# Patient Record
Sex: FEMALE | Race: WHITE | NOT HISPANIC OR LATINO | Employment: OTHER | URBAN - METROPOLITAN AREA
[De-identification: names, ages, dates, MRNs, and addresses within clinical notes are randomized per-mention and may not be internally consistent; named-entity substitution may affect disease eponyms.]

---

## 2017-01-03 ENCOUNTER — OFFICE VISIT (OUTPATIENT)
Dept: FAMILY MEDICINE | Facility: CLINIC | Age: 67
End: 2017-01-03
Payer: COMMERCIAL

## 2017-01-03 ENCOUNTER — HOSPITAL ENCOUNTER (OUTPATIENT)
Dept: RADIOLOGY | Facility: HOSPITAL | Age: 67
Discharge: HOME | End: 2017-01-03
Attending: FAMILY MEDICINE
Payer: MEDICARE

## 2017-01-03 ENCOUNTER — PATIENT OUTREACH (OUTPATIENT)
Dept: OTHER | Facility: OTHER | Age: 67
End: 2017-01-03
Payer: COMMERCIAL

## 2017-01-03 VITALS
HEIGHT: 62 IN | WEIGHT: 187.63 LBS | HEART RATE: 80 BPM | SYSTOLIC BLOOD PRESSURE: 126 MMHG | TEMPERATURE: 98 F | DIASTOLIC BLOOD PRESSURE: 70 MMHG | BODY MASS INDEX: 34.53 KG/M2

## 2017-01-03 DIAGNOSIS — E78.5 HYPERLIPIDEMIA, UNSPECIFIED HYPERLIPIDEMIA TYPE: ICD-10-CM

## 2017-01-03 DIAGNOSIS — F41.9 ANXIETY: ICD-10-CM

## 2017-01-03 DIAGNOSIS — K21.9 GASTROESOPHAGEAL REFLUX DISEASE WITHOUT ESOPHAGITIS: ICD-10-CM

## 2017-01-03 DIAGNOSIS — Z00.00 PREVENTATIVE HEALTH CARE: ICD-10-CM

## 2017-01-03 DIAGNOSIS — Z78.0 MENOPAUSE: ICD-10-CM

## 2017-01-03 DIAGNOSIS — I10 ESSENTIAL HYPERTENSION: Primary | ICD-10-CM

## 2017-01-03 PROCEDURE — 3074F SYST BP LT 130 MM HG: CPT | Mod: S$GLB,,, | Performed by: FAMILY MEDICINE

## 2017-01-03 PROCEDURE — 99999 PR PBB SHADOW E&M-EST. PATIENT-LVL III: CPT | Mod: PBBFAC,,, | Performed by: FAMILY MEDICINE

## 2017-01-03 PROCEDURE — 90471 IMMUNIZATION ADMIN: CPT | Mod: S$GLB,,, | Performed by: FAMILY MEDICINE

## 2017-01-03 PROCEDURE — 3078F DIAST BP <80 MM HG: CPT | Mod: S$GLB,,, | Performed by: FAMILY MEDICINE

## 2017-01-03 PROCEDURE — 77080 DXA BONE DENSITY AXIAL: CPT | Mod: 26,,, | Performed by: RADIOLOGY

## 2017-01-03 PROCEDURE — 77080 DXA BONE DENSITY AXIAL: CPT | Mod: TC,PO

## 2017-01-03 PROCEDURE — 90670 PCV13 VACCINE IM: CPT | Mod: S$GLB,,, | Performed by: FAMILY MEDICINE

## 2017-01-03 PROCEDURE — 1159F MED LIST DOCD IN RCRD: CPT | Mod: S$GLB,,, | Performed by: FAMILY MEDICINE

## 2017-01-03 PROCEDURE — 99214 OFFICE O/P EST MOD 30 MIN: CPT | Mod: 25,S$GLB,, | Performed by: FAMILY MEDICINE

## 2017-01-03 RX ORDER — CALC/MAG/B COMPLEX/D3/HERB 61
15 TABLET ORAL DAILY
Status: ON HOLD | COMMUNITY
End: 2017-07-13 | Stop reason: HOSPADM

## 2017-01-03 NOTE — MR AVS SNAPSHOT
[facility]  [address]  [facility] [address]  Phone: [phone]  Fax: [phone]                  Lucinda Aguilera   1/3/2017 8:00 AM   Office Visit    Description:  Female : 1950   Provider:  Saurabh Hassan MD   Department:  [facility]           Reason for Visit     Hyperlipidemia     Hypertension           Diagnoses this Visit        Comments    Essential hypertension    -  Primary     Hyperlipidemia, unspecified hyperlipidemia type         Anxiety         Gastroesophageal reflux disease without esophagitis         Preventative health care         Menopause                To Do List           Future Appointments        Provider Department Dept Phone    1/3/2017 9:20 AM [facility] DEXA1 [facility] [phone]    2017 10:00 AM Ritu Griffin MD [facility] - [phone]    2017 8:20 AM [facility] [place] [facility] [phone]    7/10/2017 9:00 AM Saurabh Hassan MD [facility] [phone]      Goals (5 Years of Data)              Today    17    Blood Pressure <= 140/90   126/70  145/73  156/81    Notes - Note created  2016 10:41 AM by Jaqueline Cage    It is important to consistently maintain a controlled blood pressure.      Exercise at least 150 minutes per week.           Maintain a low sodium diet           Take at least one BP reading per week at various times of the day           Weight (lb) < 79.4 kg (175 lb)   85.1 kg (187 lb 9.8 oz)        Notes - Note created  2016  9:36 AM by Jaqueline Cage    Decrease weight by 5% to reduce cardiovascular risk factors        Follow-Up and Disposition     Return in about 6 months (around 7/3/2017).      [facility]     [facility] -  Assistance  Registered nurses in the [facility] provide clinical advisement, health education, appointment booking, and other advisory  services.  Call for this free service at [phone].             Medications           Message regarding Medications     Verify the changes and/or additions to your medication regime listed below are the same as discussed with your clinician today.  If any of these changes or additions are incorrect, please notify your healthcare provider.        STOP taking these medications     omeprazole (PRILOSEC OTC) 20 MG tablet Take 20 mg by mouth. 1 Tablet, Delayed Release (E.C.) Oral Every day    docusate sodium (COLACE) 100 MG capsule Take 1 capsule (100 mg total) by mouth 2 (two) times daily as needed for Constipation.    promethazine (PHENERGAN) 12.5 MG Tab Take 1 tablet (12.5 mg total) by mouth every 6 (six) hours as needed (Nausea).           Verify that the below list of medications is an accurate representation of the medications you are currently taking.  If none reported, the list may be blank. If incorrect, please contact your healthcare provider. Carry this list with you in case of emergency.           Current Medications     alprazolam (XANAX) 0.5 MG tablet TAKE 1 TABLET BY MOUTH TWICE DAILY AS NEEDED    amlodipine (NORVASC) 5 MG tablet Take 1 tablet (5 mg total) by mouth every evening.    aspirin (ECOTRIN) 81 MG EC tablet Take 81 mg by mouth once daily.    carboxymethylcellulose (REFRESH PLUS) 0.5 % Dpet 1 drop 3 (three) times daily as needed.    coenzyme Q10 100 mg capsule Take 100 mg by mouth once daily.    fish oil-omega-3 fatty acids 300-1,000 mg capsule Take 2 g by mouth once daily.    hydrochlorothiazide (HYDRODIURIL) 25 MG tablet Take 1 tablet (25 mg total) by mouth every morning.    irbesartan (AVAPRO) 300 MG tablet Take 1 tablet (300 mg total) by mouth every evening.    lansoprazole (PREVACID) 15 MG capsule Take 15 mg by mouth once daily.    lansoprazole (PREVACID) 15 MG capsule Take 15 mg by mouth once daily.    multivitamin (MULTIVITAMIN) per tablet Take 1 tablet by mouth once daily.       "naproxen sodium (ALEVE) 220 mg Cap Take 220 mg by mouth 2 (two) times daily.    niacin 500 MG CpSR Take 500 mg by mouth every evening.      paroxetine (PAXIL) 10 MG tablet Take 1 tablet (10 mg total) by mouth every morning.    polycarbophil (FIBERCON) 625 mg tablet Take 625 mg by mouth once daily.    pravastatin (PRAVACHOL) 40 MG tablet Take 1 tablet (40 mg total) by mouth once daily.           Clinical Reference Information           Vital Signs - Last Recorded  Most recent update: 1/3/2017  8:17 AM by Angi Thompson MA    BP Pulse Temp Ht Wt LMP    126/70 80 97.6 °F (36.4 °C) (Oral) 5' 2" (1.575 m) 85.1 kg (187 lb 9.8 oz) 04/18/2004    BMI                34.31 kg/m2          Blood Pressure          Most Recent Value    BP  126/70      Allergies as of 1/3/2017     No Known Allergies      Immunizations Administered on Date of Encounter - 1/3/2017     Name Date Dose VIS Date Route    Pneumococcal Conjugate - 13 Valent  Incomplete 0.5 mL 11/5/2015 Intramuscular      Orders Placed During Today's Visit      Normal Orders This Visit    Pneumococcal Conjugate Vaccine (13 Valent) (IM)     Future Labs/Procedures Expected by Expires    DXA Bone Density Spine And Hip_Axial Skeleton  1/3/2017 1/3/2018    CBC auto differential  7/2/2017 3/4/2018    Comprehensive metabolic panel  7/2/2017 3/4/2018    Lipid panel  7/2/2017 1/3/2018      "

## 2017-01-03 NOTE — PROGRESS NOTES
Last 5 Patient Entered Readings                                                               Current 30 Day Average: 146/74     Recent Readings 1/2/2017 1/1/2017 12/31/2016 12/30/2016 12/29/2016    Systolic BP (mmHg) 145 156 140 141 142    Diastolic BP (mmHg) 73 81 66 70 77        Outpatient Hypertension Medications as of 1/3/2017             amlodipine (NORVASC) 5 MG tablet Take 1 tablet (5 mg total) by mouth every evening.    hydrochlorothiazide (HYDRODIURIL) 25 MG tablet Take 1 tablet (25 mg total) by mouth every morning.    irbesartan (AVAPRO) 300 MG tablet Take 1 tablet (300 mg total) by mouth every evening.        Plan:   Called patient to follow up. Per current 30 day average, BP is not well controlled. Patient was seen by PCP today, BP was 126/70mmHg.  Patient was instructed to lose 10lbs.   Patient denies having questions or concerns. Will continue to monitor. WCB in 1 month, sooner if BP begins to trend up or down.   If BP remains elevated, would like to increase amlodipine to 10mg.

## 2017-01-03 NOTE — PROGRESS NOTES
Subjective:       Patient ID: Lucinda Aguilera is a 66 y.o. female.    Chief Complaint: Hyperlipidemia (6 month follow up with labs prior: Pneum 13 today) and Hypertension    HPI Comments: Here today for 6 month f/u on chronic health issues.    HLD - tolerating Zocor 40mg daily  Anxiety - She is taking Paxil 10mg daily and feels fine. Taking Xanax 0.5mg 1 tablet BID.   HTN - tolerating Avapro 300mg daily, Norvasc 5mg and HCTZ 25mg daily with good control.  GERD - tolerating Prilosec 20mg daily    She did have angiogram in 2010.    Past Medical History:    Hypertension                                                  Anxiety                                           Hyperlipemia                                                  GERD (gastroesophageal reflux disease)                        Cataract                                                        Comment:OU    PVD (posterior vitreous detachment)                             Comment:OD    Arthritis                                                       Comment:right thumb    Past Surgical History:    chest abcess                                                     Comment:child    KNEE ARTHROSCOPY W/ LASER                                        Comment:right    COLONOSCOPY                                      1/2012          Comment:repeat in 5 years    No Known Allergies    Social History    Marital Status:              Spouse Name:                       Years of Education:                 Number of children: 2             Occupational History  Occupation          Employer            Comment               retired                                   retired teacher an*                         Social History Main Topics    Smoking Status: Never Smoker                      Smokeless Status: Never Used                        Alcohol Use: Yes                Comment: social    Drug Use: No              Sexual Activity: Yes               Partners with: Male        Birth Control/Protection: None, Post-menopausal    Current Outpatient Prescriptions on File Prior to Visit   Medication Sig Dispense Refill    alprazolam (XANAX) 0.5 MG tablet TAKE 1 TABLET BY MOUTH TWICE DAILY AS NEEDED 60 tablet 5    amlodipine (NORVASC) 5 MG tablet Take 1 tablet (5 mg total) by mouth every evening. 30 tablet 11    aspirin (ECOTRIN) 81 MG EC tablet Take 81 mg by mouth once daily.      carboxymethylcellulose (REFRESH PLUS) 0.5 % Dpet 1 drop 3 (three) times daily as needed.      coenzyme Q10 100 mg capsule Take 100 mg by mouth once daily.      fish oil-omega-3 fatty acids 300-1,000 mg capsule Take 2 g by mouth once daily.      hydrochlorothiazide (HYDRODIURIL) 25 MG tablet Take 1 tablet (25 mg total) by mouth every morning. 90 tablet 3    irbesartan (AVAPRO) 300 MG tablet Take 1 tablet (300 mg total) by mouth every evening. 90 tablet 3    lansoprazole (PREVACID) 15 MG capsule Take 15 mg by mouth once daily.      multivitamin (MULTIVITAMIN) per tablet Take 1 tablet by mouth once daily.        naproxen sodium (ALEVE) 220 mg Cap Take 220 mg by mouth 2 (two) times daily.      niacin 500 MG CpSR Take 500 mg by mouth every evening.        paroxetine (PAXIL) 10 MG tablet Take 1 tablet (10 mg total) by mouth every morning. 90 tablet 3    polycarbophil (FIBERCON) 625 mg tablet Take 625 mg by mouth once daily.      pravastatin (PRAVACHOL) 40 MG tablet Take 1 tablet (40 mg total) by mouth once daily. 90 tablet 3    [DISCONTINUED] docusate sodium (COLACE) 100 MG capsule Take 1 capsule (100 mg total) by mouth 2 (two) times daily as needed for Constipation. 60 capsule 1    [DISCONTINUED] omeprazole (PRILOSEC OTC) 20 MG tablet Take 20 mg by mouth. 1 Tablet, Delayed Release (E.C.) Oral Every day      [DISCONTINUED] promethazine (PHENERGAN) 12.5 MG Tab Take 1 tablet (12.5 mg total) by mouth every 6 (six) hours as needed (Nausea). 60 tablet 0     No current facility-administered medications on file  "prior to visit.      Review of patient's family history indicates:    Hypertension                   Mother                    Heart disease                  Mother                    Stroke                         Father                    Review of Systems   Constitutional: Negative for fever, chills, appetite change and unexpected weight change.   HENT: Negative for sore throat and trouble swallowing.    Eyes: Negative for pain and visual disturbance.   Respiratory: Negative for cough, shortness of breath and wheezing.    Cardiovascular: Negative for chest pain and palpitations.   Gastrointestinal: Negative for nausea, vomiting, abdominal pain, diarrhea and blood in stool.   Genitourinary: Negative for dysuria, hematuria and difficulty urinating.   Musculoskeletal: Negative for arthralgias, gait problem and neck pain.   Skin: Negative for rash and wound.   Neurological: Negative for dizziness, weakness, numbness and headaches.   Hematological: Negative for adenopathy.   Psychiatric/Behavioral: Negative for dysphoric mood.         Objective:       Visit Vitals    /70    Pulse 80    Temp 97.6 °F (36.4 °C) (Oral)    Ht 5' 2" (1.575 m)    Wt 85.1 kg (187 lb 9.8 oz)    LMP 04/18/2004    BMI 34.31 kg/m2       Physical Exam   Constitutional: She is oriented to person, place, and time. She appears well-developed and well-nourished.   HENT:   Head: Normocephalic.   Mouth/Throat: Oropharynx is clear and moist. No oropharyngeal exudate or posterior oropharyngeal erythema.   Eyes: Conjunctivae and EOM are normal. Pupils are equal, round, and reactive to light.   Neck: Normal range of motion. Neck supple. No thyromegaly present.   Cardiovascular: Normal rate, regular rhythm, S1 normal, S2 normal, normal heart sounds and intact distal pulses.  Exam reveals no gallop and no friction rub.    No murmur heard.  Pulmonary/Chest: Effort normal and breath sounds normal. She has no wheezes. She has no rales.   Abdominal: " Normal appearance.   Musculoskeletal:        Right lower leg: She exhibits no edema.        Left lower leg: She exhibits no edema.   Lymphadenopathy:     She has no cervical adenopathy.   Neurological: She is alert and oriented to person, place, and time. No cranial nerve deficit. Gait normal.   Skin: Skin is warm and intact. No rash noted.   Psychiatric: She has a normal mood and affect.       Results for orders placed or performed in visit on 07/26/16   Comprehensive metabolic panel   Result Value Ref Range    Sodium 132 (L) 136 - 145 mmol/L    Potassium 4.6 3.5 - 5.1 mmol/L    Chloride 100 95 - 110 mmol/L    CO2 25 23 - 29 mmol/L    Glucose 98 70 - 110 mg/dL    BUN, Bld 18 8 - 23 mg/dL    Creatinine 0.7 0.5 - 1.4 mg/dL    Calcium 9.2 8.7 - 10.5 mg/dL    Total Protein 7.2 6.0 - 8.4 g/dL    Albumin 4.0 3.5 - 5.2 g/dL    Total Bilirubin 0.3 0.1 - 1.0 mg/dL    Alkaline Phosphatase 123 55 - 135 U/L    AST 24 10 - 40 U/L    ALT 16 10 - 44 U/L    Anion Gap 7 (L) 8 - 16 mmol/L    eGFR if African American >60.0 >60 mL/min/1.73 m^2    eGFR if non African American >60.0 >60 mL/min/1.73 m^2   Lipid panel   Result Value Ref Range    Cholesterol 184 120 - 199 mg/dL    Triglycerides 101 30 - 150 mg/dL    HDL 56 40 - 75 mg/dL    LDL Cholesterol 107.8 63.0 - 159.0 mg/dL    HDL/Chol Ratio 30.4 20.0 - 50.0 %    Total Cholesterol/HDL Ratio 3.3 2.0 - 5.0    Non-HDL Cholesterol 128 mg/dL   Hepatitis C antibody   Result Value Ref Range    Hepatitis C Ab Negative      Assessment:       1. Essential hypertension    2. Hyperlipidemia, unspecified hyperlipidemia type    3. Anxiety    4. Gastroesophageal reflux disease without esophagitis    5. Preventative health care    6. Menopause        Plan:       Essential hypertension  -     CBC auto differential; Future; Expected date: 7/2/17    Hyperlipidemia, unspecified hyperlipidemia type  -     Comprehensive metabolic panel; Future; Expected date: 7/2/17  -     Lipid panel; Future; Expected  date: 7/2/17    Anxiety    Gastroesophageal reflux disease without esophagitis    Preventative health care  -     Pneumococcal Conjugate Vaccine (13 Valent) (IM)    Menopause  -     DXA Bone Density Spine And Hip_Axial Skeleton; Future; Expected date: 1/3/17            cotninue aspirin 81mg daily  Continue present meds  Counseled on regular exercise, maintenance of a healthy weight, balanced diet rich in fruits/vegetables and lean protein, and avoidance of unhealthy habits like smoking and excessive alcohol intake.  F/u 6 months with labs

## 2017-01-12 ENCOUNTER — OFFICE VISIT (OUTPATIENT)
Dept: OBSTETRICS AND GYNECOLOGY | Facility: CLINIC | Age: 67
End: 2017-01-12
Payer: MEDICARE

## 2017-01-12 VITALS
SYSTOLIC BLOOD PRESSURE: 134 MMHG | WEIGHT: 187.38 LBS | DIASTOLIC BLOOD PRESSURE: 86 MMHG | BODY MASS INDEX: 34.27 KG/M2

## 2017-01-12 DIAGNOSIS — D25.1 INTRAMURAL LEIOMYOMA OF UTERUS: ICD-10-CM

## 2017-01-12 DIAGNOSIS — Z01.419 ENCOUNTER FOR GYNECOLOGICAL EXAMINATION WITHOUT ABNORMAL FINDING: Primary | ICD-10-CM

## 2017-01-12 DIAGNOSIS — Z12.4 CERVICAL CANCER SCREENING: ICD-10-CM

## 2017-01-12 DIAGNOSIS — Z11.51 SCREENING FOR HPV (HUMAN PAPILLOMAVIRUS): ICD-10-CM

## 2017-01-12 PROCEDURE — 99213 OFFICE O/P EST LOW 20 MIN: CPT | Mod: PBBFAC,PO | Performed by: OBSTETRICS & GYNECOLOGY

## 2017-01-12 PROCEDURE — G0101 CA SCREEN;PELVIC/BREAST EXAM: HCPCS | Mod: S$PBB,,, | Performed by: OBSTETRICS & GYNECOLOGY

## 2017-01-12 PROCEDURE — G0101 CA SCREEN;PELVIC/BREAST EXAM: HCPCS | Mod: PBBFAC,PO | Performed by: OBSTETRICS & GYNECOLOGY

## 2017-01-12 PROCEDURE — 88175 CYTOPATH C/V AUTO FLUID REDO: CPT

## 2017-01-12 PROCEDURE — 87624 HPV HI-RISK TYP POOLED RSLT: CPT

## 2017-01-12 PROCEDURE — 99999 PR PBB SHADOW E&M-EST. PATIENT-LVL III: CPT | Mod: PBBFAC,,, | Performed by: OBSTETRICS & GYNECOLOGY

## 2017-01-12 NOTE — MR AVS SNAPSHOT
[facility] - OB/GYN  [address] [facility] [address]  Phone: [phone]                  Lucinda DRAKE Ale   2017 10:00 AM   Office Visit    Description:  Female : 1950   Provider:  Ritu Griffin MD   Department:  [facility] - OB/GYN           Reason for Visit     Well Woman     Fibroids           Diagnoses this Visit        Comments    Cervical cancer screening    -  Primary     Screening for HPV (human papillomavirus)                To Do List           Future Appointments        Provider Department Dept Phone    2017 10:00 AM Ritu Griffin MD [facility] [phone]    2017 8:30 AM ZACHARY Rodrigues [facility] [place] - Optometry [phone]    2017 8:20 AM [facility], [place] [facility] [phone]    7/10/2017 9:00 AM Saurabh Hassan MD [facility] [phone]      Goals (5 Years of Data)              Today    1/11/17    1/10/17    Blood Pressure <= 140/90   134/86  147/74  166/84    Notes - Note created  2016 10:41 AM by Jaqueline Cage    It is important to consistently maintain a controlled blood pressure.      Exercise at least 150 minutes per week.           Maintain a low sodium diet           Take at least one BP reading per week at various times of the day           Weight (lb) < 79.4 kg (175 lb)   85 kg (187 lb 6.3 oz)        Notes - Note created  2016  9:36 AM by Jaqueline Cage    Decrease weight by 5% to reduce cardiovascular risk factors        [facility]     [facility] -  Assistance  Registered nurses in the [facility] provide clinical advisement, health education, appointment booking, and other advisory services.  Call for this free service at [phone].             Medications           Message regarding Medications     Verify the changes and/or additions to your medication regime listed below are the same as discussed with your  clinician today.  If any of these changes or additions are incorrect, please notify your healthcare provider.             Verify that the below list of medications is an accurate representation of the medications you are currently taking.  If none reported, the list may be blank. If incorrect, please contact your healthcare provider. Carry this list with you in case of emergency.           Current Medications     alprazolam (XANAX) 0.5 MG tablet TAKE 1 TABLET BY MOUTH TWICE DAILY AS NEEDED    amlodipine (NORVASC) 5 MG tablet Take 1 tablet (5 mg total) by mouth every evening.    aspirin (ECOTRIN) 81 MG EC tablet Take 81 mg by mouth once daily.    carboxymethylcellulose (REFRESH PLUS) 0.5 % Dpet 1 drop 3 (three) times daily as needed.    coenzyme Q10 100 mg capsule Take 100 mg by mouth once daily.    fish oil-omega-3 fatty acids 300-1,000 mg capsule Take 2 g by mouth once daily.    hydrochlorothiazide (HYDRODIURIL) 25 MG tablet Take 1 tablet (25 mg total) by mouth every morning.    irbesartan (AVAPRO) 300 MG tablet Take 1 tablet (300 mg total) by mouth every evening.    lansoprazole (PREVACID) 15 MG capsule Take 15 mg by mouth once daily.    lansoprazole (PREVACID) 15 MG capsule Take 15 mg by mouth once daily.    niacin 500 MG CpSR Take 500 mg by mouth every evening.      paroxetine (PAXIL) 10 MG tablet Take 1 tablet (10 mg total) by mouth every morning.    polycarbophil (FIBERCON) 625 mg tablet Take 625 mg by mouth once daily.    pravastatin (PRAVACHOL) 40 MG tablet Take 1 tablet (40 mg total) by mouth once daily.    multivitamin (MULTIVITAMIN) per tablet Take 1 tablet by mouth once daily.      naproxen sodium (ALEVE) 220 mg Cap Take 220 mg by mouth 2 (two) times daily.           Clinical Reference Information           Vital Signs - Last Recorded  Most recent update: 1/12/2017  9:36 AM by Mayra Brown LPN    BP Wt LMP BMI       134/86 85 kg (187 lb 6.3 oz) 04/18/2004 34.27 kg/m2       Blood Pressure           Most Recent Value    BP  134/86      Allergies as of 1/12/2017     No Known Allergies      Immunizations Administered on Date of Encounter - 1/12/2017     None      Orders Placed During Today's Visit      Normal Orders This Visit    HPV DNA probe, amplified     Liquid-based pap smear, screening

## 2017-01-12 NOTE — PROGRESS NOTES
Chief Complaint   Patient presents with    Well Woman     Mammogram complete    Fibroids       History of Present Illness: Lucinda Aguilera is a 66 y.o. female that presents today 1/12/2017 for well gyn visit.    Past Medical History   Diagnosis Date    Anxiety      takes daily Xanax    Arthritis      right thumb    Cataract      OU    GERD (gastroesophageal reflux disease)     Hyperlipemia     Hypertension     PVD (posterior vitreous detachment)      OD       Past Surgical History   Procedure Laterality Date    Chest abcess       child    Knee arthroscopy w/ laser       right    Colonoscopy  1/2012     repeat in 5 years    Thumb surgery  11/2014    Knee surgery Right 06/03/2016     Total knee replacement       Current Outpatient Prescriptions   Medication Sig Dispense Refill    alprazolam (XANAX) 0.5 MG tablet TAKE 1 TABLET BY MOUTH TWICE DAILY AS NEEDED 60 tablet 5    amlodipine (NORVASC) 5 MG tablet Take 1 tablet (5 mg total) by mouth every evening. 30 tablet 11    aspirin (ECOTRIN) 81 MG EC tablet Take 81 mg by mouth once daily.      carboxymethylcellulose (REFRESH PLUS) 0.5 % Dpet 1 drop 3 (three) times daily as needed.      coenzyme Q10 100 mg capsule Take 100 mg by mouth once daily.      fish oil-omega-3 fatty acids 300-1,000 mg capsule Take 2 g by mouth once daily.      hydrochlorothiazide (HYDRODIURIL) 25 MG tablet Take 1 tablet (25 mg total) by mouth every morning. 90 tablet 3    irbesartan (AVAPRO) 300 MG tablet Take 1 tablet (300 mg total) by mouth every evening. 90 tablet 3    lansoprazole (PREVACID) 15 MG capsule Take 15 mg by mouth once daily.      lansoprazole (PREVACID) 15 MG capsule Take 15 mg by mouth once daily.      niacin 500 MG CpSR Take 500 mg by mouth every evening.        paroxetine (PAXIL) 10 MG tablet Take 1 tablet (10 mg total) by mouth every morning. 90 tablet 3    polycarbophil (FIBERCON) 625 mg tablet Take 625 mg by mouth once daily.      pravastatin  (PRAVACHOL) 40 MG tablet Take 1 tablet (40 mg total) by mouth once daily. 90 tablet 3    multivitamin (MULTIVITAMIN) per tablet Take 1 tablet by mouth once daily.        naproxen sodium (ALEVE) 220 mg Cap Take 220 mg by mouth 2 (two) times daily.       No current facility-administered medications for this visit.        Review of patient's allergies indicates:  No Known Allergies    Family History   Problem Relation Age of Onset    Hypertension Mother     Heart disease Mother     Stroke Father     Glaucoma Sister     Cataracts Sister     Breast cancer Neg Hx        Social History     Social History    Marital status:      Spouse name: N/A    Number of children: 2    Years of education: N/A     Occupational History    retired     retired teacher and principal      Social History Main Topics    Smoking status: Never Smoker    Smokeless tobacco: Never Used    Alcohol use Yes      Comment: social    Drug use: No    Sexual activity: Yes     Partners: Male     Birth control/ protection: None, Post-menopausal     Other Topics Concern    None     Social History Narrative       OB History    Para Term  AB SAB TAB Ectopic Multiple Living   3 2 2  1 1    2      # Outcome Date GA Lbr Gagan/2nd Weight Sex Delivery Anes PTL Lv   3 Term    3.175 kg (7 lb) F Vag-Spont EPI N Y   2 SAB            1 Term    2.268 kg (5 lb) F Vag-Spont EPI N Y          Review of Symptoms:  GENERAL: Denies weight gain or weight loss. Feeling well overall.   SKIN: Denies rash or lesions.   HEAD: Denies head injury or headache.   NODES: Denies enlarged lymph nodes.   CHEST: Denies chest pain or shortness of breath.   CARDIOVASCULAR: Denies palpitations or left sided chest pain.   ABDOMEN: No abdominal pain, constipation, diarrhea, nausea, vomiting or rectal bleeding.   URINARY: No frequency, dysuria, hematuria, or burning on urination.  HEMATOLOGIC: No easy bruisability or excessive bleeding.    MUSCULOSKELETAL: Denies joint pain or swelling.     Visit Vitals    /86    Wt 85 kg (187 lb 6.3 oz)    LMP 04/18/2004     Physical Exam:  APPEARANCE: Well nourished, well developed, in no acute distress.  SKIN: Normal skin turgor, no lesions.  NECK: Neck symmetric without masses   RESPIRATORY: Normal respiratory effort with no retractions or use of accessory muscles  CARDIOVASCULAR: Peripheral vascular system with no swelling no varicosities and palpation of pulses normal  LYMPHATIC: No enlargements of the lymph nodes noted in the neck, axillae, or groin  ABDOMEN: Soft. No tenderness or masses. No hepatosplenomegaly. No hernias.  BREASTS: Symmetrical, no skin changes or visible lesions. No palpable masses, nipple discharge or adenopathy bilaterally.  PELVIC: Normal external female genitalia without lesions. Normal hair distribution. Adequate perineal body, normal urethral meatus. Urethra with no masses.  Bladder nontender. Vagina moist and well rugated without lesions or discharge. Cervix pink and without lesions. No significant cystocele or rectocele. Bimanual exam showed uterus normal size, shape, position, mobile and nontender. Adnexa without masses or tenderness. Urethra and bladder normal. PAP DONE  EXTREMITIES: No clubbing cyanosis or edema.    ASSESSMENT/PLAN:  Encounter for gynecological examination without abnormal finding    Cervical cancer screening  -     Liquid-based pap smear, screening    Screening for HPV (human papillomavirus)  -     HPV DNA probe, amplified    Intramural leiomyoma of uterus          Patient was counseled today on Pap guidelines, recommendation for pelvic exams, mammograms every other year after the age of 40 and annually after the age of 50, Colonoscopy after the age of 50, Dexa Bone Scan and calcium and vitamin D supplementation in menopause and to see her PCP for other health maintenance.   FOLLOW-UP:prn

## 2017-01-13 ENCOUNTER — PATIENT MESSAGE (OUTPATIENT)
Dept: OTHER | Facility: OTHER | Age: 67
End: 2017-01-13

## 2017-01-19 ENCOUNTER — PATIENT OUTREACH (OUTPATIENT)
Dept: OTHER | Facility: OTHER | Age: 67
End: 2017-01-19
Payer: COMMERCIAL

## 2017-01-19 NOTE — PROGRESS NOTES
Last 5 Patient Entered Readings                                                               Current 30 Day Average: 149/74     Recent Readings 1/18/2017 1/17/2017 1/16/2017 1/15/2017 1/14/2017    Systolic BP (mmHg) 138 149 142 145 148    Diastolic BP (mmHg) 67 73 78 72 77    Pulse 82 93 82 91 99        Follow up with Mrs. Lucinda Aguilera completed. Ms. Aguilera is doing well. Patient is maintaining a low sodium diet and doing some walking. Patient did not have any further questions or concerns. I will follow up in a few weeks to see how she is doing and progressing.

## 2017-01-20 LAB — HUMAN PAPILLOMAVIRUS (HPV): NOT DETECTED

## 2017-01-29 ENCOUNTER — PATIENT MESSAGE (OUTPATIENT)
Dept: OTHER | Facility: OTHER | Age: 67
End: 2017-01-29

## 2017-01-31 ENCOUNTER — PATIENT OUTREACH (OUTPATIENT)
Dept: OTHER | Facility: OTHER | Age: 67
End: 2017-01-31
Payer: COMMERCIAL

## 2017-01-31 DIAGNOSIS — I10 ESSENTIAL HYPERTENSION: Primary | ICD-10-CM

## 2017-01-31 RX ORDER — CHLORTHALIDONE 25 MG/1
25 TABLET ORAL EVERY MORNING
Qty: 30 TABLET | Refills: 11 | Status: SHIPPED | OUTPATIENT
Start: 2017-01-31 | End: 2018-01-31 | Stop reason: SDUPTHER

## 2017-01-31 NOTE — PROGRESS NOTES
Last 5 Patient Entered Readings                                                               Current 30 Day Average: 148/74     Recent Readings 1/30/2017 1/29/2017 1/29/2017 1/28/2017 1/27/2017    Systolic BP (mmHg) 141 176 178 139 145    Diastolic BP (mmHg) 73 86 82 76 76    Pulse 69 76 89 81 82        Outpatient Hypertension Medications as of 1/31/2017             amlodipine (NORVASC) 5 MG tablet Take 1 tablet (5 mg total) by mouth every evening.    hydrochlorothiazide (HYDRODIURIL) 25 MG tablet Take 1 tablet (25 mg total) by mouth every morning.    irbesartan (AVAPRO) 300 MG tablet Take 1 tablet (300 mg total) by mouth every evening.        Plan:   Called patient to follow up. Per current 30 day average, BP is not well controlled. Will change HCTZ to chlorthalidone, patient confirms understanding.  Will schedule CMP.    Patient denies having questions or concerns. Will continue to monitor. WCB in 2 weeks.  If BP remains uncontrolled, will increase amlodipine to 10mg.

## 2017-02-01 ENCOUNTER — PATIENT MESSAGE (OUTPATIENT)
Dept: OTHER | Facility: OTHER | Age: 67
End: 2017-02-01

## 2017-02-02 ENCOUNTER — PATIENT MESSAGE (OUTPATIENT)
Dept: OTHER | Facility: OTHER | Age: 67
End: 2017-02-02

## 2017-02-08 ENCOUNTER — PATIENT OUTREACH (OUTPATIENT)
Dept: OTHER | Facility: OTHER | Age: 67
End: 2017-02-08
Payer: COMMERCIAL

## 2017-02-08 NOTE — PROGRESS NOTES
Last 5 Patient Entered Readings                                                               Current 30 Day Average: 148/75     Recent Readings 2/7/2017 2/6/2017 2/5/2017 2/4/2017 2/3/2017    Systolic BP (mmHg) 141 144 157 156 161    Diastolic BP (mmHg) 77 75 73 72 81    Pulse 85 88 82 79 76        Follow up with Mrs. Lucinda Aguilera completed. Mrs. Aguilera is doing well. She is finally starting to see a difference in her blood pressure after her last medication change. She is trying to lose 10 pounds to help lower her blood pressure. Patient is maintaining a low sodium diet, eliminated sweets, and drinking plenty of water. 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 She also has a bike petal that she uses at home when she is on the couch. Patient did not have any further questions or concerns. I will follow up in a few weeks to see how she is doing and progressing.

## 2017-02-09 ENCOUNTER — PATIENT MESSAGE (OUTPATIENT)
Dept: OTHER | Facility: OTHER | Age: 67
End: 2017-02-09

## 2017-02-10 ENCOUNTER — LAB VISIT (OUTPATIENT)
Dept: LAB | Facility: HOSPITAL | Age: 67
End: 2017-02-10
Attending: FAMILY MEDICINE
Payer: COMMERCIAL

## 2017-02-10 ENCOUNTER — PATIENT OUTREACH (OUTPATIENT)
Dept: OTHER | Facility: OTHER | Age: 67
End: 2017-02-10
Payer: COMMERCIAL

## 2017-02-10 DIAGNOSIS — I10 ESSENTIAL HYPERTENSION: ICD-10-CM

## 2017-02-10 LAB
ALBUMIN SERPL BCP-MCNC: 4 G/DL
ALP SERPL-CCNC: 111 U/L
ALT SERPL W/O P-5'-P-CCNC: 24 U/L
ANION GAP SERPL CALC-SCNC: 7 MMOL/L
AST SERPL-CCNC: 35 U/L
BILIRUB SERPL-MCNC: 0.3 MG/DL
BUN SERPL-MCNC: 24 MG/DL
CALCIUM SERPL-MCNC: 9.3 MG/DL
CHLORIDE SERPL-SCNC: 99 MMOL/L
CO2 SERPL-SCNC: 28 MMOL/L
CREAT SERPL-MCNC: 0.8 MG/DL
EST. GFR  (AFRICAN AMERICAN): >60 ML/MIN/1.73 M^2
EST. GFR  (NON AFRICAN AMERICAN): >60 ML/MIN/1.73 M^2
GLUCOSE SERPL-MCNC: 111 MG/DL
POTASSIUM SERPL-SCNC: 3.6 MMOL/L
PROT SERPL-MCNC: 7.7 G/DL
SODIUM SERPL-SCNC: 134 MMOL/L

## 2017-02-10 PROCEDURE — 36415 COLL VENOUS BLD VENIPUNCTURE: CPT | Mod: PO

## 2017-02-10 PROCEDURE — 80053 COMPREHEN METABOLIC PANEL: CPT

## 2017-02-10 NOTE — PROGRESS NOTES
Last 5 Patient Entered Readings                                                               Current 30 Day Average: 147/75     Recent Readings 2/9/2017 2/8/2017 2/7/2017 2/6/2017 2/5/2017    Systolic BP (mmHg) 154 148 141 144 157    Diastolic BP (mmHg) 66 79 77 75 73    Pulse 73 81 85 88 82        Hypertension Medications             amlodipine (NORVASC) 5 MG tablet Take 1 tablet (5 mg total) by mouth every evening.    chlorthalidone (HYGROTEN) 25 MG Tab Take 1 tablet (25 mg total) by mouth every morning. Stop HCTZ.    irbesartan (AVAPRO) 300 MG tablet Take 1 tablet (300 mg total) by mouth every evening.        Plan:   Called patient to follow up per HC's request. Patient states the past 2 nights her ankles have been swollen. Encouraged patient to elevate her legs when possible.  Instructed patient to call or write me if swelling remains or worsens over the next several days, patient confirms understanding.    Patient confirms she is currently taking #2 naproxen 220mg tablets qam for knee and hip pain/ arthritis.  Patient believes she could decrease to 1 tablet. Explained that daily use of NSAIDs can prevent the efficacy of chlorthalidone.  Daily naproxen use is likely the reason we are not seeing the desired effect from chlorthalidone.     Per current 30 day average, BP is not well controlled. Will continue to monitor. WCB in 1 week.    If swelling remains, will decrease or discontinue amlodipine and start coreg.

## 2017-02-13 ENCOUNTER — PATIENT MESSAGE (OUTPATIENT)
Dept: OTHER | Facility: OTHER | Age: 67
End: 2017-02-13

## 2017-02-17 ENCOUNTER — PATIENT MESSAGE (OUTPATIENT)
Dept: FAMILY MEDICINE | Facility: CLINIC | Age: 67
End: 2017-02-17

## 2017-02-17 ENCOUNTER — PATIENT OUTREACH (OUTPATIENT)
Dept: OTHER | Facility: OTHER | Age: 67
End: 2017-02-17
Payer: COMMERCIAL

## 2017-02-17 NOTE — PROGRESS NOTES
Last 5 Patient Entered Readings                                                               Current 30 Day Average: 147/74     Recent Readings 2/16/2017 2/15/2017 2/14/2017 2/13/2017 2/12/2017    Systolic BP (mmHg) 160 155 158 140 128    Diastolic BP (mmHg) 74 73 79 78 67    Pulse 84 88 85 80 78        Hypertension Medications             amlodipine (NORVASC) 5 MG tablet Take 1 tablet (5 mg total) by mouth every evening.    chlorthalidone (HYGROTEN) 25 MG Tab Take 1 tablet (25 mg total) by mouth every morning. Stop HCTZ.    irbesartan (AVAPRO) 300 MG tablet Take 1 tablet (300 mg total) by mouth every evening.        Plan:   Called patient to follow up. Per current 30 day average, BP is uncontrolled. Patient believes cuff maybe giving falsely elevated readings. Recommended patient make an appt to have her BP checked manually so we can compare against digital readings. Patient also has another cuff at home, states always gives lower readings than digital cuff.  Will email instructions on how to manually enter.   Patient denies having questions or concerns. Will continue to monitor. WCB in 2 weeks.

## 2017-02-20 ENCOUNTER — PATIENT MESSAGE (OUTPATIENT)
Dept: OTHER | Facility: OTHER | Age: 67
End: 2017-02-20

## 2017-02-21 ENCOUNTER — PATIENT MESSAGE (OUTPATIENT)
Dept: FAMILY MEDICINE | Facility: CLINIC | Age: 67
End: 2017-02-21

## 2017-02-21 NOTE — TELEPHONE ENCOUNTER
I spoke with Ms. Aguilera and explained what happened yesterday about us not coming to get her BP after she checked in. She was very understanding. I offered her another appointment and she asked if her daughter, who is a nurse, could take her BP. I told her that would be great and she can send the readings to us in a e-mail. We would send the results to Dr. Hassan for review.

## 2017-02-24 DIAGNOSIS — I10 ESSENTIAL HYPERTENSION: ICD-10-CM

## 2017-02-24 RX ORDER — PROMETHAZINE HYDROCHLORIDE 12.5 MG/1
TABLET ORAL
Qty: 60 TABLET | Refills: 0 | OUTPATIENT
Start: 2017-02-24

## 2017-02-24 RX ORDER — CIPROFLOXACIN 500 MG/1
TABLET ORAL
Qty: 10 TABLET | Refills: 0 | Status: SHIPPED | OUTPATIENT
Start: 2017-02-24 | End: 2017-04-25 | Stop reason: ALTCHOICE

## 2017-02-24 RX ORDER — IRBESARTAN 150 MG/1
TABLET ORAL
Qty: 90 TABLET | Refills: 3 | Status: SHIPPED | OUTPATIENT
Start: 2017-02-24 | End: 2017-03-03 | Stop reason: DRUGHIGH

## 2017-03-02 ENCOUNTER — PATIENT OUTREACH (OUTPATIENT)
Dept: OTHER | Facility: OTHER | Age: 67
End: 2017-03-02
Payer: COMMERCIAL

## 2017-03-02 NOTE — PROGRESS NOTES
Last 5 Patient Entered Readings                                                               Current 30 Day Average: 146/74     Recent Readings 3/1/2017 2/28/2017 2/27/2017 2/25/2017 2/24/2017    Systolic BP (mmHg) 140 141 140 126 135    Diastolic BP (mmHg) 71 80 83 68 77    Pulse 81 86 86 94 76        Follow up with Mrs. Lucinda Aguilera completed. Patient is maintaining a low sodium diet and continuing her exercise regimen. She is in [place] this week. Patient did not have any further questions or concerns. I will follow up in a few weeks to see how she is doing and progressing.

## 2017-03-03 ENCOUNTER — PATIENT OUTREACH (OUTPATIENT)
Dept: OTHER | Facility: OTHER | Age: 67
End: 2017-03-03
Payer: COMMERCIAL

## 2017-03-03 DIAGNOSIS — I10 ESSENTIAL HYPERTENSION: ICD-10-CM

## 2017-03-03 RX ORDER — AMLODIPINE BESYLATE 10 MG/1
10 TABLET ORAL NIGHTLY
Qty: 90 TABLET | Refills: 3 | Status: SHIPPED | OUTPATIENT
Start: 2017-03-03 | End: 2017-04-20

## 2017-03-03 RX ORDER — IRBESARTAN 300 MG/1
300 TABLET ORAL NIGHTLY
Qty: 90 TABLET | Refills: 3 | Status: SHIPPED | OUTPATIENT
Start: 2017-03-03 | End: 2018-06-05 | Stop reason: SDUPTHER

## 2017-03-03 NOTE — PROGRESS NOTES
Last 5 Patient Entered Readings                                                               Current 30 Day Average: 146/74     Recent Readings 3/2/2017 3/1/2017 2/28/2017 2/27/2017 2/25/2017    Systolic BP (mmHg) 155 140 141 140 126    Diastolic BP (mmHg) 73 71 80 83 68    Pulse 75 81 86 86 94        Hypertension Medications             amlodipine (NORVASC) 5 MG tablet Take 1 tablet (5 mg total) by mouth every evening.    chlorthalidone (HYGROTEN) 25 MG Tab Take 1 tablet (25 mg total) by mouth every morning. Stop HCTZ.    irbesartan (AVAPRO) 300 MG tablet Take 1 tablet (300 mg total) by mouth every evening.        Plan:   Called patient to follow up. Per current 30 day average, BP is not well controlled. Instructed patient to increase amlodipine to 10mg, patient confirms understanding. Patient denies having questions or concerns. Will continue to monitor. WCB in 2 weeks.

## 2017-03-04 DIAGNOSIS — F41.9 ANXIETY: ICD-10-CM

## 2017-03-04 RX ORDER — ALPRAZOLAM 0.5 MG/1
TABLET ORAL
Qty: 60 TABLET | Refills: 5 | Status: SHIPPED | OUTPATIENT
Start: 2017-03-04 | End: 2017-09-14 | Stop reason: SDUPTHER

## 2017-03-06 ENCOUNTER — PATIENT MESSAGE (OUTPATIENT)
Dept: ORTHOPEDICS | Facility: CLINIC | Age: 67
End: 2017-03-06

## 2017-03-08 ENCOUNTER — PATIENT MESSAGE (OUTPATIENT)
Dept: ORTHOPEDICS | Facility: CLINIC | Age: 67
End: 2017-03-08

## 2017-03-10 DIAGNOSIS — F41.9 ANXIETY: ICD-10-CM

## 2017-03-10 RX ORDER — PAROXETINE 10 MG/1
TABLET, FILM COATED ORAL
Qty: 90 TABLET | Refills: 3 | Status: SHIPPED | OUTPATIENT
Start: 2017-03-10 | End: 2017-05-23 | Stop reason: SDUPTHER

## 2017-03-15 ENCOUNTER — OFFICE VISIT (OUTPATIENT)
Dept: ORTHOPEDICS | Facility: CLINIC | Age: 67
End: 2017-03-15
Payer: MEDICARE

## 2017-03-15 ENCOUNTER — HOSPITAL ENCOUNTER (OUTPATIENT)
Dept: RADIOLOGY | Facility: HOSPITAL | Age: 67
Discharge: HOME | End: 2017-03-15
Attending: NURSE PRACTITIONER
Payer: MEDICARE

## 2017-03-15 VITALS — BODY MASS INDEX: 34.1 KG/M2 | WEIGHT: 185.31 LBS | HEIGHT: 62 IN

## 2017-03-15 DIAGNOSIS — M25.551 BILATERAL HIP PAIN: ICD-10-CM

## 2017-03-15 DIAGNOSIS — M25.552 BILATERAL HIP PAIN: ICD-10-CM

## 2017-03-15 DIAGNOSIS — M25.562 LEFT KNEE PAIN, UNSPECIFIED CHRONICITY: Primary | ICD-10-CM

## 2017-03-15 DIAGNOSIS — M25.562 LEFT KNEE PAIN, UNSPECIFIED CHRONICITY: ICD-10-CM

## 2017-03-15 PROCEDURE — 73521 X-RAY EXAM HIPS BI 2 VIEWS: CPT | Mod: 26,,, | Performed by: RADIOLOGY

## 2017-03-15 PROCEDURE — 73521 X-RAY EXAM HIPS BI 2 VIEWS: CPT | Mod: TC

## 2017-03-15 PROCEDURE — 99213 OFFICE O/P EST LOW 20 MIN: CPT | Mod: S$PBB,,, | Performed by: NURSE PRACTITIONER

## 2017-03-15 PROCEDURE — 73562 X-RAY EXAM OF KNEE 3: CPT | Mod: 26,LT,, | Performed by: RADIOLOGY

## 2017-03-15 PROCEDURE — 73560 X-RAY EXAM OF KNEE 1 OR 2: CPT | Mod: TC,59,RT

## 2017-03-15 PROCEDURE — 99999 PR PBB SHADOW E&M-EST. PATIENT-LVL IV: CPT | Mod: PBBFAC,,, | Performed by: NURSE PRACTITIONER

## 2017-03-15 PROCEDURE — 73560 X-RAY EXAM OF KNEE 1 OR 2: CPT | Mod: 26,59,RT, | Performed by: RADIOLOGY

## 2017-03-15 RX ORDER — METHYLPREDNISOLONE 4 MG/1
TABLET ORAL
Qty: 1 PACKAGE | Refills: 0 | Status: SHIPPED | OUTPATIENT
Start: 2017-03-15 | End: 2017-04-05

## 2017-03-16 ENCOUNTER — HOSPITAL ENCOUNTER (OUTPATIENT)
Dept: RADIOLOGY | Facility: HOSPITAL | Age: 67
Discharge: HOME | End: 2017-03-16
Attending: NURSE PRACTITIONER
Payer: MEDICARE

## 2017-03-16 DIAGNOSIS — M25.562 LEFT KNEE PAIN, UNSPECIFIED CHRONICITY: ICD-10-CM

## 2017-03-16 PROCEDURE — 73721 MRI JNT OF LWR EXTRE W/O DYE: CPT | Mod: TC,PO,LT

## 2017-03-16 PROCEDURE — 73721 MRI JNT OF LWR EXTRE W/O DYE: CPT | Mod: 26,LT,, | Performed by: RADIOLOGY

## 2017-03-17 ENCOUNTER — PATIENT OUTREACH (OUTPATIENT)
Dept: OTHER | Facility: OTHER | Age: 67
End: 2017-03-17
Payer: COMMERCIAL

## 2017-03-17 ENCOUNTER — OFFICE VISIT (OUTPATIENT)
Dept: OPTOMETRY | Facility: CLINIC | Age: 67
End: 2017-03-17
Payer: MEDICARE

## 2017-03-17 DIAGNOSIS — H52.203 MYOPIA WITH ASTIGMATISM AND PRESBYOPIA, BILATERAL: ICD-10-CM

## 2017-03-17 DIAGNOSIS — H43.813 POSTERIOR VITREOUS DETACHMENT, BILATERAL: ICD-10-CM

## 2017-03-17 DIAGNOSIS — Z13.5 GLAUCOMA SCREENING: ICD-10-CM

## 2017-03-17 DIAGNOSIS — H52.13 MYOPIA WITH ASTIGMATISM AND PRESBYOPIA, BILATERAL: ICD-10-CM

## 2017-03-17 DIAGNOSIS — H25.13 NUCLEAR SCLEROSIS, BILATERAL: Primary | ICD-10-CM

## 2017-03-17 DIAGNOSIS — H52.4 MYOPIA WITH ASTIGMATISM AND PRESBYOPIA, BILATERAL: ICD-10-CM

## 2017-03-17 PROCEDURE — 99213 OFFICE O/P EST LOW 20 MIN: CPT | Mod: PBBFAC,PO | Performed by: OPTOMETRIST

## 2017-03-17 PROCEDURE — 92015 DETERMINE REFRACTIVE STATE: CPT | Mod: ,,, | Performed by: OPTOMETRIST

## 2017-03-17 PROCEDURE — 99999 PR PBB SHADOW E&M-EST. PATIENT-LVL III: CPT | Mod: PBBFAC,,, | Performed by: OPTOMETRIST

## 2017-03-17 PROCEDURE — 92014 COMPRE OPH EXAM EST PT 1/>: CPT | Mod: S$PBB,,, | Performed by: OPTOMETRIST

## 2017-03-17 NOTE — MR AVS SNAPSHOT
[facility]  [address] [facility] [address]  [facility] [address]  Phone: [phone]  Fax: [phone]                  Lucinda Aguilera   3/17/2017 8:15 AM   Office Visit    Description:  Female : 1950   Provider:  ZACHARY Rodrigues OD   Department:  [facility]           Reason for Visit     Annual Exam     Dry Eye           Diagnoses this Visit        Comments    Nuclear sclerosis, bilateral    -  Primary     Posterior vitreous detachment, bilateral         Glaucoma screening         Myopia with astigmatism and presbyopia, bilateral                To Do List           Future Appointments        Provider Department Dept Phone    2017 8:20 AM LAB, [place] [facility] [phone]    7/10/2017 9:00 AM Saurabh Hassan MD [facility] [phone]      Goals (5 Years of Data)              3/15/17    3/14/17    3/13/17    Blood Pressure <= 140/90   147/75  147/75  147/75    Notes - Note created  2016 10:41 AM by Jaqueline Cage    It is important to consistently maintain a controlled blood pressure.      Exercise at least 150 minutes per week.           Maintain a low sodium diet           Take at least one BP reading per week at various times of the day           Weight (lb) < 79.4 kg (175 lb)   84.1 kg (185 lb 4.8 oz)        Notes - Note created  2016  9:36 AM by Jaqueline Cage    Decrease weight by 5% to reduce cardiovascular risk factors        Follow-Up and Disposition     Return in about 1 year (around 3/17/2018) for Annual Eye Exam.      [facility]     [facility] Care [facility] -  Assistance  Registered nurses in the [facility] provide clinical advisement, health education, appointment booking, and other advisory services.  Call for this free service at [phone].             Medications           Message regarding Medications     Verify the changes and/or additions to your medication regime listed  below are the same as discussed with your clinician today.  If any of these changes or additions are incorrect, please notify your healthcare provider.             Verify that the below list of medications is an accurate representation of the medications you are currently taking.  If none reported, the list may be blank. If incorrect, please contact your healthcare provider. Carry this list with you in case of emergency.           Current Medications     alprazolam (XANAX) 0.5 MG tablet TAKE 1 TABLET BY MOUTH TWICE DAILY AS NEEDED    amlodipine (NORVASC) 10 MG tablet Take 1 tablet (10 mg total) by mouth every evening.    aspirin (ECOTRIN) 81 MG EC tablet Take 81 mg by mouth once daily.    carboxymethylcellulose (REFRESH PLUS) 0.5 % Dpet 1 drop 3 (three) times daily as needed.    chlorthalidone (HYGROTEN) 25 MG Tab Take 1 tablet (25 mg total) by mouth every morning. Stop HCTZ.    ciprofloxacin HCl (CIPRO) 500 MG tablet TAKE 1 TABLET(500 MG) BY MOUTH TWICE DAILY    coenzyme Q10 100 mg capsule Take 100 mg by mouth once daily.    fish oil-omega-3 fatty acids 300-1,000 mg capsule Take 2 g by mouth once daily.    irbesartan (AVAPRO) 300 MG tablet Take 1 tablet (300 mg total) by mouth every evening. Discontinue irbesartan 150mg RX.    lansoprazole (PREVACID) 15 MG capsule Take 15 mg by mouth once daily.    lansoprazole (PREVACID) 15 MG capsule Take 15 mg by mouth once daily.    methylPREDNISolone (MEDROL DOSEPACK) 4 mg tablet use as directed    multivitamin (MULTIVITAMIN) per tablet Take 1 tablet by mouth once daily.      naproxen sodium (ALEVE) 220 mg Cap Take 220 mg by mouth 2 (two) times daily.    niacin 500 MG CpSR Take 500 mg by mouth every evening.      paroxetine (PAXIL) 10 MG tablet TAKE 1 TABLET BY MOUTH EVERY MORNING    polycarbophil (FIBERCON) 625 mg tablet Take 625 mg by mouth once daily.    pravastatin (PRAVACHOL) 40 MG tablet Take 1 tablet (40 mg total) by mouth once daily.           Clinical Reference  "Information           Your Vitals Were     Last Period                   04/18/2004           Allergies as of 3/17/2017     No Known Allergies      Immunizations Administered on Date of Encounter - 3/17/2017     None      Instructions    FLASHES / FLOATERS / POSTERIOR VITREOUS DETACHMENT    Call the clinic if you have any further changes in symptoms.  Including:  Increased numbers of floaters or flashing lights, dimness or darkness that moves through or stays constant in your vision, or any pain in the eye (s).            Early Cataracts--not visually significant for surgery consultation.    What Are Cataracts?  A clear lens in the eye focuses light. This lets the eye see images sharply. With age, the lens slowly becomes cloudy. The cloudy lens is a cataract. A cataract scatters light and makes it hard for the eye to focus. Cataracts often form in both eyes. But one lens may cloud faster than the other.      The Aging of Your Lens    Your lens may cloud so slowly that you don`t notice any vision changes at first. But as the cataract gets worse, the eye has a harder time focusing. In early stages, glasses may help you see better. As the lens gets cloudier, your doctor may recommend surgery to restore your vision.  DRY EYES:  Use Over The Counter artificial tears as needed for dry eye symptoms.  Some common brands include:  Systane, Optive, and Refresh.  These drops can be used as frequently as desired, but may be most helpful use during long periods of concentrated work.  For example, reading / working at the computer.  Avoid drops that "get redness out", as these contain medication that may further irritate the eyes.    ALLERGY EYES / SYMPTOMS:    Over the counter medications include--Zaditor and Alaway  Use as directed 1-2 drops daily for symptoms of itching / watering eyes.  These drops will not help for dry eye or exposure symptoms.           Language Assistance Services     ATTENTION: Language assistance " services are available, free of charge. Please call [phone].      ATENCIÓN: Si habla judy, tiene a guerrero disposición servicios gratuitos de asistencia lingüística. Llame al [phone].     CHÚ Ý: N?u b?n nói Ti?ng Vi?t, có các d?ch v? h? tr? ngôn ng? mi?n phí dành cho b?n. G?i s? [phone].         [facility] complies with applicable Federal civil rights laws and does not discriminate on the basis of race, color, national origin, age, disability, or sex.

## 2017-03-17 NOTE — PROGRESS NOTES
HPI     Annual Exam    Additional comments: DLE 1-16 (samra)    ocular health exam  // pt   states no visual complaints           Dry Eye    Additional comments: OU red qam -- +Refresh gtts prn           Comments   Agree above  Feels VA slightly reduced  Some allergy? redness in am       Last edited by ZACHARY Rodrigues, OD on 3/17/2017  8:49 AM. (History)            Assessment /Plan     For exam results, see Encounter Report.    Nuclear sclerosis, bilateral    Posterior vitreous detachment, bilateral    Glaucoma screening    Myopia with astigmatism and presbyopia, bilateral      1. Early vis sig, not ready for consult  Cautions night driving  2. RD precautions given  3. Not suspect  4. Updated specs rx gave copy fill prn    Discussed and educated patient on current findings /plan.  RTC 1 year, prn if any changes / issues

## 2017-03-17 NOTE — PROGRESS NOTES
Last 5 Patient Entered Readings                                                               Current 30 Day Average: 139/71     Recent Readings 3/15/2017 3/14/2017 3/13/2017 3/12/2017 3/11/2017    Systolic BP (mmHg) 147 132 142 123 143    Diastolic BP (mmHg) 75 69 75 71 67    Pulse 95 81 96 90 87        Hypertension Medications             amlodipine (NORVASC) 10 MG tablet Take 1 tablet (10 mg total) by mouth every evening.    chlorthalidone (HYGROTEN) 25 MG Tab Take 1 tablet (25 mg total) by mouth every morning. Stop HCTZ.    irbesartan (AVAPRO) 300 MG tablet Take 1 tablet (300 mg total) by mouth every evening. Discontinue irbesartan 150mg RX.        Plan:   Called patient to follow up. Per current 30 day average, BP is controlled. Patient denies having questions or concerns. Will continue to monitor. WCB in 1 month.

## 2017-03-17 NOTE — PROGRESS NOTES
CC: left knee pain      HPI: Pt with left knee pain which catching and locking for the past month. She had a right knee replacement in the past. The pain in the left knee is aching and medial. It is worse with activity. She is ambulating without assistive device. There is not a limp.    ROS  General: denies fever and chills  Resp: no c/o sob  CVS: no c/o cp  MSK: c/o left knee pain with catching and locking    PE  General: AAOx3, pleasant and cooperative  Resp: respirations even and unlabored  MSK: left knee exam  0 degrees extension  120 degrees flexion  No warmth or erythema   - effusion    Xray:  Ordered and reviewed by me: Total knee prosthesis is in place on the right in good position.  Left knee joint is slightly narrowed laterally no focal degenerative change and no joint effusion    Assessment:  Left knee pain    Plan:  MRI for further evaluation  RICE  nsaids prn  F/u results by phone

## 2017-03-20 ENCOUNTER — TELEPHONE (OUTPATIENT)
Dept: ORTHOPEDICS | Facility: CLINIC | Age: 67
End: 2017-03-20

## 2017-03-20 NOTE — TELEPHONE ENCOUNTER
Called pt with MRI results. Her knee is feeling much better. She will f/u for a cortisone injection in June before her trip to [facility].

## 2017-03-22 ENCOUNTER — PATIENT MESSAGE (OUTPATIENT)
Dept: ADMINISTRATIVE | Facility: OTHER | Age: 67
End: 2017-03-22

## 2017-03-30 ENCOUNTER — PATIENT OUTREACH (OUTPATIENT)
Dept: OTHER | Facility: OTHER | Age: 67
End: 2017-03-30
Payer: COMMERCIAL

## 2017-03-30 NOTE — PROGRESS NOTES
"Last 5 Patient Entered Readings                                                               Current 30 Day Average: 141/70     Recent Readings 3/29/2017 3/28/2017 3/27/2017 3/26/2017 3/24/2017    Systolic BP (mmHg) 139 147 149 130 130    Diastolic BP (mmHg) 68 67 69 66 68    Pulse 87 80 87 81 94        Follow up with Mrs. Lucinda Aguilera completed. Mrs. Aguilera has a cold. She is taking dayquil and nyquil. Mrs. Aguilera will check her BP daily. Patient reports that she is maintaining a low sodium diet. She is eating more fresh fruits and vegetables, and reading food labels for "low sodium" options. She is walking for exercise. Patient did not have any further questions or concerns. I will follow up in a few weeks to see how she is doing and progressing.            "

## 2017-04-13 ENCOUNTER — PATIENT MESSAGE (OUTPATIENT)
Dept: OTHER | Facility: OTHER | Age: 67
End: 2017-04-13

## 2017-04-20 ENCOUNTER — PATIENT MESSAGE (OUTPATIENT)
Dept: OTHER | Facility: OTHER | Age: 67
End: 2017-04-20

## 2017-04-20 ENCOUNTER — PATIENT OUTREACH (OUTPATIENT)
Dept: OTHER | Facility: OTHER | Age: 67
End: 2017-04-20
Payer: COMMERCIAL

## 2017-04-20 NOTE — PROGRESS NOTES
Ms. Aguilera messaged PharmD to report red/swollen ankles. She also complains of a non-itchy red rash that began when swelling started, but she forgot to report. She decreased amlodipine to 5mg as directed last week, but she feels that swelling has not improved. She notes that by the end of the day, even her toes are swollen. She has been elevating feet.     Last 5 Patient Entered Readings                                                               Current 30 Day Average: 142/70     Recent Readings 4/19/2017 4/17/2017 4/16/2017 4/15/2017 4/14/2017    Systolic BP (mmHg) 149 141 164 143 146    Diastolic BP (mmHg) 71 68 70 77 69    Pulse 88 85 93 89 94      BP slightly above goal  Discontinue amlodipine 5mg   Continue BP monitoring to assess additional treatment    Hypertension Medications             chlorthalidone (HYGROTEN) 25 MG Tab Take 1 tablet (25 mg total) by mouth every morning. Stop HCTZ.    irbesartan (AVAPRO) 300 MG tablet Take 1 tablet (300 mg total) by mouth every evening. Discontinue irbesartan 150mg RX.

## 2017-04-23 ENCOUNTER — PATIENT MESSAGE (OUTPATIENT)
Dept: ADMINISTRATIVE | Facility: OTHER | Age: 67
End: 2017-04-23

## 2017-04-24 ENCOUNTER — PATIENT OUTREACH (OUTPATIENT)
Dept: OTHER | Facility: OTHER | Age: 67
End: 2017-04-24
Payer: COMMERCIAL

## 2017-04-24 ENCOUNTER — PATIENT MESSAGE (OUTPATIENT)
Dept: FAMILY MEDICINE | Facility: CLINIC | Age: 67
End: 2017-04-24

## 2017-04-24 NOTE — PROGRESS NOTES
Last 5 Patient Entered Readings                                                               Current 30 Day Average: 142/71     Recent Readings 4/23/2017 4/22/2017 4/21/2017 4/20/2017 4/19/2017    Systolic BP (mmHg) 159 130 148 147 149    Diastolic BP (mmHg) 70 75 74 76 71    Pulse 80 81 96 92 88        Hypertension Medications             chlorthalidone (HYGROTEN) 25 MG Tab Take 1 tablet (25 mg total) by mouth every morning. Stop HCTZ.    irbesartan (AVAPRO) 300 MG tablet Take 1 tablet (300 mg total) by mouth every evening. Discontinue irbesartan 150mg RX.        Plan:   Called patient to follow up, amlodipine was recently discontinued due to LAMONT.  Patient states ankles/ feet are still swollen, sesitive to touch.  Patient confirms she has been keeping her feet elevated, drinking fluids, and staying on a low salt diet.  Recommended patient make appt with PCP or NP to have LAMONT evaluated.    Per current 30 day average, BP is uncontrolled. Would like to add Coreg 6.25mg BID; however, patient would prefer to first see PCP/ NP.   Patient denies having questions or concerns. Will continue to monitor. WCB in 2 weeks.

## 2017-04-25 ENCOUNTER — TELEPHONE (OUTPATIENT)
Dept: FAMILY MEDICINE | Facility: CLINIC | Age: 67
End: 2017-04-25

## 2017-04-25 ENCOUNTER — OFFICE VISIT (OUTPATIENT)
Dept: FAMILY MEDICINE | Facility: CLINIC | Age: 67
End: 2017-04-25
Payer: MEDICARE

## 2017-04-25 VITALS
HEART RATE: 90 BPM | WEIGHT: 183.63 LBS | DIASTOLIC BLOOD PRESSURE: 72 MMHG | TEMPERATURE: 98 F | HEIGHT: 62 IN | BODY MASS INDEX: 33.79 KG/M2 | SYSTOLIC BLOOD PRESSURE: 118 MMHG

## 2017-04-25 DIAGNOSIS — R60.0 LOCALIZED EDEMA: ICD-10-CM

## 2017-04-25 DIAGNOSIS — I10 ESSENTIAL HYPERTENSION: Primary | ICD-10-CM

## 2017-04-25 PROCEDURE — 99213 OFFICE O/P EST LOW 20 MIN: CPT | Mod: PBBFAC,PO | Performed by: NURSE PRACTITIONER

## 2017-04-25 PROCEDURE — 99213 OFFICE O/P EST LOW 20 MIN: CPT | Mod: S$PBB,,, | Performed by: NURSE PRACTITIONER

## 2017-04-25 PROCEDURE — 99999 PR PBB SHADOW E&M-EST. PATIENT-LVL III: CPT | Mod: PBBFAC,,, | Performed by: NURSE PRACTITIONER

## 2017-04-25 NOTE — MR AVS SNAPSHOT
[facility]  [address] [facility] [address]  [place] [address]  Phone: [phone]  Fax: [phone]                  Lucinda B Ale   2017 9:40 AM   Office Visit    Description:  Female : 1950   Provider:  Sharifa Plunkett NP   Department:  [facility]           Reason for Visit     Hypertension                To Do List           Future Appointments        Provider Department Dept Phone    2017 8:20 AM LAB, [place] [facility] [phone]    7/10/2017 9:00 AM Saurabh Hassan MD [facility] [phone]      Goals (5 Years of Data)              Today    17    Blood Pressure <= 140/90   118/72  118/72  118/72    Notes - Note created  2016 10:41 AM by Jaqueline Cage    It is important to consistently maintain a controlled blood pressure.      Exercise at least 150 minutes per week.           Maintain a low sodium diet           Take at least one BP reading per week at various times of the day           Weight (lb) < 79.4 kg (175 lb)   83.3 kg (183 lb 10.3 oz)        Notes - Note created  2016  9:36 AM by Jaqueline Cage    Decrease weight by 5% to reduce cardiovascular risk factors        [facility]     [facility] Nurse Care Line -  Assistance  Unless otherwise directed by your provider, please contact [facility], our nurse care line that is available for  assistance.     Registered nurses in the [facility] provide: appointment scheduling, clinical advisement, health education, and other advisory services.  Call: [phone] (toll free)               Medications           Message regarding Medications     Verify the changes and/or additions to your medication regime listed below are the same as discussed with your clinician today.  If any of these changes or additions are incorrect, please notify your healthcare provider.        STOP taking these medications  "    ciprofloxacin HCl (CIPRO) 500 MG tablet TAKE 1 TABLET(500 MG) BY MOUTH TWICE DAILY           Verify that the below list of medications is an accurate representation of the medications you are currently taking.  If none reported, the list may be blank. If incorrect, please contact your healthcare provider. Carry this list with you in case of emergency.           Current Medications     alprazolam (XANAX) 0.5 MG tablet TAKE 1 TABLET BY MOUTH TWICE DAILY AS NEEDED    aspirin (ECOTRIN) 81 MG EC tablet Take 81 mg by mouth once daily.    carboxymethylcellulose (REFRESH PLUS) 0.5 % Dpet 1 drop 3 (three) times daily as needed.    chlorthalidone (HYGROTEN) 25 MG Tab Take 1 tablet (25 mg total) by mouth every morning. Stop HCTZ.    coenzyme Q10 100 mg capsule Take 100 mg by mouth once daily.    fish oil-omega-3 fatty acids 300-1,000 mg capsule Take 2 g by mouth once daily.    irbesartan (AVAPRO) 300 MG tablet Take 1 tablet (300 mg total) by mouth every evening. Discontinue irbesartan 150mg RX.    lansoprazole (PREVACID) 15 MG capsule Take 15 mg by mouth once daily.    multivitamin (MULTIVITAMIN) per tablet Take 1 tablet by mouth once daily.      naproxen sodium (ALEVE) 220 mg Cap Take 220 mg by mouth 2 (two) times daily.    niacin 500 MG CpSR Take 500 mg by mouth every evening.      paroxetine (PAXIL) 10 MG tablet TAKE 1 TABLET BY MOUTH EVERY MORNING    polycarbophil (FIBERCON) 625 mg tablet Take 625 mg by mouth once daily.    pravastatin (PRAVACHOL) 40 MG tablet Take 1 tablet (40 mg total) by mouth once daily.           Clinical Reference Information           Your Vitals Were     BP Pulse Temp Height Weight Last Period    118/72 90 98 °F (36.7 °C) (Oral) 5' 2" (1.575 m) 83.3 kg (183 lb 10.3 oz) 04/18/2004    BMI                33.59 kg/m2          Blood Pressure          Most Recent Value    BP  118/72      Allergies as of 4/25/2017     No Known Allergies      Immunizations Administered on Date of Encounter - 4/25/2017  "    None      Language Assistance Services     ATTENTION: Language assistance services are available, free of charge. Please call [phone].      ATENCIÓN: Si habla judy, tiene a guerrero disposición servicios gratuitos de asistencia lingüística. Llame al [phone].     CHÚ Ý: N?u b?n nói Ti?ng Vi?t, có các d?ch v? h? tr? ngôn ng? mi?n phí dành cho b?n. G?i s? [phone].         [facility] complies with applicable Federal civil rights laws and does not discriminate on the basis of race, color, national origin, age, disability, or sex.

## 2017-04-25 NOTE — PROGRESS NOTES
Subjective:       Patient ID: Lucinda Aguilera is a 66 y.o. female.    Chief Complaint: Hypertension (BP high and feet swelling for 2 weeks,top of feet very tender to touch.patietn states she stopped taking amlodopine since the weekend. Needs refills)    HPI Comments: Stopped amlodopine 4 days ago - now Bp 118/72   And taking avapro 300 at night and chlorithalidone 25 in am   129/70s-- last night with no amlodopine     Would wait on adding any other med   Would like to check her BP monitor against our BPcuff       Hypertension   This is a new problem. The current episode started in the past 7 days. The problem has been waxing and waning since onset. The problem is uncontrolled. Pertinent negatives include no anxiety, blurred vision, chest pain, headaches, malaise/fatigue, neck pain, orthopnea, palpitations, peripheral edema, PND, shortness of breath or sweats. Agents associated with hypertension include NSAIDs (1 alleve a day ).     Vitals:    04/25/17 0935   BP: 118/72   Pulse: 90   Temp: 98 °F (36.7 °C)     Review of Systems   Constitutional: Negative.  Negative for diaphoresis, fatigue, fever and malaise/fatigue.   HENT: Negative.    Eyes: Negative.  Negative for blurred vision.   Respiratory: Negative.  Negative for cough, shortness of breath and wheezing.    Cardiovascular: Negative.  Negative for chest pain, palpitations, orthopnea and PND.   Gastrointestinal: Negative.  Negative for abdominal pain, diarrhea and nausea.   Endocrine: Negative.    Genitourinary: Negative.  Negative for dysuria and hematuria.   Musculoskeletal: Negative.  Negative for neck pain.   Skin: Negative.  Negative for color change and rash.   Allergic/Immunologic: Negative.    Neurological: Negative.  Negative for speech difficulty, numbness and headaches.   Hematological: Negative.    Psychiatric/Behavioral: Negative.        Past Medical History:   Diagnosis Date    Anxiety     takes daily Xanax    Arthritis     right thumb     Cataract     OU    GERD (gastroesophageal reflux disease)     Hyperlipemia     Hypertension     PVD (posterior vitreous detachment)     OD     Objective:      Physical Exam   Constitutional: She is oriented to person, place, and time. She appears well-developed and well-nourished.   HENT:   Head: Normocephalic and atraumatic.   Mouth/Throat: Oropharynx is clear and moist.   Eyes: Conjunctivae and EOM are normal. Pupils are equal, round, and reactive to light.   Neck: Neck supple.   Cardiovascular: Normal rate, regular rhythm, normal heart sounds and intact distal pulses.  Exam reveals no friction rub.    No murmur heard.  No swelling    Pulmonary/Chest: Effort normal and breath sounds normal. No respiratory distress. She has no wheezes.   Abdominal: Soft. Bowel sounds are normal.   Musculoskeletal: Normal range of motion.   Neurological: She is alert and oriented to person, place, and time.   Skin: Skin is warm and dry.   Psychiatric: She has a normal mood and affect. Her behavior is normal. Judgment and thought content normal.   Nursing note and vitals reviewed.      Assessment:       1. Essential hypertension    2. Localized edema        Plan:       Essential hypertension    Localized edema  Due to the amlodopine- much better since she stopped it     Now on avapro and chlorithalidone in am - doing well with this - BP stable today   She will come back this afternoon for Bp check

## 2017-04-26 ENCOUNTER — CLINICAL SUPPORT (OUTPATIENT)
Dept: FAMILY MEDICINE | Facility: CLINIC | Age: 67
End: 2017-04-26
Payer: MEDICARE

## 2017-04-26 VITALS — DIASTOLIC BLOOD PRESSURE: 72 MMHG | SYSTOLIC BLOOD PRESSURE: 118 MMHG

## 2017-04-26 NOTE — PROGRESS NOTES
Patient here to compare her electronic BP via iphone and manual BP cuff.   Electronic 150/77  Manual  118/72

## 2017-04-27 ENCOUNTER — CLINICAL SUPPORT (OUTPATIENT)
Dept: FAMILY MEDICINE | Facility: CLINIC | Age: 67
End: 2017-04-27
Payer: MEDICARE

## 2017-04-27 VITALS — DIASTOLIC BLOOD PRESSURE: 78 MMHG | SYSTOLIC BLOOD PRESSURE: 150 MMHG

## 2017-04-27 DIAGNOSIS — Z01.30 BP CHECK: Primary | ICD-10-CM

## 2017-05-03 ENCOUNTER — PATIENT MESSAGE (OUTPATIENT)
Dept: FAMILY MEDICINE | Facility: CLINIC | Age: 67
End: 2017-05-03

## 2017-05-04 ENCOUNTER — PATIENT OUTREACH (OUTPATIENT)
Dept: OTHER | Facility: OTHER | Age: 67
End: 2017-05-04
Payer: COMMERCIAL

## 2017-05-04 NOTE — PROGRESS NOTES
Last 5 Patient Entered Readings                                                               Current 30 Day Average: 144/72     Recent Readings 4/30/2017 4/29/2017 4/28/2017 4/27/2017 4/27/2017    Systolic BP (mmHg) 153 155 143 126 139    Diastolic BP (mmHg) 67 85 69 73 79    Pulse 83 69 95 67 68        Follow up with Mrs. Lucinda Aguilera completed. Mrs. Aguilera is doing well. She is uncertain of the accuracy of her [employer] cuff. She had the cuff compared to a manual reading and seems to be fine. However, her readings in the clinic were 150/78 and 118/72. She plans on using her home cuff and entering readings manually. Patient requested tech support as she forgot her [employer] password. She is maintaining a low sodium diet and continuing her exercise regimen. Patient did not have any further questions or concerns. I will follow up in a few weeks to see how she is doing and progressing.

## 2017-05-08 ENCOUNTER — PATIENT OUTREACH (OUTPATIENT)
Dept: OTHER | Facility: OTHER | Age: 67
End: 2017-05-08
Payer: COMMERCIAL

## 2017-05-08 NOTE — PROGRESS NOTES
Last 5 Patient Entered Readings                                                               Current 30 Day Average: 146/72     Recent Readings 4/30/2017 4/29/2017 4/28/2017 4/27/2017 4/27/2017    Systolic BP (mmHg) 153 155 143 126 139    Diastolic BP (mmHg) 67 85 69 73 79    Pulse 83 69 95 67 68        Hypertension Medications             chlorthalidone (HYGROTEN) 25 MG Tab Take 1 tablet (25 mg total) by mouth every morning. Stop HCTZ.    irbesartan (AVAPRO) 300 MG tablet Take 1 tablet (300 mg total) by mouth every evening. Discontinue irbesartan 150mg RX.        Plan:   Called patient to follow up. Per current 30 day average, BP is uncontrolled. Patient requests to call me back.

## 2017-05-09 ENCOUNTER — PATIENT MESSAGE (OUTPATIENT)
Dept: FAMILY MEDICINE | Facility: CLINIC | Age: 67
End: 2017-05-09

## 2017-05-09 NOTE — TELEPHONE ENCOUNTER
Your labs are scheduled for 5/31/17 at 8: 45 am. This date should be good, the lab results would be in.

## 2017-05-15 NOTE — PROGRESS NOTES
Last 5 Patient Entered Readings                                                               Current 30 Day Average: 144/72     Recent Readings 5/14/2017 5/12/2017 5/8/2017 4/30/2017 4/29/2017    Systolic BP (mmHg) 132 122 111 153 155    Diastolic BP (mmHg) 79 70 71 67 85    Pulse 81 81 73 83 69        Hypertension Medications             chlorthalidone (HYGROTEN) 25 MG Tab Take 1 tablet (25 mg total) by mouth every morning. Stop HCTZ.    irbesartan (AVAPRO) 300 MG tablet Take 1 tablet (300 mg total) by mouth every evening. Discontinue irbesartan 150mg RX.        Plan:   Called patient to follow up. Per current 30 day average, BP is slightly uncontrolled. Patient denies having questions or concerns. Will continue to monitor. WCB in 1 month.

## 2017-05-18 ENCOUNTER — PATIENT MESSAGE (OUTPATIENT)
Dept: FAMILY MEDICINE | Facility: CLINIC | Age: 67
End: 2017-05-18

## 2017-05-23 ENCOUNTER — PATIENT MESSAGE (OUTPATIENT)
Dept: FAMILY MEDICINE | Facility: CLINIC | Age: 67
End: 2017-05-23

## 2017-05-23 DIAGNOSIS — F41.9 ANXIETY: ICD-10-CM

## 2017-05-23 RX ORDER — PAROXETINE 10 MG/1
10 TABLET, FILM COATED ORAL EVERY MORNING
Qty: 90 TABLET | Refills: 3 | Status: SHIPPED | OUTPATIENT
Start: 2017-05-23 | End: 2017-06-07 | Stop reason: SDUPTHER

## 2017-05-25 ENCOUNTER — PATIENT OUTREACH (OUTPATIENT)
Dept: OTHER | Facility: OTHER | Age: 67
End: 2017-05-25
Payer: COMMERCIAL

## 2017-05-25 NOTE — PROGRESS NOTES
Last 5 Patient Entered Readings                                                               Current 30 Day Average: 134/72     Recent Readings 5/24/2017 5/20/2017 5/16/2017 5/14/2017 5/12/2017    Systolic BP (mmHg) 130 123 125 132 122    Diastolic BP (mmHg) 70 76 69 79 70    Pulse 77 67 89 81 81        Follow up with Mrs. Lucinda Aguilera completed. Mrs. Aguilera is doing well. Patient has been using her own digital cuff and entering readings manually. She is pleased with her recent BP readings. She is staying active and monitoring her sodium intake. Patient did not have any further questions or concerns. I will follow up in a few weeks to see how she is doing and progressing.

## 2017-05-31 ENCOUNTER — PATIENT OUTREACH (OUTPATIENT)
Dept: OTHER | Facility: OTHER | Age: 67
End: 2017-05-31
Payer: COMMERCIAL

## 2017-05-31 ENCOUNTER — LAB VISIT (OUTPATIENT)
Dept: LAB | Facility: HOSPITAL | Age: 67
End: 2017-05-31
Attending: FAMILY MEDICINE
Payer: MEDICARE

## 2017-05-31 DIAGNOSIS — I10 ESSENTIAL HYPERTENSION: ICD-10-CM

## 2017-05-31 DIAGNOSIS — E78.5 HYPERLIPIDEMIA, UNSPECIFIED HYPERLIPIDEMIA TYPE: ICD-10-CM

## 2017-05-31 DIAGNOSIS — I10 ESSENTIAL HYPERTENSION: Primary | ICD-10-CM

## 2017-05-31 LAB
ALBUMIN SERPL BCP-MCNC: 3.9 G/DL
ALP SERPL-CCNC: 105 U/L
ALT SERPL W/O P-5'-P-CCNC: 21 U/L
ANION GAP SERPL CALC-SCNC: 9 MMOL/L
AST SERPL-CCNC: 24 U/L
BASOPHILS # BLD AUTO: 0.06 K/UL
BASOPHILS NFR BLD: 0.7 %
BILIRUB SERPL-MCNC: 0.6 MG/DL
BUN SERPL-MCNC: 14 MG/DL
CALCIUM SERPL-MCNC: 9.3 MG/DL
CHLORIDE SERPL-SCNC: 102 MMOL/L
CHOLEST/HDLC SERPL: 3.4 {RATIO}
CO2 SERPL-SCNC: 25 MMOL/L
CREAT SERPL-MCNC: 0.8 MG/DL
DIFFERENTIAL METHOD: ABNORMAL
EOSINOPHIL # BLD AUTO: 0.3 K/UL
EOSINOPHIL NFR BLD: 2.7 %
ERYTHROCYTE [DISTWIDTH] IN BLOOD BY AUTOMATED COUNT: 13.6 %
EST. GFR  (AFRICAN AMERICAN): >60 ML/MIN/1.73 M^2
EST. GFR  (NON AFRICAN AMERICAN): >60 ML/MIN/1.73 M^2
GLUCOSE SERPL-MCNC: 89 MG/DL
HCT VFR BLD AUTO: 39.4 %
HDL/CHOLESTEROL RATIO: 29 %
HDLC SERPL-MCNC: 186 MG/DL
HDLC SERPL-MCNC: 54 MG/DL
HGB BLD-MCNC: 13.3 G/DL
LDLC SERPL CALC-MCNC: 106.8 MG/DL
LYMPHOCYTES # BLD AUTO: 3.2 K/UL
LYMPHOCYTES NFR BLD: 35.4 %
MCH RBC QN AUTO: 28.8 PG
MCHC RBC AUTO-ENTMCNC: 33.8 %
MCV RBC AUTO: 85 FL
MONOCYTES # BLD AUTO: 0.6 K/UL
MONOCYTES NFR BLD: 6.7 %
NEUTROPHILS # BLD AUTO: 5 K/UL
NEUTROPHILS NFR BLD: 54.4 %
NONHDLC SERPL-MCNC: 132 MG/DL
PLATELET # BLD AUTO: 363 K/UL
PMV BLD AUTO: 9.7 FL
POTASSIUM SERPL-SCNC: 3.3 MMOL/L
PROT SERPL-MCNC: 7.5 G/DL
RBC # BLD AUTO: 4.62 M/UL
SODIUM SERPL-SCNC: 136 MMOL/L
TRIGL SERPL-MCNC: 126 MG/DL
WBC # BLD AUTO: 9.15 K/UL

## 2017-05-31 PROCEDURE — 80053 COMPREHEN METABOLIC PANEL: CPT

## 2017-05-31 PROCEDURE — 36415 COLL VENOUS BLD VENIPUNCTURE: CPT | Mod: PO

## 2017-05-31 PROCEDURE — 85025 COMPLETE CBC W/AUTO DIFF WBC: CPT

## 2017-05-31 PROCEDURE — 80061 LIPID PANEL: CPT

## 2017-05-31 NOTE — PROGRESS NOTES
Last 5 Patient Entered Readings                                                               Current 30 Day Average: 125/73     Recent Readings 5/28/2017 5/24/2017 5/20/2017 5/16/2017 5/14/2017    Systolic BP (mmHg) 134 130 123 125 132    Diastolic BP (mmHg) 81 70 76 69 79    Pulse 70 77 67 89 81        Hypertension Medications             chlorthalidone (HYGROTEN) 25 MG Tab Take 1 tablet (25 mg total) by mouth every morning. Stop HCTZ.    irbesartan (AVAPRO) 300 MG tablet Take 1 tablet (300 mg total) by mouth every evening. Discontinue irbesartan 150mg RX.        Plan:   Called patient to follow up regarding CMP.  K+ is slightly low, 3.3mg/dl.  Instructed patient to begin eating 1 banana per day; patient would prefer to see if she can maintain K+ level by dietary means instead of RX KCl.  Will also send patient a list of K+ rich foods.  Per current 30 day average, BP is well controlled. Patient denies having questions or concerns. Will continue to monitor. WCB in 1 week with K+ results.

## 2017-06-07 ENCOUNTER — OFFICE VISIT (OUTPATIENT)
Dept: FAMILY MEDICINE | Facility: CLINIC | Age: 67
End: 2017-06-07
Payer: MEDICARE

## 2017-06-07 ENCOUNTER — LAB VISIT (OUTPATIENT)
Dept: LAB | Facility: HOSPITAL | Age: 67
End: 2017-06-07
Attending: FAMILY MEDICINE
Payer: MEDICARE

## 2017-06-07 ENCOUNTER — PATIENT OUTREACH (OUTPATIENT)
Dept: OTHER | Facility: OTHER | Age: 67
End: 2017-06-07
Payer: COMMERCIAL

## 2017-06-07 VITALS
BODY MASS INDEX: 33.51 KG/M2 | RESPIRATION RATE: 18 BRPM | HEART RATE: 88 BPM | TEMPERATURE: 98 F | DIASTOLIC BLOOD PRESSURE: 60 MMHG | SYSTOLIC BLOOD PRESSURE: 130 MMHG | WEIGHT: 182.13 LBS | HEIGHT: 62 IN

## 2017-06-07 DIAGNOSIS — I10 ESSENTIAL HYPERTENSION: Primary | ICD-10-CM

## 2017-06-07 DIAGNOSIS — E78.5 HYPERLIPIDEMIA, UNSPECIFIED HYPERLIPIDEMIA TYPE: ICD-10-CM

## 2017-06-07 DIAGNOSIS — E87.6 HYPOKALEMIA: ICD-10-CM

## 2017-06-07 DIAGNOSIS — F41.9 ANXIETY: ICD-10-CM

## 2017-06-07 DIAGNOSIS — I10 ESSENTIAL HYPERTENSION: ICD-10-CM

## 2017-06-07 LAB — POTASSIUM SERPL-SCNC: 3.2 MMOL/L

## 2017-06-07 PROCEDURE — 99999 PR PBB SHADOW E&M-EST. PATIENT-LVL III: CPT | Mod: PBBFAC,,, | Performed by: FAMILY MEDICINE

## 2017-06-07 PROCEDURE — 84132 ASSAY OF SERUM POTASSIUM: CPT

## 2017-06-07 PROCEDURE — 99213 OFFICE O/P EST LOW 20 MIN: CPT | Mod: PBBFAC,PO | Performed by: FAMILY MEDICINE

## 2017-06-07 PROCEDURE — 99214 OFFICE O/P EST MOD 30 MIN: CPT | Mod: S$PBB,,, | Performed by: FAMILY MEDICINE

## 2017-06-07 PROCEDURE — 36415 COLL VENOUS BLD VENIPUNCTURE: CPT | Mod: PO

## 2017-06-07 RX ORDER — PAROXETINE HYDROCHLORIDE 20 MG/1
20 TABLET, FILM COATED ORAL EVERY MORNING
Qty: 90 TABLET | Refills: 3 | Status: SHIPPED | OUTPATIENT
Start: 2017-06-07 | End: 2017-12-19

## 2017-06-07 RX ORDER — AMLODIPINE BESYLATE 10 MG/1
10 TABLET ORAL DAILY
Qty: 90 TABLET | Refills: 3
Start: 2017-06-07 | End: 2017-06-08

## 2017-06-07 NOTE — PROGRESS NOTES
Last 5 Patient Entered Readings                                                               Current 30 Day Average: 128/74     Recent Readings 6/5/2017 6/2/2017 5/31/2017 5/28/2017 5/24/2017    Systolic BP (mmHg) 133 129 125 134 130    Diastolic BP (mmHg) 76 77 73 81 70    Pulse 86 77 77 70 77        Hypertension Medications             chlorthalidone (HYGROTEN) 25 MG Tab Take 1 tablet (25 mg total) by mouth every morning. Stop HCTZ.    irbesartan (AVAPRO) 300 MG tablet Take 1 tablet (300 mg total) by mouth every evening. Discontinue irbesartan 150mg RX.        Plan:   Called patient to follow up regarding K+. K+ is low, 3.2mmol/L, will need to supplement.  Will call in KCl 20meq daily. Will redraw CMP in 2 weeks.   Per current 30 day average, BP is well controlled. Patient denies having questions or concerns. Will continue to monitor. WCB in 2 weeks.

## 2017-06-07 NOTE — PROGRESS NOTES
"Subjective:       Patient ID: Lucinda Aguilera is a 66 y.o. female.    Chief Complaint: Hypertension (Follow up )    HPI Comments: Here today for 6 month f/u on chronic health issues.    HLD - tolerating pravachol 40mg daily  Anxiety - She is taking Paxil 10mg daily. Taking Xanax 0.5mg 1 tablet BID. She reports more anxiety on a regular basis and is worrying more.  HTN - tolerating Avapro 300mg daily and Hygroten 25mg daily and amlodipine; BP controlled; digital flowsheet reviewed  GERD - tolerating Prilosec 20mg daily; getting some "sour stomach" and using Maalox PRN    She did have angiogram in 2010.    Past Medical History:    Hypertension                                                  Anxiety                                           Hyperlipemia                                                  GERD (gastroesophageal reflux disease)                        Cataract                                                        Comment:OU    PVD (posterior vitreous detachment)                             Comment:OD    Arthritis                                                       Comment:right thumb    Past Surgical History:    chest abcess                                                     Comment:child    KNEE ARTHROSCOPY W/ LASER                                        Comment:right    COLONOSCOPY                                      1/2012          Comment:repeat in 5 years    No Known Allergies    Social History    Marital Status:              Spouse Name:                       Years of Education:                 Number of children: 2             Occupational History  Occupation          Employer            Comment               retired                                   retired teacher an*                         Social History Main Topics    Smoking Status: Never Smoker                      Smokeless Status: Never Used                        Alcohol Use: Yes                Comment: social    Drug Use: No   "            Sexual Activity: Yes               Partners with: Male       Birth Control/Protection: None, Post-menopausal    Current Outpatient Prescriptions on File Prior to Visit   Medication Sig Dispense Refill    alprazolam (XANAX) 0.5 MG tablet TAKE 1 TABLET BY MOUTH TWICE DAILY AS NEEDED 60 tablet 5    aspirin (ECOTRIN) 81 MG EC tablet Take 81 mg by mouth once daily.      carboxymethylcellulose (REFRESH PLUS) 0.5 % Dpet 1 drop 3 (three) times daily as needed.      chlorthalidone (HYGROTEN) 25 MG Tab Take 1 tablet (25 mg total) by mouth every morning. Stop HCTZ. 30 tablet 11    coenzyme Q10 100 mg capsule Take 100 mg by mouth once daily.      fish oil-omega-3 fatty acids 300-1,000 mg capsule Take 2 g by mouth once daily.      irbesartan (AVAPRO) 300 MG tablet Take 1 tablet (300 mg total) by mouth every evening. Discontinue irbesartan 150mg RX. 90 tablet 3    lansoprazole (PREVACID) 15 MG capsule Take 15 mg by mouth once daily.      multivitamin (MULTIVITAMIN) per tablet Take 1 tablet by mouth once daily.        naproxen sodium (ALEVE) 220 mg Cap Take 220 mg by mouth 2 (two) times daily.      niacin 500 MG CpSR Take 500 mg by mouth every evening.        polycarbophil (FIBERCON) 625 mg tablet Take 625 mg by mouth once daily.      pravastatin (PRAVACHOL) 40 MG tablet Take 1 tablet (40 mg total) by mouth once daily. 90 tablet 3     No current facility-administered medications on file prior to visit.      Review of patient's family history indicates:    Hypertension                   Mother                    Heart disease                  Mother                    Stroke                         Father                    Review of Systems   Constitutional: Negative for fever, chills, appetite change and unexpected weight change.   HENT: Negative for sore throat and trouble swallowing.    Eyes: Negative for pain and visual disturbance.   Respiratory: Negative for cough, shortness of breath and wheezing.   "  Cardiovascular: Negative for chest pain and palpitations.   Gastrointestinal: Negative for nausea, vomiting, abdominal pain, diarrhea and blood in stool.   Genitourinary: Negative for dysuria, hematuria and difficulty urinating.   Musculoskeletal: Negative for arthralgias, gait problem and neck pain.   Skin: Negative for rash and wound.   Neurological: Negative for dizziness, weakness, numbness and headaches.   Hematological: Negative for adenopathy.   Psychiatric/Behavioral: Negative for dysphoric mood.         Objective:       /60 (BP Location: Left arm, Patient Position: Sitting, BP Method: Manual)   Pulse 88   Temp 98 °F (36.7 °C) (Oral)   Resp 18   Ht 5' 2" (1.575 m)   Wt 82.6 kg (182 lb 1.6 oz)   LMP 04/18/2004   BMI 33.31 kg/m²     Physical Exam   Constitutional: She is oriented to person, place, and time. She appears well-developed and well-nourished.   HENT:   Head: Normocephalic.   Mouth/Throat: Oropharynx is clear and moist. No oropharyngeal exudate or posterior oropharyngeal erythema.   Eyes: Conjunctivae and EOM are normal. Pupils are equal, round, and reactive to light.   Neck: Normal range of motion. Neck supple. No thyromegaly present.   Cardiovascular: Normal rate, regular rhythm, S1 normal, S2 normal, normal heart sounds and intact distal pulses.  Exam reveals no gallop and no friction rub.    No murmur heard.  Pulmonary/Chest: Effort normal and breath sounds normal. She has no wheezes. She has no rales.   Abdominal: Normal appearance.   Musculoskeletal:        Right lower leg: She exhibits no edema.        Left lower leg: She exhibits no edema.   Lymphadenopathy:     She has no cervical adenopathy.   Neurological: She is alert and oriented to person, place, and time. No cranial nerve deficit. Gait normal.   Skin: Skin is warm and intact. No rash noted.   Psychiatric: She has a normal mood and affect.       Results for orders placed or performed in visit on 05/31/17   Comprehensive " metabolic panel   Result Value Ref Range    Sodium 136 136 - 145 mmol/L    Potassium 3.3 (L) 3.5 - 5.1 mmol/L    Chloride 102 95 - 110 mmol/L    CO2 25 23 - 29 mmol/L    Glucose 89 70 - 110 mg/dL    BUN, Bld 14 8 - 23 mg/dL    Creatinine 0.8 0.5 - 1.4 mg/dL    Calcium 9.3 8.7 - 10.5 mg/dL    Total Protein 7.5 6.0 - 8.4 g/dL    Albumin 3.9 3.5 - 5.2 g/dL    Total Bilirubin 0.6 0.1 - 1.0 mg/dL    Alkaline Phosphatase 105 55 - 135 U/L    AST 24 10 - 40 U/L    ALT 21 10 - 44 U/L    Anion Gap 9 8 - 16 mmol/L    eGFR if African American >60.0 >60 mL/min/1.73 m^2    eGFR if non African American >60.0 >60 mL/min/1.73 m^2   Lipid panel   Result Value Ref Range    Cholesterol 186 120 - 199 mg/dL    Triglycerides 126 30 - 150 mg/dL    HDL 54 40 - 75 mg/dL    LDL Cholesterol 106.8 63.0 - 159.0 mg/dL    HDL/Chol Ratio 29.0 20.0 - 50.0 %    Total Cholesterol/HDL Ratio 3.4 2.0 - 5.0    Non-HDL Cholesterol 132 mg/dL   CBC auto differential   Result Value Ref Range    WBC 9.15 3.90 - 12.70 K/uL    RBC 4.62 4.00 - 5.40 M/uL    Hemoglobin 13.3 12.0 - 16.0 g/dL    Hematocrit 39.4 37.0 - 48.5 %    MCV 85 82 - 98 fL    MCH 28.8 27.0 - 31.0 pg    MCHC 33.8 32.0 - 36.0 %    RDW 13.6 11.5 - 14.5 %    Platelets 363 (H) 150 - 350 K/uL    MPV 9.7 9.2 - 12.9 fL    Gran # 5.0 1.8 - 7.7 K/uL    Lymph # 3.2 1.0 - 4.8 K/uL    Mono # 0.6 0.3 - 1.0 K/uL    Eos # 0.3 0.0 - 0.5 K/uL    Baso # 0.06 0.00 - 0.20 K/uL    Gran% 54.4 38.0 - 73.0 %    Lymph% 35.4 18.0 - 48.0 %    Mono% 6.7 4.0 - 15.0 %    Eosinophil% 2.7 0.0 - 8.0 %    Basophil% 0.7 0.0 - 1.9 %    Differential Method Automated      Assessment:       1. Essential hypertension    2. Hyperlipidemia, unspecified hyperlipidemia type    3. Hypokalemia    4. Anxiety        Plan:       Essential hypertension    Hyperlipidemia, unspecified hyperlipidemia type    Hypokalemia    Anxiety  -     paroxetine (PAXIL) 20 MG tablet; Take 1 tablet (20 mg total) by mouth every morning.  Dispense: 90 tablet;  Refill: 3    Other orders  -     Discontinue: amlodipine (NORVASC) 10 MG tablet; Take 1 tablet (10 mg total) by mouth once daily.  Dispense: 90 tablet; Refill: 3            Increase Paxil to 20mg daily. Advised against increasing Xanax at this time. My chart f/u in 1 month.  Potassium as ordered and will see if potassium in diet is helping the loss with the chlorthalidone  cotninue aspirin 81mg daily  Continue other present meds  Counseled on regular exercise, maintenance of a healthy weight, balanced diet rich in fruits/vegetables and lean protein, and avoidance of unhealthy habits like smoking and excessive alcohol intake.  F/u 6 months with labs

## 2017-06-08 ENCOUNTER — PATIENT MESSAGE (OUTPATIENT)
Dept: OTHER | Facility: OTHER | Age: 67
End: 2017-06-08

## 2017-06-08 RX ORDER — POTASSIUM CHLORIDE 750 MG/1
20 CAPSULE, EXTENDED RELEASE ORAL DAILY
Qty: 60 CAPSULE | Refills: 11 | Status: SHIPPED | OUTPATIENT
Start: 2017-06-08 | End: 2018-06-18 | Stop reason: SDUPTHER

## 2017-06-15 ENCOUNTER — PATIENT OUTREACH (OUTPATIENT)
Dept: OTHER | Facility: OTHER | Age: 67
End: 2017-06-15
Payer: COMMERCIAL

## 2017-06-15 NOTE — PROGRESS NOTES
Last 5 Patient Entered Readings                                                               Current 30 Day Average: 126/75     Recent Readings 6/13/2017 6/9/2017 6/5/2017 6/2/2017 5/31/2017    Systolic BP (mmHg) 113 125 133 129 125    Diastolic BP (mmHg) 77 77 76 77 73    Pulse 79 81 86 77 77        Follow up with Mrs. Lucinda Aguilera completed. Mrs. Aguilera is on vacation in [place]. She is doing well. Patient reports that she is maintaining a low sodium diet and staying active. Patient did not have any further questions or concerns. I will follow up in a few weeks to see how she is doing and progressing.

## 2017-06-17 ENCOUNTER — PATIENT MESSAGE (OUTPATIENT)
Dept: ORTHOPEDICS | Facility: CLINIC | Age: 67
End: 2017-06-17

## 2017-06-21 ENCOUNTER — LAB VISIT (OUTPATIENT)
Dept: LAB | Facility: HOSPITAL | Age: 67
End: 2017-06-21
Attending: FAMILY MEDICINE
Payer: MEDICARE

## 2017-06-21 DIAGNOSIS — I10 ESSENTIAL HYPERTENSION: ICD-10-CM

## 2017-06-21 LAB — POTASSIUM SERPL-SCNC: 4 MMOL/L

## 2017-06-21 PROCEDURE — 36415 COLL VENOUS BLD VENIPUNCTURE: CPT | Mod: PO

## 2017-06-21 PROCEDURE — 84132 ASSAY OF SERUM POTASSIUM: CPT

## 2017-06-22 ENCOUNTER — PATIENT OUTREACH (OUTPATIENT)
Dept: OTHER | Facility: OTHER | Age: 67
End: 2017-06-22

## 2017-06-22 ENCOUNTER — PATIENT MESSAGE (OUTPATIENT)
Dept: OTHER | Facility: OTHER | Age: 67
End: 2017-06-22

## 2017-06-22 NOTE — PROGRESS NOTES
Last 5 Patient Entered Readings                                                               Current 30 Day Average: 129/77     Recent Readings 6/18/2017 6/18/2017 6/16/2017 6/13/2017 6/9/2017    Systolic BP (mmHg) 126 140 132 113 125    Diastolic BP (mmHg) 80 82 85 77 77    Pulse 70 93 90 79 81        Hypertension Medications             chlorthalidone (HYGROTEN) 25 MG Tab Take 1 tablet (25 mg total) by mouth every morning. Stop HCTZ.    irbesartan (AVAPRO) 300 MG tablet Take 1 tablet (300 mg total) by mouth every evening. Discontinue irbesartan 150mg RX.        Plan:   Messaged patient to follow up, K+ is WNL (up from 3.2mmol/L on 6/7 to 4.0mmol/L on 6/21, KCl 20meq daily). Per current 30 day average, BP is well controlled. Will continue to monitor. WCB in 4 months, sooner if BP begins to trend up or down.

## 2017-06-24 ENCOUNTER — PATIENT MESSAGE (OUTPATIENT)
Dept: ORTHOPEDICS | Facility: CLINIC | Age: 67
End: 2017-06-24

## 2017-06-29 ENCOUNTER — PATIENT MESSAGE (OUTPATIENT)
Dept: OTHER | Facility: OTHER | Age: 67
End: 2017-06-29

## 2017-06-29 ENCOUNTER — OFFICE VISIT (OUTPATIENT)
Dept: ORTHOPEDICS | Facility: CLINIC | Age: 67
End: 2017-06-29
Payer: MEDICARE

## 2017-06-29 DIAGNOSIS — M17.12 PRIMARY OSTEOARTHRITIS OF LEFT KNEE: Primary | ICD-10-CM

## 2017-06-29 PROCEDURE — 20610 DRAIN/INJ JOINT/BURSA W/O US: CPT | Mod: PBBFAC | Performed by: NURSE PRACTITIONER

## 2017-06-29 PROCEDURE — 99213 OFFICE O/P EST LOW 20 MIN: CPT | Mod: PBBFAC | Performed by: NURSE PRACTITIONER

## 2017-06-29 PROCEDURE — 99999 PR PBB SHADOW E&M-EST. PATIENT-LVL III: CPT | Mod: PBBFAC,,, | Performed by: NURSE PRACTITIONER

## 2017-06-29 PROCEDURE — 20610 DRAIN/INJ JOINT/BURSA W/O US: CPT | Mod: S$PBB,LT,, | Performed by: NURSE PRACTITIONER

## 2017-06-29 PROCEDURE — 99499 UNLISTED E&M SERVICE: CPT | Mod: S$PBB,,, | Performed by: NURSE PRACTITIONER

## 2017-06-29 RX ORDER — TRIAMCINOLONE ACETONIDE 40 MG/ML
40 INJECTION, SUSPENSION INTRA-ARTICULAR; INTRAMUSCULAR
Status: COMPLETED | OUTPATIENT
Start: 2017-06-29 | End: 2017-06-29

## 2017-06-29 RX ADMIN — TRIAMCINOLONE ACETONIDE 40 MG: 40 INJECTION, SUSPENSION INTRA-ARTICULAR; INTRAMUSCULAR at 12:06

## 2017-06-29 NOTE — PROGRESS NOTES
Pt with known left knee djd and meniscus tear. MRI was done in March. She had an injection at that time which helped. She returns to clinic today for another cortisone injection. She had a right knee replacement last year and she is not ready to pursue surgery on the right.    Knee Injection Procedure Note    Pre-operative Diagnosis: left knee degenerative arthritis    Post-operative Diagnosis: same    Indications: left knee pain    Anesthesia: none    Procedure Details     Verbal consent was obtained for the procedure. The injection site was identified and the skin was prepared with alcohol. The left knee was injected from an anterolateral approach with 1 ml of Kenalog and 5 ml Lidocaine under sterile technique using a 22 gauge needle. The needle was removed and the area cleansed and dressed.    Complications:  None; patient tolerated the procedure well.    she was advised to rest the knee today, using ice and elevation as needed for comfort and swelling. she did receive immediate relief of the knee pain. she was told this would be short lived and is secondary to the lidocaine. she may have an increase in discomfort tonight followed by steady improvement over the next several days. It may take 1-3 weeks following the injection to get the full benefit of the medication.

## 2017-07-03 ENCOUNTER — OFFICE VISIT (OUTPATIENT)
Dept: FAMILY MEDICINE | Facility: CLINIC | Age: 67
End: 2017-07-03
Payer: MEDICARE

## 2017-07-03 VITALS
HEIGHT: 62 IN | BODY MASS INDEX: 32.91 KG/M2 | DIASTOLIC BLOOD PRESSURE: 70 MMHG | HEART RATE: 96 BPM | SYSTOLIC BLOOD PRESSURE: 110 MMHG | TEMPERATURE: 99 F | WEIGHT: 178.81 LBS | RESPIRATION RATE: 16 BRPM

## 2017-07-03 DIAGNOSIS — K21.9 GASTROESOPHAGEAL REFLUX DISEASE, ESOPHAGITIS PRESENCE NOT SPECIFIED: ICD-10-CM

## 2017-07-03 DIAGNOSIS — K44.9 HIATAL HERNIA: ICD-10-CM

## 2017-07-03 DIAGNOSIS — Z79.1 NSAID LONG-TERM USE: ICD-10-CM

## 2017-07-03 DIAGNOSIS — K21.9 GASTROESOPHAGEAL REFLUX DISEASE, ESOPHAGITIS PRESENCE NOT SPECIFIED: Primary | ICD-10-CM

## 2017-07-03 DIAGNOSIS — R14.2 BELCHING: ICD-10-CM

## 2017-07-03 PROCEDURE — 99215 OFFICE O/P EST HI 40 MIN: CPT | Mod: PBBFAC,PO | Performed by: NURSE PRACTITIONER

## 2017-07-03 PROCEDURE — 99213 OFFICE O/P EST LOW 20 MIN: CPT | Mod: S$PBB,,, | Performed by: NURSE PRACTITIONER

## 2017-07-03 PROCEDURE — 99999 PR PBB SHADOW E&M-EST. PATIENT-LVL V: CPT | Mod: PBBFAC,,, | Performed by: NURSE PRACTITIONER

## 2017-07-03 RX ORDER — SUCRALFATE 1 G/1
1 TABLET ORAL 4 TIMES DAILY
Qty: 120 TABLET | Refills: 0 | Status: SHIPPED | OUTPATIENT
Start: 2017-07-03 | End: 2018-06-12

## 2017-07-03 RX ORDER — SUCRALFATE 1 G/1
TABLET ORAL
Qty: 360 TABLET | Refills: 0 | OUTPATIENT
Start: 2017-07-03

## 2017-07-03 NOTE — PROGRESS NOTES
Subjective:       Patient ID: Lucinda Aguilera is a 66 y.o. female.    Chief Complaint: Hernia (hernia in upper belly.Was diagnose many years ago. It's been acting out over the weekend)    Abdominal Pain   This is a new problem. The current episode started in the past 7 days (2 days ). The onset quality is sudden. The problem occurs constantly. The problem has been gradually worsening. The pain is located in the epigastric region. Pain radiation: upper chest area  Pertinent negatives include no anorexia, arthralgias, belching, constipation, diarrhea, dysuria, fever, flatus, frequency, headaches, hematochezia, hematuria, melena, myalgias, nausea, vomiting or weight loss. The pain is aggravated by eating, palpation and NSAIDs (take NSAIDS daily ). The pain is relieved by belching. She has tried proton pump inhibitors and antacids for the symptoms. Prior diagnostic workup includes lower endoscopy. Her past medical history is significant for GERD.     Vitals:    07/03/17 1348   BP: 110/70   Pulse: 96   Resp: 16   Temp: 98.8 °F (37.1 °C)     Review of Systems   Constitutional: Negative.  Negative for diaphoresis, fatigue, fever and weight loss.   HENT: Negative.    Eyes: Negative.    Respiratory: Negative.  Negative for cough, shortness of breath and wheezing.    Cardiovascular: Negative.  Negative for chest pain.   Gastrointestinal: Positive for abdominal distention and abdominal pain. Negative for anorexia, constipation, diarrhea, flatus, hematochezia, melena, nausea and vomiting.   Endocrine: Negative.    Genitourinary: Negative.  Negative for dysuria, frequency and hematuria.   Musculoskeletal: Negative.  Negative for arthralgias and myalgias.   Skin: Negative for color change and rash.   Allergic/Immunologic: Negative.    Neurological: Negative.  Negative for speech difficulty, numbness and headaches.   Hematological: Negative.    Psychiatric/Behavioral: Negative.        Past Medical History:   Diagnosis Date     Anxiety     takes daily Xanax    Arthritis     right thumb    Cataract     OU    GERD (gastroesophageal reflux disease)     Hyperlipemia     Hypertension     PVD (posterior vitreous detachment)     OD     Objective:      Physical Exam   Constitutional: She is oriented to person, place, and time. She appears well-developed and well-nourished.   HENT:   Head: Normocephalic and atraumatic.   Mouth/Throat: Oropharynx is clear and moist.   Eyes: Conjunctivae and EOM are normal. Pupils are equal, round, and reactive to light.   Neck: Neck supple.   Cardiovascular: Normal rate, regular rhythm, normal heart sounds and intact distal pulses.  Exam reveals no friction rub.    No murmur heard.  Pulmonary/Chest: Effort normal and breath sounds normal. No respiratory distress. She has no wheezes. She has no rales.   Abdominal: Soft. Bowel sounds are normal. There is generalized tenderness and tenderness in the epigastric area. There is no rigidity, no rebound, no guarding and no CVA tenderness.   Musculoskeletal: Normal range of motion.   Neurological: She is alert and oriented to person, place, and time.   Skin: Skin is warm and dry.   Psychiatric: She has a normal mood and affect. Her behavior is normal.   Nursing note and vitals reviewed.      Assessment:       1. Gastroesophageal reflux disease, esophagitis presence not specified    2. Hiatal hernia    3. Belching    4. NSAID long-term use        Plan:       Gastroesophageal reflux disease, esophagitis presence not specified  -     Case request GI: ESOPHAGOGASTRODUODENOSCOPY (EGD)  -     sucralfate (CARAFATE) 1 gram tablet; Take 1 tablet (1 g total) by mouth 4 (four) times daily.  Dispense: 120 tablet; Refill: 0    Hiatal hernia  -     Case request GI: ESOPHAGOGASTRODUODENOSCOPY (EGD)  -     sucralfate (CARAFATE) 1 gram tablet; Take 1 tablet (1 g total) by mouth 4 (four) times daily.  Dispense: 120 tablet; Refill: 0    Belching  -     Case request GI:  ESOPHAGOGASTRODUODENOSCOPY (EGD)  -     sucralfate (CARAFATE) 1 gram tablet; Take 1 tablet (1 g total) by mouth 4 (four) times daily.  Dispense: 120 tablet; Refill: 0    NSAID long-term use  -     Case request GI: ESOPHAGOGASTRODUODENOSCOPY (EGD)  -     sucralfate (CARAFATE) 1 gram tablet; Take 1 tablet (1 g total) by mouth 4 (four) times daily.  Dispense: 120 tablet; Refill: 0            Will follow up after EGD

## 2017-07-03 NOTE — PATIENT INSTRUCTIONS
Tips to Control Acid Reflux    To control acid reflux, youll need to make some basic diet and lifestyle changes. The simple steps outlined below may be all youll need to ease discomfort.  Watch what you eat  · Avoid fatty foods and spicy foods.  · Eat fewer acidic foods, such as citrus and tomato-based foods. These can increase symptoms.  · Limit drinking alcohol, caffeine, and fizzy beverages. All increase acid reflux.  · Try limiting chocolate, peppermint, and spearmint. These can worsen acid reflux in some people.  Watch when you eat  · Avoid lying down for 3 hours after eating.  · Do not snack before going to bed.  Raise your head  Raising your head and upper body by 4 to 6 inches helps limit reflux when youre lying down. Put blocks under the head of your bed frame to raise it.  Other changes  · Lose weight, if you need to  · Dont exercise near bedtime  · Avoid tight-fitting clothes  · Limit aspirin and ibuprofen  · Stop smoking   Date Last Reviewed: 7/1/2016 © 2000-2016 [employer]. [address] [place] [address]. All rights reserved. This information is not intended as a substitute for professional medical care. Always follow your healthcare professional's instructions.        GERD (Adult)    The esophagus is a tube that carries food from the mouth to the stomach. A valve at the lower end of the esophagus prevents stomach acid from flowing upward. When this valve doesn't work properly, stomach contents may repeatedly flow back up (reflux) into the esophagus. This is called gastroesophageal reflux disease (GERD). GERD can irritate the esophagus. It can cause problems with swallowing or breathing. In severe cases, GERD can cause recurrent pneumonia or other serious problems.  Symptoms of reflux include burning, pressure or sharp pain in the upper abdomen or mid to lower chest. The pain can spread to the neck, back, or shoulder. There may be belching, an acid taste in the back of  "the throat, chronic cough, or sore throat or hoarseness. GERD symptoms often occur during the day after a big meal. They can also occur at night when lying down.   Home care  Lifestyle changes can help reduce symptoms. If needed, medicines may be prescribed. Symptoms often improve with treatment, but if treatment is stopped, the symptoms often return after a few months. So most persons with GERD will need to continue treatment.  Lifestyle changes  · Limit or avoid fatty, fried, and spicy foods, as well as coffee, chocolate, mint, and foods with high acid content such as tomatoes and citrus fruit and juices (orange, grapefruit, lemon).  · Dont eat large meals, especially at night. Frequent, smaller meals are best. Do not lie down right after eating. And dont eat anything 3 hours before going to bed.  · Avoid drinking alcohol and smoking. As much as possible, stay away from second hand smoke.  · If you are overweight, losing weight will reduce symptoms.   · Avoid wearing tight clothing around your stomach area.  · If your symptoms occur during sleep, use a foam wedge to elevate your upper body (not just your head.) Or, place 4" blocks under the head of your bed.  Medicines  If needed, medicines can help relieve the symptoms of GERD and prevent damage to the esophagus. Discuss a medicine plan with your healthcare provider. This may include one or more of the following medicines:  · Antacids to help neutralize the normal acids in your stomach.  · Acid blockers (H2 blockers) to decrease acid production.  · Acid inhibitors (PPIs) to decrease acid production in a different way than the blockers. They may work better, but can take a little longer to take effect.  Take an antacid 30-60 minutes after eating and at bedtime, but not at the same time as an acid blocker.  Try not to take medicines such as ibuprofen and aspirin. If you are taking aspirin for your heart or other medical reasons, talk to your healthcare provider " about stopping it.  Follow-up care  Follow up with your healthcare provider or as advised by our staff.  When to seek medical advice  Call your healthcare provider if any of the following occur:  · Stomach pain gets worse or moves to the lower right abdomen (appendix area)  · Chest pain appears or gets worse, or spreads to the back, neck, shoulder, or arm  · Frequent vomiting (cant keep down liquids)  · Blood in the stool or vomit (red or black in color)  · Feeling weak or dizzy  · Fever of 100.4ºF (38ºC) or higher, or as directed by your healthcare provider  Date Last Reviewed: 6/23/2015 © 2000-2016 [employer]. [address] [place] [address], [place] [address]. All rights reserved. This information is not intended as a substitute for professional medical care. Always follow your healthcare professional's instructions.        Medicines for GERD  Gastroesophageal reflux disease (GERD) can be treated with medicine. This may be done with a medicine you can buy over the counter. Or it may be done with a medicine that your healthcare provider has to prescribe. In some cases, both types may be used. Your provider will tell you what is best for your symptoms.  Antacids  Antacids work to weaken the acid in your stomach and can give you quick relief. You can buy many of them with no prescription. Antacids can be high in sodium. This may be a problem if you have high blood pressure. Some antacids also have aluminum. This should be avoided if you have long-term (chronic) kidney disease. So check with your provider first. Take antacids only when you need to, as advised by your provider.  Side effects: Constipation, diarrhea. If you take too much medicine, it can cause calcium to build up.   H-2 blockers  H-2 blockers cause the stomach to make less acid. They are often used both on demand as symptoms occur, and daily to keep symptoms away. Your provider may prescribe them if antacids dont work for you. You can buy some  of them over the counter. These come in a lower dosage.  Side effects: Confusion in older adults  Proton-pump inhibitors  These also cause the stomach to make less acid. They reduce stomach acid more than H-2 blockers. They may be used for a short time, or longer to treat certain conditions. You can buy some of them over the counter. Or your provider may prescribe them. They help control GERD symptoms.  Side effects: Belly (abdominal) pain, diarrhea, upset stomach (nausea). Possible other side effects linked to long-term use and high doses.  Prokinetics  These medicines affect the movement of the digestive tract. They may be recommended if your stomach is emptying too slowly. But in most cases they are not recommended for treating GERD.   Side effects: Tiredness, depression, anxiety, problems with physical movement, belly cramps, constipation, diarrhea, a jittery feeling  Medicines to avoid  Dont take aspirin without your providers approval. And dont take a nonsteroidal anti-inflammatory drug (NSAID), such as ibuprofen. These reduce the protective lining of your stomach. This can lead to more GERD symptoms. Check with your provider or pharmacist before taking a new medicine.   Date Last Reviewed: 7/1/2016  © 6613-8225 [employer]. [address] [place] [address], [place] [address]. All rights reserved. This information is not intended as a substitute for professional medical care. Always follow your healthcare professional's instructions.        What Is GERD?     With GERD, the weak LES allows food and fluids to travel back, or reflux, into the esophagus.      If you often have a painful burning feeling in your chest after you eat, you may have gastroesophageal reflux disease (GERD). Heartburn that keeps coming back is a classic symptom of GERD. But you may have other symptoms as well. A GERD diagnosis is made only after a complete evaluation by your healthcare provider.  Note: Chest pain may also be caused  by heart problems. Be sure to have all chest pain evaluated by a healthcare provider.   When you have a reflux problem  After you eat, food travels from your mouth down the esophagus to your stomach. Along the way, food passes through a one-way valve called the lower esophageal sphincter (LES). The LES sits at the opening to your stomach. Normally the LES opens when you swallow. It lets food enter the stomach, then closes quickly. With GERD, the LES doesnt work normally. It lets food and stomach acid flow back (reflux) into the esophagus.  Some common symptoms  · Frequent heartburn or burping  · Sour-tasting fluid backing up into your mouth  · Symptoms that get worse after you eat, bend over, or lie down  · Trouble swallowing or pain when swallowing  · A dry, long-term (chronic) cough  · Upset stomach (nausea) or vomiting  Relieving your discomfort  You and your healthcare provider can work together to find the treatment options that best ease your symptoms. These may include lifestyle changes, medicine, and possibly surgery.  Many people find their GERD symptoms decrease when they eat small frequent meals instead of 3 large ones. Reducing the amount of fatty foods in your diet will also help.   The following foods tend to cause problems for people diagnosed with GERD:  · Tomatoes and tomato products  · Alcohol  · Coffee  · Peppermint  · Greasy or spicy foods  Talk with your provider if you dont understand how to make the dietary changes needed to control your GERD symptoms. Your provider can refer you to a nutritionist.  Date Last Reviewed: 7/1/2016  © 8009-0845 [employer]. [address] [place] [address], [place] [address]. All rights reserved. This information is not intended as a substitute for professional medical care. Always follow your healthcare professional's instructions.        How Acid Reflux Affects Your Throat    Do you have to clear your throat or cough often? Are you hoarse? Do you have  trouble swallowing? If you have these or other throat symptoms, you may have acid reflux. This occurs when stomach acid flows back up and irritates your throat.  Why you have throat symptoms  There are muscles (esophageal sphincters) at both ends of the tube that carries food to your stomach (the esophagus). These muscles relax to let food pass. Then they tighten to keep stomach acid down. When the lower esophageal sphincter (LES) doesnt tighten enough, acid can flow back (reflux) from your stomach into your esophagus. This may cause heartburn. In some cases the upper esophageal sphincter (UES) also doesnt work well. Then acid can travel higher and enter your throat (pharynx). In many cases, this causes throat symptoms.  Common throat symptoms  · Need to clear your throat often  · Feeling like youre choking  · Long-term (chronic) cough  · Hoarseness  · Trouble swallowing  · Feel like you have a lump in your throat  · Sour or acid taste  · Sore throat that keeps coming back   Date Last Reviewed: 7/1/2016  © 3296-8018 [employer]. [address] [place] [address], [place] [address]. All rights reserved. This information is not intended as a substitute for professional medical care. Always follow your healthcare professional's instructions.

## 2017-07-10 ENCOUNTER — TELEPHONE (OUTPATIENT)
Dept: GASTROENTEROLOGY | Facility: CLINIC | Age: 67
End: 2017-07-10

## 2017-07-11 ENCOUNTER — TELEPHONE (OUTPATIENT)
Dept: GASTROENTEROLOGY | Facility: CLINIC | Age: 67
End: 2017-07-11

## 2017-07-11 NOTE — TELEPHONE ENCOUNTER
----- Message from Leonora Mccrary sent at 7/11/2017  8:35 AM CDT -----  Contact: Lucinda Jaimes is ready to schedule EGD. Please call [phone]. Thanks!

## 2017-07-12 NOTE — H&P
History & Physical - Short Stay  Gastroenterology      SUBJECTIVE:     Procedure: Gastroscopy    Chief Complaint/Indication for Procedure: GERD    History of Present Illness:  Office Visit     7/3/2017  [facility]   Sharifa Plunkett NP   Family Medicine   Gastroesophageal reflux disease, esophagitis presence not specified +3 more   Dx   Hernia    Reason for Visit        Progress Notes        Subjective:       Patient ID: Lucinda Aguilera is a 66 y.o. female.     Chief Complaint: Hernia (hernia in upper belly.Was diagnose many years ago. It's been acting out over the weekend)     Abdominal Pain   This is a new problem. The current episode started in the past 7 days (2 days ). The onset quality is sudden. The problem occurs constantly. The problem has been gradually worsening. The pain is located in the epigastric region. Pain radiation: upper chest area  Pertinent negatives include no anorexia, arthralgias, belching, constipation, diarrhea, dysuria, fever, flatus, frequency, headaches, hematochezia, hematuria, melena, myalgias, nausea, vomiting or weight loss. The pain is aggravated by eating, palpation and NSAIDs (take NSAIDS daily ). The pain is relieved by belching. She has tried proton pump inhibitors and antacids for the symptoms. Prior diagnostic workup includes lower endoscopy. Her past medical history is significant for GERD.     Assessment:       1. Gastroesophageal reflux disease, esophagitis presence not specified    2. Hiatal hernia    3. Belching    4. NSAID long-term use        Plan:       Gastroesophageal reflux disease, esophagitis presence not specified  -     Case request GI: ESOPHAGOGASTRODUODENOSCOPY (EGD)  -     sucralfate (CARAFATE) 1 gram tablet; Take 1 tablet (1 g total) by mouth 4 (four) times daily.  Dispense: 120 tablet; Refill: 0     Hiatal hernia  -     Case request GI: ESOPHAGOGASTRODUODENOSCOPY (EGD)  -     sucralfate (CARAFATE) 1 gram tablet; Take 1 tablet (1 g  total) by mouth 4 (four) times daily.  Dispense: 120 tablet; Refill: 0     Belching  -     Case request GI: ESOPHAGOGASTRODUODENOSCOPY (EGD)  -     sucralfate (CARAFATE) 1 gram tablet; Take 1 tablet (1 g total) by mouth 4 (four) times daily.  Dispense: 120 tablet; Refill: 0     NSAID long-term use  -     Case request GI: ESOPHAGOGASTRODUODENOSCOPY (EGD)  -     sucralfate (CARAFATE) 1 gram tablet; Take 1 tablet (1 g total) by mouth 4 (four) times daily.  Dispense: 120 tablet; Refill: 0                 PTA Medications   Medication Sig    alprazolam (XANAX) 0.5 MG tablet TAKE 1 TABLET BY MOUTH TWICE DAILY AS NEEDED    aspirin (ECOTRIN) 81 MG EC tablet Take 81 mg by mouth once daily.    chlorthalidone (HYGROTEN) 25 MG Tab Take 1 tablet (25 mg total) by mouth every morning. Stop HCTZ.    coenzyme Q10 100 mg capsule Take 100 mg by mouth once daily.    fish oil-omega-3 fatty acids 300-1,000 mg capsule Take 2 g by mouth once daily.    irbesartan (AVAPRO) 300 MG tablet Take 1 tablet (300 mg total) by mouth every evening. Discontinue irbesartan 150mg RX.    lansoprazole (PREVACID) 15 MG capsule Take 15 mg by mouth once daily.    multivitamin (MULTIVITAMIN) per tablet Take 1 tablet by mouth once daily.      naproxen sodium (ALEVE) 220 mg Cap Take 220 mg by mouth 2 (two) times daily.    niacin 500 MG CpSR Take 500 mg by mouth every evening.      paroxetine (PAXIL) 20 MG tablet Take 1 tablet (20 mg total) by mouth every morning.    polycarbophil (FIBERCON) 625 mg tablet Take 625 mg by mouth once daily.    potassium chloride (MICRO-K) 10 MEQ CpSR Take 2 capsules (20 mEq total) by mouth once daily.    pravastatin (PRAVACHOL) 40 MG tablet Take 1 tablet (40 mg total) by mouth once daily.    sucralfate (CARAFATE) 1 gram tablet Take 1 tablet (1 g total) by mouth 4 (four) times daily.    carboxymethylcellulose (REFRESH PLUS) 0.5 % Dpet 1 drop 3 (three) times daily as needed.       Review of patient's allergies  "indicates:  No Known Allergies     Past Medical History:   Diagnosis Date    Anxiety     takes daily Xanax    Arthritis     right thumb    Cataract     OU    GERD (gastroesophageal reflux disease)     Hyperlipemia     Hypertension     PVD (posterior vitreous detachment)     OD     Past Surgical History:   Procedure Laterality Date    chest abcess      child    COLONOSCOPY  1/2012    repeat in 5 years    JOINT REPLACEMENT      right knee     KNEE ARTHROSCOPY W/ LASER      right    KNEE SURGERY Right 06/03/2016    Total knee replacement    thumb surgery  11/2014     Family History   Problem Relation Age of Onset    Hypertension Mother     Heart disease Mother     Stroke Father     Glaucoma Sister     Cataracts Sister     Macular degeneration Sister     Breast cancer Neg Hx      Social History   Substance Use Topics    Smoking status: Never Smoker    Smokeless tobacco: Never Used    Alcohol use Yes      Comment: social         OBJECTIVE:     Vital Signs (Most Recent)  Temp: 97.7 °F (36.5 °C) (07/13/17 1111)  Pulse: 66 (07/13/17 1111)  Resp: 18 (07/13/17 1111)  BP: 124/60 (07/13/17 1111)  SpO2: 97 % (07/13/17 1111)    Physical Exam:          :Ht 5' 2" (1.575 m)    Wt 81.1 kg (178 lb 12.7 oz)    BMI 32.70 kg/m²                                                      GENERAL:  Comfortable, in no acute distress.                                 HEENT EXAM:  Nonicteric.  No adenopathy.  Oropharynx is clear.               NECK:  Supple.                                                               LUNGS:  Clear.                                                               CARDIAC:  Regular rate and rhythm.  S1, S2.  No murmur.                      ABDOMEN:  Soft, positive bowel sounds, nontender.  No hepatosplenomegaly or masses.  No rebound or guarding.                                             EXTREMITIES:  No edema.     MENTAL STATUS:  Alert and oriented.    ASSESSMENT/PLAN:     Assessment: " GERD    Plan: Gastroscopy    Anesthesia Plan:   MAC / General Anaesthesia    ASA Grade: ASA 2 - Patient with mild systemic disease with no functional limitations    MALLAMPATI SCORE: II (hard and soft palate, upper portion of tonsils anduvula visible)

## 2017-07-13 ENCOUNTER — HOSPITAL ENCOUNTER (OUTPATIENT)
Facility: HOSPITAL | Age: 67
Discharge: HOME | End: 2017-07-13
Attending: INTERNAL MEDICINE | Admitting: INTERNAL MEDICINE
Payer: MEDICARE

## 2017-07-13 ENCOUNTER — SURGERY (OUTPATIENT)
Age: 67
End: 2017-07-13

## 2017-07-13 ENCOUNTER — ANESTHESIA EVENT (OUTPATIENT)
Dept: ENDOSCOPY | Facility: HOSPITAL | Age: 67
End: 2017-07-13
Payer: MEDICARE

## 2017-07-13 ENCOUNTER — ANESTHESIA (OUTPATIENT)
Dept: ENDOSCOPY | Facility: HOSPITAL | Age: 67
End: 2017-07-13
Payer: MEDICARE

## 2017-07-13 VITALS
TEMPERATURE: 98 F | SYSTOLIC BLOOD PRESSURE: 160 MMHG | HEART RATE: 60 BPM | DIASTOLIC BLOOD PRESSURE: 70 MMHG | HEIGHT: 62 IN | OXYGEN SATURATION: 96 % | RESPIRATION RATE: 20 BRPM | WEIGHT: 175 LBS | BODY MASS INDEX: 32.2 KG/M2

## 2017-07-13 VITALS — RESPIRATION RATE: 6 BRPM

## 2017-07-13 DIAGNOSIS — K21.9 GERD (GASTROESOPHAGEAL REFLUX DISEASE): Primary | ICD-10-CM

## 2017-07-13 LAB — H PYLORI INDEX VALUE: NEGATIVE

## 2017-07-13 PROCEDURE — 27201012 HC FORCEPS, HOT/COLD, DISP: Mod: PO | Performed by: INTERNAL MEDICINE

## 2017-07-13 PROCEDURE — 88305 TISSUE EXAM BY PATHOLOGIST: CPT | Performed by: PATHOLOGY

## 2017-07-13 PROCEDURE — 87449 NOS EACH ORGANISM AG IA: CPT | Mod: PO | Performed by: INTERNAL MEDICINE

## 2017-07-13 PROCEDURE — 43239 EGD BIOPSY SINGLE/MULTIPLE: CPT | Mod: PO | Performed by: INTERNAL MEDICINE

## 2017-07-13 PROCEDURE — 88342 IMHCHEM/IMCYTCHM 1ST ANTB: CPT | Mod: 26,,, | Performed by: PATHOLOGY

## 2017-07-13 PROCEDURE — 37000008 HC ANESTHESIA 1ST 15 MINUTES: Mod: PO | Performed by: INTERNAL MEDICINE

## 2017-07-13 PROCEDURE — 63600175 PHARM REV CODE 636 W HCPCS: Mod: PO | Performed by: NURSE ANESTHETIST, CERTIFIED REGISTERED

## 2017-07-13 PROCEDURE — 25000003 PHARM REV CODE 250: Mod: PO | Performed by: NURSE ANESTHETIST, CERTIFIED REGISTERED

## 2017-07-13 PROCEDURE — 88305 TISSUE EXAM BY PATHOLOGIST: CPT | Mod: 26,,, | Performed by: PATHOLOGY

## 2017-07-13 PROCEDURE — 43239 EGD BIOPSY SINGLE/MULTIPLE: CPT | Mod: ,,, | Performed by: INTERNAL MEDICINE

## 2017-07-13 PROCEDURE — D9220A PRA ANESTHESIA: Mod: CRNA,,, | Performed by: NURSE ANESTHETIST, CERTIFIED REGISTERED

## 2017-07-13 PROCEDURE — 37000009 HC ANESTHESIA EA ADD 15 MINS: Mod: PO | Performed by: INTERNAL MEDICINE

## 2017-07-13 PROCEDURE — 25000003 PHARM REV CODE 250: Mod: PO | Performed by: INTERNAL MEDICINE

## 2017-07-13 PROCEDURE — D9220A PRA ANESTHESIA: Mod: ANES,,, | Performed by: ANESTHESIOLOGY

## 2017-07-13 RX ORDER — LIDOCAINE HYDROCHLORIDE 10 MG/ML
1 INJECTION INFILTRATION; PERINEURAL ONCE
Status: COMPLETED | OUTPATIENT
Start: 2017-07-13 | End: 2017-07-13

## 2017-07-13 RX ORDER — PROPOFOL 10 MG/ML
VIAL (ML) INTRAVENOUS
Status: DISCONTINUED | OUTPATIENT
Start: 2017-07-13 | End: 2017-07-13

## 2017-07-13 RX ORDER — PANTOPRAZOLE SODIUM 40 MG/1
40 TABLET, DELAYED RELEASE ORAL
Qty: 30 TABLET | Refills: 11 | Status: SHIPPED | OUTPATIENT
Start: 2017-07-13 | End: 2018-06-27 | Stop reason: SDUPTHER

## 2017-07-13 RX ORDER — SODIUM CHLORIDE, SODIUM LACTATE, POTASSIUM CHLORIDE, CALCIUM CHLORIDE 600; 310; 30; 20 MG/100ML; MG/100ML; MG/100ML; MG/100ML
INJECTION, SOLUTION INTRAVENOUS CONTINUOUS
Status: DISCONTINUED | OUTPATIENT
Start: 2017-07-13 | End: 2017-07-13 | Stop reason: HOSPADM

## 2017-07-13 RX ORDER — LIDOCAINE HCL/PF 100 MG/5ML
SYRINGE (ML) INTRAVENOUS
Status: DISCONTINUED | OUTPATIENT
Start: 2017-07-13 | End: 2017-07-13

## 2017-07-13 RX ADMIN — LIDOCAINE HYDROCHLORIDE 100 MG: 20 INJECTION, SOLUTION INTRAVENOUS at 11:07

## 2017-07-13 RX ADMIN — LIDOCAINE HYDROCHLORIDE 1 ML: 10 INJECTION, SOLUTION EPIDURAL; INFILTRATION; INTRACAUDAL; PERINEURAL at 11:07

## 2017-07-13 RX ADMIN — SODIUM CHLORIDE, SODIUM LACTATE, POTASSIUM CHLORIDE, AND CALCIUM CHLORIDE: .6; .31; .03; .02 INJECTION, SOLUTION INTRAVENOUS at 11:07

## 2017-07-13 RX ADMIN — PROPOFOL 50 MG: 10 INJECTION, EMULSION INTRAVENOUS at 12:07

## 2017-07-13 RX ADMIN — PROPOFOL 100 MG: 10 INJECTION, EMULSION INTRAVENOUS at 11:07

## 2017-07-13 RX ADMIN — PROPOFOL 50 MG: 10 INJECTION, EMULSION INTRAVENOUS at 11:07

## 2017-07-13 NOTE — BRIEF OP NOTE
Discharge Note  Short Stay      SUMMARY     Admit Date: 7/13/2017    Attending Physician: Nathan Lopez Jr., MD     Discharge Physician: Nathan Lopez Jr., MD    Discharge Date: 7/13/2017 12:45 PM    Final Diagnosis: Hiatal hernia [K44.9]  Belching [R14.2]  NSAID long-term use [Z79.1]  Gastroesophageal reflux disease, esophagitis presence not specified [K21.9]    Impression:          - Normal oropharynx.                       - Normal esophagus.                       - Non-erosive esophageal reflux (NERD) disease                        present.                       - Z-line regular, 35 cm from the incisors.                       - Gastric mucosal atrophy.                       - Antritis. Biopsied.                       - Scar in the incisura. Biopsied.                       - Normal stomach otherwise.                       - Normal examined duodenum.  Recommendation:      - Discharge patient to home.                       - Await pathology results.                       - Follow an antireflux regimen.                       - Continue present medications.                       - Use Protonix (pantoprazole) 40 mg PO daily.                       - Use sucralfate tablets 1 gram PO QID for 4 weeks.                       - Return to GI clinic in 6-8 weeks.  Nathan Lopez MD  7/13/2017     Disposition: HOME OR SELF CARE    Patient Instructions:   Current Discharge Medication List      START taking these medications    Details   pantoprazole (PROTONIX) 40 MG tablet Take 1 tablet (40 mg total) by mouth before breakfast.  Qty: 30 tablet, Refills: 11         CONTINUE these medications which have NOT CHANGED    Details   alprazolam (XANAX) 0.5 MG tablet TAKE 1 TABLET BY MOUTH TWICE DAILY AS NEEDED  Qty: 60 tablet, Refills: 5    Associated Diagnoses: Anxiety      aspirin (ECOTRIN) 81 MG EC tablet Take 81 mg by mouth once daily.      chlorthalidone (HYGROTEN) 25 MG Tab Take 1 tablet (25 mg total) by mouth every  morning. Stop HCTZ.  Qty: 30 tablet, Refills: 11    Associated Diagnoses: Essential hypertension      coenzyme Q10 100 mg capsule Take 100 mg by mouth once daily.      fish oil-omega-3 fatty acids 300-1,000 mg capsule Take 2 g by mouth once daily.      irbesartan (AVAPRO) 300 MG tablet Take 1 tablet (300 mg total) by mouth every evening. Discontinue irbesartan 150mg RX.  Qty: 90 tablet, Refills: 3    Associated Diagnoses: Essential hypertension      multivitamin (MULTIVITAMIN) per tablet Take 1 tablet by mouth once daily.        naproxen sodium (ALEVE) 220 mg Cap Take 220 mg by mouth 2 (two) times daily.      niacin 500 MG CpSR Take 500 mg by mouth every evening.        paroxetine (PAXIL) 20 MG tablet Take 1 tablet (20 mg total) by mouth every morning.  Qty: 90 tablet, Refills: 3    Associated Diagnoses: Anxiety      polycarbophil (FIBERCON) 625 mg tablet Take 625 mg by mouth once daily.      potassium chloride (MICRO-K) 10 MEQ CpSR Take 2 capsules (20 mEq total) by mouth once daily.  Qty: 60 capsule, Refills: 11    Associated Diagnoses: Essential hypertension      pravastatin (PRAVACHOL) 40 MG tablet Take 1 tablet (40 mg total) by mouth once daily.  Qty: 90 tablet, Refills: 3      sucralfate (CARAFATE) 1 gram tablet Take 1 tablet (1 g total) by mouth 4 (four) times daily.  Qty: 120 tablet, Refills: 0    Associated Diagnoses: Gastroesophageal reflux disease, esophagitis presence not specified; Hiatal hernia; Belching; NSAID long-term use      carboxymethylcellulose (REFRESH PLUS) 0.5 % Dpet 1 drop 3 (three) times daily as needed.         STOP taking these medications       lansoprazole (PREVACID) 15 MG capsule Comments:   Reason for Stopping:               Discharge Procedure Orders (must include Diet, Follow-up, Activity)    Follow Up:  Follow up with PCP as per your routine.  Please follow an anti reflux  diet.  Activity as tolerated.    No driving day of procedure.

## 2017-07-13 NOTE — DISCHARGE INSTRUCTIONS
Procedural Sedation (Adult)  You have been given medicine by vein to make you sleep during your surgery. This may have included both a pain medicine and sleeping medicine. Most of the effects have worn off. But you may still have some drowsiness for the next 6 to 8 hours.  Home care  Follow these guidelines when you get home:  · For the next 8 hours, you should be watched by a responsible adult. This person should make sure your condition is not getting worse.  · Don't take any medicine by mouth for pain or for sleep during the next 4 hours. These might react with the medicines you were given in the hospital. This could cause a much stronger response than usual.  · Don't drink any alcohol for the next 24 hours.  · Don't drive, operate dangerous machinery, or make important business or personal decisions during the next 24 hours.  Follow-up care  Follow up with your healthcare provider if you are not alert and back to your usual level of activity within 12 hours.  When to seek medical advice  Call your healthcare provider right away if any of these occur:  · Drowsiness gets worse  · Weakness or dizziness gets worse  · Repeated vomiting  · You cannot be awakened   Date Last Reviewed: 10/18/2016  © 6249-8089 [employer]. [address] [place] [address]. All rights reserved. This information is not intended as a substitute for professional medical care. Always follow your healthcare professional's instructions.        Tips to Control Acid Reflux    To control acid reflux, youll need to make some basic diet and lifestyle changes. The simple steps outlined below may be all youll need to ease discomfort.  Watch what you eat  · Avoid fatty foods and spicy foods.  · Eat fewer acidic foods, such as citrus and tomato-based foods. These can increase symptoms.  · Limit drinking alcohol, caffeine, and fizzy beverages. All increase acid reflux.  · Try limiting chocolate, peppermint, and spearmint. These  can worsen acid reflux in some people.  Watch when you eat  · Avoid lying down for 3 hours after eating.  · Do not snack before going to bed.  Raise your head  Raising your head and upper body by 4 to 6 inches helps limit reflux when youre lying down. Put blocks under the head of your bed frame to raise it.  Other changes  · Lose weight, if you need to  · Dont exercise near bedtime  · Avoid tight-fitting clothes  · Limit aspirin and ibuprofen  · Stop smoking   Date Last Reviewed: 7/1/2016  © 5167-7384 [employer]. [address] [place] [address], [place] [address]. All rights reserved. This information is not intended as a substitute for professional medical care. Always follow your healthcare professional's instructions.

## 2017-07-13 NOTE — ANESTHESIA POSTPROCEDURE EVALUATION
"Anesthesia Post Evaluation    Patient: Lucinda Aguilera    Procedure(s) Performed: Procedure(s) (LRB):  ESOPHAGOGASTRODUODENOSCOPY (EGD) (N/A)    Final Anesthesia Type: general  Patient location during evaluation: PACU  Patient participation: Yes- Able to Participate  Level of consciousness: awake and alert and oriented  Post-procedure vital signs: reviewed and stable  Pain management: adequate  Airway patency: patent  PONV status at discharge: No PONV  Anesthetic complications: no      Cardiovascular status: blood pressure returned to baseline  Respiratory status: unassisted, spontaneous ventilation and room air  Hydration status: euvolemic  Follow-up not needed.        Visit Vitals  BP (!) 112/55   Pulse 65   Temp 36.5 °C (97.7 °F) (Temporal)   Resp 18   Ht 5' 2" (1.575 m)   Wt 79.4 kg (175 lb)   LMP 04/18/2004   SpO2 99%   Breastfeeding? No   BMI 32.01 kg/m²       Pain/Ashley Score: Pain Assessment Performed: Yes (7/13/2017 12:17 PM)  Presence of Pain: denies (7/13/2017 12:17 PM)  Ashley Score: 10 (7/13/2017 12:17 PM)      "

## 2017-07-13 NOTE — ANESTHESIA PREPROCEDURE EVALUATION
07/13/2017  Lucinda Aguilera is a 66 y.o., female.    Anesthesia Evaluation    I have reviewed the Patient Summary Reports.    I have reviewed the Nursing Notes.   I have reviewed the Medications.     Review of Systems  Social:  Non-Smoker    Cardiovascular:   Hypertension    Hepatic/GI:   GERD    Musculoskeletal:   Arthritis     Neurological:   Neuromuscular Disease,        Physical Exam  General:  Obesity    Airway/Jaw/Neck:  Airway Findings: Mouth Opening: Normal Tongue: Normal  General Airway Assessment: Adult, Good  Mallampati: I       Chest/Lungs:  Chest/Lungs Findings: Clear to auscultation, Normal Respiratory Rate     Heart/Vascular:  Heart Findings: Rate: Normal  Rhythm: Regular Rhythm  Sounds: Normal        Mental Status:  Mental Status Findings:  Cooperative, Alert and Oriented         Anesthesia Plan  Type of Anesthesia, risks & benefits discussed:  Anesthesia Type:  general  Patient's Preference:   Intra-op Monitoring Plan:   Intra-op Monitoring Plan Comments:   Post Op Pain Control Plan:   Post Op Pain Control Plan Comments:   Induction:   IV  Beta Blocker:  Patient is not currently on a Beta-Blocker (No further documentation required).       Informed Consent: Patient understands risks and agrees with Anesthesia plan.  Questions answered. Anesthesia consent signed with patient.  ASA Score: 3     Day of Surgery Review of History & Physical: I have interviewed and examined the patient. I have reviewed the patient's H&P dated:  There are no significant changes.          Ready For Surgery From Anesthesia Perspective.

## 2017-07-13 NOTE — TRANSFER OF CARE
"Anesthesia Transfer of Care Note    Patient: Lucinda Aguilera    Procedure(s) Performed: Procedure(s) (LRB):  ESOPHAGOGASTRODUODENOSCOPY (EGD) (N/A)    Patient location: PACU    Anesthesia Type: general    Transport from OR: Transported from OR on room air with adequate spontaneous ventilation    Post pain: adequate analgesia    Post assessment: no apparent anesthetic complications    Post vital signs: stable    Level of consciousness: awake, alert and oriented    Nausea/Vomiting: no nausea/vomiting    Complications: none    Transfer of care protocol was followed      Last vitals:   Visit Vitals  /60 (BP Location: Right arm, Patient Position: Lying, BP Method: Automatic)   Pulse 66   Temp 36.5 °C (97.7 °F) (Temporal)   Resp 18   Ht 5' 2" (1.575 m)   Wt 79.4 kg (175 lb)   LMP 04/18/2004   SpO2 97%   Breastfeeding? No   BMI 32.01 kg/m²     "

## 2017-07-13 NOTE — PLAN OF CARE
Awake, alert and tolerating po fluids well. No apparent distress noted. Meets the department guidelines for discharge. Instructions reviewed with patient and ,verbal understanding expressed. Family/friend to drive patient home.

## 2017-07-17 ENCOUNTER — PATIENT OUTREACH (OUTPATIENT)
Dept: OTHER | Facility: OTHER | Age: 67
End: 2017-07-17

## 2017-07-17 NOTE — PROGRESS NOTES
Last 5 Patient Entered [facility] Current 30 Day Average: 122/75     Recent Readings 7/16/2017 7/10/2017 7/5/2017 7/1/2017 6/27/2017    Systolic BP (mmHg) 128 120 131 109 125    Diastolic BP (mmHg) 75 71 74 71 74    Pulse - 70 73 70 76        Follow up with Mrs. Lucinda Aguilera completed. Mrs. Aguilera is doing well. Her left meniscus is torn. She has appointment to determine what her next steps are. Mrs. Aguilera is very pleased with her BP. Patient is maintaining a low sodium diet and continuing her exercise regimen. Patient did not have any further questions or concerns. I will follow up in a few weeks to see how she is doing and progressing.

## 2017-07-20 ENCOUNTER — OFFICE VISIT (OUTPATIENT)
Dept: ORTHOPEDICS | Facility: CLINIC | Age: 67
End: 2017-07-20
Payer: MEDICARE

## 2017-07-20 ENCOUNTER — HOSPITAL ENCOUNTER (OUTPATIENT)
Dept: RADIOLOGY | Facility: HOSPITAL | Age: 67
Discharge: HOME | End: 2017-07-20
Attending: ORTHOPAEDIC SURGERY
Payer: MEDICARE

## 2017-07-20 VITALS — BODY MASS INDEX: 33.15 KG/M2 | HEIGHT: 62 IN | WEIGHT: 180.13 LBS

## 2017-07-20 DIAGNOSIS — Z98.890 POST-OPERATIVE STATE: ICD-10-CM

## 2017-07-20 DIAGNOSIS — Z96.651 S/P TKR (TOTAL KNEE REPLACEMENT), RIGHT: ICD-10-CM

## 2017-07-20 DIAGNOSIS — M17.12 PRIMARY OSTEOARTHRITIS OF LEFT KNEE: ICD-10-CM

## 2017-07-20 DIAGNOSIS — Z98.890 POST-OPERATIVE STATE: Primary | ICD-10-CM

## 2017-07-20 PROCEDURE — 99999 PR PBB SHADOW E&M-EST. PATIENT-LVL II: CPT | Mod: PBBFAC,,, | Performed by: ORTHOPAEDIC SURGERY

## 2017-07-20 PROCEDURE — 99213 OFFICE O/P EST LOW 20 MIN: CPT | Mod: S$PBB,,, | Performed by: ORTHOPAEDIC SURGERY

## 2017-07-20 NOTE — PROGRESS NOTES
CC:  Right knee replacement and left knee pain    Hx:  Lucinda Aguilera presents for routine follow up of right knee replacement.  Her procedure was performed 1 year ago.  She has done well postoperatively and she denies effusions, warmth, or fever.  No complaints of pain.  No complaints of swelling.  She does get mild intermittant left knee pain.  This does not affect her ADL's.  She had an MRI earlier this spring and wishes to review this as well.    ROS:  Denies fevers/chills.  Denies distal edema or distal paresthesias.  Denies warmth or erythema in the knee.      PE:  Skin is unremarkable over both knees other than well healed anterior incision on the right.  No warmth, erythema, or effusion bilaterally.  Stable to varus/valgus stress bilaterally.  No distal edema bilaterally.  No pain ROM either hip.  ROM 0-130 right and left    AP standing knees, Merchant's and lateral bilateral knee radiographs were reviewed with the patient and show no evidence of loosening or wear.  These show only mild DJD.  Her MRI shows mild DJD as well and I do not appreciate a significant meniscal tear.    IMP: S/P right knee replacement  2) Mild DJD left knee    Plan: Dental prophylaxis was discussed.    She may follow up in 1 year.

## 2017-08-15 ENCOUNTER — PATIENT OUTREACH (OUTPATIENT)
Dept: OTHER | Facility: OTHER | Age: 67
End: 2017-08-15

## 2017-08-15 NOTE — PROGRESS NOTES
"Last 5 Patient Entered [facility] Current 30 Day Average: 129/75     Recent Readings 8/8/2017 8/5/2017 8/2/2017 7/31/2017 7/24/2017    Systolic BP (mmHg) 122 134 132 131 134    Diastolic BP (mmHg) 75 73 74 80 79    Pulse 82 72 83 67 75        Follow up with Mrs. Lucinda Aguilera completed. Ms. Aguilera is doing well. Ms. Aguilera has had injections in her left knee and reports it is feeling much better. Patient reports having upper GI and hernia issues which requires her to be on a more restricted diet that she is maintaining along with her low sodium diet. 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 Ms. Aguilera is "so happy" with her BP readings. Patient did not have any further questions or concerns. I will follow up in a few weeks to see how she is doing and progressing.            "

## 2017-08-23 ENCOUNTER — PATIENT MESSAGE (OUTPATIENT)
Dept: OBSTETRICS AND GYNECOLOGY | Facility: CLINIC | Age: 67
End: 2017-08-23

## 2017-08-23 DIAGNOSIS — Z12.31 VISIT FOR SCREENING MAMMOGRAM: Primary | ICD-10-CM

## 2017-09-13 ENCOUNTER — PATIENT MESSAGE (OUTPATIENT)
Dept: FAMILY MEDICINE | Facility: CLINIC | Age: 67
End: 2017-09-13

## 2017-09-13 DIAGNOSIS — F41.9 ANXIETY: ICD-10-CM

## 2017-09-14 RX ORDER — ALPRAZOLAM 0.5 MG/1
0.5 TABLET ORAL 2 TIMES DAILY PRN
Qty: 60 TABLET | Refills: 5 | Status: SHIPPED | OUTPATIENT
Start: 2017-09-14 | End: 2018-03-25 | Stop reason: SDUPTHER

## 2017-09-25 ENCOUNTER — PATIENT OUTREACH (OUTPATIENT)
Dept: OTHER | Facility: OTHER | Age: 67
End: 2017-09-25

## 2017-09-25 NOTE — PROGRESS NOTES
Last 5 Patient Entered [facility] Current 30 Day Average: 125/78     Recent Readings 9/20/2017 9/18/2017 9/15/2017 9/12/2017 9/9/2017    Systolic BP (mmHg) 118 116 126 133 124    Diastolic BP (mmHg) 79 77 74 80 78    Pulse 70 83 67 75 74        Hypertension Digital Medicine Program (HDMP): Health  Follow Up    Lifestyle Modifications:    1.Low sodium diet: yes Patient reports that she is maintaining her diet.     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    3.Hypotension/Hypertension symptoms: no   Frequency/Alleviating factors/Precipitating factors, etc.     4.Patient has been compliant with the medication regimen.     Follow up with Ms. Lucinda B Ale completed. Mrs. Aguilera is doing well. No further questions or concerns. I will follow up in a few weeks to assess progress.

## 2017-10-06 ENCOUNTER — OFFICE VISIT (OUTPATIENT)
Dept: ORTHOPEDICS | Facility: CLINIC | Age: 67
End: 2017-10-06
Payer: MEDICARE

## 2017-10-06 VITALS
DIASTOLIC BLOOD PRESSURE: 64 MMHG | SYSTOLIC BLOOD PRESSURE: 104 MMHG | HEART RATE: 72 BPM | HEIGHT: 62 IN | WEIGHT: 180 LBS | BODY MASS INDEX: 33.13 KG/M2

## 2017-10-06 DIAGNOSIS — M23.201 OLD COMPLEX TEAR OF LATERAL MENISCUS OF LEFT KNEE: Primary | ICD-10-CM

## 2017-10-06 DIAGNOSIS — G89.29 CHRONIC PAIN OF LEFT KNEE: ICD-10-CM

## 2017-10-06 DIAGNOSIS — M17.12 PRIMARY OSTEOARTHRITIS OF LEFT KNEE: ICD-10-CM

## 2017-10-06 DIAGNOSIS — M25.562 CHRONIC PAIN OF LEFT KNEE: ICD-10-CM

## 2017-10-06 PROCEDURE — 99213 OFFICE O/P EST LOW 20 MIN: CPT | Mod: 57,S$PBB,, | Performed by: ORTHOPAEDIC SURGERY

## 2017-10-06 PROCEDURE — 99999 PR PBB SHADOW E&M-EST. PATIENT-LVL IV: CPT | Mod: PBBFAC,,, | Performed by: ORTHOPAEDIC SURGERY

## 2017-10-06 PROCEDURE — 99214 OFFICE O/P EST MOD 30 MIN: CPT | Mod: PBBFAC,PO | Performed by: ORTHOPAEDIC SURGERY

## 2017-10-06 NOTE — PROGRESS NOTES
Subjective:          Chief Complaint: Lucinda Aguilera is a 66 y.o. female who had concerns including Pain of the Left Knee.    Mrs. Aguilera is here for evaluation of her left knee which has been hurting since early this year. She has had imaging done in March however was not having a lot of issues at that time. Her pain, swelling and mechanical symptoms have increased since then.     She had an injection in March 2017 that helped with the pain.     Pain: 3/10          Past Medical History:   Diagnosis Date    Anxiety     takes daily Xanax    Arthritis     right thumb    Cataract     OU    GERD (gastroesophageal reflux disease)     Hyperlipemia     Hypertension     PVD (posterior vitreous detachment)     OD       Past Surgical History:   Procedure Laterality Date    chest abcess      child    COLONOSCOPY  1/2012    repeat in 5 years    JOINT REPLACEMENT      right knee     KNEE ARTHROSCOPY W/ LASER      right    KNEE SURGERY Right 06/03/2016    Total knee replacement    thumb surgery  11/2014       Family History   Problem Relation Age of Onset    Hypertension Mother     Heart disease Mother     Stroke Father     Glaucoma Sister     Cataracts Sister     Macular degeneration Sister     Breast cancer Neg Hx          Current Outpatient Prescriptions:     alprazolam (XANAX) 0.5 MG tablet, Take 1 tablet (0.5 mg total) by mouth 2 (two) times daily as needed., Disp: 60 tablet, Rfl: 5    aspirin (ECOTRIN) 81 MG EC tablet, Take 81 mg by mouth once daily., Disp: , Rfl:     carboxymethylcellulose (REFRESH PLUS) 0.5 % Dpet, 1 drop 3 (three) times daily as needed., Disp: , Rfl:     chlorthalidone (HYGROTEN) 25 MG Tab, Take 1 tablet (25 mg total) by mouth every morning. Stop HCTZ., Disp: 30 tablet, Rfl: 11    coenzyme Q10 100 mg capsule, Take 100 mg by mouth once daily., Disp: , Rfl:     fish oil-omega-3 fatty acids 300-1,000 mg capsule, Take 2 g by mouth once daily., Disp: , Rfl:     irbesartan (AVAPRO)  300 MG tablet, Take 1 tablet (300 mg total) by mouth every evening. Discontinue irbesartan 150mg RX., Disp: 90 tablet, Rfl: 3    multivitamin (MULTIVITAMIN) per tablet, Take 1 tablet by mouth once daily.  , Disp: , Rfl:     naproxen sodium (ALEVE) 220 mg Cap, Take 220 mg by mouth 2 (two) times daily., Disp: , Rfl:     niacin 500 MG CpSR, Take 500 mg by mouth every evening.  , Disp: , Rfl:     pantoprazole (PROTONIX) 40 MG tablet, Take 1 tablet (40 mg total) by mouth before breakfast., Disp: 30 tablet, Rfl: 11    paroxetine (PAXIL) 20 MG tablet, Take 1 tablet (20 mg total) by mouth every morning., Disp: 90 tablet, Rfl: 3    polycarbophil (FIBERCON) 625 mg tablet, Take 625 mg by mouth once daily., Disp: , Rfl:     potassium chloride (MICRO-K) 10 MEQ CpSR, Take 2 capsules (20 mEq total) by mouth once daily., Disp: 60 capsule, Rfl: 11    pravastatin (PRAVACHOL) 40 MG tablet, Take 1 tablet (40 mg total) by mouth once daily., Disp: 90 tablet, Rfl: 3    sucralfate (CARAFATE) 1 gram tablet, Take 1 tablet (1 g total) by mouth 4 (four) times daily., Disp: 120 tablet, Rfl: 0    Review of patient's allergies indicates:  No Known Allergies    Vitals:    10/06/17 0805   BP: 104/64   Pulse: 72       Review of Systems   Constitution: Negative for chills and fever.   Musculoskeletal: Positive for joint pain, joint swelling and stiffness.   Gastrointestinal: Positive for heartburn.   All other systems reviewed and are negative.                  Objective:        General: Lucinda is well-developed, well-nourished, appears stated age, in no acute distress, alert and oriented to time, place and person.     General    Vitals reviewed.  Constitutional: She is oriented to person, place, and time. She appears well-developed and well-nourished. No distress.   HENT:   Head: Normocephalic and atraumatic.   Nose: Nose normal.   Eyes: Pupils are equal, round, and reactive to light.   Cardiovascular: Normal rate.    Pulmonary/Chest:  Effort normal.   Neurological: She is alert and oriented to person, place, and time.   Psychiatric: She has a normal mood and affect. Her behavior is normal. Judgment and thought content normal.           Right Knee Exam     Inspection   Scars: present    Left Knee Exam     Inspection   Effusion: present  Bruising: absent    Tenderness   The patient tender to palpation of the lateral joint line and lateral retinaculum.    Range of Motion   Extension: 0   Flexion: 140     Tests   Meniscus   Liyah:  Medial - negative Lateral - positive  Stability Lachman: normal (-1 to 2mm) PCL-Posterior Drawer: normal (0 to 2mm)  MCL - Valgus: normal (0 to 2mm)  LCL - Varus: normal (0 to 2mm)  Patella   Patellar Apprehension: negative  Passive Patellar Tilt: neutral  Patellar Tracking: normal  Patellar Glide (Quadrants): Lateral - 1 Medial - 2  Q-Angle at 90 degrees: normal  Patellar Grind: negative    Other   Sensation: normal    Muscle Strength   Left Lower Extremity   Hip Abduction: 5/5   Quadriceps:  5/5   Hamstrin/5     Vascular Exam       Left Pulses  Dorsalis Pedis:      2+  Posterior Tibial:      2+              Current and previous radiographic studies and results were reviewed with the patient:   MRI:    1. Meniscal tearing involving the anterior horn posterior horn and body of the lateral meniscus    2. Loose body adjacent to the popliteal tendon along with chondromalacia patella,. Cartilaginous thinning is also noted involving the lateral tibial plateau.    3. Lateral collateral ligament sprain with a wavy appearance suggestive of possible tear. Medial collateral ligament sprain at the femoral insertion    4. Small suprapatellar joint effusion        Assessment:       Encounter Diagnoses   Name Primary?    Chronic pain of left knee     Primary osteoarthritis of left knee     Old complex tear of lateral meniscus of left knee Yes          Plan:         We reviewed with Lucinda phan, the pathology and natural  history of her diagnosis. We have discussed a variety of treatment options including medications, therapy and other alternative treatments. I also briefly explained the indications for surgery. After discussion, Lucinad decided to proceed with surgery. The decision was made to go forward with:    -  Left knee arthroscopic lateral meniscectomy  - DOS: 20 November 2017  - Pre-Op Date: 10 November 2017  - Pre-Op Clearance Needed: NO  - Case Request Completed at this visit    The details of the surgical procedure as well as risks and benefits will be explained when the patient returns for their pre-operative visit. At that time informed consent will be obtained and we will ensure the patient has taken care of all of their pre-operative responsibilities to include medical clearance if needed, PT/OT appointments, arranging home health if necessary and obtaining necessary DME. The patient will contact us if they have any questions or concerns in the interim.

## 2017-10-09 ENCOUNTER — PATIENT MESSAGE (OUTPATIENT)
Dept: ORTHOPEDICS | Facility: CLINIC | Age: 67
End: 2017-10-09

## 2017-10-11 ENCOUNTER — PATIENT MESSAGE (OUTPATIENT)
Dept: FAMILY MEDICINE | Facility: CLINIC | Age: 67
End: 2017-10-11

## 2017-10-11 RX ORDER — MUPIROCIN 20 MG/G
1 OINTMENT TOPICAL
Status: CANCELLED | OUTPATIENT
Start: 2017-10-11

## 2017-10-20 ENCOUNTER — PATIENT OUTREACH (OUTPATIENT)
Dept: OTHER | Facility: OTHER | Age: 67
End: 2017-10-20

## 2017-10-20 NOTE — PROGRESS NOTES
Last 5 Patient Entered Redings Current 30 Day Average: 129/79     Recent Readings 10/19/2017 10/13/2017 10/10/2017 10/4/2017 9/29/2017    Systolic BP (mmHg) 136 134 120 136 131    Diastolic BP (mmHg) 80 83 73 80 79    Pulse 71 60 69 74 66        Hypertension Medications             chlorthalidone (HYGROTEN) 25 MG Tab Take 1 tablet (25 mg total) by mouth every morning. Stop HCTZ.    irbesartan (AVAPRO) 300 MG tablet Take 1 tablet (300 mg total) by mouth every evening. Discontinue irbesartan 150mg RX.        Plan:   Called patient to follow up. Per current 30 day average, BP is well controlled.   LVM, requested patient call back if she has any questions or concerns.   Will continue to monitor. WCB in 4 months, sooner if BP begins to trend up or down.

## 2017-10-24 ENCOUNTER — PATIENT MESSAGE (OUTPATIENT)
Dept: OBSTETRICS AND GYNECOLOGY | Facility: CLINIC | Age: 67
End: 2017-10-24

## 2017-10-31 ENCOUNTER — PATIENT MESSAGE (OUTPATIENT)
Dept: ADMINISTRATIVE | Facility: OTHER | Age: 67
End: 2017-10-31

## 2017-10-31 ENCOUNTER — PATIENT MESSAGE (OUTPATIENT)
Dept: OBSTETRICS AND GYNECOLOGY | Facility: CLINIC | Age: 67
End: 2017-10-31

## 2017-11-05 ENCOUNTER — PATIENT MESSAGE (OUTPATIENT)
Dept: ORTHOPEDICS | Facility: CLINIC | Age: 67
End: 2017-11-05

## 2017-11-06 RX ORDER — PRAVASTATIN SODIUM 40 MG/1
TABLET ORAL
Qty: 90 TABLET | Refills: 2 | Status: SHIPPED | OUTPATIENT
Start: 2017-11-06 | End: 2018-12-17 | Stop reason: SDUPTHER

## 2017-11-10 ENCOUNTER — PATIENT MESSAGE (OUTPATIENT)
Dept: ORTHOPEDICS | Facility: CLINIC | Age: 67
End: 2017-11-10

## 2017-11-12 ENCOUNTER — PATIENT MESSAGE (OUTPATIENT)
Dept: OTHER | Facility: OTHER | Age: 67
End: 2017-11-12

## 2017-11-13 ENCOUNTER — PATIENT MESSAGE (OUTPATIENT)
Dept: ORTHOPEDICS | Facility: CLINIC | Age: 67
End: 2017-11-13

## 2017-11-14 ENCOUNTER — PATIENT OUTREACH (OUTPATIENT)
Dept: OTHER | Facility: OTHER | Age: 67
End: 2017-11-14

## 2017-11-14 NOTE — PROGRESS NOTES
Last 5 Patient Entered Readings Current 30 Day Average: 132/77     Recent Readings 11/12/2017 11/8/2017 11/5/2017 11/2/2017 10/30/2017    Systolic BP (mmHg) 136 139 125 120 138    Diastolic BP (mmHg) 76 78 82 79 71    Pulse 71 80 67 85 78        Hypertension Digital Medicine Program (HDMP): Health  Follow Up    Lifestyle Modifications:    1.Low sodium diet: yes Patient reports that she is following the Atkins diet and watching her salt intake.    2.Physical activity: yes Mrs. Aguilera is continuing to stay active.     3.Hypotension/Hypertension symptoms: no   Frequency/Alleviating factors/Precipitating factors, etc.     4.Patient has been compliant with the medication regimen.     Follow up with Ms. Lucinda Aguilera completed. Mrs. Aguilera is doing well. Patient is excited about her sister joining the HDMP. No further questions or concerns. I will follow up in a few weeks to assess progress.

## 2017-11-22 ENCOUNTER — HOSPITAL ENCOUNTER (OUTPATIENT)
Dept: RADIOLOGY | Facility: HOSPITAL | Age: 67
Discharge: HOME | End: 2017-11-22
Attending: OBSTETRICS & GYNECOLOGY
Payer: MEDICARE

## 2017-11-22 VITALS — HEIGHT: 62 IN | WEIGHT: 180 LBS | BODY MASS INDEX: 33.13 KG/M2

## 2017-11-22 DIAGNOSIS — Z12.31 VISIT FOR SCREENING MAMMOGRAM: ICD-10-CM

## 2017-11-22 PROCEDURE — 77067 SCR MAMMO BI INCL CAD: CPT | Mod: 26,,, | Performed by: RADIOLOGY

## 2017-11-22 PROCEDURE — 77067 SCR MAMMO BI INCL CAD: CPT | Mod: TC,PO

## 2017-11-22 PROCEDURE — 77063 BREAST TOMOSYNTHESIS BI: CPT | Mod: 26,,, | Performed by: RADIOLOGY

## 2017-11-29 ENCOUNTER — PATIENT MESSAGE (OUTPATIENT)
Dept: ORTHOPEDICS | Facility: CLINIC | Age: 67
End: 2017-11-29

## 2017-12-06 RX ORDER — PRAVASTATIN SODIUM 40 MG/1
TABLET ORAL
Qty: 90 TABLET | Refills: 3 | Status: SHIPPED | OUTPATIENT
Start: 2017-12-06 | End: 2017-12-19 | Stop reason: SDUPTHER

## 2017-12-07 ENCOUNTER — OFFICE VISIT (OUTPATIENT)
Dept: FAMILY MEDICINE | Facility: CLINIC | Age: 67
End: 2017-12-07
Payer: MEDICARE

## 2017-12-07 VITALS
WEIGHT: 179.69 LBS | HEIGHT: 62 IN | DIASTOLIC BLOOD PRESSURE: 80 MMHG | OXYGEN SATURATION: 96 % | HEART RATE: 101 BPM | TEMPERATURE: 98 F | BODY MASS INDEX: 33.07 KG/M2 | SYSTOLIC BLOOD PRESSURE: 120 MMHG | RESPIRATION RATE: 16 BRPM

## 2017-12-07 DIAGNOSIS — I10 ESSENTIAL HYPERTENSION: Primary | ICD-10-CM

## 2017-12-07 DIAGNOSIS — E78.5 HYPERLIPIDEMIA, UNSPECIFIED HYPERLIPIDEMIA TYPE: ICD-10-CM

## 2017-12-07 DIAGNOSIS — F41.9 ANXIETY: ICD-10-CM

## 2017-12-07 PROCEDURE — 99214 OFFICE O/P EST MOD 30 MIN: CPT | Mod: S$PBB,,, | Performed by: FAMILY MEDICINE

## 2017-12-07 PROCEDURE — 99999 PR PBB SHADOW E&M-EST. PATIENT-LVL IV: CPT | Mod: PBBFAC,,, | Performed by: FAMILY MEDICINE

## 2017-12-07 PROCEDURE — 99214 OFFICE O/P EST MOD 30 MIN: CPT | Mod: PBBFAC,PO | Performed by: FAMILY MEDICINE

## 2017-12-07 NOTE — PROGRESS NOTES
Subjective:       Patient ID: Lucinda Aguilera is a 66 y.o. female.    Chief Complaint: Hypertension (follow up )    HPI Comments: Here today for 6 month f/u on chronic health issues.    HLD - tolerating pravachol 40mg daily  Anxiety - She is taking Paxil 20mg daily. Taking Xanax 0.5mg 1 tablet BID. She feels like this is well-controlled  HTN - tolerating Avapro 300mg daily and Hygroten 25mg daily and amlodipine; BP controlled; digital flowsheet reviewed  GERD - tolerating Prilosec 20mg daily  HLD - tolerating pravastatin 40mg  Knee arthroscopy pending with Dr. Etienne  She did have angiogram in 2010.    Past Medical History:    Hypertension                                                  Anxiety                                           Hyperlipemia                                                  GERD (gastroesophageal reflux disease)                        Cataract                                                        Comment:OU    PVD (posterior vitreous detachment)                             Comment:OD    Arthritis                                                       Comment:right thumb    Past Surgical History:    chest abcess                                                     Comment:child    KNEE ARTHROSCOPY W/ LASER                                        Comment:right    COLONOSCOPY                                      1/2012          Comment:repeat in 5 years    No Known Allergies    Social History    Marital Status:              Spouse Name:                       Years of Education:                 Number of children: 2             Occupational History  Occupation          Employer            Comment               retired                                   retired teacher an*                         Social History Main Topics    Smoking Status: Never Smoker                      Smokeless Status: Never Used                        Alcohol Use: Yes                Comment: social    Drug Use: No               Sexual Activity: Yes               Partners with: Male       Birth Control/Protection: None, Post-menopausal    Current Outpatient Prescriptions on File Prior to Visit   Medication Sig Dispense Refill    alprazolam (XANAX) 0.5 MG tablet Take 1 tablet (0.5 mg total) by mouth 2 (two) times daily as needed. 60 tablet 5    aspirin (ECOTRIN) 81 MG EC tablet Take 81 mg by mouth once daily.      carboxymethylcellulose (REFRESH PLUS) 0.5 % Dpet 1 drop 3 (three) times daily as needed.      chlorthalidone (HYGROTEN) 25 MG Tab Take 1 tablet (25 mg total) by mouth every morning. Stop HCTZ. 30 tablet 11    coenzyme Q10 100 mg capsule Take 100 mg by mouth once daily.      fish oil-omega-3 fatty acids 300-1,000 mg capsule Take 2 g by mouth once daily.      irbesartan (AVAPRO) 300 MG tablet Take 1 tablet (300 mg total) by mouth every evening. Discontinue irbesartan 150mg RX. 90 tablet 3    multivitamin (MULTIVITAMIN) per tablet Take 1 tablet by mouth once daily.        naproxen sodium (ALEVE) 220 mg Cap Take 220 mg by mouth 2 (two) times daily.      niacin 500 MG CpSR Take 500 mg by mouth every evening.        pantoprazole (PROTONIX) 40 MG tablet Take 1 tablet (40 mg total) by mouth before breakfast. 30 tablet 11    paroxetine (PAXIL) 20 MG tablet Take 1 tablet (20 mg total) by mouth every morning. 90 tablet 3    potassium chloride (MICRO-K) 10 MEQ CpSR Take 2 capsules (20 mEq total) by mouth once daily. 60 capsule 11    pravastatin (PRAVACHOL) 40 MG tablet TAKE 1 TABLET(40 MG) BY MOUTH EVERY DAY 90 tablet 2    pravastatin (PRAVACHOL) 40 MG tablet TAKE 1 TABLET(40 MG) BY MOUTH EVERY DAY 90 tablet 3    sucralfate (CARAFATE) 1 gram tablet Take 1 tablet (1 g total) by mouth 4 (four) times daily. 120 tablet 0    polycarbophil (FIBERCON) 625 mg tablet Take 625 mg by mouth once daily.       No current facility-administered medications on file prior to visit.      Review of patient's family history indicates:     "Hypertension                   Mother                    Heart disease                  Mother                    Stroke                         Father                    Review of Systems   Constitutional: Negative for fever, chills, appetite change and unexpected weight change.   HENT: Negative for sore throat and trouble swallowing.    Eyes: Negative for pain and visual disturbance.   Respiratory: Negative for cough, shortness of breath and wheezing.    Cardiovascular: Negative for chest pain and palpitations.   Gastrointestinal: Negative for nausea, vomiting, abdominal pain, diarrhea and blood in stool.   Genitourinary: Negative for dysuria, hematuria and difficulty urinating.   Musculoskeletal: Negative for arthralgias, gait problem and neck pain.   Skin: Negative for rash and wound.   Neurological: Negative for dizziness, weakness, numbness and headaches.   Hematological: Negative for adenopathy.   Psychiatric/Behavioral: Negative for dysphoric mood.     Answers for HPI/ROS submitted by the patient on 11/30/2017   activity change: No  unexpected weight change: No  neck pain: No  hearing loss: No  rhinorrhea: No  trouble swallowing: No  eye discharge: No  visual disturbance: No  chest tightness: No  wheezing: No  chest pain: No  palpitations: No  blood in stool: No  constipation: No  vomiting: No  diarrhea: No  polydipsia: No  polyuria: No  difficulty urinating: No  hematuria: No  menstrual problem: No  dysuria: No  joint swelling: No  arthralgias: No  headaches: No  weakness: No  confusion: No  dysphoric mood: No        Objective:       /80 (BP Location: Left arm, Patient Position: Sitting, BP Method: Large (Manual))   Pulse 101   Temp 98.1 °F (36.7 °C) (Oral)   Resp 16   Ht 5' 2" (1.575 m)   Wt 81.5 kg (179 lb 10.8 oz)   LMP 04/18/2004   SpO2 96%   BMI 32.86 kg/m²     Physical Exam   Constitutional: She is oriented to person, place, and time. She appears well-developed and well-nourished. "   HENT:   Head: Normocephalic.   Mouth/Throat: Oropharynx is clear and moist. No oropharyngeal exudate or posterior oropharyngeal erythema.   Eyes: Conjunctivae and EOM are normal. Pupils are equal, round, and reactive to light.   Neck: Normal range of motion. Neck supple. No thyromegaly present.   Cardiovascular: Normal rate, regular rhythm, S1 normal, S2 normal, normal heart sounds and intact distal pulses.  Exam reveals no gallop and no friction rub.    No murmur heard.  Pulmonary/Chest: Effort normal and breath sounds normal. She has no wheezes. She has no rales.   Abdominal: Normal appearance.   Musculoskeletal:        Right lower leg: She exhibits no edema.        Left lower leg: She exhibits no edema.   Lymphadenopathy:     She has no cervical adenopathy.   Neurological: She is alert and oriented to person, place, and time. No cranial nerve deficit. Gait normal.   Skin: Skin is warm and intact. No rash noted.   Psychiatric: She has a normal mood and affect.       Results for orders placed or performed during the hospital encounter of 07/13/17   POCT H. pylori   Result Value Ref Range    H. pylori Index Value Negative Negative     Assessment:       1. Essential hypertension    2. Anxiety    3. Hyperlipidemia, unspecified hyperlipidemia type        Plan:       Essential hypertension  -     CBC auto differential; Future; Expected date: 03/07/2018    Anxiety    Hyperlipidemia, unspecified hyperlipidemia type  -     Lipid panel; Future; Expected date: 03/07/2018  -     Comprehensive metabolic panel; Future; Expected date: 03/07/2018            Continue Paxil 20mg daily, Xanax PRN  cotninue aspirin 81mg daily  Continue other present meds  Counseled on regular exercise, maintenance of a healthy weight, balanced diet rich in fruits/vegetables and lean protein, and avoidance of unhealthy habits like smoking and excessive alcohol intake.  F/u 6 months with labs

## 2017-12-12 ENCOUNTER — PATIENT OUTREACH (OUTPATIENT)
Dept: OTHER | Facility: OTHER | Age: 67
End: 2017-12-12

## 2017-12-12 NOTE — PROGRESS NOTES
Last 5 Patient Entered Readings Current 30 Day Average: 124/75       Units 12/11/2017 12/6/2017 12/6/2017 12/3/2017 12/1/2017    Time -  7:23 PM  6:34 PM  6:21 PM  8:48 PM  9:24 PM    Systolic Blood Pressure - 129 124 135 131 130    Diastolic Blood Pressure - 74 75 90 75 71    Pulse bpm 72 69 69 74 86    Weight - 172 172 172 172 172    Steps Walked - 4000 6000 6000 500 5500        Hypertension Digital Medicine Program (HDMP): Health  Follow Up    Lifestyle Modifications:    1.Low sodium diet: yes Patient denies any changes to her diet.    2.Physical activity: yes Ms. Aguilera continues to stay active. Patient states that she is schedule for an arthroscopy-meniscectomy on 12/21/17.     3.Hypotension/Hypertension symptoms: no   Frequency/Alleviating factors/Precipitating factors, etc.     4.Patient has been compliant with the medication regimen.     Follow up with Ms. Lucinda DRAKE Ale completed. Ms. Aguilera is doing well. Patient has been entering BP readings manually. However, she thinks that she is going to try using the [employer] digital cuff again. She has not charged it in months. Asked that she charge the cuff overnight before use, and to call is she has trouble getting it to connect. Patient verbalized understanding. No further questions or concerns. I will follow up in a few weeks to assess progress.

## 2017-12-13 ENCOUNTER — PATIENT MESSAGE (OUTPATIENT)
Dept: OBSTETRICS AND GYNECOLOGY | Facility: CLINIC | Age: 67
End: 2017-12-13

## 2017-12-19 ENCOUNTER — PATIENT MESSAGE (OUTPATIENT)
Dept: SURGERY | Facility: HOSPITAL | Age: 67
End: 2017-12-19

## 2017-12-19 ENCOUNTER — OFFICE VISIT (OUTPATIENT)
Dept: ORTHOPEDICS | Facility: CLINIC | Age: 67
End: 2017-12-19
Payer: MEDICARE

## 2017-12-19 VITALS
BODY MASS INDEX: 32.94 KG/M2 | WEIGHT: 179 LBS | SYSTOLIC BLOOD PRESSURE: 131 MMHG | DIASTOLIC BLOOD PRESSURE: 70 MMHG | HEART RATE: 72 BPM | HEIGHT: 62 IN

## 2017-12-19 DIAGNOSIS — M17.12 PRIMARY OSTEOARTHRITIS OF LEFT KNEE: ICD-10-CM

## 2017-12-19 DIAGNOSIS — M25.562 CHRONIC PAIN OF LEFT KNEE: ICD-10-CM

## 2017-12-19 DIAGNOSIS — M23.201 OLD COMPLEX TEAR OF LATERAL MENISCUS OF LEFT KNEE: Primary | ICD-10-CM

## 2017-12-19 DIAGNOSIS — G89.29 CHRONIC PAIN OF LEFT KNEE: ICD-10-CM

## 2017-12-19 PROCEDURE — 99999 PR PBB SHADOW E&M-EST. PATIENT-LVL IV: CPT | Mod: PBBFAC,,, | Performed by: ORTHOPAEDIC SURGERY

## 2017-12-19 PROCEDURE — 99214 OFFICE O/P EST MOD 30 MIN: CPT | Mod: PBBFAC,PN | Performed by: ORTHOPAEDIC SURGERY

## 2017-12-19 PROCEDURE — 99213 OFFICE O/P EST LOW 20 MIN: CPT | Mod: S$PBB,,, | Performed by: ORTHOPAEDIC SURGERY

## 2017-12-19 RX ORDER — DOCUSATE SODIUM 100 MG/1
100 CAPSULE, LIQUID FILLED ORAL 2 TIMES DAILY
Qty: 60 CAPSULE | Refills: 0 | Status: SHIPPED | OUTPATIENT
Start: 2017-12-19 | End: 2018-07-23 | Stop reason: SDUPTHER

## 2017-12-19 RX ORDER — OXYCODONE AND ACETAMINOPHEN 5; 325 MG/1; MG/1
1 TABLET ORAL EVERY 8 HOURS PRN
Qty: 60 TABLET | Refills: 0 | Status: SHIPPED | OUTPATIENT
Start: 2017-12-19 | End: 2018-03-01

## 2017-12-19 RX ORDER — PROMETHAZINE HYDROCHLORIDE 25 MG/1
25 TABLET ORAL EVERY 6 HOURS PRN
Qty: 20 TABLET | Refills: 0 | Status: SHIPPED | OUTPATIENT
Start: 2017-12-19 | End: 2018-06-12

## 2017-12-19 NOTE — H&P
Subjective:          Chief Complaint: Lucinda Aguilera is a 66 y.o. female who had concerns including Pain of the Left Knee.    Mrs. Aguilera is here for evaluation of her left knee which has been hurting since early this year. She has had imaging done in March however was not having a lot of issues at that time. Her pain, swelling and mechanical symptoms have increased since then.     She had an injection in March 2017 that helped with the pain.     Pain: 3/10      Pain   Associated symptoms include joint swelling and myalgias. Pertinent negatives include no chest pain, chills, coughing, diaphoresis, fever, headaches, nausea, neck pain, numbness, rash, vomiting or weakness.   Leg Pain    The pain is present in the left knee. This is a new problem. The current episode started more than 1 month ago. There has been no history of extremity trauma. The injury was the result of a falling action while at home. The problem occurs daily. The problem has been rapidly worsening. The quality of the pain is described as aching, generalized and throbbing. The pain is at a severity of 8/10. Associated symptoms include an inability to bear weight, joint locking, a limited range of motion and stiffness. Pertinent negatives include no fever, itching, numbness or tingling. The symptoms are aggravated by bearing weight, standing and walking. She has tried cold, heat, OTC ointments and rest for the symptoms. The treatment provided mild relief. Physical therapy was not tried.        Past Medical History:   Diagnosis Date    Anxiety     takes daily Xanax    Arthritis     right thumb    Cataract     OU    GERD (gastroesophageal reflux disease)     Hyperlipemia     Hypertension     PVD (posterior vitreous detachment)     OD       Past Surgical History:   Procedure Laterality Date    chest abcess      child    COLONOSCOPY  1/2012    repeat in 5 years    JOINT REPLACEMENT      right knee     KNEE ARTHROSCOPY W/ LASER      right     KNEE SURGERY Right 06/03/2016    Total knee replacement    thumb surgery  11/2014       Family History   Problem Relation Age of Onset    Hypertension Mother     Heart disease Mother     Stroke Father     Glaucoma Sister     Cataracts Sister     Macular degeneration Sister     Breast cancer Neg Hx          Current Outpatient Prescriptions:     alprazolam (XANAX) 0.5 MG tablet, Take 1 tablet (0.5 mg total) by mouth 2 (two) times daily as needed., Disp: 60 tablet, Rfl: 5    aspirin (ECOTRIN) 81 MG EC tablet, Take 81 mg by mouth once daily., Disp: , Rfl:     carboxymethylcellulose (REFRESH PLUS) 0.5 % Dpet, 1 drop 3 (three) times daily as needed., Disp: , Rfl:     chlorthalidone (HYGROTEN) 25 MG Tab, Take 1 tablet (25 mg total) by mouth every morning. Stop HCTZ., Disp: 30 tablet, Rfl: 11    coenzyme Q10 100 mg capsule, Take 100 mg by mouth once daily., Disp: , Rfl:     fish oil-omega-3 fatty acids 300-1,000 mg capsule, Take 2 g by mouth once daily., Disp: , Rfl:     irbesartan (AVAPRO) 300 MG tablet, Take 1 tablet (300 mg total) by mouth every evening. Discontinue irbesartan 150mg RX., Disp: 90 tablet, Rfl: 3    multivitamin (MULTIVITAMIN) per tablet, Take 1 tablet by mouth once daily.  , Disp: , Rfl:     naproxen sodium (ALEVE) 220 mg Cap, Take 220 mg by mouth 2 (two) times daily., Disp: , Rfl:     niacin 500 MG CpSR, Take 500 mg by mouth every evening.  , Disp: , Rfl:     pantoprazole (PROTONIX) 40 MG tablet, Take 1 tablet (40 mg total) by mouth before breakfast., Disp: 30 tablet, Rfl: 11    paroxetine (PAXIL) 20 MG tablet, Take 1 tablet (20 mg total) by mouth every morning., Disp: 90 tablet, Rfl: 3    polycarbophil (FIBERCON) 625 mg tablet, Take 625 mg by mouth once daily., Disp: , Rfl:     potassium chloride (MICRO-K) 10 MEQ CpSR, Take 2 capsules (20 mEq total) by mouth once daily., Disp: 60 capsule, Rfl: 11    pravastatin (PRAVACHOL) 40 MG tablet, TAKE 1 TABLET(40 MG) BY MOUTH EVERY DAY,  Disp: 90 tablet, Rfl: 2    pravastatin (PRAVACHOL) 40 MG tablet, TAKE 1 TABLET(40 MG) BY MOUTH EVERY DAY, Disp: 90 tablet, Rfl: 3    sucralfate (CARAFATE) 1 gram tablet, Take 1 tablet (1 g total) by mouth 4 (four) times daily., Disp: 120 tablet, Rfl: 0    Review of patient's allergies indicates:  No Known Allergies    Vitals:    12/19/17 0823   BP: 131/70   Pulse: 72       Review of Systems   Constitution: Negative for chills, diaphoresis, fever and weakness.   HENT: Negative for ear pain, hearing loss, nosebleeds and tinnitus.    Eyes: Negative for discharge, pain, redness and visual disturbance.   Cardiovascular: Negative for chest pain and palpitations.   Respiratory: Negative for cough, shortness of breath and wheezing.    Endocrine: Negative for polydipsia and polyuria.   Skin: Negative for itching and rash.   Musculoskeletal: Positive for joint pain, joint swelling, myalgias and stiffness. Negative for back pain and neck pain.   Gastrointestinal: Positive for heartburn. Negative for constipation, diarrhea, nausea and vomiting.   Genitourinary: Negative for dysuria, frequency and hematuria.   Neurological: Negative for dizziness, headaches, numbness, seizures and tingling.   Psychiatric/Behavioral: The patient is not nervous/anxious.    Allergic/Immunologic: Negative for environmental allergies.   All other systems reviewed and are negative.                  Objective:        General: Lucinda is well-developed, well-nourished, appears stated age, in no acute distress, alert and oriented to time, place and person.     General    Vitals reviewed.  Constitutional: She is oriented to person, place, and time. She appears well-developed and well-nourished. No distress.   HENT:   Head: Normocephalic and atraumatic.   Nose: Nose normal.   Eyes: Pupils are equal, round, and reactive to light.   Cardiovascular: Normal rate.    Pulmonary/Chest: Effort normal.   Neurological: She is alert and oriented to person, place,  and time.   Psychiatric: She has a normal mood and affect. Her behavior is normal. Judgment and thought content normal.           Right Knee Exam     Inspection   Scars: present    Left Knee Exam     Inspection   Effusion: present  Bruising: absent    Tenderness   The patient tender to palpation of the lateral joint line and lateral retinaculum.    Range of Motion   Extension: 0   Flexion: 140     Tests   Meniscus   Liyah:  Medial - negative Lateral - positive  Stability Lachman: normal (-1 to 2mm) PCL-Posterior Drawer: normal (0 to 2mm)  MCL - Valgus: normal (0 to 2mm)  LCL - Varus: normal (0 to 2mm)  Patella   Patellar Apprehension: negative  Passive Patellar Tilt: neutral  Patellar Tracking: normal  Patellar Glide (Quadrants): Lateral - 1 Medial - 2  Q-Angle at 90 degrees: normal  Patellar Grind: negative    Other   Sensation: normal    Muscle Strength   Left Lower Extremity   Hip Abduction: 5/5   Quadriceps:  5/5   Hamstrin/5     Vascular Exam       Left Pulses  Dorsalis Pedis:      2+  Posterior Tibial:      2+              Current and previous radiographic studies and results were reviewed with the patient:   MRI:    1. Meniscal tearing involving the anterior horn posterior horn and body of the lateral meniscus    2. Loose body adjacent to the popliteal tendon along with chondromalacia patella,. Cartilaginous thinning is also noted involving the lateral tibial plateau.    3. Lateral collateral ligament sprain with a wavy appearance suggestive of possible tear. Medial collateral ligament sprain at the femoral insertion    4. Small suprapatellar joint effusion        Assessment:       Encounter Diagnoses   Name Primary?    Chronic pain of left knee Yes    Primary osteoarthritis of left knee     Old complex tear of lateral meniscus of left knee           Plan:         We reviewed with Lucinda today, the pathology and natural history of her diagnosis. We have discussed a variety of treatment options  including medications, physical therapy and other alternative treatments. I also explained the indications, risks and benefits of surgery. After discussion, Lucinda decided to proceed with surgery. The decision was made to go forward with   -  Left knee arthroscopic lateral meniscectomy  - DOS: 21 DECEMBER 2017  - Pre-Op Date: 19 DECEMBER 2017  - Pre-Op Clearance Needed: NO  - Case Request Completed at this visit    The details of the surgical procedure were explained, including the location of probable incisions and a description of likely hardware and/or grafts to be used.  The patient understands the likely convalescence after surgery.  Also, we have thoroughly discussed the risks, benefits and alternatives to surgery, including, but not limited to, the risk of infection, joint stiffness, blood clot (including DVT and/or pulmonary embolus), neurologic and vascular injury.  It was explained that, if tissue has been repaired or reconstructed, there is a chance of failure, which may require further management.      All of the patient's questions were answered and informed consent was obtained. The patient will contact us if they have any questions or concerns in the interim.          Answers for HPI/ROS submitted by the patient on 12/12/2017   Leg pain  activity change: No  unexpected weight change: No  rhinorrhea: No  trouble swallowing: No  chest tightness: No  blood in stool: No  difficulty urinating: No  menstrual problem: No  arthralgias: Yes  confusion: No  dysphoric mood: No  appetite change : No  sleep disturbance: No  IMMUNOCOMPROMISED: No  sinus pressure : No  food allergies: No  painful intercourse: No  Radiating Pain: No

## 2017-12-20 ENCOUNTER — ANESTHESIA EVENT (OUTPATIENT)
Dept: SURGERY | Facility: HOSPITAL | Age: 67
End: 2017-12-20
Payer: MEDICARE

## 2017-12-21 ENCOUNTER — PATIENT MESSAGE (OUTPATIENT)
Dept: SURGERY | Facility: HOSPITAL | Age: 67
End: 2017-12-21

## 2017-12-21 ENCOUNTER — NURSE TRIAGE (OUTPATIENT)
Dept: ADMINISTRATIVE | Facility: CLINIC | Age: 67
End: 2017-12-21

## 2017-12-21 ENCOUNTER — ANESTHESIA (OUTPATIENT)
Dept: SURGERY | Facility: HOSPITAL | Age: 67
End: 2017-12-21
Payer: MEDICARE

## 2017-12-21 ENCOUNTER — SURGERY (OUTPATIENT)
Age: 67
End: 2017-12-21

## 2017-12-21 ENCOUNTER — HOSPITAL ENCOUNTER (OUTPATIENT)
Facility: HOSPITAL | Age: 67
Discharge: HOME | End: 2017-12-21
Attending: ORTHOPAEDIC SURGERY | Admitting: ORTHOPAEDIC SURGERY
Payer: MEDICARE

## 2017-12-21 DIAGNOSIS — G89.29 CHRONIC PAIN OF LEFT KNEE: ICD-10-CM

## 2017-12-21 DIAGNOSIS — M17.12 PRIMARY OSTEOARTHRITIS OF LEFT KNEE: ICD-10-CM

## 2017-12-21 DIAGNOSIS — M23.201 OLD COMPLEX TEAR OF LATERAL MENISCUS OF LEFT KNEE: ICD-10-CM

## 2017-12-21 DIAGNOSIS — M25.562 CHRONIC PAIN OF LEFT KNEE: ICD-10-CM

## 2017-12-21 PROCEDURE — 63600175 PHARM REV CODE 636 W HCPCS: Mod: PO | Performed by: ANESTHESIOLOGY

## 2017-12-21 PROCEDURE — 27201423 OPTIME MED/SURG SUP & DEVICES STERILE SUPPLY: Mod: PO | Performed by: ORTHOPAEDIC SURGERY

## 2017-12-21 PROCEDURE — D9220A PRA ANESTHESIA: Mod: CRNA,,, | Performed by: NURSE ANESTHETIST, CERTIFIED REGISTERED

## 2017-12-21 PROCEDURE — 71000039 HC RECOVERY, EACH ADD'L HOUR: Mod: PO | Performed by: ORTHOPAEDIC SURGERY

## 2017-12-21 PROCEDURE — 63600175 PHARM REV CODE 636 W HCPCS: Mod: PO | Performed by: ORTHOPAEDIC SURGERY

## 2017-12-21 PROCEDURE — 37000009 HC ANESTHESIA EA ADD 15 MINS: Mod: PO | Performed by: ORTHOPAEDIC SURGERY

## 2017-12-21 PROCEDURE — 71000033 HC RECOVERY, INTIAL HOUR: Mod: PO | Performed by: ORTHOPAEDIC SURGERY

## 2017-12-21 PROCEDURE — D9220A PRA ANESTHESIA: Mod: ANES,,, | Performed by: ANESTHESIOLOGY

## 2017-12-21 PROCEDURE — 25000003 PHARM REV CODE 250: Mod: PO | Performed by: ORTHOPAEDIC SURGERY

## 2017-12-21 PROCEDURE — 25000003 PHARM REV CODE 250: Mod: PO | Performed by: ANESTHESIOLOGY

## 2017-12-21 PROCEDURE — 29881 ARTHRS KNE SRG MNISECTMY M/L: CPT | Mod: LT,,, | Performed by: ORTHOPAEDIC SURGERY

## 2017-12-21 PROCEDURE — 63600175 PHARM REV CODE 636 W HCPCS: Mod: PO | Performed by: NURSE ANESTHETIST, CERTIFIED REGISTERED

## 2017-12-21 PROCEDURE — 37000008 HC ANESTHESIA 1ST 15 MINUTES: Mod: PO | Performed by: ORTHOPAEDIC SURGERY

## 2017-12-21 PROCEDURE — 27200651 HC AIRWAY, LMA: Mod: PO | Performed by: NURSE ANESTHETIST, CERTIFIED REGISTERED

## 2017-12-21 PROCEDURE — 36000711: Mod: PO | Performed by: ORTHOPAEDIC SURGERY

## 2017-12-21 PROCEDURE — 36000710: Mod: PO | Performed by: ORTHOPAEDIC SURGERY

## 2017-12-21 RX ORDER — FENTANYL CITRATE 50 UG/ML
INJECTION, SOLUTION INTRAMUSCULAR; INTRAVENOUS
Status: DISCONTINUED | OUTPATIENT
Start: 2017-12-21 | End: 2017-12-21

## 2017-12-21 RX ORDER — ONDANSETRON 2 MG/ML
INJECTION INTRAMUSCULAR; INTRAVENOUS
Status: DISCONTINUED | OUTPATIENT
Start: 2017-12-21 | End: 2017-12-21

## 2017-12-21 RX ORDER — MUPIROCIN 20 MG/G
1 OINTMENT TOPICAL
Status: COMPLETED | OUTPATIENT
Start: 2017-12-21 | End: 2017-12-21

## 2017-12-21 RX ORDER — FENTANYL CITRATE 50 UG/ML
25 INJECTION, SOLUTION INTRAMUSCULAR; INTRAVENOUS EVERY 5 MIN PRN
Status: COMPLETED | OUTPATIENT
Start: 2017-12-21 | End: 2017-12-21

## 2017-12-21 RX ORDER — ONDANSETRON 2 MG/ML
4 INJECTION INTRAMUSCULAR; INTRAVENOUS EVERY 12 HOURS PRN
Status: DISCONTINUED | OUTPATIENT
Start: 2017-12-21 | End: 2017-12-21 | Stop reason: HOSPADM

## 2017-12-21 RX ORDER — HYDROCODONE BITARTRATE AND ACETAMINOPHEN 5; 325 MG/1; MG/1
1 TABLET ORAL EVERY 4 HOURS PRN
Status: DISCONTINUED | OUTPATIENT
Start: 2017-12-21 | End: 2017-12-21 | Stop reason: HOSPADM

## 2017-12-21 RX ORDER — MIDAZOLAM HYDROCHLORIDE 1 MG/ML
INJECTION, SOLUTION INTRAMUSCULAR; INTRAVENOUS
Status: DISCONTINUED | OUTPATIENT
Start: 2017-12-21 | End: 2017-12-21

## 2017-12-21 RX ORDER — SODIUM CHLORIDE, SODIUM LACTATE, POTASSIUM CHLORIDE, CALCIUM CHLORIDE 600; 310; 30; 20 MG/100ML; MG/100ML; MG/100ML; MG/100ML
INJECTION, SOLUTION INTRAVENOUS CONTINUOUS
Status: DISCONTINUED | OUTPATIENT
Start: 2017-12-21 | End: 2017-12-21 | Stop reason: HOSPADM

## 2017-12-21 RX ORDER — ACETAMINOPHEN 10 MG/ML
INJECTION, SOLUTION INTRAVENOUS
Status: DISCONTINUED | OUTPATIENT
Start: 2017-12-21 | End: 2017-12-21

## 2017-12-21 RX ORDER — DIPHENHYDRAMINE HYDROCHLORIDE 50 MG/ML
25 INJECTION INTRAMUSCULAR; INTRAVENOUS EVERY 6 HOURS PRN
Status: DISCONTINUED | OUTPATIENT
Start: 2017-12-21 | End: 2017-12-21 | Stop reason: HOSPADM

## 2017-12-21 RX ORDER — PROPOFOL 10 MG/ML
VIAL (ML) INTRAVENOUS
Status: DISCONTINUED | OUTPATIENT
Start: 2017-12-21 | End: 2017-12-21

## 2017-12-21 RX ORDER — LIDOCAINE HYDROCHLORIDE 10 MG/ML
1 INJECTION, SOLUTION EPIDURAL; INFILTRATION; INTRACAUDAL; PERINEURAL ONCE AS NEEDED
Status: COMPLETED | OUTPATIENT
Start: 2017-12-21 | End: 2017-12-21

## 2017-12-21 RX ORDER — LIDOCAINE HCL/PF 100 MG/5ML
SYRINGE (ML) INTRAVENOUS
Status: DISCONTINUED | OUTPATIENT
Start: 2017-12-21 | End: 2017-12-21

## 2017-12-21 RX ORDER — LIDOCAINE HYDROCHLORIDE 10 MG/ML
1 INJECTION INFILTRATION; PERINEURAL ONCE
Status: DISCONTINUED | OUTPATIENT
Start: 2017-12-21 | End: 2017-12-21 | Stop reason: HOSPADM

## 2017-12-21 RX ORDER — HYDROMORPHONE HYDROCHLORIDE 2 MG/ML
1 INJECTION, SOLUTION INTRAMUSCULAR; INTRAVENOUS; SUBCUTANEOUS EVERY 4 HOURS PRN
Status: DISCONTINUED | OUTPATIENT
Start: 2017-12-21 | End: 2017-12-21 | Stop reason: HOSPADM

## 2017-12-21 RX ORDER — KETOROLAC TROMETHAMINE 30 MG/ML
INJECTION, SOLUTION INTRAMUSCULAR; INTRAVENOUS
Status: DISCONTINUED | OUTPATIENT
Start: 2017-12-21 | End: 2017-12-21

## 2017-12-21 RX ORDER — DEXAMETHASONE SODIUM PHOSPHATE 4 MG/ML
8 INJECTION, SOLUTION INTRA-ARTICULAR; INTRALESIONAL; INTRAMUSCULAR; INTRAVENOUS; SOFT TISSUE
Status: COMPLETED | OUTPATIENT
Start: 2017-12-21 | End: 2017-12-21

## 2017-12-21 RX ORDER — MEPERIDINE HYDROCHLORIDE 50 MG/ML
12.5 INJECTION INTRAMUSCULAR; INTRAVENOUS; SUBCUTANEOUS ONCE AS NEEDED
Status: DISCONTINUED | OUTPATIENT
Start: 2017-12-21 | End: 2017-12-21 | Stop reason: HOSPADM

## 2017-12-21 RX ORDER — HYDROMORPHONE HYDROCHLORIDE 2 MG/ML
0.2 INJECTION, SOLUTION INTRAMUSCULAR; INTRAVENOUS; SUBCUTANEOUS EVERY 5 MIN PRN
Status: DISCONTINUED | OUTPATIENT
Start: 2017-12-21 | End: 2017-12-21 | Stop reason: HOSPADM

## 2017-12-21 RX ORDER — OXYCODONE HYDROCHLORIDE 5 MG/1
5 TABLET ORAL ONCE AS NEEDED
Status: COMPLETED | OUTPATIENT
Start: 2017-12-22 | End: 2017-12-21

## 2017-12-21 RX ADMIN — FENTANYL CITRATE 25 MCG: 50 INJECTION, SOLUTION INTRAMUSCULAR; INTRAVENOUS at 07:12

## 2017-12-21 RX ADMIN — FENTANYL CITRATE 25 MCG: 50 INJECTION, SOLUTION INTRAMUSCULAR; INTRAVENOUS at 08:12

## 2017-12-21 RX ADMIN — MIDAZOLAM HYDROCHLORIDE 2 MG: 1 INJECTION, SOLUTION INTRAMUSCULAR; INTRAVENOUS at 07:12

## 2017-12-21 RX ADMIN — ACETAMINOPHEN 1000 MG: 10 INJECTION, SOLUTION INTRAVENOUS at 07:12

## 2017-12-21 RX ADMIN — DEXAMETHASONE SODIUM PHOSPHATE 8 MG: 4 INJECTION, SOLUTION INTRAMUSCULAR; INTRAVENOUS at 06:12

## 2017-12-21 RX ADMIN — MUPIROCIN 1 G: 20 OINTMENT TOPICAL at 06:12

## 2017-12-21 RX ADMIN — OXYCODONE HYDROCHLORIDE 5 MG: 5 TABLET ORAL at 08:12

## 2017-12-21 RX ADMIN — LIDOCAINE HYDROCHLORIDE 75 MG: 20 INJECTION PARENTERAL at 07:12

## 2017-12-21 RX ADMIN — SODIUM CHLORIDE, SODIUM LACTATE, POTASSIUM CHLORIDE, CALCIUM CHLORIDE: 600; 310; 30; 20 INJECTION, SOLUTION INTRAVENOUS at 06:12

## 2017-12-21 RX ADMIN — DEXTROSE 2 G: 50 INJECTION, SOLUTION INTRAVENOUS at 07:12

## 2017-12-21 RX ADMIN — LIDOCAINE HYDROCHLORIDE 1 MG: 10 INJECTION, SOLUTION EPIDURAL; INFILTRATION; INTRACAUDAL; PERINEURAL at 06:12

## 2017-12-21 RX ADMIN — KETOROLAC TROMETHAMINE 30 MG: 30 INJECTION, SOLUTION INTRAMUSCULAR; INTRAVENOUS at 07:12

## 2017-12-21 RX ADMIN — ROPIVACAINE HYDROCHLORIDE: 5 INJECTION, SOLUTION EPIDURAL; INFILTRATION; PERINEURAL at 07:12

## 2017-12-21 RX ADMIN — ONDANSETRON 4 MG: 2 INJECTION, SOLUTION INTRAMUSCULAR; INTRAVENOUS at 07:12

## 2017-12-21 RX ADMIN — FENTANYL CITRATE 50 MCG: 50 INJECTION, SOLUTION INTRAMUSCULAR; INTRAVENOUS at 07:12

## 2017-12-21 RX ADMIN — PROPOFOL 160 MG: 10 INJECTION, EMULSION INTRAVENOUS at 07:12

## 2017-12-21 NOTE — DISCHARGE INSTRUCTIONS
[address], [place]                                                                                          [phone]                   Postoperative Instructions for Knee Surgery                 Your Surgery Included:   Open               Arthroscopic   [] Ligament Repair       [x] Diagnostic           [] ACL     [] PCL     [] MCL     [] PLLC      [x] Synovectomy / Plica Removal [] Meniscal Cartilage Repair / Transplantation      [] Lysis of Adhesions / Manipulation [] Articular Cartilage Repair      [] Interval Release           [] Microfracture       [] OATS   [] ACI      [x] Meniscectomy           [] Osteochondral Allograft      [] Meniscal Cartilage Repair  [] Patellar Realignment       [x] Debridement / Chondroplasty         [] Lateral Release   [] Ligament Repair      [] Articular Cartilage Repair          [] Extensor Mechanism             []   Microfracture  []  OATS         []  Cartilage Biopsy [] Tendon Repair          [] Ligament Reconstruction          [] Patella                  [] Quadriceps             []   ACL    []   PCL  [] High Tibial Osteotomy       [] PRP Arthrocentesis  [] Joint Replacement         [] Amniox Arthrocentesis           [] Unicompartmental   [] Patellofemoral                    [] Total Knee                  Call our office ([phone]) immediately if you experience any of the following:       Excessive bleeding or pus like drainage at the incision site       Uncontrollable pain not relieved by pain medication       Excessive swelling or redness at the incision site       Fever above 101.5 degrees not controlled with Tylenol or Motrin       Shortness of Breath       Any foul odor or blistering from the surgery site    FOR EMERGENCIES: If any unusual problems or difficulties occur, call our office at [phone], or page the  at [phone] who will direct your call appropriately    1.   Pain Management: A cold therapy  cuff, pain medications, local injections, and in some cases, regional anesthesia injections are used to manage your post-operative pain. The decision to use each of these options is based on their risks and benefits.     Medications: You were given one or more of the following medication prescriptions before leaving the hospital. Have the prescriptions filled at a pharmacy on your way home and follow the instructions on the bottles. If you need a refill, please call your pharmacy.      Narcotic Medication (usually Vicodin ES, Lortab, Percocet or Nucynta): Begin taking the medication before your knee starts to hurt. Some patients do not like to take any medication, but if you wait until your pain is severe before taking, you will be very uncomfortable for several hours waiting for the narcotic to work. Always take with food.     Nausea / Vomiting: For this issue, we prescribe Phenergan, use this medication as directed.     Cold Therapy: You may have been sent home with a [facility] cold therapy unit and wrap for your knee. Fill with ice and water to the indicated fill line and use throughout the day for the first two days and then as needed to help relieve pain and control swelling.      Regional Anesthesia Injections (Blocks): You may have been given a regional nerve block either before or after surgery. This may make your entire leg numb for 24-36 hours.                            * Proceed with caution when bearing weight on your leg.     2.   Diet: Eat a bland diet for the first day after surgery. Progress your diet as tolerated. Constipation may occur with Narcotic usage, contact our office if you are experiencing constipation.    3.   Activity: Limit your activity during the first 48 hours, keep your leg elevated with pillows under your heel. After the first 48 hours at home, increase your activity level based on your symptoms.    4.   Dressing Change: Remove the dressing on the 3rd day. It is normal for some  "blood to be seen on the dressings. It is also normal for you to see apparent bruising on the skin around your knee when you remove the dressing. If present, leave the steri-strip tape across the incisions. If you are concerned by the drainage or the appearance of your knee, please call one of the numbers listed below.    5.   Showering: You may shower on the 3rd day after surgery with waterproof bandages over small incisions. If you have an incision with Prineo (clear mesh), it does not need to be covered. Do not submerge in any water until after your postoperative appointment in clinic.    6.   Your procedure did not require a post-operative brace.    7.   Your procedure did not require a Continuous Passive Motion (CPM) device.    8.   Weight Bearing: You may have been sent home with crutches. If instructed (see below), use these crutches at all times unless at complete rest.      [] Non-weight bearing for     0 weeks (you may touch your toes to the floor)      [] Partial weight bearing for  0 weeks    [] 25% Body Weight   [] 50% Body Weight      [x] Full weight bearing   AS TOLERATED         [x]  NOW    []  after 0 weeks     9.  Knee Exercises: Begin these exercises the first day after surgery in order to help you regain your motion and strength. You may do the following marked exercises:     [x] Quad Sets - Begin activating your quadriceps muscle by driving your          knee downward into full knee extension while seated on a table or bed   with a towel rolled and propped under your heel     [x] Straight Leg Raise (SLR) - While tulio your quadriceps muscle, lift     your fully extended leg to the level of your non-operative knee (as shown)     [x] Heel Slides - With the knee straight, slide your heel slowly toward your   buttocks, hold at the endpoint for 10-15 seconds, then slowly straighten     [x] Ankle pumps - With your knee straight, move your ankle in a "pumping"    fashion to activate your calf and " leg muscles      10.  Physical Therapy: Physical therapy is an essential component to your recovery from surgery. Your physical therapy will start in 1 days.    FIRST POSTOPERATIVE VISIT: As scheduled.

## 2017-12-21 NOTE — INTERVAL H&P NOTE
The patient has been examined and the H&P has been reviewed:    I concur with the findings and no changes have occurred since H&P was written. The patient was seen this morning. The operative extremity (left knee) was identified by the patient and initialed by an operating surgeon. There have been no changes in the patient's health since their last office visit. The patient's questions were all answered and we will proceed to the O.R.      Anesthesia/Surgery risks, benefits and alternative options discussed and understood by patient/family.          Active Hospital Problems    Diagnosis  POA    Old complex tear of lateral meniscus of left knee [M23.201]  Yes      Resolved Hospital Problems    Diagnosis Date Resolved POA   No resolved problems to display.

## 2017-12-21 NOTE — ANESTHESIA POSTPROCEDURE EVALUATION
"Anesthesia Post Evaluation    Patient: Lucinda Aguilera    Procedure(s) Performed: Procedure(s) (LRB):  ARTHROSCOPY-MENISCECTOMY (Left)  DEBRIDEMENT-KNEE (Left)  SYNOVECTOMY-KNEE (Left)  CHONDROPLASTY-KNEE (Left)    Final Anesthesia Type: general  Patient location during evaluation: PACU  Patient participation: Yes- Able to Participate  Level of consciousness: awake and alert and oriented  Post-procedure vital signs: reviewed and stable  Pain management: adequate  Airway patency: patent  PONV status at discharge: No PONV  Anesthetic complications: no      Cardiovascular status: blood pressure returned to baseline  Respiratory status: unassisted, spontaneous ventilation and room air  Hydration status: euvolemic  Follow-up not needed.        Visit Vitals  BP (!) 144/75 (BP Location: Left arm)   Pulse 85   Temp 36.2 °C (97.2 °F) (Skin)   Resp 18   Ht 5' 3" (1.6 m)   Wt 79.4 kg (175 lb)   LMP 04/18/2004   SpO2 96%   BMI 31.00 kg/m²       Pain/Ashley Score: Pain Assessment Performed: Yes (12/21/2017  8:31 AM)  Presence of Pain: complains of pain/discomfort (12/21/2017  8:31 AM)  Ashley Score: 8 (12/21/2017  8:31 AM)      "

## 2017-12-21 NOTE — BRIEF OP NOTE
[facility]  Brief Operative Note     SUMMARY     Surgery Date: 12/21/2017     Surgeon(s) and Role:     * Tomas Etienne MD - Primary    Assistant: MARGARETH Verma    Pre-op Diagnosis:  Primary osteoarthritis of left knee [M17.12]  Chronic pain of left knee [M25.562, G89.29]  Old complex tear of lateral meniscus of left knee [M23.201]    Post-op Diagnosis:  Primary osteoarthritis of left knee [M17.12]  Chronic pain of left knee [M25.562, G89.29]  Old complex tear of lateral meniscus of left knee [M23.201]    Procedure(s) (LRB):  ARTHROSCOPY-MENISCECTOMY (Left)  DEBRIDEMENT-KNEE (Left)  SYNOVECTOMY-KNEE (Left)    Anesthesia: General and local    Description of the findings of the procedure: patellofemoral chondromalacia; lateral meniscus tear; end-stage lateral compartment arthritis    Findings/Key Components: see operative note      Estimated Blood Loss: minimal         Specimens:   Specimen (12h ago through future)    None          Discharge Note    SUMMARY     Admit Date: 12/21/2017    Discharge Date and Time: No discharge date for patient encounter.    Attending Physician: Tomas Etienne MD     Discharge Provider: Tomas Etienne    Final Diagnosis :Primary osteoarthritis of left knee [M17.12]  Chronic pain of left knee [M25.562, G89.29]  Old complex tear of lateral meniscus of left knee [M23.201]    Outcome of Hospitalization, Treatment, Procedure, or Surgery:  Patient was admitted for an outpatient procedure and tolerated it well without complications.    Disposition: Home or Self care    Follow Up/Patient Instructions: The patient will be discharged home after meeting all discharge criteria. The patient will resume a diet as tolerated. Please follow post-operative instructions for activity restrictions. Keep previously planned post-operative follow -up appointment.      Medications:  Reconciled Home Medications:   Current Discharge Medication List      CONTINUE these medications  which have NOT CHANGED    Details   alprazolam (XANAX) 0.5 MG tablet Take 1 tablet (0.5 mg total) by mouth 2 (two) times daily as needed.  Qty: 60 tablet, Refills: 5    Associated Diagnoses: Anxiety      carboxymethylcellulose (REFRESH PLUS) 0.5 % Dpet 1 drop 3 (three) times daily as needed.      chlorthalidone (HYGROTEN) 25 MG Tab Take 1 tablet (25 mg total) by mouth every morning. Stop HCTZ.  Qty: 30 tablet, Refills: 11    Associated Diagnoses: Essential hypertension      docusate sodium (COLACE) 100 MG capsule Take 1 capsule (100 mg total) by mouth 2 (two) times daily.  Qty: 60 capsule, Refills: 0    Associated Diagnoses: Chronic pain of left knee; Primary osteoarthritis of left knee; Old complex tear of lateral meniscus of left knee      fish oil-omega-3 fatty acids 300-1,000 mg capsule Take 2 g by mouth once daily.      irbesartan (AVAPRO) 300 MG tablet Take 1 tablet (300 mg total) by mouth every evening. Discontinue irbesartan 150mg RX.  Qty: 90 tablet, Refills: 3    Associated Diagnoses: Essential hypertension      multivitamin (MULTIVITAMIN) per tablet Take 1 tablet by mouth once daily.        naproxen sodium (ALEVE) 220 mg Cap Take 220 mg by mouth 2 (two) times daily.      niacin 500 MG CpSR Take 500 mg by mouth every evening.        pantoprazole (PROTONIX) 40 MG tablet Take 1 tablet (40 mg total) by mouth before breakfast.  Qty: 30 tablet, Refills: 11      potassium chloride (MICRO-K) 10 MEQ CpSR Take 2 capsules (20 mEq total) by mouth once daily.  Qty: 60 capsule, Refills: 11    Associated Diagnoses: Essential hypertension      pravastatin (PRAVACHOL) 40 MG tablet TAKE 1 TABLET(40 MG) BY MOUTH EVERY DAY  Qty: 90 tablet, Refills: 2    Comments: **Patient requests 90 days supply**      promethazine (PHENERGAN) 25 MG tablet Take 1 tablet (25 mg total) by mouth every 6 (six) hours as needed for Nausea.  Qty: 20 tablet, Refills: 0    Associated Diagnoses: Chronic pain of left knee; Primary osteoarthritis of  left knee; Old complex tear of lateral meniscus of left knee      sucralfate (CARAFATE) 1 gram tablet Take 1 tablet (1 g total) by mouth 4 (four) times daily.  Qty: 120 tablet, Refills: 0    Associated Diagnoses: Gastroesophageal reflux disease, esophagitis presence not specified; Hiatal hernia; Belching; NSAID long-term use      aspirin (ECOTRIN) 81 MG EC tablet Take 81 mg by mouth once daily.      oxyCODONE-acetaminophen (PERCOCET) 5-325 mg per tablet Take 1 tablet by mouth every 8 (eight) hours as needed for Pain.  Qty: 60 tablet, Refills: 0    Associated Diagnoses: Chronic pain of left knee; Primary osteoarthritis of left knee; Old complex tear of lateral meniscus of left knee           No discharge procedures on file.  Follow-up Information     Tomas Etienne MD.    Specialties:  Sports Medicine, Orthopedic Surgery  Why:  patient has f/u appointments scheduled  Contact information:  [address]  [place] [address]  [phone]

## 2017-12-21 NOTE — ANESTHESIA PREPROCEDURE EVALUATION
12/21/2017  Lucinda Aguilera is a 66 y.o., female.    Anesthesia Evaluation    I have reviewed the Patient Summary Reports.    I have reviewed the Nursing Notes.   I have reviewed the Medications.     Review of Systems  Anesthesia Hx:  No problems with previous Anesthesia    Social:  Non-Smoker    Cardiovascular:   Hypertension hyperlipidemia    Hepatic/GI:   GERD    Musculoskeletal:   Arthritis   Joint Disease:  Arthritis   Chronic pain of left knee           Physical Exam  General:  Obesity    Airway/Jaw/Neck:  Airway Findings: Mouth Opening: Normal Tongue: Normal  General Airway Assessment: Adult, Good  Mallampati: II  Improves to II with phonation.  TM Distance: 4-6 cm      Dental:  Dental Findings: In tact   Chest/Lungs:  Chest/Lungs Findings: Clear to auscultation, Normal Respiratory Rate     Heart/Vascular:  Heart Findings: Rate: Normal  Rhythm: Regular Rhythm  Sounds: Normal  Heart murmur: negative       Mental Status:  Mental Status Findings:  Cooperative, Alert and Oriented         Anesthesia Plan  Type of Anesthesia, risks & benefits discussed:  Anesthesia Type:  general  Patient's Preference:   Intra-op Monitoring Plan: standard ASA monitors  Intra-op Monitoring Plan Comments:   Post Op Pain Control Plan:   Post Op Pain Control Plan Comments:   Induction:   IV  Beta Blocker:  Patient is not currently on a Beta-Blocker (No further documentation required).       Informed Consent: Patient understands risks and agrees with Anesthesia plan.  Questions answered. Anesthesia consent signed with patient.  ASA Score: 3     Day of Surgery Review of History & Physical: I have interviewed and examined the patient. I have reviewed the patient's H&P dated:  There are no significant changes.          Ready For Surgery From Anesthesia Perspective.

## 2017-12-21 NOTE — PLAN OF CARE
Vss, fausto po fluids, denies pain, ambulates easily. IV removed, catheter intact. Discharge instructions provided and states understanding. States ready to go home.  Discharged from facility with family.

## 2017-12-21 NOTE — TRANSFER OF CARE
"Anesthesia Transfer of Care Note    Patient: Lucinda Aguilera    Procedure(s) Performed: Procedure(s) (LRB):  ARTHROSCOPY-MENISCECTOMY (Left)  DEBRIDEMENT-KNEE (Left)  SYNOVECTOMY-KNEE (Left)  CHONDROPLASTY-KNEE (Left)    Patient location: PACU    Anesthesia Type: general    Transport from OR: Transported from OR on room air with adequate spontaneous ventilation    Post pain: adequate analgesia    Post assessment: no apparent anesthetic complications and tolerated procedure well    Post vital signs: stable    Level of consciousness: awake and alert    Nausea/Vomiting: no nausea/vomiting    Complications: none    Transfer of care protocol was followed      Last vitals:   Visit Vitals  /69 (BP Location: Right arm, Patient Position: Sitting)   Pulse 69   Temp 36.2 °C (97.2 °F) (Skin)   Resp 20   Ht 5' 3" (1.6 m)   Wt 79.4 kg (175 lb)   LMP 04/18/2004   SpO2 95%   BMI 31.00 kg/m²     "

## 2017-12-21 NOTE — OP NOTE
DATE OF PROCEDURE: 21 December 2017    PREOPERATIVE DIAGNOSES:   left knee lateral meniscus tear  left knee patellofemoral chondromalacia  left knee lateral compartmnt chondromalacia  left knee synovitis  left knee loose body    POSTOPERATIVE DIAGNOSES:   left knee lateral meniscus tear  left knee patellofemoral chondromalacia  left knee lateral compartmnt chondromalacia  left knee synovitis  left knee loose bodies    PROCEDURES:   left knee arthroscopic partial lateral meniscectomy (50% resection)  left knee chondroplasty of patellofemoral and lateral compartment(s)  left knee partial synovectomy  left knee debridement of loose bodies    SURGEON: Tomas Etienne M.D.     ASSISTANT: MARGARETH Verma    COMPLICATIONS: None.     POSITION: Supine     ANESTHESIA: LMA plus local.     COMPLICATIONS: None.     TOURNIQUET TIME:  None    EBL:  Minimal      EXAMINATION UNDER ANESTHESIA:   Knee: Range of motion 0-140; no instability; mild effusion.     ARTHROSCOPIC FINDINGS:   1. Normal medial meniscus   2. Degenerative tear of lateral meniscus  3. Grade II Patellar chondromalacia   4. Grade II-III Trochlear chondromalacia  5. Normal Medial compartment   6. Endstage Lateral compartment chondral loss  7. ++ Synovitis  8. +++ Chondral loose bodies    IMPLANTS: None    INDICATIONS: The patient is a 66 y.o. y.o.-year-old female with a history of LEFT knee pain that began having increased swelling and mechanical symptoms. MRI confirms a tear in her lateral meniscus as well as lateral compartment chondral loss and loose bodies  After the risks and benefits of surgery were discussed with the patient, including bleeding, infection, scarring, persistent pain, risk of blood clots (DVT), pulmonary embolism, compartment syndrome, damage to cartilage, damage to neurovascular structures, plus the risks of anesthesia, including, death, stroke, and heart attack, the patient wished to proceed with operative intervention.      DESCRIPTION  OF PROCEDURE:     The patient and correct limb were identified and marked in the pre-op holding area. The patient was brought in the room. After undergoing General,  she was placed on a well-padded operating table. The contralateral leg was secured in place on the OR table and well padded.  Her left lower extremity was prepped and draped in usual sterile fashion.     A time-out was taken properly identifying the patient, procedure to be performed; operative extremity and the receipt of pre-operative antibiotics    After infiltrating the joint and portal sites with a local anesthetic mixture, the standard inferolateral and inferomedial portals were created. Diagnostic arthroscopy performed.     The patellofemoral joint was entered. The abovementioned findings on the trochlea and the undersurface of the patella were noted.    There was a large amount synovitis noted within the anterior interval as well as a plica that was visible. An Edge device was used to remove areas of irritating synovium from the overlying trochlea in the anterior interval to allow for visualization and to minimize abrasion.    Attention turned to the medial compartment.  The medial meniscus was normal  and the chondral surfaces were essentially normal.    Attention was turned to the intercondylar notch. The ACL and PCL were intact. There were some chondral fragments that were noted in the notch that were debrided.    Attention was turned to the lateral compartment. The abovementioned findings on the lateral femoral condyle and the tibial plateau were noted. These areas were debrided minimally. There was a large degenerative  tear of the lateral meniscus, Using combination of straight biters and arthroscopic tien, the unstable portion of the lateral meniscus was trimmed down to a stable rim. Approximately 50% of the body of the lateral meniscus was resected down to get this to a stable position.       We then returned to the patellofemoral  joint and any areas of chondromalacia were debrided down to stable shoulders. The synovitic areas were further debrided.  Any additional loose bodies were removed as well.    Hemostasis was maintained throughout the case.      Arthroscopic photos were taken throughout the case as well. The case was concluded and the portal sites were closed with 3-0 Nylon, Xeroform, 4 x 4 fluffs, ABD pads, cast padding and thigh-high PAWEL hose.    The patient was awakened in the room, transferred to Recovery Room in stable condition.  There were no complications in the case.     I was present, scrubbed and did perform all critical portions of the case. The patient will be WBAT with a walker and begin PT on tomorrow.      MARIANELA SALVADOR MD

## 2017-12-22 ENCOUNTER — TELEPHONE (OUTPATIENT)
Dept: ORTHOPEDICS | Facility: CLINIC | Age: 67
End: 2017-12-22

## 2017-12-22 NOTE — TELEPHONE ENCOUNTER
----- Message from Rocío Gomez sent at 12/21/2017  4:49 PM CST -----  Contact: Self  Patient needs a call back she needs advise with her stockings and her dressing.  Please call [phone]. Thank you!

## 2017-12-26 ENCOUNTER — PATIENT MESSAGE (OUTPATIENT)
Dept: OBSTETRICS AND GYNECOLOGY | Facility: CLINIC | Age: 67
End: 2017-12-26

## 2017-12-26 VITALS
HEART RATE: 77 BPM | WEIGHT: 175 LBS | OXYGEN SATURATION: 95 % | RESPIRATION RATE: 18 BRPM | TEMPERATURE: 97 F | BODY MASS INDEX: 31.01 KG/M2 | SYSTOLIC BLOOD PRESSURE: 148 MMHG | DIASTOLIC BLOOD PRESSURE: 72 MMHG | HEIGHT: 63 IN

## 2018-01-02 ENCOUNTER — OFFICE VISIT (OUTPATIENT)
Dept: ORTHOPEDICS | Facility: CLINIC | Age: 68
End: 2018-01-02
Payer: MEDICARE

## 2018-01-02 VITALS
SYSTOLIC BLOOD PRESSURE: 151 MMHG | DIASTOLIC BLOOD PRESSURE: 87 MMHG | WEIGHT: 175 LBS | HEART RATE: 107 BPM | HEIGHT: 63 IN | BODY MASS INDEX: 31.01 KG/M2

## 2018-01-02 DIAGNOSIS — M25.562 CHRONIC PAIN OF LEFT KNEE: Primary | ICD-10-CM

## 2018-01-02 DIAGNOSIS — G89.18 POST-OP PAIN: ICD-10-CM

## 2018-01-02 DIAGNOSIS — G89.29 CHRONIC PAIN OF LEFT KNEE: Primary | ICD-10-CM

## 2018-01-02 DIAGNOSIS — M21.062 ACQUIRED VALGUS DEFORMITY KNEE, LEFT: Primary | ICD-10-CM

## 2018-01-02 DIAGNOSIS — M21.062 ACQUIRED VALGUS DEFORMITY KNEE, LEFT: ICD-10-CM

## 2018-01-02 DIAGNOSIS — M17.12 PRIMARY OSTEOARTHRITIS OF LEFT KNEE: ICD-10-CM

## 2018-01-02 DIAGNOSIS — Z98.890 S/P LEFT KNEE ARTHROSCOPY: ICD-10-CM

## 2018-01-02 PROBLEM — M23.201 OLD COMPLEX TEAR OF LATERAL MENISCUS OF LEFT KNEE: Status: RESOLVED | Noted: 2017-10-06 | Resolved: 2018-01-02

## 2018-01-02 PROCEDURE — 99024 POSTOP FOLLOW-UP VISIT: CPT | Mod: ,,, | Performed by: ORTHOPAEDIC SURGERY

## 2018-01-02 PROCEDURE — 99999 PR PBB SHADOW E&M-EST. PATIENT-LVL III: CPT | Mod: PBBFAC,,, | Performed by: ORTHOPAEDIC SURGERY

## 2018-01-02 PROCEDURE — 99213 OFFICE O/P EST LOW 20 MIN: CPT | Mod: PBBFAC,PN | Performed by: ORTHOPAEDIC SURGERY

## 2018-01-02 NOTE — PROGRESS NOTES
Subjective:          Chief Complaint: Lucinda Aguilera is a 67 y.o. female who had concerns including Post-op Evaluation of the Left Knee.    Mrs. Aguilera is here for evaluation of her left knee after surgery. She is doing well and has not been to therapy. No complaints. 0/10 pain    DATE OF PROCEDURE: 21 December 2017     PREOPERATIVE DIAGNOSES:   left knee lateral meniscus tear  left knee patellofemoral chondromalacia  left knee lateral compartmnt chondromalacia  left knee synovitis  left knee loose body     POSTOPERATIVE DIAGNOSES:   left knee lateral meniscus tear  left knee patellofemoral chondromalacia  left knee lateral compartmnt chondromalacia  left knee synovitis  left knee loose bodies     PROCEDURES:   left knee arthroscopic partial lateral meniscectomy (50% resection)  left knee chondroplasty of patellofemoral and lateral compartment(s)  left knee partial synovectomy  left knee debridement of loose bodies             Past Medical History:   Diagnosis Date    Anticoagulant long-term use     Anxiety     takes daily Xanax    Arthritis     right thumb    Cataract     OU    GERD (gastroesophageal reflux disease)     Hyperlipemia     Hypertension     PVD (posterior vitreous detachment)     OD       Past Surgical History:   Procedure Laterality Date    chest abcess      child    COLONOSCOPY  1/2012    repeat in 5 years    JOINT REPLACEMENT      right knee     KNEE ARTHROSCOPY W/ LASER      right    KNEE SURGERY Right 06/03/2016    Total knee replacement    thumb surgery  11/2014       Family History   Problem Relation Age of Onset    Hypertension Mother     Heart disease Mother     Stroke Father     Glaucoma Sister     Cataracts Sister     Macular degeneration Sister     Breast cancer Neg Hx          Current Outpatient Prescriptions:     alprazolam (XANAX) 0.5 MG tablet, Take 1 tablet (0.5 mg total) by mouth 2 (two) times daily as needed., Disp: 60 tablet, Rfl: 5    aspirin (ECOTRIN) 81  MG EC tablet, Take 81 mg by mouth once daily., Disp: , Rfl:     carboxymethylcellulose (REFRESH PLUS) 0.5 % Dpet, 1 drop 3 (three) times daily as needed., Disp: , Rfl:     chlorthalidone (HYGROTEN) 25 MG Tab, Take 1 tablet (25 mg total) by mouth every morning. Stop HCTZ., Disp: 30 tablet, Rfl: 11    docusate sodium (COLACE) 100 MG capsule, Take 1 capsule (100 mg total) by mouth 2 (two) times daily., Disp: 60 capsule, Rfl: 0    fish oil-omega-3 fatty acids 300-1,000 mg capsule, Take 2 g by mouth once daily., Disp: , Rfl:     irbesartan (AVAPRO) 300 MG tablet, Take 1 tablet (300 mg total) by mouth every evening. Discontinue irbesartan 150mg RX., Disp: 90 tablet, Rfl: 3    multivitamin (MULTIVITAMIN) per tablet, Take 1 tablet by mouth once daily.  , Disp: , Rfl:     naproxen sodium (ALEVE) 220 mg Cap, Take 220 mg by mouth 2 (two) times daily., Disp: , Rfl:     niacin 500 MG CpSR, Take 500 mg by mouth every evening.  , Disp: , Rfl:     oxyCODONE-acetaminophen (PERCOCET) 5-325 mg per tablet, Take 1 tablet by mouth every 8 (eight) hours as needed for Pain., Disp: 60 tablet, Rfl: 0    pantoprazole (PROTONIX) 40 MG tablet, Take 1 tablet (40 mg total) by mouth before breakfast., Disp: 30 tablet, Rfl: 11    potassium chloride (MICRO-K) 10 MEQ CpSR, Take 2 capsules (20 mEq total) by mouth once daily., Disp: 60 capsule, Rfl: 11    pravastatin (PRAVACHOL) 40 MG tablet, TAKE 1 TABLET(40 MG) BY MOUTH EVERY DAY, Disp: 90 tablet, Rfl: 2    promethazine (PHENERGAN) 25 MG tablet, Take 1 tablet (25 mg total) by mouth every 6 (six) hours as needed for Nausea., Disp: 20 tablet, Rfl: 0    sucralfate (CARAFATE) 1 gram tablet, Take 1 tablet (1 g total) by mouth 4 (four) times daily., Disp: 120 tablet, Rfl: 0    Review of patient's allergies indicates:  No Known Allergies    Vitals:    01/02/18 0951   BP: (!) 151/87   Pulse: 107       Review of Systems   Constitution: Negative for chills and fever.   Musculoskeletal: Positive  for arthritis, joint pain and joint swelling.   All other systems reviewed and are negative.                  Objective:        General: Lucinda is well-developed, well-nourished, appears stated age, in no acute distress, alert and oriented to time, place and person.     General    Vitals reviewed.  Constitutional: She is oriented to person, place, and time. She appears well-developed and well-nourished. No distress.   HENT:   Head: Normocephalic and atraumatic.   Nose: Nose normal.   Eyes: Pupils are equal, round, and reactive to light.   Cardiovascular: Normal rate.    Pulmonary/Chest: Effort normal.   Neurological: She is alert and oriented to person, place, and time.   Psychiatric: She has a normal mood and affect. Her behavior is normal. Judgment and thought content normal.           Right Knee Exam     Inspection   Scars: present    Left Knee Exam     Inspection   Scars: present  Effusion: absent  Bruising: absent    Tenderness   The patient tender to palpation of the lateral joint line.    Range of Motion   Extension: 0   Flexion: 140     Tests   Meniscus   Liyah:  Medial - negative Lateral - positive  Stability Lachman: normal (-1 to 2mm) PCL-Posterior Drawer: normal (0 to 2mm)  MCL - Valgus: abnormal - grade I  LCL - Varus: abnormal - grade I  Patella   Patellar Apprehension: negative  Passive Patellar Tilt: neutral  Patellar Tracking: normal  Patellar Glide (Quadrants): Lateral - 1 Medial - 2  Q-Angle at 90 degrees: normal  Patellar Grind: negative    Other   Sensation: normal    Muscle Strength   Left Lower Extremity   Hip Abduction: 5/5   Quadriceps:  5/5   Hamstrin/5     Vascular Exam       Left Pulses  Dorsalis Pedis:      2+  Posterior Tibial:      2+        Edema  Left Lower Leg: absent            Assessment:       Encounter Diagnoses   Name Primary?    Chronic pain of left knee Yes    Primary osteoarthritis of left knee     Post-op pain     S/P left knee arthroscopy     Acquired valgus  deformity knee, left           Plan:         The patient was counseled today regarding her diagnostic study results and the different treatment options. I spent >50% of the time discussing conservative vs. Operative options with the patient as well as risks and benefits of the different options.  Mrs. Aguilera primarily has pain when she is ambulating. Her pain is on the lateral aspect of the knee with correlates with the increased wear in the lateral compartment (as seen on the x-rays) and the varus/valgus  deformity (as seen on her x-rays). The patient is not unstable when they ambulate and has not had a recent injury, but has had surgery on the knee. In order for the patient to become more effective when they ambulate and treat their symptoms in the best way possible, I believe that they are a great candidate for  bracing. The deformity is correctable on exam with passive stress. This lateral  brace will unload the painful side of the knee and allow for less painful ambulation and hopefully allow the patient to ambulate for longer distances and for recreational activities. All of which should improve their current quality of life.  Home exercises  F/U in 2 weeks                \

## 2018-01-09 ENCOUNTER — PATIENT MESSAGE (OUTPATIENT)
Dept: ORTHOPEDICS | Facility: CLINIC | Age: 68
End: 2018-01-09

## 2018-01-18 ENCOUNTER — PATIENT MESSAGE (OUTPATIENT)
Dept: ORTHOPEDICS | Facility: CLINIC | Age: 68
End: 2018-01-18

## 2018-01-23 ENCOUNTER — OFFICE VISIT (OUTPATIENT)
Dept: ORTHOPEDICS | Facility: CLINIC | Age: 68
End: 2018-01-23
Payer: MEDICARE

## 2018-01-23 VITALS
BODY MASS INDEX: 31.01 KG/M2 | SYSTOLIC BLOOD PRESSURE: 147 MMHG | HEART RATE: 92 BPM | DIASTOLIC BLOOD PRESSURE: 81 MMHG | HEIGHT: 63 IN | WEIGHT: 175 LBS

## 2018-01-23 DIAGNOSIS — Z98.890 S/P LEFT KNEE ARTHROSCOPY: ICD-10-CM

## 2018-01-23 DIAGNOSIS — G89.29 CHRONIC PAIN OF LEFT KNEE: ICD-10-CM

## 2018-01-23 DIAGNOSIS — M17.12 PRIMARY OSTEOARTHRITIS OF LEFT KNEE: Primary | ICD-10-CM

## 2018-01-23 DIAGNOSIS — M21.062 ACQUIRED VALGUS DEFORMITY KNEE, LEFT: ICD-10-CM

## 2018-01-23 DIAGNOSIS — M25.562 CHRONIC PAIN OF LEFT KNEE: ICD-10-CM

## 2018-01-23 PROCEDURE — 99024 POSTOP FOLLOW-UP VISIT: CPT | Mod: POP,,, | Performed by: ORTHOPAEDIC SURGERY

## 2018-01-23 PROCEDURE — 99213 OFFICE O/P EST LOW 20 MIN: CPT | Mod: PBBFAC,PN | Performed by: ORTHOPAEDIC SURGERY

## 2018-01-23 PROCEDURE — 99999 PR PBB SHADOW E&M-EST. PATIENT-LVL III: CPT | Mod: PBBFAC,,, | Performed by: ORTHOPAEDIC SURGERY

## 2018-01-23 NOTE — PROGRESS NOTES
Subjective:          Chief Complaint: Lucinda Aguilera is a 67 y.o. female who had concerns including Pain of the Left Knee.    Mrs. Aguilera is here for evaluation of her left knee after surgery. She is doing well and has not been to therapy. No complaints. 4/10 pain today it was up to 10/10 yesterday however she has been doing a lot of walking.    DATE OF PROCEDURE: 21 December 2017     PREOPERATIVE DIAGNOSES:   left knee lateral meniscus tear  left knee patellofemoral chondromalacia  left knee lateral compartmnt chondromalacia  left knee synovitis  left knee loose body     POSTOPERATIVE DIAGNOSES:   left knee lateral meniscus tear  left knee patellofemoral chondromalacia  left knee lateral compartmnt chondromalacia  left knee synovitis  left knee loose bodies     PROCEDURES:   left knee arthroscopic partial lateral meniscectomy (50% resection)  left knee chondroplasty of patellofemoral and lateral compartment(s)  left knee partial synovectomy  left knee debridement of loose bodies         Pain   Pertinent negatives include no chills, fever or joint swelling.         Past Medical History:   Diagnosis Date    Anticoagulant long-term use     Anxiety     takes daily Xanax    Arthritis     right thumb    Cataract     OU    GERD (gastroesophageal reflux disease)     Hyperlipemia     Hypertension     PVD (posterior vitreous detachment)     OD       Past Surgical History:   Procedure Laterality Date    chest abcess      child    COLONOSCOPY  1/2012    repeat in 5 years    JOINT REPLACEMENT      right knee     KNEE ARTHROSCOPY W/ LASER      right    KNEE SURGERY Right 06/03/2016    Total knee replacement    thumb surgery  11/2014       Family History   Problem Relation Age of Onset    Hypertension Mother     Heart disease Mother     Stroke Father     Glaucoma Sister     Cataracts Sister     Macular degeneration Sister     Breast cancer Neg Hx          Current Outpatient Prescriptions:     alprazolam  (XANAX) 0.5 MG tablet, Take 1 tablet (0.5 mg total) by mouth 2 (two) times daily as needed., Disp: 60 tablet, Rfl: 5    aspirin (ECOTRIN) 81 MG EC tablet, Take 81 mg by mouth once daily., Disp: , Rfl:     carboxymethylcellulose (REFRESH PLUS) 0.5 % Dpet, 1 drop 3 (three) times daily as needed., Disp: , Rfl:     chlorthalidone (HYGROTEN) 25 MG Tab, Take 1 tablet (25 mg total) by mouth every morning. Stop HCTZ., Disp: 30 tablet, Rfl: 11    docusate sodium (COLACE) 100 MG capsule, Take 1 capsule (100 mg total) by mouth 2 (two) times daily., Disp: 60 capsule, Rfl: 0    fish oil-omega-3 fatty acids 300-1,000 mg capsule, Take 2 g by mouth once daily., Disp: , Rfl:     irbesartan (AVAPRO) 300 MG tablet, Take 1 tablet (300 mg total) by mouth every evening. Discontinue irbesartan 150mg RX., Disp: 90 tablet, Rfl: 3    multivitamin (MULTIVITAMIN) per tablet, Take 1 tablet by mouth once daily.  , Disp: , Rfl:     naproxen sodium (ALEVE) 220 mg Cap, Take 220 mg by mouth 2 (two) times daily., Disp: , Rfl:     niacin 500 MG CpSR, Take 500 mg by mouth every evening.  , Disp: , Rfl:     oxyCODONE-acetaminophen (PERCOCET) 5-325 mg per tablet, Take 1 tablet by mouth every 8 (eight) hours as needed for Pain., Disp: 60 tablet, Rfl: 0    pantoprazole (PROTONIX) 40 MG tablet, Take 1 tablet (40 mg total) by mouth before breakfast., Disp: 30 tablet, Rfl: 11    potassium chloride (MICRO-K) 10 MEQ CpSR, Take 2 capsules (20 mEq total) by mouth once daily., Disp: 60 capsule, Rfl: 11    pravastatin (PRAVACHOL) 40 MG tablet, TAKE 1 TABLET(40 MG) BY MOUTH EVERY DAY, Disp: 90 tablet, Rfl: 2    promethazine (PHENERGAN) 25 MG tablet, Take 1 tablet (25 mg total) by mouth every 6 (six) hours as needed for Nausea., Disp: 20 tablet, Rfl: 0    sucralfate (CARAFATE) 1 gram tablet, Take 1 tablet (1 g total) by mouth 4 (four) times daily., Disp: 120 tablet, Rfl: 0    Review of patient's allergies indicates:  No Known Allergies    Vitals:     18 0820   BP: (!) 147/81   Pulse: 92       Review of Systems   Constitution: Negative for chills and fever.   Musculoskeletal: Positive for arthritis and joint pain. Negative for joint swelling.   All other systems reviewed and are negative.                  Objective:        General: Lucinda is well-developed, well-nourished, appears stated age, in no acute distress, alert and oriented to time, place and person.     General    Vitals reviewed.  Constitutional: She is oriented to person, place, and time. She appears well-developed and well-nourished. No distress.   HENT:   Head: Normocephalic and atraumatic.   Nose: Nose normal.   Eyes: Pupils are equal, round, and reactive to light.   Cardiovascular: Normal rate.    Pulmonary/Chest: Effort normal.   Neurological: She is alert and oriented to person, place, and time.   Psychiatric: She has a normal mood and affect. Her behavior is normal. Judgment and thought content normal.           Right Knee Exam     Inspection   Scars: present    Left Knee Exam     Inspection   Scars: present  Effusion: absent  Bruising: absent    Tenderness   The patient tender to palpation of the lateral joint line.    Range of Motion   Extension: 0   Flexion: 140     Tests   Meniscus   Liyah:  Medial - negative Lateral - negative  Stability Lachman: normal (-1 to 2mm) PCL-Posterior Drawer: normal (0 to 2mm)  MCL - Valgus: abnormal - grade I  LCL - Varus: abnormal - grade I  Patella   Patellar Apprehension: negative  Passive Patellar Tilt: neutral  Patellar Tracking: normal  Patellar Glide (Quadrants): Lateral - 1 Medial - 2  Q-Angle at 90 degrees: normal  Patellar Grind: negative    Other   Sensation: normal    Muscle Strength   Left Lower Extremity   Hip Abduction: 5/5   Quadriceps:  5/5   Hamstrin/5     Vascular Exam       Left Pulses  Dorsalis Pedis:      2+  Posterior Tibial:      2+        Edema  Left Lower Leg: absent            Assessment:       Encounter Diagnoses    Name Primary?    Acquired valgus deformity knee, left Yes    Primary osteoarthritis of left knee     Chronic pain of left knee     S/P left knee arthroscopy           Plan:         The patient was counseled today regarding her diagnostic study results and the different treatment options. I spent >50% of the time discussing conservative vs. Operative options with the patient as well as risks and benefits of the different options.  [id number] Daryl Sandoval and I, performed a custom orthotic / brace adjustment, fitting and training with the patient. The patient demonstrated understanding and proper care. This was performed for 15 minutes.  Left knee   Home exercises  F/U PRN                \

## 2018-01-24 ENCOUNTER — PATIENT OUTREACH (OUTPATIENT)
Dept: OTHER | Facility: OTHER | Age: 68
End: 2018-01-24

## 2018-01-24 NOTE — PROGRESS NOTES
Last 5 Patient Entered Readings                                      Current 30 Day Average: 131/77     Recent Readings 1/21/2018 1/18/2018 1/14/2018 1/10/2018 1/9/2018    SBP (mmHg) 131 133 132 134 123    DBP (mmHg) 84 79 77 69 83    Pulse 89 78 73 82 94        Hypertension Digital Medicine Program (HDMP): Health  Follow Up    Lifestyle Modifications:    1.Low sodium diet: yes Patient denies changes to her diet. She is continuing to limit her salt intake.     2.Physical activity: yes  has been having trouble with her left knee. She was able to purchase a knee brace for support, and she is finding it much easier to walk.    3.Hypotension/Hypertension symptoms: no   Frequency/Alleviating factors/Precipitating factors, etc.     4.Patient has been compliant with the medication regimen.     Follow up with Ms. Lucinda DRAKE Ale completed. Mrs. Aguilera is doing well. Patient states that she has been dealing with some knee pain, and plans to schedule surgery during the summer. She accepts Care Chat invitation. Patient states that her sister is also enjoying Digital Medicine. No further questions or concerns. I will follow up in a few weeks to assess progress.

## 2018-01-30 ENCOUNTER — OFFICE VISIT (OUTPATIENT)
Dept: OBSTETRICS AND GYNECOLOGY | Facility: CLINIC | Age: 68
End: 2018-01-30
Payer: MEDICARE

## 2018-01-30 VITALS
SYSTOLIC BLOOD PRESSURE: 136 MMHG | DIASTOLIC BLOOD PRESSURE: 88 MMHG | BODY MASS INDEX: 31.79 KG/M2 | WEIGHT: 179.44 LBS

## 2018-01-30 DIAGNOSIS — D25.1 INTRAMURAL LEIOMYOMA OF UTERUS: ICD-10-CM

## 2018-01-30 DIAGNOSIS — N95.2 ATROPHY OF VAGINA: Primary | ICD-10-CM

## 2018-01-30 PROCEDURE — 99213 OFFICE O/P EST LOW 20 MIN: CPT | Mod: S$PBB,,, | Performed by: OBSTETRICS & GYNECOLOGY

## 2018-01-30 PROCEDURE — 99999 PR PBB SHADOW E&M-EST. PATIENT-LVL III: CPT | Mod: PBBFAC,,, | Performed by: OBSTETRICS & GYNECOLOGY

## 2018-01-30 PROCEDURE — 99213 OFFICE O/P EST LOW 20 MIN: CPT | Mod: PBBFAC,PN | Performed by: OBSTETRICS & GYNECOLOGY

## 2018-01-30 RX ORDER — PNEUMOCOCCAL 13-VALENT CONJUGATE VACCINE 2.2; 2.2; 2.2; 2.2; 2.2; 4.4; 2.2; 2.2; 2.2; 2.2; 2.2; 2.2; 2.2 UG/.5ML; UG/.5ML; UG/.5ML; UG/.5ML; UG/.5ML; UG/.5ML; UG/.5ML; UG/.5ML; UG/.5ML; UG/.5ML; UG/.5ML; UG/.5ML; UG/.5ML
INJECTION, SUSPENSION INTRAMUSCULAR
Refills: 0 | COMMUNITY
Start: 2017-12-04 | End: 2018-03-01

## 2018-01-30 RX ORDER — PAROXETINE 10 MG/1
TABLET, FILM COATED ORAL
Refills: 3 | COMMUNITY
Start: 2017-11-12 | End: 2018-03-05 | Stop reason: SDUPTHER

## 2018-01-30 NOTE — PROGRESS NOTES
Chief Complaint   Patient presents with    Fibroids     she wants a bimanual exam to check them, denies bleeding    Dyspareunia     vaginal pain during intercourse       History of Present Illness: Lucinda Aguilera is a 67 y.o. female that presents today 1/30/2018 for   Chief Complaint   Patient presents with    Fibroids     she wants a bimanual exam to check them, denies bleeding    Dyspareunia     vaginal pain during intercourse         Past Medical History:   Diagnosis Date    Anticoagulant long-term use     Anxiety     takes daily Xanax    Arthritis     right thumb    Cataract     OU    GERD (gastroesophageal reflux disease)     Hyperlipemia     Hypertension     PVD (posterior vitreous detachment)     OD       Past Surgical History:   Procedure Laterality Date    chest abcess      child    COLONOSCOPY  1/2012    repeat in 5 years    JOINT REPLACEMENT      right knee     KNEE ARTHROSCOPY W/ LASER      right    KNEE SURGERY Right 06/03/2016    Total knee replacement    thumb surgery  11/2014       Current Outpatient Prescriptions   Medication Sig Dispense Refill    aspirin (ECOTRIN) 81 MG EC tablet Take 81 mg by mouth once daily.      carboxymethylcellulose (REFRESH PLUS) 0.5 % Dpet 1 drop 3 (three) times daily as needed.      chlorthalidone (HYGROTEN) 25 MG Tab Take 1 tablet (25 mg total) by mouth every morning. Stop HCTZ. 30 tablet 11    docusate sodium (COLACE) 100 MG capsule Take 1 capsule (100 mg total) by mouth 2 (two) times daily. 60 capsule 0    fish oil-omega-3 fatty acids 300-1,000 mg capsule Take 2 g by mouth once daily.      irbesartan (AVAPRO) 300 MG tablet Take 1 tablet (300 mg total) by mouth every evening. Discontinue irbesartan 150mg RX. 90 tablet 3    multivitamin (MULTIVITAMIN) per tablet Take 1 tablet by mouth once daily.        pantoprazole (PROTONIX) 40 MG tablet Take 1 tablet (40 mg total) by mouth before breakfast. 30 tablet 11    paroxetine (PAXIL) 10 MG tablet  TK 1 T PO QAM  3    potassium chloride (MICRO-K) 10 MEQ CpSR Take 2 capsules (20 mEq total) by mouth once daily. 60 capsule 11    pravastatin (PRAVACHOL) 40 MG tablet TAKE 1 TABLET(40 MG) BY MOUTH EVERY DAY 90 tablet 2    sucralfate (CARAFATE) 1 gram tablet Take 1 tablet (1 g total) by mouth 4 (four) times daily. 120 tablet 0    alprazolam (XANAX) 0.5 MG tablet Take 1 tablet (0.5 mg total) by mouth 2 (two) times daily as needed. 60 tablet 5    FLUZONE HIGH-DOSE , PF, 180 mcg/0.5 mL vaccine ADM 0.5ML IM UTD  0    naproxen sodium (ALEVE) 220 mg Cap Take 220 mg by mouth 2 (two) times daily.      niacin 500 MG CpSR Take 500 mg by mouth every evening.        oxyCODONE-acetaminophen (PERCOCET) 5-325 mg per tablet Take 1 tablet by mouth every 8 (eight) hours as needed for Pain. 60 tablet 0    prasterone, dhea, (INTRAROSA) 6.5 mg Inst Place 1 Dose vaginally once daily. 30 each 11    PREVNAR 13, PF, 0.5 mL Syrg ADM 0.5ML IM UTD  0    promethazine (PHENERGAN) 25 MG tablet Take 1 tablet (25 mg total) by mouth every 6 (six) hours as needed for Nausea. 20 tablet 0     No current facility-administered medications for this visit.        Review of patient's allergies indicates:  No Known Allergies    Family History   Problem Relation Age of Onset    Hypertension Mother     Heart disease Mother     Stroke Father     Glaucoma Sister     Cataracts Sister     Macular degeneration Sister     Breast cancer Neg Hx        Social History   Substance Use Topics    Smoking status: Never Smoker    Smokeless tobacco: Never Used    Alcohol use Yes      Comment: social       OB History    Para Term  AB Living   3 2 2   1 2   SAB TAB Ectopic Multiple Live Births   1       2      # Outcome Date GA Lbr Gagan/2nd Weight Sex Delivery Anes PTL Lv   3 Term    3.175 kg (7 lb) F Vag-Spont EPI N KAREN   2 SAB            1 Term    2.268 kg (5 lb) F Vag-Spont EPI N KAREN          Review of Symptoms:  GENERAL:  Denies weight gain or weight loss. Feeling well overall.   SKIN: Denies rash or lesions.   HEAD: Denies head injury or headache.   NODES: Denies enlarged lymph nodes.   CHEST: Denies chest pain or shortness of breath.   CARDIOVASCULAR: Denies palpitations or left sided chest pain.   ABDOMEN: No abdominal pain, constipation, diarrhea, nausea, vomiting or rectal bleeding.   URINARY: No frequency, dysuria, hematuria, or burning on urination.  HEMATOLOGIC: No easy bruisability or excessive bleeding.   MUSCULOSKELETAL: Denies joint pain or swelling.     /88   Wt 81.4 kg (179 lb 7.3 oz)   LMP 04/18/2004   Physical Exam:  APPEARANCE: Well nourished, well developed, in no acute distress.  SKIN: Normal skin turgor, no lesions.  NECK: Neck symmetric without masses   RESPIRATORY: Normal respiratory effort with no retractions or use of accessory muscles  CARDIOVASCULAR: Peripheral vascular system with no swelling no varicosities and palpation of pulses normal  LYMPHATIC: No enlargements of the lymph nodes noted in the neck, axillae, or groin  ABDOMEN: Soft. No tenderness or masses. No hepatosplenomegaly. No hernias.  PELVIC: Normal external female genitalia without lesions. Normal hair distribution. Adequate perineal body, normal urethral meatus. Urethra with no masses.  Bladder nontender. Vagina moist and well rugated without lesions or discharge. Cervix pink and without lesions. No significant cystocele or rectocele. Bimanual exam showed uterus normal size, shape, position, mobile and nontender. Adnexa without masses or tenderness. Urethra and bladder normal.  EXTREMITIES: No clubbing cyanosis or edema.    ASSESSMENT/PLAN:  Atrophy of vagina    Intramural leiomyoma of uterus    Other orders  -     prasterone, dhea, (INTRAROSA) 6.5 mg Inst; Place 1 Dose vaginally once daily.  Dispense: 30 each; Refill: 11      15 minutes spent today with this patient. Greater than half spent in counseling today.

## 2018-01-31 DIAGNOSIS — I10 ESSENTIAL HYPERTENSION: ICD-10-CM

## 2018-02-01 RX ORDER — CHLORTHALIDONE 25 MG/1
TABLET ORAL
Qty: 30 TABLET | Refills: 11 | Status: SHIPPED | OUTPATIENT
Start: 2018-02-01 | End: 2019-01-18 | Stop reason: SDUPTHER

## 2018-02-08 ENCOUNTER — PATIENT MESSAGE (OUTPATIENT)
Dept: FAMILY MEDICINE | Facility: CLINIC | Age: 68
End: 2018-02-08

## 2018-02-09 ENCOUNTER — PATIENT MESSAGE (OUTPATIENT)
Dept: ORTHOPEDICS | Facility: CLINIC | Age: 68
End: 2018-02-09

## 2018-02-15 ENCOUNTER — PATIENT MESSAGE (OUTPATIENT)
Dept: FAMILY MEDICINE | Facility: CLINIC | Age: 68
End: 2018-02-15

## 2018-02-15 ENCOUNTER — PATIENT MESSAGE (OUTPATIENT)
Dept: OTHER | Facility: OTHER | Age: 68
End: 2018-02-15

## 2018-02-15 NOTE — TELEPHONE ENCOUNTER
See message and advise. Patient cancelled appt to see you and is going to her knee surgeon for the knee pain.

## 2018-02-17 ENCOUNTER — PATIENT MESSAGE (OUTPATIENT)
Dept: ORTHOPEDICS | Facility: CLINIC | Age: 68
End: 2018-02-17

## 2018-02-20 ENCOUNTER — PATIENT OUTREACH (OUTPATIENT)
Dept: OTHER | Facility: OTHER | Age: 68
End: 2018-02-20

## 2018-02-20 NOTE — PROGRESS NOTES
Last 5 Patient Entered Readings                                      Current 30 Day Average: 130/72     Recent Readings 2/14/2018 2/10/2018 2/8/2018 2/4/2018 2/1/2018    SBP (mmHg) 133 124 137 123 137    DBP (mmHg) 70 70 60 70 71    Pulse 81 69 89 71 80        Hypertension Medications             chlorthalidone (HYGROTEN) 25 MG Tab TAKE 1 TABLET BY MOUTH EVERY MORNING; DISCONTINUE HCTZ    irbesartan (AVAPRO) 300 MG tablet Take 1 tablet (300 mg total) by mouth every evening. Discontinue irbesartan 150mg RX.        Plan:   Called patient to follow up. Per newly released 2017 ACC/ AHA HTN guidelines  (goal of BP < 130/80), current 30-day average is controlled.  LVM, requested patient call back at her convenience if she has any questions or concerns, and to continue to take at least weekly BP readings.   Will continue to monitor. WCB in 3 months, sooner if BP begins to trend up or down.

## 2018-02-25 DIAGNOSIS — F41.9 ANXIETY: ICD-10-CM

## 2018-02-26 RX ORDER — PAROXETINE 10 MG/1
TABLET, FILM COATED ORAL
Qty: 90 TABLET | Refills: 3 | Status: SHIPPED | OUTPATIENT
Start: 2018-02-26 | End: 2019-02-27 | Stop reason: SDUPTHER

## 2018-02-27 ENCOUNTER — PATIENT MESSAGE (OUTPATIENT)
Dept: FAMILY MEDICINE | Facility: CLINIC | Age: 68
End: 2018-02-27

## 2018-02-27 ENCOUNTER — PATIENT MESSAGE (OUTPATIENT)
Dept: OPTOMETRY | Facility: CLINIC | Age: 68
End: 2018-02-27

## 2018-03-01 ENCOUNTER — OFFICE VISIT (OUTPATIENT)
Dept: FAMILY MEDICINE | Facility: CLINIC | Age: 68
End: 2018-03-01
Payer: MEDICARE

## 2018-03-01 VITALS
DIASTOLIC BLOOD PRESSURE: 76 MMHG | HEIGHT: 63 IN | SYSTOLIC BLOOD PRESSURE: 128 MMHG | HEART RATE: 80 BPM | BODY MASS INDEX: 31.99 KG/M2 | OXYGEN SATURATION: 97 % | WEIGHT: 180.56 LBS | TEMPERATURE: 98 F

## 2018-03-01 DIAGNOSIS — H61.22 IMPACTED CERUMEN OF LEFT EAR: Primary | ICD-10-CM

## 2018-03-01 DIAGNOSIS — H91.90 DECREASED HEARING, UNSPECIFIED LATERALITY: ICD-10-CM

## 2018-03-01 PROCEDURE — 99999 PR PBB SHADOW E&M-EST. PATIENT-LVL V: CPT | Mod: PBBFAC,,, | Performed by: NURSE PRACTITIONER

## 2018-03-01 PROCEDURE — 69209 REMOVE IMPACTED EAR WAX UNI: CPT | Mod: S$PBB,LT,, | Performed by: NURSE PRACTITIONER

## 2018-03-01 PROCEDURE — 69210 REMOVE IMPACTED EAR WAX UNI: CPT | Mod: PBBFAC,PO | Performed by: NURSE PRACTITIONER

## 2018-03-01 PROCEDURE — 99215 OFFICE O/P EST HI 40 MIN: CPT | Mod: PBBFAC,PO,25 | Performed by: NURSE PRACTITIONER

## 2018-03-01 PROCEDURE — 99213 OFFICE O/P EST LOW 20 MIN: CPT | Mod: S$PBB,25,, | Performed by: NURSE PRACTITIONER

## 2018-03-01 NOTE — PROGRESS NOTES
Subjective:       Patient ID: Lucinda Aguilera is a 67 y.o. female.    Chief Complaint: Ear Fullness (left) and Headache    Ear Fullness    Associated symptoms include headaches and hearing loss. Pertinent negatives include no abdominal pain, coughing, diarrhea, drainage, ear discharge, neck pain, rash, rhinorrhea, sore throat or vomiting. There is no history of a chronic ear infection, hearing loss or a tympanostomy tube.   Headache    Associated symptoms include ear pain and hearing loss. Pertinent negatives include no abdominal pain, coughing, drainage, fever, nausea, neck pain, numbness, rhinorrhea, sore throat or vomiting.   Otalgia    There is pain in the left ear. This is a recurrent problem. The current episode started in the past 7 days. The problem occurs constantly. The problem has been gradually worsening. There has been no fever. The pain is at a severity of 8/10. The pain is moderate. Associated symptoms include headaches and hearing loss. Pertinent negatives include no abdominal pain, coughing, diarrhea, drainage, ear discharge, neck pain, rash, rhinorrhea, sore throat or vomiting. She has tried NSAIDs and ear drops for the symptoms. The treatment provided no relief. There is no history of a chronic ear infection, hearing loss or a tympanostomy tube.     Vitals:    03/01/18 0840   BP: 128/76   Pulse: 80   Temp: 97.7 °F (36.5 °C)     Review of Systems   Constitutional: Negative.  Negative for diaphoresis, fatigue and fever.   HENT: Positive for ear pain and hearing loss. Negative for ear discharge, rhinorrhea and sore throat.    Eyes: Negative.    Respiratory: Negative.  Negative for cough, shortness of breath and wheezing.    Cardiovascular: Negative.  Negative for chest pain.   Gastrointestinal: Negative.  Negative for abdominal pain, diarrhea, nausea and vomiting.   Endocrine: Negative.    Genitourinary: Negative.  Negative for dysuria and hematuria.   Musculoskeletal: Negative.  Negative for neck  pain.   Skin: Negative for color change and rash.   Allergic/Immunologic: Negative.    Neurological: Positive for headaches. Negative for speech difficulty and numbness.   Hematological: Negative.    Psychiatric/Behavioral: Negative.        Past Medical History:   Diagnosis Date    Anticoagulant long-term use     Anxiety     takes daily Xanax    Arthritis     right thumb    Cataract     OU    GERD (gastroesophageal reflux disease)     Hyperlipemia     Hypertension     PVD (posterior vitreous detachment)     OD     Objective:      Physical Exam   Constitutional: She is oriented to person, place, and time. She appears well-developed and well-nourished.   HENT:   Head: Normocephalic and atraumatic.   Right Ear: Hearing, tympanic membrane and ear canal normal.   Left Ear: Decreased hearing is noted.   Nose: Nose normal.   Mouth/Throat: Uvula is midline and oropharynx is clear and moist.   Left ear with cerumen impaction    Eyes: Conjunctivae and EOM are normal. Pupils are equal, round, and reactive to light.   Neck: Neck supple.   Cardiovascular: Normal rate, regular rhythm and normal heart sounds.    Pulmonary/Chest: Effort normal and breath sounds normal.   Abdominal: Soft. Bowel sounds are normal.   Musculoskeletal: Normal range of motion.   Neurological: She is alert and oriented to person, place, and time.   Skin: Skin is warm and dry.   Psychiatric: She has a normal mood and affect. Her behavior is normal.   Nursing note and vitals reviewed.      Assessment:       1. Impacted cerumen of left ear    2. Decreased hearing, unspecified laterality        Plan:       Impacted cerumen of left ear  -     Ambulatory referral to ENT    Decreased hearing, unspecified laterality            She has been using ear drops to soften wax  Was able to flush ear with water, small amount of wax returned but still has wax on ear drum that was not able to be flushed   Encouraged to continue to use flush and she will see ENT for  further

## 2018-03-02 ENCOUNTER — PATIENT OUTREACH (OUTPATIENT)
Dept: OTHER | Facility: OTHER | Age: 68
End: 2018-03-02

## 2018-03-02 DIAGNOSIS — H91.90 HEARING DIFFICULTY, UNSPECIFIED LATERALITY: Primary | ICD-10-CM

## 2018-03-02 NOTE — PROGRESS NOTES
Last 5 Patient Entered Readings                                      Current 30 Day Average: 132/74     Recent Readings 3/1/2018 2/27/2018 2/25/2018 2/22/2018 2/20/2018    SBP (mmHg) 128 140 128 134 134    DBP (mmHg) 86 80 81 71 81    Pulse - 69 73 71 70        Digital Medicine: Health  Follow Up    Lifestyle Modifications:    1.Dietary Modifications (Sodium intake <2,000mg/day, food labels, dining out): Patient denies changes to her diet.    2.Physical Activity: Mrs. Aguilera reports that she is now working 4 days a week, and is active throughout the day. She is call this her exercise. She is also wearing a knee brace to help reduce knee pain.    3.Medication Therapy: Patient has been compliant with the medication regimen.    4.Patient has the following medication side effects/concerns:   (Frequency/Alleviating factors/Precipitating factors, etc.)     Follow up with Mrs. Lucinda Aguilera completed. Mrs. Aguilera is doing well. She is pleased with her BP. No further questions or concerns. I will follow up in a few weeks to assess progress.

## 2018-03-04 ENCOUNTER — PATIENT MESSAGE (OUTPATIENT)
Dept: OTOLARYNGOLOGY | Facility: CLINIC | Age: 68
End: 2018-03-04

## 2018-03-05 ENCOUNTER — OFFICE VISIT (OUTPATIENT)
Dept: OTOLARYNGOLOGY | Facility: CLINIC | Age: 68
End: 2018-03-05
Payer: MEDICARE

## 2018-03-05 VITALS
DIASTOLIC BLOOD PRESSURE: 80 MMHG | BODY MASS INDEX: 32.73 KG/M2 | HEART RATE: 75 BPM | SYSTOLIC BLOOD PRESSURE: 171 MMHG | WEIGHT: 184.75 LBS | HEIGHT: 63 IN

## 2018-03-05 DIAGNOSIS — H61.23 BILATERAL IMPACTED CERUMEN: Primary | ICD-10-CM

## 2018-03-05 PROCEDURE — 99999 PR PBB SHADOW E&M-EST. PATIENT-LVL IV: CPT | Mod: PBBFAC,,, | Performed by: NURSE PRACTITIONER

## 2018-03-05 PROCEDURE — 69210 REMOVE IMPACTED EAR WAX UNI: CPT | Mod: S$PBB,,, | Performed by: NURSE PRACTITIONER

## 2018-03-05 PROCEDURE — 99214 OFFICE O/P EST MOD 30 MIN: CPT | Mod: PBBFAC,PO,25 | Performed by: NURSE PRACTITIONER

## 2018-03-05 PROCEDURE — 99499 UNLISTED E&M SERVICE: CPT | Mod: S$PBB,,, | Performed by: NURSE PRACTITIONER

## 2018-03-05 PROCEDURE — 69210 REMOVE IMPACTED EAR WAX UNI: CPT | Mod: PBBFAC,PO | Performed by: NURSE PRACTITIONER

## 2018-03-05 NOTE — PROGRESS NOTES
Subjective:       Patient ID: Lucinda Aguilera is a 67 y.o. female.    Chief Complaint: Cerumen Impaction    HPI   Patient last seen by me 2 years ago for cerumen impaction. She returns today for same. She denies hearing loss, aural pressure, otalgia, otorrhea, or any other ENT concerns at this time.      Review of Systems   Constitutional: Negative.  Negative for fatigue and fever.   HENT: Positive for hearing loss. Negative for ear pain, rhinorrhea and sore throat.    Eyes: Negative.    Respiratory: Negative.  Negative for cough, shortness of breath and wheezing.    Cardiovascular: Negative.    Gastrointestinal: Negative.    Musculoskeletal: Negative.  Negative for gait problem.   Skin: Negative.  Negative for color change, pallor and rash.   Neurological: Negative.    Hematological: Negative.  Negative for adenopathy.   Psychiatric/Behavioral: Negative.  Negative for agitation.       Objective:      Physical Exam   Constitutional: She is oriented to person, place, and time. Vital signs are normal. She appears well-developed and well-nourished. She is cooperative. She does not appear ill. No distress.   HENT:   Head: Normocephalic and atraumatic.   Right Ear: Hearing, tympanic membrane, external ear and ear canal normal. Tympanic membrane is not erythematous. No middle ear effusion.   Left Ear: Hearing, tympanic membrane, external ear and ear canal normal. Tympanic membrane is not erythematous.  No middle ear effusion.   Nose: Nose normal. No mucosal edema, rhinorrhea or septal deviation. Right sinus exhibits no maxillary sinus tenderness and no frontal sinus tenderness. Left sinus exhibits no maxillary sinus tenderness and no frontal sinus tenderness.   Mouth/Throat: Uvula is midline, oropharynx is clear and moist and mucous membranes are normal. Mucous membranes are not pale, not dry and not cyanotic. No oral lesions. No oropharyngeal exudate, posterior oropharyngeal edema or posterior oropharyngeal erythema.      SEPARATE PROCEDURE IN OFFICE:   Procedure: Removal of impacted cerumen, bilateral   Pre Procedure Diagnosis: Cerumen Impaction   Post Procedure Diagnosis: Cerumen Impaction   Verbal informed consent in regards to risk of trauma to ear canal, ear drum or hearing, discomfort during procedure and/or inability to remove cerumen impaction in one session or unforeseen events or complications.   No anesthesia.     Procedure in detail:   Ear canal visualized bilateral with appropriate size ear speculum utilizing Operating Head Binocular Otomicroscope   Utilizing the following: Ring curet AD, suction cannula AS. The impacted cerumen of the ear canals was removed atraumatically. The TM and EAC were then inspected and found to be clear of wax. See description of TMs/EACs in PE above.   Complications: No   Condition: Improved/Good     Eyes: Conjunctivae, EOM and lids are normal. Pupils are equal, round, and reactive to light. Right eye exhibits no discharge. Left eye exhibits no discharge. No scleral icterus.   Neck: Trachea normal and normal range of motion. Neck supple. No tracheal deviation present. No thyroid mass and no thyromegaly present.   Cardiovascular: Normal rate.    Pulmonary/Chest: Effort normal. No stridor. No respiratory distress. She has no wheezes.   Musculoskeletal: Normal range of motion.   Lymphadenopathy:        Head (right side): No submental, no submandibular, no tonsillar, no preauricular and no posterior auricular adenopathy present.        Head (left side): No submental, no submandibular, no tonsillar, no preauricular and no posterior auricular adenopathy present.     She has no cervical adenopathy.        Right cervical: No superficial cervical and no posterior cervical adenopathy present.       Left cervical: No superficial cervical and no posterior cervical adenopathy present.   Neurological: She is alert and oriented to person, place, and time. She has normal strength. Coordination and gait  normal.   Skin: Skin is warm, dry and intact. No lesion and no rash noted. She is not diaphoretic. No cyanosis. No pallor.   Psychiatric: She has a normal mood and affect. Her speech is normal and behavior is normal. Judgment and thought content normal. Cognition and memory are normal.   Nursing note and vitals reviewed.      Assessment:     Cerumen impactions removed AU      Plan:     Patient politely declined the audiogram at this time    Return as needed for further ENT concerns

## 2018-03-05 NOTE — LETTER
March 5, 2018      Sharifa Plunkett, JANUARY  [address] [facility] [address] [place] [address]           [facility]  [address] [facility] [address] [place] [address]  Phone: [phone]  Fax: [phone]          Patient: Lucinda Aguilera   MR Number: [id number]   YOB: 1950   Date of Visit: 3/5/2018       Dear Sharifa Plunkett:    Thank you for referring Lucinda Aguilera to me for evaluation. Attached you will find relevant portions of my assessment and plan of care.    If you have questions, please do not hesitate to call me. I look forward to following Lucinda Aguilera along with you.    Sincerely,    Iliana Rudolph NP    Enclosure  CC:  No Recipients    If you would like to receive this communication electronically, please contact [email or web address] or [phone] to request more information on [facility] access.    For providers and/or their staff who would like to refer a patient to [facility], please contact us through our one-stop-shop provider referral line, [facility] , at [phone].    If you feel you have received this communication in error or would no longer like to receive these types of communications, please e-mail [email or web address]

## 2018-03-06 ENCOUNTER — PATIENT MESSAGE (OUTPATIENT)
Dept: OTHER | Facility: OTHER | Age: 68
End: 2018-03-06

## 2018-03-25 DIAGNOSIS — F41.9 ANXIETY: ICD-10-CM

## 2018-03-26 RX ORDER — ALPRAZOLAM 0.5 MG/1
TABLET ORAL
Qty: 60 TABLET | Refills: 5 | Status: SHIPPED | OUTPATIENT
Start: 2018-03-26 | End: 2018-09-30 | Stop reason: SDUPTHER

## 2018-03-28 ENCOUNTER — PATIENT MESSAGE (OUTPATIENT)
Dept: FAMILY MEDICINE | Facility: CLINIC | Age: 68
End: 2018-03-28

## 2018-04-03 ENCOUNTER — PATIENT OUTREACH (OUTPATIENT)
Dept: OTHER | Facility: OTHER | Age: 68
End: 2018-04-03

## 2018-04-03 NOTE — PROGRESS NOTES
Last 5 Patient Entered Readings                                      Current 30 Day Average: 134/79     Recent Readings 3/30/2018 3/27/2018 3/25/2018 3/11/2018 3/10/2018    SBP (mmHg) 125 144 127 133 141    DBP (mmHg) 77 78 75 85 82    Pulse 80 72 88 85 71        Digital Medicine: Health  Follow Up    Lifestyle Modifications:    1.Dietary Modifications (Sodium intake <2,000mg/day, food labels, dining out): Patient denies changes to her diet.     2.Physical Activity: Mrs. Aguilera reports that she has been having some hip problems. She will see ortho on Thursday, 4/5.    3.Medication Therapy: Patient has been compliant with the medication regimen.    4.Patient has the following medication side effects/concerns: None  (Frequency/Alleviating factors/Precipitating factors, etc.)     Follow up with Mrs. Lucinda Aguilera completed. Ms. Aguilera is doing well. She states that she has been busy. She will submit a BP reading this week. No further questions or concerns. I will follow up in a few weeks to assess progress.

## 2018-04-04 ENCOUNTER — PATIENT MESSAGE (OUTPATIENT)
Dept: ORTHOPEDICS | Facility: CLINIC | Age: 68
End: 2018-04-04

## 2018-04-04 DIAGNOSIS — M25.552 LEFT HIP PAIN: Primary | ICD-10-CM

## 2018-04-04 DIAGNOSIS — M54.40 ACUTE BILATERAL LOW BACK PAIN WITH SCIATICA, SCIATICA LATERALITY UNSPECIFIED: ICD-10-CM

## 2018-04-05 ENCOUNTER — OFFICE VISIT (OUTPATIENT)
Dept: OPTOMETRY | Facility: CLINIC | Age: 68
End: 2018-04-05
Payer: MEDICARE

## 2018-04-05 ENCOUNTER — HOSPITAL ENCOUNTER (OUTPATIENT)
Dept: RADIOLOGY | Facility: HOSPITAL | Age: 68
Discharge: HOME | End: 2018-04-05
Attending: ORTHOPAEDIC SURGERY
Payer: MEDICARE

## 2018-04-05 ENCOUNTER — PATIENT MESSAGE (OUTPATIENT)
Dept: OPTOMETRY | Facility: CLINIC | Age: 68
End: 2018-04-05

## 2018-04-05 ENCOUNTER — OFFICE VISIT (OUTPATIENT)
Dept: ORTHOPEDICS | Facility: CLINIC | Age: 68
End: 2018-04-05
Payer: MEDICARE

## 2018-04-05 VITALS — BODY MASS INDEX: 32.6 KG/M2 | HEIGHT: 63 IN | WEIGHT: 184 LBS

## 2018-04-05 DIAGNOSIS — M47.816 LUMBAR SPONDYLOSIS: Primary | ICD-10-CM

## 2018-04-05 DIAGNOSIS — H52.4 MYOPIA WITH ASTIGMATISM AND PRESBYOPIA, BILATERAL: ICD-10-CM

## 2018-04-05 DIAGNOSIS — Z13.5 GLAUCOMA SCREENING: ICD-10-CM

## 2018-04-05 DIAGNOSIS — Z83.511 FAMILY HISTORY OF GLAUCOMA IN SISTER: ICD-10-CM

## 2018-04-05 DIAGNOSIS — H04.123 DRY EYES, BILATERAL: ICD-10-CM

## 2018-04-05 DIAGNOSIS — M25.552 LEFT HIP PAIN: ICD-10-CM

## 2018-04-05 DIAGNOSIS — H52.203 MYOPIA WITH ASTIGMATISM AND PRESBYOPIA, BILATERAL: ICD-10-CM

## 2018-04-05 DIAGNOSIS — H25.13 NUCLEAR SCLEROSIS, BILATERAL: Primary | ICD-10-CM

## 2018-04-05 DIAGNOSIS — M54.40 ACUTE BILATERAL LOW BACK PAIN WITH SCIATICA, SCIATICA LATERALITY UNSPECIFIED: ICD-10-CM

## 2018-04-05 DIAGNOSIS — H43.813 POSTERIOR VITREOUS DETACHMENT, BILATERAL: ICD-10-CM

## 2018-04-05 DIAGNOSIS — M43.16 SPONDYLOLISTHESIS AT L4-L5 LEVEL: ICD-10-CM

## 2018-04-05 DIAGNOSIS — H52.13 MYOPIA WITH ASTIGMATISM AND PRESBYOPIA, BILATERAL: ICD-10-CM

## 2018-04-05 DIAGNOSIS — M43.17 SPONDYLOLISTHESIS AT L5-S1 LEVEL: ICD-10-CM

## 2018-04-05 PROCEDURE — 99213 OFFICE O/P EST LOW 20 MIN: CPT | Mod: PBBFAC,25,PO | Performed by: OPTOMETRIST

## 2018-04-05 PROCEDURE — 99999 PR PBB SHADOW E&M-EST. PATIENT-LVL III: CPT | Mod: PBBFAC,,, | Performed by: ORTHOPAEDIC SURGERY

## 2018-04-05 PROCEDURE — 92015 DETERMINE REFRACTIVE STATE: CPT | Mod: ,,, | Performed by: OPTOMETRIST

## 2018-04-05 PROCEDURE — 72100 X-RAY EXAM L-S SPINE 2/3 VWS: CPT | Mod: 26,,, | Performed by: RADIOLOGY

## 2018-04-05 PROCEDURE — 92014 COMPRE OPH EXAM EST PT 1/>: CPT | Mod: S$PBB,,, | Performed by: OPTOMETRIST

## 2018-04-05 PROCEDURE — 72100 X-RAY EXAM L-S SPINE 2/3 VWS: CPT | Mod: TC,PO

## 2018-04-05 PROCEDURE — 99999 PR PBB SHADOW E&M-EST. PATIENT-LVL III: CPT | Mod: PBBFAC,,, | Performed by: OPTOMETRIST

## 2018-04-05 PROCEDURE — 99213 OFFICE O/P EST LOW 20 MIN: CPT | Mod: PBBFAC,25,27,PN | Performed by: ORTHOPAEDIC SURGERY

## 2018-04-05 PROCEDURE — 73502 X-RAY EXAM HIP UNI 2-3 VIEWS: CPT | Mod: TC,PO,LT

## 2018-04-05 PROCEDURE — 73502 X-RAY EXAM HIP UNI 2-3 VIEWS: CPT | Mod: 26,LT,, | Performed by: RADIOLOGY

## 2018-04-05 PROCEDURE — 99213 OFFICE O/P EST LOW 20 MIN: CPT | Mod: S$PBB,,, | Performed by: ORTHOPAEDIC SURGERY

## 2018-04-05 NOTE — PROGRESS NOTES
HPI     Annual Exam    Additional comments: DLE 3-17 (samra)   ocular health exam // pt states   no visual complaints           Spots and/or Floaters    Additional comments: OU -- also seeing light flashes OU x 1 month //  no   eye pain or blurred vision           Dry Eye    Additional comments: +Refresh gtts OU qam           Comments   Agree above  Floaters OU, stable, no flash  Dry eye longstanding, but improved  Sister w/ glaucoma       Last edited by ZACHARY Rodrigues, OD on 4/5/2018  3:56 PM. (History)        ROS     Positive for: Eyes    Negative for: Constitutional, Gastrointestinal, Neurological, Skin,   Genitourinary, Musculoskeletal, HENT, Endocrine, Cardiovascular,   Respiratory, Psychiatric, Allergic/Imm, Heme/Lymph    Last edited by ZACHARY Rodrigues, OD on 4/5/2018  3:09 PM. (History)        Assessment /Plan     For exam results, see Encounter Report.    Nuclear sclerosis, bilateral    Posterior vitreous detachment, bilateral    Glaucoma screening    Dry eyes, bilateral    Myopia with astigmatism and presbyopia, bilateral    Family history of glaucoma in sister      1. Early changes, not vis sig for consult  2. RD precautions given  3. Low supsect w/ + fhx sister  Moderate c/d, low IOP, angles open  4. Longstanding, ok continue with otc ATs prn  5. Updated specs rx, gave copy, fill prn  6. Low suspect in patient, annual DFE and IOP checks    Discussed and educated patient on current findings /plan.  RTC 1 year, prn if any changes / issues

## 2018-04-05 NOTE — MEDICAL/APP STUDENT
DATE: 4/5/2018  PATIENT: Lucinda Aguilera  REFERRING MD:  CHIEF COMPLAINT:   Chief Complaint   Patient presents with    Left Hip - Pain       HISTORY:  Lucinda Aguilera is a 67 y.o. female  who presents for initial evaluation of L hip pain. States pain started insidiously several months ago and has been progressively worse. Pt denies inciting trauma or injury. Pain is located mainly in posterior aspect of hip/buttocks, and radiates to her R buttock. Does not radiate down leg. She describes pain as aching, rated 5/10, and worsens with exertion. She has tried Aleve and heat with limited relief. Denies numbness or tingling. She has a hx of R TKA, and is schedule to have a L TKA done in the future.      PAST MEDICAL/SURGICAL HISTORY:  Past Medical History:   Diagnosis Date    Anticoagulant long-term use     Anxiety     takes daily Xanax    Arthritis     right thumb    Cataract     OU    GERD (gastroesophageal reflux disease)     Hyperlipemia     Hypertension     PVD (posterior vitreous detachment)     OD     Past Surgical History:   Procedure Laterality Date    chest abcess      child    COLONOSCOPY  1/2012    repeat in 5 years    JOINT REPLACEMENT      right knee     KNEE ARTHROSCOPY W/ LASER      right    KNEE SURGERY Right 06/03/2016    Total knee replacement    thumb surgery  11/2014       Current Medications:   Current Outpatient Prescriptions:     ALPRAZolam (XANAX) 0.5 MG tablet, TAKE 1 TABLET(0.5 MG) BY MOUTH TWICE DAILY AS NEEDED, Disp: 60 tablet, Rfl: 5    aspirin (ECOTRIN) 81 MG EC tablet, Take 81 mg by mouth once daily., Disp: , Rfl:     carboxymethylcellulose (REFRESH PLUS) 0.5 % Dpet, 1 drop 3 (three) times daily as needed., Disp: , Rfl:     chlorthalidone (HYGROTEN) 25 MG Tab, TAKE 1 TABLET BY MOUTH EVERY MORNING; DISCONTINUE HCTZ, Disp: 30 tablet, Rfl: 11    docusate sodium (COLACE) 100 MG capsule, Take 1 capsule (100 mg total) by mouth 2 (two) times daily., Disp: 60 capsule, Rfl:  0    fish oil-omega-3 fatty acids 300-1,000 mg capsule, Take 2 g by mouth once daily., Disp: , Rfl:     multivitamin (MULTIVITAMIN) per tablet, Take 1 tablet by mouth once daily.  , Disp: , Rfl:     naproxen sodium (ALEVE) 220 mg Cap, Take 220 mg by mouth 2 (two) times daily., Disp: , Rfl:     niacin 500 MG CpSR, Take 500 mg by mouth every evening.  , Disp: , Rfl:     pantoprazole (PROTONIX) 40 MG tablet, Take 1 tablet (40 mg total) by mouth before breakfast., Disp: 30 tablet, Rfl: 11    paroxetine (PAXIL) 10 MG tablet, TAKE 1 TABLET BY MOUTH EVERY MORNING, Disp: 90 tablet, Rfl: 3    potassium chloride (MICRO-K) 10 MEQ CpSR, Take 2 capsules (20 mEq total) by mouth once daily., Disp: 60 capsule, Rfl: 11    pravastatin (PRAVACHOL) 40 MG tablet, TAKE 1 TABLET(40 MG) BY MOUTH EVERY DAY, Disp: 90 tablet, Rfl: 2    promethazine (PHENERGAN) 25 MG tablet, Take 1 tablet (25 mg total) by mouth every 6 (six) hours as needed for Nausea., Disp: 20 tablet, Rfl: 0    sucralfate (CARAFATE) 1 gram tablet, Take 1 tablet (1 g total) by mouth 4 (four) times daily., Disp: 120 tablet, Rfl: 0    irbesartan (AVAPRO) 300 MG tablet, Take 1 tablet (300 mg total) by mouth every evening. Discontinue irbesartan 150mg RX., Disp: 90 tablet, Rfl: 3    Family History: family history was reviewed and is noncontributory  Social History:   Social History     Social History    Marital status:      Spouse name: N/A    Number of children: 2    Years of education: N/A     Occupational History    retired     retired teacher and principal      Social History Main Topics    Smoking status: Never Smoker    Smokeless tobacco: Never Used    Alcohol use Yes      Comment: social    Drug use: No    Sexual activity: Yes     Partners: Male     Birth control/ protection: None, Post-menopausal     Other Topics Concern    Not on file     Social History Narrative    No narrative on file       ROS:  Constitution: Negative for chills, fever,  "and sweats. Negative for unexplained weight loss.  HENT: Negative for headaches and blurry vision.   Cardiovascular: Negative for chest pain, irregular heartbeat, leg swelling and palpitations.   Respiratory: Negative for cough and shortness of breath.   Gastrointestinal: Negative for abdominal pain, heartburn, nausea and vomiting.   Genitourinary: Negative for bladder incontinence and dysuria.   Musculoskeletal: Negative for systemic arthritis, muscle weakness and myalgias.   Neurological: Negative for numbness.   Psychiatric/Behavioral: Negative for depression.  Endocrine: Negative for polyuria.   Hematologic/Lymphatic: Negative for bleeding disorders.   Skin: Negative for poor wound healing.        PHYSICAL EXAM:  Ht 5' 3" (1.6 m)   Wt 83.5 kg (184 lb)   LMP 04/18/2004   BMI 32.59 kg/m²   Lucinda Aguilera is a well developed, well nourished female in no acute distress. Physical examination of the left hip and lumbar spine evaluated the following:    Gait and Alignment  Lumbar spine range of motion  Inspection for ecchymosis, swelling, atrophy, or deformity  Tenderness to palpation over the bony and soft tissue structures around the lower back/hip  Inspection for intra-articular and/or bursal effusions  Range of Motion and presence of contractures  Sensation and motor strength to the lower extremity  Pain/pop/click with logrolling or range of motion  Straight leg raise testing  Vascular exam to include skin temperature/color/capillary refill    Remarkable findings included:  Antalgic gait noted. Lumbar spine ROM fluid and full, without pain. No point tenderness to palpation in the hip or spine. Hip ROM full without pain on IR or ER. No clicking or popping noted. Straight leg test negative. NVI.        IMAGING:   Xrays of the hip and lumbar spine were performed today and personally reviewed by me. Xray of the hip and spine show advanced degenerative change of the lower lumbar spine in the form of marginal " osteophyte formation, degenerative disc disease, and degenerative facet arthropathy, most pronounced at L4-L5 and L5-S1.  There is moderate-severe degenerative change of both hip joints in the form of rim osteophyte formation and joint space narrowing.  No radiographically evident osteonecrosis of the femoral heads.     ASSESSMENT:  Lumbar spondylosis     PLAN:  The nature of the diagnosis, using models and diagrams when appropriate, was explained to the patient in detail. Due to the history and physical, the etiology of the pain is likely due to lumbar pathology. Treatment option discussed included lifestyle modification, observation, NSAIDS, or PT. If pain is refractory to the measures above and pt wishes further management, then referral to back and spine clinic can be arranged. All questions answered and the patient wishes to proceed with observation and will call us if she desires further referral. Followup as needed.      This note was dictated using voice recognition software. Please excuse any grammatical or typographical errors.

## 2018-04-06 NOTE — PROGRESS NOTES
DATE: 4/5/2018  PATIENT: Lucinda Aguilera  REFERRING MD:  CHIEF COMPLAINT:   Chief Complaint   Patient presents with    Left Hip - Pain         HISTORY:  Lucinda Aguilera is a 67 y.o. female  who presents for initial evaluation of L hip pain. States pain started insidiously several months ago and has been progressively worse. Pt denies inciting trauma or injury. Pain is located mainly in posterior aspect of hip/buttocks, and radiates to her R buttock. Does not radiate down leg. She describes pain as aching, rated 5/10, and worsens with exertion. She has tried Aleve and heat with limited relief. Denies numbness or tingling. She has a hx of right total knee arthroplasty and has significant osteoarthrosis of the left knee and was told she needs a left knee replacement as well.        PAST MEDICAL/SURGICAL HISTORY:       Past Medical History:   Diagnosis Date    Anticoagulant long-term use      Anxiety       takes daily Xanax    Arthritis       right thumb    Cataract       OU    GERD (gastroesophageal reflux disease)      Hyperlipemia      Hypertension      PVD (posterior vitreous detachment)       OD            Past Surgical History:   Procedure Laterality Date    chest abcess         child    COLONOSCOPY   1/2012     repeat in 5 years    JOINT REPLACEMENT         right knee     KNEE ARTHROSCOPY W/ LASER         right    KNEE SURGERY Right 06/03/2016     Total knee replacement    thumb surgery   11/2014         Current Medications:   Current Outpatient Prescriptions:     ALPRAZolam (XANAX) 0.5 MG tablet, TAKE 1 TABLET(0.5 MG) BY MOUTH TWICE DAILY AS NEEDED, Disp: 60 tablet, Rfl: 5    aspirin (ECOTRIN) 81 MG EC tablet, Take 81 mg by mouth once daily., Disp: , Rfl:     carboxymethylcellulose (REFRESH PLUS) 0.5 % Dpet, 1 drop 3 (three) times daily as needed., Disp: , Rfl:     chlorthalidone (HYGROTEN) 25 MG Tab, TAKE 1 TABLET BY MOUTH EVERY MORNING; DISCONTINUE HCTZ, Disp: 30 tablet, Rfl: 11     docusate sodium (COLACE) 100 MG capsule, Take 1 capsule (100 mg total) by mouth 2 (two) times daily., Disp: 60 capsule, Rfl: 0    fish oil-omega-3 fatty acids 300-1,000 mg capsule, Take 2 g by mouth once daily., Disp: , Rfl:     multivitamin (MULTIVITAMIN) per tablet, Take 1 tablet by mouth once daily.  , Disp: , Rfl:     naproxen sodium (ALEVE) 220 mg Cap, Take 220 mg by mouth 2 (two) times daily., Disp: , Rfl:     niacin 500 MG CpSR, Take 500 mg by mouth every evening.  , Disp: , Rfl:     pantoprazole (PROTONIX) 40 MG tablet, Take 1 tablet (40 mg total) by mouth before breakfast., Disp: 30 tablet, Rfl: 11    paroxetine (PAXIL) 10 MG tablet, TAKE 1 TABLET BY MOUTH EVERY MORNING, Disp: 90 tablet, Rfl: 3    potassium chloride (MICRO-K) 10 MEQ CpSR, Take 2 capsules (20 mEq total) by mouth once daily., Disp: 60 capsule, Rfl: 11    pravastatin (PRAVACHOL) 40 MG tablet, TAKE 1 TABLET(40 MG) BY MOUTH EVERY DAY, Disp: 90 tablet, Rfl: 2    promethazine (PHENERGAN) 25 MG tablet, Take 1 tablet (25 mg total) by mouth every 6 (six) hours as needed for Nausea., Disp: 20 tablet, Rfl: 0    sucralfate (CARAFATE) 1 gram tablet, Take 1 tablet (1 g total) by mouth 4 (four) times daily., Disp: 120 tablet, Rfl: 0    irbesartan (AVAPRO) 300 MG tablet, Take 1 tablet (300 mg total) by mouth every evening. Discontinue irbesartan 150mg RX., Disp: 90 tablet, Rfl: 3     Family History: family history was reviewed and is noncontributory  Social History:   Social History   Social History            Social History    Marital status:        Spouse name: N/A    Number of children: 2    Years of education: N/A           Occupational History    retired      retired teacher and principal               Social History Main Topics    Smoking status: Never Smoker    Smokeless tobacco: Never Used    Alcohol use Yes         Comment: social    Drug use: No    Sexual activity: Yes       Partners: Male       Birth control/  "protection: None, Post-menopausal           Other Topics Concern    Not on file          Social History Narrative    No narrative on file            ROS:  Constitution: Negative for chills, fever, and sweats. Negative for unexplained weight loss.  HENT: Negative for headaches and blurry vision.   Cardiovascular: Negative for chest pain, irregular heartbeat, leg swelling and palpitations.   Respiratory: Negative for cough and shortness of breath.   Gastrointestinal: Negative for abdominal pain, heartburn, nausea and vomiting.   Genitourinary: Negative for bladder incontinence and dysuria.   Musculoskeletal: Negative for systemic arthritis, muscle weakness and myalgias.   Neurological: Negative for numbness.   Psychiatric/Behavioral: Negative for depression.  Endocrine: Negative for polyuria.   Hematologic/Lymphatic: Negative for bleeding disorders.   Skin: Negative for poor wound healing.          PHYSICAL EXAM:  Ht 5' 3" (1.6 m)   Wt 83.5 kg (184 lb)   LMP 04/18/2004   BMI 32.59 kg/m²   Lucinda Aguilera is a well developed, well nourished female in no acute distress. Physical examination of the left hip and lumbar spine evaluated the following:     Gait and Alignment  Lumbar spine range of motion  Inspection for ecchymosis, swelling, atrophy, or deformity  Tenderness to palpation over the bony and soft tissue structures around the lower back/hip  Inspection for intra-articular and/or bursal effusions  Range of Motion and presence of contractures  Sensation and motor strength to the lower extremity  Pain/pop/click with logrolling or range of motion  Straight leg raise testing  Vascular exam to include skin temperature/color/capillary refill     Remarkable findings included:  Antalgic gait noted. Lumbar spine ROM fluid and full, without pain. No point tenderness to palpation in the hip or spine. Hip ROM full without pain on IR or ER. No clicking or popping noted. Straight leg test negative. " JOE.           IMAGING:   Xrays of the hip and lumbar spine were performed today and personally reviewed by me. Xray of the hip and spine show advanced degenerative change of the lower lumbar spine in the form of marginal osteophyte formation, degenerative disc disease, and degenerative changes to include spondylolisthesis, most pronounced at L4-L5 and L5-S1.  There is mild to moderate degenerative change of both hip joints left greater than right No radiographically evident osteonecrosis of the femoral heads.      ASSESSMENT:  Lumbar spondylolisthesis      PLAN:  The nature of the diagnosis, using models and diagrams when appropriate, was explained to the patient in detail.  I have explained to Lucinda that her pain is most likely referred from the lumbar spine.  Treatment option discussed included lifestyle modification, observation, NSAIDS, PT, and referral to the back and spine clinic for further evaluation and management. All questions answered and the patient wishes to proceed with observation.  Should her symptoms worsen, she will contact us for further treatment recommendations.  Follow-up as needed.      This note was dictated using voice recognition software. Please excuse any grammatical or typographical errors.    Answers for HPI/ROS submitted by the patient on 4/2/2018   Hip pain  unexpected weight change: No  appetite change : No  sleep disturbance: No  IMMUNOCOMPROMISED: No  nervous/ anxious: No  dysphoric mood: No  rash: No  visual disturbance: No  eye redness: No  eye pain: No  ear pain: No  tinnitus: No  hearing loss: No  sinus pressure : No  nosebleeds: No  enviro allergies: No  food allergies: No  cough: No  shortness of breath: No  sweating: No  dysuria: No  frequency: No  difficulty urinating: No  hematuria: No  painful intercourse: Yes  chest pain: No  palpitations: No  nausea: No  vomiting: No  diarrhea: No  blood in stool: No  constipation: No  headaches: No  dizziness: No  numbness:  No  seizures: No  joint swelling: No  myalgia: No  weakness: No  back pain: Yes  Pain Chronicity: recurrent  History of trauma: No  Onset: more than 1 year ago  Frequency: daily  Progression since onset: gradually worsening  injury location: at home  pain- numeric: 10/10  pain location: left hip, right hip  pain quality: aching, generalized, throbbing  Radiating Pain: Yes  If your pain is radiating, to what part of the body?: lower back, mid back  Aggravating factors: standing, walking  fever: No  inability to bear weight: Yes  itching: No  joint locking: No  limited range of motion: Yes  stiffness: Yes  tingling: No  Treatments tried: heat, OTC ointments, OTC pain meds, rest  physical therapy: not tried  Improvement on treatment: no relief

## 2018-04-09 ENCOUNTER — HOSPITAL ENCOUNTER (OUTPATIENT)
Dept: RADIOLOGY | Facility: HOSPITAL | Age: 68
Discharge: HOME | End: 2018-04-09
Attending: ORTHOPAEDIC SURGERY
Payer: MEDICARE

## 2018-04-09 ENCOUNTER — OFFICE VISIT (OUTPATIENT)
Dept: ORTHOPEDICS | Facility: CLINIC | Age: 68
End: 2018-04-09
Payer: MEDICARE

## 2018-04-09 VITALS — BODY MASS INDEX: 33.73 KG/M2 | HEIGHT: 62 IN | WEIGHT: 183.31 LBS

## 2018-04-09 DIAGNOSIS — M25.562 LEFT KNEE PAIN, UNSPECIFIED CHRONICITY: ICD-10-CM

## 2018-04-09 DIAGNOSIS — M25.562 LEFT KNEE PAIN, UNSPECIFIED CHRONICITY: Primary | ICD-10-CM

## 2018-04-09 DIAGNOSIS — M17.12 PRIMARY OSTEOARTHRITIS OF LEFT KNEE: ICD-10-CM

## 2018-04-09 PROBLEM — G89.18 POST-OP PAIN: Status: RESOLVED | Noted: 2018-01-02 | Resolved: 2018-04-09

## 2018-04-09 PROCEDURE — 99214 OFFICE O/P EST MOD 30 MIN: CPT | Mod: S$PBB,,, | Performed by: ORTHOPAEDIC SURGERY

## 2018-04-09 PROCEDURE — 73564 X-RAY EXAM KNEE 4 OR MORE: CPT | Mod: TC,50

## 2018-04-09 PROCEDURE — 99213 OFFICE O/P EST LOW 20 MIN: CPT | Mod: PBBFAC,25 | Performed by: ORTHOPAEDIC SURGERY

## 2018-04-09 PROCEDURE — 73564 X-RAY EXAM KNEE 4 OR MORE: CPT | Mod: 26,50,, | Performed by: RADIOLOGY

## 2018-04-09 PROCEDURE — 99999 PR PBB SHADOW E&M-EST. PATIENT-LVL III: CPT | Mod: PBBFAC,,, | Performed by: ORTHOPAEDIC SURGERY

## 2018-04-09 NOTE — LETTER
April 9, 2018      Shannan Waite NP  [address]  [facility] [address]           [facility]  [address]  [facility] [address]  Phone: [phone]          Patient: Lucinda Aguilera   MR Number: [id number]   YOB: 1950   Date of Visit: 4/9/2018       Dear Shannan Waite:    Thank you for referring Lucinda Aguilera to me for evaluation. Attached you will find relevant portions of my assessment and plan of care.    If you have questions, please do not hesitate to call me. I look forward to following Lucnida Aguilera along with you.    Sincerely,    Nj Melvin MD    Enclosure  CC:  No Recipients    If you would like to receive this communication electronically, please contact [email or web address] or [phone] to request more information on [employer] access.    For providers and/or their staff who would like to refer a patient to [facility], please contact us through our one-stop-shop provider referral line, Betzy Martinez, at [phone].    If you feel you have received this communication in error or would no longer like to receive these types of communications, please e-mail [email or web address]

## 2018-04-09 NOTE — PROGRESS NOTES
CHIEF COMPLAINT: Left knee pain.                                                          HISTORY OF PRESENT ILLNESS:  The patient is a 67 y.o. female  who presents  for evaluation of her left knee pain.     Pain Duration: 1 year  Pain Quality: burning  Pain Context:worsening  Pain Timing: constant  Pain Location:lateral and anterior  Pain Severity: severe  Modifying Factors: failed NSAID's and arthroscopy  Associated Signs and Symptoms: swelling    She  presents for further treatment recommendations.    She denies radicular pain or low back pain.  She denies distal paresthesias, lower extremity edema, or calf pain concerning for vascular claudication.  She has no history of discreet prior trauma.                                                                                                                       PAST MEDICAL HISTORY:    Past Medical History:   Diagnosis Date    Anticoagulant long-term use     Anxiety     takes daily Xanax    Arthritis     right thumb    Cataract     OU    GERD (gastroesophageal reflux disease)     Hyperlipemia     Hypertension     PVD (posterior vitreous detachment)     OD                                                                                                            PAST SURGICAL HISTORY:    Past Surgical History:   Procedure Laterality Date    chest abcess      child    COLONOSCOPY  1/2012    repeat in 5 years    JOINT REPLACEMENT      right knee     KNEE ARTHROSCOPY W/ LASER      right    KNEE SURGERY Right 06/03/2016    Total knee replacement    thumb surgery  11/2014                                                                                                               SOCIAL HISTORY:  Reviewed per EPIC history for tobacco or alcohol use and she   is an active  67 y.o.  female                                                                             FAMILY MEDICAL HISTORY:  family history includes Cataracts in her sister; Glaucoma in her mother  and sister; Heart disease in her mother; Hypertension in her mother; Macular degeneration in her sister; Stroke in her father.                                                                                                                                                                                                  PHYSICAL EXAMINATION:                                                        GENERAL:  A well-developed, well-nourished 67 y.o. female who is alert and       oriented in no acute distress.      Gait: She  walks with a normal gait.                   EXTREMITIES:  Examination of lower extremities reveals there is no visible mass or deformity.    Left knee:  ROM 0-120    Ligamentously stable to varus/valgus stress. Hypervalgus with lateral crepitation    Anterior and posterior drawers negative.    No pain over pes bursa.    No warmth    No erythema    Effusion Yes      The skin over both lower extremities is normal and unremarkable.  She has a  painless range of motion of the hips and ankles bilaterally.   Sensation is intact in both lower extremities.    There are no motor deficits in the lower extremities bilaterally.   Pedal pulses are palpable distally bilaterally.    She has no calf tenderness to palpation nor edema.    AP standing, Merchant's, and lateral radiographs of the knees were ordered and personally reviewed with the patient today.  Radiographs show advanced DJD with joint space narrowing, sclerosis, and osteophytes with advanced lateral compartment DJD                                                                            IMPRESSION: Osteoarthritis left knee.                             PLAN:  Having failed non-operative measures she wishes to proceed with left knee replacement.  The surgical process of knee replacement was discussed in detail with the patient including a detailed discussion of the procedure itself (including visual model, x-ray review, and literature review). The  typical perioperative and post-operative course was discussed and perioperative risks were discussed to the patient's satisfaction.  Risks and complications discussed included but were not limited to the risks of anesthetic complications, infection, bleeding, wound healing complications, aseptic loosening, instability, limb length inequality, neurologic dysfunction including numbness,  DVT, pulmonary embolism, perioperative medical risks (cardiac, pulmonary, renal, neurologic), and death and the patient elects to proceed.   I have discussed anticoagulation with aspirin and coumadin and in low risk patients I have recommended aspirin twice a day. We will initiate pre-operative medical evaluation and clearance and set a provisional date for surgical intervention according to the patient's schedule on 6/19/18. Surgical consent was obtained.

## 2018-05-01 ENCOUNTER — PATIENT OUTREACH (OUTPATIENT)
Dept: OTHER | Facility: OTHER | Age: 68
End: 2018-05-01

## 2018-05-01 ENCOUNTER — PATIENT MESSAGE (OUTPATIENT)
Dept: FAMILY MEDICINE | Facility: CLINIC | Age: 68
End: 2018-05-01

## 2018-05-01 NOTE — PROGRESS NOTES
Last 5 Patient Entered Readings                                      Current 30 Day Average: 127/78     Recent Readings 4/22/2018 4/17/2018 4/14/2018 4/12/2018 4/6/2018    SBP (mmHg) 119 131 128 136 121    DBP (mmHg) 77 75 78 83 75    Pulse 68 81 77 77 82        Digital Medicine: Health  Follow Up    Lifestyle Modifications:    1.Dietary Modifications (Sodium intake <2,000mg/day, food labels, dining out): Patient denies changes to her diet.    2.Physical Activity: Mrs. Aguilera will be having knee replacement surgery on 6/19. She is wearing a brace to help with pain while walking. She does ice in the evening for additional relief if needed.    3.Medication Therapy: Patient has been compliant with the medication regimen.    4.Patient has the following medication side effects/concerns: None  (Frequency/Alleviating factors/Precipitating factors, etc.)     Follow up with Mrs. Lucinda Aguilera completed. Mrs. Aguilera is doing well. She has been a little busy, but will submit a BP reading today. She thanks me for calling. No further questions or concerns. Will continue follow up to achieve health goals.

## 2018-05-11 ENCOUNTER — ANESTHESIA EVENT (OUTPATIENT)
Dept: SURGERY | Facility: HOSPITAL | Age: 68
DRG: 470 | End: 2018-05-11
Payer: MEDICARE

## 2018-05-11 DIAGNOSIS — M79.606 PAIN OF LOWER EXTREMITY, UNSPECIFIED LATERALITY: Primary | ICD-10-CM

## 2018-05-11 NOTE — PRE ADMISSION SCREENING
Anesthesia Assessment: Preoperative EQUATION    Planned Procedure: Procedure(s) (LRB):  REPLACEMENT-KNEE-TOTAL (Left)  Requested Anesthesia Type:Regional  Surgeon: Nj Melvin MD  Service: Orthopedics  Known or anticipated Date of Surgery:6/19/2018        Plan:    Testing:  Hematology Profile, BMP, PT/INR and EKG   Pre-anesthesia  visit       Visit focus: possible regional anesthesia and/or nerve block      Consultation:Patient's PCP for a statement of optimization appt 6/13     Vanessa Sutton RN 05/11/2018

## 2018-05-11 NOTE — ANESTHESIA PREPROCEDURE EVALUATION
Anesthesia Assessment: Preoperative EQUATION    Planned Procedure: Procedure(s) (LRB):  REPLACEMENT-KNEE-TOTAL (Left)  Requested Anesthesia Type:Regional  Surgeon: Nj Melvin MD  Service: Orthopedics  Known or anticipated Date of Surgery:6/19/2018        Plan:    Testing:  Hematology Profile, BMP, PT/INR and EKG   Pre-anesthesia  visit       Visit focus: possible regional anesthesia and/or nerve block                            Consultation:Patient's PCP for a statement of optimization appt 6/13     Vanessa Sutton RN 05/11/2018 05/11/2018  Lucinda Aguilera is a 67 y.o., female.    Anesthesia Evaluation    I have reviewed the Patient Summary Reports.    I have reviewed the Nursing Notes.   I have reviewed the Medications.     Review of Systems  Anesthesia Hx:  No problems with previous Anesthesia  History of prior surgery of interest to airway management or planning: Previous anesthesia: General, Spinal Denies Family Hx of Anesthesia complications.   Denies Personal Hx of Anesthesia complications.   Social:  Non-Smoker, No Alcohol Use    Hematology/Oncology:  Hematology Normal   Oncology Normal     EENT/Dental:  EENT/Dental Normal Fluid behind left ear. Taking nasal sprays.  Denies any problems with equilibrium.    Cardiovascular:   Exercise tolerance: good Hypertension hyperlipidemia Housework, grocery shopping, cooks, takes care of grand kids.  Can climb a flight of stairs.  Denies chest pain or sob   Pulmonary:  Pulmonary Normal  Possible Obstructive Sleep Apnea , (STOP/BANG) Symptoms A - Age > 50, P - Pressure being treated for high BP, N - Neck circumference > 40 cms and S - Snoring (loud)    Renal/:  Renal/ Normal     Hepatic/GI:  Hepatic/GI Normal GERD (can lie flat. takes protonix daily), well controlled Denies Liver Disease.    Musculoskeletal:   Arthritis   Musculoskeletal General/Symptoms:  Functional capacity is ambulatory without assistance.  Joint Disease:  Arthritis, Osteoarthritis    Neurological:  Neurology Normal  Pain , onset is chronic , location of knee , quality of aching/dull , severity is a 5 , precipitating factors are walking , alleviating factors are rest. Osteoarthritis    Endocrine:  Endocrine Normal    Dermatological:  Skin Normal    Psych:   anxiety          Physical Exam  General:  Well nourished, Obesity    Airway/Jaw/Neck:  Airway Findings: Mouth Opening: Normal Tongue: Normal  General Airway Assessment: Adult  Mallampati: III  Improves to II with phonation.  TM Distance: Normal, at least 6 cm  Jaw/Neck Findings:  Neck ROM: Normal ROM     Eyes/Ears/Nose:  EYES/EARS/NOSE FINDINGS: Normal   Dental:  Dental Findings: In tact   Chest/Lungs:  Chest/Lungs Findings: Clear to auscultation, Normal Respiratory Rate     Heart/Vascular:  Heart Findings: Rate: Normal  Rhythm: Regular Rhythm  Sounds: Normal  Heart murmur: negative Vascular Findings: Normal    Abdomen:  Abdomen Findings: Normal    Musculoskeletal:  Musculoskeletal Findings: Normal   Skin:  Skin Findings: Normal    Mental Status:  Mental Status Findings:  Cooperative, Alert and Oriented         Labs and ekg reviewed. Sodium 133    Discharge disposition: home with  Manish [phone]    Medical evaluation with PCP, Dr. Hassan-Medically cleared to proceed with knee surgery as planned    Vanessa Sutton, RN 06/12/2018            Anesthesia Plan  Type of Anesthesia, risks & benefits discussed:  Anesthesia Type:  general, spinal  Patient's Preference:   Intra-op Monitoring Plan:   Intra-op Monitoring Plan Comments:   Post Op Pain Control Plan:   Post Op Pain Control Plan Comments:   Induction:   IV  Beta Blocker:  Patient is not currently on a Beta-Blocker (No further documentation required).       Informed Consent: Patient understands risks and agrees with Anesthesia plan.  Questions answered. Anesthesia consent signed with  patient.  ASA Score: 2     Day of Surgery Review of History & Physical:            Ready For Surgery From Anesthesia Perspective.

## 2018-05-15 ENCOUNTER — PATIENT MESSAGE (OUTPATIENT)
Dept: ADMINISTRATIVE | Facility: OTHER | Age: 68
End: 2018-05-15

## 2018-05-16 ENCOUNTER — TELEPHONE (OUTPATIENT)
Dept: FAMILY MEDICINE | Facility: CLINIC | Age: 68
End: 2018-05-16

## 2018-05-16 NOTE — TELEPHONE ENCOUNTER
----- Message from Olaf Wall LPN sent at 5/16/2018  9:01 AM CDT -----      ----- Message -----  From: Vanessa Sutton RN  Sent: 5/14/2018   4:24 PM  To: [facility]    Patient is having a TKR with Dr. Melvin under spinal anesthesia on 6/19.  Anesthesia reviewed patient's record and we will added a PT/INR to your labs on 5/23.  We also need an ekg-already ordered.  Patient is scheduled to see you 6/13.  Can you see the patient earlier for preop or will you keep her appt 6/13 and include an preop clearance?    Vanessa Sutton RN  [facility]  [id number]

## 2018-05-22 ENCOUNTER — PATIENT OUTREACH (OUTPATIENT)
Dept: OTHER | Facility: OTHER | Age: 68
End: 2018-05-22

## 2018-05-22 NOTE — PROGRESS NOTES
Last 5 Patient Entered Readings                                      Current 30 Day Average: 132/81     Recent Readings 5/17/2018 5/15/2018 5/12/2018 5/6/2018 5/3/2018    SBP (mmHg) 139 132 139 134 129    DBP (mmHg) 82 80 88 79 71    Pulse 82 87 68 76 72        Hypertension Medications             chlorthalidone (HYGROTEN) 25 MG Tab TAKE 1 TABLET BY MOUTH EVERY MORNING; DISCONTINUE HCTZ    irbesartan (AVAPRO) 300 MG tablet Take 1 tablet (300 mg total) by mouth every evening. Discontinue irbesartan 150mg RX.        Plan:   Called patient to follow up. Per newly released 2017 ACC/ AHA HTN guidelines  (goal of BP < 130/80), current 30-day average is slightly uncontrolled, remains stable.  LVM, requested patient call back at her convenience if she has any questions or concerns.  Will continue to monitor. WCB in 6 weeks.

## 2018-05-23 ENCOUNTER — LAB VISIT (OUTPATIENT)
Dept: LAB | Facility: HOSPITAL | Age: 68
End: 2018-05-23
Attending: FAMILY MEDICINE
Payer: MEDICARE

## 2018-05-23 DIAGNOSIS — I10 ESSENTIAL HYPERTENSION: ICD-10-CM

## 2018-05-23 DIAGNOSIS — M79.606 PAIN OF LOWER EXTREMITY, UNSPECIFIED LATERALITY: ICD-10-CM

## 2018-05-23 DIAGNOSIS — E78.5 HYPERLIPIDEMIA, UNSPECIFIED HYPERLIPIDEMIA TYPE: ICD-10-CM

## 2018-05-23 LAB
ALBUMIN SERPL BCP-MCNC: 3.9 G/DL
ALP SERPL-CCNC: 96 U/L
ALT SERPL W/O P-5'-P-CCNC: 24 U/L
ANION GAP SERPL CALC-SCNC: 6 MMOL/L
AST SERPL-CCNC: 42 U/L
BASOPHILS # BLD AUTO: 0.06 K/UL
BASOPHILS NFR BLD: 0.7 %
BILIRUB SERPL-MCNC: 0.4 MG/DL
BUN SERPL-MCNC: 15 MG/DL
CALCIUM SERPL-MCNC: 9.6 MG/DL
CHLORIDE SERPL-SCNC: 98 MMOL/L
CHOLEST SERPL-MCNC: 183 MG/DL
CHOLEST/HDLC SERPL: 3.5 {RATIO}
CO2 SERPL-SCNC: 27 MMOL/L
CREAT SERPL-MCNC: 0.8 MG/DL
DIFFERENTIAL METHOD: ABNORMAL
EOSINOPHIL # BLD AUTO: 0.2 K/UL
EOSINOPHIL NFR BLD: 2.5 %
ERYTHROCYTE [DISTWIDTH] IN BLOOD BY AUTOMATED COUNT: 12.9 %
EST. GFR  (AFRICAN AMERICAN): >60 ML/MIN/1.73 M^2
EST. GFR  (NON AFRICAN AMERICAN): >60 ML/MIN/1.73 M^2
GLUCOSE SERPL-MCNC: 96 MG/DL
HCT VFR BLD AUTO: 36.7 %
HDLC SERPL-MCNC: 53 MG/DL
HDLC SERPL: 29 %
HGB BLD-MCNC: 12.4 G/DL
IMM GRANULOCYTES # BLD AUTO: 0.02 K/UL
IMM GRANULOCYTES NFR BLD AUTO: 0.2 %
INR PPP: 1
LDLC SERPL CALC-MCNC: 110 MG/DL
LYMPHOCYTES # BLD AUTO: 2.5 K/UL
LYMPHOCYTES NFR BLD: 30.6 %
MCH RBC QN AUTO: 29 PG
MCHC RBC AUTO-ENTMCNC: 33.8 G/DL
MCV RBC AUTO: 86 FL
MONOCYTES # BLD AUTO: 0.6 K/UL
MONOCYTES NFR BLD: 6.7 %
NEUTROPHILS # BLD AUTO: 4.9 K/UL
NEUTROPHILS NFR BLD: 59.3 %
NONHDLC SERPL-MCNC: 130 MG/DL
NRBC BLD-RTO: 0 /100 WBC
PLATELET # BLD AUTO: 378 K/UL
PMV BLD AUTO: 9.5 FL
POTASSIUM SERPL-SCNC: 4.8 MMOL/L
PROT SERPL-MCNC: 7.1 G/DL
PROTHROMBIN TIME: 10 SEC
RBC # BLD AUTO: 4.28 M/UL
SODIUM SERPL-SCNC: 131 MMOL/L
TRIGL SERPL-MCNC: 100 MG/DL
WBC # BLD AUTO: 8.3 K/UL

## 2018-05-23 PROCEDURE — 36415 COLL VENOUS BLD VENIPUNCTURE: CPT | Mod: PO

## 2018-05-23 PROCEDURE — 80053 COMPREHEN METABOLIC PANEL: CPT

## 2018-05-23 PROCEDURE — 80061 LIPID PANEL: CPT

## 2018-05-23 PROCEDURE — 85610 PROTHROMBIN TIME: CPT | Mod: PO

## 2018-05-23 PROCEDURE — 85025 COMPLETE CBC W/AUTO DIFF WBC: CPT

## 2018-05-24 ENCOUNTER — PATIENT MESSAGE (OUTPATIENT)
Dept: FAMILY MEDICINE | Facility: CLINIC | Age: 68
End: 2018-05-24

## 2018-05-24 ENCOUNTER — PATIENT MESSAGE (OUTPATIENT)
Dept: ADMINISTRATIVE | Facility: OTHER | Age: 68
End: 2018-05-24

## 2018-05-24 ENCOUNTER — OFFICE VISIT (OUTPATIENT)
Dept: FAMILY MEDICINE | Facility: CLINIC | Age: 68
End: 2018-05-24
Payer: MEDICARE

## 2018-05-24 VITALS
HEIGHT: 62 IN | HEART RATE: 73 BPM | OXYGEN SATURATION: 96 % | BODY MASS INDEX: 34.16 KG/M2 | WEIGHT: 185.63 LBS | DIASTOLIC BLOOD PRESSURE: 82 MMHG | SYSTOLIC BLOOD PRESSURE: 138 MMHG

## 2018-05-24 DIAGNOSIS — J32.9 SINUSITIS, UNSPECIFIED CHRONICITY, UNSPECIFIED LOCATION: Primary | ICD-10-CM

## 2018-05-24 PROCEDURE — 99999 PR PBB SHADOW E&M-EST. PATIENT-LVL IV: CPT | Mod: PBBFAC,,, | Performed by: NURSE PRACTITIONER

## 2018-05-24 PROCEDURE — 99213 OFFICE O/P EST LOW 20 MIN: CPT | Mod: S$PBB,,, | Performed by: NURSE PRACTITIONER

## 2018-05-24 PROCEDURE — 99214 OFFICE O/P EST MOD 30 MIN: CPT | Mod: PBBFAC,PO | Performed by: NURSE PRACTITIONER

## 2018-05-24 RX ORDER — FLUTICASONE PROPIONATE 50 MCG
1 SPRAY, SUSPENSION (ML) NASAL DAILY
Qty: 16 G | Refills: 0 | Status: SHIPPED | OUTPATIENT
Start: 2018-05-24 | End: 2018-12-11

## 2018-05-24 RX ORDER — CEFDINIR 300 MG/1
300 CAPSULE ORAL 2 TIMES DAILY
Qty: 20 CAPSULE | Refills: 0 | Status: SHIPPED | OUTPATIENT
Start: 2018-05-24 | End: 2018-06-03

## 2018-05-24 RX ORDER — AZELASTINE 1 MG/ML
1 SPRAY, METERED NASAL 2 TIMES DAILY
Qty: 30 ML | Refills: 0 | Status: SHIPPED | OUTPATIENT
Start: 2018-05-24 | End: 2018-12-11

## 2018-05-24 NOTE — PROGRESS NOTES
Subjective:       Patient ID: Lucinda Aguilera is a 67 y.o. female.    Chief Complaint: Sinus Problem; Otalgia (tinnitus); and Nasal Congestion    Last Friday started with sinus congestion, getting progressively worse. Had been taking OTC medications, stopped Bp medication x 2 days, restarted it in Monday. No OTC medications since Monday.       There are no preventive care reminders to display for this patient.    Past Medical History:  Past Medical History:  No date: Anticoagulant long-term use  No date: Anxiety      Comment: takes daily Xanax  No date: Arthritis      Comment: right thumb  No date: Cataract      Comment: OU  No date: GERD (gastroesophageal reflux disease)  No date: Hyperlipemia  No date: Hypertension  No date: PVD (posterior vitreous detachment)      Comment: OD  Past Surgical History:  No date: chest abcess      Comment: child  1/2012: COLONOSCOPY      Comment: repeat in 5 years  No date: JOINT REPLACEMENT      Comment: right knee   No date: KNEE ARTHROSCOPY W/ LASER      Comment: right  06/03/2016: KNEE SURGERY Right      Comment: Total knee replacement  11/2014: thumb surgery  Review of patient's allergies indicates:  No Known Allergies  Current Outpatient Prescriptions on File Prior to Visit:  ALPRAZolam (XANAX) 0.5 MG tablet, TAKE 1 TABLET(0.5 MG) BY MOUTH TWICE DAILY AS NEEDED, Disp: 60 tablet, Rfl: 5  aspirin (ECOTRIN) 81 MG EC tablet, Take 81 mg by mouth once daily., Disp: , Rfl:   carboxymethylcellulose (REFRESH PLUS) 0.5 % Dpet, 1 drop 3 (three) times daily as needed., Disp: , Rfl:   chlorthalidone (HYGROTEN) 25 MG Tab, TAKE 1 TABLET BY MOUTH EVERY MORNING; DISCONTINUE HCTZ, Disp: 30 tablet, Rfl: 11  docusate sodium (COLACE) 100 MG capsule, Take 1 capsule (100 mg total) by mouth 2 (two) times daily., Disp: 60 capsule, Rfl: 0  fish oil-omega-3 fatty acids 300-1,000 mg capsule, Take 2 g by mouth once daily., Disp: , Rfl:   irbesartan (AVAPRO) 300 MG tablet, Take 1 tablet (300 mg total) by  mouth every evening. Discontinue irbesartan 150mg RX., Disp: 90 tablet, Rfl: 3  multivitamin (MULTIVITAMIN) per tablet, Take 1 tablet by mouth once daily.  , Disp: , Rfl:   naproxen sodium (ALEVE) 220 mg Cap, Take 220 mg by mouth 2 (two) times daily., Disp: , Rfl:   niacin 500 MG CpSR, Take 500 mg by mouth every evening.  , Disp: , Rfl:   pantoprazole (PROTONIX) 40 MG tablet, Take 1 tablet (40 mg total) by mouth before breakfast., Disp: 30 tablet, Rfl: 11  paroxetine (PAXIL) 10 MG tablet, TAKE 1 TABLET BY MOUTH EVERY MORNING, Disp: 90 tablet, Rfl: 3  potassium chloride (MICRO-K) 10 MEQ CpSR, Take 2 capsules (20 mEq total) by mouth once daily., Disp: 60 capsule, Rfl: 11  pravastatin (PRAVACHOL) 40 MG tablet, TAKE 1 TABLET(40 MG) BY MOUTH EVERY DAY, Disp: 90 tablet, Rfl: 2  promethazine (PHENERGAN) 25 MG tablet, Take 1 tablet (25 mg total) by mouth every 6 (six) hours as needed for Nausea., Disp: 20 tablet, Rfl: 0  sucralfate (CARAFATE) 1 gram tablet, Take 1 tablet (1 g total) by mouth 4 (four) times daily., Disp: 120 tablet, Rfl: 0    No current facility-administered medications on file prior to visit.     Social History    Marital status:              Spouse name:                       Years of education:                 Number of children: 2             Occupational History  Occupation          Employer            Comment               retired                                   retired teacher an*                         Social History Main Topics    Smoking status: Never Smoker                                                                Smokeless tobacco: Never Used                        Alcohol use: Yes                Comment: social    Drug use: No              Sexual activity: Yes               Partners with: Male       Birth control/protection: None, Post-menopausal    Other Topics            Concern    None on file    Social History Narrative    None on file      Review of patient's family history  indicates:  Problem: Hypertension      Relation: Mother       Age of Onset: (Not Specified)   Problem: Heart disease      Relation: Mother       Age of Onset: (Not Specified)   Problem: Glaucoma      Relation: Mother       Age of Onset: (Not Specified)   Problem: Stroke      Relation: Father       Age of Onset: (Not Specified)   Problem: Glaucoma      Relation: Sister       Age of Onset: (Not Specified)   Problem: Cataracts      Relation: Sister       Age of Onset: (Not Specified)   Problem: Macular degeneration      Relation: Sister       Age of Onset: (Not Specified)   Problem: Breast cancer      Relation: Neg Hx       Age of Onset: (Not Specified)             Sinus Problem   This is a new problem. The current episode started 1 to 4 weeks ago (x 8 days). The problem has been gradually worsening since onset. There has been no fever. The pain is mild. Associated symptoms include congestion, coughing, ear pain, headaches, sinus pressure and a sore throat. Pertinent negatives include no chills, diaphoresis, hoarse voice, neck pain, shortness of breath, sneezing or swollen glands. Past treatments include oral decongestants. The treatment provided mild relief.     Review of Systems   Constitutional: Positive for fatigue. Negative for activity change, chills, diaphoresis and unexpected weight change.   HENT: Positive for congestion, ear pain, postnasal drip, rhinorrhea, sinus pressure, sore throat and tinnitus. Negative for hearing loss, hoarse voice, sneezing and trouble swallowing.    Eyes: Negative for discharge and visual disturbance.   Respiratory: Positive for cough. Negative for chest tightness, shortness of breath and wheezing.    Cardiovascular: Negative for chest pain and palpitations.   Gastrointestinal: Negative for blood in stool, constipation, diarrhea and vomiting.   Endocrine: Negative for polydipsia and polyuria.   Genitourinary: Negative for difficulty urinating, dysuria, hematuria and menstrual  problem.   Musculoskeletal: Positive for arthralgias and joint swelling. Negative for neck pain.   Neurological: Positive for headaches. Negative for dizziness, seizures, weakness and numbness.   Psychiatric/Behavioral: Negative for confusion and dysphoric mood.       Objective:      Physical Exam   Constitutional: She is oriented to person, place, and time. No distress.   HENT:   Head: Normocephalic and atraumatic. Head is without raccoon's eyes.   Right Ear: Tympanic membrane is not erythematous. A middle ear effusion is present.   Left Ear: Tympanic membrane is erythematous. A middle ear effusion is present.   Nose: Mucosal edema and rhinorrhea present. Right sinus exhibits no maxillary sinus tenderness and no frontal sinus tenderness. Left sinus exhibits no maxillary sinus tenderness and no frontal sinus tenderness.   Mouth/Throat: Uvula is midline. Posterior oropharyngeal erythema present.   Eyes: Pupils are equal, round, and reactive to light.   Neck: Normal range of motion.   Cardiovascular: Normal rate and regular rhythm.    Pulmonary/Chest: Effort normal and breath sounds normal. No respiratory distress. She has no wheezes.   Abdominal: Soft. Bowel sounds are normal. She exhibits no distension. There is no tenderness.   Musculoskeletal: She exhibits no edema.   Neurological: She is alert and oriented to person, place, and time.   Skin: She is not diaphoretic. No erythema.   Psychiatric: She has a normal mood and affect. Her behavior is normal.   Vitals reviewed.      Assessment:       1. Sinusitis, unspecified chronicity, unspecified location        Plan:       1. Sinusitis, unspecified chronicity, unspecified location  Follow up if not resolving or for any new or worsening symptoms.   - fluticasone (FLONASE) 50 mcg/actuation nasal spray; 1 spray (50 mcg total) by Each Nare route once daily.  Dispense: 16 g; Refill: 0  - azelastine (ASTELIN) 137 mcg (0.1 %) nasal spray; 1 spray (137 mcg total) by Nasal  route 2 (two) times daily.  Dispense: 30 mL; Refill: 0  - cefdinir (OMNICEF) 300 MG capsule; Take 1 capsule (300 mg total) by mouth 2 (two) times daily.  Dispense: 20 capsule; Refill: 0

## 2018-05-28 ENCOUNTER — PATIENT MESSAGE (OUTPATIENT)
Dept: FAMILY MEDICINE | Facility: CLINIC | Age: 68
End: 2018-05-28

## 2018-05-28 NOTE — TELEPHONE ENCOUNTER
----- Message from David Engel sent at 5/28/2018 10:57 AM CDT -----  Contact: Patient  Lucinda, [phone]. Calling in regards to her visit on Thursday. Says she's not feeling any better, and was told to call back today. Please advise. Thanks.

## 2018-05-29 ENCOUNTER — OFFICE VISIT (OUTPATIENT)
Dept: FAMILY MEDICINE | Facility: CLINIC | Age: 68
End: 2018-05-29
Payer: MEDICARE

## 2018-05-29 VITALS
BODY MASS INDEX: 33.1 KG/M2 | DIASTOLIC BLOOD PRESSURE: 84 MMHG | HEIGHT: 62 IN | SYSTOLIC BLOOD PRESSURE: 130 MMHG | OXYGEN SATURATION: 98 % | WEIGHT: 179.88 LBS | HEART RATE: 96 BPM | TEMPERATURE: 98 F

## 2018-05-29 DIAGNOSIS — J32.9 SINUSITIS, UNSPECIFIED CHRONICITY, UNSPECIFIED LOCATION: Primary | ICD-10-CM

## 2018-05-29 DIAGNOSIS — H66.90 ACUTE OTITIS MEDIA, UNSPECIFIED OTITIS MEDIA TYPE: ICD-10-CM

## 2018-05-29 PROCEDURE — 99213 OFFICE O/P EST LOW 20 MIN: CPT | Mod: S$PBB,,, | Performed by: NURSE PRACTITIONER

## 2018-05-29 PROCEDURE — 99999 PR PBB SHADOW E&M-EST. PATIENT-LVL IV: CPT | Mod: PBBFAC,,, | Performed by: NURSE PRACTITIONER

## 2018-05-29 PROCEDURE — 99214 OFFICE O/P EST MOD 30 MIN: CPT | Mod: PBBFAC,PO | Performed by: NURSE PRACTITIONER

## 2018-05-29 RX ORDER — PREDNISONE 10 MG/1
TABLET ORAL 2 TIMES DAILY
Qty: 10 TABLET | Refills: 0 | Status: SHIPPED | OUTPATIENT
Start: 2018-05-29 | End: 2018-06-03

## 2018-05-29 NOTE — PROGRESS NOTES
Subjective:       Patient ID: Lucinda Aguilera is a 67 y.o. female.    Chief Complaint: Sinusitis; Ear Fullness; and Fatigue    Treated 5/24/18 for sinusitis with omnicef, astelin, and flonase. Sinuses have improved somewhat but still having sinus congestion, fatigue, ear pain, and body aches.     There are no preventive care reminders to display for this patient.    Past Medical History:  Past Medical History:  No date: Anticoagulant long-term use  No date: Anxiety      Comment: takes daily Xanax  No date: Arthritis      Comment: right thumb  No date: Cataract      Comment: OU  No date: GERD (gastroesophageal reflux disease)  No date: Hyperlipemia  No date: Hypertension  No date: PVD (posterior vitreous detachment)      Comment: OD  Past Surgical History:  No date: chest abcess      Comment: child  1/2012: COLONOSCOPY      Comment: repeat in 5 years  No date: JOINT REPLACEMENT      Comment: right knee   No date: KNEE ARTHROSCOPY W/ LASER      Comment: right  06/03/2016: KNEE SURGERY Right      Comment: Total knee replacement  11/2014: thumb surgery  Review of patient's allergies indicates:  No Known Allergies  Current Outpatient Prescriptions on File Prior to Visit:  ALPRAZolam (XANAX) 0.5 MG tablet, TAKE 1 TABLET(0.5 MG) BY MOUTH TWICE DAILY AS NEEDED, Disp: 60 tablet, Rfl: 5  aspirin (ECOTRIN) 81 MG EC tablet, Take 81 mg by mouth once daily., Disp: , Rfl:   azelastine (ASTELIN) 137 mcg (0.1 %) nasal spray, 1 spray (137 mcg total) by Nasal route 2 (two) times daily., Disp: 30 mL, Rfl: 0  carboxymethylcellulose (REFRESH PLUS) 0.5 % Dpet, 1 drop 3 (three) times daily as needed., Disp: , Rfl:   cefdinir (OMNICEF) 300 MG capsule, Take 1 capsule (300 mg total) by mouth 2 (two) times daily., Disp: 20 capsule, Rfl: 0  chlorthalidone (HYGROTEN) 25 MG Tab, TAKE 1 TABLET BY MOUTH EVERY MORNING; DISCONTINUE HCTZ, Disp: 30 tablet, Rfl: 11  docusate sodium (COLACE) 100 MG capsule, Take 1 capsule (100 mg total) by mouth 2 (two)  times daily., Disp: 60 capsule, Rfl: 0  fish oil-omega-3 fatty acids 300-1,000 mg capsule, Take 2 g by mouth once daily., Disp: , Rfl:   fluticasone (FLONASE) 50 mcg/actuation nasal spray, 1 spray (50 mcg total) by Each Nare route once daily., Disp: 16 g, Rfl: 0  multivitamin (MULTIVITAMIN) per tablet, Take 1 tablet by mouth once daily.  , Disp: , Rfl:   naproxen sodium (ALEVE) 220 mg Cap, Take 220 mg by mouth 2 (two) times daily., Disp: , Rfl:   niacin 500 MG CpSR, Take 500 mg by mouth every evening.  , Disp: , Rfl:   pantoprazole (PROTONIX) 40 MG tablet, Take 1 tablet (40 mg total) by mouth before breakfast., Disp: 30 tablet, Rfl: 11  paroxetine (PAXIL) 10 MG tablet, TAKE 1 TABLET BY MOUTH EVERY MORNING, Disp: 90 tablet, Rfl: 3  potassium chloride (MICRO-K) 10 MEQ CpSR, Take 2 capsules (20 mEq total) by mouth once daily., Disp: 60 capsule, Rfl: 11  pravastatin (PRAVACHOL) 40 MG tablet, TAKE 1 TABLET(40 MG) BY MOUTH EVERY DAY, Disp: 90 tablet, Rfl: 2  promethazine (PHENERGAN) 25 MG tablet, Take 1 tablet (25 mg total) by mouth every 6 (six) hours as needed for Nausea., Disp: 20 tablet, Rfl: 0  sucralfate (CARAFATE) 1 gram tablet, Take 1 tablet (1 g total) by mouth 4 (four) times daily., Disp: 120 tablet, Rfl: 0  irbesartan (AVAPRO) 300 MG tablet, Take 1 tablet (300 mg total) by mouth every evening. Discontinue irbesartan 150mg RX., Disp: 90 tablet, Rfl: 3    No current facility-administered medications on file prior to visit.     Social History    Marital status:              Spouse name:                       Years of education:                 Number of children: 2             Occupational History  Occupation          Employer            Comment               retired                                   retired teacher an*                         Social History Main Topics    Smoking status: Never Smoker                                                                Smokeless tobacco: Never Used                         Alcohol use: Yes                Comment: social    Drug use: No              Sexual activity: Yes               Partners with: Male       Birth control/protection: None, Post-menopausal    Other Topics            Concern    None on file    Social History Narrative    None on file      Review of patient's family history indicates:  Problem: Hypertension      Relation: Mother       Age of Onset: (Not Specified)   Problem: Heart disease      Relation: Mother       Age of Onset: (Not Specified)   Problem: Glaucoma      Relation: Mother       Age of Onset: (Not Specified)   Problem: Stroke      Relation: Father       Age of Onset: (Not Specified)   Problem: Glaucoma      Relation: Sister       Age of Onset: (Not Specified)   Problem: Cataracts      Relation: Sister       Age of Onset: (Not Specified)   Problem: Macular degeneration      Relation: Sister       Age of Onset: (Not Specified)   Problem: Breast cancer      Relation: Neg Hx       Age of Onset: (Not Specified)             Review of Systems   Constitutional: Positive for chills, fatigue and fever.   HENT: Positive for ear pain, hearing loss, sinus pressure and sore throat. Negative for postnasal drip, rhinorrhea and sneezing.    Respiratory: Negative.  Negative for cough and shortness of breath.    Cardiovascular: Negative.  Negative for chest pain and leg swelling.   Gastrointestinal: Negative.  Negative for abdominal pain, constipation, diarrhea and vomiting.   Genitourinary: Negative.    Skin: Negative for rash.   Neurological: Positive for light-headedness and headaches. Negative for dizziness.   Psychiatric/Behavioral: Negative.        Objective:      Physical Exam   Constitutional: She is oriented to person, place, and time. No distress.   HENT:   Head: Head is without raccoon's eyes and without Cummings's sign.   Right Ear: No middle ear effusion.   Left Ear: A middle ear effusion is present.   Nose: Mucosal edema and rhinorrhea present. Right sinus  exhibits maxillary sinus tenderness. Right sinus exhibits no frontal sinus tenderness. Left sinus exhibits maxillary sinus tenderness. Left sinus exhibits no frontal sinus tenderness.   Mouth/Throat: Uvula is midline and mucous membranes are normal. Posterior oropharyngeal erythema present.   Eyes: Pupils are equal, round, and reactive to light.   Neck: Normal range of motion.   Cardiovascular: Normal rate and regular rhythm.  Exam reveals no friction rub.    No murmur heard.  Pulmonary/Chest: Effort normal and breath sounds normal. No respiratory distress. She has no wheezes.   Musculoskeletal: She exhibits no edema.   Neurological: She is alert and oriented to person, place, and time.   Skin: She is not diaphoretic.   Psychiatric: She has a normal mood and affect. Her behavior is normal.       Assessment:       1. Sinusitis, unspecified chronicity, unspecified location    2. Acute otitis media, unspecified otitis media type        Plan:       1. Sinusitis, unspecified chronicity, unspecified location  Continue omnicef.add prednisone x 5 days, continue flonase and astelin.    2. Acute otitis media, unspecified otitis media type  Continue omnicef.add prednisone x 5 days, continue flonase and astelin.

## 2018-05-31 ENCOUNTER — PATIENT MESSAGE (OUTPATIENT)
Dept: FAMILY MEDICINE | Facility: CLINIC | Age: 68
End: 2018-05-31

## 2018-05-31 ENCOUNTER — PATIENT MESSAGE (OUTPATIENT)
Dept: OTOLARYNGOLOGY | Facility: CLINIC | Age: 68
End: 2018-05-31

## 2018-05-31 DIAGNOSIS — J32.9 RECURRENT SINUSITIS: Primary | ICD-10-CM

## 2018-06-04 ENCOUNTER — OFFICE VISIT (OUTPATIENT)
Dept: OTOLARYNGOLOGY | Facility: CLINIC | Age: 68
End: 2018-06-04
Payer: MEDICARE

## 2018-06-04 VITALS
DIASTOLIC BLOOD PRESSURE: 72 MMHG | HEART RATE: 91 BPM | WEIGHT: 180.75 LBS | HEIGHT: 62 IN | SYSTOLIC BLOOD PRESSURE: 131 MMHG | BODY MASS INDEX: 33.26 KG/M2

## 2018-06-04 DIAGNOSIS — H65.192 ACUTE NON-SUPPURATIVE OTITIS MEDIA, LEFT: Primary | ICD-10-CM

## 2018-06-04 PROCEDURE — 99999 PR PBB SHADOW E&M-EST. PATIENT-LVL V: CPT | Mod: PBBFAC,,, | Performed by: NURSE PRACTITIONER

## 2018-06-04 PROCEDURE — 99215 OFFICE O/P EST HI 40 MIN: CPT | Mod: PBBFAC,PO | Performed by: NURSE PRACTITIONER

## 2018-06-04 PROCEDURE — 99214 OFFICE O/P EST MOD 30 MIN: CPT | Mod: S$PBB,,, | Performed by: NURSE PRACTITIONER

## 2018-06-04 RX ORDER — PREDNISONE 10 MG/1
TABLET ORAL
Refills: 0 | COMMUNITY
Start: 2018-05-29 | End: 2018-06-04 | Stop reason: ALTCHOICE

## 2018-06-04 NOTE — LETTER
June 4, 2018      Sharda Aguilera NP  [address]  [place] [address]           [facility]  [address] [facility] [address] [place] [address]  Phone: [phone]  Fax: [phone]          Patient: Lucinda Aguilera   MR Number: [id number]   YOB: 1950   Date of Visit: 6/4/2018       Dear Sharda Aguilera:    Thank you for referring Lucinda Aguilera to me for evaluation. Attached you will find relevant portions of my assessment and plan of care.    If you have questions, please do not hesitate to call me. I look forward to following Lucinda Aguilera along with you.    Sincerely,    Iliana Rudolph NP    Enclosure  CC:  No Recipients    If you would like to receive this communication electronically, please contact [email or web address] or [phone] to request more information on [facility] access.    For providers and/or their staff who would like to refer a patient to [facility], please contact us through our one-stop-shop provider referral line, [facility], at [phone].    If you feel you have received this communication in error or would no longer like to receive these types of communications, please e-mail [email or web address]

## 2018-06-04 NOTE — PATIENT INSTRUCTIONS
"DIFFERENT TYPES OF NASAL SPRAYS:  · Flonase / fluticasone (steroid spray) is best for stuffy, pressure, fullness.  · Astelin / azelastine (antihistamine spray) is best for itchy, drippy, sneezy.    Nasal spray instructions:  Blow nose first gently to clean. Keep chin level with the floor (do not tilt head forward or back). Insert nasal spray taking caution to direct it AWAY from the middle wall inside the nose (septum) to avoid irritating nasal septum which could cause nosebleed.  Do not tilt spray up but rather flat and out along the roof of your mouth to spray. Angle the tip of the spray out slightly toward the direction of the ears; then spray. Do not take quick vigorous sniff but rather slow gentle inhalation while waiting for medication to absorb into nasal passages. Then administer second spray in same way.     EUSTACHIAN TUBE INSTRUCTIONS:  Nasal valsalva:  Pinch nose and with closed mouth try to "pop" air into ears through the back of the nose. Attempt this several times a day. The more popping you have, the more likely the fluid will resolve.     Ponaris Nasal Emollient is used for the relief of: nasal congestion due to colds, nasal irritation, allergy exacerbations, nasal crusting. Specifically prepared iodized organic oils of pine, eucalyptus, peppermint, cajeput, and cottonseed. To order Ponaris: ask your pharmacist to order it for you or we carry it in our pharmacy downstairs on the first floor.     "

## 2018-06-04 NOTE — PROGRESS NOTES
Subjective:       Patient ID: Lucinda Aguilera is a 67 y.o. female.    Chief Complaint: Ear Fullness (left ear)    HPI   Patient was diagnosed with sinusitis and bilateral MEEs 1.5 weeks ago; treated with Astelin, Flonase, and Omnicef. Prednisone added 5 days later. She reports left otalgia now resolved; however left ear remains clogged with muffled hearing.       Review of Systems   Constitutional: Negative.    HENT: Positive for hearing loss (muffled, clogged AS). Negative for ear pain.    Eyes: Negative.    Respiratory: Negative.    Cardiovascular: Negative.    Gastrointestinal: Negative.    Musculoskeletal: Negative.    Skin: Negative.    Neurological: Negative.    Hematological: Negative.    Psychiatric/Behavioral: Negative.        Objective:      Physical Exam   Constitutional: She is oriented to person, place, and time. Vital signs are normal. She appears well-developed and well-nourished. She is cooperative. She does not appear ill. No distress.   HENT:   Head: Normocephalic and atraumatic.   Right Ear: Hearing, tympanic membrane, external ear and ear canal normal. Tympanic membrane is not erythematous. No middle ear effusion.   Left Ear: External ear and ear canal normal. No drainage. Tympanic membrane is not erythematous and not bulging. A middle ear effusion (gladys-colored serous fluid, non-suppurative) is present. Decreased hearing is noted.   Nose: Nose normal. No mucosal edema or rhinorrhea. Right sinus exhibits no maxillary sinus tenderness and no frontal sinus tenderness. Left sinus exhibits no maxillary sinus tenderness and no frontal sinus tenderness.   Mouth/Throat: Uvula is midline, oropharynx is clear and moist and mucous membranes are normal. Mucous membranes are not pale, not dry and not cyanotic. No oral lesions. No oropharyngeal exudate, posterior oropharyngeal edema or posterior oropharyngeal erythema.   Eyes: Conjunctivae, EOM and lids are normal. Pupils are equal, round, and reactive to  light. Right eye exhibits no discharge. Left eye exhibits no discharge. No scleral icterus.   Neck: Trachea normal and normal range of motion. Neck supple. No tracheal deviation present. No thyroid mass and no thyromegaly present.   Cardiovascular: Normal rate.    Pulmonary/Chest: Effort normal. No stridor. No respiratory distress. She has no wheezes.   Musculoskeletal: Normal range of motion.   Lymphadenopathy:        Head (right side): No submental, no submandibular, no tonsillar, no preauricular and no posterior auricular adenopathy present.        Head (left side): No submental, no submandibular, no tonsillar, no preauricular and no posterior auricular adenopathy present.     She has no cervical adenopathy.        Right cervical: No superficial cervical and no posterior cervical adenopathy present.       Left cervical: No superficial cervical and no posterior cervical adenopathy present.   Neurological: She is alert and oriented to person, place, and time. She has normal strength. Coordination and gait normal.   Skin: Skin is warm, dry and intact. No lesion and no rash noted. She is not diaphoretic. No cyanosis. No pallor.   Psychiatric: She has a normal mood and affect. Her speech is normal and behavior is normal. Judgment and thought content normal. Cognition and memory are normal.   Nursing note and vitals reviewed.      Assessment:     Left non-suppurative OME      Plan:     Flonase & Astelin BID, nasal valsalva several times per day.  Discussed ETD, serous/non-suppurative OME vs suppurative OM at length.   Patient to call back for further antibiotics if throbbing otalgia.   Briefly discussed myringotomy vs tympanostomy tubes.   Return to clinic in 6 weeks if not significantly improved.

## 2018-06-05 DIAGNOSIS — I10 ESSENTIAL HYPERTENSION: ICD-10-CM

## 2018-06-05 RX ORDER — IRBESARTAN 300 MG/1
TABLET ORAL
Qty: 90 TABLET | Refills: 0 | Status: SHIPPED | OUTPATIENT
Start: 2018-06-05 | End: 2018-09-01 | Stop reason: SDUPTHER

## 2018-06-12 ENCOUNTER — PATIENT MESSAGE (OUTPATIENT)
Dept: FAMILY MEDICINE | Facility: CLINIC | Age: 68
End: 2018-06-12

## 2018-06-12 ENCOUNTER — HOSPITAL ENCOUNTER (OUTPATIENT)
Dept: PREADMISSION TESTING | Facility: HOSPITAL | Age: 68
Discharge: HOME | End: 2018-06-12
Attending: ANESTHESIOLOGY
Payer: MEDICARE

## 2018-06-12 ENCOUNTER — HOSPITAL ENCOUNTER (OUTPATIENT)
Dept: CARDIOLOGY | Facility: CLINIC | Age: 68
Discharge: HOME | End: 2018-06-12
Payer: MEDICARE

## 2018-06-12 ENCOUNTER — OFFICE VISIT (OUTPATIENT)
Dept: ORTHOPEDICS | Facility: CLINIC | Age: 68
End: 2018-06-12
Payer: MEDICARE

## 2018-06-12 VITALS
BODY MASS INDEX: 33.92 KG/M2 | HEART RATE: 71 BPM | WEIGHT: 184.31 LBS | TEMPERATURE: 99 F | OXYGEN SATURATION: 96 % | HEIGHT: 62 IN | SYSTOLIC BLOOD PRESSURE: 132 MMHG | RESPIRATION RATE: 18 BRPM | DIASTOLIC BLOOD PRESSURE: 65 MMHG

## 2018-06-12 VITALS — WEIGHT: 185.06 LBS | HEIGHT: 62 IN | BODY MASS INDEX: 34.05 KG/M2

## 2018-06-12 DIAGNOSIS — M79.606 PAIN OF LOWER EXTREMITY, UNSPECIFIED LATERALITY: ICD-10-CM

## 2018-06-12 DIAGNOSIS — Z01.818 PREOP EXAMINATION: ICD-10-CM

## 2018-06-12 DIAGNOSIS — M17.12 PRIMARY OSTEOARTHRITIS OF LEFT KNEE: Primary | ICD-10-CM

## 2018-06-12 PROCEDURE — 93010 ELECTROCARDIOGRAM REPORT: CPT | Mod: S$PBB,,, | Performed by: INTERNAL MEDICINE

## 2018-06-12 PROCEDURE — 99999 PR PBB SHADOW E&M-EST. PATIENT-LVL III: CPT | Mod: PBBFAC,,, | Performed by: NURSE PRACTITIONER

## 2018-06-12 PROCEDURE — 99499 UNLISTED E&M SERVICE: CPT | Mod: S$PBB,,, | Performed by: NURSE PRACTITIONER

## 2018-06-12 PROCEDURE — 99213 OFFICE O/P EST LOW 20 MIN: CPT | Mod: PBBFAC,25 | Performed by: NURSE PRACTITIONER

## 2018-06-12 PROCEDURE — 93005 ELECTROCARDIOGRAM TRACING: CPT | Mod: PBBFAC | Performed by: INTERNAL MEDICINE

## 2018-06-12 NOTE — DISCHARGE INSTRUCTIONS
Your surgery has been scheduled for:__________________________________________    You should report to:  ____[facility], located on the [place] side of the first floor of the           [facility] ([phone])  ____[facility], located on the [facility] side of the            Second floor of the [facility] [phone])  ____3rd Floor [facility] located on the [facility] side of the [facility] [phone]  Please Note   - Tell your doctor if you take Aspirin, products containing Aspirin, herbal medications  or blood thinners, such as Coumadin, Ticlid, or Plavix.  (Consult your provider regarding holding or stopping before surgery).  - Arrange for someone to drive you home following surgery.  You will not be allowed to leave the surgical facility alone or drive yourself home following sedation and anesthesia.  Before Surgery  - Stop taking all herbal medications 14days prior to surgery  - No Motrin/Advil (Ibuprofen) 7 days before surgery  - No Aleve (Naproxen) 7 days before surgery  - No Goody's/BC powder 7 days before surgery  - Stop Taking Asprin, products containing Asprin _____days before surgery  - Stop taking blood thinners_______days before surgery  - Refrain from drinking alcoholic beverages for 24hours before and after surgery  - Stop or limit smoking _________days before surgery  - You may take Tylenol for pain  Night before Surgery  - DO NOT EAT OR DRINK ANYTHING AFTER MIDNIGHT, INCLUDING GUM, HARD CANDY, MINTS, OR CHEWING TOBACCO.  - Take a shower or bath (shower is recommended).  Bathe with Hibiclens soap or an antibacterial soap from the neck down.  If not supplied by your surgeon, hibiclens soap will need to be purchased over the counter in pharmacy.  Rinse soap off thoroughly.  - Shampoo your hair with your regular shampoo  The Day of Surgery  - Take another bath or shower with hibiclens or any  antibacterial soap, to reduce the chance of infection.  - Take heart and blood pressure medications with a small sip of water, as advised by the perioperative team.  - Do not take fluid pills  - You may brush your teeth and rinse your mouth, but do not swall any additional water.   - Do not apply perfumes, powder, body lotions or deodorant on the day of surgery.  - Nail polish should be removed.  - Do not wear makeup or moisturizer  - Wear comfortable clothes, such as a button front shirt and loose fitting pants.  - Leave all jewelry, including body piercings, and valuables at home.    - Bring any devices you will neeed after surgery such as crutches or canes.  - If you have sleep apnea, please bring your CPAP machine  In the event that your physical condition changes including the onset of a cold or respiratory illness, or if you have to delay or cancel your surgery, please notify your surgeon.Anesthesia: Regional Anesthesia    Youre scheduled for surgery. During surgery, youll receive medicine called anesthesia to keep you comfortable and pain-free. Your surgeon has decided that youll receive regional anesthesia. This sheet tells you what to expect with this type of anesthesia.  What is regional anesthesia?  Regional anesthesia numbs one region of your body. The anesthesia may be given around nerves or into veins in your arms, neck, or legs (nerve block or [place] block). Or it may be sent into the spinal fluid (spinal anesthesia) or into the space just outside the spinal fluid (epidural anesthesia). You may also be given sedatives to help you relax.  Nerve block or Telly block  A small area of the body, such as an arm or leg, can be numbed using a nerve block or [place] block.  · Nerve block. During a nerve block, your skin is numbed. A needle is then inserted near nerves that serve the area to be numbed. Anesthetic is sent through the needle.  · IV regional or [place] block. For this type of block, an IV line is put  into a vein. The blood flow to the area to be numbed is blocked for a short time. Anesthetic is sent through the IV.  Spinal anesthesia  Spinal anesthesia numbs your body from about the waist down.  · Anesthetic is injected into the spinal fluid. This is a substance that surrounds the spinal cord in your spinal column. The anesthetic blocks pain traveling from the body to the brain.  · To receive the anesthetic, your skin is numbed at the injection site on your back.  · A needle is then inserted into the spinal space. Anesthetic is sent into the spinal fluid through the needle.  Epidural anesthesia  Epidural anesthesia is most commonly used during childbirth and may also be used after surgical procedures of the chest, belly, and legs.  · Anesthetic is injected into the epidural space. This is just outside the dural sac which contains the spinal fluid.  · To receive the anesthetic, your skin is numbed at the injection site on your back.  · A needle is then inserted into the epidural space. Anesthetic is sent into the epidural space through the needle.  · A small flexible catheter may be attached to the needle and left in place. This allows for continuous injections or infusions of anesthetic.  Anesthesia tools and medicines that might be near you during your procedure  · Local anesthetic. This medicine is given through a needle numbs one region of your body.  · Electrocardiography leads (electrodes). These are used to record your heart rate and rhythm.  · Blood pressure cuff. A cuff is placed on your arm to keep track of your blood pressure.  · Pulse oximeter. This small clip is placed on the end of the finger. It measures your blood oxygen level.  · Sedatives. These medicines may be given through an IV. They help to relax you and keep you comfortable. You may stay awake or sleep lightly.  · Oxygen. You may be given oxygen through a facemask.  Risks and possible complications  Regional anesthesia carries some risks.  These include:  · Nausea and vomiting  · Headache  · Backache  · Decreased blood pressure  · Allergic reaction to the anesthetic  · Ongoing numbness (rare)  · Irregular heartbeat (rare)  · Cardiac arrest (rare)   Date Last Reviewed: 12/1/2016  © 1216-6802 [employer]. [address] [place] [address]. All rights reserved. This information is not intended as a substitute for professional medical care. Always follow your healthcare professional's instructions.

## 2018-06-13 ENCOUNTER — OFFICE VISIT (OUTPATIENT)
Dept: FAMILY MEDICINE | Facility: CLINIC | Age: 68
End: 2018-06-13
Payer: MEDICARE

## 2018-06-13 VITALS
DIASTOLIC BLOOD PRESSURE: 82 MMHG | BODY MASS INDEX: 33.79 KG/M2 | OXYGEN SATURATION: 96 % | HEART RATE: 88 BPM | SYSTOLIC BLOOD PRESSURE: 120 MMHG | TEMPERATURE: 98 F | WEIGHT: 183.63 LBS | HEIGHT: 62 IN | RESPIRATION RATE: 16 BRPM

## 2018-06-13 DIAGNOSIS — E78.5 HYPERLIPIDEMIA, UNSPECIFIED HYPERLIPIDEMIA TYPE: ICD-10-CM

## 2018-06-13 DIAGNOSIS — K21.9 GASTROESOPHAGEAL REFLUX DISEASE, ESOPHAGITIS PRESENCE NOT SPECIFIED: ICD-10-CM

## 2018-06-13 DIAGNOSIS — F41.9 ANXIETY: ICD-10-CM

## 2018-06-13 DIAGNOSIS — I10 ESSENTIAL HYPERTENSION: ICD-10-CM

## 2018-06-13 DIAGNOSIS — Z01.818 PREOP EXAMINATION: Primary | ICD-10-CM

## 2018-06-13 DIAGNOSIS — M17.12 PRIMARY OSTEOARTHRITIS OF LEFT KNEE: ICD-10-CM

## 2018-06-13 PROCEDURE — 99999 PR PBB SHADOW E&M-EST. PATIENT-LVL IV: CPT | Mod: PBBFAC,,, | Performed by: FAMILY MEDICINE

## 2018-06-13 PROCEDURE — 99214 OFFICE O/P EST MOD 30 MIN: CPT | Mod: S$PBB,,, | Performed by: FAMILY MEDICINE

## 2018-06-13 PROCEDURE — 99214 OFFICE O/P EST MOD 30 MIN: CPT | Mod: PBBFAC,PO | Performed by: FAMILY MEDICINE

## 2018-06-13 NOTE — PROGRESS NOTES
Subjective:       Patient ID: Lucinda Aguilera is a 67 y.o. female.    Chief Complaint: Pre-op Exam (left knee on Tuesday; Dr Quintanilla)    HPI Comments: Here today for preop clearance for left knee TKA with Dr. Nj Melvin    She has had progressive left knee pain which has become intolerable.  HLD - tolerating pravachol 40mg daily  Anxiety - She is taking Paxil 10mg daily. Taking Xanax 0.5mg 1 tablet BID. She feels like this is well-controlled  HTN - tolerating Avapro 300mg daily and Hygroten 25mg daily and amlodipine; BP controlled; digital flowsheet reviewed  GERD - tolerating Prilosec 20mg daily  HLD - tolerating pravastatin 40mg    She did have angiogram in 2010.    Past Medical History:    Hypertension                                                  Anxiety                                           Hyperlipemia                                                  GERD (gastroesophageal reflux disease)                        Cataract                                                        Comment:OU    PVD (posterior vitreous detachment)                             Comment:OD    Arthritis                                                       Comment:right thumb    Past Surgical History:    chest abcess                                                     Comment:child    KNEE ARTHROSCOPY W/ LASER                                        Comment:right    COLONOSCOPY                                      1/2012          Comment:repeat in 5 years    No Known Allergies    Social History    Marital Status:              Spouse Name:                       Years of Education:                 Number of children: 2             Occupational History  Occupation          Employer            Comment               retired                                   retired teacher an*                         Social History Main Topics    Smoking Status: Never Smoker                      Smokeless Status: Never Used                         Alcohol Use: Yes                Comment: social    Drug Use: No              Sexual Activity: Yes               Partners with: Male       Birth Control/Protection: None, Post-menopausal    Current Outpatient Prescriptions on File Prior to Visit   Medication Sig Dispense Refill    ALPRAZolam (XANAX) 0.5 MG tablet TAKE 1 TABLET(0.5 MG) BY MOUTH TWICE DAILY AS NEEDED 60 tablet 5    aspirin (ECOTRIN) 81 MG EC tablet Take 81 mg by mouth once daily.      azelastine (ASTELIN) 137 mcg (0.1 %) nasal spray 1 spray (137 mcg total) by Nasal route 2 (two) times daily. 30 mL 0    carboxymethylcellulose (REFRESH PLUS) 0.5 % Dpet 1 drop 3 (three) times daily as needed.      chlorthalidone (HYGROTEN) 25 MG Tab TAKE 1 TABLET BY MOUTH EVERY MORNING; DISCONTINUE HCTZ 30 tablet 11    docusate sodium (COLACE) 100 MG capsule Take 1 capsule (100 mg total) by mouth 2 (two) times daily. 60 capsule 0    fish oil-omega-3 fatty acids 300-1,000 mg capsule Take 2 g by mouth once daily.      fluticasone (FLONASE) 50 mcg/actuation nasal spray 1 spray (50 mcg total) by Each Nare route once daily. 16 g 0    irbesartan (AVAPRO) 300 MG tablet TAKE 1 TABLET BY MOUTH EVERY EVENING 90 tablet 0    multivitamin (MULTIVITAMIN) per tablet Take 1 tablet by mouth once daily.        naproxen sodium (ALEVE) 220 mg Cap Take 220 mg by mouth 2 (two) times daily.      niacin 500 MG CpSR Take 500 mg by mouth every evening.        pantoprazole (PROTONIX) 40 MG tablet Take 1 tablet (40 mg total) by mouth before breakfast. 30 tablet 11    paroxetine (PAXIL) 10 MG tablet TAKE 1 TABLET BY MOUTH EVERY MORNING 90 tablet 3    potassium chloride (MICRO-K) 10 MEQ CpSR Take 2 capsules (20 mEq total) by mouth once daily. 60 capsule 11    pravastatin (PRAVACHOL) 40 MG tablet TAKE 1 TABLET(40 MG) BY MOUTH EVERY DAY 90 tablet 2     No current facility-administered medications on file prior to visit.      Review of patient's family history indicates:    Hypertension    "                Mother                    Heart disease                  Mother                    Stroke                         Father                    Review of Systems   Constitutional: Negative for fever, chills, appetite change and unexpected weight change.   HENT: Negative for sore throat and trouble swallowing.    Eyes: Negative for pain and visual disturbance.   Respiratory: Negative for cough, shortness of breath and wheezing.    Cardiovascular: Negative for chest pain and palpitations.   Gastrointestinal: Negative for nausea, vomiting, abdominal pain, diarrhea and blood in stool.   Genitourinary: Negative for dysuria, hematuria and difficulty urinating.   Musculoskeletal: Negative for arthralgias, gait problem and neck pain.   Skin: Negative for rash and wound.   Neurological: Negative for dizziness, weakness, numbness and headaches.   Hematological: Negative for adenopathy.   Psychiatric/Behavioral: Negative for dysphoric mood.     Answers for HPI/ROS submitted by the patient on 5/31/2018   activity change: No  unexpected weight change: No  neck pain: No  hearing loss: No  rhinorrhea: No  trouble swallowing: No  eye discharge: No  visual disturbance: No  chest tightness: No  wheezing: No  chest pain: No  palpitations: No  blood in stool: No  constipation: No  vomiting: No  diarrhea: No  polydipsia: No  polyuria: No  difficulty urinating: No  hematuria: No  menstrual problem: No  dysuria: No  joint swelling: Yes  arthralgias: Yes  headaches: No  weakness: No  confusion: No  dysphoric mood: No          Objective:       /82 (BP Location: Left arm, Patient Position: Sitting, BP Method: Large (Manual))   Pulse 88   Temp 97.8 °F (36.6 °C) (Oral)   Resp 16   Ht 5' 2" (1.575 m)   Wt 83.3 kg (183 lb 10.3 oz)   LMP 04/18/2004   SpO2 96%   BMI 33.59 kg/m²     Physical Exam   Constitutional: She is oriented to person, place, and time. She appears well-developed and well-nourished.   HENT:   Head: " Normocephalic.   Mouth/Throat: Oropharynx is clear and moist. No oropharyngeal exudate or posterior oropharyngeal erythema.   Eyes: Conjunctivae and EOM are normal. Pupils are equal, round, and reactive to light.   Neck: Normal range of motion. Neck supple. No thyromegaly present.   Cardiovascular: Normal rate, regular rhythm, S1 normal, S2 normal, normal heart sounds and intact distal pulses.  Exam reveals no gallop and no friction rub.    No murmur heard.  Pulmonary/Chest: Effort normal and breath sounds normal. She has no wheezes. She has no rales.   Abdominal: Normal appearance.   Musculoskeletal:        Right lower leg: She exhibits no edema.        Left lower leg: She exhibits no edema.   Lymphadenopathy:     She has no cervical adenopathy.   Neurological: She is alert and oriented to person, place, and time. No cranial nerve deficit. Gait normal.   Skin: Skin is warm and intact. No rash noted.   Psychiatric: She has a normal mood and affect.       Results for orders placed or performed in visit on 06/12/18   CBC Without Differential   Result Value Ref Range    WBC 11.92 3.90 - 12.70 K/uL    RBC 4.27 4.00 - 5.40 M/uL    Hemoglobin 12.5 12.0 - 16.0 g/dL    Hematocrit 37.1 37.0 - 48.5 %    MCV 87 82 - 98 fL    MCH 29.3 27.0 - 31.0 pg    MCHC 33.7 32.0 - 36.0 g/dL    RDW 13.0 11.5 - 14.5 %    Platelets 406 (H) 150 - 350 K/uL    MPV 9.4 9.2 - 12.9 fL   Basic metabolic panel   Result Value Ref Range    Sodium 133 (L) 136 - 145 mmol/L    Potassium 4.6 3.5 - 5.1 mmol/L    Chloride 99 95 - 110 mmol/L    CO2 26 23 - 29 mmol/L    Glucose 87 70 - 110 mg/dL    BUN, Bld 15 8 - 23 mg/dL    Creatinine 0.8 0.5 - 1.4 mg/dL    Calcium 9.7 8.7 - 10.5 mg/dL    Anion Gap 8 8 - 16 mmol/L    eGFR if African American >60.0 >60 mL/min/1.73 m^2    eGFR if non African American >60.0 >60 mL/min/1.73 m^2     CXR and EKG reviewed    Assessment:       1. Preop examination    2. Primary osteoarthritis of left knee    3. Essential  hypertension    4. Anxiety    5. Hyperlipidemia, unspecified hyperlipidemia type    6. Gastroesophageal reflux disease, esophagitis presence not specified        Plan:       Preop examination    Primary osteoarthritis of left knee    Essential hypertension    Anxiety    Hyperlipidemia, unspecified hyperlipidemia type    Gastroesophageal reflux disease, esophagitis presence not specified            Medically cleared to proceed with knee surgery as planned  Continue Paxil 10mg daily, Xanax PRN  cotninue aspirin 81mg daily  Continue other present meds  Counseled on regular exercise, maintenance of a healthy weight, balanced diet rich in fruits/vegetables and lean protein, and avoidance of unhealthy habits like smoking and excessive alcohol intake.  F/u as previously advised

## 2018-06-15 ENCOUNTER — PATIENT MESSAGE (OUTPATIENT)
Dept: ORTHOPEDICS | Facility: CLINIC | Age: 68
End: 2018-06-15

## 2018-06-17 RX ORDER — RAMELTEON 8 MG/1
8 TABLET ORAL NIGHTLY PRN
Status: CANCELLED | OUTPATIENT
Start: 2018-06-17

## 2018-06-17 RX ORDER — ONDANSETRON 2 MG/ML
4 INJECTION INTRAMUSCULAR; INTRAVENOUS EVERY 8 HOURS PRN
Status: CANCELLED | OUTPATIENT
Start: 2018-06-17

## 2018-06-17 RX ORDER — MORPHINE SULFATE 10 MG/ML
2 INJECTION, SOLUTION INTRAMUSCULAR; INTRAVENOUS
Status: CANCELLED | OUTPATIENT
Start: 2018-06-17

## 2018-06-17 RX ORDER — MIDAZOLAM HYDROCHLORIDE 5 MG/ML
1 INJECTION INTRAMUSCULAR; INTRAVENOUS EVERY 5 MIN PRN
Status: CANCELLED | OUTPATIENT
Start: 2018-06-17

## 2018-06-17 RX ORDER — NALOXONE HCL 0.4 MG/ML
0.02 VIAL (ML) INJECTION
Status: CANCELLED | OUTPATIENT
Start: 2018-06-17

## 2018-06-17 RX ORDER — ROPIVACAINE HYDROCHLORIDE 2 MG/ML
8 INJECTION, SOLUTION EPIDURAL; INFILTRATION; PERINEURAL CONTINUOUS
Status: CANCELLED | OUTPATIENT
Start: 2018-06-17

## 2018-06-17 RX ORDER — OXYCODONE HYDROCHLORIDE 5 MG/1
15 TABLET ORAL
Status: CANCELLED | OUTPATIENT
Start: 2018-06-17

## 2018-06-17 RX ORDER — FAMOTIDINE 20 MG/1
20 TABLET, FILM COATED ORAL 2 TIMES DAILY
Status: CANCELLED | OUTPATIENT
Start: 2018-06-17

## 2018-06-17 RX ORDER — FENTANYL CITRATE 50 UG/ML
25 INJECTION, SOLUTION INTRAMUSCULAR; INTRAVENOUS EVERY 5 MIN PRN
Status: CANCELLED | OUTPATIENT
Start: 2018-06-17

## 2018-06-17 RX ORDER — LIDOCAINE HYDROCHLORIDE 10 MG/ML
1 INJECTION, SOLUTION EPIDURAL; INFILTRATION; INTRACAUDAL; PERINEURAL
Status: CANCELLED | OUTPATIENT
Start: 2018-06-17

## 2018-06-17 RX ORDER — OXYCODONE HYDROCHLORIDE 5 MG/1
5 TABLET ORAL
Status: CANCELLED | OUTPATIENT
Start: 2018-06-17

## 2018-06-17 RX ORDER — BISACODYL 10 MG
10 SUPPOSITORY, RECTAL RECTAL EVERY 12 HOURS PRN
Status: CANCELLED | OUTPATIENT
Start: 2018-06-17

## 2018-06-17 RX ORDER — SODIUM CHLORIDE 9 MG/ML
INJECTION, SOLUTION INTRAVENOUS
Status: CANCELLED | OUTPATIENT
Start: 2018-06-17

## 2018-06-17 RX ORDER — ACETAMINOPHEN 10 MG/ML
1000 INJECTION, SOLUTION INTRAVENOUS ONCE
Status: CANCELLED | OUTPATIENT
Start: 2018-06-17 | End: 2018-06-17

## 2018-06-17 RX ORDER — POLYETHYLENE GLYCOL 3350 17 G/17G
17 POWDER, FOR SOLUTION ORAL DAILY
Status: CANCELLED | OUTPATIENT
Start: 2018-06-17

## 2018-06-17 RX ORDER — PROMETHAZINE HYDROCHLORIDE 25 MG/1
12.5 TABLET ORAL EVERY 6 HOURS PRN
Status: CANCELLED | OUTPATIENT
Start: 2018-06-17

## 2018-06-17 RX ORDER — CELECOXIB 100 MG/1
200 CAPSULE ORAL DAILY
Status: CANCELLED | OUTPATIENT
Start: 2018-06-17

## 2018-06-17 RX ORDER — CELECOXIB 100 MG/1
400 CAPSULE ORAL
Status: CANCELLED | OUTPATIENT
Start: 2018-06-17

## 2018-06-17 RX ORDER — ASPIRIN 325 MG
325 TABLET, DELAYED RELEASE (ENTERIC COATED) ORAL 2 TIMES DAILY
Status: CANCELLED | OUTPATIENT
Start: 2018-06-17

## 2018-06-17 RX ORDER — SODIUM CHLORIDE 0.9 % (FLUSH) 0.9 %
3 SYRINGE (ML) INJECTION EVERY 8 HOURS PRN
Status: CANCELLED | OUTPATIENT
Start: 2018-06-17

## 2018-06-17 RX ORDER — SODIUM CHLORIDE 9 MG/ML
INJECTION, SOLUTION INTRAVENOUS CONTINUOUS
Status: CANCELLED | OUTPATIENT
Start: 2018-06-17 | End: 2018-06-18

## 2018-06-17 RX ORDER — PREGABALIN 25 MG/1
75 CAPSULE ORAL
Status: CANCELLED | OUTPATIENT
Start: 2018-06-17

## 2018-06-17 RX ORDER — PREGABALIN 25 MG/1
75 CAPSULE ORAL NIGHTLY
Status: CANCELLED | OUTPATIENT
Start: 2018-06-17

## 2018-06-17 RX ORDER — MUPIROCIN 20 MG/G
1 OINTMENT TOPICAL
Status: CANCELLED | OUTPATIENT
Start: 2018-06-17

## 2018-06-17 RX ORDER — ACETAMINOPHEN 500 MG
1000 TABLET ORAL EVERY 6 HOURS
Status: CANCELLED | OUTPATIENT
Start: 2018-06-17 | End: 2018-06-19

## 2018-06-17 RX ORDER — AMOXICILLIN 250 MG
1 CAPSULE ORAL 2 TIMES DAILY
Status: CANCELLED | OUTPATIENT
Start: 2018-06-17

## 2018-06-17 RX ORDER — OXYCODONE HYDROCHLORIDE 5 MG/1
10 TABLET ORAL
Status: CANCELLED | OUTPATIENT
Start: 2018-06-17

## 2018-06-17 NOTE — PROGRESS NOTES
Lucinda Aguilera is a 67 y.o. year old here today for a pre-operative visit in preparation for a Left total knee arthroplasty to be performed by  Dr. Melvin on 6/19/18.  she was last seen and treated in the clinic on 4/9/2018. she will be medically optimized by the pre op center. There has been no significant change in medical status since last visit. No fever, chills, malaise, or unexplained weight change.      Allergies, Medications, past medical and surgical history reviewed.    Focused examination performed.    Patient declined to see Dr. Melvin today in clinic.All questions answered. Patient encouraged to call with questions. Contact information given.     Pre, taz, and post operative procedures and expectations discussed. Questions were answered. Lucinda Aguilera has been educated and is ready to proceed with surgery. Approximately 30 minutes was spent discussing surgical outcomes, plans, procedures pre, taz, and post operative expections and care.  Surgical consent signed.    Lucinda Aguilera will contact us if there are any questions, concerns, or changes in medical status prior to surgery.

## 2018-06-17 NOTE — H&P
CC: Left knee pain    Lucinda Aguilera is a 67 y.o. female with a history of Left knee pain. Pain is worse with activity and weight bearing.  Patient has experienced interference of activities of daily living due to decreased range of motion and an increase in joint pain and swelling.  Patient has failed non-operative treatment including NSAIDs, corticosteroid injections, viscosupplement injections, and activity modification.  Lucinda Aguilera currently ambulates independently.     Past Medical History:   Diagnosis Date    Anticoagulant long-term use     Anxiety     takes daily Xanax    Arthritis     right thumb    Cataract     OU    GERD (gastroesophageal reflux disease)     Hyperlipemia     Hypertension     PVD (posterior vitreous detachment)     OD       Past Surgical History:   Procedure Laterality Date    chest abcess      child    COLONOSCOPY  1/2012    repeat in 5 years    KNEE ARTHROSCOPY W/ LASER      right    KNEE SURGERY Right 06/03/2016    Total knee replacement    thumb surgery  11/2014       Family History   Problem Relation Age of Onset    Hypertension Mother     Heart disease Mother     Glaucoma Mother     Stroke Father     Glaucoma Sister     Cataracts Sister     Macular degeneration Sister     Breast cancer Neg Hx        Review of patient's allergies indicates:  No Known Allergies      Current Outpatient Prescriptions:     ALPRAZolam (XANAX) 0.5 MG tablet, TAKE 1 TABLET(0.5 MG) BY MOUTH TWICE DAILY AS NEEDED, Disp: 60 tablet, Rfl: 5    aspirin (ECOTRIN) 81 MG EC tablet, Take 81 mg by mouth once daily., Disp: , Rfl:     azelastine (ASTELIN) 137 mcg (0.1 %) nasal spray, 1 spray (137 mcg total) by Nasal route 2 (two) times daily., Disp: 30 mL, Rfl: 0    carboxymethylcellulose (REFRESH PLUS) 0.5 % Dpet, 1 drop 3 (three) times daily as needed., Disp: , Rfl:     chlorthalidone (HYGROTEN) 25 MG Tab, TAKE 1 TABLET BY MOUTH EVERY MORNING; DISCONTINUE HCTZ, Disp: 30 tablet, Rfl:  "11    docusate sodium (COLACE) 100 MG capsule, Take 1 capsule (100 mg total) by mouth 2 (two) times daily., Disp: 60 capsule, Rfl: 0    fish oil-omega-3 fatty acids 300-1,000 mg capsule, Take 2 g by mouth once daily., Disp: , Rfl:     fluticasone (FLONASE) 50 mcg/actuation nasal spray, 1 spray (50 mcg total) by Each Nare route once daily., Disp: 16 g, Rfl: 0    irbesartan (AVAPRO) 300 MG tablet, TAKE 1 TABLET BY MOUTH EVERY EVENING, Disp: 90 tablet, Rfl: 0    multivitamin (MULTIVITAMIN) per tablet, Take 1 tablet by mouth once daily.  , Disp: , Rfl:     naproxen sodium (ALEVE) 220 mg Cap, Take 220 mg by mouth 2 (two) times daily., Disp: , Rfl:     niacin 500 MG CpSR, Take 500 mg by mouth every evening.  , Disp: , Rfl:     pantoprazole (PROTONIX) 40 MG tablet, Take 1 tablet (40 mg total) by mouth before breakfast., Disp: 30 tablet, Rfl: 11    paroxetine (PAXIL) 10 MG tablet, TAKE 1 TABLET BY MOUTH EVERY MORNING, Disp: 90 tablet, Rfl: 3    potassium chloride (MICRO-K) 10 MEQ CpSR, Take 2 capsules (20 mEq total) by mouth once daily., Disp: 60 capsule, Rfl: 11    pravastatin (PRAVACHOL) 40 MG tablet, TAKE 1 TABLET(40 MG) BY MOUTH EVERY DAY, Disp: 90 tablet, Rfl: 2    Review of Systems:  Constitutional: no fever or chills  Eyes: no visual changes  ENT: no nasal congestion or sore throat  Respiratory: no cough or shortness of breath  Cardiovascular: no chest pain or palpitations  Gastrointestinal: no nausea or vomiting, tolerating diet  Genitourinary: no hematuria or dysuria  Integument/Breast: no rash or pruritis  Hematologic/Lymphatic: no easy bruising or lymphadenopathy  Musculoskeletal: positive for c/o left knee pain which is aching and medial and worse with activity  Neurological: no seizures or tremors  Behavioral/Psych: no auditory or visual hallucinations  Endocrine: no heat or cold intolerance    PE:  Ht 5' 2" (1.575 m)   Wt 84 kg (185 lb 1.2 oz)   LMP 04/18/2004   BMI 33.85 kg/m²   General: " Pleasant, cooperative, NAD   Gait: antalgic  HEENT: NCAT, sclera nonicteric   Lungs: Respirations clear bilaterally; equal and unlabored.   CV: S1S2; 2+ bilateral upper and lower extremity pulses.   Skin: Intact throughout with no rashes, erythema, or lesions  Extremities: No LE edema,  no erythema or warmth of the skin in either lower extremity.    Left knee exam:  Knee Range of Motion:normal, pain with passive range of motion  Effusion:none  Condition of skin:intact  Location of tenderness:Medial joint line   Strength:normal  Stability: stable to testing    Hip Examination:normal    Radiographs: Radiographs reveal There is mild narrowing of the left knee joint    Knee Alignment: normal    Diagnosis: osteoarthritis Left knee    Plan: Left total knee arthroplasty    Due to the serious nature of total joint infection and the high prevalence of community acquired MRSA, vancomycin will be used perioperatively.

## 2018-06-18 ENCOUNTER — PATIENT OUTREACH (OUTPATIENT)
Dept: OTHER | Facility: OTHER | Age: 68
End: 2018-06-18

## 2018-06-18 DIAGNOSIS — I10 ESSENTIAL HYPERTENSION: ICD-10-CM

## 2018-06-18 RX ORDER — POTASSIUM CHLORIDE 750 MG/1
CAPSULE, EXTENDED RELEASE ORAL
Qty: 60 CAPSULE | Refills: 11 | Status: SHIPPED | OUTPATIENT
Start: 2018-06-18 | End: 2019-06-15 | Stop reason: SDUPTHER

## 2018-06-18 NOTE — PROGRESS NOTES
"Last 5 Patient Entered Readings                                      Current 30 Day Average: 133/80     Recent Readings 6/13/2018 6/12/2018 6/10/2018 6/4/2018 5/31/2018    SBP (mmHg) 120 132 134 131 131    DBP (mmHg) 82 65 86 72 80    Pulse - 71 67 91 70        Digital Medicine: Health  Follow Up    Lifestyle Modifications:    1.Dietary Modifications (Sodium intake <2,000mg/day, food labels, dining out): Patient reports that she is limiting her salt intake. Mrs. Aguilera reports that her blood sodium was a little on the low side after labs last week. She was told two different solutions for low blood sodium. She was told to increase salt intake, and also to "not worry about it". Explained that dietary sodium has little impact on blood sodium levels. Patient states that she drinks coffee, tea, and sprite throughout the day. She also takes a diuretic for better BP control. Explained that patient should be maintaining a low sodium diet, not a no salt diet. Encouraged patient to keep salt intake to <2000mg/day.     2.Physical Activity: Mrs. Aguilera will be having knee replacement surgery tomorrow. She will have home health and physical therapy 5 days the first week and 3 days the second week post surgery. She will then attend outpatient therapy until healed. Mrs. Aguilera hopes to start attending water aerobics at the [facility] once cleared for regular exercise.     3.Medication Therapy: Patient has been compliant with the medication regimen.    4.Patient has the following medication side effects/concerns: None  (Frequency/Alleviating factors/Precipitating factors, etc.)     Follow up with Mrs. Lucinda Aguilera completed. Mrs. Aguilera is doing okay. She is anxious about surgery tomorrow. No further questions or concerns. Will continue follow up to achieve health goals.        "

## 2018-06-19 ENCOUNTER — HOSPITAL ENCOUNTER (INPATIENT)
Facility: HOSPITAL | Age: 68
LOS: 1 days | Discharge: HOME | DRG: 470 | End: 2018-06-20
Attending: ORTHOPAEDIC SURGERY | Admitting: ORTHOPAEDIC SURGERY
Payer: MEDICARE

## 2018-06-19 ENCOUNTER — ANESTHESIA (OUTPATIENT)
Dept: SURGERY | Facility: HOSPITAL | Age: 68
DRG: 470 | End: 2018-06-19
Payer: MEDICARE

## 2018-06-19 DIAGNOSIS — M17.12 PRIMARY OSTEOARTHRITIS OF LEFT KNEE: Primary | ICD-10-CM

## 2018-06-19 DIAGNOSIS — Z01.818 PREOP EXAMINATION: ICD-10-CM

## 2018-06-19 LAB
ANION GAP SERPL CALC-SCNC: 12 MMOL/L
BUN SERPL-MCNC: 9 MG/DL
CALCIUM SERPL-MCNC: 8.4 MG/DL
CHLORIDE SERPL-SCNC: 104 MMOL/L
CO2 SERPL-SCNC: 21 MMOL/L
CREAT SERPL-MCNC: 0.7 MG/DL
EST. GFR  (AFRICAN AMERICAN): >60 ML/MIN/1.73 M^2
EST. GFR  (NON AFRICAN AMERICAN): >60 ML/MIN/1.73 M^2
GLUCOSE SERPL-MCNC: 86 MG/DL
POTASSIUM SERPL-SCNC: 3.8 MMOL/L
SODIUM SERPL-SCNC: 137 MMOL/L

## 2018-06-19 PROCEDURE — G8978 MOBILITY CURRENT STATUS: HCPCS | Mod: CK

## 2018-06-19 PROCEDURE — 25000003 PHARM REV CODE 250

## 2018-06-19 PROCEDURE — 94761 N-INVAS EAR/PLS OXIMETRY MLT: CPT

## 2018-06-19 PROCEDURE — 25000003 PHARM REV CODE 250: Performed by: NURSE PRACTITIONER

## 2018-06-19 PROCEDURE — 27201423 OPTIME MED/SURG SUP & DEVICES STERILE SUPPLY: Performed by: ORTHOPAEDIC SURGERY

## 2018-06-19 PROCEDURE — 94799 UNLISTED PULMONARY SVC/PX: CPT

## 2018-06-19 PROCEDURE — 27447 TOTAL KNEE ARTHROPLASTY: CPT | Mod: LT,GC,, | Performed by: ORTHOPAEDIC SURGERY

## 2018-06-19 PROCEDURE — 36000711: Performed by: ORTHOPAEDIC SURGERY

## 2018-06-19 PROCEDURE — C1713 ANCHOR/SCREW BN/BN,TIS/BN: HCPCS | Performed by: ORTHOPAEDIC SURGERY

## 2018-06-19 PROCEDURE — 71000033 HC RECOVERY, INTIAL HOUR: Performed by: ORTHOPAEDIC SURGERY

## 2018-06-19 PROCEDURE — 63600175 PHARM REV CODE 636 W HCPCS: Performed by: NURSE PRACTITIONER

## 2018-06-19 PROCEDURE — 88305 TISSUE EXAM BY PATHOLOGIST: CPT | Performed by: PATHOLOGY

## 2018-06-19 PROCEDURE — 63600175 PHARM REV CODE 636 W HCPCS: Performed by: NURSE ANESTHETIST, CERTIFIED REGISTERED

## 2018-06-19 PROCEDURE — 64448 NJX AA&/STRD FEM NRV NFS IMG: CPT | Mod: 59,LT,, | Performed by: ANESTHESIOLOGY

## 2018-06-19 PROCEDURE — 88311 DECALCIFY TISSUE: CPT | Mod: 26,,, | Performed by: PATHOLOGY

## 2018-06-19 PROCEDURE — 97530 THERAPEUTIC ACTIVITIES: CPT

## 2018-06-19 PROCEDURE — 25000003 PHARM REV CODE 250: Performed by: STUDENT IN AN ORGANIZED HEALTH CARE EDUCATION/TRAINING PROGRAM

## 2018-06-19 PROCEDURE — C1776 JOINT DEVICE (IMPLANTABLE): HCPCS | Performed by: ORTHOPAEDIC SURGERY

## 2018-06-19 PROCEDURE — 97116 GAIT TRAINING THERAPY: CPT

## 2018-06-19 PROCEDURE — 76942 ECHO GUIDE FOR BIOPSY: CPT | Performed by: ANESTHESIOLOGY

## 2018-06-19 PROCEDURE — 25000003 PHARM REV CODE 250: Performed by: NURSE ANESTHETIST, CERTIFIED REGISTERED

## 2018-06-19 PROCEDURE — 11000001 HC ACUTE MED/SURG PRIVATE ROOM

## 2018-06-19 PROCEDURE — 63600175 PHARM REV CODE 636 W HCPCS

## 2018-06-19 PROCEDURE — 27000221 HC OXYGEN, UP TO 24 HOURS

## 2018-06-19 PROCEDURE — 36000710: Performed by: ORTHOPAEDIC SURGERY

## 2018-06-19 PROCEDURE — 71000039 HC RECOVERY, EACH ADD'L HOUR: Performed by: ORTHOPAEDIC SURGERY

## 2018-06-19 PROCEDURE — D9220A PRA ANESTHESIA: Mod: CRNA,,, | Performed by: NURSE ANESTHETIST, CERTIFIED REGISTERED

## 2018-06-19 PROCEDURE — G8988 SELF CARE GOAL STATUS: HCPCS | Mod: CI

## 2018-06-19 PROCEDURE — 0SRD0J9 REPLACEMENT OF LEFT KNEE JOINT WITH SYNTHETIC SUBSTITUTE, CEMENTED, OPEN APPROACH: ICD-10-PCS | Performed by: ORTHOPAEDIC SURGERY

## 2018-06-19 PROCEDURE — 37000008 HC ANESTHESIA 1ST 15 MINUTES: Performed by: ORTHOPAEDIC SURGERY

## 2018-06-19 PROCEDURE — 27100025 HC TUBING, SET FLUID WARMER: Performed by: NURSE ANESTHETIST, CERTIFIED REGISTERED

## 2018-06-19 PROCEDURE — G8987 SELF CARE CURRENT STATUS: HCPCS | Mod: CK

## 2018-06-19 PROCEDURE — 97165 OT EVAL LOW COMPLEX 30 MIN: CPT

## 2018-06-19 PROCEDURE — D9220A PRA ANESTHESIA: Mod: ANES,,, | Performed by: ANESTHESIOLOGY

## 2018-06-19 PROCEDURE — 97161 PT EVAL LOW COMPLEX 20 MIN: CPT

## 2018-06-19 PROCEDURE — 64450 NJX AA&/STRD OTHER PN/BRANCH: CPT | Mod: 59,LT,, | Performed by: ANESTHESIOLOGY

## 2018-06-19 PROCEDURE — 88305 TISSUE EXAM BY PATHOLOGIST: CPT | Mod: 26,,, | Performed by: PATHOLOGY

## 2018-06-19 PROCEDURE — G8979 MOBILITY GOAL STATUS: HCPCS | Mod: CJ

## 2018-06-19 PROCEDURE — 37000009 HC ANESTHESIA EA ADD 15 MINS: Performed by: ORTHOPAEDIC SURGERY

## 2018-06-19 PROCEDURE — 80048 BASIC METABOLIC PNL TOTAL CA: CPT

## 2018-06-19 PROCEDURE — 36415 COLL VENOUS BLD VENIPUNCTURE: CPT

## 2018-06-19 DEVICE — COMP FEM POST STAB CEM SZ 4 LT: Type: IMPLANTABLE DEVICE | Site: KNEE | Status: FUNCTIONAL

## 2018-06-19 DEVICE — INSERT TRIATHLON SZ4 11MM: Type: IMPLANTABLE DEVICE | Site: KNEE | Status: FUNCTIONAL

## 2018-06-19 DEVICE — PATELLA TRIATHLON 31X9 SYMTRC: Type: IMPLANTABLE DEVICE | Site: KNEE | Status: FUNCTIONAL

## 2018-06-19 DEVICE — BASEPLATE TIB CEM PRIM SZ 4: Type: IMPLANTABLE DEVICE | Site: KNEE | Status: FUNCTIONAL

## 2018-06-19 DEVICE — CEMENT BONE SIMPLE P INDIVID: Type: IMPLANTABLE DEVICE | Site: KNEE | Status: FUNCTIONAL

## 2018-06-19 RX ORDER — MORPHINE SULFATE 4 MG/ML
2 INJECTION, SOLUTION INTRAMUSCULAR; INTRAVENOUS
Status: DISCONTINUED | OUTPATIENT
Start: 2018-06-19 | End: 2018-06-20 | Stop reason: HOSPADM

## 2018-06-19 RX ORDER — PREGABALIN 75 MG/1
75 CAPSULE ORAL
Status: COMPLETED | OUTPATIENT
Start: 2018-06-19 | End: 2018-06-19

## 2018-06-19 RX ORDER — BISACODYL 10 MG
10 SUPPOSITORY, RECTAL RECTAL EVERY 12 HOURS PRN
Status: DISCONTINUED | OUTPATIENT
Start: 2018-06-19 | End: 2018-06-20 | Stop reason: HOSPADM

## 2018-06-19 RX ORDER — AMOXICILLIN 250 MG
1 CAPSULE ORAL 2 TIMES DAILY
Status: DISCONTINUED | OUTPATIENT
Start: 2018-06-19 | End: 2018-06-20 | Stop reason: HOSPADM

## 2018-06-19 RX ORDER — RAMELTEON 8 MG/1
8 TABLET ORAL NIGHTLY PRN
Status: DISCONTINUED | OUTPATIENT
Start: 2018-06-19 | End: 2018-06-20 | Stop reason: HOSPADM

## 2018-06-19 RX ORDER — PRAVASTATIN SODIUM 40 MG/1
40 TABLET ORAL DAILY
Status: DISCONTINUED | OUTPATIENT
Start: 2018-06-19 | End: 2018-06-20 | Stop reason: HOSPADM

## 2018-06-19 RX ORDER — OXYCODONE HYDROCHLORIDE 5 MG/1
10 TABLET ORAL
Status: DISCONTINUED | OUTPATIENT
Start: 2018-06-19 | End: 2018-06-20 | Stop reason: HOSPADM

## 2018-06-19 RX ORDER — MIDAZOLAM HYDROCHLORIDE 1 MG/ML
1 INJECTION INTRAMUSCULAR; INTRAVENOUS EVERY 5 MIN PRN
Status: DISCONTINUED | OUTPATIENT
Start: 2018-06-19 | End: 2018-06-19 | Stop reason: HOSPADM

## 2018-06-19 RX ORDER — SODIUM CHLORIDE 0.9 % (FLUSH) 0.9 %
3 SYRINGE (ML) INJECTION EVERY 8 HOURS PRN
Status: DISCONTINUED | OUTPATIENT
Start: 2018-06-19 | End: 2018-06-20 | Stop reason: HOSPADM

## 2018-06-19 RX ORDER — KETAMINE HYDROCHLORIDE 10 MG/ML
INJECTION, SOLUTION INTRAMUSCULAR; INTRAVENOUS
Status: DISCONTINUED | OUTPATIENT
Start: 2018-06-19 | End: 2018-06-19

## 2018-06-19 RX ORDER — LIDOCAINE HCL/PF 100 MG/5ML
SYRINGE (ML) INTRAVENOUS
Status: DISCONTINUED | OUTPATIENT
Start: 2018-06-19 | End: 2018-06-19

## 2018-06-19 RX ORDER — CELECOXIB 200 MG/1
400 CAPSULE ORAL
Status: COMPLETED | OUTPATIENT
Start: 2018-06-19 | End: 2018-06-19

## 2018-06-19 RX ORDER — OXYCODONE HYDROCHLORIDE 5 MG/1
TABLET ORAL
Status: COMPLETED
Start: 2018-06-19 | End: 2018-06-19

## 2018-06-19 RX ORDER — FAMOTIDINE 20 MG/1
20 TABLET, FILM COATED ORAL 2 TIMES DAILY
Status: DISCONTINUED | OUTPATIENT
Start: 2018-06-19 | End: 2018-06-20 | Stop reason: HOSPADM

## 2018-06-19 RX ORDER — PHENYLEPHRINE HYDROCHLORIDE 10 MG/ML
INJECTION INTRAVENOUS
Status: DISCONTINUED | OUTPATIENT
Start: 2018-06-19 | End: 2018-06-19

## 2018-06-19 RX ORDER — DOCUSATE SODIUM 100 MG/1
100 CAPSULE, LIQUID FILLED ORAL 2 TIMES DAILY
Qty: 60 CAPSULE | Refills: 0 | Status: SHIPPED | OUTPATIENT
Start: 2018-06-19 | End: 2018-12-11

## 2018-06-19 RX ORDER — POLYETHYLENE GLYCOL 3350 17 G/17G
17 POWDER, FOR SOLUTION ORAL DAILY
Status: DISCONTINUED | OUTPATIENT
Start: 2018-06-20 | End: 2018-06-20 | Stop reason: HOSPADM

## 2018-06-19 RX ORDER — SODIUM CHLORIDE 9 MG/ML
INJECTION, SOLUTION INTRAVENOUS CONTINUOUS PRN
Status: DISCONTINUED | OUTPATIENT
Start: 2018-06-19 | End: 2018-06-19

## 2018-06-19 RX ORDER — ASPIRIN 81 MG/1
81 TABLET ORAL 2 TIMES DAILY
Qty: 60 TABLET | Refills: 0 | Status: SHIPPED | OUTPATIENT
Start: 2018-06-19 | End: 2020-06-23

## 2018-06-19 RX ORDER — CELECOXIB 200 MG/1
200 CAPSULE ORAL DAILY
Status: DISCONTINUED | OUTPATIENT
Start: 2018-06-20 | End: 2018-06-20 | Stop reason: HOSPADM

## 2018-06-19 RX ORDER — VANCOMYCIN HYDROCHLORIDE
1500
Status: COMPLETED | OUTPATIENT
Start: 2018-06-19 | End: 2018-06-19

## 2018-06-19 RX ORDER — ONDANSETRON HYDROCHLORIDE 8 MG/1
8 TABLET, FILM COATED ORAL EVERY 8 HOURS PRN
Qty: 60 TABLET | Refills: 0 | Status: SHIPPED | OUTPATIENT
Start: 2018-06-19 | End: 2018-12-11

## 2018-06-19 RX ORDER — ASPIRIN 81 MG/1
81 TABLET ORAL 2 TIMES DAILY
Status: DISCONTINUED | OUTPATIENT
Start: 2018-06-19 | End: 2018-06-20 | Stop reason: HOSPADM

## 2018-06-19 RX ORDER — IRBESARTAN 150 MG/1
300 TABLET ORAL NIGHTLY
Status: DISCONTINUED | OUTPATIENT
Start: 2018-06-19 | End: 2018-06-20 | Stop reason: HOSPADM

## 2018-06-19 RX ORDER — OXYCODONE HYDROCHLORIDE 5 MG/1
5 TABLET ORAL
Status: DISCONTINUED | OUTPATIENT
Start: 2018-06-19 | End: 2018-06-20 | Stop reason: HOSPADM

## 2018-06-19 RX ORDER — PROPOFOL 10 MG/ML
VIAL (ML) INTRAVENOUS
Status: DISCONTINUED | OUTPATIENT
Start: 2018-06-19 | End: 2018-06-19

## 2018-06-19 RX ORDER — PAROXETINE 10 MG/1
10 TABLET, FILM COATED ORAL EVERY MORNING
Status: DISCONTINUED | OUTPATIENT
Start: 2018-06-20 | End: 2018-06-20 | Stop reason: HOSPADM

## 2018-06-19 RX ORDER — ACETAMINOPHEN 500 MG
1000 TABLET ORAL EVERY 6 HOURS
Status: DISCONTINUED | OUTPATIENT
Start: 2018-06-19 | End: 2018-06-20 | Stop reason: HOSPADM

## 2018-06-19 RX ORDER — PROMETHAZINE HYDROCHLORIDE 12.5 MG/1
12.5 TABLET ORAL EVERY 6 HOURS PRN
Status: DISCONTINUED | OUTPATIENT
Start: 2018-06-19 | End: 2018-06-19 | Stop reason: HOSPADM

## 2018-06-19 RX ORDER — ROPIVACAINE HYDROCHLORIDE 2 MG/ML
INJECTION, SOLUTION EPIDURAL; INFILTRATION; PERINEURAL
Status: COMPLETED
Start: 2018-06-19 | End: 2018-06-19

## 2018-06-19 RX ORDER — OXYCODONE HYDROCHLORIDE 5 MG/1
15 TABLET ORAL
Status: DISCONTINUED | OUTPATIENT
Start: 2018-06-19 | End: 2018-06-20 | Stop reason: HOSPADM

## 2018-06-19 RX ORDER — LIDOCAINE HYDROCHLORIDE 10 MG/ML
1 INJECTION, SOLUTION EPIDURAL; INFILTRATION; INTRACAUDAL; PERINEURAL
Status: DISCONTINUED | OUTPATIENT
Start: 2018-06-19 | End: 2018-06-19 | Stop reason: HOSPADM

## 2018-06-19 RX ORDER — ACETAMINOPHEN 10 MG/ML
INJECTION, SOLUTION INTRAVENOUS
Status: COMPLETED
Start: 2018-06-19 | End: 2018-06-19

## 2018-06-19 RX ORDER — ONDANSETRON 2 MG/ML
INJECTION INTRAMUSCULAR; INTRAVENOUS
Status: DISCONTINUED | OUTPATIENT
Start: 2018-06-19 | End: 2018-06-19

## 2018-06-19 RX ORDER — PROPOFOL 10 MG/ML
VIAL (ML) INTRAVENOUS CONTINUOUS PRN
Status: DISCONTINUED | OUTPATIENT
Start: 2018-06-19 | End: 2018-06-19

## 2018-06-19 RX ORDER — HYDRALAZINE HYDROCHLORIDE 20 MG/ML
INJECTION INTRAMUSCULAR; INTRAVENOUS
Status: COMPLETED
Start: 2018-06-19 | End: 2018-06-19

## 2018-06-19 RX ORDER — CEFAZOLIN SODIUM 1 G/3ML
2 INJECTION, POWDER, FOR SOLUTION INTRAMUSCULAR; INTRAVENOUS
Status: COMPLETED | OUTPATIENT
Start: 2018-06-19 | End: 2018-06-20

## 2018-06-19 RX ORDER — NALOXONE HCL 0.4 MG/ML
0.02 VIAL (ML) INJECTION
Status: DISCONTINUED | OUTPATIENT
Start: 2018-06-19 | End: 2018-06-20 | Stop reason: HOSPADM

## 2018-06-19 RX ORDER — CEFAZOLIN SODIUM 1 G/3ML
2 INJECTION, POWDER, FOR SOLUTION INTRAMUSCULAR; INTRAVENOUS
Status: COMPLETED | OUTPATIENT
Start: 2018-06-19 | End: 2018-06-19

## 2018-06-19 RX ORDER — MIDAZOLAM HYDROCHLORIDE 1 MG/ML
INJECTION, SOLUTION INTRAMUSCULAR; INTRAVENOUS
Status: DISCONTINUED | OUTPATIENT
Start: 2018-06-19 | End: 2018-06-19

## 2018-06-19 RX ORDER — ROPIVACAINE HYDROCHLORIDE 2 MG/ML
8 INJECTION, SOLUTION EPIDURAL; INFILTRATION; PERINEURAL CONTINUOUS
Status: DISCONTINUED | OUTPATIENT
Start: 2018-06-19 | End: 2018-06-20 | Stop reason: HOSPADM

## 2018-06-19 RX ORDER — ASPIRIN 325 MG
325 TABLET, DELAYED RELEASE (ENTERIC COATED) ORAL 2 TIMES DAILY
Status: DISCONTINUED | OUTPATIENT
Start: 2018-06-19 | End: 2018-06-19

## 2018-06-19 RX ORDER — MUPIROCIN 20 MG/G
1 OINTMENT TOPICAL
Status: DISCONTINUED | OUTPATIENT
Start: 2018-06-19 | End: 2018-06-19 | Stop reason: HOSPADM

## 2018-06-19 RX ORDER — SODIUM CHLORIDE 9 MG/ML
INJECTION, SOLUTION INTRAVENOUS CONTINUOUS
Status: DISCONTINUED | OUTPATIENT
Start: 2018-06-19 | End: 2018-06-20 | Stop reason: HOSPADM

## 2018-06-19 RX ORDER — SODIUM CHLORIDE 9 MG/ML
INJECTION, SOLUTION INTRAVENOUS
Status: COMPLETED | OUTPATIENT
Start: 2018-06-19 | End: 2018-06-19

## 2018-06-19 RX ORDER — HYDRALAZINE HYDROCHLORIDE 20 MG/ML
5 INJECTION INTRAMUSCULAR; INTRAVENOUS ONCE
Status: COMPLETED | OUTPATIENT
Start: 2018-06-19 | End: 2018-06-19

## 2018-06-19 RX ORDER — PREGABALIN 50 MG/1
150 CAPSULE ORAL NIGHTLY
Status: DISCONTINUED | OUTPATIENT
Start: 2018-06-19 | End: 2018-06-20 | Stop reason: HOSPADM

## 2018-06-19 RX ORDER — ONDANSETRON 2 MG/ML
4 INJECTION INTRAMUSCULAR; INTRAVENOUS EVERY 8 HOURS PRN
Status: DISCONTINUED | OUTPATIENT
Start: 2018-06-19 | End: 2018-06-20 | Stop reason: HOSPADM

## 2018-06-19 RX ORDER — OXYCODONE AND ACETAMINOPHEN 10; 325 MG/1; MG/1
1 TABLET ORAL EVERY 4 HOURS PRN
Qty: 45 TABLET | Refills: 0 | Status: SHIPPED | OUTPATIENT
Start: 2018-06-19 | End: 2018-06-29 | Stop reason: SDUPTHER

## 2018-06-19 RX ORDER — ACETAMINOPHEN 10 MG/ML
1000 INJECTION, SOLUTION INTRAVENOUS ONCE
Status: COMPLETED | OUTPATIENT
Start: 2018-06-19 | End: 2018-06-19

## 2018-06-19 RX ORDER — DEXAMETHASONE SODIUM PHOSPHATE 4 MG/ML
INJECTION, SOLUTION INTRA-ARTICULAR; INTRALESIONAL; INTRAMUSCULAR; INTRAVENOUS; SOFT TISSUE
Status: DISCONTINUED | OUTPATIENT
Start: 2018-06-19 | End: 2018-06-19

## 2018-06-19 RX ORDER — ALPRAZOLAM 0.5 MG/1
0.5 TABLET ORAL 2 TIMES DAILY PRN
Status: DISCONTINUED | OUTPATIENT
Start: 2018-06-19 | End: 2018-06-20 | Stop reason: HOSPADM

## 2018-06-19 RX ORDER — FENTANYL CITRATE 50 UG/ML
25 INJECTION, SOLUTION INTRAMUSCULAR; INTRAVENOUS EVERY 5 MIN PRN
Status: DISCONTINUED | OUTPATIENT
Start: 2018-06-19 | End: 2018-06-19 | Stop reason: HOSPADM

## 2018-06-19 RX ADMIN — MIDAZOLAM HYDROCHLORIDE 1 MG: 1 INJECTION, SOLUTION INTRAMUSCULAR; INTRAVENOUS at 01:06

## 2018-06-19 RX ADMIN — VANCOMYCIN HYDROCHLORIDE 1500 MG: 10 INJECTION, POWDER, LYOPHILIZED, FOR SOLUTION INTRAVENOUS at 01:06

## 2018-06-19 RX ADMIN — SODIUM CHLORIDE: 0.9 INJECTION, SOLUTION INTRAVENOUS at 12:06

## 2018-06-19 RX ADMIN — IRBESARTAN 300 MG: 150 TABLET ORAL at 09:06

## 2018-06-19 RX ADMIN — ACETAMINOPHEN 1000 MG: 10 INJECTION, SOLUTION INTRAVENOUS at 04:06

## 2018-06-19 RX ADMIN — PROPOFOL 30 MG: 10 INJECTION, EMULSION INTRAVENOUS at 01:06

## 2018-06-19 RX ADMIN — PHENYLEPHRINE HYDROCHLORIDE 100 MCG: 10 INJECTION INTRAVENOUS at 02:06

## 2018-06-19 RX ADMIN — OXYCODONE HYDROCHLORIDE 15 MG: 5 TABLET ORAL at 04:06

## 2018-06-19 RX ADMIN — ONDANSETRON 4 MG: 2 INJECTION INTRAMUSCULAR; INTRAVENOUS at 02:06

## 2018-06-19 RX ADMIN — MIDAZOLAM HYDROCHLORIDE 2 MG: 1 INJECTION, SOLUTION INTRAMUSCULAR; INTRAVENOUS at 12:06

## 2018-06-19 RX ADMIN — KETAMINE HYDROCHLORIDE 20 MG: 10 INJECTION, SOLUTION INTRAMUSCULAR; INTRAVENOUS at 02:06

## 2018-06-19 RX ADMIN — OXYCODONE HYDROCHLORIDE 5 MG: 5 TABLET ORAL at 10:06

## 2018-06-19 RX ADMIN — PREGABALIN 75 MG: 75 CAPSULE ORAL at 12:06

## 2018-06-19 RX ADMIN — FAMOTIDINE 20 MG: 20 TABLET ORAL at 09:06

## 2018-06-19 RX ADMIN — ROPIVACAINE HYDROCHLORIDE 8 ML/HR: 2 INJECTION, SOLUTION EPIDURAL; INFILTRATION at 04:06

## 2018-06-19 RX ADMIN — HYDRALAZINE HYDROCHLORIDE 5 MG: 20 INJECTION INTRAMUSCULAR; INTRAVENOUS at 05:06

## 2018-06-19 RX ADMIN — FENTANYL CITRATE 50 MCG: 50 INJECTION, SOLUTION INTRAMUSCULAR; INTRAVENOUS at 12:06

## 2018-06-19 RX ADMIN — ASPIRIN 81 MG: 81 TABLET, COATED ORAL at 09:06

## 2018-06-19 RX ADMIN — ACETAMINOPHEN 1000 MG: 500 TABLET ORAL at 10:06

## 2018-06-19 RX ADMIN — CEFAZOLIN 2 G: 330 INJECTION, POWDER, FOR SOLUTION INTRAMUSCULAR; INTRAVENOUS at 01:06

## 2018-06-19 RX ADMIN — DEXAMETHASONE SODIUM PHOSPHATE 8 MG: 4 INJECTION, SOLUTION INTRAMUSCULAR; INTRAVENOUS at 02:06

## 2018-06-19 RX ADMIN — PHENYLEPHRINE HYDROCHLORIDE 200 MCG: 10 INJECTION INTRAVENOUS at 02:06

## 2018-06-19 RX ADMIN — STANDARDIZED SENNA CONCENTRATE AND DOCUSATE SODIUM 1 TABLET: 8.6; 5 TABLET, FILM COATED ORAL at 09:06

## 2018-06-19 RX ADMIN — LIDOCAINE HYDROCHLORIDE 60 MG: 20 INJECTION, SOLUTION INTRAVENOUS at 01:06

## 2018-06-19 RX ADMIN — PROPOFOL 100 MCG/KG/MIN: 10 INJECTION, EMULSION INTRAVENOUS at 01:06

## 2018-06-19 RX ADMIN — CEFAZOLIN 2 G: 1 INJECTION, POWDER, FOR SOLUTION INTRAMUSCULAR; INTRAVENOUS at 09:06

## 2018-06-19 RX ADMIN — PRAVASTATIN SODIUM 40 MG: 40 TABLET ORAL at 04:06

## 2018-06-19 RX ADMIN — SODIUM CHLORIDE, SODIUM GLUCONATE, SODIUM ACETATE, POTASSIUM CHLORIDE, MAGNESIUM CHLORIDE, SODIUM PHOSPHATE, DIBASIC, AND POTASSIUM PHOSPHATE: .53; .5; .37; .037; .03; .012; .00082 INJECTION, SOLUTION INTRAVENOUS at 02:06

## 2018-06-19 RX ADMIN — SODIUM CHLORIDE: 0.9 INJECTION, SOLUTION INTRAVENOUS at 04:06

## 2018-06-19 RX ADMIN — PREGABALIN 150 MG: 50 CAPSULE ORAL at 10:06

## 2018-06-19 RX ADMIN — CELECOXIB 400 MG: 200 CAPSULE ORAL at 12:06

## 2018-06-19 RX ADMIN — SODIUM CHLORIDE: 0.9 INJECTION, SOLUTION INTRAVENOUS at 01:06

## 2018-06-19 NOTE — TRANSFER OF CARE
"Anesthesia Transfer of Care Note    Patient: Lucinda Aguilera    Procedure(s) Performed: Procedure(s) (LRB):  REPLACEMENT-KNEE-TOTAL (Left)    Patient location: PACU    Anesthesia Type: general, regional and spinal    Transport from OR: Transported from OR on 6-10 L/min O2 by face mask with adequate spontaneous ventilation    Post pain: adequate analgesia    Post assessment: no apparent anesthetic complications    Post vital signs: stable    Level of consciousness: awake    Nausea/Vomiting: no nausea/vomiting    Complications: none    Transfer of care protocol was followed      Last vitals:   Visit Vitals  BP (!) 159/65   Pulse 67   Temp 35.8 °C (96.5 °F) (Axillary)   Resp 13   Ht 5' 2" (1.575 m)   Wt 83.9 kg (185 lb)   LMP 04/18/2004   SpO2 100%   Breastfeeding? No   BMI 33.84 kg/m²     "

## 2018-06-19 NOTE — ANESTHESIA PROCEDURE NOTES
Spinal    Diagnosis: Left knee osteoarthritis  Patient location during procedure: OR  Start time: 6/19/2018 1:38 PM  Timeout: 6/19/2018 1:38 PM  End time: 6/19/2018 1:43 PM  Staffing  Anesthesiologist: COLLEEN PAYAN  Performed: anesthesiologist   Preanesthetic Checklist  Completed: patient identified, site marked, surgical consent, pre-op evaluation, timeout performed, IV checked, risks and benefits discussed and monitors and equipment checked  Spinal Block  Patient position: sitting  Prep: ChloraPrep  Patient monitoring: continuous pulse ox  Approach: midline  Location: L3-4  Injection technique: single shot  CSF Fluid: clear free-flowing CSF  Needle  Additional Documentation: negative aspiration for heme and no paresthesia on injection  Needle localization: anatomical landmarks  Assessment  Sensory level: T6   Dermatomal levels determined by alcohol wipe  Ease of block: easy  Patient's tolerance of the procedure: comfortable throughout block and no complaints  Medications:  Bolus administered: 2.2 mL of 2.0 mepivacaine

## 2018-06-19 NOTE — PT/OT/SLP EVAL
Occupational Therapy   Evaluation    Name: Lucinda Aguilera  MRN: [id number]  Admitting Diagnosis:  Primary osteoarthritis of left knee Day of Surgery    Recommendations:     Discharge recommendations: Home w/ HH    Barriers to discharge: None    Equipment recommendations: shower chair    History:     Occupational Profile:  Living Environment: Pt lives with her  in [facility] w/ 2STE and L HR. Pt uses both a tub/shower and walk-in  Previous level of function: PTA, pt reports being independent w/ ADLs and functional mobility   Equipment Owned: rolling walker and bedside commode - does not use, AD from previous sx  Assistance Upon Discharge: Pt reports her  and various family members will be providing assistance upon d/c home from hospital.     Past Medical History:   Diagnosis Date    Anticoagulant long-term use     Anxiety     takes daily Xanax    Arthritis     right thumb    Cataract     OU    GERD (gastroesophageal reflux disease)     Hyperlipemia     Hypertension     PVD (posterior vitreous detachment)     OD       Past Surgical History:   Procedure Laterality Date    chest abcess      child    COLONOSCOPY  1/2012    repeat in 5 years    KNEE ARTHROSCOPY W/ LASER      right    KNEE SURGERY Right 06/03/2016    Total knee replacement    thumb surgery  11/2014       Subjective     Communicated with: RN prior to session.    Pt agreeable to Evaluation.    Pain/Comfort:  Pain level: 0/10 in L knee    Objective:     Pt found supine in bed with blood pressure cuff, sanderson catheter, telemetry, PNC, PCEA, pulse ox, Peripheral IV and FCD.    Orthopedic Precautions: LLE weight bearing as tolerated    Occupational Performance:    Bed Mobility:    Supine to sit: CGA  Sit to supine: CGA    Transfers:    Sit<>Stand: CGA, SW    Ambulation:    Pt ambulated 3 steps forward, backward, and side stepped to HOB w/ CGA and SW - no signs of LOB or SOB.     Activities of Daily Living:  UE dressing: Maximum Assistance to  marquis gown around back  LE dressing: Total Assistance to marquis socks  Pt educated on LE dressing techniques and need for AD with LE dressing      Patient left supine in bed with all lines intact and RN notified.    [facility] 6 Click: Self-care  [facility] Total Score: 16    Education:    Assessment:     Lucinda Aguilera is a 67 y.o. female with a medical diagnosis of Primary osteoarthritis of left knee.  She presents with decreased function of L LE impeding her ability to perform ADLs and functional mobility and would benefit from OT services to maximize functional (I) and safety.    Performance deficits affecting function are weakness, impaired endurance, impaired self care skills, impaired functional mobilty, gait instability, impaired balance, decreased lower extremity function, decreased safety awareness, pain, impaired skin, decreased ROM, edema, orthopedic precautions.    Rehab Prognosis:  Good    Plan:     Patient to be seen QD to address the above listed problems via self-care/home management, therapeutic activities, therapeutic exercises    Plan of Care Reviewed with: patient    This Plan of care has been discussed with the patient who was involved in its development and understands and is in agreement with the identified goals and treatment plan    GOALS:    Occupational Therapy Goals        Problem: Occupational Therapy Goal    Goal Priority Disciplines Outcome Interventions   Occupational Therapy Goal     OT, PT/OT Ongoing (interventions implemented as appropriate)    Description:  Goals to be met by: 7 days    Patient will increase functional independence with ADLs by performing:    UE Dressing with Supervision.  LE Dressing with Supervision with AD as needed.  Grooming while standing with Supervision.  Toileting from bedside commode with Supervision for hygiene and clothing management.   Stand pivot transfers with Supervision.  Toilet transfer to bedside commode with Supervision.                         Time  Tracking:     OT Received On: 6/19/2018  OT Start Time: 1640  OT Stop Time: 1700   OT Total Time: 20 minutes     Billable Minutes:  Evaluation 12 minutes  Therapeutic Activity 8 minutes    Casandra Shaffer OT  6/19/2018

## 2018-06-19 NOTE — ANESTHESIA PROCEDURE NOTES
IPAJOSE Single Injection Block    Patient location during procedure: pre-op   Block not for primary anesthetic.  Reason for block: at surgeon's request and post-op pain management   Post-op Pain Location: left knee   Start time: 6/19/2018 12:32 PM  Timeout: 6/19/2018 12:28 PM   End time: 6/19/2018 12:52 PM  Surgery related to: left TKR   Staffing  Anesthesiologist: LUIS MENDEZ  Other anesthesia staff: JESSICA JUAREZ  Performed: other anesthesia staff   Preanesthetic Checklist  Completed: patient identified, site marked, surgical consent, pre-op evaluation, timeout performed, IV checked, risks and benefits discussed and monitors and equipment checked  Peripheral Block  Patient position: supine  Prep: ChloraPrep  Patient monitoring: heart rate, cardiac monitor, continuous pulse ox, continuous capnometry and frequent blood pressure checks  Block type: I PACK  Laterality: left  Injection technique: single shot  Needle  Needle type: Stimuplex   Needle gauge: 21 G  Needle length: 4 in  Needle localization: anatomical landmarks and ultrasound guidance   -ultrasound image captured on disc.  Assessment  Injection assessment: negative aspiration, negative parasthesia and local visualized surrounding nerve  Paresthesia pain: none  Heart rate change: no  Slow fractionated injection: yes  Medications:  Bolus administered: 20 mL of 0.25 ropivacaine  Epinephrine added: 3.75 mcg/mL (1/300,000)  Additional Notes  VSS.  [facility] RN monitoring vitals throughout procedure.  Patient tolerated procedure well.

## 2018-06-19 NOTE — PT/OT/SLP EVAL
Physical Therapy Evaluation    Patient Name:  Lucinda Aguilera   MRN:  [id number]    Recommendations:     Discharge recommendations: Home Health PT  Barriers to discharge: Inaccessible home environment  Equipment recommendations: none    Assessment:     Lucinda Aguilera is a 67 y.o. female admitted with a medical diagnosis of L TKA.  She presents with the below impairments/functional limitations.  Pt tolerated evaluation well today and is motivated to do well with recent joint replacement.  Pt is a good candidate for skilled PT services to address the below deficits and to increase functional independence.      Rehab Prognosis: good; patient would benefit from acute skilled PT services to address these deficits and reach maximum level of function.      Rehab Identified impairments/functional limitations:   weakness, impaired endurance, impaired sensation, impaired self care skills, impaired functional mobilty, gait instability, impaired balance, decreased coordination, decreased lower extremity function, decreased safety awareness, pain, decreased ROM, impaired skin, edema, orthopedic precautions    Recent Surgery: Procedure(s) (LRB):  REPLACEMENT-KNEE-TOTAL (Left) Day of Surgery    Plan:     During this hospitalization, patient to be seen BID to address the above listed problems via gait training, therapeutic activity, therapeutic exercise, and neuromuscular re-education   Plan of Care Reviewed with: patient    Subjective     Communicated with RN prior to session.  Patient found supine upon PT entry, agreeable to evaluation.      Chief Complaint: none  Patient comments/goals: to return home safely    Pain/Comfort:  Pain level prior: 3/10 in L knee  Pain level post: 3/10 in L knee    Patients cultural, spiritual, Hindu conflicts given the current situation: none stated    Living Environment:  Pt lives with her  in [facility] w/ 2STE and L HR. Pt uses both a tub/shower and walk-in  Previous level of function: PTA,  pt reports being independent w/ ADLs and functional mobility   Equipment Owned: rolling walker and bedside commode - does not use, AD from previous sx  Assistance Upon Discharge: Pt reports her  and various family members will be providing assistance upon d/c home from hospital.     Objective:     Patient found supine in bed with blood pressure cuff, telemetry, perineural catheter, polar ice, peripheral IV, pulse ox, and FCD.     General Precautions: Standard, fall  Orthopedic Precautions:  LLE weight bearing as tolerated  Braces: none     Physical Exam:  Postural examination/scapula alignment: WFL    Skin integrity: Visible skin intact  Edema: Mild LLE    Sensation:   Intact    Lower Extremity Range of Motion:  Right Lower Extremity: WFL  Left Lower Extremity: WFL except knee    Lower Extremity Strength:  Right Lower Extremity: WFL  Left Lower Extremity: WFL except knee    Functional Mobility:    Bed Mobility:    Supine to sit: CGA  Sit to supine: CGA    Transfers:    Sit<>Stand: Min A with SW    Ambulation:    Pt ambulated 3 steps forward and backward and 1-2 sidesteps with SW and Min A.  Pt educated on 3 point gait pattern.  Pt able to verbalize and demonstrate understanding.       AM-PAC 6 CLICK MOBILITY  Total Score: 18     Therapeutic Activities and Exercises:  · Pt educated on:   · Role of PT, safety with functional mobility.   · DME and discharge needs  · Importance of OOB activity  · Weightbearing precautions  · Gait sequencing     Patient left supine in bed with all lines intact and RN notified.    GOALS:    Physical Therapy Goals        Problem: Physical Therapy Goal    Goal Priority Disciplines Outcome Goal Variances Interventions   Physical Therapy Goal     PT/OT, PT Ongoing (interventions implemented as appropriate)     Description:  Goals to be met by: 6/30/18     Patient will increase functional independence with mobility by performing:    Supine to sit with Supervision  Sit to supine with  Supervision   Sit to stand transfer with Stand-by Assistance  Gait  x 150 feet with Stand-by Assistance using Rolling Walker.   Ascend/descend 4 stairs with left handrail with Contact Guard Assistance     Lower extremity exercise program x 30 reps per handout, with independence                           History:     Past Medical History:   Diagnosis Date    Anticoagulant long-term use     Anxiety     takes daily Xanax    Arthritis     right thumb    Cataract     OU    GERD (gastroesophageal reflux disease)     Hyperlipemia     Hypertension     PVD (posterior vitreous detachment)     OD       Past Surgical History:   Procedure Laterality Date    chest abcess      child    COLONOSCOPY  1/2012    repeat in 5 years    KNEE ARTHROSCOPY W/ LASER      right    KNEE SURGERY Right 06/03/2016    Total knee replacement    thumb surgery  11/2014       Time Tracking:     PT Received On: 06/19/18  PT Start Time: 1640     PT Stop Time: 1700  PT Total Time (min): 20 min     Billable Minutes: Evaluation 12; Gait Training 8      Huan Winn, PT, DPT  6/19/2018   [phone]

## 2018-06-19 NOTE — OP NOTE
06/19/2018    PREOPERATIVE DIAGNOSIS:  Osteoarthritis left knee.    POSTOPERATIVE DIAGNOSIS:  Osteoarthritis left knee.    PROCEDURE:  Left total knee replacement. (CPT# 53280)    SURGEON:  jN Melvin M.D.    ASSISTANT:   Marleen.    ANESTHESIA:  spinal.    ESTIMATED BLOOD LOSS:  100 mL    Complications: None    Specimen: Bone    INDICATIONS:  Lucinda Aguilera is a 67 y.o. year-old with a longstanding history of left knee pain.  She has failed extensive conservative care to this point.  Preoperative imaging revealed degenerative joint disease and treatment options were discussed.  She elected to proceed with knee replacement.    Risks and complications were discussed including, but not limited to risks of anesthetic complications, infections, wound healing complications, aseptic loosening, instability, DVT, pulmonary embolism and death among others and she elected to proceed.    COMPONENTS USED:  The [employer] triathlon system with size femur 4, tibia 4, 11  mm polyethylene, 31 mm patella.    DESCRIPTION OF PROCEDURE:  The patient was taken to the Operating Room where anesthesia was administered by the Anesthesia Department. She was then placed in the supine position and all superficial neurovascular structures were well padded.  The left lower extremity was then sterilely prepped and draped in the normal fashion.    Under tourniquet control, a 20 cm longitudinal incision was made over the anterior aspect of the knee.  Subcutaneous tissue was sharply dissected down to the deep fascia, which was incised along the line of the incision.  A standard medial parapatellar arthrotomy was made.  The proximal medial tibial plateau was then subperiosteally exposed protecting the medial collateral ligament.  A portion of the infrapatellar fat pad was excised.  The patella was everted and the knee was flexed to 90 degrees.    The extramedullary tibial alignment guide was then placed and the proximal tibial osteotomy was made  approximately 2 mm distal to the most shallow surface of the tibial plateau.  This was done perpendicular to the axis of the tibia with approximately 3 degrees posterior slope.    A drill was then used to gain access into the intramedullary canal of the distal femur. The distal femoral cutting guide was placed and the 8 mm distal femoral cut was made in 5 degrees of valgus.  Femur was sized to a size 4.  The size 4 cutting guide was applied and the posterior femoral condyle cuts were made.  At this time, the cutting guide was removed and the cruciate ligaments, osteophytes, menisci and any debris was removed from the joint space.  Flexion and extension gaps were checked and found to be equal and stable at 11 mm. The femoral cutting guide was reapplied and the posterior chamfer, anterior, and anterior chamfer cuts were made. The notch cutting guide for the posterior stabilize housing was placed and the notch cuts were made.    We then turned our attention back towards the proximal tibia.  This was sized to a size 4, placed in neutral rotation, and the keel was punched in the standard fashion.  Trial sizes of the 4 femur, 4 tibia, and 11 mm polyethylene liner were then placed.    The patellar cutting guide was then used to remove the dorsal 8 mm of the patellar surface to a final thickness of 14 mm.  This was sized to a size 31. Drill holes were placed and patellar trial was placed.    At this time, the knee was placed through range of motion.  Excellent patellofemoral tracking and stability were noted throughout.  Full extension was easily obtained and intraoperative range of motion was from approximately 0 to 130 degrees.    Trial components were removed and the bony surfaces were thoroughly irrigated in a pulse lavage fashion and dried.  Two batches of cement were mixed and the tibial, femoral and patellar components were cemented into position, impacted and held in place as the cement polymerized.  Any excess  cement was removed using [place] elevators.  The 11 mm polyethylene was then locked into position.    The wound was once again thoroughly irrigated.  The tourniquet was deflated and any significant bleeding was stopped using electrocautery.  Tranexamic acid was placed in the wound as well to aid in hemostasis.    The quadriceps tendon and parapatellar retinaculum were then closed with interrupted figure-of-eight sutures of #1 Vicryl.  Subcutaneous tissue was closed with interrupted inverted stitches #3-0 Vicryl.  Skin was approximated using staples.  Sterile dressing was applied and she was returned to the Postanesthesia Care Unit in stable condition.    As the attending surgeon I was physically present for the key/critical portions of the procedure.

## 2018-06-19 NOTE — PLAN OF CARE
Problem: Physical Therapy Goal  Goal: Physical Therapy Goal  Goals to be met by: 6/30/18     Patient will increase functional independence with mobility by performing:    Supine to sit with Supervision  Sit to supine with Supervision   Sit to stand transfer with Stand-by Assistance  Gait  x 150 feet with Stand-by Assistance using Rolling Walker.   Ascend/descend 4 stairs with left handrail with Contact Guard Assistance     Lower extremity exercise program x 30 reps per handout, with independence         Outcome: Ongoing (interventions implemented as appropriate)  PT eval complete and POC initiated.

## 2018-06-19 NOTE — ANESTHESIA PROCEDURE NOTES
Adductor Canal Catheter    Patient location during procedure: pre-op   Block not for primary anesthetic.  Reason for block: at surgeon's request and post-op pain management   Post-op Pain Location: left knee   Start time: 6/19/2018 12:32 PM  Timeout: 6/19/2018 12:28 PM   End time: 6/19/2018 12:52 PM  Surgery related to: left TKR   Staffing  Anesthesiologist: LUIS MENDEZ  Other anesthesia staff: JESSICA JUAREZ  Performed: other anesthesia staff   Preanesthetic Checklist  Completed: patient identified, site marked, surgical consent, pre-op evaluation, timeout performed, IV checked, risks and benefits discussed and monitors and equipment checked  Peripheral Block  Patient position: supine  Prep: ChloraPrep and site prepped and draped  Patient monitoring: heart rate, cardiac monitor, continuous pulse ox, continuous capnometry and frequent blood pressure checks  Block type: adductor canal  Laterality: left  Injection technique: continuous  Needle  Needle type: Tuohy   Needle gauge: 17 G  Needle length: 3.5 in  Needle localization: anatomical landmarks and ultrasound guidance  Catheter type: spring wound  Catheter size: 19 G  Test dose: lidocaine 1.5% with Epi 1-to-200,000 and negative   -ultrasound image captured on disc.  Assessment  Injection assessment: negative aspiration, negative parasthesia and local visualized surrounding nerve  Paresthesia pain: none  Heart rate change: no  Slow fractionated injection: yes  Medications:  Bolus administered: 20 mL of 0.25 ropivacaine  Epinephrine added: 3.75 mcg/mL (1/300,000)  Additional Notes  VSS.  [facility] RN monitoring vitals throughout procedure.  Patient tolerated procedure well.

## 2018-06-20 ENCOUNTER — PATIENT MESSAGE (OUTPATIENT)
Dept: FAMILY MEDICINE | Facility: CLINIC | Age: 68
End: 2018-06-20

## 2018-06-20 VITALS
DIASTOLIC BLOOD PRESSURE: 78 MMHG | BODY MASS INDEX: 32.2 KG/M2 | TEMPERATURE: 96 F | SYSTOLIC BLOOD PRESSURE: 181 MMHG | OXYGEN SATURATION: 96 % | HEART RATE: 73 BPM | HEIGHT: 62 IN | WEIGHT: 175 LBS | RESPIRATION RATE: 18 BRPM

## 2018-06-20 PROCEDURE — 25000003 PHARM REV CODE 250: Performed by: STUDENT IN AN ORGANIZED HEALTH CARE EDUCATION/TRAINING PROGRAM

## 2018-06-20 PROCEDURE — G8989 SELF CARE D/C STATUS: HCPCS | Mod: CI

## 2018-06-20 PROCEDURE — 99231 SBSQ HOSP IP/OBS SF/LOW 25: CPT | Mod: GC,,, | Performed by: ANESTHESIOLOGY

## 2018-06-20 PROCEDURE — G8980 MOBILITY D/C STATUS: HCPCS | Mod: CJ

## 2018-06-20 PROCEDURE — G8979 MOBILITY GOAL STATUS: HCPCS | Mod: CJ

## 2018-06-20 PROCEDURE — 63600175 PHARM REV CODE 636 W HCPCS: Performed by: NURSE PRACTITIONER

## 2018-06-20 PROCEDURE — 25000003 PHARM REV CODE 250: Performed by: NURSE PRACTITIONER

## 2018-06-20 PROCEDURE — G8987 SELF CARE CURRENT STATUS: HCPCS | Mod: CI

## 2018-06-20 PROCEDURE — 97116 GAIT TRAINING THERAPY: CPT | Mod: 59

## 2018-06-20 PROCEDURE — 97530 THERAPEUTIC ACTIVITIES: CPT

## 2018-06-20 PROCEDURE — G8988 SELF CARE GOAL STATUS: HCPCS | Mod: CI

## 2018-06-20 PROCEDURE — 97110 THERAPEUTIC EXERCISES: CPT

## 2018-06-20 PROCEDURE — 97535 SELF CARE MNGMENT TRAINING: CPT

## 2018-06-20 RX ADMIN — ROPIVACAINE HYDROCHLORIDE 8 ML/HR: 2 INJECTION, SOLUTION EPIDURAL; INFILTRATION at 02:06

## 2018-06-20 RX ADMIN — CELECOXIB 200 MG: 200 CAPSULE ORAL at 09:06

## 2018-06-20 RX ADMIN — OXYCODONE HYDROCHLORIDE 10 MG: 5 TABLET ORAL at 09:06

## 2018-06-20 RX ADMIN — FAMOTIDINE 20 MG: 20 TABLET ORAL at 09:06

## 2018-06-20 RX ADMIN — CEFAZOLIN 2 G: 1 INJECTION, POWDER, FOR SOLUTION INTRAMUSCULAR; INTRAVENOUS at 06:06

## 2018-06-20 RX ADMIN — ACETAMINOPHEN 1000 MG: 500 TABLET ORAL at 06:06

## 2018-06-20 RX ADMIN — ASPIRIN 81 MG: 81 TABLET, COATED ORAL at 09:06

## 2018-06-20 RX ADMIN — STANDARDIZED SENNA CONCENTRATE AND DOCUSATE SODIUM 1 TABLET: 8.6; 5 TABLET, FILM COATED ORAL at 09:06

## 2018-06-20 RX ADMIN — PRAVASTATIN SODIUM 40 MG: 40 TABLET ORAL at 09:06

## 2018-06-20 RX ADMIN — OXYCODONE HYDROCHLORIDE 10 MG: 5 TABLET ORAL at 06:06

## 2018-06-20 RX ADMIN — PAROXETINE HYDROCHLORIDE 10 MG: 10 TABLET, FILM COATED ORAL at 06:06

## 2018-06-20 NOTE — PROGRESS NOTES
Patient had witnessed disconnect to PNC at alligator clamp.  Remaining catheter site CDI.  After donning sterile gloves, thoroughly cleaned catheter, clipped and replaced alligator clamp.  Re-dressed with tegaderms and re-started infusion.  Pt tolerated well.

## 2018-06-20 NOTE — PROGRESS NOTES
Pt and family present, consent to converting adductor PNC to On Q.  Site CDI.  Educated regarding continued pain management, fall risk, signs of complications, continued monitoring, as well as discontinuing catheter on 6/22.  Two numbers obtained for APS to follow up.  Understanding verbalized.

## 2018-06-20 NOTE — PT/OT/SLP PROGRESS
Physical Therapy Treatment and Discharge    Patient Name:  Lucinda Aguilera   MRN:  [id number]    Recommendations:     Discharge Recommendations:  home with home health   Discharge Equipment Recommendations: none   Barriers to discharge: None    Assessment:     Lucinda Aguilera is a 67 y.o. female admitted with a medical diagnosis of Primary osteoarthritis of left knee.  Pt tolerated treatment well.  Pt is able to perform all functional mobility without incident and is safe to discharge home from a mobility standpoint.  Pt will continue to benefit from skilled PT services to address the below deficits and to increase functional independence.      Rehab Prognosis:  good; patient would benefit from acute skilled PT services to address these deficits and reach maximum level of function.      Recent Surgery: Procedure(s) (LRB):  REPLACEMENT-KNEE-TOTAL (Left) 1 Day Post-Op    Plan:     · D/C acute PT   Plan of Care Reviewed with: patient, family    Subjective     Communicated with RN prior to session.  Patient found seated in chair upon PT entry to room, agreeable to treatment.      Chief Complaint: none stated; ready to go home     Pain/Comfort:  · Pain Rating 1:  (did not rate)  · Location - Side 1: Left  · Location - Orientation 1: generalized  · Location 1: knee  · Pain Addressed 1: Reposition, Distraction, Pre-medicate for activity  · Pain Rating Post-Intervention 1:  (did not rate)    Patients cultural, spiritual, Mandaeism conflicts given the current situation: none noted    Objective:     Patient found with: cryotherapy, perineural catheter, FCD, telemetry     General Precautions: Standard, fall   Orthopedic Precautions:LLE weight bearing as tolerated   Braces: N/A     Functional Mobility:    Bed Mobility:    · Supine to Sit: (S)  · Sit to Supine: (S)    Transfers:  · Sit to Stand: SBA with RW    Gait:  Pt ambulated 200 feet with RW and SBA.  No LOB or SOB noted.  Good gait quality noted.      Stairs:  Pt  ascended/descended 4 stair(s) with No Assistive Device with bilateral handrails with Stand-by Assistance and Contact Guard Assistance.      AM-PAC 6 CLICK MOBILITY  Turning over in bed (including adjusting bedclothes, sheets and blankets)?: 4  Sitting down on and standing up from a chair with arms (e.g., wheelchair, bedside commode, etc.): 3  Moving from lying on back to sitting on the side of the bed?: 4  Moving to and from a bed to a chair (including a wheelchair)?: 3  Need to walk in hospital room?: 3  Climbing 3-5 steps with a railing?: 3  Total Score: 20     Therapeutic Activities and Exercises:    Supine therex per handout (Knee protocol), 30x each  · Ankle pumps  · Quad sets  · Glute squeezes  · SAQ  · Heel slides  · Supine hip abduction  · SLR  · LAQ      Pt educated on:  · Car transfers  · DME Management   · Common signs and symptoms associated with TKA  · Importance of protecting surgical incision during functional tasks to reduce the risk of bleeding/infection  · HEP      Knee extension: -3 degrees   Knee flexion: 94 degrees     Patient left up in chair with all lines intact, call button in reach and RN notified.    GOALS:    Physical Therapy Goals        Problem: Physical Therapy Goal    Goal Priority Disciplines Outcome Goal Variances Interventions   Physical Therapy Goal     PT/OT, PT Ongoing (interventions implemented as appropriate)     Description:  Goals to be met by: 6/30/18     Patient will increase functional independence with mobility by performing:    Supine to sit with Supervision  Sit to supine with Supervision   Sit to stand transfer with Stand-by Assistance  Gait  x 150 feet with Stand-by Assistance using Rolling Walker.   Ascend/descend 4 stairs with left handrail with Contact Guard Assistance     Lower extremity exercise program x 30 reps per handout, with independence                           Time Tracking:     PT Received On: 06/20/18  PT Start Time: 1030     PT Stop Time: 1109  PT  Total Time (min): 39 min     Billable Minutes: Gait Training 10, Therapeutic Activity 10 and Therapeutic Exercise 19    Huan Winn, PT, DPT  6/20/2018   [phone]

## 2018-06-20 NOTE — PLAN OF CARE
Problem: Occupational Therapy Goal  Goal: Occupational Therapy Goal  Goals to be met by: 7 days    Patient will increase functional independence with ADLs by performing:    UE Dressing with Supervision. -- Met (6/20)  LE Dressing with Supervision with AD as needed.  -- Met (6/20)  Grooming while standing with Supervision.  -- Met (6/20)  Toileting from bedside commode with Supervision for hygiene and clothing management.  -- Met (6/20)  Stand pivot transfers with Supervision. -- Met (6/20)  Toilet transfer to bedside commode with Supervision.  -- Met (6/20)          Outcome: Outcome(s) achieved Date Met: 06/20/18  Pt has satisfactorily achieved all functional outcomes this date.     Comments: D/c from acute OT

## 2018-06-20 NOTE — PROGRESS NOTES
[facility]  Orthopedics  Progress Note    Patient Name: Lucinda Aguilera  MRN: [id number]  Admission Date: 6/19/2018  Hospital Length of Stay: 1 days  Attending Provider: Nj Melvin MD  Primary Care Provider: Saurabh Hassan MD  Follow-up For: Procedure(s) (LRB):  REPLACEMENT-KNEE-TOTAL (Left)    Post-Operative Day: 1 Day Post-Op  Subjective:     Principal Problem:Primary osteoarthritis of left knee    Principal Orthopedic Problem: Same  Interval History: Patient seen and examined at bedside.  No acute events overnight.  Pain controlled.      Review of patient's allergies indicates:  No Known Allergies    Current Facility-Administered Medications   Medication    0.9%  NaCl infusion    acetaminophen tablet 1,000 mg    ALPRAZolam tablet 0.5 mg    aspirin EC tablet 81 mg    bisacodyl suppository 10 mg    celecoxib capsule 200 mg    famotidine tablet 20 mg    irbesartan tablet 300 mg    morphine injection 2 mg    morphine injection 2 mg    morphine injection 2 mg    naloxone 0.4 mg/mL injection 0.02 mg    ondansetron injection 4 mg    oxyCODONE immediate release tablet 10 mg    oxyCODONE immediate release tablet 15 mg    oxyCODONE immediate release tablet 5 mg    paroxetine tablet 10 mg    polyethylene glycol packet 17 g    pravastatin tablet 40 mg    pregabalin capsule 150 mg    ramelteon tablet 8 mg    ropivacaine (PF) 2 mg/ml (0.2%) infusion    ropivacaine 0.2% ON-Q C-BLOC 400 ML (SELECT A FLOW)    senna-docusate 8.6-50 mg per tablet 1 tablet    sodium chloride 0.9% flush 3 mL     Objective:     Vital Signs (Most Recent):  Temp: 97.8 °F (36.6 °C) (06/20/18 0414)  Pulse: 91 (06/20/18 0414)  Resp: 18 (06/20/18 0414)  BP: (!) 158/73 (06/20/18 0414)  SpO2: (!) 94 % (06/20/18 0414) Vital Signs (24h Range):  Temp:  [96.3 °F (35.7 °C)-97.8 °F (36.6 °C)] 97.8 °F (36.6 °C)  Pulse:  [] 91  Resp:  [11-25] 18  SpO2:  [94 %-100 %] 94 %  BP: (127-206)/() 158/73  "    Weight: 79.4 kg (175 lb)  Height: 5' 2" (157.5 cm)  Body mass index is 32.01 kg/m².      Intake/Output Summary (Last 24 hours) at 06/20/18 0611  Last data filed at 06/20/18 0500   Gross per 24 hour   Intake             4440 ml   Output              550 ml   Net             3890 ml       Ortho/SPM Exam    AAOx4  NAD  RRR  No increased WOB  Aquacel c/d/i  SILT and motor intact T/SP/DP  WWP extremities  FCDs in place and functioning      Significant Labs: All pertinent labs within the past 24 hours have been reviewed.    Significant Imaging: I have reviewed and interpreted all pertinent imaging results/findings.    Assessment/Plan:     * Primary osteoarthritis of left knee    67 y.o. female POD1 s/p L TKA    Pain control: per APS  PT/OT: WBAT LLE  DVT PPx: ASA 81 BID, FCDs at all times when not ambulating  Abx: postop Ancef    Dispo: f/u PT recs                David Hawley MD  Orthopedics  [facility]  "

## 2018-06-20 NOTE — PLAN OF CARE
Problem: Patient Care Overview  Goal: Plan of Care Review  Outcome: Ongoing (interventions implemented as appropriate)  Vital sign stable. Afebrile. Alert, oriented and following commands. Pain controlled with PRN meds and ropivacaine gtt. Denies nausea. Dressing remains CDI. Polar ice remains in place. IV fluids and ropivacaine gtt per MD order. Pt given hydralazine x1 for hypertension. POC reviewed with PT and family. All questions/concerns addressed.

## 2018-06-20 NOTE — PLAN OF CARE
Problem: Patient Care Overview  Goal: Plan of Care Review  Outcome: Ongoing (interventions implemented as appropriate)  Pt remained free from falls or injuries this shift. Pt independent in repositioning. No skin breakdown noticed. Pt rested well through the night. L leg w ace wrap, polar ice. PNC in place w Ropivacaine @8cc/hr. Pain controlled w scheduled meds. Pt voiding on BSC. IV fluids SL at 0530. Tolerating po intake.

## 2018-06-20 NOTE — ADDENDUM NOTE
Addendum  created 06/20/18 1700 by Mario Gayle MD    Charge Capture section accepted, Sign clinical note

## 2018-06-20 NOTE — PLAN OF CARE
POD 1 s/p left TKA. PT/OT recommended HH and no additional DME needs. Plans to discharge patient home today with [facility]. Patient verbalized understanding. All questions and concerns addressed. SW and CM will continue to follow for any additional needs. Discharge and follow-up instructions to be completed by the bedside nurse.    Future Appointments  Date Time Provider Department Center   7/3/2018 1:45 PM Shannan Waite NP [facility] ORTHO [place] [facility]   7/23/2018 8:40 AM Iliana Rudolph NP [facility] ENT [place]      06/20/18 1207   Final Note   Assessment Type Final Discharge Note   Discharge Disposition Home-Health  ([facility] )   What phone number can be called within the next 1-3 days to see how you are doing after discharge? ([phone])   Hospital Follow Up  Appt(s) scheduled? Yes   Discharge plans and expectations educations in teach back method with documentation complete? Yes

## 2018-06-20 NOTE — PT/OT/SLP PROGRESS
Occupational Therapy   Treatment/ Discharge Summary    Name: Lucinda Aguilera  MRN: [id number]  Admitting Diagnosis:  Primary osteoarthritis of left knee  1 Day Post-Op    Recommendations:     Discharge Recommendations: home with home health  Discharge Equipment Recommendations:  none  Barriers to discharge:   (2 SILVIANO)    Subjective     Communicated with: RN prior to session.  Pain/Comfort:  · Pain Rating 1:  (did not rate)  · Location - Side 1: Left  · Location - Orientation 1: generalized  · Location 1: knee  · Pain Addressed 1: Pre-medicate for activity, Reposition, Distraction, Cessation of Activity  · Pain Rating Post-Intervention 1:  (did not rate)    Patients cultural, spiritual, Islam conflicts given the current situation: none stated    Objective:     Patient found with: cryotherapy, FCD, telemetry, perineural catheter    General Precautions: Standard, fall   Orthopedic Precautions:LLE weight bearing as tolerated   Braces: N/A     Occupational Performance:    Bed Mobility:    · Patient completed Scooting/Bridging with modified independence  · Patient completed Supine to Sit with modified independence     Functional Mobility/Transfers:  · Patient completed Sit <> Stand Transfer with supervision  with  rolling walker   · Patient completed Bed <> Chair Transfer using Stand Pivot technique with supervision with rolling walker  · Patient completed Toilet Transfer Stand Pivot technique with supervision with  rolling walker  · Functional Mobility: Pt ambulated household distance w/ Supervision and RW for increased stability. No signs of LOB or SOB. Following re-ed of gait sequence w/ RW, pt demonstrated good understanding w/ no concerns.     Activities of Daily Living:  · Grooming: modified independence standing at sink washing hands  · UB Dressing: independence donning overhead dress   · LB Dressing: supervision donning underwear w/ compensatory technique sitting EOB   · Toileting: simulated clothing management  at toilet w/ supervision     Patient left up in chair with all lines intact, call button in reach and family member present    [facility] 6 Click: Self-care  [facility] Total Score: 23    Treatment & Education:  - OT POC & d/c from acute OT - pt verbalized understanding and expressed no further questions or concerns  - Importance of OOB activity to maximize recovery   - Safety w/ functional mobility; safe hand placement, RW management, gait sequence  - Compensatory technique to facilitate LB dressing   Education:    Assessment:     Lucinda Aguilera is a 67 y.o. female with a medical diagnosis of Primary osteoarthritis of left knee.  She presents with no current acute OT needs at this time.  Pt has presented w/ great functional improvements in self-care and mobility tasks performing all w/ Supervision - Mod I. Pt has no known environmental concerns at home. She is safe to d/c from acute OT and further her therapy w/ HH services upon d/c from hospital.    Rehab Prognosis:  Good; patient would benefit from acute skilled OT services to address these deficits and reach maximum level of function.       Plan:      D/c from acute OT - please reconsult if anything changes    · Plan of Care Expires: 07/19/18  · Plan of Care Reviewed with: patient, family    This Plan of care has been discussed with the patient who was involved in its development and understands and is in agreement with the identified goals and treatment plan    GOALS:    Occupational Therapy Goals     Not on file          Multidisciplinary Problems (Resolved)        Problem: Occupational Therapy Goal    Goal Priority Disciplines Outcome Interventions   Occupational Therapy Goal   (Resolved)     OT, PT/OT Outcome(s) achieved    Description:  Goals to be met by: 7 days    Patient will increase functional independence with ADLs by performing:    UE Dressing with Supervision. -- Met (6/20)  LE Dressing with Supervision with AD as needed.  -- Met (6/20)  Grooming while  standing with Supervision.  -- Met (6/20)  Toileting from bedside commode with Supervision for hygiene and clothing management.  -- Met (6/20)  Stand pivot transfers with Supervision. -- Met (6/20)  Toilet transfer to bedside commode with Supervision.  -- Met (6/20)                            Time Tracking:     OT Date of Treatment: 06/20/18  OT Start Time: 0836  OT Stop Time: 0900  OT Total Time (min): 24 min    Billable Minutes:Self Care/Home Management 16  Therapeutic Activity 8    Casandra Shaffer, OT  6/20/2018

## 2018-06-20 NOTE — ANESTHESIA POSTPROCEDURE EVALUATION
"Anesthesia Post Evaluation    Patient: Lucinda Aguilera    Procedure(s) Performed: Procedure(s) (LRB):  REPLACEMENT-KNEE-TOTAL (Left)    Final Anesthesia Type: spinal  Patient location during evaluation: PACU  Patient participation: Yes- Able to Participate  Level of consciousness: awake and alert and oriented  Post-procedure vital signs: reviewed and stable  Pain management: adequate  Airway patency: patent  PONV status at discharge: No PONV  Anesthetic complications: no      Cardiovascular status: hemodynamically stable  Respiratory status: unassisted, spontaneous ventilation and room air  Hydration status: euvolemic  Follow-up not needed.        Visit Vitals  BP (!) 181/78   Pulse 73   Temp 35.7 °C (96.2 °F)   Resp 18   Ht 5' 2" (1.575 m)   Wt 79.4 kg (175 lb)   LMP 04/18/2004   SpO2 96%   Breastfeeding? No   BMI 32.01 kg/m²       Pain/Ashley Score: Pain Assessment Performed: Yes (6/20/2018  7:48 AM)  Presence of Pain: complains of pain/discomfort (6/20/2018  7:48 AM)  Pain Rating Prior to Med Admin: 6 (6/20/2018  9:28 AM)  Pain Rating Post Med Admin: 0 (pt appears asleep and in NAD) (6/19/2018 11:00 PM)  Ashley Score: 10 (6/19/2018  7:30 PM)      "

## 2018-06-20 NOTE — PLAN OF CARE
POD 1 s/p left TKA. PT/OT ordered to eval and treat. PT/OT recommended HH and no additional DME needs. Patient currently lives with her spouse. Patient has good family support at home. CM completed discharge assessment and planning with patient. Patient verbalized understanding. All questions and concerns addressed. SW and CM will continue to follow for any additional needs. Plan A to discharge home with home health as soon as medically stable. Plan B to discharge home with family support and plans for outpatient rehab.    Patient requested [facility].    PCP: Saurabh Hassan MD    Pharmacy:   [facility] [address] [place] [address] AT [facility] & [facility] [address]  [address] [address] [address]  Phone: [phone] Fax: [phone]    [facility] - [place], [place] - [address] AT [facility]  [address]  [place] [address]  Phone: [phone] Fax: [phone]    Payor: MEDICARE / Plan: MEDICARE PART A & B / Product Type: [facility] /      06/20/18 0740   Discharge Assessment   Assessment Type Discharge Planning Assessment   Confirmed/corrected address and phone number on facesheet? Yes   Assessment information obtained from? Patient;Medical Record   Expected Length of Stay (days) 2   Communicated expected length of stay with patient/caregiver yes   Prior to hospitilization cognitive status: Alert/Oriented   Prior to hospitalization functional status: Assistive Equipment   Current cognitive status: Alert/Oriented   Current Functional Status: Assistive Equipment   Lives With spouse   Able to Return to Prior Arrangements yes   Is patient able to care for self after discharge? Yes   Who are your caregiver(s) and their phone number(s)? spouse- Manish Aguilera [phone]   Patient's perception of discharge disposition home health   Readmission Within The Last 30 Days no previous admission in last 30 days   Patient currently being  followed by outpatient case management? No   Patient currently receives any other outside agency services? No   Equipment Currently Used at Home walker, rolling;bedside commode;shower chair   Do you have any problems affording any of your prescribed medications? No   Is the patient taking medications as prescribed? yes   Does the patient have transportation home? Yes   Transportation Available family or friend will provide   Does the patient receive services at the [facility]? No   Discharge Plan A Home Health;Home with family   Discharge Plan B Home with family;Other  (outpatient rehab)   Patient/Family In Agreement With Plan yes

## 2018-06-20 NOTE — PLAN OF CARE
10:36 AM  SW received home health orders. Pt's preference is [facility].  Faxed orders to [facility].    Will f/u as needed.    Janina Valente LMSW   [facility]  Ext [phone]

## 2018-06-20 NOTE — ASSESSMENT & PLAN NOTE
67 y.o. female POD1 s/p L TKA    Pain control: per APS  PT/OT: WBAT LLE  DVT PPx: ASA 81 BID, FCDs at all times when not ambulating  Abx: postop Ancef    Dispo: f/u PT recs

## 2018-06-20 NOTE — SUBJECTIVE & OBJECTIVE
"Principal Problem:Primary osteoarthritis of left knee    Principal Orthopedic Problem: Same  Interval History: Patient seen and examined at bedside.  No acute events overnight.  Pain controlled.      Review of patient's allergies indicates:  No Known Allergies    Current Facility-Administered Medications   Medication    0.9%  NaCl infusion    acetaminophen tablet 1,000 mg    ALPRAZolam tablet 0.5 mg    aspirin EC tablet 81 mg    bisacodyl suppository 10 mg    celecoxib capsule 200 mg    famotidine tablet 20 mg    irbesartan tablet 300 mg    morphine injection 2 mg    morphine injection 2 mg    morphine injection 2 mg    naloxone 0.4 mg/mL injection 0.02 mg    ondansetron injection 4 mg    oxyCODONE immediate release tablet 10 mg    oxyCODONE immediate release tablet 15 mg    oxyCODONE immediate release tablet 5 mg    paroxetine tablet 10 mg    polyethylene glycol packet 17 g    pravastatin tablet 40 mg    pregabalin capsule 150 mg    ramelteon tablet 8 mg    ropivacaine (PF) 2 mg/ml (0.2%) infusion    ropivacaine 0.2% ON-Q C-BLOC 400 ML (SELECT A FLOW)    senna-docusate 8.6-50 mg per tablet 1 tablet    sodium chloride 0.9% flush 3 mL     Objective:     Vital Signs (Most Recent):  Temp: 97.8 °F (36.6 °C) (06/20/18 0414)  Pulse: 91 (06/20/18 0414)  Resp: 18 (06/20/18 0414)  BP: (!) 158/73 (06/20/18 0414)  SpO2: (!) 94 % (06/20/18 0414) Vital Signs (24h Range):  Temp:  [96.3 °F (35.7 °C)-97.8 °F (36.6 °C)] 97.8 °F (36.6 °C)  Pulse:  [] 91  Resp:  [11-25] 18  SpO2:  [94 %-100 %] 94 %  BP: (127-206)/() 158/73     Weight: 79.4 kg (175 lb)  Height: 5' 2" (157.5 cm)  Body mass index is 32.01 kg/m².      Intake/Output Summary (Last 24 hours) at 06/20/18 0611  Last data filed at 06/20/18 0500   Gross per 24 hour   Intake             4440 ml   Output              550 ml   Net             3890 ml       Ortho/SPM Exam    AAOx4  NAD  RRR  No increased WOB  Aquacel c/d/i  SILT and motor intact " T/SP/DP  WWP extremities  FCDs in place and functioning      Significant Labs: All pertinent labs within the past 24 hours have been reviewed.    Significant Imaging: I have reviewed and interpreted all pertinent imaging results/findings.

## 2018-06-20 NOTE — ANESTHESIA POST-OP PAIN MANAGEMENT
Acute Pain Service and Perioperative Surgical Home Progress Note    HPI  Lucinda Aguilera is a 67 y.o., female w/ h/o HTN and anxiety now s/p L TKR.      Interval history  Pt stated some pain ON, but improving. Urinating well. -flatus. Eating. Denies N/V. Plan to go home today w/ . Pt denies having JUN    Surgery:  Procedure(s) (LRB):  REPLACEMENT-KNEE-TOTAL (Left)    Post Op Day #: 1    Catheter type: Perineural Adductor Canal    Infusion type: Ropivacaine 0.2%  8 ml/hr basal    Problem List:    Active Hospital Problems    Diagnosis  POA    *Primary osteoarthritis of left knee [M17.12]  Yes      Resolved Hospital Problems    Diagnosis Date Resolved POA   No resolved problems to display.       Subjective:       General appearance of alert, oriented, no complaints   Pain with rest: 6    Numbers   Pain with movement: 10    Numbers   Side Effects    1. Pruritis No    2. Nausea No    3. Motor Blockade No, 0=Ability to raise lower extremities off bed    4. Sedation No, 1=awake and alert    Schedule Medications:    acetaminophen  1,000 mg Oral Q6H    aspirin  81 mg Oral BID    celecoxib  200 mg Oral Daily    famotidine  20 mg Oral BID    irbesartan  300 mg Oral QHS    paroxetine  10 mg Oral QAM    polyethylene glycol  17 g Oral Daily    pravastatin  40 mg Oral Daily    pregabalin  150 mg Oral QHS    senna-docusate 8.6-50 mg  1 tablet Oral BID        Continuous Infusions:   sodium chloride 0.9% Stopped (06/20/18 0510)    ropivacaine (PF) 2 mg/ml (0.2%) 8 mL/hr (06/20/18 0251)    ropivacaine          PRN Medications:  ALPRAZolam, bisacodyl, morphine, morphine, morphine, naloxone, ondansetron, oxyCODONE, oxyCODONE, oxyCODONE, ramelteon, ropivacaine, sodium chloride 0.9%       Antibiotics:  Antibiotics     None             Objective:     Catheter site clean, dry, intact          Vital Signs (Most Recent):  Temp: 36.2 °C (97.1 °F) (06/20/18 0748)  Pulse: 78 (06/20/18 0748)  Resp: 16 (06/20/18 0748)  BP:  139/75 (06/20/18 0748)  SpO2: 98 % (06/20/18 0748) Vital Signs Range (Last 24H):  Temp:  [35.7 °C (96.3 °F)-36.6 °C (97.8 °F)]   Pulse:  []   Resp:  [11-25]   BP: (127-206)/()   SpO2:  [94 %-100 %]          I & O (Last 24H):  Intake/Output Summary (Last 24 hours) at 06/20/18 0816  Last data filed at 06/20/18 0500   Gross per 24 hour   Intake             4440 ml   Output              550 ml   Net             3890 ml       Physical Exam:    GA: Alert, comfortable, no acute distress.   Pulmonary: Clear to auscultation A/P/L. No wheezing, crackles, or rhonchi.  Cardiac: RRR S1 & S2 w/o rubs/murmurs/gallops.   Abdominal:Bowel sounds present. No tenderness to palpation or distension. No appreciable hepatosplenomegaly.   Skin: No jaundice, rashes, or visible lesions.         Laboratory:  CBC: No results for input(s): WBC, RBC, HGB, HCT, PLT, MCV, MCH, MCHC in the last 72 hours.    BMP: Recent Labs      06/19/18   1559   NA  137   K  3.8   CO2  21*   CL  104   BUN  9   CREATININE  0.7   GLU  86   CALCIUM  8.4*       No results for input(s): PT, INR, PROTIME, APTT in the last 72 hours.      Anticoagulant dose ASA at 81mg.    Assessment:         Pain control adequate    Plan:     1) Pain:    Adductor canal perineural catheter in place and infusing. Good level. Multimodal pain regimen with acetaminophen, Celebrex, Lyrica, and prn oxycodone given  Will continue to monitor. Plan to D/C perineural catheter in AM or home with On-Q if patient discharged today.   2) HTN: improved BP control, resumed home meds   3) Anxiety: no issues, home meds resumed   4) FEN/GI: Tolerating liquids, advance diet as tolerated. - flatus. - BM.   5) Dispo: Pt working well with PT/OT. Case management and SW for    placement. Plan for D/C tomorrow morning or possibly today if patient   works well with PT and meets goals.    Evaluator Zhao Finch MD      I have seen the patient, reviewed the Resident's assessment and plan. I have  personally interviewed and examined the patient at bedside and: agree with the findings.     Pain well controlled.  Did well with PT  D/c home today    ABHINAV Gayle MD  Staff Anesthesiologist  Perioperative Surgical Home and Acute Pain Service

## 2018-06-21 ENCOUNTER — PATIENT MESSAGE (OUTPATIENT)
Dept: FAMILY MEDICINE | Facility: CLINIC | Age: 68
End: 2018-06-21

## 2018-06-21 NOTE — PROGRESS NOTES
Spoke w/ pt via phone. Pt states pain is well controlled. Reminded pt to pull PNC tomorrow. All questions answered.    Zhao Finch, CA-2

## 2018-06-21 NOTE — DISCHARGE SUMMARY
[facility]  Orthopedics  Discharge Summary      Patient Name: Lucinda Aguilera  MRN: [id number]  Admission Date: 6/19/2018  Hospital Length of Stay: 1 days  Discharge Date and Time: 6/20/2018 12:47 PM  Attending Physician: No att. providers found   Discharging Provider: David Hawley MD  Primary Care Provider: Saurabh Hassan MD    HPI: Lucinda Aguilera is a 67 y.o. female with a history of Left knee pain. Pain is worse with activity and weight bearing.  Patient has experienced interference of activities of daily living due to decreased range of motion and an increase in joint pain and swelling.  Patient has failed non-operative treatment including NSAIDs, corticosteroid injections, viscosupplement injections, and activity modification.  Lucinda Aguilera currently ambulates independently.     Procedure(s) (LRB):  REPLACEMENT-KNEE-TOTAL (Left)      Hospital Course: Patient presented for above procedure.  Tolerated it well and was discharged home POD1 after voiding, tolerating diet, ambulating, pain controlled.  Discharge instructions, follow-up appointment, and med rec are below.          Significant Diagnostic Studies: Labs: CBC No results for input(s): WBC, HGB, HCT, PLT in the last 48 hours.    Pending Diagnostic Studies:     None        Final Active Diagnoses:    Diagnosis Date Noted POA    PRINCIPAL PROBLEM:  Primary osteoarthritis of left knee [M17.12] 06/19/2018 Yes      Problems Resolved During this Admission:    Diagnosis Date Noted Date Resolved POA      Discharged Condition: good    Disposition: Home or Self Care    Follow Up:    Patient Instructions:     Call MD for:  temperature >100.4     Call MD for:  persistent nausea and vomiting or diarrhea     Call MD for:  severe uncontrolled pain     Call MD for:  redness, tenderness, or signs of infection (pain, swelling, redness, odor or green/yellow discharge around incision site)     Call MD for:  difficulty breathing or increased  cough     Call MD for:  severe persistent headache     Call MD for:  worsening rash     Call MD for:  persistent dizziness, light-headedness, or visual disturbances     Call MD for:  increased confusion or weakness     Leave dressing on - Keep it clean, dry, and intact until clinic visit       Medications:  Reconciled Home Medications:      Medication List      START taking these medications    ondansetron 8 MG tablet  Commonly known as:  ZOFRAN  Take 1 tablet (8 mg total) by mouth every 8 (eight) hours as needed for Nausea.     oxyCODONE-acetaminophen  mg per tablet  Commonly known as:  PERCOCET  Take 1 tablet by mouth every 4 (four) hours as needed for Pain.        ASK your doctor about these medications    ALEVE 220 mg Cap  Generic drug:  naproxen sodium  Take 220 mg by mouth 2 (two) times daily.     ALPRAZolam 0.5 MG tablet  Commonly known as:  XANAX  TAKE 1 TABLET(0.5 MG) BY MOUTH TWICE DAILY AS NEEDED     * aspirin 81 MG EC tablet  Commonly known as:  ECOTRIN  Take 81 mg by mouth once daily.  Ask about: Which instructions should I use?     * aspirin 81 MG EC tablet  Commonly known as:  ECOTRIN  Take 1 tablet (81 mg total) by mouth 2 (two) times daily.  Ask about: Which instructions should I use?     azelastine 137 mcg (0.1 %) nasal spray  Commonly known as:  ASTELIN  1 spray (137 mcg total) by Nasal route 2 (two) times daily.     carboxymethylcellulose 0.5 % Dpet  Commonly known as:  REFRESH PLUS  1 drop 3 (three) times daily as needed.     chlorthalidone 25 MG Tab  Commonly known as:  HYGROTEN  TAKE 1 TABLET BY MOUTH EVERY MORNING; DISCONTINUE HCTZ     * docusate sodium 100 MG capsule  Commonly known as:  COLACE  Take 1 capsule (100 mg total) by mouth 2 (two) times daily.  Ask about: Which instructions should I use?     * STOOL SOFTENER 100 MG capsule  Generic drug:  docusate sodium  Take 1 capsule (100 mg total) by mouth 2 (two) times daily.  Ask about: Which instructions should I use?     fish  oil-omega-3 fatty acids 300-1,000 mg capsule  Take 2 g by mouth once daily.     fluticasone 50 mcg/actuation nasal spray  Commonly known as:  FLONASE  1 spray (50 mcg total) by Each Nare route once daily.     irbesartan 300 MG tablet  Commonly known as:  AVAPRO  TAKE 1 TABLET BY MOUTH EVERY EVENING     niacin 500 MG Cpsr  Take 500 mg by mouth every evening.     ONE DAILY MULTIVITAMIN per tablet  Generic drug:  multivitamin  Take 1 tablet by mouth once daily.     pantoprazole 40 MG tablet  Commonly known as:  PROTONIX  Take 1 tablet (40 mg total) by mouth before breakfast.     paroxetine 10 MG tablet  Commonly known as:  PAXIL  TAKE 1 TABLET BY MOUTH EVERY MORNING     potassium chloride 10 MEQ Cpsr  Commonly known as:  MICRO-K  TAKE 2 CAPSULES BY MOUTH ONCE DAILY     pravastatin 40 MG tablet  Commonly known as:  PRAVACHOL  TAKE 1 TABLET(40 MG) BY MOUTH EVERY DAY        * This list has 4 medication(s) that are the same as other medications prescribed for you. Read the directions carefully, and ask your doctor or other care provider to review them with you.                David Hawley MD  Orthopedics  [facility]

## 2018-06-22 NOTE — ADDENDUM NOTE
Addendum  created 06/22/18 1247 by Zhao Finch MD    Anesthesia Event edited, Sign clinical note, Visit Navigator SmartForm Flowsheet section accepted

## 2018-06-22 NOTE — PROGRESS NOTES
Spoke w/ pt via phone. Pt reports pain is well controlled. Pt pulled PNC, blue tip was intact. All questions answered.     Zhao Finch, CA-2

## 2018-06-25 ENCOUNTER — PATIENT OUTREACH (OUTPATIENT)
Dept: OTHER | Facility: OTHER | Age: 68
End: 2018-06-25

## 2018-06-25 NOTE — PROGRESS NOTES
Last 5 Patient Entered Readings                                      Current 30 Day Average: 129/76     Recent Readings 6/25/2018 6/23/2018 6/22/2018 6/21/2018 6/21/2018    SBP (mmHg) 117 134 134 124 142    DBP (mmHg) 78 79 78 54 84    Pulse 100 91 90 84 83        Hypertension Medications             chlorthalidone (HYGROTEN) 25 MG Tab TAKE 1 TABLET BY MOUTH EVERY MORNING; DISCONTINUE HCTZ    irbesartan (AVAPRO) 300 MG tablet TAKE 1 TABLET BY MOUTH EVERY EVENING        Assessment/ Plan:   Called patient to follow up.   Per newly released 2017 ACC/ AHA HTN guidelines (goal of BP < 130/80), current 30-day average is controlled.    Patient is 1 week post op, TKR.   Patient denies having questions or concerns. Instructed patient to call if she has any questions or concerns, patient confirms understanding.   Will continue to monitor. WCB in 2 months, sooner if BP begins to trend up or down.

## 2018-06-28 RX ORDER — PANTOPRAZOLE SODIUM 40 MG/1
TABLET, DELAYED RELEASE ORAL
Qty: 90 TABLET | Refills: 3 | Status: SHIPPED | OUTPATIENT
Start: 2018-06-28 | End: 2019-05-22 | Stop reason: ALTCHOICE

## 2018-06-29 ENCOUNTER — PATIENT MESSAGE (OUTPATIENT)
Dept: ORTHOPEDICS | Facility: CLINIC | Age: 68
End: 2018-06-29

## 2018-06-29 ENCOUNTER — TELEPHONE (OUTPATIENT)
Dept: ADMINISTRATIVE | Facility: CLINIC | Age: 68
End: 2018-06-29

## 2018-06-29 ENCOUNTER — PATIENT MESSAGE (OUTPATIENT)
Dept: FAMILY MEDICINE | Facility: CLINIC | Age: 68
End: 2018-06-29

## 2018-06-29 DIAGNOSIS — G89.18 POST-OP PAIN: Primary | ICD-10-CM

## 2018-06-29 RX ORDER — OXYCODONE AND ACETAMINOPHEN 10; 325 MG/1; MG/1
1 TABLET ORAL EVERY 4 HOURS PRN
Qty: 60 TABLET | Refills: 0 | Status: SHIPPED | OUTPATIENT
Start: 2018-06-29 | End: 2018-12-11

## 2018-07-03 ENCOUNTER — HOSPITAL ENCOUNTER (OUTPATIENT)
Dept: RADIOLOGY | Facility: HOSPITAL | Age: 68
Discharge: HOME | End: 2018-07-03
Attending: NURSE PRACTITIONER
Payer: MEDICARE

## 2018-07-03 ENCOUNTER — OFFICE VISIT (OUTPATIENT)
Dept: ORTHOPEDICS | Facility: CLINIC | Age: 68
End: 2018-07-03
Payer: MEDICARE

## 2018-07-03 VITALS
HEART RATE: 89 BPM | SYSTOLIC BLOOD PRESSURE: 116 MMHG | WEIGHT: 183.19 LBS | BODY MASS INDEX: 33.71 KG/M2 | DIASTOLIC BLOOD PRESSURE: 64 MMHG | TEMPERATURE: 97 F | HEIGHT: 62 IN

## 2018-07-03 DIAGNOSIS — M25.562 LEFT KNEE PAIN, UNSPECIFIED CHRONICITY: Primary | ICD-10-CM

## 2018-07-03 DIAGNOSIS — Z96.652 S/P TKR (TOTAL KNEE REPLACEMENT) USING CEMENT, LEFT: ICD-10-CM

## 2018-07-03 DIAGNOSIS — M25.562 LEFT KNEE PAIN, UNSPECIFIED CHRONICITY: ICD-10-CM

## 2018-07-03 PROCEDURE — 73562 X-RAY EXAM OF KNEE 3: CPT | Mod: TC,LT

## 2018-07-03 PROCEDURE — 99215 OFFICE O/P EST HI 40 MIN: CPT | Mod: PBBFAC,25 | Performed by: NURSE PRACTITIONER

## 2018-07-03 PROCEDURE — 99999 PR PBB SHADOW E&M-EST. PATIENT-LVL V: CPT | Mod: PBBFAC,,, | Performed by: NURSE PRACTITIONER

## 2018-07-03 PROCEDURE — 73560 X-RAY EXAM OF KNEE 1 OR 2: CPT | Mod: 26,XS,RT, | Performed by: RADIOLOGY

## 2018-07-03 PROCEDURE — 73562 X-RAY EXAM OF KNEE 3: CPT | Mod: 26,LT,, | Performed by: RADIOLOGY

## 2018-07-03 PROCEDURE — 73560 X-RAY EXAM OF KNEE 1 OR 2: CPT | Mod: TC,RT

## 2018-07-03 PROCEDURE — 99024 POSTOP FOLLOW-UP VISIT: CPT | Mod: POP,,, | Performed by: NURSE PRACTITIONER

## 2018-07-03 NOTE — PROGRESS NOTES
"Lucinda Aguilera presents for initial post-operative visit following a left total knee arthroplasty performed by Dr. Melvin on 6/19/2018. Tolerating pain medication well.      Exam:   Blood pressure 116/64, pulse 89, temperature 97.2 °F (36.2 °C), height 5' 2" (1.575 m), weight 83.1 kg (183 lb 3.2 oz), last menstrual period 04/18/2004.   Ambulating well with assistive device.  Incision is clean and dry without drainage or erythema. ROM:0-90    Initial post-operative radiographs reviewed today revealing a well fixed and aligned prosthesis.    A/P:  2 weeks s/p left total knee replacement  - The patient was advised to keep the incision clean and dry for the next 24 hours after which she may wash the area with antibacterial soap in the shower. Will not submerge until the incision is completely healed.   - Outpatient PT: orders faxed to [place]/D[facility] PT  - Continue aspirin for 1 month from surgery.  - Pain medication refilled last week  - Follow up in 4 weeks with me. Pt will call clinic with problems/concerns.     "

## 2018-07-15 ENCOUNTER — PATIENT MESSAGE (OUTPATIENT)
Dept: ORTHOPEDICS | Facility: CLINIC | Age: 68
End: 2018-07-15

## 2018-07-18 ENCOUNTER — PATIENT OUTREACH (OUTPATIENT)
Dept: OTHER | Facility: OTHER | Age: 68
End: 2018-07-18

## 2018-07-18 NOTE — PROGRESS NOTES
Last 5 Patient Entered Readings                                      Current 30 Day Average: 127/74     Recent Readings 7/15/2018 7/6/2018 7/5/2018 7/3/2018 7/2/2018    SBP (mmHg) 127 138 130 116 138    DBP (mmHg) 82 78 80 64 76    Pulse 80 98 100 89 79        Digital Medicine: Health  Follow Up    Lifestyle Modifications:    1.Dietary Modifications (Sodium intake <2,000mg/day, food labels, dining out): Patient denies changes to her diet.     2.Physical Activity: Patient reports that her knee is healing. She is attending therapy 3 times a week, and is finding it to be very beneficial. She will follow up with Dr. Melvin on July 30. She is hoping to cleared to go back to work on August 9.    3.Medication Therapy: Patient has been compliant with the medication regimen.    4.Patient has the following medication side effects/concerns: None  (Frequency/Alleviating factors/Precipitating factors, etc.)     Follow up with Mrs. Lucinda Aguilera completed. No further questions or concerns. Will continue follow up to achieve health goals.

## 2018-07-23 ENCOUNTER — OFFICE VISIT (OUTPATIENT)
Dept: OTOLARYNGOLOGY | Facility: CLINIC | Age: 68
End: 2018-07-23
Payer: MEDICARE

## 2018-07-23 ENCOUNTER — CLINICAL SUPPORT (OUTPATIENT)
Dept: AUDIOLOGY | Facility: CLINIC | Age: 68
End: 2018-07-23
Payer: MEDICARE

## 2018-07-23 VITALS
HEART RATE: 91 BPM | DIASTOLIC BLOOD PRESSURE: 67 MMHG | WEIGHT: 184.31 LBS | BODY MASS INDEX: 33.92 KG/M2 | SYSTOLIC BLOOD PRESSURE: 122 MMHG | HEIGHT: 62 IN

## 2018-07-23 DIAGNOSIS — H92.09 OTALGIA, UNSPECIFIED LATERALITY: Primary | ICD-10-CM

## 2018-07-23 DIAGNOSIS — Z86.69 OTITIS MEDIA RESOLVED: ICD-10-CM

## 2018-07-23 DIAGNOSIS — H61.23 BILATERAL IMPACTED CERUMEN: Primary | ICD-10-CM

## 2018-07-23 PROCEDURE — 99213 OFFICE O/P EST LOW 20 MIN: CPT | Mod: 25,S$PBB,, | Performed by: NURSE PRACTITIONER

## 2018-07-23 PROCEDURE — 69210 REMOVE IMPACTED EAR WAX UNI: CPT | Mod: S$PBB,,, | Performed by: NURSE PRACTITIONER

## 2018-07-23 PROCEDURE — 99999 PR PBB SHADOW E&M-EST. PATIENT-LVL IV: CPT | Mod: PBBFAC,,, | Performed by: NURSE PRACTITIONER

## 2018-07-23 PROCEDURE — 99214 OFFICE O/P EST MOD 30 MIN: CPT | Mod: PBBFAC,PO,25 | Performed by: NURSE PRACTITIONER

## 2018-07-23 PROCEDURE — 69210 REMOVE IMPACTED EAR WAX UNI: CPT | Mod: PBBFAC,PO | Performed by: NURSE PRACTITIONER

## 2018-07-23 PROCEDURE — 92567 TYMPANOMETRY: CPT | Mod: PBBFAC,PO | Performed by: AUDIOLOGIST-HEARING AID FITTER

## 2018-07-23 NOTE — PROGRESS NOTES
Lucinda VIDAL Ale was seen 07/23/2018 for tympanometry per order from [facility] due to complaint of ear pain. Results indicate Type A for the right ear indicating normal middle ear function and Type A for the left ear indicating normal middle ear function.     Results will be reviewed by ENT following this encounter.

## 2018-07-23 NOTE — PROGRESS NOTES
Subjective:       Patient ID: Lucinda Aguilera is a 67 y.o. female.    Chief Complaint: Ear Fullness (left ear follow up)    Ear Fullness    Associated symptoms include hearing loss (muffled, clogged).      Patient was seen six weeks ago with a left HAYDEE (non-suppurative); treated with Astelin, Flonase, and valsalva. No further otalgia; however ears clogged with muffled hearing.     Review of Systems   Constitutional: Negative.    HENT: Positive for hearing loss (muffled, clogged). Negative for ear pain.    Eyes: Negative.    Respiratory: Negative.    Cardiovascular: Negative.    Gastrointestinal: Negative.    Musculoskeletal: Negative.    Skin: Negative.    Neurological: Negative.    Hematological: Negative.    Psychiatric/Behavioral: Negative.        Objective:      Physical Exam   Constitutional: She is oriented to person, place, and time. Vital signs are normal. She appears well-developed and well-nourished. She is cooperative. She does not appear ill. No distress.   HENT:   Head: Normocephalic and atraumatic.   Right Ear: Hearing, tympanic membrane, external ear and ear canal normal. Tympanic membrane is not erythematous. Tympanic membrane mobility is normal. No middle ear effusion.   Left Ear: External ear and ear canal normal. Tympanic membrane is not erythematous. Tympanic membrane mobility is normal.  No middle ear effusion.   Nose: Nose normal. No mucosal edema or rhinorrhea. Right sinus exhibits no maxillary sinus tenderness and no frontal sinus tenderness. Left sinus exhibits no maxillary sinus tenderness and no frontal sinus tenderness.   Mouth/Throat: Uvula is midline, oropharynx is clear and moist and mucous membranes are normal. Mucous membranes are not pale, not dry and not cyanotic. No oral lesions. No oropharyngeal exudate, posterior oropharyngeal edema or posterior oropharyngeal erythema.     SEPARATE PROCEDURE IN OFFICE:   Procedure: Removal of impacted cerumen, bilateral   Pre Procedure  "Diagnosis: Cerumen Impaction   Post Procedure Diagnosis: Cerumen Impaction   Verbal informed consent in regards to risk of trauma to ear canal, ear drum or hearing, discomfort during procedure and/or inability to remove cerumen impaction in one session or unforeseen events or complications.   No anesthesia.     Procedure in detail:   Ear canal visualized bilateral with appropriate size ear speculum utilizing Operating Head Binocular Otomicroscope   Utilizing the following:  Ring curet was used. The impacted cerumen of the ear canals was removed atraumatically. The TM and EAC were then inspected and found to be clear of wax. See description of TMs/EACs in PE above.   Complications: No   Condition: Improved/Good    Type "A" tympanogram consistent with well-pneumatized mesotympanum and absence of middle ear effusion AU.     Eyes: Conjunctivae, EOM and lids are normal. Pupils are equal, round, and reactive to light. Right eye exhibits no discharge. Left eye exhibits no discharge. No scleral icterus.   Neck: Trachea normal and normal range of motion. Neck supple. No tracheal deviation present. No thyroid mass and no thyromegaly present.   Cardiovascular: Normal rate.    Pulmonary/Chest: Effort normal. No stridor. No respiratory distress. She has no wheezes.   Musculoskeletal: Normal range of motion.   Lymphadenopathy:        Head (right side): No submental, no submandibular, no tonsillar, no preauricular and no posterior auricular adenopathy present.        Head (left side): No submental, no submandibular, no tonsillar, no preauricular and no posterior auricular adenopathy present.     She has no cervical adenopathy.        Right cervical: No superficial cervical and no posterior cervical adenopathy present.       Left cervical: No superficial cervical and no posterior cervical adenopathy present.   Neurological: She is alert and oriented to person, place, and time. She has normal strength. Coordination and gait normal. "   Skin: Skin is warm, dry and intact. No lesion and no rash noted. She is not diaphoretic. No cyanosis. No pallor.   Psychiatric: She has a normal mood and affect. Her speech is normal and behavior is normal. Judgment and thought content normal. Cognition and memory are normal.   Nursing note and vitals reviewed.      Assessment:     Non-suppurative OME resolved    Bilateral cerumen impactions removed  Plan:     May continue Flonase & Astelin prn.  Return as needed for further ENT concerns

## 2018-07-30 ENCOUNTER — OFFICE VISIT (OUTPATIENT)
Dept: ORTHOPEDICS | Facility: CLINIC | Age: 68
End: 2018-07-30
Payer: MEDICARE

## 2018-07-30 ENCOUNTER — PATIENT MESSAGE (OUTPATIENT)
Dept: ORTHOPEDICS | Facility: CLINIC | Age: 68
End: 2018-07-30

## 2018-07-30 VITALS — BODY MASS INDEX: 33.86 KG/M2 | HEIGHT: 62 IN | WEIGHT: 184 LBS

## 2018-07-30 DIAGNOSIS — Z96.652 S/P TKR (TOTAL KNEE REPLACEMENT) USING CEMENT, LEFT: Primary | ICD-10-CM

## 2018-07-30 PROCEDURE — 99024 POSTOP FOLLOW-UP VISIT: CPT | Mod: POP,,, | Performed by: NURSE PRACTITIONER

## 2018-07-30 PROCEDURE — 99999 PR PBB SHADOW E&M-EST. PATIENT-LVL III: CPT | Mod: PBBFAC,,, | Performed by: NURSE PRACTITIONER

## 2018-07-30 PROCEDURE — 99213 OFFICE O/P EST LOW 20 MIN: CPT | Mod: PBBFAC | Performed by: NURSE PRACTITIONER

## 2018-07-30 RX ORDER — CYCLOBENZAPRINE HCL 5 MG
5 TABLET ORAL 3 TIMES DAILY PRN
Qty: 40 TABLET | Refills: 1 | Status: SHIPPED | OUTPATIENT
Start: 2018-07-30 | End: 2018-08-09

## 2018-07-30 NOTE — PROGRESS NOTES
"Lucinda Aguilera presents for 6 week post-operative visit following a left total knee arthroplasty performed by Dr. Melvin on 6/19/2018. Tolerating pain medication well.      Exam:   Height 5' 2" (1.575 m), weight 83.4 kg (183 lb 15.6 oz), last menstrual period 04/18/2004.   Ambulating well with assistive device- cane for long distances.  Incision is completely healed. ROM:0-120    Post-operative radiographs reviewed today revealing a well fixed and aligned prosthesis.    A/P:  6 weeks s/p left total knee replacement  - Outpatient PT: ongoing  - Pain medication: pt does not need a refill, but she is still having muscle spasms in her legs at night so she will try a muscle relaxer as needed to see if that helps  - Follow up in 6 weeks with Dr. Melvin. Pt will call clinic with problems/concerns.     "

## 2018-08-15 ENCOUNTER — PATIENT MESSAGE (OUTPATIENT)
Dept: ADMINISTRATIVE | Facility: OTHER | Age: 68
End: 2018-08-15

## 2018-08-16 ENCOUNTER — PATIENT MESSAGE (OUTPATIENT)
Dept: ORTHOPEDICS | Facility: CLINIC | Age: 68
End: 2018-08-16

## 2018-08-16 ENCOUNTER — PATIENT OUTREACH (OUTPATIENT)
Dept: OTHER | Facility: OTHER | Age: 68
End: 2018-08-16

## 2018-08-16 NOTE — PROGRESS NOTES
Last 5 Patient Entered Readings                                      Current 30 Day Average: 129/78     Recent Readings 8/15/2018 8/8/2018 8/3/2018 7/31/2018 7/31/2018    SBP (mmHg) 136 127 129 129 148    DBP (mmHg) 83 84 79 78 79    Pulse 80 87 86 78 69        Hypertension Medications             chlorthalidone (HYGROTEN) 25 MG Tab TAKE 1 TABLET BY MOUTH EVERY MORNING; DISCONTINUE HCTZ    irbesartan (AVAPRO) 300 MG tablet TAKE 1 TABLET BY MOUTH EVERY EVENING        Assessment/ Plan:   Called patient to follow up, reviewed recent readings.   Per 2017 ACC/ AHA HTN guidelines (goal of BP < 130/80), current 30-day average is controlled.   Patient denies having questions or concerns. Instructed patient to call if she has any questions or concerns, patient confirms understanding.   Will continue to monitor. WCB in 3 months, sooner if BP begins to trend up or down.

## 2018-08-28 ENCOUNTER — TELEPHONE (OUTPATIENT)
Dept: ORTHOPEDICS | Facility: CLINIC | Age: 68
End: 2018-08-28

## 2018-08-28 NOTE — TELEPHONE ENCOUNTER
We have not seen pt since April and have not prescribed either pain rx or muscle relaxer. Please advise.

## 2018-08-28 NOTE — TELEPHONE ENCOUNTER
Spoke with patient and she have an appointment scheduled with Dr. Arce. Patient will keep appointment on 09/06/2018. Patient verbally understand.

## 2018-08-28 NOTE — TELEPHONE ENCOUNTER
----- Message from Sonali Hayward sent at 8/28/2018  8:47 AM CDT -----  Contact: self  Type: Needs Medical Advice    Who Called:  self  Symptoms (please be specific):  Back pain  Best Call Back Number: [phone]  Additional Information: Patient wants to know if she can take a muscle relaxer and pain medication the same day. Please advise patient.

## 2018-09-01 DIAGNOSIS — I10 ESSENTIAL HYPERTENSION: ICD-10-CM

## 2018-09-02 RX ORDER — IRBESARTAN 300 MG/1
TABLET ORAL
Qty: 90 TABLET | Refills: 0 | Status: SHIPPED | OUTPATIENT
Start: 2018-09-02 | End: 2018-12-02 | Stop reason: SDUPTHER

## 2018-09-04 ENCOUNTER — PATIENT OUTREACH (OUTPATIENT)
Dept: OTHER | Facility: OTHER | Age: 68
End: 2018-09-04

## 2018-09-04 NOTE — PROGRESS NOTES
Last 5 Patient Entered Readings                                      Current 30 Day Average: 132/84     Recent Readings 9/2/2018 9/2/2018 8/27/2018 8/24/2018 8/19/2018    SBP (mmHg) 130 130 126 136 137    DBP (mmHg) 90 90 85 79 83    Pulse 96 100 83 101 82        Digital Medicine: Health  Follow Up    Lifestyle Modifications:    1.Dietary Modifications (Sodium intake <2,000mg/day, food labels, dining out): Encouraged sodium restriction.    2.Physical Activity: Mrs. Aguilera continues to recover from knee replacement surgery. 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 She states that she is averaging about 5,000 steps a day.     3.Medication Therapy: Patient has been compliant with the medication regimen.    4.Patient has the following medication side effects/concerns: None  (Frequency/Alleviating factors/Precipitating factors, etc.)     Follow up with Mrs. Lucinda Aguilera completed. Mrs. Aguilera is doing well. Her 30 day BP average 132/84 mmHg is not at goal, <130/80 mmHg. She has no questions for improving dietary habits or physical activity currently. Will continue follow up to achieve health goals.

## 2018-09-30 ENCOUNTER — PATIENT MESSAGE (OUTPATIENT)
Dept: OBSTETRICS AND GYNECOLOGY | Facility: CLINIC | Age: 68
End: 2018-09-30

## 2018-09-30 DIAGNOSIS — F41.9 ANXIETY: ICD-10-CM

## 2018-09-30 DIAGNOSIS — Z12.39 SCREENING FOR BREAST CANCER: Primary | ICD-10-CM

## 2018-10-01 RX ORDER — ALPRAZOLAM 0.5 MG/1
TABLET ORAL
Qty: 60 TABLET | Refills: 5 | Status: SHIPPED | OUTPATIENT
Start: 2018-10-01 | End: 2019-02-27 | Stop reason: SDUPTHER

## 2018-10-02 ENCOUNTER — HOSPITAL ENCOUNTER (OUTPATIENT)
Dept: RADIOLOGY | Facility: HOSPITAL | Age: 68
Discharge: HOME | End: 2018-10-02
Attending: ORTHOPAEDIC SURGERY
Payer: MEDICARE

## 2018-10-02 ENCOUNTER — OFFICE VISIT (OUTPATIENT)
Dept: ORTHOPEDICS | Facility: CLINIC | Age: 68
End: 2018-10-02
Payer: MEDICARE

## 2018-10-02 DIAGNOSIS — Z96.652 STATUS POST TOTAL LEFT KNEE REPLACEMENT: ICD-10-CM

## 2018-10-02 DIAGNOSIS — Z96.652 STATUS POST TOTAL LEFT KNEE REPLACEMENT: Primary | ICD-10-CM

## 2018-10-02 PROCEDURE — 99212 OFFICE O/P EST SF 10 MIN: CPT | Mod: PBBFAC | Performed by: ORTHOPAEDIC SURGERY

## 2018-10-02 PROCEDURE — 99999 PR PBB SHADOW E&M-EST. PATIENT-LVL II: CPT | Mod: PBBFAC,,, | Performed by: ORTHOPAEDIC SURGERY

## 2018-10-02 PROCEDURE — 99024 POSTOP FOLLOW-UP VISIT: CPT | Mod: POP,,, | Performed by: ORTHOPAEDIC SURGERY

## 2018-10-02 NOTE — PROGRESS NOTES
Lucinda Aguilera presents for post-operative evaluation.  She is now 3 months s/p  left knee replacement. The wound is healing well with no signs of erythema or warmth.  There is no drainage.  No clinical signs or symptoms of infection are present.     ROM: 0-125    We will continue post-operative physical therapy until the patient feels that they are independent with a home rehab program.  Future dental prophylaxis was discussed.    F/U in 8 months.

## 2018-10-09 ENCOUNTER — PATIENT OUTREACH (OUTPATIENT)
Dept: OTHER | Facility: OTHER | Age: 68
End: 2018-10-09

## 2018-10-09 NOTE — PROGRESS NOTES
Last 5 Patient Entered Readings                                      Current 30 Day Average: 137/80     Recent Readings 10/5/2018 9/27/2018 9/20/2018 9/20/2018 9/12/2018    SBP (mmHg) 130 134 147 158 137    DBP (mmHg) 85 72 78 92 83    Pulse 84 77 86 80 90        Digital Medicine: Health  Follow Up    Left voicemail to follow up with Ms. Lucinda Aguilera.  Current BP average 137/80 mmHg is not at goal, <130/80 mmHg.  Will continue to follow up.

## 2018-10-23 NOTE — PROGRESS NOTES
Last 5 Patient Entered Readings                                      Current 30 Day Average: 134/79     Recent Readings 10/20/2018 10/12/2018 10/5/2018 9/27/2018 9/20/2018    SBP (mmHg) 136 136 130 134 147    DBP (mmHg) 80 79 85 72 78    Pulse 78 86 84 77 86        Digital Medicine: Health  Follow Up    Lifestyle Modifications:    1.Dietary Modifications (Sodium intake <2,000mg/day, food labels, dining out): Patient reports that she is maintaining a low sodium diet.     2.Physical Activity: Patient is continuing to recover from knee replacement surgery. She feels that her BP is more elevated on days when she is feeling more pain.     3.Medication Therapy: Patient has been compliant with the medication regimen.    4.Patient has the following medication side effects/concerns: None  (Frequency/Alleviating factors/Precipitating factors, etc.)     Follow up with Mrs. Lucinda Aguilera completed. Mrs. Aguilera is doing. However, she has not been checking her BP as often. She reports that she has been forgetting. 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 She feels that on days when she is on her feet more, knee pain seems to surface. She attributes this to spikes in BP. No further questions or concerns. Will continue to monitor.

## 2018-11-08 ENCOUNTER — PATIENT OUTREACH (OUTPATIENT)
Dept: OTHER | Facility: OTHER | Age: 68
End: 2018-11-08

## 2018-11-08 NOTE — PROGRESS NOTES
Last 5 Patient Entered Readings                                      Current 30 Day Average: 131/81     Recent Readings 11/3/2018 10/28/2018 10/20/2018 10/12/2018 10/5/2018    SBP (mmHg) 122 130 136 136 130    DBP (mmHg) 84 79 80 79 85    Pulse 87 76 78 86 84        Hypertension Medications             chlorthalidone (HYGROTEN) 25 MG Tab TAKE 1 TABLET BY MOUTH EVERY MORNING; DISCONTINUE HCTZ    irbesartan (AVAPRO) 300 MG tablet TAKE 1 TABLET BY MOUTH EVERY EVENING        Assessment/ Plan:   Called patient to follow up, reviewed recent readings.   Per 2017 ACC/ AHA HTN guidelines (goal of BP < 130/80), current 30-day average is slightly uncontrolled, remains stable.   Patient denies having questions or concerns. Instructed patient to call if she has any questions or concerns, patient confirms understanding.   Will continue to monitor. WCB in 3 months, sooner if BP begins to trend up or down.

## 2018-11-18 ENCOUNTER — PATIENT MESSAGE (OUTPATIENT)
Dept: OTHER | Facility: OTHER | Age: 68
End: 2018-11-18

## 2018-12-02 DIAGNOSIS — I10 ESSENTIAL HYPERTENSION: ICD-10-CM

## 2018-12-02 RX ORDER — IRBESARTAN 300 MG/1
TABLET ORAL
Qty: 90 TABLET | Refills: 0 | Status: SHIPPED | OUTPATIENT
Start: 2018-12-02 | End: 2019-03-21 | Stop reason: SDUPTHER

## 2018-12-11 ENCOUNTER — OFFICE VISIT (OUTPATIENT)
Dept: FAMILY MEDICINE | Facility: CLINIC | Age: 68
End: 2018-12-11
Payer: MEDICARE

## 2018-12-11 VITALS
BODY MASS INDEX: 33.95 KG/M2 | TEMPERATURE: 98 F | HEIGHT: 62 IN | DIASTOLIC BLOOD PRESSURE: 74 MMHG | OXYGEN SATURATION: 97 % | WEIGHT: 184.5 LBS | SYSTOLIC BLOOD PRESSURE: 110 MMHG | HEART RATE: 86 BPM

## 2018-12-11 DIAGNOSIS — Z00.00 PREVENTATIVE HEALTH CARE: ICD-10-CM

## 2018-12-11 DIAGNOSIS — I10 ESSENTIAL HYPERTENSION: Primary | ICD-10-CM

## 2018-12-11 DIAGNOSIS — K21.9 GASTROESOPHAGEAL REFLUX DISEASE, ESOPHAGITIS PRESENCE NOT SPECIFIED: ICD-10-CM

## 2018-12-11 DIAGNOSIS — F41.9 ANXIETY: ICD-10-CM

## 2018-12-11 DIAGNOSIS — E78.5 HYPERLIPIDEMIA, UNSPECIFIED HYPERLIPIDEMIA TYPE: ICD-10-CM

## 2018-12-11 PROCEDURE — 99214 OFFICE O/P EST MOD 30 MIN: CPT | Mod: S$PBB,,, | Performed by: FAMILY MEDICINE

## 2018-12-11 PROCEDURE — 99999 PR PBB SHADOW E&M-EST. PATIENT-LVL IV: CPT | Mod: PBBFAC,,, | Performed by: FAMILY MEDICINE

## 2018-12-11 PROCEDURE — 99214 OFFICE O/P EST MOD 30 MIN: CPT | Mod: PBBFAC,PO,25 | Performed by: FAMILY MEDICINE

## 2018-12-11 PROCEDURE — 90732 PPSV23 VACC 2 YRS+ SUBQ/IM: CPT | Mod: PBBFAC,PO

## 2018-12-11 NOTE — PROGRESS NOTES
Subjective:       Patient ID: Lucinda Aguilera is a 67 y.o. female.    Chief Complaint: 6 Month Follow-up Blood pressure (Pneumonia shot); Medication questions (Aleve for neck and back pain); and Low energy    HPI Comments: Here for f/u on chronic health issues  Knee surgery was uncomplicated.    HLD - tolerating pravachol 40mg daily  Anxiety - She is taking Paxil 10mg daily. Taking Xanax 0.5mg 1 tablet BID. She feels like this is well-controlled  HTN - tolerating Avapro 300mg daily and Hygroten 25mg daily and amlodipine; BP controlled; digital flowsheet reviewed  GERD - tolerating Prilosec 20mg daily  HLD - tolerating pravastatin 40mg    She did have angiogram in 2010.    Past Medical History:    Hypertension                                                  Anxiety                                           Hyperlipemia                                                  GERD (gastroesophageal reflux disease)                        Cataract                                                        Comment:OU    PVD (posterior vitreous detachment)                             Comment:OD    Arthritis                                                       Comment:right thumb    Past Surgical History:    chest abcess                                                     Comment:child    KNEE ARTHROSCOPY W/ LASER                                        Comment:right    COLONOSCOPY                                      1/2012          Comment:repeat in 5 years    No Known Allergies    Social History    Marital Status:              Spouse Name:                       Years of Education:                 Number of children: 2             Occupational History  Occupation          Employer            Comment               retired                                   retired teacher an*                         Social History Main Topics    Smoking Status: Never Smoker                      Smokeless Status: Never Used                         Alcohol Use: Yes                Comment: social    Drug Use: No              Sexual Activity: Yes               Partners with: Male       Birth Control/Protection: None, Post-menopausal    Current Outpatient Medications on File Prior to Visit   Medication Sig Dispense Refill    ALPRAZolam (XANAX) 0.5 MG tablet TAKE 1 TABLET(0.5 MG) BY MOUTH TWICE DAILY AS NEEDED 60 tablet 5    aspirin (ECOTRIN) 81 MG EC tablet Take 1 tablet (81 mg total) by mouth 2 (two) times daily. 60 tablet 0    carboxymethylcellulose (REFRESH PLUS) 0.5 % Dpet 1 drop 3 (three) times daily as needed.      chlorthalidone (HYGROTEN) 25 MG Tab TAKE 1 TABLET BY MOUTH EVERY MORNING; DISCONTINUE HCTZ 30 tablet 11    fish oil-omega-3 fatty acids 300-1,000 mg capsule Take 2 g by mouth once daily.      irbesartan (AVAPRO) 300 MG tablet TAKE 1 TABLET BY MOUTH EVERY EVENING 90 tablet 0    multivitamin (MULTIVITAMIN) per tablet Take 1 tablet by mouth once daily.        naproxen sodium (ALEVE) 220 mg Cap Take 220 mg by mouth 2 (two) times daily.      niacin 500 MG CpSR Take 500 mg by mouth every evening.        pantoprazole (PROTONIX) 40 MG tablet TAKE 1 TABLET(40 MG) BY MOUTH BEFORE BREAKFAST 90 tablet 3    paroxetine (PAXIL) 10 MG tablet TAKE 1 TABLET BY MOUTH EVERY MORNING 90 tablet 3    potassium chloride (MICRO-K) 10 MEQ CpSR TAKE 2 CAPSULES BY MOUTH ONCE DAILY 60 capsule 11    pravastatin (PRAVACHOL) 40 MG tablet TAKE 1 TABLET(40 MG) BY MOUTH EVERY DAY 90 tablet 2     No current facility-administered medications on file prior to visit.      Review of patient's family history indicates:    Hypertension                   Mother                    Heart disease                  Mother                    Stroke                         Father                    Review of Systems   Constitutional: Negative for fever, chills, appetite change and unexpected weight change.   HENT: Negative for sore throat and trouble swallowing.    Eyes: Negative  "for pain and visual disturbance.   Respiratory: Negative for cough, shortness of breath and wheezing.    Cardiovascular: Negative for chest pain and palpitations.   Gastrointestinal: Negative for nausea, vomiting, abdominal pain, diarrhea and blood in stool.   Genitourinary: Negative for dysuria, hematuria and difficulty urinating.   Musculoskeletal: Negative for arthralgias, gait problem and neck pain.   Skin: Negative for rash and wound.   Neurological: Negative for dizziness, weakness, numbness and headaches.   Hematological: Negative for adenopathy.   Psychiatric/Behavioral: Negative for dysphoric mood.         Objective:       /74   Pulse 86   Temp 97.7 °F (36.5 °C) (Oral)   Ht 5' 2" (1.575 m)   Wt 83.7 kg (184 lb 8.4 oz)   LMP 04/18/2004   SpO2 97%   BMI 33.75 kg/m²     Physical Exam   Constitutional: She is oriented to person, place, and time. She appears well-developed and well-nourished.   HENT:   Head: Normocephalic.   Mouth/Throat: Oropharynx is clear and moist. No oropharyngeal exudate or posterior oropharyngeal erythema.   Eyes: Conjunctivae and EOM are normal. Pupils are equal, round, and reactive to light.   Neck: Normal range of motion. Neck supple. No thyromegaly present.   Cardiovascular: Normal rate, regular rhythm, S1 normal, S2 normal, normal heart sounds and intact distal pulses.  Exam reveals no gallop and no friction rub.    No murmur heard.  Pulmonary/Chest: Effort normal and breath sounds normal. She has no wheezes. She has no rales.   Abdominal: Normal appearance.   Musculoskeletal:        Right lower leg: She exhibits no edema.        Left lower leg: She exhibits no edema.   Lymphadenopathy:     She has no cervical adenopathy.   Neurological: She is alert and oriented to person, place, and time. No cranial nerve deficit. Gait normal.   Skin: Skin is warm and intact. No rash noted.   Psychiatric: She has a normal mood and affect.       Results for orders placed or performed " during the hospital encounter of 06/19/18   Basic metabolic panel   Result Value Ref Range    Sodium 137 136 - 145 mmol/L    Potassium 3.8 3.5 - 5.1 mmol/L    Chloride 104 95 - 110 mmol/L    CO2 21 (L) 23 - 29 mmol/L    Glucose 86 70 - 110 mg/dL    BUN, Bld 9 8 - 23 mg/dL    Creatinine 0.7 0.5 - 1.4 mg/dL    Calcium 8.4 (L) 8.7 - 10.5 mg/dL    Anion Gap 12 8 - 16 mmol/L    eGFR if African American >60.0 >60 mL/min/1.73 m^2    eGFR if non African American >60.0 >60 mL/min/1.73 m^2       Assessment:       1. Essential hypertension    2. Preventative health care    3. Hyperlipidemia, unspecified hyperlipidemia type    4. Anxiety    5. Gastroesophageal reflux disease, esophagitis presence not specified        Plan:       Essential hypertension  -     CBC auto differential; Future; Expected date: 06/09/2019    Preventative health care  -     (In Office Administered) Pneumococcal Polysaccharide Vaccine (23 Valent) (SQ/IM)    Hyperlipidemia, unspecified hyperlipidemia type  -     Comprehensive metabolic panel; Future; Expected date: 06/09/2019  -     Lipid panel; Future; Expected date: 06/09/2019    Anxiety    Gastroesophageal reflux disease, esophagitis presence not specified            Ok to use Aleve 2 tabs BID  Continue Paxil 10mg daily, Xanax PRN  cotninue aspirin 81mg daily  Continue other present meds  Counseled on regular exercise, maintenance of a healthy weight, balanced diet rich in fruits/vegetables and lean protein, and avoidance of unhealthy habits like smoking and excessive alcohol intake.  F/u6 months with labs            Answers for HPI/ROS submitted by the patient on 12/7/2018   activity change: No  unexpected weight change: No  neck pain: No  hearing loss: No  rhinorrhea: No  trouble swallowing: No  eye discharge: No  visual disturbance: No  chest tightness: No  wheezing: No  chest pain: No  palpitations: No  blood in stool: No  constipation: No  vomiting: No  diarrhea: No  polydipsia: No  polyuria:  No  difficulty urinating: No  hematuria: No  menstrual problem: No  dysuria: No  joint swelling: No  arthralgias: No  headaches: No  weakness: No  confusion: No  dysphoric mood: No

## 2018-12-16 DIAGNOSIS — E78.5 HYPERLIPIDEMIA, UNSPECIFIED HYPERLIPIDEMIA TYPE: Primary | ICD-10-CM

## 2018-12-17 RX ORDER — PRAVASTATIN SODIUM 40 MG/1
TABLET ORAL
Qty: 90 TABLET | Refills: 1 | Status: SHIPPED | OUTPATIENT
Start: 2018-12-17 | End: 2019-06-11 | Stop reason: SDUPTHER

## 2018-12-17 NOTE — PROGRESS NOTES
Refill Authorization Note     is requesting a refill authorization.    Brief assessment and rationale for refill: APPROVE: prr  Amount/Quantity of medication ordered: 90d        Refills Authorized: Yes  If authorized number of refills: 1           Medication Therapy Plan: HLD-tolerating pravachol, lco(lov); Labs-wnl; Nov -05/18/19; Approve 6 more months   Name and strength of medication: pravastatin (PRAVACHOL) 40 MG tablet  How patient will take medication: t1t po qd  Medication reconciliation completed: No        Comments:   Last Visit with authorizing provider:   Saurabh Hassan MD: 12/11/2018       Lipids (12 months)  Lab Results   Component Value Date    CHOL 183 05/23/2018    CHOL 186 05/31/2017    CHOL 184 07/26/2016     Lab Results   Component Value Date    TRIG 100 05/23/2018    TRIG 126 05/31/2017    TRIG 101 07/26/2016       Lab Results   Component Value Date    HDL 53 05/23/2018    HDL 54 05/31/2017    HDL 56 07/26/2016     Lab Results   Component Value Date    LDLCALC 110.0 05/23/2018    LDLCALC 106.8 05/31/2017    LDLCALC 107.8 07/26/2016     Lab Results   Component Value Date    CHOLHDL 29.0 05/23/2018    CHOLHDL 29.0 05/31/2017    CHOLHDL 30.4 07/26/2016

## 2018-12-18 ENCOUNTER — PATIENT OUTREACH (OUTPATIENT)
Dept: OTHER | Facility: OTHER | Age: 68
End: 2018-12-18

## 2018-12-18 NOTE — PROGRESS NOTES
Last 5 Patient Entered Readings                                      Current 30 Day Average: 124/79     Recent Readings 12/11/2018 12/10/2018 12/1/2018 11/26/2018 11/19/2018    SBP (mmHg) 110 123 120 128 123    DBP (mmHg) 74 72 74 77 85    Pulse - 71 73 79 78        Digital Medicine: Health  Follow Up    Left voicemail to follow up with Jackelyn Lucinda VIDAL Aguilera.  Current BP average 124/79 mmHg is at goal, <130/80 mmHg.  Will follow up in 2-3 weeks.

## 2019-01-01 ENCOUNTER — PATIENT MESSAGE (OUTPATIENT)
Dept: FAMILY MEDICINE | Facility: CLINIC | Age: 69
End: 2019-01-01

## 2019-01-03 ENCOUNTER — OFFICE VISIT (OUTPATIENT)
Dept: FAMILY MEDICINE | Facility: CLINIC | Age: 69
End: 2019-01-03
Payer: MEDICARE

## 2019-01-03 VITALS
BODY MASS INDEX: 34.61 KG/M2 | HEART RATE: 81 BPM | SYSTOLIC BLOOD PRESSURE: 132 MMHG | HEIGHT: 62 IN | WEIGHT: 188.06 LBS | OXYGEN SATURATION: 97 % | DIASTOLIC BLOOD PRESSURE: 80 MMHG

## 2019-01-03 DIAGNOSIS — J06.9 UPPER RESPIRATORY TRACT INFECTION, UNSPECIFIED TYPE: Primary | ICD-10-CM

## 2019-01-03 PROCEDURE — 99213 PR OFFICE/OUTPT VISIT, EST, LEVL III, 20-29 MIN: ICD-10-PCS | Mod: S$PBB,,, | Performed by: FAMILY MEDICINE

## 2019-01-03 PROCEDURE — 99999 PR PBB SHADOW E&M-EST. PATIENT-LVL III: CPT | Mod: PBBFAC,,, | Performed by: FAMILY MEDICINE

## 2019-01-03 PROCEDURE — 99213 OFFICE O/P EST LOW 20 MIN: CPT | Mod: S$PBB,,, | Performed by: FAMILY MEDICINE

## 2019-01-03 PROCEDURE — 99999 PR PBB SHADOW E&M-EST. PATIENT-LVL III: ICD-10-PCS | Mod: PBBFAC,,, | Performed by: FAMILY MEDICINE

## 2019-01-03 PROCEDURE — 99213 OFFICE O/P EST LOW 20 MIN: CPT | Mod: PBBFAC,PO | Performed by: FAMILY MEDICINE

## 2019-01-03 NOTE — PROGRESS NOTES
Subjective:       Patient ID: Lucinda Aguilera is a 68 y.o. female.    Chief Complaint: Cough (x 2 weeks)    C/o 2 week h/o cough, throat clearing, sore throat.  No sputum.   sick with similar symptoms.  No fever  Grandson sick initially  Using nyquil, dayquil.  She was waking up coughing until last night.          Past Medical History:   Diagnosis Date    Anticoagulant long-term use     Anxiety     takes daily Xanax    Arthritis     right thumb    Cataract     OU    GERD (gastroesophageal reflux disease)     Hyperlipemia     Hypertension     PVD (posterior vitreous detachment)     OD       Past Surgical History:   Procedure Laterality Date    ARTHROSCOPY-KNEE Right 9/19/2014    [facility]    ARTHROSCOPY-MENISCECTOMY Left 12/21/2017    Performed [facility] Tomas Etienne [facility] at [facility] OR    chest abcess      child    CHONDROPLASTY-KNEE Left 12/21/2017    Per[facility]ienne [facility] at [facility]    COLONOSCOPY  1/2012    repeat in 5 years    DEBRIDEMENT-KNEE Left 12/21/2017    Per[facility] at [facility]    ESOPHAGOGASTRODUODENOSCOPY (EGD) N/A 7/13/2017    Performed by Nathan Lopez Jr., MD at [facility] ENDO    JOINT REPLACEMENT      KNEE ARTHROSCOPY W/ LASER      right    KNEE SURGERY Right 06/03/2016    Total knee replacement    REPAIR - COLLATERAL LIGAMENT THUMB Left 12/19/2014    [facility]    REPLACEMENT-KNEE-TOTAL Left 6/19/2018    Performed by Nj Melvin [facility] 2ND [facility]    REPLACEMENT-KNEE-TOTAL Right 6/3/2016    Performed [facility] at [facility] 2ND [facility]    SYNOVECTOMY-KNEE Left 12/21/2017    Per[facility]ienne [facility] at [facility] OR    thumb surgery  11/2014       Review of patient's allergies indicates:  No Known Allergies    Social History     Socioeconomic History    Marital status:      Spouse name: Not on file    Number of children: 2    Years of education:  Not on file    Highest education level: Not on file   Social Needs    Financial resource strain: Not on file    Food insecurity - worry: Not on file    Food insecurity - inability: Not on file    Transportation needs - medical: Not on file    Transportation needs - non-medical: Not on file   Occupational History    Occupation: retired    Occupation: retired teacher and principal   Tobacco Use    Smoking status: Never Smoker    Smokeless tobacco: Never Used   Substance and Sexual Activity    Alcohol use: Yes     Comment: social    Drug use: No    Sexual activity: Yes     Partners: Male     Birth control/protection: None, Post-menopausal   Other Topics Concern    Not on file   Social History Narrative    Not on file       Current Outpatient Medications on File Prior to Visit   Medication Sig Dispense Refill    ALPRAZolam (XANAX) 0.5 MG tablet TAKE 1 TABLET(0.5 MG) BY MOUTH TWICE DAILY AS NEEDED 60 tablet 5    aspirin (ECOTRIN) 81 MG EC tablet Take 1 tablet (81 mg total) by mouth 2 (two) times daily. 60 tablet 0    carboxymethylcellulose (REFRESH PLUS) 0.5 % Dpet 1 drop 3 (three) times daily as needed.      chlorthalidone (HYGROTEN) 25 MG Tab TAKE 1 TABLET BY MOUTH EVERY MORNING; DISCONTINUE HCTZ 30 tablet 11    fish oil-omega-3 fatty acids 300-1,000 mg capsule Take 2 g by mouth once daily.      irbesartan (AVAPRO) 300 MG tablet TAKE 1 TABLET BY MOUTH EVERY EVENING 90 tablet 0    multivitamin (MULTIVITAMIN) per tablet Take 1 tablet by mouth once daily.        naproxen sodium (ALEVE) 220 mg Cap Take 220 mg by mouth 2 (two) times daily.      niacin 500 MG CpSR Take 500 mg by mouth every evening.        pantoprazole (PROTONIX) 40 MG tablet TAKE 1 TABLET(40 MG) BY MOUTH BEFORE BREAKFAST 90 tablet 3    paroxetine (PAXIL) 10 MG tablet TAKE 1 TABLET BY MOUTH EVERY MORNING 90 tablet 3    potassium chloride (MICRO-K) 10 MEQ CpSR TAKE 2 CAPSULES BY MOUTH ONCE DAILY 60 capsule 11    pravastatin  "(PRAVACHOL) 40 MG tablet TAKE 1 TABLET(40 MG) BY MOUTH EVERY DAY 90 tablet 1     No current facility-administered medications on file prior to visit.        Family History   Problem Relation Age of Onset    Hypertension Mother     Heart disease Mother     Glaucoma Mother     Stroke Father     Glaucoma Sister     Cataracts Sister     Macular degeneration Sister     Breast cancer Neg Hx        Review of Systems   Constitutional: Negative for appetite change, chills, fever and unexpected weight change.   HENT: Positive for sore throat. Negative for trouble swallowing.    Eyes: Negative for pain and visual disturbance.   Respiratory: Positive for cough. Negative for shortness of breath and wheezing.    Cardiovascular: Negative for chest pain and palpitations.   Gastrointestinal: Negative for abdominal pain, blood in stool, diarrhea, nausea and vomiting.   Genitourinary: Negative for difficulty urinating, dysuria and hematuria.   Musculoskeletal: Negative for arthralgias, gait problem and neck pain.   Skin: Negative for rash and wound.   Neurological: Negative for dizziness, weakness, numbness and headaches.   Hematological: Negative for adenopathy.   Psychiatric/Behavioral: Negative for dysphoric mood.       Objective:      /80 (BP Location: Right arm, Patient Position: Sitting, BP Method: Medium (Manual))   Pulse 81   Ht 5' 2" (1.575 m)   Wt 85.3 kg (188 lb 0.8 oz)   LMP 04/18/2004   SpO2 97%   BMI 34.40 kg/m²   Physical Exam   Constitutional: She appears well-developed and well-nourished.   HENT:   Head: Normocephalic and atraumatic.   Right Ear: Tympanic membrane, external ear and ear canal normal.   Left Ear: Hearing, tympanic membrane and external ear normal.   Nose: Nose normal. No rhinorrhea or septal deviation. Right sinus exhibits no maxillary sinus tenderness and no frontal sinus tenderness. Left sinus exhibits no maxillary sinus tenderness and no frontal sinus tenderness.   Mouth/Throat: " Oropharynx is clear and moist and mucous membranes are normal. No oropharyngeal exudate, posterior oropharyngeal erythema or tonsillar abscesses.   Eyes: Conjunctivae and EOM are normal. Pupils are equal, round, and reactive to light.   Neck: Normal range of motion. Neck supple.   Cardiovascular: Normal rate, regular rhythm, normal heart sounds and intact distal pulses.   Pulmonary/Chest: Effort normal and breath sounds normal. She has no wheezes. She has no rales.   Lymphadenopathy:     She has no cervical adenopathy.       Assessment:       1. Upper respiratory tract infection, unspecified type        Plan:       Upper respiratory tract infection, unspecified type        Reassurance regarding resolving URI  Patient advised to pursue rest, increase fluids, take OTC multi-symptom cold relief medication of choice, and exercise contact precautions.

## 2019-01-09 NOTE — PROGRESS NOTES
Last 5 Patient Entered Readings                                      Current 30 Day Average: 125/77     Recent Readings 1/3/2019 12/27/2018 12/20/2018 12/11/2018 12/10/2018    SBP (mmHg) 132 140 119 110 123    DBP (mmHg) 80 80 79 74 72    Pulse 81 71 77 - 71        Digital Medicine: Health  Follow Up    Unable to leave a voicemail to follow up with Jackelyn Lucinda VIDAL Aguilera.  Current BP average 125/77 mmHg is at goal, <130/80 mmHg.  Will continue to follow up.

## 2019-01-18 DIAGNOSIS — I10 ESSENTIAL HYPERTENSION: ICD-10-CM

## 2019-01-21 RX ORDER — CHLORTHALIDONE 25 MG/1
TABLET ORAL
Qty: 90 TABLET | Refills: 0 | Status: SHIPPED | OUTPATIENT
Start: 2019-01-21 | End: 2019-04-18 | Stop reason: SDUPTHER

## 2019-01-21 NOTE — PROGRESS NOTES
Refill Authorization Note     is requesting a refill authorization.    Brief assessment and rationale for refill: APPROVE: Needs Labs  Amount/Quantity of medication ordered: 90d        Refills Authorized: Yes  If authorized number of refills: 0        Medication-related problems identified: Requires labs  Medication Therapy Plan: HTN- lco (lov); Bp-controlled; NTBO(Cr/Na/K); Approve 3 more months   Name and strength of medication: chlorthalidone (HYGROTEN) 25 MG Tab  How patient will take medication: t1t po qd  Medication reconciliation completed: No        Comments:   Blood Pressure Readings:  BP Readings from Last 3 Encounters:   01/03/19 132/80   12/11/18 110/74   07/23/18 122/67        Last Kidney (6 months: Diuretics or 12 months: non-diuretics)  Lab Results   Component Value Date    CREATININE 0.7 06/19/2018    K 3.8 06/19/2018     06/19/2018      Lab Results   Component Value Date     06/19/2018    BUN 9 06/19/2018    CO2 21 (L) 06/19/2018        Digital Hypertension Data (if enrolled)  Last 5 Patient Entered Readings                                      Current 30 Day Average: 135/79     Recent Readings 1/12/2019 1/3/2019 12/27/2018 12/20/2018 12/11/2018    SBP (mmHg) 134 132 140 119 110    DBP (mmHg) 78 80 80 79 74    Pulse 70 81 71 77 -          Appointment in past 12 months or upcoming 90 days  Last visit with authorizing provider:  Saurabh Hassan MD:1/3/2019     Next visit with authorizing provider:   Saurabh Hassan MD:6/11/2019

## 2019-01-26 ENCOUNTER — PATIENT MESSAGE (OUTPATIENT)
Dept: FAMILY MEDICINE | Facility: CLINIC | Age: 69
End: 2019-01-26

## 2019-01-31 ENCOUNTER — PATIENT OUTREACH (OUTPATIENT)
Dept: OTHER | Facility: OTHER | Age: 69
End: 2019-01-31

## 2019-01-31 NOTE — PROGRESS NOTES
Last 5 Patient Entered Readings                                      Current 30 Day Average: 133/83     Recent Readings 1/26/2019 1/22/2019 1/21/2019 1/12/2019 1/3/2019    SBP (mmHg) 138 127 133 134 132    DBP (mmHg) 78 85 93 78 80    Pulse 71 74 84 70 81        Hypertension Medications             chlorthalidone (HYGROTEN) 25 MG Tab TAKE 1 TABLET BY MOUTH EVERY MORNING; DISCONTINUE HCTZ    irbesartan (AVAPRO) 300 MG tablet TAKE 1 TABLET BY MOUTH EVERY EVENING        Plan:   Called patient to follow up, reviewed BP readings. Per 2017 ACC/ AHA HTN guidelines  (goal of BP < 130/80), current 30-day average is slightly uncontrolled, remains stable.  LVM, requested patient call back at her convenience.  Will continue to monitor. WCB in 1 month.

## 2019-02-08 NOTE — PROGRESS NOTES
Last 5 Patient Entered Readings                                      Current 30 Day Average: 132/84     Recent Readings 2/6/2019 2/3/2019 1/26/2019 1/22/2019 1/21/2019    SBP (mmHg) 140 121 138 127 133    DBP (mmHg) 83 84 78 85 93    Pulse 74 78 71 74 84        Digital Medicine: Health  Follow Up    Lifestyle Modifications:    1.Dietary Modifications (Sodium intake <2,000mg/day, food labels, dining out): Patient reports that she is eating better than she ever has, but she feels that she is not eating enough. She has been working a lot, and has not been snacking throughout the day. Patient reports having coffee with peanut butter and jelly for breakfast, Atkins shake and bar for lunch, and lean protein with mashed potatoes or cereal and milk for dinner. She adds honey and cocoa powder to her coffee, and has a sprite a day. She does not drink enough water throughout the day either. She feels that she is not eating enough, but also isn't losing any weight. Encouraged patient to incorporate more fresh fruits and vegetables into her diet and make healthy swaps for soft drinks, starches, and added sugars.    2.Physical Activity: Patient does some hip/leg stretches in bed in the morning. She is walking 5-6000 steps per day at school.     3.Medication Therapy: Patient has been compliant with the medication regimen.    4.Patient has the following medication side effects/concerns: None  (Frequency/Alleviating factors/Precipitating factors, etc.)     Follow up with Mrs. Lucinda Aguilera completed. Mrs. Aguilera is doing okay. Encouraged patient to submit more frequent BP readings. No further questions or concerns. Will continue to follow up to achieve health goals.

## 2019-02-27 DIAGNOSIS — F41.9 ANXIETY: ICD-10-CM

## 2019-02-27 NOTE — PROGRESS NOTES
Last 5 Patient Entered Readings                                      Current 30 Day Average: 131/82     Recent Readings 2/26/2019 2/21/2019 2/17/2019 2/11/2019 2/6/2019    SBP (mmHg) 134 136 129 126 140    DBP (mmHg) 79 83 91 73 83    Pulse 78 76 81 78 74        Hypertension Medications             chlorthalidone (HYGROTEN) 25 MG Tab TAKE 1 TABLET BY MOUTH EVERY MORNING; DISCONTINUE HCTZ    irbesartan (AVAPRO) 300 MG tablet TAKE 1 TABLET BY MOUTH EVERY EVENING        Plan:   Called patient to follow up, reviewed BP readings. Per 2017 ACC/ AHA HTN guidelines  (goal of BP < 130/80), current 30-day average is slightly uncontrolled, remains stable.  LVM, 2nd attempt, requested patient call back at her convenience.  Will continue to monitor. WCB in 3 months.

## 2019-02-28 ENCOUNTER — PATIENT MESSAGE (OUTPATIENT)
Dept: OPTOMETRY | Facility: CLINIC | Age: 69
End: 2019-02-28

## 2019-02-28 ENCOUNTER — PATIENT MESSAGE (OUTPATIENT)
Dept: FAMILY MEDICINE | Facility: CLINIC | Age: 69
End: 2019-02-28

## 2019-02-28 RX ORDER — ALPRAZOLAM 0.5 MG/1
TABLET ORAL
Qty: 60 TABLET | Refills: 0 | Status: SHIPPED | OUTPATIENT
Start: 2019-02-28 | End: 2019-04-06 | Stop reason: SDUPTHER

## 2019-02-28 RX ORDER — PAROXETINE 10 MG/1
TABLET, FILM COATED ORAL
Qty: 90 TABLET | Refills: 3 | Status: SHIPPED | OUTPATIENT
Start: 2019-02-28 | End: 2019-12-30 | Stop reason: SDUPTHER

## 2019-02-28 NOTE — PROGRESS NOTES
Refill Authorization Note     is requesting a refill authorization.    Brief assessment and rationale for refill: ROUTE: op  Name and strength of medication: paroxetine (PAXIL) 10 MG tablet /  ALPRAZolam (XANAX) 0.5 MG tablet            Medication reconciliation completed: No                         Comments:   APPOINTMENTS (past 12 m or future 3m authorizing provider)  LAST VISIT DATE  Saurabh Hassan MD 1/3/2019         NEXT VISIT DATE  Saurabh Hassan MD 6/11/2019

## 2019-03-09 ENCOUNTER — PATIENT MESSAGE (OUTPATIENT)
Dept: ADMINISTRATIVE | Facility: OTHER | Age: 69
End: 2019-03-09

## 2019-03-09 ENCOUNTER — PATIENT MESSAGE (OUTPATIENT)
Dept: FAMILY MEDICINE | Facility: CLINIC | Age: 69
End: 2019-03-09

## 2019-03-14 ENCOUNTER — PATIENT MESSAGE (OUTPATIENT)
Dept: ADMINISTRATIVE | Facility: OTHER | Age: 69
End: 2019-03-14

## 2019-03-15 ENCOUNTER — HOSPITAL ENCOUNTER (OUTPATIENT)
Dept: RADIOLOGY | Facility: HOSPITAL | Age: 69
Discharge: HOME | End: 2019-03-15
Attending: OBSTETRICS & GYNECOLOGY
Payer: MEDICARE

## 2019-03-15 ENCOUNTER — PATIENT MESSAGE (OUTPATIENT)
Dept: OBSTETRICS AND GYNECOLOGY | Facility: CLINIC | Age: 69
End: 2019-03-15

## 2019-03-15 DIAGNOSIS — Z12.39 SCREENING FOR BREAST CANCER: ICD-10-CM

## 2019-03-15 PROCEDURE — 77063 MAMMO DIGITAL SCREENING BILAT WITH TOMOSYNTHESIS_CAD: ICD-10-PCS | Mod: 26,,, | Performed by: RADIOLOGY

## 2019-03-15 PROCEDURE — 77067 MAMMO DIGITAL SCREENING BILAT WITH TOMOSYNTHESIS_CAD: ICD-10-PCS | Mod: 26,,, | Performed by: RADIOLOGY

## 2019-03-15 PROCEDURE — 77063 BREAST TOMOSYNTHESIS BI: CPT | Mod: 26,,, | Performed by: RADIOLOGY

## 2019-03-15 PROCEDURE — 77067 SCR MAMMO BI INCL CAD: CPT | Mod: 26,,, | Performed by: RADIOLOGY

## 2019-03-15 PROCEDURE — 77067 SCR MAMMO BI INCL CAD: CPT | Mod: TC

## 2019-03-18 ENCOUNTER — PATIENT MESSAGE (OUTPATIENT)
Dept: ADMINISTRATIVE | Facility: OTHER | Age: 69
End: 2019-03-18

## 2019-03-19 ENCOUNTER — PATIENT OUTREACH (OUTPATIENT)
Dept: OTHER | Facility: OTHER | Age: 69
End: 2019-03-19

## 2019-03-19 NOTE — PROGRESS NOTES
Last 5 Patient Entered Readings                                      Current 30 Day Average: 132/83     Recent Readings 3/16/2019 3/13/2019 3/10/2019 2/26/2019 2/21/2019    SBP (mmHg) 130 130 130 134 136    DBP (mmHg) 80 74 90 79 83    Pulse 71 78 73 78 76        Digital Medicine: Health  Follow Up    Lifestyle Modifications:    1.Dietary Modifications (Sodium intake <2,000mg/day, food labels, dining out): Patient reports that she started [employer] Monday.    2.Physical Activity: Patient recently visited [place] and did a lot of walking. She experienced no knee pain. Encouraged patient to continue to stay active.     3.Medication Therapy: Patient has been compliant with the medication regimen.    4.Patient has the following medication side effects/concerns: None  (Frequency/Alleviating factors/Precipitating factors, etc.)     Follow up with Mrs. Lucinda Aguilera completed. Mrs. Aguilera is doing okay. She continues to notice spikes in her BP. She hopes weight loss will help lower her BP. Encouraged patient to submit more frequent readings over the next few weeks. No further questions or concerns. Will continue to follow up to achieve health goals.

## 2019-03-21 ENCOUNTER — HOSPITAL ENCOUNTER (OUTPATIENT)
Dept: RADIOLOGY | Facility: HOSPITAL | Age: 69
Discharge: HOME | End: 2019-03-21
Attending: OBSTETRICS & GYNECOLOGY
Payer: MEDICARE

## 2019-03-21 ENCOUNTER — PATIENT MESSAGE (OUTPATIENT)
Dept: FAMILY MEDICINE | Facility: CLINIC | Age: 69
End: 2019-03-21

## 2019-03-21 DIAGNOSIS — R92.8 ABNORMAL MAMMOGRAM: ICD-10-CM

## 2019-03-21 DIAGNOSIS — I10 ESSENTIAL HYPERTENSION: ICD-10-CM

## 2019-03-21 PROCEDURE — 77061 BREAST TOMOSYNTHESIS UNI: CPT | Mod: 26,LT,, | Performed by: RADIOLOGY

## 2019-03-21 PROCEDURE — 76642 ULTRASOUND BREAST LIMITED: CPT | Mod: TC,PO,LT

## 2019-03-21 PROCEDURE — 77065 MAMMO DIGITAL DIAGNOSTIC LEFT WITH TOMOSYNTHESIS_CAD: ICD-10-PCS | Mod: 26,LT,, | Performed by: RADIOLOGY

## 2019-03-21 PROCEDURE — 77065 DX MAMMO INCL CAD UNI: CPT | Mod: TC,PO,LT

## 2019-03-21 PROCEDURE — 77065 DX MAMMO INCL CAD UNI: CPT | Mod: 26,LT,, | Performed by: RADIOLOGY

## 2019-03-21 PROCEDURE — 77061 BREAST TOMOSYNTHESIS UNI: CPT | Mod: TC,PO,LT

## 2019-03-21 PROCEDURE — 77061 MAMMO DIGITAL DIAGNOSTIC LEFT WITH TOMOSYNTHESIS_CAD: ICD-10-PCS | Mod: 26,LT,, | Performed by: RADIOLOGY

## 2019-03-21 PROCEDURE — 76642 US BREAST LEFT LIMITED: ICD-10-PCS | Mod: 26,LT,, | Performed by: RADIOLOGY

## 2019-03-21 PROCEDURE — 76642 ULTRASOUND BREAST LIMITED: CPT | Mod: 26,LT,, | Performed by: RADIOLOGY

## 2019-03-21 RX ORDER — IRBESARTAN 300 MG/1
300 TABLET ORAL NIGHTLY
Qty: 90 TABLET | Refills: 2 | Status: SHIPPED | OUTPATIENT
Start: 2019-03-21 | End: 2019-12-06 | Stop reason: SDUPTHER

## 2019-04-04 ENCOUNTER — PATIENT MESSAGE (OUTPATIENT)
Dept: FAMILY MEDICINE | Facility: CLINIC | Age: 69
End: 2019-04-04

## 2019-04-06 DIAGNOSIS — F41.9 ANXIETY: ICD-10-CM

## 2019-04-08 ENCOUNTER — OFFICE VISIT (OUTPATIENT)
Dept: OPTOMETRY | Facility: CLINIC | Age: 69
End: 2019-04-08
Payer: MEDICARE

## 2019-04-08 DIAGNOSIS — H52.4 MYOPIA WITH ASTIGMATISM AND PRESBYOPIA, BILATERAL: ICD-10-CM

## 2019-04-08 DIAGNOSIS — H43.813 POSTERIOR VITREOUS DETACHMENT, BILATERAL: ICD-10-CM

## 2019-04-08 DIAGNOSIS — H52.13 MYOPIA WITH ASTIGMATISM AND PRESBYOPIA, BILATERAL: ICD-10-CM

## 2019-04-08 DIAGNOSIS — H04.123 DRY EYES, BILATERAL: ICD-10-CM

## 2019-04-08 DIAGNOSIS — Z83.518 FAMILY HISTORY OF MACULAR DEGENERATION: ICD-10-CM

## 2019-04-08 DIAGNOSIS — H52.203 MYOPIA WITH ASTIGMATISM AND PRESBYOPIA, BILATERAL: ICD-10-CM

## 2019-04-08 DIAGNOSIS — H25.13 NUCLEAR SCLEROSIS, BILATERAL: Primary | ICD-10-CM

## 2019-04-08 DIAGNOSIS — Z13.5 GLAUCOMA SCREENING: ICD-10-CM

## 2019-04-08 PROCEDURE — 92015 PR REFRACTION: ICD-10-PCS | Mod: ,,, | Performed by: OPTOMETRIST

## 2019-04-08 PROCEDURE — 92014 COMPRE OPH EXAM EST PT 1/>: CPT | Mod: S$PBB,,, | Performed by: OPTOMETRIST

## 2019-04-08 PROCEDURE — 99213 OFFICE O/P EST LOW 20 MIN: CPT | Mod: PBBFAC,PO | Performed by: OPTOMETRIST

## 2019-04-08 PROCEDURE — 92014 PR EYE EXAM, EST PATIENT,COMPREHESV: ICD-10-PCS | Mod: S$PBB,,, | Performed by: OPTOMETRIST

## 2019-04-08 PROCEDURE — 99999 PR PBB SHADOW E&M-EST. PATIENT-LVL III: ICD-10-PCS | Mod: PBBFAC,,, | Performed by: OPTOMETRIST

## 2019-04-08 PROCEDURE — 92015 DETERMINE REFRACTIVE STATE: CPT | Mod: ,,, | Performed by: OPTOMETRIST

## 2019-04-08 PROCEDURE — 99999 PR PBB SHADOW E&M-EST. PATIENT-LVL III: CPT | Mod: PBBFAC,,, | Performed by: OPTOMETRIST

## 2019-04-08 RX ORDER — ALPRAZOLAM 0.5 MG/1
TABLET ORAL
Qty: 60 TABLET | Refills: 1 | Status: SHIPPED | OUTPATIENT
Start: 2019-04-08 | End: 2019-06-02 | Stop reason: SDUPTHER

## 2019-04-08 NOTE — PROGRESS NOTES
Refill Authorization Note     is requesting a refill authorization.    Brief assessment and rationale for refill: ROUTE: op  Name and strength of medication: ALPRAZolam (XANAX) 0.5 MG tablet       Medication Therapy Plan: ANXIETY- lco(lov); route to you     Medication reconciliation completed: No              How patient will take medication: t1t po bid prn          Comments:   APPOINTMENTS (past 12 m or future 3m authorizing provider)  LAST VISIT DATE  Saurabh Hassan MD 1/3/2019         NEXT VISIT DATE  Saurabh Hassan MD 6/28/2019

## 2019-04-08 NOTE — PROGRESS NOTES
HPI     Routine eye exam--dle--4/5/18    Pt complains of blurred vision at near, but pt does not want to wear   bifocals. Needing updated glasses rx. Pt complains of floaters and some   flashes. Denies any eye pain.     Last edited by Lisa Jade on 4/8/2019  3:46 PM. (History)        ROS     Positive for: Eyes    Negative for: Constitutional, Gastrointestinal, Neurological, Skin,   Genitourinary, Musculoskeletal, HENT, Endocrine, Cardiovascular,   Respiratory, Psychiatric, Allergic/Imm, Heme/Lymph    Last edited by ZACHARY Rodrigues, OD on 4/8/2019  4:05 PM. (History)        Assessment /Plan     For exam results, see Encounter Report.    Nuclear sclerosis, bilateral    Posterior vitreous detachment, bilateral    Glaucoma screening    Dry eyes, bilateral    Myopia with astigmatism and presbyopia, bilateral      ***

## 2019-04-08 NOTE — PROGRESS NOTES
HPI     Routine eye exam--dle--4/5/18    Pt complains of blurred vision at near, but pt does not want to wear   bifocals. Needing updated glasses rx. Pt complains of floaters and some   flashes. Denies any eye pain.   Sister with amd and glaucoma    Last edited by ZACHARY Rodrigues, OD on 4/8/2019  4:40 PM. (History)        ROS     Positive for: Eyes    Negative for: Constitutional, Gastrointestinal, Neurological, Skin,   Genitourinary, Musculoskeletal, HENT, Endocrine, Cardiovascular,   Respiratory, Psychiatric, Allergic/Imm, Heme/Lymph    Last edited by ZACHARY Rodrigues, OD on 4/8/2019  4:05 PM. (History)        Assessment /Plan     For exam results, see Encounter Report.    Nuclear sclerosis, bilateral    Posterior vitreous detachment, bilateral    Glaucoma screening    Dry eyes, bilateral    Family history of macular degeneration    Myopia with astigmatism and presbyopia, bilateral      1. Early changes, not vis sig for consult  2. RD precautions given, reviewed  3. Not suspect  4. Longstanding, but currently using ATs only qod  5. Sister with presumed dry amd and glaucoma  Advised areds vits or similar, sun protection and good nutrition---never smoker  5. Updated specs for distance only ---can otc as needed for near    Discussed and educated patient on current findings /plan.  RTC 1 year, prn if any changes / issues

## 2019-04-18 DIAGNOSIS — I10 ESSENTIAL HYPERTENSION: ICD-10-CM

## 2019-04-18 RX ORDER — CHLORTHALIDONE 25 MG/1
TABLET ORAL
Qty: 90 TABLET | Refills: 3 | Status: SHIPPED | OUTPATIENT
Start: 2019-04-18 | End: 2019-12-30 | Stop reason: SDUPTHER

## 2019-04-25 ENCOUNTER — PATIENT MESSAGE (OUTPATIENT)
Dept: FAMILY MEDICINE | Facility: CLINIC | Age: 69
End: 2019-04-25

## 2019-05-01 ENCOUNTER — PATIENT MESSAGE (OUTPATIENT)
Dept: FAMILY MEDICINE | Facility: CLINIC | Age: 69
End: 2019-05-01

## 2019-05-01 DIAGNOSIS — R30.0 DYSURIA: Primary | ICD-10-CM

## 2019-05-02 ENCOUNTER — TELEPHONE (OUTPATIENT)
Dept: GASTROENTEROLOGY | Facility: CLINIC | Age: 69
End: 2019-05-02

## 2019-05-02 ENCOUNTER — LAB VISIT (OUTPATIENT)
Dept: LAB | Facility: HOSPITAL | Age: 69
End: 2019-05-02
Payer: MEDICARE

## 2019-05-02 ENCOUNTER — PATIENT MESSAGE (OUTPATIENT)
Dept: FAMILY MEDICINE | Facility: CLINIC | Age: 69
End: 2019-05-02

## 2019-05-02 DIAGNOSIS — N30.00 ACUTE CYSTITIS WITHOUT HEMATURIA: Primary | ICD-10-CM

## 2019-05-02 DIAGNOSIS — R30.0 DYSURIA: ICD-10-CM

## 2019-05-02 LAB
BACTERIA #/AREA URNS HPF: ABNORMAL /HPF
BILIRUB UR QL STRIP: NEGATIVE
CLARITY UR: ABNORMAL
COLOR UR: YELLOW
GLUCOSE UR QL STRIP: NEGATIVE
HGB UR QL STRIP: ABNORMAL
KETONES UR QL STRIP: NEGATIVE
LEUKOCYTE ESTERASE UR QL STRIP: ABNORMAL
MICROSCOPIC COMMENT: ABNORMAL
NITRITE UR QL STRIP: NEGATIVE
PH UR STRIP: 8 [PH] (ref 5–8)
PROT UR QL STRIP: NEGATIVE
RBC #/AREA URNS HPF: 2 /HPF (ref 0–4)
SP GR UR STRIP: 1.01 (ref 1–1.03)
URN SPEC COLLECT METH UR: ABNORMAL
WBC #/AREA URNS HPF: >100 /HPF (ref 0–5)

## 2019-05-02 PROCEDURE — 81000 URINALYSIS NONAUTO W/SCOPE: CPT | Mod: PO

## 2019-05-02 PROCEDURE — 87086 URINE CULTURE/COLONY COUNT: CPT

## 2019-05-02 RX ORDER — NITROFURANTOIN (MACROCRYSTALS) 100 MG/1
100 CAPSULE ORAL EVERY 12 HOURS
Qty: 10 CAPSULE | Refills: 0 | Status: SHIPPED | OUTPATIENT
Start: 2019-05-02 | End: 2019-05-13 | Stop reason: HOSPADM

## 2019-05-02 NOTE — TELEPHONE ENCOUNTER
----- Message from Aliyah Victor sent at 5/2/2019 12:44 PM CDT -----  Contact: [employer]  Message from [facility], System Message sent at 5/2/2019  8:01 AM CDT -----    Appointment Request From: Lucinda Aguilera    With Provider: John    Preferred Date Range: 5/8/2019 - 5/14/2019    Preferred Times: Any time    Reason for visit: Nauseous    Comments:  Stomachache after eating

## 2019-05-03 LAB
BACTERIA UR CULT: NORMAL
BACTERIA UR CULT: NORMAL

## 2019-05-06 ENCOUNTER — PATIENT OUTREACH (OUTPATIENT)
Dept: OTHER | Facility: OTHER | Age: 69
End: 2019-05-06

## 2019-05-06 NOTE — PROGRESS NOTES
Last 5 Patient Entered Readings                                      Current 30 Day Average: 125/79     Recent Readings 5/4/2019 4/28/2019 4/20/2019 4/15/2019 4/11/2019    SBP (mmHg) 128 122 124 114 126    DBP (mmHg) 80 76 80 78 77    Pulse 73 82 80 79 75        Digital Medicine: Health  Follow Up    Left voicemail to follow up with Ms. Lucinda Aguilera.  Current BP average 125/79 mmHg is at goal, <130/80 mmHg.  Will follow up in 2 weeks.

## 2019-05-10 ENCOUNTER — PATIENT MESSAGE (OUTPATIENT)
Dept: FAMILY MEDICINE | Facility: CLINIC | Age: 69
End: 2019-05-10

## 2019-05-10 ENCOUNTER — LAB VISIT (OUTPATIENT)
Dept: LAB | Facility: HOSPITAL | Age: 69
End: 2019-05-10
Attending: NURSE PRACTITIONER
Payer: MEDICARE

## 2019-05-10 DIAGNOSIS — R30.0 DYSURIA: ICD-10-CM

## 2019-05-10 DIAGNOSIS — R30.0 DYSURIA: Primary | ICD-10-CM

## 2019-05-10 PROCEDURE — 87186 SC STD MICRODIL/AGAR DIL: CPT

## 2019-05-10 PROCEDURE — 87088 URINE BACTERIA CULTURE: CPT

## 2019-05-10 PROCEDURE — 87077 CULTURE AEROBIC IDENTIFY: CPT

## 2019-05-10 PROCEDURE — 87086 URINE CULTURE/COLONY COUNT: CPT

## 2019-05-10 NOTE — TELEPHONE ENCOUNTER
The culture grew multiple organisms so I looked back at other urine cultures   The macrobid treatment should have covered the infection - but if it was resistant because we didn't have good culture then it would not   She needs another culture

## 2019-05-13 DIAGNOSIS — N30.00 ACUTE CYSTITIS WITHOUT HEMATURIA: Primary | ICD-10-CM

## 2019-05-13 LAB — BACTERIA UR CULT: NORMAL

## 2019-05-13 RX ORDER — SULFAMETHOXAZOLE AND TRIMETHOPRIM 800; 160 MG/1; MG/1
1 TABLET ORAL 2 TIMES DAILY
Qty: 14 TABLET | Refills: 0 | Status: SHIPPED | OUTPATIENT
Start: 2019-05-13 | End: 2019-05-13 | Stop reason: ALTCHOICE

## 2019-05-13 RX ORDER — CIPROFLOXACIN 500 MG/1
500 TABLET ORAL EVERY 12 HOURS
Qty: 14 TABLET | Refills: 0 | Status: SHIPPED | OUTPATIENT
Start: 2019-05-13 | End: 2019-05-22 | Stop reason: ALTCHOICE

## 2019-05-14 ENCOUNTER — OFFICE VISIT (OUTPATIENT)
Dept: FAMILY MEDICINE | Facility: CLINIC | Age: 69
End: 2019-05-14
Payer: MEDICARE

## 2019-05-14 VITALS
OXYGEN SATURATION: 97 % | WEIGHT: 182.75 LBS | HEART RATE: 80 BPM | SYSTOLIC BLOOD PRESSURE: 128 MMHG | HEIGHT: 62 IN | BODY MASS INDEX: 33.63 KG/M2 | DIASTOLIC BLOOD PRESSURE: 72 MMHG

## 2019-05-14 DIAGNOSIS — I10 ESSENTIAL HYPERTENSION: ICD-10-CM

## 2019-05-14 DIAGNOSIS — S91.301A OPEN WOUND OF RIGHT FOOT, INITIAL ENCOUNTER: Primary | ICD-10-CM

## 2019-05-14 PROCEDURE — 99215 OFFICE O/P EST HI 40 MIN: CPT | Mod: PBBFAC,PO | Performed by: NURSE PRACTITIONER

## 2019-05-14 PROCEDURE — 99999 PR PBB SHADOW E&M-EST. PATIENT-LVL V: ICD-10-PCS | Mod: PBBFAC,,, | Performed by: NURSE PRACTITIONER

## 2019-05-14 PROCEDURE — 99999 PR PBB SHADOW E&M-EST. PATIENT-LVL V: CPT | Mod: PBBFAC,,, | Performed by: NURSE PRACTITIONER

## 2019-05-14 PROCEDURE — 99214 PR OFFICE/OUTPT VISIT, EST, LEVL IV, 30-39 MIN: ICD-10-PCS | Mod: S$PBB,,, | Performed by: NURSE PRACTITIONER

## 2019-05-14 PROCEDURE — 99214 OFFICE O/P EST MOD 30 MIN: CPT | Mod: S$PBB,,, | Performed by: NURSE PRACTITIONER

## 2019-05-14 RX ORDER — DOXYCYCLINE HYCLATE 100 MG
100 TABLET ORAL 2 TIMES DAILY
Qty: 20 TABLET | Refills: 0 | Status: SHIPPED | OUTPATIENT
Start: 2019-05-14 | End: 2019-05-22 | Stop reason: ALTCHOICE

## 2019-05-14 RX ORDER — MUPIROCIN 20 MG/G
OINTMENT TOPICAL 3 TIMES DAILY
Qty: 22 G | Refills: 0 | Status: SHIPPED | OUTPATIENT
Start: 2019-05-14 | End: 2019-06-28

## 2019-05-14 NOTE — PROGRESS NOTES
Subjective:       Patient ID: Lucinda Aguilera is a 68 y.o. female.    Chief Complaint: Wound Check    Patient wore some new shoes on 5/4/19 and got a blister on her right great toe. She has been using neopsorin on it but it is not healing. HTn stable, compliant with medications.   There are no preventive care reminders to display for this patient.    Past Medical History:  Past Medical History:  No date: Anticoagulant long-term use  No date: Anxiety      Comment:  takes daily Xanax  No date: Arthritis      Comment:  right thumb  No date: Cataract      Comment:  OU  No date: GERD (gastroesophageal reflux disease)  No date: Hyperlipemia  No date: Hypertension  No date: PVD (posterior vitreous detachment)      Comment:  OD  Past Surgical History:  9/19/2014: ARTHROSCOPY-KNEE; Right      Comment:  Performed by Ernesto Crain MD at [facility] OR  12/21/2017: ARTHROSCOPY-MENISCECTOMY; Left      Comment:  Performed by Tomas Etienne MD at [facility] OR  No date: chest abcess      Comment:  child  12/21/2017: CHONDROPLASTY-KNEE; Left      Comment:  Performed by Tomas Etienne MD at [facility] OR  1/2012: COLONOSCOPY      Comment:  repeat in 5 years  12/21/2017: DEBRIDEMENT-KNEE; Left      Comment:  Performed by Tomas Etienne MD at [facility] OR  7/13/2017: ESOPHAGOGASTRODUODENOSCOPY (EGD); N/A      Comment:  Performed by Nathan Lopez Jr., MD at [facility] ENDO  No date: JOINT REPLACEMENT  No date: KNEE ARTHROSCOPY W/ LASER      Comment:  right  06/03/2016: KNEE SURGERY; Right      Comment:  Total knee replacement  12/19/2014: REPAIR - COLLATERAL LIGAMENT THUMB; Left      Comment:  Performed by Arash Rene Jr., MD at [facility] OR  6/19/2018: REPLACEMENT-KNEE-TOTAL; Left      Comment:  Performed by Nj Melvin MD at [facility] OR 2ND FLR  6/3/2016: REPLACEMENT-KNEE-TOTAL; Right      Comment:  Performed by Nj Melvin MD at [facility] OR 2ND FLR  12/21/2017: SYNOVECTOMY-KNEE; Left      Comment:  Performed by Tomas Etienne MD at [facility]  OR  11/2014: thumb surgery  Review of patient's allergies indicates:  No Known Allergies  Current Outpatient Medications on File Prior to Visit:  ALPRAZolam (XANAX) 0.5 MG tablet, TAKE 1 TABLET(0.5 MG) BY MOUTH TWICE DAILY AS NEEDED, Disp: 60 tablet, Rfl: 1  aspirin (ECOTRIN) 81 MG EC tablet, Take 1 tablet (81 mg total) by mouth 2 (two) times daily., Disp: 60 tablet, Rfl: 0  carboxymethylcellulose (REFRESH PLUS) 0.5 % Dpet, 1 drop 3 (three) times daily as needed., Disp: , Rfl:   chlorthalidone (HYGROTEN) 25 MG Tab, TAKE 1 TABLET BY MOUTH EVERY MORNING, DISCONTINUE HCTZ, Disp: 90 tablet, Rfl: 3  ciprofloxacin HCl (CIPRO) 500 MG tablet, Take 1 tablet (500 mg total) by mouth every 12 (twelve) hours., Disp: 14 tablet, Rfl: 0  fish oil-omega-3 fatty acids 300-1,000 mg capsule, Take 2 g by mouth once daily., Disp: , Rfl:   irbesartan (AVAPRO) 300 MG tablet, Take 1 tablet (300 mg total) by mouth every evening. 1 po qd, Disp: 90 tablet, Rfl: 2  multivitamin (MULTIVITAMIN) per tablet, Take 1 tablet by mouth once daily.  , Disp: , Rfl:   naproxen sodium (ALEVE) 220 mg Cap, Take 220 mg by mouth 2 (two) times daily., Disp: , Rfl:   niacin 500 MG CpSR, Take 500 mg by mouth every evening.  , Disp: , Rfl:   pantoprazole (PROTONIX) 40 MG tablet, TAKE 1 TABLET(40 MG) BY MOUTH BEFORE BREAKFAST, Disp: 90 tablet, Rfl: 3  paroxetine (PAXIL) 10 MG tablet, TAKE 1 TABLET BY MOUTH EVERY MORNING, Disp: 90 tablet, Rfl: 3  potassium chloride (MICRO-K) 10 MEQ CpSR, TAKE 2 CAPSULES BY MOUTH ONCE DAILY, Disp: 60 capsule, Rfl: 11  pravastatin (PRAVACHOL) 40 MG tablet, TAKE 1 TABLET(40 MG) BY MOUTH EVERY DAY, Disp: 90 tablet, Rfl: 1    No current facility-administered medications on file prior to visit.     Social History    Socioeconomic History      Marital status:       Spouse name: Not on file      Number of children: 2      Years of education: Not on file      Highest education level: Not on file    Occupational History      Occupation:  retired      Occupation: retired teacher and principal    Social Needs      Financial resource strain: Not hard at all      Food insecurity:        Worry: Never true        Inability: Never true      Transportation needs:        Medical: No        Non-medical: No    Tobacco Use      Smoking status: Never Smoker      Smokeless tobacco: Never Used    Substance and Sexual Activity      Alcohol use: Yes        Frequency: Never        Drinks per session: 1 or 2        Binge frequency: Never        Comment: social      Drug use: No      Sexual activity: Yes        Partners: Male        Birth control/protection: None, Post-menopausal    Lifestyle      Physical activity:        Days per week: 5 days        Minutes per session: 90 min      Stress: Not at all    Relationships      Social connections:        Talks on phone: More than three times a week        Gets together: More than three times a week        Attends Anabaptism service: Not on file        Active member of club or organization: Yes        Attends meetings of clubs or organizations: More than 4 times per year        Relationship status:     Other Topics      Concerns:        Not on file    Social History Narrative      Not on file    Review of patient's family history indicates:  Problem: Hypertension      Relation: Mother          Age of Onset: (Not Specified)  Problem: Heart disease      Relation: Mother          Age of Onset: (Not Specified)  Problem: Glaucoma      Relation: Mother          Age of Onset: (Not Specified)  Problem: Stroke      Relation: Father          Age of Onset: (Not Specified)  Problem: Glaucoma      Relation: Sister          Age of Onset: (Not Specified)  Problem: Cataracts      Relation: Sister          Age of Onset: (Not Specified)  Problem: Macular degeneration      Relation: Sister          Age of Onset: (Not Specified)  Problem: Breast cancer      Relation: Neg Hx          Age of Onset: (Not Specified)            Review of  Systems   Constitutional: Negative for activity change, fever and unexpected weight change.   HENT: Negative for hearing loss, rhinorrhea and trouble swallowing.    Eyes: Negative for discharge and visual disturbance.   Respiratory: Negative for chest tightness and wheezing.    Cardiovascular: Negative for chest pain and palpitations.   Gastrointestinal: Negative for blood in stool, constipation, diarrhea and vomiting.   Endocrine: Negative for polydipsia and polyuria.   Genitourinary: Negative for difficulty urinating, dysuria, hematuria and menstrual problem.   Musculoskeletal: Negative for arthralgias, joint swelling and neck pain.   Skin: Positive for color change and wound.   Neurological: Negative for weakness and headaches.   Psychiatric/Behavioral: Negative for confusion and dysphoric mood.       Objective:      Physical Exam   Constitutional: She is oriented to person, place, and time. No distress.   HENT:   Head: Normocephalic and atraumatic.   Cardiovascular: Normal rate.   Pulmonary/Chest: Effort normal.   Abdominal: Soft.   Musculoskeletal: She exhibits no edema.   Neurological: She is alert and oriented to person, place, and time.   Skin: She is not diaphoretic.        Vitals reviewed.      Assessment:       1. Open wound of right foot, initial encounter    2. Essential hypertension        Plan:       1. Open wound of right foot, initial encounter  Wound care twice daily and apply Bactroban and gauze.   - Ambulatory referral to Podiatry  - mupirocin (BACTROBAN) 2 % ointment; Apply topically 3 (three) times daily.  Dispense: 22 g; Refill: 0  - doxycycline (VIBRA-TABS) 100 MG tablet; Take 1 tablet (100 mg total) by mouth 2 (two) times daily.  Dispense: 20 tablet; Refill: 0         2. Essential hypertension  Stable, continue current medications.

## 2019-05-16 ENCOUNTER — PATIENT OUTREACH (OUTPATIENT)
Dept: OTHER | Facility: OTHER | Age: 69
End: 2019-05-16

## 2019-05-16 NOTE — PROGRESS NOTES
Last 5 Patient Entered Readings                                      Current 30 Day Average: 127/78     Recent Readings 5/14/2019 5/13/2019 5/8/2019 5/4/2019 4/28/2019    SBP (mmHg) 128 134 125 128 122    DBP (mmHg) 72 83 79 80 76    Pulse 80 83 71 73 82        Hypertension Medications             chlorthalidone (HYGROTEN) 25 MG Tab TAKE 1 TABLET BY MOUTH EVERY MORNING, DISCONTINUE HCTZ    irbesartan (AVAPRO) 300 MG tablet Take 1 tablet (300 mg total) by mouth every evening. 1 po qd        Called patient to follow up, reviewed BP readings. Per 2017 ACC/ AHA HTN guidelines  (goal of BP < 130/80), current 30-day average is well controlled.  LVM, requested patient call back at his and her convenience if she has any questions or concerns.  Will continue to monitor. WCB in 4 months, sooner if BP begins to trend up or down.

## 2019-05-22 ENCOUNTER — OFFICE VISIT (OUTPATIENT)
Dept: GASTROENTEROLOGY | Facility: CLINIC | Age: 69
End: 2019-05-22
Payer: MEDICARE

## 2019-05-22 VITALS
BODY MASS INDEX: 33.51 KG/M2 | HEIGHT: 62 IN | WEIGHT: 182.13 LBS | SYSTOLIC BLOOD PRESSURE: 116 MMHG | DIASTOLIC BLOOD PRESSURE: 65 MMHG | HEART RATE: 83 BPM

## 2019-05-22 DIAGNOSIS — R12 HEARTBURN: ICD-10-CM

## 2019-05-22 DIAGNOSIS — R05.9 COUGH: ICD-10-CM

## 2019-05-22 DIAGNOSIS — R11.0 NAUSEA: Primary | ICD-10-CM

## 2019-05-22 DIAGNOSIS — Z79.01 ANTICOAGULANT LONG-TERM USE: ICD-10-CM

## 2019-05-22 DIAGNOSIS — Z87.19 HISTORY OF GALLSTONES: ICD-10-CM

## 2019-05-22 DIAGNOSIS — R49.0 HOARSENESS OF VOICE: ICD-10-CM

## 2019-05-22 PROCEDURE — 99214 OFFICE O/P EST MOD 30 MIN: CPT | Mod: S$PBB,,, | Performed by: NURSE PRACTITIONER

## 2019-05-22 PROCEDURE — 99215 OFFICE O/P EST HI 40 MIN: CPT | Mod: PBBFAC,PO | Performed by: NURSE PRACTITIONER

## 2019-05-22 PROCEDURE — 99214 PR OFFICE/OUTPT VISIT, EST, LEVL IV, 30-39 MIN: ICD-10-PCS | Mod: S$PBB,,, | Performed by: NURSE PRACTITIONER

## 2019-05-22 PROCEDURE — 99999 PR PBB SHADOW E&M-EST. PATIENT-LVL V: CPT | Mod: PBBFAC,,, | Performed by: NURSE PRACTITIONER

## 2019-05-22 PROCEDURE — 99999 PR PBB SHADOW E&M-EST. PATIENT-LVL V: ICD-10-PCS | Mod: PBBFAC,,, | Performed by: NURSE PRACTITIONER

## 2019-05-22 RX ORDER — OMEPRAZOLE 40 MG/1
40 CAPSULE, DELAYED RELEASE ORAL
Qty: 30 CAPSULE | Refills: 3 | Status: SHIPPED | OUTPATIENT
Start: 2019-05-22 | End: 2019-06-28

## 2019-05-22 NOTE — PROGRESS NOTES
Subjective:       Patient ID: Lucinda Aguilera is a 68 y.o. female Body mass index is 33.31 kg/m².    Chief Complaint: Nausea    This patient is new to me.  Referring Provider:  Dr. Hassan  Established patient of Dr. Lopez.    Gastroesophageal Reflux   She complains of belching, coughing (occasional nonproductive), heartburn, a hoarse voice (occasional) and nausea (CHIEF COMPLAINT: started a few months ago; relieved with drinking sprite and belching; occurs daily, worse with trigger foods (see below)). She reports no abdominal pain, no chest pain, no choking, no dysphagia, no globus sensation or no sore throat. This is a chronic problem. The problem occurs occasionally. The symptoms are aggravated by certain foods and stress (high fat and fried foods & spicy foods). Associated symptoms include weight loss (trying to lose weight; lost a pant size but the scale shows the same number; exercising and dieting). Pertinent negatives include no fatigue or melena. Risk factors include NSAIDs and hiatal hernia (takes aleve BID (arthritis)). She has tried a diet change (soft bland diet, protonix 40 mg once daily (been on this for years); PAST: nexium, prilosec- helped some) for the symptoms. Past procedures include an EGD.     Review of Systems   Constitutional: Positive for appetite change (decreased appetite) and weight loss (trying to lose weight; lost a pant size but the scale shows the same number; exercising and dieting). Negative for chills, fatigue and fever.   HENT: Positive for hoarse voice (occasional). Negative for sore throat and trouble swallowing.    Respiratory: Positive for cough (occasional nonproductive). Negative for choking and shortness of breath.    Cardiovascular: Negative for chest pain.   Gastrointestinal: Positive for heartburn and nausea (CHIEF COMPLAINT: started a few months ago; relieved with drinking sprite and belching; occurs daily, worse with trigger foods (see below)). Negative for  abdominal pain, anal bleeding, blood in stool, constipation, diarrhea, dysphagia, melena, rectal pain and vomiting.   Genitourinary: Negative for difficulty urinating, dysuria and flank pain.   Neurological: Negative for weakness.       Patient's last menstrual period was 04/18/2004. postmenopausal  Past Medical History:   Diagnosis Date    Anticoagulant long-term use     Anxiety     takes daily Xanax    Arthritis     right thumb    Cataract     OU    Cholelithiasis     GERD (gastroesophageal reflux disease)     Hyperlipemia     Hypertension     PVD (posterior vitreous detachment)     OD     Past Surgical History:   Procedure Laterality Date    ARTHROSCOPY-KNEE Right 9/19/2014    [facility]    ARTHROSCOPY-MENISCECTOMY Left 12/21/2017    [facility]    chest abcess      child    CHONDROPLASTY-KNEE Left 12/21/2017    [facility]    COLONOSCOPY  01/06/2012    Dr. Lopez: hemorrhoids, redundant colon    DEBRIDEMENT-KNEE Left 12/21/2017    [facility]    ESOPHAGOGASTRODUODENOSCOPY (EGD) N/A 7/13/2017    [facility] by Nathan [facility] Jr[facility] ENDO    JOINT REPLACEMENT      KNEE ARTHROSCOPY W/ LASER      right    KNEE SURGERY Right 06/03/2016    Total knee replacement    REPAIR - COLLATERAL LIGAMENT THUMB Left 12/19/2014    [facility]    REPLACEMENT-KNEE-TOTAL Left 6/19/2018    [facility] 2ND FLR    REPLACEMENT-KNEE-TOTAL Right 6/3/2016    [facility] 2ND FLR    SYNOVECTOMY-KNEE Left 12/21/2017    [facility] OR    thumb surgery  11/2014    UPPER GASTROINTESTINAL ENDOSCOPY  07/13/2017    Dr. Lopez: NERD, Gastric mucosal atrophy. antritis, Scar in the incisura. Biopsied; biopsy: chronic gastritis. negative for h pylori     Family  History   Problem Relation Age of Onset    Hypertension Mother     Heart disease Mother     Glaucoma Mother     Stroke Father     Glaucoma Sister     Cataracts Sister     Macular degeneration Sister     Breast cancer Neg Hx     Colon cancer Neg Hx     Crohn's disease Neg Hx     Ulcerative colitis Neg Hx      Wt Readings from Last 10 Encounters:   05/22/19 82.6 kg (182 lb 1.6 oz)   05/14/19 82.9 kg (182 lb 12.2 oz)   01/03/19 85.3 kg (188 lb 0.8 oz)   12/11/18 83.7 kg (184 lb 8.4 oz)   07/30/18 83.4 kg (183 lb 15.6 oz)   07/23/18 83.6 kg (184 lb 4.9 oz)   07/03/18 83.1 kg (183 lb 3.2 oz)   06/19/18 79.4 kg (175 lb)   06/13/18 83.3 kg (183 lb 10.3 oz)   06/12/18 83.6 kg (184 lb 4.8 oz)     Lab Results   Component Value Date    WBC 11.92 06/12/2018    HGB 12.5 06/12/2018    HCT 37.1 06/12/2018    MCV 87 06/12/2018     (H) 06/12/2018     CMP  Sodium   Date Value Ref Range Status   06/19/2018 137 136 - 145 mmol/L Final     Potassium   Date Value Ref Range Status   06/19/2018 3.8 3.5 - 5.1 mmol/L Final     Chloride   Date Value Ref Range Status   06/19/2018 104 95 - 110 mmol/L Final     CO2   Date Value Ref Range Status   06/19/2018 21 (L) 23 - 29 mmol/L Final     Glucose   Date Value Ref Range Status   06/19/2018 86 70 - 110 mg/dL Final     BUN, Bld   Date Value Ref Range Status   06/19/2018 9 8 - 23 mg/dL Final     Creatinine   Date Value Ref Range Status   06/19/2018 0.7 0.5 - 1.4 mg/dL Final     Calcium   Date Value Ref Range Status   06/19/2018 8.4 (L) 8.7 - 10.5 mg/dL Final     Total Protein   Date Value Ref Range Status   05/23/2018 7.1 6.0 - 8.4 g/dL Final     Albumin   Date Value Ref Range Status   05/23/2018 3.9 3.5 - 5.2 g/dL Final     Total Bilirubin   Date Value Ref Range Status   05/23/2018 0.4 0.1 - 1.0 mg/dL Final     Comment:     For infants and newborns, interpretation of results should be based  on gestational age, weight and in agreement with clinical  observations.  Premature Infant  recommended reference ranges:  Up to 24 hours.............<8.0 mg/dL  Up to 48 hours............<12.0 mg/dL  3-5 days..................<15.0 mg/dL  6-29 days.................<15.0 mg/dL       Alkaline Phosphatase   Date Value Ref Range Status   05/23/2018 96 55 - 135 U/L Final     AST   Date Value Ref Range Status   05/23/2018 42 (H) 10 - 40 U/L Final     ALT   Date Value Ref Range Status   05/23/2018 24 10 - 44 U/L Final     Anion Gap   Date Value Ref Range Status   06/19/2018 12 8 - 16 mmol/L Final     eGFR if    Date Value Ref Range Status   06/19/2018 >60.0 >60 mL/min/1.73 m^2 Final     eGFR if non    Date Value Ref Range Status   06/19/2018 >60.0 >60 mL/min/1.73 m^2 Final     Comment:     Calculation used to obtain the estimated glomerular filtration  rate (eGFR) is the CKD-EPI equation.        Lab Results   Component Value Date    TSH 0.872 07/23/2015     Reviewed prior medical records including radiology report of 1/12/12 ct abdomen pelvis & endoscopy history (see surgical history).    Objective:      Physical Exam   Constitutional: She is oriented to person, place, and time. She appears well-developed and well-nourished. No distress.   HENT:   Mouth/Throat: Oropharynx is clear and moist and mucous membranes are normal. No oral lesions. No oropharyngeal exudate.   Eyes: Pupils are equal, round, and reactive to light. Conjunctivae are normal. No scleral icterus.   Pulmonary/Chest: Effort normal and breath sounds normal. No respiratory distress. She has no wheezes.   Abdominal: Soft. Normal appearance and bowel sounds are normal. She exhibits no distension, no abdominal bruit and no mass. There is no tenderness. There is no rigidity, no rebound, no guarding, no tenderness at McBurney's point and negative Rocha's sign.   Neurological: She is alert and oriented to person, place, and time.   Skin: Skin is warm and dry. No rash noted. She is not diaphoretic. No erythema. No pallor.    Non-jaundiced   Psychiatric: She has a normal mood and affect. Her behavior is normal. Judgment and thought content normal.   Nursing note and vitals reviewed.      Assessment:       1. Nausea    2. Heartburn    3. Cough    4. Hoarseness of voice    5. History of gallstones    6. Anticoagulant long-term use        Plan:       Nausea  -     US Abdomen Limited; Future; Expected date: 05/22/2019  -  RESTART   omeprazole (PRILOSEC) 40 MG capsule; Take 1 capsule (40 mg total) by mouth before breakfast.  Dispense: 30 capsule; Refill: 3  -     Lipase; Future; Expected date: 05/22/2019  -     Hepatic function panel; Future; Expected date: 05/22/2019  -     CBC Without Differential; Future; Expected date: 05/22/2019  - schedule EGD, discussed procedure with patient, patient verbalized understanding    Heartburn  -  RESTART   omeprazole (PRILOSEC) 40 MG capsule; Take 1 capsule (40 mg total) by mouth before breakfast.  Dispense: 30 capsule; Refill: 3, take in the morning 30-60 minutes before breakfast, discussed about possible long term use of medication (prefer to use lowest effective dose or discontinuing if possible) and discussed the risks & benefits with taking a reflux medication long term, and to take OTC calcium and vitamin d supplements as directed (such as Citracal +D), pt verbalized understanding  -discussed about the different types of medications used to treat reflux and how to use them, antacids can be used PRN for breakthrough heartburn symptoms by reducing stomach acid that is already produced, H2 blockers (zantac) work by limiting the amount acid production, & PPI's work to block acid production and are taken daily, patient verbalized understanding.  -Educated patient on lifestyle modifications to help control/reduce reflux/abdominal pain including: avoid large meals, avoid eating within 2-3 hours of bedtime (avoid late night eating & lying down soon after eating), elevate head of bed if nocturnal symptoms  are present, smoking cessation (if current smoker), & weight loss (if overweight).   -Educated to avoid known foods which trigger reflux symptoms & to minimize/avoid high-fat foods, chocolate, caffeine, citrus, alcohol, & tomato products.  -Advised to avoid/limit use of NSAID's, since they can cause GI upset, bleeding, and/or ulcers. If needed, take with food.   - schedule EGD, discussed procedure with patient, patient verbalized understanding    Cough & Hoarseness of voice  Recommend follow-up with Primary Care Provider/ENT for continued evaluation and management.    History of gallstones  -     US Abdomen Limited; Future; Expected date: 05/22/2019  - possible HIDA scan  - recommend low fat diet  - recommend seeing general surgery for further evaluation and management, patient verbalized understanding    Anticoagulant long-term use  - informed patient that the anticoagulant(s) will likely need to be held for endoscopy, nurse will confirm with cardiologist/PCP.    Follow up in about 1 month (around 6/22/2019), or if symptoms worsen or fail to improve.      If no improvement in symptoms or symptoms worsen, call/follow-up at clinic or go to ER.

## 2019-05-22 NOTE — PATIENT INSTRUCTIONS

## 2019-05-22 NOTE — LETTER
May 25, 2019      Saurabh Hassan MD  [address] [facility] [address] [place] [address]           [place] [facility]  [address] [facility] [address] [place] [address]  Phone: [phone]          Patient: Lucinda Aguilera   MR Number: [id number]   YOB: 1950   Date of Visit: 5/22/2019       Dear Dr. Saurabh Hassan:    Thank you for referring Lucinda Aguilera to me for evaluation. Attached you will find relevant portions of my assessment and plan of care.    If you have questions, please do not hesitate to call me. I look forward to following Lucinda Aguilera along with you.    Sincerely,    Stephanie Hendrickson, [facility]    Enclosure  CC:  No Recipients    If you would like to receive this communication electronically, please contact [email or web address] or [phone] to request more information on [employer] Link access.    For providers and/or their staff who would like to refer a patient to [facility], please contact us through our one-stop-shop provider referral line, [facility] , at [phone].    If you feel you have received this communication in error or would no longer like to receive these types of communications, please e-mail [email or web address]

## 2019-05-24 NOTE — PROGRESS NOTES
Last 5 Patient Entered Readings                                      Current 30 Day Average: 126/76     Recent Readings 5/22/2019 5/14/2019 5/13/2019 5/8/2019 5/4/2019    SBP (mmHg) 116 128 134 125 128    DBP (mmHg) 65 72 83 79 80    Pulse 80 80 83 71 73        Digital Medicine: Health  Follow Up    Unable to leave a voicemail to follow up with Jackelyn Lucinda DRAKE Ale. Patient answered and hung up.   Current BP average 126/76 mmHg is at goal, <130/80 mmHg.  2nd attempt. Will follow up in 2 weeks.

## 2019-05-27 ENCOUNTER — HOSPITAL ENCOUNTER (OUTPATIENT)
Dept: RADIOLOGY | Facility: HOSPITAL | Age: 69
Discharge: HOME | End: 2019-05-27
Attending: NURSE PRACTITIONER
Payer: MEDICARE

## 2019-05-27 ENCOUNTER — TELEPHONE (OUTPATIENT)
Dept: GASTROENTEROLOGY | Facility: CLINIC | Age: 69
End: 2019-05-27

## 2019-05-27 ENCOUNTER — OFFICE VISIT (OUTPATIENT)
Dept: PODIATRY | Facility: CLINIC | Age: 69
End: 2019-05-27
Payer: MEDICARE

## 2019-05-27 ENCOUNTER — PATIENT MESSAGE (OUTPATIENT)
Dept: GASTROENTEROLOGY | Facility: CLINIC | Age: 69
End: 2019-05-27

## 2019-05-27 VITALS
BODY MASS INDEX: 32.76 KG/M2 | HEART RATE: 75 BPM | DIASTOLIC BLOOD PRESSURE: 69 MMHG | WEIGHT: 178 LBS | SYSTOLIC BLOOD PRESSURE: 121 MMHG | HEIGHT: 62 IN

## 2019-05-27 DIAGNOSIS — R11.0 NAUSEA: ICD-10-CM

## 2019-05-27 DIAGNOSIS — Z87.19 HISTORY OF GALLSTONES: ICD-10-CM

## 2019-05-27 DIAGNOSIS — Z87.19 HISTORY OF GALLSTONES: Primary | ICD-10-CM

## 2019-05-27 DIAGNOSIS — L89.891 PRESSURE ULCER OF RIGHT FOOT, STAGE 1: Primary | ICD-10-CM

## 2019-05-27 DIAGNOSIS — M79.671 PAIN IN RIGHT FOOT: ICD-10-CM

## 2019-05-27 DIAGNOSIS — M20.11 HAV (HALLUX ABDUCTO VALGUS), RIGHT: ICD-10-CM

## 2019-05-27 PROCEDURE — 97597 PR DEBRIDEMENT OPEN WOUND 20 SQ CM<: ICD-10-PCS | Mod: S$PBB,,, | Performed by: PODIATRIST

## 2019-05-27 PROCEDURE — 99999 PR PBB SHADOW E&M-EST. PATIENT-LVL III: CPT | Mod: PBBFAC,,, | Performed by: PODIATRIST

## 2019-05-27 PROCEDURE — 99203 OFFICE O/P NEW LOW 30 MIN: CPT | Mod: S$PBB,25,, | Performed by: PODIATRIST

## 2019-05-27 PROCEDURE — 97597 DBRDMT OPN WND 1ST 20 CM/<: CPT | Mod: S$PBB,,, | Performed by: PODIATRIST

## 2019-05-27 PROCEDURE — 99999 PR PBB SHADOW E&M-EST. PATIENT-LVL III: ICD-10-PCS | Mod: PBBFAC,,, | Performed by: PODIATRIST

## 2019-05-27 PROCEDURE — 99203 PR OFFICE/OUTPT VISIT, NEW, LEVL III, 30-44 MIN: ICD-10-PCS | Mod: S$PBB,25,, | Performed by: PODIATRIST

## 2019-05-27 PROCEDURE — 76705 ECHO EXAM OF ABDOMEN: CPT | Mod: 26,,, | Performed by: RADIOLOGY

## 2019-05-27 PROCEDURE — 76705 ECHO EXAM OF ABDOMEN: CPT | Mod: TC,PO

## 2019-05-27 PROCEDURE — 76705 US ABDOMEN LIMITED: ICD-10-PCS | Mod: 26,,, | Performed by: RADIOLOGY

## 2019-05-27 PROCEDURE — 99213 OFFICE O/P EST LOW 20 MIN: CPT | Mod: PBBFAC,25,PN | Performed by: PODIATRIST

## 2019-05-27 PROCEDURE — 97597 DBRDMT OPN WND 1ST 20 CM/<: CPT | Mod: PBBFAC,PN | Performed by: PODIATRIST

## 2019-05-27 NOTE — LETTER
May 30, 2019      Sharda Engel, NP  [address] [facility] [place] [address]           [place] [facility]  [address] [facility] [place] [address]  Phone: [phone]          Patient: Lucinda Aguilera   MR Number: [id number]   YOB: 1950   Date of Visit: 5/27/2019       Dear Sharda Engel:    Thank you for referring Lucinda Aguilera to me for evaluation. Attached you will find relevant portions of my assessment and plan of care.    If you have questions, please do not hesitate to call me. I look forward to following Lucinda Aguilera along with you.    Sincerely,    Kelli Walker  CC:  No Recipients    If you would like to receive this communication electronically, please contact [email or web address] or [phone] to request more information on [employer] Link access.    For providers and/or their staff who would like to refer a patient to [facility], please contact us through our one-stop-shop provider referral line, [facility], at [phone].    If you feel you have received this communication in error or would no longer like to receive these types of communications, please e-mail [email or web address]

## 2019-05-27 NOTE — TELEPHONE ENCOUNTER
"Please call to inform & review the results with the patient- radiology report of the RUQ abdominal ultrasound showed "1. Hepatic steatosis": For fatty liver recommend: low fat, low cholesterol diet, maintain good control of blood sugars and cholesterol levels, exercise, weight loss (if overweight), minimize/avoid alcohol and tylenol products, & follow-up with PCP for continued evaluation and management; if specialist is needed, recommend seeing hepatology.    "2. Cholelithiasis": multiple gallstones seen- recommend low fat diet  - recommend seeing general surgery for further evaluation and management (REFERRAL PLACED)    RECOMMEND SCHEDULING EGD IF NOT DONE SO ALREADY.  Other findings are unremarkable.    Continue with previous recommendations. If no improvement in symptoms or symptoms worsen, call/follow-up at clinic or go to ER.  Please release results to patient's mychart once you have discussed results and recommendations with patient.  Thanks,        "

## 2019-05-27 NOTE — PROGRESS NOTES
Subjective:      Patient ID: Lucinda Aguilera is a 68 y.o. female.    Chief Complaint: Foot Problem (wound on right great toe)      HPI:  Lucinda Aguilera is a 68 y.o. female who presents to clinic with a chief complaint of wound right foot.    PCP:  Saurabh Hassan MD  Date last seen: 5/14/19 Sharda Engel NP    Review of Systems   Constitutional: Negative for appetite change, fever, chills, fatigue and unexpected weight change.   Respiratory: Negative for cough, wheezing, and shortness of breath.   Cardiovascular: Negative for chest pain, claudication, cyanosis, and leg swelling.  Endocrine:  Negative for intolerance to cold, intolerance to heat, polydipsia, polyphagia, and polyuria.    Gastrointestinal: Negative for nausea, vomiting, diarrhea, and constipation.   Musculoskeletal: Negative for back pain, arthritis, joint pain, joint swelling, myalgias, and stiffness.   Skin: Negative for nail bed changes, discoloration, rash, itching, poor wound healing, suspicious lesion, and unusual hair distribution.   Neurological: Negative for loss of balance, sensory change, paresthesias, and numbness.   Hematological: Negative for adenopathy, bleeding, and bruising easily.   Psychiatric/Behavioral: The patient is not nervous/anxious.  Negative for altered mental status.    No results found for: HGBA1C    Past Medical History:   Diagnosis Date    Anticoagulant long-term use     Anxiety     takes daily Xanax    Arthritis     right thumb    Cataract     OU    Cholelithiasis     GERD (gastroesophageal reflux disease)     Hepatic steatosis     Hyperlipemia     Hypertension     PVD (posterior vitreous detachment)     OD     Past Surgical History:   Procedure Laterality Date    ARTHROSCOPY-KNEE Right 9/19/2014    [facility]    ARTHROSCOPY-MENISCECTOMY Left 12/21/2017    [facility]    [facility]      child    CHONDROPLASTY-KNEE Left  12/21/2017    [facility]    COLONOSCOPY  01/06/2012    Dr. Lopez: hemorrhoids, redundant colon    DEBRIDEMENT-KNEE Left 12/21/2017    [facility]    ESOPHAGOGASTRODUODENOSCOPY (EGD) N/A 7/13/2017    [facility] Nathan [facility] ENDO    JOINT REPLACEMENT      KNEE ARTHROSCOPY W/ LASER      right    KNEE SURGERY Right 06/03/2016    Total knee replacement    REPAIR - COLLATERAL LIGAMENT THUMB Left 12/19/2014    [facility]    REPLACEMENT-KNEE-TOTAL Left 6/19/2018    [facility]    REPLACEMENT-KNEE-TOTAL Right 6/3/2016    [facility]    SYNOVECTOMY-KNEE Left 12/21/2017    [facility]    thumb surgery  11/2014    UPPER GASTROINTESTINAL ENDOSCOPY  07/13/2017    Dr. Lopez: NERD, Gastric mucosal atrophy. antritis, Scar in the incisura. Biopsied; biopsy: chronic gastritis. negative for h pylori     Family History   Problem Relation Age of Onset    Hypertension Mother     Heart disease Mother     Glaucoma Mother     Stroke Father     Glaucoma Sister     Cataracts Sister     Macular degeneration Sister     Breast cancer Neg Hx     Colon cancer Neg Hx     Crohn's disease Neg Hx     Ulcerative colitis Neg Hx      Social History     Socioeconomic History    Marital status:      Spouse name: Not on file    Number of children: 2    Years of education: Not on file    Highest education level: Not on file   Occupational History    Occupation: retired    Occupation: retired teacher and principal   Social Needs    Financial resource strain: Not hard at all    Food insecurity:     Worry: Never true     Inability: Never true    Transportation needs:     Medical: No     Non-medical: No   Tobacco Use    Smoking status: Never Smoker    Smokeless tobacco: Never Used  "  Substance and Sexual Activity    Alcohol use: Yes     Frequency: Never     Drinks per session: 1 or 2     Binge frequency: Never     Comment: social- maybe once every few months    Drug use: No    Sexual activity: Yes     Partners: Male     Birth control/protection: None, Post-menopausal   Lifestyle    Physical activity:     Days per week: 5 days     Minutes per session: 90 min    Stress: Not at all   Relationships    Social connections:     Talks on phone: More than three times a week     Gets together: More than three times a week     Attends Faith service: Not on file     Active member of club or organization: Yes     Attends meetings of clubs or organizations: More than 4 times per year     Relationship status:    Other Topics Concern    Not on file   Social History Narrative    Not on file           Objective:        /69 (BP Location: Left arm, Patient Position: Sitting, BP Method: Medium (Automatic))   Pulse 75   Ht 5' 2" (1.575 m)   Wt 80.7 kg (178 lb)   LMP 04/18/2004   BMI 32.56 kg/m²     Physical Exam   Constitutional: Patient is oriented to person, place, and time. Patient appears well-developed and well-nourished. No acute distress.     Psychiatric: Patient has a normal mood and affect. Patient's speech is normal and behavior is normal. Judgment is normal. Cognition and memory are normal.     Right pedal exam was performed today.  Vascular: Pedal pulses palpable 1/4 DP & PT.  CFT is = 3 seconds to the hallux.  Skin temperature is cool to cool proximal tibia to distal toes without localized increase in calor noted.  No erythema, edema, or ecchymosis noted to the foot or ankle.  Hair growth decreased distally to the LE.     Musculoskeletal: Ankle joint ROM is decreased. Subtalar joint ROM is decreased.  Midtarsal joint ROM is decreased.  1st ray ROM is decreased.  1st  MTPJ ROM is decreased.  Ankle joint dorsiflexion is restricted with the knee extended and flexed per " Silfverskiold exam.    Muscle strength is 5/5 for all LE muscle groups tested.    Neurological: Protective sensation is intact to 10/10 sites on the right foot via [place] monofilament.    Proprioception is Intact to the foot.  Vibratory sensation is Decreased to the foot.   Light touch sensation is Intact to the foot.   Achilles DTR and Chaddock STR are Intact to right lower extremity.   Negative Babinski sign right lower extremity.  Negative clonus sign right lower extremity.  The hallux is abducted on the 1st ray.    Dermatological: Toenails 1-5 right are WNL in length and thickness.  Webspaces 1-4 right are clean, dry, and intact.  Skin turgor is supple.  No dry, flaky skin noted to the LE.  Open wound measuring 0.8 cm x 0.4 cm x 0.1 cm extending down to dermis covered in fibrotic tissue 100% and biofilm without harlan signs of infection present to the dorsomedial aspect of the right 1st MTPJ.    Nursing note and vitals reviewed.          Assessment:       Encounter Diagnoses   Name Primary?    Pressure ulcer of right foot, stage 1 Yes    Hav (hallux abducto valgus), right     Pain in right foot          Plan:       Lucinda was seen today for foot problem.    Diagnoses and all orders for this visit:    Pressure ulcer of right foot, stage 1    Hav (hallux abducto valgus), right    Pain in right foot      I counseled the patient on her conditions, their implications and medical management.    - With patient's permission, cleansed right foot wound with normal saline and performed sharp, selective debridement of devitalized tissue and biofilm, < 20 cm sq., extending down to the level of dermis via 3 mm dermal curette to patient's tolerance without incident.  Applied betadine, offloading pad, antibiotic cream, and bandaid to wound.    Patient was given the following recommendations and instructions:  Patient Instructions   - Perform wound care daily after shower/bath and whenever dressing becomes soiled or  wet via removing dressing, cleansing with betadine, patting area dry, applying offloading pad and antibiotic cream, and covering with bandaid.    - Wear toe spacer and sneakers at all times when ambulating.    - Notify clinic if redness, swelling, or pain worsens or fails to improve.    - Follow up in 2 weeks for wound care.          Renae Rios DPM        Dictation was performed using M*Modal Fluency.  Transcription errors may be present.

## 2019-05-28 ENCOUNTER — PATIENT MESSAGE (OUTPATIENT)
Dept: GASTROENTEROLOGY | Facility: CLINIC | Age: 69
End: 2019-05-28

## 2019-05-29 ENCOUNTER — HOSPITAL ENCOUNTER (OUTPATIENT)
Dept: RADIOLOGY | Facility: HOSPITAL | Age: 69
Discharge: HOME | End: 2019-05-29
Attending: NURSE PRACTITIONER
Payer: MEDICARE

## 2019-05-29 ENCOUNTER — OFFICE VISIT (OUTPATIENT)
Dept: FAMILY MEDICINE | Facility: CLINIC | Age: 69
End: 2019-05-29
Payer: MEDICARE

## 2019-05-29 ENCOUNTER — TELEPHONE (OUTPATIENT)
Dept: FAMILY MEDICINE | Facility: CLINIC | Age: 69
End: 2019-05-29

## 2019-05-29 VITALS
HEIGHT: 62 IN | SYSTOLIC BLOOD PRESSURE: 112 MMHG | WEIGHT: 178.81 LBS | BODY MASS INDEX: 32.91 KG/M2 | OXYGEN SATURATION: 97 % | HEART RATE: 74 BPM | DIASTOLIC BLOOD PRESSURE: 72 MMHG

## 2019-05-29 DIAGNOSIS — B02.9 HERPES ZOSTER WITHOUT COMPLICATION: ICD-10-CM

## 2019-05-29 DIAGNOSIS — M54.2 NECK PAIN: Primary | ICD-10-CM

## 2019-05-29 DIAGNOSIS — M54.2 NECK PAIN: ICD-10-CM

## 2019-05-29 PROCEDURE — 99215 OFFICE O/P EST HI 40 MIN: CPT | Mod: PBBFAC,PO,25 | Performed by: NURSE PRACTITIONER

## 2019-05-29 PROCEDURE — 99214 PR OFFICE/OUTPT VISIT, EST, LEVL IV, 30-39 MIN: ICD-10-PCS | Mod: S$PBB,,, | Performed by: NURSE PRACTITIONER

## 2019-05-29 PROCEDURE — 99999 PR PBB SHADOW E&M-EST. PATIENT-LVL V: ICD-10-PCS | Mod: PBBFAC,,, | Performed by: NURSE PRACTITIONER

## 2019-05-29 PROCEDURE — 99999 PR PBB SHADOW E&M-EST. PATIENT-LVL V: CPT | Mod: PBBFAC,,, | Performed by: NURSE PRACTITIONER

## 2019-05-29 PROCEDURE — 72040 X-RAY EXAM NECK SPINE 2-3 VW: CPT | Mod: TC,FY,PO

## 2019-05-29 PROCEDURE — 72040 XR CERVICAL SPINE AP LATERAL: ICD-10-PCS | Mod: 26,,, | Performed by: RADIOLOGY

## 2019-05-29 PROCEDURE — 99214 OFFICE O/P EST MOD 30 MIN: CPT | Mod: S$PBB,,, | Performed by: NURSE PRACTITIONER

## 2019-05-29 PROCEDURE — 72040 X-RAY EXAM NECK SPINE 2-3 VW: CPT | Mod: 26,,, | Performed by: RADIOLOGY

## 2019-05-29 RX ORDER — VALACYCLOVIR HYDROCHLORIDE 1 G/1
1000 TABLET, FILM COATED ORAL 3 TIMES DAILY
Qty: 21 TABLET | Refills: 0 | Status: SHIPPED | OUTPATIENT
Start: 2019-05-29 | End: 2019-06-05

## 2019-05-29 RX ORDER — PREDNISONE 20 MG/1
TABLET ORAL
Qty: 7 TABLET | Refills: 0 | Status: SHIPPED | OUTPATIENT
Start: 2019-05-29 | End: 2019-06-05

## 2019-05-29 NOTE — PATIENT INSTRUCTIONS
SHINGLES  Anyone who has had chicken pox may get shingles later in life. It is caused by the same virus that has remained dormant (asleep) in your body. Shingles usually occurs in adults over the age of 50 or those with lowered immunity (cancer treatment, prolonged steroid use, HIV or AIDS).  It starts as a tingling patch of skin on one side of the body. During the first several days small painful blisters appear in this area. However, unlike chicken pox the rash does not spread to the rest of the body. The blister fluid contains the virus. Exposure to shingles cannot cause shingles. However, it can cause chicken pox in anyone who has never had chicken pox before. The contagious period ends when all blisters have crusted over (usually about two weeks after the illness begins).  Scarring may occur where the blisters appear. Sometimes there is continued sensitivity and pain in the involved patch of skin for months after the infection (neuralgia). Persons older than 50 or those with a weakened immune system may be treated with antiviral medicines to reduce pain, shorten the illness and prevent neuralgia.  Zostavax is a vaccine that can help prevent shingles or make it less painful. It is recommended for adults over the age of 60 who have had chicken pox in the past, but not shingles. Adults over 60 who have had neither chicken pox nor shingles can prevent both diseases with a Varicella vaccine.  HOME CARE:  · You may use acetaminophen (Tylenol) or ibuprofen (Motrin, Advil) to control pain, unless another medicine was prescribed. [NOTE: If you have chronic liver or kidney disease or ever had a stomach ulcer or GI bleeding, talk with your doctor before using these medicines.] (Aspirin should never be used in anyone under 18 years of age who is ill with a fever. It may cause severe liver damage.)  · To relieve itching and pain, make a solution of cool water mixed with cornstarch, baking soda, Aveeno Oatmeal, or Domeboro  powder (available without a prescription). Apply the solution as a compress to the area. This will soothe the skin. Calamine or Caladryl lotion may help.  · Oral Benadryl (diphenhydramine) is an antihistamine available at drug and grocery stores. Unless a prescription antihistamine was given, Benadryl may be used to reduce itching if large areas of the skin are involved. Use lower doses during the daytime and higher doses at bedtime since the drug may make you sleepy. [NOTE: Do not use Benadryl if you have glaucoma or if you are a man with trouble urinating due to an enlarged prostate.] Claritin (loratidine) is an antihistamine that causes less drowsiness and is a good alternative for daytime use.  · Wash skin with soap and water to keep rash free of infection. Trim fingernails to prevent scratching. Scratching the sores may leave scars.  · Stay home from work or school until all blisters have formed a crust and you are no longer contagious.  FOLLOW UP with your doctor or as directed by our staff if the above measures do not bring relief.    GET PROMPT MEDICAL ATTENTION if any of the following occur:   · Headache or stiff neck  · Increasing drowsiness, confusion or bizarre behavior  · Cough with trouble breathing or fast breathing (over 25 breaths per minute)  · Pain, redness or swelling of a joint  · Fever of 100.4ºF (38ºC) or higher, or as directed by your healthcare provider  · Eye pain or changes in vision or sores that appear near the eye  · Signs of skin infection (yellow or white drainage from the sores, increasing redness or pain)  · Weakness or numbness of an arm or leg  · Difficulty speaking, swallowing or walking  · Seizure  © 4240-7122 [facility], [address], [place] [address]. All rights reserved. This information is not intended as a substitute for professional medical care. Always follow your healthcare professional's instructions.  Valacyclovir Hydrochloride Oral tablet  What is this  medicine?  VALACYCLOVIR (french ay ANGÉLICA mitchell) is an antiviral medicine. It is used to treat or prevent infections caused by certain kinds of viruses. Examples of these infections include herpes and shingles. This medicine will not cure herpes.  This medicine may be used for other purposes; ask your health care provider or pharmacist if you have questions.  What should I tell my health care provider before I take this medicine?  They need to know if you have any of these conditions:  · acquired immunodeficiency syndrome (AIDS)  · any other condition that may weaken the immune system  · bone marrow or kidney transplant  · kidney disease  · an unusual or allergic reaction to valacyclovir, acyclovir, ganciclovir, valganciclovir, other medicines, foods, dyes, or preservatives  · pregnant or trying to get pregnant  · breast-feeding  How should I use this medicine?  Take this medicine by mouth with a glass of water. Follow the directions on the prescription label. You can take this medicine with or without food. Take your doses at regular intervals. Do not take your medicine more often than directed. Finish the full course prescribed by your doctor or health care professional even if you think your condition is better. Do not stop taking except on the advice of your doctor or health care professional.  Talk to your pediatrician regarding the use of this medicine in children. While this drug may be prescribed for children as young as 2 years for selected conditions, precautions do apply.  Overdosage: If you think you have taken too much of this medicine contact a poison control center or emergency room at once.  NOTE: This medicine is only for you. Do not share this medicine with others.  What if I miss a dose?  If you miss a dose, take it as soon as you can. If it is almost time for your next dose, take only that dose. Do not take double or extra doses.  What may interact with this  medicine?  · cimetidine  · probenecid  This list may not describe all possible interactions. Give your health care provider a list of all the medicines, herbs, non-prescription drugs, or dietary supplements you use. Also tell them if you smoke, drink alcohol, or use illegal drugs. Some items may interact with your medicine.  What should I watch for while using this medicine?  Tell your doctor or health care professional if your symptoms do not start to get better after 1 week.  This medicine works best when taken early in the course of an infection, within the first 72 hours. Begin treatment as soon as possible after the first signs of infection like tingling, itching, or pain in the affected area.  It is possible that genital herpes may still be spread even when you are not having symptoms. Always use safer sex practices like condoms made of latex or polyurethane whenever you have sexual contact.  You should stay well hydrated while taking this medicine. Drink plenty of fluids.  What side effects may I notice from receiving this medicine?  Side effects that you should report to your doctor or health care professional as soon as possible:  · allergic reactions like skin rash, itching or hives, swelling of the face, lips, or tongue  · aggressive behavior  · confusion  · hallucinations  · problems with balance, talking, walking  · stomach pain  · tremor  · trouble passing urine or change in the amount of urine  Side effects that usually do not require medical attention (report to your doctor or health care professional if they continue or are bothersome):  · dizziness  · headache  · nausea, vomiting  This list may not describe all possible side effects. Call your doctor for medical advice about side effects. You may report side effects to FDA at 6-344-GRY-5285.  Where should I keep my medicine?  Keep out of the reach of children.  Store at room temperature between 15 and 25 degrees C (59 and 77 degrees F). Keep  container tightly closed. Throw away any unused medicine after the expiration date.  NOTE:This sheet is a summary. It may not cover all possible information. If you have questions about this medicine, talk to your doctor, pharmacist, or health care provider. Copyright© 2011 [employer]

## 2019-06-02 DIAGNOSIS — F41.9 ANXIETY: ICD-10-CM

## 2019-06-03 RX ORDER — ALPRAZOLAM 0.5 MG/1
TABLET ORAL
Qty: 60 TABLET | Refills: 0 | Status: SHIPPED | OUTPATIENT
Start: 2019-06-03 | End: 2019-06-28 | Stop reason: SDUPTHER

## 2019-06-06 ENCOUNTER — ANESTHESIA (OUTPATIENT)
Dept: ENDOSCOPY | Facility: HOSPITAL | Age: 69
End: 2019-06-06
Payer: MEDICARE

## 2019-06-06 ENCOUNTER — HOSPITAL ENCOUNTER (OUTPATIENT)
Facility: HOSPITAL | Age: 69
Discharge: HOME | End: 2019-06-06
Attending: INTERNAL MEDICINE | Admitting: INTERNAL MEDICINE
Payer: MEDICARE

## 2019-06-06 ENCOUNTER — ANESTHESIA EVENT (OUTPATIENT)
Dept: ENDOSCOPY | Facility: HOSPITAL | Age: 69
End: 2019-06-06
Payer: MEDICARE

## 2019-06-06 VITALS
HEIGHT: 62 IN | SYSTOLIC BLOOD PRESSURE: 149 MMHG | WEIGHT: 175 LBS | HEART RATE: 64 BPM | DIASTOLIC BLOOD PRESSURE: 70 MMHG | BODY MASS INDEX: 32.2 KG/M2 | RESPIRATION RATE: 18 BRPM | OXYGEN SATURATION: 96 % | TEMPERATURE: 98 F

## 2019-06-06 DIAGNOSIS — R11.0 NAUSEA: ICD-10-CM

## 2019-06-06 LAB — H PYLORI INDEX VALUE: NEGATIVE

## 2019-06-06 PROCEDURE — D9220A PRA ANESTHESIA: ICD-10-PCS | Mod: CRNA,,, | Performed by: NURSE ANESTHETIST, CERTIFIED REGISTERED

## 2019-06-06 PROCEDURE — 88342 IMHCHEM/IMCYTCHM 1ST ANTB: CPT | Mod: 26,,, | Performed by: PATHOLOGY

## 2019-06-06 PROCEDURE — 87449 NOS EACH ORGANISM AG IA: CPT | Mod: PO | Performed by: INTERNAL MEDICINE

## 2019-06-06 PROCEDURE — 43239 EGD BIOPSY SINGLE/MULTIPLE: CPT | Mod: ,,, | Performed by: INTERNAL MEDICINE

## 2019-06-06 PROCEDURE — 37000008 HC ANESTHESIA 1ST 15 MINUTES: Mod: PO | Performed by: INTERNAL MEDICINE

## 2019-06-06 PROCEDURE — 37000009 HC ANESTHESIA EA ADD 15 MINS: Mod: PO | Performed by: INTERNAL MEDICINE

## 2019-06-06 PROCEDURE — D9220A PRA ANESTHESIA: ICD-10-PCS | Mod: ANES,,, | Performed by: ANESTHESIOLOGY

## 2019-06-06 PROCEDURE — 63600175 PHARM REV CODE 636 W HCPCS: Mod: PO | Performed by: NURSE ANESTHETIST, CERTIFIED REGISTERED

## 2019-06-06 PROCEDURE — 88305 TISSUE EXAM BY PATHOLOGIST: CPT | Mod: 26,,, | Performed by: PATHOLOGY

## 2019-06-06 PROCEDURE — 25000003 PHARM REV CODE 250: Mod: PO | Performed by: INTERNAL MEDICINE

## 2019-06-06 PROCEDURE — D9220A PRA ANESTHESIA: Mod: ANES,,, | Performed by: ANESTHESIOLOGY

## 2019-06-06 PROCEDURE — 88342 TISSUE SPECIMEN TO PATHOLOGY - SURGERY: ICD-10-PCS | Mod: 26,,, | Performed by: PATHOLOGY

## 2019-06-06 PROCEDURE — 88305 TISSUE SPECIMEN TO PATHOLOGY - SURGERY: ICD-10-PCS | Mod: 26,,, | Performed by: PATHOLOGY

## 2019-06-06 PROCEDURE — D9220A PRA ANESTHESIA: Mod: CRNA,,, | Performed by: NURSE ANESTHETIST, CERTIFIED REGISTERED

## 2019-06-06 PROCEDURE — 43239 EGD BIOPSY SINGLE/MULTIPLE: CPT | Mod: PO | Performed by: INTERNAL MEDICINE

## 2019-06-06 PROCEDURE — 43239 PR EGD, FLEX, W/BIOPSY, SGL/MULTI: ICD-10-PCS | Mod: ,,, | Performed by: INTERNAL MEDICINE

## 2019-06-06 PROCEDURE — 88305 TISSUE EXAM BY PATHOLOGIST: CPT | Performed by: PATHOLOGY

## 2019-06-06 PROCEDURE — 88342 IMHCHEM/IMCYTCHM 1ST ANTB: CPT | Performed by: PATHOLOGY

## 2019-06-06 RX ORDER — SODIUM CHLORIDE, SODIUM LACTATE, POTASSIUM CHLORIDE, CALCIUM CHLORIDE 600; 310; 30; 20 MG/100ML; MG/100ML; MG/100ML; MG/100ML
INJECTION, SOLUTION INTRAVENOUS CONTINUOUS
Status: DISCONTINUED | OUTPATIENT
Start: 2019-06-06 | End: 2019-06-06 | Stop reason: HOSPADM

## 2019-06-06 RX ORDER — PROPOFOL 10 MG/ML
VIAL (ML) INTRAVENOUS
Status: DISCONTINUED | OUTPATIENT
Start: 2019-06-06 | End: 2019-06-06

## 2019-06-06 RX ORDER — LIDOCAINE HCL/PF 100 MG/5ML
SYRINGE (ML) INTRAVENOUS
Status: DISCONTINUED | OUTPATIENT
Start: 2019-06-06 | End: 2019-06-06

## 2019-06-06 RX ORDER — SODIUM CHLORIDE 0.9 % (FLUSH) 0.9 %
10 SYRINGE (ML) INJECTION
Status: DISCONTINUED | OUTPATIENT
Start: 2019-06-06 | End: 2019-06-06 | Stop reason: HOSPADM

## 2019-06-06 RX ADMIN — LIDOCAINE HYDROCHLORIDE 75 MG: 20 INJECTION, SOLUTION INTRAVENOUS at 12:06

## 2019-06-06 RX ADMIN — PROPOFOL 30 MG: 10 INJECTION, EMULSION INTRAVENOUS at 12:06

## 2019-06-06 RX ADMIN — PROPOFOL 100 MG: 10 INJECTION, EMULSION INTRAVENOUS at 12:06

## 2019-06-06 RX ADMIN — PROPOFOL 20 MG: 10 INJECTION, EMULSION INTRAVENOUS at 12:06

## 2019-06-06 RX ADMIN — SODIUM CHLORIDE, SODIUM LACTATE, POTASSIUM CHLORIDE, AND CALCIUM CHLORIDE: .6; .31; .03; .02 INJECTION, SOLUTION INTRAVENOUS at 12:06

## 2019-06-06 NOTE — H&P
History & Physical - Short Stay  Gastroenterology      SUBJECTIVE:     Procedure: Gastroscopy    Chief Complaint/Indication for Procedure: Heartburn, nausea, cough.    History of Present Illness:  Office Visit     5/22/2019  [facility] - Gastroenterology      Stephanie Hendrickson CURT   Gastroenterology   Nausea +5 more   Dx   Nausea ; Referred by Saurabh Hassan MD   Reason for Visit       Progress Notes        Subjective:       Patient ID: Lucinda Aguilera is a 68 y.o. female Body mass index is 33.31 kg/m².     Chief Complaint: Nausea, belching, and heartburn     This patient is new to me.  Referring Provider:  Dr. Hassan  Established patient of Dr. Lopez.     Gastroesophageal Reflux   She complains of belching, coughing (occasional nonproductive), heartburn, a hoarse voice (occasional) and nausea (CHIEF COMPLAINT: started a few months ago; relieved with drinking sprite and belching; occurs daily, worse with trigger foods (see below)). She reports no abdominal pain, no chest pain, no choking, no dysphagia, no globus sensation or no sore throat. This is a chronic problem. The problem occurs occasionally. The symptoms are aggravated by certain foods and stress (high fat and fried foods & spicy foods). Associated symptoms include weight loss (trying to lose weight; lost a pant size but the scale shows the same number; exercising and dieting). Pertinent negatives include no fatigue or melena. Risk factors include NSAIDs and hiatal hernia (takes aleve BID (arthritis)). She has tried a diet change (soft bland diet, protonix 40 mg once daily (been on this for years); PAST: nexium, prilosec- helped some) for the symptoms. Past procedures include an EGD.                US 5/27/2019  Impression       1. Hepatic steatosis.  2. Cholelithiasis..    Electronically signed by: Nick Green MD  Date: 05/27/2019           See last EGD 7/13/2017  Impression:  - Normal oropharynx.                       - Normal esophagus.                        - Non-erosive esophageal reflux (NERD) disease                        present.                       - Z-line regular, 35 cm from the incisors.                       - Gastric mucosal atrophy.                       - Antritis. Biopsied.                       - Scar in the incisura. Biopsied.                       - Normal stomach otherwise.                       - Normal examined duodenum.  Recommendation:      - Discharge patient to home.                       - Await pathology results.                       - Follow an antireflux regimen.                       - Continue present medications.                       - Use Protonix (pantoprazole) 40 mg PO daily.                       - Use sucralfate tablets 1 gram PO QID for 4 weeks.                       - Return to GI clinic in 6 weeks.  Nathan Lopez MD  7/13/2017    SPECIMEN  1) Antrum gastritis.  2) Angularis.  Supplemental Diagnosis  Immunohistochemical stain for Helicobacter species is completed on the stomach biopsy and is negative for  organisms with satisfactory positive and negative controls.  (Electronically Signed: 2017-07-19 11:25:02 )  Diagnosed by: Siri Ruelas M.D.  FINAL PATHOLOGIC DIAGNOSIS  1. Stomach, antrum, biopsy:  Mild chronic gastritis.  No evidence of Helicobacter species on H&E stain.  2. Stomach, angularis, biopsy:  Chronic gastritis with focal lymphoid aggregates.  Immunohistochemical stain for Helicobacter species will be performed and results will follow in a supplemental  Report.      PTA Medications   Medication Sig    ALPRAZolam (XANAX) 0.5 MG tablet TAKE 1 TABLET(0.5 MG) BY MOUTH TWICE DAILY AS NEEDED    aspirin (ECOTRIN) 81 MG EC tablet Take 1 tablet (81 mg total) by mouth 2 (two) times daily.    carboxymethylcellulose (REFRESH PLUS) 0.5 % Dpet 1 drop 3 (three) times daily as needed.    chlorthalidone (HYGROTEN) 25 MG Tab TAKE 1 TABLET BY MOUTH EVERY MORNING, DISCONTINUE HCTZ    fish oil-omega-3  fatty acids 300-1,000 mg capsule Take 2 g by mouth once daily.    irbesartan (AVAPRO) 300 MG tablet Take 1 tablet (300 mg total) by mouth every evening. 1 po qd    multivitamin (MULTIVITAMIN) per tablet Take 1 tablet by mouth once daily.      mupirocin (BACTROBAN) 2 % ointment Apply topically 3 (three) times daily.    naproxen sodium (ALEVE) 220 mg Cap Take 220 mg by mouth 2 (two) times daily.    niacin 500 MG CpSR Take 500 mg by mouth every evening.      omeprazole (PRILOSEC) 40 MG capsule Take 1 capsule (40 mg total) by mouth before breakfast.    paroxetine (PAXIL) 10 MG tablet TAKE 1 TABLET BY MOUTH EVERY MORNING    potassium chloride (MICRO-K) 10 MEQ CpSR TAKE 2 CAPSULES BY MOUTH ONCE DAILY    pravastatin (PRAVACHOL) 40 MG tablet TAKE 1 TABLET(40 MG) BY MOUTH EVERY DAY       Review of patient's allergies indicates:  No Known Allergies     Past Medical History:   Diagnosis Date    Anticoagulant long-term use     Anxiety     takes daily Xanax    Arthritis     right thumb    Cataract     OU    Cholelithiasis     GERD (gastroesophageal reflux disease)     Hepatic steatosis     Hyperlipemia     Hypertension     PVD (posterior vitreous detachment)     OD     Past Surgical History:   Procedure Laterality Date    ARTHROSCOPY-KNEE Right 9/19/2014    [facility]    ARTHROSCOPY-MENISCECTOMY Left 12/21/2017    [facility]    chest abcess      child    CHONDROPLASTY-KNEE Left 12/21/2017    [facility]    COLONOSCOPY  01/06/2012    Dr. Lopez: hemorrhoids, redundant colon    DEBRIDEMENT-KNEE Left 12/21/2017    [facility]    ESOPHAGOGASTRODUODENOSCOPY (EGD) N/A 7/13/2017    [facility] Jr[facility] ENDO    JOINT REPLACEMENT Bilateral     knee    KNEE ARTHROSCOPY W/ LASER      right    KNEE SURGERY Right 06/03/2016    Total knee replacement     "REPAIR - COLLATERAL LIGAMENT THUMB Left 12/19/2014    [facility]    REPLACEMENT-KNEE-TOTAL Left 6/19/2018    [facility]ND [facility]    REPLACEMENT-KNEE-TOTAL Right 6/3/2016    [facility]    SYNOVECTOMY-KNEE Left 12/21/2017    [facility]    thumb surgery  11/2014    UPPER GASTROINTESTINAL ENDOSCOPY  07/13/2017    Dr. Lopez: NERD, Gastric mucosal atrophy. antritis, Scar in the incisura. Biopsied; biopsy: chronic gastritis. negative for h pylori     Family History   Problem Relation Age of Onset    Hypertension Mother     Heart disease Mother     Glaucoma Mother     Stroke Father     Glaucoma Sister     Cataracts Sister     Macular degeneration Sister     Breast cancer Neg Hx     Colon cancer Neg Hx     Crohn's disease Neg Hx     Ulcerative colitis Neg Hx      Social History     Tobacco Use    Smoking status: Never Smoker    Smokeless tobacco: Never Used   Substance Use Topics    Alcohol use: Yes     Frequency: Never     Drinks per session: 1 or 2     Binge frequency: Never     Comment: social- maybe once every few months    Drug use: No         OBJECTIVE:     Vital Signs (Most Recent)  Temp: 98.3 °F (36.8 °C) (06/06/19 1200)  Pulse: 77 (06/06/19 1200)  Resp: 18 (06/06/19 1200)  BP: (!) 144/67 (06/06/19 1200)  SpO2: 98 % (06/06/19 1200)    Physical Exam:  :Ht 5' 2" (1.575 m)   Wt 82.6 kg (182 lb 1.6 oz)   BMI 33.31 kg/m²   GENERAL:  Comfortable, in no acute distress.                             HEENT EXAM:  Nonicteric.  No adenopathy.  Oropharynx is clear.               NECK:  Supple.                                                               LUNGS:  Clear.                                                               CARDIAC:  Regular rate and rhythm.  S1, S2.  No murmur.          ABDOMEN:  Soft, positive bowel sounds, nontender.  No hepatosplenomegaly or " masses.  No rebound or guarding.          EXTREMITIES:  No edema.     MENTAL STATUS:  Alert and oriented.    ASSESSMENT/PLAN:     Assessment: Heartburn, nausea, cough. And belching.    Plan: Gastroscopy    Anesthesia Plan:   MAC / General Anaesthesia    ASA Grade: ASA 2 - Patient with mild systemic disease with no functional limitations    MALLAMPATI SCORE: II (hard and soft palate, upper portion of tonsils anduvula visible)

## 2019-06-06 NOTE — DISCHARGE INSTRUCTIONS
Recovery After Procedural Sedation (Adult)  You have been given medicine by vein to make you sleep during your surgery. This may have included both a pain medicine and sleeping medicine. Most of the effects have worn off. But you may still have some drowsiness for the next 6 to 8 hours.  Home care  Follow these guidelines when you get home:  · For the next 8 hours, you should be watched by a responsible adult. This person should make sure your condition is not getting worse.  · Don't drink any alcohol for the next 24 hours.  · Don't drive, operate dangerous machinery, or make important business or personal decisions during the next 24 hours.  Note: Your healthcare provider may tell you not to take any medicine by mouth for pain or sleep in the next 4 hours. These medicines may react with the medicines you were given in the hospital. This could cause a much stronger response than usual.  Follow-up care  Follow up with your healthcare provider if you are not alert and back to your usual level of activity within 12 hours.  When to seek medical advice  Call your healthcare provider right away if any of these occur:  · Drowsiness gets worse  · Weakness or dizziness gets worse  · Repeated vomiting  · You can't be awakened   Date Last Reviewed: 10/18/2016  © 1664-7131 [employer]. [address] [place] [address], [place] [address]. All rights reserved. This information is not intended as a substitute for professional medical care. Always follow your healthcare professional's instructions.      Tips to Control Acid Reflux    To control acid reflux, youll need to make some basic diet and lifestyle changes. The simple steps outlined below may be all youll need to ease discomfort.  Watch what you eat  · Avoid fatty foods and spicy foods.  · Eat fewer acidic foods, such as citrus and tomato-based foods. These can increase symptoms.  · Limit drinking alcohol, caffeine, and fizzy beverages. All increase acid  reflux.  · Try limiting chocolate, peppermint, and spearmint. These can worsen acid reflux in some people.  Watch when you eat  · Avoid lying down for 3 hours after eating.  · Do not snack before going to bed.  Raise your head  Raising your head and upper body by 4 to 6 inches helps limit reflux when youre lying down. Put blocks under the head of your bed frame to raise it.  Other changes  · Lose weight, if you need to  · Dont exercise near bedtime  · Avoid tight-fitting clothes  · Limit aspirin and ibuprofen  · Stop smoking   Date Last Reviewed: 7/1/2016  © 8566-0074 [employer]. [address] [place] [address], [place] [address]. All rights reserved. This information is not intended as a substitute for professional medical care. Always follow your healthcare professional's instructions.

## 2019-06-06 NOTE — TRANSFER OF CARE
"Anesthesia Transfer of Care Note    Patient: Lucinda Aguilera    Procedure(s) Performed: Procedure(s) (LRB):  EGD (ESOPHAGOGASTRODUODENOSCOPY) (N/A)    Patient location: PACU    Anesthesia Type: general    Transport from OR: Transported from OR on room air with adequate spontaneous ventilation    Post pain: adequate analgesia    Post assessment: no apparent anesthetic complications    Post vital signs: stable    Level of consciousness: awake and sedated    Nausea/Vomiting: no nausea/vomiting    Complications: none    Transfer of care protocol was followed      Last vitals:   Visit Vitals  BP (!) 144/67 (BP Location: Right arm)   Pulse 77   Temp 36.8 °C (98.3 °F) (Skin)   Resp 18   Ht 5' 2" (1.575 m)   Wt 79.4 kg (175 lb)   LMP 04/18/2004   SpO2 98%   Breastfeeding? No   BMI 32.01 kg/m²     "

## 2019-06-06 NOTE — ANESTHESIA PREPROCEDURE EVALUATION
06/06/2019  Lucinda Aguilera is a 68 y.o., female.    Anesthesia Evaluation    I have reviewed the Patient Summary Reports.    I have reviewed the Nursing Notes.   I have reviewed the Medications.     Review of Systems  Anesthesia Hx:  No problems with previous Anesthesia  History of prior surgery of interest to airway management or planning: Previous anesthesia: General, Spinal Denies Family Hx of Anesthesia complications.   Denies Personal Hx of Anesthesia complications.   Social:  Non-Smoker, No Alcohol Use    Hematology/Oncology:  Hematology Normal   Oncology Normal     EENT/Dental:  EENT/Dental Normal Fluid behind left ear. Taking nasal sprays.  Denies any problems with equilibrium.    Cardiovascular:   Exercise tolerance: good Hypertension hyperlipidemia Housework, grocery shopping, cooks, takes care of grand kids.  Can climb a flight of stairs.  Denies chest pain or sob   Pulmonary:  Pulmonary Normal  Possible Obstructive Sleep Apnea , (STOP/BANG) Symptoms A - Age > 50, P - Pressure being treated for high BP, N - Neck circumference > 40 cms and S - Snoring (loud)    Renal/:  Renal/ Normal     Hepatic/GI:   GERD (can lie flat. takes protonix daily) Denies Liver Disease.    Musculoskeletal:   Arthritis   Musculoskeletal General/Symptoms: Functional capacity is ambulatory without assistance.  Joint Disease:  Arthritis, Osteoarthritis    Neurological:  Neurology Normal  Pain , onset is chronic , location of knee , quality of aching/dull , severity is a 5 , precipitating factors are walking , alleviating factors are rest. Osteoarthritis    Endocrine:  Endocrine Normal    Dermatological:  Skin Normal    Psych:   anxiety          Physical Exam  General:  Well nourished, Obesity    Airway/Jaw/Neck:  Airway Findings: Mouth Opening: Normal Tongue: Normal  General Airway Assessment: Adult  Mallampati: III   Improves to II with phonation.  TM Distance: Normal, at least 6 cm  Jaw/Neck Findings:  Neck ROM: Normal ROM     Eyes/Ears/Nose:  EYES/EARS/NOSE FINDINGS: Normal   Dental:  Dental Findings: In tact   Chest/Lungs:  Chest/Lungs Findings: Clear to auscultation, Normal Respiratory Rate     Heart/Vascular:  Heart Findings: Rate: Normal  Rhythm: Regular Rhythm  Sounds: Normal  Heart murmur: negative Vascular Findings: Normal    Abdomen:  Abdomen Findings: Normal    Musculoskeletal:  Musculoskeletal Findings: Normal   Skin:  Skin Findings: Normal    Mental Status:  Mental Status Findings:  Cooperative, Alert and Oriented         Anesthesia Plan  Type of Anesthesia, risks & benefits discussed:  Anesthesia Type:  general  Patient's Preference: General  Intra-op Monitoring Plan: standard ASA monitors  Intra-op Monitoring Plan Comments:   Post Op Pain Control Plan:   Post Op Pain Control Plan Comments:   Induction:   IV  Beta Blocker:  Patient is not currently on a Beta-Blocker (No further documentation required).       Informed Consent: Patient understands risks and agrees with Anesthesia plan.  Questions answered. Anesthesia consent signed with patient.  ASA Score: 2     Day of Surgery Review of History & Physical:    H&P update referred to the surgeon.         Ready For Surgery From Anesthesia Perspective.

## 2019-06-06 NOTE — TRANSFER OF CARE
"Anesthesia Transfer of Care Note    Patient: Lucinda Aguilera    Procedure(s) Performed: Procedure(s) (LRB):  EGD (ESOPHAGOGASTRODUODENOSCOPY) (N/A)    Patient location: PACU    Anesthesia Type: general    Transport from OR: Transported from OR on room air with adequate spontaneous ventilation    Post pain: adequate analgesia    Post assessment: no apparent anesthetic complications    Post vital signs: stable    Level of consciousness: awake and sedated    Nausea/Vomiting: no nausea/vomiting    Complications: none    Transfer of care protocol was followed      Last vitals:   Visit Vitals  BP (!) 111/53 (BP Location: Left arm, Patient Position: Lying)   Pulse 76   Temp 36.8 °C (98.3 °F) (Skin)   Resp 18   Ht 5' 2" (1.575 m)   Wt 79.4 kg (175 lb)   LMP 04/18/2004   SpO2 98%   Breastfeeding? No   BMI 32.01 kg/m²     "

## 2019-06-06 NOTE — ANESTHESIA POSTPROCEDURE EVALUATION
Anesthesia Post Evaluation    Patient: Lucinda Aguilera    Procedure(s) Performed: Procedure(s) (LRB):  EGD (ESOPHAGOGASTRODUODENOSCOPY) (N/A)    Final Anesthesia Type: general  Patient location during evaluation: PACU  Patient participation: Yes- Able to Participate  Level of consciousness: awake and alert, oriented and awake  Post-procedure vital signs: reviewed and stable  Pain management: adequate  Airway patency: patent  PONV status at discharge: No PONV  Anesthetic complications: no      Cardiovascular status: blood pressure returned to baseline and hemodynamically stable  Respiratory status: unassisted, spontaneous ventilation and room air  Hydration status: euvolemic  Follow-up not needed.          Vitals Value Taken Time   /53 6/6/2019 12:35 PM   Temp 36.8 °C (98.3 °F) 6/6/2019 12:00 PM   Pulse 76 6/6/2019 12:35 PM   Resp 18 6/6/2019 12:35 PM   SpO2 98 % 6/6/2019 12:35 PM         No case tracking events are documented in the log.      Pain/Ashley Score: No data recorded

## 2019-06-06 NOTE — [employer] PATIENT INSTRUCTIONS
Discharge Summary/Instructions after an Endoscopic Procedure  Patient Name: Lucinda Aguilera  Patient MRN: [id number]  Patient YOB: 1950 Thursday, June 06, 2019  Nathan Lopez MD  RESTRICTIONS:  During your procedure today, you received medications for sedation.  These   medications may affect your judgment, balance and coordination.  Therefore,   for 24 hours, you have the following restrictions:   - DO NOT drive a car, operate machinery, make legal/financial decisions,   sign important papers or drink alcohol.    ACTIVITY:  Today: no heavy lifting, straining or running due to procedural   sedation/anesthesia.  The following day: return to full activity including work.  DIET:  Eat and drink normally unless instructed otherwise.     TREATMENT FOR COMMON SIDE EFFECTS:  - Mild abdominal pain, nausea, belching, bloating or excessive gas:  rest,   eat lightly and use a heating pad.  - Sore Throat: treat with throat lozenges and/or gargle with warm salt   water.  - Because air was used during the procedure, expelling large amounts of air   from your rectum or belching is normal.  - If a bowel prep was taken, you may not have a bowel movement for 1-3 days.    This is normal.  SYMPTOMS TO WATCH FOR AND REPORT TO YOUR PHYSICIAN:  1. Abdominal pain or bloating, other than gas cramps.  2. Chest pain.  3. Back pain.  4. Signs of infection such as: chills or fever occurring within 24 hours   after the procedure.  5. Rectal bleeding, which would show as bright red, maroon, or black stools.   (A tablespoon of blood from the rectum is not serious, especially if   hemorrhoids are present.)  6. Vomiting.  7. Weakness or dizziness.  GO DIRECTLY TO THE NEAREST EMERGENCY ROOM IF YOU HAVE ANY OF THE FOLLOWING:      Difficulty breathing              Chills and/or fever over 101 F   Persistent vomiting and/or vomiting blood   Severe abdominal pain   Severe chest pain   Black, tarry stools   Bleeding- more than one  tablespoon   Any other symptom or condition that you feel may need urgent attention  Your doctor recommends these additional instructions:  If any biopsies were taken, your doctors clinic will contact you in 1 to 2   weeks with any results.  Follow an antireflux regimen.  This includes:       - Do not lie down for at least 3 to 4 hours after meals.        - Raise the head of the bed 4 to 6 inches.        - Decrease excess weight.        - Avoid citrus juices and other acidic foods, alcohol, chocolate, mints,   coffee and other caffeinated beverages, carbonated beverages, fatty and   fried foods.        - Avoid tight-fitting clothing.        - Avoid cigarettes and other tobacco products.   Continue your present medications.   Take Prilosec (omeprazole) 40 mg by mouth once a day.   Take Zantac (ranitidine) 300 mg by mouth every night about an hour before   bedtime.   Return to GI clinic in 6-8 weeks.  For questions, problems or results please call your physician - Nathan Lopez MD at Work:  [phone].  EMERGENCY PHONE NUMBER: [phone], LAB RESULTS: [phone]  IF A COMPLICATION OR EMERGENCY SITUATION ARISES AND YOU ARE UNABLE TO REACH   YOUR PHYSICIAN - GO DIRECTLY TO THE EMERGENCY ROOM.  ___________________________________________  Nurse Signature  ___________________________________________  Patient/Designated Responsible Party Signature  Nathan Lopez MD  6/6/2019 12:45:26 PM  This report has been verified and signed electronically.  [employer]

## 2019-06-07 NOTE — PROGRESS NOTES
"Last 5 Patient Entered Readings                                      Current 30 Day Average: 123/73     Recent Readings 5/29/2019 5/27/2019 5/22/2019 5/14/2019 5/13/2019    SBP (mmHg) 112 121 116 128 134    DBP (mmHg) 72 69 65 72 83    Pulse 74 75 80 80 83        Digital Medicine: Health  Follow Up    Lifestyle Modifications:    1.Dietary Modifications (Sodium intake <2,000mg/day, food labels, dining out): Patient reports not having much of an appetite while having the shingles.    2.Physical Activity: Deferred    3.Medication Therapy: Patient has been compliant with the medication regimen.    4.Patient has the following medication side effects/concerns: None  (Frequency/Alleviating factors/Precipitating factors, etc.)     Follow up with Mrs. Lucinda Aguilera completed. Mrs. Aguilera is doing okay. She reports having the shingles last week, and discovered she has "floating gallstones" yesterday. She will be seen in the clinic on Monday to determine next steps for removing gallstones. Mrs. Aguilera has been surprised to see her BP so low, considering what she's been dealing with. She hopes her BP will stay within this range once things have settled. No further questions or concerns. Will continue to follow up to achieve health goals.            "

## 2019-06-10 ENCOUNTER — OFFICE VISIT (OUTPATIENT)
Dept: PODIATRY | Facility: CLINIC | Age: 69
End: 2019-06-10
Payer: MEDICARE

## 2019-06-10 ENCOUNTER — LAB VISIT (OUTPATIENT)
Dept: LAB | Facility: HOSPITAL | Age: 69
End: 2019-06-10
Attending: SURGERY
Payer: MEDICARE

## 2019-06-10 ENCOUNTER — OFFICE VISIT (OUTPATIENT)
Dept: SURGERY | Facility: CLINIC | Age: 69
End: 2019-06-10
Payer: MEDICARE

## 2019-06-10 VITALS
RESPIRATION RATE: 16 BRPM | BODY MASS INDEX: 33.34 KG/M2 | HEIGHT: 62 IN | HEART RATE: 93 BPM | SYSTOLIC BLOOD PRESSURE: 130 MMHG | TEMPERATURE: 98 F | DIASTOLIC BLOOD PRESSURE: 60 MMHG | WEIGHT: 181.19 LBS

## 2019-06-10 VITALS
BODY MASS INDEX: 33.26 KG/M2 | DIASTOLIC BLOOD PRESSURE: 75 MMHG | HEIGHT: 62 IN | HEART RATE: 93 BPM | WEIGHT: 180.75 LBS | SYSTOLIC BLOOD PRESSURE: 132 MMHG

## 2019-06-10 DIAGNOSIS — K80.50 BILIARY COLIC: ICD-10-CM

## 2019-06-10 DIAGNOSIS — M20.11 HAV (HALLUX ABDUCTO VALGUS), RIGHT: Primary | ICD-10-CM

## 2019-06-10 DIAGNOSIS — K80.50 BILIARY COLIC: Primary | ICD-10-CM

## 2019-06-10 PROCEDURE — 99213 OFFICE O/P EST LOW 20 MIN: CPT | Mod: PBBFAC,25,27,PN | Performed by: PODIATRIST

## 2019-06-10 PROCEDURE — 99212 OFFICE O/P EST SF 10 MIN: CPT | Mod: S$PBB,,, | Performed by: PODIATRIST

## 2019-06-10 PROCEDURE — 99999 PR PBB SHADOW E&M-EST. PATIENT-LVL V: ICD-10-PCS | Mod: PBBFAC,,, | Performed by: SURGERY

## 2019-06-10 PROCEDURE — 99215 OFFICE O/P EST HI 40 MIN: CPT | Mod: PBBFAC,25,PO | Performed by: SURGERY

## 2019-06-10 PROCEDURE — 93010 ELECTROCARDIOGRAM REPORT: CPT | Mod: S$PBB,,, | Performed by: INTERNAL MEDICINE

## 2019-06-10 PROCEDURE — 93005 ELECTROCARDIOGRAM TRACING: CPT | Mod: PBBFAC,PO | Performed by: INTERNAL MEDICINE

## 2019-06-10 PROCEDURE — 93010 EKG 12-LEAD: ICD-10-PCS | Mod: S$PBB,,, | Performed by: INTERNAL MEDICINE

## 2019-06-10 PROCEDURE — 99999 PR PBB SHADOW E&M-EST. PATIENT-LVL III: ICD-10-PCS | Mod: PBBFAC,,, | Performed by: PODIATRIST

## 2019-06-10 PROCEDURE — 99204 PR OFFICE/OUTPT VISIT, NEW, LEVL IV, 45-59 MIN: ICD-10-PCS | Mod: S$PBB,,, | Performed by: SURGERY

## 2019-06-10 PROCEDURE — 99999 PR PBB SHADOW E&M-EST. PATIENT-LVL V: CPT | Mod: PBBFAC,,, | Performed by: SURGERY

## 2019-06-10 PROCEDURE — 99204 OFFICE O/P NEW MOD 45 MIN: CPT | Mod: S$PBB,,, | Performed by: SURGERY

## 2019-06-10 PROCEDURE — 80053 COMPREHEN METABOLIC PANEL: CPT

## 2019-06-10 PROCEDURE — 36415 COLL VENOUS BLD VENIPUNCTURE: CPT | Mod: PO

## 2019-06-10 PROCEDURE — 99212 PR OFFICE/OUTPT VISIT, EST, LEVL II, 10-19 MIN: ICD-10-PCS | Mod: S$PBB,,, | Performed by: PODIATRIST

## 2019-06-10 PROCEDURE — 99999 PR PBB SHADOW E&M-EST. PATIENT-LVL III: CPT | Mod: PBBFAC,,, | Performed by: PODIATRIST

## 2019-06-10 NOTE — PROGRESS NOTES
Subjective:      Patient ID: Lucinda Aguilera is a 68 y.o. female.    Chief Complaint: Follow-up (2wk f/u PCP MAGDALENO Engel NP 5/2019)      HPI:  Lucinda Aguilera is a 68 y.o. female who presents to clinic with a chief complaint of wound right foot.  Patient states that she has had to perform wound care in at least a week.  She relates offloading pad helped tremendously.  She is thankful that wound is healed.  She denies any pain at previous wound site.  Patient denies any other pedal complaints this time.    PCP:  Saurabh Hassan MD  Date last seen: 5/14/19 Sharda Engel NP    Review of Systems   Constitutional: Negative for appetite change, fever, chills, fatigue and unexpected weight change.   Cardiovascular: Negative for chest pain, claudication, cyanosis, and leg swelling.  Musculoskeletal: Negative for back pain, arthritis, joint pain, joint swelling, myalgias, and stiffness.   Skin: Negative for nail bed changes, discoloration, rash, itching, poor wound healing, suspicious lesion, and unusual hair distribution.   Neurological: Negative for loss of balance, sensory change, paresthesias, and numbness.   Hematological: Negative for adenopathy, bleeding, and bruising easily.   Psychiatric/Behavioral: The patient is not nervous/anxious.  Negative for altered mental status.    No results found for: HGBA1C    Past Medical History:   Diagnosis Date    Anticoagulant long-term use     Anxiety     takes daily Xanax    Arthritis     right thumb    Cataract     OU    Cholelithiasis     GERD (gastroesophageal reflux disease)     Hepatic steatosis     Hyperlipemia     Hypertension     PVD (posterior vitreous detachment)     OD     Past Surgical History:   Procedure Laterality Date    ARTHROSCOPY-KNEE Right 9/19/2014    [facility]    ARTHROSCOPY-MENISCECTOMY Left 12/21/2017    [facility]    [facility]      child    CHONDROPLASTY-KNEE  Left 12/21/2017    [facility]    COLONOSCOPY  01/06/2012    Dr. Lopez: hemorrhoids, redundant colon    DEBRIDEMENT-KNEE Left 12/21/2017    [facility] OR    EGD (ESOPHAGOGASTRODUODENOSCOPY) N/A 6/6/2019    [facility] Nathan [facility] Jr[facility] ENDO    ESOPHAGOGASTRODUODENOSCOPY (EGD) N/A 7/13/2017    [facility] ENDO    JOINT REPLACEMENT Bilateral     knee    KNEE ARTHROSCOPY W/ LASER      right    KNEE SURGERY Right 06/03/2016    Total knee replacement    REPAIR - COLLATERAL LIGAMENT THUMB Left 12/19/2014    [facility] OR    REPLACEMENT-KNEE-TOTAL Left 6/19/2018    [facility]formed [facility] Nj Melvin [facility] OR 2ND FLR    REPLACEMENT-KNEE-TOTAL Right 6/3/2016    [facility]formed [facility] Nj [facility]vin [facility] OR 2ND FLR    SYNOVECTOMY-KNEE Left 12/21/2017    [facility] OR    thumb surgery  11/2014    UPPER GASTROINTESTINAL ENDOSCOPY  07/13/2017    Dr. Lopez: NERD, Gastric mucosal atrophy. antritis, Scar in the incisura. Biopsied; biopsy: chronic gastritis. negative for h pylori     Family History   Problem Relation Age of Onset    Hypertension Mother     Heart disease Mother     Glaucoma Mother     Stroke Father     Glaucoma Sister     Cataracts Sister     Macular degeneration Sister     Breast cancer Neg Hx     Colon cancer Neg Hx     Crohn's disease Neg Hx     Ulcerative colitis Neg Hx      Social History     Socioeconomic History    Marital status:      Spouse name: Not on file    Number of children: 2    Years of education: Not on file    Highest education level: Not on file   Occupational History    Occupation: retired    Occupation: retired teacher and principal   Social Needs    Financial resource strain: Not hard at all    Food insecurity:     Worry: Never true     Inability: Never true    Transportation needs:  "    Medical: No     Non-medical: No   Tobacco Use    Smoking status: Never Smoker    Smokeless tobacco: Never Used   Substance and Sexual Activity    Alcohol use: Yes     Frequency: Never     Drinks per session: 1 or 2     Binge frequency: Never     Comment: social- maybe once every few months    Drug use: No    Sexual activity: Yes     Partners: Male     Birth control/protection: None, Post-menopausal   Lifestyle    Physical activity:     Days per week: 5 days     Minutes per session: 90 min    Stress: Not at all   Relationships    Social connections:     Talks on phone: More than three times a week     Gets together: More than three times a week     Attends Hindu service: Not on file     Active member of club or organization: Yes     Attends meetings of clubs or organizations: More than 4 times per year     Relationship status:    Other Topics Concern    Not on file   Social History Narrative    Not on file           Objective:        /75   Pulse 93   Ht 5' 2" (1.575 m)   Wt 82 kg (180 lb 12.4 oz)   LMP 04/18/2004   BMI 33.06 kg/m²     Physical Exam   Constitutional: Patient is oriented to person, place, and time. Patient appears well-developed and well-nourished. No acute distress.     Psychiatric: Patient has a normal mood and affect. Patient's speech is normal and behavior is normal. Judgment is normal. Cognition and memory are normal.     Right pedal exam was performed today.  Vascular: Pedal pulses palpable 1/4 DP & PT.  CFT is = 3 seconds to the hallux.  Skin temperature is cool to cool proximal tibia to distal toes without localized increase in calor noted.  No erythema, edema, or ecchymosis noted to the foot or ankle.  Hair growth decreased distally to the LE.     Musculoskeletal: Ankle joint ROM is decreased. Subtalar joint ROM is decreased.  Midtarsal joint ROM is decreased.  1st ray ROM is decreased.  1st  MTPJ ROM is decreased.  Ankle joint dorsiflexion is restricted " with the knee extended and flexed per Silfverskiold exam.    Muscle strength is 5/5 for all LE muscle groups tested.    Neurological: Protective sensation is intact to 10/10 sites on the right foot via Freeland-Hossein monofilament.    Proprioception is Intact to the foot.  Vibratory sensation is Decreased to the foot.   Light touch sensation is Intact to the foot.   Achilles DTR and Chaddock STR are Intact to right lower extremity.  The hallux is abducted on the 1st ray.  No pain to palpation of right 1st MTPJ.    Dermatological: Toenails 1-5 right are WNL in length and thickness.  Webspaces 1-4 right are clean, dry, and intact.  Skin turgor is supple.  No dry, flaky skin noted to the LE.  Wound present to the dorsomedial aspect of the right 1st MTPJ has healed.    Nursing note and vitals reviewed.          Assessment:       Encounter Diagnosis   Name Primary?    Hav (hallux abducto valgus), right Yes         Plan:       Lucinda was seen today for follow-up.    Diagnoses and all orders for this visit:    Hav (hallux abducto valgus), right      I counseled the patient on her conditions, their implications and medical management.    - Advised patient to resume wound care as needed if wound recurs and follow up immediately.    Patient was given the following recommendations and instructions:  Patient Instructions   - Wear toe spacer and sneakers at all times when ambulating.    - Apply OTC topical arnica to affected area for pain relief and to reduce bruising/swelling.    - Notify clinic if redness, swelling, or pain worsens or fails to improve.    - Follow up as needed.          Renae Rios DPM        Dictation was performed using M*Modal Fluency.  Transcription errors may be present.

## 2019-06-10 NOTE — PATIENT INSTRUCTIONS
- Wear toe spacer and sneakers at all times when ambulating.    - Apply OTC topical arnica to affected area for pain relief and to reduce bruising/swelling.    - Notify clinic if redness, swelling, or pain worsens or fails to improve.    - Follow up as needed.

## 2019-06-10 NOTE — PATIENT INSTRUCTIONS
..PRE-OP INSTRUCTIONS    Your procedure has been scheduled at:    [facility]pre-admit nurse  [phone]      DAY Friday     DATE 06/14/19       Please call the pre-op nurse to schedule your pre-op testing and registration  Someone from the hospital will call you the evening before surgery to let you know what time you need to be at the hospital for surgery.                                               1:  DO NOT EAT OR DRINK ANYTHING AFTER MIDNIGHT THE NIGHT BEFORE SURGERY.     2:  You will need to stop any blood thinners 1 week prior to surgery.  This includes Aspirin, fish oil, Pradaxa, Coumadin, Plavix, Pletal.  Please contact the prescribing doctor to be sure it is ok to stop these medicines.    3:  Pre-admit nurse will go over your medicines and let you know which ones not to take the morning of surgery    4:  Plan to have someone drive you home after you are released from the hospital.  You WILL NOT be able to drive yourself.    5:  If you have any questions or need to change your surgery date, please call Elaine at [phone]    AFTER SURGERY:    You can shower 48 hours after surgery, REMOVE WET BANDAGES AND BANDAIDS, leave the steri- strips on.  If you have not had a bowel movement within 3 days after surgery you may take a laxative of your choice.  Do not lift anything over 5-10 pounds.    You need to have a follow up appointment 7-14 days after surgery, call the office to schedule or if you have questions or concerns.    For [facility] patients, you will receive a [facility] message with a link to schedule your post op appointment.

## 2019-06-11 DIAGNOSIS — E78.5 HYPERLIPIDEMIA, UNSPECIFIED HYPERLIPIDEMIA TYPE: ICD-10-CM

## 2019-06-11 LAB
ALBUMIN SERPL BCP-MCNC: 3.7 G/DL (ref 3.5–5.2)
ALP SERPL-CCNC: 102 U/L (ref 55–135)
ALT SERPL W/O P-5'-P-CCNC: 24 U/L (ref 10–44)
ANION GAP SERPL CALC-SCNC: 10 MMOL/L (ref 8–16)
AST SERPL-CCNC: 27 U/L (ref 10–40)
BILIRUB SERPL-MCNC: 0.2 MG/DL (ref 0.1–1)
BUN SERPL-MCNC: 16 MG/DL (ref 8–23)
CALCIUM SERPL-MCNC: 9.7 MG/DL (ref 8.7–10.5)
CHLORIDE SERPL-SCNC: 101 MMOL/L (ref 95–110)
CO2 SERPL-SCNC: 24 MMOL/L (ref 23–29)
CREAT SERPL-MCNC: 0.7 MG/DL (ref 0.5–1.4)
EST. GFR  (AFRICAN AMERICAN): >60 ML/MIN/1.73 M^2
EST. GFR  (NON AFRICAN AMERICAN): >60 ML/MIN/1.73 M^2
GLUCOSE SERPL-MCNC: 101 MG/DL (ref 70–110)
POTASSIUM SERPL-SCNC: 3.4 MMOL/L (ref 3.5–5.1)
PROT SERPL-MCNC: 7.2 G/DL (ref 6–8.4)
SODIUM SERPL-SCNC: 135 MMOL/L (ref 136–145)

## 2019-06-11 RX ORDER — LIDOCAINE HYDROCHLORIDE 10 MG/ML
1 INJECTION, SOLUTION EPIDURAL; INFILTRATION; INTRACAUDAL; PERINEURAL ONCE
Status: CANCELLED | OUTPATIENT
Start: 2019-06-14

## 2019-06-11 RX ORDER — SODIUM CHLORIDE 9 MG/ML
INJECTION, SOLUTION INTRAVENOUS CONTINUOUS
Status: CANCELLED | OUTPATIENT
Start: 2019-06-14

## 2019-06-12 ENCOUNTER — PATIENT OUTREACH (OUTPATIENT)
Dept: ADMINISTRATIVE | Facility: HOSPITAL | Age: 69
End: 2019-06-12

## 2019-06-12 ENCOUNTER — PATIENT MESSAGE (OUTPATIENT)
Dept: ADMINISTRATIVE | Facility: OTHER | Age: 69
End: 2019-06-12

## 2019-06-12 RX ORDER — PRAVASTATIN SODIUM 40 MG/1
TABLET ORAL
Qty: 90 TABLET | Refills: 3 | Status: SHIPPED | OUTPATIENT
Start: 2019-06-12 | End: 2020-06-03

## 2019-06-12 NOTE — PROGRESS NOTES
Health Maintenance Due   Topic Date Due    Shingles Vaccine (2 of 3) 11/17/2013     Chart review completed 06/12/2019  Future Appointments   Date Time Provider Department Center   6/28/2019  2:20 PM Saurabh Hassan MD [facility]   9/3/2019  8:15 AM [id number]

## 2019-06-14 ENCOUNTER — ANESTHESIA EVENT (OUTPATIENT)
Dept: SURGERY | Facility: HOSPITAL | Age: 69
End: 2019-06-14
Payer: MEDICARE

## 2019-06-14 ENCOUNTER — HOSPITAL ENCOUNTER (OUTPATIENT)
Facility: HOSPITAL | Age: 69
Discharge: HOME | End: 2019-06-14
Attending: SURGERY | Admitting: SURGERY
Payer: MEDICARE

## 2019-06-14 ENCOUNTER — ANESTHESIA (OUTPATIENT)
Dept: SURGERY | Facility: HOSPITAL | Age: 69
End: 2019-06-14
Payer: MEDICARE

## 2019-06-14 ENCOUNTER — PATIENT MESSAGE (OUTPATIENT)
Dept: FAMILY MEDICINE | Facility: CLINIC | Age: 69
End: 2019-06-14

## 2019-06-14 DIAGNOSIS — K80.50 BILIARY COLIC: Primary | ICD-10-CM

## 2019-06-14 LAB
BASOPHILS # BLD AUTO: 0 K/UL (ref 0–0.2)
BASOPHILS NFR BLD: 0.5 % (ref 0–1.9)
DIFFERENTIAL METHOD: ABNORMAL
EOSINOPHIL # BLD AUTO: 0.1 K/UL (ref 0–0.5)
EOSINOPHIL NFR BLD: 1.6 % (ref 0–8)
ERYTHROCYTE [DISTWIDTH] IN BLOOD BY AUTOMATED COUNT: 13.8 % (ref 11.5–14.5)
HCT VFR BLD AUTO: 37.3 % (ref 37–48.5)
HGB BLD-MCNC: 12.6 G/DL (ref 12–16)
LYMPHOCYTES # BLD AUTO: 2.4 K/UL (ref 1–4.8)
LYMPHOCYTES NFR BLD: 32.3 % (ref 18–48)
MCH RBC QN AUTO: 29.1 PG (ref 27–31)
MCHC RBC AUTO-ENTMCNC: 33.8 G/DL (ref 32–36)
MCV RBC AUTO: 86 FL (ref 82–98)
MONOCYTES # BLD AUTO: 0.5 K/UL (ref 0.3–1)
MONOCYTES NFR BLD: 6.9 % (ref 4–15)
NEUTROPHILS # BLD AUTO: 4.4 K/UL (ref 1.8–7.7)
NEUTROPHILS NFR BLD: 58.7 % (ref 38–73)
PLATELET # BLD AUTO: 370 K/UL (ref 150–350)
PMV BLD AUTO: 6.9 FL (ref 9.2–12.9)
RBC # BLD AUTO: 4.32 M/UL (ref 4–5.4)
WBC # BLD AUTO: 7.5 K/UL (ref 3.9–12.7)

## 2019-06-14 PROCEDURE — 71000033 HC RECOVERY, INTIAL HOUR: Performed by: SURGERY

## 2019-06-14 PROCEDURE — 85025 COMPLETE CBC W/AUTO DIFF WBC: CPT

## 2019-06-14 PROCEDURE — 88304 TISSUE EXAM BY PATHOLOGIST: CPT | Performed by: PATHOLOGY

## 2019-06-14 PROCEDURE — 25000003 PHARM REV CODE 250: Performed by: ANESTHESIOLOGY

## 2019-06-14 PROCEDURE — 71000015 HC POSTOP RECOV 1ST HR: Performed by: SURGERY

## 2019-06-14 PROCEDURE — 88304 TISSUE SPECIMEN TO PATHOLOGY - SURGERY: ICD-10-PCS | Mod: 26,,, | Performed by: PATHOLOGY

## 2019-06-14 PROCEDURE — 99900104 DSU ONLY-NO CHARGE-EA ADD'L HR (STAT): Performed by: SURGERY

## 2019-06-14 PROCEDURE — 47562 PR LAP,CHOLECYSTECTOMY: ICD-10-PCS | Mod: ,,, | Performed by: SURGERY

## 2019-06-14 PROCEDURE — 27201423 OPTIME MED/SURG SUP & DEVICES STERILE SUPPLY: Performed by: SURGERY

## 2019-06-14 PROCEDURE — 63600175 PHARM REV CODE 636 W HCPCS: Performed by: NURSE ANESTHETIST, CERTIFIED REGISTERED

## 2019-06-14 PROCEDURE — 36000708 HC OR TIME LEV III 1ST 15 MIN: Performed by: SURGERY

## 2019-06-14 PROCEDURE — 37000009 HC ANESTHESIA EA ADD 15 MINS: Performed by: SURGERY

## 2019-06-14 PROCEDURE — 99900103 DSU ONLY-NO CHARGE-INITIAL HR (STAT): Performed by: SURGERY

## 2019-06-14 PROCEDURE — 36415 COLL VENOUS BLD VENIPUNCTURE: CPT

## 2019-06-14 PROCEDURE — D9220A PRA ANESTHESIA: ICD-10-PCS | Mod: CRNA,,, | Performed by: NURSE ANESTHETIST, CERTIFIED REGISTERED

## 2019-06-14 PROCEDURE — 25000003 PHARM REV CODE 250: Performed by: NURSE ANESTHETIST, CERTIFIED REGISTERED

## 2019-06-14 PROCEDURE — D9220A PRA ANESTHESIA: ICD-10-PCS | Mod: ANES,,, | Performed by: ANESTHESIOLOGY

## 2019-06-14 PROCEDURE — 36000709 HC OR TIME LEV III EA ADD 15 MIN: Performed by: SURGERY

## 2019-06-14 PROCEDURE — 25000003 PHARM REV CODE 250: Performed by: SURGERY

## 2019-06-14 PROCEDURE — 47562 LAPAROSCOPIC CHOLECYSTECTOMY: CPT | Mod: ,,, | Performed by: SURGERY

## 2019-06-14 PROCEDURE — 37000008 HC ANESTHESIA 1ST 15 MINUTES: Performed by: SURGERY

## 2019-06-14 PROCEDURE — D9220A PRA ANESTHESIA: Mod: ANES,,, | Performed by: ANESTHESIOLOGY

## 2019-06-14 PROCEDURE — 63600175 PHARM REV CODE 636 W HCPCS: Performed by: SURGERY

## 2019-06-14 PROCEDURE — D9220A PRA ANESTHESIA: Mod: CRNA,,, | Performed by: NURSE ANESTHETIST, CERTIFIED REGISTERED

## 2019-06-14 RX ORDER — BUPIVACAINE HYDROCHLORIDE AND EPINEPHRINE 2.5; 5 MG/ML; UG/ML
INJECTION, SOLUTION EPIDURAL; INFILTRATION; INTRACAUDAL; PERINEURAL
Status: DISCONTINUED | OUTPATIENT
Start: 2019-06-14 | End: 2019-06-14 | Stop reason: HOSPADM

## 2019-06-14 RX ORDER — SODIUM CHLORIDE 0.9 % (FLUSH) 0.9 %
10 SYRINGE (ML) INJECTION
Status: DISCONTINUED | OUTPATIENT
Start: 2019-06-14 | End: 2019-06-14 | Stop reason: HOSPADM

## 2019-06-14 RX ORDER — KETOROLAC TROMETHAMINE 30 MG/ML
INJECTION, SOLUTION INTRAMUSCULAR; INTRAVENOUS
Status: DISCONTINUED | OUTPATIENT
Start: 2019-06-14 | End: 2019-06-14

## 2019-06-14 RX ORDER — PROPOFOL 10 MG/ML
VIAL (ML) INTRAVENOUS
Status: DISCONTINUED | OUTPATIENT
Start: 2019-06-14 | End: 2019-06-14

## 2019-06-14 RX ORDER — HYDROCODONE BITARTRATE AND ACETAMINOPHEN 5; 325 MG/1; MG/1
1 TABLET ORAL EVERY 6 HOURS PRN
Qty: 15 TABLET | Refills: 0 | Status: SHIPPED | OUTPATIENT
Start: 2019-06-14 | End: 2019-06-28

## 2019-06-14 RX ORDER — ONDANSETRON 2 MG/ML
INJECTION INTRAMUSCULAR; INTRAVENOUS
Status: DISCONTINUED | OUTPATIENT
Start: 2019-06-14 | End: 2019-06-14

## 2019-06-14 RX ORDER — PHENYLEPHRINE HYDROCHLORIDE 10 MG/ML
INJECTION INTRAVENOUS
Status: DISCONTINUED | OUTPATIENT
Start: 2019-06-14 | End: 2019-06-14

## 2019-06-14 RX ORDER — HYDROMORPHONE HYDROCHLORIDE 2 MG/ML
0.2 INJECTION, SOLUTION INTRAMUSCULAR; INTRAVENOUS; SUBCUTANEOUS EVERY 5 MIN PRN
Status: DISCONTINUED | OUTPATIENT
Start: 2019-06-14 | End: 2019-06-14 | Stop reason: HOSPADM

## 2019-06-14 RX ORDER — SODIUM CHLORIDE 9 MG/ML
INJECTION, SOLUTION INTRAVENOUS CONTINUOUS
Status: DISCONTINUED | OUTPATIENT
Start: 2019-06-14 | End: 2019-06-14 | Stop reason: HOSPADM

## 2019-06-14 RX ORDER — LIDOCAINE HYDROCHLORIDE 10 MG/ML
1 INJECTION, SOLUTION EPIDURAL; INFILTRATION; INTRACAUDAL; PERINEURAL ONCE
Status: COMPLETED | OUTPATIENT
Start: 2019-06-14 | End: 2019-06-14

## 2019-06-14 RX ORDER — DEXAMETHASONE SODIUM PHOSPHATE 4 MG/ML
INJECTION, SOLUTION INTRA-ARTICULAR; INTRALESIONAL; INTRAMUSCULAR; INTRAVENOUS; SOFT TISSUE
Status: DISCONTINUED | OUTPATIENT
Start: 2019-06-14 | End: 2019-06-14

## 2019-06-14 RX ORDER — ROCURONIUM BROMIDE 10 MG/ML
INJECTION, SOLUTION INTRAVENOUS
Status: DISCONTINUED | OUTPATIENT
Start: 2019-06-14 | End: 2019-06-14

## 2019-06-14 RX ORDER — LIDOCAINE HCL/PF 100 MG/5ML
SYRINGE (ML) INTRAVENOUS
Status: DISCONTINUED | OUTPATIENT
Start: 2019-06-14 | End: 2019-06-14

## 2019-06-14 RX ORDER — GLYCOPYRROLATE 0.2 MG/ML
INJECTION INTRAMUSCULAR; INTRAVENOUS
Status: DISCONTINUED | OUTPATIENT
Start: 2019-06-14 | End: 2019-06-14

## 2019-06-14 RX ORDER — CEFAZOLIN SODIUM 2 G/50ML
2 SOLUTION INTRAVENOUS
Status: COMPLETED | OUTPATIENT
Start: 2019-06-14 | End: 2019-06-14

## 2019-06-14 RX ORDER — MIDAZOLAM HYDROCHLORIDE 1 MG/ML
INJECTION, SOLUTION INTRAMUSCULAR; INTRAVENOUS
Status: DISCONTINUED | OUTPATIENT
Start: 2019-06-14 | End: 2019-06-14

## 2019-06-14 RX ORDER — SUCCINYLCHOLINE CHLORIDE 20 MG/ML
INJECTION INTRAMUSCULAR; INTRAVENOUS
Status: DISCONTINUED | OUTPATIENT
Start: 2019-06-14 | End: 2019-06-14

## 2019-06-14 RX ORDER — FENTANYL CITRATE 50 UG/ML
INJECTION, SOLUTION INTRAMUSCULAR; INTRAVENOUS
Status: DISCONTINUED | OUTPATIENT
Start: 2019-06-14 | End: 2019-06-14

## 2019-06-14 RX ORDER — SODIUM CHLORIDE, SODIUM LACTATE, POTASSIUM CHLORIDE, CALCIUM CHLORIDE 600; 310; 30; 20 MG/100ML; MG/100ML; MG/100ML; MG/100ML
INJECTION, SOLUTION INTRAVENOUS CONTINUOUS
Status: DISCONTINUED | OUTPATIENT
Start: 2019-06-14 | End: 2019-06-14 | Stop reason: HOSPADM

## 2019-06-14 RX ORDER — FENTANYL CITRATE 50 UG/ML
25 INJECTION, SOLUTION INTRAMUSCULAR; INTRAVENOUS EVERY 5 MIN PRN
Status: DISCONTINUED | OUTPATIENT
Start: 2019-06-14 | End: 2019-06-14 | Stop reason: HOSPADM

## 2019-06-14 RX ORDER — NEOSTIGMINE METHYLSULFATE 1 MG/ML
INJECTION, SOLUTION INTRAVENOUS
Status: DISCONTINUED | OUTPATIENT
Start: 2019-06-14 | End: 2019-06-14

## 2019-06-14 RX ORDER — OXYCODONE HYDROCHLORIDE 5 MG/1
5 TABLET ORAL
Status: DISCONTINUED | OUTPATIENT
Start: 2019-06-14 | End: 2019-06-14 | Stop reason: HOSPADM

## 2019-06-14 RX ADMIN — PROPOFOL 170 MG: 10 INJECTION, EMULSION INTRAVENOUS at 09:06

## 2019-06-14 RX ADMIN — ONDANSETRON 4 MG: 2 INJECTION, SOLUTION INTRAMUSCULAR; INTRAVENOUS at 09:06

## 2019-06-14 RX ADMIN — SODIUM CHLORIDE, SODIUM LACTATE, POTASSIUM CHLORIDE, AND CALCIUM CHLORIDE: .6; .31; .03; .02 INJECTION, SOLUTION INTRAVENOUS at 08:06

## 2019-06-14 RX ADMIN — NEOSTIGMINE METHYLSULFATE 3 MG: 1 INJECTION INTRAVENOUS at 10:06

## 2019-06-14 RX ADMIN — GLYCOPYRROLATE 0.4 MG: 0.2 INJECTION, SOLUTION INTRAMUSCULAR; INTRAVENOUS at 10:06

## 2019-06-14 RX ADMIN — GLYCOPYRROLATE 0.2 MG: 0.2 INJECTION, SOLUTION INTRAMUSCULAR; INTRAVENOUS at 09:06

## 2019-06-14 RX ADMIN — FENTANYL CITRATE 50 MCG: 50 INJECTION, SOLUTION INTRAMUSCULAR; INTRAVENOUS at 09:06

## 2019-06-14 RX ADMIN — MIDAZOLAM 2 MG: 1 INJECTION INTRAMUSCULAR; INTRAVENOUS at 09:06

## 2019-06-14 RX ADMIN — ACETAMINOPHEN 1000 MG: 10 INJECTION, SOLUTION INTRAVENOUS at 09:06

## 2019-06-14 RX ADMIN — PHENYLEPHRINE HYDROCHLORIDE 100 MCG: 10 INJECTION INTRAVENOUS at 09:06

## 2019-06-14 RX ADMIN — DEXAMETHASONE SODIUM PHOSPHATE 4 MG: 4 INJECTION, SOLUTION INTRAMUSCULAR; INTRAVENOUS at 09:06

## 2019-06-14 RX ADMIN — OXYCODONE HYDROCHLORIDE 5 MG: 5 TABLET ORAL at 10:06

## 2019-06-14 RX ADMIN — ROCURONIUM BROMIDE 10 MG: 10 INJECTION, SOLUTION INTRAVENOUS at 09:06

## 2019-06-14 RX ADMIN — KETOROLAC TROMETHAMINE 30 MG: 30 INJECTION, SOLUTION INTRAMUSCULAR; INTRAVENOUS at 10:06

## 2019-06-14 RX ADMIN — LIDOCAINE HYDROCHLORIDE 10 MG: 10 INJECTION, SOLUTION EPIDURAL; INFILTRATION; INTRACAUDAL; PERINEURAL at 08:06

## 2019-06-14 RX ADMIN — SUCCINYLCHOLINE CHLORIDE 140 MG: 20 INJECTION, SOLUTION INTRAMUSCULAR; INTRAVENOUS at 09:06

## 2019-06-14 RX ADMIN — CEFAZOLIN SODIUM 2 G: 2 SOLUTION INTRAVENOUS at 09:06

## 2019-06-14 RX ADMIN — LIDOCAINE HYDROCHLORIDE 75 MG: 20 INJECTION, SOLUTION INTRAVENOUS at 09:06

## 2019-06-14 NOTE — TRANSFER OF CARE
"Anesthesia Transfer of Care Note    Patient: Lucinda Aguilera    Procedure(s) Performed: Procedure(s) (LRB):  CHOLECYSTECTOMY, LAPAROSCOPIC (N/A)    Patient location: PACU    Anesthesia Type: general    Transport from OR: Transported from OR on 2-3 L/min O2 by NC with adequate spontaneous ventilation    Post pain: adequate analgesia    Post assessment: no apparent anesthetic complications    Post vital signs: stable    Level of consciousness: awake    Nausea/Vomiting: no nausea/vomiting    Complications: none    Transfer of care protocol was followed      Last vitals:   Visit Vitals  BP (!) 144/63 (BP Location: Left arm, Patient Position: Sitting)   Pulse 70   Temp 36.5 °C (97.7 °F) (Skin)   Resp 16   Ht 5' 2" (1.575 m)   Wt 82.1 kg (181 lb)   LMP 04/18/2004   SpO2 96%   Breastfeeding? No   BMI 33.11 kg/m²     "

## 2019-06-14 NOTE — OP NOTE
PATIENT NAME:  Lucinda Aguilera     DATE OF PROCEDURE: 6/14/2019    PROCEDURE: Laparoscopic Cholecystectomy    PREOPERATIVE DIAGNOSIS: Cholelithiasis / Cholecystitis   POSTOPERATIVE DIAGNOSIS: Cholelithiasis / Cholecystitis     SURGEON: Guevara Ellington Jr., M.D.  ASSISTANT: None     DESCRIPTION OF PROCEDURE: After appropriate consent was obtained, consent forms   signed and questions answered, the patient was taken to the Operating Room and   placed on the operating room table where general endotracheal anesthesia was   induced without difficulty. Preoperative antibiotics were administered. SCDs were in   place. A Timeout procedure was performed. The abdomen was prepped and draped in the usual sterile fashion. A midline incision was made beneath the umbilicus. Dissection was performed down to the fascia, which was incised. Stay sutures were placed on the fascia and the Christopher trocar was inserted. The abdomen was insufflated. Under direct   vision and after injection with local anesthetic, a 5 mm trocar was placed in   the upper midline. A second 5 mm trocar was placed between these two. The   gallbladder was identified, grasped, and retracted. There was some mild to moderate  inflammation. The cystic duct was identified and carefully dissected. Three clips   were placed on the common duct side, 2 on the gallbladder side, and the duct was   transected. The artery was identified, dissected, clipped and cut as well. The   gallbladder was then dissected free of the liver bed with electrocautery. It was   placed in a retrieval bag and removed through the umbilical port site. No bile   was spilled. The gallbladder fossa was examined and found to be hemostatic. The trocars were then removed under direct vision.The abdomen was desufflated. The fascia at the umbilicus was closed with interrupted 0 Vicryl suture and additional local was injected. The skin was then closed with 4-0 Monocryl subcuticular stitches. Steri-Strips  were then applied to all incisions. The patient was awakened, extubated and transferred to the Recovery Room in good condition. The patient tolerated the procedure without complication. Lap and instrument counts were reported as correct at the termination of the procedure.    EBL: 5 cc  SPECIMEN: gallbladder  COMPLICATIONS: none  ANESTHESIA: General

## 2019-06-14 NOTE — PLAN OF CARE
Pt transferred to phase II. VSS, denies pain, lap site dressings x 3 to abdomen cdi, tolerated clear liquids without N/V. Report given to SYLVIA Del Castillo.

## 2019-06-14 NOTE — DISCHARGE SUMMARY
Discharge Summary  General Surgery      Admit Date: 6/14/2019    Discharge Date :6/14/2019    Attending Physician: Guevara Evans     Discharge Physician: Guevara Evans    Discharged Condition: good    Discharge Diagnosis: Biliary colic [K80.50]    Treatments/Procedures: Procedure(s) (LRB):  CHOLECYSTECTOMY, LAPAROSCOPIC (N/A)    Hospital Course: Uneventful; Discharged home from Recovery    Significant Diagnostic Studies: none    Disposition: Home or Self Care    Diet: Regular    Follow up: Office 10-14 days    Activity: No heavy lifting till seen in office.    Patient Instructions:   Current Discharge Medication List      START taking these medications    Details   HYDROcodone-acetaminophen (NORCO) 5-325 mg per tablet Take 1 tablet by mouth every 6 (six) hours as needed for Pain.  Qty: 15 tablet, Refills: 0         CONTINUE these medications which have NOT CHANGED    Details   ALPRAZolam (XANAX) 0.5 MG tablet TAKE 1 TABLET(0.5 MG) BY MOUTH TWICE DAILY AS NEEDED  Qty: 60 tablet, Refills: 0    Associated Diagnoses: Anxiety      chlorthalidone (HYGROTEN) 25 MG Tab TAKE 1 TABLET BY MOUTH EVERY MORNING, DISCONTINUE HCTZ  Qty: 90 tablet, Refills: 3    Associated Diagnoses: Essential hypertension      irbesartan (AVAPRO) 300 MG tablet Take 1 tablet (300 mg total) by mouth every evening. 1 po qd  Qty: 90 tablet, Refills: 2    Associated Diagnoses: Essential hypertension      mupirocin (BACTROBAN) 2 % ointment Apply topically 3 (three) times daily.  Qty: 22 g, Refills: 0    Associated Diagnoses: Open wound of right foot, initial encounter      niacin 500 MG CpSR Take 500 mg by mouth every evening.        omeprazole (PRILOSEC) 40 MG capsule Take 1 capsule (40 mg total) by mouth before breakfast.  Qty: 30 capsule, Refills: 3    Associated Diagnoses: Nausea; Heartburn      paroxetine (PAXIL) 10 MG tablet TAKE 1 TABLET BY MOUTH EVERY MORNING  Qty: 90 tablet, Refills: 3    Associated Diagnoses: Anxiety      ranitidine  (ZANTAC) 300 MG tablet Take 1 tablet (300 mg total) by mouth every evening. , about 30-60 minutes before bedtime.  Qty: 60 tablet, Refills: 5      aspirin (ECOTRIN) 81 MG EC tablet Take 1 tablet (81 mg total) by mouth 2 (two) times daily.  Qty: 60 tablet, Refills: 0      carboxymethylcellulose (REFRESH PLUS) 0.5 % Dpet 1 drop 3 (three) times daily as needed.      fish oil-omega-3 fatty acids 300-1,000 mg capsule Take 2 g by mouth once daily.      multivitamin (MULTIVITAMIN) per tablet Take 1 tablet by mouth once daily.        naproxen sodium (ALEVE) 220 mg Cap Take 220 mg by mouth once.       potassium chloride (MICRO-K) 10 MEQ CpSR TAKE 2 CAPSULES BY MOUTH ONCE DAILY  Qty: 60 capsule, Refills: 11    Associated Diagnoses: Essential hypertension      pravastatin (PRAVACHOL) 40 MG tablet TAKE 1 TABLET(40 MG) BY MOUTH EVERY DAY  Qty: 90 tablet, Refills: 3    Associated Diagnoses: Hyperlipidemia, unspecified hyperlipidemia type             Discharge Procedure Orders   Diet general

## 2019-06-14 NOTE — DISCHARGE INSTRUCTIONS
"  Discharge Instructions: After Your Surgery/Procedure  Youve just had surgery. During surgery you were given medicine called anesthesia to keep you relaxed and free of pain. After surgery you may have some pain or nausea. This is common. Here are some tips for feeling better and getting well after surgery.     Stay on schedule with your medication.   Going home  Your doctor or nurse will show you how to take care of yourself when you go home. He or she will also answer your questions. Have an adult family member or friend drive you home.      For your safety we recommend these precaution for the first 24 hours after your procedure:  · Do not drive or use heavy equipment.  · Do not make important decisions or sign legal papers.  · Do not drink alcohol.  · Have someone stay with you, if needed. He or she can watch for problems and help keep you safe.  · Your concentration, balance, coordination, and judgement may be impaired for many hours after anesthesia.  Use caution when ambulating or standing up.     · You may feel weak and "washed out" after anesthesia and surgery.      Subtle residual effects of general anesthesia or sedation with regional / local anesthesia can last more than 24 hours.  Rest for the remainder of the day or longer if your Doctor/Surgeon has advised you to do so.  Although you may feel normal within the first 24 hours, your reflexes and mental ability may be impaired without you realizing it.  You may feel dizzy, lightheaded or sleepy for 24 hours or longer.      Be sure to go to all follow-up visits with your doctor. And rest after your surgery for as long as your doctor tells you to.  Coping with pain  If you have pain after surgery, pain medicine will help you feel better. Take it as told, before pain becomes severe. Also, ask your doctor or pharmacist about other ways to control pain. This might be with heat, ice, or relaxation. And follow any other instructions your surgeon or nurse gives " you.  Tips for taking pain medicine  To get the best relief possible, remember these points:  · Pain medicines can upset your stomach. Taking them with a little food may help.  · Most pain relievers taken by mouth need at least 20 to 30 minutes to start to work.  · Taking medicine on a schedule can help you remember to take it. Try to time your medicine so that you can take it before starting an activity. This might be before you get dressed, go for a walk, or sit down for dinner.  · Constipation is a common side effect of pain medicines. Call your doctor before taking any medicines such as laxatives or stool softeners to help ease constipation. Also ask if you should skip any foods. Drinking lots of fluids and eating foods such as fruits and vegetables that are high in fiber can also help. Remember, do not take laxatives unless your surgeon has prescribed them.  · Drinking alcohol and taking pain medicine can cause dizziness and slow your breathing. It can even be deadly. Do not drink alcohol while taking pain medicine.  · Pain medicine can make you react more slowly to things. Do not drive or run machinery while taking pain medicine.  Your health care provider may tell you to take acetaminophen to help ease your pain. Ask him or her how much you are supposed to take each day. Acetaminophen or other pain relievers may interact with your prescription medicines or other over-the-counter (OTC) drugs. Some prescription medicines have acetaminophen and other ingredients. Using both prescription and OTC acetaminophen for pain can cause you to overdose. Read the labels on your OTC medicines with care. This will help you to clearly know the list of ingredients, how much to take, and any warnings. It may also help you not take too much acetaminophen. If you have questions or do not understand the information, ask your pharmacist or health care provider to explain it to you before you take the OTC medicine.  Managing  nausea  Some people have an upset stomach after surgery. This is often because of anesthesia, pain, or pain medicine, or the stress of surgery. These tips will help you handle nausea and eat healthy foods as you get better. If you were on a special food plan before surgery, ask your doctor if you should follow it while you get better. These tips may help:  · Do not push yourself to eat. Your body will tell you when to eat and how much.  · Start off with clear liquids and soup. They are easier to digest.  · Next try semi-solid foods, such as mashed potatoes, applesauce, and gelatin, as you feel ready.  · Slowly move to solid foods. Dont eat fatty, rich, or spicy foods at first.  · Do not force yourself to have 3 large meals a day. Instead eat smaller amounts more often.  · Take pain medicines with a small amount of solid food, such as crackers or toast, to avoid nausea.     Call your surgeon if  · You still have pain an hour after taking medicine. The medicine may not be strong enough.  · You feel too sleepy, dizzy, or groggy. The medicine may be too strong.  · You have side effects like nausea, vomiting, or skin changes, such as rash, itching, or hives.       If you have obstructive sleep apnea  You were given anesthesia medicine during surgery to keep you comfortable and free of pain. After surgery, you may have more apnea spells because of this medicine and other medicines you were given. The spells may last longer than usual.   At home:  · Keep using the continuous positive airway pressure (CPAP) device when you sleep. Unless your health care provider tells you not to, use it when you sleep, day or night. CPAP is a common device used to treat obstructive sleep apnea.  · Talk with your provider before taking any pain medicine, muscle relaxants, or sedatives. Your provider will tell you about the possible dangers of taking these medicines.  © 1285-4746 [employer]. [address] [place] [address]  [place] [address]. All rights reserved. This information is not intended as a substitute for professional medical care. Always follow your healthcare professional's instructions.  General Post Operative Instructions:    · Do not drink alcoholic beverages including beer for 24 hours or as long as you are on pain medicine.  · Do not drive a motor vehicle, operate machinery or power tools, or sign legal papers for 24 hours or as long as you are on pain medication.  · You may experience light-headedness, dizziness and sleepiness following surgery.  Please do not stay alone.  A responsible adult should be with you for this 24 hour period.  · Go home and rest.  · Progress slowly to a normal diet unless instructed.  Otherwise, begin with liquids, soup and crackers, advance to solid foods.  · Certain anesthetics and pain medications may produce nausea and vomiting.  If nausea becomes a problem, call your doctor.  · A nurse will be calling you sometime after surgery. Do not be alarmed. This is our way of finding out how you are doing.  · Several times every hour while you are awake, take 2-3 deep breaths and cough.  If you have had abdominal surgery, support your stomach with a pillow firmly. This will prevent pneumonia.  · Several times every hour while you are awake, pump and flex your feet 5-6 times and do foot circles. This will help prevent blood clots.  · Call your doctor for severe pain, bleeding, fever, or signs of infection (pain, swelling, redness, foul odor, drainage).          WOUND CARE    After Surgery    DOS:   May shower in 48 hours   May remove outer dressing in 48 hours. Leave steri-strips in place. If steri-strips get wet, pat dry. If they fall off, do not worry.   Minimal activity for 48 hours.   Advance diet as tolerated.   Resume home medications as prescribed    DONT:   Do not soak in the tub   No driving for 24 hours or while taking narcotic pain medication   DO NOT TAKE ADDITIONAL  "TYLENOL/ACETAMINOPHEN WHILE TAKING NARCOTIC PAIN MEDICATION THAT CONTAINS TYLENOL/ACETAMINOPHEN.    CALL PHYSICIAN FOR:   Redness, swelling or bleeding.   Fever greater then 101.   Drainage from the incision site that appears infected.   Persistent pain not relieved by pain medication.    RETURN APPOINTMENT: Call to schedule appointment in 10-14 days  Discharge Instructions: After Your Surgery/Procedure  Youve just had surgery. During surgery you were given medicine called anesthesia to keep you relaxed and free of pain. After surgery you may have some pain or nausea. This is common. Here are some tips for feeling better and getting well after surgery.     Stay on schedule with your medication.   Going home  Your doctor or nurse will show you how to take care of yourself when you go home. He or she will also answer your questions. Have an adult family member or friend drive you home.      For your safety we recommend these precaution for the first 24 hours after your procedure:  · Do not drive or use heavy equipment.  · Do not make important decisions or sign legal papers.  · Do not drink alcohol.  · Have someone stay with you, if needed. He or she can watch for problems and help keep you safe.  · Your concentration, balance, coordination, and judgement may be impaired for many hours after anesthesia.  Use caution when ambulating or standing up.     · You may feel weak and "washed out" after anesthesia and surgery.      Subtle residual effects of general anesthesia or sedation with regional / local anesthesia can last more than 24 hours.  Rest for the remainder of the day or longer if your Doctor/Surgeon has advised you to do so.  Although you may feel normal within the first 24 hours, your reflexes and mental ability may be impaired without you realizing it.  You may feel dizzy, lightheaded or sleepy for 24 hours or longer.      Be sure to go to all follow-up visits with your doctor. And rest after your " surgery for as long as your doctor tells you to.  Coping with pain  If you have pain after surgery, pain medicine will help you feel better. Take it as told, before pain becomes severe. Also, ask your doctor or pharmacist about other ways to control pain. This might be with heat, ice, or relaxation. And follow any other instructions your surgeon or nurse gives you.  Tips for taking pain medicine  To get the best relief possible, remember these points:  · Pain medicines can upset your stomach. Taking them with a little food may help.  · Most pain relievers taken by mouth need at least 20 to 30 minutes to start to work.  · Taking medicine on a schedule can help you remember to take it. Try to time your medicine so that you can take it before starting an activity. This might be before you get dressed, go for a walk, or sit down for dinner.  · Constipation is a common side effect of pain medicines. Call your doctor before taking any medicines such as laxatives or stool softeners to help ease constipation. Also ask if you should skip any foods. Drinking lots of fluids and eating foods such as fruits and vegetables that are high in fiber can also help. Remember, do not take laxatives unless your surgeon has prescribed them.  · Drinking alcohol and taking pain medicine can cause dizziness and slow your breathing. It can even be deadly. Do not drink alcohol while taking pain medicine.  · Pain medicine can make you react more slowly to things. Do not drive or run machinery while taking pain medicine.  Your health care provider may tell you to take acetaminophen to help ease your pain. Ask him or her how much you are supposed to take each day. Acetaminophen or other pain relievers may interact with your prescription medicines or other over-the-counter (OTC) drugs. Some prescription medicines have acetaminophen and other ingredients. Using both prescription and OTC acetaminophen for pain can cause you to overdose. Read the  labels on your OTC medicines with care. This will help you to clearly know the list of ingredients, how much to take, and any warnings. It may also help you not take too much acetaminophen. If you have questions or do not understand the information, ask your pharmacist or health care provider to explain it to you before you take the OTC medicine.  Managing nausea  Some people have an upset stomach after surgery. This is often because of anesthesia, pain, or pain medicine, or the stress of surgery. These tips will help you handle nausea and eat healthy foods as you get better. If you were on a special food plan before surgery, ask your doctor if you should follow it while you get better. These tips may help:  · Do not push yourself to eat. Your body will tell you when to eat and how much.  · Start off with clear liquids and soup. They are easier to digest.  · Next try semi-solid foods, such as mashed potatoes, applesauce, and gelatin, as you feel ready.  · Slowly move to solid foods. Dont eat fatty, rich, or spicy foods at first.  · Do not force yourself to have 3 large meals a day. Instead eat smaller amounts more often.  · Take pain medicines with a small amount of solid food, such as crackers or toast, to avoid nausea.     Call your surgeon if  · You still have pain an hour after taking medicine. The medicine may not be strong enough.  · You feel too sleepy, dizzy, or groggy. The medicine may be too strong.  · You have side effects like nausea, vomiting, or skin changes, such as rash, itching, or hives.       If you have obstructive sleep apnea  You were given anesthesia medicine during surgery to keep you comfortable and free of pain. After surgery, you may have more apnea spells because of this medicine and other medicines you were given. The spells may last longer than usual.   At home:  · Keep using the continuous positive airway pressure (CPAP) device when you sleep. Unless your health care provider tells  you not to, use it when you sleep, day or night. CPAP is a common device used to treat obstructive sleep apnea.  · Talk with your provider before taking any pain medicine, muscle relaxants, or sedatives. Your provider will tell you about the possible dangers of taking these medicines.  © 2433-7793 [employer]. [address] [place] [id number]. All rights reserved. This information is not intended as a substitute for professional medical care. Always follow your healthcare professional's instructions.  Post op instructions for prevention of DVT  What is deep vein thrombosis?  Deep vein thrombosis (DVT) is the medical term for blood clots in the deep veins of the leg.  These blood clots can be dangerous.  A DVT can block a blood vessel and keep blood from getting where it needs to go.  Another problem is that the clot can travel to other parts of the body such as the lungs.  A clot that travels to the lungs is called a pulmonary embolus (PE) and can cause serious problems with breathing which can lead to death.  Am I at risk for DVT/PE?  If you are not very active, you are at risk of DVT.  Anyone confined to bed, sitting for long periods of time, recovering from surgery, etc. increases the risk of DVT.  Other risk factors are cancer diagnosis, certain medications, estrogen replacement in any form,older age, obesity, pregnancy, smoking, history of clotting disorders, and dehydration.  How will I know if I have a DVT?   Swelling in the lower leg   Pain   Warmth, redness, hardness or bulging of the vein  If you have any of these symptoms, call your doctors office right away.  Some people will not have any symptoms until the clot moves to the lungs.  What are the symptoms of a PE?   Panting, shortness of breath, or trouble breathing   Sharp, knife-like chest pain when you breathe   Coughing or coughing up blood   Rapid heartbeat  If you have any of these symptoms or get worse quickly, call  911 for emergency treatment.  How can I prevent a DVT?   Avoid long periods of inactivity and dont cross your legs--get up and walk around every hour or so.   Stay active--walking after surgery is highly encouraged.  This means you should get out of the house and walk in the neighborhood.  Going up and down stairs will not impair healing (unless advised against such activity by your doctor).     Drink plenty of noncaffeinated, nonalcoholic fluids each day to prevent dehydration.   Wear special support stockings, if they have been advised by your doctor.   If you travel, stop at least once an hour and walk around.   Avoid smoking (assistance with stopping is available through your healthcare provider)  Always notify your doctor if you are not able to follow the post operative instructions that are given to you at the time of discharge.  It may be necessary to prescribe one of the medications available to prevent DVT.We hope your stay was comfortable as you heal now, mend and rest.    For we have enjoyed taking care of you by giving your our best.    And as you get better, by regaining your health and strength;   We count it as a privilege to have served you and hope your time at [facility] was well spent.      Thank  You!!!

## 2019-06-14 NOTE — H&P (VIEW-ONLY)
Subjective:       Patient ID: Lucinda Aguilera is a 68 y.o. female.    Chief Complaint: No chief complaint on file.      HPI 67 yo female with RUQ and epigastric pain associated with meals. Has some nausea. Denies fever or chills. US show cholelithiasis without cholecystitis.    Past Medical History:   Diagnosis Date    Anticoagulant long-term use     Anxiety     takes daily Xanax    Arthritis     right thumb    Cataract     OU    Cholelithiasis     GERD (gastroesophageal reflux disease)     Hepatic steatosis     Hyperlipemia     Hypertension     PVD (posterior vitreous detachment)     OD     Past Surgical History:   Procedure Laterality Date    ARTHROSCOPY-KNEE Right 9/19/2014    [facility]formed [facility]rain [facility] at [facility] OR    ARTHROSCOPY-MENISCECTOMY Left 12/21/2017    [facility]ienne [facility] at [facility]    chest abcess      child    CHONDROPLASTY-KNEE Left 12/21/2017    [facility] at [facility]    COLONOSCOPY  01/06/2012    Dr. Lopez: hemorrhoids, redundant colon    DEBRIDEMENT-KNEE Left 12/21/2017    [facility] at [facility]    EGD (ESOPHAGOGASTRODUODENOSCOPY) N/A 6/6/2019    Performed by Nathan Lopez Jr., [facility] at [facility]    ESOPHAGOGASTRODUODENOSCOPY (EGD) N/A 7/13/2017    [facility] by Nathan [facility] Jr[facility] at [facility]    JOINT REPLACEMENT Bilateral     knee    KNEE ARTHROSCOPY W/ LASER      right    KNEE SURGERY Right 06/03/2016    Total knee replacement    REPAIR - COLLATERAL LIGAMENT THUMB Left 12/19/2014    [facility]    REPLACEMENT-KNEE-TOTAL Left 6/19/2018    [facility] at [facility]    REPLACEMENT-KNEE-TOTAL Right 6/3/2016    [facility] at [facility]    SYNOVECTOMY-KNEE Left 12/21/2017    [facility] at [facility] OR    thumb surgery  11/2014    UPPER GASTROINTESTINAL ENDOSCOPY  07/13/2017    Dr. Lopez: NERD, Gastric  mucosal atrophy. antritis, Scar in the incisura. Biopsied; biopsy: chronic gastritis. negative for h pylori       No current facility-administered medications for this visit.   No current outpatient medications on file.    Facility-Administered Medications Ordered in Other Visits:     0.9%  NaCl infusion, , Intravenous, Continuous, Guevara Evans MD    cefazolin (ANCEF) 2 gram in dextrose 5% 50 mL IVPB (premix), 2 g, Intravenous, On Call Procedure, Guevara Evans MD    lactated ringers infusion, , Intravenous, Continuous, Elias Gottlieb MD, Last Rate: 100 mL/hr at 06/14/19 0808    Review of patient's allergies indicates:  No Known Allergies    Family History   Problem Relation Age of Onset    Hypertension Mother     Heart disease Mother     Glaucoma Mother     Stroke Father     Glaucoma Sister     Cataracts Sister     Macular degeneration Sister     Breast cancer Neg Hx     Colon cancer Neg Hx     Crohn's disease Neg Hx     Ulcerative colitis Neg Hx      Social History     Socioeconomic History    Marital status:      Spouse name: Not on file    Number of children: 2    Years of education: Not on file    Highest education level: Not on file   Occupational History    Occupation: retired    Occupation: retired teacher and principal   Social Needs    Financial resource strain: Not hard at all    Food insecurity:     Worry: Never true     Inability: Never true    Transportation needs:     Medical: No     Non-medical: No   Tobacco Use    Smoking status: Never Smoker    Smokeless tobacco: Never Used   Substance and Sexual Activity    Alcohol use: Yes     Frequency: Never     Drinks per session: 1 or 2     Binge frequency: Never     Comment: social- maybe once every few months    Drug use: No    Sexual activity: Yes     Partners: Male     Birth control/protection: None, Post-menopausal   Lifestyle    Physical activity:     Days per week: 5 days     Minutes per session: 90 min     Stress: Not at all   Relationships    Social connections:     Talks on phone: More than three times a week     Gets together: More than three times a week     Attends Protestant service: Not on file     Active member of club or organization: Yes     Attends meetings of clubs or organizations: More than 4 times per year     Relationship status:    Other Topics Concern    Not on file   Social History Narrative    Not on file       Review of Systems   Constitutional: Negative for activity change, chills, fever and unexpected weight change.   HENT: Negative for congestion, sore throat, trouble swallowing and voice change.    Eyes: Negative for redness and visual disturbance.   Respiratory: Negative for cough, shortness of breath and wheezing.    Cardiovascular: Negative for chest pain and palpitations.   Gastrointestinal: Positive for abdominal pain and nausea. Negative for blood in stool and vomiting.   Endocrine: Negative.    Genitourinary: Negative for dysuria, frequency and hematuria.   Musculoskeletal: Negative for arthralgias, back pain and neck pain.   Skin: Negative for rash and wound.   Allergic/Immunologic: Negative.    Neurological: Negative for dizziness, weakness and headaches.   Hematological: Negative for adenopathy.   Psychiatric/Behavioral: Negative for agitation and dysphoric mood. The patient is not nervous/anxious.      Objective:     Physical Exam   Constitutional: She is oriented to person, place, and time. She appears well-developed and well-nourished. No distress.   HENT:   Head: Normocephalic and atraumatic.   Mouth/Throat: Oropharynx is clear and moist. No oropharyngeal exudate.   Eyes: Pupils are equal, round, and reactive to light. Conjunctivae and EOM are normal. No scleral icterus.   Neck: Normal range of motion. No thyromegaly present.   Cardiovascular: Normal rate and regular rhythm.   No murmur heard.  Pulmonary/Chest: Effort normal and breath sounds normal. She has no  wheezes. She has no rales.   Abdominal: Soft. Bowel sounds are normal. She exhibits no distension and no mass. There is tenderness (Minimal abdominal tenderness.). No hernia.   Musculoskeletal: Normal range of motion. She exhibits no edema.   Lymphadenopathy:     She has no cervical adenopathy.   Neurological: She is alert and oriented to person, place, and time. No cranial nerve deficit.   Skin: Skin is warm and dry. No rash noted. No erythema.   Psychiatric: She has a normal mood and affect. Her behavior is normal.       US report reviewed.    Impression       1. Hepatic steatosis.  2. Cholelithiasis..           Assessment:     Encounter Diagnosis   Name Primary?    Biliary colic Yes       Plan:      1. Laparoscopic, possible open cholecystectomy.  2. Risks and benefits of the planned procedure were discussed at length with the patient.  Risks and benefits of not proceeding with the procedure were discussed as well. All questions were answered. The patient expressed clear understanding and would like to proceed with the procedure as discussed.

## 2019-06-14 NOTE — PROGRESS NOTES
Subjective:       Patient ID: Lucinda Aguilera is a 68 y.o. female.    Chief Complaint: No chief complaint on file.      HPI 69 yo female with RUQ and epigastric pain associated with meals. Has some nausea. Denies fever or chills. US show cholelithiasis without cholecystitis.    Past Medical History:   Diagnosis Date    Anticoagulant long-term use     Anxiety     takes daily Xanax    Arthritis     right thumb    Cataract     OU    Cholelithiasis     GERD (gastroesophageal reflux disease)     Hepatic steatosis     Hyperlipemia     Hypertension     PVD (posterior vitreous detachment)     OD     Past Surgical History:   Procedure Laterality Date    ARTHROSCOPY-KNEE Right 9/19/2014    Performed by [facility]esto Crain [facility] at [facility] OR    ARTHROSCOPY-MENISCECTOMY Left 12/21/2017    Performed by Tomas Etienne [facility] at [facility] OR    chest abcess      child    CHONDROPLASTY-KNEE Left 12/21/2017    [facility]ienne [facility] at [facility] OR    COLONOSCOPY  01/06/2012    Dr. Lopez: hemorrhoids, redundant colon    DEBRIDEMENT-KNEE Left 12/21/2017    Performed by [facility]ienne [facility] at [facility] OR    EGD (ESOPHAGOGASTRODUODENOSCOPY) N/A 6/6/2019    Performed by Nathan Lopez Jr., MD at [facility] ENDO    ESOPHAGOGASTRODUODENOSCOPY (EGD) N/A 7/13/2017    Performed by Nathan Lopez Jr., MD at [facility] ENDO    JOINT REPLACEMENT Bilateral     knee    KNEE ARTHROSCOPY W/ LASER      right    KNEE SURGERY Right 06/03/2016    Total knee replacement    REPAIR - COLLATERAL LIGAMENT THUMB Left 12/19/2014    [facility]    REPLACEMENT-KNEE-TOTAL Left 6/19/2018    [facility]formed [facility] at [facility] 2ND FLR    REPLACEMENT-KNEE-TOTAL Right 6/3/2016    Performed by Nj [facility] at [facility] OR 2ND FLR    SYNOVECTOMY-KNEE Left 12/21/2017    Performed by [facility]ienne [facility] at [facility] OR    thumb surgery  11/2014    UPPER GASTROINTESTINAL ENDOSCOPY  07/13/2017    Dr. Lopez: NERD, Gastric  mucosal atrophy. antritis, Scar in the incisura. Biopsied; biopsy: chronic gastritis. negative for h pylori       No current facility-administered medications for this visit.   No current outpatient medications on file.    Facility-Administered Medications Ordered in Other Visits:     0.9%  NaCl infusion, , Intravenous, Continuous, Guevara Evans MD    cefazolin (ANCEF) 2 gram in dextrose 5% 50 mL IVPB (premix), 2 g, Intravenous, On Call Procedure, Guevara Evans MD    lactated ringers infusion, , Intravenous, Continuous, Elias Gottlieb MD, Last Rate: 100 mL/hr at 06/14/19 0808    Review of patient's allergies indicates:  No Known Allergies    Family History   Problem Relation Age of Onset    Hypertension Mother     Heart disease Mother     Glaucoma Mother     Stroke Father     Glaucoma Sister     Cataracts Sister     Macular degeneration Sister     Breast cancer Neg Hx     Colon cancer Neg Hx     Crohn's disease Neg Hx     Ulcerative colitis Neg Hx      Social History     Socioeconomic History    Marital status:      Spouse name: Not on file    Number of children: 2    Years of education: Not on file    Highest education level: Not on file   Occupational History    Occupation: retired    Occupation: retired teacher and principal   Social Needs    Financial resource strain: Not hard at all    Food insecurity:     Worry: Never true     Inability: Never true    Transportation needs:     Medical: No     Non-medical: No   Tobacco Use    Smoking status: Never Smoker    Smokeless tobacco: Never Used   Substance and Sexual Activity    Alcohol use: Yes     Frequency: Never     Drinks per session: 1 or 2     Binge frequency: Never     Comment: social- maybe once every few months    Drug use: No    Sexual activity: Yes     Partners: Male     Birth control/protection: None, Post-menopausal   Lifestyle    Physical activity:     Days per week: 5 days     Minutes per session: 90 min     Stress: Not at all   Relationships    Social connections:     Talks on phone: More than three times a week     Gets together: More than three times a week     Attends Voodoo service: Not on file     Active member of club or organization: Yes     Attends meetings of clubs or organizations: More than 4 times per year     Relationship status:    Other Topics Concern    Not on file   Social History Narrative    Not on file       Review of Systems   Constitutional: Negative for activity change, chills, fever and unexpected weight change.   HENT: Negative for congestion, sore throat, trouble swallowing and voice change.    Eyes: Negative for redness and visual disturbance.   Respiratory: Negative for cough, shortness of breath and wheezing.    Cardiovascular: Negative for chest pain and palpitations.   Gastrointestinal: Positive for abdominal pain and nausea. Negative for blood in stool and vomiting.   Endocrine: Negative.    Genitourinary: Negative for dysuria, frequency and hematuria.   Musculoskeletal: Negative for arthralgias, back pain and neck pain.   Skin: Negative for rash and wound.   Allergic/Immunologic: Negative.    Neurological: Negative for dizziness, weakness and headaches.   Hematological: Negative for adenopathy.   Psychiatric/Behavioral: Negative for agitation and dysphoric mood. The patient is not nervous/anxious.      Objective:     Physical Exam   Constitutional: She is oriented to person, place, and time. She appears well-developed and well-nourished. No distress.   HENT:   Head: Normocephalic and atraumatic.   Mouth/Throat: Oropharynx is clear and moist. No oropharyngeal exudate.   Eyes: Pupils are equal, round, and reactive to light. Conjunctivae and EOM are normal. No scleral icterus.   Neck: Normal range of motion. No thyromegaly present.   Cardiovascular: Normal rate and regular rhythm.   No murmur heard.  Pulmonary/Chest: Effort normal and breath sounds normal. She has no  wheezes. She has no rales.   Abdominal: Soft. Bowel sounds are normal. She exhibits no distension and no mass. There is tenderness (Minimal abdominal tenderness.). No hernia.   Musculoskeletal: Normal range of motion. She exhibits no edema.   Lymphadenopathy:     She has no cervical adenopathy.   Neurological: She is alert and oriented to person, place, and time. No cranial nerve deficit.   Skin: Skin is warm and dry. No rash noted. No erythema.   Psychiatric: She has a normal mood and affect. Her behavior is normal.       US report reviewed.    Impression       1. Hepatic steatosis.  2. Cholelithiasis..           Assessment:     Encounter Diagnosis   Name Primary?    Biliary colic Yes       Plan:      1. Laparoscopic, possible open cholecystectomy.  2. Risks and benefits of the planned procedure were discussed at length with the patient.  Risks and benefits of not proceeding with the procedure were discussed as well. All questions were answered. The patient expressed clear understanding and would like to proceed with the procedure as discussed.

## 2019-06-14 NOTE — ANESTHESIA PREPROCEDURE EVALUATION
06/14/2019  Lucinda Aguilera is a 68 y.o., female.    Anesthesia Evaluation    I have reviewed the Patient Summary Reports.    I have reviewed the Nursing Notes.   I have reviewed the Medications.     Review of Systems  Cardiovascular:   Hypertension  Functional Capacity good / => 4 METS  Hypertension    Hepatic/GI:   GERD, well controlled        Physical Exam  General:  Well nourished    Airway/Jaw/Neck:  Airway Findings: Mouth Opening: Normal Tongue: Normal  General Airway Assessment: Adult  Mallampati: II  Improves to II with phonation.  Jaw/Neck Findings:  Neck ROM: Normal ROM      Dental:  Dental Findings: In tact   Chest/Lungs:  Chest/Lungs Findings: Clear to auscultation, Normal Respiratory Rate              Anesthesia Plan  Type of Anesthesia, risks & benefits discussed:  Anesthesia Type:  general  Patient's Preference:   Intra-op Monitoring Plan: standard ASA monitors  Intra-op Monitoring Plan Comments:   Post Op Pain Control Plan: multimodal analgesia  Post Op Pain Control Plan Comments:   Induction:   IV  Beta Blocker:  Patient is not currently on a Beta-Blocker (No further documentation required).       Informed Consent: Patient understands risks and agrees with Anesthesia plan.  Questions answered. Anesthesia consent signed with patient.  ASA Score: 2     Day of Surgery Review of History & Physical:            Ready For Surgery From Anesthesia Perspective.

## 2019-06-14 NOTE — PLAN OF CARE
Educated patient on discharge instructions and pain management. Patient states pain is a 2. Vitals stable, no complaints of n/v. No further questions at this time.     Patient urinated prior to d/c

## 2019-06-15 DIAGNOSIS — I10 ESSENTIAL HYPERTENSION: ICD-10-CM

## 2019-06-16 RX ORDER — POTASSIUM CHLORIDE 750 MG/1
CAPSULE, EXTENDED RELEASE ORAL
Qty: 60 CAPSULE | Refills: 0 | Status: SHIPPED | OUTPATIENT
Start: 2019-06-16 | End: 2019-07-24 | Stop reason: SDUPTHER

## 2019-06-17 ENCOUNTER — PATIENT MESSAGE (OUTPATIENT)
Dept: FAMILY MEDICINE | Facility: CLINIC | Age: 69
End: 2019-06-17

## 2019-06-17 VITALS
TEMPERATURE: 97 F | DIASTOLIC BLOOD PRESSURE: 72 MMHG | HEART RATE: 62 BPM | RESPIRATION RATE: 16 BRPM | OXYGEN SATURATION: 94 % | WEIGHT: 181 LBS | SYSTOLIC BLOOD PRESSURE: 161 MMHG | HEIGHT: 62 IN | BODY MASS INDEX: 33.31 KG/M2

## 2019-06-17 DIAGNOSIS — Z79.1 NSAID LONG-TERM USE: ICD-10-CM

## 2019-06-17 DIAGNOSIS — E78.5 HYPERLIPIDEMIA, UNSPECIFIED HYPERLIPIDEMIA TYPE: ICD-10-CM

## 2019-06-17 DIAGNOSIS — I10 ESSENTIAL HYPERTENSION: Primary | ICD-10-CM

## 2019-06-17 PROBLEM — M20.11 HAV (HALLUX ABDUCTO VALGUS), RIGHT: Status: ACTIVE | Noted: 2019-06-17

## 2019-06-24 ENCOUNTER — LAB VISIT (OUTPATIENT)
Dept: LAB | Facility: HOSPITAL | Age: 69
End: 2019-06-24
Attending: FAMILY MEDICINE
Payer: MEDICARE

## 2019-06-24 DIAGNOSIS — I10 ESSENTIAL HYPERTENSION: ICD-10-CM

## 2019-06-24 DIAGNOSIS — E78.5 HYPERLIPIDEMIA, UNSPECIFIED HYPERLIPIDEMIA TYPE: ICD-10-CM

## 2019-06-24 LAB
ALBUMIN SERPL BCP-MCNC: 3.8 G/DL (ref 3.5–5.2)
ALP SERPL-CCNC: 133 U/L (ref 55–135)
ALT SERPL W/O P-5'-P-CCNC: 24 U/L (ref 10–44)
ANION GAP SERPL CALC-SCNC: 10 MMOL/L (ref 8–16)
AST SERPL-CCNC: 22 U/L (ref 10–40)
BASOPHILS # BLD AUTO: 0.08 K/UL (ref 0–0.2)
BASOPHILS NFR BLD: 0.9 % (ref 0–1.9)
BILIRUB SERPL-MCNC: 0.3 MG/DL (ref 0.1–1)
BUN SERPL-MCNC: 16 MG/DL (ref 8–23)
CALCIUM SERPL-MCNC: 9.8 MG/DL (ref 8.7–10.5)
CHLORIDE SERPL-SCNC: 99 MMOL/L (ref 95–110)
CHOLEST SERPL-MCNC: 217 MG/DL (ref 120–199)
CHOLEST/HDLC SERPL: 4.2 {RATIO} (ref 2–5)
CO2 SERPL-SCNC: 26 MMOL/L (ref 23–29)
CREAT SERPL-MCNC: 0.8 MG/DL (ref 0.5–1.4)
CREAT SERPL-MCNC: 0.8 MG/DL (ref 0.5–1.4)
DIFFERENTIAL METHOD: ABNORMAL
EOSINOPHIL # BLD AUTO: 0.2 K/UL (ref 0–0.5)
EOSINOPHIL NFR BLD: 2.1 % (ref 0–8)
ERYTHROCYTE [DISTWIDTH] IN BLOOD BY AUTOMATED COUNT: 13.3 % (ref 11.5–14.5)
EST. GFR  (AFRICAN AMERICAN): >60 ML/MIN/1.73 M^2
EST. GFR  (AFRICAN AMERICAN): >60 ML/MIN/1.73 M^2
EST. GFR  (NON AFRICAN AMERICAN): >60 ML/MIN/1.73 M^2
EST. GFR  (NON AFRICAN AMERICAN): >60 ML/MIN/1.73 M^2
GLUCOSE SERPL-MCNC: 89 MG/DL (ref 70–110)
HCT VFR BLD AUTO: 40 % (ref 37–48.5)
HDLC SERPL-MCNC: 52 MG/DL (ref 40–75)
HDLC SERPL: 24 % (ref 20–50)
HGB BLD-MCNC: 13.6 G/DL (ref 12–16)
IMM GRANULOCYTES # BLD AUTO: 0.05 K/UL (ref 0–0.04)
IMM GRANULOCYTES NFR BLD AUTO: 0.6 % (ref 0–0.5)
LDLC SERPL CALC-MCNC: 119.4 MG/DL (ref 63–159)
LYMPHOCYTES # BLD AUTO: 3.1 K/UL (ref 1–4.8)
LYMPHOCYTES NFR BLD: 34 % (ref 18–48)
MCH RBC QN AUTO: 29.2 PG (ref 27–31)
MCHC RBC AUTO-ENTMCNC: 34 G/DL (ref 32–36)
MCV RBC AUTO: 86 FL (ref 82–98)
MONOCYTES # BLD AUTO: 0.7 K/UL (ref 0.3–1)
MONOCYTES NFR BLD: 7.8 % (ref 4–15)
NEUTROPHILS # BLD AUTO: 5 K/UL (ref 1.8–7.7)
NEUTROPHILS NFR BLD: 54.6 % (ref 38–73)
NONHDLC SERPL-MCNC: 165 MG/DL
NRBC BLD-RTO: 0 /100 WBC
PLATELET # BLD AUTO: 470 K/UL (ref 150–350)
PMV BLD AUTO: 9.4 FL (ref 9.2–12.9)
POTASSIUM SERPL-SCNC: 4.3 MMOL/L (ref 3.5–5.1)
POTASSIUM SERPL-SCNC: 4.3 MMOL/L (ref 3.5–5.1)
PROT SERPL-MCNC: 7.5 G/DL (ref 6–8.4)
RBC # BLD AUTO: 4.66 M/UL (ref 4–5.4)
SODIUM SERPL-SCNC: 135 MMOL/L (ref 136–145)
SODIUM SERPL-SCNC: 135 MMOL/L (ref 136–145)
TRIGL SERPL-MCNC: 228 MG/DL (ref 30–150)
WBC # BLD AUTO: 9.07 K/UL (ref 3.9–12.7)

## 2019-06-24 PROCEDURE — 36415 COLL VENOUS BLD VENIPUNCTURE: CPT | Mod: PO

## 2019-06-24 PROCEDURE — 80061 LIPID PANEL: CPT

## 2019-06-24 PROCEDURE — 80053 COMPREHEN METABOLIC PANEL: CPT

## 2019-06-24 PROCEDURE — 85025 COMPLETE CBC W/AUTO DIFF WBC: CPT

## 2019-06-26 RX ORDER — PANTOPRAZOLE SODIUM 40 MG/1
TABLET, DELAYED RELEASE ORAL
Qty: 90 TABLET | Refills: 3 | Status: SHIPPED | OUTPATIENT
Start: 2019-06-26 | End: 2020-06-03

## 2019-06-28 ENCOUNTER — OFFICE VISIT (OUTPATIENT)
Dept: FAMILY MEDICINE | Facility: CLINIC | Age: 69
End: 2019-06-28
Payer: MEDICARE

## 2019-06-28 VITALS
BODY MASS INDEX: 33.1 KG/M2 | TEMPERATURE: 98 F | OXYGEN SATURATION: 97 % | RESPIRATION RATE: 18 BRPM | WEIGHT: 179.88 LBS | HEART RATE: 74 BPM | DIASTOLIC BLOOD PRESSURE: 74 MMHG | HEIGHT: 62 IN | SYSTOLIC BLOOD PRESSURE: 122 MMHG

## 2019-06-28 DIAGNOSIS — I10 ESSENTIAL HYPERTENSION: Primary | ICD-10-CM

## 2019-06-28 DIAGNOSIS — E78.5 HYPERLIPIDEMIA, UNSPECIFIED HYPERLIPIDEMIA TYPE: ICD-10-CM

## 2019-06-28 DIAGNOSIS — F41.9 ANXIETY: ICD-10-CM

## 2019-06-28 PROCEDURE — 99214 OFFICE O/P EST MOD 30 MIN: CPT | Mod: S$PBB,,, | Performed by: FAMILY MEDICINE

## 2019-06-28 PROCEDURE — 99214 OFFICE O/P EST MOD 30 MIN: CPT | Mod: PBBFAC,PO | Performed by: FAMILY MEDICINE

## 2019-06-28 PROCEDURE — 99999 PR PBB SHADOW E&M-EST. PATIENT-LVL IV: CPT | Mod: PBBFAC,,, | Performed by: FAMILY MEDICINE

## 2019-06-28 PROCEDURE — 99999 PR PBB SHADOW E&M-EST. PATIENT-LVL IV: ICD-10-PCS | Mod: PBBFAC,,, | Performed by: FAMILY MEDICINE

## 2019-06-28 PROCEDURE — 99214 PR OFFICE/OUTPT VISIT, EST, LEVL IV, 30-39 MIN: ICD-10-PCS | Mod: S$PBB,,, | Performed by: FAMILY MEDICINE

## 2019-06-28 RX ORDER — ALPRAZOLAM 0.5 MG/1
TABLET ORAL
Qty: 60 TABLET | Refills: 5 | Status: SHIPPED | OUTPATIENT
Start: 2019-06-28 | End: 2019-12-30 | Stop reason: SDUPTHER

## 2019-06-28 NOTE — PROGRESS NOTES
Subjective:       Patient ID: Lucinda Aguilera is a 68 y.o. female.    Chief Complaint: Follow-up (6 month, lab results)    HPI Comments: Here for f/u on chronic health issues    HLD - tolerating pravachol 40mg daily  Anxiety - She is taking Paxil 10mg daily. Taking Xanax 0.5mg 1 tablet BID. She feels like this is well-controlled  HTN - tolerating Avapro 300mg daily and Hygroten 25mg daily and amlodipine; BP controlled; digital flowsheet reviewed  GERD - tolerating Protonix 40mg daily  HLD - tolerating pravastatin 40mg  S/p laprascopic conner - doing well; appetite normal; BM2 normal    She did have angiogram in 2010.    Past Medical History:    Hypertension                                                  Anxiety                                           Hyperlipemia                                                  GERD (gastroesophageal reflux disease)                        Cataract                                                        Comment:OU    PVD (posterior vitreous detachment)                             Comment:OD    Arthritis                                                       Comment:right thumb    Past Surgical History:    chest abcess                                                     Comment:child    KNEE ARTHROSCOPY W/ LASER                                        Comment:right    COLONOSCOPY                                      1/2012          Comment:repeat in 5 years    No Known Allergies    Social History    Marital Status:              Spouse Name:                       Years of Education:                 Number of children: 2             Occupational History  Occupation          Employer            Comment               retired                                   retired teacher an*                         Social History Main Topics    Smoking Status: Never Smoker                      Smokeless Status: Never Used                        Alcohol Use: Yes                Comment: social     Drug Use: No              Sexual Activity: Yes               Partners with: Male       Birth Control/Protection: None, Post-menopausal    Current Outpatient Medications on File Prior to Visit   Medication Sig Dispense Refill    aspirin (ECOTRIN) 81 MG EC tablet Take 1 tablet (81 mg total) by mouth 2 (two) times daily. (Patient taking differently: Take 81 mg by mouth once. ) 60 tablet 0    carboxymethylcellulose (REFRESH PLUS) 0.5 % Dpet 1 drop 3 (three) times daily as needed.      chlorthalidone (HYGROTEN) 25 MG Tab TAKE 1 TABLET BY MOUTH EVERY MORNING, DISCONTINUE HCTZ 90 tablet 3    fish oil-omega-3 fatty acids 300-1,000 mg capsule Take 2 g by mouth once daily.      irbesartan (AVAPRO) 300 MG tablet Take 1 tablet (300 mg total) by mouth every evening. 1 po qd 90 tablet 2    multivitamin (MULTIVITAMIN) per tablet Take 1 tablet by mouth once daily.        naproxen sodium (ALEVE) 220 mg Cap Take 220 mg by mouth once.       niacin 500 MG CpSR Take 500 mg by mouth every evening.        pantoprazole (PROTONIX) 40 MG tablet TAKE 1 TABLET(40 MG) BY MOUTH BEFORE BREAKFAST 90 tablet 3    paroxetine (PAXIL) 10 MG tablet TAKE 1 TABLET BY MOUTH EVERY MORNING 90 tablet 3    potassium chloride (MICRO-K) 10 MEQ CpSR TAKE 2 CAPSULES BY MOUTH DAILY 60 capsule 0    pravastatin (PRAVACHOL) 40 MG tablet TAKE 1 TABLET(40 MG) BY MOUTH EVERY DAY 90 tablet 3    ranitidine (ZANTAC) 300 MG tablet Take 1 tablet (300 mg total) by mouth every evening. , about 30-60 minutes before bedtime. 60 tablet 5    [DISCONTINUED] ALPRAZolam (XANAX) 0.5 MG tablet TAKE 1 TABLET(0.5 MG) BY MOUTH TWICE DAILY AS NEEDED 60 tablet 0    [DISCONTINUED] omeprazole (PRILOSEC) 40 MG capsule Take 1 capsule (40 mg total) by mouth before breakfast. 30 capsule 3    [DISCONTINUED] HYDROcodone-acetaminophen (NORCO) 5-325 mg per tablet Take 1 tablet by mouth every 6 (six) hours as needed for Pain. 15 tablet 0    [DISCONTINUED] mupirocin (BACTROBAN) 2 %  "ointment Apply topically 3 (three) times daily. 22 g 0     No current facility-administered medications on file prior to visit.      Review of patient's family history indicates:    Hypertension                   Mother                    Heart disease                  Mother                    Stroke                         Father                    Review of Systems   Constitutional: Negative for fever, chills, appetite change and unexpected weight change.   HENT: Negative for sore throat and trouble swallowing.    Eyes: Negative for pain and visual disturbance.   Respiratory: Negative for cough, shortness of breath and wheezing.    Cardiovascular: Negative for chest pain and palpitations.   Gastrointestinal: Negative for nausea, vomiting, abdominal pain, diarrhea and blood in stool.   Genitourinary: Negative for dysuria, hematuria and difficulty urinating.   Musculoskeletal: Negative for arthralgias, gait problem and neck pain.   Skin: Negative for rash and wound.   Neurological: Negative for dizziness, weakness, numbness and headaches.   Hematological: Negative for adenopathy.   Psychiatric/Behavioral: Negative for dysphoric mood.         Objective:       /74 (BP Location: Right arm, Patient Position: Sitting)   Pulse 74   Temp 98.2 °F (36.8 °C)   Resp 18   Ht 5' 2" (1.575 m)   Wt 81.6 kg (179 lb 14.3 oz)   LMP 04/18/2004   SpO2 97%   BMI 32.90 kg/m²     Physical Exam   Constitutional: She is oriented to person, place, and time. She appears well-developed and well-nourished.   HENT:   Head: Normocephalic.   Mouth/Throat: Oropharynx is clear and moist. No oropharyngeal exudate or posterior oropharyngeal erythema.   Eyes: Conjunctivae and EOM are normal. Pupils are equal, round, and reactive to light.   Neck: Normal range of motion. Neck supple. No thyromegaly present.   Cardiovascular: Normal rate, regular rhythm, S1 normal, S2 normal, normal heart sounds and intact distal pulses.  Exam reveals no " gallop and no friction rub.    No murmur heard.  Pulmonary/Chest: Effort normal and breath sounds normal. She has no wheezes. She has no rales.   Abdominal: Normal appearance.   Musculoskeletal:        Right lower leg: She exhibits no edema.        Left lower leg: She exhibits no edema.   Lymphadenopathy:     She has no cervical adenopathy.   Neurological: She is alert and oriented to person, place, and time. No cranial nerve deficit. Gait normal.   Skin: Skin is warm and intact. No rash noted.   Psychiatric: She has a normal mood and affect.         Results for orders placed or performed in visit on 06/24/19   Comprehensive metabolic panel   Result Value Ref Range    Sodium 135 (L) 136 - 145 mmol/L    Potassium 4.3 3.5 - 5.1 mmol/L    Chloride 99 95 - 110 mmol/L    CO2 26 23 - 29 mmol/L    Glucose 89 70 - 110 mg/dL    BUN, Bld 16 8 - 23 mg/dL    Creatinine 0.8 0.5 - 1.4 mg/dL    Calcium 9.8 8.7 - 10.5 mg/dL    Total Protein 7.5 6.0 - 8.4 g/dL    Albumin 3.8 3.5 - 5.2 g/dL    Total Bilirubin 0.3 0.1 - 1.0 mg/dL    Alkaline Phosphatase 133 55 - 135 U/L    AST 22 10 - 40 U/L    ALT 24 10 - 44 U/L    Anion Gap 10 8 - 16 mmol/L    eGFR if African American >60.0 >60 mL/min/1.73 m^2    eGFR if non African American >60.0 >60 mL/min/1.73 m^2   Lipid panel   Result Value Ref Range    Cholesterol 217 (H) 120 - 199 mg/dL    Triglycerides 228 (H) 30 - 150 mg/dL    HDL 52 40 - 75 mg/dL    LDL Cholesterol 119.4 63.0 - 159.0 mg/dL    Hdl/Cholesterol Ratio 24.0 20.0 - 50.0 %    Total Cholesterol/HDL Ratio 4.2 2.0 - 5.0    Non-HDL Cholesterol 165 mg/dL   CBC auto differential   Result Value Ref Range    WBC 9.07 3.90 - 12.70 K/uL    RBC 4.66 4.00 - 5.40 M/uL    Hemoglobin 13.6 12.0 - 16.0 g/dL    Hematocrit 40.0 37.0 - 48.5 %    Mean Corpuscular Volume 86 82 - 98 fL    Mean Corpuscular Hemoglobin 29.2 27.0 - 31.0 pg    Mean Corpuscular Hemoglobin Conc 34.0 32.0 - 36.0 g/dL    RDW 13.3 11.5 - 14.5 %    Platelets 470 (H) 150 - 350  K/uL    MPV 9.4 9.2 - 12.9 fL    Immature Granulocytes 0.6 (H) 0.0 - 0.5 %    Gran # (ANC) 5.0 1.8 - 7.7 K/uL    Immature Grans (Abs) 0.05 (H) 0.00 - 0.04 K/uL    Lymph # 3.1 1.0 - 4.8 K/uL    Mono # 0.7 0.3 - 1.0 K/uL    Eos # 0.2 0.0 - 0.5 K/uL    Baso # 0.08 0.00 - 0.20 K/uL    nRBC 0 0 /100 WBC    Gran% 54.6 38.0 - 73.0 %    Lymph% 34.0 18.0 - 48.0 %    Mono% 7.8 4.0 - 15.0 %    Eosinophil% 2.1 0.0 - 8.0 %    Basophil% 0.9 0.0 - 1.9 %    Differential Method Automated    Creatinine, serum   Result Value Ref Range    Creatinine 0.8 0.5 - 1.4 mg/dL    eGFR if African American >60.0 >60 mL/min/1.73 m^2    eGFR if non African American >60.0 >60 mL/min/1.73 m^2   Potassium   Result Value Ref Range    Potassium 4.3 3.5 - 5.1 mmol/L   Sodium   Result Value Ref Range    Sodium 135 (L) 136 - 145 mmol/L       Assessment:       1. Essential hypertension    2. Hyperlipidemia, unspecified hyperlipidemia type    3. Anxiety        Plan:       Essential hypertension    Hyperlipidemia, unspecified hyperlipidemia type  -     Comprehensive metabolic panel; Future; Expected date: 12/25/2019  -     Lipid panel; Future; Expected date: 12/25/2019    Anxiety  -     ALPRAZolam (XANAX) 0.5 MG tablet; TAKE 1 TABLET(0.5 MG) BY MOUTH TWICE DAILY AS NEEDED  Dispense: 60 tablet; Refill: 5              Continue Paxil 10mg daily, Xanax PRN  cotninue aspirin 81mg daily  Continue other present meds  Counseled on regular exercise, maintenance of a healthy weight, balanced diet rich in fruits/vegetables and lean protein, and avoidance of unhealthy habits like smoking and excessive alcohol intake.  F/u 6 months with labs    Answers for HPI/ROS submitted by the patient on 6/21/2019   activity change: No  unexpected weight change: No  neck pain: No  hearing loss: No  rhinorrhea: No  trouble swallowing: No  eye discharge: No  visual disturbance: No  chest tightness: No  wheezing: No  chest pain: No  palpitations: No  blood in stool: No  constipation:  No  vomiting: No  diarrhea: No  polydipsia: No  polyuria: No  difficulty urinating: No  hematuria: No  menstrual problem: No  dysuria: No  joint swelling: No  arthralgias: No  headaches: No  weakness: No  confusion: No  dysphoric mood: No

## 2019-07-01 ENCOUNTER — OFFICE VISIT (OUTPATIENT)
Dept: SURGERY | Facility: CLINIC | Age: 69
End: 2019-07-01
Payer: MEDICARE

## 2019-07-01 VITALS — WEIGHT: 181 LBS | BODY MASS INDEX: 33.1 KG/M2

## 2019-07-01 DIAGNOSIS — Z98.890 POST-OPERATIVE STATE: Primary | ICD-10-CM

## 2019-07-01 PROCEDURE — 99212 OFFICE O/P EST SF 10 MIN: CPT | Mod: PBBFAC,PO | Performed by: SURGERY

## 2019-07-01 PROCEDURE — 99999 PR PBB SHADOW E&M-EST. PATIENT-LVL II: CPT | Mod: PBBFAC,,, | Performed by: SURGERY

## 2019-07-01 PROCEDURE — 99999 PR PBB SHADOW E&M-EST. PATIENT-LVL II: ICD-10-PCS | Mod: PBBFAC,,, | Performed by: SURGERY

## 2019-07-01 PROCEDURE — 99024 PR POST-OP FOLLOW-UP VISIT: ICD-10-PCS | Mod: POP,,, | Performed by: SURGERY

## 2019-07-01 PROCEDURE — 99024 POSTOP FOLLOW-UP VISIT: CPT | Mod: POP,,, | Performed by: SURGERY

## 2019-07-01 NOTE — PROGRESS NOTES
POST-OP NOTE    PROCEDURE: Lap cholecystectomy.    COMPLAINTS: None.    EXAM: Incision well approximated. No drainage. No infection.      IMPRESSION: Doing well.    PLAN: FU as needed.

## 2019-07-12 ENCOUNTER — PATIENT OUTREACH (OUTPATIENT)
Dept: OTHER | Facility: OTHER | Age: 69
End: 2019-07-12

## 2019-07-12 NOTE — PROGRESS NOTES
Last 5 Patient Entered Readings                                      Current 30 Day Average: 118/78     Recent Readings 7/7/2019 7/7/2019 6/28/2019 6/26/2019 6/17/2019    SBP (mmHg) 109 100 122 129 111    DBP (mmHg) 86 79 74 78 75    Pulse 82 93 74 84 86        Digital Medicine: Health  Follow Up    Lifestyle Modifications:    1.Dietary Modifications (Sodium intake <2,000mg/day, food labels, dining out): Patient states that she has been drinking a lot of water. She has noticed some spikes in her DBP. Encouraged sodium restriction.    2.Physical Activity: No change.    3.Medication Therapy: Patient has been compliant with the medication regimen.    4.Patient has the following medication side effects/concerns: None  (Frequency/Alleviating factors/Precipitating factors, etc.)     Follow up with Mrs. Lucinda Aguilera completed. Mrs. Aguilera is feeling well and is pleased with her BP overall. She has no questions or concerns at this time. Will continue to follow up to achieve health goals.

## 2019-07-18 ENCOUNTER — OFFICE VISIT (OUTPATIENT)
Dept: FAMILY MEDICINE | Facility: CLINIC | Age: 69
End: 2019-07-18
Payer: MEDICARE

## 2019-07-18 VITALS
SYSTOLIC BLOOD PRESSURE: 126 MMHG | BODY MASS INDEX: 33.23 KG/M2 | HEIGHT: 62 IN | WEIGHT: 180.56 LBS | HEART RATE: 73 BPM | OXYGEN SATURATION: 97 % | DIASTOLIC BLOOD PRESSURE: 80 MMHG

## 2019-07-18 DIAGNOSIS — K21.9 GASTROESOPHAGEAL REFLUX DISEASE, ESOPHAGITIS PRESENCE NOT SPECIFIED: Primary | ICD-10-CM

## 2019-07-18 DIAGNOSIS — B37.2 CANDIDAL SKIN INFECTION: ICD-10-CM

## 2019-07-18 PROCEDURE — 99214 OFFICE O/P EST MOD 30 MIN: CPT | Mod: PBBFAC,PO | Performed by: NURSE PRACTITIONER

## 2019-07-18 PROCEDURE — 99214 PR OFFICE/OUTPT VISIT, EST, LEVL IV, 30-39 MIN: ICD-10-PCS | Mod: S$PBB,,, | Performed by: NURSE PRACTITIONER

## 2019-07-18 PROCEDURE — 99999 PR PBB SHADOW E&M-EST. PATIENT-LVL IV: ICD-10-PCS | Mod: PBBFAC,,, | Performed by: NURSE PRACTITIONER

## 2019-07-18 PROCEDURE — 99999 PR PBB SHADOW E&M-EST. PATIENT-LVL IV: CPT | Mod: PBBFAC,,, | Performed by: NURSE PRACTITIONER

## 2019-07-18 PROCEDURE — 99214 OFFICE O/P EST MOD 30 MIN: CPT | Mod: S$PBB,,, | Performed by: NURSE PRACTITIONER

## 2019-07-18 RX ORDER — FLUCONAZOLE 150 MG/1
150 TABLET ORAL DAILY
Qty: 1 TABLET | Refills: 0 | Status: SHIPPED | OUTPATIENT
Start: 2019-07-18 | End: 2019-07-19

## 2019-07-18 RX ORDER — SUCRALFATE 1 G/1
1 TABLET ORAL
Qty: 120 TABLET | Refills: 3 | Status: SHIPPED | OUTPATIENT
Start: 2019-07-18 | End: 2019-09-10

## 2019-07-18 RX ORDER — CLOTRIMAZOLE AND BETAMETHASONE DIPROPIONATE 10; .64 MG/G; MG/G
CREAM TOPICAL 2 TIMES DAILY
Qty: 45 G | Refills: 2 | Status: SHIPPED | OUTPATIENT
Start: 2019-07-18 | End: 2020-06-11

## 2019-07-18 NOTE — PROGRESS NOTES
Subjective:       Patient ID: Lucinda Aguilera is a 68 y.o. female.    Chief Complaint: Rash (abdomen)    5/29/19 diagnosed with shingles. Had EGD due to GERD 6/6/19. Diagnosed with gallstones. GERD symptoms are still severe, compliant with prescribed medications.   Has noticed rash, burning and pain in groin and under skin fold of abdomen. Using OTC cream with mild relief.    Past Medical History:  No date: Anticoagulant long-term use  No date: Anxiety      Comment:  takes daily Xanax  No date: Arthritis      Comment:  right thumb  No date: Cataract      Comment:  OU  No date: Cholelithiasis  No date: GERD (gastroesophageal reflux disease)  No date: Hepatic steatosis  No date: Hyperlipemia  No date: Hypertension  No date: PVD (posterior vitreous detachment)      Comment:  OD    Past Surgical History:  9/19/2014: ARTHROSCOPY-KNEE; Right      Comment:  Performed by Ernesto Crain MD at [facility] OR  12/21/2017: ARTHROSCOPY-MENISCECTOMY; Left      Comment:  Performed by Tomas Etienne MD at [facility] OR  No date: chest abcess      Comment:  child  No date: CHOLECYSTECTOMY  6/14/2019: CHOLECYSTECTOMY, LAPAROSCOPIC; N/A      Comment:  Performed by Guevara Evans MD at [facility] OR  12/21/2017: CHONDROPLASTY-KNEE; Left      Comment:  Performed by Tomas Etienne MD at [facility] OR  01/06/2012: COLONOSCOPY      Comment:  Dr. Lopez: hemorrhoids, redundant colon  12/21/2017: DEBRIDEMENT-KNEE; Left      Comment:  Performed by Tomas Etienne MD at [facility] OR  6/6/2019: EGD (ESOPHAGOGASTRODUODENOSCOPY); N/A      Comment:  Performed by Nathan Lopez Jr., MD at [facility] ENDO  7/13/2017: ESOPHAGOGASTRODUODENOSCOPY (EGD); N/A      Comment:  Performed by Nathan Lopez Jr., MD at [facility] ENDO  No date: JOINT REPLACEMENT; Bilateral      Comment:  knee  No date: KNEE ARTHROSCOPY W/ LASER      Comment:  right  06/03/2016: KNEE SURGERY; Right      Comment:  Total knee replacement  12/19/2014: REPAIR - COLLATERAL LIGAMENT THUMB; Left       Comment:  Performed by Arash Rene Jr., MD at [facility] OR  6/19/2018: REPLACEMENT-KNEE-TOTAL; Left      Comment:  Performed by Nj Melvin MD at [facility] OR 2ND FLR  6/3/2016: REPLACEMENT-KNEE-TOTAL; Right      Comment:  Performed by Nj Melvin MD at [facility] OR 2ND FLR  12/21/2017: SYNOVECTOMY-KNEE; Left      Comment:  Performed by Tomas Etienne MD at [facility] OR  11/2014: thumb surgery  07/13/2017: UPPER GASTROINTESTINAL ENDOSCOPY      Comment:  Dr. Lopez: NERD, Gastric mucosal atrophy. antritis, Scar               in the incisura. Biopsied; biopsy: chronic gastritis.                negative for h pylori    Review of patient's family history indicates:  Problem: Hypertension      Relation: Mother          Age of Onset: (Not Specified)  Problem: Heart disease      Relation: Mother          Age of Onset: (Not Specified)  Problem: Glaucoma      Relation: Mother          Age of Onset: (Not Specified)  Problem: Stroke      Relation: Father          Age of Onset: (Not Specified)  Problem: Glaucoma      Relation: Sister          Age of Onset: (Not Specified)  Problem: Cataracts      Relation: Sister          Age of Onset: (Not Specified)  Problem: Macular degeneration      Relation: Sister          Age of Onset: (Not Specified)  Problem: Breast cancer      Relation: Neg Hx          Age of Onset: (Not Specified)  Problem: Colon cancer      Relation: Neg Hx          Age of Onset: (Not Specified)  Problem: Crohn's disease      Relation: Neg Hx          Age of Onset: (Not Specified)  Problem: Ulcerative colitis      Relation: Neg Hx          Age of Onset: (Not Specified)      Social History    Socioeconomic History      Marital status:       Spouse name: Not on file      Number of children: 2      Years of education: Not on file      Highest education level: Not on file    Occupational History      Occupation: retired      Occupation: retired teacher and principal    Social Needs      Financial resource strain: Not  hard at all      Food insecurity:        Worry: Never true        Inability: Never true      Transportation needs:        Medical: No        Non-medical: No    Tobacco Use      Smoking status: Never Smoker      Smokeless tobacco: Never Used    Substance and Sexual Activity      Alcohol use: Yes        Frequency: Never        Drinks per session: 1 or 2        Binge frequency: Never        Comment: social- maybe once every few months      Drug use: No      Sexual activity: Yes        Partners: Male        Birth control/protection: None, Post-menopausal    Lifestyle      Physical activity:        Days per week: 4 days        Minutes per session: 90 min      Stress: Not at all    Relationships      Social connections:        Talks on phone: More than three times a week        Gets together: More than three times a week        Attends [employer] service: Not on file        Active member of club or organization: Yes        Attends meetings of clubs or organizations: 1 to 4 times per year        Relationship status:     Other Topics      Concerns:        Not on file    Social History Narrative      Not on file      Current Outpatient Medications:  ALPRAZolam (XANAX) 0.5 MG tablet, TAKE 1 TABLET(0.5 MG) BY MOUTH TWICE DAILY AS NEEDED, Disp: 60 tablet, Rfl: 5  carboxymethylcellulose (REFRESH PLUS) 0.5 % Dpet, 1 drop 3 (three) times daily as needed., Disp: , Rfl:   chlorthalidone (HYGROTEN) 25 MG Tab, TAKE 1 TABLET BY MOUTH EVERY MORNING, DISCONTINUE HCTZ, Disp: 90 tablet, Rfl: 3  fish oil-omega-3 fatty acids 300-1,000 mg capsule, Take 2 g by mouth once daily., Disp: , Rfl:   menthol/zinc oxide (CALMOSEPTINE TOP), Apply topically., Disp: , Rfl:   multivitamin (MULTIVITAMIN) per tablet, Take 1 tablet by mouth once daily.  , Disp: , Rfl:   naproxen sodium (ALEVE) 220 mg Cap, Take 220 mg by mouth once. , Disp: , Rfl:   niacin 500 MG CpSR, Take 500 mg by mouth every evening.  , Disp: , Rfl:   pantoprazole (PROTONIX) 40 MG  tablet, TAKE 1 TABLET(40 MG) BY MOUTH BEFORE BREAKFAST, Disp: 90 tablet, Rfl: 3  paroxetine (PAXIL) 10 MG tablet, TAKE 1 TABLET BY MOUTH EVERY MORNING, Disp: 90 tablet, Rfl: 3  potassium chloride (MICRO-K) 10 MEQ CpSR, TAKE 2 CAPSULES BY MOUTH DAILY, Disp: 60 capsule, Rfl: 0  pravastatin (PRAVACHOL) 40 MG tablet, TAKE 1 TABLET(40 MG) BY MOUTH EVERY DAY, Disp: 90 tablet, Rfl: 3  ranitidine (ZANTAC) 300 MG tablet, Take 1 tablet (300 mg total) by mouth every evening. , about 30-60 minutes before bedtime., Disp: 60 tablet, Rfl: 5  aspirin (ECOTRIN) 81 MG EC tablet, Take 1 tablet (81 mg total) by mouth 2 (two) times daily. (Patient taking differently: Take 81 mg by mouth once. ), Disp: 60 tablet, Rfl: 0  irbesartan (AVAPRO) 300 MG tablet, Take 1 tablet (300 mg total) by mouth every evening. 1 po qd, Disp: 90 tablet, Rfl: 2    No current facility-administered medications for this visit.       Review of patient's allergies indicates:  No Known Allergies    Rash   This is a new problem. The current episode started more than 1 month ago. The problem has been gradually worsening since onset. The affected locations include theabdomen and groin. The rash is characterized by blistering, burning, pain, redness and itchiness. Pertinent negatives include no anorexia, congestion, cough, diarrhea, eye pain, facial edema, fatigue, fever, joint pain, nail changes, rhinorrhea, shortness of breath, sore throat or vomiting. Past treatments include anti-itch cream and antibiotic cream. The treatment provided no relief. Her past medical history is significant for varicella. There is no history of allergies, asthma or eczema.     Review of Systems   Constitutional: Negative for fatigue and fever.   HENT: Negative for congestion, rhinorrhea and sore throat.    Eyes: Negative for pain.   Respiratory: Negative for cough and shortness of breath.    Gastrointestinal: Negative for anorexia, diarrhea and vomiting.   Musculoskeletal: Negative for  joint pain.   Skin: Positive for rash. Negative for nail changes.       Objective:      Physical Exam   Constitutional: She is oriented to person, place, and time.   HENT:   Head: Normocephalic and atraumatic.   Cardiovascular: Normal rate.   Pulmonary/Chest: Effort normal.   Musculoskeletal: She exhibits no edema.   Neurological: She is alert and oriented to person, place, and time.   Skin: Rash noted.        Vitals reviewed.      Assessment:       1. Gastroesophageal reflux disease, esophagitis presence not specified    2. Candidal skin infection        Plan:       1. Gastroesophageal reflux disease, esophagitis presence not specified  Continue current medications, add Carafate. Follow up with GI as scheduled.   - sucralfate (CARAFATE) 1 gram tablet; Take 1 tablet (1 g total) by mouth 4 (four) times daily before meals and nightly.  Dispense: 120 tablet; Refill: 3    2. Candidal skin infection  Follow up if not resolving.   - fluconazole (DIFLUCAN) 150 MG Tab; Take 1 tablet (150 mg total) by mouth once daily. for 1 day  Dispense: 1 tablet; Refill: 0  - clotrimazole-betamethasone 1-0.05% (LOTRISONE) cream; Apply topically 2 (two) times daily.  Dispense: 45 g; Refill: 2

## 2019-07-24 DIAGNOSIS — I10 ESSENTIAL HYPERTENSION: ICD-10-CM

## 2019-07-25 RX ORDER — POTASSIUM CHLORIDE 750 MG/1
CAPSULE, EXTENDED RELEASE ORAL
Qty: 180 CAPSULE | Refills: 1 | Status: SHIPPED | OUTPATIENT
Start: 2019-07-25 | End: 2019-12-30

## 2019-07-25 NOTE — PROGRESS NOTES
Refill Authorization Note     is requesting a refill authorization.    Brief assessment and rationale for refill: APPROVE: prr  Name and strength of medication: POTASSIUM CL 10MEQ ER CAPSULES       Medication Therapy Plan: HTN LCO stable 6/19; Labs WNL 6/19; approve 6 more    Medication reconciliation completed: No              How patient will take medication: t21 qd          Comments:   Last Potassium/Creatinine: (6 months)  Lab Results   Component Value Date    K 4.3 06/24/2019    K 4.3 06/24/2019    K 3.4 (L) 06/10/2019    Lab Results   Component Value Date    CREATININE 0.8 06/24/2019    CREATININE 0.8 06/24/2019    CREATININE 0.7 06/10/2019        APPOINTMENTS (past 12m or future 3m authorizing provider)  LAST VISIT DATE  Saurabh Hassan MD 6/28/2019         NEXT VISIT DATE  Saurabh Hassan MD 12/30/2019

## 2019-09-03 ENCOUNTER — HOSPITAL ENCOUNTER (OUTPATIENT)
Dept: RADIOLOGY | Facility: HOSPITAL | Age: 69
Discharge: HOME | End: 2019-09-03
Attending: OBSTETRICS & GYNECOLOGY
Payer: MEDICARE

## 2019-09-03 DIAGNOSIS — R92.8 ABNORMAL MAMMOGRAM: ICD-10-CM

## 2019-09-03 PROCEDURE — 77061 BREAST TOMOSYNTHESIS UNI: CPT | Mod: TC,PO,LT

## 2019-09-03 PROCEDURE — 77061 MAMMO DIGITAL DIAGNOSTIC LEFT WITH TOMOSYNTHESIS_CAD: ICD-10-PCS | Mod: 26,LT,, | Performed by: RADIOLOGY

## 2019-09-03 PROCEDURE — 77065 DX MAMMO INCL CAD UNI: CPT | Mod: 26,LT,, | Performed by: RADIOLOGY

## 2019-09-03 PROCEDURE — 77061 BREAST TOMOSYNTHESIS UNI: CPT | Mod: 26,LT,, | Performed by: RADIOLOGY

## 2019-09-03 PROCEDURE — 77065 DX MAMMO INCL CAD UNI: CPT | Mod: TC,PO,LT

## 2019-09-03 PROCEDURE — 77065 MAMMO DIGITAL DIAGNOSTIC LEFT WITH TOMOSYNTHESIS_CAD: ICD-10-PCS | Mod: 26,LT,, | Performed by: RADIOLOGY

## 2019-09-05 ENCOUNTER — PATIENT OUTREACH (OUTPATIENT)
Dept: OTHER | Facility: OTHER | Age: 69
End: 2019-09-05

## 2019-09-05 NOTE — PROGRESS NOTES
HPI:  Called patient to follow up. Patient reports adherence to medication regimen daily and denies missed doses. Patient denies hypotensive s/sx (lightheadedness, dizziness, nausea, fatigue); patient denies hypertensive s/sx (SOB, CP, severe headaches, changes in vision, dizziness, fatigue, confusion, anxiety, nosebleeds).     Last 5 Patient Entered Readings                                      Current 30 Day Average: 120/79     Recent Readings 8/29/2019 8/26/2019 8/17/2019 8/7/2019 7/30/2019    SBP (mmHg) 102 133 119 126 132    DBP (mmHg) 75 83 80 76 73    Pulse - 92 77 78 64        Assessment:  Reviewed recent readings. Per 2017 ACC/ AHA HTN guidelines (goal of BP < 130/80), current 30-day average is well controlled.     Plan:  Continue current medication regimen.   Patients health , Jaqueline Cage, will be following up as scheduled.   I will continue to monitor regularly and will follow-up in 6 months, sooner if blood pressure begins to trend upward or downward.     Current medication regimen:  Hypertension Medications             chlorthalidone (HYGROTEN) 25 MG Tab TAKE 1 TABLET BY MOUTH EVERY MORNING, DISCONTINUE HCTZ    irbesartan (AVAPRO) 300 MG tablet Take 1 tablet (300 mg total) by mouth every evening. 1 po qd         Patient denies having questions or concerns. Patient has my contact information and knows to call with any concerns or clinical changes. Instructed patient to call if BP remains > 130/80.

## 2019-09-10 ENCOUNTER — HOSPITAL ENCOUNTER (OUTPATIENT)
Dept: RADIOLOGY | Facility: HOSPITAL | Age: 69
Discharge: HOME | End: 2019-09-10
Attending: NURSE PRACTITIONER
Payer: MEDICARE

## 2019-09-10 ENCOUNTER — OFFICE VISIT (OUTPATIENT)
Dept: GASTROENTEROLOGY | Facility: CLINIC | Age: 69
End: 2019-09-10
Payer: MEDICARE

## 2019-09-10 VITALS
HEIGHT: 62 IN | RESPIRATION RATE: 18 BRPM | SYSTOLIC BLOOD PRESSURE: 125 MMHG | DIASTOLIC BLOOD PRESSURE: 71 MMHG | BODY MASS INDEX: 33.84 KG/M2 | HEART RATE: 77 BPM | WEIGHT: 183.88 LBS

## 2019-09-10 DIAGNOSIS — K59.00 CONSTIPATION, UNSPECIFIED CONSTIPATION TYPE: ICD-10-CM

## 2019-09-10 DIAGNOSIS — Z87.19 HISTORY OF GASTRITIS: ICD-10-CM

## 2019-09-10 DIAGNOSIS — R14.0 BLOATING SYMPTOM: ICD-10-CM

## 2019-09-10 DIAGNOSIS — R49.0 HOARSENESS OF VOICE: ICD-10-CM

## 2019-09-10 DIAGNOSIS — Z90.49 S/P CHOLECYSTECTOMY: ICD-10-CM

## 2019-09-10 DIAGNOSIS — Z98.890 HISTORY OF ESOPHAGOGASTRODUODENOSCOPY (EGD): Primary | ICD-10-CM

## 2019-09-10 DIAGNOSIS — R10.816 EPIGASTRIC ABDOMINAL TENDERNESS WITHOUT REBOUND TENDERNESS: ICD-10-CM

## 2019-09-10 DIAGNOSIS — R05.9 COUGH: ICD-10-CM

## 2019-09-10 PROCEDURE — 74019 RADEX ABDOMEN 2 VIEWS: CPT | Mod: TC,FY,PO

## 2019-09-10 PROCEDURE — 99214 OFFICE O/P EST MOD 30 MIN: CPT | Mod: S$PBB,,, | Performed by: NURSE PRACTITIONER

## 2019-09-10 PROCEDURE — 99999 PR PBB SHADOW E&M-EST. PATIENT-LVL V: CPT | Mod: PBBFAC,,, | Performed by: NURSE PRACTITIONER

## 2019-09-10 PROCEDURE — 99215 OFFICE O/P EST HI 40 MIN: CPT | Mod: PBBFAC,25,PO | Performed by: NURSE PRACTITIONER

## 2019-09-10 PROCEDURE — 74019 XR ABDOMEN FLAT AND ERECT: ICD-10-PCS | Mod: 26,,, | Performed by: RADIOLOGY

## 2019-09-10 PROCEDURE — 74019 RADEX ABDOMEN 2 VIEWS: CPT | Mod: 26,,, | Performed by: RADIOLOGY

## 2019-09-10 PROCEDURE — 99214 PR OFFICE/OUTPT VISIT, EST, LEVL IV, 30-39 MIN: ICD-10-PCS | Mod: S$PBB,,, | Performed by: NURSE PRACTITIONER

## 2019-09-10 PROCEDURE — 99999 PR PBB SHADOW E&M-EST. PATIENT-LVL V: ICD-10-PCS | Mod: PBBFAC,,, | Performed by: NURSE PRACTITIONER

## 2019-09-10 RX ORDER — POLYETHYLENE GLYCOL 3350 17 G/17G
17 POWDER, FOR SOLUTION ORAL DAILY
Qty: 510 G | Refills: 2 | Status: SHIPPED | OUTPATIENT
Start: 2019-09-10 | End: 2019-09-25

## 2019-09-10 RX ORDER — BISACODYL 5 MG
5 TABLET, DELAYED RELEASE (ENTERIC COATED) ORAL DAILY PRN
COMMUNITY
End: 2019-09-25

## 2019-09-10 NOTE — PATIENT INSTRUCTIONS
Gastritis (Adult)    Gastritis is inflammation and irritation of the stomach lining. It can be present for a short time (acute) or be long lasting (chronic). Gastritis is often caused by infection with bacteria called H pylori. More than a third of people in the [place] have this bacteria in their bodies. In many cases, H pylori causes no problems or symptoms. In some people, though, the infection irritates the stomach lining and causes gastritis. Other causes of stomach irritation include drinking alcohol or taking pain-relieving medicines called NSAIDs (such as aspirin or ibuprofen).   Symptoms of gastritis can include:  · Abdominal pain or bloating  · Loss of appetite  · Nausea or vomiting  · Vomiting blood or having black stools  · Feeling more tired than usual  An inflamed and irritated stomach lining is more likely to develop a sore called an ulcer. To help prevent this, gastritis should be treated.  Home care  If needed, medicines may be prescribed. If you have H pylori infection, treating it will likely relieve your symptoms. Other changes can help reduce stomach irritation and help it heal.  · If you have been prescribed medicines for H pylori infection, take them as directed. Take all of the medicine until it is finished or your healthcare provider tells you to stop, even if you feel better.  · Your healthcare provider may recommend avoiding NSAIDs. If you take daily aspirin for your heart or other medical reasons, do not stop without talking to your healthcare provider first.  · Avoid drinking alcohol.  · Stop smoking. Smoking can irritate the stomach and delay healing. As much as possible, stay away from second hand smoke.  Follow-up care  Follow up with your healthcare provider, or as advised by our staff. Testing may be needed to check for inflammation or an ulcer.  When to seek medical advice  Call your healthcare provider for any of the following:  · Stomach pain that gets worse or moves to the lower  right abdomen (appendix area)  · Chest pain that appears or gets worse, or spreads to the back, neck, shoulder, or arm  · Frequent vomiting (cant keep down liquids)  · Blood in the stool or vomit (red or black in color)  · Feeling weak or dizzy  · Fever of 100.4ºF (38ºC) or higher, or as directed by your healthcare provider  Date Last Reviewed: 6/22/2015 © 2000-2017 [employer]. [address] [place] [address], [place] [address]. All rights reserved. This information is not intended as a substitute for professional medical care. Always follow your healthcare professional's instructions.        Excess Gas  Certain foods produce gas when digested. In some people, these foods make an excessive amount of gas. This may cause bloating, burping, or increased gas passing through the rectum (flatulence).     Foods that cause gas  The following foods are more likely to cause this problem. Limit them, or remove them from your diet:  · Broccoli  · Cauliflower  · [place] sprouts  · Cabbage  · Cooked dried beans  · Fizzy (carbonated) drinks, such as sparkling water, soda, beer, and champagne  Other causes  Other causes of excess gas include:  · Eating too fast or talking while you chew. This may cause you to swallow air. This increases the amount of gas in your stomach. And it may make your symptoms worse. Chew each mouthful completely before you swallow. Take your time.  · Chewing on gum or sucking on hard candy. These cause you to swallow more often. And some of what you are swallowing is air. This leads to more gas in your stomach. Avoid chewing gum and hard candy.  · Overeating. This may increase the feeling of being bloated and cause more gas. When you are full, stop eating.   · Being constipated. This can increase the amount of normal intestinal gas. Avoid constipation by getting more fiber in your diet. Good sources of fiber include whole-grain cereal, fresh vegetables (except those in the above list), and fresh  fruits. High-fiber foods absorb water and carry it out of the body. When adding more fiber to your diet, you also need to drink more water. You should drink at least 8, 8-ounce glasses of water (2 quarts) per day.  Date Last Reviewed: 8/1/2016  © 8860-8511 [employer]. [address] [place] [address], [place] [address]. All rights reserved. This information is not intended as a substitute for professional medical care. Always follow your healthcare professional's instructions.          Constipation (Adult)  Constipation means that you have bowel movements that are less frequent than usual. Stools often become very hard and difficult to pass.  Constipation is very common. At some point in life it affects almost everyone. Since everyone's bowel habits are different, what is constipation to one person may not be to another. Your healthcare provider may do tests to diagnose constipation. It depends on what he or she finds when evaluating you.    Symptoms of constipation include:  · Abdominal pain  · Bloating  · Vomiting  · Painful bowel movements  · Itching, swelling, bleeding, or pain around the anus  Causes  Constipation can have many causes. These include:  · Diet low in fiber  · Too much dairy  · Not drinking enough liquids  · Lack of exercise or physical activity. This is especially true for older adults.  · Changes in lifestyle or daily routine, including pregnancy, aging, work, and travel  · Frequent use or misuse of laxatives  · Ignoring the urge to have a bowel movement or delaying it until later  · Medicines, such as certain prescription pain medicines, iron supplements, antacids, certain antidepressants, and calcium supplements  · Diseases like irritable bowel syndrome, bowel obstructions, stroke, diabetes, thyroid disease, Parkinson disease, hemorrhoids, and colon cancer  Complications  Potential complications of constipation can include:  · Hemorrhoids  · Rectal bleeding from hemorrhoids or anal  fissures (skin tears)  · Hernias  · Dependency on laxatives  · Chronic constipation  · Fecal impaction  · Bowel obstruction or perforation  Home care  All treatment should be done after talking with your healthcare provider. This is especially true if you have another medical problems, are taking prescription medicines, or are an older adult. Treatment most often involves lifestyle changes. You may also need medicines. Your healthcare provider will tell you which will work best for you. Follow the advice below to help avoid this problem in the future.  Lifestyle changes  These lifestyle changes can help prevent constipation:  · Diet. Eat a high-fiber diet, with fresh fruit and vegetables, and reduce dairy intake, meats, and processed foods  · Fluids. It's important to get enough fluids each day. Drink plenty of water when you eat more fiber. If you are on diet that limits the amount of fluid you can have, talk about this with your healthcare provider.  · Regular exercise. Check with your healthcare provider first.  Medications  Take any medicines as directed. Some laxatives are safe to use only every now and then. Others can be taken on a regular basis. Talk with your doctor or pharmacist if you have questions.  Prescription pain medicines can cause constipation. If you are taking this kind of medicine, ask your healthcare provider if you should also take a stool softener.  Medicines you may take to treat constipation include:  · Fiber supplements  · Stool softeners  · Laxatives  · Enemas  · Rectal suppositories  Follow-up care  Follow up with your healthcare provider if symptoms don't get better in the next few days. You may need to have more tests or see a specialist.  Call 911  Call 911 if any of these occur:  · Trouble breathing  · Stiff, rigid abdomen that is severely painful to touch  · Confusion  · Fainting or loss of consciousness  · Rapid heart rate  · Chest pain  When to seek medical advice  Call your  healthcare provider right away if any of these occur:  · Fever over 100.4°F (38°C)  · Failure to resume normal bowel movements  · Pain in your abdomen or back gets worse  · Nausea or vomiting  · Swelling in your abdomen  · Blood in the stool  · Black, tarry stool  · Involuntary weight loss  · Weakness  Date Last Reviewed: 12/30/2015  © 2806-5520 [employer]. [address] [place] [address], [place] [id number]. All rights reserved. This information is not intended as a substitute for professional medical care. Always follow your healthcare professional's instructions.

## 2019-09-10 NOTE — PROGRESS NOTES
Subjective:       Patient ID: Lucinda Aguilera is a 68 y.o. female Body mass index is 33.63 kg/m².    Chief Complaint: Gastroesophageal Reflux (Follow-up EGD)    Established patient of Dr. Lopez & myself.    Gastroesophageal Reflux   She complains of belching (relieves fullness in belly and dyspepsia, bloating), coughing (occasional nonproductive; typically triggered by drinking liquid too fast), heartburn and a hoarse voice (occasional). She reports no abdominal pain, no chest pain, no choking, no dysphagia, no early satiety, no globus sensation, no nausea or no sore throat. This is a chronic problem. The problem occurs rarely. The problem has been rapidly improving. The symptoms are aggravated by certain foods and stress (high fat and fried foods & spicy foods, hamburger steak, cabbage). Pertinent negatives include no fatigue, melena or weight loss. Risk factors include NSAIDs and hiatal hernia (takes aleve qAM (arthritis)). She has tried a diet change, a PPI and a histamine-2 antagonist (soft bland diet, protonix 40 mg once daily (been on this for years); zantac 300mg nightly; PAST: nexium, prilosec- helped some, carafate started in 7/2019 and discontinued last week) for the symptoms. The treatment provided moderate relief. Past procedures include an abdominal ultrasound and an EGD.     Review of Systems   Constitutional: Positive for appetite change (still decreased). Negative for chills, fatigue, fever and weight loss.   HENT: Positive for hoarse voice (occasional). Negative for sore throat and trouble swallowing.    Respiratory: Positive for cough (occasional nonproductive; typically triggered by drinking liquid too fast). Negative for choking and shortness of breath.    Cardiovascular: Negative for chest pain.   Gastrointestinal: Positive for constipation (started about a month ago; bowel movements once every 2-3 days; dulcolax prn- maybe once every 3 days; started taking carafate in 7/2019 and stopped last  week) and heartburn. Negative for abdominal pain, anal bleeding, blood in stool, diarrhea, dysphagia, melena, nausea, rectal pain and vomiting.   Genitourinary: Negative for difficulty urinating, dysuria and flank pain.   Neurological: Negative for weakness.       Patient's last menstrual period was 04/18/2004. postmenopausal  Past Medical History:   Diagnosis Date    Anticoagulant long-term use     Anxiety     takes daily Xanax    Arthritis     right thumb    Cataract     OU    Cholelithiasis     GERD (gastroesophageal reflux disease)     Hepatic steatosis     Hyperlipemia     Hypertension     PVD (posterior vitreous detachment)     OD     Past Surgical History:   Procedure Laterality Date    ARTHROSCOPY-KNEE Right 9/19/2014    [facility]    ARTHROSCOPY-MENISCECTOMY Left 12/21/2017    [facility]ienne [facility] at [facility]    chest abcess      child    CHOLECYSTECTOMY      CHOLECYSTECTOMY, LAPAROSCOPIC N/A 6/14/2019    [facility]    CHONDROPLASTY-KNEE Left 12/21/2017    [facility]    COLONOSCOPY  01/06/2012    Dr. Lopez: hemorrhoids, redundant colon    DEBRIDEMENT-KNEE Left 12/21/2017    [facility]    EGD (ESOPHAGOGASTRODUODENOSCOPY) N/A 6/6/2019    Per[facility] by Nathan Lopez Jr., [facility] at [facility] ENDO    ESOPHAGOGASTRODUODENOSCOPY (EGD) N/A 7/13/2017    [facility] Jr[facility] END[facility]    JOINT REPLACEMENT Bilateral     knee    KNEE ARTHROSCOPY W/ LASER      right    KNEE SURGERY Right 06/03/2016    Total knee replacement    REPAIR - COLLATERAL LIGAMENT THUMB Left 12/19/2014    [facility]    REPLACEMENT-KNEE-TOTAL Left 6/19/2018    [facility] 2ND FLR    REPLACEMENT-KNEE-TOTAL Right 6/3/2016    [facility] 2ND FLR     SYNOVECTOMY-KNEE Left 12/21/2017    [facility]    thumb surgery  11/2014    UPPER GASTROINTESTINAL ENDOSCOPY  07/13/2017    Dr. Lopez: NERD, Gastric mucosal atrophy. antritis, Scar in the incisura. Biopsied; biopsy: chronic gastritis. negative for h pylori    UPPER GASTROINTESTINAL ENDOSCOPY  06/06/2019    Dr. Lopez: small hiatal hernia, Non-erosive esophageal reflux (NERD) disease?., slight antritis; biopsy: Antral mucosa with chemical/reactive gastropathy. negative for h pylori     Family History   Problem Relation Age of Onset    Hypertension Mother     Heart disease Mother     Glaucoma Mother     Stroke Father     Glaucoma Sister     Cataracts Sister     Macular degeneration Sister     Breast cancer Neg Hx     Colon cancer Neg Hx     Crohn's disease Neg Hx     Ulcerative colitis Neg Hx      Wt Readings from Last 10 Encounters:   09/10/19 83.4 kg (183 lb 13.8 oz)   07/18/19 81.9 kg (180 lb 8.9 oz)   07/01/19 82.1 kg (181 lb)   06/28/19 81.6 kg (179 lb 14.3 oz)   06/14/19 82.1 kg (181 lb)   06/10/19 82 kg (180 lb 12.4 oz)   06/10/19 82.2 kg (181 lb 3.5 oz)   06/05/19 79.4 kg (175 lb)   05/29/19 81.1 kg (178 lb 12.7 oz)   05/27/19 80.7 kg (178 lb)     Lab Results   Component Value Date    WBC 9.07 06/24/2019    HGB 13.6 06/24/2019    HCT 40.0 06/24/2019    MCV 86 06/24/2019     (H) 06/24/2019     CMP  Sodium   Date Value Ref Range Status   06/24/2019 135 (L) 136 - 145 mmol/L Final   06/24/2019 135 (L) 136 - 145 mmol/L Final     Potassium   Date Value Ref Range Status   06/24/2019 4.3 3.5 - 5.1 mmol/L Final   06/24/2019 4.3 3.5 - 5.1 mmol/L Final     Chloride   Date Value Ref Range Status   06/24/2019 99 95 - 110 mmol/L Final     CO2   Date Value Ref Range Status   06/24/2019 26 23 - 29 mmol/L Final     Glucose   Date Value Ref Range Status   06/24/2019 89 70 - 110 mg/dL Final     BUN, Bld   Date Value Ref Range Status   06/24/2019 16 8 - 23 mg/dL Final      Creatinine   Date Value Ref Range Status   06/24/2019 0.8 0.5 - 1.4 mg/dL Final   06/24/2019 0.8 0.5 - 1.4 mg/dL Final     Calcium   Date Value Ref Range Status   06/24/2019 9.8 8.7 - 10.5 mg/dL Final     Total Protein   Date Value Ref Range Status   06/24/2019 7.5 6.0 - 8.4 g/dL Final     Albumin   Date Value Ref Range Status   06/24/2019 3.8 3.5 - 5.2 g/dL Final     Total Bilirubin   Date Value Ref Range Status   06/24/2019 0.3 0.1 - 1.0 mg/dL Final     Comment:     For infants and newborns, interpretation of results should be based  on gestational age, weight and in agreement with clinical  observations.  Premature Infant recommended reference ranges:  Up to 24 hours.............<8.0 mg/dL  Up to 48 hours............<12.0 mg/dL  3-5 days..................<15.0 mg/dL  6-29 days.................<15.0 mg/dL       Alkaline Phosphatase   Date Value Ref Range Status   06/24/2019 133 55 - 135 U/L Final     AST   Date Value Ref Range Status   06/24/2019 22 10 - 40 U/L Final     ALT   Date Value Ref Range Status   06/24/2019 24 10 - 44 U/L Final     Anion Gap   Date Value Ref Range Status   06/24/2019 10 8 - 16 mmol/L Final     eGFR if    Date Value Ref Range Status   06/24/2019 >60.0 >60 mL/min/1.73 m^2 Final   06/24/2019 >60.0 >60 mL/min/1.73 m^2 Final     eGFR if non    Date Value Ref Range Status   06/24/2019 >60.0 >60 mL/min/1.73 m^2 Final     Comment:     Calculation used to obtain the estimated glomerular filtration  rate (eGFR) is the CKD-EPI equation.      06/24/2019 >60.0 >60 mL/min/1.73 m^2 Final     Comment:     Calculation used to obtain the estimated glomerular filtration  rate (eGFR) is the CKD-EPI equation.        Lab Results   Component Value Date    TSH 0.872 07/23/2015     Reviewed prior medical records including radiology report of 5/27/19 limited abdominal ultrasound; 1/12/12 ct abdomen pelvis & endoscopy history (see surgical history).    Objective:      Physical  Exam   Constitutional: She is oriented to person, place, and time. She appears well-developed and well-nourished. No distress.   HENT:   Mouth/Throat: Oropharynx is clear and moist and mucous membranes are normal. No oral lesions. No oropharyngeal exudate.   Eyes: Pupils are equal, round, and reactive to light. Conjunctivae are normal. No scleral icterus.   Pulmonary/Chest: Effort normal and breath sounds normal. No respiratory distress. She has no wheezes.   Abdominal: Soft. Normal appearance and bowel sounds are normal. She exhibits no distension, no abdominal bruit and no mass. There is tenderness (mild) in the epigastric area. There is no rigidity, no rebound, no guarding, no tenderness at McBurney's point and negative Rocha's sign.   Neurological: She is alert and oriented to person, place, and time.   Skin: Skin is warm and dry. No rash noted. She is not diaphoretic. No erythema. No pallor.   Non-jaundiced   Psychiatric: She has a normal mood and affect. Her behavior is normal. Judgment and thought content normal.   Nursing note and vitals reviewed.      Assessment:       1. History of esophagogastroduodenoscopy (EGD)    2. History of gastritis    3. S/P cholecystectomy    4. Cough    5. Hoarseness of voice    6. Epigastric abdominal tenderness without rebound tenderness    7. Bloating symptom    8. Constipation, unspecified constipation type        Plan:       History of esophagogastroduodenoscopy (EGD) & History of gastritis  - DISCONTINUE CARAFATE- THERAPY COMPLETED & MAY BE CONTRIBUTING TO CONSTIPATION  - CONTINUE ZANTAC 300 MG NIGHTLY AS DIRECTED  - CONTINUE PROTONIX 40 MG ONCE DAILY AS DIRECTED,    omeprazole (PRILOSEC) 40 MG capsule; Take 1 capsule (40 mg total) by mouth before breakfast, take in the morning 30-60 minutes before breakfast, discussed about possible long term use of medication (prefer to use lowest effective dose or discontinuing if possible) and discussed the risks & benefits with  taking a reflux medication long term, and to take OTC calcium and vitamin d supplements as directed (such as Citracal +D), pt verbalized understanding  -discussed about the different types of medications used to treat reflux and how to use them, antacids can be used PRN for breakthrough heartburn symptoms by reducing stomach acid that is already produced, H2 blockers (zantac) work by limiting the amount acid production, & PPI's work to block acid production and are taken daily, patient verbalized understanding.  -CONTINUE lifestyle modifications to help control/reduce reflux/abdominal pain including: avoid large meals, avoid eating within 2-3 hours of bedtime (avoid late night eating & lying down soon after eating), elevate head of bed if nocturnal symptoms are present, smoking cessation (if current smoker), & weight loss (if overweight).   - avoid known foods which trigger reflux symptoms & to minimize/avoid high-fat foods, chocolate, caffeine, citrus, alcohol, & tomato products.  - avoid/limit use of NSAID's, since they can cause GI upset, bleeding, and/or ulcers. If needed, take with food.    S/P cholecystectomy  Recommend follow-up with general surgery for continued evaluation and management.    Cough & Hoarseness of voice  -     Ambulatory referral to ENT    Epigastric abdominal tenderness without rebound tenderness  -     X-Ray Abdomen Flat And Erect; Future; Expected date: 09/10/2019  - COMPLETE BLOOD WORK AS PREVIOUSLY ORDERED  -     Lipase; Future; Expected date: 05/22/2019  -     Hepatic function panel; Future; Expected date: 05/22/2019  -     CBC Without Differential; Future; Expected date: 05/22/2019    Bloating symptom  -     X-Ray Abdomen Flat And Erect; Future; Expected date: 09/10/2019  - START    polyethylene glycol (GLYCOLAX) 17 gram/dose powder; Take 17 g by mouth once daily.  Dispense: 510 g; Refill: 2  - recommended OTC simethicone as directed, such as Phazyme or Gas-x  -discussed with patient  about low gas diet: Reduce or eliminate these foods from your diet: Broccoli, Cauliflower, [place] sprouts, Cabbage, Cooked dried beans, Carbonated beverages (sparkling water, soda, beer, champagne)  Other Causes Of Excess Gas Include:   1) EATING TOO FAST or TALKING WHILE YOU CHEW may cause you to swallow air. This increases the amount of gas in the stomach and may worsen your symptoms.  --> Chew each mouthful completely before swallowing. Take your time.  2) OVEREATING may increase the feeling of being bloated and cause more gas.  --> When you are full, stop eating.  3) CONSTIPATION can increase the amount of normal intestinal gas.  --> Avoid constipation by increasing the amount of fiber in your diet by including whole cereal grains, fresh vegetables (except those in the above list) and fresh fruits. High-fiber foods absorb water and carry it out of the body. When increasing the amount of fiber in your diet, you also need to increase the amount of water that you drink. You should drink at least eight 8-ounce glasses of water (two quarts) per day.    Constipation, unspecified constipation type  -     X-Ray Abdomen Flat And Erect; Future; Expected date: 09/10/2019  -  START   polyethylene glycol (GLYCOLAX) 17 gram/dose powder; Take 17 g by mouth once daily.  Dispense: 510 g; Refill: 2  Recommend daily exercise as tolerated, adequate water intake (six 8-oz glasses of water daily), and high fiber diet. OTC fiber supplements are recommended if diet does not reach daily fiber goal (20-30 grams daily), such as Metamucil, Citrucel, or FiberCon (take as directed, separate from other oral medications by >2 hours).  -Recommend taking an OTC stool softener such as Colace as directed to avoid hard stools and straining with bowel movements PRN  - If no improvement with above recommendations, try intermittently dosed Dulcolax OTC as directed (every 3-4  days) PRN to facilitate bowel movements  -If still no improvement with  these measures, call/follow-up    Follow up in about 1 month (around 10/10/2019), or if symptoms worsen or fail to improve.      If no improvement in symptoms or symptoms worsen, call/follow-up at clinic or go to ER.

## 2019-09-11 ENCOUNTER — TELEPHONE (OUTPATIENT)
Dept: GASTROENTEROLOGY | Facility: CLINIC | Age: 69
End: 2019-09-11

## 2019-09-11 ENCOUNTER — PATIENT MESSAGE (OUTPATIENT)
Dept: GASTROENTEROLOGY | Facility: CLINIC | Age: 69
End: 2019-09-11

## 2019-09-11 NOTE — TELEPHONE ENCOUNTER
----- Message from EULA Orr sent at 9/11/2019  8:07 AM CDT -----  Please call to inform & review the results with the patient- Lab results are overall unremarkable/stable.  Continue with previous recommendations.  Thanks,  Stephanie FORDE-C

## 2019-09-12 ENCOUNTER — PATIENT MESSAGE (OUTPATIENT)
Dept: GASTROENTEROLOGY | Facility: CLINIC | Age: 69
End: 2019-09-12

## 2019-09-12 ENCOUNTER — TELEPHONE (OUTPATIENT)
Dept: GASTROENTEROLOGY | Facility: CLINIC | Age: 69
End: 2019-09-12

## 2019-09-12 NOTE — TELEPHONE ENCOUNTER
----- Message from EULA Orr sent at 9/12/2019 10:42 AM CDT -----  Please call to inform & review the results with the patient- The radiology report of the abdominal x-ray showed unremarkable bowel gas pattern & no signs of intestinal obstruction.  Continue with previous recommendations.  Thanks,  Stephanie FORDE-MARCUS

## 2019-09-15 ENCOUNTER — PATIENT MESSAGE (OUTPATIENT)
Dept: FAMILY MEDICINE | Facility: CLINIC | Age: 69
End: 2019-09-15

## 2019-09-24 DIAGNOSIS — M25.551 BILATERAL HIP PAIN: Primary | ICD-10-CM

## 2019-09-24 DIAGNOSIS — M25.552 BILATERAL HIP PAIN: Primary | ICD-10-CM

## 2019-09-25 ENCOUNTER — OFFICE VISIT (OUTPATIENT)
Dept: ORTHOPEDICS | Facility: CLINIC | Age: 69
End: 2019-09-25
Payer: MEDICARE

## 2019-09-25 ENCOUNTER — OFFICE VISIT (OUTPATIENT)
Dept: GASTROENTEROLOGY | Facility: CLINIC | Age: 69
End: 2019-09-25
Payer: MEDICARE

## 2019-09-25 ENCOUNTER — HOSPITAL ENCOUNTER (OUTPATIENT)
Dept: RADIOLOGY | Facility: HOSPITAL | Age: 69
Discharge: HOME | End: 2019-09-25
Attending: ORTHOPAEDIC SURGERY
Payer: MEDICARE

## 2019-09-25 VITALS
HEART RATE: 94 BPM | HEIGHT: 62 IN | WEIGHT: 183 LBS | DIASTOLIC BLOOD PRESSURE: 60 MMHG | BODY MASS INDEX: 33.68 KG/M2 | SYSTOLIC BLOOD PRESSURE: 126 MMHG

## 2019-09-25 VITALS
DIASTOLIC BLOOD PRESSURE: 64 MMHG | SYSTOLIC BLOOD PRESSURE: 110 MMHG | WEIGHT: 184.75 LBS | RESPIRATION RATE: 18 BRPM | HEART RATE: 77 BPM | HEIGHT: 62 IN | BODY MASS INDEX: 34 KG/M2

## 2019-09-25 DIAGNOSIS — R49.0 HOARSENESS OF VOICE: ICD-10-CM

## 2019-09-25 DIAGNOSIS — Z12.11 SCREENING FOR COLON CANCER: ICD-10-CM

## 2019-09-25 DIAGNOSIS — M25.551 BILATERAL HIP PAIN: ICD-10-CM

## 2019-09-25 DIAGNOSIS — M16.10 ARTHRITIS OF HIP: Primary | ICD-10-CM

## 2019-09-25 DIAGNOSIS — R11.0 CHRONIC NAUSEA: ICD-10-CM

## 2019-09-25 DIAGNOSIS — R63.0 DECREASED APPETITE: Primary | ICD-10-CM

## 2019-09-25 DIAGNOSIS — M25.552 BILATERAL HIP PAIN: ICD-10-CM

## 2019-09-25 DIAGNOSIS — R68.81 EARLY SATIETY: ICD-10-CM

## 2019-09-25 DIAGNOSIS — K21.9 GERD WITHOUT ESOPHAGITIS: ICD-10-CM

## 2019-09-25 DIAGNOSIS — R05.9 COUGH: ICD-10-CM

## 2019-09-25 DIAGNOSIS — Z90.49 S/P CHOLECYSTECTOMY: ICD-10-CM

## 2019-09-25 PROCEDURE — 73521 X-RAY EXAM HIPS BI 2 VIEWS: CPT | Mod: TC,PO

## 2019-09-25 PROCEDURE — 99215 OFFICE O/P EST HI 40 MIN: CPT | Mod: PBBFAC,25,27,PO | Performed by: NURSE PRACTITIONER

## 2019-09-25 PROCEDURE — 99214 OFFICE O/P EST MOD 30 MIN: CPT | Mod: S$PBB,,, | Performed by: NURSE PRACTITIONER

## 2019-09-25 PROCEDURE — 73521 XR HIPS BILATERAL 2 VIEW INCL AP PELVIS: ICD-10-PCS | Mod: 26,,, | Performed by: RADIOLOGY

## 2019-09-25 PROCEDURE — 99999 PR PBB SHADOW E&M-EST. PATIENT-LVL V: CPT | Mod: PBBFAC,,, | Performed by: NURSE PRACTITIONER

## 2019-09-25 PROCEDURE — 99213 OFFICE O/P EST LOW 20 MIN: CPT | Mod: PBBFAC,25,PN | Performed by: ORTHOPAEDIC SURGERY

## 2019-09-25 PROCEDURE — 99999 PR PBB SHADOW E&M-EST. PATIENT-LVL V: ICD-10-PCS | Mod: PBBFAC,,, | Performed by: NURSE PRACTITIONER

## 2019-09-25 PROCEDURE — 99214 OFFICE O/P EST MOD 30 MIN: CPT | Mod: S$PBB,ICN,, | Performed by: ORTHOPAEDIC SURGERY

## 2019-09-25 PROCEDURE — 99214 PR OFFICE/OUTPT VISIT, EST, LEVL IV, 30-39 MIN: ICD-10-PCS | Mod: S$PBB,ICN,, | Performed by: ORTHOPAEDIC SURGERY

## 2019-09-25 PROCEDURE — 99999 PR PBB SHADOW E&M-EST. PATIENT-LVL III: ICD-10-PCS | Mod: PBBFAC,,, | Performed by: ORTHOPAEDIC SURGERY

## 2019-09-25 PROCEDURE — 99214 PR OFFICE/OUTPT VISIT, EST, LEVL IV, 30-39 MIN: ICD-10-PCS | Mod: S$PBB,,, | Performed by: NURSE PRACTITIONER

## 2019-09-25 PROCEDURE — 99999 PR PBB SHADOW E&M-EST. PATIENT-LVL III: CPT | Mod: PBBFAC,,, | Performed by: ORTHOPAEDIC SURGERY

## 2019-09-25 PROCEDURE — 73521 X-RAY EXAM HIPS BI 2 VIEWS: CPT | Mod: 26,,, | Performed by: RADIOLOGY

## 2019-09-25 RX ORDER — MELOXICAM 15 MG/1
15 TABLET ORAL DAILY
Qty: 30 TABLET | Refills: 2 | Status: SHIPPED | OUTPATIENT
Start: 2019-09-25 | End: 2019-10-25

## 2019-09-25 NOTE — PROGRESS NOTES
Past Medical History:   Diagnosis Date    Anticoagulant long-term use     Anxiety     takes daily Xanax    Arthritis     right thumb    Cataract     OU    Cholelithiasis     GERD (gastroesophageal reflux disease)     Hepatic steatosis     Hyperlipemia     Hypertension     PVD (posterior vitreous detachment)     OD       Past Surgical History:   Procedure Laterality Date    chest abcess      child    CHOLECYSTECTOMY      COLONOSCOPY  01/06/2012    Dr. Lopez: hemorrhoids, redundant colon    ESOPHAGOGASTRODUODENOSCOPY N/A 6/6/2019    Procedure: EGD (ESOPHAGOGASTRODUODENOSCOPY);  Surgeon: Nathan Lopez Jr., MD;  [facility];  Service: Endoscopy;  Laterality: N/A;    JOINT REPLACEMENT Bilateral     knee    KNEE ARTHROSCOPY W/ LASER      right    KNEE SURGERY Right 06/03/2016    Total knee replacement    LAPAROSCOPIC CHOLECYSTECTOMY N/A 6/14/2019    Procedure: CHOLECYSTECTOMY, LAPAROSCOPIC;  Surgeon: Guevara Evans MD;  [facility];  Service: General;  Laterality: N/A;    thumb surgery  11/2014    TOTAL KNEE ARTHROPLASTY Left 6/19/2018    Procedure: REPLACEMENT-KNEE-TOTAL;  Surgeon: Nj Melvin MD;  Location: [address];  Service: Orthopedics;  Laterality: Left;  [place]    UPPER GASTROINTESTINAL ENDOSCOPY  07/13/2017    Dr. Lopez: NERD, Gastric mucosal atrophy. antritis, Scar in the incisura. Biopsied; biopsy: chronic gastritis. negative for h pylori    UPPER GASTROINTESTINAL ENDOSCOPY  06/06/2019    Dr. Lopez: small hiatal hernia, Non-erosive esophageal reflux (NERD) disease?., slight antritis; biopsy: Antral mucosa with chemical/reactive gastropathy. negative for h pylori       Current Outpatient Medications   Medication Sig    ALPRAZolam (XANAX) 0.5 MG tablet TAKE 1 TABLET(0.5 MG) BY MOUTH TWICE DAILY AS NEEDED    bisacodyl (DULCOLAX) 5 mg EC tablet Take 5 mg by mouth daily as needed for Constipation.    carboxymethylcellulose (REFRESH PLUS) 0.5 % Dpet 1 drop 3  (three) times daily as needed.    chlorthalidone (HYGROTEN) 25 MG Tab TAKE 1 TABLET BY MOUTH EVERY MORNING, DISCONTINUE HCTZ    clotrimazole-betamethasone 1-0.05% (LOTRISONE) cream Apply topically 2 (two) times daily.    fish oil-omega-3 fatty acids 300-1,000 mg capsule Take 2 g by mouth once daily.    menthol/zinc oxide (CALMOSEPTINE TOP) Apply topically.    multivitamin (MULTIVITAMIN) per tablet Take 1 tablet by mouth once daily.      naproxen sodium (ALEVE) 220 mg Cap Take 220 mg by mouth once.     niacin 500 MG CpSR Take 500 mg by mouth every evening.      pantoprazole (PROTONIX) 40 MG tablet TAKE 1 TABLET(40 MG) BY MOUTH BEFORE BREAKFAST    paroxetine (PAXIL) 10 MG tablet TAKE 1 TABLET BY MOUTH EVERY MORNING    polyethylene glycol (GLYCOLAX) 17 gram/dose powder Take 17 g by mouth once daily.    potassium chloride (MICRO-K) 10 MEQ CpSR TAKE 2 CAPSULES BY MOUTH DAILY    pravastatin (PRAVACHOL) 40 MG tablet TAKE 1 TABLET(40 MG) BY MOUTH EVERY DAY    ranitidine (ZANTAC) 300 MG tablet Take 1 tablet (300 mg total) by mouth every evening. , about 30-60 minutes before bedtime.    aspirin (ECOTRIN) 81 MG EC tablet Take 1 tablet (81 mg total) by mouth 2 (two) times daily. (Patient taking differently: Take 81 mg by mouth once. )    irbesartan (AVAPRO) 300 MG tablet Take 1 tablet (300 mg total) by mouth every evening. 1 po qd     No current facility-administered medications for this visit.        Review of patient's allergies indicates:  No Known Allergies    Family History   Problem Relation Age of Onset    Hypertension Mother     Heart disease Mother     Glaucoma Mother     Stroke Father     Glaucoma Sister     Cataracts Sister     Macular degeneration Sister     Breast cancer Neg Hx     Colon cancer Neg Hx     Crohn's disease Neg Hx     Ulcerative colitis Neg Hx        Social History     Socioeconomic History    Marital status:      Spouse name: Not on file    Number of children: 2     Years of education: Not on file    Highest education level: Not on file   Occupational History    Occupation: retired    Occupation: retired teacher and principal   Social Needs    Financial resource strain: Not hard at all    Food insecurity:     Worry: Never true     Inability: Never true    Transportation needs:     Medical: No     Non-medical: No   Tobacco Use    Smoking status: Never Smoker    Smokeless tobacco: Never Used   Substance and Sexual Activity    Alcohol use: Yes     Frequency: Never     Drinks per session: 1 or 2     Binge frequency: Never     Comment: social- maybe once every few months    Drug use: No    Sexual activity: Yes     Partners: Male     Birth control/protection: None, Post-menopausal   Lifestyle    Physical activity:     Days per week: 4 days     Minutes per session: 90 min    Stress: Not at all   Relationships    Social connections:     Talks on phone: More than three times a week     Gets together: More than three times a week     Attends Gnosticist service: Not on file     Active member of club or organization: Yes     Attends meetings of clubs or organizations: 1 to 4 times per year     Relationship status:    Other Topics Concern    Not on file   Social History Narrative    Not on file       Chief Complaint:   Chief Complaint   Patient presents with    Hip Pain     bilateral       History of present illness:  This is a 68-year-old female with a history of polyarthritis seen for bilateral hip pain. Patient localizes the pain to the back of her hips and low back.  She has done physical therapy and a back program.  She is currently on Aleve.  Pain is a 3/10.  Pain started about a month ago.  Hurts after walking or standing for a long time.  Worse at the end of the day.      Answers for HPI/ROS submitted by the patient on 9/21/2019   Hip pain  unexpected weight change: No  appetite change : Yes  sleep disturbance: No  IMMUNOCOMPROMISED: No  nervous/ anxious:  Yes  dysphoric mood: No  rash: No  visual disturbance: No  eye redness: No  eye pain: No  ear pain: No  tinnitus: No  hearing loss: No  sinus pressure : No  nosebleeds: No  enviro allergies: No  food allergies: No  cough: Yes  shortness of breath: No  sweating: No  dysuria: No  frequency: No  difficulty urinating: No  hematuria: No  painful intercourse: Yes  chest pain: No  palpitations: No  nausea: Yes  vomiting: No  diarrhea: No  blood in stool: No  constipation: Yes  headaches: No  dizziness: No  numbness: No  seizures: No  joint swelling: No  myalgia: Yes  weakness: No  back pain: Yes  Pain Chronicity: recurrent  History of trauma: No  Onset: 1 to 4 weeks ago  Frequency: daily  Progression since onset: gradually worsening  Injury mechanism: reaching  injury location: at home  pain- numeric: 7/10  pain location: left pelvic, right pelvic, left hip, right hip, right knee, left foot, right foot, left toes, right toes  pain quality: sharp  Radiating Pain: Yes  If your pain is radiating, to what part of the body?: lower back  Aggravating factors: standing, walking  fever: No  inability to bear weight: No  itching: No  joint locking: No  limited range of motion: No  stiffness: No  tingling: Yes  Treatments tried: heat, exercise, rest  physical therapy: effective  Improvement on treatment: significant    Physical Examination:    Vital Signs:    Vitals:    09/25/19 0812   BP: 126/60   Pulse: 94       Body mass index is 33.47 kg/m².    This a well-developed, well nourished patient in no acute distress.  They are alert and oriented and cooperative to examination.  Pt. walks without an antalgic gait.      Examination of the patient's bilateral hips shows full range of motion with flexion to 160°, extension to 0, external rotation to 50°, internal rotation of 15°, abduction of 50°, adduction of 15°. Skin has no rashes or bruising. Patient has negative Stinchfield exam. Patient has negative straight leg raise.Negative  internal impingement test. Negative JOSUE test. Negative Juanita's test. Patient has no pain with hip range of motion. Nontender to palpation over the greater trochanteric bursa. Patient is 5 out of 5 motor strength, palpable distal pulses, and intact light touch sensation.       X-rays:  X-rays of the pelvis and both hips are ordered and review which show some mild narrowing with the left being slightly worse.     Assessment::  Mild bilateral hip arthritis  Low back pain    Plan:  I reviewed the findings with her today. Her hips actually do not look too bad.  It is most likely related to some low back problems or SI joint.  I put her on Mobic 15 milligrams daily.  Discussed the benefits and complications of the medication.  Follow-up as needed.    This note was created using Eight19 voice recognition software that occasionally misinterpreted phrases or words.    Consult note is delivered via Epic messaging service.

## 2019-09-25 NOTE — PROGRESS NOTES
Subjective:       Patient ID: Lucinda Aguilera is a 68 y.o. female, Body mass index is 33.79 kg/m².    Chief Complaint: Other (decrease in appeite)      Patient is new to me. Established patient of Dr. Lopez and Stephanie Hendrickson NP.    Gastroesophageal Reflux   She complains of belching, coughing, early satiety, a hoarse voice and nausea. She reports no abdominal pain, no chest pain, no dysphagia or no heartburn. This is a chronic problem. The current episode started more than 1 month ago (Started months ago). The problem occurs frequently. The problem has been gradually improving. The symptoms are aggravated by certain foods (nausea and belching triggered by certain foods, especially fatty foods). Pertinent negatives include no fatigue, melena or weight loss. Risk factors include hiatal hernia and obesity. She has tried a PPI and a histamine-2 antagonist (Currently on: Protonix 40 mg once daily and Ranitidine 300 mg before bedtime) for the symptoms. The treatment provided mild relief. Past procedures include an abdominal ultrasound (US abdomen in 5/2019 showed gallstones which led to cholecystectomy in 6/2019) and an EGD (EGD in 6/2019 showed mild antritis, bx negative for h pylori).     Review of Systems   Constitutional: Positive for appetite change (decreased appetite). Negative for fatigue, fever, unexpected weight change and weight loss.   HENT: Positive for hoarse voice. Negative for trouble swallowing.    Respiratory: Positive for cough. Negative for shortness of breath.    Cardiovascular: Negative for chest pain.   Gastrointestinal: Positive for nausea. Negative for abdominal pain, blood in stool, constipation (resolved after stopping carafate; bowel movements are once per day; never started Miralax), diarrhea, dysphagia, heartburn, melena and vomiting.   Genitourinary: Negative for difficulty urinating and dysuria.   Musculoskeletal: Negative for gait problem.   Skin: Negative for rash.   Neurological:  Negative for speech difficulty.   Psychiatric/Behavioral: Negative for confusion.       Past Medical History:   Diagnosis Date    Anticoagulant long-term use     Anxiety     takes daily Xanax    Arthritis     right thumb    Cataract     OU    Cholelithiasis     GERD (gastroesophageal reflux disease)     Hepatic steatosis     Hyperlipemia     Hypertension     PVD (posterior vitreous detachment)     OD      Past Surgical History:   Procedure Laterality Date    chest abcess      child    CHOLECYSTECTOMY      COLONOSCOPY  01/06/2012    Dr. Lopez: hemorrhoids, redundant colon    ESOPHAGOGASTRODUODENOSCOPY N/A 6/6/2019    Procedure: EGD (ESOPHAGOGASTRODUODENOSCOPY);  Surgeon: Nathan Lopez Jr., MD;  Location: [facility] ENDO;  Service: Endoscopy;  Laterality: N/A;    JOINT REPLACEMENT Bilateral     knee    KNEE ARTHROSCOPY W/ LASER      right    KNEE SURGERY Right 06/03/2016    Total knee replacement    LAPAROSCOPIC CHOLECYSTECTOMY N/A 6/14/2019    Procedure: CHOLECYSTECTOMY, LAPAROSCOPIC;  Surgeon: Guevara Evans MD;  Location: [facility] OR;  Service: General;  Laterality: N/A;    thumb surgery  11/2014    TOTAL KNEE ARTHROPLASTY Left 6/19/2018    Procedure: REPLACEMENT-KNEE-TOTAL;  Surgeon: Nj Melvin MD;  Location: [address];  Service: Orthopedics;  Laterality: Left;  [place]    UPPER GASTROINTESTINAL ENDOSCOPY  07/13/2017    Dr. Lopez: NERD, Gastric mucosal atrophy. antritis, Scar in the incisura. Biopsied; biopsy: chronic gastritis. negative for h pylori    UPPER GASTROINTESTINAL ENDOSCOPY  06/06/2019    Dr. Lopez: small hiatal hernia, Non-erosive esophageal reflux (NERD) disease?., slight antritis; biopsy: Antral mucosa with chemical/reactive gastropathy. negative for h pylori      Family History   Problem Relation Age of Onset    Hypertension Mother     Heart disease Mother     Glaucoma Mother     Stroke Father     Glaucoma Sister     Cataracts Sister     Macular degeneration  Sister     Breast cancer Neg Hx     Colon cancer Neg Hx     Crohn's disease Neg Hx     Ulcerative colitis Neg Hx       Wt Readings from Last 10 Encounters:   09/25/19 83.8 kg (184 lb 11.9 oz)   09/25/19 83 kg (183 lb)   09/10/19 83.4 kg (183 lb 13.8 oz)   07/18/19 81.9 kg (180 lb 8.9 oz)   07/01/19 82.1 kg (181 lb)   06/28/19 81.6 kg (179 lb 14.3 oz)   06/14/19 82.1 kg (181 lb)   06/10/19 82 kg (180 lb 12.4 oz)   06/10/19 82.2 kg (181 lb 3.5 oz)   06/05/19 79.4 kg (175 lb)     Lab Results   Component Value Date    WBC 9.76 09/10/2019    HGB 12.9 09/10/2019    HCT 39.2 09/10/2019    MCV 89 09/10/2019     (H) 09/10/2019     CMP  Sodium   Date Value Ref Range Status   06/24/2019 135 (L) 136 - 145 mmol/L Final   06/24/2019 135 (L) 136 - 145 mmol/L Final     Potassium   Date Value Ref Range Status   06/24/2019 4.3 3.5 - 5.1 mmol/L Final   06/24/2019 4.3 3.5 - 5.1 mmol/L Final     Chloride   Date Value Ref Range Status   06/24/2019 99 95 - 110 mmol/L Final     CO2   Date Value Ref Range Status   06/24/2019 26 23 - 29 mmol/L Final     Glucose   Date Value Ref Range Status   06/24/2019 89 70 - 110 mg/dL Final     BUN, Bld   Date Value Ref Range Status   06/24/2019 16 8 - 23 mg/dL Final     Creatinine   Date Value Ref Range Status   06/24/2019 0.8 0.5 - 1.4 mg/dL Final   06/24/2019 0.8 0.5 - 1.4 mg/dL Final     Calcium   Date Value Ref Range Status   06/24/2019 9.8 8.7 - 10.5 mg/dL Final     Total Protein   Date Value Ref Range Status   09/10/2019 7.7 6.0 - 8.4 g/dL Final     Albumin   Date Value Ref Range Status   09/10/2019 4.1 3.5 - 5.2 g/dL Final     Total Bilirubin   Date Value Ref Range Status   09/10/2019 0.5 0.1 - 1.0 mg/dL Final     Comment:     For infants and newborns, interpretation of results should be based  on gestational age, weight and in agreement with clinical  observations.  Premature Infant recommended reference ranges:  Up to 24 hours.............<8.0 mg/dL  Up to 48 hours............<12.0  mg/dL  3-5 days..................<15.0 mg/dL  6-29 days.................<15.0 mg/dL       Alkaline Phosphatase   Date Value Ref Range Status   09/10/2019 106 55 - 135 U/L Final     AST   Date Value Ref Range Status   09/10/2019 31 10 - 40 U/L Final     ALT   Date Value Ref Range Status   09/10/2019 23 10 - 44 U/L Final     Anion Gap   Date Value Ref Range Status   06/24/2019 10 8 - 16 mmol/L Final     eGFR if    Date Value Ref Range Status   06/24/2019 >60.0 >60 mL/min/1.73 m^2 Final   06/24/2019 >60.0 >60 mL/min/1.73 m^2 Final     eGFR if non    Date Value Ref Range Status   06/24/2019 >60.0 >60 mL/min/1.73 m^2 Final     Comment:     Calculation used to obtain the estimated glomerular filtration  rate (eGFR) is the CKD-EPI equation.      06/24/2019 >60.0 >60 mL/min/1.73 m^2 Final     Comment:     Calculation used to obtain the estimated glomerular filtration  rate (eGFR) is the CKD-EPI equation.          Lab Results   Component Value Date    LIPASE 47 09/10/2019               Reviewed prior medical records including radiology report of X-Ray of abdomen 9/10/19 and US abdomen 5/27/19 & endoscopy history (see surgical history).     Objective:      Physical Exam   Constitutional: She is oriented to person, place, and time. She appears well-developed and well-nourished.   HENT:   Head: Normocephalic.   Eyes: Pupils are equal, round, and reactive to light.   Neck: Normal range of motion.   Cardiovascular: Normal rate, regular rhythm and normal heart sounds.   No murmur heard.  Pulmonary/Chest: Breath sounds normal. No respiratory distress. She has no wheezes.   Abdominal: Soft. Bowel sounds are normal. She exhibits no distension and no mass. There is tenderness (mild) in the epigastric area. There is no guarding.   Well healed surgical scars noted   Musculoskeletal: Normal range of motion.   Neurological: She is alert and oriented to person, place, and time.   Skin: Skin is warm and dry.  No rash noted.   Non jaundiced   Psychiatric: She has a normal mood and affect. Her speech is normal.         Assessment:       1. Decreased appetite    2. Early satiety    3. Chronic nausea    4. GERD without esophagitis    5. Cough    6. Hoarseness of voice    7. S/P cholecystectomy    8. Screening for colon cancer           Plan:   All diagnoses and orders for this visit:    Decreased appetite, Early satiety, and Chronic nausea  - TSH; Future; Expected date: 09/25/2019  - NM Gastric Emptying; Future; Expected date: 09/25/2019    GERD without esophagitis   - Continue Protonix 40 mg once daily as directed   - Continue Ranitidine 300 mg before bedtime as directed   - Take PPI in the morning 30-60 minutes before breakfast   - Educated patient on lifestyle modifications to help control/reduce reflux/abdominal pain including: avoid large meals, avoid eating within 2-3 hours of bedtime (avoid late night eating & lying down soon after eating), elevate head of bed if nocturnal symptoms are present, smoking cessation (if current smoker), & weight loss (if overweight).    - Educated to avoid known foods which trigger reflux symptoms & to minimize/avoid high-fat foods, chocolate, caffeine, citrus, alcohol, & tomato products.   - Advised to avoid/limit use of NSAID's, since they can cause GI upset, bleeding, and/or ulcers. If needed, take with food.     Cough & Hoarseness of voice   - Ambulatory referral to ENT    S/P cholecystectomy    Screening for colon cancer   - Next surveillance colonoscopy due 2022     &If no improvement in symptoms or symptoms worsen, call/follow-up at clinic or go to ER

## 2019-09-27 ENCOUNTER — LAB VISIT (OUTPATIENT)
Dept: LAB | Facility: HOSPITAL | Age: 69
End: 2019-09-27
Attending: NURSE PRACTITIONER
Payer: MEDICARE

## 2019-09-27 ENCOUNTER — TELEPHONE (OUTPATIENT)
Dept: GASTROENTEROLOGY | Facility: CLINIC | Age: 69
End: 2019-09-27

## 2019-09-27 DIAGNOSIS — R68.81 EARLY SATIETY: ICD-10-CM

## 2019-09-27 LAB — TSH SERPL DL<=0.005 MIU/L-ACNC: 0.97 UIU/ML (ref 0.4–4)

## 2019-09-27 PROCEDURE — 36415 COLL VENOUS BLD VENIPUNCTURE: CPT | Mod: PO

## 2019-09-27 PROCEDURE — 84443 ASSAY THYROID STIM HORMONE: CPT

## 2019-09-27 NOTE — TELEPHONE ENCOUNTER
----- Message from Jamee Allen NP sent at 9/27/2019  2:44 PM CDT -----  Please let patient know her TSH is normal. Continue with previous recommendations.

## 2019-10-08 ENCOUNTER — HOSPITAL ENCOUNTER (OUTPATIENT)
Dept: RADIOLOGY | Facility: HOSPITAL | Age: 69
Discharge: HOME | End: 2019-10-08
Attending: NURSE PRACTITIONER
Payer: MEDICARE

## 2019-10-08 ENCOUNTER — PATIENT MESSAGE (OUTPATIENT)
Dept: GASTROENTEROLOGY | Facility: CLINIC | Age: 69
End: 2019-10-08

## 2019-10-08 ENCOUNTER — TELEPHONE (OUTPATIENT)
Dept: GASTROENTEROLOGY | Facility: CLINIC | Age: 69
End: 2019-10-08

## 2019-10-08 DIAGNOSIS — R68.81 EARLY SATIETY: ICD-10-CM

## 2019-10-08 PROCEDURE — 78264 GASTRIC EMPTYING IMG STUDY: CPT | Mod: 26,,, | Performed by: RADIOLOGY

## 2019-10-08 PROCEDURE — 78264 NM GASTRIC EMPTYING: ICD-10-PCS | Mod: 26,,, | Performed by: RADIOLOGY

## 2019-10-08 PROCEDURE — 78264 GASTRIC EMPTYING IMG STUDY: CPT | Mod: TC,PO

## 2019-10-08 PROCEDURE — A9541 TC99M SULFUR COLLOID: HCPCS | Mod: PO

## 2019-10-08 NOTE — TELEPHONE ENCOUNTER
Informed pt of result from her gastric emptying study, also of Jamee Allen NP recommendations. Pt verbalized understanding.

## 2019-10-08 NOTE — TELEPHONE ENCOUNTER
----- Message from Jamee Allen NP sent at 10/8/2019  1:20 PM CDT -----  Please let patient know her gastric emptying study was normal but there was a prolonged lag phase at the initial phase of the test (slow emptying at the begin). This could be contributing to her symptoms.  Recommend small frequent meals instead of 3 big meals a day, low fat meals & low residue diet. Avoid NSAIDs and narcotics.  Follow up if symptoms persist despite following these recommendations.

## 2019-10-11 ENCOUNTER — OFFICE VISIT (OUTPATIENT)
Dept: OTOLARYNGOLOGY | Facility: CLINIC | Age: 69
End: 2019-10-11
Payer: MEDICARE

## 2019-10-11 VITALS — WEIGHT: 184.06 LBS | BODY MASS INDEX: 33.87 KG/M2 | HEIGHT: 62 IN

## 2019-10-11 DIAGNOSIS — H61.23 BILATERAL IMPACTED CERUMEN: Primary | ICD-10-CM

## 2019-10-11 DIAGNOSIS — R09.89 CHRONIC THROAT CLEARING: ICD-10-CM

## 2019-10-11 DIAGNOSIS — R09.89 CHOKING SENSATION: ICD-10-CM

## 2019-10-11 DIAGNOSIS — R05.3 CHRONIC COUGH: ICD-10-CM

## 2019-10-11 DIAGNOSIS — R07.0 THROAT DISCOMFORT: ICD-10-CM

## 2019-10-11 DIAGNOSIS — R49.0 DYSPHONIA: ICD-10-CM

## 2019-10-11 PROCEDURE — 31575 DIAGNOSTIC LARYNGOSCOPY: CPT | Mod: S$PBB,,, | Performed by: NURSE PRACTITIONER

## 2019-10-11 PROCEDURE — 99999 PR PBB SHADOW E&M-EST. PATIENT-LVL IV: ICD-10-PCS | Mod: PBBFAC,,, | Performed by: NURSE PRACTITIONER

## 2019-10-11 PROCEDURE — 31575 PR LARYNGOSCOPY, FLEXIBLE; DIAGNOSTIC: ICD-10-PCS | Mod: S$PBB,,, | Performed by: NURSE PRACTITIONER

## 2019-10-11 PROCEDURE — 69210 REMOVE IMPACTED EAR WAX UNI: CPT | Mod: 50,PBBFAC,PO | Performed by: NURSE PRACTITIONER

## 2019-10-11 PROCEDURE — 99214 OFFICE O/P EST MOD 30 MIN: CPT | Mod: 25,S$PBB,, | Performed by: NURSE PRACTITIONER

## 2019-10-11 PROCEDURE — 99214 PR OFFICE/OUTPT VISIT, EST, LEVL IV, 30-39 MIN: ICD-10-PCS | Mod: 25,S$PBB,, | Performed by: NURSE PRACTITIONER

## 2019-10-11 PROCEDURE — 99214 OFFICE O/P EST MOD 30 MIN: CPT | Mod: PBBFAC,PO,25 | Performed by: NURSE PRACTITIONER

## 2019-10-11 PROCEDURE — 31575 DIAGNOSTIC LARYNGOSCOPY: CPT | Mod: PBBFAC,PO | Performed by: NURSE PRACTITIONER

## 2019-10-11 PROCEDURE — 69210 PR REMOVAL IMPACTED CERUMEN REQUIRING INSTRUMENTATION, UNILATERAL: ICD-10-PCS | Mod: 51,S$PBB,, | Performed by: NURSE PRACTITIONER

## 2019-10-11 PROCEDURE — 99999 PR PBB SHADOW E&M-EST. PATIENT-LVL IV: CPT | Mod: PBBFAC,,, | Performed by: NURSE PRACTITIONER

## 2019-10-11 PROCEDURE — 69210 REMOVE IMPACTED EAR WAX UNI: CPT | Mod: 51,S$PBB,, | Performed by: NURSE PRACTITIONER

## 2019-10-11 NOTE — PROGRESS NOTES
Subjective:       Patient ID: Lucinda Aguilera is a 68 y.o. female.    Chief Complaint: Other (hoarseness, sore throat )    Ear Fullness    Associated symptoms include coughing (dry) and a sore throat (frequent throat irritation).      Patient was last seen by me last year for a left HAYDEE (non-suppurative); treated with Astelin, Flonase, and valsalva. She returns today for dysphonia. She is a  and has morning hoarseness for no reason, prior to voice strain, X 3 months. She also chokes on water for no reason. She has chronic mild discomfort in her throat.     Review of Systems   Constitutional: Negative.    HENT: Positive for sore throat (frequent throat irritation) and voice change. Negative for ear pain.         Frequent throat clearing   Eyes: Positive for itching.   Respiratory: Positive for cough (dry) and choking.    Cardiovascular: Negative.    Gastrointestinal: Negative.    Musculoskeletal: Negative.    Skin: Negative.    Neurological: Negative.    Hematological: Negative.    Psychiatric/Behavioral: Negative.        Objective:      Physical Exam   Constitutional: She is oriented to person, place, and time. Vital signs are normal. She appears well-developed and well-nourished. She is cooperative. She does not appear ill. No distress.   HENT:   Head: Normocephalic and atraumatic.   Right Ear: Hearing, tympanic membrane, external ear and ear canal normal. Tympanic membrane is not erythematous. Tympanic membrane mobility is normal. No middle ear effusion.   Left Ear: External ear and ear canal normal. Tympanic membrane is not erythematous. Tympanic membrane mobility is normal.  No middle ear effusion.   Nose: Nose normal. No mucosal edema or rhinorrhea. Right sinus exhibits no maxillary sinus tenderness and no frontal sinus tenderness. Left sinus exhibits no maxillary sinus tenderness and no frontal sinus tenderness.   Mouth/Throat: Uvula is midline, oropharynx is clear and moist and mucous  "membranes are normal. Mucous membranes are not pale, not dry and not cyanotic. No oral lesions. No oropharyngeal exudate, posterior oropharyngeal edema or posterior oropharyngeal erythema.     SEPARATE PROCEDURE IN OFFICE:   Procedure: Removal of impacted cerumen, bilateral   Pre Procedure Diagnosis: Cerumen Impaction   Post Procedure Diagnosis: Cerumen Impaction   Verbal informed consent in regards to risk of trauma to ear canal, ear drum or hearing, discomfort during procedure and/or inability to remove cerumen impaction in one session or unforeseen events or complications.   No anesthesia.     Procedure in detail:   Ear canal visualized bilateral with appropriate size ear speculum utilizing Operating Head Binocular Otomicroscope   Utilizing the following:  Ring curet was used AU. The impacted cerumen of the ear canals was removed atraumatically. The TM and EAC were then inspected and found to be clear of wax. See description of TMs/EACs in PE above.   Complications: No   Condition: Improved/Good    Type "A" tympanogram consistent with well-pneumatized mesotympanum and absence of middle ear effusion AU.     Eyes: Pupils are equal, round, and reactive to light. Conjunctivae, EOM and lids are normal. Right eye exhibits no discharge. Left eye exhibits no discharge. No scleral icterus.   Neck: Trachea normal and normal range of motion. Neck supple. No tracheal deviation present. No thyroid mass and no thyromegaly present.   Cardiovascular: Normal rate.   Pulmonary/Chest: Effort normal. No stridor. No respiratory distress. She has no wheezes.   Musculoskeletal: Normal range of motion.   Lymphadenopathy:        Head (right side): No submental, no submandibular, no tonsillar, no preauricular and no posterior auricular adenopathy present.        Head (left side): No submental, no submandibular, no tonsillar, no preauricular and no posterior auricular adenopathy present.     She has no cervical adenopathy.        Right " cervical: No superficial cervical and no posterior cervical adenopathy present.       Left cervical: No superficial cervical and no posterior cervical adenopathy present.   Neurological: She is alert and oriented to person, place, and time. She has normal strength. Coordination and gait normal.   Skin: Skin is warm, dry and intact. No lesion and no rash noted. She is not diaphoretic. No cyanosis. No pallor.   Psychiatric: She has a normal mood and affect. Her speech is normal and behavior is normal. Judgment and thought content normal. Cognition and memory are normal.   Nursing note and vitals reviewed.      Procedure: Flexible laryngoscopy    In order to fully examine the upper aerodigestive tract, including the larynx, in a patient with a hyperactive gag reflex, and suboptimal visualization with indirect mirror exam,  flexible endoscopy is required.   After explaining the procedure and obtaining verbal consent, a timeout was performed with the patient's participation according to the universal protocol. Both nasal cavities were anesthetized with 4% Xylocaine spray mixed with Mike-Synephrine. The flexible laryngoscope  was inserted into the nasal cavity and advanced to visualize the nasal cavity, nasopharynx, the posterior oropharynx, hypopharynx, and the endolarynx with the  findings noted. The scope was removed and the procedure terminated. The patient tolerated this procedure well without apparent complication.     OVERALL FINDINGS  Nasopharynx - the torus is clear. There are no lesions of the posterior wall.   Oropharynx - no lesions of the tongue base. There is no obvious fullness or asymmetry.  Hypopharynx - there are no lesions of the pyriform sinuses or postcricoid region   Larynx - there are no lesions of the supraglottic or glottic larynx.  Vocal fold mobility is normal.     SPECIFIC FINDINGS  Adenoid tissue - normal   Nasopharynx & eustachian tube orifices - normal   Posterior pharyngeal wall - normal    Base of tongue - normal   Epiglottis - normal   Valleculae - normal   Pyriform sinuses - normal   False vocal cords - normal   True vocal cords - normal  Arytenoids - normal   Interarytenoid space - mucus, erythema, edema  Larynx    Larynx    Assessment:     LPRD, manifested as chronic dysphonia, choking, dysphagia, throat discomfort, chronic cough, chronic throat clearing    Bilateral cerumen impactions removed  Plan:     Discussed typical constellation of symptoms seen with acute allergic rhinitis exacerbation, acute bacterial sinusitis, acute bacterial pharyngitis/tonsillitis, and LPRD/GERD.    Discussed common differentials for chronic cough may include but not limited to: allergic rhinitis (see allergist or take daily allergy meds), silent reflux (see GI or take daily reflux meds), sinusitis (imaging), pulmonary issues (see pulmonologist), ACEi.   Discussed common differentials for recurrent throat pain may include but not limited to: allergic rhinitis (see allergist or take daily allergy meds), silent reflux (see GI or take daily reflux meds), sinusitis (imaging), and tonsillitis/pharyngitis (swab/labs).   Advised/Cautioned: The results of today's ENT exam and flexible endoscopy were detailed to the patient and all questions were answered. Patient education centered around GERD, known exacerbants and contemporary treatment options. Laryngoscope photos were given to the patient. Handouts given on LPRD and GERD were given to the patient. After review of these, patient elected to be placed on PPI 40 mg QAM on an empty stomach for the next 6-8 weeks, and H2-blocker QHS. I encouraged the patient once she has completed the evening meal to not snack or consume any other food products or caffeinated beverages for at least  minutes before retiring. Finally, I encouraged the patient to sleep about 30 degrees above horizontal, and this can be facilitated by using 2-3 pillows or a wedge foam product. If the  patient is not demonstrably improved in 6-8 weeks, consultation with gastroenterology may be indicated to rule out intrinsic disease in the lower esophagus, stomach, or proximal duodenum.     If you are still dissatisfied with the quality of your voice after 8 weeks of aggressive anti-reflux treatment, then you should return to ENT to see Dr. Akshat Gonzalez for a video stroboscopy exam.

## 2019-10-11 NOTE — PATIENT INSTRUCTIONS
Some of the Top Considerations for Chronic Cough:     1. Nasal allergies -- Typical constellation of symptoms seen with nasal allergies: itchy, red, watery eyes; itchy, red, watery nose; excessive sneezing; excessive stuffiness. If this one best describes your current state, then discuss with your primary care provider whether you should see an allergist or take daily allergy medications.     2. Sinus Infection -- Typical constellation of symptoms seen with acute bacterial sinus infection are:  Green-gold, foul-smelling, foul-tasting mucus from nose and throat, inability to breathe through nose, inability to smell or taste well, facial pain and swelling, dental pain, headaches around eyes, sore throat and productive cough. If this one best describes your current state, then let's get sinus imaging to rule out infection.     3.  Silent reflux -- Typical constellation of symptoms seen with silent reflux: post-nasal drip sensation with absence of significant runny nose or nasal congestion, sensation of thick or too much mucus in the back of throat, raspy voice, frequent throat clearing, lump in the back of throat, frequent sore throats. If this one best describes your current state, discuss with your primary care provider whether you should see a gastroenterologist or take daily reflux medications. Your GI doctor may want to do an Upper GI or obtain a barium swallow or pH monitoring test.       Some of the Top Considerations for Recurrent Sore Throat:     1. Nasal allergies -- Typical constellation of symptoms seen with nasal allergies: itchy, red, watery eyes; itchy, red, watery nose; excessive sneezing; excessive stuffiness. Discuss with your primary care provider whether you should see an allergist or take daily allergy medications.     2. Silent reflux -- Typical constellation of symptoms seen with silent reflux: post-nasal drip sensation with absence of significant runny nose or nasal congestion, sensation of  "thick or too much mucus in the back of throat, raspy voice, frequent throat clearing, lump in the back of throat, frequent sore throats. Discuss with your primary care provider whether you should see a gastroenterologist or take daily reflux medications.     3. Sinus Infection -- Typical constellation of symptoms seen with acute bacterial sinus infection are:  Green-gold, foul-smelling, foul-tasting mucus from nose and throat, inability to breathe through nose, inability to smell or taste well, facial pain and swelling, dental pain, headaches around eyes, sore throat and productive cough. Sinus imaging may be needed to rule out infection if these symptoms are present.    4. Pharyngitis/Tonsillitis -- Typical constellation of symptoms seen with acute bacterial tonsillitis/pharyngitis are:  Smelly pus (exudate), very red inflamed throat, swollen lymph nodes, fever, general malaise, absence of cough. If these symptoms are present, you may need a throat swab.        LARYNGOPHARYNGEAL REFLUX  (SILENT OR ATYPICAL REFLUX)    If you have any of the following symptoms you may have laryngopharyngeal reflux (LPR):  hoarseness, thick or too much mucus, chronic throat pain/irritation, chronic throat clearing, chronic cough, especially cough that wake you up from sleep, chronic "postnasal drip" without the need to blow your nose.     Many people with LPR do not have symptoms of heartburn. Compared to the esophagus, the voice box and the back of the throat are significantly more sensitive to the effects of acid on surrounding tissue. Acid passing quickly through the esophagus does not have a chance to irritate the area for too long.  However acid that pools in the throat or voice box can cause prolonged irritation resulting in the symptoms of LPR. In patients known to have LPR, 71% reported hoarseness, 51% reported chronic cough, 47% reported sensation of thickness or lump in the back of the throat, 42% reported chronic throat " "clearing, and 35% reported trouble swallowing.     Another major symptom of LPR is "postnasal drip."  Patients are often told symptoms are due to abnormal nasal drainage or sinus infection; however this is rarely the cause of chronic throat irritation. For post nasal drip to cause the complaints described, signs and symptoms of an active nasal infection should be present.     Treatments for LPR include:  postural changes, weight reduction, diet modification, medication to reduce stomach acid and promote normal motility, and surgery to prevent reflux. Most patients will begin to notice some relief in her symptoms about 2 weeks after starting the medication; however it is generally recommended the medication should be continued for 2 months. If the symptoms completely resolve, the medication can then be tapered.  Some people will remain symptom free while others may have relapses which required treatment again.    Things you can do to prevent reflux include:  Do not smoke.  Smoking will cause reflux.  Avoid tight fitting clothes or belts around the waist.  Avoid vigorous exercise at least 2 hours before bedtime. Avoid eating at least 2 hours prior to bedtime.  In fact avoid eating your largest meal at night.  Weight loss.  For patient's with recent weight gain, shedding a few pounds is all that is required to improve reflux.  Avoid caffeine, cola beverages, citrus beverages, mints, alcoholic beverages, particularly at night, cheese, fried foods, spicy foods, eggs, and chocolate.  Sleep with the head of bed elevated at least 6 inches ("MedCline" wedge pillow).    Recommendations:    Take Nexium or Prilosec (PPI) every morning on an empty stomach (30-60 minutes before eating) 40 MG.   At bedtime take Zantac (H2-blocker) 300 mg.    After 4-8 weeks, with significant symptomatic improvement, you may begin weaning your reflux medications down:  Nexium or Prilosec 40 mg --> to 20 mg (over-the-counter strength).  Zantac 300 " mg --> to 150 mg (over-the-counter stength).  Then continue to wean as symptoms allow.    See a Gastro doctor (GI) for refractory symptoms and continued management.        How Acid Reflux Affects Your Throat    Do you have to clear your throat or cough often? Are you hoarse? Do you have trouble swallowing? If you have these or other throat symptoms, you may have acid reflux. This occurs when stomach acid flows back up and irritates your throat.  Why you have throat symptoms  There are muscles (esophageal sphincters) at both ends of the tube that carries food to your stomach (the esophagus). These muscles relax to let food pass. Then they tighten to keep stomach acid down. When the lower esophageal sphincter (LES) doesnt tighten enough, acid can flow back (reflux) from your stomach into your esophagus. This may cause heartburn. In some cases the upper esophageal sphincter (UES) also doesnt work well. Then acid can travel higher and enter your throat (pharynx). In many cases, this causes throat symptoms.  Common throat symptoms  · Need to clear your throat often  · Feeling like youre choking  · Long-term (chronic) cough  · Hoarseness  · Trouble swallowing  · Feel like you have a lump in your throat  · Sour or acid taste  · Sore throat that keeps coming back   Date Last Reviewed: 7/1/2016  © 0629-8142 [employer]. [address] [place] [address], [place] [address]. All rights reserved. This information is not intended as a substitute for professional medical care. Always follow your healthcare professional's instructions.

## 2019-10-11 NOTE — LETTER
October 11, 2019      Stephanie Hendrickson, FNP  [address] [facility] [address] [place] [address]           [facility]  [address] [place] [address]  Phone: [phone]  Fax: [phone]          Patient: Lucinda Aguilera   MR Number: [id number]   YOB: 1950   Date of Visit: 10/11/2019       Dear Stephanie Hendrickson:    Thank you for referring Lucinda Aguilera to me for evaluation. Attached you will find relevant portions of my assessment and plan of care.    If you have questions, please do not hesitate to call me. I look forward to following Lucinda Aguilera along with you.    Sincerely,    Iliana Rudolph, JANUARY    Enclosure  CC:  No Recipients    If you would like to receive this communication electronically, please contact [email or web address] or [phone] to request more information on [employer] access.    For providers and/or their staff who would like to refer a patient to [facility], please contact us through our one-stop-shop provider referral line, [facility] , at [phone].    If you feel you have received this communication in error or would no longer like to receive these types of communications, please e-mail [email or web address]

## 2019-10-20 ENCOUNTER — PATIENT MESSAGE (OUTPATIENT)
Dept: FAMILY MEDICINE | Facility: CLINIC | Age: 69
End: 2019-10-20

## 2019-10-21 ENCOUNTER — PATIENT OUTREACH (OUTPATIENT)
Dept: OTHER | Facility: OTHER | Age: 69
End: 2019-10-21

## 2019-10-21 ENCOUNTER — PATIENT MESSAGE (OUTPATIENT)
Dept: FAMILY MEDICINE | Facility: CLINIC | Age: 69
End: 2019-10-21

## 2019-10-31 ENCOUNTER — PATIENT MESSAGE (OUTPATIENT)
Dept: OBSTETRICS AND GYNECOLOGY | Facility: CLINIC | Age: 69
End: 2019-10-31

## 2019-10-31 DIAGNOSIS — R10.2 PELVIC PAIN: Primary | ICD-10-CM

## 2019-11-02 ENCOUNTER — PATIENT MESSAGE (OUTPATIENT)
Dept: OBSTETRICS AND GYNECOLOGY | Facility: CLINIC | Age: 69
End: 2019-11-02

## 2019-11-06 ENCOUNTER — HOSPITAL ENCOUNTER (OUTPATIENT)
Dept: RADIOLOGY | Facility: HOSPITAL | Age: 69
Discharge: HOME | End: 2019-11-06
Attending: OBSTETRICS & GYNECOLOGY
Payer: MEDICARE

## 2019-11-06 DIAGNOSIS — R10.2 PELVIC PAIN: ICD-10-CM

## 2019-11-06 PROCEDURE — 76830 US PELVIS COMP WITH TRANSVAG NON-OB (XPD): ICD-10-PCS | Mod: 26,,, | Performed by: RADIOLOGY

## 2019-11-06 PROCEDURE — 76830 TRANSVAGINAL US NON-OB: CPT | Mod: 26,,, | Performed by: RADIOLOGY

## 2019-11-06 PROCEDURE — 76856 US EXAM PELVIC COMPLETE: CPT | Mod: 26,,, | Performed by: RADIOLOGY

## 2019-11-06 PROCEDURE — 76830 TRANSVAGINAL US NON-OB: CPT | Mod: TC,PO

## 2019-11-06 PROCEDURE — 76856 US PELVIS COMP WITH TRANSVAG NON-OB (XPD): ICD-10-PCS | Mod: 26,,, | Performed by: RADIOLOGY

## 2019-11-07 ENCOUNTER — PATIENT MESSAGE (OUTPATIENT)
Dept: OBSTETRICS AND GYNECOLOGY | Facility: CLINIC | Age: 69
End: 2019-11-07

## 2019-11-18 ENCOUNTER — PATIENT MESSAGE (OUTPATIENT)
Dept: OPTOMETRY | Facility: CLINIC | Age: 69
End: 2019-11-18

## 2019-12-06 DIAGNOSIS — I10 ESSENTIAL HYPERTENSION: ICD-10-CM

## 2019-12-06 RX ORDER — IRBESARTAN 300 MG/1
TABLET ORAL
Qty: 90 TABLET | Refills: 0 | Status: SHIPPED | OUTPATIENT
Start: 2019-12-06 | End: 2019-12-30 | Stop reason: SDUPTHER

## 2019-12-09 ENCOUNTER — PATIENT MESSAGE (OUTPATIENT)
Dept: ADMINISTRATIVE | Facility: OTHER | Age: 69
End: 2019-12-09

## 2019-12-18 ENCOUNTER — PATIENT MESSAGE (OUTPATIENT)
Dept: FAMILY MEDICINE | Facility: CLINIC | Age: 69
End: 2019-12-18

## 2019-12-18 RX ORDER — MELOXICAM 15 MG/1
1 TABLET ORAL DAILY
Refills: 2 | COMMUNITY
Start: 2019-11-17 | End: 2019-12-18 | Stop reason: SDUPTHER

## 2019-12-18 RX ORDER — MELOXICAM 15 MG/1
15 TABLET ORAL DAILY
Qty: 30 TABLET | Refills: 11 | Status: SHIPPED | OUTPATIENT
Start: 2019-12-18 | End: 2020-12-23 | Stop reason: SDUPTHER

## 2019-12-20 ENCOUNTER — TELEPHONE (OUTPATIENT)
Dept: ORTHOPEDICS | Facility: CLINIC | Age: 69
End: 2019-12-20

## 2019-12-22 ENCOUNTER — PATIENT MESSAGE (OUTPATIENT)
Dept: ADMINISTRATIVE | Facility: OTHER | Age: 69
End: 2019-12-22

## 2019-12-23 ENCOUNTER — LAB VISIT (OUTPATIENT)
Dept: LAB | Facility: HOSPITAL | Age: 69
End: 2019-12-23
Attending: FAMILY MEDICINE
Payer: MEDICARE

## 2019-12-23 DIAGNOSIS — E78.5 HYPERLIPIDEMIA, UNSPECIFIED HYPERLIPIDEMIA TYPE: ICD-10-CM

## 2019-12-23 LAB
ALBUMIN SERPL BCP-MCNC: 4 G/DL (ref 3.5–5.2)
ALP SERPL-CCNC: 104 U/L (ref 55–135)
ALT SERPL W/O P-5'-P-CCNC: 25 U/L (ref 10–44)
ANION GAP SERPL CALC-SCNC: 9 MMOL/L (ref 8–16)
AST SERPL-CCNC: 26 U/L (ref 10–40)
BILIRUB SERPL-MCNC: 0.5 MG/DL (ref 0.1–1)
BUN SERPL-MCNC: 13 MG/DL (ref 8–23)
CALCIUM SERPL-MCNC: 9.5 MG/DL (ref 8.7–10.5)
CHLORIDE SERPL-SCNC: 101 MMOL/L (ref 95–110)
CHOLEST SERPL-MCNC: 205 MG/DL (ref 120–199)
CHOLEST/HDLC SERPL: 3.7 {RATIO} (ref 2–5)
CO2 SERPL-SCNC: 24 MMOL/L (ref 23–29)
CREAT SERPL-MCNC: 0.8 MG/DL (ref 0.5–1.4)
EST. GFR  (AFRICAN AMERICAN): >60 ML/MIN/1.73 M^2
EST. GFR  (NON AFRICAN AMERICAN): >60 ML/MIN/1.73 M^2
GLUCOSE SERPL-MCNC: 99 MG/DL (ref 70–110)
HDLC SERPL-MCNC: 56 MG/DL (ref 40–75)
HDLC SERPL: 27.3 % (ref 20–50)
LDLC SERPL CALC-MCNC: 114.6 MG/DL (ref 63–159)
NONHDLC SERPL-MCNC: 149 MG/DL
POTASSIUM SERPL-SCNC: 3.9 MMOL/L (ref 3.5–5.1)
PROT SERPL-MCNC: 7.6 G/DL (ref 6–8.4)
SODIUM SERPL-SCNC: 134 MMOL/L (ref 136–145)
TRIGL SERPL-MCNC: 172 MG/DL (ref 30–150)

## 2019-12-23 PROCEDURE — 36415 COLL VENOUS BLD VENIPUNCTURE: CPT | Mod: PO

## 2019-12-23 PROCEDURE — 80053 COMPREHEN METABOLIC PANEL: CPT

## 2019-12-23 PROCEDURE — 80061 LIPID PANEL: CPT

## 2019-12-26 ENCOUNTER — PATIENT MESSAGE (OUTPATIENT)
Dept: FAMILY MEDICINE | Facility: CLINIC | Age: 69
End: 2019-12-26

## 2019-12-30 ENCOUNTER — OFFICE VISIT (OUTPATIENT)
Dept: FAMILY MEDICINE | Facility: CLINIC | Age: 69
End: 2019-12-30
Payer: MEDICARE

## 2019-12-30 VITALS
HEART RATE: 85 BPM | DIASTOLIC BLOOD PRESSURE: 80 MMHG | WEIGHT: 185.88 LBS | OXYGEN SATURATION: 98 % | BODY MASS INDEX: 34.2 KG/M2 | SYSTOLIC BLOOD PRESSURE: 120 MMHG | HEIGHT: 62 IN | TEMPERATURE: 98 F

## 2019-12-30 DIAGNOSIS — K21.9 GASTROESOPHAGEAL REFLUX DISEASE, ESOPHAGITIS PRESENCE NOT SPECIFIED: ICD-10-CM

## 2019-12-30 DIAGNOSIS — I10 ESSENTIAL HYPERTENSION: Primary | ICD-10-CM

## 2019-12-30 DIAGNOSIS — F41.9 ANXIETY: ICD-10-CM

## 2019-12-30 DIAGNOSIS — E78.5 HYPERLIPIDEMIA, UNSPECIFIED HYPERLIPIDEMIA TYPE: ICD-10-CM

## 2019-12-30 PROBLEM — R11.0 NAUSEA: Status: RESOLVED | Noted: 2019-06-06 | Resolved: 2019-12-30

## 2019-12-30 PROCEDURE — 99214 PR OFFICE/OUTPT VISIT, EST, LEVL IV, 30-39 MIN: ICD-10-PCS | Mod: S$PBB,,, | Performed by: FAMILY MEDICINE

## 2019-12-30 PROCEDURE — 99999 PR PBB SHADOW E&M-EST. PATIENT-LVL III: CPT | Mod: PBBFAC,,, | Performed by: FAMILY MEDICINE

## 2019-12-30 PROCEDURE — 1126F PR PAIN SEVERITY QUANTIFIED, NO PAIN PRESENT: ICD-10-PCS | Mod: ,,, | Performed by: FAMILY MEDICINE

## 2019-12-30 PROCEDURE — 99214 OFFICE O/P EST MOD 30 MIN: CPT | Mod: S$PBB,,, | Performed by: FAMILY MEDICINE

## 2019-12-30 PROCEDURE — 1126F AMNT PAIN NOTED NONE PRSNT: CPT | Mod: ,,, | Performed by: FAMILY MEDICINE

## 2019-12-30 PROCEDURE — 1159F PR MEDICATION LIST DOCUMENTED IN MEDICAL RECORD: ICD-10-PCS | Mod: ,,, | Performed by: FAMILY MEDICINE

## 2019-12-30 PROCEDURE — 1159F MED LIST DOCD IN RCRD: CPT | Mod: ,,, | Performed by: FAMILY MEDICINE

## 2019-12-30 PROCEDURE — 99213 OFFICE O/P EST LOW 20 MIN: CPT | Mod: PBBFAC,PO | Performed by: FAMILY MEDICINE

## 2019-12-30 PROCEDURE — 99999 PR PBB SHADOW E&M-EST. PATIENT-LVL III: ICD-10-PCS | Mod: PBBFAC,,, | Performed by: FAMILY MEDICINE

## 2019-12-30 RX ORDER — PAROXETINE 10 MG/1
10 TABLET, FILM COATED ORAL EVERY MORNING
Qty: 90 TABLET | Refills: 3 | Status: SHIPPED | OUTPATIENT
Start: 2019-12-30 | End: 2020-05-11 | Stop reason: SDUPTHER

## 2019-12-30 RX ORDER — CHLORTHALIDONE 25 MG/1
TABLET ORAL
Qty: 90 TABLET | Refills: 3 | Status: SHIPPED | OUTPATIENT
Start: 2019-12-30 | End: 2020-05-05

## 2019-12-30 RX ORDER — POTASSIUM CHLORIDE 750 MG/1
20 CAPSULE, EXTENDED RELEASE ORAL DAILY
Qty: 180 CAPSULE | Refills: 3 | Status: SHIPPED | OUTPATIENT
Start: 2019-12-30 | End: 2020-02-06

## 2019-12-30 RX ORDER — FAMOTIDINE 40 MG/1
40 TABLET, FILM COATED ORAL DAILY
Qty: 90 TABLET | Refills: 3 | Status: SHIPPED | OUTPATIENT
Start: 2019-12-30 | End: 2020-06-23

## 2019-12-30 RX ORDER — ALPRAZOLAM 0.5 MG/1
TABLET ORAL
Qty: 60 TABLET | Refills: 5 | Status: SHIPPED | OUTPATIENT
Start: 2019-12-30 | End: 2020-03-30 | Stop reason: SDUPTHER

## 2019-12-30 RX ORDER — IRBESARTAN 300 MG/1
300 TABLET ORAL NIGHTLY
Qty: 90 TABLET | Refills: 3 | Status: SHIPPED | OUTPATIENT
Start: 2019-12-30 | End: 2020-11-11 | Stop reason: SDUPTHER

## 2019-12-30 NOTE — PROGRESS NOTES
Subjective:       Patient ID: Lucinda Aguilera is a 69 y.o. female.    Chief Complaint: Follow-up (6 month)    HPI Comments: Here for f/u on chronic health issues    HLD - tolerating pravachol 40mg daily  Anxiety - She is taking Paxil 10mg daily. Taking Xanax 0.5mg 1 tablet BID. She feels like this is well-controlled  HTN - tolerating Avapro 300mg daily and Hygroten 25mg daily and amlodipine; BP controlled; digital flowsheet reviewed  GERD - tolerating Protonix 40mg daily and zantac 300mg QHS  HLD - tolerating pravastatin 40mg  S/p laprascopic conner - doing well; appetite normal; BM2 normal    She did have angiogram in 2010.    Past Medical History:    Hypertension                                                  Anxiety                                           Hyperlipemia                                                  GERD (gastroesophageal reflux disease)                        Cataract                                                        Comment:OU    PVD (posterior vitreous detachment)                             Comment:OD    Arthritis                                                       Comment:right thumb    Past Surgical History:    chest abcess                                                     Comment:child    KNEE ARTHROSCOPY W/ LASER                                        Comment:right    COLONOSCOPY                                      1/2012          Comment:repeat in 5 years    No Known Allergies    Social History    Marital Status:              Spouse Name:                       Years of Education:                 Number of children: 2             Occupational History  Occupation          Employer            Comment               retired                                   retired teacher an*                         Social History Main Topics    Smoking Status: Never Smoker                      Smokeless Status: Never Used                        Alcohol Use: Yes                Comment:  social    Drug Use: No              Sexual Activity: Yes               Partners with: Male       Birth Control/Protection: None, Post-menopausal    Current Outpatient Medications on File Prior to Visit   Medication Sig Dispense Refill    Al hyd-Mg tr-alg ac-sod bicarb (GAVISCON) 80-14.2 mg Chew Take by mouth.      carboxymethylcellulose (REFRESH PLUS) 0.5 % Dpet 1 drop 3 (three) times daily as needed.      clotrimazole-betamethasone 1-0.05% (LOTRISONE) cream Apply topically 2 (two) times daily. 45 g 2    fish oil-omega-3 fatty acids 300-1,000 mg capsule Take 2 g by mouth once daily.      meloxicam (MOBIC) 15 MG tablet Take 1 tablet (15 mg total) by mouth once daily. 30 tablet 11    multivitamin (MULTIVITAMIN) per tablet Take 1 tablet by mouth once daily.        niacin 500 MG CpSR Take 500 mg by mouth every evening.        pantoprazole (PROTONIX) 40 MG tablet TAKE 1 TABLET(40 MG) BY MOUTH BEFORE BREAKFAST 90 tablet 3    pravastatin (PRAVACHOL) 40 MG tablet TAKE 1 TABLET(40 MG) BY MOUTH EVERY DAY 90 tablet 3    [DISCONTINUED] ALPRAZolam (XANAX) 0.5 MG tablet TAKE 1 TABLET(0.5 MG) BY MOUTH TWICE DAILY AS NEEDED 60 tablet 5    [DISCONTINUED] chlorthalidone (HYGROTEN) 25 MG Tab TAKE 1 TABLET BY MOUTH EVERY MORNING, DISCONTINUE HCTZ 90 tablet 3    [DISCONTINUED] irbesartan (AVAPRO) 300 MG tablet TAKE 1 TABLET BY MOUTH EVERY EVENING 90 tablet 0    [DISCONTINUED] paroxetine (PAXIL) 10 MG tablet TAKE 1 TABLET BY MOUTH EVERY MORNING 90 tablet 3    [DISCONTINUED] potassium chloride (MICRO-K) 10 MEQ CpSR TAKE 2 CAPSULES BY MOUTH DAILY 180 capsule 1    [DISCONTINUED] ranitidine (ZANTAC) 300 MG tablet Take 1 tablet (300 mg total) by mouth every evening. , about 30-60 minutes before bedtime. 60 tablet 5    aspirin (ECOTRIN) 81 MG EC tablet Take 1 tablet (81 mg total) by mouth 2 (two) times daily. (Patient taking differently: Take 81 mg by mouth once. ) 60 tablet 0    FLUZONE HIGH-DOSE 2019-20, PF, 180 mcg/0.5 mL  "Syrg ADM 0.5ML IM UTD  0    [DISCONTINUED] menthol/zinc oxide (CALMOSEPTINE TOP) Apply topically.       No current facility-administered medications on file prior to visit.      Review of patient's family history indicates:    Hypertension                   Mother                    Heart disease                  Mother                    Stroke                         Father                    Review of Systems   Constitutional: Negative for fever, chills, appetite change and unexpected weight change.   HENT: Negative for sore throat and trouble swallowing.    Eyes: Negative for pain and visual disturbance.   Respiratory: Negative for cough, shortness of breath and wheezing.    Cardiovascular: Negative for chest pain and palpitations.   Gastrointestinal: Negative for nausea, vomiting, abdominal pain, diarrhea and blood in stool.   Genitourinary: Negative for dysuria, hematuria and difficulty urinating.   Musculoskeletal: Negative for arthralgias, gait problem and neck pain.   Skin: Negative for rash and wound.   Neurological: Negative for dizziness, weakness, numbness and headaches.   Hematological: Negative for adenopathy.   Psychiatric/Behavioral: Negative for dysphoric mood.     Answers for HPI/ROS submitted by the patient on 12/26/2019   activity change: No  unexpected weight change: No  neck pain: No  hearing loss: No  rhinorrhea: No  trouble swallowing: No  eye discharge: No  visual disturbance: No  chest tightness: No  wheezing: No  chest pain: No  palpitations: No  blood in stool: No  constipation: No  vomiting: No  diarrhea: No  polydipsia: No  polyuria: No  difficulty urinating: No  hematuria: No  menstrual problem: No  dysuria: No  joint swelling: No  arthralgias: No  headaches: No  weakness: No  confusion: No  dysphoric mood: No          Objective:       /80 (BP Location: Left arm, Patient Position: Sitting)   Pulse 85   Temp 97.7 °F (36.5 °C) (Oral)   Ht 5' 2" (1.575 m)   Wt 84.3 kg (185 lb " 13.6 oz)   LMP 04/18/2004   SpO2 98%   BMI 33.99 kg/m²     Physical Exam   Constitutional: She is oriented to person, place, and time. She appears well-developed and well-nourished.   HENT:   Head: Normocephalic.   Mouth/Throat: Oropharynx is clear and moist. No oropharyngeal exudate or posterior oropharyngeal erythema.   Eyes: Conjunctivae and EOM are normal. Pupils are equal, round, and reactive to light.   Neck: Normal range of motion. Neck supple. No thyromegaly present.   Cardiovascular: Normal rate, regular rhythm, S1 normal, S2 normal, normal heart sounds and intact distal pulses.  Exam reveals no gallop and no friction rub.    No murmur heard.  Pulmonary/Chest: Effort normal and breath sounds normal. She has no wheezes. She has no rales.   Abdominal: Normal appearance.   Musculoskeletal:        Right lower leg: She exhibits no edema.        Left lower leg: She exhibits no edema.   Lymphadenopathy:     She has no cervical adenopathy.   Neurological: She is alert and oriented to person, place, and time. No cranial nerve deficit. Gait normal.   Skin: Skin is warm and intact. No rash noted.   Psychiatric: She has a normal mood and affect.         Results for orders placed or performed in visit on 12/23/19   Comprehensive metabolic panel   Result Value Ref Range    Sodium 134 (L) 136 - 145 mmol/L    Potassium 3.9 3.5 - 5.1 mmol/L    Chloride 101 95 - 110 mmol/L    CO2 24 23 - 29 mmol/L    Glucose 99 70 - 110 mg/dL    BUN, Bld 13 8 - 23 mg/dL    Creatinine 0.8 0.5 - 1.4 mg/dL    Calcium 9.5 8.7 - 10.5 mg/dL    Total Protein 7.6 6.0 - 8.4 g/dL    Albumin 4.0 3.5 - 5.2 g/dL    Total Bilirubin 0.5 0.1 - 1.0 mg/dL    Alkaline Phosphatase 104 55 - 135 U/L    AST 26 10 - 40 U/L    ALT 25 10 - 44 U/L    Anion Gap 9 8 - 16 mmol/L    eGFR if African American >60.0 >60 mL/min/1.73 m^2    eGFR if non African American >60.0 >60 mL/min/1.73 m^2   Lipid panel   Result Value Ref Range    Cholesterol 205 (H) 120 - 199 mg/dL     Triglycerides 172 (H) 30 - 150 mg/dL    HDL 56 40 - 75 mg/dL    LDL Cholesterol 114.6 63.0 - 159.0 mg/dL    Hdl/Cholesterol Ratio 27.3 20.0 - 50.0 %    Total Cholesterol/HDL Ratio 3.7 2.0 - 5.0    Non-HDL Cholesterol 149 mg/dL       Assessment:       1. Essential hypertension    2. Hyperlipidemia, unspecified hyperlipidemia type    3. Gastroesophageal reflux disease, esophagitis presence not specified    4. Anxiety        Plan:       Essential hypertension  -     irbesartan (AVAPRO) 300 MG tablet; Take 1 tablet (300 mg total) by mouth every evening.  Dispense: 90 tablet; Refill: 3  -     chlorthalidone (HYGROTEN) 25 MG Tab; TAKE 1 TABLET BY MOUTH EVERY MORNING  Dispense: 90 tablet; Refill: 3  -     potassium chloride (MICRO-K) 10 MEQ CpSR; Take 2 capsules (20 mEq total) by mouth once daily.  Dispense: 180 capsule; Refill: 3    Hyperlipidemia, unspecified hyperlipidemia type    Gastroesophageal reflux disease, esophagitis presence not specified  -     famotidine (PEPCID) 40 MG tablet; Take 1 tablet (40 mg total) by mouth once daily.  Dispense: 90 tablet; Refill: 3    Anxiety  -     paroxetine (PAXIL) 10 MG tablet; Take 1 tablet (10 mg total) by mouth every morning.  Dispense: 90 tablet; Refill: 3  -     ALPRAZolam (XANAX) 0.5 MG tablet; TAKE 1 TABLET(0.5 MG) BY MOUTH TWICE DAILY AS NEEDED  Dispense: 60 tablet; Refill: 5            Still on shingrix vaccine wating list  Continue Paxil 10mg daily, Xanax PRN  cotninue aspirin 81mg daily  Continue other present meds  Counseled on regular exercise, maintenance of a healthy weight, balanced diet rich in fruits/vegetables and lean protein, and avoidance of unhealthy habits like smoking and excessive alcohol intake.  F/u 6 months

## 2020-01-08 ENCOUNTER — PATIENT MESSAGE (OUTPATIENT)
Dept: PODIATRY | Facility: CLINIC | Age: 70
End: 2020-01-08

## 2020-01-14 ENCOUNTER — OFFICE VISIT (OUTPATIENT)
Dept: PODIATRY | Facility: CLINIC | Age: 70
End: 2020-01-14
Payer: MEDICARE

## 2020-01-14 VITALS
HEIGHT: 62 IN | BODY MASS INDEX: 34.04 KG/M2 | DIASTOLIC BLOOD PRESSURE: 73 MMHG | WEIGHT: 185 LBS | SYSTOLIC BLOOD PRESSURE: 129 MMHG | HEART RATE: 86 BPM

## 2020-01-14 DIAGNOSIS — M20.41 HAMMER TOE OF RIGHT FOOT: ICD-10-CM

## 2020-01-14 DIAGNOSIS — M77.8 EXTENSOR TENDONITIS OF FOOT: Primary | ICD-10-CM

## 2020-01-14 DIAGNOSIS — M19.071 PRIMARY OSTEOARTHRITIS OF RIGHT FOOT: ICD-10-CM

## 2020-01-14 DIAGNOSIS — M79.671 PAIN IN RIGHT FOOT: ICD-10-CM

## 2020-01-14 DIAGNOSIS — M20.11 HAV (HALLUX ABDUCTO VALGUS), RIGHT: ICD-10-CM

## 2020-01-14 PROCEDURE — 1125F PR PAIN SEVERITY QUANTIFIED, PAIN PRESENT: ICD-10-PCS | Mod: ,,, | Performed by: PODIATRIST

## 2020-01-14 PROCEDURE — 99213 OFFICE O/P EST LOW 20 MIN: CPT | Mod: PBBFAC,PN | Performed by: PODIATRIST

## 2020-01-14 PROCEDURE — 99213 OFFICE O/P EST LOW 20 MIN: CPT | Mod: S$PBB,,, | Performed by: PODIATRIST

## 2020-01-14 PROCEDURE — 99999 PR PBB SHADOW E&M-EST. PATIENT-LVL III: ICD-10-PCS | Mod: PBBFAC,,, | Performed by: PODIATRIST

## 2020-01-14 PROCEDURE — 99999 PR PBB SHADOW E&M-EST. PATIENT-LVL III: CPT | Mod: PBBFAC,,, | Performed by: PODIATRIST

## 2020-01-14 PROCEDURE — 1125F AMNT PAIN NOTED PAIN PRSNT: CPT | Mod: ,,, | Performed by: PODIATRIST

## 2020-01-14 PROCEDURE — 1159F MED LIST DOCD IN RCRD: CPT | Mod: ,,, | Performed by: PODIATRIST

## 2020-01-14 PROCEDURE — 1159F PR MEDICATION LIST DOCUMENTED IN MEDICAL RECORD: ICD-10-PCS | Mod: ,,, | Performed by: PODIATRIST

## 2020-01-14 PROCEDURE — 99213 PR OFFICE/OUTPT VISIT, EST, LEVL III, 20-29 MIN: ICD-10-PCS | Mod: S$PBB,,, | Performed by: PODIATRIST

## 2020-01-14 NOTE — PROGRESS NOTES
Subjective:      Patient ID: Lucinda Aguilera is a 69 y.o. female.    Chief Complaint: Foot Pain (R foot pain worse at top part of foot x2wk, Dr Hassan 12/2019 )      HPI:  Lucinda Aguilera is a 69 y.o. female who presents to clinic with a chief complaint of right foot pain.  Patient states that she has been walking differently since she developed her foot wound and has continued doing so even though her wound has healed.  She reports soreness with occasional shooting pain along the top of her foot - points to area from the medial and central midfoot to the ankle along the EDL tendon.  She rates her pain as 6/10 on the pain scale today, which is about at its worst.  She remarks of having tried elevating her foot when sitting in the recliner with a pillow under her foot but demonstrates insufficient level of elevation.  She denies any recent history of trauma to the area.  Patient denies any other pedal complaints this time.    PCP:  Saurabh Hassan MD  Date last seen: 12/30/19    Review of Systems   Constitutional: Negative for appetite change, fever, chills, fatigue and unexpected weight change.   Cardiovascular: Negative for chest pain, claudication, cyanosis, and leg swelling.  Musculoskeletal: Negative for back pain.  Positive for arthritis, joint pain, joint swelling, myalgias, and stiffness.   Skin: Negative for nail bed changes, discoloration, rash, itching, poor wound healing, suspicious lesion, unusual hair distribution.   Neurological: Negative for loss of balance, sensory change, paresthesias, and numbness.  Positive for pain.  Hematological: Negative for adenopathy, bleeding, and bruising easily.   Psychiatric/Behavioral: The patient is not nervous/anxious.  Negative for altered mental status.    No results found for: HGBA1C    Past Medical History:   Diagnosis Date    Anticoagulant long-term use     Anxiety     takes daily Xanax    Arthritis     right thumb    Cataract     OU     Cholelithiasis     GERD (gastroesophageal reflux disease)     Hepatic steatosis     Hyperlipemia     Hypertension     PVD (posterior vitreous detachment)     OD     Past Surgical History:   Procedure Laterality Date    chest abcess      child    CHOLECYSTECTOMY      COLONOSCOPY  01/06/2012    Dr. Lopez: hemorrhoids, redundant colon    ESOPHAGOGASTRODUODENOSCOPY N/A 6/6/2019    Procedure: EGD (ESOPHAGOGASTRODUODENOSCOPY);  Surgeon: Nathan Lopez Jr., MD;  Location: [facility];  Service: Endoscopy;  Laterality: N/A;    JOINT REPLACEMENT Bilateral     knee    KNEE ARTHROSCOPY W/ LASER      right    KNEE SURGERY Right 06/03/2016    Total knee replacement    LAPAROSCOPIC CHOLECYSTECTOMY N/A 6/14/2019    Procedure: CHOLECYSTECTOMY, LAPAROSCOPIC;  Surgeon: Guevara Evans MD;  Location[facility];  Service: General;  Laterality: N/A;    thumb surgery  11/2014    TOTAL KNEE ARTHROPLASTY Left 6/19/2018    Procedure: REPLACEMENT-KNEE-TOTAL;  Surgeon: Nj Melvin MD;  Location: [address];  Service: Orthopedics;  Laterality: Left;  [employer]    UPPER GASTROINTESTINAL ENDOSCOPY  07/13/2017    Dr. Lopez: NERD, Gastric mucosal atrophy. antritis, Scar in the incisura. Biopsied; biopsy: chronic gastritis. negative for h pylori    UPPER GASTROINTESTINAL ENDOSCOPY  06/06/2019    Dr. Lopez: small hiatal hernia, Non-erosive esophageal reflux (NERD) disease?., slight antritis; biopsy: Antral mucosa with chemical/reactive gastropathy. negative for h pylori     Family History   Problem Relation Age of Onset    Hypertension Mother     Heart disease Mother     Glaucoma Mother     Stroke Father     Glaucoma Sister     Cataracts Sister     Macular degeneration Sister     Breast cancer Neg Hx     Colon cancer Neg Hx     Crohn's disease Neg Hx     Ulcerative colitis Neg Hx      Social History     Socioeconomic History    Marital status:      Spouse name: Not on file    Number of children: 2     "Years of education: Not on file    Highest education level: Not on file   Occupational History    Occupation: retired    Occupation: retired teacher and principal   Social Needs    Financial resource strain: Not hard at all    Food insecurity:     Worry: Never true     Inability: Never true    Transportation needs:     Medical: No     Non-medical: No   Tobacco Use    Smoking status: Never Smoker    Smokeless tobacco: Never Used   Substance and Sexual Activity    Alcohol use: Yes     Frequency: Never     Drinks per session: 1 or 2     Binge frequency: Never     Comment: social- maybe once every few months    Drug use: No    Sexual activity: Yes     Partners: Male     Birth control/protection: None, Post-menopausal   Lifestyle    Physical activity:     Days per week: 4 days     Minutes per session: 90 min    Stress: Not at all   Relationships    Social connections:     Talks on phone: More than three times a week     Gets together: More than three times a week     Attends Mandaeism service: Not on file     Active member of club or organization: Yes     Attends meetings of clubs or organizations: 1 to 4 times per year     Relationship status:    Other Topics Concern    Not on file   Social History Narrative    Not on file           Objective:        /73   Pulse 86   Ht 5' 2" (1.575 m)   Wt 83.9 kg (185 lb)   LMP 04/18/2004   BMI 33.84 kg/m²     Physical Exam   Constitutional: Patient is oriented to person, place, and time. Patient appears well-developed and well-nourished. No acute distress.     Psychiatric: Patient has a normal mood and affect. Patient's speech is normal and behavior is normal. Judgment is normal. Cognition and memory are normal.     Right pedal exam was performed today.  Vascular: Pedal pulses palpable 1/4 DP & PT.  CFT is = 3 seconds to the hallux.  Skin temperature is cool to cool proximal tibia to distal toes without localized increase in calor noted.  No " erythema, edema, or ecchymosis noted to the foot or ankle.  Hair growth decreased distally to the LE.     Musculoskeletal: Ankle and pedal joint ROM are decreased.  Ankle joint dorsiflexion is restricted with the knee extended and flexed per [place] exam.  Muscle strength is 5/5 for all LE muscle groups tested.  The hallux is abducted on the 1st ray.    Neurological: Epicritic sensation is Decreased to the foot.  [place] STR is negative to the LE.  Tenderness to palpation noted along the extensor digitorum longus tendon extending to the extensor retinaculum.    Dermatological: Toenails 1-5 right are WNL in length and thickness.  Webspaces 1-4 right are clean, dry, and intact.  Skin turgor is supple.  No dry, flaky skin noted to the LE.  No open wound or suspicious lesion noted to the foot or ankle.    Nursing note and vitals reviewed.        Assessment:       Encounter Diagnoses   Name Primary?    Extensor tendonitis of foot - Right Foot Yes    Pain in right foot     Hammer toe of right foot     Hav (hallux abducto valgus), right     Primary osteoarthritis of right foot          Plan:       Lucinda was seen today for foot pain.    Diagnoses and all orders for this visit:    Extensor tendonitis of foot - Right Foot    Pain in right foot    Hammer toe of right foot    Hav (hallux abducto valgus), right    Primary osteoarthritis of right foot      I counseled the patient on her conditions, their implications and medical management.    - Educated patient on proper foot care, supportive/accommodative shoe gear, and PRICE therapy.    - Recommended OTC lidocaine cream as needed for pain relief.    - Placed metatarsal pad on right foot.  Advised patient to RTC with cushioned insoles or arch supports for met pads to be added to if she felt relief from pads.    Patient was given the following recommendations and instructions:  Patient Instructions   - Wear supportive shoes such as sneakers.    - Look for  cushioned insoles/inserts.    - Rest/reduce ambulation to necessary activities of daily living.    - Ice foot for no more than 20 minutes/per hour.  Can perform contrast therapy by alternating applications of ice and heat for 20 minutes at a time - always begin and end with ice.    - Elevate foot as much as possible throughout the day.    - Apply lidocaine cream as directed.    - Notify clinic if pain worsens or fails to improve.            Renae Rios DPM        Dictation was performed using M*Modal Fluency.  Transcription errors may be present.

## 2020-01-14 NOTE — PATIENT INSTRUCTIONS
- Wear supportive shoes such as sneakers.    - Look for cushioned insoles/inserts.    - Rest/reduce ambulation to necessary activities of daily living.    - Ice foot for no more than 20 minutes/per hour.  Can perform contrast therapy by alternating applications of ice and heat for 20 minutes at a time - always begin and end with ice.    - Elevate foot as much as possible throughout the day.    - Apply lidocaine cream as directed.    - Notify clinic if pain worsens or fails to improve.

## 2020-01-25 PROBLEM — M19.071 PRIMARY OSTEOARTHRITIS OF RIGHT FOOT: Status: ACTIVE | Noted: 2020-01-25

## 2020-02-04 DIAGNOSIS — I10 ESSENTIAL HYPERTENSION: ICD-10-CM

## 2020-02-06 RX ORDER — POTASSIUM CHLORIDE 750 MG/1
CAPSULE, EXTENDED RELEASE ORAL
Qty: 180 CAPSULE | Refills: 1 | Status: SHIPPED | OUTPATIENT
Start: 2020-02-06 | End: 2020-08-04

## 2020-02-06 NOTE — PROGRESS NOTES
Refill Authorization Note     is requesting a refill authorization.    Brief assessment and rationale for refill: APPROVE: prr                                         Comments:   Requested Prescriptions   Pending Prescriptions Disp Refills    potassium chloride (MICRO-K) 10 MEQ CpSR [Pharmacy Med Name: POTASSIUM CL 10MEQ ER CAPSULES] 180 capsule 1     Sig: TAKE 2 CAPSULES BY MOUTH DAILY       Endocrinology:  Minerals - Potassium Supplementation Passed - 2/6/2020  3:22 PM        Passed - Patient is at least 18 years old        Passed - Office visit in past 12 months or future 90 days.     Recent Outpatient Visits            3 weeks ago Extensor tendonitis of foot - Right Foot    [place] - [facility]iat[facility] Renae Rios DPM    1 month ago Essential hypertension    [facility] Saurabh Hassan MD    3 months ago Bilateral impacted cerumen    [facility] ENT Iliana Rudolph NP    4 months ago Arthritis of hip    [facility] Lorne Cowan MD    4 months ago Decreased appetite    [facility] Jamee Allen NP          Future Appointments              In 4 months Saurabh Hassan MD [facility] [place]                Passed - K in normal range and within 180 days     Potassium   Date Value Ref Range Status   12/23/2019 3.9 3.5 - 5.1 mmol/L Final   06/24/2019 4.3 3.5 - 5.1 mmol/L Final   06/24/2019 4.3 3.5 - 5.1 mmol/L Final              Passed - Cr is 1.3 or below and within 180 days     Creatinine   Date Value Ref Range Status   12/23/2019 0.8 0.5 - 1.4 mg/dL Final   06/24/2019 0.8 0.5 - 1.4 mg/dL Final   06/24/2019 0.8 0.5 - 1.4 mg/dL Final              Passed - eGFR within 180 days     eGFR if non    Date Value Ref Range Status   12/23/2019 >60.0 >60 mL/min/1.73 m^2 Final     Comment:     Calculation used to obtain the estimated glomerular filtration  rate (eGFR) is the CKD-EPI equation.      06/24/2019 >60.0 >60  mL/min/1.73 m^2 Final     Comment:     Calculation used to obtain the estimated glomerular filtration  rate (eGFR) is the CKD-EPI equation.      06/24/2019 >60.0 >60 mL/min/1.73 m^2 Final     Comment:     Calculation used to obtain the estimated glomerular filtration  rate (eGFR) is the CKD-EPI equation.        eGFR if    Date Value Ref Range Status   12/23/2019 >60.0 >60 mL/min/1.73 m^2 Final   06/24/2019 >60.0 >60 mL/min/1.73 m^2 Final   06/24/2019 >60.0 >60 mL/min/1.73 m^2 Final

## 2020-02-20 ENCOUNTER — PATIENT OUTREACH (OUTPATIENT)
Dept: OTHER | Facility: OTHER | Age: 70
End: 2020-02-20

## 2020-02-20 NOTE — PROGRESS NOTES
Digital Medicine: Clinician Follow-Up    The history is provided by the patient.     Follow Up  Follow-up reason(s): reading review        HPI:  Called patient to follow up, call was brief.     Last 5 Patient Entered Readings                                      Current 30 Day Average: 121/80     Recent Readings 2/12/2020 2/3/2020 1/22/2020 1/13/2020 1/1/2020    SBP (mmHg) 129 108 126 118 120    DBP (mmHg) 80 83 76 84 80    Pulse 70 80 69 80 85        Assessment:  Reviewed recent readings and labs (last CMP drawn on 12/23/19). Per 2017 ACC/ AHA HTN guidelines (goal of BP < 130/80), current 30-day average is controlled.     Plan:  Continue current medication regimen.   Patients health  will be following up as scheduled.   I will continue to monitor regularly and will follow-up in 6 months, sooner if blood pressure begins to trend upward or downward.     Current medication regimen:  Hypertension Medications             chlorthalidone (HYGROTEN) 25 MG Tab TAKE 1 TABLET BY MOUTH EVERY MORNING    irbesartan (AVAPRO) 300 MG tablet Take 1 tablet (300 mg total) by mouth every evening.         Patient denies having questions or concerns. Patient has my contact information and knows to call with any concerns or clinical changes.

## 2020-02-22 ENCOUNTER — PATIENT MESSAGE (OUTPATIENT)
Dept: OBSTETRICS AND GYNECOLOGY | Facility: CLINIC | Age: 70
End: 2020-02-22

## 2020-02-22 ENCOUNTER — PATIENT MESSAGE (OUTPATIENT)
Dept: PODIATRY | Facility: CLINIC | Age: 70
End: 2020-02-22

## 2020-02-22 DIAGNOSIS — R92.8 ABNORMAL MAMMOGRAM: Primary | ICD-10-CM

## 2020-02-24 ENCOUNTER — PATIENT MESSAGE (OUTPATIENT)
Dept: PODIATRY | Facility: CLINIC | Age: 70
End: 2020-02-24

## 2020-02-24 ENCOUNTER — IMMUNIZATION (OUTPATIENT)
Dept: PHARMACY | Facility: CLINIC | Age: 70
End: 2020-02-24
Payer: MEDICARE

## 2020-03-23 ENCOUNTER — PATIENT OUTREACH (OUTPATIENT)
Dept: OTHER | Facility: OTHER | Age: 70
End: 2020-03-23

## 2020-03-23 NOTE — PROGRESS NOTES
"Digital Medicine: Health  Follow-Up    The history is provided by the patient.     Follow Up  Follow-up reason(s): goal follow-up    Mrs. Aguilera is doing okay. Patient is surprised to see readings at goal, considering how "anxious" she's been about COVID-19. She and her  has been staying home as much as possible. She reports missing her grandchildren, but is happy to be able to group FaceTime with them often. Support provided. She thanked me for calling today.       INTERVENTION(S)  encouragement/support and denied questions    PLAN  continue monitoring      There are no preventive care reminders to display for this patient.    Last 5 Patient Entered Readings                                      Current 30 Day Average: 131/82     Recent Readings 3/22/2020 3/16/2020 3/6/2020 2/24/2020 2/12/2020    SBP (mmHg) 133 128 130 131 129    DBP (mmHg) 80 80 82 84 80    Pulse 71 80 72 70 70        Screenings - deferred      "

## 2020-03-25 ENCOUNTER — PATIENT MESSAGE (OUTPATIENT)
Dept: GASTROENTEROLOGY | Facility: CLINIC | Age: 70
End: 2020-03-25

## 2020-03-27 ENCOUNTER — PATIENT MESSAGE (OUTPATIENT)
Dept: FAMILY MEDICINE | Facility: CLINIC | Age: 70
End: 2020-03-27

## 2020-03-29 ENCOUNTER — PATIENT MESSAGE (OUTPATIENT)
Dept: FAMILY MEDICINE | Facility: CLINIC | Age: 70
End: 2020-03-29

## 2020-03-29 DIAGNOSIS — F41.9 ANXIETY: ICD-10-CM

## 2020-03-30 ENCOUNTER — PATIENT MESSAGE (OUTPATIENT)
Dept: FAMILY MEDICINE | Facility: CLINIC | Age: 70
End: 2020-03-30

## 2020-03-30 RX ORDER — ALPRAZOLAM 0.5 MG/1
0.75 TABLET ORAL 2 TIMES DAILY PRN
Qty: 90 TABLET | Refills: 5 | Status: SHIPPED | OUTPATIENT
Start: 2020-03-30 | End: 2020-10-05

## 2020-05-02 DIAGNOSIS — I10 ESSENTIAL HYPERTENSION: ICD-10-CM

## 2020-05-05 ENCOUNTER — PATIENT MESSAGE (OUTPATIENT)
Dept: ADMINISTRATIVE | Facility: HOSPITAL | Age: 70
End: 2020-05-05

## 2020-05-05 ENCOUNTER — PATIENT MESSAGE (OUTPATIENT)
Dept: FAMILY MEDICINE | Facility: CLINIC | Age: 70
End: 2020-05-05

## 2020-05-05 RX ORDER — CHLORTHALIDONE 25 MG/1
TABLET ORAL
Qty: 90 TABLET | Refills: 0 | Status: SHIPPED | OUTPATIENT
Start: 2020-05-05 | End: 2020-07-21 | Stop reason: SDUPTHER

## 2020-05-05 NOTE — PROGRESS NOTES
Refill Authorization Note    is requesting a refill authorization.    Brief assessment and rationale for refill: APPROVE: prr               Medication reconciliation completed: No                         Comments:      Requested Prescriptions   Pending Prescriptions Disp Refills    chlorthalidone (HYGROTEN) 25 MG Tab [Pharmacy Med Name: CHLORTHALIDONE 25MG TABLETS] 90 tablet 0     Sig: TAKE 1 TABLET BY MOUTH EVERY MORNING, DISCONTINUE HYDROCHLORTHIAZIDE       Cardiovascular: Diuretics - Thiazide Passed - 5/5/2020  2:39 PM        Passed - Patient is at least 18 years old        Passed - Last BP in normal range within 360 days.     BP Readings from Last 3 Encounters:   01/14/20 129/73   12/30/19 120/80   09/25/19 110/64              Passed - Office visit in past 12 months or future 90 days.     Recent Outpatient Visits            3 months ago Extensor tendonitis of foot - Right Foot    [place] - [facility] ALBARO GomezM    4 months ago Essential hypertension    [facility] Saurabh Hassan MD    6 months ago Bilateral impacted cerumen    [facility] ENT Iliana Rudolph NP    7 months ago Arthritis of hip    [facility] Simeon Cowan MD    7 months ago Decreased appetite    [facility]astroenter[facility] Jamee Allen, JANUARY          Future Appointments              In 1 month Saurabh Hassan MD [facility]                Passed - Ca in normal range and within 360 days     Calcium   Date Value Ref Range Status   12/23/2019 9.5 8.7 - 10.5 mg/dL Final   06/24/2019 9.8 8.7 - 10.5 mg/dL Final   06/10/2019 9.7 8.7 - 10.5 mg/dL Final              Passed - Cr is 1.3 or below and within 180 days     Creatinine   Date Value Ref Range Status   12/23/2019 0.8 0.5 - 1.4 mg/dL Final   06/24/2019 0.8 0.5 - 1.4 mg/dL Final   06/24/2019 0.8 0.5 - 1.4 mg/dL Final              Passed - K in normal range and within 180 days     Potassium   Date  Value Ref Range Status   12/23/2019 3.9 3.5 - 5.1 mmol/L Final   06/24/2019 4.3 3.5 - 5.1 mmol/L Final   06/24/2019 4.3 3.5 - 5.1 mmol/L Final              Passed - Na is between 130 and 148 and within 180 days     Sodium   Date Value Ref Range Status   12/23/2019 134 (L) 136 - 145 mmol/L Final   06/24/2019 135 (L) 136 - 145 mmol/L Final   06/24/2019 135 (L) 136 - 145 mmol/L Final              Passed - eGFR within 180 days     eGFR if non    Date Value Ref Range Status   12/23/2019 >60.0 >60 mL/min/1.73 m^2 Final     Comment:     Calculation used to obtain the estimated glomerular filtration  rate (eGFR) is the CKD-EPI equation.      06/24/2019 >60.0 >60 mL/min/1.73 m^2 Final     Comment:     Calculation used to obtain the estimated glomerular filtration  rate (eGFR) is the CKD-EPI equation.      06/24/2019 >60.0 >60 mL/min/1.73 m^2 Final     Comment:     Calculation used to obtain the estimated glomerular filtration  rate (eGFR) is the CKD-EPI equation.        eGFR if    Date Value Ref Range Status   12/23/2019 >60.0 >60 mL/min/1.73 m^2 Final   06/24/2019 >60.0 >60 mL/min/1.73 m^2 Final   06/24/2019 >60.0 >60 mL/min/1.73 m^2 Final               Appointments  past 12m or future 3m with PCP    Date Provider   Last Visit   12/30/2019 Saurabh Hassan MD   Next Visit   6/30/2020 Saurabh Hassan MD   ED visits in past 90 days: 0     Note composed:2:40 PM 05/05/2020

## 2020-05-11 ENCOUNTER — OFFICE VISIT (OUTPATIENT)
Dept: FAMILY MEDICINE | Facility: CLINIC | Age: 70
End: 2020-05-11
Payer: MEDICARE

## 2020-05-11 VITALS
TEMPERATURE: 98 F | DIASTOLIC BLOOD PRESSURE: 84 MMHG | OXYGEN SATURATION: 98 % | BODY MASS INDEX: 34.56 KG/M2 | WEIGHT: 188.94 LBS | SYSTOLIC BLOOD PRESSURE: 122 MMHG | HEART RATE: 72 BPM

## 2020-05-11 DIAGNOSIS — E78.5 HYPERLIPIDEMIA, UNSPECIFIED HYPERLIPIDEMIA TYPE: ICD-10-CM

## 2020-05-11 DIAGNOSIS — F41.9 ANXIETY: ICD-10-CM

## 2020-05-11 DIAGNOSIS — K21.9 GASTROESOPHAGEAL REFLUX DISEASE, ESOPHAGITIS PRESENCE NOT SPECIFIED: ICD-10-CM

## 2020-05-11 DIAGNOSIS — K64.9 HEMORRHOIDS, UNSPECIFIED HEMORRHOID TYPE: ICD-10-CM

## 2020-05-11 DIAGNOSIS — I10 ESSENTIAL HYPERTENSION: Primary | ICD-10-CM

## 2020-05-11 PROCEDURE — 99214 OFFICE O/P EST MOD 30 MIN: CPT | Mod: PBBFAC,PO | Performed by: FAMILY MEDICINE

## 2020-05-11 PROCEDURE — 99999 PR PBB SHADOW E&M-EST. PATIENT-LVL IV: CPT | Mod: PBBFAC,,, | Performed by: FAMILY MEDICINE

## 2020-05-11 PROCEDURE — 99214 PR OFFICE/OUTPT VISIT, EST, LEVL IV, 30-39 MIN: ICD-10-PCS | Mod: S$PBB,,, | Performed by: FAMILY MEDICINE

## 2020-05-11 PROCEDURE — 99214 OFFICE O/P EST MOD 30 MIN: CPT | Mod: S$PBB,,, | Performed by: FAMILY MEDICINE

## 2020-05-11 PROCEDURE — 99999 PR PBB SHADOW E&M-EST. PATIENT-LVL IV: ICD-10-PCS | Mod: PBBFAC,,, | Performed by: FAMILY MEDICINE

## 2020-05-11 RX ORDER — HYDROCORTISONE ACETATE PRAMOXINE HCL 1; 1 G/100G; G/100G
CREAM TOPICAL 2 TIMES DAILY
Qty: 28.4 G | Refills: 2 | Status: SHIPPED | OUTPATIENT
Start: 2020-05-11 | End: 2020-07-21 | Stop reason: SDUPTHER

## 2020-05-11 RX ORDER — PAROXETINE HYDROCHLORIDE 20 MG/1
20 TABLET, FILM COATED ORAL EVERY MORNING
Qty: 90 TABLET | Refills: 3 | Status: SHIPPED | OUTPATIENT
Start: 2020-05-11 | End: 2020-06-11

## 2020-05-11 NOTE — PROGRESS NOTES
Subjective:       Patient ID: Lucinda Aguilera is a 69 y.o. female.    Chief Complaint: Hypertension    HPI Comments: Here for f/u on chronic health issues    More emotional lately and crying more.  Feeling depressed, tired, decreased focus, dysthymia    HLD - tolerating pravachol 40mg daily  Anxiety - She is taking Paxil 10mg daily. Taking Xanax 0.5mg 1 tablet BID. She feels like this is well-controlled  HTN - tolerating Avapro 300mg daily and Hygroten 25mg daily and amlodipine; BP controlled; digital flowsheet reviewed  GERD - tolerating Protonix 40mg daily and zantac 300mg QHS  HLD - tolerating pravastatin 40mg  S/p laprascopic conner - doing well; appetite normal; BM2 normal    She did have angiogram in 2010.    Past Medical History:    Hypertension                                                  Anxiety                                           Hyperlipemia                                                  GERD (gastroesophageal reflux disease)                        Cataract                                                        Comment:OU    PVD (posterior vitreous detachment)                             Comment:OD    Arthritis                                                       Comment:right thumb    Past Surgical History:    chest abcess                                                     Comment:child    KNEE ARTHROSCOPY W/ LASER                                        Comment:right    COLONOSCOPY                                      1/2012          Comment:repeat in 5 years    No Known Allergies    Social History    Marital Status:              Spouse Name:                       Years of Education:                 Number of children: 2             Occupational History  Occupation          Employer            Comment               retired                                   retired teacher an*                         Social History Main Topics    Smoking Status: Never Smoker                       Smokeless Status: Never Used                        Alcohol Use: Yes                Comment: social    Drug Use: No              Sexual Activity: Yes               Partners with: Male       Birth Control/Protection: None, Post-menopausal    Current Outpatient Medications on File Prior to Visit   Medication Sig Dispense Refill    Al hyd-Mg tr-alg ac-sod bicarb (GAVISCON) 80-14.2 mg Chew Take by mouth.      ALPRAZolam (XANAX) 0.5 MG tablet Take 1.5 tablets (0.75 mg total) by mouth 2 (two) times daily as needed for Anxiety. 90 tablet 5    carboxymethylcellulose (REFRESH PLUS) 0.5 % Dpet 1 drop 3 (three) times daily as needed.      chlorthalidone (HYGROTEN) 25 MG Tab TAKE 1 TABLET BY MOUTH EVERY MORNING, DISCONTINUE HYDROCHLORTHIAZIDE 90 tablet 0    fish oil-omega-3 fatty acids 300-1,000 mg capsule Take 2 g by mouth once daily.      irbesartan (AVAPRO) 300 MG tablet Take 1 tablet (300 mg total) by mouth every evening. 90 tablet 3    meloxicam (MOBIC) 15 MG tablet Take 1 tablet (15 mg total) by mouth once daily. 30 tablet 11    multivitamin (MULTIVITAMIN) per tablet Take 1 tablet by mouth once daily.        niacin 500 MG CpSR Take 500 mg by mouth every evening.        pantoprazole (PROTONIX) 40 MG tablet TAKE 1 TABLET(40 MG) BY MOUTH BEFORE BREAKFAST 90 tablet 3    phenylephrine-shark liver oil-mineral oil-petrolatum (PREPARATION H) rectal ointment Place rectally 2 (two) times daily as needed for Hemorrhoids.      potassium chloride (MICRO-K) 10 MEQ CpSR TAKE 2 CAPSULES BY MOUTH DAILY 180 capsule 1    pravastatin (PRAVACHOL) 40 MG tablet TAKE 1 TABLET(40 MG) BY MOUTH EVERY DAY 90 tablet 3    ranitidine (ZANTAC) 300 MG tablet Take 300 mg by mouth every evening.      [DISCONTINUED] paroxetine (PAXIL) 10 MG tablet Take 1 tablet (10 mg total) by mouth every morning. 90 tablet 3    aspirin (ECOTRIN) 81 MG EC tablet Take 1 tablet (81 mg total) by mouth 2 (two) times daily. (Patient taking differently: Take 81  mg by mouth once daily. ) 60 tablet 0    clotrimazole-betamethasone 1-0.05% (LOTRISONE) cream Apply topically 2 (two) times daily. 45 g 2    famotidine (PEPCID) 40 MG tablet Take 1 tablet (40 mg total) by mouth once daily. (Patient not taking: Reported on 5/11/2020) 90 tablet 3    FLUZONE HIGH-DOSE 2019-20, PF, 180 mcg/0.5 mL Syrg ADM 0.5ML IM UTD  0     No current facility-administered medications on file prior to visit.      Review of patient's family history indicates:    Hypertension                   Mother                    Heart disease                  Mother                    Stroke                         Father                    Review of Systems   Constitutional: Negative for fever, chills, appetite change and unexpected weight change.   HENT: Negative for sore throat and trouble swallowing.    Eyes: Negative for pain and visual disturbance.   Respiratory: Negative for cough, shortness of breath and wheezing.    Cardiovascular: Negative for chest pain and palpitations.   Gastrointestinal: Negative for nausea, vomiting, abdominal pain, diarrhea and blood in stool.   Genitourinary: Negative for dysuria, hematuria and difficulty urinating.   Musculoskeletal: Negative for arthralgias, gait problem and neck pain.   Skin: Negative for rash and wound.   Neurological: Negative for dizziness, weakness, numbness and headaches.   Hematological: Negative for adenopathy.   Psychiatric/Behavioral: Negative for dysphoric mood.     Answers for HPI/ROS submitted by the patient on 12/26/2019   activity change: No  unexpected weight change: No  neck pain: No  hearing loss: No  rhinorrhea: No  trouble swallowing: No  eye discharge: No  visual disturbance: No  chest tightness: No  wheezing: No  chest pain: No  palpitations: No  blood in stool: No  constipation: No  vomiting: No  diarrhea: No  polydipsia: No  polyuria: No  difficulty urinating: No  hematuria: No  menstrual problem: No  dysuria: No  joint swelling:  No  arthralgias: No  headaches: No  weakness: No  confusion: No  dysphoric mood: No          Objective:       /84 (BP Location: Right arm, Patient Position: Sitting)   Pulse 72   Temp 98 °F (36.7 °C) (Oral)   Wt 85.7 kg (188 lb 15 oz)   LMP 04/18/2004   SpO2 98%   BMI 34.56 kg/m²     Physical Exam   Constitutional: She is oriented to person, place, and time. She appears well-developed and well-nourished.   HENT:   Head: Normocephalic.   Mouth/Throat: Oropharynx is clear and moist. No oropharyngeal exudate or posterior oropharyngeal erythema.   Eyes: Conjunctivae and EOM are normal. Pupils are equal, round, and reactive to light.   Neck: Normal range of motion. Neck supple. No thyromegaly present.   Cardiovascular: Normal rate, regular rhythm, S1 normal, S2 normal, normal heart sounds and intact distal pulses.  Exam reveals no gallop and no friction rub.    No murmur heard.  Pulmonary/Chest: Effort normal and breath sounds normal. She has no wheezes. She has no rales.   Abdominal: Normal appearance.   Musculoskeletal:        Right lower leg: She exhibits no edema.        Left lower leg: She exhibits no edema.   Lymphadenopathy:     She has no cervical adenopathy.   Neurological: She is alert and oriented to person, place, and time. No cranial nerve deficit. Gait normal.   Skin: Skin is warm and intact. No rash noted.   Psychiatric: She has a normal mood and affect.         Results for orders placed or performed in visit on 12/23/19   Comprehensive metabolic panel   Result Value Ref Range    Sodium 134 (L) 136 - 145 mmol/L    Potassium 3.9 3.5 - 5.1 mmol/L    Chloride 101 95 - 110 mmol/L    CO2 24 23 - 29 mmol/L    Glucose 99 70 - 110 mg/dL    BUN, Bld 13 8 - 23 mg/dL    Creatinine 0.8 0.5 - 1.4 mg/dL    Calcium 9.5 8.7 - 10.5 mg/dL    Total Protein 7.6 6.0 - 8.4 g/dL    Albumin 4.0 3.5 - 5.2 g/dL    Total Bilirubin 0.5 0.1 - 1.0 mg/dL    Alkaline Phosphatase 104 55 - 135 U/L    AST 26 10 - 40 U/L    ALT  25 10 - 44 U/L    Anion Gap 9 8 - 16 mmol/L    eGFR if African American >60.0 >60 mL/min/1.73 m^2    eGFR if non African American >60.0 >60 mL/min/1.73 m^2   Lipid panel   Result Value Ref Range    Cholesterol 205 (H) 120 - 199 mg/dL    Triglycerides 172 (H) 30 - 150 mg/dL    HDL 56 40 - 75 mg/dL    LDL Cholesterol 114.6 63.0 - 159.0 mg/dL    Hdl/Cholesterol Ratio 27.3 20.0 - 50.0 %    Total Cholesterol/HDL Ratio 3.7 2.0 - 5.0    Non-HDL Cholesterol 149 mg/dL       Assessment:       1. Essential hypertension    2. Anxiety    3. Hyperlipidemia, unspecified hyperlipidemia type    4. Gastroesophageal reflux disease, esophagitis presence not specified    5. Hemorrhoids, unspecified hemorrhoid type        Plan:       Essential hypertension    Anxiety  -     paroxetine (PAXIL) 20 MG tablet; Take 1 tablet (20 mg total) by mouth every morning.  Dispense: 90 tablet; Refill: 3    Hyperlipidemia, unspecified hyperlipidemia type  -     Comprehensive metabolic panel; Future; Expected date: 11/07/2020  -     Lipid Panel; Future; Expected date: 11/07/2020    Gastroesophageal reflux disease, esophagitis presence not specified    Hemorrhoids, unspecified hemorrhoid type  -     pramoxine-hydrocortisone (PROCTOCREAM-HC) 1-1 % rectal cream; Place rectally 2 (two) times daily.  Dispense: 28.4 g; Refill: 2          Increase to Paxil 20mg daily, Xanax PRN; f/u 1 month  cotninue aspirin 81mg daily  Continue other present meds  Counseled on regular exercise, maintenance of a healthy weight, balanced diet rich in fruits/vegetables and lean protein, and avoidance of unhealthy habits like smoking and excessive alcohol intake.  F/u 6 months with labs

## 2020-05-18 ENCOUNTER — PATIENT MESSAGE (OUTPATIENT)
Dept: HOME HEALTH SERVICES | Facility: CLINIC | Age: 70
End: 2020-05-18

## 2020-05-19 ENCOUNTER — OFFICE VISIT (OUTPATIENT)
Dept: FAMILY MEDICINE | Facility: CLINIC | Age: 70
End: 2020-05-19
Payer: MEDICARE

## 2020-05-19 VITALS
BODY MASS INDEX: 34.59 KG/M2 | HEART RATE: 87 BPM | HEIGHT: 62 IN | WEIGHT: 187.94 LBS | DIASTOLIC BLOOD PRESSURE: 86 MMHG | OXYGEN SATURATION: 96 % | SYSTOLIC BLOOD PRESSURE: 134 MMHG

## 2020-05-19 DIAGNOSIS — R51.9 FRONTAL HEADACHE: Primary | ICD-10-CM

## 2020-05-19 PROCEDURE — 99999 PR PBB SHADOW E&M-EST. PATIENT-LVL V: CPT | Mod: PBBFAC,,, | Performed by: NURSE PRACTITIONER

## 2020-05-19 PROCEDURE — 99999 PR PBB SHADOW E&M-EST. PATIENT-LVL V: ICD-10-PCS | Mod: PBBFAC,,, | Performed by: NURSE PRACTITIONER

## 2020-05-19 PROCEDURE — 99213 PR OFFICE/OUTPT VISIT, EST, LEVL III, 20-29 MIN: ICD-10-PCS | Mod: S$PBB,,, | Performed by: NURSE PRACTITIONER

## 2020-05-19 PROCEDURE — 99213 OFFICE O/P EST LOW 20 MIN: CPT | Mod: S$PBB,,, | Performed by: NURSE PRACTITIONER

## 2020-05-19 PROCEDURE — 99215 OFFICE O/P EST HI 40 MIN: CPT | Mod: PBBFAC,PO | Performed by: NURSE PRACTITIONER

## 2020-05-19 RX ORDER — FLUTICASONE PROPIONATE 50 MCG
1 SPRAY, SUSPENSION (ML) NASAL DAILY
Qty: 16 G | Refills: 0 | Status: SHIPPED | OUTPATIENT
Start: 2020-05-19 | End: 2020-07-21 | Stop reason: SDUPTHER

## 2020-05-19 NOTE — PROGRESS NOTES
Subjective:       Patient ID: Lucinda Aguilera is a 69 y.o. female.    Chief Complaint: Migraine and Ear Fullness    Patient is complaining of significant headache, mainly behind her eyes, hurts worse when she moves her neck around. She says it feels as if there is pressure behind her eyes. She says her  says she has been snoring recently. No decrease in hearing. She does take Mobic for arthritis.  The only recent medication change was increase in paxil 20 mg about 1 week ago.     Past Medical History:  No date: Anticoagulant long-term use  No date: Anxiety      Comment:  takes daily Xanax  No date: Arthritis      Comment:  right thumb  No date: Cataract      Comment:  OU  No date: Cholelithiasis  No date: GERD (gastroesophageal reflux disease)  No date: Hepatic steatosis  No date: Hyperlipemia  No date: Hypertension  No date: PVD (posterior vitreous detachment)      Comment:  OD    Past Surgical History:  No date: chest abcess      Comment:  child  No date: CHOLECYSTECTOMY  01/06/2012: COLONOSCOPY      Comment:  Dr. Lopez: hemorrhoids, redundant colon  6/6/2019: ESOPHAGOGASTRODUODENOSCOPY; N/A      Comment:  Procedure: EGD (ESOPHAGOGASTRODUODENOSCOPY);  Surgeon:                Nathan Lopez Jr., MD;  Location: [facility];  Service:               Endoscopy;  Laterality: N/A;  No date: JOINT REPLACEMENT; Bilateral      Comment:  knee  No date: KNEE ARTHROSCOPY W/ LASER      Comment:  right  06/03/2016: KNEE SURGERY; Right      Comment:  Total knee replacement  6/14/2019: LAPAROSCOPIC CHOLECYSTECTOMY; N/A      Comment:  Procedure: CHOLECYSTECTOMY, LAPAROSCOPIC;  Surgeon:                Guevara Evans MD;  Location: [facility];  Service:                General;  Laterality: N/A;  11/2014: thumb surgery  6/19/2018: TOTAL KNEE ARTHROPLASTY; Left      Comment:  Procedure: REPLACEMENT-KNEE-TOTAL;  Surgeon: Nj Melvin MD;  Location: [facility] OR [address] [place] [address];  Service:                Orthopedics;   Laterality: Left;  Ruy  07/13/2017: UPPER GASTROINTESTINAL ENDOSCOPY      Comment:  Dr. Lopez: NERD, Gastric mucosal atrophy. antritis, Scar               in the incisura. Biopsied; biopsy: chronic gastritis.                negative for h pylori  06/06/2019: UPPER GASTROINTESTINAL ENDOSCOPY      Comment:  Dr. Lopez: small hiatal hernia, Non-erosive esophageal                reflux (NERD) disease?., slight antritis; biopsy: Antral                mucosa with chemical/reactive gastropathy. negative for h               pylori    Review of patient's family history indicates:  Problem: Hypertension      Relation: Mother          Age of Onset: (Not Specified)  Problem: Heart disease      Relation: Mother          Age of Onset: (Not Specified)  Problem: Glaucoma      Relation: Mother          Age of Onset: (Not Specified)  Problem: Stroke      Relation: Father          Age of Onset: (Not Specified)  Problem: Glaucoma      Relation: Sister          Age of Onset: (Not Specified)  Problem: Cataracts      Relation: Sister          Age of Onset: (Not Specified)  Problem: Macular degeneration      Relation: Sister          Age of Onset: (Not Specified)  Problem: Breast cancer      Relation: Neg Hx          Age of Onset: (Not Specified)  Problem: Colon cancer      Relation: Neg Hx          Age of Onset: (Not Specified)  Problem: Crohn's disease      Relation: Neg Hx          Age of Onset: (Not Specified)  Problem: Ulcerative colitis      Relation: Neg Hx          Age of Onset: (Not Specified)      Social History    Socioeconomic History      Marital status:       Spouse name: Not on file      Number of children: 2      Years of education: Not on file      Highest education level: Not on file    Occupational History      Occupation: retired teacher and principal    Social Needs      Financial resource strain: Not hard at all      Food insecurity:        Worry: Never true        Inability: Never true      Transportation  needs:        Medical: No        Non-medical: No    Tobacco Use      Smoking status: Never Smoker      Smokeless tobacco: Never Used    Substance and Sexual Activity      Alcohol use: Yes        Frequency: Never        Drinks per session: 1 or 2        Binge frequency: Never        Comment: social- maybe once every few months      Drug use: No      Sexual activity: Yes        Partners: Male        Birth control/protection: None, Post-menopausal    Lifestyle      Physical activity:        Days per week: 4 days        Minutes per session: 90 min      Stress: Not at all    Relationships      Social connections:        Talks on phone: More than three times a week        Gets together: More than three times a week        Attends [employer] service: Not on file        Active member of club or organization: Yes        Attends meetings of clubs or organizations: 1 to 4 times per year        Relationship status:     Other Topics      Concerns:        Not on file    Social History Narrative      Not on file      Current Outpatient Medications:  Al hyd-Mg tr-alg ac-sod bicarb (GAVISCON) 80-14.2 mg Chew, Take by mouth., Disp: , Rfl:   ALPRAZolam (XANAX) 0.5 MG tablet, Take 1.5 tablets (0.75 mg total) by mouth 2 (two) times daily as needed for Anxiety., Disp: 90 tablet, Rfl: 5  carboxymethylcellulose (REFRESH PLUS) 0.5 % Dpet, 1 drop 3 (three) times daily as needed., Disp: , Rfl:   chlorthalidone (HYGROTEN) 25 MG Tab, TAKE 1 TABLET BY MOUTH EVERY MORNING, DISCONTINUE HYDROCHLORTHIAZIDE, Disp: 90 tablet, Rfl: 0  clotrimazole-betamethasone 1-0.05% (LOTRISONE) cream, Apply topically 2 (two) times daily., Disp: 45 g, Rfl: 2  famotidine (PEPCID) 40 MG tablet, Take 1 tablet (40 mg total) by mouth once daily., Disp: 90 tablet, Rfl: 3  fish oil-omega-3 fatty acids 300-1,000 mg capsule, Take 2 g by mouth once daily., Disp: , Rfl:   FLUZONE HIGH-DOSE 2019-20, PF, 180 mcg/0.5 mL Syrg, ADM 0.5ML IM UTD, Disp: , Rfl: 0  irbesartan  (AVAPRO) 300 MG tablet, Take 1 tablet (300 mg total) by mouth every evening., Disp: 90 tablet, Rfl: 3  meloxicam (MOBIC) 15 MG tablet, Take 1 tablet (15 mg total) by mouth once daily., Disp: 30 tablet, Rfl: 11  multivitamin (MULTIVITAMIN) per tablet, Take 1 tablet by mouth once daily.  , Disp: , Rfl:   niacin 500 MG CpSR, Take 500 mg by mouth every evening.  , Disp: , Rfl:   pantoprazole (PROTONIX) 40 MG tablet, TAKE 1 TABLET(40 MG) BY MOUTH BEFORE BREAKFAST, Disp: 90 tablet, Rfl: 3  paroxetine (PAXIL) 20 MG tablet, Take 1 tablet (20 mg total) by mouth every morning., Disp: 90 tablet, Rfl: 3  phenylephrine-shark liver oil-mineral oil-petrolatum (PREPARATION H) rectal ointment, Place rectally 2 (two) times daily as needed for Hemorrhoids., Disp: , Rfl:   potassium chloride (MICRO-K) 10 MEQ CpSR, TAKE 2 CAPSULES BY MOUTH DAILY, Disp: 180 capsule, Rfl: 1  pramoxine-hydrocortisone (PROCTOCREAM-HC) 1-1 % rectal cream, Place rectally 2 (two) times daily., Disp: 28.4 g, Rfl: 2  pravastatin (PRAVACHOL) 40 MG tablet, TAKE 1 TABLET(40 MG) BY MOUTH EVERY DAY, Disp: 90 tablet, Rfl: 3  ranitidine (ZANTAC) 300 MG tablet, Take 300 mg by mouth every evening., Disp: , Rfl:   varicella-zoster [id number], PF, (SHINGRIX, PF,) 50 mcg/0.5 mL injection, Inject 0.5 mLs into the muscle once. for 1 dose, Disp: 0.5 mL, Rfl: 1  aspirin (ECOTRIN) 81 MG EC tablet, Take 1 tablet (81 mg total) by mouth 2 (two) times daily. (Patient taking differently: Take 81 mg by mouth once daily. ), Disp: 60 tablet, Rfl: 0    No current facility-administered medications for this visit.       Review of patient's allergies indicates:  No Known Allergies    Review of Systems   Constitutional: Negative for chills and fever.   HENT: Positive for sinus pressure. Negative for ear pain, hearing loss, postnasal drip, rhinorrhea, sinus pain, sore throat and voice change.    Eyes: Positive for photophobia.   Respiratory: Negative.  Negative for cough.    Cardiovascular:  Negative.    Gastrointestinal: Negative.  Negative for nausea.   Genitourinary: Negative.    Musculoskeletal: Positive for neck pain.   Neurological: Positive for headaches. Negative for dizziness, light-headedness and numbness.   Psychiatric/Behavioral: Positive for dysphoric mood. The patient is not nervous/anxious.        Objective:      Physical Exam   Constitutional: She is oriented to person, place, and time.   HENT:   Head: Normocephalic and atraumatic.   Ears:    Nose: Right sinus exhibits maxillary sinus tenderness. Left sinus exhibits maxillary sinus tenderness.   Eyes: Pupils are equal, round, and reactive to light.   Cardiovascular: Normal rate.   Pulmonary/Chest: Effort normal.   Neurological: She is alert and oriented to person, place, and time.   Psychiatric: She has a normal mood and affect. Her behavior is normal.       Assessment:       1. Frontal headache        Plan:       1. Frontal headache  Debrox, flonase, and tylenol. Follow up if not resolving, immediately for any new or worsening symptoms.   - fluticasone propionate (FLONASE) 50 mcg/actuation nasal spray; 1 spray (50 mcg total) by Each Nostril route once daily.  Dispense: 16 g; Refill: 0  - carbamide peroxide (DEBROX) 6.5 % otic solution; Place 5 drops into both ears 2 (two) times daily. for 10 days  Dispense: 30 mL; Refill: 0

## 2020-05-20 ENCOUNTER — IMMUNIZATION (OUTPATIENT)
Dept: PHARMACY | Facility: CLINIC | Age: 70
End: 2020-05-20
Payer: MEDICARE

## 2020-06-03 DIAGNOSIS — E78.5 HYPERLIPIDEMIA, UNSPECIFIED HYPERLIPIDEMIA TYPE: ICD-10-CM

## 2020-06-03 RX ORDER — PRAVASTATIN SODIUM 40 MG/1
TABLET ORAL
Qty: 90 TABLET | Refills: 1 | Status: SHIPPED | OUTPATIENT
Start: 2020-06-03 | End: 2020-11-30 | Stop reason: SDUPTHER

## 2020-06-03 NOTE — PROGRESS NOTES
Refill Authorization Note    is requesting a refill authorization.    Brief assessment and rationale for refill: APPROVE: prr               Medication reconciliation completed: No                         Comments:      Requested Prescriptions   Pending Prescriptions Disp Refills    pravastatin (PRAVACHOL) 40 MG tablet [Pharmacy Med Name: PRAVASTATIN 40MG TABLETS] 90 tablet 1     Sig: TAKE 1 TABLET(40 MG) BY MOUTH EVERY DAY       Cardiovascular:  Antilipid - Statins Passed - 6/3/2020  6:19 AM        Passed - Patient is at least 18 years old        Passed - Office visit in past 12 months or future 90 days.     Recent Outpatient Visits            2 weeks ago Frontal headache    [facility] Sharda Engel, JANUARY    3 weeks ago Essential hypertension    [facility] Saurabh Hassan MD    4 months ago Extensor tendonitis of foot - Right Foot    [facility] Podiatry Renae Rios, NAILA    5 months ago Essential hypertension    [facility] Saurabh Hassan MD    7 months ago Bilateral impacted cerumen    [facility] ENT Iliana Rudolph NP          Future Appointments              In 1 week Saurabh Hassan MD [facility]    In 2 weeks Ritu Griffin MD [facility]    In 5 months LAB, [place] [facility]    In 5 months Saurabh Hassan MD [facility]                Passed - Lipid Panel completed in last 360 days     Lab Results   Component Value Date    CHOL 205 (H) 12/23/2019    HDL 56 12/23/2019    LDLCALC 114.6 12/23/2019    TRIG 172 (H) 12/23/2019             Passed - ALT is 94 or below and within 360 days     ALT   Date Value Ref Range Status   12/23/2019 25 10 - 44 U/L Final   09/10/2019 23 10 - 44 U/L Final   06/24/2019 24 10 - 44 U/L Final              Passed - AST is 54 or below and within 360 days     AST   Date Value Ref Range Status    12/23/2019 26 10 - 40 U/L Final   09/10/2019 31 10 - 40 U/L Final   06/24/2019 22 10 - 40 U/L Final               Appointments  past 12m or future 3m with PCP    Date Provider   Last Visit   5/11/2020 Saurabh Hassan MD   Next Visit   6/11/2020 Saurabh Hassan MD   ED visits in past 90 days: 0     Note composed:12:54 PM 06/03/2020

## 2020-06-11 ENCOUNTER — OFFICE VISIT (OUTPATIENT)
Dept: FAMILY MEDICINE | Facility: CLINIC | Age: 70
End: 2020-06-11
Payer: MEDICARE

## 2020-06-11 VITALS
BODY MASS INDEX: 34.16 KG/M2 | HEART RATE: 105 BPM | DIASTOLIC BLOOD PRESSURE: 76 MMHG | SYSTOLIC BLOOD PRESSURE: 110 MMHG | HEIGHT: 62 IN | WEIGHT: 185.63 LBS | OXYGEN SATURATION: 97 % | TEMPERATURE: 98 F

## 2020-06-11 DIAGNOSIS — I10 ESSENTIAL HYPERTENSION: Primary | ICD-10-CM

## 2020-06-11 DIAGNOSIS — F41.9 ANXIETY: ICD-10-CM

## 2020-06-11 PROCEDURE — 99999 PR PBB SHADOW E&M-EST. PATIENT-LVL V: ICD-10-PCS | Mod: PBBFAC,,, | Performed by: FAMILY MEDICINE

## 2020-06-11 PROCEDURE — 99215 OFFICE O/P EST HI 40 MIN: CPT | Mod: PBBFAC,PO | Performed by: FAMILY MEDICINE

## 2020-06-11 PROCEDURE — 99213 PR OFFICE/OUTPT VISIT, EST, LEVL III, 20-29 MIN: ICD-10-PCS | Mod: S$PBB,,, | Performed by: FAMILY MEDICINE

## 2020-06-11 PROCEDURE — 99999 PR PBB SHADOW E&M-EST. PATIENT-LVL V: CPT | Mod: PBBFAC,,, | Performed by: FAMILY MEDICINE

## 2020-06-11 PROCEDURE — 99213 OFFICE O/P EST LOW 20 MIN: CPT | Mod: S$PBB,,, | Performed by: FAMILY MEDICINE

## 2020-06-11 RX ORDER — VENLAFAXINE HYDROCHLORIDE 75 MG/1
75 CAPSULE, EXTENDED RELEASE ORAL DAILY
Qty: 30 CAPSULE | Refills: 11 | Status: SHIPPED | OUTPATIENT
Start: 2020-06-11 | End: 2020-07-21

## 2020-06-11 NOTE — PROGRESS NOTES
Subjective:       Patient ID: Lucinda Aguilera is a 69 y.o. female.    Chief Complaint: Follow-up (4 weeks)    HPI Comments: Here for f/u on chronic health issues    We increased the Paxil to 20mg 1 month ago.  She reports still being emotional.  More emotional lately and crying more.  Feeling depressed, tired, decreased focus, dysthymia    HLD - tolerating pravachol 40mg daily  Anxiety - She is taking Paxil 20mg daily. Taking Xanax 0.5mg 1.5 tablet BID. She feels like this is well-controlled  HTN - tolerating Avapro 300mg daily and Hygroten 25mg daily and amlodipine; BP controlled; digital flowsheet reviewed  GERD - tolerating Protonix 40mg daily and zantac 300mg QHS  HLD - tolerating pravastatin 40mg  S/p laprascopic conner - doing well; appetite normal; BM2 normal      Past Medical History:    Hypertension                                                  Anxiety                                           Hyperlipemia                                                  GERD (gastroesophageal reflux disease)                        Cataract                                                        Comment:OU    PVD (posterior vitreous detachment)                             Comment:OD    Arthritis                                                       Comment:right thumb    Past Surgical History:    chest abcess                                                     Comment:child    KNEE ARTHROSCOPY W/ LASER                                        Comment:right    COLONOSCOPY                                      1/2012          Comment:repeat in 5 years    No Known Allergies    Social History    Marital Status:              Spouse Name:                       Years of Education:                 Number of children: 2             Occupational History  Occupation          Employer            Comment               retired                                   retired teacher an*                         Social History Main Topics     Smoking Status: Never Smoker                      Smokeless Status: Never Used                        Alcohol Use: Yes                Comment: social    Drug Use: No              Sexual Activity: Yes               Partners with: Male       Birth Control/Protection: None, Post-menopausal    Current Outpatient Medications on File Prior to Visit   Medication Sig Dispense Refill    Al hyd-Mg tr-alg ac-sod bicarb (GAVISCON) 80-14.2 mg Chew Take by mouth.      ALPRAZolam (XANAX) 0.5 MG tablet Take 1.5 tablets (0.75 mg total) by mouth 2 (two) times daily as needed for Anxiety. 90 tablet 5    carboxymethylcellulose (REFRESH PLUS) 0.5 % Dpet 1 drop 3 (three) times daily as needed.      chlorthalidone (HYGROTEN) 25 MG Tab TAKE 1 TABLET BY MOUTH EVERY MORNING, DISCONTINUE HYDROCHLORTHIAZIDE 90 tablet 0    famotidine (PEPCID) 40 MG tablet Take 1 tablet (40 mg total) by mouth once daily. 90 tablet 3    fish oil-omega-3 fatty acids 300-1,000 mg capsule Take 2 g by mouth once daily.      fluticasone propionate (FLONASE) 50 mcg/actuation nasal spray 1 spray (50 mcg total) by Each Nostril route once daily. 16 g 0    FLUZONE HIGH-DOSE 2019-20, PF, 180 mcg/0.5 mL Syrg ADM 0.5ML IM UTD  0    irbesartan (AVAPRO) 300 MG tablet Take 1 tablet (300 mg total) by mouth every evening. 90 tablet 3    meloxicam (MOBIC) 15 MG tablet Take 1 tablet (15 mg total) by mouth once daily. 30 tablet 11    multivitamin (MULTIVITAMIN) per tablet Take 1 tablet by mouth once daily.        niacin 500 MG CpSR Take 500 mg by mouth every evening.        pantoprazole (PROTONIX) 40 MG tablet TAKE 1 TABLET(40 MG) BY MOUTH BEFORE BREAKFAST 90 tablet 3    phenylephrine-shark liver oil-mineral oil-petrolatum (PREPARATION H) rectal ointment Place rectally 2 (two) times daily as needed for Hemorrhoids.      potassium chloride (MICRO-K) 10 MEQ CpSR TAKE 2 CAPSULES BY MOUTH DAILY 180 capsule 1    pramoxine-hydrocortisone (PROCTOCREAM-HC) 1-1 % rectal cream  Place rectally 2 (two) times daily. 28.4 g 2    pravastatin (PRAVACHOL) 40 MG tablet TAKE 1 TABLET(40 MG) BY MOUTH EVERY DAY 90 tablet 1    [DISCONTINUED] paroxetine (PAXIL) 20 MG tablet Take 1 tablet (20 mg total) by mouth every morning. 90 tablet 3    aspirin (ECOTRIN) 81 MG EC tablet Take 1 tablet (81 mg total) by mouth 2 (two) times daily. (Patient taking differently: Take 81 mg by mouth once daily. ) 60 tablet 0    [DISCONTINUED] clotrimazole-betamethasone 1-0.05% (LOTRISONE) cream Apply topically 2 (two) times daily. 45 g 2     No current facility-administered medications on file prior to visit.      Review of patient's family history indicates:    Hypertension                   Mother                    Heart disease                  Mother                    Stroke                         Father                    Review of Systems   Constitutional: Negative for fever, chills, appetite change and unexpected weight change.   HENT: Negative for sore throat and trouble swallowing.    Eyes: Negative for pain and visual disturbance.   Respiratory: Negative for cough, shortness of breath and wheezing.    Cardiovascular: Negative for chest pain and palpitations.   Gastrointestinal: Negative for nausea, vomiting, abdominal pain, diarrhea and blood in stool.   Genitourinary: Negative for dysuria, hematuria and difficulty urinating.   Musculoskeletal: Negative for arthralgias, gait problem and neck pain.   Skin: Negative for rash and wound.   Neurological: Negative for dizziness, weakness, numbness and headaches.   Hematological: Negative for adenopathy.   Psychiatric/Behavioral: Negative for dysphoric mood.     Answers for HPI/ROS submitted by the patient on 12/26/2019   activity change: No  unexpected weight change: No  neck pain: No  hearing loss: No  rhinorrhea: No  trouble swallowing: No  eye discharge: No  visual disturbance: No  chest tightness: No  wheezing: No  chest pain: No  palpitations: No  blood in  "stool: No  constipation: No  vomiting: No  diarrhea: No  polydipsia: No  polyuria: No  difficulty urinating: No  hematuria: No  menstrual problem: No  dysuria: No  joint swelling: No  arthralgias: No  headaches: No  weakness: No  confusion: No  dysphoric mood: No          Objective:       /76 (BP Location: Right arm, Patient Position: Sitting)   Pulse 105   Temp 97.7 °F (36.5 °C) (Oral)   Ht 5' 2" (1.575 m)   Wt 84.2 kg (185 lb 10 oz)   LMP 04/18/2004   SpO2 97%   BMI 33.95 kg/m²     Physical Exam   Constitutional: She is oriented to person, place, and time. She appears well-developed and well-nourished.   HENT:   Head: Normocephalic.   Mouth/Throat: Oropharynx is clear and moist. No oropharyngeal exudate or posterior oropharyngeal erythema.   Eyes: Conjunctivae and EOM are normal. Pupils are equal, round, and reactive to light.   Neck: Normal range of motion. Neck supple. No thyromegaly present.   Cardiovascular: Normal rate, regular rhythm, S1 normal, S2 normal, normal heart sounds and intact distal pulses.  Exam reveals no gallop and no friction rub.    No murmur heard.  Pulmonary/Chest: Effort normal and breath sounds normal. She has no wheezes. She has no rales.   Abdominal: Normal appearance.   Musculoskeletal:        Right lower leg: She exhibits no edema.        Left lower leg: She exhibits no edema.   Lymphadenopathy:     She has no cervical adenopathy.   Neurological: She is alert and oriented to person, place, and time. No cranial nerve deficit. Gait normal.   Skin: Skin is warm and intact. No rash noted.   Psychiatric: She has a normal mood and affect.         Results for orders placed or performed in visit on 12/23/19   Comprehensive metabolic panel   Result Value Ref Range    Sodium 134 (L) 136 - 145 mmol/L    Potassium 3.9 3.5 - 5.1 mmol/L    Chloride 101 95 - 110 mmol/L    CO2 24 23 - 29 mmol/L    Glucose 99 70 - 110 mg/dL    BUN, Bld 13 8 - 23 mg/dL    Creatinine 0.8 0.5 - 1.4 mg/dL    " Calcium 9.5 8.7 - 10.5 mg/dL    Total Protein 7.6 6.0 - 8.4 g/dL    Albumin 4.0 3.5 - 5.2 g/dL    Total Bilirubin 0.5 0.1 - 1.0 mg/dL    Alkaline Phosphatase 104 55 - 135 U/L    AST 26 10 - 40 U/L    ALT 25 10 - 44 U/L    Anion Gap 9 8 - 16 mmol/L    eGFR if African American >60.0 >60 mL/min/1.73 m^2    eGFR if non African American >60.0 >60 mL/min/1.73 m^2   Lipid panel   Result Value Ref Range    Cholesterol 205 (H) 120 - 199 mg/dL    Triglycerides 172 (H) 30 - 150 mg/dL    HDL 56 40 - 75 mg/dL    LDL Cholesterol 114.6 63.0 - 159.0 mg/dL    Hdl/Cholesterol Ratio 27.3 20.0 - 50.0 %    Total Cholesterol/HDL Ratio 3.7 2.0 - 5.0    Non-HDL Cholesterol 149 mg/dL       Assessment:       1. Essential hypertension    2. Anxiety        Plan:       Essential hypertension    Anxiety  -     venlafaxine (EFFEXOR-XR) 75 MG 24 hr capsule; Take 1 capsule (75 mg total) by mouth once daily.  Dispense: 30 capsule; Refill: 11          discontinue Paxil 20mg daily; begin Effexor XR 75 mg daily; f/u 1 month  Xanax 1.5 BID PRN; f/u 1 month  cotninue aspirin 81mg daily  Continue other present meds  Counseled on regular exercise, maintenance of a healthy weight, balanced diet rich in fruits/vegetables and lean protein, and avoidance of unhealthy habits like smoking and excessive alcohol intake.  F/u November with labs as planned

## 2020-06-23 ENCOUNTER — OFFICE VISIT (OUTPATIENT)
Dept: GASTROENTEROLOGY | Facility: CLINIC | Age: 70
End: 2020-06-23
Payer: MEDICARE

## 2020-06-23 VITALS
WEIGHT: 187.63 LBS | SYSTOLIC BLOOD PRESSURE: 119 MMHG | BODY MASS INDEX: 33.25 KG/M2 | HEIGHT: 63 IN | DIASTOLIC BLOOD PRESSURE: 69 MMHG | HEART RATE: 97 BPM

## 2020-06-23 DIAGNOSIS — K44.9 HIATAL HERNIA: ICD-10-CM

## 2020-06-23 DIAGNOSIS — K59.09 CHRONIC CONSTIPATION: ICD-10-CM

## 2020-06-23 DIAGNOSIS — F41.9 ANXIETY: ICD-10-CM

## 2020-06-23 DIAGNOSIS — Z87.19 HISTORY OF GASTRITIS: ICD-10-CM

## 2020-06-23 DIAGNOSIS — Z90.49 S/P CHOLECYSTECTOMY: ICD-10-CM

## 2020-06-23 DIAGNOSIS — R11.0 NAUSEA: Primary | ICD-10-CM

## 2020-06-23 DIAGNOSIS — K21.9 GASTROESOPHAGEAL REFLUX DISEASE WITHOUT ESOPHAGITIS: ICD-10-CM

## 2020-06-23 DIAGNOSIS — R10.13 EPIGASTRIC PAIN: ICD-10-CM

## 2020-06-23 PROCEDURE — 99214 PR OFFICE/OUTPT VISIT, EST, LEVL IV, 30-39 MIN: ICD-10-PCS | Mod: S$PBB,,, | Performed by: NURSE PRACTITIONER

## 2020-06-23 PROCEDURE — 99214 OFFICE O/P EST MOD 30 MIN: CPT | Mod: S$PBB,,, | Performed by: NURSE PRACTITIONER

## 2020-06-23 PROCEDURE — 99999 PR PBB SHADOW E&M-EST. PATIENT-LVL V: CPT | Mod: PBBFAC,,, | Performed by: NURSE PRACTITIONER

## 2020-06-23 PROCEDURE — 99999 PR PBB SHADOW E&M-EST. PATIENT-LVL V: ICD-10-PCS | Mod: PBBFAC,,, | Performed by: NURSE PRACTITIONER

## 2020-06-23 PROCEDURE — 99215 OFFICE O/P EST HI 40 MIN: CPT | Mod: PBBFAC,PO | Performed by: NURSE PRACTITIONER

## 2020-06-23 RX ORDER — CIMETIDINE 800 MG
800 TABLET ORAL NIGHTLY
Qty: 30 TABLET | Refills: 2 | Status: SHIPPED | OUTPATIENT
Start: 2020-06-23 | End: 2020-11-11

## 2020-06-23 RX ORDER — ASPIRIN 81 MG/1
81 TABLET ORAL NIGHTLY
COMMUNITY

## 2020-06-23 RX ORDER — ONDANSETRON 4 MG/1
4 TABLET, FILM COATED ORAL EVERY 8 HOURS PRN
Qty: 30 TABLET | Refills: 0 | Status: SHIPPED | OUTPATIENT
Start: 2020-06-23 | End: 2020-07-03

## 2020-06-23 NOTE — PROGRESS NOTES
Subjective:       Patient ID: Lucinda Aguilera is a 69 y.o. female, Body mass index is 33.23 kg/m².    Chief Complaint: Gastroesophageal Reflux, Abdominal Pain, and Nausea      Established patient of Stephanie Coleman, NP and myself.    Gastroesophageal Reflux  She complains of abdominal pain (epigastric burning; worse after eating), belching (drinks sprite to cause belching which relieves abdominal pain), heartburn and nausea. She reports no chest pain, no choking, no coughing, no dysphagia, no early satiety, no globus sensation or no hoarse voice. This is a recurrent problem. The current episode started more than 1 month ago (Recurred couple months ago). The problem occurs frequently. The problem has been unchanged. The heartburn is located in the abdomen. The heartburn is of moderate intensity. The heartburn does not wake her from sleep. The heartburn does not limit her activity. The heartburn doesn't change with position. The symptoms are aggravated by certain foods and stress (eating in general; has had more anxiety lately due to COVID ). Pertinent negatives include no anemia. Risk factors include obesity and hiatal hernia. She has tried a PPI and a histamine-2 antagonist (Past: Ranitidine- effective but stopped due to FDA recall; Currently: Protonix and Pepcid) for the symptoms. Past procedures include an abdominal ultrasound and an EGD.     Review of Systems   Constitutional: Negative for appetite change, fever and unexpected weight change.   HENT: Negative for hoarse voice and trouble swallowing.    Respiratory: Negative for cough, choking and shortness of breath.    Cardiovascular: Negative for chest pain.   Gastrointestinal: Positive for abdominal pain (epigastric burning; worse after eating), constipation (chronic; bowel movements are 4-5 times per week; describes as type 1 on bristol scale), heartburn and nausea. Negative for blood in stool, diarrhea, dysphagia and vomiting.   Genitourinary:  Negative for difficulty urinating and dysuria.   Musculoskeletal: Negative for gait problem.   Skin: Negative for rash.   Neurological: Negative for speech difficulty.   Psychiatric/Behavioral: Negative for confusion.       Past Medical History:   Diagnosis Date    Anticoagulant long-term use     Anxiety     takes daily Xanax    Arthritis     right thumb    Cataract     OU    Cholelithiasis     GERD (gastroesophageal reflux disease)     Hepatic steatosis     Hyperlipemia     Hypertension     PVD (posterior vitreous detachment)     OD      Past Surgical History:   Procedure Laterality Date    chest abcess      child    CHOLECYSTECTOMY      COLONOSCOPY  01/06/2012    Dr. Lopez: hemorrhoids, redundant colon    ESOPHAGOGASTRODUODENOSCOPY N/A 6/6/2019    Procedure: EGD (ESOPHAGOGASTRODUODENOSCOPY);  Surgeon: Nathan Lopez Jr., MD;  Location[facility];  Service: Endoscopy;  Laterality: N/A;    JOINT REPLACEMENT Bilateral     knee    KNEE ARTHROSCOPY W/ LASER      right    KNEE SURGERY Right 06/03/2016    Total knee replacement    LAPAROSCOPIC CHOLECYSTECTOMY N/A 6/14/2019    Procedure: CHOLECYSTECTOMY, LAPAROSCOPIC;  Surgeon: Guevara Evans MD;  Location[facility];  Service: General;  Laterality: N/A;    thumb surgery  11/2014    TOTAL KNEE ARTHROPLASTY Left 6/19/2018    Procedure: REPLACEMENT-KNEE-TOTAL;  Surgeon: Nj Melvin MD;  Location: [address];  Service: Orthopedics;  Laterality: Left;  [employer]    UPPER GASTROINTESTINAL ENDOSCOPY  07/13/2017    Dr. Lopez: NERD, Gastric mucosal atrophy. antritis, Scar in the incisura. Biopsied; biopsy: chronic gastritis. negative for h pylori    UPPER GASTROINTESTINAL ENDOSCOPY  06/06/2019    Dr. Lopez: small hiatal hernia, Non-erosive esophageal reflux (NERD) disease?., slight antritis; biopsy: Antral mucosa with chemical/reactive gastropathy. negative for h pylori      Family History   Problem Relation Age of Onset    Hypertension Mother      Heart disease Mother     Glaucoma Mother     Stroke Father     Glaucoma Sister     Cataracts Sister     Macular degeneration Sister     Breast cancer Neg Hx     Colon cancer Neg Hx     Crohn's disease Neg Hx     Ulcerative colitis Neg Hx       Wt Readings from Last 10 Encounters:   06/23/20 85.1 kg (187 lb 9.8 oz)   06/23/20 84.9 kg (187 lb 2.7 oz)   06/11/20 84.2 kg (185 lb 10 oz)   05/19/20 85.2 kg (187 lb 15.1 oz)   05/11/20 85.7 kg (188 lb 15 oz)   01/14/20 83.9 kg (185 lb)   12/30/19 84.3 kg (185 lb 13.6 oz)   10/11/19 83.5 kg (184 lb 1.4 oz)   09/25/19 83.8 kg (184 lb 11.9 oz)   09/25/19 83 kg (183 lb)     Lab Results   Component Value Date    WBC 9.76 09/10/2019    HGB 12.9 09/10/2019    HCT 39.2 09/10/2019    MCV 89 09/10/2019     (H) 09/10/2019     CMP  Sodium   Date Value Ref Range Status   12/23/2019 134 (L) 136 - 145 mmol/L Final     Potassium   Date Value Ref Range Status   12/23/2019 3.9 3.5 - 5.1 mmol/L Final     Chloride   Date Value Ref Range Status   12/23/2019 101 95 - 110 mmol/L Final     CO2   Date Value Ref Range Status   12/23/2019 24 23 - 29 mmol/L Final     Glucose   Date Value Ref Range Status   12/23/2019 99 70 - 110 mg/dL Final     BUN, Bld   Date Value Ref Range Status   12/23/2019 13 8 - 23 mg/dL Final     Creatinine   Date Value Ref Range Status   12/23/2019 0.8 0.5 - 1.4 mg/dL Final     Calcium   Date Value Ref Range Status   12/23/2019 9.5 8.7 - 10.5 mg/dL Final     Total Protein   Date Value Ref Range Status   12/23/2019 7.6 6.0 - 8.4 g/dL Final     Albumin   Date Value Ref Range Status   12/23/2019 4.0 3.5 - 5.2 g/dL Final     Total Bilirubin   Date Value Ref Range Status   12/23/2019 0.5 0.1 - 1.0 mg/dL Final     Comment:     For infants and newborns, interpretation of results should be based  on gestational age, weight and in agreement with clinical  observations.  Premature Infant recommended reference ranges:  Up to 24 hours.............<8.0 mg/dL  Up to 48  hours............<12.0 mg/dL  3-5 days..................<15.0 mg/dL  6-29 days.................<15.0 mg/dL       Alkaline Phosphatase   Date Value Ref Range Status   12/23/2019 104 55 - 135 U/L Final     AST   Date Value Ref Range Status   12/23/2019 26 10 - 40 U/L Final     ALT   Date Value Ref Range Status   12/23/2019 25 10 - 44 U/L Final     Anion Gap   Date Value Ref Range Status   12/23/2019 9 8 - 16 mmol/L Final     eGFR if    Date Value Ref Range Status   12/23/2019 >60.0 >60 mL/min/1.73 m^2 Final     eGFR if non    Date Value Ref Range Status   12/23/2019 >60.0 >60 mL/min/1.73 m^2 Final     Comment:     Calculation used to obtain the estimated glomerular filtration  rate (eGFR) is the CKD-EPI equation.          Lab Results   Component Value Date    LIPASE 47 09/10/2019              Reviewed prior medical records including radiology report of Gastric emptying study 10/8/19 and US of abdomen 5/27/19 & endoscopy history (see surgical history).     Objective:      Physical Exam  Constitutional:       General: She is not in acute distress.     Appearance: She is well-developed.   HENT:      Head: Normocephalic.      Right Ear: Hearing normal.      Left Ear: Hearing normal.      Nose: Nose normal.      Mouth/Throat:      Comments: Pt wearing mask due to COVID concerns  Eyes:      General: Lids are normal.      Conjunctiva/sclera: Conjunctivae normal.      Pupils: Pupils are equal, round, and reactive to light.   Neck:      Musculoskeletal: Normal range of motion.      Trachea: Trachea normal.   Cardiovascular:      Rate and Rhythm: Normal rate and regular rhythm.      Heart sounds: Normal heart sounds. No murmur.   Pulmonary:      Effort: Pulmonary effort is normal. No respiratory distress.      Breath sounds: Normal breath sounds. No stridor. No wheezing.   Abdominal:      General: Bowel sounds are normal. There is no distension.      Palpations: Abdomen is soft. There is no mass.       Tenderness: There is abdominal tenderness (mild) in the right upper quadrant and epigastric area. There is no guarding or rebound.   Musculoskeletal: Normal range of motion.   Skin:     General: Skin is warm and dry.      Findings: No rash.      Comments: Non jaundiced   Neurological:      Mental Status: She is alert and oriented to person, place, and time.   Psychiatric:         Speech: Speech normal.         Behavior: Behavior normal. Behavior is cooperative.           Assessment:       1. Nausea    2. Gastroesophageal reflux disease without esophagitis    3. Epigastric pain    4. Hiatal hernia    5. History of gastritis    6. Chronic constipation    7. S/P cholecystectomy    8. Anxiety           Plan:   All diagnoses and orders for this visit:    Nausea  - FL Upper GI Air Contrast; Future; Expected date: 06/23/2020  - Start: ondansetron (ZOFRAN) 4 MG tablet; Take 1 tablet (4 mg total) by mouth every 8 (eight) hours as needed for Nausea.  Dispense: 30 tablet; Refill: 0  - Start: cimetidine (TAGAMET) 800 MG tablet; Take 1 tablet (800 mg total) by mouth every evening.  Dispense: 30 tablet; Refill: 2  - Discontinue Pepcid  - Continue Protonix 40 mg once daily    Gastroesophageal reflux disease without esophagitis & History of gastritis  - FL Upper GI Air Contrast; Future; Expected date: 06/23/2020  - Start: cimetidine (TAGAMET) 800 MG tablet; Take 1 tablet (800 mg total) by mouth every evening.  Dispense: 30 tablet; Refill: 2  - Discontinue Pepcid  - Continue Protonix 40 mg once daily  - Take PPI in the morning 30 minutes before breakfast  - Recommend to avoid large meals, avoid eating within 3 hours of bedtime, elevate head of bed if nocturnal symptoms are present, smoking cessation (if current smoker), & weight loss (if overweight).   - Recommend minimize/avoid high-fat foods, chocolate, caffeine, citrus, alcohol, & tomato products.  - Advised to avoid/limit use of NSAID's, since they can cause GI upset,  bleeding, and/or ulcers. If needed, take with food.     Epigastric pain  - US Abdomen Complete; Future; Expected date: 06/23/2020  - Start: cimetidine (TAGAMET) 800 MG tablet; Take 1 tablet (800 mg total) by mouth every evening.  Dispense: 30 tablet; Refill: 2  - Discontinue Pepcid  - Continue Protonix 40 mg once daily    Hiatal hernia   - Discussed diagnosis with patient & that it is usually managed by controlling reflux symptoms, surgery is an option, but usually performed if reflux is uncontrolled by medication management and lifestyle/dietary modifications; if symptoms persist despite medication management and lifestyle/dietary modifications, we can refer to general surgery to consult about surgical options, patient verbalized understanding    Chronic constipation   - Recommend daily exercise as tolerated, adequate water intake, and high fiber diet.    - Recommend OTC fiber supplement   - Recommend OTC stool softener    - Recommend OTC MiraLax once daily (17g PO) as directed   - Consider colonoscopy if symptoms persist    S/P cholecystectomy    Anxiety   - Recommend follow-up with PCP for continued evaluation and management    If no improvement in symptoms or symptoms worsen, call/follow-up at clinic or go to ER

## 2020-06-23 NOTE — PATIENT INSTRUCTIONS
Epigastric Pain (Uncertain Cause)     Epigastric pain can be a sign of disease in the upper abdomen. Common causes include:  · Acid reflux (stomach acid flowing up into the esophagus)  · Gastritis (irritation of the stomach lining)  · Peptic Ulcer Disease  · Inflammation of the pancreas  · Gallstone  · Infection in the gallbladder  Pain may be dull or burning. It may spread upward to the chest or to the back. There may be other symptoms such as belching, bloating, cramps or hunger pains. There may be weight loss or poor appetite, nausea or vomiting.  Since the diagnosis of your pain is not certain yet, further tests may sometimes be needed. Sometimes the doctor will treat you for the most likely condition to see if there is improvement before doing further tests.  Home care  Medicines  · Antacids help neutralize the normal acids in your stomach. Examples are Maalox, Mylanta, Rolaids, and Tums. If you dont like the liquid, you can also try a chewable one. You may find one works better than another for you. Overuse can cause diarrhea or constipation.  · Acid blockers (H2 blockers) decrease acid production. Examples are cimetidine (Tagamet), famotidine (Pepcid) and ranitidine (Zantac).  · Acid inhibitors (PPIs) decrease acid production in a different way than the blockers. You may find they work better, but can take a little longer to take effect.  Examples are omeprazole (Prilosec), lansoprazole (Prevacid), pantoprazole (Protonix), rabeprazole (Aciphex), and esomeprazole (Nexium).  · Take an antacid 30-60 minutes after eating and at bedtime, but not at the same time as an acid blocker.  · Try not to take NSAIDs. Aspirin may also cause problems, but if taking it for your heart or other medical reasons, talk to your doctor before stopping it; you do not want to cause a worse problem, like a heart attack or stroke.  Diet  · If certain foods seem to cause your spasm, try to avoid them.   · Eat slowly and chew food well  before swallowing. Symptoms of gastritis can be worsened by certain foods. Limit or avoid fatty, fried, and spicy foods, as well as coffee, chocolate, mint, and foods with high acid content such as tomatoes and citrus fruit and juices (orange, grapefruit, lemon).  · Avoid alcohol, caffeine, and tobacco, which can delay healing and worsen your problem.  · Try eating smaller meals with snacks in between  Follow-up care  Follow up with your healthcare provider or as advised.  When to seek medical advice  Call your healthcare provider right away if any of the following occur:  · Stomach pain worsens or moves to the right lower part of the abdomen  · Chest pain appears, or if it worsens or spreads to the chest, back, neck, shoulder, or arm  · Frequent vomiting (cant keep down liquids)  · Blood in the stool or vomit (red or black color)  · Feeling weak or dizzy, fainting, or having trouble breathing  · Fever of 100.4ºF (38ºC) or higher, or as directed by your healthcare provider  · Abdominal swelling  Date Last Reviewed: 9/25/2015  © 1181-3013 [employer]. [address] [place] [id number]. All rights reserved. This information is not intended as a substitute for professional medical care. Always follow your healthcare professional's instructions.

## 2020-06-25 ENCOUNTER — PATIENT MESSAGE (OUTPATIENT)
Dept: GASTROENTEROLOGY | Facility: CLINIC | Age: 70
End: 2020-06-25

## 2020-06-26 ENCOUNTER — HOSPITAL ENCOUNTER (OUTPATIENT)
Dept: RADIOLOGY | Facility: HOSPITAL | Age: 70
Discharge: HOME | End: 2020-06-26
Attending: NURSE PRACTITIONER
Payer: MEDICARE

## 2020-06-26 DIAGNOSIS — R11.0 NAUSEA: ICD-10-CM

## 2020-06-26 DIAGNOSIS — K21.9 GASTROESOPHAGEAL REFLUX DISEASE WITHOUT ESOPHAGITIS: ICD-10-CM

## 2020-06-26 DIAGNOSIS — R10.13 EPIGASTRIC PAIN: ICD-10-CM

## 2020-06-26 PROCEDURE — A9698 NON-RAD CONTRAST MATERIALNOC: HCPCS | Mod: PO | Performed by: NURSE PRACTITIONER

## 2020-06-26 PROCEDURE — 74246 X-RAY XM UPR GI TRC 2CNTRST: CPT | Mod: 26,,, | Performed by: RADIOLOGY

## 2020-06-26 PROCEDURE — 25500020 PHARM REV CODE 255: Mod: PO | Performed by: NURSE PRACTITIONER

## 2020-06-26 PROCEDURE — 74246 FL UPPER GI AIR CONTRAST: ICD-10-PCS | Mod: 26,,, | Performed by: RADIOLOGY

## 2020-06-26 PROCEDURE — 74246 X-RAY XM UPR GI TRC 2CNTRST: CPT | Mod: TC,FY,PO

## 2020-06-26 PROCEDURE — 76700 US EXAM ABDOM COMPLETE: CPT | Mod: TC,PO

## 2020-06-26 PROCEDURE — 76700 US EXAM ABDOM COMPLETE: CPT | Mod: 26,,, | Performed by: RADIOLOGY

## 2020-06-26 PROCEDURE — 76700 US ABDOMEN COMPLETE: ICD-10-PCS | Mod: 26,,, | Performed by: RADIOLOGY

## 2020-06-26 RX ADMIN — Medication: at 10:06

## 2020-06-26 RX ADMIN — BARIUM SULFATE 355 ML: 0.6 SUSPENSION ORAL at 10:06

## 2020-07-02 ENCOUNTER — PATIENT MESSAGE (OUTPATIENT)
Dept: FAMILY MEDICINE | Facility: CLINIC | Age: 70
End: 2020-07-02

## 2020-07-02 DIAGNOSIS — R10.13 DYSPEPSIA: Primary | ICD-10-CM

## 2020-07-03 ENCOUNTER — PATIENT MESSAGE (OUTPATIENT)
Dept: GASTROENTEROLOGY | Facility: CLINIC | Age: 70
End: 2020-07-03

## 2020-07-05 ENCOUNTER — PATIENT MESSAGE (OUTPATIENT)
Dept: GASTROENTEROLOGY | Facility: CLINIC | Age: 70
End: 2020-07-05

## 2020-07-21 ENCOUNTER — OFFICE VISIT (OUTPATIENT)
Dept: FAMILY MEDICINE | Facility: CLINIC | Age: 70
End: 2020-07-21
Payer: MEDICARE

## 2020-07-21 VITALS
WEIGHT: 184.94 LBS | OXYGEN SATURATION: 98 % | HEART RATE: 96 BPM | BODY MASS INDEX: 32.77 KG/M2 | DIASTOLIC BLOOD PRESSURE: 78 MMHG | SYSTOLIC BLOOD PRESSURE: 124 MMHG | TEMPERATURE: 98 F

## 2020-07-21 DIAGNOSIS — F41.8 DEPRESSION WITH ANXIETY: Primary | ICD-10-CM

## 2020-07-21 DIAGNOSIS — F41.9 ANXIETY: ICD-10-CM

## 2020-07-21 DIAGNOSIS — I10 ESSENTIAL HYPERTENSION: ICD-10-CM

## 2020-07-21 PROCEDURE — 99999 PR PBB SHADOW E&M-EST. PATIENT-LVL IV: ICD-10-PCS | Mod: PBBFAC,,, | Performed by: FAMILY MEDICINE

## 2020-07-21 PROCEDURE — 99213 PR OFFICE/OUTPT VISIT, EST, LEVL III, 20-29 MIN: ICD-10-PCS | Mod: S$PBB,,, | Performed by: FAMILY MEDICINE

## 2020-07-21 PROCEDURE — 99213 OFFICE O/P EST LOW 20 MIN: CPT | Mod: S$PBB,,, | Performed by: FAMILY MEDICINE

## 2020-07-21 PROCEDURE — 99214 OFFICE O/P EST MOD 30 MIN: CPT | Mod: PBBFAC,PO | Performed by: FAMILY MEDICINE

## 2020-07-21 PROCEDURE — 99999 PR PBB SHADOW E&M-EST. PATIENT-LVL IV: CPT | Mod: PBBFAC,,, | Performed by: FAMILY MEDICINE

## 2020-07-21 RX ORDER — VENLAFAXINE HYDROCHLORIDE 37.5 MG/1
37.5 CAPSULE, EXTENDED RELEASE ORAL DAILY
Qty: 30 CAPSULE | Refills: 11 | Status: SHIPPED | OUTPATIENT
Start: 2020-07-21 | End: 2020-11-11 | Stop reason: SDUPTHER

## 2020-07-21 RX ORDER — ASCORBIC ACID 250 MG
500 TABLET ORAL DAILY
COMMUNITY

## 2020-07-21 RX ORDER — CHLORTHALIDONE 25 MG/1
TABLET ORAL
Qty: 90 TABLET | Refills: 3 | Status: SHIPPED | OUTPATIENT
Start: 2020-07-21 | End: 2020-11-11 | Stop reason: SDUPTHER

## 2020-07-21 RX ORDER — ALUMINUM HYDROXIDE, MAGNESIUM HYDROXIDE, AND SIMETHICONE 2400; 240; 2400 MG/30ML; MG/30ML; MG/30ML
10 SUSPENSION ORAL EVERY 6 HOURS PRN
COMMUNITY
End: 2020-12-29

## 2020-07-21 RX ORDER — ACETAMINOPHEN 500 MG
500 TABLET ORAL EVERY 6 HOURS PRN
COMMUNITY

## 2020-07-21 NOTE — PROGRESS NOTES
Subjective:       Patient ID: Lucinda Aguilera is a 69 y.o. female.    Chief Complaint: Follow-up (Essential hypertension)    HPI Comments: Here for f/u on chronic health issues    We stopped Paxil and began Effexor 1 month ago  Depression better, but have some dizziness which is intolerable.  Depression is better.  She is getting out more.  She is now going around her grandkids.    HLD - tolerating pravachol 40mg daily  Anxiety - She is taking Paxil 20mg daily. Taking Xanax 0.5mg 1.5 tablet BID. She feels like this is well-controlled  HTN - tolerating Avapro 300mg daily and Hygroten 25mg daily and amlodipine; BP controlled; digital flowsheet reviewed  GERD - tolerating Protonix 40mg daily and zantac 300mg QHS  HLD - tolerating pravastatin 40mg  S/p laprascopic conner - doing well; appetite normal; BM2 normal      Past Medical History:    Hypertension                                                  Anxiety                                           Hyperlipemia                                                  GERD (gastroesophageal reflux disease)                        Cataract                                                        Comment:OU    PVD (posterior vitreous detachment)                             Comment:OD    Arthritis                                                       Comment:right thumb    Past Surgical History:    chest abcess                                                     Comment:child    KNEE ARTHROSCOPY W/ LASER                                        Comment:right    COLONOSCOPY                                      1/2012          Comment:repeat in 5 years    No Known Allergies    Social History    Marital Status:              Spouse Name:                       Years of Education:                 Number of children: 2             Occupational History  Occupation          Employer            Comment               retired                                   retired teacher an*                          Social History Main Topics    Smoking Status: Never Smoker                      Smokeless Status: Never Used                        Alcohol Use: Yes                Comment: social    Drug Use: No              Sexual Activity: Yes               Partners with: Male       Birth Control/Protection: None, Post-menopausal    Current Outpatient Medications on File Prior to Visit   Medication Sig Dispense Refill    acetaminophen (TYLENOL) 500 MG tablet Take 500 mg by mouth every 6 (six) hours as needed for Pain.      ALPRAZolam (XANAX) 0.5 MG tablet Take 1.5 tablets (0.75 mg total) by mouth 2 (two) times daily as needed for Anxiety. 90 tablet 5    aluminum & magnesium hydroxide-simethicone (MYLANTA MAXIMUM STRENGTH) 400-400-40 mg/5 mL suspension Take 10 mLs by mouth every 6 (six) hours as needed for Indigestion.      ascorbic acid, vitamin C, (VITAMIN C) 250 MG tablet Take 250 mg by mouth once daily.      aspirin (ECOTRIN) 81 MG EC tablet Take 81 mg by mouth once daily.      carboxymethylcellulose (REFRESH PLUS) 0.5 % Dpet 1 drop 3 (three) times daily as needed.      cimetidine (TAGAMET) 800 MG tablet Take 1 tablet (800 mg total) by mouth every evening. 30 tablet 2    fish oil-omega-3 fatty acids 300-1,000 mg capsule Take 2 g by mouth once daily.      fluticasone propionate (FLONASE) 50 mcg/actuation nasal spray 1 spray (50 mcg total) by Each Nostril route once daily. 16 g 0    irbesartan (AVAPRO) 300 MG tablet Take 1 tablet (300 mg total) by mouth every evening. 90 tablet 3    meloxicam (MOBIC) 15 MG tablet Take 1 tablet (15 mg total) by mouth once daily. 30 tablet 11    multivitamin (MULTIVITAMIN) per tablet Take 1 tablet by mouth once daily.        niacin 500 MG CpSR Take 500 mg by mouth every evening.        pantoprazole (PROTONIX) 40 MG tablet TAKE 1 TABLET(40 MG) BY MOUTH BEFORE BREAKFAST 90 tablet 3    potassium chloride (MICRO-K) 10 MEQ CpSR TAKE 2 CAPSULES BY MOUTH DAILY 180 capsule 1     pramoxine-hydrocortisone (PROCTOCREAM-HC) 1-1 % rectal cream Place rectally 2 (two) times daily. 28.4 g 2    pravastatin (PRAVACHOL) 40 MG tablet TAKE 1 TABLET(40 MG) BY MOUTH EVERY DAY 90 tablet 1    [DISCONTINUED] chlorthalidone (HYGROTEN) 25 MG Tab TAKE 1 TABLET BY MOUTH EVERY MORNING, DISCONTINUE HYDROCHLORTHIAZIDE 90 tablet 0    [DISCONTINUED] venlafaxine (EFFEXOR-XR) 75 MG 24 hr capsule Take 1 capsule (75 mg total) by mouth once daily. 30 capsule 11    Al hyd-Mg tr-alg ac-sod bicarb (GAVISCON) 80-14.2 mg Chew Take by mouth.      phenylephrine-shark liver oil-mineral oil-petrolatum (PREPARATION H) rectal ointment Place rectally 2 (two) times daily as needed for Hemorrhoids.       No current facility-administered medications on file prior to visit.      Review of patient's family history indicates:    Hypertension                   Mother                    Heart disease                  Mother                    Stroke                         Father                    Review of Systems   Constitutional: Negative for fever, chills, appetite change and unexpected weight change.   HENT: Negative for sore throat and trouble swallowing.    Eyes: Negative for pain and visual disturbance.   Respiratory: Negative for cough, shortness of breath and wheezing.    Cardiovascular: Negative for chest pain and palpitations.   Gastrointestinal: Negative for nausea, vomiting, abdominal pain, diarrhea and blood in stool.   Genitourinary: Negative for dysuria, hematuria and difficulty urinating.   Musculoskeletal: Negative for arthralgias, gait problem and neck pain.   Skin: Negative for rash and wound.   Neurological: Negative for dizziness, weakness, numbness and headaches.   Hematological: Negative for adenopathy.   Psychiatric/Behavioral: Negative for dysphoric mood.         Objective:       /78 (BP Location: Right arm, Patient Position: Sitting)   Pulse 96   Temp 98.2 °F (36.8 °C) (Oral)   Wt 83.9 kg (184  lb 15.5 oz)   LMP 04/18/2004   SpO2 98%   BMI 32.77 kg/m²     Physical Exam   Constitutional: She is oriented to person, place, and time. She appears well-developed and well-nourished.   HENT:   Head: Normocephalic.   Mouth/Throat: Oropharynx is clear and moist. No oropharyngeal exudate or posterior oropharyngeal erythema.   Eyes: Conjunctivae and EOM are normal. Pupils are equal, round, and reactive to light.   Neck: Normal range of motion. Neck supple. No thyromegaly present.   Cardiovascular: Normal rate, regular rhythm, S1 normal, S2 normal, normal heart sounds and intact distal pulses.  Exam reveals no gallop and no friction rub.    No murmur heard.  Pulmonary/Chest: Effort normal and breath sounds normal. She has no wheezes. She has no rales.   Abdominal: Normal appearance.   Musculoskeletal:        Right lower leg: She exhibits no edema.        Left lower leg: She exhibits no edema.   Lymphadenopathy:     She has no cervical adenopathy.   Neurological: She is alert and oriented to person, place, and time. No cranial nerve deficit. Gait normal.   Skin: Skin is warm and intact. No rash noted.   Psychiatric: She has a normal mood and affect.         Results for orders placed or performed in visit on 12/23/19   Comprehensive metabolic panel   Result Value Ref Range    Sodium 134 (L) 136 - 145 mmol/L    Potassium 3.9 3.5 - 5.1 mmol/L    Chloride 101 95 - 110 mmol/L    CO2 24 23 - 29 mmol/L    Glucose 99 70 - 110 mg/dL    BUN, Bld 13 8 - 23 mg/dL    Creatinine 0.8 0.5 - 1.4 mg/dL    Calcium 9.5 8.7 - 10.5 mg/dL    Total Protein 7.6 6.0 - 8.4 g/dL    Albumin 4.0 3.5 - 5.2 g/dL    Total Bilirubin 0.5 0.1 - 1.0 mg/dL    Alkaline Phosphatase 104 55 - 135 U/L    AST 26 10 - 40 U/L    ALT 25 10 - 44 U/L    Anion Gap 9 8 - 16 mmol/L    eGFR if African American >60.0 >60 mL/min/1.73 m^2    eGFR if non African American >60.0 >60 mL/min/1.73 m^2   Lipid panel   Result Value Ref Range    Cholesterol 205 (H) 120 - 199 mg/dL     Triglycerides 172 (H) 30 - 150 mg/dL    HDL 56 40 - 75 mg/dL    LDL Cholesterol 114.6 63.0 - 159.0 mg/dL    Hdl/Cholesterol Ratio 27.3 20.0 - 50.0 %    Total Cholesterol/HDL Ratio 3.7 2.0 - 5.0    Non-HDL Cholesterol 149 mg/dL       Assessment:       1. Depression with anxiety    2. Essential hypertension    3. Anxiety        Plan:       Depression with anxiety    Essential hypertension  -     chlorthalidone (HYGROTEN) 25 MG Tab; TAKE 1 TABLET BY MOUTH EVERY MORNING  Dispense: 90 tablet; Refill: 3    Anxiety  -     venlafaxine (EFFEXOR-XR) 37.5 MG 24 hr capsule; Take 1 capsule (37.5 mg total) by mouth once daily.  Dispense: 30 capsule; Refill: 11          Reduce to Effexor XR 37.5 mg daily  Xanax 1.5 BID PRN  cotninue aspirin 81mg daily  Continue other present meds  Counseled on regular exercise, maintenance of a healthy weight, balanced diet rich in fruits/vegetables and lean protein, and avoidance of unhealthy habits like smoking and excessive alcohol intake.  F/u November with labs as planned

## 2020-08-01 DIAGNOSIS — I10 ESSENTIAL HYPERTENSION: ICD-10-CM

## 2020-08-01 NOTE — TELEPHONE ENCOUNTER
No new care gaps identified.  Powered by [employer]. Reference number: [id number]. 8/01/2020 3:17:29 PM CDT

## 2020-08-04 RX ORDER — POTASSIUM CHLORIDE 750 MG/1
CAPSULE, EXTENDED RELEASE ORAL
Qty: 180 CAPSULE | Refills: 0 | Status: SHIPPED | OUTPATIENT
Start: 2020-08-04 | End: 2020-11-12 | Stop reason: SDUPTHER

## 2020-08-04 NOTE — PROGRESS NOTES
Refill Authorization Note    is requesting a refill authorization.    Brief assessment and rationale for refill: APPROVE: prr          Medication Therapy Plan: FLOS; Approve 3 more    Medication reconciliation completed: No                         Comments:          Requested Prescriptions   Pending Prescriptions Disp Refills    potassium chloride (MICRO-K) 10 MEQ CpSR [Pharmacy Med Name: POTASSIUM CL 10MEQ ER CAPSULES] 180 capsule 0     Sig: TAKE 2 CAPSULES BY MOUTH DAILY       Endocrinology:  Minerals - Potassium Supplementation Failed - 8/1/2020  3:16 PM        Failed - K in normal range and within 180 days     Potassium   Date Value Ref Range Status   12/23/2019 3.9 3.5 - 5.1 mmol/L Final   06/24/2019 4.3 3.5 - 5.1 mmol/L Final   06/24/2019 4.3 3.5 - 5.1 mmol/L Final              Failed - Cr is 1.3 or below and within 180 days     Creatinine   Date Value Ref Range Status   12/23/2019 0.8 0.5 - 1.4 mg/dL Final   06/24/2019 0.8 0.5 - 1.4 mg/dL Final   06/24/2019 0.8 0.5 - 1.4 mg/dL Final              Failed - eGFR within 180 days     eGFR if non    Date Value Ref Range Status   12/23/2019 >60.0 >60 mL/min/1.73 m^2 Final     Comment:     Calculation used to obtain the estimated glomerular filtration  rate (eGFR) is the CKD-EPI equation.      06/24/2019 >60.0 >60 mL/min/1.73 m^2 Final     Comment:     Calculation used to obtain the estimated glomerular filtration  rate (eGFR) is the CKD-EPI equation.      06/24/2019 >60.0 >60 mL/min/1.73 m^2 Final     Comment:     Calculation used to obtain the estimated glomerular filtration  rate (eGFR) is the CKD-EPI equation.        eGFR if    Date Value Ref Range Status   12/23/2019 >60.0 >60 mL/min/1.73 m^2 Final   06/24/2019 >60.0 >60 mL/min/1.73 m^2 Final   06/24/2019 >60.0 >60 mL/min/1.73 m^2 Final              Passed - Patient is at least 18 years old        Passed - Office visit in past 12 months or future 90 days.     Recent  Outpatient Visits            2 weeks ago Depression with anxiety    [facility] Saurabh Hassan MD    1 month ago Nausea    [facility] Jamee Allen, NP    1 month ago Essential hypertension    [facility] Saurabh Hassan MD    2 months ago Frontal headache    [facility] Sharda Engel, JANUARY    2 months ago Essential hypertension    [facility] Saurabh Hassan MD          Future Appointments              In 3 months LAB, [place] [facility] [place]    In 3 months Saurabh Hassan MD [facility]                    Appointments  past 12m or future 3m with PCP    Date Provider   Last Visit   7/21/2020 Saurabh Hassan MD   Next Visit   11/11/2020 Saurabh Hassan MD   ED visits in past 90 days: 0     Note composed:12:13 PM 08/04/2020

## 2020-08-06 ENCOUNTER — PATIENT OUTREACH (OUTPATIENT)
Dept: OTHER | Facility: OTHER | Age: 70
End: 2020-08-06

## 2020-08-06 NOTE — PROGRESS NOTES
Digital Medicine: Clinician Follow-Up    Patient was seen by PCP on 7/21/20, BP was 124/78.    Patient c/o anxiety, depression, confirms she is being treated by PCP.    The history is provided by the patient.   Follow-up reason(s): routine follow up.     Hypertension    Patient readings are stable   Patient is not experiencing signs/symptoms of hypotension.  Patient is not experiencing signs/symptoms of hypertension.        Last 5 Patient Entered Readings                                      Current 30 Day Average: 119/85     Recent Readings 7/24/2020 7/15/2020 7/15/2020 7/3/2020 6/23/2020    SBP (mmHg) 124 113 114 132 113    DBP (mmHg) 78 84 92 84 80    Pulse 90 88 85 90 -          Depression Screening  Did not address depression screening.    Sleep Apnea Screening    Did not address sleep apnea screening.     Medication Affordability Screening  Did not address medication affordability screening.     Medication Adherence-Medication Adherence not addressed.        ASSESSMENT(S)  Patients BP average is 119/85 mmHg, which is above goal. Patient's BP goal is less than or equal to 130/80 per 2017 ACC/AHA Hypertension Guidelines.   However, BP average is close to goal.    Hypertension Plan  Additional monitoring needed. Encouraged patient to increase frequency of monitoring to at least weekly.  Continue current therapy.  Instructed patient to call if BP remains > 130/80 or if she begins to experience s/s of hypotension.          Addressed any questions or concerns and patient has my contact information if needed prior to next outreach. Patient verbalizes understanding.          Hypertension Medications             chlorthalidone (HYGROTEN) 25 MG Tab TAKE 1 TABLET BY MOUTH EVERY MORNING    irbesartan (AVAPRO) 300 MG tablet Take 1 tablet (300 mg total) by mouth every evening.

## 2020-08-19 ENCOUNTER — HOSPITAL ENCOUNTER (OUTPATIENT)
Dept: RADIOLOGY | Facility: HOSPITAL | Age: 70
Discharge: HOME | End: 2020-08-19
Attending: OBSTETRICS & GYNECOLOGY
Payer: MEDICARE

## 2020-08-19 DIAGNOSIS — R92.8 ABNORMAL MAMMOGRAM: ICD-10-CM

## 2020-08-19 PROCEDURE — 77062 BREAST TOMOSYNTHESIS BI: CPT | Mod: TC

## 2020-08-19 PROCEDURE — 77066 DX MAMMO INCL CAD BI: CPT | Mod: 26,,, | Performed by: RADIOLOGY

## 2020-08-19 PROCEDURE — 76642 ULTRASOUND BREAST LIMITED: CPT | Mod: TC,LT

## 2020-08-19 PROCEDURE — 77062 MAMMO DIGITAL DIAGNOSTIC BILAT WITH TOMOSYNTHESIS_CAD: ICD-10-PCS | Mod: 26,,, | Performed by: RADIOLOGY

## 2020-08-19 PROCEDURE — 77066 MAMMO DIGITAL DIAGNOSTIC BILAT WITH TOMOSYNTHESIS_CAD: ICD-10-PCS | Mod: 26,,, | Performed by: RADIOLOGY

## 2020-08-19 PROCEDURE — 76642 ULTRASOUND BREAST LIMITED: CPT | Mod: 26,LT,, | Performed by: RADIOLOGY

## 2020-08-19 PROCEDURE — 77062 BREAST TOMOSYNTHESIS BI: CPT | Mod: 26,,, | Performed by: RADIOLOGY

## 2020-08-19 PROCEDURE — 76642 US BREAST LEFT LIMITED: ICD-10-PCS | Mod: 26,LT,, | Performed by: RADIOLOGY

## 2020-08-20 ENCOUNTER — TELEPHONE (OUTPATIENT)
Dept: RADIOLOGY | Facility: HOSPITAL | Age: 70
End: 2020-08-20

## 2020-08-28 ENCOUNTER — HOSPITAL ENCOUNTER (OUTPATIENT)
Dept: RADIOLOGY | Facility: HOSPITAL | Age: 70
Discharge: HOME | End: 2020-08-28
Attending: OBSTETRICS & GYNECOLOGY
Payer: MEDICARE

## 2020-08-28 DIAGNOSIS — R92.8 ABNORMAL MAMMOGRAM: ICD-10-CM

## 2020-08-28 PROCEDURE — 19081 BX BREAST 1ST LESION STRTCTC: CPT | Mod: LT,,, | Performed by: RADIOLOGY

## 2020-08-28 PROCEDURE — 19081 MAMMO BREAST STEREOTACTIC BREAST BIOPSY LEFT: ICD-10-PCS | Mod: LT,,, | Performed by: RADIOLOGY

## 2020-08-28 PROCEDURE — 88342 CHG IMMUNOCYTOCHEMISTRY: ICD-10-PCS | Mod: 26,,, | Performed by: PATHOLOGY

## 2020-08-28 PROCEDURE — 88342 IMHCHEM/IMCYTCHM 1ST ANTB: CPT | Mod: 26,,, | Performed by: PATHOLOGY

## 2020-08-28 PROCEDURE — 77065 DX MAMMO INCL CAD UNI: CPT | Mod: 26,LT,, | Performed by: RADIOLOGY

## 2020-08-28 PROCEDURE — 88341 IMHCHEM/IMCYTCHM EA ADD ANTB: CPT | Mod: 26,,, | Performed by: PATHOLOGY

## 2020-08-28 PROCEDURE — 88305 TISSUE EXAM BY PATHOLOGIST: ICD-10-PCS | Mod: 26,,, | Performed by: PATHOLOGY

## 2020-08-28 PROCEDURE — 88341 PR IHC OR ICC EACH ADD'L SINGLE ANTIBODY  STAINPR: ICD-10-PCS | Mod: 26,,, | Performed by: PATHOLOGY

## 2020-08-28 PROCEDURE — 25000003 PHARM REV CODE 250: Performed by: OBSTETRICS & GYNECOLOGY

## 2020-08-28 PROCEDURE — 77065 MAMMO DIGITAL DIAGNOSTIC LEFT WITH CAD: ICD-10-PCS | Mod: 26,LT,, | Performed by: RADIOLOGY

## 2020-08-28 PROCEDURE — 88342 IMHCHEM/IMCYTCHM 1ST ANTB: CPT | Performed by: PATHOLOGY

## 2020-08-28 PROCEDURE — 88305 TISSUE EXAM BY PATHOLOGIST: CPT | Performed by: PATHOLOGY

## 2020-08-28 PROCEDURE — 27200939 MAMMO BREAST STEREOTACTIC BREAST BIOPSY LEFT

## 2020-08-28 PROCEDURE — 88341 IMHCHEM/IMCYTCHM EA ADD ANTB: CPT | Performed by: PATHOLOGY

## 2020-08-28 PROCEDURE — 88305 TISSUE EXAM BY PATHOLOGIST: CPT | Mod: 26,,, | Performed by: PATHOLOGY

## 2020-08-28 RX ORDER — LIDOCAINE HYDROCHLORIDE 10 MG/ML
3 INJECTION, SOLUTION EPIDURAL; INFILTRATION; INTRACAUDAL; PERINEURAL ONCE
Status: COMPLETED | OUTPATIENT
Start: 2020-08-28 | End: 2020-08-28

## 2020-08-28 RX ORDER — LIDOCAINE HYDROCHLORIDE AND EPINEPHRINE 20; 10 MG/ML; UG/ML
20 INJECTION, SOLUTION INFILTRATION; PERINEURAL ONCE
Status: COMPLETED | OUTPATIENT
Start: 2020-08-28 | End: 2020-08-28

## 2020-08-28 RX ADMIN — LIDOCAINE HYDROCHLORIDE 3 ML: 10 INJECTION, SOLUTION EPIDURAL; INFILTRATION; INTRACAUDAL; PERINEURAL at 11:08

## 2020-08-28 RX ADMIN — LIDOCAINE HYDROCHLORIDE,EPINEPHRINE BITARTRATE 20 ML: 20; .01 INJECTION, SOLUTION INFILTRATION; PERINEURAL at 11:08

## 2020-09-02 LAB
COMMENT: NORMAL
FINAL PATHOLOGIC DIAGNOSIS: NORMAL
GROSS: NORMAL
Lab: NORMAL

## 2020-09-04 ENCOUNTER — TELEPHONE (OUTPATIENT)
Dept: SURGERY | Facility: CLINIC | Age: 70
End: 2020-09-04

## 2020-09-04 NOTE — TELEPHONE ENCOUNTER
Dr. Elias brian patient her results from her breast biopsy from 8/28/2020. Patient is already scheduled on 9/16/2020 to see Dr. Ashford on the [facility].

## 2020-09-16 ENCOUNTER — OFFICE VISIT (OUTPATIENT)
Dept: HEMATOLOGY/ONCOLOGY | Facility: CLINIC | Age: 70
End: 2020-09-16
Payer: MEDICARE

## 2020-09-16 ENCOUNTER — TELEPHONE (OUTPATIENT)
Dept: SURGERY | Facility: CLINIC | Age: 70
End: 2020-09-16

## 2020-09-16 ENCOUNTER — PATIENT MESSAGE (OUTPATIENT)
Dept: HEMATOLOGY/ONCOLOGY | Facility: CLINIC | Age: 70
End: 2020-09-16

## 2020-09-16 ENCOUNTER — LAB VISIT (OUTPATIENT)
Dept: LAB | Facility: HOSPITAL | Age: 70
End: 2020-09-16
Attending: SURGERY
Payer: MEDICARE

## 2020-09-16 VITALS
HEIGHT: 63 IN | BODY MASS INDEX: 32.77 KG/M2 | WEIGHT: 184.94 LBS | DIASTOLIC BLOOD PRESSURE: 85 MMHG | SYSTOLIC BLOOD PRESSURE: 138 MMHG | HEART RATE: 85 BPM

## 2020-09-16 DIAGNOSIS — D24.2 PAPILLOMA OF LEFT BREAST: Primary | ICD-10-CM

## 2020-09-16 DIAGNOSIS — Z01.818 PREOP TESTING: ICD-10-CM

## 2020-09-16 LAB
ALBUMIN SERPL BCP-MCNC: 4.6 G/DL (ref 3.5–5.2)
ALP SERPL-CCNC: 131 U/L (ref 38–145)
ALT SERPL W/O P-5'-P-CCNC: 47 U/L (ref 0–35)
ANION GAP SERPL CALC-SCNC: 5 MMOL/L (ref 8–16)
AST SERPL-CCNC: 64 U/L (ref 14–36)
BASOPHILS # BLD AUTO: 0.06 K/UL (ref 0–0.2)
BASOPHILS NFR BLD: 0.7 % (ref 0–1.9)
BILIRUB SERPL-MCNC: 0.5 MG/DL (ref 0.2–1.3)
BUN SERPL-MCNC: 19 MG/DL (ref 7–18)
CALCIUM SERPL-MCNC: 9.9 MG/DL (ref 8.4–10.2)
CHLORIDE SERPL-SCNC: 100 MMOL/L (ref 95–110)
CO2 SERPL-SCNC: 29 MMOL/L (ref 22–31)
CREAT SERPL-MCNC: 0.74 MG/DL (ref 0.5–1.4)
DIFFERENTIAL METHOD: ABNORMAL
EOSINOPHIL # BLD AUTO: 0.2 K/UL (ref 0–0.5)
EOSINOPHIL NFR BLD: 2.3 % (ref 0–8)
ERYTHROCYTE [DISTWIDTH] IN BLOOD BY AUTOMATED COUNT: 13.2 % (ref 11.5–14.5)
EST. GFR  (AFRICAN AMERICAN): >60 ML/MIN/1.73 M^2
EST. GFR  (NON AFRICAN AMERICAN): >60 ML/MIN/1.73 M^2
GLUCOSE SERPL-MCNC: 98 MG/DL (ref 70–110)
HCT VFR BLD AUTO: 38.6 % (ref 37–48.5)
HGB BLD-MCNC: 13 G/DL (ref 12–16)
IMM GRANULOCYTES # BLD AUTO: 0.02 K/UL (ref 0–0.04)
IMM GRANULOCYTES NFR BLD AUTO: 0.2 % (ref 0–0.5)
LYMPHOCYTES # BLD AUTO: 3.8 K/UL (ref 1–4.8)
LYMPHOCYTES NFR BLD: 42.4 % (ref 18–48)
MCH RBC QN AUTO: 29.5 PG (ref 27–31)
MCHC RBC AUTO-ENTMCNC: 33.7 G/DL (ref 32–36)
MCV RBC AUTO: 88 FL (ref 82–98)
MONOCYTES # BLD AUTO: 0.8 K/UL (ref 0.3–1)
MONOCYTES NFR BLD: 8.6 % (ref 4–15)
NEUTROPHILS # BLD AUTO: 4.1 K/UL (ref 1.8–7.7)
NEUTROPHILS NFR BLD: 45.8 % (ref 38–73)
NRBC BLD-RTO: 0 /100 WBC
PLATELET # BLD AUTO: 393 K/UL (ref 150–350)
PMV BLD AUTO: 8.5 FL (ref 9.2–12.9)
POTASSIUM SERPL-SCNC: 4.3 MMOL/L (ref 3.5–5.1)
PROT SERPL-MCNC: 8 G/DL (ref 6–8.4)
RBC # BLD AUTO: 4.4 M/UL (ref 4–5.4)
SODIUM SERPL-SCNC: 134 MMOL/L (ref 136–145)
WBC # BLD AUTO: 9.06 K/UL (ref 3.9–12.7)

## 2020-09-16 PROCEDURE — 80053 COMPREHEN METABOLIC PANEL: CPT | Mod: PN

## 2020-09-16 PROCEDURE — 36415 COLL VENOUS BLD VENIPUNCTURE: CPT | Mod: PN

## 2020-09-16 PROCEDURE — 85025 COMPLETE CBC W/AUTO DIFF WBC: CPT | Mod: PN

## 2020-09-16 PROCEDURE — 99999 PR PBB SHADOW E&M-EST. PATIENT-LVL IV: ICD-10-PCS | Mod: PBBFAC,,, | Performed by: SURGERY

## 2020-09-16 PROCEDURE — 99999 PR PBB SHADOW E&M-EST. PATIENT-LVL IV: CPT | Mod: PBBFAC,,, | Performed by: SURGERY

## 2020-09-16 PROCEDURE — 99213 PR OFFICE/OUTPT VISIT, EST, LEVL III, 20-29 MIN: ICD-10-PCS | Mod: S$PBB,,, | Performed by: SURGERY

## 2020-09-16 PROCEDURE — 80053 COMPREHEN METABOLIC PANEL: CPT

## 2020-09-16 PROCEDURE — 99213 OFFICE O/P EST LOW 20 MIN: CPT | Mod: S$PBB,,, | Performed by: SURGERY

## 2020-09-16 PROCEDURE — 99214 OFFICE O/P EST MOD 30 MIN: CPT | Mod: PBBFAC,PN | Performed by: SURGERY

## 2020-09-16 PROCEDURE — 85025 COMPLETE CBC W/AUTO DIFF WBC: CPT

## 2020-09-16 NOTE — TELEPHONE ENCOUNTER
Spoke to patient explained that this is an appointment and if she needs surgery we will schedule it today, where we are located and that she can bring one person with her.

## 2020-09-16 NOTE — PROGRESS NOTES
History and Physical  [facility]  Department of Surgery    REFERRING PROVIDER: Ritu Griffin Md  [id number] [address] [address]    CHIEF COMPLAINT: atypical papillary lesion of the left breast    Subjective:      Lucinda Aguilera is a 69 y.o. postmenopausal female referred for evaluation of an atypical papillary lesion of the left breast noted on core needle biopsy.  Patient denies left nipple discharge. Diagnostics studies including  Mammogram and/or ultrasound were performed on 2020.  Patient was given BIRADS 4 and a biopsy was scheduled. A stereotactic biopsy was performed on 2020.  Pathology demonstrated an atypical papillary carcinoma.    Patient does routinely do self breast exams.  Patient has not noted a change on breast exam.  Patient denies to previous breast biopsy. Patient denies a personal history of breast cancer.    GYN History:  Age of menarche was 14. Age of menopause was 50s. Patient reports hormonal therapy with estrogen only for about 2 years in the past. Patient is . Age of first live birth was 21. Patient did breast feed x 2-6 weeks..      FAMILY History:  none    Past Medical History:   Diagnosis Date    Anticoagulant long-term use     Anxiety     takes daily Xanax    Arthritis     right thumb    Cataract     OU    Cholelithiasis     GERD (gastroesophageal reflux disease)     Hepatic steatosis     Hyperlipemia     Hypertension     PVD (posterior vitreous detachment)     OD     Past Surgical History:   Procedure Laterality Date    chest abcess      child    CHOLECYSTECTOMY      COLONOSCOPY  2012    Dr. Lopez: hemorrhoids, redundant colon    ESOPHAGOGASTRODUODENOSCOPY N/A 2019    Procedure: EGD (ESOPHAGOGASTRODUODENOSCOPY);  Surgeon: Nathan Lopez Jr., MD;  Location: [facility];  Service: Endoscopy;  Laterality: N/A;    JOINT REPLACEMENT Bilateral     knee    KNEE ARTHROSCOPY W/ LASER      right    KNEE SURGERY Right 2016     Total knee replacement    LAPAROSCOPIC CHOLECYSTECTOMY N/A 6/14/2019    Procedure: CHOLECYSTECTOMY, LAPAROSCOPIC;  Surgeon: Guevara Evans MD;  Location: [facility] OR;  Service: General;  Laterality: N/A;    thumb surgery  11/2014    TOTAL KNEE ARTHROPLASTY Left 6/19/2018    Procedure: REPLACEMENT-KNEE-TOTAL;  Surgeon: Nj Melvin MD;  Location: [facility] OR 2ND FLR;  Service: Orthopedics;  Laterality: Left;  [employer]    UPPER GASTROINTESTINAL ENDOSCOPY  07/13/2017    Dr. Lopez: NERD, Gastric mucosal atrophy. antritis, Scar in the incisura. Biopsied; biopsy: chronic gastritis. negative for h pylori    UPPER GASTROINTESTINAL ENDOSCOPY  06/06/2019    Dr. Lopez: small hiatal hernia, Non-erosive esophageal reflux (NERD) disease?., slight antritis; biopsy: Antral mucosa with chemical/reactive gastropathy. negative for h pylori     Current Outpatient Medications on File Prior to Visit   Medication Sig Dispense Refill    acetaminophen (TYLENOL) 500 MG tablet Take 500 mg by mouth every 6 (six) hours as needed for Pain.      Al hyd-Mg tr-alg ac-sod bicarb (GAVISCON) 80-14.2 mg Chew Take by mouth.      ALPRAZolam (XANAX) 0.5 MG tablet Take 1.5 tablets (0.75 mg total) by mouth 2 (two) times daily as needed for Anxiety. 90 tablet 5    aluminum & magnesium hydroxide-simethicone (MYLANTA MAXIMUM STRENGTH) 400-400-40 mg/5 mL suspension Take 10 mLs by mouth every 6 (six) hours as needed for Indigestion.      ascorbic acid, vitamin C, (VITAMIN C) 250 MG tablet Take 250 mg by mouth once daily.      aspirin (ECOTRIN) 81 MG EC tablet Take 81 mg by mouth once daily.      carboxymethylcellulose (REFRESH PLUS) 0.5 % Dpet 1 drop 3 (three) times daily as needed.      chlorthalidone (HYGROTEN) 25 MG Tab TAKE 1 TABLET BY MOUTH EVERY MORNING 90 tablet 3    cimetidine (TAGAMET) 800 MG tablet Take 1 tablet (800 mg total) by mouth every evening. 30 tablet 2    fish oil-omega-3 fatty acids 300-1,000 mg capsule Take 2 g by mouth once  daily.      fluticasone propionate (FLONASE) 50 mcg/actuation nasal spray 1 spray (50 mcg total) by Each Nostril route once daily. 16 g 0    irbesartan (AVAPRO) 300 MG tablet Take 1 tablet (300 mg total) by mouth every evening. 90 tablet 3    meloxicam (MOBIC) 15 MG tablet Take 1 tablet (15 mg total) by mouth once daily. 30 tablet 11    multivitamin (MULTIVITAMIN) per tablet Take 1 tablet by mouth once daily.        niacin 500 MG CpSR Take 500 mg by mouth every evening.        pantoprazole (PROTONIX) 40 MG tablet TAKE 1 TABLET(40 MG) BY MOUTH BEFORE BREAKFAST 90 tablet 3    phenylephrine-shark liver oil-mineral oil-petrolatum (PREPARATION H) rectal ointment Place rectally 2 (two) times daily as needed for Hemorrhoids.      potassium chloride (MICRO-K) 10 MEQ CpSR TAKE 2 CAPSULES BY MOUTH DAILY 180 capsule 0    pravastatin (PRAVACHOL) 40 MG tablet TAKE 1 TABLET(40 MG) BY MOUTH EVERY DAY 90 tablet 1    venlafaxine (EFFEXOR-XR) 37.5 MG 24 hr capsule Take 1 capsule (37.5 mg total) by mouth once daily. 30 capsule 11    pramoxine-hydrocortisone (PROCTOCREAM-HC) 1-1 % rectal cream Place rectally 2 (two) times daily. 28.4 g 2     No current facility-administered medications on file prior to visit.      Social History     Socioeconomic History    Marital status:      Spouse name: Not on file    Number of children: 2    Years of education: Not on file    Highest education level: Not on file   Occupational History    Occupation: retired teacher and principal   Social Needs    Financial resource strain: Not hard at all    Food insecurity     Worry: Never true     Inability: Never true    Transportation needs     Medical: No     Non-medical: No   Tobacco Use    Smoking status: Never Smoker    Smokeless tobacco: Never Used   Substance and Sexual Activity    Alcohol use: Yes     Frequency: Never     Drinks per session: 1 or 2     Binge frequency: Never     Comment: social- maybe once every few months  "   Drug use: No    Sexual activity: Yes     Partners: Male     Birth control/protection: None, Post-menopausal   Lifestyle    Physical activity     Days per week: 4 days     Minutes per session: 90 min    Stress: Not at all   Relationships    Social connections     Talks on phone: More than three times a week     Gets together: More than three times a week     Attends [employer] service: Not on file     Active member of club or organization: Yes     Attends meetings of clubs or organizations: 1 to 4 times per year     Relationship status:    Other Topics Concern    Not on file   Social History Narrative    Not on file     Family History   Problem Relation Age of Onset    Hypertension Mother     Heart disease Mother     Glaucoma Mother     Stroke Father     Glaucoma Sister     Cataracts Sister     Macular degeneration Sister     Breast cancer Neg Hx     Colon cancer Neg Hx     Crohn's disease Neg Hx     Ulcerative colitis Neg Hx        Review of Systems  Pertinent items are noted in HPI.       Objective:        /85 (BP Location: Right arm, Patient Position: Sitting, BP Method: Medium (Automatic))   Pulse 85   Ht 5' 3" (1.6 m)   Wt 83.9 kg (184 lb 15.5 oz)   LMP 04/18/2004   BMI 32.77 kg/m²     General Appearance:    Alert, cooperative, no distress, appears stated age   Head:    Normocephalic, without obvious abnormality, atraumatic   Eyes:    PERRL, lids normal   Neck:   Supple, symmetrical, no adenopathy   Lungs:     respirations unlabored; no obvious deformity   Chest Wall:    No tenderness or deformity   Heart::   Regular rate and rhythm   Abdomen:     Soft, non-tender, nondistended   Extremities:   Extremities normal, atraumatic   Skin:   Skin color, texture, turgor normal, no rashes or lesions   Lymph nodes:   No Cervical or supraclavicular adenopathy   Neuro/Psych:   Alert and oriented, good judgement   BREAST exam:  Left: no masses, skin changes. No nipple discharge or " inverted nipple.  No axillary LAD  Right: no masses, skin changes. No nipple discharge or inverted nipple.  No axillary LAD      Radiology review: Images personally reviewed by me in the clinic.        Assessment:      Lucinda Aguilera is a 69 y.o. postmenopausal female with an atypical papillary lesion of the left breast     Plan:   We discussed the options for management of this.   I do recommend excision of the area due to the chance of upstaging to an early stage breast cancer such as DCIS or invasive breast cancer.  We did discuss that the chance of upstaging is probably about 30% with an atypical papillary lesion.  Surgery would involve excising a small area of tissue at the site of the biopsy.  This allows us to better evaluate the tissue. This is done using a radar to localize acting as a map for what needs to be removed.  Patient has elected to proceed with left radar localized excisional breast biopsy.     Given atypia, will get MRI prior to surgery.  If neg, proceed with LEFT radar excisional biopsy.  Discussed options if upstaged to cancer briefly.    Surgery will be coordinated with the patient's schedule.  Risks and benefits were explained including but not limited to bleeding, infection, pain, recurrence, and need for further surgery.

## 2020-09-16 NOTE — LETTER
September 16, 2020      Ritu Griffin MD  [address]  [facility] [address]           [facility]  [address]  [facility] [address]  Phone: [phone]  Fax: [phone]          Patient: Lucinda Aguilera   MR Number: [id number]   YOB: 1950   Date of Visit: 9/16/2020       Dear Dr. Ritu Griffin:    Thank you for referring Lucinda Aguilera to me for evaluation. Attached you will find relevant portions of my assessment and plan of care.    If you have questions, please do not hesitate to call me. I look forward to following Lucinda Aguilera along with you.    Sincerely,    Brooklynn Ashford MD    Enclosure  CC:  No Recipients    If you would like to receive this communication electronically, please contact [email or web address] or [phone] to request more information on [employer] Link access.    For providers and/or their staff who would like to refer a patient to [facility], please contact us through our one-stop-shop provider referral line, [facility], at [phone].    If you feel you have received this communication in error or would no longer like to receive these types of communications, please e-mail [email or web address]

## 2020-09-29 ENCOUNTER — PATIENT MESSAGE (OUTPATIENT)
Dept: PODIATRY | Facility: CLINIC | Age: 70
End: 2020-09-29

## 2020-09-29 ENCOUNTER — PATIENT MESSAGE (OUTPATIENT)
Dept: ORTHOPEDICS | Facility: CLINIC | Age: 70
End: 2020-09-29

## 2020-09-29 ENCOUNTER — PATIENT MESSAGE (OUTPATIENT)
Dept: FAMILY MEDICINE | Facility: CLINIC | Age: 70
End: 2020-09-29

## 2020-09-30 ENCOUNTER — PATIENT MESSAGE (OUTPATIENT)
Dept: PODIATRY | Facility: CLINIC | Age: 70
End: 2020-09-30

## 2020-09-30 ENCOUNTER — OFFICE VISIT (OUTPATIENT)
Dept: ORTHOPEDICS | Facility: CLINIC | Age: 70
End: 2020-09-30
Payer: MEDICARE

## 2020-09-30 VITALS — HEIGHT: 63 IN | WEIGHT: 184 LBS | RESPIRATION RATE: 18 BRPM | BODY MASS INDEX: 32.6 KG/M2

## 2020-09-30 DIAGNOSIS — M25.552 BILATERAL HIP PAIN: Primary | ICD-10-CM

## 2020-09-30 DIAGNOSIS — M25.551 BILATERAL HIP PAIN: Primary | ICD-10-CM

## 2020-09-30 DIAGNOSIS — M70.71 BURSITIS OF RIGHT HIP, UNSPECIFIED BURSA: Primary | ICD-10-CM

## 2020-09-30 PROCEDURE — 99214 OFFICE O/P EST MOD 30 MIN: CPT | Mod: PBBFAC,PN | Performed by: ORTHOPAEDIC SURGERY

## 2020-09-30 PROCEDURE — 99999 PR PBB SHADOW E&M-EST. PATIENT-LVL IV: ICD-10-PCS | Mod: PBBFAC,,, | Performed by: ORTHOPAEDIC SURGERY

## 2020-09-30 PROCEDURE — 99213 OFFICE O/P EST LOW 20 MIN: CPT | Mod: S$PBB,,, | Performed by: ORTHOPAEDIC SURGERY

## 2020-09-30 PROCEDURE — 99999 PR PBB SHADOW E&M-EST. PATIENT-LVL IV: CPT | Mod: PBBFAC,,, | Performed by: ORTHOPAEDIC SURGERY

## 2020-09-30 PROCEDURE — 99213 PR OFFICE/OUTPT VISIT, EST, LEVL III, 20-29 MIN: ICD-10-PCS | Mod: S$PBB,,, | Performed by: ORTHOPAEDIC SURGERY

## 2020-09-30 NOTE — PROGRESS NOTES
Past Medical History:   Diagnosis Date    Anticoagulant long-term use     Anxiety     takes daily Xanax    Arthritis     right thumb    Cataract     OU    Cholelithiasis     GERD (gastroesophageal reflux disease)     Hepatic steatosis     Hyperlipemia     Hypertension     PVD (posterior vitreous detachment)     OD       Past Surgical History:   Procedure Laterality Date    chest abcess      child    CHOLECYSTECTOMY      COLONOSCOPY  01/06/2012    Dr. Lopez: hemorrhoids, redundant colon    ESOPHAGOGASTRODUODENOSCOPY N/A 6/6/2019    Procedure: EGD (ESOPHAGOGASTRODUODENOSCOPY);  Surgeon: Nathan Lopez Jr., MD;  [facility];  Service: Endoscopy;  Laterality: N/A;    JOINT REPLACEMENT Bilateral     knee    KNEE ARTHROSCOPY W/ LASER      right    KNEE SURGERY Right 06/03/2016    Total knee replacement    LAPAROSCOPIC CHOLECYSTECTOMY N/A 6/14/2019    Procedure: CHOLECYSTECTOMY, LAPAROSCOPIC;  Surgeon: Guevara Evans MD;  [facility];  Service: General;  Laterality: N/A;    thumb surgery  11/2014    TOTAL KNEE ARTHROPLASTY Left 6/19/2018    Procedure: REPLACEMENT-KNEE-TOTAL;  Surgeon: Nj Melvin MD;  Location: [address];  Service: Orthopedics;  Laterality: Left;  [employer]    UPPER GASTROINTESTINAL ENDOSCOPY  07/13/2017    Dr. Lopez: NERD, Gastric mucosal atrophy. antritis, Scar in the incisura. Biopsied; biopsy: chronic gastritis. negative for h pylori    UPPER GASTROINTESTINAL ENDOSCOPY  06/06/2019    Dr. Lopez: small hiatal hernia, Non-erosive esophageal reflux (NERD) disease?., slight antritis; biopsy: Antral mucosa with chemical/reactive gastropathy. negative for h pylori       Current Outpatient Medications   Medication Sig    acetaminophen (TYLENOL) 500 MG tablet Take 500 mg by mouth every 6 (six) hours as needed for Pain.    Al hyd-Mg tr-alg ac-sod bicarb (GAVISCON) 80-14.2 mg Chew Take by mouth.    ALPRAZolam (XANAX) 0.5 MG tablet Take 1.5 tablets (0.75 mg  total) by mouth 2 (two) times daily as needed for Anxiety.    aluminum & magnesium hydroxide-simethicone (MYLANTA MAXIMUM STRENGTH) 400-400-40 mg/5 mL suspension Take 10 mLs by mouth every 6 (six) hours as needed for Indigestion.    ascorbic acid, vitamin C, (VITAMIN C) 250 MG tablet Take 250 mg by mouth once daily.    aspirin (ECOTRIN) 81 MG EC tablet Take 81 mg by mouth once daily.    carboxymethylcellulose (REFRESH PLUS) 0.5 % Dpet 1 drop 3 (three) times daily as needed.    chlorthalidone (HYGROTEN) 25 MG Tab TAKE 1 TABLET BY MOUTH EVERY MORNING    fish oil-omega-3 fatty acids 300-1,000 mg capsule Take 2 g by mouth once daily.    fluticasone propionate (FLONASE) 50 mcg/actuation nasal spray 1 spray (50 mcg total) by Each Nostril route once daily.    irbesartan (AVAPRO) 300 MG tablet Take 1 tablet (300 mg total) by mouth every evening.    meloxicam (MOBIC) 15 MG tablet Take 1 tablet (15 mg total) by mouth once daily.    multivitamin (MULTIVITAMIN) per tablet Take 1 tablet by mouth once daily.      niacin 500 MG CpSR Take 500 mg by mouth every evening.      pantoprazole (PROTONIX) 40 MG tablet TAKE 1 TABLET(40 MG) BY MOUTH BEFORE BREAKFAST    phenylephrine-shark liver oil-mineral oil-petrolatum (PREPARATION H) rectal ointment Place rectally 2 (two) times daily as needed for Hemorrhoids.    potassium chloride (MICRO-K) 10 MEQ CpSR TAKE 2 CAPSULES BY MOUTH DAILY    pravastatin (PRAVACHOL) 40 MG tablet TAKE 1 TABLET(40 MG) BY MOUTH EVERY DAY    venlafaxine (EFFEXOR-XR) 37.5 MG 24 hr capsule Take 1 capsule (37.5 mg total) by mouth once daily.    cimetidine (TAGAMET) 800 MG tablet Take 1 tablet (800 mg total) by mouth every evening.    pramoxine-hydrocortisone (PROCTOCREAM-HC) 1-1 % rectal cream Place rectally 2 (two) times daily.     No current facility-administered medications for this visit.        Review of patient's allergies indicates:  No Known Allergies    Family History   Problem Relation Age  of Onset    Hypertension Mother     Heart disease Mother     Glaucoma Mother     Stroke Father     Glaucoma Sister     Cataracts Sister     Macular degeneration Sister     Breast cancer Neg Hx     Colon cancer Neg Hx     Crohn's disease Neg Hx     Ulcerative colitis Neg Hx        Social History     Socioeconomic History    Marital status:      Spouse name: Not on file    Number of children: 2    Years of education: Not on file    Highest education level: Not on file   Occupational History    Occupation: retired teacher and principal   Social Needs    Financial resource strain: Not hard at all    Food insecurity     Worry: Never true     Inability: Never true    Transportation needs     Medical: No     Non-medical: No   Tobacco Use    Smoking status: Never Smoker    Smokeless tobacco: Never Used   Substance and Sexual Activity    Alcohol use: Yes     Frequency: Never     Drinks per session: 1 or 2     Binge frequency: Never     Comment: social- maybe once every few months    Drug use: No    Sexual activity: Yes     Partners: Male     Birth control/protection: None, Post-menopausal   Lifestyle    Physical activity     Days per week: 4 days     Minutes per session: 90 min    Stress: Not at all   Relationships    Social connections     Talks on phone: More than three times a week     Gets together: More than three times a week     Attends Adventism service: Not on file     Active member of club or organization: Yes     Attends meetings of clubs or organizations: 1 to 4 times per year     Relationship status:    Other Topics Concern    Not on file   Social History Narrative    Not on file       Chief Complaint:   Chief Complaint   Patient presents with    Right Hip - Pain       History of present illness:  This is a 69-year-old female with a history of polyarthritis seen for bilateral hip pain. Patient localizes the pain to the back of her hips and low back.  She has done  physical therapy and a back program.  She is currently on Aleve.  Pain is a 3/10.  Pain started about a month ago.  Hurts after walking or standing for a long time.  Worse at the end of the day.  The pain is gotten worse since March since she had to stop exercising because of COVID.  She would like to do some more therapy.      Answers for HPI/ROS submitted by the patient on 9/21/2019   Hip pain  unexpected weight change: No  appetite change : Yes  sleep disturbance: No  IMMUNOCOMPROMISED: No  nervous/ anxious: Yes  dysphoric mood: No  rash: No  visual disturbance: No  eye redness: No  eye pain: No  ear pain: No  tinnitus: No  hearing loss: No  sinus pressure : No  nosebleeds: No  enviro allergies: No  food allergies: No  cough: Yes  shortness of breath: No  sweating: No  dysuria: No  frequency: No  difficulty urinating: No  hematuria: No  painful intercourse: Yes  chest pain: No  palpitations: No  nausea: Yes  vomiting: No  diarrhea: No  blood in stool: No  constipation: Yes  headaches: No  dizziness: No  numbness: No  seizures: No  joint swelling: No  myalgia: Yes  weakness: No  back pain: Yes  Pain Chronicity: recurrent  History of trauma: No  Onset: 1 to 4 weeks ago  Frequency: daily  Progression since onset: gradually worsening  Injury mechanism: reaching  injury location: at home  pain- numeric: 7/10  pain location: left pelvic, right pelvic, left hip, right hip, right knee, left foot, right foot, left toes, right toes  pain quality: sharp  Radiating Pain: Yes  If your pain is radiating, to what part of the body?: lower back  Aggravating factors: standing, walking  fever: No  inability to bear weight: No  itching: No  joint locking: No  limited range of motion: No  stiffness: No  tingling: Yes  Treatments tried: heat, exercise, rest  physical therapy: effective  Improvement on treatment: significant    Physical Examination:    Vital Signs:    Vitals:    09/30/20 1053   Resp: 18       Body mass index is 32.59  kg/m².    This a well-developed, well nourished patient in no acute distress.  They are alert and oriented and cooperative to examination.  Pt. walks without an antalgic gait.      Examination of the patient's bilateral hips shows full range of motion with flexion to 160°, extension to 0, external rotation to 50°, internal rotation of 15°, abduction of 50°, adduction of 15°. Skin has no rashes or bruising. Patient has negative Stinchfield exam. Patient has negative straight leg raise.Negative internal impingement test. Negative JOSUE test. Negative Juanita's test. Patient has no pain with hip range of motion. Nontender to palpation over the greater trochanteric bursa. Patient is 5 out of 5 motor strength, palpable distal pulses, and intact light touch sensation.       X-rays:  X-rays of the pelvis and both hips are  review which show some mild narrowing with the left being slightly worse.     Assessment::  Mild bilateral hip arthritis  Low back pain  Right trochanteric bursitis    Plan:  I reviewed the findings with her today. Her hips actually do not look too bad.  It agreed to get her into some physical therapy.  Follow-up as needed.    This note was created using [employer] voice recognition software that occasionally misinterpreted phrases or words.    Consult note is delivered via Epic messaging service.

## 2020-10-02 RX ORDER — SODIUM CHLORIDE 9 MG/ML
INJECTION, SOLUTION INTRAVENOUS CONTINUOUS
Status: CANCELLED | OUTPATIENT
Start: 2020-10-02

## 2020-10-04 ENCOUNTER — LAB VISIT (OUTPATIENT)
Dept: FAMILY MEDICINE | Facility: CLINIC | Age: 70
End: 2020-10-04
Payer: MEDICARE

## 2020-10-04 DIAGNOSIS — Z01.818 PREOP TESTING: ICD-10-CM

## 2020-10-04 DIAGNOSIS — F41.9 ANXIETY: ICD-10-CM

## 2020-10-04 LAB — SARS-COV-2 RNA RESP QL NAA+PROBE: NOT DETECTED

## 2020-10-04 PROCEDURE — U0003 INFECTIOUS AGENT DETECTION BY NUCLEIC ACID (DNA OR RNA); SEVERE ACUTE RESPIRATORY SYNDROME CORONAVIRUS 2 (SARS-COV-2) (CORONAVIRUS DISEASE [COVID-19]), AMPLIFIED PROBE TECHNIQUE, MAKING USE OF HIGH THROUGHPUT TECHNOLOGIES AS DESCRIBED BY CMS-2020-01-R: HCPCS

## 2020-10-05 RX ORDER — ALPRAZOLAM 0.5 MG/1
TABLET ORAL
Qty: 90 TABLET | Refills: 5 | Status: SHIPPED | OUTPATIENT
Start: 2020-10-05 | End: 2021-05-12

## 2020-10-05 NOTE — PROGRESS NOTES
Refill Routing Note   Medication(s) are not appropriate for processing by [facility] for the following reason(s)   - Outside of Protocol    Medication reconciliation completed: No   Automatic Epic Protocol Generated Data:    Requested Prescriptions   Pending Prescriptions Disp Refills    ALPRAZolam (XANAX) 0.5 MG tablet [Pharmacy Med Name: ALPRAZOLAM 0.5MG TABLETS] 90 tablet      Sig: TAKE 1 AND 1/2 TABLETS(0.75 MG) BY MOUTH TWICE DAILY AS NEEDED FOR ANXIETY       There is no refill protocol information for this order           Appointments     Date Provider   Last Visit   7/21/2020 Saurabh Hassan MD   Next Visit   11/11/2020 Saurabh Hassan MD   ED visits in past 90 days: 0    Note composed:12:55 PM 10/05/2020

## 2020-10-06 ENCOUNTER — TELEPHONE (OUTPATIENT)
Dept: SURGERY | Facility: CLINIC | Age: 70
End: 2020-10-06

## 2020-10-06 ENCOUNTER — PATIENT OUTREACH (OUTPATIENT)
Dept: OTHER | Facility: OTHER | Age: 70
End: 2020-10-06

## 2020-10-06 NOTE — TELEPHONE ENCOUNTER
Left message for patient to give surgery arrival time of 1100 tomorrow.  Covid test results negative.  Patient had no other concerns at this time

## 2020-10-06 NOTE — TELEPHONE ENCOUNTER
Spoke to patient to give surgery arrival time of 1100 tomorrow.  Covid test results negative.  Reviewed which meds to take and not to take tonight and in the morning per the pre admit screening documentation.

## 2020-10-06 NOTE — PROGRESS NOTES
"Digital Medicine: Health  Follow-Up    The history is provided by the patient.             Reason for review: Blood pressure not at goal      Additional Follow-up details: Mrs. Aguilera is doing okay. Homer has had increased anxiety after learning of a mass in her left breast. However, she had a MRI of the left breast and doctors told her they are "85% sure its nothing". She will go in tomorrow for a biopsy to confirm. Support provided.    Mrs. Aguilera has not returned to work. She discuss with her PCP and advised to wait on returning until things settled down. She is anxious to return but does not want to risk getting COVID.     Patient attributes this to elevated BP.            Diet-Not assessed          Physical Activity-Not assessed    Medication Adherence-Medication Adherence not addressed.      Substance, Sleep, Stress-Not assessed         Addressed patient questions and patient has my contact information if needed prior to next outreach. Patient verbalizes understanding.      Explained the importance of self-monitoring and medication adherence. Encouraged the patient to communicate with their health  for lifestyle modifications to help improve or maintain a healthy lifestyle.               There are no preventive care reminders to display for this patient.      Last 5 Patient Entered Readings                                      Current 30 Day Average: 128/87     Recent Readings 10/1/2020 9/16/2020 9/6/2020 8/20/2020 8/10/2020    SBP (mmHg) 126 138 119 117 131    DBP (mmHg) 94 85 83 95 84    Pulse 94 - 85 92 100               "

## 2020-10-07 PROBLEM — D24.2 PAPILLOMA OF LEFT BREAST: Status: ACTIVE | Noted: 2020-10-07

## 2020-10-09 ENCOUNTER — PATIENT MESSAGE (OUTPATIENT)
Dept: SURGERY | Facility: CLINIC | Age: 70
End: 2020-10-09

## 2020-10-15 ENCOUNTER — PATIENT MESSAGE (OUTPATIENT)
Dept: SURGERY | Facility: CLINIC | Age: 70
End: 2020-10-15

## 2020-10-15 ENCOUNTER — TELEPHONE (OUTPATIENT)
Dept: SURGERY | Facility: CLINIC | Age: 70
End: 2020-10-15

## 2020-10-15 NOTE — TELEPHONE ENCOUNTER
Spoke to patient to let her know per Dr. Ashford that her pathology results were back and that there was no evidence of malignancy.

## 2020-10-20 ENCOUNTER — PATIENT OUTREACH (OUTPATIENT)
Dept: ADMINISTRATIVE | Facility: OTHER | Age: 70
End: 2020-10-20

## 2020-10-20 NOTE — PROGRESS NOTES
Health Maintenance Due   Topic Date Due    Influenza Vaccine (1) 08/01/2020     Updates were requested from care everywhere.  Chart was reviewed for overdue Proactive [facility] Encounters ([facility]) topics (CRS, Breast Cancer Screening, Eye exam)  Health Maintenance has been updated.  LINKS immunization registry triggered.  Immunizations were reconciled.

## 2020-10-21 ENCOUNTER — OFFICE VISIT (OUTPATIENT)
Dept: SURGERY | Facility: CLINIC | Age: 70
End: 2020-10-21
Payer: MEDICARE

## 2020-10-21 VITALS
HEIGHT: 63 IN | BODY MASS INDEX: 32.46 KG/M2 | DIASTOLIC BLOOD PRESSURE: 56 MMHG | WEIGHT: 183.19 LBS | HEART RATE: 101 BPM | SYSTOLIC BLOOD PRESSURE: 126 MMHG

## 2020-10-21 DIAGNOSIS — D24.2 PAPILLOMA OF LEFT BREAST: Primary | ICD-10-CM

## 2020-10-21 DIAGNOSIS — N64.89 RADIAL SCAR OF BREAST: ICD-10-CM

## 2020-10-21 PROCEDURE — 99024 PR POST-OP FOLLOW-UP VISIT: ICD-10-PCS | Mod: S$GLB,POP,, | Performed by: SURGERY

## 2020-10-21 PROCEDURE — 99024 POSTOP FOLLOW-UP VISIT: CPT | Mod: S$GLB,POP,, | Performed by: SURGERY

## 2020-10-21 RX ORDER — CHOLECALCIFEROL (VITAMIN D3) 25 MCG
1000 TABLET ORAL DAILY
COMMUNITY
End: 2023-04-08 | Stop reason: CLARIF

## 2020-10-21 NOTE — PROGRESS NOTES
Core biopsy left breast atypical papillary lesion  Final path: radial scar, no residual papilloma. Biopsy site changes noted.      Inc c/d/i.    Plan for repeat MMG in 6 months  Will discuss with pathology regarding change in diagnosis (aypical papillary lesion, now radial scar).

## 2020-10-27 ENCOUNTER — CLINICAL SUPPORT (OUTPATIENT)
Dept: REHABILITATION | Facility: HOSPITAL | Age: 70
End: 2020-10-27
Attending: ORTHOPAEDIC SURGERY
Payer: MEDICARE

## 2020-10-27 DIAGNOSIS — M25.559 HIP PAIN: ICD-10-CM

## 2020-10-27 DIAGNOSIS — R26.9 GAIT DIFFICULTY: ICD-10-CM

## 2020-10-27 DIAGNOSIS — M70.71 BURSITIS OF RIGHT HIP, UNSPECIFIED BURSA: ICD-10-CM

## 2020-10-27 PROCEDURE — 97162 PT EVAL MOD COMPLEX 30 MIN: CPT | Mod: PO

## 2020-10-27 NOTE — PLAN OF CARE
[facility]  Physical Therapy Initial Evaluation    Name: Lucinda Aguilera  Clinic Number: [id number]    Therapy Diagnosis:   Encounter Diagnosis   Name Primary?    Bursitis of right hip, unspecified bursa      Physician: Lorne Cowan,*    Physician Orders: PT Eval and Treat   Medical Diagnosis from Referral: M70.71 (ICD-10-CM) - Bursitis of right hip, unspecified bursa     Evaluation Date: 10/27/2020  Authorization Period Expiration: 09/3/2021  Plan of Care Expiration: 12/22/2020  Visit # / Visits authorized: 1/1     Time In: 0846  Time Out: 0925  Total Billable Time: 40 minutes    Precautions: Standard    Subjective   Date of onset: few years, intermittent  History of current condition - Lucinda reports: over the past 6 months she became a lot more inactive due to the Covid she spent a lot of time in the lounge chair.        Past Medical History:   Diagnosis Date    Anticoagulant long-term use     Anxiety     takes daily Xanax    Arthritis     right thumb    Cataract     OU    Cholelithiasis     GERD (gastroesophageal reflux disease)     Hepatic steatosis     Hyperlipemia     Hypertension     PVD (posterior vitreous detachment)     OD     Lucinda Aguilera  has a past surgical history that includes chest abcess; Knee arthroscopy w/ laser; thumb surgery (11/2014); Total knee arthroplasty (Left, 6/19/2018); Colonoscopy (01/06/2012); Joint replacement (Bilateral); Esophagogastroduodenoscopy (N/A, 6/6/2019); Laparoscopic cholecystectomy (N/A, 6/14/2019); Upper gastrointestinal endoscopy (07/13/2017); Upper gastrointestinal endoscopy (06/06/2019); and Excisional biopsy (Left, 10/7/2020).    Lucinda has a current medication list which includes the following prescription(s): acetaminophen, al hyd-mg tr-alg ac-sod bicarb, alprazolam, aluminum & magnesium hydroxide-simethicone, ascorbic acid (vitamin c), aspirin, carboxymethylcellulose, chlorthalidone, cimetidine, fish  oil-omega-3 fatty acids, fluticasone propionate, hydrocodone-acetaminophen, irbesartan, meloxicam, multivitamin, niacin, pantoprazole, phenylephrine-shark liver oil-mineral oil-petrolatum, potassium chloride, pravastatin, venlafaxine, and vitamin d, and the following Facility-Administered Medications: diphenhydramine, hydromorphone, lactated ringers, lorazepam, ondansetron, and sodium chloride 0.9%.    Review of patient's allergies indicates:  No Known Allergies     Imaging,  X-Ray  COMPARISON:  04/05/2018     FINDINGS:  A large calcific density is noted overlying the pelvis similar to on the prior dating back to 03/15/2017.  This was noted relate to a fibroid on 03/15/2017.     Pseudo articulations of the transverse processes of the lower transitional type vertebral body with the sacrum are noted which can be a source of pain.  Hip joint space loss is noted left greater than right.  Superior acetabular spurring is noted bilaterally.  No convincing fracture or dislocation is noted.     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      Able to mini squat          AROM Right Left Comment   Hip Flexion: 95 degrees 95 degrees    Hip ABD: 35 degrees 35  degrees    Hip ER, 90°  flex:      Hip IR, 90°  flex:      Knee Extension: 0 degrees 0 degrees    Knee Flexion: 115 degrees 115 degrees    *pain    Lumbar screen: Good ROM, Mild tightness with lateral bend    Strength   Right LE Left LE   Hip flexion 5/5 5/5   Hip extension  5/5 5/5   Gluteus medius 4-/5 4-/5   Hip abduction 4-/5 4-/5   Hip adduction 4+/5 4+/5   Knee flexion 5/5 5/5   Knee extension 5/5 5/5     Sensation: all peripheral nerves and dermatomes intact and equal bilaterally          Intra-articular Pathology  - Passive IR/ER: -  - Scour: +  - JOSUE Test: +  - FADIR/Ant Impingement (flex, IR, ADD): -      Neuro Dynamic Testing  - Seated slump test: cephalic, caudal: -  - Passive SLR: cephalic, caudal, tibial, CF bias:-      Palpation: Bilateral glute med/min      Pt/family was provided educational information, including: role of PT, goals for PT, scheduling - pt verbalized understanding. Discussed insurance plan with pt.     CMS Impairment/Limitation/Restriction for FOTO hip Survey    Therapist reviewed FOTO scores for Lucinda Aguilera on 10/27/2020.   FOTO documents entered into [facility] - see Media section.    Limitation Score: 45%           TREATMENT   None        Home Exercises and Patient Education Provided    Education provided:   - benefits of aqua therapy  Progression of home walking    Written Home Exercises Provided: none today.  Exercises were reviewed and Lucinda was able to demonstrate them prior to the end of the session.  Lucinda demonstrated good  understanding of the education provided.     See EMR under N/A for exercises provided N/A.      Assessment   . Pt presents with bilateral hip pain mainly with being uo walking or standing. She hasd some mild intermittent pain that happened when she did a lot of walking but since her inactivity she has developed weakness around the hips which have caused her pain and decreased function. She should do well with a progressive exercises program and will  be started on a aqua therapy program and progressed to a land based program. We also discussed a slow progression of a at home walking program 3 days a week.     Pt prognosis is Good.   Pt will benefit from skilled outpatient Physical Therapy to address the deficits stated above and in the chart below, provide pt/family education, and to maximize pt's level of independence.     Plan of care discussed with patient: Yes  Pt's spiritual, cultural and educational needs considered and patient is agreeable to the plan of care and goals as stated below:     Anticipated Barriers for therapy: none    Medical Necessity is demonstrated by the following  History  Co-morbidities and personal factors that may impact the plan of care Co-morbidities:   advanced age, anxiety, high BMI, HTN and Bilateral TKA    Personal Factors:   lifestyle     moderate   Examination  Body Structures and Functions, activity limitations and participation restrictions that may impact the plan of care Body Regions:   back  lower extremities    Body Systems:    gross symmetry  ROM  strength  gross coordinated movement  gait  transfers  transitions  motor control    Participation Restrictions:   none    Activity limitations:   Learning and applying knowledge  no deficits    General Tasks and Commands  no deficits    Communication  no deficits    Mobility  walking    Self care  toileting  dressing    Domestic Life  cooking  doing house work (cleaning house, washing dishes, laundry)    Interactions/Relationships  no deficits    Life Areas  employment    Community and Social Life  community life  recreation and leisure         moderate   Clinical Presentation evolving clinical presentation with changing clinical characteristics moderate   Decision Making/ Complexity Score: moderate     Goals:  Short Term Goals: 3-4 weeks   Decrease pain with walking 5 min straight by 2 points or more   Increase hip Abd strength by 1/2 grade    Long Term Goals:8 weeks   Able  to walk x 15 min with 3/10 or less pain  Bilateral hip Abd 4+/5  Independent with HEP      Plan   Plan of care Certification: 10/27/2020 to 12/22/2020.    Outpatient Physical Therapy 2 times weekly for 8 weeks to include the following interventions: Aquatic Therapy, Manual Therapy, Moist Heat/ Ice, Neuromuscular Re-ed, Patient Education and Therapeutic Exercise.     Arash Lovell, PT

## 2020-10-28 ENCOUNTER — OFFICE VISIT (OUTPATIENT)
Dept: PODIATRY | Facility: CLINIC | Age: 70
End: 2020-10-28
Payer: MEDICARE

## 2020-10-28 ENCOUNTER — PATIENT MESSAGE (OUTPATIENT)
Dept: SURGERY | Facility: CLINIC | Age: 70
End: 2020-10-28

## 2020-10-28 VITALS — HEIGHT: 63 IN | BODY MASS INDEX: 32.46 KG/M2 | WEIGHT: 183.19 LBS

## 2020-10-28 DIAGNOSIS — M19.071 PRIMARY OSTEOARTHRITIS OF RIGHT FOOT: Primary | ICD-10-CM

## 2020-10-28 PROCEDURE — 99213 PR OFFICE/OUTPT VISIT, EST, LEVL III, 20-29 MIN: ICD-10-PCS | Mod: S$PBB,,, | Performed by: PODIATRIST

## 2020-10-28 PROCEDURE — 99213 OFFICE O/P EST LOW 20 MIN: CPT | Mod: S$PBB,,, | Performed by: PODIATRIST

## 2020-10-28 PROCEDURE — 99214 OFFICE O/P EST MOD 30 MIN: CPT | Mod: PBBFAC,PN | Performed by: PODIATRIST

## 2020-10-28 PROCEDURE — 99999 PR PBB SHADOW E&M-EST. PATIENT-LVL IV: CPT | Mod: PBBFAC,,, | Performed by: PODIATRIST

## 2020-10-28 PROCEDURE — 99999 PR PBB SHADOW E&M-EST. PATIENT-LVL IV: ICD-10-PCS | Mod: PBBFAC,,, | Performed by: PODIATRIST

## 2020-10-28 RX ORDER — PAROXETINE HYDROCHLORIDE 20 MG/1
TABLET, FILM COATED ORAL
COMMUNITY
Start: 2020-08-17 | End: 2020-11-11

## 2020-10-28 RX ORDER — PAROXETINE 10 MG/1
TABLET, FILM COATED ORAL
COMMUNITY
Start: 2020-09-01 | End: 2020-11-11

## 2020-10-29 ENCOUNTER — TELEPHONE (OUTPATIENT)
Dept: SURGERY | Facility: CLINIC | Age: 70
End: 2020-10-29

## 2020-10-29 NOTE — TELEPHONE ENCOUNTER
Contacted the patient regarding portal message sent in by the patient regarding the message.  Stated to the patient per  there will be some soreness and that she can start pool therapy.  The patient voiced understanding of this information.

## 2020-10-30 ENCOUNTER — PATIENT MESSAGE (OUTPATIENT)
Dept: FAMILY MEDICINE | Facility: CLINIC | Age: 70
End: 2020-10-30

## 2020-11-02 ENCOUNTER — CLINICAL SUPPORT (OUTPATIENT)
Dept: REHABILITATION | Facility: HOSPITAL | Age: 70
End: 2020-11-02
Attending: ORTHOPAEDIC SURGERY
Payer: MEDICARE

## 2020-11-02 ENCOUNTER — PATIENT MESSAGE (OUTPATIENT)
Dept: FAMILY MEDICINE | Facility: CLINIC | Age: 70
End: 2020-11-02

## 2020-11-02 DIAGNOSIS — M25.559 HIP PAIN: Primary | ICD-10-CM

## 2020-11-02 PROCEDURE — 97113 AQUATIC THERAPY/EXERCISES: CPT | Mod: PO,CQ

## 2020-11-02 NOTE — PROGRESS NOTES
Physical Therapy Aquatic Treatment Note     Name: Lucinda Aguilera  Clinic Number: [id number]    Therapy Diagnosis: No diagnosis found.  Physician: Lorne Cowan,*  Visit Date: 11/2/2020  Physician Orders: PT Eval and Treat   Medical Diagnosis from Referral: M70.71 (ICD-10-CM) - Bursitis of right hip, unspecified bursa      Evaluation Date: 10/27/2020  Authorization Period Expiration: 09/3/2021  Plan of Care Expiration: 12/22/2020  Visit # / Visits authorized: 2/1      Time In: 0846  Time Out: 0925  Total Billable Time: 41 minutes     Precautions: Standard      Subjective     Pt reports: she is w/o c/o hip pn upon arrival today   she was not issued a home exercise program last session.     Pain: 0/10  Location: bilateral hips    Objective     Lucinda received aquatic exercises to develop strength, endurance, ROM, flexibility, posture and core stabilization for 41 minutes including:  AMB FWD,BWD,Side 3 min eac    Stretches 3 x 20 sec  HSS  Quad    LE exs 20x each  Mini Squat with QS  Heel Raise with GS  Hip flex/ext  Hip ABD/ADD      Home Exercises Provided and Patient Education Provided     Education provided:   - progress towards goals   - role of therapy     Written Home Exercises Provided: Patient was not issued HEP for pool.  Exercises were reviewed and Lucinda was able to demonstrate them prior to the end of the session.   Pt received a written copy of exercises to perform at home.     Lucinda demonstrated good  understanding of the education provided.     Assessment     Lucinda fausto today's tx with aquatic ther ex well. She requires S and mod VCs to remain on task and to maintain proper form, posture, ROM with exs post instruction, requiring increased time to complete each activity. She was w/o c/o hip pn throughout tx. Exs performed on both platform and deep part of pool.   Lucinda is progressing well towards her goals.   Pt prognosis is Good.     Pt will continue to benefit from skilled outpatient  physical therapy to address the deficits listed in the problem list box on initial evaluation, provide pt/family education and to maximize pt's level of independence in the home and community environment.     Pt's spiritual, cultural and educational needs considered and pt agreeable to plan of care and goals.    Anticipated barriers to physical therapy: none    Goals:   Short Term Goals: 3-4 weeks   Decrease pain with walking 5 min straight by 2 points or more   Increase hip Abd strength by 1/2 grade     Long Term Goals:8 weeks   Able to walk x 15 min with 3/10 or less pain  Bilateral hip Abd 4+/5  Independent with HEP      Plan     Continue 2x per week. Outpatient Physical Therapy 2 times weekly for 8 weeks to include the following interventions: Aquatic Therapy, Manual Therapy, Moist Heat/ Ice, Neuromuscular Re-ed, Patient Education and Therapeutic Exercise       Manuel Garcia PTA

## 2020-11-04 ENCOUNTER — LAB VISIT (OUTPATIENT)
Dept: LAB | Facility: HOSPITAL | Age: 70
End: 2020-11-04
Attending: FAMILY MEDICINE
Payer: MEDICARE

## 2020-11-04 ENCOUNTER — CLINICAL SUPPORT (OUTPATIENT)
Dept: REHABILITATION | Facility: HOSPITAL | Age: 70
End: 2020-11-04
Attending: ORTHOPAEDIC SURGERY
Payer: MEDICARE

## 2020-11-04 ENCOUNTER — PATIENT MESSAGE (OUTPATIENT)
Dept: SURGERY | Facility: CLINIC | Age: 70
End: 2020-11-04

## 2020-11-04 DIAGNOSIS — E78.5 HYPERLIPIDEMIA, UNSPECIFIED HYPERLIPIDEMIA TYPE: ICD-10-CM

## 2020-11-04 DIAGNOSIS — M25.559 HIP PAIN: ICD-10-CM

## 2020-11-04 DIAGNOSIS — R26.9 GAIT DIFFICULTY: ICD-10-CM

## 2020-11-04 DIAGNOSIS — Z12.31 SCREENING MAMMOGRAM, ENCOUNTER FOR: Primary | ICD-10-CM

## 2020-11-04 LAB
ALBUMIN SERPL BCP-MCNC: 4.2 G/DL (ref 3.5–5.2)
ALP SERPL-CCNC: 110 U/L (ref 55–135)
ALT SERPL W/O P-5'-P-CCNC: 31 U/L (ref 10–44)
ANION GAP SERPL CALC-SCNC: 12 MMOL/L (ref 8–16)
AST SERPL-CCNC: 34 U/L (ref 10–40)
BILIRUB SERPL-MCNC: 0.7 MG/DL (ref 0.1–1)
BUN SERPL-MCNC: 15 MG/DL (ref 8–23)
CALCIUM SERPL-MCNC: 9.9 MG/DL (ref 8.7–10.5)
CHLORIDE SERPL-SCNC: 93 MMOL/L (ref 95–110)
CHOLEST SERPL-MCNC: 215 MG/DL (ref 120–199)
CHOLEST/HDLC SERPL: 3.3 {RATIO} (ref 2–5)
CO2 SERPL-SCNC: 23 MMOL/L (ref 23–29)
CREAT SERPL-MCNC: 0.9 MG/DL (ref 0.5–1.4)
EST. GFR  (AFRICAN AMERICAN): >60 ML/MIN/1.73 M^2
EST. GFR  (NON AFRICAN AMERICAN): >60 ML/MIN/1.73 M^2
GLUCOSE SERPL-MCNC: 141 MG/DL (ref 70–110)
HDLC SERPL-MCNC: 66 MG/DL (ref 40–75)
HDLC SERPL: 30.7 % (ref 20–50)
LDLC SERPL CALC-MCNC: 113.4 MG/DL (ref 63–159)
NONHDLC SERPL-MCNC: 149 MG/DL
POTASSIUM SERPL-SCNC: 4.2 MMOL/L (ref 3.5–5.1)
PROT SERPL-MCNC: 8 G/DL (ref 6–8.4)
SODIUM SERPL-SCNC: 128 MMOL/L (ref 136–145)
TRIGL SERPL-MCNC: 178 MG/DL (ref 30–150)

## 2020-11-04 PROCEDURE — 97113 AQUATIC THERAPY/EXERCISES: CPT | Mod: PO,CQ

## 2020-11-04 PROCEDURE — 80061 LIPID PANEL: CPT

## 2020-11-04 PROCEDURE — 36415 COLL VENOUS BLD VENIPUNCTURE: CPT | Mod: PO

## 2020-11-04 PROCEDURE — 80053 COMPREHEN METABOLIC PANEL: CPT

## 2020-11-04 NOTE — PROGRESS NOTES
"  Physical Therapy Aquatic Treatment Note     Name: Lucinda Aguilera  Clinic Number: [id number]    Therapy Diagnosis:   Encounter Diagnoses   Name Primary?    Hip pain     Gait difficulty      Physician: Lorne Cowan,*  Visit Date: 11/4/2020  Physician Orders: PT Eval and Treat   Medical Diagnosis from Referral: M70.71 (ICD-10-CM) - Bursitis of right hip, unspecified bursa      Evaluation Date: 10/27/2020  Authorization Period Expiration: 09/3/2021  Plan of Care Expiration: 12/22/2020  Visit # / Visits authorized: 3/19      Time In: 0830  Time Out: 0915  Total Billable Time: 45 minutes     Precautions: Standard      Subjective     Pt reports: she is w/o c/o hip pn upon arrival today, however she states that she had a brief episode of 4/10 earlier this morning. She reports that her pn "comes and goes"   she was not issued a home exercise program last session.     Pain: 0/10  Location: bilateral hips    Objective     Lucinda received aquatic exercises to develop strength, endurance, ROM, flexibility, posture and core stabilization for 41 minutes including:  AMB FWD,BWD,Side 3 min eac    Stretches 3 x 20 sec  HSS  Quad    LE exs 20x each  Mini Squat with QS  Heel Raise with GS  Hip flex/ext  Hip ABD/ADD  HS Curl  HIp Circles CW/CCW  Step-ups    Home Exercises Provided and Patient Education Provided     Education provided:   - progress towards goals   - role of therapy     Written Home Exercises Provided: Patient was not issued HEP for pool.  Exercises were reviewed and Lucinda was able to demonstrate them prior to the end of the session.   Pt received a written copy of exercises to perform at home.     Lucinda demonstrated good  understanding of the education provided.     Assessment     Lucinda fausto today's tx with aquatic ther ex well adding LE exs w/o c/o pn sx. She requires S and mod VCs to remain on task and to maintain proper form, posture, ROM with exs post instruction, requiring increased time to " complete each activity. She was w/o c/o hip pn throughout tx. Exs performed on both platform and deep part of pool.   Lucinda is progressing well towards her goals.   Pt prognosis is Good.     Pt will continue to benefit from skilled outpatient physical therapy to address the deficits listed in the problem list box on initial evaluation, provide pt/family education and to maximize pt's level of independence in the home and community environment.     Pt's spiritual, cultural and educational needs considered and pt agreeable to plan of care and goals.    Anticipated barriers to physical therapy: none    Goals:   Short Term Goals: 3-4 weeks   Decrease pain with walking 5 min straight by 2 points or more   Increase hip Abd strength by 1/2 grade     Long Term Goals:8 weeks   Able to walk x 15 min with 3/10 or less pain  Bilateral hip Abd 4+/5  Independent with HEP      Plan     Continue 2x per week. Outpatient Physical Therapy 2 times weekly for 8 weeks to include the following interventions: Aquatic Therapy, Manual Therapy, Moist Heat/ Ice, Neuromuscular Re-ed, Patient Education and Therapeutic Exercise       Manuel Garcia, PTA

## 2020-11-09 NOTE — PROGRESS NOTES
Subjective:      Patient ID: Lucinda Aguilera is a 69 y.o. female.    Chief Complaint: Follow-up (insole check)      HPI:  Lucinda Aguilera is a 69 y.o. female who presents to clinic with a chief complaint of right foot pain.  Patient states that she has been doing well as long as she wears supportive shoes.  She has brought in cushioned insoles for metatarsal pads to be applied.  She relates having had significant pain relief when wearing the metatarsal pad  She rates her foot pain currently as 0/10 on the pain scale.  Patient denies any other pedal complaints this time.    PCP:  Saurabh Hassan MD  Date last seen: 7/21/2020    Review of Systems   Constitutional: Negative for appetite change, fever, chills, fatigue and unexpected weight change.   Cardiovascular: Negative for chest pain, claudication, cyanosis, and leg swelling.  Musculoskeletal: Negative for back pain.  Positive for arthritis, joint pain, joint swelling, myalgias, and stiffness.   Skin: Negative for nail bed changes, discoloration, rash, itching, poor wound healing, suspicious lesion, unusual hair distribution.   Neurological: Negative for loss of balance, sensory change, paresthesias, and numbness.  Positive for pain.  Hematological: Negative for adenopathy, bleeding, and bruising easily.   Psychiatric/Behavioral: The patient is not nervous/anxious.  Negative for altered mental status.    No results found for: HGBA1C    Past Medical History:   Diagnosis Date    Anticoagulant long-term use     Anxiety     takes daily Xanax    Arthritis     right thumb    Cataract     OU    Cholelithiasis     GERD (gastroesophageal reflux disease)     Hepatic steatosis     Hyperlipemia     Hypertension     PVD (posterior vitreous detachment)     OD     Past Surgical History:   Procedure Laterality Date    chest abcess      child    COLONOSCOPY  01/06/2012    Dr. Lopez: hemorrhoids, redundant colon    ESOPHAGOGASTRODUODENOSCOPY N/A 6/6/2019     Procedure: EGD (ESOPHAGOGASTRODUODENOSCOPY);  Surgeon: Nathan Lopez Jr., MD;  Location: [facility];  Service: Endoscopy;  Laterality: N/A;    EXCISIONAL BIOPSY Left 10/7/2020    Procedure: EXCISIONAL BIOPSY-Left with radiological marker;  Surgeon: Brooklynn Ashford MD;  Location: [facility];  Service: General;  Laterality: Left;    JOINT REPLACEMENT Bilateral     knee    KNEE ARTHROSCOPY W/ LASER      right    LAPAROSCOPIC CHOLECYSTECTOMY N/A 6/14/2019    Procedure: CHOLECYSTECTOMY, LAPAROSCOPIC;  Surgeon: Guevara Evasn MD;  Location: [facility];  Service: General;  Laterality: N/A;    thumb surgery  11/2014    TOTAL KNEE ARTHROPLASTY Left 6/19/2018    Procedure: REPLACEMENT-KNEE-TOTAL;  Surgeon: Nj Melvin MD;  Location: [address];  Service: Orthopedics;  Laterality: Left;  [place]    UPPER GASTROINTESTINAL ENDOSCOPY  07/13/2017    Dr. Lopez: NERD, Gastric mucosal atrophy. antritis, Scar in the incisura. Biopsied; biopsy: chronic gastritis. negative for h pylori    UPPER GASTROINTESTINAL ENDOSCOPY  06/06/2019    Dr. Lopez: small hiatal hernia, Non-erosive esophageal reflux (NERD) disease?., slight antritis; biopsy: Antral mucosa with chemical/reactive gastropathy. negative for h pylori     Family History   Problem Relation Age of Onset    Hypertension Mother     Heart disease Mother     Glaucoma Mother     Stroke Father     Glaucoma Sister     Cataracts Sister     Macular degeneration Sister     Breast cancer Neg Hx     Colon cancer Neg Hx     Crohn's disease Neg Hx     Ulcerative colitis Neg Hx      Social History     Socioeconomic History    Marital status:      Spouse name: Not on file    Number of children: 2    Years of education: Not on file    Highest education level: Not on file   Occupational History    Occupation: retired teacher and principal   Social Needs    Financial resource strain: Not hard at all    Food insecurity     Worry: Never true     Inability:  "Never true    Transportation needs     Medical: No     Non-medical: No   Tobacco Use    Smoking status: Never Smoker    Smokeless tobacco: Never Used   Substance and Sexual Activity    Alcohol use: Yes     Frequency: Never     Drinks per session: 1 or 2     Binge frequency: Never     Comment: social- maybe once every few months    Drug use: No    Sexual activity: Yes     Partners: Male     Birth control/protection: None, Post-menopausal   Lifestyle    Physical activity     Days per week: 4 days     Minutes per session: 90 min    Stress: Not at all   Relationships    Social connections     Talks on phone: More than three times a week     Gets together: More than three times a week     Attends Taoism service: Not on file     Active member of club or organization: Yes     Attends meetings of clubs or organizations: 1 to 4 times per year     Relationship status:    Other Topics Concern    Not on file   Social History Narrative    Not on file           Objective:        Ht 5' 3" (1.6 m)   Wt 83.1 kg (183 lb 3.2 oz)   LMP 04/18/2004   BMI 32.45 kg/m²     Physical Exam   Constitutional: Patient is oriented to person, place, and time. Patient appears well-developed and well-nourished. No acute distress.     Psychiatric: Patient has a normal mood and affect. Patient's speech is normal and behavior is normal. Judgment is normal. Cognition and memory are normal.     Right pedal exam was performed today.  Vascular: Pedal pulses palpable 1/4 DP & PT.  CFT is = 3 seconds to the hallux.  Skin temperature is cool to cool proximal tibia to distal toes without localized increase in calor noted.  No erythema, edema, or ecchymosis noted to the foot or ankle.  Hair growth decreased distally to the LE.     Musculoskeletal: Ankle and pedal joint ROM are decreased.  Ankle joint dorsiflexion is restricted with the knee extended and flexed per Sil[place] exam.  Muscle strength is 5/5 for all LE muscle groups " tested.  The hallux is abducted on the 1st ray.    Neurological: Epicritic sensation is Decreased to the foot.  [place] STR is intact to the LE.  No tenderness to palpation noted to the foot or ankle.    Dermatological: Toenails 1-5 right are WNL in length and thickness.  Webspaces 1-4 right are clean, dry, and intact.  Skin turgor is supple.  No dry, flaky skin, open wound, or suspicious lesion noted to the foot or ankle.    Nursing note and vitals reviewed.        Assessment:       Encounter Diagnosis   Name Primary?    Primary osteoarthritis of right foot Yes         Plan:       Lucinda was seen today for follow-up.    Diagnoses and all orders for this visit:    Primary osteoarthritis of right foot      I counseled the patient on her conditions, their implications and medical management.    - Educated patient again on proper foot care, supportive/accommodative shoe gear, and PRICE therapy.    - Applied metatarsal pads on 3 sets of cushioned insoles for patient.    Patient was given the following recommendations and instructions:  Patient Instructions   - Wear supportive shoes such as sneakers.    - Rest/reduce ambulation to necessary activities of daily living.    - Ice foot for no more than 20 minutes/per hour.  Can perform contrast therapy by alternating applications of ice and heat for 20 minutes at a time - always begin and end with ice.    - Elevate foot as much as possible throughout the day.    - Apply lidocaine cream or Voltaren gel as needed for pain relief.    - Notify clinic if pain worsens or fails to improve.        Renae Rios DPM        Dictation was performed using M*Modal Fluency.  Transcription errors may be present.

## 2020-11-09 NOTE — PATIENT INSTRUCTIONS
- Wear supportive shoes such as sneakers.    - Rest/reduce ambulation to necessary activities of daily living.    - Ice foot for no more than 20 minutes/per hour.  Can perform contrast therapy by alternating applications of ice and heat for 20 minutes at a time - always begin and end with ice.    - Elevate foot as much as possible throughout the day.    - Apply lidocaine cream or Voltaren gel as needed for pain relief.    - Notify clinic if pain worsens or fails to improve.   Reviewed discharge plan with [facility]. Encouraged him to f/u with Omayra Lan DO and he understood. NAD noted on discharge, gait steady. Reviewed discharge prescription for Tobrex. Freddy Santoyo states understanding of how and when to take medications.       Electronically signed by Sharon Ritchie RN on 5/22/2019 at [facility], [place]  05/22/19 [id number]

## 2020-11-11 ENCOUNTER — OFFICE VISIT (OUTPATIENT)
Dept: FAMILY MEDICINE | Facility: CLINIC | Age: 70
End: 2020-11-11
Payer: MEDICARE

## 2020-11-11 ENCOUNTER — CLINICAL SUPPORT (OUTPATIENT)
Dept: REHABILITATION | Facility: HOSPITAL | Age: 70
End: 2020-11-11
Attending: ORTHOPAEDIC SURGERY
Payer: MEDICARE

## 2020-11-11 ENCOUNTER — PATIENT MESSAGE (OUTPATIENT)
Dept: OTHER | Facility: OTHER | Age: 70
End: 2020-11-11

## 2020-11-11 VITALS
HEIGHT: 63 IN | OXYGEN SATURATION: 95 % | TEMPERATURE: 98 F | WEIGHT: 183.44 LBS | DIASTOLIC BLOOD PRESSURE: 88 MMHG | BODY MASS INDEX: 32.5 KG/M2 | SYSTOLIC BLOOD PRESSURE: 124 MMHG | HEART RATE: 102 BPM

## 2020-11-11 DIAGNOSIS — E78.5 HYPERLIPIDEMIA, UNSPECIFIED HYPERLIPIDEMIA TYPE: ICD-10-CM

## 2020-11-11 DIAGNOSIS — R26.9 GAIT DIFFICULTY: ICD-10-CM

## 2020-11-11 DIAGNOSIS — K21.9 GASTROESOPHAGEAL REFLUX DISEASE WITHOUT ESOPHAGITIS: ICD-10-CM

## 2020-11-11 DIAGNOSIS — M25.559 HIP PAIN: ICD-10-CM

## 2020-11-11 DIAGNOSIS — F41.9 ANXIETY: ICD-10-CM

## 2020-11-11 DIAGNOSIS — R73.09 ABNORMAL GLUCOSE: ICD-10-CM

## 2020-11-11 DIAGNOSIS — I10 ESSENTIAL HYPERTENSION: Primary | ICD-10-CM

## 2020-11-11 DIAGNOSIS — E87.1 HYPONATREMIA: ICD-10-CM

## 2020-11-11 DIAGNOSIS — F41.8 DEPRESSION WITH ANXIETY: ICD-10-CM

## 2020-11-11 PROCEDURE — 99214 PR OFFICE/OUTPT VISIT, EST, LEVL IV, 30-39 MIN: ICD-10-PCS | Mod: S$PBB,,, | Performed by: FAMILY MEDICINE

## 2020-11-11 PROCEDURE — 97113 AQUATIC THERAPY/EXERCISES: CPT | Mod: PO,CQ

## 2020-11-11 PROCEDURE — 99999 PR PBB SHADOW E&M-EST. PATIENT-LVL IV: ICD-10-PCS | Mod: PBBFAC,,, | Performed by: FAMILY MEDICINE

## 2020-11-11 PROCEDURE — 99214 OFFICE O/P EST MOD 30 MIN: CPT | Mod: S$PBB,,, | Performed by: FAMILY MEDICINE

## 2020-11-11 PROCEDURE — 99999 PR PBB SHADOW E&M-EST. PATIENT-LVL IV: CPT | Mod: PBBFAC,,, | Performed by: FAMILY MEDICINE

## 2020-11-11 PROCEDURE — 99214 OFFICE O/P EST MOD 30 MIN: CPT | Mod: PBBFAC,PO | Performed by: FAMILY MEDICINE

## 2020-11-11 RX ORDER — VENLAFAXINE HYDROCHLORIDE 37.5 MG/1
37.5 CAPSULE, EXTENDED RELEASE ORAL DAILY
Qty: 30 CAPSULE | Refills: 11
Start: 2020-11-11 | End: 2021-08-02

## 2020-11-11 RX ORDER — IRBESARTAN 300 MG/1
300 TABLET ORAL NIGHTLY
Qty: 90 TABLET | Refills: 3
Start: 2020-11-11 | End: 2021-03-01

## 2020-11-11 RX ORDER — CHLORTHALIDONE 25 MG/1
TABLET ORAL
Qty: 90 TABLET | Refills: 3 | Status: SHIPPED | OUTPATIENT
Start: 2020-11-11 | End: 2021-06-08 | Stop reason: SDUPTHER

## 2020-11-11 NOTE — PROGRESS NOTES
Physical Therapy Aquatic Treatment Note     Name: Lucinda Aguilera  Clinic Number: [id number]    Therapy Diagnosis:   Encounter Diagnoses   Name Primary?    Hip pain     Gait difficulty      Physician: Lorne Cowan,*  Visit Date: 11/11/2020  Physician Orders: PT Eval and Treat   Medical Diagnosis from Referral: M70.71 (ICD-10-CM) - Bursitis of right hip, unspecified bursa      Evaluation Date: 10/27/2020  Authorization Period Expiration: 09/3/2021  Plan of Care Expiration: 12/22/2020  Visit # / Visits authorized: 4/19      Time In: 10:00  Time Out: 10:45  Total Billable Time: 45 minutes     Precautions: Standard      Subjective     Pt reports: she is again w/o c/o hip pn upon arrival today,   she was not issued a home exercise program last session.     Pain: 0/10  Location: bilateral hips    Objective     Lucinda received aquatic exercises to develop strength, endurance, ROM, flexibility, posture and core stabilization for 41 minutes including:  AMB FWD,BWD,Side 3 min eac    Stretches 3 x 20 sec  HSS  Quad    LE exs 20x each 1.5#  Mini Squat with QS  Heel Raise with GS  Hip flex/ext  Hip ABD/ADD  HS Curl  HIp Circles CW/CCW  Step-ups    Home Exercises Provided and Patient Education Provided     Education provided:   - progress towards goals   - role of therapy     Written Home Exercises Provided: Patient was not issued HEP for pool.  Exercises were reviewed and Lucinda was able to demonstrate them prior to the end of the session.   Pt received a written copy of exercises to perform at home.     Lucinda demonstrated good  understanding of the education provided.     Assessment     Lucinda fausto today's tx with progression of resistance with aquatic ther ex well  w/o c/o pn sx. She requires S and mod VCs to remain on task and to maintain proper form, posture, ROM with exs post instruction, She was w/o c/o hip pn throughout tx. Exs performed on both platform and deep part of pool.   Lucinda is progressing well  towards her goals.   Pt prognosis is Good.     Pt will continue to benefit from skilled outpatient physical therapy to address the deficits listed in the problem list box on initial evaluation, provide pt/family education and to maximize pt's level of independence in the home and community environment.     Pt's spiritual, cultural and educational needs considered and pt agreeable to plan of care and goals.    Anticipated barriers to physical therapy: none    Goals:   Short Term Goals: 3-4 weeks   Decrease pain with walking 5 min straight by 2 points or more   Increase hip Abd strength by 1/2 grade     Long Term Goals:8 weeks   Able to walk x 15 min with 3/10 or less pain  Bilateral hip Abd 4+/5  Independent with HEP      Plan     Continue 2x per week. Outpatient Physical Therapy 2 times weekly for 8 weeks to include the following interventions: Aquatic Therapy, Manual Therapy, Moist Heat/ Ice, Neuromuscular Re-ed, Patient Education and Therapeutic Exercise       Manuel Garcia, PTA

## 2020-11-11 NOTE — PROGRESS NOTES
Subjective:       Patient ID: Lucinda Aguilera is a 69 y.o. female.    Chief Complaint: 6 MONTH F/U    HPI Comments: Here for f/u on chronic health issues    Depression - Mood controlled on Effexor XR 37.5mg; she had accidentally not stopped paxil after we switched to the Effexor.  HLD - tolerating pravachol 40mg daily  Anxiety - Taking Xanax 0.5mg 1 tablet BID. She feels like this is well-controlled  HTN - tolerating Avapro 300mg daily and Hygroten 25mg daily and amlodipine; BP controlled; digital flowsheet reviewed  GERD - tolerating Protonix 40mg daily and zantac 300mg QHS  S/p laprascopic conner - doing well; appetite normal; BM2 normal    Doing aqua therapy now      Past Medical History:    Hypertension                                                  Anxiety                                           Hyperlipemia                                                  GERD (gastroesophageal reflux disease)                        Cataract                                                        Comment:OU    PVD (posterior vitreous detachment)                             Comment:OD    Arthritis                                                       Comment:right thumb    Past Surgical History:    chest abcess                                                     Comment:child    KNEE ARTHROSCOPY W/ LASER                                        Comment:right    COLONOSCOPY                                      1/2012          Comment:repeat in 5 years    No Known Allergies    Social History    Marital Status:              Spouse Name:                       Years of Education:                 Number of children: 2             Occupational History  Occupation          Employer            Comment               retired                                   retired teacher an*                         Social History Main Topics    Smoking Status: Never Smoker                      Smokeless Status: Never Used                         Alcohol Use: Yes                Comment: social    Drug Use: No              Sexual Activity: Yes               Partners with: Male       Birth Control/Protection: None, Post-menopausal    Current Outpatient Medications on File Prior to Visit   Medication Sig Dispense Refill    acetaminophen (TYLENOL) 500 MG tablet Take 500 mg by mouth every 6 (six) hours as needed for Pain.      ALPRAZolam (XANAX) 0.5 MG tablet TAKE 1 AND 1/2 TABLETS(0.75 MG) BY MOUTH TWICE DAILY AS NEEDED FOR ANXIETY 90 tablet 5    aluminum & magnesium hydroxide-simethicone (MYLANTA MAXIMUM STRENGTH) 400-400-40 mg/5 mL suspension Take 10 mLs by mouth every 6 (six) hours as needed for Indigestion.      ascorbic acid, vitamin C, (VITAMIN C) 250 MG tablet Take 250 mg by mouth once daily.      aspirin (ECOTRIN) 81 MG EC tablet Take 81 mg by mouth once daily.      carboxymethylcellulose (REFRESH PLUS) 0.5 % Dpet 1 drop 3 (three) times daily as needed.      fish oil-omega-3 fatty acids 300-1,000 mg capsule Take 2 g by mouth once daily.      Lactobac no.41/Bifidobact no.7 (PROBIOTIC-10 ORAL) Take by mouth.      meloxicam (MOBIC) 15 MG tablet Take 1 tablet (15 mg total) by mouth once daily. 30 tablet 11    multivitamin (MULTIVITAMIN) per tablet Take 1 tablet by mouth once daily.        niacin 500 MG CpSR Take 500 mg by mouth every evening.        pantoprazole (PROTONIX) 40 MG tablet TAKE 1 TABLET(40 MG) BY MOUTH BEFORE BREAKFAST 90 tablet 3    potassium chloride (MICRO-K) 10 MEQ CpSR TAKE 2 CAPSULES BY MOUTH DAILY 180 capsule 0    pravastatin (PRAVACHOL) 40 MG tablet TAKE 1 TABLET(40 MG) BY MOUTH EVERY DAY 90 tablet 1    vitamin D (VITAMIN D3) 1000 units Tab Take 1,000 Units by mouth once daily.      [DISCONTINUED] Al hyd-Mg tr-alg ac-sod bicarb (GAVISCON) 80-14.2 mg Chew Take 1 tablet by mouth every 6 (six) hours as needed.       [DISCONTINUED] chlorthalidone (HYGROTEN) 25 MG Tab TAKE 1 TABLET BY MOUTH EVERY MORNING 90 tablet 3     [DISCONTINUED] fluticasone propionate (FLONASE) 50 mcg/actuation nasal spray 1 spray (50 mcg total) by Each Nostril route once daily. 16 g 0    [DISCONTINUED] HYDROcodone-acetaminophen (NORCO) 5-325 mg per tablet Take 1 tablet by mouth every 6 (six) hours as needed for Pain. 5 tablet 0    [DISCONTINUED] irbesartan (AVAPRO) 300 MG tablet Take 1 tablet (300 mg total) by mouth every evening. 90 tablet 3    [DISCONTINUED] paroxetine (PAXIL) 10 MG tablet       [DISCONTINUED] paroxetine (PAXIL) 20 MG tablet       [DISCONTINUED] phenylephrine-shark liver oil-mineral oil-petrolatum (PREPARATION H) rectal ointment Place rectally 2 (two) times daily as needed for Hemorrhoids.      [DISCONTINUED] venlafaxine (EFFEXOR-XR) 37.5 MG 24 hr capsule Take 1 capsule (37.5 mg total) by mouth once daily. 30 capsule 11    [DISCONTINUED] cimetidine (TAGAMET) 800 MG tablet Take 1 tablet (800 mg total) by mouth every evening. 30 tablet 2     Current Facility-Administered Medications on File Prior to Visit   Medication Dose Route Frequency Provider Last Rate Last Dose    diphenhydrAMINE injection 25 mg  25 mg Intravenous Q6H PRN Igor Ramires MD        [DISCONTINUED] HYDROmorphone injection 0.5 mg  0.5 mg Intravenous Q5 Min PRN Igor Ramires MD        [DISCONTINUED] lactated ringers infusion   Intravenous Continuous Jacqueline Volpi-Abadie, MD   Stopped at 10/07/20 1301    [DISCONTINUED] lorazepam injection 0.25 mg  0.25 mg Intravenous Once PRN Igor Ramires MD        [DISCONTINUED] ondansetron injection 4 mg  4 mg Intravenous Daily PRN Igor Ramires MD        [DISCONTINUED] sodium chloride 0.9% bolus 250 mL  250 mL Intravenous Once Igor Ramires MD         Review of patient's family history indicates:    Hypertension                   Mother                    Heart disease                  Mother                    Stroke                         Father                    Review of Systems  "  Constitutional: Negative for fever, chills, appetite change and unexpected weight change.   HENT: Negative for sore throat and trouble swallowing.    Eyes: Negative for pain and visual disturbance.   Respiratory: Negative for cough, shortness of breath and wheezing.    Cardiovascular: Negative for chest pain and palpitations.   Gastrointestinal: Negative for nausea, vomiting, abdominal pain, diarrhea and blood in stool.   Genitourinary: Negative for dysuria, hematuria and difficulty urinating.   Musculoskeletal: Negative for arthralgias, gait problem and neck pain.   Skin: Negative for rash and wound.   Neurological: Negative for dizziness, weakness, numbness and headaches.   Hematological: Negative for adenopathy.   Psychiatric/Behavioral: Negative for dysphoric mood.         Objective:       /88 (BP Location: Left arm, Patient Position: Sitting)   Pulse 102   Temp 98.4 °F (36.9 °C) (Oral)   Ht 5' 3" (1.6 m)   Wt 83.2 kg (183 lb 6.8 oz)   LMP 04/18/2004   SpO2 95%   BMI 32.49 kg/m²     Physical Exam   Constitutional: She is oriented to person, place, and time. She appears well-developed and well-nourished.   HENT:   Head: Normocephalic.   Mouth/Throat: Oropharynx is clear and moist. No oropharyngeal exudate or posterior oropharyngeal erythema.   Eyes: Conjunctivae and EOM are normal. Pupils are equal, round, and reactive to light.   Neck: Normal range of motion. Neck supple. No thyromegaly present.   Cardiovascular: Normal rate, regular rhythm, S1 normal, S2 normal, normal heart sounds and intact distal pulses.  Exam reveals no gallop and no friction rub.    No murmur heard.  Pulmonary/Chest: Effort normal and breath sounds normal. She has no wheezes. She has no rales.   Abdominal: Normal appearance.   Musculoskeletal:        Right lower leg: She exhibits no edema.        Left lower leg: She exhibits no edema.   Lymphadenopathy:     She has no cervical adenopathy.   Neurological: She is alert and " oriented to person, place, and time. No cranial nerve deficit. Gait normal.   Skin: Skin is warm and intact. No rash noted.   Psychiatric: She has a normal mood and affect.         Results for orders placed or performed in visit on 11/04/20   Comprehensive metabolic panel   Result Value Ref Range    Sodium 128 (L) 136 - 145 mmol/L    Potassium 4.2 3.5 - 5.1 mmol/L    Chloride 93 (L) 95 - 110 mmol/L    CO2 23 23 - 29 mmol/L    Glucose 141 (H) 70 - 110 mg/dL    BUN 15 8 - 23 mg/dL    Creatinine 0.9 0.5 - 1.4 mg/dL    Calcium 9.9 8.7 - 10.5 mg/dL    Total Protein 8.0 6.0 - 8.4 g/dL    Albumin 4.2 3.5 - 5.2 g/dL    Total Bilirubin 0.7 0.1 - 1.0 mg/dL    Alkaline Phosphatase 110 55 - 135 U/L    AST 34 10 - 40 U/L    ALT 31 10 - 44 U/L    Anion Gap 12 8 - 16 mmol/L    eGFR if African American >60.0 >60 mL/min/1.73 m^2    eGFR if non African American >60.0 >60 mL/min/1.73 m^2   Lipid Panel   Result Value Ref Range    Cholesterol 215 (H) 120 - 199 mg/dL    Triglycerides 178 (H) 30 - 150 mg/dL    HDL 66 40 - 75 mg/dL    LDL Cholesterol 113.4 63.0 - 159.0 mg/dL    HDL/Cholesterol Ratio 30.7 20.0 - 50.0 %    Total Cholesterol/HDL Ratio 3.3 2.0 - 5.0    Non-HDL Cholesterol 149 mg/dL     *Note: Due to a large number of results and/or encounters for the requested time period, some results have not been displayed. A complete set of results can be found in Results Review.       Assessment:       1. Essential hypertension    2. Hyperlipidemia, unspecified hyperlipidemia type    3. Depression with anxiety    4. Gastroesophageal reflux disease without esophagitis    5. Hyponatremia    6. Abnormal glucose    7. Anxiety        Plan:       Essential hypertension  -     chlorthalidone (HYGROTEN) 25 MG Tab; TAKE 1 TABLET BY MOUTH EVERY MORNING for blood pressure  Dispense: 90 tablet; Refill: 3  -     irbesartan (AVAPRO) 300 MG tablet; Take 1 tablet (300 mg total) by mouth every evening. For blood pressure  Dispense: 90 tablet; Refill:  3    Hyperlipidemia, unspecified hyperlipidemia type    Depression with anxiety    Gastroesophageal reflux disease without esophagitis    Hyponatremia  -     Comprehensive Metabolic Panel; Future; Expected date: 11/11/2020    Abnormal glucose  -     Hemoglobin A1C; Future; Expected date: 11/11/2020    Anxiety  -     venlafaxine (EFFEXOR-XR) 37.5 MG 24 hr capsule; Take 1 capsule (37.5 mg total) by mouth once daily. For depression  Dispense: 30 capsule; Refill: 11        Stop paxil - likely cause of hyponatremia as well  Xanax 1.5 BID PRN  cotninue aspirin 81mg daily  Continue other present meds  Counseled on regular exercise, maintenance of a healthy weight, balanced diet rich in fruits/vegetables and lean protein, and avoidance of unhealthy habits like smoking and excessive alcohol intake.  Repeat labs in 4 weeks

## 2020-11-12 ENCOUNTER — PATIENT MESSAGE (OUTPATIENT)
Dept: FAMILY MEDICINE | Facility: CLINIC | Age: 70
End: 2020-11-12

## 2020-11-12 DIAGNOSIS — I10 ESSENTIAL HYPERTENSION: ICD-10-CM

## 2020-11-12 RX ORDER — POTASSIUM CHLORIDE 750 MG/1
20 CAPSULE, EXTENDED RELEASE ORAL DAILY
Qty: 180 CAPSULE | Refills: 3 | Status: SHIPPED | OUTPATIENT
Start: 2020-11-12 | End: 2021-12-15

## 2020-11-12 RX ORDER — POTASSIUM CHLORIDE 750 MG/1
20 CAPSULE, EXTENDED RELEASE ORAL DAILY
Qty: 180 CAPSULE | Refills: 3 | Status: SHIPPED | OUTPATIENT
Start: 2020-11-12 | End: 2020-11-12 | Stop reason: SDUPTHER

## 2020-11-12 NOTE — TELEPHONE ENCOUNTER
No new care gaps identified.  Powered by [employer]. Reference number: [id number]. 11/12/2020 9:26:03 AM   CST

## 2020-11-12 NOTE — PROGRESS NOTES
Refill Authorization Note   Lucinda Aguilera is requesting a refill authorization.  Brief assessment and rationale for refill: Approve     Medication Therapy Plan: CD    Medication reconciliation completed: No   Comments:   Orders Placed This Encounter    potassium chloride (MICRO-K) 10 MEQ CpSR      Requested Prescriptions   Signed Prescriptions Disp Refills    potassium chloride (MICRO-K) 10 MEQ CpSR 180 capsule 3     Sig: Take 2 capsules (20 mEq total) by mouth once daily.       Endocrinology:  Minerals - Potassium Supplementation Passed - 11/12/2020  9:25 AM        Passed - Patient is at least 18 years old        Passed - Office visit in past 12 months or future 90 days     Recent Outpatient Visits            Yesterday Essential hypertension    [facility] Saurabh Hassan MD    2 weeks ago Primary osteoarthritis of right foot    [place] - Podiatry Renae Rios DPM    3 weeks ago Papilloma of left breast    [facility] - Breast Surgery Brooklynn Ashford MD    1 month ago Bilateral hip pain    [facility] Lorne Cowan MD    1 month ago Papilloma of left breast    [facility] On[facility] Brooklynn Ashford MD          Future Appointments              Tomorrow Manuel Garcia, RAPHAEL [facility], [place]    In 5 days Manuel Garcia, RAPHAEL [facility], [place]    In 1 week Arash Lovell, PT [facility], [place]    In 4 weeks LAB, [place] [facility]    In 6 months Saurabh Hassan MD [facility]                Passed - K in normal range and within 360 days     Potassium   Date Value Ref Range Status   11/04/2020 4.2 3.5 - 5.1 mmol/L Final   09/16/2020 4.3 3.5 - 5.1 mmol/L Final   12/23/2019 3.9 3.5 - 5.1 mmol/L Final              Passed - Cr is 1.4 or below and within 360 days      Creatinine   Date Value Ref Range Status   11/04/2020 0.9 0.5 - 1.4 mg/dL Final   09/16/2020 0.74 0.50 - 1.40 mg/dL Final   12/23/2019 0.8 0.5 - 1.4 mg/dL Final              Passed - eGFR within 360 days     eGFR if non    Date Value Ref Range Status   11/04/2020 >60.0 >60 mL/min/1.73 m^2 Final     Comment:     Calculation used to obtain the estimated glomerular filtration  rate (eGFR) is the CKD-EPI equation.      09/16/2020 >60 >60 mL/min/1.73 m^2 Final     Comment:     Calculation used to obtain the estimated glomerular filtration  rate (eGFR) is the CKD-EPI equation.      12/23/2019 >60.0 >60 mL/min/1.73 m^2 Final     Comment:     Calculation used to obtain the estimated glomerular filtration  rate (eGFR) is the CKD-EPI equation.        eGFR if    Date Value Ref Range Status   11/04/2020 >60.0 >60 mL/min/1.73 m^2 Final   09/16/2020 >60 >60 mL/min/1.73 m^2 Final   12/23/2019 >60.0 >60 mL/min/1.73 m^2 Final                  Appointments  past 12m or future 3m with PCP    Date Provider   Last Visit   11/11/2020 Saurabh Hassan MD   Next Visit   5/11/2021 Saurabh Hassan MD   ED visits in past 90 days: 0     Note composed:12:11 PM 11/12/2020

## 2020-11-12 NOTE — TELEPHONE ENCOUNTER
No new care gaps identified.  Powered by [employer]. Reference number: [id number]. 11/12/2020 2:28:56 PM   CST

## 2020-11-13 ENCOUNTER — CLINICAL SUPPORT (OUTPATIENT)
Dept: REHABILITATION | Facility: HOSPITAL | Age: 70
End: 2020-11-13
Attending: ORTHOPAEDIC SURGERY
Payer: MEDICARE

## 2020-11-13 DIAGNOSIS — R26.9 GAIT DIFFICULTY: ICD-10-CM

## 2020-11-13 DIAGNOSIS — M25.559 HIP PAIN: ICD-10-CM

## 2020-11-13 PROCEDURE — 97113 AQUATIC THERAPY/EXERCISES: CPT | Mod: PO,CQ

## 2020-11-13 NOTE — PROGRESS NOTES
Physical Therapy Aquatic Treatment Note     Name: Lucinda Aguilera  Clinic Number: [id number]    Therapy Diagnosis:   Encounter Diagnoses   Name Primary?    Hip pain     Gait difficulty      Physician: Lorne Cowan,*  Visit Date: 11/13/2020  Physician Orders: PT Eval and Treat   Medical Diagnosis from Referral: M70.71 (ICD-10-CM) - Bursitis of right hip, unspecified bursa      Evaluation Date: 10/27/2020  Authorization Period Expiration: 09/3/2021  Plan of Care Expiration: 12/22/2020  Visit # / Visits authorized: 5/19      Time In: 8:30  Time Out: 9:15  Total Billable Time: 45 minutes     Precautions: Standard      Subjective     Pt reports: that she has some gen mm soreness for a day post last tx. .She is w/o complaint today,   she was not issued a home exercise program last session.     Pain: 0/10  Location: bilateral hips    Objective     Lucinda received aquatic exercises to develop strength, endurance, ROM, flexibility, posture and core stabilization for 41 minutes including:  AMB FWD,BWD,Side 3 min eac    Stretches 3 x 20 sec  HSS  Quad    LE exs 20x each 1.5#  Mini Squat with QS  Heel Raise with GS  Hip flex/ext  Hip ABD/ADD  HS Curl  HIp Circles CW/CCW  Step-ups    Home Exercises Provided and Patient Education Provided     Education provided:   - progress towards goals   - role of therapy     Written Home Exercises Provided: Patient was not issued HEP for pool.  Exercises were reviewed and Lucinda was able to demonstrate them prior to the end of the session.   Pt received a written copy of exercises to perform at home.     Lucinda demonstrated good  understanding of the education provided.     Assessment     Lucinda fausto today's tx with aquatic ther ex well  w/o c/o pn sx. She did report some fatigue post tx but no pn.  She requires S and mod VCs to remain on task and to maintain proper form, posture, ROM with exs post instruction, She was w/o c/o hip pn throughout tx. Exs performed on both platform  and deep part of pool.   Lucinda is progressing well towards her goals.   Pt prognosis is Good.     Pt will continue to benefit from skilled outpatient physical therapy to address the deficits listed in the problem list box on initial evaluation, provide pt/family education and to maximize pt's level of independence in the home and community environment.     Pt's spiritual, cultural and educational needs considered and pt agreeable to plan of care and goals.    Anticipated barriers to physical therapy: none    Goals:   Short Term Goals: 3-4 weeks   Decrease pain with walking 5 min straight by 2 points or more   Increase hip Abd strength by 1/2 grade     Long Term Goals:8 weeks   Able to walk x 15 min with 3/10 or less pain  Bilateral hip Abd 4+/5  Independent with HEP      Plan     Continue 2x per week. Outpatient Physical Therapy 2 times weekly for 8 weeks to include the following interventions: Aquatic Therapy, Manual Therapy, Moist Heat/ Ice, Neuromuscular Re-ed, Patient Education and Therapeutic Exercise       Manuel Garcia, PTA

## 2020-11-17 ENCOUNTER — PATIENT MESSAGE (OUTPATIENT)
Dept: FAMILY MEDICINE | Facility: CLINIC | Age: 70
End: 2020-11-17

## 2020-11-17 ENCOUNTER — CLINICAL SUPPORT (OUTPATIENT)
Dept: REHABILITATION | Facility: HOSPITAL | Age: 70
End: 2020-11-17
Attending: ORTHOPAEDIC SURGERY
Payer: MEDICARE

## 2020-11-17 DIAGNOSIS — R26.9 GAIT DIFFICULTY: ICD-10-CM

## 2020-11-17 DIAGNOSIS — R51.9 HEAD ACHE: ICD-10-CM

## 2020-11-17 DIAGNOSIS — R05.9 COUGH: ICD-10-CM

## 2020-11-17 DIAGNOSIS — M25.559 HIP PAIN: ICD-10-CM

## 2020-11-17 DIAGNOSIS — Z20.822 EXPOSURE TO COVID-19 VIRUS: Primary | ICD-10-CM

## 2020-11-17 PROCEDURE — 97113 AQUATIC THERAPY/EXERCISES: CPT | Mod: PO,CQ

## 2020-11-17 NOTE — PROGRESS NOTES
"  Physical Therapy Aquatic Treatment Note     Name: Lucinda Aguilera  Clinic Number: [id number]    Therapy Diagnosis:   Encounter Diagnoses   Name Primary?    Hip pain     Gait difficulty      Physician: Lorne Cowan,*  Visit Date: 11/17/2020  Physician Orders: PT Eval and Treat   Medical Diagnosis from Referral: M70.71 (ICD-10-CM) - Bursitis of right hip, unspecified bursa      Evaluation Date: 10/27/2020  Authorization Period Expiration: 09/3/2021  Plan of Care Expiration: 12/22/2020  Visit # / Visits authorized: 6/19      Time In: 8:45  Time Out: 9:30  Total Billable Time: 45 minutes     Precautions: Standard      Subjective     Pt reports: that she returned to work yesterday and reports 6/10 pn today. She states "I am better than I was".   she was not issued a home exercise program last session.     Pain: 6/10  Location: bilateral hips    Objective     Lucinda received aquatic exercises to develop strength, endurance, ROM, flexibility, posture and core stabilization for 41 minutes including:  AMB FWD,BWD,Side 3 min eac    Stretches 3 x 20 sec  HSS  Quad    LE exs 20x each 2#  Mini Squat with QS  Heel Raise with GS  Hip flex/ext  Hip ABD/ADD  HS Curl  HIp Circles CW/CCW  Step-ups    Home Exercises Provided and Patient Education Provided     Education provided:   - progress towards goals   - role of therapy     Written Home Exercises Provided: Patient was not issued HEP for pool.  Exercises were reviewed and Lucinda was able to demonstrate them prior to the end of the session.   Pt received a written copy of exercises to perform at home.     Lucinda demonstrated good  understanding of the education provided.     Assessment     Lucinda fausto today's tx with progression of resistance with aquatic ther ex well  w/o c/o pn sx.  She requires  some S and occasional VCs to remain on task and to maintain proper form, posture, ROM with exs post instruction, She was w/o c/o hip pn throughout tx. Exs performed on both " platform and deep part of pool.   Lucinda is progressing well towards her goals.   Pt prognosis is Good.     Pt will continue to benefit from skilled outpatient physical therapy to address the deficits listed in the problem list box on initial evaluation, provide pt/family education and to maximize pt's level of independence in the home and community environment.     Pt's spiritual, cultural and educational needs considered and pt agreeable to plan of care and goals.    Anticipated barriers to physical therapy: none    Goals:   Short Term Goals: 3-4 weeks   Decrease pain with walking 5 min straight by 2 points or more   Increase hip Abd strength by 1/2 grade     Long Term Goals:8 weeks   Able to walk x 15 min with 3/10 or less pain  Bilateral hip Abd 4+/5  Independent with HEP      Plan     Pt to be reassessed by PT next visit       Manuel Garcia PTA

## 2020-11-18 ENCOUNTER — OFFICE VISIT (OUTPATIENT)
Dept: URGENT CARE | Facility: CLINIC | Age: 70
End: 2020-11-18
Payer: MEDICARE

## 2020-11-18 ENCOUNTER — PATIENT MESSAGE (OUTPATIENT)
Dept: FAMILY MEDICINE | Facility: CLINIC | Age: 70
End: 2020-11-18

## 2020-11-18 VITALS
SYSTOLIC BLOOD PRESSURE: 140 MMHG | OXYGEN SATURATION: 95 % | RESPIRATION RATE: 16 BRPM | BODY MASS INDEX: 32.43 KG/M2 | HEIGHT: 63 IN | TEMPERATURE: 98 F | DIASTOLIC BLOOD PRESSURE: 74 MMHG | WEIGHT: 183 LBS | HEART RATE: 92 BPM

## 2020-11-18 DIAGNOSIS — Z20.822 EXPOSURE TO COVID-19 VIRUS: ICD-10-CM

## 2020-11-18 DIAGNOSIS — R51.9 INTRACTABLE HEADACHE, UNSPECIFIED CHRONICITY PATTERN, UNSPECIFIED HEADACHE TYPE: ICD-10-CM

## 2020-11-18 DIAGNOSIS — R05.9 COUGH: ICD-10-CM

## 2020-11-18 PROCEDURE — 99214 PR OFFICE/OUTPT VISIT, EST, LEVL IV, 30-39 MIN: ICD-10-PCS | Mod: S$GLB,CS,, | Performed by: PHYSICIAN ASSISTANT

## 2020-11-18 PROCEDURE — U0003 INFECTIOUS AGENT DETECTION BY NUCLEIC ACID (DNA OR RNA); SEVERE ACUTE RESPIRATORY SYNDROME CORONAVIRUS 2 (SARS-COV-2) (CORONAVIRUS DISEASE [COVID-19]), AMPLIFIED PROBE TECHNIQUE, MAKING USE OF HIGH THROUGHPUT TECHNOLOGIES AS DESCRIBED BY CMS-2020-01-R: HCPCS

## 2020-11-18 PROCEDURE — 99214 OFFICE O/P EST MOD 30 MIN: CPT | Mod: S$GLB,CS,, | Performed by: PHYSICIAN ASSISTANT

## 2020-11-18 NOTE — PATIENT INSTRUCTIONS
Symptomatic treatment:    Alternate Tylenol and Ibuprofen every 3 hrs  salt water gargles to soothe throat  Honey/lemon water to soothe throat  Cold-eeze helps to reduce the duration of URI symptoms  Elderberry to reduce duration of URI symptoms  Cepachol helps to numb the discomfort in throat  Nasal saline spray reduces inflammation and dryness  Warm face compresses/hot showers as often as you can to open sinuses   Vicks vapor rub at night  Flonase OTC or Nasacort OTC  Simple foods like chicken noodle soup help hydrate  Delsym helps with coughing at night  Zyrtec/Claritin during the day and Benadryl at night may help if allergy component   Zantac will help if there is reflux from the post nasal drip  Rest as much as you can  Your symptoms will likely last 5-7 days, maybe longer depending on how it affects your body.  You are contagious 5-7, so minimize contact with others to reduce the spread to others. Dehydration is preventable but is one of the main reasons why you will feel so badly. Drink pedialyte, gatorade or propel. Stay hydrated.  Antibiotics are not needed unless a complication(such as Otitis Media, Bacterial sinus infection or pneumonia). Taking antibiotics for Flu/Cold is not supported by evidence-based medicine and can expose you to unnecessary side effects of the medication, such as anaphylaxis.   If you experience any:  Chest pain, shortness of breath, wheezing or difficulty breathing  Severe headache, face, neck or ear pain  New rash  Fever over 101.5º F (38.6 C) for more than three days  Confusion, behavior change or seizure  Severe weakness or dizziness  Go to ER       Prevention steps for patients with confirmed or suspected COVID-19     Stay home except to get medical care.   Separate yourself from other people and animals in your home   Call ahead before visiting your doctor.   Wear a facemask.   Cover your coughs and sneezes.   Clean your hands often.   Avoid sharing personal household  items.   Clean all high-touch surfaces every day.   Monitor your symptoms. Seek prompt medical attention if your illness is worsening (e.g., difficulty breathing). Before seeking care, call your healthcare provider.   If you have a medical emergency and need to call 911, notify the dispatch personnel that you have, or are being evaluated for COVID-19. If possible, put on a facemask before emergency medical services arrive.   Discontinuing home isolation. Call your provider about guidance to discontinue home isolation.    Recommended precautions for household members, intimate partners, and caregivers in a nonhealthcare setting of a patient with symptomatic laboratory-confirmed COVID-19 or a patient under investigation.  Household members, intimate partners, and caregivers in a nonhealthcare setting may have close contact with a person with symptomatic, laboratory-confirmed COVID-19 or a person under investigation. Close contacts should monitor their health; they should call their healthcare provider right away if they develop symptoms suggestive of COVID-19 (e.g., fever, cough, shortness of breath).    Close contacts should also follow these recommendations:   Make sure that you understand and can help the patient follow their healthcare provider's instructions for medication(s) and care. You should help the patient with basic needs in the home and provide support for getting groceries, prescriptions, and other personal needs.   Monitor the patient's symptoms. If the patient is getting sicker, call his or her healthcare provider and tell them that the patient has laboratory-confirmed COVID-19. This will help the healthcare provider's office take steps to keep other people in the office or waiting room from getting infected. Ask the healthcare provider to call the local or state health department for additional guidance. If the patient has a medical emergency and you need to call 911, notify the dispatch  personnel that the patient has, or is being evaluated for COVID-19.   Household members should stay in another room or be  from the patient as much as possible. Household members should use a separate bedroom and bathroom, if available.   Prohibit visitors who do not have an essential need to be in the home.   Household members should care for any pets in the home. Do not handle pets or other animals while sick.   Make sure that shared spaces in the home have good air flow, such as by an air conditioner or an opened window, weather permitting.   Perform hand hygiene frequently. Wash your hands often with soap and water for at least 20 seconds or use an alcohol-based hand  that contains 60 to 95% alcohol, covering all surfaces of your hands and rubbing them together until they feel dry. Soap and water should be used preferentially if hands are visibly dirty.   Avoid touching your eyes, nose, and mouth with unwashed hands.   The patient should wear a facemask when you are around other people. If the patient is not able to wear a facemask (for example, because it causes trouble breathing), you, as the caregiver should wear a mask when you are in the same room as the patient.   Wear a disposable facemask and gloves when you touch or have contact with the patient's blood, stool, or body fluids, such as saliva, sputum, nasal mucus, vomit, urine.  o Throw out disposable facemasks and gloves after using them. Do not reuse.  o When removing personal protective equipment, first remove and dispose of gloves. Then, immediately clean your hands with soap and water or alcohol-based hand . Next, remove and dispose of facemask, and immediately clean your hands again with soap and water or alcohol-based hand .   Avoid sharing household items with the patient. You should not share dishes, drinking glasses, cups, eating utensils, towels, bedding, or other items. After the patient uses these  items, you should wash them thoroughly (see below Wash laundry thoroughly).   Clean all high-touch surfaces, such as counters, tabletops, doorknobs, bathroom fixtures, toilets, phones, keyboards, tablets, and bedside tables, every day. Also, clean any surfaces that may have blood, stool, or body fluids on them.   Use a household cleaning spray or wipe, according to the label instructions. Labels contain instructions for safe and effective use of the cleaning product including precautions you should take when applying the product, such as wearing gloves and making sure you have good ventilation during use of the product.   Wash laundry thoroughly.  o Immediately remove and wash clothes or bedding that have blood, stool, or body fluids on them.  o Wear disposable gloves while handling soiled items and keep soiled items away from your body. Clean your hands (with soap and water or an alcohol-based hand ) immediately after removing your gloves.  o Read and follow directions on labels of laundry or clothing items and detergent. In general, using a normal laundry detergent according to washing machine instructions and dry thoroughly using the warmest temperatures recommended on the clothing label.   Place all used disposable gloves, facemasks, and other contaminated items in a lined container before disposing of them with other household waste. Clean your hands (with soap and water or an alcohol-based hand ) immediately after handling these items. Soap and water should be used preferentially if hands are visibly dirty.   Discuss any additional questions with your state or local health department or healthcare provider. Check available hours when contacting your local health department.    For more information see CDC link below.      https://www.cdc.gov/coronavirus/2019-ncov/hcp/guidance-prevent-spread.html#precautions        Sources:  [facility], [facility]         Sincerely,    Terrell Monroy PA-C

## 2020-11-18 NOTE — PROGRESS NOTES
"S: Do presents to clinic to establish care and for an IUD check. She moved here recently from [place]. She is here with her son, her partner is still in [place] but plans to come here soon. Working remotely in mental health. Had an IUD placed a couple months ago and was instructed to return to IUD check so would like to do that today. This IUD is working well for her. Long history of ovarian cysts, endometriosis. Currently doing well, asymptomatic. Periods are monthly, lasting 7 days. Prior to IUD her cycles were typically q35-42 days, last 7-10 days.    O: /73   Pulse 64   Temp 98.7  F (37.1  C) (Oral)   Ht 1.626 m (5' 4\")   Wt 68.8 kg (151 lb 11.2 oz)   LMP 11/18/2020 (Exact Date)   BMI 26.04 kg/m      PELVIC EXAM:  Vulva: BUS WNL, no lesions noted  Vagina: Discharge normal and physiologic, no lesions noted  Cervix: smooth, pink, no visible lesions, neg CMT, IUD strings noted to be extending from os about 2-3cm    Pt reports pap January 2019 - NIL, HPV negative, no h/o abnormals  Will send to abstraction    Medical, surgical and family history reviewed.    A: IUD check    P: Discussed IUD in appropriate position  Reviewed midwifery care vs MD care  Encouraged patient to reach out with any questions or concerns  RTC for annual exam in one year, sooner prn  Yocasta Mccabe CNM          " Subjective:       Patient ID: Lucinda Aguilera is a 68 y.o. female.    Chief Complaint: Neck Pain    Patient has had neck pain x 1 week, constantly worsening. Has been using taylor hunter, aleve and heating pad. Not helping. No injury, no fall. Today it is a 10/10 pain. Constant pain. Movement makes the pain worse. If she is completely still there is no pain. Pain is radiating up neck into head on the left side only. No pain anywhere else. Has never had any neck issues in the past, no change in exercise, no heavy lifting, nothing that she can think of that precipitated this pain.     There are no preventive care reminders to display for this patient.    Past Medical History:  Past Medical History:  No date: Anticoagulant long-term use  No date: Anxiety      Comment:  takes daily Xanax  No date: Arthritis      Comment:  right thumb  No date: Cataract      Comment:  OU  No date: Cholelithiasis  No date: GERD (gastroesophageal reflux disease)  No date: Hepatic steatosis  No date: Hyperlipemia  No date: Hypertension  No date: PVD (posterior vitreous detachment)      Comment:  OD  Past Surgical History:  9/19/2014: ARTHROSCOPY-KNEE; Right      Comment:  Performed by Ernesto Crain MD at [facility] OR  12/21/2017: ARTHROSCOPY-MENISCECTOMY; Left      Comment:  Performed by Tomas Etienne MD at [facility] OR  No date: chest abcess      Comment:  child  12/21/2017: CHONDROPLASTY-KNEE; Left      Comment:  Performed by Tomas Etienne MD at [facility] OR  01/06/2012: COLONOSCOPY      Comment:  Dr. Lopez: hemorrhoids, redundant colon  12/21/2017: DEBRIDEMENT-KNEE; Left      Comment:  Performed by Tomas Etienne MD at [facility] OR  7/13/2017: ESOPHAGOGASTRODUODENOSCOPY (EGD); N/A      Comment:  Performed by Nathan Lopez Jr., MD at [facility] ENDO  No date: JOINT REPLACEMENT  No date: KNEE ARTHROSCOPY W/ LASER      Comment:  right  06/03/2016: KNEE SURGERY; Right      Comment:  Total knee replacement  12/19/2014: REPAIR - COLLATERAL LIGAMENT  THUMB; Left      Comment:  Performed by Arash Rene Jr., MD at [facility] OR  6/19/2018: REPLACEMENT-KNEE-TOTAL; Left      Comment:  Performed by Nj Melvin MD at [facility] OR 2ND FLR  6/3/2016: REPLACEMENT-KNEE-TOTAL; Right      Comment:  Performed by Nj Melvin MD at [facility] OR 2ND FLR  12/21/2017: SYNOVECTOMY-KNEE; Left      Comment:  Performed by Tomas Etienne MD at [facility] OR  11/2014: thumb surgery  07/13/2017: UPPER GASTROINTESTINAL ENDOSCOPY      Comment:  Dr. Lopez: NERD, Gastric mucosal atrophy. antritis, Scar               in the incisura. Biopsied; biopsy: chronic gastritis.                negative for h pylori  Review of patient's allergies indicates:  No Known Allergies  Current Outpatient Medications on File Prior to Visit:  ALPRAZolam (XANAX) 0.5 MG tablet, TAKE 1 TABLET(0.5 MG) BY MOUTH TWICE DAILY AS NEEDED, Disp: 60 tablet, Rfl: 1  aspirin (ECOTRIN) 81 MG EC tablet, Take 1 tablet (81 mg total) by mouth 2 (two) times daily., Disp: 60 tablet, Rfl: 0  carboxymethylcellulose (REFRESH PLUS) 0.5 % Dpet, 1 drop 3 (three) times daily as needed., Disp: , Rfl:   chlorthalidone (HYGROTEN) 25 MG Tab, TAKE 1 TABLET BY MOUTH EVERY MORNING, DISCONTINUE HCTZ, Disp: 90 tablet, Rfl: 3  fish oil-omega-3 fatty acids 300-1,000 mg capsule, Take 2 g by mouth once daily., Disp: , Rfl:   irbesartan (AVAPRO) 300 MG tablet, Take 1 tablet (300 mg total) by mouth every evening. 1 po qd, Disp: 90 tablet, Rfl: 2  multivitamin (MULTIVITAMIN) per tablet, Take 1 tablet by mouth once daily.  , Disp: , Rfl:   mupirocin (BACTROBAN) 2 % ointment, Apply topically 3 (three) times daily., Disp: 22 g, Rfl: 0  naproxen sodium (ALEVE) 220 mg Cap, Take 220 mg by mouth 2 (two) times daily., Disp: , Rfl:   niacin 500 MG CpSR, Take 500 mg by mouth every evening.  , Disp: , Rfl:   omeprazole (PRILOSEC) 40 MG capsule, Take 1 capsule (40 mg total) by mouth before breakfast., Disp: 30 capsule, Rfl: 3  paroxetine (PAXIL) 10 MG tablet, TAKE 1 TABLET  BY MOUTH EVERY MORNING, Disp: 90 tablet, Rfl: 3  potassium chloride (MICRO-K) 10 MEQ CpSR, TAKE 2 CAPSULES BY MOUTH ONCE DAILY, Disp: 60 capsule, Rfl: 11  pravastatin (PRAVACHOL) 40 MG tablet, TAKE 1 TABLET(40 MG) BY MOUTH EVERY DAY, Disp: 90 tablet, Rfl: 1    No current facility-administered medications on file prior to visit.     Social History    Socioeconomic History      Marital status:       Spouse name: Not on file      Number of children: 2      Years of education: Not on file      Highest education level: Not on file    Occupational History      Occupation: retired      Occupation: retired teacher and principal    Social Needs      Financial resource strain: Not hard at all      Food insecurity:        Worry: Never true        Inability: Never true      Transportation needs:        Medical: No        Non-medical: No    Tobacco Use      Smoking status: Never Smoker      Smokeless tobacco: Never Used    Substance and Sexual Activity      Alcohol use: Yes        Frequency: Never        Drinks per session: 1 or 2        Binge frequency: Never        Comment: social- maybe once every few months      Drug use: No      Sexual activity: Yes        Partners: Male        Birth control/protection: None, Post-menopausal    Lifestyle      Physical activity:        Days per week: 5 days        Minutes per session: 90 min      Stress: Not at all    Relationships      Social connections:        Talks on phone: More than three times a week        Gets together: More than three times a week        Attends Gnosticism service: Not on file        Active member of club or organization: Yes        Attends meetings of clubs or organizations: More than 4 times per year        Relationship status:     Other Topics      Concerns:        Not on file    Social History Narrative      Not on file    Review of patient's family history indicates:  Problem: Hypertension      Relation: Mother          Age of Onset: (Not  Specified)  Problem: Heart disease      Relation: Mother          Age of Onset: (Not Specified)  Problem: Glaucoma      Relation: Mother          Age of Onset: (Not Specified)  Problem: Stroke      Relation: Father          Age of Onset: (Not Specified)  Problem: Glaucoma      Relation: Sister          Age of Onset: (Not Specified)  Problem: Cataracts      Relation: Sister          Age of Onset: (Not Specified)  Problem: Macular degeneration      Relation: Sister          Age of Onset: (Not Specified)  Problem: Breast cancer      Relation: Neg Hx          Age of Onset: (Not Specified)  Problem: Colon cancer      Relation: Neg Hx          Age of Onset: (Not Specified)  Problem: Crohn's disease      Relation: Neg Hx          Age of Onset: (Not Specified)  Problem: Ulcerative colitis      Relation: Neg Hx          Age of Onset: (Not Specified)            Review of Systems   Constitutional: Negative for activity change, fever and unexpected weight change.   HENT: Negative for hearing loss, rhinorrhea and trouble swallowing.    Eyes: Negative for photophobia, discharge and visual disturbance.   Respiratory: Negative for chest tightness and wheezing.    Cardiovascular: Negative for chest pain and palpitations.   Gastrointestinal: Negative for blood in stool, constipation, diarrhea and vomiting.   Endocrine: Negative for polydipsia and polyuria.   Genitourinary: Negative for difficulty urinating, dysuria, hematuria and menstrual problem.   Musculoskeletal: Positive for neck pain. Negative for arthralgias and joint swelling.   Skin: Negative for rash.   Neurological: Positive for light-headedness and headaches. Negative for dizziness, weakness and numbness.   Psychiatric/Behavioral: Negative for confusion and dysphoric mood.       Objective:      Physical Exam   HENT:   Head: Normocephalic and atraumatic.   Neck: Muscular tenderness present. No spinous process tenderness present. Normal range of motion present.        Cardiovascular: Normal rate.   Pulmonary/Chest: Effort normal.   Skin: Rash noted.   Vitals reviewed.      Assessment:       1. Neck pain    2. Herpes zoster without complication        Plan:     Suspect early shingles rash. Treat as such, follow up if not improving or immediately for worsening.   1. Neck pain    - X-Ray Cervical Spine AP And Lateral; Future  - predniSONE (DELTASONE) 20 MG tablet; 2 pills daily x 2 days, 1 pill daily x 2 days, 1/2 pill daily x 2 days.  Dispense: 7 tablet; Refill: 0    2. Herpes zoster without complication    - valACYclovir (VALTREX) 1000 MG tablet; Take 1 tablet (1,000 mg total) by mouth 3 (three) times daily. for 7 days  Dispense: 21 tablet; Refill: 0  - predniSONE (DELTASONE) 20 MG tablet; 2 pills daily x 2 days, 1 pill daily x 2 days, 1/2 pill daily x 2 days.  Dispense: 7 tablet; Refill: 0

## 2020-11-18 NOTE — PROGRESS NOTES
"Subjective:       Patient ID: Lucinda Aguilera is a 69 y.o. female.    Vitals:  height is 5' 3" (1.6 m) and weight is 83 kg (183 lb). Her temperature is 97.7 °F (36.5 °C). Her blood pressure is 140/74 (abnormal) and her pulse is 92. Her respiration is 16 and oxygen saturation is 95%.     Chief Complaint: COVID-19 Concerns    Patient reports headache, diarrhea, nausea, dizziness. Started Monday.    Headache   This is a new problem. The current episode started in the past 7 days. The problem has been unchanged. The pain is located in the frontal and temporal region. The quality of the pain is described as aching. The pain is at a severity of 3/10. The pain is moderate. Associated symptoms include abdominal pain, dizziness and nausea. Pertinent negatives include no blurred vision, coughing, fever, muscle aches, sinus pressure, sore throat, vomiting or weakness. She has tried acetaminophen for the symptoms. The treatment provided no relief. There is no history of migraine headaches or migraines in the family.       Constitution: Negative for chills, fatigue and fever.   HENT: Negative for congestion, sinus pressure and sore throat.    Neck: Negative for painful lymph nodes.   Cardiovascular: Negative for chest pain and leg swelling.   Eyes: Negative for double vision and blurred vision.   Respiratory: Negative for cough and shortness of breath.    Gastrointestinal: Positive for abdominal pain and nausea. Negative for vomiting and diarrhea.   Genitourinary: Negative for dysuria, frequency, urgency and history of kidney stones.   Musculoskeletal: Negative for joint pain, joint swelling, muscle cramps and muscle ache.   Skin: Negative for color change, pale, rash and bruising.   Allergic/Immunologic: Negative for seasonal allergies.   Neurological: Positive for dizziness and headaches. Negative for history of vertigo, light-headedness, passing out and history of migraines.   Hematologic/Lymphatic: Negative for swollen " lymph nodes.   Psychiatric/Behavioral: Negative for nervous/anxious, sleep disturbance and depression. The patient is not nervous/anxious.        Objective:      Physical Exam   Constitutional: She is oriented to person, place, and time. She appears well-developed. She appears distressed (anxious about health).   HENT:   Head: Normocephalic and atraumatic.   Ears:   Right Ear: External ear normal.   Left Ear: External ear normal.   Nose: Nose normal.   Mouth/Throat: Mucous membranes are normal.   Eyes: Conjunctivae and lids are normal.   Neck: Trachea normal and full passive range of motion without pain. Neck supple.   Cardiovascular: Normal rate, regular rhythm and normal heart sounds.   Pulmonary/Chest: Effort normal and breath sounds normal. No respiratory distress.   Abdominal: Soft. Normal appearance and bowel sounds are normal. She exhibits no distension, no abdominal bruit, no pulsatile midline mass and no mass. There is no abdominal tenderness.   Musculoskeletal: Normal range of motion.   Neurological: She is alert and oriented to person, place, and time. She has normal strength.   Skin: Skin is warm, dry, intact, not diaphoretic and not pale. Psychiatric: Her speech is normal and behavior is normal. Judgment and thought content normal.   Nursing note and vitals reviewed.        Assessment:       1. Exposure to COVID-19 virus    2. Cough    3. Intractable headache, unspecified chronicity pattern, unspecified headache type        Plan:         Exposure to COVID-19 virus    Cough  -     COVID-19 Routine Screening    Intractable headache, unspecified chronicity pattern, unspecified headache type  -     COVID-19 Routine Screening    All applicable EKG, medical records, labs, imaging reviewed in Epic and discussed with patient.     Will call with PCR results.   Patient Instructions   Symptomatic treatment:    Alternate Tylenol and Ibuprofen every 3 hrs  salt water gargles to soothe throat  Honey/lemon water to  soothe throat  Cold-eeze helps to reduce the duration of URI symptoms  Elderberry to reduce duration of URI symptoms  Cepachol helps to numb the discomfort in throat  Nasal saline spray reduces inflammation and dryness  Warm face compresses/hot showers as often as you can to open sinuses   Vicks vapor rub at night  Flonase OTC or Nasacort OTC  Simple foods like chicken noodle soup help hydrate  Delsym helps with coughing at night  Zyrtec/Claritin during the day and Benadryl at night may help if allergy component   Zantac will help if there is reflux from the post nasal drip  Rest as much as you can  Your symptoms will likely last 5-7 days, maybe longer depending on how it affects your body.  You are contagious 5-7, so minimize contact with others to reduce the spread to others. Dehydration is preventable but is one of the main reasons why you will feel so badly. Drink pedialyte, gatorade or propel. Stay hydrated.  Antibiotics are not needed unless a complication(such as Otitis Media, Bacterial sinus infection or pneumonia). Taking antibiotics for Flu/Cold is not supported by evidence-based medicine and can expose you to unnecessary side effects of the medication, such as anaphylaxis.   If you experience any:  Chest pain, shortness of breath, wheezing or difficulty breathing  Severe headache, face, neck or ear pain  New rash  Fever over 101.5º F (38.6 C) for more than three days  Confusion, behavior change or seizure  Severe weakness or dizziness  Go to ER       Prevention steps for patients with confirmed or suspected COVID-19     Stay home except to get medical care.   Separate yourself from other people and animals in your home   Call ahead before visiting your doctor.   Wear a facemask.   Cover your coughs and sneezes.   Clean your hands often.   Avoid sharing personal household items.   Clean all high-touch surfaces every day.   Monitor your symptoms. Seek prompt medical attention if your illness is  worsening (e.g., difficulty breathing). Before seeking care, call your healthcare provider.   If you have a medical emergency and need to call 911, notify the dispatch personnel that you have, or are being evaluated for COVID-19. If possible, put on a facemask before emergency medical services arrive.   Discontinuing home isolation. Call your provider about guidance to discontinue home isolation.    Recommended precautions for household members, intimate partners, and caregivers in a nonhealthcare setting of a patient with symptomatic laboratory-confirmed COVID-19 or a patient under investigation.  Household members, intimate partners, and caregivers in a nonhealthcare setting may have close contact with a person with symptomatic, laboratory-confirmed COVID-19 or a person under investigation. Close contacts should monitor their health; they should call their healthcare provider right away if they develop symptoms suggestive of COVID-19 (e.g., fever, cough, shortness of breath).    Close contacts should also follow these recommendations:   Make sure that you understand and can help the patient follow their healthcare provider's instructions for medication(s) and care. You should help the patient with basic needs in the home and provide support for getting groceries, prescriptions, and other personal needs.   Monitor the patient's symptoms. If the patient is getting sicker, call his or her healthcare provider and tell them that the patient has laboratory-confirmed COVID-19. This will help the healthcare provider's office take steps to keep other people in the office or waiting room from getting infected. Ask the healthcare provider to call the local or state health department for additional guidance. If the patient has a medical emergency and you need to call 911, notify the dispatch personnel that the patient has, or is being evaluated for COVID-19.   Household members should stay in another room or be   from the patient as much as possible. Household members should use a separate bedroom and bathroom, if available.   Prohibit visitors who do not have an essential need to be in the home.   Household members should care for any pets in the home. Do not handle pets or other animals while sick.   Make sure that shared spaces in the home have good air flow, such as by an air conditioner or an opened window, weather permitting.   Perform hand hygiene frequently. Wash your hands often with soap and water for at least 20 seconds or use an alcohol-based hand  that contains 60 to 95% alcohol, covering all surfaces of your hands and rubbing them together until they feel dry. Soap and water should be used preferentially if hands are visibly dirty.   Avoid touching your eyes, nose, and mouth with unwashed hands.   The patient should wear a facemask when you are around other people. If the patient is not able to wear a facemask (for example, because it causes trouble breathing), you, as the caregiver should wear a mask when you are in the same room as the patient.   Wear a disposable facemask and gloves when you touch or have contact with the patient's blood, stool, or body fluids, such as saliva, sputum, nasal mucus, vomit, urine.  o Throw out disposable facemasks and gloves after using them. Do not reuse.  o When removing personal protective equipment, first remove and dispose of gloves. Then, immediately clean your hands with soap and water or alcohol-based hand . Next, remove and dispose of facemask, and immediately clean your hands again with soap and water or alcohol-based hand .   Avoid sharing household items with the patient. You should not share dishes, drinking glasses, cups, eating utensils, towels, bedding, or other items. After the patient uses these items, you should wash them thoroughly (see below Wash laundry thoroughly).   Clean all high-touch surfaces, such as counters,  tabletops, doorknobs, bathroom fixtures, toilets, phones, keyboards, tablets, and bedside tables, every day. Also, clean any surfaces that may have blood, stool, or body fluids on them.   Use a household cleaning spray or wipe, according to the label instructions. Labels contain instructions for safe and effective use of the cleaning product including precautions you should take when applying the product, such as wearing gloves and making sure you have good ventilation during use of the product.   Wash laundry thoroughly.  o Immediately remove and wash clothes or bedding that have blood, stool, or body fluids on them.  o Wear disposable gloves while handling soiled items and keep soiled items away from your body. Clean your hands (with soap and water or an alcohol-based hand ) immediately after removing your gloves.  o Read and follow directions on labels of laundry or clothing items and detergent. In general, using a normal laundry detergent according to washing machine instructions and dry thoroughly using the warmest temperatures recommended on the clothing label.   Place all used disposable gloves, facemasks, and other contaminated items in a lined container before disposing of them with other household waste. Clean your hands (with soap and water or an alcohol-based hand ) immediately after handling these items. Soap and water should be used preferentially if hands are visibly dirty.   Discuss any additional questions with your state or local health department or healthcare provider. Check available hours when contacting your local health department.    For more information see CDC link below.      https://www.cdc.gov/coronavirus/2019-ncov/hcp/guidance-prevent-spread.html#precautions        Sources:  CDC, [place] Department of [facility] and [facility]         Sincerely,    Terrell Monroy PA-C

## 2020-11-20 LAB — SARS-COV-2 RNA RESP QL NAA+PROBE: NOT DETECTED

## 2020-11-22 ENCOUNTER — NURSE TRIAGE (OUTPATIENT)
Dept: ADMINISTRATIVE | Facility: CLINIC | Age: 70
End: 2020-11-22

## 2020-11-22 NOTE — TELEPHONE ENCOUNTER
Pt tested negative for COVID. C/o severe HA and a cough. Calling to find out if delsym cough syrup would interact with her BP meds. Advised pt to call pharmacist to verify compatibility with her meds. Also reports nausea. Advised her to eat a meal before taking ibuprofen for her severe HA or use tylenol instead. Pt will call local pharmacist when open.     Reason for Disposition   [1] Caller has medication question about med not prescribed by PCP AND [2] triager unable to answer question (e.g., compatibility with other med, storage)    Additional Information   Negative: Drug overdose and triager unable to answer question   Negative: Caller requesting information unrelated to medicine   Negative: Caller requesting a prescription for Strep throat and has a positive culture result   Negative: Rash while taking a medication or within 3 days of stopping it   Negative: Immunization reaction suspected   Negative: [1] Asthma and [2] having symptoms of asthma (cough, wheezing, etc.)   Negative: [1] Influenza symptoms AND [2] anti-viral med prescription request, such as Tamiflu   Negative: [1] Symptom of illness (e.g., headache, abdominal pain, earache, vomiting) AND [2] more than mild   Negative: MORE THAN A DOUBLE DOSE of a prescription or over-the-counter (OTC) drug   Negative: [1] DOUBLE DOSE (an extra dose or lesser amount) of over-the-counter (OTC) drug AND [2] any symptoms (e.g., dizziness, nausea, pain, sleepiness)   Negative: [1] DOUBLE DOSE (an extra dose or lesser amount) of prescription drug AND [2] any symptoms (e.g., dizziness, nausea, pain, sleepiness)   Negative: Took another person's prescription drug   Negative: [1] DOUBLE DOSE (an extra dose or lesser amount) of prescription drug AND [2] NO symptoms (Exception: a double dose of antibiotics)   Negative: Diabetes drug error or overdose (e.g., took wrong type of insulin or took extra dose)   Negative: [1] Request for URGENT new prescription or  "refill of "essential" medication (i.e., likelihood of harm to patient if not taken) AND [2] triager unable to fill per unit policy   Negative: [1] Prescription not at pharmacy AND [2] was prescribed by PCP recently   Negative: [1] Pharmacy calling with prescription questions AND [2] triager unable to answer question   Negative: [1] Caller has URGENT medication question about med that PCP or specialist prescribed AND [2] triager unable to answer question   Negative: [1] Caller has NON-URGENT medication question about med that PCP prescribed AND [2] triager unable to answer question   Negative: [1] Caller requesting a NON-URGENT new prescription or refill AND [2] triager unable to refill per unit policy    Protocols used: MEDICATION QUESTION CALL-A-AH      "

## 2020-11-24 ENCOUNTER — NURSE TRIAGE (OUTPATIENT)
Dept: ADMINISTRATIVE | Facility: CLINIC | Age: 70
End: 2020-11-24

## 2020-11-24 NOTE — TELEPHONE ENCOUNTER
Patient is calling, states she has had awful diarrhea and throwing up for three days.States she cannot keep anything down at all, slight abdominal pain. States she went to an Urgent Care and they suggested the ED for hydration as they do not do that at the Urgent Care. Wants to know if she can wait until she see her PCP in am. Triage done, ED advice given per statement of very dry mouth and lightheaded. Verb understanding, states her  will bring her. No further questions or concerns at this time.     Reason for Disposition   [1] Drinking very little AND [2] dehydration suspected (e.g., no urine > 12 hours, very dry mouth, very lightheaded)    Additional Information   Negative: Shock suspected (e.g., cold/pale/clammy skin, too weak to stand, low BP, rapid pulse)   Negative: Difficult to awaken or acting confused (e.g., disoriented, slurred speech)   Negative: Sounds like a life-threatening emergency to the triager   Negative: Vomiting also present and worse than the diarrhea   Negative: [1] Blood in stool AND [2] without diarrhea   Negative: Diarrhea in a cancer patient who is currently (or recently) receiving chemotherapy or radiation therapy, or cancer patient who has metastatic or end-stage cancer and is receiving palliative care   Negative: [1] SEVERE abdominal pain (e.g., excruciating) AND [2] present > 1 hour   Negative: [1] SEVERE abdominal pain AND [2] age > 60   Negative: [1] Blood in the stool AND [2] moderate or large amount of blood   Negative: Black or tarry bowel movements  (Exception: chronic-unchanged  black-grey bowel movements AND is taking iron pills or Pepto-bismol)    Protocols used: DIARRHEA-A-AH

## 2020-11-25 ENCOUNTER — PATIENT MESSAGE (OUTPATIENT)
Dept: FAMILY MEDICINE | Facility: CLINIC | Age: 70
End: 2020-11-25

## 2020-11-25 DIAGNOSIS — E78.5 HYPERLIPIDEMIA, UNSPECIFIED HYPERLIPIDEMIA TYPE: ICD-10-CM

## 2020-11-27 DIAGNOSIS — R10.13 EPIGASTRIC PAIN: ICD-10-CM

## 2020-11-27 DIAGNOSIS — R00.0 TACHYCARDIA: Primary | ICD-10-CM

## 2020-11-30 ENCOUNTER — PATIENT MESSAGE (OUTPATIENT)
Dept: FAMILY MEDICINE | Facility: CLINIC | Age: 70
End: 2020-11-30

## 2020-11-30 RX ORDER — PRAVASTATIN SODIUM 40 MG/1
40 TABLET ORAL DAILY
Qty: 90 TABLET | Refills: 3 | Status: SHIPPED | OUTPATIENT
Start: 2020-11-30 | End: 2021-10-22

## 2020-11-30 NOTE — TELEPHONE ENCOUNTER
No new care gaps identified.  Powered by [employer]. Reference number: [id number]. 11/30/2020 9:45:20 AM   CST

## 2020-11-30 NOTE — TELEPHONE ENCOUNTER
Refill Routing Note   Medication(s) are not appropriate for processing by [facility] for the following reason(s):     - Required laboratory values are abnormal  [facility] action(s):  Defer     Medication Therapy Plan: [facility]  Medication reconciliation completed: No   Automatic Epic Generated Protocol Data:        Requested Prescriptions   Pending Prescriptions Disp Refills    pravastatin (PRAVACHOL) 40 MG tablet 90 tablet 3     Sig: Take 1 tablet (40 mg total) by mouth once daily.       Cardiovascular:  Antilipid - Statins Failed - 11/30/2020  9:44 AM        Failed - AST is 54 or below and within 360 days     AST   Date Value Ref Range Status   11/27/2020 97 (H) 14 - 36 U/L Final   11/24/2020 71 (H) 14 - 36 U/L Final   11/04/2020 34 10 - 40 U/L Final              Passed - Patient is at least 18 years old        Passed - Office visit in past 12 months or future 90 days     Recent Outpatient Visits            1 week ago Exposure to COVID-19 virus    [facility] [place] Terrell Monroy PA-C    2 weeks ago Essential hypertension    [facility] Saurabh Hassan MD    1 month ago Primary osteoarthritis of right foot    [facility] Renae Rios DPM    1 month ago Papilloma of left breast    [facility] Brooklynn Ashford MD    2 months ago Bilateral hip pain    [facility] [place] Lorne Cowan MD          Future Appointments              In 2 days Saurabh Hassan MD [facility]    In 1 week LAB, [place] [facility]    In 2 weeks [facility] Jr., [facility]    In 5 months Saurabh Hassan MD [facility]                Passed - ALT is 94 or below and within 360 days     ALT   Date Value Ref Range Status   11/27/2020 68 (H) 0 - 35 U/L Final   11/24/2020 73 (H) 0 - 35 U/L Final   11/04/2020 31 10 - 44 U/L Final               Passed - Total Cholesterol within 360 days     Cholesterol   Date Value Ref Range Status   11/04/2020 215 (H) 120 - 199 mg/dL Final     Comment:     The National Cholesterol Education Program (NCEP) has set the  following guidelines (reference ranges) for Cholesterol:  Optimal.....................<200 mg/dL  Borderline High.............200-239 mg/dL  High........................> or = 240 mg/dL     12/23/2019 205 (H) 120 - 199 mg/dL Final     Comment:     The National Cholesterol Education Program (NCEP) has set the  following guidelines (reference ranges) for Cholesterol:  Optimal.....................<200 mg/dL  Borderline High.............200-239 mg/dL  High........................> or = 240 mg/dL     06/24/2019 217 (H) 120 - 199 mg/dL Final     Comment:     The National Cholesterol Education Program (NCEP) has set the  following guidelines (reference ranges) for Cholesterol:  Optimal.....................<200 mg/dL  Borderline High.............200-239 mg/dL  High........................> or = 240 mg/dL                Passed - LDL within 360 days     LDL Cholesterol   Date Value Ref Range Status   11/04/2020 113.4 63.0 - 159.0 mg/dL Final     Comment:     The National Cholesterol Education Program (NCEP) has set the  following guidelines (reference values) for LDL Cholesterol:  Optimal.......................<130 mg/dL  Borderline High...............130-159 mg/dL  High..........................160-189 mg/dL  Very High.....................>190 mg/dL              Passed - HDL within 360 days     HDL   Date Value Ref Range Status   11/04/2020 66 40 - 75 mg/dL Final     Comment:     The National Cholesterol Education Program (NCEP) has set the  following guidelines (reference values) for HDL Cholesterol:  Low...............<40 mg/dL  Optimal...........>60 mg/dL              Passed - Triglycerides within 360 days     Triglycerides   Date Value Ref Range Status   11/04/2020 178 (H) 30 - 150 mg/dL Final     Comment:      The National Cholesterol Education Program (NCEP) has set the  following guidelines (reference values) for triglycerides:  Normal......................<150 mg/dL  Borderline High.............150-199 mg/dL  High........................200-499 mg/dL     12/23/2019 172 (H) 30 - 150 mg/dL Final     Comment:     The National Cholesterol Education Program (NCEP) has set the  following guidelines (reference values) for triglycerides:  Normal......................<150 mg/dL  Borderline High.............150-199 mg/dL  High........................200-499 mg/dL     06/24/2019 228 (H) 30 - 150 mg/dL Final     Comment:     The National Cholesterol Education Program (NCEP) has set the  following guidelines (reference values) for triglycerides:  Normal......................<150 mg/dL  Borderline High.............150-199 mg/dL  High........................200-499 mg/dL                      Appointments  past 12m or future 3m with PCP    Date Provider   Last Visit   11/11/2020 Saurabh Hassan MD   Next Visit   12/2/2020 Saurabh Hassan MD   ED visits in past 90 days: [unfilled]        Note composed:12:06 PM 11/30/2020

## 2020-12-01 ENCOUNTER — OFFICE VISIT (OUTPATIENT)
Dept: FAMILY MEDICINE | Facility: CLINIC | Age: 70
End: 2020-12-01
Payer: MEDICARE

## 2020-12-01 DIAGNOSIS — U07.1 COVID-19: Primary | ICD-10-CM

## 2020-12-01 PROCEDURE — 99213 OFFICE O/P EST LOW 20 MIN: CPT | Mod: 95,,, | Performed by: FAMILY MEDICINE

## 2020-12-01 PROCEDURE — 99213 PR OFFICE/OUTPT VISIT, EST, LEVL III, 20-29 MIN: ICD-10-PCS | Mod: 95,,, | Performed by: FAMILY MEDICINE

## 2020-12-01 NOTE — PROGRESS NOTES
The patient location is: home  The chief complaint leading to consultation is: covid 19  Visit type: Virtual visit with synchronous audio and video  Total time spent with patient: 15 minutes  Each patient to whom he or she provides medical services by telemedicine is:  (1) informed of the relationship between the physician and patient and the respective role of any other health care provider with respect to management of the patient; and (2) notified that he or she may decline to receive medical services by telemedicine and may withdraw from such care at any time.    HPI:    Following up on Covid19  Feeling better, but still with mild cough, loose stool.  No fever or SOB.  She completed Keflex for UTI.        Past Medical History:   Diagnosis Date    Anticoagulant long-term use     Anxiety     takes daily Xanax    Arthritis     right thumb    Cataract     OU    Cholelithiasis     GERD (gastroesophageal reflux disease)     Hepatic steatosis     Hyperlipemia     Hypertension     PVD (posterior vitreous detachment)     OD       Past Surgical History:   Procedure Laterality Date    chest abcess      child    COLONOSCOPY  01/06/2012    Dr. Lopez: hemorrhoids, redundant colon    ESOPHAGOGASTRODUODENOSCOPY N/A 6/6/2019    Procedure: EGD (ESOPHAGOGASTRODUODENOSCOPY);  Surgeon: Nathan Lopez Jr., MD;  Location: [facility];  Service: Endoscopy;  Laterality: N/A;    EXCISIONAL BIOPSY Left 10/7/2020    Procedure: EXCISIONAL BIOPSY-Left with radiological marker;  Surgeon: Brooklynn Ashford MD;  Location: [facility];  Service: General;  Laterality: Left;    JOINT REPLACEMENT Bilateral     knee    KNEE ARTHROSCOPY W/ LASER      right    LAPAROSCOPIC CHOLECYSTECTOMY N/A 6/14/2019    Procedure: CHOLECYSTECTOMY, LAPAROSCOPIC;  Surgeon: Guevara Evans MD;  Location: [facility] OR;  Service: General;  Laterality: N/A;    thumb surgery  11/2014    TOTAL KNEE ARTHROPLASTY Left 6/19/2018    Procedure:  REPLACEMENT-KNEE-TOTAL;  Surgeon: Nj Melvin MD;  Location: [facility] OR [address] [place] [address];  Service: Orthopedics;  Laterality: Left;  Ruy    UPPER GASTROINTESTINAL ENDOSCOPY  07/13/2017    Dr. Lopez: NERD, Gastric mucosal atrophy. antritis, Scar in the incisura. Biopsied; biopsy: chronic gastritis. negative for h pylori    UPPER GASTROINTESTINAL ENDOSCOPY  06/06/2019    Dr. Lopez: small hiatal hernia, Non-erosive esophageal reflux (NERD) disease?., slight antritis; biopsy: Antral mucosa with chemical/reactive gastropathy. negative for h pylori       Review of patient's allergies indicates:  No Known Allergies    Social History     Socioeconomic History    Marital status:      Spouse name: Not on file    Number of children: 2    Years of education: Not on file    Highest education level: Not on file   Occupational History    Occupation: retired teacher and principal   Social Needs    Financial resource strain: Not hard at all    Food insecurity     Worry: Never true     Inability: Never true    Transportation needs     Medical: No     Non-medical: No   Tobacco Use    Smoking status: Never Smoker    Smokeless tobacco: Never Used   Substance and Sexual Activity    Alcohol use: Yes     Frequency: Never     Drinks per session: Patient refused     Binge frequency: Never     Comment: social- maybe once every few months    Drug use: No    Sexual activity: Yes     Partners: Male     Birth control/protection: None, Post-menopausal   Lifestyle    Physical activity     Days per week: 4 days     Minutes per session: 90 min    Stress: Not at all   Relationships    Social connections     Talks on phone: More than three times a week     Gets together: Once a week     Attends [employer] service: Not on file     Active member of club or organization: No     Attends meetings of clubs or organizations: Never     Relationship status:    Other Topics Concern    Not on file   Social History Narrative    Not on  file       Current Outpatient Medications on File Prior to Visit   Medication Sig Dispense Refill    acetaminophen (TYLENOL) 500 MG tablet Take 500 mg by mouth every 6 (six) hours as needed for Pain.      ALPRAZolam (XANAX) 0.5 MG tablet TAKE 1 AND 1/2 TABLETS(0.75 MG) BY MOUTH TWICE DAILY AS NEEDED FOR ANXIETY 90 tablet 5    aluminum & magnesium hydroxide-simethicone (MYLANTA MAXIMUM STRENGTH) 400-400-40 mg/5 mL suspension Take 10 mLs by mouth every 6 (six) hours as needed for Indigestion.      ascorbic acid, vitamin C, (VITAMIN C) 250 MG tablet Take 250 mg by mouth once daily.      aspirin (ECOTRIN) 81 MG EC tablet Take 81 mg by mouth once daily.      carboxymethylcellulose (REFRESH PLUS) 0.5 % Dpet 1 drop 3 (three) times daily as needed.      chlorthalidone (HYGROTEN) 25 MG Tab TAKE 1 TABLET BY MOUTH EVERY MORNING for blood pressure 90 tablet 3    fish oil-omega-3 fatty acids 300-1,000 mg capsule Take 2 g by mouth once daily.      irbesartan (AVAPRO) 300 MG tablet Take 1 tablet (300 mg total) by mouth every evening. For blood pressure 90 tablet 3    Lactobac no.41/Bifidobact no.7 (PROBIOTIC-10 ORAL) Take by mouth.      meloxicam (MOBIC) 15 MG tablet Take 1 tablet (15 mg total) by mouth once daily. 30 tablet 11    multivitamin (MULTIVITAMIN) per tablet Take 1 tablet by mouth once daily.        niacin 500 MG CpSR Take 500 mg by mouth every evening.        potassium chloride (MICRO-K) 10 MEQ CpSR Take 2 capsules (20 mEq total) by mouth once daily. 180 capsule 3    pravastatin (PRAVACHOL) 40 MG tablet Take 1 tablet (40 mg total) by mouth once daily. 90 tablet 3    promethazine (PHENERGAN) 25 MG tablet Take 1 tablet (25 mg total) by mouth every 6 (six) hours as needed for Nausea. 15 tablet 0    venlafaxine (EFFEXOR-XR) 37.5 MG 24 hr capsule Take 1 capsule (37.5 mg total) by mouth once daily. For depression 30 capsule 11    vitamin D (VITAMIN D3) 1000 units Tab Take 1,000 Units by mouth once daily.        Current Facility-Administered Medications on File Prior to Visit   Medication Dose Route Frequency Provider Last Rate Last Dose    diphenhydrAMINE injection 25 mg  25 mg Intravenous Q6H PRN Igor Ramires MD           Family History   Problem Relation Age of Onset    Hypertension Mother     Heart disease Mother     Glaucoma Mother     Stroke Father     Glaucoma Sister     Cataracts Sister     Macular degeneration Sister     Breast cancer Neg Hx     Colon cancer Neg Hx     Crohn's disease Neg Hx     Ulcerative colitis Neg Hx         Review of Systems   HENT: Negative for hearing loss.    Eyes: Negative for discharge.   Respiratory: Negative for wheezing.    Cardiovascular: Negative for chest pain and palpitations.   Gastrointestinal: Positive for diarrhea. Negative for blood in stool, constipation and vomiting.   Genitourinary: Negative for dysuria and hematuria.   Musculoskeletal: Negative for neck pain.   Neurological: Negative for weakness and headaches.   Endo/Heme/Allergies: Negative for polydipsia.       Physical Exam  Constitutional:       Appearance: She is well-developed.   HENT:      Head: Normocephalic and atraumatic.   Eyes:      Pupils: Pupils are equal, round, and reactive to light.   Neck:      Musculoskeletal: Neck supple.   Pulmonary:      Effort: Pulmonary effort is normal.   Neurological:      Mental Status: She is alert and oriented to person, place, and time.   Psychiatric:         Behavior: Behavior normal.         Thought Content: Thought content normal.         Judgment: Judgment normal.          COVID-19     doing well.  Quarantine ends 12/4  Patient advised to pursue rest, increase fluids, take OTC multi-symptom cold relief medication of choice, and exercise contact precautions.  F/u PRN    Answers for HPI/ROS submitted by the patient on 11/30/2020   activity change: Yes  unexpected weight change: No  rhinorrhea: No  trouble swallowing: No  visual disturbance: No  chest  tightness: No  polyuria: No  difficulty urinating: No  menstrual problem: No  joint swelling: No  arthralgias: No  confusion: No  dysphoric mood: No

## 2020-12-02 ENCOUNTER — PATIENT MESSAGE (OUTPATIENT)
Dept: FAMILY MEDICINE | Facility: CLINIC | Age: 70
End: 2020-12-02

## 2020-12-04 ENCOUNTER — PATIENT MESSAGE (OUTPATIENT)
Dept: FAMILY MEDICINE | Facility: CLINIC | Age: 70
End: 2020-12-04

## 2020-12-04 RX ORDER — BENZONATATE 100 MG/1
100 CAPSULE ORAL 3 TIMES DAILY PRN
Qty: 30 CAPSULE | Refills: 0 | Status: SHIPPED | OUTPATIENT
Start: 2020-12-04 | End: 2020-12-14

## 2020-12-10 ENCOUNTER — PATIENT MESSAGE (OUTPATIENT)
Dept: FAMILY MEDICINE | Facility: CLINIC | Age: 70
End: 2020-12-10

## 2020-12-13 ENCOUNTER — PATIENT MESSAGE (OUTPATIENT)
Dept: GASTROENTEROLOGY | Facility: CLINIC | Age: 70
End: 2020-12-13

## 2020-12-14 ENCOUNTER — TELEPHONE (OUTPATIENT)
Dept: INTERNAL MEDICINE | Facility: CLINIC | Age: 70
End: 2020-12-14

## 2020-12-14 NOTE — TELEPHONE ENCOUNTER
Patient referred by self and sister-in-law Lauren Rome for Health coaching (lifestyle changes).  Patient called, gave an explanation about Health Coaching program and invited participation.   Patient states she would like to participate.  Set appointment for [phone]   Gave direct contact number to call if she has any questions [phone].   Janel Quiroz RN  Health

## 2020-12-14 NOTE — TELEPHONE ENCOUNTER
She can cont the Imodium.  But also, look for Pepto Diarrhea.   AND    Is she taking a Probiotic?  If not, start taking Align or Culturelle or Felisa-Q.    See if one of the NPs have an opening this or next week (she has seen Jamee before).

## 2020-12-17 ENCOUNTER — PATIENT MESSAGE (OUTPATIENT)
Dept: ADMINISTRATIVE | Facility: OTHER | Age: 70
End: 2020-12-17

## 2020-12-17 ENCOUNTER — PATIENT OUTREACH (OUTPATIENT)
Dept: OTHER | Facility: OTHER | Age: 70
End: 2020-12-17

## 2020-12-17 NOTE — PROGRESS NOTES
"Digital Medicine: Health  Follow-Up    The history is provided by the patient.             Reason for review: Blood pressure not at goal      Additional Follow-up details: Mrs. Aguilera is doing okay. Patient attributes fluctuations in BP to COVID-19 diagnosis around Thanksgiving. She reports feeling better, but has not completely recovered yet. Patient reports her main symptoms included diarrhea, nausea, and coughing. She is eating a "bland" diet, because she feels she still isn't able to handle much. She also still has some coughing fits if talking for longer periods of time.    Patient reports a time where she stopped antihypertensive medications completely, but has been compliant for a few weeks now. With her stomach upset she didn't know if her medications were making it worse.     Will continue monitoring. Patient denies questions/concerns. She thanked me for calling.             Diet-Change      Dietary Improvements:Following "bland" diet after COVID diagnosis.               Physical Activity-Change      Additional physical activity details: Resting.       Medication Adherence-Medication adherence was assessed.      Substance, Sleep, Stress-Not assessed      Additional monitoring needed.       Addressed patient questions and patient has my contact information if needed prior to next outreach. Patient verbalizes understanding.             There are no preventive care reminders to display for this patient.      Last 5 Patient Entered Readings                                      Current 30 Day Average: 134/81     Recent Readings 12/10/2020 12/9/2020 11/28/2020 11/27/2020 11/27/2020    SBP (mmHg) 117 121 126 141 170    DBP (mmHg) 95 94 80 75 72    Pulse - 102 - - 72               "

## 2020-12-23 ENCOUNTER — OFFICE VISIT (OUTPATIENT)
Dept: FAMILY MEDICINE | Facility: CLINIC | Age: 70
End: 2020-12-23
Payer: MEDICARE

## 2020-12-23 ENCOUNTER — PATIENT MESSAGE (OUTPATIENT)
Dept: FAMILY MEDICINE | Facility: CLINIC | Age: 70
End: 2020-12-23

## 2020-12-23 DIAGNOSIS — R94.31 ABNORMAL EKG: ICD-10-CM

## 2020-12-23 DIAGNOSIS — N39.0 RECURRENT UTI: ICD-10-CM

## 2020-12-23 DIAGNOSIS — R42 VERTIGO: Primary | ICD-10-CM

## 2020-12-23 PROCEDURE — 99213 PR OFFICE/OUTPT VISIT, EST, LEVL III, 20-29 MIN: ICD-10-PCS | Mod: 95,,, | Performed by: FAMILY MEDICINE

## 2020-12-23 PROCEDURE — 99213 OFFICE O/P EST LOW 20 MIN: CPT | Mod: 95,,, | Performed by: FAMILY MEDICINE

## 2020-12-23 RX ORDER — MELOXICAM 15 MG/1
15 TABLET ORAL DAILY
Qty: 90 TABLET | Refills: 3 | Status: SHIPPED | OUTPATIENT
Start: 2020-12-23 | End: 2021-06-08

## 2020-12-23 NOTE — PROGRESS NOTES
The patient location is: [place]  The chief complaint leading to consultation is: ER follow-up  Visit type: Virtual visit with synchronous audio and video  Total time spent with patient: 15 minutes  Each patient to whom he or she provides medical services by telemedicine is:  (1) informed of the relationship between the physician and patient and the respective role of any other health care provider with respect to management of the patient; and (2) notified that he or she may decline to receive medical services by telemedicine and may withdraw from such care at any time.    HPI:    Patient seen in ER on 12/21 for dizziness  She was diagnosed with vertigo and a UTI  She is taking cefdinir to complete on 12/31  ER testing reviewed  She was discharged with meclizine  She was advised to f/u with cardiology (abnormal EKG) and urology (recurrent UTI)    She has upcoming GI appt        Past Medical History:   Diagnosis Date    Anticoagulant long-term use     Anxiety     takes daily Xanax    Arthritis     right thumb    Cataract     OU    Cholelithiasis     GERD (gastroesophageal reflux disease)     Hepatic steatosis     Hyperlipemia     Hypertension     PVD (posterior vitreous detachment)     OD       Past Surgical History:   Procedure Laterality Date    chest abcess      child    COLONOSCOPY  01/06/2012    Dr. Lopez: hemorrhoids, redundant colon    ESOPHAGOGASTRODUODENOSCOPY N/A 6/6/2019    Procedure: EGD (ESOPHAGOGASTRODUODENOSCOPY);  Surgeon: Nathan Lopez Jr., MD;  Location: [facility];  Service: Endoscopy;  Laterality: N/A;    EXCISIONAL BIOPSY Left 10/7/2020    Procedure: EXCISIONAL BIOPSY-Left with radiological marker;  Surgeon: Brooklynn Ashford MD;  Location: [facility];  Service: General;  Laterality: Left;    JOINT REPLACEMENT Bilateral     knee    KNEE ARTHROSCOPY W/ LASER      right    LAPAROSCOPIC CHOLECYSTECTOMY N/A 6/14/2019    Procedure: CHOLECYSTECTOMY, LAPAROSCOPIC;  Surgeon: Guevara Evans,  MD;  Location: [facility];  Service: General;  Laterality: N/A;    thumb surgery  11/2014    TOTAL KNEE ARTHROPLASTY Left 6/19/2018    Procedure: REPLACEMENT-KNEE-TOTAL;  Surgeon: Nj Melvin MD;  Location: [address];  Service: Orthopedics;  Laterality: Left;  Cool    UPPER GASTROINTESTINAL ENDOSCOPY  07/13/2017    Dr. Lopez: NERD, Gastric mucosal atrophy. antritis, Scar in the incisura. Biopsied; biopsy: chronic gastritis. negative for h pylori    UPPER GASTROINTESTINAL ENDOSCOPY  06/06/2019    Dr. Lopez: small hiatal hernia, Non-erosive esophageal reflux (NERD) disease?., slight antritis; biopsy: Antral mucosa with chemical/reactive gastropathy. negative for h pylori       Review of patient's allergies indicates:  No Known Allergies    Social History     Socioeconomic History    Marital status:      Spouse name: Not on file    Number of children: 2    Years of education: Not on file    Highest education level: Not on file   Occupational History    Occupation: retired teacher and principal   Social Needs    Financial resource strain: Not hard at all    Food insecurity     Worry: Never true     Inability: Never true    Transportation needs     Medical: No     Non-medical: No   Tobacco Use    Smoking status: Never Smoker    Smokeless tobacco: Never Used   Substance and Sexual Activity    Alcohol use: Yes     Frequency: Never     Drinks per session: Patient refused     Binge frequency: Never     Comment: social- maybe once every few months    Drug use: No    Sexual activity: Yes     Partners: Male     Birth control/protection: None, Post-menopausal   Lifestyle    Physical activity     Days per week: 4 days     Minutes per session: 90 min    Stress: Not at all   Relationships    Social connections     Talks on phone: More than three times a week     Gets together: Once a week     Attends Confucianism service: Not on file     Active member of club or organization: No     Attends meetings  of clubs or organizations: Never     Relationship status:    Other Topics Concern    Not on file   Social History Narrative    Not on file       Current Outpatient Medications on File Prior to Visit   Medication Sig Dispense Refill    acetaminophen (TYLENOL) 500 MG tablet Take 500 mg by mouth every 6 (six) hours as needed for Pain.      ALPRAZolam (XANAX) 0.5 MG tablet TAKE 1 AND 1/2 TABLETS(0.75 MG) BY MOUTH TWICE DAILY AS NEEDED FOR ANXIETY 90 tablet 5    aluminum & magnesium hydroxide-simethicone (MYLANTA MAXIMUM STRENGTH) 400-400-40 mg/5 mL suspension Take 10 mLs by mouth every 6 (six) hours as needed for Indigestion.      ascorbic acid, vitamin C, (VITAMIN C) 250 MG tablet Take 250 mg by mouth once daily.      aspirin (ECOTRIN) 81 MG EC tablet Take 81 mg by mouth once daily.      carboxymethylcellulose (REFRESH PLUS) 0.5 % Dpet 1 drop 3 (three) times daily as needed.      cefdinir (OMNICEF) 300 MG capsule Take 1 capsule (300 mg total) by mouth 2 (two) times daily. for 10 days 20 capsule 0    chlorthalidone (HYGROTEN) 25 MG Tab TAKE 1 TABLET BY MOUTH EVERY MORNING for blood pressure 90 tablet 3    fish oil-omega-3 fatty acids 300-1,000 mg capsule Take 2 g by mouth once daily.      irbesartan (AVAPRO) 300 MG tablet Take 1 tablet (300 mg total) by mouth every evening. For blood pressure 90 tablet 3    Lactobac no.41/Bifidobact no.7 (PROBIOTIC-10 ORAL) Take by mouth.      meclizine (ANTIVERT) 25 mg tablet Take 1 tablet (25 mg total) by mouth 3 (three) times daily as needed. 20 tablet 0    meloxicam (MOBIC) 15 MG tablet Take 1 tablet (15 mg total) by mouth once daily. 30 tablet 11    multivitamin (MULTIVITAMIN) per tablet Take 1 tablet by mouth once daily.        niacin 500 MG CpSR Take 500 mg by mouth every evening.        potassium chloride (MICRO-K) 10 MEQ CpSR Take 2 capsules (20 mEq total) by mouth once daily. 180 capsule 3    pravastatin (PRAVACHOL) 40 MG tablet Take 1 tablet (40 mg  total) by mouth once daily. 90 tablet 3    promethazine (PHENERGAN) 25 MG tablet Take 1 tablet (25 mg total) by mouth every 6 (six) hours as needed for Nausea. 15 tablet 0    venlafaxine (EFFEXOR-XR) 37.5 MG 24 hr capsule Take 1 capsule (37.5 mg total) by mouth once daily. For depression 30 capsule 11    vitamin D (VITAMIN D3) 1000 units Tab Take 1,000 Units by mouth once daily.       Current Facility-Administered Medications on File Prior to Visit   Medication Dose Route Frequency Provider Last Rate Last Dose    diphenhydrAMINE injection 25 mg  25 mg Intravenous Q6H PRN Igor Ramires MD           Family History   Problem Relation Age of Onset    Hypertension Mother     Heart disease Mother     Glaucoma Mother     Stroke Father     Glaucoma Sister     Cataracts Sister     Macular degeneration Sister     Breast cancer Neg Hx     Colon cancer Neg Hx     Crohn's disease Neg Hx     Ulcerative colitis Neg Hx         Review of Systems   HENT: Negative for hearing loss.    Eyes: Negative for discharge.   Respiratory: Negative for wheezing.    Cardiovascular: Negative for chest pain and palpitations.   Gastrointestinal: Negative for blood in stool, constipation, diarrhea and vomiting.   Genitourinary: Negative for dysuria and hematuria.   Musculoskeletal: Negative for neck pain.   Neurological: Positive for dizziness and headaches. Negative for weakness.   Endo/Heme/Allergies: Positive for polydipsia.       Physical Exam  Constitutional:       Appearance: She is well-developed.   HENT:      Head: Normocephalic and atraumatic.   Eyes:      Pupils: Pupils are equal, round, and reactive to light.   Neck:      Musculoskeletal: Neck supple.   Pulmonary:      Effort: Pulmonary effort is normal.   Neurological:      Mental Status: She is alert and oriented to person, place, and time.   Psychiatric:         Behavior: Behavior normal.         Thought Content: Thought content normal.         Judgment: Judgment  normal.          Vertigo    Recurrent UTI    Abnormal EKG  -     Ambulatory referral/consult to Cardiology; Future; Expected date: 12/30/2020       Reassurance regarding resolving vertigo  Continue meclizine PRN  Complete course of cerfdinir  Cardiology referral to eval EKG   Continue other present meds and specialy follow-up    Answers for HPI/ROS submitted by the patient on 12/23/2020   activity change: Yes  unexpected weight change: No  rhinorrhea: No  trouble swallowing: No  visual disturbance: No  chest tightness: No  polyuria: No  difficulty urinating: No  menstrual problem: No  joint swelling: No  arthralgias: No  confusion: No  dysphoric mood: No

## 2020-12-29 ENCOUNTER — OFFICE VISIT (OUTPATIENT)
Dept: GASTROENTEROLOGY | Facility: CLINIC | Age: 70
End: 2020-12-29
Payer: MEDICARE

## 2020-12-29 ENCOUNTER — PATIENT MESSAGE (OUTPATIENT)
Dept: GASTROENTEROLOGY | Facility: CLINIC | Age: 70
End: 2020-12-29

## 2020-12-29 VITALS — WEIGHT: 176.81 LBS | HEIGHT: 62 IN | BODY MASS INDEX: 32.54 KG/M2

## 2020-12-29 DIAGNOSIS — R19.4 CHANGE IN BOWEL HABITS: ICD-10-CM

## 2020-12-29 DIAGNOSIS — R79.89 ELEVATED LFTS: ICD-10-CM

## 2020-12-29 DIAGNOSIS — R19.7 DIARRHEA, UNSPECIFIED TYPE: ICD-10-CM

## 2020-12-29 DIAGNOSIS — K76.0 FATTY LIVER: ICD-10-CM

## 2020-12-29 DIAGNOSIS — R11.0 NAUSEA: ICD-10-CM

## 2020-12-29 DIAGNOSIS — R10.13 EPIGASTRIC PAIN: Primary | ICD-10-CM

## 2020-12-29 DIAGNOSIS — K21.9 GASTROESOPHAGEAL REFLUX DISEASE WITHOUT ESOPHAGITIS: ICD-10-CM

## 2020-12-29 PROCEDURE — 99214 PR OFFICE/OUTPT VISIT, EST, LEVL IV, 30-39 MIN: ICD-10-PCS | Mod: S$PBB,,, | Performed by: NURSE PRACTITIONER

## 2020-12-29 PROCEDURE — 99999 PR PBB SHADOW E&M-EST. PATIENT-LVL V: ICD-10-PCS | Mod: PBBFAC,,, | Performed by: NURSE PRACTITIONER

## 2020-12-29 PROCEDURE — 99215 OFFICE O/P EST HI 40 MIN: CPT | Mod: PBBFAC,PO | Performed by: NURSE PRACTITIONER

## 2020-12-29 PROCEDURE — 99214 OFFICE O/P EST MOD 30 MIN: CPT | Mod: S$PBB,,, | Performed by: NURSE PRACTITIONER

## 2020-12-29 PROCEDURE — 99999 PR PBB SHADOW E&M-EST. PATIENT-LVL V: CPT | Mod: PBBFAC,,, | Performed by: NURSE PRACTITIONER

## 2020-12-29 RX ORDER — BISMUTH SUBSALICYLATE 525 MG/30ML
15 LIQUID ORAL EVERY 6 HOURS PRN
COMMUNITY
End: 2022-02-28

## 2020-12-29 RX ORDER — SUCRALFATE 1 G/1
1 TABLET ORAL 4 TIMES DAILY
Qty: 120 TABLET | Refills: 1 | Status: SHIPPED | OUTPATIENT
Start: 2020-12-29 | End: 2021-01-04 | Stop reason: SINTOL

## 2020-12-29 RX ORDER — PANTOPRAZOLE SODIUM 40 MG/1
40 TABLET, DELAYED RELEASE ORAL DAILY
COMMUNITY
End: 2021-03-04 | Stop reason: SDUPTHER

## 2020-12-29 NOTE — PROGRESS NOTES
Subjective:       Patient ID: Lucinda Aguilera is a 70 y.o. female, Body mass index is 32.34 kg/m².    Chief Complaint: Abdominal Pain      Established patient of Dr. Lopez and myself.    GI Problem  The primary symptoms include fatigue (improving), abdominal pain (Chief complaint), nausea and diarrhea. Primary symptoms do not include fever, weight loss, vomiting, melena, hematemesis, jaundice, hematochezia, dysuria or rash.   The abdominal pain began more than 2 days ago (Recurrent problem; current episode started beginning of 11/2020; intermittent; describes as burning and aching). The abdominal pain has been unchanged since its onset. The abdominal pain is located in the epigastric region. The abdominal pain does not radiate. The abdominal pain is relieved by nothing (eating aggravates pain; GI cocktail in ED relieved pain; taking Protonix 40 mg once daily and Pepto bismol OTC PRN- somewhat helps).   Nausea began more than 1 week ago (Recurrent problem; current episode started beginning of 11/2020; intermittent). The nausea is associated with eating. The nausea is exacerbated by food (Phenergan relieves).   The diarrhea began more than 1 week ago (Started ~ one month ago; gradually improving). The diarrhea is watery and semi-solid (describes stool as type 7 on bristol scale at onset of symptoms; now describes as type 5 or 2; denies bloody). The diarrhea occurs 2 to 4 times per day (associated with fecal urgency and occ fecal incontinence). Risk factors for illness producing diarrhea include recent antibiotic use (received multiple antibiotics for recurrent UTI over the past month; recent COVID infection end of 11/2020- overall feels better; took Imodium- helped; currently taking Pepto bismol PRN and Probiotic daily- helps).   The illness is also significant for anorexia (improved). The illness does not include chills, dysphagia, odynophagia, bloating or constipation. Significant associated medical issues include  GERD (denies heartburn taking Protonix), gallstones (hx of cholecystectomy) and liver disease (hx of fatty liver). Associated medical issues do not include inflammatory bowel disease, alcohol abuse, PUD, gastric bypass, bowel resection, irritable bowel syndrome, hemorrhoids or diverticulitis.     Review of Systems   Constitutional: Positive for fatigue (improving). Negative for appetite change, chills, fever, unexpected weight change and weight loss.   HENT: Negative for trouble swallowing.    Respiratory: Negative for cough and shortness of breath.    Cardiovascular: Negative for chest pain.   Gastrointestinal: Positive for abdominal pain (Chief complaint), anorexia (improved), diarrhea and nausea. Negative for abdominal distention, anal bleeding, bloating, blood in stool, constipation, dysphagia, hematemesis, hematochezia, jaundice, melena and vomiting.   Genitourinary: Negative for difficulty urinating and dysuria.   Musculoskeletal: Negative for gait problem.   Skin: Negative for rash.   Neurological: Negative for speech difficulty.   Psychiatric/Behavioral: Negative for confusion.       Past Medical History:   Diagnosis Date    Anticoagulant long-term use     Anxiety     takes daily Xanax    Arthritis     right thumb    Cataract     OU    Cholelithiasis     GERD (gastroesophageal reflux disease)     Hepatic steatosis     Hyperlipemia     Hypertension     PVD (posterior vitreous detachment)     OD      Past Surgical History:   Procedure Laterality Date    chest abcess      child    COLONOSCOPY  01/06/2012    Dr. Lopez: hemorrhoids, redundant colon    ESOPHAGOGASTRODUODENOSCOPY N/A 6/6/2019    Procedure: EGD (ESOPHAGOGASTRODUODENOSCOPY);  Surgeon: Nathan Lopez Jr., MD;  Location: [facility];  Service: Endoscopy;  Laterality: N/A;    EXCISIONAL BIOPSY Left 10/7/2020    Procedure: EXCISIONAL BIOPSY-Left with radiological marker;  Surgeon: Brooklynn Ashford MD;  Location: [facility];  Service: General;   Laterality: Left;    JOINT REPLACEMENT Bilateral     knee    KNEE ARTHROSCOPY W/ LASER      right    LAPAROSCOPIC CHOLECYSTECTOMY N/A 6/14/2019    Procedure: CHOLECYSTECTOMY, LAPAROSCOPIC;  Surgeon: Guevara Evans MD;  Location: [facility] OR;  Service: General;  Laterality: N/A;    thumb surgery  11/2014    TOTAL KNEE ARTHROPLASTY Left 6/19/2018    Procedure: REPLACEMENT-KNEE-TOTAL;  Surgeon: Nj Melvin MD;  Location: [facility] OR [address] [place] [address];  Service: Orthopedics;  Laterality: Left;  [employer]    UPPER GASTROINTESTINAL ENDOSCOPY  07/13/2017    Dr. Lopez: NERD, Gastric mucosal atrophy. antritis, Scar in the incisura. Biopsied; biopsy: chronic gastritis. negative for h pylori    UPPER GASTROINTESTINAL ENDOSCOPY  06/06/2019    Dr. Lopez: small hiatal hernia, Non-erosive esophageal reflux (NERD) disease?., slight antritis; biopsy: Antral mucosa with chemical/reactive gastropathy. negative for h pylori      Family History   Problem Relation Age of Onset    Hypertension Mother     Heart disease Mother     Glaucoma Mother     Stroke Father     Glaucoma Sister     Cataracts Sister     Macular degeneration Sister     Breast cancer Neg Hx     Colon cancer Neg Hx     Crohn's disease Neg Hx     Ulcerative colitis Neg Hx       Wt Readings from Last 10 Encounters:   12/29/20 80.2 kg (176 lb 12.9 oz)   12/21/20 79.4 kg (175 lb)   11/27/20 81.6 kg (180 lb)   11/24/20 81.6 kg (180 lb)   11/18/20 83 kg (183 lb)   11/11/20 83.2 kg (183 lb 6.8 oz)   10/28/20 83.1 kg (183 lb 3.2 oz)   10/21/20 83.1 kg (183 lb 3.2 oz)   10/07/20 83.1 kg (183 lb 3.2 oz)   09/30/20 83.5 kg (184 lb)     Lab Results   Component Value Date    WBC 12.81 (H) 12/21/2020    HGB 14.2 12/21/2020    HCT 42.0 12/21/2020    MCV 85 12/21/2020     12/21/2020     CMP  Sodium   Date Value Ref Range Status   12/21/2020 130 (L) 136 - 145 mmol/L Final     Potassium   Date Value Ref Range Status   12/21/2020 3.4 (L) 3.5 - 5.1 mmol/L Final     Chloride    Date Value Ref Range Status   12/21/2020 95 95 - 110 mmol/L Final     CO2   Date Value Ref Range Status   12/21/2020 25 22 - 31 mmol/L Final     Glucose   Date Value Ref Range Status   12/21/2020 162 (H) 70 - 110 mg/dL Final     Comment:     The ADA recommends the following guidelines for fasting glucose:  Normal:       less than 100 mg/dL  Prediabetes:  100 mg/dL to 125 mg/dL  Diabetes:     126 mg/dL or higher       BUN   Date Value Ref Range Status   12/21/2020 13 7 - 18 mg/dL Final     Creatinine   Date Value Ref Range Status   12/21/2020 0.51 0.50 - 1.40 mg/dL Final     Calcium   Date Value Ref Range Status   12/21/2020 9.4 8.4 - 10.2 mg/dL Final     Total Protein   Date Value Ref Range Status   12/21/2020 8.2 6.0 - 8.4 g/dL Final     Albumin   Date Value Ref Range Status   12/21/2020 4.5 3.5 - 5.2 g/dL Final     Total Bilirubin   Date Value Ref Range Status   12/21/2020 0.6 0.2 - 1.3 mg/dL Final     Alkaline Phosphatase   Date Value Ref Range Status   12/21/2020 131 38 - 145 U/L Final     AST   Date Value Ref Range Status   12/21/2020 49 (H) 14 - 36 U/L Final     ALT   Date Value Ref Range Status   12/21/2020 37 (H) 0 - 35 U/L Final     Anion Gap   Date Value Ref Range Status   12/21/2020 10 8 - 16 mmol/L Final     eGFR if    Date Value Ref Range Status   12/21/2020 >60 >60 mL/min/1.73 m^2 Final     eGFR if non    Date Value Ref Range Status   12/21/2020 >60 >60 mL/min/1.73 m^2 Final     Comment:     Calculation used to obtain the estimated glomerular filtration  rate (eGFR) is the CKD-EPI equation.          Lab Results   Component Value Date    LIPASE 47 09/10/2019     Lab Results   Component Value Date    LIPASERES 183 11/27/2020             Reviewed prior medical records including radiology report of Upper GI 6/26/2020, US of abdomen 6/26/2020, and Gastric emptying study 10/8/19 & endoscopy history (see surgical history).     Objective:      Physical Exam  Constitutional:        General: She is not in acute distress.     Appearance: She is well-developed.   HENT:      Head: Normocephalic.      Right Ear: Hearing normal.      Left Ear: Hearing normal.      Nose: Nose normal.      Mouth/Throat:      Comments: Pt wearing mask due to COVID concerns  Eyes:      General: Lids are normal.      Conjunctiva/sclera: Conjunctivae normal.      Pupils: Pupils are equal, round, and reactive to light.   Neck:      Musculoskeletal: Normal range of motion.      Trachea: Trachea normal.   Cardiovascular:      Rate and Rhythm: Normal rate and regular rhythm.      Heart sounds: Normal heart sounds. No murmur.   Pulmonary:      Effort: Pulmonary effort is normal. No respiratory distress.      Breath sounds: Normal breath sounds. No stridor. No wheezing.   Abdominal:      General: Bowel sounds are normal. There is no distension.      Palpations: Abdomen is soft. There is no mass.      Tenderness: There is abdominal tenderness (mild) in the epigastric area. There is no guarding or rebound.   Musculoskeletal: Normal range of motion.   Skin:     General: Skin is warm and dry.      Findings: No rash.      Comments: Non jaundiced   Neurological:      Mental Status: She is alert and oriented to person, place, and time.   Psychiatric:         Speech: Speech normal.         Behavior: Behavior normal. Behavior is cooperative.           Assessment:       1. Epigastric pain    2. Nausea    3. Gastroesophageal reflux disease without esophagitis    4. Diarrhea, unspecified type    5. Change in bowel habits    6. Fatty liver    7. Elevated LFTs           Plan:   All diagnoses and orders for this visit:    Epigastric pain & Nausea  - Start: sucralfate (CARAFATE) 1 gram tablet; Take 1 tablet (1 g total) by mouth 4 (four) times daily.  Dispense: 120 tablet; Refill: 1  - Continue Protonix 40 mg once daily  - Continue Phenergan 25 mg every six hours PRN  - Schedule EGD  - Take PPI in the morning 30 minutes before breakfast  -  Recommend to avoid large meals, avoid eating within 3 hours of bedtime, elevate head of bed if nocturnal symptoms are present, smoking cessation (if current smoker), & weight loss (if overweight).   - Recommend minimize/avoid high-fat foods, chocolate, caffeine, citrus, alcohol, & tomato products.  - Advised to avoid/limit use of NSAID's, since they can cause GI upset, bleeding, and/or ulcers. If needed, take with food.     Gastroesophageal reflux disease without esophagitis   - Continue Protonix 40 mg once daily   - Take PPI in the morning 30 minutes before breakfast   - Recommend to avoid large meals, avoid eating within 3 hours of bedtime, elevate head of bed if nocturnal symptoms are present, smoking cessation (if current smoker), & weight loss (if overweight).    - Recommend minimize/avoid high-fat foods, chocolate, caffeine, citrus, alcohol, & tomato products.   - Advised to avoid/limit use of NSAID's, since they can cause GI upset, bleeding, and/or ulcers. If needed, take with food.     Diarrhea, unspecified type & Change in bowel habits  - Stool Exam-Ova,Cysts,Parasites; Future; Expected date: 12/29/2020  - Stool culture; Future; Expected date: 12/29/2020  - Giardia / Cryptosporidum, EIA; Future; Expected date: 12/29/2020  - Occult blood x 1, stool; Future; Expected date: 12/29/2020  - WBC, Stool; Future; Expected date: 12/29/2020  - Clostridium difficile EIA; Future; Expected date: 12/29/2020  - Recommended increase fiber in diet, especially soluble fiber since this can help bulk up the stool consistency and may help to slow down how fast the stool goes through the colon and can prevent diarrhea  - Continue Probiotic once daily  - Schedule Colonoscopy    Fatty liver and Elevated LFTs   - For fatty liver recommend: low fat, low cholesterol diet, maintain good control of blood sugars and cholesterol levels, exercise, weight loss (if overweight), minimize/avoid alcohol and tylenol products, & follow-up with  PCP for continued evaluation and management; if specialist is needed, recommend seeing hepatology.    If no improvement in symptoms or symptoms worsen, call/follow-up at clinic or go to ER

## 2020-12-29 NOTE — PATIENT INSTRUCTIONS
Epigastric Pain (Uncertain Cause)     Epigastric pain can be a sign of disease in the upper abdomen. Common causes include:  · Acid reflux (stomach acid flowing up into the esophagus)  · Gastritis (irritation of the stomach lining)  · Peptic Ulcer Disease  · Inflammation of the pancreas  · Gallstone  · Infection in the gallbladder  Pain may be dull or burning. It may spread upward to the chest or to the back. There may be other symptoms such as belching, bloating, cramps or hunger pains. There may be weight loss or poor appetite, nausea or vomiting.  Since the diagnosis of your pain is not certain yet, further tests may sometimes be needed. Sometimes the doctor will treat you for the most likely condition to see if there is improvement before doing further tests.  Home care  Medicines  · Antacids help neutralize the normal acids in your stomach. Examples are Maalox, Mylanta, Rolaids, and Tums. If you dont like the liquid, you can also try a chewable one. You may find one works better than another for you. Overuse can cause diarrhea or constipation.  · Acid blockers (H2 blockers) decrease acid production. Examples are cimetidine (Tagamet), famotidine (Pepcid) and ranitidine (Zantac).  · Acid inhibitors (PPIs) decrease acid production in a different way than the blockers. You may find they work better, but can take a little longer to take effect.  Examples are omeprazole (Prilosec), lansoprazole (Prevacid), pantoprazole (Protonix), rabeprazole (Aciphex), and esomeprazole (Nexium).  · Take an antacid 30-60 minutes after eating and at bedtime, but not at the same time as an acid blocker.  · Try not to take NSAIDs. Aspirin may also cause problems, but if taking it for your heart or other medical reasons, talk to your doctor before stopping it; you do not want to cause a worse problem, like a heart attack or stroke.  Diet  · If certain foods seem to cause your spasm, try to avoid them.   · Eat slowly and chew food well  before swallowing. Symptoms of gastritis can be worsened by certain foods. Limit or avoid fatty, fried, and spicy foods, as well as coffee, chocolate, mint, and foods with high acid content such as tomatoes and citrus fruit and juices (orange, grapefruit, lemon).  · Avoid alcohol, caffeine, and tobacco, which can delay healing and worsen your problem.  · Try eating smaller meals with snacks in between  Follow-up care  Follow up with your healthcare provider or as advised.  When to seek medical advice  Call your healthcare provider right away if any of the following occur:  · Stomach pain worsens or moves to the right lower part of the abdomen  · Chest pain appears, or if it worsens or spreads to the chest, back, neck, shoulder, or arm  · Frequent vomiting (cant keep down liquids)  · Blood in the stool or vomit (red or black color)  · Feeling weak or dizzy, fainting, or having trouble breathing  · Fever of 100.4ºF (38ºC) or higher, or as directed by your healthcare provider  · Abdominal swelling  Date Last Reviewed: 9/25/2015  © 5725-0816 [employer]. [address] [place] [address]. All rights reserved. This information is not intended as a substitute for professional medical care. Always follow your healthcare professional's instructions.

## 2020-12-30 ENCOUNTER — LAB VISIT (OUTPATIENT)
Dept: LAB | Facility: HOSPITAL | Age: 70
End: 2020-12-30
Attending: FAMILY MEDICINE
Payer: MEDICARE

## 2020-12-30 ENCOUNTER — PATIENT MESSAGE (OUTPATIENT)
Dept: FAMILY MEDICINE | Facility: CLINIC | Age: 70
End: 2020-12-30

## 2020-12-30 DIAGNOSIS — N39.0 URINARY TRACT INFECTION WITHOUT HEMATURIA, SITE UNSPECIFIED: Primary | ICD-10-CM

## 2020-12-30 DIAGNOSIS — N39.0 URINARY TRACT INFECTION WITHOUT HEMATURIA, SITE UNSPECIFIED: ICD-10-CM

## 2020-12-30 LAB
BILIRUB UR QL STRIP: NEGATIVE
CLARITY UR: CLEAR
COLOR UR: YELLOW
GLUCOSE UR QL STRIP: NEGATIVE
HGB UR QL STRIP: NEGATIVE
KETONES UR QL STRIP: NEGATIVE
LEUKOCYTE ESTERASE UR QL STRIP: NEGATIVE
NITRITE UR QL STRIP: NEGATIVE
PH UR STRIP: 7 [PH] (ref 5–8)
PROT UR QL STRIP: NEGATIVE
SP GR UR STRIP: 1.02 (ref 1–1.03)
URN SPEC COLLECT METH UR: NORMAL

## 2020-12-30 PROCEDURE — 81003 URINALYSIS AUTO W/O SCOPE: CPT | Mod: PO

## 2020-12-31 ENCOUNTER — LAB VISIT (OUTPATIENT)
Dept: LAB | Facility: HOSPITAL | Age: 70
End: 2020-12-31
Attending: FAMILY MEDICINE
Payer: MEDICARE

## 2020-12-31 DIAGNOSIS — R19.7 DIARRHEA, UNSPECIFIED TYPE: ICD-10-CM

## 2020-12-31 PROCEDURE — 87427 SHIGA-LIKE TOXIN AG IA: CPT

## 2020-12-31 PROCEDURE — 87324 CLOSTRIDIUM AG IA: CPT

## 2020-12-31 PROCEDURE — 87329 GIARDIA AG IA: CPT

## 2020-12-31 PROCEDURE — 87046 STOOL CULTR AEROBIC BACT EA: CPT

## 2020-12-31 PROCEDURE — 87045 FECES CULTURE AEROBIC BACT: CPT

## 2020-12-31 PROCEDURE — 89055 LEUKOCYTE ASSESSMENT FECAL: CPT

## 2020-12-31 PROCEDURE — 82272 OCCULT BLD FECES 1-3 TESTS: CPT

## 2020-12-31 PROCEDURE — 87449 NOS EACH ORGANISM AG IA: CPT

## 2021-01-01 ENCOUNTER — PATIENT MESSAGE (OUTPATIENT)
Dept: OBSTETRICS AND GYNECOLOGY | Facility: CLINIC | Age: 71
End: 2021-01-01

## 2021-01-01 ENCOUNTER — PATIENT MESSAGE (OUTPATIENT)
Dept: REHABILITATION | Facility: HOSPITAL | Age: 71
End: 2021-01-01

## 2021-01-01 LAB
C DIFF GDH STL QL: NEGATIVE
C DIFF TOX A+B STL QL IA: NEGATIVE
OB PNL STL: NEGATIVE
WBC #/AREA STL HPF: NORMAL /[HPF]

## 2021-01-02 ENCOUNTER — PATIENT MESSAGE (OUTPATIENT)
Dept: GASTROENTEROLOGY | Facility: CLINIC | Age: 71
End: 2021-01-02

## 2021-01-02 ENCOUNTER — PATIENT MESSAGE (OUTPATIENT)
Dept: OBSTETRICS AND GYNECOLOGY | Facility: CLINIC | Age: 71
End: 2021-01-02

## 2021-01-02 LAB
CRYPTOSP AG STL QL IA: NEGATIVE
E COLI SXT1 STL QL IA: NEGATIVE
E COLI SXT2 STL QL IA: NEGATIVE
G LAMBLIA AG STL QL IA: NEGATIVE

## 2021-01-04 ENCOUNTER — OFFICE VISIT (OUTPATIENT)
Dept: GASTROENTEROLOGY | Facility: CLINIC | Age: 71
End: 2021-01-04
Payer: MEDICARE

## 2021-01-04 DIAGNOSIS — K21.9 GASTROESOPHAGEAL REFLUX DISEASE WITHOUT ESOPHAGITIS: ICD-10-CM

## 2021-01-04 DIAGNOSIS — R79.89 ELEVATED LFTS: ICD-10-CM

## 2021-01-04 DIAGNOSIS — R10.13 EPIGASTRIC PAIN: ICD-10-CM

## 2021-01-04 DIAGNOSIS — R10.9 ABDOMINAL CRAMPING: Primary | ICD-10-CM

## 2021-01-04 DIAGNOSIS — R19.4 CHANGE IN BOWEL HABITS: ICD-10-CM

## 2021-01-04 DIAGNOSIS — R19.5 YEAST IN STOOL: ICD-10-CM

## 2021-01-04 DIAGNOSIS — R19.7 ACUTE DIARRHEA: ICD-10-CM

## 2021-01-04 DIAGNOSIS — K76.0 FATTY LIVER: ICD-10-CM

## 2021-01-04 LAB — O+P STL MICRO: NORMAL

## 2021-01-04 PROCEDURE — 99214 PR OFFICE/OUTPT VISIT, EST, LEVL IV, 30-39 MIN: ICD-10-PCS | Mod: 95,,, | Performed by: NURSE PRACTITIONER

## 2021-01-04 PROCEDURE — 99214 OFFICE O/P EST MOD 30 MIN: CPT | Mod: 95,,, | Performed by: NURSE PRACTITIONER

## 2021-01-04 RX ORDER — NYSTATIN 500000 [USP'U]/1
500000 TABLET, COATED ORAL EVERY 8 HOURS
Qty: 21 TABLET | Refills: 0 | Status: SHIPPED | OUTPATIENT
Start: 2021-01-04 | End: 2021-01-11

## 2021-01-04 RX ORDER — DICYCLOMINE HYDROCHLORIDE 10 MG/1
10 CAPSULE ORAL
Qty: 120 CAPSULE | Refills: 0 | Status: SHIPPED | OUTPATIENT
Start: 2021-01-04 | End: 2021-02-03

## 2021-01-05 ENCOUNTER — TELEPHONE (OUTPATIENT)
Dept: GASTROENTEROLOGY | Facility: CLINIC | Age: 71
End: 2021-01-05

## 2021-01-05 ENCOUNTER — PATIENT MESSAGE (OUTPATIENT)
Dept: SURGERY | Facility: CLINIC | Age: 71
End: 2021-01-05

## 2021-01-05 ENCOUNTER — PATIENT MESSAGE (OUTPATIENT)
Dept: GASTROENTEROLOGY | Facility: CLINIC | Age: 71
End: 2021-01-05

## 2021-01-05 LAB — BACTERIA STL CULT: NORMAL

## 2021-01-08 ENCOUNTER — OFFICE VISIT (OUTPATIENT)
Dept: CARDIOLOGY | Facility: CLINIC | Age: 71
End: 2021-01-08
Payer: MEDICARE

## 2021-01-08 VITALS
HEART RATE: 96 BPM | HEIGHT: 62 IN | DIASTOLIC BLOOD PRESSURE: 80 MMHG | BODY MASS INDEX: 31.89 KG/M2 | WEIGHT: 173.31 LBS | SYSTOLIC BLOOD PRESSURE: 144 MMHG

## 2021-01-08 DIAGNOSIS — E87.6 HYPOKALEMIA: ICD-10-CM

## 2021-01-08 DIAGNOSIS — I10 ESSENTIAL HYPERTENSION: ICD-10-CM

## 2021-01-08 DIAGNOSIS — R94.31 NONSPECIFIC ABNORMAL ELECTROCARDIOGRAM (ECG) (EKG): Primary | ICD-10-CM

## 2021-01-08 PROCEDURE — 99214 OFFICE O/P EST MOD 30 MIN: CPT | Mod: PBBFAC,PO,25 | Performed by: INTERNAL MEDICINE

## 2021-01-08 PROCEDURE — 93010 ELECTROCARDIOGRAM REPORT: CPT | Mod: S$PBB,,, | Performed by: INTERNAL MEDICINE

## 2021-01-08 PROCEDURE — 99999 PR PBB SHADOW E&M-EST. PATIENT-LVL IV: ICD-10-PCS | Mod: PBBFAC,,, | Performed by: INTERNAL MEDICINE

## 2021-01-08 PROCEDURE — 99204 OFFICE O/P NEW MOD 45 MIN: CPT | Mod: S$PBB,,, | Performed by: INTERNAL MEDICINE

## 2021-01-08 PROCEDURE — 93005 ELECTROCARDIOGRAM TRACING: CPT | Mod: PBBFAC,PO | Performed by: INTERNAL MEDICINE

## 2021-01-08 PROCEDURE — 93010 EKG 12-LEAD: ICD-10-PCS | Mod: S$PBB,,, | Performed by: INTERNAL MEDICINE

## 2021-01-08 PROCEDURE — 99204 PR OFFICE/OUTPT VISIT, NEW, LEVL IV, 45-59 MIN: ICD-10-PCS | Mod: S$PBB,,, | Performed by: INTERNAL MEDICINE

## 2021-01-08 PROCEDURE — 99999 PR PBB SHADOW E&M-EST. PATIENT-LVL IV: CPT | Mod: PBBFAC,,, | Performed by: INTERNAL MEDICINE

## 2021-01-12 DIAGNOSIS — M25.551 BILATERAL HIP PAIN: Primary | ICD-10-CM

## 2021-01-12 DIAGNOSIS — M25.552 BILATERAL HIP PAIN: Primary | ICD-10-CM

## 2021-01-13 ENCOUNTER — PATIENT MESSAGE (OUTPATIENT)
Dept: OPTOMETRY | Facility: CLINIC | Age: 71
End: 2021-01-13

## 2021-01-14 ENCOUNTER — PATIENT MESSAGE (OUTPATIENT)
Dept: GASTROENTEROLOGY | Facility: CLINIC | Age: 71
End: 2021-01-14

## 2021-01-14 ENCOUNTER — PATIENT MESSAGE (OUTPATIENT)
Dept: CARDIOLOGY | Facility: CLINIC | Age: 71
End: 2021-01-14

## 2021-01-14 ENCOUNTER — TELEPHONE (OUTPATIENT)
Dept: CARDIOLOGY | Facility: CLINIC | Age: 71
End: 2021-01-14

## 2021-01-14 ENCOUNTER — CLINICAL SUPPORT (OUTPATIENT)
Dept: CARDIOLOGY | Facility: CLINIC | Age: 71
End: 2021-01-14
Attending: INTERNAL MEDICINE
Payer: MEDICARE

## 2021-01-14 VITALS — HEIGHT: 62 IN | WEIGHT: 173 LBS | BODY MASS INDEX: 31.83 KG/M2

## 2021-01-14 LAB
ASCENDING AORTA: 2.9 CM
AV INDEX (PROSTH): 0.71
AV MEAN GRADIENT: 6 MMHG
AV PEAK GRADIENT: 9 MMHG
AV VALVE AREA: 2.3 CM2
AV VELOCITY RATIO: 0.68
BSA FOR ECHO PROCEDURE: 1.85 M2
CV ECHO LV RWT: 0.55 CM
DOP CALC AO PEAK VEL: 1.53 M/S
DOP CALC AO VTI: 25.05 CM
DOP CALC LVOT AREA: 3.2 CM2
DOP CALC LVOT DIAMETER: 2.03 CM
DOP CALC LVOT PEAK VEL: 1.04 M/S
DOP CALC LVOT STROKE VOLUME: 57.58 CM3
DOP CALCLVOT PEAK VEL VTI: 17.8 CM
E WAVE DECELERATION TIME: 220.15 MSEC
E/A RATIO: 0.57
E/E' RATIO: 5.75 M/S
ECHO LV POSTERIOR WALL: 0.97 CM (ref 0.6–1.1)
FRACTIONAL SHORTENING: 41 % (ref 28–44)
INTERVENTRICULAR SEPTUM: 0.93 CM (ref 0.6–1.1)
IVRT: 95.15 MSEC
LA MAJOR: 3.51 CM
LA MINOR: 4.73 CM
LA WIDTH: 2.45 CM
LEFT ATRIUM SIZE: 3.36 CM
LEFT ATRIUM VOLUME INDEX: 15.7 ML/M2
LEFT ATRIUM VOLUME: 28.2 CM3
LEFT INTERNAL DIMENSION IN SYSTOLE: 2.05 CM (ref 2.1–4)
LEFT VENTRICLE DIASTOLIC VOLUME INDEX: 28.28 ML/M2
LEFT VENTRICLE DIASTOLIC VOLUME: 50.82 ML
LEFT VENTRICLE MASS INDEX: 53 G/M2
LEFT VENTRICLE SYSTOLIC VOLUME INDEX: 7.5 ML/M2
LEFT VENTRICLE SYSTOLIC VOLUME: 13.55 ML
LEFT VENTRICULAR INTERNAL DIMENSION IN DIASTOLE: 3.5 CM (ref 3.5–6)
LEFT VENTRICULAR MASS: 95.94 G
LV LATERAL E/E' RATIO: 4.6 M/S
LV SEPTAL E/E' RATIO: 7.67 M/S
MV PEAK A VEL: 0.81 M/S
MV PEAK E VEL: 0.46 M/S
PISA TR MAX VEL: 2.49 M/S
RA MAJOR: 3.39 CM
RA PRESSURE: 3 MMHG
RA WIDTH: 2.9 CM
RIGHT VENTRICULAR END-DIASTOLIC DIMENSION: 2.83 CM
RV TISSUE DOPPLER FREE WALL SYSTOLIC VELOCITY 1 (APICAL 4 CHAMBER VIEW): 11.05 CM/S
SINUS: 2.73 CM
STJ: 2.47 CM
TDI LATERAL: 0.1 M/S
TDI SEPTAL: 0.06 M/S
TDI: 0.08 M/S
TR MAX PG: 25 MMHG
TV REST PULMONARY ARTERY PRESSURE: 28 MMHG

## 2021-01-14 PROCEDURE — 99211 OFF/OP EST MAY X REQ PHY/QHP: CPT | Mod: PBBFAC,PO

## 2021-01-14 PROCEDURE — 99999 PR PBB SHADOW E&M-EST. PATIENT-LVL I: CPT | Mod: PBBFAC,,,

## 2021-01-14 PROCEDURE — 99999 PR PBB SHADOW E&M-EST. PATIENT-LVL I: ICD-10-PCS | Mod: PBBFAC,,,

## 2021-01-15 ENCOUNTER — TELEPHONE (OUTPATIENT)
Dept: GASTROENTEROLOGY | Facility: CLINIC | Age: 71
End: 2021-01-15

## 2021-01-15 ENCOUNTER — HOSPITAL ENCOUNTER (OUTPATIENT)
Dept: RADIOLOGY | Facility: HOSPITAL | Age: 71
Discharge: HOME | End: 2021-01-15
Attending: NURSE PRACTITIONER
Payer: MEDICARE

## 2021-01-15 ENCOUNTER — OFFICE VISIT (OUTPATIENT)
Dept: GASTROENTEROLOGY | Facility: CLINIC | Age: 71
End: 2021-01-15
Payer: MEDICARE

## 2021-01-15 VITALS — HEIGHT: 62 IN | WEIGHT: 170.19 LBS | BODY MASS INDEX: 31.32 KG/M2

## 2021-01-15 DIAGNOSIS — K52.9 COLITIS: Primary | ICD-10-CM

## 2021-01-15 DIAGNOSIS — D72.9 ABNORMAL WBC COUNT: ICD-10-CM

## 2021-01-15 DIAGNOSIS — R63.4 WEIGHT LOSS: ICD-10-CM

## 2021-01-15 DIAGNOSIS — Z90.49 S/P CHOLECYSTECTOMY: ICD-10-CM

## 2021-01-15 DIAGNOSIS — R10.30 LOWER ABDOMINAL PAIN: ICD-10-CM

## 2021-01-15 DIAGNOSIS — D25.9 UTERINE LEIOMYOMA, UNSPECIFIED LOCATION: ICD-10-CM

## 2021-01-15 DIAGNOSIS — R63.0 DECREASED APPETITE: ICD-10-CM

## 2021-01-15 DIAGNOSIS — R79.89 ELEVATED LFTS: ICD-10-CM

## 2021-01-15 DIAGNOSIS — R19.7 DIARRHEA, UNSPECIFIED TYPE: Primary | ICD-10-CM

## 2021-01-15 DIAGNOSIS — R10.32 LEFT LOWER QUADRANT PAIN: ICD-10-CM

## 2021-01-15 DIAGNOSIS — Z87.19 HISTORY OF GASTRITIS: ICD-10-CM

## 2021-01-15 DIAGNOSIS — R10.31 RIGHT LOWER QUADRANT PAIN: ICD-10-CM

## 2021-01-15 DIAGNOSIS — R10.816 EPIGASTRIC ABDOMINAL TENDERNESS WITHOUT REBOUND TENDERNESS: ICD-10-CM

## 2021-01-15 DIAGNOSIS — R14.0 BLOATING SYMPTOM: ICD-10-CM

## 2021-01-15 DIAGNOSIS — R14.0 ABDOMINAL DISTENSION (GASEOUS): ICD-10-CM

## 2021-01-15 PROCEDURE — 25500020 PHARM REV CODE 255: Mod: PO | Performed by: NURSE PRACTITIONER

## 2021-01-15 PROCEDURE — 99214 OFFICE O/P EST MOD 30 MIN: CPT | Mod: S$PBB,,, | Performed by: NURSE PRACTITIONER

## 2021-01-15 PROCEDURE — 99215 OFFICE O/P EST HI 40 MIN: CPT | Mod: PBBFAC,25,PO | Performed by: NURSE PRACTITIONER

## 2021-01-15 PROCEDURE — A9698 NON-RAD CONTRAST MATERIALNOC: HCPCS | Mod: PO | Performed by: NURSE PRACTITIONER

## 2021-01-15 PROCEDURE — 99999 PR PBB SHADOW E&M-EST. PATIENT-LVL V: CPT | Mod: PBBFAC,,, | Performed by: NURSE PRACTITIONER

## 2021-01-15 PROCEDURE — 99214 PR OFFICE/OUTPT VISIT, EST, LEVL IV, 30-39 MIN: ICD-10-PCS | Mod: S$PBB,,, | Performed by: NURSE PRACTITIONER

## 2021-01-15 PROCEDURE — 99999 PR PBB SHADOW E&M-EST. PATIENT-LVL V: ICD-10-PCS | Mod: PBBFAC,,, | Performed by: NURSE PRACTITIONER

## 2021-01-15 PROCEDURE — 74177 CT ABD & PELVIS W/CONTRAST: CPT | Mod: 26,,, | Performed by: RADIOLOGY

## 2021-01-15 PROCEDURE — 74177 CT ABD & PELVIS W/CONTRAST: CPT | Mod: TC,PO

## 2021-01-15 PROCEDURE — 74177 CT ABDOMEN PELVIS WITH CONTRAST: ICD-10-PCS | Mod: 26,,, | Performed by: RADIOLOGY

## 2021-01-15 RX ORDER — CIPROFLOXACIN 500 MG/1
500 TABLET ORAL 2 TIMES DAILY
Qty: 20 TABLET | Refills: 0 | Status: SHIPPED | OUTPATIENT
Start: 2021-01-15 | End: 2021-01-25

## 2021-01-15 RX ORDER — MONTELUKAST SODIUM 4 MG/1
1 TABLET, CHEWABLE ORAL 2 TIMES DAILY
Qty: 60 TABLET | Refills: 2 | Status: SHIPPED | OUTPATIENT
Start: 2021-01-15 | End: 2021-03-04 | Stop reason: SDUPTHER

## 2021-01-15 RX ORDER — NYSTATIN 500000 [USP'U]/1
500000 TABLET, COATED ORAL EVERY 8 HOURS
COMMUNITY
End: 2021-01-15

## 2021-01-15 RX ORDER — METRONIDAZOLE 500 MG/1
500 TABLET ORAL 3 TIMES DAILY
Qty: 30 TABLET | Refills: 0 | Status: SHIPPED | OUTPATIENT
Start: 2021-01-15 | End: 2021-01-25

## 2021-01-15 RX ADMIN — IOHEXOL 75 ML: 350 INJECTION, SOLUTION INTRAVENOUS at 02:01

## 2021-01-15 RX ADMIN — IOHEXOL 1000 ML: 9 SOLUTION ORAL at 02:01

## 2021-01-17 ENCOUNTER — PATIENT MESSAGE (OUTPATIENT)
Dept: GASTROENTEROLOGY | Facility: CLINIC | Age: 71
End: 2021-01-17

## 2021-01-17 DIAGNOSIS — D72.829 LEUKOCYTOSIS, UNSPECIFIED TYPE: Primary | ICD-10-CM

## 2021-01-17 DIAGNOSIS — R11.0 NAUSEA: ICD-10-CM

## 2021-01-18 ENCOUNTER — PATIENT MESSAGE (OUTPATIENT)
Dept: GASTROENTEROLOGY | Facility: CLINIC | Age: 71
End: 2021-01-18

## 2021-01-19 ENCOUNTER — PATIENT MESSAGE (OUTPATIENT)
Dept: GASTROENTEROLOGY | Facility: CLINIC | Age: 71
End: 2021-01-19

## 2021-01-19 RX ORDER — ONDANSETRON 4 MG/1
4 TABLET, ORALLY DISINTEGRATING ORAL 3 TIMES DAILY PRN
Qty: 30 TABLET | Refills: 1 | Status: SHIPPED | OUTPATIENT
Start: 2021-01-19 | End: 2021-03-04

## 2021-01-21 ENCOUNTER — TELEPHONE (OUTPATIENT)
Dept: FAMILY MEDICINE | Facility: CLINIC | Age: 71
End: 2021-01-21

## 2021-01-21 ENCOUNTER — PATIENT MESSAGE (OUTPATIENT)
Dept: GASTROENTEROLOGY | Facility: CLINIC | Age: 71
End: 2021-01-21

## 2021-01-25 ENCOUNTER — CLINICAL SUPPORT (OUTPATIENT)
Dept: REHABILITATION | Facility: HOSPITAL | Age: 71
End: 2021-01-25
Attending: ORTHOPAEDIC SURGERY
Payer: MEDICARE

## 2021-01-25 DIAGNOSIS — M25.552 BILATERAL HIP PAIN: ICD-10-CM

## 2021-01-25 DIAGNOSIS — M25.551 BILATERAL HIP PAIN: ICD-10-CM

## 2021-01-25 PROCEDURE — 97110 THERAPEUTIC EXERCISES: CPT | Mod: PO

## 2021-01-27 ENCOUNTER — OFFICE VISIT (OUTPATIENT)
Dept: OBSTETRICS AND GYNECOLOGY | Facility: CLINIC | Age: 71
End: 2021-01-27
Payer: MEDICARE

## 2021-01-27 VITALS
HEIGHT: 62 IN | DIASTOLIC BLOOD PRESSURE: 80 MMHG | SYSTOLIC BLOOD PRESSURE: 124 MMHG | BODY MASS INDEX: 31.16 KG/M2 | WEIGHT: 169.31 LBS

## 2021-01-27 DIAGNOSIS — D21.9 FIBROIDS: ICD-10-CM

## 2021-01-27 DIAGNOSIS — Z01.419 ROUTINE GYNECOLOGICAL EXAMINATION: Primary | ICD-10-CM

## 2021-01-27 PROCEDURE — G0101 PR CA SCREEN;PELVIC/BREAST EXAM: ICD-10-PCS | Mod: S$PBB,,, | Performed by: OBSTETRICS & GYNECOLOGY

## 2021-01-27 PROCEDURE — G0101 CA SCREEN;PELVIC/BREAST EXAM: HCPCS | Mod: S$PBB,,, | Performed by: OBSTETRICS & GYNECOLOGY

## 2021-01-27 PROCEDURE — G0101 CA SCREEN;PELVIC/BREAST EXAM: HCPCS | Mod: PBBFAC,PN | Performed by: OBSTETRICS & GYNECOLOGY

## 2021-01-27 PROCEDURE — 99214 OFFICE O/P EST MOD 30 MIN: CPT | Mod: PBBFAC,PN | Performed by: OBSTETRICS & GYNECOLOGY

## 2021-01-27 PROCEDURE — 99999 PR PBB SHADOW E&M-EST. PATIENT-LVL IV: ICD-10-PCS | Mod: PBBFAC,,, | Performed by: OBSTETRICS & GYNECOLOGY

## 2021-01-27 PROCEDURE — 99999 PR PBB SHADOW E&M-EST. PATIENT-LVL IV: CPT | Mod: PBBFAC,,, | Performed by: OBSTETRICS & GYNECOLOGY

## 2021-01-27 RX ORDER — PAROXETINE 10 MG/1
TABLET, FILM COATED ORAL
COMMUNITY
Start: 2020-11-28 | End: 2021-03-01 | Stop reason: ALTCHOICE

## 2021-02-02 ENCOUNTER — CLINICAL SUPPORT (OUTPATIENT)
Dept: REHABILITATION | Facility: HOSPITAL | Age: 71
End: 2021-02-02
Attending: ORTHOPAEDIC SURGERY
Payer: MEDICARE

## 2021-02-02 DIAGNOSIS — M25.559 HIP PAIN: ICD-10-CM

## 2021-02-02 DIAGNOSIS — R26.9 GAIT DIFFICULTY: ICD-10-CM

## 2021-02-02 PROCEDURE — 97113 AQUATIC THERAPY/EXERCISES: CPT | Mod: PO,CQ

## 2021-02-04 ENCOUNTER — PATIENT MESSAGE (OUTPATIENT)
Dept: OTHER | Facility: OTHER | Age: 71
End: 2021-02-04

## 2021-02-04 ENCOUNTER — PATIENT MESSAGE (OUTPATIENT)
Dept: GASTROENTEROLOGY | Facility: CLINIC | Age: 71
End: 2021-02-04

## 2021-02-08 ENCOUNTER — CLINICAL SUPPORT (OUTPATIENT)
Dept: REHABILITATION | Facility: HOSPITAL | Age: 71
End: 2021-02-08
Attending: ORTHOPAEDIC SURGERY
Payer: MEDICARE

## 2021-02-08 DIAGNOSIS — R26.9 GAIT DIFFICULTY: ICD-10-CM

## 2021-02-08 DIAGNOSIS — M25.559 HIP PAIN: ICD-10-CM

## 2021-02-08 PROCEDURE — 97113 AQUATIC THERAPY/EXERCISES: CPT | Mod: PO,CQ

## 2021-02-09 ENCOUNTER — LAB VISIT (OUTPATIENT)
Dept: LAB | Facility: HOSPITAL | Age: 71
End: 2021-02-09
Attending: NURSE PRACTITIONER
Payer: MEDICARE

## 2021-02-09 DIAGNOSIS — D72.829 LEUKOCYTOSIS, UNSPECIFIED TYPE: ICD-10-CM

## 2021-02-09 LAB
ERYTHROCYTE [DISTWIDTH] IN BLOOD BY AUTOMATED COUNT: 14.5 % (ref 11.5–14.5)
HCT VFR BLD AUTO: 38.8 % (ref 37–48.5)
HGB BLD-MCNC: 12.7 G/DL (ref 12–16)
MCH RBC QN AUTO: 28.9 PG (ref 27–31)
MCHC RBC AUTO-ENTMCNC: 32.7 G/DL (ref 32–36)
MCV RBC AUTO: 88 FL (ref 82–98)
PLATELET # BLD AUTO: 420 K/UL (ref 150–350)
PMV BLD AUTO: 10.4 FL (ref 9.2–12.9)
RBC # BLD AUTO: 4.4 M/UL (ref 4–5.4)
WBC # BLD AUTO: 10.23 K/UL (ref 3.9–12.7)

## 2021-02-09 PROCEDURE — 85027 COMPLETE CBC AUTOMATED: CPT

## 2021-02-09 PROCEDURE — 36415 COLL VENOUS BLD VENIPUNCTURE: CPT | Mod: PO

## 2021-02-10 ENCOUNTER — PATIENT MESSAGE (OUTPATIENT)
Dept: GASTROENTEROLOGY | Facility: CLINIC | Age: 71
End: 2021-02-10

## 2021-02-10 ENCOUNTER — TELEPHONE (OUTPATIENT)
Dept: GASTROENTEROLOGY | Facility: CLINIC | Age: 71
End: 2021-02-10

## 2021-02-11 ENCOUNTER — PATIENT MESSAGE (OUTPATIENT)
Dept: GASTROENTEROLOGY | Facility: CLINIC | Age: 71
End: 2021-02-11

## 2021-02-12 ENCOUNTER — PATIENT MESSAGE (OUTPATIENT)
Dept: FAMILY MEDICINE | Facility: CLINIC | Age: 71
End: 2021-02-12

## 2021-02-12 ENCOUNTER — PATIENT MESSAGE (OUTPATIENT)
Dept: GASTROENTEROLOGY | Facility: CLINIC | Age: 71
End: 2021-02-12

## 2021-02-12 DIAGNOSIS — K52.9 COLITIS: Primary | ICD-10-CM

## 2021-02-12 RX ORDER — METRONIDAZOLE 500 MG/1
500 TABLET ORAL 3 TIMES DAILY
Qty: 21 TABLET | Refills: 0 | Status: SHIPPED | OUTPATIENT
Start: 2021-02-12 | End: 2021-02-19

## 2021-02-12 RX ORDER — CIPROFLOXACIN 500 MG/1
500 TABLET ORAL 2 TIMES DAILY
Qty: 14 TABLET | Refills: 0 | Status: SHIPPED | OUTPATIENT
Start: 2021-02-12 | End: 2021-02-19

## 2021-02-17 ENCOUNTER — HOSPITAL ENCOUNTER (OUTPATIENT)
Facility: HOSPITAL | Age: 71
Discharge: HOME | End: 2021-02-17
Attending: INTERNAL MEDICINE | Admitting: INTERNAL MEDICINE
Payer: MEDICARE

## 2021-02-17 ENCOUNTER — ANESTHESIA (OUTPATIENT)
Dept: ENDOSCOPY | Facility: HOSPITAL | Age: 71
End: 2021-02-17
Payer: MEDICARE

## 2021-02-17 ENCOUNTER — ANESTHESIA EVENT (OUTPATIENT)
Dept: ENDOSCOPY | Facility: HOSPITAL | Age: 71
End: 2021-02-17
Payer: MEDICARE

## 2021-02-17 VITALS
SYSTOLIC BLOOD PRESSURE: 144 MMHG | RESPIRATION RATE: 16 BRPM | HEART RATE: 89 BPM | OXYGEN SATURATION: 95 % | BODY MASS INDEX: 30.91 KG/M2 | WEIGHT: 168 LBS | HEIGHT: 62 IN | TEMPERATURE: 98 F | DIASTOLIC BLOOD PRESSURE: 72 MMHG

## 2021-02-17 DIAGNOSIS — R19.7 DIARRHEA: ICD-10-CM

## 2021-02-17 PROCEDURE — 88305 TISSUE EXAM BY PATHOLOGIST: CPT | Mod: 26,,, | Performed by: PATHOLOGY

## 2021-02-17 PROCEDURE — 87324 CLOSTRIDIUM AG IA: CPT

## 2021-02-17 PROCEDURE — 88342 IMHCHEM/IMCYTCHM 1ST ANTB: CPT | Mod: 26,,, | Performed by: PATHOLOGY

## 2021-02-17 PROCEDURE — 25000003 PHARM REV CODE 250: Mod: PO | Performed by: NURSE ANESTHETIST, CERTIFIED REGISTERED

## 2021-02-17 PROCEDURE — 87449 NOS EACH ORGANISM AG IA: CPT

## 2021-02-17 PROCEDURE — 87046 STOOL CULTR AEROBIC BACT EA: CPT | Mod: 59

## 2021-02-17 PROCEDURE — 63600175 PHARM REV CODE 636 W HCPCS: Mod: PO | Performed by: NURSE ANESTHETIST, CERTIFIED REGISTERED

## 2021-02-17 PROCEDURE — 45380 COLONOSCOPY AND BIOPSY: CPT | Mod: PO | Performed by: INTERNAL MEDICINE

## 2021-02-17 PROCEDURE — 87209 SMEAR COMPLEX STAIN: CPT

## 2021-02-17 PROCEDURE — D9220A PRA ANESTHESIA: Mod: CRNA,,, | Performed by: NURSE ANESTHETIST, CERTIFIED REGISTERED

## 2021-02-17 PROCEDURE — 88305 TISSUE EXAM BY PATHOLOGIST: CPT | Performed by: PATHOLOGY

## 2021-02-17 PROCEDURE — 43239 EGD BIOPSY SINGLE/MULTIPLE: CPT | Mod: PO | Performed by: INTERNAL MEDICINE

## 2021-02-17 PROCEDURE — 37000009 HC ANESTHESIA EA ADD 15 MINS: Mod: PO | Performed by: INTERNAL MEDICINE

## 2021-02-17 PROCEDURE — 37000008 HC ANESTHESIA 1ST 15 MINUTES: Mod: PO | Performed by: INTERNAL MEDICINE

## 2021-02-17 PROCEDURE — 88342 IMHCHEM/IMCYTCHM 1ST ANTB: CPT | Performed by: PATHOLOGY

## 2021-02-17 PROCEDURE — D9220A PRA ANESTHESIA: ICD-10-PCS | Mod: ANES,,, | Performed by: ANESTHESIOLOGY

## 2021-02-17 PROCEDURE — D9220A PRA ANESTHESIA: ICD-10-PCS | Mod: CRNA,,, | Performed by: NURSE ANESTHETIST, CERTIFIED REGISTERED

## 2021-02-17 PROCEDURE — 45380 PR COLONOSCOPY,BIOPSY: ICD-10-PCS | Mod: ,,, | Performed by: INTERNAL MEDICINE

## 2021-02-17 PROCEDURE — 88305 TISSUE EXAM BY PATHOLOGIST: ICD-10-PCS | Mod: 26,,, | Performed by: PATHOLOGY

## 2021-02-17 PROCEDURE — 87427 SHIGA-LIKE TOXIN AG IA: CPT | Mod: 59

## 2021-02-17 PROCEDURE — 45380 COLONOSCOPY AND BIOPSY: CPT | Mod: ,,, | Performed by: INTERNAL MEDICINE

## 2021-02-17 PROCEDURE — 43239 PR EGD, FLEX, W/BIOPSY, SGL/MULTI: ICD-10-PCS | Mod: 51,,, | Performed by: INTERNAL MEDICINE

## 2021-02-17 PROCEDURE — 43239 EGD BIOPSY SINGLE/MULTIPLE: CPT | Mod: 51,,, | Performed by: INTERNAL MEDICINE

## 2021-02-17 PROCEDURE — 63600175 PHARM REV CODE 636 W HCPCS: Mod: PO | Performed by: INTERNAL MEDICINE

## 2021-02-17 PROCEDURE — 87045 FECES CULTURE AEROBIC BACT: CPT

## 2021-02-17 PROCEDURE — D9220A PRA ANESTHESIA: Mod: ANES,,, | Performed by: ANESTHESIOLOGY

## 2021-02-17 PROCEDURE — 88342 CHG IMMUNOCYTOCHEMISTRY: ICD-10-PCS | Mod: 26,,, | Performed by: PATHOLOGY

## 2021-02-17 PROCEDURE — 27201012 HC FORCEPS, HOT/COLD, DISP: Mod: PO | Performed by: INTERNAL MEDICINE

## 2021-02-17 RX ORDER — PROPOFOL 10 MG/ML
VIAL (ML) INTRAVENOUS
Status: DISCONTINUED | OUTPATIENT
Start: 2021-02-17 | End: 2021-02-17

## 2021-02-17 RX ORDER — SODIUM CHLORIDE 0.9 % (FLUSH) 0.9 %
10 SYRINGE (ML) INJECTION
Status: DISCONTINUED | OUTPATIENT
Start: 2021-02-17 | End: 2021-02-17 | Stop reason: HOSPADM

## 2021-02-17 RX ORDER — BALSALAZIDE DISODIUM 750 MG/1
2250 CAPSULE ORAL 2 TIMES DAILY WITH MEALS
Qty: 180 CAPSULE | Refills: 11 | Status: SHIPPED | OUTPATIENT
Start: 2021-02-17 | End: 2022-02-07

## 2021-02-17 RX ORDER — SODIUM CHLORIDE, SODIUM LACTATE, POTASSIUM CHLORIDE, CALCIUM CHLORIDE 600; 310; 30; 20 MG/100ML; MG/100ML; MG/100ML; MG/100ML
INJECTION, SOLUTION INTRAVENOUS CONTINUOUS
Status: DISCONTINUED | OUTPATIENT
Start: 2021-02-17 | End: 2021-02-17 | Stop reason: HOSPADM

## 2021-02-17 RX ORDER — LIDOCAINE HYDROCHLORIDE 20 MG/ML
INJECTION INTRAVENOUS
Status: DISCONTINUED | OUTPATIENT
Start: 2021-02-17 | End: 2021-02-17

## 2021-02-17 RX ORDER — EPHEDRINE SULFATE 50 MG/ML
INJECTION, SOLUTION INTRAVENOUS
Status: DISCONTINUED | OUTPATIENT
Start: 2021-02-17 | End: 2021-02-17

## 2021-02-17 RX ADMIN — PROPOFOL 50 MG: 10 INJECTION, EMULSION INTRAVENOUS at 12:02

## 2021-02-17 RX ADMIN — PROPOFOL 30 MG: 10 INJECTION, EMULSION INTRAVENOUS at 01:02

## 2021-02-17 RX ADMIN — PROPOFOL 30 MG: 10 INJECTION, EMULSION INTRAVENOUS at 12:02

## 2021-02-17 RX ADMIN — EPHEDRINE SULFATE 25 MG: 50 INJECTION, SOLUTION INTRAMUSCULAR; INTRAVENOUS; SUBCUTANEOUS at 01:02

## 2021-02-17 RX ADMIN — EPHEDRINE SULFATE 10 MG: 50 INJECTION, SOLUTION INTRAMUSCULAR; INTRAVENOUS; SUBCUTANEOUS at 01:02

## 2021-02-17 RX ADMIN — PROPOFOL 100 MG: 10 INJECTION, EMULSION INTRAVENOUS at 12:02

## 2021-02-17 RX ADMIN — SODIUM CHLORIDE, SODIUM LACTATE, POTASSIUM CHLORIDE, AND CALCIUM CHLORIDE: .6; .31; .03; .02 INJECTION, SOLUTION INTRAVENOUS at 11:02

## 2021-02-17 RX ADMIN — LIDOCAINE HYDROCHLORIDE 100 MG: 20 INJECTION, SOLUTION INTRAVENOUS at 12:02

## 2021-02-18 LAB
C DIFF GDH STL QL: NEGATIVE
C DIFF TOX A+B STL QL IA: NEGATIVE

## 2021-02-19 LAB — O+P STL MICRO: NORMAL

## 2021-02-22 LAB
BACTERIA STL CULT: NORMAL
BACTERIA STL CULT: NORMAL
FINAL PATHOLOGIC DIAGNOSIS: NORMAL
GROSS: NORMAL
Lab: NORMAL
SUPPLEMENTAL DIAGNOSIS: NORMAL

## 2021-02-26 ENCOUNTER — PATIENT MESSAGE (OUTPATIENT)
Dept: OTHER | Facility: OTHER | Age: 71
End: 2021-02-26

## 2021-02-26 ENCOUNTER — PATIENT MESSAGE (OUTPATIENT)
Dept: REHABILITATION | Facility: HOSPITAL | Age: 71
End: 2021-02-26

## 2021-02-26 DIAGNOSIS — I10 ESSENTIAL HYPERTENSION: ICD-10-CM

## 2021-03-01 RX ORDER — IRBESARTAN 300 MG/1
TABLET ORAL
Qty: 90 TABLET | Refills: 3 | Status: SHIPPED | OUTPATIENT
Start: 2021-03-01 | End: 2022-02-07

## 2021-03-01 RX ORDER — PAROXETINE 10 MG/1
TABLET, FILM COATED ORAL
Qty: 90 TABLET | OUTPATIENT
Start: 2021-03-01

## 2021-03-04 ENCOUNTER — OFFICE VISIT (OUTPATIENT)
Dept: GASTROENTEROLOGY | Facility: CLINIC | Age: 71
End: 2021-03-04
Payer: MEDICARE

## 2021-03-04 ENCOUNTER — HOSPITAL ENCOUNTER (OUTPATIENT)
Dept: RADIOLOGY | Facility: HOSPITAL | Age: 71
Discharge: HOME | End: 2021-03-04
Attending: PODIATRIST
Payer: MEDICARE

## 2021-03-04 ENCOUNTER — OFFICE VISIT (OUTPATIENT)
Dept: PODIATRY | Facility: CLINIC | Age: 71
End: 2021-03-04
Payer: MEDICARE

## 2021-03-04 VITALS
BODY MASS INDEX: 29.98 KG/M2 | HEIGHT: 62 IN | RESPIRATION RATE: 19 BRPM | HEIGHT: 62 IN | WEIGHT: 162.94 LBS | WEIGHT: 162.94 LBS | BODY MASS INDEX: 29.98 KG/M2

## 2021-03-04 DIAGNOSIS — Z98.890 HISTORY OF ESOPHAGOGASTRODUODENOSCOPY (EGD): ICD-10-CM

## 2021-03-04 DIAGNOSIS — K52.9 NONSPECIFIC COLITIS: ICD-10-CM

## 2021-03-04 DIAGNOSIS — M79.672 PAIN IN LEFT FOOT: ICD-10-CM

## 2021-03-04 DIAGNOSIS — M20.42 HAMMER TOE OF LEFT FOOT: ICD-10-CM

## 2021-03-04 DIAGNOSIS — R19.7 DIARRHEA, UNSPECIFIED TYPE: ICD-10-CM

## 2021-03-04 DIAGNOSIS — R10.9 ABDOMINAL CRAMPING: ICD-10-CM

## 2021-03-04 DIAGNOSIS — Z87.19 HISTORY OF GASTRITIS: ICD-10-CM

## 2021-03-04 DIAGNOSIS — Z98.890 HISTORY OF COLONOSCOPY: Primary | ICD-10-CM

## 2021-03-04 DIAGNOSIS — R63.4 WEIGHT LOSS: ICD-10-CM

## 2021-03-04 DIAGNOSIS — Z90.49 S/P CHOLECYSTECTOMY: ICD-10-CM

## 2021-03-04 DIAGNOSIS — M77.42 METATARSALGIA, LEFT FOOT: Primary | ICD-10-CM

## 2021-03-04 PROCEDURE — 99999 PR PBB SHADOW E&M-EST. PATIENT-LVL IV: ICD-10-PCS | Mod: PBBFAC,,, | Performed by: NURSE PRACTITIONER

## 2021-03-04 PROCEDURE — 99999 PR PBB SHADOW E&M-EST. PATIENT-LVL IV: CPT | Mod: PBBFAC,,, | Performed by: PODIATRIST

## 2021-03-04 PROCEDURE — 99213 OFFICE O/P EST LOW 20 MIN: CPT | Mod: S$PBB,,, | Performed by: PODIATRIST

## 2021-03-04 PROCEDURE — 99214 OFFICE O/P EST MOD 30 MIN: CPT | Mod: S$PBB,,, | Performed by: NURSE PRACTITIONER

## 2021-03-04 PROCEDURE — 73630 XR FOOT COMPLETE 3 VIEW LEFT: ICD-10-PCS | Mod: 26,LT,, | Performed by: RADIOLOGY

## 2021-03-04 PROCEDURE — 73630 X-RAY EXAM OF FOOT: CPT | Mod: TC,PO,LT

## 2021-03-04 PROCEDURE — 99214 PR OFFICE/OUTPT VISIT, EST, LEVL IV, 30-39 MIN: ICD-10-PCS | Mod: S$PBB,,, | Performed by: NURSE PRACTITIONER

## 2021-03-04 PROCEDURE — 99214 OFFICE O/P EST MOD 30 MIN: CPT | Mod: PBBFAC,25,27,PN | Performed by: PODIATRIST

## 2021-03-04 PROCEDURE — 99999 PR PBB SHADOW E&M-EST. PATIENT-LVL IV: ICD-10-PCS | Mod: PBBFAC,,, | Performed by: PODIATRIST

## 2021-03-04 PROCEDURE — 99214 OFFICE O/P EST MOD 30 MIN: CPT | Mod: PBBFAC,25,PO | Performed by: NURSE PRACTITIONER

## 2021-03-04 PROCEDURE — 99999 PR PBB SHADOW E&M-EST. PATIENT-LVL IV: CPT | Mod: PBBFAC,,, | Performed by: NURSE PRACTITIONER

## 2021-03-04 PROCEDURE — 73630 X-RAY EXAM OF FOOT: CPT | Mod: 26,LT,, | Performed by: RADIOLOGY

## 2021-03-04 PROCEDURE — 99213 PR OFFICE/OUTPT VISIT, EST, LEVL III, 20-29 MIN: ICD-10-PCS | Mod: S$PBB,,, | Performed by: PODIATRIST

## 2021-03-04 RX ORDER — MONTELUKAST SODIUM 4 MG/1
2 TABLET, CHEWABLE ORAL 2 TIMES DAILY
Qty: 120 TABLET | Refills: 2 | Status: SHIPPED | OUTPATIENT
Start: 2021-03-04 | End: 2021-06-24

## 2021-03-04 RX ORDER — PANTOPRAZOLE SODIUM 40 MG/1
40 TABLET, DELAYED RELEASE ORAL 2 TIMES DAILY
Qty: 60 TABLET | Refills: 1 | Status: SHIPPED | OUTPATIENT
Start: 2021-03-04 | End: 2021-05-19

## 2021-03-05 ENCOUNTER — PATIENT MESSAGE (OUTPATIENT)
Dept: CARDIOLOGY | Facility: CLINIC | Age: 71
End: 2021-03-05

## 2021-03-06 ENCOUNTER — PATIENT MESSAGE (OUTPATIENT)
Dept: PODIATRY | Facility: CLINIC | Age: 71
End: 2021-03-06

## 2021-03-08 ENCOUNTER — PATIENT MESSAGE (OUTPATIENT)
Dept: FAMILY MEDICINE | Facility: CLINIC | Age: 71
End: 2021-03-08

## 2021-03-08 ENCOUNTER — PATIENT MESSAGE (OUTPATIENT)
Dept: CARDIOLOGY | Facility: CLINIC | Age: 71
End: 2021-03-08

## 2021-03-08 ENCOUNTER — PATIENT MESSAGE (OUTPATIENT)
Dept: PODIATRY | Facility: CLINIC | Age: 71
End: 2021-03-08

## 2021-03-09 ENCOUNTER — CLINICAL SUPPORT (OUTPATIENT)
Dept: REHABILITATION | Facility: HOSPITAL | Age: 71
End: 2021-03-09
Attending: ORTHOPAEDIC SURGERY
Payer: MEDICARE

## 2021-03-09 DIAGNOSIS — R26.9 GAIT DIFFICULTY: ICD-10-CM

## 2021-03-09 DIAGNOSIS — M25.559 HIP PAIN: Primary | ICD-10-CM

## 2021-03-09 PROCEDURE — 97110 THERAPEUTIC EXERCISES: CPT | Mod: PO

## 2021-03-15 ENCOUNTER — PATIENT MESSAGE (OUTPATIENT)
Dept: GASTROENTEROLOGY | Facility: CLINIC | Age: 71
End: 2021-03-15

## 2021-03-15 ENCOUNTER — CLINICAL SUPPORT (OUTPATIENT)
Dept: REHABILITATION | Facility: HOSPITAL | Age: 71
End: 2021-03-15
Attending: ORTHOPAEDIC SURGERY
Payer: MEDICARE

## 2021-03-15 DIAGNOSIS — M25.559 HIP PAIN: ICD-10-CM

## 2021-03-15 DIAGNOSIS — R26.9 GAIT DIFFICULTY: ICD-10-CM

## 2021-03-15 PROCEDURE — 97113 AQUATIC THERAPY/EXERCISES: CPT | Mod: PO,CQ

## 2021-03-20 ENCOUNTER — PATIENT MESSAGE (OUTPATIENT)
Dept: GASTROENTEROLOGY | Facility: CLINIC | Age: 71
End: 2021-03-20

## 2021-03-22 ENCOUNTER — PATIENT MESSAGE (OUTPATIENT)
Dept: GASTROENTEROLOGY | Facility: CLINIC | Age: 71
End: 2021-03-22

## 2021-03-22 ENCOUNTER — CLINICAL SUPPORT (OUTPATIENT)
Dept: REHABILITATION | Facility: HOSPITAL | Age: 71
End: 2021-03-22
Attending: ORTHOPAEDIC SURGERY
Payer: MEDICARE

## 2021-03-22 ENCOUNTER — PATIENT MESSAGE (OUTPATIENT)
Dept: ORTHOPEDICS | Facility: CLINIC | Age: 71
End: 2021-03-22

## 2021-03-22 ENCOUNTER — OFFICE VISIT (OUTPATIENT)
Dept: OTOLARYNGOLOGY | Facility: CLINIC | Age: 71
End: 2021-03-22
Payer: MEDICARE

## 2021-03-22 VITALS — WEIGHT: 161.63 LBS | BODY MASS INDEX: 29.74 KG/M2 | HEIGHT: 62 IN

## 2021-03-22 DIAGNOSIS — H61.23 BILATERAL IMPACTED CERUMEN: Primary | ICD-10-CM

## 2021-03-22 DIAGNOSIS — R26.9 GAIT DIFFICULTY: ICD-10-CM

## 2021-03-22 DIAGNOSIS — M25.559 HIP PAIN: ICD-10-CM

## 2021-03-22 PROCEDURE — 99499 NO LOS: ICD-10-PCS | Mod: S$PBB,,, | Performed by: NURSE PRACTITIONER

## 2021-03-22 PROCEDURE — 69210 REMOVE IMPACTED EAR WAX UNI: CPT | Mod: S$PBB,,, | Performed by: NURSE PRACTITIONER

## 2021-03-22 PROCEDURE — 69210 REMOVE IMPACTED EAR WAX UNI: CPT | Mod: 50,PBBFAC,PO | Performed by: NURSE PRACTITIONER

## 2021-03-22 PROCEDURE — 99999 PR PBB SHADOW E&M-EST. PATIENT-LVL IV: ICD-10-PCS | Mod: PBBFAC,,, | Performed by: NURSE PRACTITIONER

## 2021-03-22 PROCEDURE — 99999 PR PBB SHADOW E&M-EST. PATIENT-LVL IV: CPT | Mod: PBBFAC,,, | Performed by: NURSE PRACTITIONER

## 2021-03-22 PROCEDURE — 69210 PR REMOVAL IMPACTED CERUMEN REQUIRING INSTRUMENTATION, UNILATERAL: ICD-10-PCS | Mod: S$PBB,,, | Performed by: NURSE PRACTITIONER

## 2021-03-22 PROCEDURE — 99214 OFFICE O/P EST MOD 30 MIN: CPT | Mod: PBBFAC,PO | Performed by: NURSE PRACTITIONER

## 2021-03-22 PROCEDURE — 99499 UNLISTED E&M SERVICE: CPT | Mod: S$PBB,,, | Performed by: NURSE PRACTITIONER

## 2021-03-22 PROCEDURE — 97113 AQUATIC THERAPY/EXERCISES: CPT | Mod: PO,CQ

## 2021-03-25 ENCOUNTER — CLINICAL SUPPORT (OUTPATIENT)
Dept: REHABILITATION | Facility: HOSPITAL | Age: 71
End: 2021-03-25
Attending: ORTHOPAEDIC SURGERY
Payer: MEDICARE

## 2021-03-25 DIAGNOSIS — R26.9 GAIT DIFFICULTY: ICD-10-CM

## 2021-03-25 DIAGNOSIS — M25.559 HIP PAIN: ICD-10-CM

## 2021-03-25 PROCEDURE — 97113 AQUATIC THERAPY/EXERCISES: CPT | Mod: PO,CQ

## 2021-03-26 ENCOUNTER — PATIENT MESSAGE (OUTPATIENT)
Dept: OPTOMETRY | Facility: CLINIC | Age: 71
End: 2021-03-26

## 2021-03-31 ENCOUNTER — PATIENT MESSAGE (OUTPATIENT)
Dept: FAMILY MEDICINE | Facility: CLINIC | Age: 71
End: 2021-03-31

## 2021-04-01 ENCOUNTER — CLINICAL SUPPORT (OUTPATIENT)
Dept: REHABILITATION | Facility: HOSPITAL | Age: 71
End: 2021-04-01
Attending: ORTHOPAEDIC SURGERY
Payer: MEDICARE

## 2021-04-01 ENCOUNTER — OFFICE VISIT (OUTPATIENT)
Dept: OPTOMETRY | Facility: CLINIC | Age: 71
End: 2021-04-01
Payer: MEDICARE

## 2021-04-01 ENCOUNTER — PATIENT MESSAGE (OUTPATIENT)
Dept: FAMILY MEDICINE | Facility: CLINIC | Age: 71
End: 2021-04-01

## 2021-04-01 DIAGNOSIS — H52.203 MYOPIA WITH ASTIGMATISM AND PRESBYOPIA, BILATERAL: ICD-10-CM

## 2021-04-01 DIAGNOSIS — H25.13 NUCLEAR SCLEROSIS, BILATERAL: Primary | ICD-10-CM

## 2021-04-01 DIAGNOSIS — H52.13 MYOPIA WITH ASTIGMATISM AND PRESBYOPIA, BILATERAL: ICD-10-CM

## 2021-04-01 DIAGNOSIS — M25.559 HIP PAIN: Primary | ICD-10-CM

## 2021-04-01 DIAGNOSIS — Z83.518 FAMILY HISTORY OF MACULAR DEGENERATION: ICD-10-CM

## 2021-04-01 DIAGNOSIS — H04.123 DRY EYES, BILATERAL: ICD-10-CM

## 2021-04-01 DIAGNOSIS — R26.9 GAIT DIFFICULTY: ICD-10-CM

## 2021-04-01 DIAGNOSIS — H52.4 MYOPIA WITH ASTIGMATISM AND PRESBYOPIA, BILATERAL: ICD-10-CM

## 2021-04-01 PROCEDURE — 92014 PR EYE EXAM, EST PATIENT,COMPREHESV: ICD-10-PCS | Mod: S$PBB,,, | Performed by: OPTOMETRIST

## 2021-04-01 PROCEDURE — 99999 PR PBB SHADOW E&M-EST. PATIENT-LVL IV: CPT | Mod: PBBFAC,,, | Performed by: OPTOMETRIST

## 2021-04-01 PROCEDURE — 92015 PR REFRACTION: ICD-10-PCS | Mod: ,,, | Performed by: OPTOMETRIST

## 2021-04-01 PROCEDURE — 99214 OFFICE O/P EST MOD 30 MIN: CPT | Mod: PBBFAC,PO | Performed by: OPTOMETRIST

## 2021-04-01 PROCEDURE — 92015 DETERMINE REFRACTIVE STATE: CPT | Mod: ,,, | Performed by: OPTOMETRIST

## 2021-04-01 PROCEDURE — 99999 PR PBB SHADOW E&M-EST. PATIENT-LVL IV: ICD-10-PCS | Mod: PBBFAC,,, | Performed by: OPTOMETRIST

## 2021-04-01 PROCEDURE — 97110 THERAPEUTIC EXERCISES: CPT | Mod: PO

## 2021-04-01 PROCEDURE — 92014 COMPRE OPH EXAM EST PT 1/>: CPT | Mod: S$PBB,,, | Performed by: OPTOMETRIST

## 2021-04-01 RX ORDER — HYDROCORTISONE 25 MG/G
CREAM TOPICAL 2 TIMES DAILY
Qty: 28 G | Refills: 1 | Status: SHIPPED | OUTPATIENT
Start: 2021-04-01 | End: 2022-02-28

## 2021-04-04 PROBLEM — M20.42 HAMMER TOE OF LEFT FOOT: Status: ACTIVE | Noted: 2020-01-14

## 2021-04-04 PROBLEM — M77.42 METATARSALGIA, LEFT FOOT: Status: ACTIVE | Noted: 2021-04-04

## 2021-04-21 ENCOUNTER — CLINICAL SUPPORT (OUTPATIENT)
Dept: REHABILITATION | Facility: HOSPITAL | Age: 71
End: 2021-04-21
Attending: ORTHOPAEDIC SURGERY
Payer: MEDICARE

## 2021-04-21 DIAGNOSIS — M25.559 HIP PAIN: ICD-10-CM

## 2021-04-21 DIAGNOSIS — R26.9 GAIT DIFFICULTY: ICD-10-CM

## 2021-04-21 PROCEDURE — 97113 AQUATIC THERAPY/EXERCISES: CPT | Mod: PO,CQ

## 2021-04-28 ENCOUNTER — CLINICAL SUPPORT (OUTPATIENT)
Dept: REHABILITATION | Facility: HOSPITAL | Age: 71
End: 2021-04-28
Attending: ORTHOPAEDIC SURGERY
Payer: MEDICARE

## 2021-04-28 DIAGNOSIS — M25.559 HIP PAIN: Primary | ICD-10-CM

## 2021-04-28 DIAGNOSIS — R26.9 GAIT DIFFICULTY: ICD-10-CM

## 2021-04-28 PROCEDURE — 97110 THERAPEUTIC EXERCISES: CPT | Mod: PO

## 2021-05-12 DIAGNOSIS — F41.9 ANXIETY: ICD-10-CM

## 2021-05-12 RX ORDER — ALPRAZOLAM 0.5 MG/1
TABLET ORAL
Qty: 90 TABLET | Refills: 1 | Status: SHIPPED | OUTPATIENT
Start: 2021-05-12 | End: 2021-08-16

## 2021-05-13 ENCOUNTER — OFFICE VISIT (OUTPATIENT)
Dept: GASTROENTEROLOGY | Facility: CLINIC | Age: 71
End: 2021-05-13
Payer: MEDICARE

## 2021-05-13 VITALS
SYSTOLIC BLOOD PRESSURE: 124 MMHG | HEART RATE: 77 BPM | DIASTOLIC BLOOD PRESSURE: 65 MMHG | WEIGHT: 164 LBS | BODY MASS INDEX: 30.18 KG/M2 | HEIGHT: 62 IN

## 2021-05-13 DIAGNOSIS — K52.9 COLITIS, NONSPECIFIC: ICD-10-CM

## 2021-05-13 DIAGNOSIS — R19.7 DIARRHEA, UNSPECIFIED TYPE: Primary | ICD-10-CM

## 2021-05-13 PROCEDURE — 99999 PR PBB SHADOW E&M-EST. PATIENT-LVL IV: ICD-10-PCS | Mod: PBBFAC,,, | Performed by: INTERNAL MEDICINE

## 2021-05-13 PROCEDURE — 99214 OFFICE O/P EST MOD 30 MIN: CPT | Mod: PBBFAC,PO | Performed by: INTERNAL MEDICINE

## 2021-05-13 PROCEDURE — 99213 PR OFFICE/OUTPT VISIT, EST, LEVL III, 20-29 MIN: ICD-10-PCS | Mod: S$PBB,,, | Performed by: INTERNAL MEDICINE

## 2021-05-13 PROCEDURE — 99999 PR PBB SHADOW E&M-EST. PATIENT-LVL IV: CPT | Mod: PBBFAC,,, | Performed by: INTERNAL MEDICINE

## 2021-05-13 PROCEDURE — 99213 OFFICE O/P EST LOW 20 MIN: CPT | Mod: S$PBB,,, | Performed by: INTERNAL MEDICINE

## 2021-06-07 ENCOUNTER — CLINICAL SUPPORT (OUTPATIENT)
Dept: REHABILITATION | Facility: HOSPITAL | Age: 71
End: 2021-06-07
Attending: ORTHOPAEDIC SURGERY
Payer: MEDICARE

## 2021-06-07 DIAGNOSIS — R26.9 GAIT DIFFICULTY: ICD-10-CM

## 2021-06-07 DIAGNOSIS — M25.559 HIP PAIN: Primary | ICD-10-CM

## 2021-06-07 PROCEDURE — 97110 THERAPEUTIC EXERCISES: CPT | Mod: PO

## 2021-06-08 ENCOUNTER — OFFICE VISIT (OUTPATIENT)
Dept: FAMILY MEDICINE | Facility: CLINIC | Age: 71
End: 2021-06-08
Payer: MEDICARE

## 2021-06-08 ENCOUNTER — LAB VISIT (OUTPATIENT)
Dept: LAB | Facility: HOSPITAL | Age: 71
End: 2021-06-08
Attending: FAMILY MEDICINE
Payer: MEDICARE

## 2021-06-08 VITALS
HEIGHT: 62 IN | TEMPERATURE: 98 F | HEART RATE: 83 BPM | OXYGEN SATURATION: 99 % | SYSTOLIC BLOOD PRESSURE: 116 MMHG | RESPIRATION RATE: 18 BRPM | BODY MASS INDEX: 31 KG/M2 | DIASTOLIC BLOOD PRESSURE: 70 MMHG | WEIGHT: 168.44 LBS

## 2021-06-08 DIAGNOSIS — R73.09 ABNORMAL GLUCOSE: ICD-10-CM

## 2021-06-08 DIAGNOSIS — R23.2 FLUSHING: ICD-10-CM

## 2021-06-08 DIAGNOSIS — I10 ESSENTIAL HYPERTENSION: Primary | ICD-10-CM

## 2021-06-08 DIAGNOSIS — K21.9 GASTROESOPHAGEAL REFLUX DISEASE WITHOUT ESOPHAGITIS: ICD-10-CM

## 2021-06-08 DIAGNOSIS — F41.8 DEPRESSION WITH ANXIETY: ICD-10-CM

## 2021-06-08 DIAGNOSIS — E78.5 HYPERLIPIDEMIA, UNSPECIFIED HYPERLIPIDEMIA TYPE: ICD-10-CM

## 2021-06-08 DIAGNOSIS — Z87.19 HISTORY OF GASTRITIS: ICD-10-CM

## 2021-06-08 DIAGNOSIS — I10 ESSENTIAL HYPERTENSION: ICD-10-CM

## 2021-06-08 PROBLEM — E87.6 HYPOKALEMIA: Status: RESOLVED | Noted: 2021-01-08 | Resolved: 2021-06-08

## 2021-06-08 LAB
ALBUMIN SERPL BCP-MCNC: 3.7 G/DL (ref 3.5–5.2)
ALP SERPL-CCNC: 84 U/L (ref 55–135)
ALT SERPL W/O P-5'-P-CCNC: 16 U/L (ref 10–44)
ANION GAP SERPL CALC-SCNC: 11 MMOL/L (ref 8–16)
AST SERPL-CCNC: 27 U/L (ref 10–40)
BILIRUB SERPL-MCNC: 0.4 MG/DL (ref 0.1–1)
BUN SERPL-MCNC: 12 MG/DL (ref 8–23)
CALCIUM SERPL-MCNC: 9.6 MG/DL (ref 8.7–10.5)
CHLORIDE SERPL-SCNC: 96 MMOL/L (ref 95–110)
CHOLEST SERPL-MCNC: 204 MG/DL (ref 120–199)
CHOLEST/HDLC SERPL: 2.7 {RATIO} (ref 2–5)
CO2 SERPL-SCNC: 26 MMOL/L (ref 23–29)
CREAT SERPL-MCNC: 0.7 MG/DL (ref 0.5–1.4)
EST. GFR  (AFRICAN AMERICAN): >60 ML/MIN/1.73 M^2
EST. GFR  (NON AFRICAN AMERICAN): >60 ML/MIN/1.73 M^2
ESTIMATED AVG GLUCOSE: 117 MG/DL (ref 68–131)
GLUCOSE SERPL-MCNC: 83 MG/DL (ref 70–110)
HBA1C MFR BLD: 5.7 % (ref 4–5.6)
HDLC SERPL-MCNC: 75 MG/DL (ref 40–75)
HDLC SERPL: 36.8 % (ref 20–50)
LDLC SERPL CALC-MCNC: 104.4 MG/DL (ref 63–159)
NONHDLC SERPL-MCNC: 129 MG/DL
POTASSIUM SERPL-SCNC: 3.7 MMOL/L (ref 3.5–5.1)
PROT SERPL-MCNC: 7.4 G/DL (ref 6–8.4)
SODIUM SERPL-SCNC: 133 MMOL/L (ref 136–145)
TRIGL SERPL-MCNC: 123 MG/DL (ref 30–150)
TSH SERPL DL<=0.005 MIU/L-ACNC: 0.97 UIU/ML (ref 0.4–4)

## 2021-06-08 PROCEDURE — 99999 PR PBB SHADOW E&M-EST. PATIENT-LVL III: ICD-10-PCS | Mod: PBBFAC,,, | Performed by: FAMILY MEDICINE

## 2021-06-08 PROCEDURE — 99214 OFFICE O/P EST MOD 30 MIN: CPT | Mod: S$PBB,,, | Performed by: FAMILY MEDICINE

## 2021-06-08 PROCEDURE — 80053 COMPREHEN METABOLIC PANEL: CPT | Performed by: FAMILY MEDICINE

## 2021-06-08 PROCEDURE — 99213 OFFICE O/P EST LOW 20 MIN: CPT | Mod: PBBFAC,PO | Performed by: FAMILY MEDICINE

## 2021-06-08 PROCEDURE — 83036 HEMOGLOBIN GLYCOSYLATED A1C: CPT | Performed by: FAMILY MEDICINE

## 2021-06-08 PROCEDURE — 99999 PR PBB SHADOW E&M-EST. PATIENT-LVL III: CPT | Mod: PBBFAC,,, | Performed by: FAMILY MEDICINE

## 2021-06-08 PROCEDURE — 80061 LIPID PANEL: CPT | Performed by: FAMILY MEDICINE

## 2021-06-08 PROCEDURE — 36415 COLL VENOUS BLD VENIPUNCTURE: CPT | Mod: PO | Performed by: FAMILY MEDICINE

## 2021-06-08 PROCEDURE — 99214 PR OFFICE/OUTPT VISIT, EST, LEVL IV, 30-39 MIN: ICD-10-PCS | Mod: S$PBB,,, | Performed by: FAMILY MEDICINE

## 2021-06-08 PROCEDURE — 84443 ASSAY THYROID STIM HORMONE: CPT | Performed by: FAMILY MEDICINE

## 2021-06-08 RX ORDER — CHLORTHALIDONE 25 MG/1
TABLET ORAL
Qty: 90 TABLET | Refills: 3 | Status: SHIPPED | OUTPATIENT
Start: 2021-06-08 | End: 2022-05-23 | Stop reason: SDUPTHER

## 2021-06-08 RX ORDER — PANTOPRAZOLE SODIUM 40 MG/1
40 TABLET, DELAYED RELEASE ORAL
Qty: 90 TABLET | Refills: 3 | Status: SHIPPED | OUTPATIENT
Start: 2021-06-08 | End: 2021-09-07 | Stop reason: SDUPTHER

## 2021-06-10 ENCOUNTER — CLINICAL SUPPORT (OUTPATIENT)
Dept: REHABILITATION | Facility: HOSPITAL | Age: 71
End: 2021-06-10
Attending: ORTHOPAEDIC SURGERY
Payer: MEDICARE

## 2021-06-10 DIAGNOSIS — M25.559 HIP PAIN: ICD-10-CM

## 2021-06-10 DIAGNOSIS — R26.9 GAIT DIFFICULTY: ICD-10-CM

## 2021-06-10 PROCEDURE — 97113 AQUATIC THERAPY/EXERCISES: CPT | Mod: PO,CQ

## 2021-06-15 ENCOUNTER — CLINICAL SUPPORT (OUTPATIENT)
Dept: REHABILITATION | Facility: HOSPITAL | Age: 71
End: 2021-06-15
Attending: ORTHOPAEDIC SURGERY
Payer: MEDICARE

## 2021-06-15 DIAGNOSIS — R26.9 GAIT DIFFICULTY: ICD-10-CM

## 2021-06-15 DIAGNOSIS — M25.559 HIP PAIN: ICD-10-CM

## 2021-06-15 PROCEDURE — 97113 AQUATIC THERAPY/EXERCISES: CPT | Mod: PO,CQ

## 2021-06-17 ENCOUNTER — CLINICAL SUPPORT (OUTPATIENT)
Dept: REHABILITATION | Facility: HOSPITAL | Age: 71
End: 2021-06-17
Attending: ORTHOPAEDIC SURGERY
Payer: MEDICARE

## 2021-06-17 DIAGNOSIS — M25.559 HIP PAIN: ICD-10-CM

## 2021-06-17 DIAGNOSIS — R26.9 GAIT DIFFICULTY: ICD-10-CM

## 2021-06-17 PROCEDURE — 97113 AQUATIC THERAPY/EXERCISES: CPT | Mod: PO,CQ

## 2021-06-22 ENCOUNTER — CLINICAL SUPPORT (OUTPATIENT)
Dept: REHABILITATION | Facility: HOSPITAL | Age: 71
End: 2021-06-22
Attending: ORTHOPAEDIC SURGERY
Payer: MEDICARE

## 2021-06-22 DIAGNOSIS — M25.559 HIP PAIN: ICD-10-CM

## 2021-06-22 DIAGNOSIS — R26.9 GAIT DIFFICULTY: ICD-10-CM

## 2021-06-22 PROCEDURE — 97113 AQUATIC THERAPY/EXERCISES: CPT | Mod: PO,CQ

## 2021-06-24 ENCOUNTER — CLINICAL SUPPORT (OUTPATIENT)
Dept: REHABILITATION | Facility: HOSPITAL | Age: 71
End: 2021-06-24
Attending: ORTHOPAEDIC SURGERY
Payer: MEDICARE

## 2021-06-24 DIAGNOSIS — M25.559 HIP PAIN: ICD-10-CM

## 2021-06-24 DIAGNOSIS — R26.9 GAIT DIFFICULTY: ICD-10-CM

## 2021-06-24 PROCEDURE — 97113 AQUATIC THERAPY/EXERCISES: CPT | Mod: PO,CQ

## 2021-06-29 ENCOUNTER — CLINICAL SUPPORT (OUTPATIENT)
Dept: REHABILITATION | Facility: HOSPITAL | Age: 71
End: 2021-06-29
Attending: ORTHOPAEDIC SURGERY
Payer: MEDICARE

## 2021-06-29 DIAGNOSIS — M25.559 HIP PAIN: Primary | ICD-10-CM

## 2021-06-29 DIAGNOSIS — R26.9 GAIT DIFFICULTY: ICD-10-CM

## 2021-06-29 PROCEDURE — 97110 THERAPEUTIC EXERCISES: CPT | Mod: PO

## 2021-07-13 ENCOUNTER — CLINICAL SUPPORT (OUTPATIENT)
Dept: REHABILITATION | Facility: HOSPITAL | Age: 71
End: 2021-07-13
Attending: ORTHOPAEDIC SURGERY
Payer: MEDICARE

## 2021-07-13 DIAGNOSIS — M25.559 HIP PAIN: Primary | ICD-10-CM

## 2021-07-13 DIAGNOSIS — R26.9 GAIT DIFFICULTY: ICD-10-CM

## 2021-07-13 PROCEDURE — 97110 THERAPEUTIC EXERCISES: CPT | Mod: PO

## 2021-07-15 ENCOUNTER — CLINICAL SUPPORT (OUTPATIENT)
Dept: REHABILITATION | Facility: HOSPITAL | Age: 71
End: 2021-07-15
Attending: ORTHOPAEDIC SURGERY
Payer: MEDICARE

## 2021-07-15 DIAGNOSIS — R26.9 GAIT DIFFICULTY: ICD-10-CM

## 2021-07-15 DIAGNOSIS — M25.559 HIP PAIN: ICD-10-CM

## 2021-07-15 PROCEDURE — 97110 THERAPEUTIC EXERCISES: CPT | Mod: PO,CQ

## 2021-07-19 ENCOUNTER — CLINICAL SUPPORT (OUTPATIENT)
Dept: REHABILITATION | Facility: HOSPITAL | Age: 71
End: 2021-07-19
Attending: ORTHOPAEDIC SURGERY
Payer: MEDICARE

## 2021-07-19 DIAGNOSIS — M25.559 HIP PAIN: Primary | ICD-10-CM

## 2021-07-19 DIAGNOSIS — R26.9 GAIT DIFFICULTY: ICD-10-CM

## 2021-07-19 PROCEDURE — 97110 THERAPEUTIC EXERCISES: CPT | Mod: PO

## 2021-07-28 ENCOUNTER — CLINICAL SUPPORT (OUTPATIENT)
Dept: REHABILITATION | Facility: HOSPITAL | Age: 71
End: 2021-07-28
Attending: ORTHOPAEDIC SURGERY
Payer: MEDICARE

## 2021-07-28 DIAGNOSIS — M25.559 HIP PAIN: ICD-10-CM

## 2021-07-28 DIAGNOSIS — R26.9 GAIT DIFFICULTY: ICD-10-CM

## 2021-07-28 PROCEDURE — 97110 THERAPEUTIC EXERCISES: CPT | Mod: PO,CQ

## 2021-07-31 DIAGNOSIS — F41.9 ANXIETY: ICD-10-CM

## 2021-08-02 RX ORDER — VENLAFAXINE HYDROCHLORIDE 37.5 MG/1
CAPSULE, EXTENDED RELEASE ORAL
Qty: 90 CAPSULE | Refills: 3 | Status: SHIPPED | OUTPATIENT
Start: 2021-08-02 | End: 2021-12-30

## 2021-08-10 ENCOUNTER — CLINICAL SUPPORT (OUTPATIENT)
Dept: REHABILITATION | Facility: HOSPITAL | Age: 71
End: 2021-08-10
Attending: ORTHOPAEDIC SURGERY
Payer: MEDICARE

## 2021-08-10 DIAGNOSIS — R26.9 GAIT DIFFICULTY: ICD-10-CM

## 2021-08-10 DIAGNOSIS — M25.559 HIP PAIN: Primary | ICD-10-CM

## 2021-08-10 PROCEDURE — 97110 THERAPEUTIC EXERCISES: CPT | Mod: KX,PO

## 2021-08-11 ENCOUNTER — PATIENT OUTREACH (OUTPATIENT)
Dept: ADMINISTRATIVE | Facility: OTHER | Age: 71
End: 2021-08-11

## 2021-08-14 DIAGNOSIS — F41.9 ANXIETY: ICD-10-CM

## 2021-08-16 RX ORDER — ALPRAZOLAM 0.5 MG/1
TABLET ORAL
Qty: 90 TABLET | Refills: 5 | Status: SHIPPED | OUTPATIENT
Start: 2021-08-16 | End: 2022-02-11

## 2021-08-24 ENCOUNTER — PATIENT MESSAGE (OUTPATIENT)
Dept: FAMILY MEDICINE | Facility: CLINIC | Age: 71
End: 2021-08-24

## 2021-08-26 ENCOUNTER — PATIENT MESSAGE (OUTPATIENT)
Dept: FAMILY MEDICINE | Facility: CLINIC | Age: 71
End: 2021-08-26

## 2021-09-07 ENCOUNTER — PATIENT MESSAGE (OUTPATIENT)
Dept: OTHER | Facility: OTHER | Age: 71
End: 2021-09-07

## 2021-09-07 DIAGNOSIS — Z87.19 HISTORY OF GASTRITIS: ICD-10-CM

## 2021-09-07 RX ORDER — PANTOPRAZOLE SODIUM 40 MG/1
40 TABLET, DELAYED RELEASE ORAL
Qty: 90 TABLET | Refills: 2 | Status: SHIPPED | OUTPATIENT
Start: 2021-09-07 | End: 2022-05-09 | Stop reason: SDUPTHER

## 2021-09-17 ENCOUNTER — PATIENT MESSAGE (OUTPATIENT)
Dept: FAMILY MEDICINE | Facility: CLINIC | Age: 71
End: 2021-09-17

## 2021-10-16 ENCOUNTER — IMMUNIZATION (OUTPATIENT)
Dept: FAMILY MEDICINE | Facility: CLINIC | Age: 71
End: 2021-10-16
Payer: MEDICARE

## 2021-10-16 DIAGNOSIS — Z23 NEED FOR VACCINATION: Primary | ICD-10-CM

## 2021-10-16 PROCEDURE — 0003A COVID-19, MRNA, LNP-S, PF, 30 MCG/0.3 ML DOSE VACCINE: CPT | Mod: PBBFAC,PO

## 2021-10-16 PROCEDURE — 91300 COVID-19, MRNA, LNP-S, PF, 30 MCG/0.3 ML DOSE VACCINE: CPT | Mod: PBBFAC,PO

## 2021-10-22 DIAGNOSIS — E78.5 HYPERLIPIDEMIA, UNSPECIFIED HYPERLIPIDEMIA TYPE: ICD-10-CM

## 2021-10-22 RX ORDER — PRAVASTATIN SODIUM 40 MG/1
TABLET ORAL
Qty: 90 TABLET | Refills: 2 | Status: SHIPPED | OUTPATIENT
Start: 2021-10-22 | End: 2022-05-23 | Stop reason: SDUPTHER

## 2021-11-01 ENCOUNTER — OFFICE VISIT (OUTPATIENT)
Dept: GASTROENTEROLOGY | Facility: CLINIC | Age: 71
End: 2021-11-01
Payer: MEDICARE

## 2021-11-01 VITALS — BODY MASS INDEX: 34.12 KG/M2 | WEIGHT: 185.44 LBS | HEIGHT: 62 IN

## 2021-11-01 DIAGNOSIS — K21.9 GASTROESOPHAGEAL REFLUX DISEASE, UNSPECIFIED WHETHER ESOPHAGITIS PRESENT: Primary | ICD-10-CM

## 2021-11-01 DIAGNOSIS — K52.9 COLITIS WITHOUT COMPLICATION: ICD-10-CM

## 2021-11-01 PROCEDURE — 99213 OFFICE O/P EST LOW 20 MIN: CPT | Mod: S$PBB,,, | Performed by: INTERNAL MEDICINE

## 2021-11-01 PROCEDURE — 99999 PR PBB SHADOW E&M-EST. PATIENT-LVL IV: CPT | Mod: PBBFAC,,, | Performed by: INTERNAL MEDICINE

## 2021-11-01 PROCEDURE — 99999 PR PBB SHADOW E&M-EST. PATIENT-LVL IV: ICD-10-PCS | Mod: PBBFAC,,, | Performed by: INTERNAL MEDICINE

## 2021-11-01 PROCEDURE — 99213 PR OFFICE/OUTPT VISIT, EST, LEVL III, 20-29 MIN: ICD-10-PCS | Mod: S$PBB,,, | Performed by: INTERNAL MEDICINE

## 2021-11-01 PROCEDURE — 99214 OFFICE O/P EST MOD 30 MIN: CPT | Mod: PBBFAC,PO | Performed by: INTERNAL MEDICINE

## 2021-11-16 DIAGNOSIS — M25.551 RIGHT HIP PAIN: Primary | ICD-10-CM

## 2021-11-17 ENCOUNTER — HOSPITAL ENCOUNTER (OUTPATIENT)
Dept: RADIOLOGY | Facility: HOSPITAL | Age: 71
Discharge: HOME | End: 2021-11-17
Attending: ORTHOPAEDIC SURGERY
Payer: MEDICARE

## 2021-11-17 ENCOUNTER — OFFICE VISIT (OUTPATIENT)
Dept: ORTHOPEDICS | Facility: CLINIC | Age: 71
End: 2021-11-17
Payer: MEDICARE

## 2021-11-17 VITALS — BODY MASS INDEX: 34.04 KG/M2 | WEIGHT: 185 LBS | HEIGHT: 62 IN | RESPIRATION RATE: 16 BRPM

## 2021-11-17 DIAGNOSIS — M25.551 RIGHT HIP PAIN: ICD-10-CM

## 2021-11-17 DIAGNOSIS — M70.71 BURSITIS OF RIGHT HIP, UNSPECIFIED BURSA: Primary | ICD-10-CM

## 2021-11-17 PROCEDURE — 99213 PR OFFICE/OUTPT VISIT, EST, LEVL III, 20-29 MIN: ICD-10-PCS | Mod: 25,S$PBB,, | Performed by: ORTHOPAEDIC SURGERY

## 2021-11-17 PROCEDURE — 73502 X-RAY EXAM HIP UNI 2-3 VIEWS: CPT | Mod: TC,PO,RT

## 2021-11-17 PROCEDURE — 73502 XR HIP WITH PELVIS WHEN PERFORMED, 2 OR 3  VIEWS RIGHT: ICD-10-PCS | Mod: 26,RT,, | Performed by: RADIOLOGY

## 2021-11-17 PROCEDURE — 20610 LARGE JOINT ASPIRATION/INJECTION: R GREATER TROCHANTERIC BURSA: ICD-10-PCS | Mod: S$PBB,RT,, | Performed by: ORTHOPAEDIC SURGERY

## 2021-11-17 PROCEDURE — 99213 OFFICE O/P EST LOW 20 MIN: CPT | Mod: 25,S$PBB,, | Performed by: ORTHOPAEDIC SURGERY

## 2021-11-17 PROCEDURE — 99214 OFFICE O/P EST MOD 30 MIN: CPT | Mod: PBBFAC,PN,25 | Performed by: ORTHOPAEDIC SURGERY

## 2021-11-17 PROCEDURE — 20610 DRAIN/INJ JOINT/BURSA W/O US: CPT | Mod: PBBFAC,PN | Performed by: ORTHOPAEDIC SURGERY

## 2021-11-17 PROCEDURE — 73502 X-RAY EXAM HIP UNI 2-3 VIEWS: CPT | Mod: 26,RT,, | Performed by: RADIOLOGY

## 2021-11-17 PROCEDURE — 99999 PR PBB SHADOW E&M-EST. PATIENT-LVL IV: CPT | Mod: PBBFAC,,, | Performed by: ORTHOPAEDIC SURGERY

## 2021-11-17 PROCEDURE — 99999 PR PBB SHADOW E&M-EST. PATIENT-LVL IV: ICD-10-PCS | Mod: PBBFAC,,, | Performed by: ORTHOPAEDIC SURGERY

## 2021-11-17 RX ORDER — TRIAMCINOLONE ACETONIDE 40 MG/ML
40 INJECTION, SUSPENSION INTRA-ARTICULAR; INTRAMUSCULAR
Status: DISCONTINUED | OUTPATIENT
Start: 2021-11-17 | End: 2021-11-17 | Stop reason: HOSPADM

## 2021-11-17 RX ADMIN — TRIAMCINOLONE ACETONIDE 40 MG: 40 INJECTION, SUSPENSION INTRA-ARTICULAR; INTRAMUSCULAR at 04:11

## 2021-11-21 ENCOUNTER — PATIENT MESSAGE (OUTPATIENT)
Dept: ADMINISTRATIVE | Facility: OTHER | Age: 71
End: 2021-11-21
Payer: MEDICARE

## 2021-12-09 ENCOUNTER — IMMUNIZATION (OUTPATIENT)
Dept: FAMILY MEDICINE | Facility: CLINIC | Age: 71
End: 2021-12-09
Payer: MEDICARE

## 2021-12-09 ENCOUNTER — PATIENT MESSAGE (OUTPATIENT)
Dept: ADMINISTRATIVE | Facility: OTHER | Age: 71
End: 2021-12-09
Payer: MEDICARE

## 2021-12-09 PROCEDURE — 90694 VACC AIIV4 NO PRSRV 0.5ML IM: CPT | Mod: PBBFAC,PO

## 2021-12-09 PROCEDURE — G0008 ADMIN INFLUENZA VIRUS VAC: HCPCS | Mod: PBBFAC,PO

## 2021-12-11 DIAGNOSIS — I10 ESSENTIAL HYPERTENSION: ICD-10-CM

## 2021-12-15 RX ORDER — POTASSIUM CHLORIDE 750 MG/1
CAPSULE, EXTENDED RELEASE ORAL
Qty: 180 CAPSULE | Refills: 1 | Status: SHIPPED | OUTPATIENT
Start: 2021-12-15 | End: 2022-05-23 | Stop reason: SDUPTHER

## 2021-12-27 ENCOUNTER — PATIENT MESSAGE (OUTPATIENT)
Dept: FAMILY MEDICINE | Facility: CLINIC | Age: 71
End: 2021-12-27
Payer: MEDICARE

## 2021-12-27 NOTE — TELEPHONE ENCOUNTER
Pt would like blood work before next chelsi. CMP, LIPID and TSH are already in active request not sure if you wanted to add on to those.

## 2021-12-29 ENCOUNTER — LAB VISIT (OUTPATIENT)
Dept: LAB | Facility: HOSPITAL | Age: 71
End: 2021-12-29
Attending: FAMILY MEDICINE
Payer: MEDICARE

## 2021-12-29 DIAGNOSIS — E78.49 OTHER HYPERLIPIDEMIA: ICD-10-CM

## 2021-12-29 LAB
ALBUMIN SERPL BCP-MCNC: 3.9 G/DL (ref 3.5–5.2)
ALP SERPL-CCNC: 74 U/L (ref 55–135)
ALT SERPL W/O P-5'-P-CCNC: 19 U/L (ref 10–44)
ANION GAP SERPL CALC-SCNC: 9 MMOL/L (ref 8–16)
AST SERPL-CCNC: 22 U/L (ref 10–40)
BILIRUB SERPL-MCNC: 0.5 MG/DL (ref 0.1–1)
BUN SERPL-MCNC: 8 MG/DL (ref 8–23)
CALCIUM SERPL-MCNC: 9.3 MG/DL (ref 8.7–10.5)
CHLORIDE SERPL-SCNC: 94 MMOL/L (ref 95–110)
CHOLEST SERPL-MCNC: 203 MG/DL (ref 120–199)
CHOLEST/HDLC SERPL: 2.5 {RATIO} (ref 2–5)
CO2 SERPL-SCNC: 25 MMOL/L (ref 23–29)
CREAT SERPL-MCNC: 0.7 MG/DL (ref 0.5–1.4)
EST. GFR  (AFRICAN AMERICAN): >60 ML/MIN/1.73 M^2
EST. GFR  (NON AFRICAN AMERICAN): >60 ML/MIN/1.73 M^2
GLUCOSE SERPL-MCNC: 90 MG/DL (ref 70–110)
HDLC SERPL-MCNC: 82 MG/DL (ref 40–75)
HDLC SERPL: 40.4 % (ref 20–50)
LDLC SERPL CALC-MCNC: 93.4 MG/DL (ref 63–159)
NONHDLC SERPL-MCNC: 121 MG/DL
POTASSIUM SERPL-SCNC: 4.5 MMOL/L (ref 3.5–5.1)
PROT SERPL-MCNC: 7.5 G/DL (ref 6–8.4)
SODIUM SERPL-SCNC: 128 MMOL/L (ref 136–145)
TRIGL SERPL-MCNC: 138 MG/DL (ref 30–150)
TSH SERPL DL<=0.005 MIU/L-ACNC: 0.96 UIU/ML (ref 0.4–4)

## 2021-12-29 PROCEDURE — 80053 COMPREHEN METABOLIC PANEL: CPT | Performed by: FAMILY MEDICINE

## 2021-12-29 PROCEDURE — 80061 LIPID PANEL: CPT | Performed by: FAMILY MEDICINE

## 2021-12-29 PROCEDURE — 84443 ASSAY THYROID STIM HORMONE: CPT | Performed by: FAMILY MEDICINE

## 2021-12-29 PROCEDURE — 36415 COLL VENOUS BLD VENIPUNCTURE: CPT | Mod: PO | Performed by: FAMILY MEDICINE

## 2021-12-30 ENCOUNTER — OFFICE VISIT (OUTPATIENT)
Dept: FAMILY MEDICINE | Facility: CLINIC | Age: 71
End: 2021-12-30
Payer: MEDICARE

## 2021-12-30 VITALS
HEART RATE: 82 BPM | TEMPERATURE: 98 F | SYSTOLIC BLOOD PRESSURE: 112 MMHG | BODY MASS INDEX: 33.34 KG/M2 | DIASTOLIC BLOOD PRESSURE: 74 MMHG | OXYGEN SATURATION: 97 % | WEIGHT: 181.19 LBS | HEIGHT: 62 IN

## 2021-12-30 DIAGNOSIS — F41.9 ANXIETY: ICD-10-CM

## 2021-12-30 DIAGNOSIS — E78.5 HYPERLIPIDEMIA, UNSPECIFIED HYPERLIPIDEMIA TYPE: ICD-10-CM

## 2021-12-30 DIAGNOSIS — F41.8 DEPRESSION WITH ANXIETY: ICD-10-CM

## 2021-12-30 DIAGNOSIS — Z78.0 MENOPAUSE: ICD-10-CM

## 2021-12-30 DIAGNOSIS — I10 ESSENTIAL HYPERTENSION: Primary | ICD-10-CM

## 2021-12-30 PROCEDURE — 99214 PR OFFICE/OUTPT VISIT, EST, LEVL IV, 30-39 MIN: ICD-10-PCS | Mod: S$PBB,,, | Performed by: FAMILY MEDICINE

## 2021-12-30 PROCEDURE — 99215 OFFICE O/P EST HI 40 MIN: CPT | Mod: PBBFAC,PO | Performed by: FAMILY MEDICINE

## 2021-12-30 PROCEDURE — 99999 PR PBB SHADOW E&M-EST. PATIENT-LVL V: CPT | Mod: PBBFAC,,, | Performed by: FAMILY MEDICINE

## 2021-12-30 PROCEDURE — 99214 OFFICE O/P EST MOD 30 MIN: CPT | Mod: S$PBB,,, | Performed by: FAMILY MEDICINE

## 2021-12-30 PROCEDURE — 99999 PR PBB SHADOW E&M-EST. PATIENT-LVL V: ICD-10-PCS | Mod: PBBFAC,,, | Performed by: FAMILY MEDICINE

## 2021-12-30 RX ORDER — VENLAFAXINE HYDROCHLORIDE 75 MG/1
75 CAPSULE, EXTENDED RELEASE ORAL DAILY
Qty: 90 CAPSULE | Refills: 3 | Status: SHIPPED | OUTPATIENT
Start: 2021-12-30 | End: 2022-02-18

## 2022-01-16 ENCOUNTER — PATIENT MESSAGE (OUTPATIENT)
Dept: OTHER | Facility: OTHER | Age: 72
End: 2022-01-16
Payer: MEDICARE

## 2022-01-16 ENCOUNTER — PATIENT MESSAGE (OUTPATIENT)
Dept: FAMILY MEDICINE | Facility: CLINIC | Age: 72
End: 2022-01-16
Payer: MEDICARE

## 2022-01-16 ENCOUNTER — PATIENT MESSAGE (OUTPATIENT)
Dept: ORTHOPEDICS | Facility: CLINIC | Age: 72
End: 2022-01-16
Payer: MEDICARE

## 2022-01-17 ENCOUNTER — PATIENT MESSAGE (OUTPATIENT)
Dept: FAMILY MEDICINE | Facility: CLINIC | Age: 72
End: 2022-01-17
Payer: MEDICARE

## 2022-01-18 ENCOUNTER — HOSPITAL ENCOUNTER (OUTPATIENT)
Dept: RADIOLOGY | Facility: HOSPITAL | Age: 72
Discharge: HOME | End: 2022-01-18
Attending: FAMILY MEDICINE
Payer: MEDICARE

## 2022-01-18 DIAGNOSIS — Z78.0 MENOPAUSE: ICD-10-CM

## 2022-01-18 PROCEDURE — 77080 DEXA BONE DENSITY SPINE HIP: ICD-10-PCS | Mod: 26,,, | Performed by: RADIOLOGY

## 2022-01-18 PROCEDURE — 77080 DXA BONE DENSITY AXIAL: CPT | Mod: 26,,, | Performed by: RADIOLOGY

## 2022-01-18 PROCEDURE — 77080 DXA BONE DENSITY AXIAL: CPT | Mod: TC,PO

## 2022-01-19 ENCOUNTER — PATIENT MESSAGE (OUTPATIENT)
Dept: FAMILY MEDICINE | Facility: CLINIC | Age: 72
End: 2022-01-19
Payer: MEDICARE

## 2022-01-19 ENCOUNTER — PATIENT MESSAGE (OUTPATIENT)
Dept: OTHER | Facility: OTHER | Age: 72
End: 2022-01-19
Payer: MEDICARE

## 2022-01-19 ENCOUNTER — TELEPHONE (OUTPATIENT)
Dept: FAMILY MEDICINE | Facility: CLINIC | Age: 72
End: 2022-01-19
Payer: MEDICARE

## 2022-01-20 ENCOUNTER — PATIENT MESSAGE (OUTPATIENT)
Dept: FAMILY MEDICINE | Facility: CLINIC | Age: 72
End: 2022-01-20
Payer: MEDICARE

## 2022-01-20 DIAGNOSIS — I10 ESSENTIAL HYPERTENSION: ICD-10-CM

## 2022-01-20 NOTE — TELEPHONE ENCOUNTER
No new care gaps identified.  Powered by [employer] by [employer]. Reference number: [id number].   1/20/2022 6:12:48 AM CST

## 2022-01-20 NOTE — TELEPHONE ENCOUNTER
IRFLOWSHEET    Date: 1/20/2022    Time: 9:20 AM     Exam: Ultrasound guided biopsy of right thyroid and right submandibular lymph node    Radiologist Performing Procedure: Dr. Mariam Rodriguez    Nurse Reporting/Phone: [phone]     Nurse Receiving: Obinna Nixon RN    Permit signed:      Yes    ID Band Checklist: Yes    Allergies: Patient has no known allergies. TIME OUT: 0928    PROCEDURE START TIME: 0929    Puncture Site: Right neck    Puncture Time: 0930    Catheters: 25 gauge x 1 1/2 inches    LABS: No results found for: INR, PROTIME        Lab Results   Component Value Date    CREATININE 1.0 09/03/2021    BUN 16 09/03/2021          Lab Results   Component Value Date    HGB 15.3 09/03/2021    HCT 45.9 09/03/2021     09/03/2021         PROCEDURE END TIME: 0937    Total Contrast: N/A    Fluoroscopy Time: N/A    Complications:  None    Comments:  Patient arrival from home to ultrasound for right thyroid biopsy. Consent signed, Dr. Mariam Rodriguez in to speak with the patient about the procedure. Patient placed supine on cart, right neck prepped and draped. Using ultrasound, needle inserted to right neck and fine needle aspiration x 3 taken by Dr. Mariam Rodriguez and given directly to pathologist. It was determined to only perform right thyroid biopsy at this time due to patient previous lymph node removal and biopsy order. Dr. Mariam Rodriguez and pathologist reviewed 12/7/2021 lymph node biopsy results. Patient is agreeable. Procedure completed @ 4797. Site cleansed and band aid applied. No complications noted. Discharge instructions reviewed and understood by patient, patient discharged home. Please review and advise pt my chart message

## 2022-02-07 RX ORDER — IRBESARTAN 300 MG/1
TABLET ORAL
Qty: 90 TABLET | Refills: 3 | Status: SHIPPED | OUTPATIENT
Start: 2022-02-07 | End: 2022-05-17 | Stop reason: SDUPTHER

## 2022-02-11 DIAGNOSIS — F41.9 ANXIETY: ICD-10-CM

## 2022-02-11 RX ORDER — ALPRAZOLAM 0.5 MG/1
TABLET ORAL
Qty: 90 TABLET | Refills: 0 | Status: SHIPPED | OUTPATIENT
Start: 2022-02-11 | End: 2022-03-07

## 2022-02-11 NOTE — TELEPHONE ENCOUNTER
No new care gaps identified.  Powered by [employer]. Reference number: [id number].   2/11/2022 7:24:31 AM CST

## 2022-02-17 ENCOUNTER — PATIENT MESSAGE (OUTPATIENT)
Dept: FAMILY MEDICINE | Facility: CLINIC | Age: 72
End: 2022-02-17
Payer: MEDICARE

## 2022-02-17 DIAGNOSIS — F41.9 ANXIETY: ICD-10-CM

## 2022-02-18 RX ORDER — VENLAFAXINE HYDROCHLORIDE 37.5 MG/1
37.5 CAPSULE, EXTENDED RELEASE ORAL DAILY
Qty: 90 CAPSULE | Refills: 3 | Status: SHIPPED | OUTPATIENT
Start: 2022-02-18 | End: 2022-05-17 | Stop reason: SDUPTHER

## 2022-02-20 ENCOUNTER — PATIENT MESSAGE (OUTPATIENT)
Dept: FAMILY MEDICINE | Facility: CLINIC | Age: 72
End: 2022-02-20
Payer: MEDICARE

## 2022-02-23 ENCOUNTER — PATIENT MESSAGE (OUTPATIENT)
Dept: FAMILY MEDICINE | Facility: CLINIC | Age: 72
End: 2022-02-23
Payer: MEDICARE

## 2022-02-24 ENCOUNTER — PATIENT MESSAGE (OUTPATIENT)
Dept: FAMILY MEDICINE | Facility: CLINIC | Age: 72
End: 2022-02-24
Payer: MEDICARE

## 2022-02-28 ENCOUNTER — OFFICE VISIT (OUTPATIENT)
Dept: FAMILY MEDICINE | Facility: CLINIC | Age: 72
End: 2022-02-28
Payer: MEDICARE

## 2022-02-28 VITALS — DIASTOLIC BLOOD PRESSURE: 80 MMHG | SYSTOLIC BLOOD PRESSURE: 130 MMHG | HEART RATE: 87 BPM | OXYGEN SATURATION: 97 %

## 2022-02-28 DIAGNOSIS — H57.13 EYE PAIN, BILATERAL: ICD-10-CM

## 2022-02-28 DIAGNOSIS — Z86.69 HISTORY OF MIGRAINE: ICD-10-CM

## 2022-02-28 DIAGNOSIS — H53.143 PHOTOPHOBIA OF BOTH EYES: Primary | ICD-10-CM

## 2022-02-28 PROCEDURE — 99999 PR PBB SHADOW E&M-EST. PATIENT-LVL V: CPT | Mod: PBBFAC,,, | Performed by: PHYSICIAN ASSISTANT

## 2022-02-28 PROCEDURE — 99214 PR OFFICE/OUTPT VISIT, EST, LEVL IV, 30-39 MIN: ICD-10-PCS | Mod: S$PBB,,, | Performed by: PHYSICIAN ASSISTANT

## 2022-02-28 PROCEDURE — 99999 PR PBB SHADOW E&M-EST. PATIENT-LVL V: ICD-10-PCS | Mod: PBBFAC,,, | Performed by: PHYSICIAN ASSISTANT

## 2022-02-28 PROCEDURE — 99214 OFFICE O/P EST MOD 30 MIN: CPT | Mod: S$PBB,,, | Performed by: PHYSICIAN ASSISTANT

## 2022-02-28 PROCEDURE — 99215 OFFICE O/P EST HI 40 MIN: CPT | Mod: PBBFAC,PO | Performed by: PHYSICIAN ASSISTANT

## 2022-02-28 NOTE — PROGRESS NOTES
Subjective:       Patient ID: Lucinda Aguilera is a 71 y.o. female.    Chief Complaint: Burning Eyes (3 days) and Headache    HPI     Pt is new to me, PCP Dr. Hassan.     Pt is a 71 year old female with anxiety, HLD, HTN, GERD, biliary colic, osteoarthritis. She presents today complaining of sensitivity to light and discomfort behind her eyes. It feels like a dull ache. Pt first noticed this 4-5 days ago, and it has persisted since. Also has a generalized, dull headache. It is not throbbing/pulsatile. She has never had this discomfort before. She does have a history of migraine, but usually it is not associated with photophobia and resolves with tylenol. She has been taking tylenol and notes no improvement. Pain is about 5/10. No eye watering or eye discharge. They do feel better when she itches them. She reports no visual disturbances, loss of central or peripheral vision. Denies nausea, phonophobia, pain with EOMs.   No speech difficulty, focal weakness, facial drooping. Feels better with resting in a dark room. She is concerned bc glaucoma runs in her family. She does report increased stress over the last few days.     Review of Systems   Constitutional: Negative for activity change, appetite change, chills, fatigue, fever and unexpected weight change.   HENT: Negative for nasal congestion, ear pain, hearing loss, postnasal drip, rhinorrhea and sore throat.    Eyes: Positive for photophobia and pain. Negative for visual disturbance.   Respiratory: Negative for cough, chest tightness, shortness of breath and wheezing.    Cardiovascular: Negative for chest pain and palpitations.   Gastrointestinal: Negative for abdominal pain, change in bowel habit, constipation, diarrhea, nausea, vomiting, reflux and change in bowel habit.   Genitourinary: Negative for difficulty urinating, dysuria, frequency, hematuria and urgency.   Musculoskeletal: Negative for arthralgias and myalgias.   Integumentary:  Negative for rash.    Neurological: Positive for headaches. Negative for dizziness, vertigo, seizures, syncope, weakness and light-headedness.   Psychiatric/Behavioral: Negative for dysphoric mood, self-injury, sleep disturbance and suicidal ideas. The patient is not nervous/anxious.          Objective:      Physical Exam  Vitals and nursing note reviewed.   Constitutional:       General: She is not in acute distress.     Appearance: Normal appearance. She is not ill-appearing, toxic-appearing or diaphoretic.   HENT:      Head: Normocephalic and atraumatic.      Comments: No temporal artery tenderness     Right Ear: Tympanic membrane, ear canal and external ear normal. There is no impacted cerumen.      Left Ear: Tympanic membrane, ear canal and external ear normal. There is no impacted cerumen.      Nose: Nose normal. No congestion or rhinorrhea.      Right Sinus: No maxillary sinus tenderness or frontal sinus tenderness.      Left Sinus: No maxillary sinus tenderness or frontal sinus tenderness.      Mouth/Throat:      Mouth: Mucous membranes are moist.      Pharynx: Oropharynx is clear. No oropharyngeal exudate or posterior oropharyngeal erythema.   Eyes:      General: No scleral icterus.        Right eye: No discharge.         Left eye: No discharge.      Extraocular Movements: Extraocular movements intact.      Conjunctiva/sclera: Conjunctivae normal.      Pupils: Pupils are equal, round, and reactive to light.   Cardiovascular:      Rate and Rhythm: Normal rate and regular rhythm.      Pulses: Normal pulses.      Heart sounds: Normal heart sounds. No murmur heard.    No friction rub. No gallop.   Pulmonary:      Effort: Pulmonary effort is normal. No respiratory distress.      Breath sounds: Normal breath sounds. No stridor. No wheezing, rhonchi or rales.   Abdominal:      General: Abdomen is flat. Bowel sounds are normal. There is no distension.      Palpations: Abdomen is soft. There is no mass.      Tenderness: There is no  abdominal tenderness. There is no right CVA tenderness, left CVA tenderness, guarding or rebound.   Musculoskeletal:         General: No deformity or signs of injury.      Cervical back: Normal range of motion and neck supple.      Right lower leg: No edema.      Left lower leg: No edema.   Lymphadenopathy:      Cervical: No cervical adenopathy.   Skin:     General: Skin is warm.      Capillary Refill: Capillary refill takes less than 2 seconds.      Coloration: Skin is not jaundiced or pale.      Findings: No lesion or rash.   Neurological:      General: No focal deficit present.      Mental Status: She is alert and oriented to person, place, and time. Mental status is at baseline.      GCS: GCS eye subscore is 4. GCS verbal subscore is 5. GCS motor subscore is 6.      Cranial Nerves: Cranial nerves are intact.      Sensory: Sensation is intact.      Motor: Motor function is intact.      Coordination: Romberg sign positive. Coordination normal. Finger-Nose-Finger Test and Heel to Shin Test normal. Rapid alternating movements normal.      Gait: Tandem walk abnormal (pt cannot complete due to knee arthritis). Gait normal.   Psychiatric:         Mood and Affect: Mood normal.         Behavior: Behavior normal.         Thought Content: Thought content normal.         Judgment: Judgment normal.           ophthalmology nurse came to clinic and performed tuyet-pen testing. Both eye IOPs 12.     Assessment:       Problem List Items Addressed This Visit    None     Visit Diagnoses     Photophobia of both eyes    -  Primary    Eye pain, bilateral        Relevant Orders    Ambulatory referral/consult to Ophthalmology    History of migraine              Plan:       1. Photophobia of both eyes    2. Eye pain, bilateral  - Ambulatory referral/consult to Ophthalmology; Future    3. History of migraine         -may be due to migraine, although this is not patients typical presentation. Recommend trying tylenol + caffeine., rest  increase hydration. Still recommend thorough ophtho eval, ER precautions given. If no improvement with tylenol + caffeine, will trial triptan and consider imaging.

## 2022-02-28 NOTE — PATIENT INSTRUCTIONS
A few reminders from today:    Try tylenol tension headache = tylenol + caffeine  Increase hydration, about 60 oz of water a day  Rest and reduce stress levels  Schedule ophtho    Follow up with me if needed.   Please go to ER/urgent care if after hours or symptoms persist/worsen.     Do not hesitate to get in touch with me should you have any further questions.     Thank you for trusting me with your care!  I wish you health and happiness.    -Laura Harding PA-C

## 2022-03-02 ENCOUNTER — OFFICE VISIT (OUTPATIENT)
Dept: OPTOMETRY | Facility: CLINIC | Age: 72
End: 2022-03-02
Payer: MEDICARE

## 2022-03-02 DIAGNOSIS — H04.123 BILATERAL DRY EYES: Primary | ICD-10-CM

## 2022-03-02 DIAGNOSIS — H57.13 EYE PAIN, BILATERAL: ICD-10-CM

## 2022-03-02 PROCEDURE — 99999 PR PBB SHADOW E&M-EST. PATIENT-LVL IV: CPT | Mod: PBBFAC,,, | Performed by: OPTOMETRIST

## 2022-03-02 PROCEDURE — 99999 PR PBB SHADOW E&M-EST. PATIENT-LVL IV: ICD-10-PCS | Mod: PBBFAC,,, | Performed by: OPTOMETRIST

## 2022-03-02 PROCEDURE — 92012 INTRM OPH EXAM EST PATIENT: CPT | Mod: S$PBB,,, | Performed by: OPTOMETRIST

## 2022-03-02 PROCEDURE — 99214 OFFICE O/P EST MOD 30 MIN: CPT | Mod: PBBFAC,PO | Performed by: OPTOMETRIST

## 2022-03-02 PROCEDURE — 92012 PR EYE EXAM, EST PATIENT,INTERMED: ICD-10-PCS | Mod: S$PBB,,, | Performed by: OPTOMETRIST

## 2022-03-02 RX ORDER — BISMUTH SUBSALICYLATE 262 MG/1
2 TABLET ORAL DAILY PRN
COMMUNITY
End: 2022-08-23

## 2022-03-02 RX ORDER — FLUOROMETHOLONE 1 MG/ML
1 SUSPENSION/ DROPS OPHTHALMIC 4 TIMES DAILY
Qty: 5 ML | Refills: 0 | Status: SHIPPED | OUTPATIENT
Start: 2022-03-02 | End: 2022-03-09 | Stop reason: SDUPTHER

## 2022-03-02 NOTE — PATIENT INSTRUCTIONS
Start steroid eye drops 4x/day both eyes x 7-10 days. Signs and symptoms consistent with inflammation due to dry eyes. No increased eye pressure. Will follow up in 1 week to evaluate for improvement.

## 2022-03-02 NOTE — PROGRESS NOTES
HPI     DLS: 4/1/21    Pt states starting Saturday starting with burning, itching, headaches and   light sensitivity. Pain level today 6 with eyes and headaches. Using eye   wash. Tried visine and burned her eyes worse. No tearing. Saw PCP and was   told to come to optometry.     Last edited by Cheryle Quintana on 3/2/2022  8:07 AM. (History)            Assessment /Plan     For exam results, see Encounter Report.    Bilateral dry eyes  -     fluorometholone 0.1% (FML) 0.1 % DrpS; Place 1 drop into both eyes 4 (four) times daily. for 10 days  Dispense: 5 mL; Refill: 0    Eye pain, bilateral  -     Ambulatory referral/consult to Ophthalmology      1,2. Start steroid eye drops 4x/day both eyes x 7-10 days. Signs and symptoms consistent with inflammation due to dry eyes. No increased eye pressure. Will follow up in 1 week to evaluate for improvement.

## 2022-03-05 DIAGNOSIS — F41.9 ANXIETY: ICD-10-CM

## 2022-03-06 NOTE — TELEPHONE ENCOUNTER
No new care gaps identified.  Powered by [employer]. Reference number: [id number].   3/05/2022 6:58:07 PM CST

## 2022-03-08 ENCOUNTER — PATIENT MESSAGE (OUTPATIENT)
Dept: FAMILY MEDICINE | Facility: CLINIC | Age: 72
End: 2022-03-08
Payer: MEDICARE

## 2022-03-08 RX ORDER — ALPRAZOLAM 0.5 MG/1
TABLET ORAL
Qty: 90 TABLET | Refills: 5 | Status: SHIPPED | OUTPATIENT
Start: 2022-03-08 | End: 2022-03-11

## 2022-03-09 ENCOUNTER — OFFICE VISIT (OUTPATIENT)
Dept: OPTOMETRY | Facility: CLINIC | Age: 72
End: 2022-03-09
Payer: MEDICARE

## 2022-03-09 VITALS — HEART RATE: 91 BPM | DIASTOLIC BLOOD PRESSURE: 78 MMHG | SYSTOLIC BLOOD PRESSURE: 130 MMHG

## 2022-03-09 DIAGNOSIS — H04.123 BILATERAL DRY EYES: Primary | ICD-10-CM

## 2022-03-09 PROCEDURE — 99214 OFFICE O/P EST MOD 30 MIN: CPT | Mod: PBBFAC,PO | Performed by: OPTOMETRIST

## 2022-03-09 PROCEDURE — 92012 INTRM OPH EXAM EST PATIENT: CPT | Mod: S$PBB,,, | Performed by: OPTOMETRIST

## 2022-03-09 PROCEDURE — 99999 PR PBB SHADOW E&M-EST. PATIENT-LVL IV: ICD-10-PCS | Mod: PBBFAC,,, | Performed by: OPTOMETRIST

## 2022-03-09 PROCEDURE — 99999 PR PBB SHADOW E&M-EST. PATIENT-LVL IV: CPT | Mod: PBBFAC,,, | Performed by: OPTOMETRIST

## 2022-03-09 PROCEDURE — 92012 PR EYE EXAM, EST PATIENT,INTERMED: ICD-10-PCS | Mod: S$PBB,,, | Performed by: OPTOMETRIST

## 2022-03-09 RX ORDER — FLUOROMETHOLONE 1 MG/ML
1 SUSPENSION/ DROPS OPHTHALMIC 4 TIMES DAILY
Qty: 5 ML | Refills: 1 | Status: SHIPPED | OUTPATIENT
Start: 2022-03-09 | End: 2022-03-19

## 2022-03-09 NOTE — PROGRESS NOTES
HPI     Dle- 03/02/2022    Pt here for 1 wk f/u for dry eye. Pt sts dryness has improved but still   wants to itch or scratch eyes. Gtts: fluoromethlolne x4 daily OU, and gel   at night OU.     Last edited by Zayra Jones MA on 3/9/2022  9:21 AM. (History)            Assessment /Plan     For exam results, see Encounter Report.    Bilateral dry eyes  -     fluorometholone 0.1% (FML) 0.1 % DrpS; Place 1 drop into both eyes 4 (four) times daily. for 10 days  Dispense: 5 mL; Refill: 1      1. Symptoms improved, cont steroid drops for one more week and then stop. At that time start refresh drops 4x/day with gel at night. Also discussed gradient tint for glasses.

## 2022-03-10 ENCOUNTER — DOCUMENTATION ONLY (OUTPATIENT)
Dept: FAMILY MEDICINE | Facility: CLINIC | Age: 72
End: 2022-03-10
Payer: MEDICARE

## 2022-03-10 NOTE — PROGRESS NOTES
PA approved; faxed copy to pt's pharmacy.     PA initiated for Alprazolam 0.5 mg in [facility]  Key: [id number]  Case: [id number]  [facility]

## 2022-03-11 ENCOUNTER — OFFICE VISIT (OUTPATIENT)
Dept: FAMILY MEDICINE | Facility: CLINIC | Age: 72
End: 2022-03-11
Payer: MEDICARE

## 2022-03-11 VITALS
DIASTOLIC BLOOD PRESSURE: 72 MMHG | BODY MASS INDEX: 33.79 KG/M2 | TEMPERATURE: 98 F | HEIGHT: 62 IN | WEIGHT: 183.63 LBS | HEART RATE: 100 BPM | OXYGEN SATURATION: 96 % | SYSTOLIC BLOOD PRESSURE: 136 MMHG

## 2022-03-11 DIAGNOSIS — E78.5 HYPERLIPIDEMIA, UNSPECIFIED HYPERLIPIDEMIA TYPE: ICD-10-CM

## 2022-03-11 DIAGNOSIS — I10 ESSENTIAL HYPERTENSION: ICD-10-CM

## 2022-03-11 DIAGNOSIS — F41.9 ANXIETY: Primary | ICD-10-CM

## 2022-03-11 PROCEDURE — 99214 OFFICE O/P EST MOD 30 MIN: CPT | Mod: PBBFAC,PO | Performed by: FAMILY MEDICINE

## 2022-03-11 PROCEDURE — 99214 OFFICE O/P EST MOD 30 MIN: CPT | Mod: S$PBB,,, | Performed by: FAMILY MEDICINE

## 2022-03-11 PROCEDURE — 99999 PR PBB SHADOW E&M-EST. PATIENT-LVL IV: CPT | Mod: PBBFAC,,, | Performed by: FAMILY MEDICINE

## 2022-03-11 PROCEDURE — 99214 PR OFFICE/OUTPT VISIT, EST, LEVL IV, 30-39 MIN: ICD-10-PCS | Mod: S$PBB,,, | Performed by: FAMILY MEDICINE

## 2022-03-11 PROCEDURE — 99999 PR PBB SHADOW E&M-EST. PATIENT-LVL IV: ICD-10-PCS | Mod: PBBFAC,,, | Performed by: FAMILY MEDICINE

## 2022-03-11 RX ORDER — LORAZEPAM 0.5 MG/1
0.5 TABLET ORAL 2 TIMES DAILY
Qty: 60 TABLET | Refills: 5 | Status: SHIPPED | OUTPATIENT
Start: 2022-03-11 | End: 2022-05-23 | Stop reason: SDUPTHER

## 2022-03-11 NOTE — PROGRESS NOTES
Subjective:       Patient ID: Lucinda Aguilera is a 71 y.o. female.    Chief Complaint: Follow-up    HPI Comments: Here for f/u on chronic health issues    Recovered from Covid in December 2020    Depression - tolerating Effexor XR 37.5mg; she could not tolerate the 75mg dose  HLD - tolerating pravachol 40mg daily  Anxiety - Taking Xanax 0.5mg 1 tablet BID. Doing some counseling presently; insurance will not longer cover the Xanax  HTN - tolerating Avapro 300mg daily and Hygroten 25mg daily; BP controlled; digital flowsheet reviewed  GERD - tolerating Protonix 40mg daily  S/p conner - taking colestipol    Past Medical History:    Hypertension                                                  Anxiety                                           Hyperlipemia                                                  GERD (gastroesophageal reflux disease)                        Cataract                                                        Comment:OU    PVD (posterior vitreous detachment)                             Comment:OD    Arthritis                                                       Comment:right thumb    Past Surgical History:    chest abcess                                                     Comment:child    KNEE ARTHROSCOPY W/ LASER                                        Comment:right    COLONOSCOPY                                      1/2012          Comment:repeat in 5 years    No Known Allergies    Social History    Marital Status:              Spouse Name:                       Years of Education:                 Number of children: 2             Occupational History  Occupation          Employer            Comment               retired                                   retired teacher an*                         Social History Main Topics    Smoking Status: Never Smoker                      Smokeless Status: Never Used                        Alcohol Use: Yes                Comment: social    Drug Use: No               Sexual Activity: Yes               Partners with: Male       Birth Control/Protection: None, Post-menopausal    Current Outpatient Medications on File Prior to Visit   Medication Sig Dispense Refill    acetaminophen (TYLENOL) 500 MG tablet Take 500 mg by mouth every 6 (six) hours as needed for Pain.      ascorbic acid, vitamin C, (VITAMIN C) 250 MG tablet Take 500 mg by mouth once daily.       aspirin (ECOTRIN) 81 MG EC tablet Take 81 mg by mouth once daily.      balsalazide (COLAZAL) 750 mg capsule TAKE 3 CAPSULES(2250 MG) BY MOUTH TWICE DAILY WITH MEALS 180 capsule 11    bismuth subsalicylate (BISMAREX) 262 mg Chew Take 2 tablets by mouth daily as needed.      chlorthalidone (HYGROTEN) 25 MG Tab TAKE 1 TABLET BY MOUTH EVERY MORNING for blood pressure 90 tablet 3    colestipoL (COLESTID) 1 gram Tab TAKE 2 TABLETS(2 GRAMS) BY MOUTH TWICE DAILY 240 tablet 5    fish oil-omega-3 fatty acids 300-1,000 mg capsule Take 2 g by mouth once daily.      fluorometholone 0.1% (FML) 0.1 % DrpS Place 1 drop into both eyes 4 (four) times daily. for 10 days 5 mL 1    irbesartan (AVAPRO) 300 MG tablet TAKE 1 TABLET(300 MG) BY MOUTH EVERY EVENING 90 tablet 3    Lactobac no.41/Bifidobact no.7 (PROBIOTIC-10 ORAL) Take by mouth.      multivitamin (THERAGRAN) per tablet Take 1 tablet by mouth once daily.      niacin 500 MG CpSR Take 500 mg by mouth every evening.      pantoprazole (PROTONIX) 40 MG tablet Take 1 tablet (40 mg total) by mouth before breakfast. 90 tablet 2    potassium chloride (MICRO-K) 10 MEQ CpSR TAKE 2 CAPSULES(20 MEQ) BY MOUTH EVERY  capsule 1    pravastatin (PRAVACHOL) 40 MG tablet TAKE 1 TABLET BY MOUTH ONCE DAILY 90 tablet 2    venlafaxine (EFFEXOR-XR) 37.5 MG 24 hr capsule Take 1 capsule (37.5 mg total) by mouth once daily. 90 capsule 3    vitamin D (VITAMIN D3) 1000 units Tab Take 1,000 Units by mouth once daily.       No current facility-administered medications on file prior to  "visit.     Review of patient's family history indicates:    Hypertension                   Mother                    Heart disease                  Mother                    Stroke                         Father                    Review of Systems   Constitutional: Negative for fever, chills, appetite change and unexpected weight change.   HENT: Negative for sore throat and trouble swallowing.    Eyes: Negative for pain and visual disturbance.   Respiratory: Negative for cough, shortness of breath and wheezing.    Cardiovascular: Negative for chest pain and palpitations.   Gastrointestinal: Negative for nausea, vomiting, abdominal pain, diarrhea and blood in stool.   Genitourinary: Negative for dysuria, hematuria and difficulty urinating.   Musculoskeletal: Negative for arthralgias, gait problem and neck pain.   Skin: Negative for rash and wound.   Neurological: Negative for dizziness, weakness, numbness and headaches.   Hematological: Negative for adenopathy.   Psychiatric/Behavioral: Negative for dysphoric mood.           Objective:       /72   Pulse 100   Temp 98.1 °F (36.7 °C)   Ht 5' 2" (1.575 m)   Wt 83.3 kg (183 lb 10.3 oz)   LMP 04/18/2004   SpO2 96%   BMI 33.59 kg/m²     Physical Exam   Constitutional: She is oriented to person, place, and time. She appears well-developed and well-nourished.   HENT:   Head: Normocephalic.   Mouth/Throat: Oropharynx is clear and moist. No oropharyngeal exudate or posterior oropharyngeal erythema.   Eyes: Conjunctivae and EOM are normal. Pupils are equal, round, and reactive to light.   Neck: Normal range of motion. Neck supple. No thyromegaly present.   Cardiovascular: Normal rate, regular rhythm, S1 normal, S2 normal, normal heart sounds and intact distal pulses.  Exam reveals no gallop and no friction rub.    No murmur heard.  Pulmonary/Chest: Effort normal and breath sounds normal. She has no wheezes. She has no rales.   Abdominal: Normal appearance. "   Musculoskeletal:        Right lower leg: She exhibits no edema.        Left lower leg: She exhibits no edema.   Lymphadenopathy:     She has no cervical adenopathy.   Neurological: She is alert and oriented to person, place, and time. No cranial nerve deficit. Gait normal.   Skin: Skin is warm and intact. No rash noted.   Psychiatric: She has a normal mood and affect.         Results for orders placed or performed in visit on 12/29/21   TSH   Result Value Ref Range    TSH 0.961 0.400 - 4.000 uIU/mL   Lipid Panel   Result Value Ref Range    Cholesterol 203 (H) 120 - 199 mg/dL    Triglycerides 138 30 - 150 mg/dL    HDL 82 (H) 40 - 75 mg/dL    LDL Cholesterol 93.4 63.0 - 159.0 mg/dL    HDL/Cholesterol Ratio 40.4 20.0 - 50.0 %    Total Cholesterol/HDL Ratio 2.5 2.0 - 5.0    Non-HDL Cholesterol 121 mg/dL   Comprehensive Metabolic Panel   Result Value Ref Range    Sodium 128 (L) 136 - 145 mmol/L    Potassium 4.5 3.5 - 5.1 mmol/L    Chloride 94 (L) 95 - 110 mmol/L    CO2 25 23 - 29 mmol/L    Glucose 90 70 - 110 mg/dL    BUN 8 8 - 23 mg/dL    Creatinine 0.7 0.5 - 1.4 mg/dL    Calcium 9.3 8.7 - 10.5 mg/dL    Total Protein 7.5 6.0 - 8.4 g/dL    Albumin 3.9 3.5 - 5.2 g/dL    Total Bilirubin 0.5 0.1 - 1.0 mg/dL    Alkaline Phosphatase 74 55 - 135 U/L    AST 22 10 - 40 U/L    ALT 19 10 - 44 U/L    Anion Gap 9 8 - 16 mmol/L    eGFR if African American >60.0 >60 mL/min/1.73 m^2    eGFR if non African American >60.0 >60 mL/min/1.73 m^2     *Note: Due to a large number of results and/or encounters for the requested time period, some results have not been displayed. A complete set of results can be found in Results Review.         Assessment:       1. Anxiety    2. Essential hypertension    3. Hyperlipidemia, unspecified hyperlipidemia type        Plan:       Anxiety  -     LORazepam (ATIVAN) 0.5 MG tablet; Take 1 tablet (0.5 mg total) by mouth 2 (two) times daily.  Dispense: 60 tablet; Refill: 5    Essential hypertension  -      CBC Auto Differential; Future; Expected date: 09/07/2022    Hyperlipidemia, unspecified hyperlipidemia type  -     Comprehensive Metabolic Panel; Future; Expected date: 09/07/2022  -     Lipid Panel; Future; Expected date: 09/07/2022     change from xanax to Ativan  Effexor XR 37.5mg daily  Overall stable  cotninue aspirin 81mg daily  Continue other present meds  Counseled on regular exercise, maintenance of a healthy weight, balanced diet rich in fruits/vegetables and lean protein, and avoidance of unhealthy habits like smoking and excessive alcohol intake.  F/u 6 months [facility] labs

## 2022-03-17 ENCOUNTER — OFFICE VISIT (OUTPATIENT)
Dept: FAMILY MEDICINE | Facility: CLINIC | Age: 72
End: 2022-03-17
Payer: MEDICARE

## 2022-03-17 VITALS — OXYGEN SATURATION: 96 % | DIASTOLIC BLOOD PRESSURE: 88 MMHG | HEART RATE: 92 BPM | SYSTOLIC BLOOD PRESSURE: 168 MMHG

## 2022-03-17 DIAGNOSIS — A08.4 VIRAL GASTROENTERITIS: Primary | ICD-10-CM

## 2022-03-17 PROCEDURE — 99213 PR OFFICE/OUTPT VISIT, EST, LEVL III, 20-29 MIN: ICD-10-PCS | Mod: S$PBB,,, | Performed by: INTERNAL MEDICINE

## 2022-03-17 PROCEDURE — 99999 PR PBB SHADOW E&M-EST. PATIENT-LVL IV: ICD-10-PCS | Mod: PBBFAC,,, | Performed by: INTERNAL MEDICINE

## 2022-03-17 PROCEDURE — 99214 OFFICE O/P EST MOD 30 MIN: CPT | Mod: PBBFAC,PO | Performed by: INTERNAL MEDICINE

## 2022-03-17 PROCEDURE — 99213 OFFICE O/P EST LOW 20 MIN: CPT | Mod: S$PBB,,, | Performed by: INTERNAL MEDICINE

## 2022-03-17 PROCEDURE — 99999 PR PBB SHADOW E&M-EST. PATIENT-LVL IV: CPT | Mod: PBBFAC,,, | Performed by: INTERNAL MEDICINE

## 2022-03-17 RX ORDER — ONDANSETRON 4 MG/1
4 TABLET, ORALLY DISINTEGRATING ORAL EVERY 8 HOURS PRN
Qty: 20 TABLET | Refills: 0 | Status: SHIPPED | OUTPATIENT
Start: 2022-03-17 | End: 2022-04-11 | Stop reason: SDUPTHER

## 2022-03-17 NOTE — PATIENT INSTRUCTIONS
Start Zofran I sent in.  Start taking pantoprazole and irbesartan, later start your other meds if you are able to keep liquids and some solids down.  Consider taking Imodium if you continue having watery diarrhea.

## 2022-03-17 NOTE — PROGRESS NOTES
Subjective     Lucinda Aguilera is a 71 y.o. old, female here for Nausea, Diarrhea (2 days), and Dizziness    Patient complains of one day of nausea, vomiting and watery diarrhea. 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 She is unable to keep down liquids so far. About half a dozen episode of vomiting and diarrhea each.    Review of Systems   Constitutional: Positive for malaise/fatigue. Negative for chills and fever.   Respiratory: Negative.    Cardiovascular: Negative.    Gastrointestinal: Positive for abdominal pain and diarrhea. Negative for blood in stool.   Neurological: Positive for weakness.     Medications     Outpatient Medications Marked as Taking for the 3/17/22 encounter (Office Visit) with Oscar Serrano MD   Medication Sig Dispense Refill    acetaminophen (TYLENOL) 500 MG tablet Take 500 mg by mouth every 6 (six) hours as needed for Pain.      ascorbic acid, vitamin C, (VITAMIN C) 250 MG tablet Take 500 mg by mouth once daily.       aspirin (ECOTRIN) 81 MG EC tablet Take 81 mg by mouth once daily.      balsalazide (COLAZAL) 750 mg capsule TAKE 3 CAPSULES(2250 MG) BY MOUTH TWICE DAILY WITH MEALS 180 capsule 11    bismuth subsalicylate (BISMAREX) 262 mg Chew Take 2 tablets by mouth daily as needed.      chlorthalidone (HYGROTEN) 25 MG Tab TAKE 1 TABLET BY MOUTH EVERY MORNING for blood pressure 90 tablet 3    colestipoL (COLESTID) 1 gram Tab TAKE 2 TABLETS(2 GRAMS) BY MOUTH TWICE DAILY 240 tablet 5    fish oil-omega-3 fatty acids 300-1,000 mg capsule Take 2 g by mouth once daily.      fluorometholone 0.1% (FML) 0.1 % DrpS Place 1 drop into both eyes 4 (four) times daily. for 10 days 5 mL 1    irbesartan (AVAPRO) 300 MG tablet TAKE 1 TABLET(300 MG) BY MOUTH EVERY EVENING 90 tablet 3    Lactobac no.41/Bifidobact no.7 (PROBIOTIC-10 ORAL) Take by mouth.      LORazepam (ATIVAN) 0.5 MG tablet Take 1 tablet (0.5 mg total) by mouth 2 (two) times daily. 60  tablet 5    multivitamin (THERAGRAN) per tablet Take 1 tablet by mouth once daily.      niacin 500 MG CpSR Take 500 mg by mouth every evening.      pantoprazole (PROTONIX) 40 MG tablet Take 1 tablet (40 mg total) by mouth before breakfast. 90 tablet 2    potassium chloride (MICRO-K) 10 MEQ CpSR TAKE 2 CAPSULES(20 MEQ) BY MOUTH EVERY  capsule 1    pravastatin (PRAVACHOL) 40 MG tablet TAKE 1 TABLET BY MOUTH ONCE DAILY 90 tablet 2    venlafaxine (EFFEXOR-XR) 37.5 MG 24 hr capsule Take 1 capsule (37.5 mg total) by mouth once daily. 90 capsule 3    vitamin D (VITAMIN D3) 1000 units Tab Take 1,000 Units by mouth once daily.       Objective     BP (!) 168/88   Pulse 92   LMP 04/18/2004   SpO2 96%   Physical Exam  Constitutional:       General: She is not in acute distress.     Appearance: She is well-developed. She is ill-appearing. She is not toxic-appearing.   HENT:      Mouth/Throat:      Mouth: Mucous membranes are moist.   Neurological:      Mental Status: She is alert.       Assessment and Plan     Viral gastroenteritis  -     ondansetron (ZOFRAN-ODT) 4 MG TbDL; Take 1 tablet (4 mg total) by mouth every 8 (eight) hours as needed (nausea).  Dispense: 20 tablet; Refill: 0      History and exam consistent with acute viral gastroenteritis. Recommend supportive care and symptomatic treatment, drink plenty of fluids, antipyretics prn.  Discussed non use of antibiotics. Call if symptoms worsen.    No follow-ups on file.  ___________________  Oscar Serrano MD  Internal Medicine and Pediatrics

## 2022-03-21 ENCOUNTER — PATIENT MESSAGE (OUTPATIENT)
Dept: FAMILY MEDICINE | Facility: CLINIC | Age: 72
End: 2022-03-21
Payer: MEDICARE

## 2022-03-22 ENCOUNTER — OFFICE VISIT (OUTPATIENT)
Dept: FAMILY MEDICINE | Facility: CLINIC | Age: 72
End: 2022-03-22
Payer: MEDICARE

## 2022-03-22 VITALS
BODY MASS INDEX: 32.05 KG/M2 | OXYGEN SATURATION: 95 % | DIASTOLIC BLOOD PRESSURE: 84 MMHG | SYSTOLIC BLOOD PRESSURE: 136 MMHG | WEIGHT: 174.19 LBS | HEIGHT: 62 IN | TEMPERATURE: 98 F | HEART RATE: 107 BPM

## 2022-03-22 DIAGNOSIS — A08.4 VIRAL GASTROENTERITIS: Primary | ICD-10-CM

## 2022-03-22 PROCEDURE — 99999 PR PBB SHADOW E&M-EST. PATIENT-LVL IV: ICD-10-PCS | Mod: PBBFAC,,, | Performed by: NURSE PRACTITIONER

## 2022-03-22 PROCEDURE — 99213 OFFICE O/P EST LOW 20 MIN: CPT | Mod: S$PBB,,, | Performed by: NURSE PRACTITIONER

## 2022-03-22 PROCEDURE — 99999 PR PBB SHADOW E&M-EST. PATIENT-LVL IV: CPT | Mod: PBBFAC,,, | Performed by: NURSE PRACTITIONER

## 2022-03-22 PROCEDURE — 99213 PR OFFICE/OUTPT VISIT, EST, LEVL III, 20-29 MIN: ICD-10-PCS | Mod: S$PBB,,, | Performed by: NURSE PRACTITIONER

## 2022-03-22 PROCEDURE — 99214 OFFICE O/P EST MOD 30 MIN: CPT | Mod: PBBFAC,PO | Performed by: NURSE PRACTITIONER

## 2022-03-22 NOTE — PROGRESS NOTES
Subjective:       Patient ID: Lucinda Aguilera is a 71 y.o. female.    Chief Complaint: Nausea (On going nausea. Started last Tuesday, then fell on Wednesday ( pt does not know if she hit her head, Ada stated she may have a concussion. Pt fell on her knees) . Then Thursday she started the vomit. Not vomiting, but still having nausea. She does get headaches on and off.)    HPI  Patient evaluated on 3/17/22 by my colleague for 1 day of nausea, vomiting and diarrhea.  with GI bug going around school. Prescribed zofran and advised on supportive care. Vomiting resolved after 2 days. She reports persistent decreased appetite and nausea--improving but not resolved. Remains afebrile. Diarrhea resolved. Denies dark/bloody stool or abdominal pain. Drinking plenty of fluids and eating bland diet     She reports tripping over a student's chair on 3/16/22. She denies head trauma. Wondering if she has whiplash from fall which caused symptoms. She denies headache, dizziness, neck pain or vision change     Vitals:    03/22/22 0832   BP: 136/84   Pulse: 107   Temp: 98.2 °F (36.8 °C)     Review of Systems   Constitutional: Positive for appetite change. Negative for fever.   Eyes: Negative for visual disturbance.   Gastrointestinal: Positive for nausea. Negative for abdominal pain, anal bleeding, blood in stool, diarrhea and vomiting.   Neurological: Negative for dizziness and headaches.       Past Medical History:   Diagnosis Date    Anticoagulant long-term use     Anxiety     takes daily Xanax    Arthritis     right thumb    Cataract     OU    Cholelithiasis     GERD (gastroesophageal reflux disease)     Hepatic steatosis     Hyperlipemia     Hypertension     PVD (posterior vitreous detachment)     OD     Objective:      Physical Exam  Vitals and nursing note reviewed.   Constitutional:       General: She is not in acute distress.     Appearance: She is not diaphoretic.   HENT:      Head: Normocephalic.    Eyes:      General:         Right eye: No discharge.         Left eye: No discharge.      Pupils: Pupils are equal, round, and reactive to light.   Neck:      Trachea: No tracheal deviation.   Cardiovascular:      Rate and Rhythm: Normal rate.      Heart sounds: Normal heart sounds.   Pulmonary:      Effort: Pulmonary effort is normal.      Breath sounds: Normal breath sounds.   Abdominal:      Palpations: Abdomen is soft.      Tenderness: There is no abdominal tenderness.   Skin:     Coloration: Skin is not pale.   Neurological:      Mental Status: She is alert and oriented to person, place, and time.   Psychiatric:         Speech: Speech normal.         Behavior: Behavior normal.         Thought Content: Thought content normal.         Judgment: Judgment normal.         Assessment:       1. Viral gastroenteritis        Plan:       Viral gastroenteritis    reassurance provided no concerning s/s, clinically improving   education provided on supportive care, symptom monitoring and return precautions           Follow up for further evaluation if s/s worsen, fail to improve, or new symptoms arise.    Medication List with Changes/Refills   Current Medications    ACETAMINOPHEN (TYLENOL) 500 MG TABLET    Take 500 mg by mouth every 6 (six) hours as needed for Pain.    ASCORBIC ACID, VITAMIN C, (VITAMIN C) 250 MG TABLET    Take 500 mg by mouth once daily.     ASPIRIN (ECOTRIN) 81 MG EC TABLET    Take 81 mg by mouth once daily.    BALSALAZIDE (COLAZAL) 750 MG CAPSULE    TAKE 3 CAPSULES(2250 MG) BY MOUTH TWICE DAILY WITH MEALS    BISMUTH SUBSALICYLATE (BISMAREX) 262 MG CHEW    Take 2 tablets by mouth daily as needed.    CHLORTHALIDONE (HYGROTEN) 25 MG TAB    TAKE 1 TABLET BY MOUTH EVERY MORNING for blood pressure    COLESTIPOL (COLESTID) 1 GRAM TAB    TAKE 2 TABLETS(2 GRAMS) BY MOUTH TWICE DAILY    FISH OIL-OMEGA-3 FATTY ACIDS 300-1,000 MG CAPSULE    Take 2 g by mouth once daily.    IRBESARTAN (AVAPRO) 300 MG TABLET    TAKE  1 TABLET(300 MG) BY MOUTH EVERY EVENING    LACTOBAC NO.41/BIFIDOBACT NO.7 (PROBIOTIC-10 ORAL)    Take by mouth.    LORAZEPAM (ATIVAN) 0.5 MG TABLET    Take 1 tablet (0.5 mg total) by mouth 2 (two) times daily.    MULTIVITAMIN (THERAGRAN) PER TABLET    Take 1 tablet by mouth once daily.    NIACIN 500 MG CPSR    Take 500 mg by mouth every evening.    ONDANSETRON (ZOFRAN-ODT) 4 MG TBDL    Take 1 tablet (4 mg total) by mouth every 8 (eight) hours as needed (nausea).    PANTOPRAZOLE (PROTONIX) 40 MG TABLET    Take 1 tablet (40 mg total) by mouth before breakfast.    POTASSIUM CHLORIDE (MICRO-K) 10 MEQ CPSR    TAKE 2 CAPSULES(20 MEQ) BY MOUTH EVERY DAY    PRAVASTATIN (PRAVACHOL) 40 MG TABLET    TAKE 1 TABLET BY MOUTH ONCE DAILY    VENLAFAXINE (EFFEXOR-XR) 37.5 MG 24 HR CAPSULE    Take 1 capsule (37.5 mg total) by mouth once daily.    VITAMIN D (VITAMIN D3) 1000 UNITS TAB    Take 1,000 Units by mouth once daily.

## 2022-03-29 ENCOUNTER — OFFICE VISIT (OUTPATIENT)
Dept: GASTROENTEROLOGY | Facility: CLINIC | Age: 72
End: 2022-03-29
Payer: MEDICARE

## 2022-03-29 VITALS — BODY MASS INDEX: 32.33 KG/M2 | WEIGHT: 175.69 LBS | HEIGHT: 62 IN

## 2022-03-29 DIAGNOSIS — K52.9 COLITIS: ICD-10-CM

## 2022-03-29 DIAGNOSIS — R11.2 NON-INTRACTABLE VOMITING WITH NAUSEA, UNSPECIFIED VOMITING TYPE: Primary | ICD-10-CM

## 2022-03-29 DIAGNOSIS — K21.9 GASTROESOPHAGEAL REFLUX DISEASE WITHOUT ESOPHAGITIS: ICD-10-CM

## 2022-03-29 DIAGNOSIS — R10.13 EPIGASTRIC DISCOMFORT: ICD-10-CM

## 2022-03-29 DIAGNOSIS — R63.4 WEIGHT LOSS: ICD-10-CM

## 2022-03-29 DIAGNOSIS — Z90.49 S/P CHOLECYSTECTOMY: ICD-10-CM

## 2022-03-29 PROCEDURE — 99214 OFFICE O/P EST MOD 30 MIN: CPT | Mod: PBBFAC,PO | Performed by: NURSE PRACTITIONER

## 2022-03-29 PROCEDURE — 99214 PR OFFICE/OUTPT VISIT, EST, LEVL IV, 30-39 MIN: ICD-10-PCS | Mod: S$PBB,,, | Performed by: NURSE PRACTITIONER

## 2022-03-29 PROCEDURE — 99999 PR PBB SHADOW E&M-EST. PATIENT-LVL IV: CPT | Mod: PBBFAC,,, | Performed by: NURSE PRACTITIONER

## 2022-03-29 PROCEDURE — 99999 PR PBB SHADOW E&M-EST. PATIENT-LVL IV: ICD-10-PCS | Mod: PBBFAC,,, | Performed by: NURSE PRACTITIONER

## 2022-03-29 PROCEDURE — 99214 OFFICE O/P EST MOD 30 MIN: CPT | Mod: S$PBB,,, | Performed by: NURSE PRACTITIONER

## 2022-03-29 RX ORDER — FAMOTIDINE 40 MG/1
40 TABLET, FILM COATED ORAL NIGHTLY
Qty: 30 TABLET | Refills: 2 | Status: SHIPPED | OUTPATIENT
Start: 2022-03-29 | End: 2022-05-11

## 2022-03-29 NOTE — PROGRESS NOTES
Subjective:       Patient ID: Lucinda Aguilera is a 71 y.o. female, Body mass index is 32.14 kg/m².    Chief Complaint: Abdominal Pain      Established patient of Stephanie Coleman, NP & myself.     Abdominal Pain  This is a new problem. The current episode started 1 to 4 weeks ago (Started ~ 2-3 weeks ago). The onset quality is sudden. The problem occurs intermittently. The problem has been gradually improving. The pain is located in the epigastric region. Quality: describes as discomfort. The abdominal pain does not radiate. Associated symptoms include anorexia, headaches, nausea (Chief complaint; intermittent; worsened by eating; Zofran helps), vomiting (occurred for two days; denies currently) and weight loss (documented weight loss of 8 pounds since 3/11/22; eating less due to symptoms). Pertinent negatives include no belching, constipation, diarrhea (Resolved; taking Colestid 2 gm BID and Balsalazide 2250 mg BID), dysuria, fever, flatus, hematochezia or melena. The pain is aggravated by eating and palpation. Relieved by: heating pad helps. She has tried nothing for the symptoms. Prior diagnostic workup includes GI consult, CT scan, ultrasound, lower endoscopy and upper endoscopy. Her past medical history is significant for abdominal surgery, gallstones (Hx of cholecystectomy), GERD (Hx of GERD and gastritis- denies heartburn/dyspepsia taking Protonix 40 mg once daily) and irritable bowel syndrome. There is no history of colon cancer, Crohn's disease, pancreatitis, PUD or ulcerative colitis (Hx of nonspecific colitis).     Review of Systems   Constitutional: Positive for appetite change (decreased appetite), unexpected weight change and weight loss (documented weight loss of 8 pounds since 3/11/22; eating less due to symptoms). Negative for fever.   HENT: Negative for trouble swallowing.    Respiratory: Negative for cough and shortness of breath.    Cardiovascular: Negative for chest pain.  "  Gastrointestinal: Positive for abdominal pain, anorexia, nausea (Chief complaint; intermittent; worsened by eating; Zofran helps) and vomiting (occurred for two days; denies currently). Negative for abdominal distention, anal bleeding, blood in stool, constipation, diarrhea (Resolved; taking Colestid 2 gm BID and Balsalazide 2250 mg BID), flatus, hematochezia, melena and rectal pain.   Genitourinary: Negative for difficulty urinating and dysuria.   Musculoskeletal: Negative for gait problem.   Skin: Negative for rash.   Neurological: Positive for dizziness and headaches. Negative for speech difficulty.   Psychiatric/Behavioral: Negative for confusion. The patient is nervous/anxious.        Past Medical History:   Diagnosis Date    Anticoagulant long-term use     Anxiety     takes daily Xanax    Arthritis     right thumb    Cataract     OU    Cholelithiasis     GERD (gastroesophageal reflux disease)     Hepatic steatosis     Hyperlipemia     Hypertension     PVD (posterior vitreous detachment)     OD      Past Surgical History:   Procedure Laterality Date    BREAST BIOPSY Left 08/28/2020    stereo biopsy    BREAST BIOPSY Left 10/07/2020    benign excisional biopsy    chest abcess      child    COLONOSCOPY  01/06/2012    Dr. Lopez: hemorrhoids, redundant colon    COLONOSCOPY N/A 2/17/2021    Dr. Lopez: Congested and erythematous mucosa in the rectum, in the recto-sigmoid colon and in the sigmoid colon, proctosigmoid colitis, Redundant colon; biopsy: focal active colitis "nonspecific but can be seen with acute self-limited colitis (infection), medication effect (e.g. NSAID use), proximity to diverticula, or early/evolving IBD    ESOPHAGOGASTRODUODENOSCOPY N/A 6/6/2019    Dr. Lopez: small hiatal hernia, Non-erosive esophageal reflux (NERD) disease?., slight antritis; biopsy: Antral mucosa with chemical/reactive gastropathy. negative for h pylori    ESOPHAGOGASTRODUODENOSCOPY N/A 2/17/2021    " Procedure: EGD (ESOPHAGOGASTRODUODENOSCOPY);  Surgeon: Nathan Lopez Jr., MD;  Location: [facility] ENDO;  Service: Endoscopy;  Laterality: N/A; Minimal gastritis; biopsy: stomach- negative for h pylori, active chronic gastritis with focal features of erosive gastritis, duodenum WNL    EXCISIONAL BIOPSY Left 10/7/2020    Procedure: EXCISIONAL BIOPSY-Left with radiological marker;  Surgeon: Brooklynn Ashford MD;  Location: [facility];  Service: General;  Laterality: Left;    JOINT REPLACEMENT Bilateral     knee    KNEE ARTHROSCOPY W/ LASER      right    LAPAROSCOPIC CHOLECYSTECTOMY N/A 6/14/2019    Procedure: CHOLECYSTECTOMY, LAPAROSCOPIC;  Surgeon: Guevara Evans MD;  Location: [facility] OR;  Service: General;  Laterality: N/A;    thumb surgery  11/2014    TOTAL KNEE ARTHROPLASTY Left 6/19/2018    Procedure: REPLACEMENT-KNEE-TOTAL;  Surgeon: Nj Melvin MD;  Location: [address];  Service: Orthopedics;  Laterality: Left;  Moven    UPPER GASTROINTESTINAL ENDOSCOPY  07/13/2017    Dr. Lopez: NERD, Gastric mucosal atrophy. antritis, Scar in the incisura. Biopsied; biopsy: chronic gastritis. negative for h pylori      Family History   Problem Relation Age of Onset    Hypertension Mother     Heart disease Mother     Glaucoma Mother     Stroke Father     Glaucoma Sister     Cataracts Sister     Macular degeneration Sister     Breast cancer Neg Hx     Colon cancer Neg Hx     Crohn's disease Neg Hx     Ulcerative colitis Neg Hx     Stomach cancer Neg Hx     Esophageal cancer Neg Hx     Celiac disease Neg Hx       Wt Readings from Last 10 Encounters:   03/29/22 79.7 kg (175 lb 11.3 oz)   03/22/22 79 kg (174 lb 2.6 oz)   03/11/22 83.3 kg (183 lb 10.3 oz)   12/30/21 82.2 kg (181 lb 3.5 oz)   11/17/21 83.9 kg (185 lb)   11/01/21 84.1 kg (185 lb 6.5 oz)   06/08/21 76.4 kg (168 lb 6.9 oz)   05/13/21 74.4 kg (164 lb 0.4 oz)   03/22/21 73.3 kg (161 lb 9.6 oz)   03/04/21 73.9 kg (162 lb 14.7 oz)     Lab Results    Component Value Date    WBC 10.23 02/09/2021    HGB 12.7 02/09/2021    HCT 38.8 02/09/2021    MCV 88 02/09/2021     (H) 02/09/2021     CMP  Sodium   Date Value Ref Range Status   12/29/2021 128 (L) 136 - 145 mmol/L Final     Potassium   Date Value Ref Range Status   12/29/2021 4.5 3.5 - 5.1 mmol/L Final     Chloride   Date Value Ref Range Status   12/29/2021 94 (L) 95 - 110 mmol/L Final     CO2   Date Value Ref Range Status   12/29/2021 25 23 - 29 mmol/L Final     Glucose   Date Value Ref Range Status   12/29/2021 90 70 - 110 mg/dL Final     BUN   Date Value Ref Range Status   12/29/2021 8 8 - 23 mg/dL Final     Creatinine   Date Value Ref Range Status   12/29/2021 0.7 0.5 - 1.4 mg/dL Final     Calcium   Date Value Ref Range Status   12/29/2021 9.3 8.7 - 10.5 mg/dL Final     Total Protein   Date Value Ref Range Status   12/29/2021 7.5 6.0 - 8.4 g/dL Final     Albumin   Date Value Ref Range Status   12/29/2021 3.9 3.5 - 5.2 g/dL Final     Total Bilirubin   Date Value Ref Range Status   12/29/2021 0.5 0.1 - 1.0 mg/dL Final     Comment:     For infants and newborns, interpretation of results should be based  on gestational age, weight and in agreement with clinical  observations.    Premature Infant recommended reference ranges:  Up to 24 hours.............<8.0 mg/dL  Up to 48 hours............<12.0 mg/dL  3-5 days..................<15.0 mg/dL  6-29 days.................<15.0 mg/dL       Alkaline Phosphatase   Date Value Ref Range Status   12/29/2021 74 55 - 135 U/L Final     AST   Date Value Ref Range Status   12/29/2021 22 10 - 40 U/L Final     ALT   Date Value Ref Range Status   12/29/2021 19 10 - 44 U/L Final     Anion Gap   Date Value Ref Range Status   12/29/2021 9 8 - 16 mmol/L Final     eGFR if    Date Value Ref Range Status   12/29/2021 >60.0 >60 mL/min/1.73 m^2 Final     eGFR if non    Date Value Ref Range Status   12/29/2021 >60.0 >60 mL/min/1.73 m^2 Final      Comment:     Calculation used to obtain the estimated glomerular filtration  rate (eGFR) is the CKD-EPI equation.          Lab Results   Component Value Date    LIPASE 47 09/10/2019     Lab Results   Component Value Date    LIPASERES 183 11/27/2020             Reviewed prior medical records including radiology report of CT of abdomen and pelvis 1/15/21 & endoscopy history (see surgical history).     Objective:      Physical Exam  Constitutional:       General: She is not in acute distress.     Appearance: She is well-developed.   HENT:      Head: Normocephalic.      Right Ear: Hearing normal.      Left Ear: Hearing normal.      Nose: Nose normal.      Mouth/Throat:      Comments: Pt wearing mask due to COVID concerns  Eyes:      General: Lids are normal.      Conjunctiva/sclera: Conjunctivae normal.      Pupils: Pupils are equal, round, and reactive to light.   Neck:      Trachea: Trachea normal.   Cardiovascular:      Rate and Rhythm: Normal rate and regular rhythm.      Heart sounds: Normal heart sounds. No murmur heard.  Pulmonary:      Effort: Pulmonary effort is normal. No respiratory distress.      Breath sounds: Normal breath sounds. No stridor. No wheezing.   Abdominal:      General: Bowel sounds are normal. There is no distension.      Palpations: Abdomen is soft. There is no mass.      Tenderness: There is abdominal tenderness (mild) in the epigastric area. There is no guarding or rebound.   Musculoskeletal:         General: Normal range of motion.      Cervical back: Normal range of motion.   Skin:     General: Skin is warm and dry.      Findings: No rash.      Comments: Non jaundiced   Neurological:      Mental Status: She is alert and oriented to person, place, and time.   Psychiatric:         Speech: Speech normal.         Behavior: Behavior normal. Behavior is cooperative.           Assessment:       1. Non-intractable vomiting with nausea, unspecified vomiting type    2. Epigastric discomfort    3.  Weight loss    4. Gastroesophageal reflux disease without esophagitis    5. Colitis    6. S/P cholecystectomy           Plan:   All diagnoses and orders for this visit:    Non-intractable vomiting with nausea, unspecified vomiting type & Epigastric discomfort  - Start: famotidine (PEPCID) 40 MG tablet; Take 1 tablet (40 mg total) by mouth nightly.  Dispense: 30 tablet; Refill: 2  - Continue Protonix 40 mg once daily  - Continue Zofran 4 mg every 8 hours PRN  - Schedule EGD  - Take PPI in the morning 30 minutes before breakfast  - Recommend to avoid large meals, avoid eating within 3 hours of bedtime, elevate head of bed if nocturnal symptoms are present, smoking cessation (if current smoker), & weight loss (if overweight).   - Recommend minimize/avoid high-fat foods, chocolate, caffeine, citrus, alcohol, & tomato products.  - Advised to avoid/limit use of NSAID's, since they can cause GI upset, bleeding, and/or ulcers. If needed, take with food.     Weight loss   - Schedule EGD   - Encouraged PO intake and daily calorie counts to ensure adequate nutrition is taken in, recommend at least 1,800-2,000 calories a day   - Recommend nutritional drinks, such as Boost, Ensure or Glucerna, to supplement nutrition needs    Gastroesophageal reflux disease without esophagitis   - Continue Protonix 40 mg once daily   - Take PPI in the morning 30 minutes before breakfast   - Recommend to avoid large meals, avoid eating within 3 hours of bedtime, elevate head of bed if nocturnal symptoms are present, smoking cessation (if current smoker), & weight loss (if overweight).    - Recommend minimize/avoid high-fat foods, chocolate, caffeine, citrus, alcohol, & tomato products.   - Advised to avoid/limit use of NSAID's, since they can cause GI upset, bleeding, and/or ulcers. If needed, take with food.     Colitis   - Continue Balsalazide 2250 mg BID   - Continue Colestid 2 gm BID    S/P cholecystectomy    If no improvement in symptoms or  symptoms worsen, call/follow-up at clinic or go to ER

## 2022-04-05 ENCOUNTER — PATIENT MESSAGE (OUTPATIENT)
Dept: OPTOMETRY | Facility: CLINIC | Age: 72
End: 2022-04-05
Payer: MEDICARE

## 2022-04-06 DIAGNOSIS — M25.552 BILATERAL HIP PAIN: Primary | ICD-10-CM

## 2022-04-06 DIAGNOSIS — M25.551 BILATERAL HIP PAIN: Primary | ICD-10-CM

## 2022-04-07 ENCOUNTER — PATIENT MESSAGE (OUTPATIENT)
Dept: ORTHOPEDICS | Facility: CLINIC | Age: 72
End: 2022-04-07
Payer: MEDICARE

## 2022-04-09 ENCOUNTER — HOSPITAL ENCOUNTER (OUTPATIENT)
Dept: RADIOLOGY | Facility: HOSPITAL | Age: 72
Discharge: HOME | End: 2022-04-09
Attending: ORTHOPAEDIC SURGERY
Payer: MEDICARE

## 2022-04-09 ENCOUNTER — PATIENT MESSAGE (OUTPATIENT)
Dept: ORTHOPEDICS | Facility: CLINIC | Age: 72
End: 2022-04-09
Payer: MEDICARE

## 2022-04-09 DIAGNOSIS — M25.552 BILATERAL HIP PAIN: ICD-10-CM

## 2022-04-09 DIAGNOSIS — M25.551 BILATERAL HIP PAIN: ICD-10-CM

## 2022-04-09 PROCEDURE — 73564 X-RAY EXAM KNEE 4 OR MORE: CPT | Mod: 26,50,, | Performed by: RADIOLOGY

## 2022-04-09 PROCEDURE — 73564 XR KNEE ORTHO BILAT WITH FLEXION: ICD-10-PCS | Mod: 26,50,, | Performed by: RADIOLOGY

## 2022-04-09 PROCEDURE — 73564 X-RAY EXAM KNEE 4 OR MORE: CPT | Mod: TC,50,FY,PO

## 2022-04-11 ENCOUNTER — OFFICE VISIT (OUTPATIENT)
Dept: GASTROENTEROLOGY | Facility: CLINIC | Age: 72
End: 2022-04-11
Payer: MEDICARE

## 2022-04-11 VITALS — WEIGHT: 176.38 LBS | HEIGHT: 62 IN | BODY MASS INDEX: 32.46 KG/M2

## 2022-04-11 DIAGNOSIS — Z87.19 HISTORY OF GASTRITIS: ICD-10-CM

## 2022-04-11 DIAGNOSIS — R11.0 NAUSEA: ICD-10-CM

## 2022-04-11 DIAGNOSIS — K21.9 GASTROESOPHAGEAL REFLUX DISEASE WITHOUT ESOPHAGITIS: ICD-10-CM

## 2022-04-11 DIAGNOSIS — R10.13 EPIGASTRIC PAIN: Primary | ICD-10-CM

## 2022-04-11 DIAGNOSIS — K52.9 CHRONIC DIARRHEA: ICD-10-CM

## 2022-04-11 DIAGNOSIS — K59.00 ACUTE CONSTIPATION: ICD-10-CM

## 2022-04-11 DIAGNOSIS — Z90.49 S/P CHOLECYSTECTOMY: ICD-10-CM

## 2022-04-11 DIAGNOSIS — K52.9 COLITIS: ICD-10-CM

## 2022-04-11 PROCEDURE — 99214 PR OFFICE/OUTPT VISIT, EST, LEVL IV, 30-39 MIN: ICD-10-PCS | Mod: S$PBB,,, | Performed by: NURSE PRACTITIONER

## 2022-04-11 PROCEDURE — 99213 OFFICE O/P EST LOW 20 MIN: CPT | Mod: PBBFAC,PO | Performed by: NURSE PRACTITIONER

## 2022-04-11 PROCEDURE — 99999 PR PBB SHADOW E&M-EST. PATIENT-LVL III: ICD-10-PCS | Mod: PBBFAC,,, | Performed by: NURSE PRACTITIONER

## 2022-04-11 PROCEDURE — 99999 PR PBB SHADOW E&M-EST. PATIENT-LVL III: CPT | Mod: PBBFAC,,, | Performed by: NURSE PRACTITIONER

## 2022-04-11 PROCEDURE — 99214 OFFICE O/P EST MOD 30 MIN: CPT | Mod: S$PBB,,, | Performed by: NURSE PRACTITIONER

## 2022-04-11 RX ORDER — ONDANSETRON 4 MG/1
4 TABLET, ORALLY DISINTEGRATING ORAL EVERY 8 HOURS PRN
Qty: 30 TABLET | Refills: 1 | Status: SHIPPED | OUTPATIENT
Start: 2022-04-11 | End: 2022-05-11 | Stop reason: SDUPTHER

## 2022-04-11 RX ORDER — MONTELUKAST SODIUM 4 MG/1
1 TABLET, CHEWABLE ORAL 2 TIMES DAILY
Qty: 180 TABLET | Refills: 3 | Status: SHIPPED | OUTPATIENT
Start: 2022-04-11 | End: 2022-04-18 | Stop reason: SINTOL

## 2022-04-11 NOTE — PROGRESS NOTES
Subjective:       Patient ID: Lucinda Aguilera is a 71 y.o. female, Body mass index is 32.26 kg/m².    Chief Complaint: Follow-up      Established patient of Stephanie Coleman, NP & myself.     Abdominal Pain  This is a recurrent problem. The current episode started more than 1 month ago. The onset quality is sudden. The problem occurs intermittently. The problem has been gradually improving (since starting Famotidine). The pain is located in the epigastric region. The quality of the pain is aching (describes as discomfort). The abdominal pain does not radiate. Associated symptoms include belching, constipation, diarrhea (chronic problem; hx of nonspecific colitis; taking Balsalazide 2250 mg BID and Colestid 2 gm BID- helped but lately complains of hard stools and frequent straining to have a bowel movement), flatus, nausea (Zofran helps) and weight loss (stable since last office visit). Pertinent negatives include no anorexia, dysuria, fever, frequency, hematochezia, melena or vomiting. The pain is aggravated by eating and palpation. The pain is relieved by nothing. She has tried proton pump inhibitors and H2 blockers (Currently: Protonix 40 mg once daily and Famotidine 40 mg nightly- symptoms improving) for the symptoms. Prior diagnostic workup includes GI consult, CT scan, lower endoscopy, ultrasound and upper endoscopy (gastric emptying study). Her past medical history is significant for abdominal surgery, gallstones (Hx of cholecystectomy), GERD and irritable bowel syndrome. There is no history of colon cancer, Crohn's disease, pancreatitis, PUD or ulcerative colitis (Hx of colitis; EGD scheduled on 6/28/22- she would like it to be done sooner).     Review of Systems   Constitutional: Positive for weight loss (stable since last office visit). Negative for appetite change, fever and unexpected weight change.   HENT: Negative for trouble swallowing.    Respiratory: Negative for cough and shortness of  "breath.    Cardiovascular: Negative for chest pain.   Gastrointestinal: Positive for abdominal pain, constipation, diarrhea (chronic problem; hx of nonspecific colitis; taking Balsalazide 2250 mg BID and Colestid 2 gm BID- helped but lately complains of hard stools and frequent straining to have a bowel movement), flatus and nausea (Zofran helps). Negative for abdominal distention, anal bleeding, anorexia, blood in stool, hematochezia, melena, rectal pain and vomiting.   Genitourinary: Negative for difficulty urinating, dysuria and frequency.   Musculoskeletal: Negative for gait problem.   Skin: Negative for rash.   Neurological: Negative for speech difficulty.   Psychiatric/Behavioral: Negative for confusion. The patient is nervous/anxious.        Past Medical History:   Diagnosis Date    Anticoagulant long-term use     Anxiety     takes daily Xanax    Arthritis     right thumb    Cataract     OU    Cholelithiasis     GERD (gastroesophageal reflux disease)     Hepatic steatosis     Hyperlipemia     Hypertension     PVD (posterior vitreous detachment)     OD      Past Surgical History:   Procedure Laterality Date    BREAST BIOPSY Left 08/28/2020    stereo biopsy    BREAST BIOPSY Left 10/07/2020    benign excisional biopsy    chest abcess      child    COLONOSCOPY  01/06/2012    Dr. Lopez: hemorrhoids, redundant colon    COLONOSCOPY N/A 2/17/2021    Dr. Lopez: Congested and erythematous mucosa in the rectum, in the recto-sigmoid colon and in the sigmoid colon, proctosigmoid colitis, Redundant colon; biopsy: focal active colitis "nonspecific but can be seen with acute self-limited colitis (infection), medication effect (e.g. NSAID use), proximity to diverticula, or early/evolving IBD    ESOPHAGOGASTRODUODENOSCOPY N/A 6/6/2019    Dr. Lopez: small hiatal hernia, Non-erosive esophageal reflux (NERD) disease?., slight antritis; biopsy: Antral mucosa with chemical/reactive gastropathy. negative for h pylori "    ESOPHAGOGASTRODUODENOSCOPY N/A 2/17/2021    Procedure: EGD (ESOPHAGOGASTRODUODENOSCOPY);  Surgeon: Nathan Lopez Jr., MD;  Location: [facility] ENDO;  Service: Endoscopy;  Laterality: N/A; Minimal gastritis; biopsy: stomach- negative for h pylori, active chronic gastritis with focal features of erosive gastritis, duodenum WNL    EXCISIONAL BIOPSY Left 10/7/2020    Procedure: EXCISIONAL BIOPSY-Left with radiological marker;  Surgeon: Brooklynn Ashford MD;  Location: [facility];  Service: General;  Laterality: Left;    JOINT REPLACEMENT Bilateral     knee    KNEE ARTHROSCOPY W/ LASER      right    LAPAROSCOPIC CHOLECYSTECTOMY N/A 6/14/2019    Procedure: CHOLECYSTECTOMY, LAPAROSCOPIC;  Surgeon: Guevara Evans MD;  Location: [facility] OR;  Service: General;  Laterality: N/A;    thumb surgery  11/2014    TOTAL KNEE ARTHROPLASTY Left 6/19/2018    Procedure: REPLACEMENT-KNEE-TOTAL;  Surgeon: Nj Melvin MD;  Location: [address];  Service: Orthopedics;  Laterality: Left;  [employer]    UPPER GASTROINTESTINAL ENDOSCOPY  07/13/2017    Dr. Lopez: NERD, Gastric mucosal atrophy. antritis, Scar in the incisura. Biopsied; biopsy: chronic gastritis. negative for h pylori      Family History   Problem Relation Age of Onset    Hypertension Mother     Heart disease Mother     Glaucoma Mother     Stroke Father     Glaucoma Sister     Cataracts Sister     Macular degeneration Sister     Breast cancer Neg Hx     Colon cancer Neg Hx     Crohn's disease Neg Hx     Ulcerative colitis Neg Hx     Stomach cancer Neg Hx     Esophageal cancer Neg Hx     Celiac disease Neg Hx       Wt Readings from Last 10 Encounters:   04/11/22 80 kg (176 lb 5.9 oz)   03/29/22 79.7 kg (175 lb 11.3 oz)   03/22/22 79 kg (174 lb 2.6 oz)   03/11/22 83.3 kg (183 lb 10.3 oz)   12/30/21 82.2 kg (181 lb 3.5 oz)   11/17/21 83.9 kg (185 lb)   11/01/21 84.1 kg (185 lb 6.5 oz)   06/08/21 76.4 kg (168 lb 6.9 oz)   05/13/21 74.4 kg (164 lb 0.4 oz)    03/22/21 73.3 kg (161 lb 9.6 oz)     Lab Results   Component Value Date    WBC 10.23 02/09/2021    HGB 12.7 02/09/2021    HCT 38.8 02/09/2021    MCV 88 02/09/2021     (H) 02/09/2021     CMP  Sodium   Date Value Ref Range Status   12/29/2021 128 (L) 136 - 145 mmol/L Final     Potassium   Date Value Ref Range Status   12/29/2021 4.5 3.5 - 5.1 mmol/L Final     Chloride   Date Value Ref Range Status   12/29/2021 94 (L) 95 - 110 mmol/L Final     CO2   Date Value Ref Range Status   12/29/2021 25 23 - 29 mmol/L Final     Glucose   Date Value Ref Range Status   12/29/2021 90 70 - 110 mg/dL Final     BUN   Date Value Ref Range Status   12/29/2021 8 8 - 23 mg/dL Final     Creatinine   Date Value Ref Range Status   12/29/2021 0.7 0.5 - 1.4 mg/dL Final     Calcium   Date Value Ref Range Status   12/29/2021 9.3 8.7 - 10.5 mg/dL Final     Total Protein   Date Value Ref Range Status   12/29/2021 7.5 6.0 - 8.4 g/dL Final     Albumin   Date Value Ref Range Status   12/29/2021 3.9 3.5 - 5.2 g/dL Final     Total Bilirubin   Date Value Ref Range Status   12/29/2021 0.5 0.1 - 1.0 mg/dL Final     Comment:     For infants and newborns, interpretation of results should be based  on gestational age, weight and in agreement with clinical  observations.    Premature Infant recommended reference ranges:  Up to 24 hours.............<8.0 mg/dL  Up to 48 hours............<12.0 mg/dL  3-5 days..................<15.0 mg/dL  6-29 days.................<15.0 mg/dL       Alkaline Phosphatase   Date Value Ref Range Status   12/29/2021 74 55 - 135 U/L Final     AST   Date Value Ref Range Status   12/29/2021 22 10 - 40 U/L Final     ALT   Date Value Ref Range Status   12/29/2021 19 10 - 44 U/L Final     Anion Gap   Date Value Ref Range Status   12/29/2021 9 8 - 16 mmol/L Final     eGFR if    Date Value Ref Range Status   12/29/2021 >60.0 >60 mL/min/1.73 m^2 Final     eGFR if non    Date Value Ref Range Status    12/29/2021 >60.0 >60 mL/min/1.73 m^2 Final     Comment:     Calculation used to obtain the estimated glomerular filtration  rate (eGFR) is the CKD-EPI equation.          Lab Results   Component Value Date    LIPASE 47 09/10/2019     Lab Results   Component Value Date    LIPASERES 183 11/27/2020             Reviewed prior medical records including radiology report of CT of abdomen and pelvis 1/15/21, US of abdomen 6/26/20 and gastric emptying study 10/8/19 & endoscopy history (see surgical history).     Objective:      Physical Exam  Constitutional:       General: She is not in acute distress.     Appearance: She is well-developed.   HENT:      Head: Normocephalic.      Right Ear: Hearing normal.      Left Ear: Hearing normal.      Nose: Nose normal.      Mouth/Throat:      Comments: Pt wearing mask due to COVID concerns  Eyes:      General: Lids are normal.      Conjunctiva/sclera: Conjunctivae normal.      Pupils: Pupils are equal, round, and reactive to light.   Neck:      Trachea: Trachea normal.   Cardiovascular:      Rate and Rhythm: Normal rate and regular rhythm.      Heart sounds: Normal heart sounds. No murmur heard.  Pulmonary:      Effort: Pulmonary effort is normal. No respiratory distress.      Breath sounds: Normal breath sounds. No stridor. No wheezing.   Abdominal:      General: Bowel sounds are normal. There is no distension.      Palpations: Abdomen is soft. There is no mass.      Tenderness: There is abdominal tenderness in the epigastric area. There is no guarding or rebound.   Musculoskeletal:         General: Normal range of motion.      Cervical back: Normal range of motion.   Skin:     General: Skin is warm and dry.      Findings: No rash.      Comments: Non jaundiced   Neurological:      Mental Status: She is alert and oriented to person, place, and time.   Psychiatric:         Mood and Affect: Mood is anxious.         Speech: Speech normal.         Behavior: Behavior normal. Behavior is  cooperative.           Assessment:       1. Epigastric pain    2. Nausea    3. Gastroesophageal reflux disease without esophagitis    4. History of gastritis    5. Colitis    6. Chronic diarrhea    7. Acute constipation    8. S/P cholecystectomy           Plan:   All diagnoses and orders for this visit:    Epigastric pain & Nausea   - Continue with scheduled EGD (will check for sooner availability)   - Continue Protonix 40 mg once daily   - Continue Famotidine 40 mg nightly   - Refill and continue: ondansetron (ZOFRAN-ODT) 4 MG TbDL; Take 1 tablet (4 mg total) by mouth every 8 (eight) hours as needed (nausea).  Dispense: 30 tablet; Refill: 1   - Take PPI in the morning 30 minutes before breakfast   - Recommend to avoid large meals, avoid eating within 3 hours of bedtime, elevate head of bed if nocturnal symptoms are present, smoking cessation (if current smoker), & weight loss (if overweight).    - Recommend minimize/avoid high-fat foods, chocolate, caffeine, citrus, alcohol, & tomato products.   - Advised to avoid/limit use of NSAID's, since they can cause GI upset, bleeding, and/or ulcers. If needed, take with food.     Gastroesophageal reflux disease without esophagitis & History of gastritis   - Continue with scheduled EGD (will check for sooner availability)   - Continue Protonix 40 mg once daily   - Continue Famotidine 40 mg nightly   - Take PPI in the morning 30 minutes before breakfast   - Recommend to avoid large meals, avoid eating within 3 hours of bedtime, elevate head of bed if nocturnal symptoms are present, smoking cessation (if current smoker), & weight loss (if overweight).    - Recommend minimize/avoid high-fat foods, chocolate, caffeine, citrus, alcohol, & tomato products.   - Advised to avoid/limit use of NSAID's, since they can cause GI upset, bleeding, and/or ulcers. If needed, take with food.     Colitis, Chronic diarrhea & Acute constipation  - Decrease Colestipol to 1 gm BID: colestipoL  (COLESTID) 1 gram Tab; Take 1 tablet (1 g total) by mouth 2 (two) times daily.  Dispense: 180 tablet; Refill: 3  - Continue Balsalazide 2250 mg BID  - Recommend daily exercise as tolerated, adequate water intake, and high fiber diet.     S/P cholecystectomy    If no improvement in symptoms or symptoms worsen, call/follow-up at clinic or go to ER

## 2022-04-18 ENCOUNTER — PATIENT MESSAGE (OUTPATIENT)
Dept: GASTROENTEROLOGY | Facility: CLINIC | Age: 72
End: 2022-04-18
Payer: MEDICARE

## 2022-04-18 ENCOUNTER — OFFICE VISIT (OUTPATIENT)
Dept: OPTOMETRY | Facility: CLINIC | Age: 72
End: 2022-04-18
Payer: MEDICARE

## 2022-04-18 DIAGNOSIS — Z83.518 FAMILY HISTORY OF MACULAR DEGENERATION: ICD-10-CM

## 2022-04-18 DIAGNOSIS — H43.813 POSTERIOR VITREOUS DETACHMENT, BILATERAL: ICD-10-CM

## 2022-04-18 DIAGNOSIS — H04.123 BILATERAL DRY EYES: ICD-10-CM

## 2022-04-18 DIAGNOSIS — H52.13 MYOPIA WITH ASTIGMATISM AND PRESBYOPIA, BILATERAL: ICD-10-CM

## 2022-04-18 DIAGNOSIS — H25.13 NUCLEAR SCLEROSIS, BILATERAL: Primary | ICD-10-CM

## 2022-04-18 DIAGNOSIS — H52.4 MYOPIA WITH ASTIGMATISM AND PRESBYOPIA, BILATERAL: ICD-10-CM

## 2022-04-18 DIAGNOSIS — H52.203 MYOPIA WITH ASTIGMATISM AND PRESBYOPIA, BILATERAL: ICD-10-CM

## 2022-04-18 DIAGNOSIS — Z13.5 GLAUCOMA SCREENING: ICD-10-CM

## 2022-04-18 PROCEDURE — 99999 PR PBB SHADOW E&M-EST. PATIENT-LVL III: ICD-10-PCS | Mod: PBBFAC,,, | Performed by: OPTOMETRIST

## 2022-04-18 PROCEDURE — 92015 PR REFRACTION: ICD-10-PCS | Mod: ,,, | Performed by: OPTOMETRIST

## 2022-04-18 PROCEDURE — 99999 PR PBB SHADOW E&M-EST. PATIENT-LVL III: CPT | Mod: PBBFAC,,, | Performed by: OPTOMETRIST

## 2022-04-18 PROCEDURE — 92014 PR EYE EXAM, EST PATIENT,COMPREHESV: ICD-10-PCS | Mod: S$PBB,,, | Performed by: OPTOMETRIST

## 2022-04-18 PROCEDURE — 92015 DETERMINE REFRACTIVE STATE: CPT | Mod: ,,, | Performed by: OPTOMETRIST

## 2022-04-18 PROCEDURE — 92014 COMPRE OPH EXAM EST PT 1/>: CPT | Mod: S$PBB,,, | Performed by: OPTOMETRIST

## 2022-04-18 PROCEDURE — 99213 OFFICE O/P EST LOW 20 MIN: CPT | Mod: PBBFAC,PO | Performed by: OPTOMETRIST

## 2022-04-18 NOTE — PATIENT INSTRUCTIONS
"DRY EYES -- BURNING OR PROSPER SYMPTOMS:  Use Over The Counter artificial tears as needed for dry eye symptoms.   Some common brands include:  Systane, Optive, Refresh, and Thera-Tears.  These drops can be used as frequently as desired, but may be most helpful use during long periods of concentrated work.  For example, reading / working at the computer. Start with 3-4x per day.     Nighttime Ophthalmic gel or ointments are available: Refresh PM, Genteal, and Lacrilube.    Avoid drops that "get redness out" (Visine, Murine, Clear Eyes), as these may contain medication that could further irritate the eyes, especially with chronic use.    ALLERGY EYES -- ITCHING SYMPTOMS:  Over the counter medications include--Pataday, Zaditor, and Alaway.  Use as directed 1-2 drops daily for symptoms of itching / watering eyes.  These drops will not help for dry eye or exposure symptoms.    REDNESS RELIEF:  Lumify---is a good redness reliever that will not cause irritation if used chronically.        FLASHES / FLOATERS / POSTERIOR VITREOUS DETACHMENT    Call the clinic if you have any further changes in symptoms.  Including:  Increased numbers of floaters or flashing lights, dimness or darkness that moves through or stays constant in your vision, or any pain in the eye (s).    You may sometimes see small specks or clouds moving in your field of vision.  They are called FLOATERS.  You can often see them when looking at a plain background, like a blank wall or blue saul.  Floaters are actually tiny clumps of gel or cells inside the VITREOUS, the clear jelly-like fluid that fills the inside of your eye.    While these objects look like they are in front of your eye, they are actually floating inside.  What you see are the shadows they cast on the RETINA, the nerve layer at the back of the eye that senses light and allows you to see.      POSTERIOR VITREOUS DETACHMENT    The appearance of new floaters may be alarming.  If you suddenly " develop new floaters, you should contact your eye care professional  right away.    The retina can tear if the shrinking vitreous pulls away from the wall of the eye.  This sometimes causes a small amount of bleeding in the eye that may appear as new floaters.    A torn retina is always a serious problem, since it can lead to a retinal detachment.  You should see your eye care professional as soon as possible if:    even one new floater appears suddenly;  you see sudden flashes of light;  you notice other symptoms, like the loss of side vision, or a curtain closes down in your vision        POSTERIOR VITREOUS DETACHMENT is more common for people who:    are nearsighted;  have had cataract surgery;  have had YAG laser surgery of the eye;  have had inflammation inside the eye;  are over age 60.      While some floaters may remain visible, many of them will fade over time and become less noticeable.  Even if you've had some floaters for years, you should have your eyes checked as soon as possible if you notice new ones.    FLASHING LIGHTS    When the vitreous gel rubs or pulls on the retina, you may see what look like flashing lights or lightning streaks.  These flashes can appear off and on for several weeks or months.      Some people experience flashes of light that appear as jagged lines or heat waves in both eyes, lasting 10-20 minutes.  These flashes are caused by a spasm of blood vessels in the brain, which is called a migraine.    If a headache follows these flashes, it's called a migraine headache.  If   no headache occurs, these flashes are called Ophthalmic or Ocular Migraine.           Early Cataracts--not visually significant for surgery consultation.    What Are Cataracts?  A clear lens in the eye focuses light. This lets the eye see images sharply. With age, the lens slowly becomes cloudy. The cloudy lens is a cataract. A cataract scatters light and makes it hard for the eye to focus. Cataracts often  form in both eyes. But one lens may cloud faster than the other.      The Aging of Your Lens    Your lens may cloud so slowly that you don`t notice any vision changes at first. But as the cataract gets worse, the eye has a harder time focusing. In early stages, glasses may help you see better. As the lens gets cloudier, your doctor may recommend surgery to restore your vision.   Using the Amsler Grid  If you are at risk for vision loss, you may be told to check your eyesight regularly using the Amsler grid. Below is the grid and instructions for using it.         The Amsler grid helps you track changes in your vision.    How to Use the Amsler Grid  Use the grid in a well-lighted area.  Wear glasses or contact lenses if you usually wear them.  Hold the grid at your normal reading distance (about 16 inches).  Cover your left eye.  With your right eye, look at the dot in the center of the grid.  While looking at the dot, notice if any of the lines look wavy, if any lines disappear, or if the boxes change shape.  Write down on a piece of paper any vision changes from the last time you used the grid.  Now repeat the exercise, this time covering your right eye.  Call your doctor right away if you notice any vision changes.  How Often Should I Check My Vision?  Use the Amsler grid as often as your eye doctor suggests. Keep the grid where youll remember to use it. Call your eye doctor right away if you notice any changes with your eyesight. This includes if your vision improves.  © 8783-5794 [employer], [address] [place] [address], [place] [address]. All rights reserved. This information is not intended as a substitute for professional medical care. Always follow your healthcare professional's instructions.

## 2022-04-25 ENCOUNTER — DOCUMENTATION ONLY (OUTPATIENT)
Dept: FAMILY MEDICINE | Facility: CLINIC | Age: 72
End: 2022-04-25

## 2022-05-08 ENCOUNTER — PATIENT MESSAGE (OUTPATIENT)
Dept: ORTHOPEDICS | Facility: CLINIC | Age: 72
End: 2022-05-08
Payer: MEDICARE

## 2022-05-09 ENCOUNTER — PATIENT MESSAGE (OUTPATIENT)
Dept: ENDOSCOPY | Facility: HOSPITAL | Age: 72
End: 2022-05-09
Payer: MEDICARE

## 2022-05-09 ENCOUNTER — PATIENT OUTREACH (OUTPATIENT)
Dept: ADMINISTRATIVE | Facility: OTHER | Age: 72
End: 2022-05-09
Payer: MEDICARE

## 2022-05-09 ENCOUNTER — PATIENT MESSAGE (OUTPATIENT)
Dept: SMOKING CESSATION | Facility: CLINIC | Age: 72
End: 2022-05-09
Payer: MEDICARE

## 2022-05-09 DIAGNOSIS — Z87.19 HISTORY OF GASTRITIS: ICD-10-CM

## 2022-05-09 RX ORDER — BALSALAZIDE DISODIUM 750 MG/1
CAPSULE ORAL
Qty: 180 CAPSULE | Refills: 11 | Status: SHIPPED | OUTPATIENT
Start: 2022-05-09 | End: 2023-05-02 | Stop reason: SDUPTHER

## 2022-05-09 RX ORDER — PANTOPRAZOLE SODIUM 40 MG/1
40 TABLET, DELAYED RELEASE ORAL
Qty: 30 TABLET | Refills: 11 | Status: ON HOLD | OUTPATIENT
Start: 2022-05-09 | End: 2022-05-10 | Stop reason: CLARIF

## 2022-05-09 NOTE — H&P
History & Physical - Short Stay  Gastroenterology      SUBJECTIVE:     Procedure: Gastroscopy    Chief Complaint/Indication for Procedure: Upper abdo pain.  N/V.  Weight loss.    History of Present Illness:  See March note:  Office Visit    3/29/2022  [facility] - G[facility]     Jamee Allen NP    Gastroenterology  Non-intractable vomiting with nausea, unspecified vomiting type +5 more    Dx  Abdominal Pain    Reason for Visit     Progress Notes  Jamee Allen NP (Nurse Practitioner)   Gastroenterology  Subjective:       Patient ID: Lucinda Aguilera is a 71 y.o. female, Body mass index is 32.14 kg/m².     Chief Complaint: Abdominal Pain        Established patient of Stephanie Coleman, JANUARY & myself.      Abdominal Pain  This is a new problem. The current episode started 1 to 4 weeks ago (Started ~ 2-3 weeks ago). The onset quality is sudden. The problem occurs intermittently. The problem has been gradually improving. The pain is located in the epigastric region. Quality: describes as discomfort. The abdominal pain does not radiate. Associated symptoms include anorexia, headaches, nausea (Chief complaint; intermittent; worsened by eating; Zofran helps), vomiting (occurred for two days; denies currently) and weight loss (documented weight loss of 8 pounds since 3/11/22; eating less due to symptoms). Pertinent negatives include no belching, constipation, diarrhea (Resolved; taking Colestid 2 gm BID and Balsalazide 2250 mg BID), dysuria, fever, flatus, hematochezia or melena. The pain is aggravated by eating and palpation. Relieved by: heating pad helps. She has tried nothing for the symptoms. Prior diagnostic workup includes GI consult, CT scan, ultrasound, lower endoscopy and upper endoscopy. Her past medical history is significant for abdominal surgery, gallstones (Hx of cholecystectomy), GERD (Hx of GERD and gastritis- denies heartburn/dyspepsia taking Protonix 40 mg once daily) and irritable  bowel syndrome. There is no history of colon cancer, Crohn's disease, pancreatitis, PUD or ulcerative colitis (Hx of nonspecific colitis).      Review of Systems   Constitutional: Positive for appetite change (decreased appetite), unexpected weight change and weight loss (documented weight loss of 8 pounds since 3/11/22; eating less due to symptoms). Negative for fever.   HENT: Negative for trouble swallowing.    Respiratory: Negative for cough and shortness of breath.    Cardiovascular: Negative for chest pain.   Gastrointestinal: Positive for abdominal pain, anorexia, nausea (Chief complaint; intermittent; worsened by eating; Zofran helps) and vomiting (occurred for two days; denies currently). Negative for abdominal distention, anal bleeding, blood in stool, constipation, diarrhea (Resolved; taking Colestid 2 gm BID and Balsalazide 2250 mg BID), flatus, hematochezia, melena and rectal pain.       Assessment:       1. Non-intractable vomiting with nausea, unspecified vomiting type    2. Epigastric discomfort    3. Weight loss    4. Gastroesophageal reflux disease without esophagitis    5. Colitis    6. S/P cholecystectomy           Plan:   All diagnoses and orders for this visit:     Non-intractable vomiting with nausea, unspecified vomiting type & Epigastric discomfort  - Start: famotidine (PEPCID) 40 MG tablet; Take 1 tablet (40 mg total) by mouth nightly.  Dispense: 30 tablet; Refill: 2  - Continue Protonix 40 mg once daily  - Continue Zofran 4 mg every 8 hours PRN  - Schedule EGD  - Take PPI in the morning 30 minutes before breakfast  - Recommend to avoid large meals, avoid eating within 3 hours of bedtime, elevate head of bed if nocturnal symptoms are present, smoking cessation (if current smoker), & weight loss (if overweight).   - Recommend minimize/avoid high-fat foods, chocolate, caffeine, citrus, alcohol, & tomato products.  - Advised to avoid/limit use of NSAID's, since they can cause GI upset,  bleeding, and/or ulcers. If needed, take with food.                  And then see the April note:  Office Visit   4/11/2022  [facility] - G[facility]     Jamee Allen NP    Gastroenterology  Epigastric pain +7 more    Dx  Follow-up    Reason for Visit     Progress Notes  Jamee Allen NP (Nurse Practitioner)   Gastroenterology  Subjective:       Patient ID: Lucinda Aguilera is a 71 y.o. female, Body mass index is 32.26 kg/m².     Chief Complaint: Follow-up        Established patient of Stephanie Coleman, JANUARY & myself.      Abdominal Pain  This is a recurrent problem. The current episode started more than 1 month ago. The onset quality is sudden. The problem occurs intermittently. The problem has been gradually improving (since starting Famotidine). The pain is located in the epigastric region. The quality of the pain is aching (describes as discomfort). The abdominal pain does not radiate. Associated symptoms include belching, constipation, diarrhea (chronic problem; hx of nonspecific colitis; taking Balsalazide 2250 mg BID and Colestid 2 gm BID- helped but lately complains of hard stools and frequent straining to have a bowel movement), flatus, nausea (Zofran helps) and weight loss (stable since last office visit). Pertinent negatives include no anorexia, dysuria, fever, frequency, hematochezia, melena or vomiting. The pain is aggravated by eating and palpation. The pain is relieved by nothing. She has tried proton pump inhibitors and H2 blockers (Currently: Protonix 40 mg once daily and Famotidine 40 mg nightly- symptoms improving) for the symptoms. Prior diagnostic workup includes GI consult, CT scan, lower endoscopy, ultrasound and upper endoscopy (gastric emptying study). Her past medical history is significant for abdominal surgery, gallstones (Hx of cholecystectomy), GERD and irritable bowel syndrome. There is no history of colon cancer, Crohn's disease, pancreatitis, PUD or ulcerative  colitis (Hx of colitis; EGD scheduled on 6/28/22- she would like it to be done sooner).      Review of Systems   Constitutional: Positive for weight loss (stable since last office visit). Negative for appetite change, fever and unexpected weight change.   HENT: Negative for trouble swallowing.    Respiratory: Negative for cough and shortness of breath.    Cardiovascular: Negative for chest pain.   Gastrointestinal: Positive for abdominal pain, constipation, diarrhea (chronic problem; hx of nonspecific colitis; taking Balsalazide 2250 mg BID and Colestid 2 gm BID- helped but lately complains of hard stools and frequent straining to have a bowel movement), flatus and nausea (Zofran helps). Negative for abdominal distention, anal bleeding, anorexia, blood in stool, hematochezia, melena, rectal pain and vomiting.     Assessment:       1. Epigastric pain    2. Nausea    3. Gastroesophageal reflux disease without esophagitis    4. History of gastritis    5. Colitis    6. Chronic diarrhea    7. Acute constipation    8. S/P cholecystectomy           Plan:   All diagnoses and orders for this visit:     Epigastric pain & Nausea              - Continue with scheduled EGD (will check for sooner availability)              - Continue Protonix 40 mg once daily              - Continue Famotidine 40 mg nightly              - Refill and continue: ondansetron (ZOFRAN-ODT) 4 MG TbDL; Take 1 tablet (4 mg total) by mouth every 8 (eight) hours as needed (nausea).  Dispense: 30 tablet; Refill: 1              - Take PPI in the morning 30 minutes before breakfast              - Recommend to avoid large meals, avoid eating within 3 hours of bedtime, elevate head of bed if nocturnal symptoms are present, smoking cessation (if current smoker), & weight loss (if overweight).               - Recommend minimize/avoid high-fat foods, chocolate, caffeine, citrus, alcohol, & tomato products.              - Advised to avoid/limit use of NSAID's, since  they can cause GI upset, bleeding, and/or ulcers. If needed, take with food.      Gastroesophageal reflux disease without esophagitis & History of gastritis              - Continue with scheduled EGD (will check for sooner availability)              - Continue Protonix 40 mg once daily              - Continue Famotidine 40 mg nightly              - Take PPI in the morning 30 minutes before breakfast              - Recommend to avoid large meals, avoid eating within 3 hours of bedtime, elevate head of bed if nocturnal symptoms are present, smoking cessation (if current smoker), & weight loss (if overweight).               - Recommend minimize/avoid high-fat foods, chocolate, caffeine, citrus, alcohol, & tomato products.              - Advised to avoid/limit use of NSAID's, since they can cause GI upset, bleeding, and/or ulcers. If needed, take with food.      Colitis, Chronic diarrhea & Acute constipation  - Decrease Colestipol to 1 gm BID: colestipoL (COLESTID) 1 gram Tab; Take 1 tablet (1 g total) by mouth 2 (two) times daily.  Dispense: 180 tablet; Refill: 3  - Continue Balsalazide 2250 mg BID  - Recommend daily exercise as tolerated, adequate water intake, and high fiber diet.      S/P cholecystectomy                 See last Upper GI endoscopy 2/17/2021:  Indications:         Epigastric abdominal pain, Diarrhea. She is status                        post EGD 2 years ago, 6/6/2019.   Impression:          - Normal oropharynx.                        - Normal esophagus.                        - Z-line regular, 35 cm from the incisors.                        - Patulous lower esophageal sphincter.                        - Non-erosive esophageal reflux (NERD) disease(?).                        - Minimal gastritis. Biopsied.                        - Normal stomach otherwise.                        - Normal pylorus.                        - Normal examined duodenum. Biopsied.   Recommendation:      - Perform a colonoscopy  as previously scheduled.                        - Discharge patient to home after that.                        - Await pathology results.                        - Follow an antireflux regimen.                        - Continue present medications.                        - Use Protonix (pantoprazole) 40 mg PO daily.                        - Repeat upper endoscopy PRN.   Nathan Lopez MD   2/17/2021     1. Stomach, greater curvature, biopsy:   - Benign gastric mucosa with moderate focally active chronic gastritis with   focal features of erosive gastritis   - An immunostain for Helicobacter organisms has been requested and will be   reported separately upon completion.   - Negative for dysplasia and malignancy   2. Duodenum, biopsy:   - Benign small bowel mucosa with no significant histopathologic abnormality   - No evidence of acute inflammation, viral inclusions, or celiac disease   - Negative for dysplasia and malignancy   Supplemental Diagnosis An immunostain for Helicobacter organisms has been performed and is NEGATIVE   with appropriate control.             Wt Readings from Last 20 Encounters:   05/10/22 77.1 kg (170 lb)   04/11/22 80 kg (176 lb 5.9 oz)   03/29/22 79.7 kg (175 lb 11.3 oz)   03/22/22 79 kg (174 lb 2.6 oz)   03/11/22 83.3 kg (183 lb 10.3 oz)   12/30/21 82.2 kg (181 lb 3.5 oz)   11/17/21 83.9 kg (185 lb)   11/01/21 84.1 kg (185 lb 6.5 oz)   06/08/21 76.4 kg (168 lb 6.9 oz)   05/13/21 74.4 kg (164 lb 0.4 oz)   03/22/21 73.3 kg (161 lb 9.6 oz)   03/04/21 73.9 kg (162 lb 14.7 oz)   03/04/21 73.9 kg (162 lb 14.7 oz)   02/15/21 76.2 kg (168 lb)   01/27/21 76.8 kg (169 lb 5 oz)   01/15/21 77.2 kg (170 lb 3.1 oz)   01/14/21 78.5 kg (173 lb)   01/08/21 78.6 kg (173 lb 4.5 oz)   12/29/20 80.2 kg (176 lb 12.9 oz)   12/21/20 79.4 kg (175 lb)               PTA Medications   Medication Sig    acetaminophen (TYLENOL) 500 MG tablet Take 500 mg by mouth every 6 (six) hours as needed for Pain.    ascorbic  acid, vitamin C, (VITAMIN C) 250 MG tablet Take 500 mg by mouth once daily.     aspirin (ECOTRIN) 81 MG EC tablet Take 81 mg by mouth once daily.    balsalazide (COLAZAL) 750 mg capsule TAKE 3 CAPSULES(2250 MG) BY MOUTH TWICE DAILY WITH MEALS    bismuth subsalicylate (BISMAREX) 262 mg Chew Take 2 tablets by mouth daily as needed.    chlorthalidone (HYGROTEN) 25 MG Tab TAKE 1 TABLET BY MOUTH EVERY MORNING for blood pressure    fish oil-omega-3 fatty acids 300-1,000 mg capsule Take 2 g by mouth once daily.    irbesartan (AVAPRO) 300 MG tablet TAKE 1 TABLET(300 MG) BY MOUTH EVERY EVENING    LORazepam (ATIVAN) 0.5 MG tablet Take 1 tablet (0.5 mg total) by mouth 2 (two) times daily.    multivitamin (THERAGRAN) per tablet Take 1 tablet by mouth once daily.    niacin 500 MG CpSR Take 500 mg by mouth every evening.    ondansetron (ZOFRAN-ODT) 4 MG TbDL Take 1 tablet (4 mg total) by mouth every 8 (eight) hours as needed (nausea).    potassium chloride (MICRO-K) 10 MEQ CpSR TAKE 2 CAPSULES(20 MEQ) BY MOUTH EVERY DAY    pravastatin (PRAVACHOL) 40 MG tablet TAKE 1 TABLET BY MOUTH ONCE DAILY    venlafaxine (EFFEXOR-XR) 37.5 MG 24 hr capsule Take 1 capsule (37.5 mg total) by mouth once daily.    vitamin D (VITAMIN D3) 1000 units Tab Take 1,000 Units by mouth once daily.    artificial tears ointment (ARTIFICIAL TEARS) Oint every evening.    famotidine (PEPCID) 40 MG tablet Take 1 tablet (40 mg total) by mouth nightly. (Patient not taking: Reported on 5/9/2022)    Lactobac no.41/Bifidobact no.7 (PROBIOTIC-10 ORAL) Take by mouth.       Review of patient's allergies indicates:  No Known Allergies     Past Medical History:   Diagnosis Date    Anticoagulant long-term use     Anxiety     takes daily Xanax    Arthritis     right thumb    Cataract     OU    Cholelithiasis     GERD (gastroesophageal reflux disease)     Hepatic steatosis     Hyperlipemia     Hypertension     PVD (posterior vitreous detachment)      "OD     Past Surgical History:   Procedure Laterality Date    BREAST BIOPSY Left 08/28/2020    stereo biopsy    BREAST BIOPSY Left 10/07/2020    benign excisional biopsy    chest abcess      child    COLONOSCOPY  01/06/2012    Dr. Lopez: hemorrhoids, redundant colon    COLONOSCOPY N/A 2/17/2021    Dr. Lopez: Congested and erythematous mucosa in the rectum, in the recto-sigmoid colon and in the sigmoid colon, proctosigmoid colitis, Redundant colon; biopsy: focal active colitis "nonspecific but can be seen with acute self-limited colitis (infection), medication effect (e.g. NSAID use), proximity to diverticula, or early/evolving IBD    ESOPHAGOGASTRODUODENOSCOPY N/A 6/6/2019    Dr. Lopez: small hiatal hernia, Non-erosive esophageal reflux (NERD) disease?., slight antritis; biopsy: Antral mucosa with chemical/reactive gastropathy. negative for h pylori    ESOPHAGOGASTRODUODENOSCOPY N/A 2/17/2021    Procedure: EGD (ESOPHAGOGASTRODUODENOSCOPY);  Surgeon: Nathan Lopez Jr., MD;  Location: [facility];  Service: Endoscopy;  Laterality: N/A; Minimal gastritis; biopsy: stomach- negative for h pylori, active chronic gastritis with focal features of erosive gastritis, duodenum WNL    EXCISIONAL BIOPSY Left 10/7/2020    Procedure: EXCISIONAL BIOPSY-Left with radiological marker;  Surgeon: Brooklynn Ashford MD;  Location: [facility];  Service: General;  Laterality: Left;    JOINT REPLACEMENT Bilateral     knee    KNEE ARTHROSCOPY W/ LASER      right    LAPAROSCOPIC CHOLECYSTECTOMY N/A 6/14/2019    Procedure: CHOLECYSTECTOMY, LAPAROSCOPIC;  Surgeon: Guevara Evans MD;  Location[facility];  Service: General;  Laterality: N/A;    thumb surgery  11/2014    TOTAL KNEE ARTHROPLASTY Left 6/19/2018    Procedure: REPLACEMENT-KNEE-TOTAL;  Surgeon: Nj Melvin MD;  Location: [address];  Service: Orthopedics;  Laterality: Left;  PLAXD    UPPER GASTROINTESTINAL ENDOSCOPY  07/13/2017    Dr. Lopez: NERD, Gastric mucosal " "atrophy. antritis, Scar in the incisura. Biopsied; biopsy: chronic gastritis. negative for h pylori     Family History   Problem Relation Age of Onset    Hypertension Mother     Heart disease Mother     Glaucoma Mother     Stroke Father     Glaucoma Sister     Cataracts Sister     Macular degeneration Sister     Breast cancer Neg Hx     Colon cancer Neg Hx     Crohn's disease Neg Hx     Ulcerative colitis Neg Hx     Stomach cancer Neg Hx     Esophageal cancer Neg Hx     Celiac disease Neg Hx     Amblyopia Neg Hx     Blindness Neg Hx     Retinal detachment Neg Hx     Strabismus Neg Hx      Social History     Tobacco Use    Smoking status: Never Smoker    Smokeless tobacco: Never Used   Substance Use Topics    Alcohol use: Yes     Comment: social- maybe once every few months    Drug use: No         OBJECTIVE:     Vital Signs (Most Recent)  Temp: 98.1 °F (36.7 °C) (05/10/22 0848)  Pulse: 74 (05/10/22 0848)  Resp: 18 (05/10/22 0848)  BP: (!) 149/68 (05/10/22 0848)  SpO2: 95 % (05/10/22 0848)    Physical Exam:  :Ht: 5' 2" (157.5 cm)   Wt: 77.1 kg (170 lb)   BMI: 31.09 kg/m²                                                          GENERAL:  Comfortable, in no acute distress.                                 HEENT EXAM:  Nonicteric.  No adenopathy.  Oropharynx is clear.               NECK:  Supple.                                                               LUNGS:  Clear.                                                               CARDIAC:  Regular rate and rhythm.  S1, S2.  No murmur.                      ABDOMEN:  Obese.  Soft, positive bowel sounds, nontender.  No hepatosplenomegaly or masses.  No rebound or guarding.                                             EXTREMITIES:  No edema.     MENTAL STATUS:  Alert and oriented.    ASSESSMENT/PLAN:     Assessment: Upper abdo pain.  N/V.  Weight loss.    Plan: Gastroscopy    Anesthesia Plan:   MAC / General Anaesthesia    ASA Grade: ASA 2 - Patient " with mild systemic disease with no functional limitations    MALLAMPATI SCORE: II (hard and soft palate, upper portion of tonsils anduvula visible)

## 2022-05-09 NOTE — PROGRESS NOTES
Health Maintenance Due   Topic Date Due    COVID-19 Vaccine (4 - Booster for Pfizer series) 01/16/2022     Updates were requested from care everywhere.  Chart was reviewed for overdue Proactive [facility] Encounters ([facility]) topics (CRS, Breast Cancer Screening, Eye exam)  Health Maintenance has been updated.  [place] immunization registry triggered.  Immunizations were reconciled.

## 2022-05-10 ENCOUNTER — PATIENT MESSAGE (OUTPATIENT)
Dept: ENDOSCOPY | Facility: HOSPITAL | Age: 72
End: 2022-05-10

## 2022-05-10 ENCOUNTER — HOSPITAL ENCOUNTER (OUTPATIENT)
Facility: HOSPITAL | Age: 72
Discharge: HOME | End: 2022-05-10
Attending: INTERNAL MEDICINE | Admitting: INTERNAL MEDICINE
Payer: MEDICARE

## 2022-05-10 ENCOUNTER — ANESTHESIA EVENT (OUTPATIENT)
Dept: ENDOSCOPY | Facility: HOSPITAL | Age: 72
End: 2022-05-10
Payer: MEDICARE

## 2022-05-10 ENCOUNTER — ANESTHESIA (OUTPATIENT)
Dept: ENDOSCOPY | Facility: HOSPITAL | Age: 72
End: 2022-05-10
Payer: MEDICARE

## 2022-05-10 VITALS
WEIGHT: 170 LBS | SYSTOLIC BLOOD PRESSURE: 151 MMHG | RESPIRATION RATE: 17 BRPM | HEIGHT: 62 IN | HEART RATE: 74 BPM | BODY MASS INDEX: 31.28 KG/M2 | DIASTOLIC BLOOD PRESSURE: 70 MMHG | OXYGEN SATURATION: 97 % | TEMPERATURE: 98 F

## 2022-05-10 DIAGNOSIS — R10.13 EPIGASTRIC PAIN: ICD-10-CM

## 2022-05-10 PROCEDURE — 88342 IMHCHEM/IMCYTCHM 1ST ANTB: CPT | Performed by: PATHOLOGY

## 2022-05-10 PROCEDURE — 88342 IMHCHEM/IMCYTCHM 1ST ANTB: CPT | Mod: 26,,, | Performed by: PATHOLOGY

## 2022-05-10 PROCEDURE — D9220A PRA ANESTHESIA: ICD-10-PCS | Mod: CRNA,,, | Performed by: NURSE ANESTHETIST, CERTIFIED REGISTERED

## 2022-05-10 PROCEDURE — 43239 EGD BIOPSY SINGLE/MULTIPLE: CPT | Mod: PO | Performed by: INTERNAL MEDICINE

## 2022-05-10 PROCEDURE — 88342 CHG IMMUNOCYTOCHEMISTRY: ICD-10-PCS | Mod: 26,,, | Performed by: PATHOLOGY

## 2022-05-10 PROCEDURE — 63600175 PHARM REV CODE 636 W HCPCS: Mod: PO | Performed by: INTERNAL MEDICINE

## 2022-05-10 PROCEDURE — 43239 EGD BIOPSY SINGLE/MULTIPLE: CPT | Mod: ,,, | Performed by: INTERNAL MEDICINE

## 2022-05-10 PROCEDURE — 37000008 HC ANESTHESIA 1ST 15 MINUTES: Mod: PO | Performed by: INTERNAL MEDICINE

## 2022-05-10 PROCEDURE — D9220A PRA ANESTHESIA: Mod: ANES,,, | Performed by: ANESTHESIOLOGY

## 2022-05-10 PROCEDURE — D9220A PRA ANESTHESIA: Mod: CRNA,,, | Performed by: NURSE ANESTHETIST, CERTIFIED REGISTERED

## 2022-05-10 PROCEDURE — 37000009 HC ANESTHESIA EA ADD 15 MINS: Mod: PO | Performed by: INTERNAL MEDICINE

## 2022-05-10 PROCEDURE — D9220A PRA ANESTHESIA: ICD-10-PCS | Mod: ANES,,, | Performed by: ANESTHESIOLOGY

## 2022-05-10 PROCEDURE — 25000003 PHARM REV CODE 250: Mod: PO | Performed by: NURSE ANESTHETIST, CERTIFIED REGISTERED

## 2022-05-10 PROCEDURE — 88305 TISSUE EXAM BY PATHOLOGIST: ICD-10-PCS | Mod: 26,,, | Performed by: PATHOLOGY

## 2022-05-10 PROCEDURE — 63600175 PHARM REV CODE 636 W HCPCS: Mod: PO | Performed by: NURSE ANESTHETIST, CERTIFIED REGISTERED

## 2022-05-10 PROCEDURE — 27201012 HC FORCEPS, HOT/COLD, DISP: Mod: PO | Performed by: INTERNAL MEDICINE

## 2022-05-10 PROCEDURE — 88305 TISSUE EXAM BY PATHOLOGIST: CPT | Mod: 26,,, | Performed by: PATHOLOGY

## 2022-05-10 PROCEDURE — 43239 PR EGD, FLEX, W/BIOPSY, SGL/MULTI: ICD-10-PCS | Mod: ,,, | Performed by: INTERNAL MEDICINE

## 2022-05-10 PROCEDURE — 88305 TISSUE EXAM BY PATHOLOGIST: CPT | Performed by: PATHOLOGY

## 2022-05-10 RX ORDER — PANTOPRAZOLE SODIUM 40 MG/1
40 TABLET, DELAYED RELEASE ORAL
Qty: 30 TABLET | Refills: 11
Start: 2022-05-10 | End: 2022-05-10 | Stop reason: SDUPTHER

## 2022-05-10 RX ORDER — SODIUM CHLORIDE 0.9 % (FLUSH) 0.9 %
10 SYRINGE (ML) INJECTION
Status: DISCONTINUED | OUTPATIENT
Start: 2022-05-10 | End: 2022-05-10 | Stop reason: HOSPADM

## 2022-05-10 RX ORDER — PANTOPRAZOLE SODIUM 40 MG/1
40 TABLET, DELAYED RELEASE ORAL
Qty: 90 TABLET | Refills: 3 | Status: SHIPPED | OUTPATIENT
Start: 2022-05-10 | End: 2023-05-31 | Stop reason: SDUPTHER

## 2022-05-10 RX ORDER — LIDOCAINE HCL/PF 100 MG/5ML
SYRINGE (ML) INTRAVENOUS
Status: DISCONTINUED | OUTPATIENT
Start: 2022-05-10 | End: 2022-05-10

## 2022-05-10 RX ORDER — SODIUM CHLORIDE, SODIUM LACTATE, POTASSIUM CHLORIDE, CALCIUM CHLORIDE 600; 310; 30; 20 MG/100ML; MG/100ML; MG/100ML; MG/100ML
INJECTION, SOLUTION INTRAVENOUS CONTINUOUS
Status: DISCONTINUED | OUTPATIENT
Start: 2022-05-10 | End: 2022-05-10 | Stop reason: HOSPADM

## 2022-05-10 RX ORDER — PROPOFOL 10 MG/ML
VIAL (ML) INTRAVENOUS
Status: DISCONTINUED | OUTPATIENT
Start: 2022-05-10 | End: 2022-05-10

## 2022-05-10 RX ADMIN — PROPOFOL 50 MG: 10 INJECTION, EMULSION INTRAVENOUS at 09:05

## 2022-05-10 RX ADMIN — LIDOCAINE HYDROCHLORIDE 100 MG: 20 INJECTION, SOLUTION INTRAVENOUS at 09:05

## 2022-05-10 RX ADMIN — PROPOFOL 100 MG: 10 INJECTION, EMULSION INTRAVENOUS at 09:05

## 2022-05-10 RX ADMIN — SODIUM CHLORIDE, SODIUM LACTATE, POTASSIUM CHLORIDE, AND CALCIUM CHLORIDE: .6; .31; .03; .02 INJECTION, SOLUTION INTRAVENOUS at 08:05

## 2022-05-10 NOTE — PATIENT INSTRUCTIONS
Recovery After Procedural Sedation (Adult)   You have been given medicine by vein to make you sleep during your surgery. This may have included both a pain medicine and sleeping medicine. Most of the effects have worn off. But you may still have some drowsiness for the next 6 to 8 hours.  Home care  Follow these guidelines when you get home:  For the next 8 hours, you should be watched by a responsible adult. This person should make sure your condition is not getting worse.  Don't drink any alcohol for the next 24 hours.  Don't drive, operate dangerous machinery, or make important business or personal decisions during the next 24 hours.  To prevent injury or falls, use caution when standing and walking for at least 24 hours after your procedure.  Note: Your healthcare provider may tell you not to take any medicine by mouth for pain or sleep in the next 4 hours. These medicines may react with the medicines you were given in the hospital. This could cause a much stronger response than usual.  Follow-up care  Follow up with your healthcare provider if you are not alert and back to your usual level of activity within 12 hours.  When to seek medical advice  Call your healthcare provider right away if any of these occur:  Drowsiness gets worse  Weakness or dizziness gets worse  Repeated vomiting  You can't be awakened  Fever  New rash  [employer] last reviewed this educational content on 9/1/2019  © 9945-0382 [employer] [address] [place] [address], [place] [address]. All rights reserved. This information is not intended as a substitute for professional medical care. Always follow your healthcare professional's instructions         Tips to Control Acid Reflux    To control acid reflux, youll need to make some basic diet and lifestyle changes. The simple steps outlined below may be all youll need to ease discomfort.  Watch what you eat  Avoid fatty foods and spicy foods.  Eat fewer acidic foods, such as citrus  and tomato-based foods. These can increase symptoms.  Limit drinking alcohol, caffeine, and fizzy beverages. All increase acid reflux.  Try limiting chocolate, peppermint, and spearmint. These can worsen acid reflux in some people.  Watch when you eat  Avoid lying down for 3 hours after eating.  Do not snack before going to bed.  Raise your head  Raising your head and upper body by 4 to 6 inches helps limit reflux when youre lying down. Put blocks under the head of your bed frame to raise it.  Other changes  Lose weight, if you need to  Dont exercise near bedtime  Avoid tight-fitting clothes  Limit aspirin and ibuprofen  Stop smoking   Date Last Reviewed: 7/1/2016  © 3191-9747 [employer]. [address] [place] [address], [place] [address]. All rights reserved. This information is not intended as a substitute for professional medical care. Always follow your healthcare professional's instructions.         High-Fiber Diet  Fiber is in fruits, vegetables, cereals, and grains. Fiber passes through your body undigested. A high-fiber diet helps food move through your intestinal tract. The added bulk is helpful in preventing constipation. In people with diverticulosis, fiber helps clean out the pouches along the colon wall. It also prevents new pouches from forming. A high-fiber diet reduces the risk of colon cancer. It also lowers blood cholesterol and prevents high blood sugar in people with diabetes.    The fiber-rich foods listed below should be part of your diet. If you are not used to high-fiber foods, start with 1 or 2 foods from this list. Every 3 to 4 days add a new one to your diet. Do this until you are eating 4 high-fiber foods per day. This should give you 20 to 35 grams of fiber a day. It is also important to drink a lot of water when you are on this diet. You should have 6 to 8 glasses of water a day. Water makes the fiber swell and increases the benefit.  Foods high in dietary fiber  The following  foods are high in dietary fiber:  Breads. Breads made with 100% whole-wheat flour; fiona, wheat, or rye crackers; whole-grain tortillas, bran muffins.  Cereals. Whole-grain and bran cereals with bran (shredded wheat, wheat flakes, raisin bran, corn bran); oatmeal, rolled oats, granola, and brown rice.  Fruits. Fresh fruits and their edible skins (pears, prunes, raisins, berries, apples, and apricots); bananas, citrus fruit, mangoes, pineapple; and prune juice.  Nuts. Any nuts and seeds.  Vegetables. Best served raw or lightly cooked. All types, especially: green peas, celery, eggplant, potatoes, spinach, broccoli, [place] sprouts, winter squash, carrots, cauliflower, soybeans, lentils, and fresh and dried beans of all kinds.  Other. Popcorn, any spices.  Date Last Reviewed: 8/1/2016  © 2323-9759 [employer]. [address] [place] [address]. All rights reserved. This information is not intended as a substitute for professional medical care. Always follow your healthcare professional's instructions.

## 2022-05-10 NOTE — PROVATION PATIENT INSTRUCTIONS
Discharge Summary/Instructions after an Endoscopic Procedure  Patient Name: Lucinda Aguilera  Patient MRN: [id number]  Patient YOB: 1950  Tuesday, May 10, 2022  Nathan Lopez MD  Dear patient,  As a result of recent federal legislation (The Federal Cures Act), you may   receive lab or pathology results from your procedure in your [facility]   account before your physician is able to contact you. Your physician or   their representative will relay the results to you with their   recommendations at their soonest availability.  Thank you,  RESTRICTIONS:  During your procedure today, you received medications for sedation.  These   medications may affect your judgment, balance and coordination.  Therefore,   for 24 hours, you have the following restrictions:   - DO NOT drive a car, operate machinery, make legal/financial decisions,   sign important papers or drink alcohol.    ACTIVITY:  Today: no heavy lifting, straining or running due to procedural   sedation/anesthesia.  The following day: return to full activity including work.  DIET:  Eat and drink normally unless instructed otherwise.     TREATMENT FOR COMMON SIDE EFFECTS:  - Mild abdominal pain, nausea, belching, bloating or excessive gas:  rest,   eat lightly and use a heating pad.  - Sore Throat: treat with throat lozenges and/or gargle with warm salt   water.  - Because air was used during the procedure, expelling large amounts of air   from your rectum or belching is normal.  - If a bowel prep was taken, you may not have a bowel movement for 1-3 days.    This is normal.  SYMPTOMS TO WATCH FOR AND REPORT TO YOUR PHYSICIAN:  1. Abdominal pain or bloating, other than gas cramps.  2. Chest pain.  3. Back pain.  4. Signs of infection such as: chills or fever occurring within 24 hours   after the procedure.  5. Rectal bleeding, which would show as bright red, maroon, or black stools.   (A tablespoon of blood from the rectum is not serious, especially  if   hemorrhoids are present.)  6. Vomiting.  7. Weakness or dizziness.  GO DIRECTLY TO THE NEAREST EMERGENCY ROOM IF YOU HAVE ANY OF THE FOLLOWING:      Difficulty breathing              Chills and/or fever over 101 F   Persistent vomiting and/or vomiting blood   Severe abdominal pain   Severe chest pain   Black, tarry stools   Bleeding- more than one tablespoon   Any other symptom or condition that you feel may need urgent attention  Your doctor recommends these additional instructions:  If any biopsies were taken, your doctors clinic will contact you in 1 to 2   weeks with any results.  You are being discharged to home.   We are waiting for your pathology results.   Follow an antireflux regimen.  This includes:       - Do not lie down for at least 3 to 4 hours after meals.        - Raise the head of the bed 4 to 6 inches.        - Decrease excess weight.        - Avoid citrus juices and other acidic foods, alcohol, chocolate, mints,   coffee and other caffeinated beverages, carbonated beverages, fatty and   fried foods.        - Avoid tight-fitting clothing.        - Avoid cigarettes and other tobacco products.  Especially:   Exercise and weight loss.      Continue your present medications.   Take Protonix (pantoprazole) 40 mg by mouth once a day.   Take Pepcid (famotidine) 40 mg by mouth every night.   Return to GI clinic in 2-3 months.  For questions, problems or results please call your physician - Nathan Lopez MD at Work:  [phone].  EMERGENCY PHONE NUMBER: [phone], LAB RESULTS: [phone]  IF A COMPLICATION OR EMERGENCY SITUATION ARISES AND YOU ARE UNABLE TO REACH   YOUR PHYSICIAN - GO DIRECTLY TO THE EMERGENCY ROOM.  ___________________________________________  Nurse Signature  ___________________________________________  Patient/Designated Responsible Party Signature  Nathan Lopez MD  5/10/2022 9:50:29 AM  This report has been verified and signed electronically.  Dear  patient,  As a result of recent federal legislation (The Federal Cures Act), you may   receive lab or pathology results from your procedure in your [facility]   account before your physician is able to contact you. Your physician or   their representative will relay the results to you with their   recommendations at their soonest availability.  Thank you.  [employer]

## 2022-05-10 NOTE — TRANSFER OF CARE
"Anesthesia Transfer of Care Note    Patient: Lucinda Aguilera    Procedure(s) Performed: Procedure(s) (LRB):  EGD (ESOPHAGOGASTRODUODENOSCOPY) (N/A)    Patient location: PACU    Anesthesia Type: general    Transport from OR: Transported from OR on 2-3 L/min O2 by NC with adequate spontaneous ventilation    Post pain: adequate analgesia    Post assessment: no apparent anesthetic complications and tolerated procedure well    Post vital signs: stable    Level of consciousness: awake, alert and oriented    Nausea/Vomiting: no nausea/vomiting    Complications: none    Transfer of care protocol was followed      Last vitals:   Visit Vitals  BP (!) 149/68 (BP Location: Right arm, Patient Position: Lying)   Pulse 74   Temp 36.7 °C (98.1 °F) (Skin)   Resp 18   Ht 5' 2" (1.575 m)   Wt 77.1 kg (170 lb)   LMP 04/18/2004   SpO2 95%   Breastfeeding No   BMI 31.09 kg/m²     "

## 2022-05-10 NOTE — ANESTHESIA PREPROCEDURE EVALUATION
05/10/2022  Lucinda Aguilera is a 71 y.o., female.    Pre-op Assessment    I have reviewed the Patient Summary Reports.    I have reviewed the Nursing Notes. I have reviewed the NPO Status.   I have reviewed the Medications.     Review of Systems  Social:  Non-Smoker    Cardiovascular:   Hypertension hyperlipidemia  Functional Capacity good / => 4 METS  Hypertension    Hepatic/GI:   GERD, well controlled Liver Disease, Nausea and vomiting    Musculoskeletal:   Arthritis     Endocrine:  Obesity / BMI > 30  Psych:   anxiety          Physical Exam  General:  Well nourished      Airway/Jaw/Neck:  Airway Findings: Mouth Opening: Normal   Tongue: Normal   General Airway Assessment: Adult Mallampati: II  Improves to II with phonation.  Jaw/Neck Findings:  Neck ROM: Normal ROM       Dental:  Dental Findings: In tact     Chest/Lungs:  Chest/Lungs Findings: Normal Respiratory Rate               Anesthesia Plan  Type of Anesthesia, risks & benefits discussed:  Anesthesia Type:  Gen Natural Airway    Patient's Preference:   Plan Factors:          Intra-op Monitoring Plan: standard ASA monitors  Intra-op Monitoring Plan Comments:   Post Op Pain Control Plan:   Post Op Pain Control Plan Comments:     Induction:   IV  Beta Blocker:  Patient is not currently on a Beta-Blocker (No further documentation required).       Informed Consent: Informed consent signed with the Patient and all parties understand the risks and agree with anesthesia plan.  All questions answered.  Anesthesia consent signed with patient.  ASA Score: 2     Day of Surgery Review of History & Physical:    H&P Update referred to the surgeon/provider.          Ready For Surgery From Anesthesia Perspective.           Physical Exam  General: Well nourished    Airway:  Mallampati: II / II  Mouth Opening: Normal  Tongue: Normal  Neck ROM: Normal  ROM    Dental:  In tact    Chest/Lungs:  Normal Respiratory Rate          Anesthesia Plan  Type of Anesthesia, risks & benefits discussed:    Anesthesia Type: Gen Natural Airway  Intra-op Monitoring Plan: standard ASA monitors  Induction:  IV  Informed Consent: Informed consent signed with the Patient and all parties understand the risks and agree with anesthesia plan.  All questions answered.   ASA Score: 2  Day of Surgery Review of History & Physical: H&P Update referred to the surgeon/provider.    Ready For Surgery From Anesthesia Perspective.       .

## 2022-05-10 NOTE — ANESTHESIA POSTPROCEDURE EVALUATION
Anesthesia Post Evaluation    Patient: Lucinda Aguilera    Procedure(s) Performed: Procedure(s) (LRB):  EGD (ESOPHAGOGASTRODUODENOSCOPY) (N/A)    Final Anesthesia Type: general      Patient location during evaluation: PACU  Patient participation: Yes- Able to Participate  Level of consciousness: awake and alert  Post-procedure vital signs: reviewed and stable  Pain management: adequate  Airway patency: patent    PONV status at discharge: No PONV  Anesthetic complications: no      Cardiovascular status: hemodynamically stable  Respiratory status: unassisted and room air  Hydration status: euvolemic  Follow-up not needed.          Vitals Value Taken Time   /70 05/10/22 0949     05/10/22 1112   Pulse 74 05/10/22 0949   Resp 17 05/10/22 0949   SpO2 97 % 05/10/22 0942         Event Time   Out of Recovery 10:07:33         Pain/Ashley Score: Ashley Score: 10 (5/10/2022  9:42 AM)

## 2022-05-10 NOTE — BRIEF OP NOTE
Discharge Note  Short Stay      SUMMARY     Admit Date: 5/10/2022    Attending Physician: Nathan Lopez Jr., MD     Discharge Physician: Nathan Lopez Jr., MD    Discharge Date: 5/10/2022 9:59 AM    Final Diagnosis: Nausea and vomiting, intractability of vomiting not specified, unspecified vomiting type [R11.2]  Epigastric pain [R10.13]  History of gastroesophageal reflux (GERD) [Z87.19]  Weight loss [R63.4]    Impression:            - Normal oropharynx.                          - Normal esophagus.                          - Z-line regular, 36-37 cm from the incisors.                          - Non-erosive esophageal reflux (NERD) disease(?).                          - Normal cardia.                          - M9ild/moderate antritis. Biopsied.                          - Normal stomach otherwise.                          - Normal pylorus.                          - Normal examined duodenum.                          - Normal major papilla.   Recommendation:        - Discharge patient to home.                          - Await pathology results.                          - Follow an antireflux regimen.                          - Exercise and weight loss.                          - Continue present medications.                          - Use Protonix (pantoprazole) 40 mg PO daily.                          - Use Pepcid (famotidine) 40 mg PO nightly.                          - Call the G.I. clinic in 2 weeks for reports (if                          you haven't heard from us sooner) [phone].                          - Return to GI clinic in 2-3 months.   Nathan Lopez MD   5/10/2022     Disposition: HOME OR SELF CARE    Patient Instructions:   Current Discharge Medication List      START taking these medications    Details   pantoprazole (PROTONIX) 40 MG tablet Take 1 tablet (40 mg total) by mouth before breakfast.  Qty: 90 tablet, Refills: 3         CONTINUE these medications which have NOT CHANGED     Details   acetaminophen (TYLENOL) 500 MG tablet Take 500 mg by mouth every 6 (six) hours as needed for Pain.      ascorbic acid, vitamin C, (VITAMIN C) 250 MG tablet Take 500 mg by mouth once daily.       aspirin (ECOTRIN) 81 MG EC tablet Take 81 mg by mouth once daily.      balsalazide (COLAZAL) 750 mg capsule TAKE 3 CAPSULES(2250 MG) BY MOUTH TWICE DAILY WITH MEALS  Qty: 180 capsule, Refills: 11    Associated Diagnoses: History of gastritis      bismuth subsalicylate (BISMAREX) 262 mg Chew Take 2 tablets by mouth daily as needed.      chlorthalidone (HYGROTEN) 25 MG Tab TAKE 1 TABLET BY MOUTH EVERY MORNING for blood pressure  Qty: 90 tablet, Refills: 3    Comments: Please inactivate all prior scripts with same name and strength including on holds.  Associated Diagnoses: Essential hypertension      fish oil-omega-3 fatty acids 300-1,000 mg capsule Take 2 g by mouth once daily.      irbesartan (AVAPRO) 300 MG tablet TAKE 1 TABLET(300 MG) BY MOUTH EVERY EVENING  Qty: 90 tablet, Refills: 3    Associated Diagnoses: Essential hypertension      LORazepam (ATIVAN) 0.5 MG tablet Take 1 tablet (0.5 mg total) by mouth 2 (two) times daily.  Qty: 60 tablet, Refills: 5    Associated Diagnoses: Anxiety      multivitamin (THERAGRAN) per tablet Take 1 tablet by mouth once daily.      niacin 500 MG CpSR Take 500 mg by mouth every evening.      ondansetron (ZOFRAN-ODT) 4 MG TbDL Take 1 tablet (4 mg total) by mouth every 8 (eight) hours as needed (nausea).  Qty: 30 tablet, Refills: 1    Associated Diagnoses: Nausea      potassium chloride (MICRO-K) 10 MEQ CpSR TAKE 2 CAPSULES(20 MEQ) BY MOUTH EVERY DAY  Qty: 180 capsule, Refills: 1    Comments: Please delete all prior scripts with same name and strength including on holds.  Associated Diagnoses: Essential hypertension      pravastatin (PRAVACHOL) 40 MG tablet TAKE 1 TABLET BY MOUTH ONCE DAILY  Qty: 90 tablet, Refills: 2    Associated Diagnoses: Hyperlipidemia, unspecified  hyperlipidemia type      venlafaxine (EFFEXOR-XR) 37.5 MG 24 hr capsule Take 1 capsule (37.5 mg total) by mouth once daily.  Qty: 90 capsule, Refills: 3    Associated Diagnoses: Anxiety      vitamin D (VITAMIN D3) 1000 units Tab Take 1,000 Units by mouth once daily.      artificial tears ointment (ARTIFICIAL TEARS) Oint every evening.      famotidine (PEPCID) 40 MG tablet Take 1 tablet (40 mg total) by mouth nightly.  Qty: 30 tablet, Refills: 2    Associated Diagnoses: Non-intractable vomiting with nausea, unspecified vomiting type; Epigastric discomfort      Lactobac no.41/Bifidobact no.7 (PROBIOTIC-10 ORAL) Take by mouth.             Discharge Procedure Orders (must include Diet, Follow-up, Activity)    Follow Up:  Follow up with PCP as per your routine.  Please follow an anti reflux bland diet and a high fiber diet.  Activity as tolerated.    No driving day of procedure.

## 2022-05-11 ENCOUNTER — PATIENT MESSAGE (OUTPATIENT)
Dept: SPINE | Facility: CLINIC | Age: 72
End: 2022-05-11
Payer: MEDICARE

## 2022-05-11 ENCOUNTER — PATIENT MESSAGE (OUTPATIENT)
Dept: GASTROENTEROLOGY | Facility: CLINIC | Age: 72
End: 2022-05-11
Payer: MEDICARE

## 2022-05-11 DIAGNOSIS — R11.0 NAUSEA: ICD-10-CM

## 2022-05-11 RX ORDER — ONDANSETRON 4 MG/1
4 TABLET, ORALLY DISINTEGRATING ORAL EVERY 8 HOURS PRN
Qty: 30 TABLET | Refills: 1 | Status: SHIPPED | OUTPATIENT
Start: 2022-05-11 | End: 2022-05-19 | Stop reason: SDUPTHER

## 2022-05-11 NOTE — TELEPHONE ENCOUNTER
Called and spoke with the patient, patient was scheduled for the 3 om f/u with Dr. Lopez post procedure f/u gerd, patient verbalized understanding of this.

## 2022-05-17 ENCOUNTER — PATIENT MESSAGE (OUTPATIENT)
Dept: GASTROENTEROLOGY | Facility: CLINIC | Age: 72
End: 2022-05-17
Payer: MEDICARE

## 2022-05-17 ENCOUNTER — PATIENT MESSAGE (OUTPATIENT)
Dept: FAMILY MEDICINE | Facility: CLINIC | Age: 72
End: 2022-05-17
Payer: MEDICARE

## 2022-05-17 DIAGNOSIS — I10 ESSENTIAL HYPERTENSION: ICD-10-CM

## 2022-05-17 DIAGNOSIS — F41.9 ANXIETY: ICD-10-CM

## 2022-05-17 LAB
FINAL PATHOLOGIC DIAGNOSIS: NORMAL
Lab: NORMAL

## 2022-05-17 NOTE — TELEPHONE ENCOUNTER
Refill Routing Note   Medication(s) are not appropriate for processing by [facility] for the following reason(s):      - Required vitals are abnormal  - Patient has been seen in the ED/Hospital since the last PCP visit    [facility] action(s):  Route          Medication reconciliation completed: No     Appointments  past 12m or future 3m with PCP    Date Provider   Last Visit   3/11/2022 Saurabh Hassan MD   Next Visit   9/16/2022 Saurabh Hassan MD   ED visits in past 90 days: 0        Note composed:5:06 PM 05/17/2022

## 2022-05-17 NOTE — TELEPHONE ENCOUNTER
No new care gaps identified.  [facility] Embedded Care Gaps. Reference number: [id number]. 5/17/2022   4:36:57 PM CDT

## 2022-05-18 RX ORDER — IRBESARTAN 300 MG/1
300 TABLET ORAL NIGHTLY
Qty: 90 TABLET | Refills: 3 | Status: SHIPPED | OUTPATIENT
Start: 2022-05-18 | End: 2022-07-21 | Stop reason: SDUPTHER

## 2022-05-18 RX ORDER — VENLAFAXINE HYDROCHLORIDE 37.5 MG/1
37.5 CAPSULE, EXTENDED RELEASE ORAL DAILY
Qty: 90 CAPSULE | Refills: 3 | Status: SHIPPED | OUTPATIENT
Start: 2022-05-18 | End: 2022-08-31

## 2022-05-19 ENCOUNTER — PATIENT MESSAGE (OUTPATIENT)
Dept: FAMILY MEDICINE | Facility: CLINIC | Age: 72
End: 2022-05-19
Payer: MEDICARE

## 2022-05-19 ENCOUNTER — PATIENT MESSAGE (OUTPATIENT)
Dept: GASTROENTEROLOGY | Facility: CLINIC | Age: 72
End: 2022-05-19
Payer: MEDICARE

## 2022-05-19 DIAGNOSIS — R11.0 NAUSEA: ICD-10-CM

## 2022-05-19 DIAGNOSIS — R10.13 EPIGASTRIC PAIN: ICD-10-CM

## 2022-05-19 RX ORDER — ONDANSETRON 4 MG/1
4 TABLET, ORALLY DISINTEGRATING ORAL EVERY 8 HOURS PRN
Qty: 30 TABLET | Refills: 1 | Status: SHIPPED | OUTPATIENT
Start: 2022-05-19 | End: 2022-12-15 | Stop reason: SDUPTHER

## 2022-05-19 NOTE — TELEPHONE ENCOUNTER
Let her know:  The biopsies were OK.  And no evidence of the bacteria.    Next, I would suggest we do a CT scan (abdo/pelvis), with oral contrast.              RELIAPATH DIAGNOSIS:   PREPYLORIC GASTRITIS, BIOPSY:   Antral-type mucosa with chronic inactive gastritis.   No Helicobacter-type organisms identified on the Helicobacter stain.

## 2022-05-19 NOTE — TELEPHONE ENCOUNTER
My chart message sent to the patient, see below    Per Dr. Lopez-Let her know:  The biopsies were OK.  And no evidence of the bacteria.     Next, I would suggest we do a CT scan (abdo/pelvis), with oral contrast.                    RELIAPATH DIAGNOSIS:   PREPYLORIC GASTRITIS, BIOPSY:   Antral-type mucosa with chronic inactive gastritis.   No Helicobacter-type organisms identified on the Helicobacter stain.

## 2022-05-19 NOTE — TELEPHONE ENCOUNTER
No new care gaps identified.  [facility] Embedded Care Gaps. Reference number: [id number]. 5/19/2022   9:25:47 AM CDT

## 2022-05-20 ENCOUNTER — OFFICE VISIT (OUTPATIENT)
Dept: SPINE | Facility: CLINIC | Age: 72
End: 2022-05-20
Payer: MEDICARE

## 2022-05-20 ENCOUNTER — HOSPITAL ENCOUNTER (OUTPATIENT)
Dept: RADIOLOGY | Facility: HOSPITAL | Age: 72
Discharge: HOME | End: 2022-05-20
Attending: PHYSICIAN ASSISTANT
Payer: MEDICARE

## 2022-05-20 VITALS
WEIGHT: 174.63 LBS | HEIGHT: 62 IN | SYSTOLIC BLOOD PRESSURE: 147 MMHG | BODY MASS INDEX: 32.14 KG/M2 | HEART RATE: 97 BPM | DIASTOLIC BLOOD PRESSURE: 83 MMHG

## 2022-05-20 DIAGNOSIS — M54.50 CHRONIC BILATERAL LOW BACK PAIN, UNSPECIFIED WHETHER SCIATICA PRESENT: Primary | ICD-10-CM

## 2022-05-20 DIAGNOSIS — M47.816 LUMBAR SPONDYLOSIS: ICD-10-CM

## 2022-05-20 DIAGNOSIS — G89.29 CHRONIC BILATERAL LOW BACK PAIN, UNSPECIFIED WHETHER SCIATICA PRESENT: ICD-10-CM

## 2022-05-20 DIAGNOSIS — M54.50 CHRONIC BILATERAL LOW BACK PAIN, UNSPECIFIED WHETHER SCIATICA PRESENT: ICD-10-CM

## 2022-05-20 DIAGNOSIS — M43.16 SPONDYLOLISTHESIS OF LUMBAR REGION: ICD-10-CM

## 2022-05-20 DIAGNOSIS — G89.29 CHRONIC BILATERAL LOW BACK PAIN, UNSPECIFIED WHETHER SCIATICA PRESENT: Primary | ICD-10-CM

## 2022-05-20 PROCEDURE — 99213 OFFICE O/P EST LOW 20 MIN: CPT | Mod: S$PBB,,, | Performed by: PHYSICIAN ASSISTANT

## 2022-05-20 PROCEDURE — 99215 OFFICE O/P EST HI 40 MIN: CPT | Mod: PBBFAC,PN | Performed by: PHYSICIAN ASSISTANT

## 2022-05-20 PROCEDURE — 99999 PR PBB SHADOW E&M-EST. PATIENT-LVL V: CPT | Mod: PBBFAC,,, | Performed by: PHYSICIAN ASSISTANT

## 2022-05-20 PROCEDURE — 72114 X-RAY EXAM L-S SPINE BENDING: CPT | Mod: TC,FY,PO

## 2022-05-20 PROCEDURE — 99213 PR OFFICE/OUTPT VISIT, EST, LEVL III, 20-29 MIN: ICD-10-PCS | Mod: S$PBB,,, | Performed by: PHYSICIAN ASSISTANT

## 2022-05-20 PROCEDURE — 72114 X-RAY EXAM L-S SPINE BENDING: CPT | Mod: 26,,, | Performed by: RADIOLOGY

## 2022-05-20 PROCEDURE — 72114 XR LUMBAR SPINE 5 VIEW WITH FLEX AND EXT: ICD-10-PCS | Mod: 26,,, | Performed by: RADIOLOGY

## 2022-05-20 PROCEDURE — 99999 PR PBB SHADOW E&M-EST. PATIENT-LVL V: ICD-10-PCS | Mod: PBBFAC,,, | Performed by: PHYSICIAN ASSISTANT

## 2022-05-20 NOTE — PATIENT INSTRUCTIONS
You were seen today for lower back pain more on the right into the buttock and hip.    Xrays from 2018 show that you have arthrtic changes and bones that are not perfectly lined up.  This is at L4/5 and L5/S1, the two lowest areas in the lumbar (lower back) spine.  The pain you have is likely from the arthritis, muscle spasms, and can be referred from the hips and SI (sacrum meets the pelvis) joint.    We will get Xrays of the back today to update the pictures and know if any of the bones are abnormally sliding on one another in the areas where they are not positioned correctly.  We will notify you via phone and the online portal with results.    I have placed a referral to PT to help with pain as well and they will address all sources of pain.    If no improvement with PT, we can consider an MRI and further treatments just as injections.

## 2022-05-23 ENCOUNTER — CLINICAL SUPPORT (OUTPATIENT)
Dept: REHABILITATION | Facility: HOSPITAL | Age: 72
End: 2022-05-23
Payer: MEDICARE

## 2022-05-23 ENCOUNTER — PATIENT MESSAGE (OUTPATIENT)
Dept: FAMILY MEDICINE | Facility: CLINIC | Age: 72
End: 2022-05-23
Payer: MEDICARE

## 2022-05-23 DIAGNOSIS — M43.16 SPONDYLOLISTHESIS OF LUMBAR REGION: ICD-10-CM

## 2022-05-23 DIAGNOSIS — R29.898 WEAKNESS OF BOTH HIPS: ICD-10-CM

## 2022-05-23 DIAGNOSIS — M47.816 LUMBAR SPONDYLOSIS: ICD-10-CM

## 2022-05-23 DIAGNOSIS — I10 ESSENTIAL HYPERTENSION: ICD-10-CM

## 2022-05-23 DIAGNOSIS — E78.5 HYPERLIPIDEMIA, UNSPECIFIED HYPERLIPIDEMIA TYPE: ICD-10-CM

## 2022-05-23 DIAGNOSIS — M54.50 CHRONIC BILATERAL LOW BACK PAIN, UNSPECIFIED WHETHER SCIATICA PRESENT: ICD-10-CM

## 2022-05-23 DIAGNOSIS — M53.86 DECREASED ROM OF LUMBAR SPINE: ICD-10-CM

## 2022-05-23 DIAGNOSIS — F41.9 ANXIETY: ICD-10-CM

## 2022-05-23 DIAGNOSIS — G89.29 CHRONIC BILATERAL LOW BACK PAIN, UNSPECIFIED WHETHER SCIATICA PRESENT: ICD-10-CM

## 2022-05-23 PROCEDURE — 97161 PT EVAL LOW COMPLEX 20 MIN: CPT | Mod: PO | Performed by: PHYSICAL THERAPIST

## 2022-05-23 PROCEDURE — 97110 THERAPEUTIC EXERCISES: CPT | Mod: PO | Performed by: PHYSICAL THERAPIST

## 2022-05-23 RX ORDER — PRAVASTATIN SODIUM 40 MG/1
40 TABLET ORAL DAILY
Qty: 90 TABLET | Refills: 3 | Status: SHIPPED | OUTPATIENT
Start: 2022-05-23 | End: 2022-09-29 | Stop reason: SDUPTHER

## 2022-05-23 RX ORDER — POTASSIUM CHLORIDE 750 MG/1
CAPSULE, EXTENDED RELEASE ORAL
Qty: 180 CAPSULE | Refills: 3 | Status: SHIPPED | OUTPATIENT
Start: 2022-05-23 | End: 2023-05-16 | Stop reason: SDUPTHER

## 2022-05-23 RX ORDER — CHLORTHALIDONE 25 MG/1
TABLET ORAL
Qty: 90 TABLET | Refills: 3 | Status: SHIPPED | OUTPATIENT
Start: 2022-05-23 | End: 2022-07-21 | Stop reason: SDUPTHER

## 2022-05-23 RX ORDER — LORAZEPAM 0.5 MG/1
0.5 TABLET ORAL 2 TIMES DAILY
Qty: 60 TABLET | Refills: 0 | Status: SHIPPED | OUTPATIENT
Start: 2022-05-23 | End: 2022-09-23

## 2022-05-23 NOTE — PROGRESS NOTES
See johnny.  Thanks for Consult.      Connor Purvis PT, DPT  Board Certified Clinical Specialist in Orthopaedic Physical Therapy  [facility]

## 2022-05-23 NOTE — PLAN OF CARE
[facility]  Physical Therapy Initial Evaluation    Name: Lucinda Aguilera  Clinic Number: [id number]    Therapy Diagnosis:   Encounter Diagnoses   Name Primary?    Chronic bilateral low back pain, unspecified whether sciatica present     Lumbar spondylosis     Spondylolisthesis of lumbar region      Physician: Stormy Ellsworth PA-C    Physician Orders: PT Eval and Treat  Medical Diagnosis from Referral: Chronic bilateral low back pain  Evaluation Date: 5/23/2022  Authorization Period Expiration: 5/20/2023  Plan of Care Expiration: 8/23/2022  Progress Note Due: 6/23/2023  Visit # / Visits authorized: 1/ 1   FOTO: 1/3    Time In: 9:10  Time Out: 10:00  Total Billable Time: 50 minutes    Precautions: Standard    Subjective   Date of onset: Chronic  History of current condition - Lucinda reports she has had some increasing lower back pain over the past few months. She works as a  at [employer] for 3rd, 4th, and 5th graders so she has been very busy but now school is out for the summer. She has a lot of lower back pain when she walks for too long and it catches sometimes when she leans forward and comes back up.     Medical History:   Past Medical History:   Diagnosis Date    Anticoagulant long-term use     Anxiety     takes daily Xanax    Arthritis     right thumb    Cataract     OU    Cholelithiasis     GERD (gastroesophageal reflux disease)     Hepatic steatosis     Hyperlipemia     Hypertension     PVD (posterior vitreous detachment)     OD       Surgical History:   Lucinda Aguilera  has a past surgical history that includes chest abcess; Knee arthroscopy w/ laser; thumb surgery (11/2014); Total knee arthroplasty (Left, 6/19/2018); Colonoscopy (01/06/2012); Joint replacement (Bilateral); Esophagogastroduodenoscopy (N/A, 6/6/2019); Laparoscopic cholecystectomy (N/A, 6/14/2019); Upper gastrointestinal endoscopy (07/13/2017); Excisional biopsy (Left, 10/7/2020);  Esophagogastroduodenoscopy (N/A, 2/17/2021); Colonoscopy (N/A, 2/17/2021); Breast biopsy (Left, 08/28/2020); Breast biopsy (Left, 10/07/2020); and Esophagogastroduodenoscopy (N/A, 5/10/2022).    Medications:   Lucinda has a current medication list which includes the following prescription(s): acetaminophen, artificial tears, ascorbic acid (vitamin c), aspirin, balsalazide, bismuth subsalicylate, chlorthalidone, famotidine, fish oil-omega-3 fatty acids, irbesartan, lactobac no.41/bifidobact no.7, lorazepam, multivitamin, niacin, ondansetron, pantoprazole, potassium chloride, pravastatin, venlafaxine, and vitamin d.    Allergies:   Review of patient's allergies indicates:  No Known Allergies     Imaging, x-ray: 1. Mild lumbar scoliosis.  2. Multilevel progressive degenerative disc disease.  3. Degenerative facet arthrosis at the lower 3 lumbar levels with mild anterior subluxation of L4 and L5 and no evidence of instability.  4. Mild chronic posterior subluxation of L1.    Prior Therapy: Yes, but for TKA only.  Social History:  lives with their family  Occupation:   Prior Level of Function: NA, Chronic  Current Level of Function: Pain with walking or standing for any extended period of time.     Pain:  Current 4/10, worst 8/10, best 2/10   Location: bilateral back   Description: Aching, Dull and Throbbing  Aggravating Factors: Standing and Walking  Easing Factors: rest    Pts goals: Improve pain and function    Red Flag Screening:   Cough  Sneeze  Strain: (--)  Bladder/ bowel: (--)  Falls: (--)  Night pain: (--)  Unexplained weight loss: (--)  General health: Good    Objective   Posture:  Normal    Lumbar Range of Motion:    % Limitation Pain   Flexion 40   no        Extension 25   no        Left Side Bending 40 no        Right Side Bending 40 yes        Left rotation   20 no        Right Rotation   20 no           Lower Extremity Strength  Right LE  Left LE    Knee extension: 4/5 Knee extension:  4/5   Knee flexion: 4/5 Knee flexion: 4/5   Hip flexion: 4-/5 Hip flexion: 4-/5   Hip extension:  4-/5 Hip extension: 4-/5   Hip abduction: 3+/5 Hip abduction: 3+/5   Hip adduction: 4-/5 Hip adduction 4-/5       Joint Mobility: Decreased lumbar mobility    Palpation: TTP Bilateral glute max and lumbar paraspinals      CMS Impairment/Limitation/Restriction for FOTO Lumbar Survey    Therapist reviewed FOTO scores for Lucinda Aguilera on 5/23/2022.   FOTO documents entered into [employer] - see Media section.    Limitation Score: 61%       TREATMENT   Treatment Time In: 9:30  Treatment Time Out: 9:50  Total Treatment time separate from Evaluation: 20 minutes    Lucinda received therapeutic exercises to develop strength, endurance, ROM, flexibility, posture and core stabilization for 20 minutes including:  Supine pelvic tilt working on decreasing diaphragmatic activation  Bridge c/ TB 2x10 c/ 5s holds  Sidelying clamshell no band 2x10 c/ 5s hold  HEP instruction    Home Exercises and Patient Education Provided    Education provided:   - Role of PT, PT POC    Written Home Exercises Provided: yes.  Exercises were reviewed and Lucinda was able to demonstrate them prior to the end of the session.  Lucinda demonstrated good  understanding of the education provided.     See EMR under Patient Instructions for exercises provided 5/23/2022.    Assessment   Patient presents with signs and symptoms consistent with a diagnosis of mechanical lower back pain including all above listed objective impairments. It appears that the patients most significant impairments contributing to their diagnosis are decreased hip abductor strength causing a compensated trendelenburg gait on both sides. This pt is a good candidate for skilled PT treatment and stands to benefit from a combination of manual therapy, therapeutic exercise/actvities, neuromuscular re-education, and modalities Prn. The pt has been educated on their dx/POC and consents to further  PT treatment.    Pt prognosis is Good.   Pt will benefit from skilled outpatient Physical Therapy to address the deficits stated above and in the chart below, provide pt/family education, and to maximize pt's level of independence.     Plan of care discussed with patient: Yes  Pt's spiritual, cultural and educational needs considered and patient is agreeable to the plan of care and goals as stated below:     Anticipated Barriers for therapy: None    Medical Necessity is demonstrated by the following  History  Co-morbidities and personal factors that may impact the plan of care Co-morbidities:   None    Personal Factors:   no deficits     low   Examination  Body Structures and Functions, activity limitations and participation restrictions that may impact the plan of care Body Regions:   lower extremities  trunk    Body Systems:    gross symmetry  ROM  strength  gross coordinated movement    Participation Restrictions:   ADL    Activity limitations:   Learning and applying knowledge  no deficits    General Tasks and Commands  no deficits    Communication  no deficits    Mobility  no deficits    Self care  no deficits    Domestic Life  no deficits    Interactions/Relationships  no deficits    Life Areas  no deficits    Community and Social Life  no deficits         low   Clinical Presentation stable and uncomplicated low   Decision Making/ Complexity Score: low     Goals:   Short Term Goals (4 Weeks):   1) Pt will demonstrate compliance with initial home exercise program as prescribed by physical therapist to improve independence with management of condition.  2) Pt to improve active range of motion in lumbar flexion by 10% to allow for improved functional mobility.  3) Pt to report low back pain of <6/10 at worst during walking to improve tolerance to ADLs.  4) Pt to tolerate sitting for >1 hour with <2/10 pain in low back to allow for improvement in IADLs.    Long Term Goals (8 Weeks):  1) Pt to achieve <45%  limitation as measured by the FOTO to demonstrate decreased low back pain disability.  2) Pt to increase strength to at least 4+/5 of muscles tested to allow for improvement in functional activities such as performing ADLs.  3) Pt to improve active range of motion in lumbar flexion by 0% to allow for improved functional mobility.  4) Pt to report low back pain of <3/10 at worst during walking and stairs to improve tolerance to ADLs.    Plan   Plan of care Certification: 5/23/2022 to 8/23/2022.    Outpatient Physical Therapy 3 times weekly for 6 weeks to include the following interventions: Manual Therapy, Neuromuscular Re-ed, Therapeutic Activities and Therapeutic Exercise.     Connor Purvis, PT

## 2022-05-23 NOTE — TELEPHONE ENCOUNTER
No new care gaps identified.  [facility] Embedded Care Gaps. Reference number: [id number]. 5/23/2022   10:16:24 AM CDT

## 2022-05-24 ENCOUNTER — PATIENT MESSAGE (OUTPATIENT)
Dept: FAMILY MEDICINE | Facility: CLINIC | Age: 72
End: 2022-05-24
Payer: MEDICARE

## 2022-05-24 NOTE — PROGRESS NOTES
Back and Spine New Patient    Patient ID: Lucinda Aguilera is a 71 y.o. female.    Chief Complaint   Patient presents with    Back Pain    Neck Pain       Review of Systems   Constitutional: Negative for activity change, appetite change, chills, fever and unexpected weight change.   HENT: Negative for tinnitus, trouble swallowing and voice change.    Respiratory: Negative for apnea, cough, chest tightness and shortness of breath.    Cardiovascular: Negative for chest pain and palpitations.   Gastrointestinal: Negative for constipation, diarrhea, nausea and vomiting.   Genitourinary: Negative for difficulty urinating, dysuria, frequency and urgency.   Musculoskeletal: Positive for back pain, myalgias and neck pain. Negative for gait problem and neck stiffness.   Skin: Negative for wound.   Neurological: Negative for dizziness, tremors, seizures, facial asymmetry, speech difficulty, weakness, light-headedness, numbness and headaches.   Psychiatric/Behavioral: Negative for confusion and decreased concentration.       Past Medical History:   Diagnosis Date    Anticoagulant long-term use     Anxiety     takes daily Xanax    Arthritis     right thumb    Cataract     OU    Cholelithiasis     GERD (gastroesophageal reflux disease)     Hepatic steatosis     Hyperlipemia     Hypertension     PVD (posterior vitreous detachment)     OD     Social History     Socioeconomic History    Marital status:     Number of children: 2   Occupational History    Occupation: retired teacher and principal   Tobacco Use    Smoking status: Never Smoker    Smokeless tobacco: Never Used   Substance and Sexual Activity    Alcohol use: Yes     Comment: social- maybe once every few months    Drug use: No    Sexual activity: Yes     Partners: Male     Birth control/protection: None, Post-menopausal     Social Determinants of Health     Financial Resource Strain: Unknown    Difficulty of Paying Living Expenses: Patient  refused   Food Insecurity: Unknown    Worried About Running Out of Food in the Last Year: Patient refused    Ran Out of Food in the Last Year: Patient refused   Transportation Needs: Unknown    Lack of Transportation (Medical): Patient refused    Lack of Transportation (Non-Medical): Patient refused   Physical Activity: Unknown    Days of Exercise per Week: Patient refused    Minutes of Exercise per Session: 150+ min   Stress: Unknown    Feeling of Stress : Patient refused   Social Connections: Unknown    Frequency of Communication with Friends and Family: Patient refused    Frequency of Social Gatherings with Friends and Family: Once a week    Active Member of Clubs or Organizations: Patient refused    Attends Club or Organization Meetings: 1 to 4 times per year    Marital Status: Patient refused   Housing Stability: Unknown    Unable to Pay for Housing in the Last Year: Patient refused    Number of Places Lived in the Last Year: 1    Unstable Housing in the Last Year: Patient refused     Family History   Problem Relation Age of Onset    Hypertension Mother     Heart disease Mother     Glaucoma Mother     Stroke Father     Glaucoma Sister     Cataracts Sister     Macular degeneration Sister     Breast cancer Neg Hx     Colon cancer Neg Hx     Crohn's disease Neg Hx     Ulcerative colitis Neg Hx     Stomach cancer Neg Hx     Esophageal cancer Neg Hx     Celiac disease Neg Hx     Amblyopia Neg Hx     Blindness Neg Hx     Retinal detachment Neg Hx     Strabismus Neg Hx      Review of patient's allergies indicates:  No Known Allergies    Current Outpatient Medications:     acetaminophen (TYLENOL) 500 MG tablet, Take 500 mg by mouth every 6 (six) hours as needed for Pain., Disp: , Rfl:     artificial tears ointment (ARTIFICIAL TEARS) Oint, every evening., Disp: , Rfl:     ascorbic acid, vitamin C, (VITAMIN C) 250 MG tablet, Take 500 mg by mouth once daily. , Disp: , Rfl:     aspirin  (ECOTRIN) 81 MG EC tablet, Take 81 mg by mouth once daily., Disp: , Rfl:     balsalazide (COLAZAL) 750 mg capsule, TAKE 3 CAPSULES(2250 MG) BY MOUTH TWICE DAILY WITH MEALS, Disp: 180 capsule, Rfl: 11    bismuth subsalicylate (BISMAREX) 262 mg Chew, Take 2 tablets by mouth daily as needed., Disp: , Rfl:     chlorthalidone (HYGROTEN) 25 MG Tab, TAKE 1 TABLET BY MOUTH EVERY MORNING for blood pressure, Disp: 90 tablet, Rfl: 3    famotidine (PEPCID) 40 MG tablet, Take 1 tablet (40 mg total) by mouth nightly., Disp: 90 tablet, Rfl: 1    fish oil-omega-3 fatty acids 300-1,000 mg capsule, Take 2 g by mouth once daily., Disp: , Rfl:     irbesartan (AVAPRO) 300 MG tablet, Take 1 tablet (300 mg total) by mouth every evening., Disp: 90 tablet, Rfl: 3    Lactobac no.41/Bifidobact no.7 (PROBIOTIC-10 ORAL), Take by mouth., Disp: , Rfl:     LORazepam (ATIVAN) 0.5 MG tablet, Take 1 tablet (0.5 mg total) by mouth 2 (two) times daily., Disp: 60 tablet, Rfl: 0    multivitamin (THERAGRAN) per tablet, Take 1 tablet by mouth once daily., Disp: , Rfl:     niacin 500 MG CpSR, Take 500 mg by mouth every evening., Disp: , Rfl:     ondansetron (ZOFRAN-ODT) 4 MG TbDL, Take 1 tablet (4 mg total) by mouth every 8 (eight) hours as needed (nausea)., Disp: 30 tablet, Rfl: 1    pantoprazole (PROTONIX) 40 MG tablet, Take 1 tablet (40 mg total) by mouth before breakfast., Disp: 90 tablet, Rfl: 3    potassium chloride (MICRO-K) 10 MEQ CpSR, TAKE 2 CAPSULES(20 MEQ) BY MOUTH EVERY DAY, Disp: 180 capsule, Rfl: 3    pravastatin (PRAVACHOL) 40 MG tablet, Take 1 tablet (40 mg total) by mouth once daily., Disp: 90 tablet, Rfl: 3    venlafaxine (EFFEXOR-XR) 37.5 MG 24 hr capsule, Take 1 capsule (37.5 mg total) by mouth once daily., Disp: 90 capsule, Rfl: 3    vitamin D (VITAMIN D3) 1000 units Tab, Take 1,000 Units by mouth once daily., Disp: , Rfl:     Vitals:    05/20/22 1351   BP: (!) 147/83   BP Location: Left arm   Patient Position: Sitting  "  Pulse: 97   Weight: 79.2 kg (174 lb 9.7 oz)   Height: 5' 2.4" (1.585 m)       Physical Exam  Vitals and nursing note reviewed.   Constitutional:       Appearance: She is well-developed.   HENT:      Head: Normocephalic and atraumatic.   Eyes:      Pupils: Pupils are equal, round, and reactive to light.   Cardiovascular:      Rate and Rhythm: Normal rate.   Pulmonary:      Effort: Pulmonary effort is normal.   Musculoskeletal:         General: Normal range of motion.      Cervical back: Normal range of motion and neck supple.      Comments: (+) SI joint tenderness bilaterally  (+) JOSUE bilaterally for buttock, hip, thigh pain   Skin:     General: Skin is warm and dry.   Neurological:      Mental Status: She is alert and oriented to person, place, and time.      Coordination: Finger-Nose-Finger Test, Heel to Shin Test and Romberg Test normal.      Gait: Gait is intact. Tandem walk normal.      Deep Tendon Reflexes:      Reflex Scores:       Tricep reflexes are 2+ on the right side and 2+ on the left side.       Bicep reflexes are 2+ on the right side and 2+ on the left side.       Brachioradialis reflexes are 2+ on the right side and 2+ on the left side.       Patellar reflexes are 2+ on the right side and 2+ on the left side.       Achilles reflexes are 2+ on the right side and 2+ on the left side.  Psychiatric:         Speech: Speech normal.         Behavior: Behavior normal.         Thought Content: Thought content normal.         Judgment: Judgment normal.         Neurologic Exam     Mental Status   Oriented to person, place, and time.   Oriented to person.   Oriented to place.   Oriented to time.   Follows 3 step commands.   Attention: normal. Concentration: normal.   Speech: speech is normal   Level of consciousness: alert  Knowledge: consistent with education.   Able to name object. Able to read. Able to repeat. Able to write. Normal comprehension.     Cranial Nerves     CN II   Visual acuity: normal  Right " visual field deficit: none  Left visual field deficit: none     CN III, IV, VI   Pupils are equal, round, and reactive to light.  Right pupil: Size: 3 mm. Shape: regular. Reactivity: brisk. Consensual response: intact.   Left pupil: Size: 3 mm. Shape: regular. Reactivity: brisk. Consensual response: intact.   CN III: no CN III palsy  CN VI: no CN VI palsy  Nystagmus: none   Diplopia: none  Ophthalmoparesis: none  Conjugate gaze: present    CN V   Right facial sensation deficit: none  Left facial sensation deficit: none    CN VII   Right facial weakness: none  Left facial weakness: none    CN VIII   Hearing: intact    CN IX, X   CN IX normal.   CN X normal.     CN XI   Right sternocleidomastoid strength: normal  Left sternocleidomastoid strength: normal  Right trapezius strength: normal  Left trapezius strength: normal    CN XII   Fasciculations: absent  Tongue deviation: none    Motor Exam   Muscle bulk: normal  Overall muscle tone: normal  Right arm pronator drift: absent  Left arm pronator drift: absent    Strength   Right neck flexion: 5/5  Left neck flexion: 5/5  Right neck extension: 5/5  Left neck extension: 5/5  Right deltoid: 5/5  Left deltoid: 5/5  Right biceps: 5/5  Left biceps: 5/5  Right triceps: 5/5  Left triceps: 5/5  Right wrist flexion: 5/5  Left wrist flexion: 5/5  Right wrist extension: 5/5  Left wrist extension: 5/5  Right interossei: 5/5  Left interossei: 5/5  Right abdominals: 5/5  Left abdominals: 5/5  Right iliopsoas: 5/5  Left iliopsoas: 5/5  Right quadriceps: 5/5  Left quadriceps: 5/5  Right hamstrin/5  Left hamstrin/5  Right glutei: 5/5  Left glutei: 5/5  Right anterior tibial: 5/5  Left anterior tibial: 5/5  Right posterior tibial: 5/5  Left posterior tibial: 5/5  Right peroneal: 5/5  Left peroneal: 5/5  Right gastroc: 5/5  Left gastroc: 5/5    Sensory Exam   Right arm light touch: normal  Left arm light touch: normal  Right leg light touch: normal  Left leg light touch:  normal  Right arm vibration: normal  Left arm vibration: normal  Right arm pinprick: normal  Left arm pinprick: normal    Gait, Coordination, and Reflexes     Gait  Gait: normal    Coordination   Romberg: negative  Finger to nose coordination: normal  Heel to shin coordination: normal  Tandem walking coordination: normal    Tremor   Resting tremor: absent  Intention tremor: absent  Action tremor: absent    Reflexes   Right brachioradialis: 2+  Left brachioradialis: 2+  Right biceps: 2+  Left biceps: 2+  Right triceps: 2+  Left triceps: 2+  Right patellar: 2+  Left patellar: 2+  Right achilles: 2+  Left achilles: 2+  Right Jacob: absent  Left Jacob: absent  Right ankle clonus: absent  Left ankle clonus: absent      Provider dictation:    71 year old female with anxiety, GERD, hyperlipidemia, hypertension, history of knee replacements, and bilateral hip pain presents to discuss back pain.  She has some neck pain, but lower lumbar back pain is her greater concern.  She has years of sharp pain across the lower lumbar region with radiation into the right buttocks and hips.  She takes tylenol for pain and has tried PT.  No interventional procedures.    Current medications:  tylenol  Physical therapy:  For lower back and knee with last trial in March/ April 2022  Interventional Procedures:  none     On exam, there is 5/5 strength with 2+ DTR and no sensory deficits.  Gait and station fluid.  Denies bowel/ bladder dysfunction.  Full range of motion of the upper and lower extremities. No pain with axial facet loading.  (+) SI joint tenderness bilaterally.  (+) JOSEU bilaterally for buttock, hip, thigh pain  No pain with lumbar flexion/ extension.    Xray cervical spine from 5-29-19 reviewed.  2mm anterolisthesis of C3 on C4 and C7 on T1.  C4/5 and C5/6 osteophytes.    Xray lumbar spine from 4-5-18 reviewed.  There is anterolisthesis of L5 on S1 and L4 on L5.  Multilevel facet arthropathy noted.    Ms. Jane has chronic  lower back pain with radiation into the right hip/ buttock area.  No focal radiculopathy.  Pain can be referred from and associated with degenerative chagnes/ spondylolisthesis at L4/5, L5/S1.  May also be SI joint pain.  I recommend getting updated xrays of the lumbar spine with flexion/ extension views to further assess spondylolisthesis and for stability.  We will call with results.  Also recommend PT to help with pain.  She would like to proceed.  Follow up after PT and if no improvement, we can obtain MRI lumbar spine.      Visit Diagnosis:  Chronic bilateral low back pain, unspecified whether sciatica present  -     X-Ray Lumbar Complete Including Flex And Ext; Future; Expected date: 05/20/2022  -     Ambulatory referral/consult to Physical/Occupational Therapy; Future; Expected date: 05/27/2022    Lumbar spondylosis  -     X-Ray Lumbar Complete Including Flex And Ext; Future; Expected date: 05/20/2022  -     Ambulatory referral/consult to Physical/Occupational Therapy; Future; Expected date: 05/27/2022    Spondylolisthesis of lumbar region  -     X-Ray Lumbar Complete Including Flex And Ext; Future; Expected date: 05/20/2022  -     Ambulatory referral/consult to Physical/Occupational Therapy; Future; Expected date: 05/27/2022        Total time spent counseling greater than fifty percent of total visit time.  Counseling included discussion regarding imaging findings, diagnosis possibilities, treatment options, risks and benefits.   The patient had many questions regarding the options and long-term effects.

## 2022-05-27 ENCOUNTER — PATIENT MESSAGE (OUTPATIENT)
Dept: ADMINISTRATIVE | Facility: OTHER | Age: 72
End: 2022-05-27
Payer: MEDICARE

## 2022-05-31 ENCOUNTER — EXTERNAL CHRONIC CARE MANAGEMENT (OUTPATIENT)
Dept: PRIMARY CARE CLINIC | Facility: CLINIC | Age: 72
End: 2022-05-31
Payer: MEDICARE

## 2022-05-31 ENCOUNTER — CLINICAL SUPPORT (OUTPATIENT)
Dept: REHABILITATION | Facility: HOSPITAL | Age: 72
End: 2022-05-31
Payer: MEDICARE

## 2022-05-31 DIAGNOSIS — R29.898 WEAKNESS OF BOTH HIPS: ICD-10-CM

## 2022-05-31 DIAGNOSIS — M53.86 DECREASED ROM OF LUMBAR SPINE: Primary | ICD-10-CM

## 2022-05-31 PROCEDURE — 99490 CHRNC CARE MGMT STAFF 1ST 20: CPT | Mod: PBBFAC,PO | Performed by: FAMILY MEDICINE

## 2022-05-31 PROCEDURE — 97110 THERAPEUTIC EXERCISES: CPT | Mod: PO | Performed by: PHYSICAL THERAPIST

## 2022-05-31 PROCEDURE — 99490 CHRNC CARE MGMT STAFF 1ST 20: CPT | Mod: S$PBB,,, | Performed by: FAMILY MEDICINE

## 2022-05-31 PROCEDURE — 99490 PR CHRONIC CARE MGMT, 1ST 20 MIN: ICD-10-PCS | Mod: S$PBB,,, | Performed by: FAMILY MEDICINE

## 2022-05-31 NOTE — PROGRESS NOTES
[facility]   Physical Therapy Treatment Note     Name: Lucinda Aguilera  Clinic Number: [id number]    Therapy Diagnosis:   Encounter Diagnoses   Name Primary?    Decreased ROM of lumbar spine Yes    Weakness of both hips      Physician: Stormy Ellsworth PA-C    Visit Date: 5/31/2022  Physician Orders: PT Eval and Treat  Medical Diagnosis from Referral: Chronic bilateral low back pain  Evaluation Date: 5/23/2022  Authorization Period Expiration: 5/20/2023  Plan of Care Expiration: 8/23/2022  Progress Note Due: 6/23/2023  Visit # / Visits authorized: 1/ 1   FOTO: 1/3    PTA Visit #: 0/5     Time In: 12:00  Time Out: 12:45  Total Billable Time: 30 minutes    SUBJECTIVE     Pt reports: Hips were very sore after the last visit. She isn't upset but feels like that just means those muscles needed to be worked.  She was compliant with home exercise program.  Response to previous treatment: soreness  Functional change: too soon to tell    Pain: 3/10  Location: bilateral back      OBJECTIVE     Objective Measures updated at progress report unless specified.     Treatment     Lucinda received the treatments listed below:      therapeutic exercises to develop strength, endurance, ROM, flexibility, posture and core stabilization for 40 minutes including:  Recumbent bike 6' L2  HSS long sitting  Prone quad stretch  Piriformis stretch  Glute bridge 20x5s  Sidelying clamshells 15x5s each side      Patient Education and Home Exercises     Home Exercises Provided and Patient Education Provided     Education provided:   - HEP    Written Home Exercises Provided: Patient instructed to cont prior HEP. Exercises were reviewed and Lucinda was able to demonstrate them prior to the end of the session.  Lucinda demonstrated good  understanding of the education provided. See EMR under Patient Instructions for exercises provided during therapy sessions    ASSESSMENT     Pt tolerated today's treatment. We did not  increase the volume of resistance training at this time to allow her muscles more time to adapt to the increased demand. We will progress to more hip and thigh strengthening at the next visit.    Lucinda Is progressing well towards her goals.   Pt prognosis is Good.     Pt will continue to benefit from skilled outpatient physical therapy to address the deficits listed in the problem list box on initial evaluation, provide pt/family education and to maximize pt's level of independence in the home and community environment.     Pt's spiritual, cultural and educational needs considered and pt agreeable to plan of care and goals.     Anticipated barriers to physical therapy: None.    Goals:  Short Term Goals (4 Weeks):   1) Pt will demonstrate compliance with initial home exercise program as prescribed by physical therapist to improve independence with management of condition.  2) Pt to improve active range of motion in lumbar flexion by 10% to allow for improved functional mobility.  3) Pt to report low back pain of <6/10 at worst during walking to improve tolerance to ADLs.  4) Pt to tolerate sitting for >1 hour with <2/10 pain in low back to allow for improvement in IADLs.     Long Term Goals (8 Weeks):  1) Pt to achieve <45% limitation as measured by the FOTO to demonstrate decreased low back pain disability.  2) Pt to increase strength to at least 4+/5 of muscles tested to allow for improvement in functional activities such as performing ADLs.  3) Pt to improve active range of motion in lumbar flexion by 0% to allow for improved functional mobility.  4) Pt to report low back pain of <3/10 at worst during walking and stairs to improve tolerance to ADLs.    PLAN     Progress as tolerated.    Connor Purvis, PT

## 2022-06-02 ENCOUNTER — HOSPITAL ENCOUNTER (OUTPATIENT)
Dept: RADIOLOGY | Facility: HOSPITAL | Age: 72
Discharge: HOME | End: 2022-06-02
Attending: INTERNAL MEDICINE
Payer: MEDICARE

## 2022-06-02 DIAGNOSIS — R10.13 EPIGASTRIC PAIN: ICD-10-CM

## 2022-06-02 PROCEDURE — 74176 CT ABD & PELVIS W/O CONTRAST: CPT | Mod: TC,PO

## 2022-06-02 PROCEDURE — 74176 CT ABDOMEN PELVIS WITHOUT CONTRAST: ICD-10-PCS | Mod: 26,,, | Performed by: RADIOLOGY

## 2022-06-02 PROCEDURE — 25500020 PHARM REV CODE 255: Mod: PO | Performed by: INTERNAL MEDICINE

## 2022-06-02 PROCEDURE — 74176 CT ABD & PELVIS W/O CONTRAST: CPT | Mod: 26,,, | Performed by: RADIOLOGY

## 2022-06-02 PROCEDURE — A9698 NON-RAD CONTRAST MATERIALNOC: HCPCS | Mod: PO | Performed by: INTERNAL MEDICINE

## 2022-06-02 RX ADMIN — IOHEXOL 1000 ML: 12 SOLUTION ORAL at 01:06

## 2022-06-08 ENCOUNTER — PATIENT MESSAGE (OUTPATIENT)
Dept: GASTROENTEROLOGY | Facility: CLINIC | Age: 72
End: 2022-06-08
Payer: MEDICARE

## 2022-06-08 DIAGNOSIS — R11.0 NAUSEA: Primary | ICD-10-CM

## 2022-06-10 ENCOUNTER — CLINICAL SUPPORT (OUTPATIENT)
Dept: REHABILITATION | Facility: HOSPITAL | Age: 72
End: 2022-06-10
Payer: MEDICARE

## 2022-06-10 DIAGNOSIS — R29.898 WEAKNESS OF BOTH HIPS: ICD-10-CM

## 2022-06-10 DIAGNOSIS — M53.86 DECREASED ROM OF LUMBAR SPINE: Primary | ICD-10-CM

## 2022-06-10 PROCEDURE — 97110 THERAPEUTIC EXERCISES: CPT | Mod: PO,CQ

## 2022-06-10 NOTE — PROGRESS NOTES
[facility]   Physical Therapy Treatment Note     Name: Lucinda Aguilera  Clinic Number: [id number]    Therapy Diagnosis:   Encounter Diagnoses   Name Primary?    Decreased ROM of lumbar spine Yes    Weakness of both hips      Physician: Stormy Ellsworth PA-C    Visit Date: 6/10/2022  Physician Orders: PT Eval and Treat  Medical Diagnosis from Referral: Chronic bilateral low back pain  Evaluation Date: 5/23/2022  Authorization Period Expiration: 5/20/2023  Plan of Care Expiration: 8/23/2022  Progress Note Due: 6/23/2023  Visit # / Visits authorized: 3/ 1   FOTO: 1/3    PTA Visit #: 0/5     Time In: 10:46  Time Out: 11:32  Total Billable Time: 45 minutes    SUBJECTIVE     Pt reports: having a lot of pain in the evening when she went to [employer]. She used a walker there that helped her not get a lot of pain and able to walk more. She has not been doing her HEP.   She was compliant with home exercise program.  Response to previous treatment: soreness  Functional change: too soon to tell    Pain: 3/10  Location: bilateral back      OBJECTIVE     Objective Measures updated at progress report unless specified.     Treatment     Lucinda received the treatments listed below:      therapeutic exercises to develop strength, endurance, ROM, flexibility, posture and core stabilization for 47 minutes including:  Recumbent bike 10 L2  HSS long sitting 2x 20s  Prone quad stretch  Piriformis stretch 3 x 30s  Glute bridge 20x5s  Sidelying clamshells 2 x 10 w/ R TB 5s  LTR 20x  DKTC 10x  Seated trunk flexion 10x    Patient Education and Home Exercises     Home Exercises Provided and Patient Education Provided     Education provided:   - HEP    Written Home Exercises Provided: Patient instructed to cont prior HEP. Exercises were reviewed and Lucinda was able to demonstrate them prior to the end of the session.  Lucinda demonstrated good  understanding of the education provided. See EMR under Patient  Instructions for exercises provided during therapy sessions    ASSESSMENT     Pt tolerated today's treatment. Educated pt on the importance of daily stretch to increase the benefit of therapy and positive results. Pt understood. Added flexion bias stretch due to flexion and sitting feels good to her and did lower pain with DKTC and seated flexion. Issued her new HEP sheet for new stretches.     Lucinda Is progressing well towards her goals.   Pt prognosis is Good.     Pt will continue to benefit from skilled outpatient physical therapy to address the deficits listed in the problem list box on initial evaluation, provide pt/family education and to maximize pt's level of independence in the home and community environment.     Pt's spiritual, cultural and educational needs considered and pt agreeable to plan of care and goals.     Anticipated barriers to physical therapy: None.    Goals:  Short Term Goals (4 Weeks):   1) Pt will demonstrate compliance with initial home exercise program as prescribed by physical therapist to improve independence with management of condition.  2) Pt to improve active range of motion in lumbar flexion by 10% to allow for improved functional mobility.  3) Pt to report low back pain of <6/10 at worst during walking to improve tolerance to ADLs.  4) Pt to tolerate sitting for >1 hour with <2/10 pain in low back to allow for improvement in IADLs.     Long Term Goals (8 Weeks):  1) Pt to achieve <45% limitation as measured by the FOTO to demonstrate decreased low back pain disability.  2) Pt to increase strength to at least 4+/5 of muscles tested to allow for improvement in functional activities such as performing ADLs.  3) Pt to improve active range of motion in lumbar flexion by 0% to allow for improved functional mobility.  4) Pt to report low back pain of <3/10 at worst during walking and stairs to improve tolerance to ADLs.    PLAN     Progress as tolerated.    Lois Torres, PTA

## 2022-06-10 NOTE — TELEPHONE ENCOUNTER
My chart message sent to the patient, see below      Per Dr. Lopez-    CT reviewed.     The stomach is a little disteneded.  And she looks like she might be constipated.     Take some doses of Miralax, every day until cleared out.     And schedule her for gastric empty study. (order in)

## 2022-06-10 NOTE — TELEPHONE ENCOUNTER
CT reviewed.    The stomach is a little disteneded.  And she looks like she might be constipated.    Take some doses of Miralax, every day until cleared out.    And schedule her for gastric empty study. (order in)

## 2022-06-14 ENCOUNTER — CLINICAL SUPPORT (OUTPATIENT)
Dept: REHABILITATION | Facility: HOSPITAL | Age: 72
End: 2022-06-14
Payer: MEDICARE

## 2022-06-14 DIAGNOSIS — M53.86 DECREASED ROM OF LUMBAR SPINE: Primary | ICD-10-CM

## 2022-06-14 DIAGNOSIS — R29.898 WEAKNESS OF BOTH HIPS: ICD-10-CM

## 2022-06-14 PROCEDURE — 97110 THERAPEUTIC EXERCISES: CPT | Mod: PO,CQ

## 2022-06-14 NOTE — PROGRESS NOTES
[facility]   Physical Therapy Treatment Note     Name: Lucinda Aguilera  Clinic Number: [id number]    Therapy Diagnosis:   Encounter Diagnoses   Name Primary?    Decreased ROM of lumbar spine Yes    Weakness of both hips      Physician: Stormy Ellsworth PA-C    Visit Date: 6/14/2022  Physician Orders: PT Eval and Treat  Medical Diagnosis from Referral: Chronic bilateral low back pain  Evaluation Date: 5/23/2022  Authorization Period Expiration: 5/20/2023  Plan of Care Expiration: 8/23/2022  Progress Note Due: 6/23/2023  Visit # / Visits authorized: 4/ 1   FOTO: 1/3    PTA Visit #: 0/5     Time In: 10:46  Time Out: 11:32  Total Billable Time: 45 minutes    SUBJECTIVE     Pt reports: having no LB or glute pain today. She states she does the piriformis stretch when she has glute pain and it decreases her pain.   She was compliant with home exercise program.  Response to previous treatment: soreness  Functional change: too soon to tell    Pain: 3/10  Location: bilateral back and R glute    OBJECTIVE     Objective Measures updated at progress report unless specified.     Treatment     Lucinda received the treatments listed below:      therapeutic exercises to develop strength, endurance, ROM, flexibility, posture and core stabilization for 47 minutes including:  Recumbent bike 10 L2  HSS long sitting 2x 20s  Prone quad stretch  Piriformis stretch 3 x 30s  Glute bridge  2 x 10 5s  Sidelying clamshells 2 x 10 w/ R TB 5s  LTR 20x  DKTC 10x 5s  Seated trunk flexion 10x 5s    Patient Education and Home Exercises     Home Exercises Provided and Patient Education Provided     Education provided:   - HEP    Written Home Exercises Provided: Patient instructed to cont prior HEP. Exercises were reviewed and Lucinda was able to demonstrate them prior to the end of the session.  Lucinda demonstrated good  understanding of the education provided. See EMR under Patient Instructions for exercises provided  during therapy sessions    ASSESSMENT      Pt tolerated today's treatment well with no pain. Educated her to stretch if she has pain or not.  Pt understood.Flexion stretches cont to help pt decrease pain also    Lucinda Is progressing well towards her goals.   Pt prognosis is Good.     Pt will continue to benefit from skilled outpatient physical therapy to address the deficits listed in the problem list box on initial evaluation, provide pt/family education and to maximize pt's level of independence in the home and community environment.     Pt's spiritual, cultural and educational needs considered and pt agreeable to plan of care and goals.     Anticipated barriers to physical therapy: None.    Goals:  Short Term Goals (4 Weeks):   1) Pt will demonstrate compliance with initial home exercise program as prescribed by physical therapist to improve independence with management of condition.  2) Pt to improve active range of motion in lumbar flexion by 10% to allow for improved functional mobility.  3) Pt to report low back pain of <6/10 at worst during walking to improve tolerance to ADLs.  4) Pt to tolerate sitting for >1 hour with <2/10 pain in low back to allow for improvement in IADLs.     Long Term Goals (8 Weeks):  1) Pt to achieve <45% limitation as measured by the FOTO to demonstrate decreased low back pain disability.  2) Pt to increase strength to at least 4+/5 of muscles tested to allow for improvement in functional activities such as performing ADLs.  3) Pt to improve active range of motion in lumbar flexion by 0% to allow for improved functional mobility.  4) Pt to report low back pain of <3/10 at worst during walking and stairs to improve tolerance to ADLs.    PLAN     Progress as tolerated.    Lois Torres, PTA

## 2022-06-21 ENCOUNTER — CLINICAL SUPPORT (OUTPATIENT)
Dept: REHABILITATION | Facility: HOSPITAL | Age: 72
End: 2022-06-21
Payer: MEDICARE

## 2022-06-21 DIAGNOSIS — R29.898 WEAKNESS OF BOTH HIPS: ICD-10-CM

## 2022-06-21 DIAGNOSIS — M53.86 DECREASED ROM OF LUMBAR SPINE: Primary | ICD-10-CM

## 2022-06-21 PROCEDURE — 97110 THERAPEUTIC EXERCISES: CPT | Mod: PO | Performed by: PHYSICAL THERAPIST

## 2022-06-21 NOTE — PROGRESS NOTES
[facility]   Physical Therapy Treatment Note     Name: Lucinda Aguilera  Clinic Number: [id number]    Therapy Diagnosis:   Encounter Diagnoses   Name Primary?    Decreased ROM of lumbar spine Yes    Weakness of both hips      Physician: Stormy Ellsworth PA-C    Visit Date: 6/21/2022  Physician Orders: PT Eval and Treat  Medical Diagnosis from Referral: Chronic bilateral low back pain  Evaluation Date: 5/23/2022  Authorization Period Expiration: 5/20/2023  Plan of Care Expiration: 8/23/2022  Progress Note Due: 6/23/2023  Visit # / Visits authorized: 1/ 1   FOTO: 1/3    PTA Visit #: 0/5     Time In: 10:00  Time Out: 10:45  Total Billable Time: 25 minutes    SUBJECTIVE     Pt reports: Doing well. Not too much back pain today but feels that the PT is helping and she's doing exactly what she needs to do to get better.  She was compliant with home exercise program.  Response to previous treatment: soreness  Functional change: too soon to tell    Pain: 3/10  Location: bilateral back      OBJECTIVE     Objective Measures updated at progress report unless specified.     Treatment     Lucinda received the treatments listed below:      therapeutic exercises to develop strength, endurance, ROM, flexibility, posture and core stabilization for 40 minutes including:  Recumbent bike 6' L2  HSS long sitting 5x30s each  Prone quad stretch 5x30s each  SKTC 10x10s each  Glute bridge 20x5s  Sidelying clamshells 15x5s each side  SLR no weight 2x10 each side  LAQ 5# 3x10 each      Patient Education and Home Exercises     Home Exercises Provided and Patient Education Provided     Education provided:   - HEP    Written Home Exercises Provided: Patient instructed to cont prior HEP. Exercises were reviewed and Lucinda was able to demonstrate them prior to the end of the session.  Lucinda demonstrated good  understanding of the education provided. See EMR under Patient Instructions for exercises provided during  therapy sessions    ASSESSMENT     Pt tolerated today's treatment well with no increased pain.    Lucinda Is progressing well towards her goals.   Pt prognosis is Good.     Pt will continue to benefit from skilled outpatient physical therapy to address the deficits listed in the problem list box on initial evaluation, provide pt/family education and to maximize pt's level of independence in the home and community environment.     Pt's spiritual, cultural and educational needs considered and pt agreeable to plan of care and goals.     Anticipated barriers to physical therapy: None.    Goals:  Short Term Goals (4 Weeks):   1) Pt will demonstrate compliance with initial home exercise program as prescribed by physical therapist to improve independence with management of condition.  2) Pt to improve active range of motion in lumbar flexion by 10% to allow for improved functional mobility.  3) Pt to report low back pain of <6/10 at worst during walking to improve tolerance to ADLs.  4) Pt to tolerate sitting for >1 hour with <2/10 pain in low back to allow for improvement in IADLs.     Long Term Goals (8 Weeks):  1) Pt to achieve <45% limitation as measured by the FOTO to demonstrate decreased low back pain disability.  2) Pt to increase strength to at least 4+/5 of muscles tested to allow for improvement in functional activities such as performing ADLs.  3) Pt to improve active range of motion in lumbar flexion by 0% to allow for improved functional mobility.  4) Pt to report low back pain of <3/10 at worst during walking and stairs to improve tolerance to ADLs.    PLAN     Progress as tolerated.    Connor Purvis, PT

## 2022-06-28 ENCOUNTER — CLINICAL SUPPORT (OUTPATIENT)
Dept: REHABILITATION | Facility: HOSPITAL | Age: 72
End: 2022-06-28
Payer: MEDICARE

## 2022-06-28 DIAGNOSIS — M53.86 DECREASED ROM OF LUMBAR SPINE: Primary | ICD-10-CM

## 2022-06-28 DIAGNOSIS — R29.898 WEAKNESS OF BOTH HIPS: ICD-10-CM

## 2022-06-28 PROCEDURE — 97110 THERAPEUTIC EXERCISES: CPT | Mod: PO | Performed by: PHYSICAL THERAPIST

## 2022-06-28 NOTE — PROGRESS NOTES
[facility]   Physical Therapy Treatment Note     Name: Lucinda Aguilera  [facility] Number: [id number]    Therapy Diagnosis:   Encounter Diagnoses   Name Primary?    Decreased ROM of lumbar spine Yes    Weakness of both hips      Physician: Stormy Ellsworth PA-C    Visit Date: 6/28/2022  Physician Orders: PT Eval and Treat  Medical Diagnosis from Referral: Chronic bilateral low back pain  Evaluation Date: 5/23/2022  Authorization Period Expiration: 5/20/2023  Plan of Care Expiration: 8/23/2022  Progress Note Due: 6/23/2023  Visit # / Visits authorized: 5/20  FOTO: 1/3    PTA Visit #: 0/5     Time In: 10:00 AM  Time Out: 10:50AM  Total Billable Time: 15 minutes    SUBJECTIVE     Pt reports: Has been feeling pretty good overall. Has been doing exercises at home but not as often.  She was compliant with home exercise program.  Response to previous treatment: soreness  Functional change: too soon to tell    Pain: 3/10  Location: bilateral back      OBJECTIVE     Objective Measures updated at progress report unless specified.     Treatment     Lucinda received the treatments listed below:      therapeutic exercises to develop strength, endurance, ROM, flexibility, posture and core stabilization for 40 minutes including:  Recumbent bike 6' L2  HSS long sitting 5x30s each  Prone quad stretch 5x30s each  SKTC 10x10s each  Glute bridge 20x5s  Sidelying clamshells 15x5s each side  SLR no weight 2x10 each side  LAQ 5# 3x10 each      Patient Education and Home Exercises     Home Exercises Provided and Patient Education Provided     Education provided:   - HEP    Written Home Exercises Provided: Patient instructed to cont prior HEP. Exercises were reviewed and Lucinda was able to demonstrate them prior to the end of the session.  Lucinda demonstrated good  understanding of the education provided. See EMR under Patient Instructions for exercises provided during therapy sessions    ASSESSMENT     Pt  tolerated today's treatment well with no increased pain. Continuing to add resistance to exercises as tolerated.    Lucinda Is progressing well towards her goals.   Pt prognosis is Good.     Pt will continue to benefit from skilled outpatient physical therapy to address the deficits listed in the problem list box on initial evaluation, provide pt/family education and to maximize pt's level of independence in the home and community environment.     Pt's spiritual, cultural and educational needs considered and pt agreeable to plan of care and goals.     Anticipated barriers to physical therapy: None.    Goals:  Short Term Goals (4 Weeks):   1) Pt will demonstrate compliance with initial home exercise program as prescribed by physical therapist to improve independence with management of condition.  2) Pt to improve active range of motion in lumbar flexion by 10% to allow for improved functional mobility.  3) Pt to report low back pain of <6/10 at worst during walking to improve tolerance to ADLs.  4) Pt to tolerate sitting for >1 hour with <2/10 pain in low back to allow for improvement in IADLs.     Long Term Goals (8 Weeks):  1) Pt to achieve <45% limitation as measured by the FOTO to demonstrate decreased low back pain disability.  2) Pt to increase strength to at least 4+/5 of muscles tested to allow for improvement in functional activities such as performing ADLs.  3) Pt to improve active range of motion in lumbar flexion by 0% to allow for improved functional mobility.  4) Pt to report low back pain of <3/10 at worst during walking and stairs to improve tolerance to ADLs.    PLAN     Progress as tolerated.    Connor Purvis, PT

## 2022-06-30 ENCOUNTER — CLINICAL SUPPORT (OUTPATIENT)
Dept: REHABILITATION | Facility: HOSPITAL | Age: 72
End: 2022-06-30
Payer: MEDICARE

## 2022-06-30 ENCOUNTER — EXTERNAL CHRONIC CARE MANAGEMENT (OUTPATIENT)
Dept: PRIMARY CARE CLINIC | Facility: CLINIC | Age: 72
End: 2022-06-30
Payer: MEDICARE

## 2022-06-30 DIAGNOSIS — M53.86 DECREASED ROM OF LUMBAR SPINE: Primary | ICD-10-CM

## 2022-06-30 DIAGNOSIS — R29.898 WEAKNESS OF BOTH HIPS: ICD-10-CM

## 2022-06-30 PROCEDURE — 97110 THERAPEUTIC EXERCISES: CPT | Mod: PO | Performed by: PHYSICAL THERAPIST

## 2022-06-30 PROCEDURE — 99439 CHRNC CARE MGMT STAF EA ADDL: CPT | Mod: S$PBB,,, | Performed by: FAMILY MEDICINE

## 2022-06-30 PROCEDURE — 99439 PR CHRONIC CARE MGMT, EA ADDTL 20 MIN: ICD-10-PCS | Mod: S$PBB,,, | Performed by: FAMILY MEDICINE

## 2022-06-30 PROCEDURE — 99490 PR CHRONIC CARE MGMT, 1ST 20 MIN: ICD-10-PCS | Mod: S$PBB,,, | Performed by: FAMILY MEDICINE

## 2022-06-30 PROCEDURE — 99439 CHRNC CARE MGMT STAF EA ADDL: CPT | Mod: PBBFAC,PO | Performed by: FAMILY MEDICINE

## 2022-06-30 PROCEDURE — 99490 CHRNC CARE MGMT STAFF 1ST 20: CPT | Mod: PBBFAC,PO | Performed by: FAMILY MEDICINE

## 2022-06-30 PROCEDURE — 99490 CHRNC CARE MGMT STAFF 1ST 20: CPT | Mod: S$PBB,,, | Performed by: FAMILY MEDICINE

## 2022-06-30 NOTE — PROGRESS NOTES
[facility]   Physical Therapy Treatment Note     Name: Lucinda Aguilera  Clinic Number: [id number]    Therapy Diagnosis:   Encounter Diagnoses   Name Primary?    Decreased ROM of lumbar spine Yes    Weakness of both hips      Physician: Stormy Ellsworth PA-C    Visit Date: 6/30/2022  Physician Orders: PT Eval and Treat  Medical Diagnosis from Referral: Chronic bilateral low back pain  Evaluation Date: 5/23/2022  Authorization Period Expiration: 5/20/2023  Plan of Care Expiration: 8/23/2022  Progress Note Due: 6/23/2023  Visit # / Visits authorized: 5/20  FOTO: 1/3    PTA Visit #: 0/5     Time In: 10:00 AM  Time Out: 10:50AM  Total Billable Time: 25 minutes    SUBJECTIVE     Pt reports: Has been feeling pretty good overall. Has been doing exercises at home but not as often.  She was compliant with home exercise program.  Response to previous treatment: soreness  Functional change: too soon to tell    Pain: 3/10  Location: bilateral back      OBJECTIVE     Objective Measures updated at progress report unless specified.     Treatment     Lucinda received the treatments listed below:      therapeutic exercises to develop strength, endurance, ROM, flexibility, posture and core stabilization for 40 minutes including:  Recumbent bike 6' L2  HSS long sitting 5x30s each  Prone quad stretch 5x30s each  SKTC 10x10s each  Glute bridge 20x5s  Sidelying clamshells 15x5s each side  SLR no weight 2x10 each side  LAQ 5# 3x10 each      Patient Education and Home Exercises     Home Exercises Provided and Patient Education Provided     Education provided:   - HEP    Written Home Exercises Provided: Patient instructed to cont prior HEP. Exercises were reviewed and Lucinda was able to demonstrate them prior to the end of the session.  Lucinda demonstrated good  understanding of the education provided. See EMR under Patient Instructions for exercises provided during therapy sessions    ASSESSMENT     Pt  tolerated today's treatment well with no increased pain. Continuing to add resistance to exercises as tolerated.    Lucinda Is progressing well towards her goals.   Pt prognosis is Good.     Pt will continue to benefit from skilled outpatient physical therapy to address the deficits listed in the problem list box on initial evaluation, provide pt/family education and to maximize pt's level of independence in the home and community environment.     Pt's spiritual, cultural and educational needs considered and pt agreeable to plan of care and goals.     Anticipated barriers to physical therapy: None.    Goals:  Short Term Goals (4 Weeks):   1) Pt will demonstrate compliance with initial home exercise program as prescribed by physical therapist to improve independence with management of condition.  2) Pt to improve active range of motion in lumbar flexion by 10% to allow for improved functional mobility.  3) Pt to report low back pain of <6/10 at worst during walking to improve tolerance to ADLs.  4) Pt to tolerate sitting for >1 hour with <2/10 pain in low back to allow for improvement in IADLs.     Long Term Goals (8 Weeks):  1) Pt to achieve <45% limitation as measured by the FOTO to demonstrate decreased low back pain disability.  2) Pt to increase strength to at least 4+/5 of muscles tested to allow for improvement in functional activities such as performing ADLs.  3) Pt to improve active range of motion in lumbar flexion by 0% to allow for improved functional mobility.  4) Pt to report low back pain of <3/10 at worst during walking and stairs to improve tolerance to ADLs.    PLAN     Progress as tolerated.    Connor Purvis, PT

## 2022-07-07 ENCOUNTER — CLINICAL SUPPORT (OUTPATIENT)
Dept: REHABILITATION | Facility: HOSPITAL | Age: 72
End: 2022-07-07
Payer: MEDICARE

## 2022-07-07 DIAGNOSIS — R29.898 WEAKNESS OF BOTH HIPS: ICD-10-CM

## 2022-07-07 DIAGNOSIS — M53.86 DECREASED ROM OF LUMBAR SPINE: Primary | ICD-10-CM

## 2022-07-07 PROCEDURE — 97110 THERAPEUTIC EXERCISES: CPT | Mod: PO | Performed by: PHYSICAL THERAPIST

## 2022-07-07 NOTE — PROGRESS NOTES
[facility]   Physical Therapy Treatment Note     Name: Lucinda Aguilera  [facility] Number: [id number]    Therapy Diagnosis:   Encounter Diagnoses   Name Primary?    Decreased ROM of lumbar spine Yes    Weakness of both hips      Physician: Stormy Ellsworth PA-C    Visit Date: 7/7/2022  Physician Orders: PT Eval and Treat  Medical Diagnosis from Referral: Chronic bilateral low back pain  Evaluation Date: 5/23/2022  Authorization Period Expiration: 5/20/2023  Plan of Care Expiration: 8/23/2022  Progress Note Due: 6/23/2023  Visit # / Visits authorized: 5/20  FOTO: 1/3    PTA Visit #: 0/5     Time In: 10:00 AM  Time Out: 10:50AM  Total Billable Time: 25 minutes    SUBJECTIVE     Pt reports: Has been feeling pretty good overall. Has been doing exercises at home but not as often.  She was compliant with home exercise program.  Response to previous treatment: soreness  Functional change: too soon to tell    Pain: 3/10  Location: bilateral back      OBJECTIVE     Objective Measures updated at progress report unless specified.     Treatment     Lucinda received the treatments listed below:      therapeutic exercises to develop strength, endurance, ROM, flexibility, posture and core stabilization for 40 minutes including:  Recumbent bike 6' L2  HSS long sitting 5x30s each  Prone quad stretch 5x30s each  SKTC 10x10s each  Glute bridge 20x5s  Sidelying clamshells 15x5s each side  SLR no weight 2x10 each side  LAQ 5# 3x10 each      Patient Education and Home Exercises     Home Exercises Provided and Patient Education Provided     Education provided:   - HEP    Written Home Exercises Provided: Patient instructed to cont prior HEP. Exercises were reviewed and Lucinda was able to demonstrate them prior to the end of the session.  Lucinda demonstrated good  understanding of the education provided. See EMR under Patient Instructions for exercises provided during therapy sessions    ASSESSMENT     Pt tolerated  today's treatment well with no increased pain. Continuing to add resistance to exercises as tolerated.    Lucinda Is progressing well towards her goals.   Pt prognosis is Good.     Pt will continue to benefit from skilled outpatient physical therapy to address the deficits listed in the problem list box on initial evaluation, provide pt/family education and to maximize pt's level of independence in the home and community environment.     Pt's spiritual, cultural and educational needs considered and pt agreeable to plan of care and goals.     Anticipated barriers to physical therapy: None.    Goals:  Short Term Goals (4 Weeks):   1) Pt will demonstrate compliance with initial home exercise program as prescribed by physical therapist to improve independence with management of condition.  2) Pt to improve active range of motion in lumbar flexion by 10% to allow for improved functional mobility.  3) Pt to report low back pain of <6/10 at worst during walking to improve tolerance to ADLs.  4) Pt to tolerate sitting for >1 hour with <2/10 pain in low back to allow for improvement in IADLs.     Long Term Goals (8 Weeks):  1) Pt to achieve <45% limitation as measured by the FOTO to demonstrate decreased low back pain disability.  2) Pt to increase strength to at least 4+/5 of muscles tested to allow for improvement in functional activities such as performing ADLs.  3) Pt to improve active range of motion in lumbar flexion by 0% to allow for improved functional mobility.  4) Pt to report low back pain of <3/10 at worst during walking and stairs to improve tolerance to ADLs.    PLAN     Progress as tolerated.    Connor Purvis, PT

## 2022-07-11 ENCOUNTER — CLINICAL SUPPORT (OUTPATIENT)
Dept: REHABILITATION | Facility: HOSPITAL | Age: 72
End: 2022-07-11
Payer: MEDICARE

## 2022-07-11 DIAGNOSIS — R29.898 WEAKNESS OF BOTH HIPS: ICD-10-CM

## 2022-07-11 DIAGNOSIS — M53.86 DECREASED ROM OF LUMBAR SPINE: Primary | ICD-10-CM

## 2022-07-11 PROCEDURE — 97110 THERAPEUTIC EXERCISES: CPT | Mod: PO | Performed by: PHYSICAL THERAPIST

## 2022-07-11 NOTE — PROGRESS NOTES
[facility]   Physical Therapy Treatment Note     Name: Lucinda Aguilera  Clinic Number: [id number]    Therapy Diagnosis:   Encounter Diagnoses   Name Primary?    Decreased ROM of lumbar spine Yes    Weakness of both hips      Physician: Stormy Ellsworth PA-C    Visit Date: 7/11/2022  Physician Orders: PT Eval and Treat  Medical Diagnosis from Referral: Chronic bilateral low back pain  Evaluation Date: 5/23/2022  Authorization Period Expiration: 5/20/2023  Plan of Care Expiration: 8/23/2022  Progress Note Due: 6/23/2023  Visit # / Visits authorized: 5/20  FOTO: 1/3    PTA Visit #: 0/5     Time In: 9:00 AM  Time Out: 10:00AM  Total Billable Time: 45 minutes    SUBJECTIVE     Pt reports: Hip hurt a lot over the weekend after sitting in her car for a long period of time. She reports pain only on the sides of the hip and not really in her spine. Hurts with walking longer distances.  She was compliant with home exercise program.  Response to previous treatment: soreness  Functional change: too soon to tell    Pain: 3/10  Location: bilateral back      OBJECTIVE     Objective Measures updated at progress report unless specified.     Treatment     Lucinda received the treatments listed below:      therapeutic exercises to develop strength, endurance, ROM, flexibility, posture and core stabilization for 40 minutes including:  Recumbent bike 6' L2  HSS long sitting 5x30s each  Prone quad stretch 5x30s each  SKTC 10x10s each  Glute bridge 20x5s  Sidelying clamshells 15x5s each side  SLR no weight 2x10 each side  LAQ 5# 3x10 each      Patient Education and Home Exercises     Home Exercises Provided and Patient Education Provided     Education provided:   - HEP    Written Home Exercises Provided: Patient instructed to cont prior HEP. Exercises were reviewed and Lucinda was able to demonstrate them prior to the end of the session.  Lucinda demonstrated good  understanding of the education provided. See  EMR under Patient Instructions for exercises provided during therapy sessions    ASSESSMENT     Pt tolerated today's treatment well with no increased pain. Continuing to add resistance to exercises as tolerated. I believe that she does have some signs of a hip abductor tendinopathy and recommended that she follow up with a hip orthopedist if she is concerned about her continued hip pain.    Lucinda Is progressing well towards her goals.   Pt prognosis is Good.     Pt will continue to benefit from skilled outpatient physical therapy to address the deficits listed in the problem list box on initial evaluation, provide pt/family education and to maximize pt's level of independence in the home and community environment.     Pt's spiritual, cultural and educational needs considered and pt agreeable to plan of care and goals.     Anticipated barriers to physical therapy: None.    Goals:  Short Term Goals (4 Weeks):   1) Pt will demonstrate compliance with initial home exercise program as prescribed by physical therapist to improve independence with management of condition.  2) Pt to improve active range of motion in lumbar flexion by 10% to allow for improved functional mobility.  3) Pt to report low back pain of <6/10 at worst during walking to improve tolerance to ADLs.  4) Pt to tolerate sitting for >1 hour with <2/10 pain in low back to allow for improvement in IADLs.     Long Term Goals (8 Weeks):  1) Pt to achieve <45% limitation as measured by the FOTO to demonstrate decreased low back pain disability.  2) Pt to increase strength to at least 4+/5 of muscles tested to allow for improvement in functional activities such as performing ADLs.  3) Pt to improve active range of motion in lumbar flexion by 0% to allow for improved functional mobility.  4) Pt to report low back pain of <3/10 at worst during walking and stairs to improve tolerance to ADLs.    PLAN     Progress as tolerated.    Connor Purvis, PT

## 2022-07-12 ENCOUNTER — PATIENT MESSAGE (OUTPATIENT)
Dept: GASTROENTEROLOGY | Facility: CLINIC | Age: 72
End: 2022-07-12
Payer: MEDICARE

## 2022-07-12 ENCOUNTER — HOSPITAL ENCOUNTER (OUTPATIENT)
Dept: RADIOLOGY | Facility: HOSPITAL | Age: 72
Discharge: HOME | End: 2022-07-12
Attending: INTERNAL MEDICINE
Payer: MEDICARE

## 2022-07-12 DIAGNOSIS — R11.0 NAUSEA: ICD-10-CM

## 2022-07-12 PROCEDURE — A9541 TC99M SULFUR COLLOID: HCPCS | Mod: PO

## 2022-07-12 PROCEDURE — 78264 GASTRIC EMPTYING IMG STUDY: CPT | Mod: 26,,, | Performed by: RADIOLOGY

## 2022-07-12 PROCEDURE — 78264 NM GASTRIC EMPTYING: ICD-10-PCS | Mod: 26,,, | Performed by: RADIOLOGY

## 2022-07-14 ENCOUNTER — CLINICAL SUPPORT (OUTPATIENT)
Dept: REHABILITATION | Facility: HOSPITAL | Age: 72
End: 2022-07-14
Payer: MEDICARE

## 2022-07-14 DIAGNOSIS — R29.898 WEAKNESS OF BOTH HIPS: ICD-10-CM

## 2022-07-14 DIAGNOSIS — M53.86 DECREASED ROM OF LUMBAR SPINE: Primary | ICD-10-CM

## 2022-07-14 PROCEDURE — 97110 THERAPEUTIC EXERCISES: CPT | Mod: PO | Performed by: PHYSICAL THERAPIST

## 2022-07-14 NOTE — PROGRESS NOTES
[facility]   Physical Therapy Treatment Note     Name: Lucinda Aguilera  Clinic Number: [id number]    Therapy Diagnosis:   Encounter Diagnoses   Name Primary?    Decreased ROM of lumbar spine Yes    Weakness of both hips      Physician: Stormy Ellsworth PA-C    Visit Date: 7/14/2022  Physician Orders: PT Eval and Treat  Medical Diagnosis from Referral: Chronic bilateral low back pain  Evaluation Date: 5/23/2022  Authorization Period Expiration: 5/20/2023  Plan of Care Expiration: 8/23/2022  Progress Note Due: 6/23/2023  Visit # / Visits authorized: 9/20  FOTO: 1/3    PTA Visit #: 0/5     Time In: 9:00 AM  Time Out: 10:00AM  Total Billable Time: 45 minutes    SUBJECTIVE     Pt reports: Hip hurt a lot over the weekend after sitting in her car for a long period of time. She reports pain only on the sides of the hip and not really in her spine. Hurts with walking longer distances.  She was compliant with home exercise program.  Response to previous treatment: soreness  Functional change: too soon to tell    Pain: 3/10  Location: bilateral back      OBJECTIVE     Objective Measures updated at progress report unless specified.     Treatment     Lucinda received the treatments listed below:      therapeutic exercises to develop strength, endurance, ROM, flexibility, posture and core stabilization for 40 minutes including:  Recumbent bike 6' L2  HSS long sitting 5x30s each  Prone quad stretch 5x30s each  SKTC 10x10s each  Glute bridge 20x5s  Sidelying clamshells 15x5s each side  SLR no weight 2x10 each side  LAQ 5# 3x10 each      Patient Education and Home Exercises     Home Exercises Provided and Patient Education Provided     Education provided:   - HEP    Written Home Exercises Provided: Patient instructed to cont prior HEP. Exercises were reviewed and Lucinda was able to demonstrate them prior to the end of the session.  Lucinda demonstrated good  understanding of the education provided. See  EMR under Patient Instructions for exercises provided during therapy sessions    ASSESSMENT     Pt tolerated today's treatment well with no increased pain. Continuing to add resistance to exercises as tolerated. I believe that she does have some signs of a hip abductor tendinopathy and recommended that she follow up with a hip orthopedist if she is concerned about her continued hip pain.    Lucinda Is progressing well towards her goals.   Pt prognosis is Good.     Pt will continue to benefit from skilled outpatient physical therapy to address the deficits listed in the problem list box on initial evaluation, provide pt/family education and to maximize pt's level of independence in the home and community environment.     Pt's spiritual, cultural and educational needs considered and pt agreeable to plan of care and goals.     Anticipated barriers to physical therapy: None.    Goals:  Short Term Goals (4 Weeks):   1) Pt will demonstrate compliance with initial home exercise program as prescribed by physical therapist to improve independence with management of condition.  2) Pt to improve active range of motion in lumbar flexion by 10% to allow for improved functional mobility.  3) Pt to report low back pain of <6/10 at worst during walking to improve tolerance to ADLs.  4) Pt to tolerate sitting for >1 hour with <2/10 pain in low back to allow for improvement in IADLs.     Long Term Goals (8 Weeks):  1) Pt to achieve <45% limitation as measured by the FOTO to demonstrate decreased low back pain disability.  2) Pt to increase strength to at least 4+/5 of muscles tested to allow for improvement in functional activities such as performing ADLs.  3) Pt to improve active range of motion in lumbar flexion by 0% to allow for improved functional mobility.  4) Pt to report low back pain of <3/10 at worst during walking and stairs to improve tolerance to ADLs.    PLAN     Progress as tolerated.    Connor Purvis, PT

## 2022-07-17 ENCOUNTER — PATIENT MESSAGE (OUTPATIENT)
Dept: OPTOMETRY | Facility: CLINIC | Age: 72
End: 2022-07-17
Payer: MEDICARE

## 2022-07-18 ENCOUNTER — PATIENT MESSAGE (OUTPATIENT)
Dept: GASTROENTEROLOGY | Facility: CLINIC | Age: 72
End: 2022-07-18
Payer: MEDICARE

## 2022-07-19 ENCOUNTER — PATIENT MESSAGE (OUTPATIENT)
Dept: FAMILY MEDICINE | Facility: CLINIC | Age: 72
End: 2022-07-19
Payer: MEDICARE

## 2022-07-19 ENCOUNTER — PATIENT MESSAGE (OUTPATIENT)
Dept: GASTROENTEROLOGY | Facility: CLINIC | Age: 72
End: 2022-07-19
Payer: MEDICARE

## 2022-07-19 DIAGNOSIS — T78.1XXA GASTROINTESTINAL FOOD SENSITIVITY: Primary | ICD-10-CM

## 2022-07-20 ENCOUNTER — CLINICAL SUPPORT (OUTPATIENT)
Dept: REHABILITATION | Facility: HOSPITAL | Age: 72
End: 2022-07-20
Payer: MEDICARE

## 2022-07-20 ENCOUNTER — PATIENT MESSAGE (OUTPATIENT)
Dept: OPTOMETRY | Facility: CLINIC | Age: 72
End: 2022-07-20
Payer: MEDICARE

## 2022-07-20 ENCOUNTER — PATIENT MESSAGE (OUTPATIENT)
Dept: ADMINISTRATIVE | Facility: OTHER | Age: 72
End: 2022-07-20
Payer: MEDICARE

## 2022-07-20 DIAGNOSIS — R29.898 WEAKNESS OF BOTH HIPS: ICD-10-CM

## 2022-07-20 DIAGNOSIS — M53.86 DECREASED ROM OF LUMBAR SPINE: Primary | ICD-10-CM

## 2022-07-20 PROCEDURE — 97110 THERAPEUTIC EXERCISES: CPT | Mod: PO,CQ

## 2022-07-20 NOTE — TELEPHONE ENCOUNTER
Requested tests ordered, but inform patient she will need to go to [facility] to have these drawn. Our lab does not do this testing here.

## 2022-07-20 NOTE — PROGRESS NOTES
[facility]   Physical Therapy Treatment Note     Name: Lucinda Aguilera  [facility] Number: [id number]    Therapy Diagnosis:   Encounter Diagnoses   Name Primary?    Decreased ROM of lumbar spine Yes    Weakness of both hips      Physician: Stormy Ellsworth PA-C    Visit Date: 7/20/2022  Physician Orders: PT Eval and Treat  Medical Diagnosis from Referral: Chronic bilateral low back pain  Evaluation Date: 5/23/2022  Authorization Period Expiration: 5/20/2023  Plan of Care Expiration: 8/23/2022  Progress Note Due: 6/23/2023  Visit # / Visits authorized: 10/20  FOTO: 1/3    PTA Visit #: 0/5     Time In: 10:00 AM  Time Out: 10:45AM  Total Billable Time: 40 minutes    SUBJECTIVE     Pt reports:she has been feeling better. No LBP today.   She was compliant with home exercise program.  Response to previous treatment: soreness  Functional change: too soon to tell    Pain: 0/10  Location: bilateral back      OBJECTIVE     Objective Measures updated at progress report unless specified.     Treatment     Lucinda received the treatments listed below:      therapeutic exercises to develop strength, endurance, ROM, flexibility, posture and core stabilization for 39 minutes including:  Recumbent bike 6' L2  HSS long sitting 3x30s each  Prone quad stretch 5x30s each  SKTC 10x10s eachz  Glute bridge 20x5s  Sidelying clamshells R TB 2 x 10 5s each side  SLR no weight 2x10 each side  LAQ 5# 3x10 each      Patient Education and Home Exercises     Home Exercises Provided and Patient Education Provided     Education provided:   - HEP    Written Home Exercises Provided: Patient instructed to cont prior HEP. Exercises were reviewed and Lucinda was able to demonstrate them prior to the end of the session.  Lucinda demonstrated good  understanding of the education provided. See EMR under Patient Instructions for exercises provided during therapy sessions    ASSESSMENT     Pt tolerated today's treatment well with no  increased pain. Continuing to add resistance to exercises as tolerated. Pt has been complaint with her HEP.     Lucinda Is progressing well towards her goals.   Pt prognosis is Good.     Pt will continue to benefit from skilled outpatient physical therapy to address the deficits listed in the problem list box on initial evaluation, provide pt/family education and to maximize pt's level of independence in the home and community environment.     Pt's spiritual, cultural and educational needs considered and pt agreeable to plan of care and goals.     Anticipated barriers to physical therapy: None.    Goals:  Short Term Goals (4 Weeks):   1) Pt will demonstrate compliance with initial home exercise program as prescribed by physical therapist to improve independence with management of condition.  2) Pt to improve active range of motion in lumbar flexion by 10% to allow for improved functional mobility.  3) Pt to report low back pain of <6/10 at worst during walking to improve tolerance to ADLs.  4) Pt to tolerate sitting for >1 hour with <2/10 pain in low back to allow for improvement in IADLs.     Long Term Goals (8 Weeks):  1) Pt to achieve <45% limitation as measured by the FOTO to demonstrate decreased low back pain disability.  2) Pt to increase strength to at least 4+/5 of muscles tested to allow for improvement in functional activities such as performing ADLs.  3) Pt to improve active range of motion in lumbar flexion by 0% to allow for improved functional mobility.  4) Pt to report low back pain of <3/10 at worst during walking and stairs to improve tolerance to ADLs.    PLAN     Progress as tolerated.    Lois Torres, PTA

## 2022-07-21 ENCOUNTER — OFFICE VISIT (OUTPATIENT)
Dept: OPTOMETRY | Facility: CLINIC | Age: 72
End: 2022-07-21
Payer: MEDICARE

## 2022-07-21 ENCOUNTER — PATIENT MESSAGE (OUTPATIENT)
Dept: OTHER | Facility: OTHER | Age: 72
End: 2022-07-21
Payer: MEDICARE

## 2022-07-21 ENCOUNTER — TELEPHONE (OUTPATIENT)
Dept: GASTROENTEROLOGY | Facility: CLINIC | Age: 72
End: 2022-07-21
Payer: MEDICARE

## 2022-07-21 VITALS — HEART RATE: 79 BPM | DIASTOLIC BLOOD PRESSURE: 80 MMHG | SYSTOLIC BLOOD PRESSURE: 130 MMHG

## 2022-07-21 DIAGNOSIS — H11.31 SUBCONJUNCTIVAL HEMORRHAGE, NON-TRAUMATIC, RIGHT: Primary | ICD-10-CM

## 2022-07-21 PROCEDURE — 99999 PR PBB SHADOW E&M-EST. PATIENT-LVL V: CPT | Mod: PBBFAC,,, | Performed by: OPTOMETRIST

## 2022-07-21 PROCEDURE — 92014 PR EYE EXAM, EST PATIENT,COMPREHESV: ICD-10-PCS | Mod: S$PBB,,, | Performed by: OPTOMETRIST

## 2022-07-21 PROCEDURE — 92014 COMPRE OPH EXAM EST PT 1/>: CPT | Mod: S$PBB,,, | Performed by: OPTOMETRIST

## 2022-07-21 PROCEDURE — 99999 PR PBB SHADOW E&M-EST. PATIENT-LVL V: ICD-10-PCS | Mod: PBBFAC,,, | Performed by: OPTOMETRIST

## 2022-07-21 PROCEDURE — 99215 OFFICE O/P EST HI 40 MIN: CPT | Mod: PBBFAC,PO | Performed by: OPTOMETRIST

## 2022-07-21 NOTE — TELEPHONE ENCOUNTER
----- Message from Lou Diaz sent at 7/21/2022  9:17 AM CDT -----  Type:  Sooner Apoointment Request    Caller is requesting a sooner appointment.  Caller declined first available appointment listed below.  Caller will not accept being placed on the waitlist and is requesting a message be sent to doctor.  Name of Caller:pt  When is the first available appointment?08/23/22  Symptoms:stomach pains  Would the patient rather a call back or a response via [facility]? call  Best Call Back Number:[phone]  Additional Information:

## 2022-07-21 NOTE — TELEPHONE ENCOUNTER
Returned call to the patient, patient is requesting an appointment with a new GI provider in [facility], patient was offered an appointment with Dr. Coleman and accepted that offered appointemtn however requests to keep her scheduled appointmetn with Dr. John Gomez.

## 2022-07-21 NOTE — PROGRESS NOTES
HPI     Urgent care-red eye    Pt complains of OD red x 4 days. Pt states she rubbed her eye Sunday night   and woke up with red eye. States she was a little dizzy yesterday. Wanted   to get eyes checked for conjunctivitis. Taking blood thinners. Htn   controlled. Denies any eye pain. No flashes or floaters.     Last edited by Lisa Jade on 7/21/2022 10:07 AM. (History)        ROS     Positive for: Eyes    Negative for: Constitutional, Gastrointestinal, Neurological, Skin,   Genitourinary, Musculoskeletal, HENT, Endocrine, Cardiovascular,   Respiratory, Psychiatric, Allergic/Imm, Heme/Lymph    Last edited by ZACHARY Rodrigues, OD on 7/21/2022 10:12 AM. (History)        Assessment /Plan     For exam results, see Encounter Report.    Subconjunctival hemorrhage, non-traumatic, right      4 day h/o subconj heme OD  BP normal   IOP/ DFE normal   No VA changes    Continue all meds as directed  Avoid rubbing eye  ATs for comfort --gave suggestions    Probable will resolve after another week    Message if any issues

## 2022-07-21 NOTE — PATIENT INSTRUCTIONS
"DRY EYES -- BURNING OR PROSPER SYMPTOMS:  Use Over The Counter artificial tears as needed for dry eye symptoms.   Some common brands include:  Systane, Optive, Refresh, and Thera-Tears.  These drops can be used as frequently as desired, but may be most helpful use during long periods of concentrated work.  For example, reading / working at the computer. Start with 3-4x per day.     Nighttime Ophthalmic gel or ointments are available: Refresh PM, Genteal, and Lacrilube.    Avoid drops that "get redness out" (Visine, Murine, Clear Eyes), as these may contain medication that could further irritate the eyes, especially with chronic use.    ALLERGY EYES -- ITCHING SYMPTOMS:  Over the counter medications include--Pataday, Zaditor, and Alaway.  Use as directed 1-2 drops daily for symptoms of itching / watering eyes.  These drops will not help for dry eye or exposure symptoms.    REDNESS RELIEF:  Lumify---is a good redness reliever that will not cause irritation if used chronically.          "

## 2022-07-22 ENCOUNTER — PATIENT MESSAGE (OUTPATIENT)
Dept: FAMILY MEDICINE | Facility: CLINIC | Age: 72
End: 2022-07-22
Payer: MEDICARE

## 2022-07-23 ENCOUNTER — PATIENT MESSAGE (OUTPATIENT)
Dept: FAMILY MEDICINE | Facility: CLINIC | Age: 72
End: 2022-07-23
Payer: MEDICARE

## 2022-07-23 LAB
ALMOND IGE QN: <0.1 KU/L
CASHEW NUT IGE QN: <0.1 KU/L
CODFISH IGE QN: <0.1 KU/L
DEPRECATED ALMOND IGE RAST QL: 0
DEPRECATED CASHEW NUT IGE RAST QL: 0
DEPRECATED CODFISH IGE RAST QL: 0
DEPRECATED EGG WHITE IGE RAST QL: ABNORMAL
DEPRECATED HAZELNUT IGE RAST QL: 0
DEPRECATED MILK IGE RAST QL: 0
DEPRECATED PEANUT IGE RAST QL: 0
DEPRECATED SALMON IGE RAST QL: 0
DEPRECATED SCALLOP IGE RAST QL: 0
DEPRECATED SESAME SEED IGE RAST QL: 0
DEPRECATED SHRIMP IGE RAST QL: 0
DEPRECATED SOYBEAN IGE RAST QL: 0
DEPRECATED TUNA IGE RAST QL: 0
DEPRECATED WALNUT IGE RAST QL: 0
DEPRECATED WHEAT IGE RAST QL: 0
EGG WHITE IGE QN: 0.21 KU/L
GLUTEN IGG-MCNC: 3.1 MCG/ML
HAZELNUT IGE QN: <0.1 KU/L
MILK IGE QN: <0.1 KU/L
PEANUT IGE QN: <0.1 KU/L
REF LAB TEST REF RANGE: ABNORMAL
SALMON IGE QN: <0.1 KU/L
SCALLOP IGE QN: <0.1 KU/L
SESAME SEED IGE QN: <0.1 KU/L
SHRIMP IGE QN: <0.1 KU/L
SOYBEAN IGE QN: <0.1 KU/L
TUNA IGE QN: <0.1 KU/L
WALNUT IGE QN: <0.1 KU/L
WHEAT IGE QN: <0.1 KU/L

## 2022-07-24 ENCOUNTER — PATIENT MESSAGE (OUTPATIENT)
Dept: PAIN MEDICINE | Facility: CLINIC | Age: 72
End: 2022-07-24
Payer: MEDICARE

## 2022-07-24 ENCOUNTER — PATIENT MESSAGE (OUTPATIENT)
Dept: FAMILY MEDICINE | Facility: CLINIC | Age: 72
End: 2022-07-24
Payer: MEDICARE

## 2022-07-24 DIAGNOSIS — T78.1XXA GASTROINTESTINAL FOOD SENSITIVITY: Primary | ICD-10-CM

## 2022-07-24 NOTE — TELEPHONE ENCOUNTER
Patient is requesting a allergy referral - labs show Gluten     Please advise Dr Hassan is out of clinic  referral pended LOV 3/22/2022 with you

## 2022-07-25 ENCOUNTER — CLINICAL SUPPORT (OUTPATIENT)
Dept: REHABILITATION | Facility: HOSPITAL | Age: 72
End: 2022-07-25
Payer: MEDICARE

## 2022-07-25 DIAGNOSIS — R29.898 WEAKNESS OF BOTH HIPS: ICD-10-CM

## 2022-07-25 DIAGNOSIS — M53.86 DECREASED ROM OF LUMBAR SPINE: Primary | ICD-10-CM

## 2022-07-25 PROCEDURE — 97110 THERAPEUTIC EXERCISES: CPT | Mod: PO | Performed by: PHYSICAL THERAPIST

## 2022-07-25 NOTE — PROGRESS NOTES
[facility]   Physical Therapy Treatment Note     Name: Lucinda Aguilera  [facility] Number: [id number]    Therapy Diagnosis:   Encounter Diagnoses   Name Primary?    Decreased ROM of lumbar spine Yes    Weakness of both hips      Physician: Stormy Ellsworth PA-C    Visit Date: 7/25/2022  Physician Orders: PT Eval and Treat  Medical Diagnosis from Referral: Chronic bilateral low back pain  Evaluation Date: 5/23/2022  Authorization Period Expiration: 5/20/2023  Plan of Care Expiration: 8/23/2022  Progress Note Due: 6/23/2023  Visit # / Visits authorized: 9/20  FOTO: 1/3    PTA Visit #: 0/5     Time In: 10:00 AM  Time Out: 10:50AM  Total Billable Time: 25 minutes    SUBJECTIVE     Pt reports: Pain has been a lot better lately. She is swimming almost every day and she does her hip exercises almost every day as well.  She was compliant with home exercise program.  Response to previous treatment: soreness  Functional change: too soon to tell    Pain: 3/10  Location: bilateral back      OBJECTIVE     Objective Measures updated at progress report unless specified.     Treatment     Lucinda received the treatments listed below:      therapeutic exercises to develop strength, endurance, ROM, flexibility, posture and core stabilization for 40 minutes including:  Recumbent bike 6' L2  HSS long sitting 5x30s each  Prone quad stretch 5x30s each  SKTC 10x10s each  Glute bridge 20x5s  Sidelying clamshells 15x5s each side  SLR no weight 2x10 each side  LAQ 5# 3x10 each    Final FOTO score of 54%.    Patient Education and Home Exercises     Home Exercises Provided and Patient Education Provided     Education provided:   - HEP    Written Home Exercises Provided: Patient instructed to cont prior HEP. Exercises were reviewed and Lucinda was able to demonstrate them prior to the end of the session.  Lucinda demonstrated good  understanding of the education provided. See EMR under Patient Instructions for exercises  provided during therapy sessions    ASSESSMENT     Pt tolerated today's treatment well with no increased pain. Continuing to add resistance to exercises as tolerated. At this time we are going to discharge to [facility] with instructions to contact us if her pain returns.    Lucinda Is progressing well towards her goals.   Pt prognosis is Good.     Pt will continue to benefit from skilled outpatient physical therapy to address the deficits listed in the problem list box on initial evaluation, provide pt/family education and to maximize pt's level of independence in the home and community environment.     Pt's spiritual, cultural and educational needs considered and pt agreeable to plan of care and goals.     Anticipated barriers to physical therapy: None.    Goals:  Short Term Goals (4 Weeks):   1) Pt will demonstrate compliance with initial home exercise program as prescribed by physical therapist to improve independence with management of condition.  2) Pt to improve active range of motion in lumbar flexion by 10% to allow for improved functional mobility.  3) Pt to report low back pain of <6/10 at worst during walking to improve tolerance to ADLs.  4) Pt to tolerate sitting for >1 hour with <2/10 pain in low back to allow for improvement in IADLs.     Long Term Goals (8 Weeks):  1) Pt to achieve <45% limitation as measured by the FOTO to demonstrate decreased low back pain disability.  2) Pt to increase strength to at least 4+/5 of muscles tested to allow for improvement in functional activities such as performing ADLs.  3) Pt to improve active range of motion in lumbar flexion by 0% to allow for improved functional mobility.  4) Pt to report low back pain of <3/10 at worst during walking and stairs to improve tolerance to ADLs.    PLAN     Progress as tolerated.    Connor Purvis, PT

## 2022-07-25 NOTE — TELEPHONE ENCOUNTER
Why does she need to see an allergist?    If she has a question for PCP she can wait until his return. Not urgent.

## 2022-07-28 ENCOUNTER — PATIENT MESSAGE (OUTPATIENT)
Dept: OBSTETRICS AND GYNECOLOGY | Facility: CLINIC | Age: 72
End: 2022-07-28
Payer: MEDICARE

## 2022-07-31 ENCOUNTER — EXTERNAL CHRONIC CARE MANAGEMENT (OUTPATIENT)
Dept: PRIMARY CARE CLINIC | Facility: CLINIC | Age: 72
End: 2022-07-31
Payer: MEDICARE

## 2022-07-31 PROCEDURE — 99490 PR CHRONIC CARE MGMT, 1ST 20 MIN: ICD-10-PCS | Mod: S$PBB,,, | Performed by: FAMILY MEDICINE

## 2022-07-31 PROCEDURE — 99490 CHRNC CARE MGMT STAFF 1ST 20: CPT | Mod: S$PBB,,, | Performed by: FAMILY MEDICINE

## 2022-07-31 PROCEDURE — 99490 CHRNC CARE MGMT STAFF 1ST 20: CPT | Mod: PBBFAC,PO | Performed by: FAMILY MEDICINE

## 2022-08-06 ENCOUNTER — PATIENT MESSAGE (OUTPATIENT)
Dept: FAMILY MEDICINE | Facility: CLINIC | Age: 72
End: 2022-08-06
Payer: MEDICARE

## 2022-08-08 ENCOUNTER — PATIENT MESSAGE (OUTPATIENT)
Dept: FAMILY MEDICINE | Facility: CLINIC | Age: 72
End: 2022-08-08
Payer: MEDICARE

## 2022-08-23 ENCOUNTER — OFFICE VISIT (OUTPATIENT)
Dept: GASTROENTEROLOGY | Facility: CLINIC | Age: 72
End: 2022-08-23
Payer: MEDICARE

## 2022-08-23 ENCOUNTER — OFFICE VISIT (OUTPATIENT)
Dept: ORTHOPEDICS | Facility: CLINIC | Age: 72
End: 2022-08-23
Payer: MEDICARE

## 2022-08-23 ENCOUNTER — OFFICE VISIT (OUTPATIENT)
Dept: OTOLARYNGOLOGY | Facility: CLINIC | Age: 72
End: 2022-08-23
Payer: MEDICARE

## 2022-08-23 ENCOUNTER — PATIENT MESSAGE (OUTPATIENT)
Dept: FAMILY MEDICINE | Facility: CLINIC | Age: 72
End: 2022-08-23
Payer: MEDICARE

## 2022-08-23 VITALS — HEIGHT: 62 IN | WEIGHT: 174.81 LBS | BODY MASS INDEX: 32.17 KG/M2

## 2022-08-23 VITALS — HEIGHT: 60 IN | BODY MASS INDEX: 34.16 KG/M2 | WEIGHT: 174 LBS

## 2022-08-23 VITALS — BODY MASS INDEX: 34.2 KG/M2 | TEMPERATURE: 99 F | WEIGHT: 174.19 LBS | HEIGHT: 60 IN

## 2022-08-23 DIAGNOSIS — K21.9 GASTROESOPHAGEAL REFLUX DISEASE, UNSPECIFIED WHETHER ESOPHAGITIS PRESENT: Primary | ICD-10-CM

## 2022-08-23 DIAGNOSIS — R09.89 CHRONIC THROAT CLEARING: ICD-10-CM

## 2022-08-23 DIAGNOSIS — R11.2 NON-INTRACTABLE VOMITING WITH NAUSEA, UNSPECIFIED VOMITING TYPE: ICD-10-CM

## 2022-08-23 DIAGNOSIS — M70.61 GREATER TROCHANTERIC BURSITIS OF RIGHT HIP: Primary | ICD-10-CM

## 2022-08-23 DIAGNOSIS — Z12.31 ENCOUNTER FOR SCREENING MAMMOGRAM FOR BREAST CANCER: Primary | ICD-10-CM

## 2022-08-23 DIAGNOSIS — H61.23 BILATERAL IMPACTED CERUMEN: Primary | ICD-10-CM

## 2022-08-23 DIAGNOSIS — M76.891 HIP FLEXOR TENDINITIS, RIGHT: ICD-10-CM

## 2022-08-23 DIAGNOSIS — R49.0 DYSPHONIA: ICD-10-CM

## 2022-08-23 DIAGNOSIS — R05.3 PERSISTENT DRY COUGH: ICD-10-CM

## 2022-08-23 DIAGNOSIS — R10.13 EPIGASTRIC DISCOMFORT: ICD-10-CM

## 2022-08-23 DIAGNOSIS — R42 LIGHTHEADEDNESS: ICD-10-CM

## 2022-08-23 DIAGNOSIS — R05.9 COUGH: ICD-10-CM

## 2022-08-23 PROCEDURE — 20610 LARGE JOINT ASPIRATION/INJECTION: R GREATER TROCHANTERIC BURSA: ICD-10-PCS | Mod: S$PBB,RT,, | Performed by: ORTHOPAEDIC SURGERY

## 2022-08-23 PROCEDURE — 69210 REMOVE IMPACTED EAR WAX UNI: CPT | Mod: S$PBB,,, | Performed by: NURSE PRACTITIONER

## 2022-08-23 PROCEDURE — 99999 PR PBB SHADOW E&M-EST. PATIENT-LVL IV: CPT | Mod: PBBFAC,,, | Performed by: INTERNAL MEDICINE

## 2022-08-23 PROCEDURE — 99214 PR OFFICE/OUTPT VISIT, EST, LEVL IV, 30-39 MIN: ICD-10-PCS | Mod: 25,S$PBB,, | Performed by: NURSE PRACTITIONER

## 2022-08-23 PROCEDURE — 20610 DRAIN/INJ JOINT/BURSA W/O US: CPT | Mod: PBBFAC,PN | Performed by: ORTHOPAEDIC SURGERY

## 2022-08-23 PROCEDURE — 99204 OFFICE O/P NEW MOD 45 MIN: CPT | Mod: S$PBB,25,, | Performed by: ORTHOPAEDIC SURGERY

## 2022-08-23 PROCEDURE — 99213 OFFICE O/P EST LOW 20 MIN: CPT | Mod: PBBFAC,PO,25 | Performed by: NURSE PRACTITIONER

## 2022-08-23 PROCEDURE — 99999 PR PBB SHADOW E&M-EST. PATIENT-LVL III: ICD-10-PCS | Mod: PBBFAC,,, | Performed by: ORTHOPAEDIC SURGERY

## 2022-08-23 PROCEDURE — 69210 PR REMOVAL IMPACTED CERUMEN REQUIRING INSTRUMENTATION, UNILATERAL: ICD-10-PCS | Mod: S$PBB,,, | Performed by: NURSE PRACTITIONER

## 2022-08-23 PROCEDURE — 99999 PR PBB SHADOW E&M-EST. PATIENT-LVL IV: ICD-10-PCS | Mod: PBBFAC,,, | Performed by: INTERNAL MEDICINE

## 2022-08-23 PROCEDURE — 99214 OFFICE O/P EST MOD 30 MIN: CPT | Mod: PBBFAC,25,27,PO | Performed by: INTERNAL MEDICINE

## 2022-08-23 PROCEDURE — 69210 REMOVE IMPACTED EAR WAX UNI: CPT | Mod: 50,PBBFAC,PO | Performed by: NURSE PRACTITIONER

## 2022-08-23 PROCEDURE — 99204 PR OFFICE/OUTPT VISIT, NEW, LEVL IV, 45-59 MIN: ICD-10-PCS | Mod: S$PBB,25,, | Performed by: ORTHOPAEDIC SURGERY

## 2022-08-23 PROCEDURE — 99999 PR PBB SHADOW E&M-EST. PATIENT-LVL III: ICD-10-PCS | Mod: PBBFAC,,, | Performed by: NURSE PRACTITIONER

## 2022-08-23 PROCEDURE — 99213 OFFICE O/P EST LOW 20 MIN: CPT | Mod: S$PBB,,, | Performed by: INTERNAL MEDICINE

## 2022-08-23 PROCEDURE — 99214 OFFICE O/P EST MOD 30 MIN: CPT | Mod: 25,S$PBB,, | Performed by: NURSE PRACTITIONER

## 2022-08-23 PROCEDURE — 99999 PR PBB SHADOW E&M-EST. PATIENT-LVL III: CPT | Mod: PBBFAC,,, | Performed by: ORTHOPAEDIC SURGERY

## 2022-08-23 PROCEDURE — 99999 PR PBB SHADOW E&M-EST. PATIENT-LVL III: CPT | Mod: PBBFAC,,, | Performed by: NURSE PRACTITIONER

## 2022-08-23 PROCEDURE — 99213 PR OFFICE/OUTPT VISIT, EST, LEVL III, 20-29 MIN: ICD-10-PCS | Mod: S$PBB,,, | Performed by: INTERNAL MEDICINE

## 2022-08-23 PROCEDURE — 99213 OFFICE O/P EST LOW 20 MIN: CPT | Mod: PBBFAC,25,27,PN | Performed by: ORTHOPAEDIC SURGERY

## 2022-08-23 RX ORDER — TRIAMCINOLONE ACETONIDE 40 MG/ML
40 INJECTION, SUSPENSION INTRA-ARTICULAR; INTRAMUSCULAR
Status: DISCONTINUED | OUTPATIENT
Start: 2022-08-23 | End: 2022-08-23 | Stop reason: HOSPADM

## 2022-08-23 RX ORDER — FAMOTIDINE 40 MG/1
40 TABLET, FILM COATED ORAL NIGHTLY
Qty: 90 TABLET | Refills: 3 | Status: SHIPPED | OUTPATIENT
Start: 2022-08-23 | End: 2023-06-23 | Stop reason: SDUPTHER

## 2022-08-23 RX ORDER — BENZONATATE 200 MG/1
200 CAPSULE ORAL NIGHTLY
Qty: 30 CAPSULE | Refills: 5 | Status: SHIPPED | OUTPATIENT
Start: 2022-08-23 | End: 2023-02-23 | Stop reason: ALTCHOICE

## 2022-08-23 RX ADMIN — TRIAMCINOLONE ACETONIDE 40 MG: 40 INJECTION, SUSPENSION INTRA-ARTICULAR; INTRAMUSCULAR at 01:08

## 2022-08-23 NOTE — PROCEDURES
Tendon Sheath    Date/Time: 8/23/2022 1:30 PM  Performed by: Nick Arnold MD  Authorized by: Nick Arnold MD     Consent Done?:  Yes (Verbal)  Indications:  Pain  Site marked: the procedure site was marked    Timeout: prior to procedure the correct patient, procedure, and site was verified    Prep: patient was prepped and draped in usual sterile fashion      Needle size:  25 G  Medications:  40 mg triamcinolone acetonide 40 mg/mL  Patient tolerance:  Patient tolerated the procedure well with no immediate complications  Large Joint Aspiration/Injection: R greater trochanteric bursa    Date/Time: 8/23/2022 1:30 PM  Performed by: Nick Arnold MD  Authorized by: Nick Arnold MD     Consent Done?:  Yes (Verbal)  Indications:  Pain  Timeout: prior to procedure the correct patient, procedure, and site was verified    Prep: patient was prepped and draped in usual sterile fashion      Local anesthesia used?: Yes    Local anesthetic:  Lidocaine 1% without epinephrine  Anesthetic total (ml):  5      Details:  Needle Size:  22 G  Approach:  Lateral  Location:  Hip  Site:  R greater trochanteric bursa  Medications:  40 mg triamcinolone acetonide 40 mg/mL  Patient tolerance:  Patient tolerated the procedure well with no immediate complications

## 2022-08-23 NOTE — PROGRESS NOTES
Subjective:       Patient ID: Lucinda Aguilera is a 71 y.o. female.    Chief Complaint: No chief complaint on file.    Ear Fullness   Associated symptoms include coughing.   Patient returns today for stopped up ears. Denies otalgia or otorrhea. Patient also endorses dry cough, day and night. Sips of water help relieve coughing. Never smoked. No h/o asthma. Additionally c/o lightheadedness/dizziness X one month, both at rest and with motion. Flatly denies any true vertigo: no spinning or swirling. No nausea.      Review of Systems   Constitutional: Negative.    HENT: Positive for voice change.         Chronic throat clearing   Eyes: Negative.    Respiratory: Positive for cough.    Cardiovascular: Negative.    Gastrointestinal: Negative.         Dyspepsia   Endocrine: Negative.    Genitourinary: Negative.    Musculoskeletal: Negative.    Skin: Negative.    Allergic/Immunologic: Negative.    Neurological: Positive for light-headedness.   Hematological: Negative.    Psychiatric/Behavioral: Negative.        Objective:      Physical Exam  Vitals and nursing note reviewed.   Constitutional:       General: She is not in acute distress.     Appearance: She is well-developed. She is not ill-appearing or diaphoretic.   HENT:      Head: Normocephalic and atraumatic.      Right Ear: Hearing, tympanic membrane, ear canal and external ear normal. No middle ear effusion. There is impacted cerumen. Tympanic membrane is not erythematous. Tympanic membrane has normal mobility.      Left Ear: Ear canal and external ear normal.  No middle ear effusion. There is impacted cerumen. Tympanic membrane is not erythematous. Tympanic membrane has normal mobility.      Nose: Nose normal. No mucosal edema, congestion or rhinorrhea.      Mouth/Throat:      Mouth: Mucous membranes are not pale, not dry and not cyanotic. No oral lesions.      Dentition: Normal dentition.      Tongue: No lesions.      Palate: No lesions.      Pharynx: No pharyngeal  swelling, oropharyngeal exudate, posterior oropharyngeal erythema or uvula swelling.   Eyes:      General: Lids are normal. No scleral icterus.        Right eye: No discharge.         Left eye: No discharge.   Neck:      Trachea: Trachea normal. No tracheal deviation.   Cardiovascular:      Rate and Rhythm: Normal rate.   Pulmonary:      Effort: Pulmonary effort is normal. No respiratory distress.      Breath sounds: No stridor. No wheezing.   Musculoskeletal:         General: Normal range of motion.      Cervical back: Normal range of motion.   Lymphadenopathy:      Cervical: No cervical adenopathy.   Skin:     General: Skin is warm and dry.      Coloration: Skin is not pale.      Findings: No lesion or rash.   Neurological:      Mental Status: She is alert and oriented to person, place, and time.      Coordination: Coordination normal.      Gait: Gait normal.   Psychiatric:         Speech: Speech normal.         Behavior: Behavior normal. Behavior is cooperative.         Thought Content: Thought content normal.         Judgment: Judgment normal.      SEPARATE PROCEDURE IN OFFICE:   Procedure: Removal of impacted cerumen, bilateral   Pre Procedure Diagnosis: Cerumen Impaction   Post Procedure Diagnosis: Cerumen Impaction   Verbal informed consent in regards to risk of trauma to ear canal, ear drum or hearing, discomfort during procedure and/or inability to remove cerumen impaction in one session or unforeseen events or complications.   No anesthesia.     Procedure in detail:   Ear canal visualized bilateral with appropriate size ear speculum utilizing Operating Head Binocular Otomicroscope   Utilizing the following:  Ring curet, delicate alligator forceps, and/or suction cannula was used. The impacted cerumen of the ear canals was removed atraumatically. The TM and EAC were then inspected and found to be clear of wax. See description of TMs/EACs in PE above.   Complications: No   Condition: Improved/Good      Assessment:     Probable LPRD manifested as dry cough, chronic throat clearing, and dysphonia    Bilateral cerumen impactions removed  Dysequilibrium  Plan:       Discussed typical constellation of symptoms seen with some of the more common differentials for chronic cough may include but not limited to: allergic rhinitis (see allergist or take daily allergy meds), silent reflux (see GI or take daily reflux meds), sinusitis (imaging), pulmonary issues (see pulmonologist), ACEi.   Patient states she has an appt later today with her GI to discuss her dyspepsia.   Debrox one week prior to ENT cleaning appt   Lengthy discussion regarding dysequilibrium versus true vertigo. Discussed VNG for comprehensive vestibular system diagnostic testing indicated for those patients experiencing true vertigo but likely little to no yield if not vertiginous.   Discussed dysequilibrium typically multifactorial: anemia, dehydration, hypo-/hyper-glycemia, hypo-/hyper-tension, thyroid, arrhthymia, side effects of medication, gait/mobility issues, age, headaches and other neurological issues, etc. Discuss with PCP to determine stages of workup.          Answers for HPI/ROS submitted by the patient on 3/21/2021  Acid Reflux?: Yes        Answers for HPI/ROS submitted by the patient on 8/20/2022  Acid Reflux?: Yes

## 2022-08-23 NOTE — PROGRESS NOTES
Ms. Aguilera returns for follow-up of her reflux.  At present, she is doing fairly well.  She has cut down on portions at mealtime.  And is definitely trying to eat early.  She reports that she thinks she has a borderline egg allergy, so she is also trying to eat gluten free.  She has also cut down on dairy products, and is now drinking almond milk instead.  Her BMs can be variable.  Sometimes they are hard, sometimes they are loose.  And she has stopped taking the Colestid.  As for her dieting, her weight appears to have plateaued.  See the weight table below.  She is also complaining of an annoying cough as well.  We will give a trial of Tessalon Perles.        See recent EGD 5/10/2022   Impression:     - Normal oropharynx.                          - Normal esophagus.                          - Z-line regular, 36-37 cm from the incisors.                          - Non-erosive esophageal reflux (NERD) disease(?).                          - Normal cardia.                          - Mild/moderate antritis. Biopsied.                          - Normal stomach otherwise.                          - Normal pylorus.                          - Normal examined duodenum.                          - Normal major papilla.   Recommendation:        - Discharge patient to home.                          - Await pathology results.                          - Follow an antireflux regimen.                          - Exercise and weight loss.                          - Continue present medications.                          - Use Protonix (pantoprazole) 40 mg PO daily.                          - Use Pepcid (famotidine) 40 mg PO nightly.                          - Call the G.I. clinic in 2 weeks for reports (if you haven't heard from us sooner) [phone].                          - Return to GI clinic in 2-3 months.   Nathan Lopez MD   5/10/2022     PREPYLORIC GASTRITIS, BIOPSY:   Antral-type mucosa with chronic inactive gastritis.    No Helicobacter-type organisms identified on the Helicobacter stain.       See julissa empt study 7/12/2022:  FINDINGS:  The T1/2 of gastric emptying is abnormal at 156 minutes, compared to 174 minutes on the prior study.  There is again an initial prolonged lag phase with little to no gastric emptying, followed by relatively rapid emptying beginning at 02:00 hours.   At 53 minutes, 4% gastric emptying had occurred.   At 115 minutes, 7% gastric emptying had occurred.   At 174 minutes, 68% gastric emptying had occurred.   At 230 minutes, 91% gastric emptying had occurred.   At 4 hours the percentage of retention is 9 % (normal retention at 4 hours is 10% and lower).   Impression:   Findings similar to the previous examination with an initial prolonged lag in gastric emptying followed by normal gastric emptying.      Electronically signed by: Zoë De La Fuente  Date:                                            07/12/2022      Wt Readings from Last 23 Encounters:   11/08/22 76.2 kg (168 lb)   09/16/22 76.6 kg (168 lb 14 oz)   08/23/22 79.3 kg (174 lb 13.2 oz)   08/23/22 78.9 kg (174 lb)   08/23/22 79 kg (174 lb 2.6 oz)   05/20/22 79.2 kg (174 lb 9.7 oz)   05/10/22 77.1 kg (170 lb)   04/11/22 80 kg (176 lb 5.9 oz)   03/29/22 79.7 kg (175 lb 11.3 oz)   03/22/22 79 kg (174 lb 2.6 oz)   03/11/22 83.3 kg (183 lb 10.3 oz)   12/30/21 82.2 kg (181 lb 3.5 oz)   11/17/21 83.9 kg (185 lb)   11/01/21 84.1 kg (185 lb 6.5 oz)   06/08/21 76.4 kg (168 lb 6.9 oz)   05/13/21 74.4 kg (164 lb 0.4 oz)   03/22/21 73.3 kg (161 lb 9.6 oz)   03/04/21 73.9 kg (162 lb 14.7 oz)   03/04/21 73.9 kg (162 lb 14.7 oz)   02/15/21 76.2 kg (168 lb)   01/27/21 76.8 kg (169 lb 5 oz)   01/15/21 77.2 kg (170 lb 3.1 oz)   01/14/21 78.5 kg (173 lb)               Subjective:       Patient ID: Lucinda Aguilera is a 71 y.o. female.    Chief Complaint: Gastroesophageal Reflux    HPI  Review of Systems   Constitutional:  Negative for appetite change, fever and  unexpected weight change.   HENT: Negative.  Negative for mouth sores, sore throat and trouble swallowing.    Eyes: Negative.    Respiratory:  Positive for cough. Negative for choking.    Cardiovascular: Negative.  Negative for chest pain and leg swelling.   Gastrointestinal:  Positive for constipation, diarrhea and reflux. Negative for abdominal distention, abdominal pain, anal bleeding, blood in stool, nausea and vomiting.   Genitourinary: Negative.    Musculoskeletal: Negative.    Integumentary:  Negative for color change and rash. Negative.   Neurological: Negative.    Hematological:  Negative for adenopathy.   Psychiatric/Behavioral: Negative.         Objective:      Physical Exam  Vitals and nursing note reviewed.   Constitutional:       General: She is not in acute distress.     Appearance: Normal appearance. She is well-developed. She is obese.   HENT:      Mouth/Throat:      Mouth: Mucous membranes are moist.      Pharynx: No oropharyngeal exudate.   Eyes:      General: No scleral icterus.  Neck:      Thyroid: No thyromegaly.      Trachea: No tracheal deviation.   Cardiovascular:      Rate and Rhythm: Normal rate and regular rhythm.      Heart sounds: Normal heart sounds.   Pulmonary:      Effort: Pulmonary effort is normal.      Breath sounds: Normal breath sounds.   Abdominal:      General: Bowel sounds are normal. There is no distension.      Palpations: Abdomen is soft. There is no mass.      Tenderness: There is no abdominal tenderness. There is no guarding.   Lymphadenopathy:      Cervical: No cervical adenopathy.   Skin:     General: Skin is warm and dry.      Findings: No rash.   Neurological:      General: No focal deficit present.      Mental Status: She is alert and oriented to person, place, and time.   Psychiatric:         Mood and Affect: Mood normal.         Behavior: Behavior normal.       Assessment:       Gastroesophageal reflux disease, unspecified whether esophagitis  present    Epigastric discomfort  -     famotidine (PEPCID) 40 MG tablet; Take 1 tablet (40 mg total) by mouth nightly.  Dispense: 90 tablet; Refill: 3    Non-intractable vomiting with nausea, unspecified vomiting type  -     famotidine (PEPCID) 40 MG tablet; Take 1 tablet (40 mg total) by mouth nightly.  Dispense: 90 tablet; Refill: 3    Cough  -     benzonatate (TESSALON) 200 MG capsule; Take 1 capsule (200 mg total) by mouth every evening.  Dispense: 30 capsule; Refill: 5          Plan:       1. Trial of Tessalon Perles.  2. Diet and weight loss.  3. Continue other meds the same.

## 2022-08-23 NOTE — PROGRESS NOTES
"  Chief Complaint   Patient presents with    Right Hip - Pain, Tingling      HPI:   This is a 71 y.o. who presents to clinic today for right hip pain for the past 2 weeks after no known trauma. Complains of pain while sleeping on side and when descending stairs. Pain is dull. No numbness or tingling. No associated signs or symptoms.      Past Medical History:   Diagnosis Date    Anticoagulant long-term use     Anxiety     takes daily Xanax    Arthritis     right thumb    Cataract     OU    Cholelithiasis     GERD (gastroesophageal reflux disease)     Hepatic steatosis     Hyperlipemia     Hypertension     PVD (posterior vitreous detachment)     OD     Past Surgical History:   Procedure Laterality Date    BREAST BIOPSY Left 08/28/2020    stereo biopsy    BREAST BIOPSY Left 10/07/2020    benign excisional biopsy    chest abcess      child    COLONOSCOPY  01/06/2012    Dr. Lopez: hemorrhoids, redundant colon    COLONOSCOPY N/A 2/17/2021    Dr. Lopez: Congested and erythematous mucosa in the rectum, in the recto-sigmoid colon and in the sigmoid colon, proctosigmoid colitis, Redundant colon; biopsy: focal active colitis "nonspecific but can be seen with acute self-limited colitis (infection), medication effect (e.g. NSAID use), proximity to diverticula, or early/evolving IBD    ESOPHAGOGASTRODUODENOSCOPY N/A 6/6/2019    Dr. Lopez: small hiatal hernia, Non-erosive esophageal reflux (NERD) disease?., slight antritis; biopsy: Antral mucosa with chemical/reactive gastropathy. negative for h pylori    ESOPHAGOGASTRODUODENOSCOPY N/A 2/17/2021    Procedure: EGD (ESOPHAGOGASTRODUODENOSCOPY);  Surgeon: Nathan Lopez Jr., MD;  Location: [facility];  Service: Endoscopy;  Laterality: N/A; Minimal gastritis; biopsy: stomach- negative for h pylori, active chronic gastritis with focal features of erosive gastritis, duodenum WNL    ESOPHAGOGASTRODUODENOSCOPY N/A 5/10/2022    Procedure: EGD " (ESOPHAGOGASTRODUODENOSCOPY);  Surgeon: Nathan Lopez Jr., MD;  Location: [facility] ENDO;  Service: Endoscopy;  Laterality: N/A;    EXCISIONAL BIOPSY Left 10/7/2020    Procedure: EXCISIONAL BIOPSY-Left with radiological marker;  Surgeon: Brooklynn Ashford MD;  Location: [facility];  Service: General;  Laterality: Left;    JOINT REPLACEMENT Bilateral     knee    KNEE ARTHROSCOPY W/ LASER      right    LAPAROSCOPIC CHOLECYSTECTOMY N/A 6/14/2019    Procedure: CHOLECYSTECTOMY, LAPAROSCOPIC;  Surgeon: Guevara Evans MD;  Location: [facility] OR;  Service: General;  Laterality: N/A;    thumb surgery  11/2014    TOTAL KNEE ARTHROPLASTY Left 6/19/2018    Procedure: REPLACEMENT-KNEE-TOTAL;  Surgeon: Nj Melvin MD;  Location: [address];  Service: Orthopedics;  Laterality: Left;  Ruy    UPPER GASTROINTESTINAL ENDOSCOPY  07/13/2017    Dr. Lopez: NERD, Gastric mucosal atrophy. antritis, Scar in the incisura. Biopsied; biopsy: chronic gastritis. negative for h pylori     Current Outpatient Medications on File Prior to Visit   Medication Sig Dispense Refill    acetaminophen (TYLENOL) 500 MG tablet Take 500 mg by mouth every 6 (six) hours as needed for Pain.      artificial tears ointment (ARTIFICIAL TEARS) Oint every evening.      ascorbic acid, vitamin C, (VITAMIN C) 250 MG tablet Take 500 mg by mouth once daily.       aspirin (ECOTRIN) 81 MG EC tablet Take 81 mg by mouth once daily.      balsalazide (COLAZAL) 750 mg capsule TAKE 3 CAPSULES(2250 MG) BY MOUTH TWICE DAILY WITH MEALS 180 capsule 11    chlorthalidone (HYGROTEN) 25 MG Tab TAKE 1 TABLET BY MOUTH EVERY MORNING for blood pressure 90 tablet 1    fish oil-omega-3 fatty acids 300-1,000 mg capsule Take 2 g by mouth once daily.      irbesartan (AVAPRO) 300 MG tablet Take 1 tablet (300 mg total) by mouth every evening. 90 tablet 1    multivitamin (THERAGRAN) per tablet Take 1 tablet by mouth once daily.      niacin 500 MG CpSR Take 500 mg by mouth every  evening.      ondansetron (ZOFRAN-ODT) 4 MG TbDL Take 1 tablet (4 mg total) by mouth every 8 (eight) hours as needed (nausea). 30 tablet 1    pantoprazole (PROTONIX) 40 MG tablet Take 1 tablet (40 mg total) by mouth before breakfast. 90 tablet 3    potassium chloride (MICRO-K) 10 MEQ CpSR TAKE 2 CAPSULES(20 MEQ) BY MOUTH EVERY  capsule 3    pravastatin (PRAVACHOL) 40 MG tablet Take 1 tablet (40 mg total) by mouth once daily. 90 tablet 3    venlafaxine (EFFEXOR-XR) 37.5 MG 24 hr capsule Take 1 capsule (37.5 mg total) by mouth once daily. 90 capsule 3    vitamin D (VITAMIN D3) 1000 units Tab Take 1,000 Units by mouth once daily.      famotidine (PEPCID) 40 MG tablet Take 1 tablet (40 mg total) by mouth nightly. 90 tablet 1    LORazepam (ATIVAN) 0.5 MG tablet Take 1 tablet (0.5 mg total) by mouth 2 (two) times daily. 60 tablet 0    [DISCONTINUED] bismuth subsalicylate (BISMAREX) 262 mg Chew Take 2 tablets by mouth daily as needed.      [DISCONTINUED] Lactobac no.41/Bifidobact no.7 (PROBIOTIC-10 ORAL) Take by mouth.       No current facility-administered medications on file prior to visit.     Review of patient's allergies indicates:  No Known Allergies  Family History   Problem Relation Age of Onset    Hypertension Mother     Heart disease Mother     Glaucoma Mother     Stroke Father     Glaucoma Sister     Cataracts Sister     Macular degeneration Sister     Breast cancer Neg Hx     Colon cancer Neg Hx     Crohn's disease Neg Hx     Ulcerative colitis Neg Hx     Stomach cancer Neg Hx     Esophageal cancer Neg Hx     Celiac disease Neg Hx     Amblyopia Neg Hx     Blindness Neg Hx     Retinal detachment Neg Hx     Strabismus Neg Hx      Social History     Socioeconomic History    Marital status:     Number of children: 2   Occupational History    Occupation: retired teacher and principal   Tobacco Use    Smoking status: Never Smoker    Smokeless tobacco: Never Used    Substance and Sexual Activity    Alcohol use: Yes     Comment: social- maybe once every few months    Drug use: No    Sexual activity: Yes     Partners: Male     Birth control/protection: None, Post-menopausal     Social Determinants of Health     Financial Resource Strain: Unknown    Difficulty of Paying Living Expenses: Patient refused   Food Insecurity: Unknown    Worried About Running Out of Food in the Last Year: Patient refused    Ran Out of Food in the Last Year: Patient refused   Transportation Needs: Unknown    Lack of Transportation (Medical): Patient refused    Lack of Transportation (Non-Medical): Patient refused   Physical Activity: Unknown    Days of Exercise per Week: Patient refused    Minutes of Exercise per Session: 150+ min   Stress: Unknown    Feeling of Stress : Patient refused   Social Connections: Unknown    Frequency of Communication with Friends and Family: Patient refused    Frequency of Social Gatherings with Friends and Family: Once a week    Active Member of Clubs or Organizations: Patient refused    Attends Club or Organization Meetings: 1 to 4 times per year    Marital Status: Patient refused   Housing Stability: Unknown    Unable to Pay for Housing in the Last Year: Patient refused    Number of Places Lived in the Last Year: 1    Unstable Housing in the Last Year: Patient refused       Review of Systems:  Constitutional:  Denies fever or chills   Eyes:  Denies change in visual acuity   HENT:  Denies nasal congestion or sore throat   Respiratory:  Denies cough or shortness of breath   Cardiovascular:  Denies chest pain or edema   GI:  Denies abdominal pain, nausea, vomiting, bloody stools or diarrhea   :  Denies dysuria   Integument:  Denies rash   Neurologic:  Denies headache, focal weakness or sensory changes   Endocrine:  Denies polyuria or polydipsia   Lymphatic:  Denies swollen glands   Psychiatric:  Denies depression or anxiety     Physical Exam:    Constitutional:  Well developed, well nourished, no acute distress, non-toxic appearance   Integument:  Well hydrated, no rash   Lymphatic:  No lymphadenopathy noted   Neurologic:  Alert & oriented x 3  Psychiatric:  Speech and behavior appropriate     Bilateral Hip Exam    left Hip Exam   left hip exam performed same as contralateral hip and is normal.     right Hip Exam   Tenderness   The patient is experiencing tenderness in the greater trochanter and hip flexor.  Range of Motion   The patient has normal hip ROM.  Muscle Strength   Abduction: 4/5   Other   Erythema: absent  Sensation: normal  Pulse: present    X-rays were personally reviewed by me and findings discussed with the patient.  2 views of the right hip show no fractures or dislocations    Greater trochanteric bursitis of right hip    Hip flexor tendinitis, right           Using an aseptic technique, I injected 5 cc of lidocaine 1% without and 1 cc of kenalog 40mg into the right Hip and 1:1 in the hip flexor. The patient tolerated this well. I will have them return to clinic as needed.        Answers for HPI/ROS submitted by the patient on 8/23/2022  unexpected weight change: No  appetite change : No  sleep disturbance: No  IMMUNOCOMPROMISED: No  nervous/ anxious: Yes  dysphoric mood: Yes  rash: No  visual disturbance: No  eye redness: No  eye pain: No  ear pain: No  tinnitus: No  hearing loss: No  sinus pressure : No  nosebleeds: No  enviro allergies: No  food allergies: No  cough: No  shortness of breath: No  sweating: No  dysuria: No  frequency: No  difficulty urinating: No  hematuria: No  painful intercourse: No  chest pain: No  palpitations: No  nausea: No  vomiting: No  diarrhea: No  blood in stool: No  constipation: No  headaches: No  dizziness: No  numbness: No  seizures: No  joint swelling: No  myalgia: No  weakness: No  back pain: Yes  Pain Chronicity: chronic  History of trauma: No  Onset: more than 1 year ago  Frequency:  intermittently  Progression since onset: waxing and waning  Injury mechanism: reaching  injury location: at home  pain- numeric: 6/10  pain location: right pelvic, right hip  pain quality: aching, tightness  Radiating Pain: Yes  If your pain is radiating, to what part of the body?: lower back  Aggravating factors: bearing weight, exercise, extension, standing, walking, lifting, sitting  fever: No  inability to bear weight: No  itching: No  joint locking: No  limited range of motion: Yes  stiffness: No  tingling: Yes  Treatments tried: heat, exercise, rest, other  physical therapy: effective  Improvement on treatment: mild

## 2022-08-29 ENCOUNTER — PATIENT MESSAGE (OUTPATIENT)
Dept: ORTHOPEDICS | Facility: CLINIC | Age: 72
End: 2022-08-29
Payer: MEDICARE

## 2022-08-29 DIAGNOSIS — M54.16 LUMBAR RADICULOPATHY, CHRONIC: Primary | ICD-10-CM

## 2022-08-31 ENCOUNTER — PATIENT MESSAGE (OUTPATIENT)
Dept: FAMILY MEDICINE | Facility: CLINIC | Age: 72
End: 2022-08-31

## 2022-08-31 ENCOUNTER — EXTERNAL CHRONIC CARE MANAGEMENT (OUTPATIENT)
Dept: PRIMARY CARE CLINIC | Facility: CLINIC | Age: 72
End: 2022-08-31
Payer: MEDICARE

## 2022-08-31 DIAGNOSIS — F41.9 ANXIETY: ICD-10-CM

## 2022-08-31 PROCEDURE — 99490 CHRNC CARE MGMT STAFF 1ST 20: CPT | Mod: PBBFAC,PO | Performed by: FAMILY MEDICINE

## 2022-08-31 PROCEDURE — 99490 CHRNC CARE MGMT STAFF 1ST 20: CPT | Mod: S$PBB,,, | Performed by: FAMILY MEDICINE

## 2022-08-31 PROCEDURE — 99490 PR CHRONIC CARE MGMT, 1ST 20 MIN: ICD-10-PCS | Mod: S$PBB,,, | Performed by: FAMILY MEDICINE

## 2022-08-31 RX ORDER — VENLAFAXINE HYDROCHLORIDE 37.5 MG/1
CAPSULE, EXTENDED RELEASE ORAL
Qty: 90 CAPSULE | Refills: 1 | Status: SHIPPED | OUTPATIENT
Start: 2022-08-31 | End: 2022-09-01 | Stop reason: SDUPTHER

## 2022-09-01 ENCOUNTER — PATIENT MESSAGE (OUTPATIENT)
Dept: ORTHOPEDICS | Facility: CLINIC | Age: 72
End: 2022-09-01
Payer: MEDICARE

## 2022-09-01 RX ORDER — VENLAFAXINE HYDROCHLORIDE 37.5 MG/1
37.5 CAPSULE, EXTENDED RELEASE ORAL DAILY
Qty: 90 CAPSULE | Refills: 3 | Status: SHIPPED | OUTPATIENT
Start: 2022-09-01 | End: 2023-06-20

## 2022-09-01 NOTE — TELEPHONE ENCOUNTER
No new care gaps identified.  [facility] Embedded Care Gaps. Reference number: [id number]. 9/01/2022   11:24:37 AM GRISELT

## 2022-09-01 NOTE — TELEPHONE ENCOUNTER
Refill Decision Note   Lucinda Aguilera  is requesting a refill authorization.  Brief Assessment and Rationale for Refill:  Approve     Medication Therapy Plan:       Medication Reconciliation Completed: No   Comments:     No Care Gaps recommended.     Note composed:10:21 PM 08/31/2022

## 2022-09-01 NOTE — TELEPHONE ENCOUNTER
No new care gaps identified.  [facility] Embedded Care Gaps. Reference number: [id number]. 8/31/2022   9:18:35 PM CDT

## 2022-09-07 ENCOUNTER — PATIENT MESSAGE (OUTPATIENT)
Dept: FAMILY MEDICINE | Facility: CLINIC | Age: 72
End: 2022-09-07
Payer: MEDICARE

## 2022-09-13 ENCOUNTER — LAB VISIT (OUTPATIENT)
Dept: LAB | Facility: HOSPITAL | Age: 72
End: 2022-09-13
Attending: FAMILY MEDICINE
Payer: MEDICARE

## 2022-09-13 DIAGNOSIS — I10 ESSENTIAL HYPERTENSION: ICD-10-CM

## 2022-09-13 DIAGNOSIS — E78.49 OTHER HYPERLIPIDEMIA: ICD-10-CM

## 2022-09-13 DIAGNOSIS — E78.5 HYPERLIPIDEMIA, UNSPECIFIED HYPERLIPIDEMIA TYPE: ICD-10-CM

## 2022-09-13 LAB
ALBUMIN SERPL BCP-MCNC: 3.8 G/DL (ref 3.5–5.2)
ALP SERPL-CCNC: 88 U/L (ref 55–135)
ALT SERPL W/O P-5'-P-CCNC: 26 U/L (ref 10–44)
ANION GAP SERPL CALC-SCNC: 8 MMOL/L (ref 8–16)
AST SERPL-CCNC: 35 U/L (ref 10–40)
BASOPHILS # BLD AUTO: 0.04 K/UL (ref 0–0.2)
BASOPHILS NFR BLD: 0.5 % (ref 0–1.9)
BILIRUB SERPL-MCNC: 0.4 MG/DL (ref 0.1–1)
BUN SERPL-MCNC: 15 MG/DL (ref 8–23)
CALCIUM SERPL-MCNC: 9.2 MG/DL (ref 8.7–10.5)
CHLORIDE SERPL-SCNC: 94 MMOL/L (ref 95–110)
CHOLEST SERPL-MCNC: 186 MG/DL (ref 120–199)
CHOLEST SERPL-MCNC: 186 MG/DL (ref 120–199)
CHOLEST/HDLC SERPL: 2.6 {RATIO} (ref 2–5)
CHOLEST/HDLC SERPL: 2.6 {RATIO} (ref 2–5)
CO2 SERPL-SCNC: 25 MMOL/L (ref 23–29)
CREAT SERPL-MCNC: 0.7 MG/DL (ref 0.5–1.4)
DIFFERENTIAL METHOD: ABNORMAL
EOSINOPHIL # BLD AUTO: 0.1 K/UL (ref 0–0.5)
EOSINOPHIL NFR BLD: 0.7 % (ref 0–8)
ERYTHROCYTE [DISTWIDTH] IN BLOOD BY AUTOMATED COUNT: 12.5 % (ref 11.5–14.5)
EST. GFR  (NO RACE VARIABLE): >60 ML/MIN/1.73 M^2
GLUCOSE SERPL-MCNC: 87 MG/DL (ref 70–110)
HCT VFR BLD AUTO: 35.6 % (ref 37–48.5)
HDLC SERPL-MCNC: 71 MG/DL (ref 40–75)
HDLC SERPL-MCNC: 71 MG/DL (ref 40–75)
HDLC SERPL: 38.2 % (ref 20–50)
HDLC SERPL: 38.2 % (ref 20–50)
HGB BLD-MCNC: 13 G/DL (ref 12–16)
IMM GRANULOCYTES # BLD AUTO: 0.03 K/UL (ref 0–0.04)
IMM GRANULOCYTES NFR BLD AUTO: 0.4 % (ref 0–0.5)
LDLC SERPL CALC-MCNC: 100.4 MG/DL (ref 63–159)
LDLC SERPL CALC-MCNC: 100.4 MG/DL (ref 63–159)
LYMPHOCYTES # BLD AUTO: 2.7 K/UL (ref 1–4.8)
LYMPHOCYTES NFR BLD: 32.8 % (ref 18–48)
MCH RBC QN AUTO: 30.4 PG (ref 27–31)
MCHC RBC AUTO-ENTMCNC: 36.5 G/DL (ref 32–36)
MCV RBC AUTO: 83 FL (ref 82–98)
MONOCYTES # BLD AUTO: 0.8 K/UL (ref 0.3–1)
MONOCYTES NFR BLD: 9.9 % (ref 4–15)
NEUTROPHILS # BLD AUTO: 4.6 K/UL (ref 1.8–7.7)
NEUTROPHILS NFR BLD: 55.7 % (ref 38–73)
NONHDLC SERPL-MCNC: 115 MG/DL
NONHDLC SERPL-MCNC: 115 MG/DL
NRBC BLD-RTO: 0 /100 WBC
PLATELET # BLD AUTO: 385 K/UL (ref 150–450)
PMV BLD AUTO: 9.1 FL (ref 9.2–12.9)
POTASSIUM SERPL-SCNC: 4 MMOL/L (ref 3.5–5.1)
PROT SERPL-MCNC: 7.1 G/DL (ref 6–8.4)
RBC # BLD AUTO: 4.27 M/UL (ref 4–5.4)
SODIUM SERPL-SCNC: 127 MMOL/L (ref 136–145)
T4 FREE SERPL-MCNC: 0.99 NG/DL (ref 0.71–1.51)
TRIGL SERPL-MCNC: 73 MG/DL (ref 30–150)
TRIGL SERPL-MCNC: 73 MG/DL (ref 30–150)
TSH SERPL DL<=0.005 MIU/L-ACNC: 0.25 UIU/ML (ref 0.4–4)
WBC # BLD AUTO: 8.19 K/UL (ref 3.9–12.7)

## 2022-09-13 PROCEDURE — 36415 COLL VENOUS BLD VENIPUNCTURE: CPT | Mod: PO | Performed by: FAMILY MEDICINE

## 2022-09-13 PROCEDURE — 84439 ASSAY OF FREE THYROXINE: CPT | Performed by: FAMILY MEDICINE

## 2022-09-13 PROCEDURE — 80053 COMPREHEN METABOLIC PANEL: CPT | Performed by: FAMILY MEDICINE

## 2022-09-13 PROCEDURE — 80061 LIPID PANEL: CPT | Performed by: FAMILY MEDICINE

## 2022-09-13 PROCEDURE — 84443 ASSAY THYROID STIM HORMONE: CPT | Performed by: FAMILY MEDICINE

## 2022-09-13 PROCEDURE — 85025 COMPLETE CBC W/AUTO DIFF WBC: CPT | Performed by: FAMILY MEDICINE

## 2022-09-16 ENCOUNTER — OFFICE VISIT (OUTPATIENT)
Dept: FAMILY MEDICINE | Facility: CLINIC | Age: 72
End: 2022-09-16
Payer: MEDICARE

## 2022-09-16 VITALS
HEIGHT: 60 IN | SYSTOLIC BLOOD PRESSURE: 128 MMHG | RESPIRATION RATE: 18 BRPM | OXYGEN SATURATION: 96 % | BODY MASS INDEX: 33.15 KG/M2 | TEMPERATURE: 97 F | WEIGHT: 168.88 LBS | HEART RATE: 81 BPM | DIASTOLIC BLOOD PRESSURE: 70 MMHG

## 2022-09-16 DIAGNOSIS — I10 ESSENTIAL HYPERTENSION: ICD-10-CM

## 2022-09-16 DIAGNOSIS — E78.5 HYPERLIPIDEMIA, UNSPECIFIED HYPERLIPIDEMIA TYPE: ICD-10-CM

## 2022-09-16 DIAGNOSIS — T78.1XXA GASTROINTESTINAL FOOD SENSITIVITY: ICD-10-CM

## 2022-09-16 DIAGNOSIS — F41.9 ANXIETY: Primary | ICD-10-CM

## 2022-09-16 PROCEDURE — 99214 OFFICE O/P EST MOD 30 MIN: CPT | Mod: S$PBB,,, | Performed by: FAMILY MEDICINE

## 2022-09-16 PROCEDURE — 99213 OFFICE O/P EST LOW 20 MIN: CPT | Mod: PBBFAC,PO | Performed by: FAMILY MEDICINE

## 2022-09-16 PROCEDURE — 99214 PR OFFICE/OUTPT VISIT, EST, LEVL IV, 30-39 MIN: ICD-10-PCS | Mod: S$PBB,,, | Performed by: FAMILY MEDICINE

## 2022-09-16 PROCEDURE — 99999 PR PBB SHADOW E&M-EST. PATIENT-LVL III: ICD-10-PCS | Mod: PBBFAC,,, | Performed by: FAMILY MEDICINE

## 2022-09-16 PROCEDURE — 99999 PR PBB SHADOW E&M-EST. PATIENT-LVL III: CPT | Mod: PBBFAC,,, | Performed by: FAMILY MEDICINE

## 2022-09-16 NOTE — PROGRESS NOTES
Subjective:       Patient ID: Lucinda Aguilera is a 71 y.o. female.    Chief Complaint: Hypertension (Physical with labs prior)    HPI Comments: Here for f/u on chronic health issues    Recovered from Covid in December 2020    Depression - tolerating Effexor XR 37.5mg; she could not tolerate the 75mg dose; she still has worry. She did some counseling.  HLD - tolerating pravachol 40mg daily  Anxiety - Taking lorazepam BID. Doing some counseling presently  HTN - tolerating Avapro 300mg daily and Hygroten 25mg daily; BP controlled; digital flowsheet reviewed  She got some swelling with amlodipine  GERD - tolerating Protonix 40mg daily  S/p conner - taking colestipol    Past Medical History:    Hypertension                                                  Anxiety                                           Hyperlipemia                                                  GERD (gastroesophageal reflux disease)                        Cataract                                                        Comment:OU    PVD (posterior vitreous detachment)                             Comment:OD    Arthritis                                                       Comment:right thumb    Past Surgical History:    chest abcess                                                     Comment:child    KNEE ARTHROSCOPY W/ LASER                                        Comment:right    COLONOSCOPY                                      1/2012          Comment:repeat in 5 years    No Known Allergies    Social History    Marital Status:              Spouse Name:                       Years of Education:                 Number of children: 2             Occupational History  Occupation          Employer            Comment               retired                                   retired teacher an*                         Social History Main Topics    Smoking Status: Never Smoker                      Smokeless Status: Never Used                         Alcohol Use: Yes                Comment: social    Drug Use: No              Sexual Activity: Yes               Partners with: Male       Birth Control/Protection: None, Post-menopausal    Current Outpatient Medications on File Prior to Visit   Medication Sig Dispense Refill    acetaminophen (TYLENOL) 500 MG tablet Take 500 mg by mouth every 6 (six) hours as needed for Pain.      artificial tears ointment (ARTIFICIAL TEARS) Oint every evening.      ascorbic acid, vitamin C, (VITAMIN C) 250 MG tablet Take 500 mg by mouth once daily.       aspirin (ECOTRIN) 81 MG EC tablet Take 81 mg by mouth once daily.      balsalazide (COLAZAL) 750 mg capsule TAKE 3 CAPSULES(2250 MG) BY MOUTH TWICE DAILY WITH MEALS 180 capsule 11    benzonatate (TESSALON) 200 MG capsule Take 1 capsule (200 mg total) by mouth every evening. 30 capsule 5    chlorthalidone (HYGROTEN) 25 MG Tab TAKE 1 TABLET BY MOUTH EVERY MORNING for blood pressure 90 tablet 1    famotidine (PEPCID) 40 MG tablet Take 1 tablet (40 mg total) by mouth nightly. 90 tablet 3    fish oil-omega-3 fatty acids 300-1,000 mg capsule Take 2 g by mouth once daily.      irbesartan (AVAPRO) 300 MG tablet Take 1 tablet (300 mg total) by mouth every evening. 90 tablet 1    LORazepam (ATIVAN) 0.5 MG tablet Take 1 tablet (0.5 mg total) by mouth 2 (two) times daily. 60 tablet 0    multivitamin (THERAGRAN) per tablet Take 1 tablet by mouth once daily.      niacin 500 MG CpSR Take 500 mg by mouth every evening.      potassium chloride (MICRO-K) 10 MEQ CpSR TAKE 2 CAPSULES(20 MEQ) BY MOUTH EVERY  capsule 3    pravastatin (PRAVACHOL) 40 MG tablet Take 1 tablet (40 mg total) by mouth once daily. 90 tablet 3    venlafaxine (EFFEXOR-XR) 37.5 MG 24 hr capsule Take 1 capsule (37.5 mg total) by mouth once daily. 90 capsule 3    vitamin D (VITAMIN D3) 1000 units Tab Take 1,000 Units by mouth once daily.      ondansetron (ZOFRAN-ODT) 4 MG TbDL Take 1 tablet (4 mg total) by mouth every 8  (eight) hours as needed (nausea). (Patient not taking: Reported on 9/16/2022) 30 tablet 1    pantoprazole (PROTONIX) 40 MG tablet Take 1 tablet (40 mg total) by mouth before breakfast. (Patient not taking: Reported on 9/16/2022) 90 tablet 3     No current facility-administered medications on file prior to visit.     Review of patient's family history indicates:    Hypertension                   Mother                    Heart disease                  Mother                    Stroke                         Father                    Review of Systems   Constitutional: Negative for fever, chills, appetite change and unexpected weight change.   HENT: Negative for sore throat and trouble swallowing.    Eyes: Negative for pain and visual disturbance.   Respiratory: Negative for cough, shortness of breath and wheezing.    Cardiovascular: Negative for chest pain and palpitations.   Gastrointestinal: Negative for nausea, vomiting, abdominal pain, diarrhea and blood in stool.   Genitourinary: Negative for dysuria, hematuria and difficulty urinating.   Musculoskeletal: Negative for arthralgias, gait problem and neck pain.   Skin: Negative for rash and wound.   Neurological: Negative for dizziness, weakness, numbness and headaches.   Hematological: Negative for adenopathy.   Psychiatric/Behavioral: Negative for dysphoric mood.           Objective:       /70   Pulse 81   Temp 97.4 °F (36.3 °C) (Oral)   Resp 18   Ht 5' (1.524 m)   Wt 76.6 kg (168 lb 14 oz)   LMP 04/18/2004   SpO2 96%   BMI 32.98 kg/m²     Physical Exam   Constitutional: She is oriented to person, place, and time. She appears well-developed and well-nourished.   HENT:   Head: Normocephalic.   Mouth/Throat: Oropharynx is clear and moist. No oropharyngeal exudate or posterior oropharyngeal erythema.   Eyes: Conjunctivae and EOM are normal. Pupils are equal, round, and reactive to light.   Neck: Normal range of motion. Neck supple. No thyromegaly  present.   Cardiovascular: Normal rate, regular rhythm, S1 normal, S2 normal, normal heart sounds and intact distal pulses.  Exam reveals no gallop and no friction rub.    No murmur heard.  Pulmonary/Chest: Effort normal and breath sounds normal. She has no wheezes. She has no rales.   Abdominal: Normal appearance.   Musculoskeletal:        Right lower leg: She exhibits no edema.        Left lower leg: She exhibits no edema.   Lymphadenopathy:     She has no cervical adenopathy.   Neurological: She is alert and oriented to person, place, and time. No cranial nerve deficit. Gait normal.   Skin: Skin is warm and intact. No rash noted.   Psychiatric: She has a normal mood and affect.         Results for orders placed or performed in visit on 09/13/22   Lipid Panel   Result Value Ref Range    Cholesterol 186 120 - 199 mg/dL    Triglycerides 73 30 - 150 mg/dL    HDL 71 40 - 75 mg/dL    LDL Cholesterol 100.4 63.0 - 159.0 mg/dL    HDL/Cholesterol Ratio 38.2 20.0 - 50.0 %    Total Cholesterol/HDL Ratio 2.6 2.0 - 5.0    Non-HDL Cholesterol 115 mg/dL   Comprehensive Metabolic Panel   Result Value Ref Range    Sodium 127 (L) 136 - 145 mmol/L    Potassium 4.0 3.5 - 5.1 mmol/L    Chloride 94 (L) 95 - 110 mmol/L    CO2 25 23 - 29 mmol/L    Glucose 87 70 - 110 mg/dL    BUN 15 8 - 23 mg/dL    Creatinine 0.7 0.5 - 1.4 mg/dL    Calcium 9.2 8.7 - 10.5 mg/dL    Total Protein 7.1 6.0 - 8.4 g/dL    Albumin 3.8 3.5 - 5.2 g/dL    Total Bilirubin 0.4 0.1 - 1.0 mg/dL    Alkaline Phosphatase 88 55 - 135 U/L    AST 35 10 - 40 U/L    ALT 26 10 - 44 U/L    Anion Gap 8 8 - 16 mmol/L    eGFR >60.0 >60 mL/min/1.73 m^2   Lipid Panel   Result Value Ref Range    Cholesterol 186 120 - 199 mg/dL    Triglycerides 73 30 - 150 mg/dL    HDL 71 40 - 75 mg/dL    LDL Cholesterol 100.4 63.0 - 159.0 mg/dL    HDL/Cholesterol Ratio 38.2 20.0 - 50.0 %    Total Cholesterol/HDL Ratio 2.6 2.0 - 5.0    Non-HDL Cholesterol 115 mg/dL   CBC Auto Differential   Result  Value Ref Range    WBC 8.19 3.90 - 12.70 K/uL    RBC 4.27 4.00 - 5.40 M/uL    Hemoglobin 13.0 12.0 - 16.0 g/dL    Hematocrit 35.6 (L) 37.0 - 48.5 %    MCV 83 82 - 98 fL    MCH 30.4 27.0 - 31.0 pg    MCHC 36.5 (H) 32.0 - 36.0 g/dL    RDW 12.5 11.5 - 14.5 %    Platelets 385 150 - 450 K/uL    MPV 9.1 (L) 9.2 - 12.9 fL    Immature Granulocytes 0.4 0.0 - 0.5 %    Gran # (ANC) 4.6 1.8 - 7.7 K/uL    Immature Grans (Abs) 0.03 0.00 - 0.04 K/uL    Lymph # 2.7 1.0 - 4.8 K/uL    Mono # 0.8 0.3 - 1.0 K/uL    Eos # 0.1 0.0 - 0.5 K/uL    Baso # 0.04 0.00 - 0.20 K/uL    nRBC 0 0 /100 WBC    Gran % 55.7 38.0 - 73.0 %    Lymph % 32.8 18.0 - 48.0 %    Mono % 9.9 4.0 - 15.0 %    Eosinophil % 0.7 0.0 - 8.0 %    Basophil % 0.5 0.0 - 1.9 %    Differential Method Automated    TSH   Result Value Ref Range    TSH 0.246 (L) 0.400 - 4.000 uIU/mL   T4, Free   Result Value Ref Range    Free T4 0.99 0.71 - 1.51 ng/dL     *Note: Due to a large number of results and/or encounters for the requested time period, some results have not been displayed. A complete set of results can be found in Results Review.         Assessment:       1. Anxiety    2. Essential hypertension    3. Hyperlipidemia, unspecified hyperlipidemia type    4. Gastrointestinal food sensitivity          Plan:       Anxiety    Essential hypertension    Hyperlipidemia, unspecified hyperlipidemia type    Gastrointestinal food sensitivity     continue Ativan BID  Effexor XR 37.5mg daily  Overall stable  cotninue aspirin 81mg daily  Continue other present meds  Counseled on regular exercise, maintenance of a healthy weight, balanced diet rich in fruits/vegetables and lean protein, and avoidance of unhealthy habits like smoking and excessive alcohol intake.  F/u 6 months

## 2022-09-28 ENCOUNTER — IMMUNIZATION (OUTPATIENT)
Dept: FAMILY MEDICINE | Facility: CLINIC | Age: 72
End: 2022-09-28
Payer: MEDICARE

## 2022-09-28 ENCOUNTER — PATIENT MESSAGE (OUTPATIENT)
Dept: FAMILY MEDICINE | Facility: CLINIC | Age: 72
End: 2022-09-28

## 2022-09-28 DIAGNOSIS — Z23 NEED FOR VACCINATION: Primary | ICD-10-CM

## 2022-09-28 DIAGNOSIS — E78.5 HYPERLIPIDEMIA, UNSPECIFIED HYPERLIPIDEMIA TYPE: ICD-10-CM

## 2022-09-28 PROCEDURE — 91312 COVID-19, MRNA, LNP-S, BIVALENT BOOSTER, PF, 30 MCG/0.3 ML DOSE: CPT | Mod: PBBFAC,PO

## 2022-09-28 PROCEDURE — 0124A COVID-19, MRNA, LNP-S, BIVALENT BOOSTER, PF, 30 MCG/0.3 ML DOSE: CPT | Mod: PBBFAC | Performed by: FAMILY MEDICINE

## 2022-09-29 RX ORDER — PRAVASTATIN SODIUM 40 MG/1
40 TABLET ORAL DAILY
Qty: 90 TABLET | Refills: 3 | Status: SHIPPED | OUTPATIENT
Start: 2022-09-29 | End: 2022-09-29 | Stop reason: SDUPTHER

## 2022-09-29 NOTE — TELEPHONE ENCOUNTER
No new care gaps identified.  [facility] Embedded Care Gaps. Reference number: [id number]. 9/29/2022   10:52:14 AM GRISELT

## 2022-09-30 ENCOUNTER — EXTERNAL CHRONIC CARE MANAGEMENT (OUTPATIENT)
Dept: PRIMARY CARE CLINIC | Facility: CLINIC | Age: 72
End: 2022-09-30
Payer: MEDICARE

## 2022-09-30 PROCEDURE — 99490 CHRNC CARE MGMT STAFF 1ST 20: CPT | Mod: PBBFAC,PO | Performed by: FAMILY MEDICINE

## 2022-09-30 PROCEDURE — 99490 CHRNC CARE MGMT STAFF 1ST 20: CPT | Mod: S$PBB,,, | Performed by: FAMILY MEDICINE

## 2022-09-30 PROCEDURE — 99490 PR CHRONIC CARE MGMT, 1ST 20 MIN: ICD-10-PCS | Mod: S$PBB,,, | Performed by: FAMILY MEDICINE

## 2022-10-31 ENCOUNTER — EXTERNAL CHRONIC CARE MANAGEMENT (OUTPATIENT)
Dept: PRIMARY CARE CLINIC | Facility: CLINIC | Age: 72
End: 2022-10-31
Payer: MEDICARE

## 2022-10-31 PROCEDURE — 99490 PR CHRONIC CARE MGMT, 1ST 20 MIN: ICD-10-PCS | Mod: S$PBB,,, | Performed by: FAMILY MEDICINE

## 2022-10-31 PROCEDURE — 99490 CHRNC CARE MGMT STAFF 1ST 20: CPT | Mod: PBBFAC,PO | Performed by: FAMILY MEDICINE

## 2022-10-31 PROCEDURE — 99490 CHRNC CARE MGMT STAFF 1ST 20: CPT | Mod: S$PBB,,, | Performed by: FAMILY MEDICINE

## 2022-11-16 ENCOUNTER — PATIENT MESSAGE (OUTPATIENT)
Dept: ADMINISTRATIVE | Facility: OTHER | Age: 72
End: 2022-11-16
Payer: MEDICARE

## 2022-11-30 ENCOUNTER — EXTERNAL CHRONIC CARE MANAGEMENT (OUTPATIENT)
Dept: PRIMARY CARE CLINIC | Facility: CLINIC | Age: 72
End: 2022-11-30
Payer: MEDICARE

## 2022-11-30 PROCEDURE — 99439 CHRNC CARE MGMT STAF EA ADDL: CPT | Mod: S$PBB,,, | Performed by: FAMILY MEDICINE

## 2022-11-30 PROCEDURE — 99490 CHRNC CARE MGMT STAFF 1ST 20: CPT | Mod: PBBFAC,PO | Performed by: FAMILY MEDICINE

## 2022-11-30 PROCEDURE — 99439 CHRNC CARE MGMT STAF EA ADDL: CPT | Mod: PBBFAC,PO | Performed by: FAMILY MEDICINE

## 2022-11-30 PROCEDURE — 99490 PR CHRONIC CARE MGMT, 1ST 20 MIN: ICD-10-PCS | Mod: S$PBB,,, | Performed by: FAMILY MEDICINE

## 2022-11-30 PROCEDURE — 99439 PR CHRONIC CARE MGMT, EA ADDTL 20 MIN: ICD-10-PCS | Mod: S$PBB,,, | Performed by: FAMILY MEDICINE

## 2022-11-30 PROCEDURE — 99490 CHRNC CARE MGMT STAFF 1ST 20: CPT | Mod: S$PBB,,, | Performed by: FAMILY MEDICINE

## 2022-12-15 ENCOUNTER — OFFICE VISIT (OUTPATIENT)
Dept: GASTROENTEROLOGY | Facility: CLINIC | Age: 72
End: 2022-12-15
Payer: MEDICARE

## 2022-12-15 VITALS — HEIGHT: 63 IN | BODY MASS INDEX: 29.57 KG/M2 | WEIGHT: 166.88 LBS

## 2022-12-15 DIAGNOSIS — K21.9 GASTROESOPHAGEAL REFLUX DISEASE, UNSPECIFIED WHETHER ESOPHAGITIS PRESENT: Primary | ICD-10-CM

## 2022-12-15 DIAGNOSIS — I10 ESSENTIAL HYPERTENSION: ICD-10-CM

## 2022-12-15 DIAGNOSIS — R11.0 NAUSEA: ICD-10-CM

## 2022-12-15 PROCEDURE — 99999 PR PBB SHADOW E&M-EST. PATIENT-LVL IV: ICD-10-PCS | Mod: PBBFAC,,, | Performed by: INTERNAL MEDICINE

## 2022-12-15 PROCEDURE — 99213 OFFICE O/P EST LOW 20 MIN: CPT | Mod: S$PBB,,, | Performed by: INTERNAL MEDICINE

## 2022-12-15 PROCEDURE — 99214 OFFICE O/P EST MOD 30 MIN: CPT | Mod: PBBFAC,PO | Performed by: INTERNAL MEDICINE

## 2022-12-15 PROCEDURE — 99999 PR PBB SHADOW E&M-EST. PATIENT-LVL IV: CPT | Mod: PBBFAC,,, | Performed by: INTERNAL MEDICINE

## 2022-12-15 PROCEDURE — 99213 PR OFFICE/OUTPT VISIT, EST, LEVL III, 20-29 MIN: ICD-10-PCS | Mod: S$PBB,,, | Performed by: INTERNAL MEDICINE

## 2022-12-15 RX ORDER — ONDANSETRON 4 MG/1
4 TABLET, ORALLY DISINTEGRATING ORAL EVERY 8 HOURS PRN
Qty: 30 TABLET | Refills: 3 | Status: SHIPPED | OUTPATIENT
Start: 2022-12-15 | End: 2022-12-19

## 2022-12-15 RX ORDER — CHLORTHALIDONE 25 MG/1
TABLET ORAL
Qty: 90 TABLET | Refills: 2 | Status: ON HOLD | OUTPATIENT
Start: 2022-12-15 | End: 2023-04-12 | Stop reason: HOSPADM

## 2022-12-15 NOTE — PROGRESS NOTES
Subjective:       Patient ID: Lucinda Aguilera is a 71 y.o. female.    Chief Complaint: Gastroesophageal Reflux    HPI  Review of Systems      Objective:      Physical Exam    Assessment:       Problem List Items Addressed This Visit       GERD (gastroesophageal reflux disease) - Primary     Other Visit Diagnoses       Epigastric discomfort        Relevant Medications    famotidine (PEPCID) 40 MG tablet    Non-intractable vomiting with nausea, unspecified vomiting type        Relevant Medications    famotidine (PEPCID) 40 MG tablet    Cough        Relevant Medications    benzonatate (TESSALON) 200 MG capsule              Plan:

## 2022-12-15 NOTE — PROGRESS NOTES
Subjective:       Patient ID: Lucinda Aguilera is a 71 y.o. female.    Chief Complaint: No chief complaint on file.    Ms. Aguilera returns for follow-up of her reflux and possible gastroparesis.  She reports she is doing well at this time.  She reports she is watching her diet closely.  She is eating bland foods.  She is eating smaller portions.  And she is specifically avoiding fried foods and red gravies.  Last visit she was complaining of cough.  We prescribed Tessalon Perles, and this helped.  She now is reporting the cough is gone.    See her weight table below.  There appears to be a definite trend down words.      See last OV note 8/23/2022:  Ms. Aguilera returns for follow-up of her reflux.  At present, she is doing fairly well.  She has cut down on portions at mealtime.  And is definitely trying to eat early.  She reports that she thinks she has a borderline egg allergy, so she is also trying to eat gluten free.  She has also cut down on dairy products, and is now drinking almond milk instead.  Her BMs can be variable.  Sometimes they are hard, sometimes they are loose.  And she has stopped taking the Colestid.  As for her dieting, her weight appears to have plateaued.  See the weight table below.  She is also complaining of an annoying cough as well.  We will give a trial of Tessalon Perles.  Assessment:    Gastroesophageal reflux disease, unspecified whether esophagitis present   Epigastric discomfort  -     famotidine (PEPCID) 40 MG tablet; Take 1 tablet (40 mg total) by mouth nightly.  Dispense: 90 tablet; Refill: 3   Non-intractable vomiting with nausea, unspecified vomiting type  -     famotidine (PEPCID) 40 MG tablet; Take 1 tablet (40 mg total) by mouth nightly.  Dispense: 90 tablet; Refill: 3   Cough  -     benzonatate (TESSALON) 200 MG capsule; Take 1 capsule (200 mg total) by mouth every evening.  Dispense: 30 capsule; Refill: 5   Plan:    1. Trial of Tessalon Perles.  2. Diet and weight loss.  3.  Continue other meds the same.                See julissa empt study 7/12/2022:  FINDINGS:  The T1/2 of gastric emptying is abnormal at 156 minutes, compared to 174 minutes on the prior study.  There is again an initial prolonged lag phase with little to no gastric emptying, followed by relatively rapid emptying beginning at 02:00 hours.   At 53 minutes, 4% gastric emptying had occurred.   At 115 minutes, 7% gastric emptying had occurred.   At 174 minutes, 68% gastric emptying had occurred.   At 230 minutes, 91% gastric emptying had occurred.   At 4 hours the percentage of retention is 9 % (normal retention at 4 hours is 10% and lower).   Impression:   Findings similar to the previous examination with an initial prolonged lag in gastric emptying followed by normal gastric emptying.      Electronically signed by: Zoë De La Fuente  Date:                                            07/12/2022        Wt Readings from Last 20 Encounters:  12/15/22 : 75.7 kg (166 lb 14.2 oz)  11/08/22 : 76.2 kg (168 lb)  09/16/22 : 76.6 kg (168 lb 14 oz)  08/23/22 : 79.3 kg (174 lb 13.2 oz)  08/23/22 : 78.9 kg (174 lb)  08/23/22 : 79 kg (174 lb 2.6 oz)  05/20/22 : 79.2 kg (174 lb 9.7 oz)  05/10/22 : 77.1 kg (170 lb)  04/11/22 : 80 kg (176 lb 5.9 oz)  03/29/22 : 79.7 kg (175 lb 11.3 oz)  03/22/22 : 79 kg (174 lb 2.6 oz)  03/11/22 : 83.3 kg (183 lb 10.3 oz)  12/30/21 : 82.2 kg (181 lb 3.5 oz)  11/17/21 : 83.9 kg (185 lb)  11/01/21 : 84.1 kg (185 lb 6.5 oz)  06/08/21 : 76.4 kg (168 lb 6.9 oz)  05/13/21 : 74.4 kg (164 lb 0.4 oz)  03/22/21 : 73.3 kg (161 lb 9.6 oz)  03/04/21 : 73.9 kg (162 lb 14.7 oz)  03/04/21 : 73.9 kg (162 lb 14.7 oz)    Review of Systems   Constitutional:  Negative for appetite change, fever and unexpected weight change.   HENT: Negative.  Negative for mouth sores, sore throat and trouble swallowing.    Eyes: Negative.    Respiratory: Negative.  Negative for cough and choking.    Cardiovascular: Negative.  Negative for  chest pain and leg swelling.   Gastrointestinal:  Negative for abdominal distention, abdominal pain, anal bleeding, blood in stool, constipation, diarrhea, nausea and vomiting.   Genitourinary: Negative.    Musculoskeletal: Negative.    Integumentary:  Negative for color change and rash. Negative.   Neurological: Negative.    Hematological:  Negative for adenopathy.   Psychiatric/Behavioral: Negative.         Objective:      Physical Exam  Constitutional:       General: She is not in acute distress.     Appearance: Normal appearance. She is well-developed. She is obese.   HENT:      Head: Normocephalic.      Nose: Nose normal.      Mouth/Throat:      Mouth: Mucous membranes are moist.   Eyes:      General: No scleral icterus.     Conjunctiva/sclera: Conjunctivae normal.   Neck:      Thyroid: No thyromegaly.      Trachea: No tracheal deviation.   Cardiovascular:      Rate and Rhythm: Normal rate and regular rhythm.      Heart sounds: Normal heart sounds. No murmur heard.    No gallop.   Abdominal:      General: Bowel sounds are normal. There is no distension.      Palpations: Abdomen is soft. There is no mass.      Tenderness: There is no abdominal tenderness. There is no guarding.   Genitourinary:     Comments: Rectal exam normal.  Normal tone. No masses.  Musculoskeletal:         General: Normal range of motion.      Cervical back: Neck supple.   Lymphadenopathy:      Cervical: No cervical adenopathy.   Skin:     General: Skin is warm and dry.   Neurological:      General: No focal deficit present.      Mental Status: She is alert and oriented to person, place, and time.   Psychiatric:         Mood and Affect: Mood normal.         Behavior: Behavior normal.       Assessment:         Gastroesophageal reflux disease, unspecified whether esophagitis present    Nausea  -     ondansetron (ZOFRAN-ODT) 4 MG TbDL; Take 1 tablet (4 mg total) by mouth every 8 (eight) hours as needed (nausea).  Dispense: 30 tablet; Refill:  3    Essential hypertension  -     chlorthalidone (HYGROTEN) 25 MG Tab; TAKE 1 TABLET BY MOUTH EVERY MORNING for blood pressure  Dispense: 90 tablet; Refill: 2    BMI 29.0-29.9,adult      Plan:       Continue meds the same.  Continue diet and weight loss.  RTC 5-6 months.

## 2022-12-22 ENCOUNTER — OFFICE VISIT (OUTPATIENT)
Dept: ORTHOPEDICS | Facility: CLINIC | Age: 72
End: 2022-12-22
Payer: MEDICARE

## 2022-12-22 VITALS — HEIGHT: 63 IN | BODY MASS INDEX: 29.41 KG/M2 | WEIGHT: 166 LBS

## 2022-12-22 DIAGNOSIS — M70.61 GREATER TROCHANTERIC BURSITIS OF RIGHT HIP: Primary | ICD-10-CM

## 2022-12-22 PROCEDURE — 20610 LARGE JOINT ASPIRATION/INJECTION: R GREATER TROCHANTERIC BURSA: ICD-10-PCS | Mod: S$PBB,RT,, | Performed by: ORTHOPAEDIC SURGERY

## 2022-12-22 PROCEDURE — 99999 PR PBB SHADOW E&M-EST. PATIENT-LVL II: ICD-10-PCS | Mod: PBBFAC,,, | Performed by: ORTHOPAEDIC SURGERY

## 2022-12-22 PROCEDURE — 99999 PR PBB SHADOW E&M-EST. PATIENT-LVL II: CPT | Mod: PBBFAC,,, | Performed by: ORTHOPAEDIC SURGERY

## 2022-12-22 PROCEDURE — 99212 OFFICE O/P EST SF 10 MIN: CPT | Mod: PBBFAC,PN,25 | Performed by: ORTHOPAEDIC SURGERY

## 2022-12-22 PROCEDURE — 20610 DRAIN/INJ JOINT/BURSA W/O US: CPT | Mod: PBBFAC,PN | Performed by: ORTHOPAEDIC SURGERY

## 2022-12-22 PROCEDURE — 99213 PR OFFICE/OUTPT VISIT, EST, LEVL III, 20-29 MIN: ICD-10-PCS | Mod: S$PBB,25,, | Performed by: ORTHOPAEDIC SURGERY

## 2022-12-22 PROCEDURE — 99213 OFFICE O/P EST LOW 20 MIN: CPT | Mod: S$PBB,25,, | Performed by: ORTHOPAEDIC SURGERY

## 2022-12-22 RX ADMIN — TRIAMCINOLONE ACETONIDE 40 MG: 40 INJECTION, SUSPENSION INTRA-ARTICULAR; INTRAMUSCULAR at 01:12

## 2022-12-31 ENCOUNTER — EXTERNAL CHRONIC CARE MANAGEMENT (OUTPATIENT)
Dept: PRIMARY CARE CLINIC | Facility: CLINIC | Age: 72
End: 2022-12-31
Payer: MEDICARE

## 2022-12-31 PROCEDURE — 99490 CHRNC CARE MGMT STAFF 1ST 20: CPT | Mod: S$PBB,,, | Performed by: FAMILY MEDICINE

## 2022-12-31 PROCEDURE — 99490 CHRNC CARE MGMT STAFF 1ST 20: CPT | Mod: PBBFAC,PO | Performed by: FAMILY MEDICINE

## 2022-12-31 PROCEDURE — 99490 PR CHRONIC CARE MGMT, 1ST 20 MIN: ICD-10-PCS | Mod: S$PBB,,, | Performed by: FAMILY MEDICINE

## 2022-12-31 PROCEDURE — 99439 PR CHRONIC CARE MGMT, EA ADDTL 20 MIN: ICD-10-PCS | Mod: S$PBB,,, | Performed by: FAMILY MEDICINE

## 2022-12-31 PROCEDURE — 99439 CHRNC CARE MGMT STAF EA ADDL: CPT | Mod: PBBFAC,PO | Performed by: FAMILY MEDICINE

## 2022-12-31 PROCEDURE — 99439 CHRNC CARE MGMT STAF EA ADDL: CPT | Mod: S$PBB,,, | Performed by: FAMILY MEDICINE

## 2023-01-03 RX ORDER — TRIAMCINOLONE ACETONIDE 40 MG/ML
40 INJECTION, SUSPENSION INTRA-ARTICULAR; INTRAMUSCULAR
Status: DISCONTINUED | OUTPATIENT
Start: 2022-12-22 | End: 2023-01-03 | Stop reason: HOSPADM

## 2023-01-03 NOTE — PROGRESS NOTES
"  Chief Complaint   Patient presents with    Right Hip - Pain      HPI:   This is a 72 y.o. who presents to clinic today for right hip pain for the past 1 weeks after no known trauma. Complains of pain while sleeping on side and when descending stairs. Pain is dull. No numbness or tingling. No associated signs or symptoms.      Past Medical History:   Diagnosis Date    Anticoagulant long-term use     Anxiety     takes daily Xanax    Arthritis     right thumb    Cataract     OU    Cholelithiasis     GERD (gastroesophageal reflux disease)     Hepatic steatosis     Hyperlipemia     Hypertension     PVD (posterior vitreous detachment)     OD     Past Surgical History:   Procedure Laterality Date    BREAST BIOPSY Left 08/28/2020    stereo biopsy    BREAST BIOPSY Left 10/07/2020    benign excisional biopsy    chest abcess      child    COLONOSCOPY  01/06/2012    Dr. Lopez: hemorrhoids, redundant colon    COLONOSCOPY N/A 2/17/2021    Dr. Lopez: Congested and erythematous mucosa in the rectum, in the recto-sigmoid colon and in the sigmoid colon, proctosigmoid colitis, Redundant colon; biopsy: focal active colitis "nonspecific but can be seen with acute self-limited colitis (infection), medication effect (e.g. NSAID use), proximity to diverticula, or early/evolving IBD    ESOPHAGOGASTRODUODENOSCOPY N/A 6/6/2019    Dr. Lopez: small hiatal hernia, Non-erosive esophageal reflux (NERD) disease?., slight antritis; biopsy: Antral mucosa with chemical/reactive gastropathy. negative for h pylori    ESOPHAGOGASTRODUODENOSCOPY N/A 2/17/2021    Procedure: EGD (ESOPHAGOGASTRODUODENOSCOPY);  Surgeon: Nathan Lopez Jr., MD;  Location: [facility];  Service: Endoscopy;  Laterality: N/A; Minimal gastritis; biopsy: stomach- negative for h pylori, active chronic gastritis with focal features of erosive gastritis, duodenum WNL    ESOPHAGOGASTRODUODENOSCOPY N/A 5/10/2022    Procedure: EGD (ESOPHAGOGASTRODUODENOSCOPY);  Surgeon: Nathan SOOD" John Ferguson MD;  Location: [facility] ENDO;  Service: Endoscopy;  Laterality: N/A;    EXCISIONAL BIOPSY Left 10/7/2020    Procedure: EXCISIONAL BIOPSY-Left with radiological marker;  Surgeon: Brooklynn Ashford MD;  Location: [facility];  Service: General;  Laterality: Left;    JOINT REPLACEMENT Bilateral     knee    KNEE ARTHROSCOPY W/ LASER      right    LAPAROSCOPIC CHOLECYSTECTOMY N/A 6/14/2019    Procedure: CHOLECYSTECTOMY, LAPAROSCOPIC;  Surgeon: Guevara Evans MD;  Location: [facility] OR;  Service: General;  Laterality: N/A;    thumb surgery  11/2014    TOTAL KNEE ARTHROPLASTY Left 6/19/2018    Procedure: REPLACEMENT-KNEE-TOTAL;  Surgeon: Nj Melvin MD;  [address];  Service: Orthopedics;  Laterality: Left;  Ruy    UPPER GASTROINTESTINAL ENDOSCOPY  07/13/2017    Dr. Lopez: NERD, Gastric mucosal atrophy. antritis, Scar in the incisura. Biopsied; biopsy: chronic gastritis. negative for h pylori     Current Outpatient Medications on File Prior to Visit   Medication Sig Dispense Refill    acetaminophen (TYLENOL) 500 MG tablet Take 500 mg by mouth every 6 (six) hours as needed for Pain.      artificial tears ointment (ARTIFICIAL TEARS) Oint every evening.      ascorbic acid, vitamin C, (VITAMIN C) 250 MG tablet Take 500 mg by mouth once daily.       aspirin (ECOTRIN) 81 MG EC tablet Take 81 mg by mouth once daily.      balsalazide (COLAZAL) 750 mg capsule TAKE 3 CAPSULES(2250 MG) BY MOUTH TWICE DAILY WITH MEALS 180 capsule 11    benzonatate (TESSALON) 200 MG capsule Take 1 capsule (200 mg total) by mouth every evening. 30 capsule 5    chlorthalidone (HYGROTEN) 25 MG Tab TAKE 1 TABLET BY MOUTH EVERY MORNING for blood pressure 90 tablet 2    famotidine (PEPCID) 40 MG tablet Take 1 tablet (40 mg total) by mouth nightly. 90 tablet 3    irbesartan (AVAPRO) 300 MG tablet TAKE 1 TABLET(300 MG) BY MOUTH EVERY EVENING 90 tablet 3    LORazepam (ATIVAN) 0.5 MG tablet TAKE 1 TABLET(0.5 MG) BY MOUTH TWICE DAILY 60  tablet 5    multivitamin (THERAGRAN) per tablet Take 1 tablet by mouth once daily.      niacin 500 MG CpSR Take 500 mg by mouth every evening.      ondansetron (ZOFRAN-ODT) 4 MG TbDL DISSOLVE 1 TABLET ON THE TONGUE EVERY 8 HOURS AS NEEDED FOR NAUSEA 30 tablet 3    pantoprazole (PROTONIX) 40 MG tablet Take 1 tablet (40 mg total) by mouth before breakfast. 90 tablet 3    potassium chloride (MICRO-K) 10 MEQ CpSR TAKE 2 CAPSULES(20 MEQ) BY MOUTH EVERY  capsule 3    pravastatin (PRAVACHOL) 40 MG tablet Take 1 tablet (40 mg total) by mouth once daily. 90 tablet 3    venlafaxine (EFFEXOR-XR) 37.5 MG 24 hr capsule Take 1 capsule (37.5 mg total) by mouth once daily. 90 capsule 3    vitamin D (VITAMIN D3) 1000 units Tab Take 1,000 Units by mouth once daily.       No current facility-administered medications on file prior to visit.     Review of patient's allergies indicates:  No Known Allergies  Family History   Problem Relation Age of Onset    Hypertension Mother     Heart disease Mother     Glaucoma Mother     Stroke Father     Glaucoma Sister     Cataracts Sister     Macular degeneration Sister     Breast cancer Neg Hx     Colon cancer Neg Hx     Crohn's disease Neg Hx     Ulcerative colitis Neg Hx     Stomach cancer Neg Hx     Esophageal cancer Neg Hx     Celiac disease Neg Hx     Amblyopia Neg Hx     Blindness Neg Hx     Retinal detachment Neg Hx     Strabismus Neg Hx      Social History     Socioeconomic History    Marital status:     Number of children: 2   Occupational History    Occupation: retired teacher and principal   Tobacco Use    Smoking status: Never    Smokeless tobacco: Never   Substance and Sexual Activity    Alcohol use: Yes     Comment: social- maybe once every few months    Drug use: No    Sexual activity: Yes     Partners: Male     Birth control/protection: None, Post-menopausal     Social Determinants of Health     Financial Resource Strain: Unknown    Difficulty of Paying Living  Expenses: Patient refused   Food Insecurity: Unknown    Worried About Running Out of Food in the Last Year: Patient refused    Ran Out of Food in the Last Year: Patient refused   Transportation Needs: Unknown    Lack of Transportation (Medical): Patient refused    Lack of Transportation (Non-Medical): Patient refused   Physical Activity: Unknown    Days of Exercise per Week: Patient refused    Minutes of Exercise per Session: 150+ min   Stress: Unknown    Feeling of Stress : Patient refused   Social Connections: Unknown    Frequency of Communication with Friends and Family: Patient refused    Frequency of Social Gatherings with Friends and Family: Once a week    Active Member of Clubs or Organizations: Patient refused    Attends Club or Organization Meetings: 1 to 4 times per year    Marital Status: Patient refused   Housing Stability: Unknown    Unable to Pay for Housing in the Last Year: Patient refused    Number of Places Lived in the Last Year: 1    Unstable Housing in the Last Year: Patient refused       Review of Systems:  Constitutional:  Denies fever or chills   Eyes:  Denies change in visual acuity   HENT:  Denies nasal congestion or sore throat   Respiratory:  Denies cough or shortness of breath   Cardiovascular:  Denies chest pain or edema   GI:  Denies abdominal pain, nausea, vomiting, bloody stools or diarrhea   :  Denies dysuria   Integument:  Denies rash   Neurologic:  Denies headache, focal weakness or sensory changes   Endocrine:  Denies polyuria or polydipsia   Lymphatic:  Denies swollen glands   Psychiatric:  Denies depression or anxiety     Physical Exam:   Constitutional:  Well developed, well nourished, no acute distress, non-toxic appearance   Integument:  Well hydrated, no rash   Lymphatic:  No lymphadenopathy noted   Neurologic:  Alert & oriented x 3  Psychiatric:  Speech and behavior appropriate     Bilateral Hip Exam    left Hip Exam   left hip exam performed same as contralateral hip  and is normal.     right Hip Exam   Tenderness   The patient is experiencing tenderness in the greater trochanter.  Range of Motion   The patient has normal hip ROM.  Muscle Strength   Abduction: 4/5   Other   Erythema: absent  Sensation: normal  Pulse: present    There are no diagnoses linked to this encounter.       Using an aseptic technique, I injected 5 cc of lidocaine 1% without and 1 cc of kenalog 40mg into the right Hip. The patient tolerated this well. I will have them return to clinic as needed.

## 2023-01-03 NOTE — PROCEDURES
Large Joint Aspiration/Injection: R greater trochanteric bursa    Date/Time: 12/22/2022 1:45 PM  Performed by: Nick Arnold MD  Authorized by: Nick Arnold MD     Consent Done?:  Yes (Verbal)  Indications:  Pain  Timeout: prior to procedure the correct patient, procedure, and site was verified    Prep: patient was prepped and draped in usual sterile fashion      Local anesthesia used?: Yes    Local anesthetic:  Lidocaine 1% without epinephrine  Anesthetic total (ml):  5      Details:  Needle Size:  22 G  Approach:  Lateral  Location:  Hip  Site:  R greater trochanteric bursa  Medications:  40 mg triamcinolone acetonide 40 mg/mL  Patient tolerance:  Patient tolerated the procedure well with no immediate complications

## 2023-01-15 ENCOUNTER — E-VISIT (OUTPATIENT)
Dept: FAMILY MEDICINE | Facility: CLINIC | Age: 73
End: 2023-01-15
Payer: MEDICARE

## 2023-01-15 ENCOUNTER — PATIENT MESSAGE (OUTPATIENT)
Dept: FAMILY MEDICINE | Facility: CLINIC | Age: 73
End: 2023-01-15
Payer: MEDICARE

## 2023-01-15 DIAGNOSIS — R82.90 ABNORMAL URINE ODOR: Primary | ICD-10-CM

## 2023-01-15 PROCEDURE — 99421 PR E&M, ONLINE DIGIT, EST, < 7 DAYS, 5-10 MINS: ICD-10-PCS | Mod: ,,, | Performed by: FAMILY MEDICINE

## 2023-01-15 PROCEDURE — 99421 OL DIG E/M SVC 5-10 MIN: CPT | Mod: ,,, | Performed by: FAMILY MEDICINE

## 2023-01-17 NOTE — PROGRESS NOTES
Patient ID: Lucinda Aguilera is a 72 y.o. female.    Chief Complaint: Urinary Tract Infection    The patient initiated a request through [employer] on 1/15/2023 for evaluation and management with a chief complaint of Urinary Tract Infection     I evaluated the questionnaire submission on 1/17/23.    Ohs Peq Evisit Uti Questionnaire    1/15/2023 10:59 AM CST - Filed by Patient   Do you agree to participate in an E-Visit? Yes   If you have any of the following problems, please present to your local ER or call 911:  I acknowledge   What is the main issue that you would like for your doctor to address today? Urine odor   Are you able to take your vital signs? Yes   Systolic Blood Pressure: 128   Diastolic Blood Pressure: 82   Weight: 162   Height: 62   Pulse: 78   Temp: 97   Resp:    Pulse Ox:    What symptoms do you currently have? Change in urine appearance or smell   When did your symptoms first appear? 1/2/2023   List what you have done or taken to help your symptoms. Drink cranberry juice   [place] milk water   Please indicate whether you have had the following symptoms during the past 24 hours     Urgent urination (a sudden and uncontrollable urge to urinate) None   Frequent urination of small amounts of urine (going to the toilet very often) None   Burning pain when urinating None   Incomplete bladder emptying (still feel like you need to urinate again after urination) None   Pain not associated with urination in the lower abdomen below the belly button) None   What does your urine look like? Clear   Blood seen in the urine (without menstrual cycle) None   Flank pain (pain in one or both sides of the lower back) None   Abnormal Vaginal Discharge (abnormal amount, color and/or odor) None   Discharge from the urethra (urinary opening) without urination None   High body temperature/fever? None-<99.5   Please rate how much discomfort you have experience because of the symptoms in the past 24 hours: None   Please indicate  how the symptoms have interfered with your every day activities/work in the past 24 hours: None   Please indicate how these symptoms have interfered with your social activities (visiting people, meeting with friends, etc.) in the past 24 hours? None   Are you a diabetic? No   If you are female, please indicate whether you have the following at the time of completion of this questionnaire: Signs of menopause syndrome (hot flashes)   Are you currently pregnant, could you be pregnant, or are you breast feeding? None of the above   Provide any information you feel is important to your history not asked above    Please attach any relevant images or files (if you have performed a home test for UTI, please submit a photo of results)           Active Problem List with Overview Notes    Diagnosis Date Noted    Chronic bilateral low back pain 05/23/2022    Lumbar spondylosis 05/23/2022    Spondylolisthesis of lumbar region 05/23/2022    Decreased ROM of lumbar spine 05/23/2022    Weakness of both hips 05/23/2022    Epigastric pain 05/10/2022    Metatarsalgia, left foot 04/04/2021    Diarrhea 02/17/2021    Nonspecific abnormal electrocardiogram (ECG) (EKG) 01/08/2021    Hip pain 10/27/2020    Gait difficulty 10/27/2020    Papilloma of left breast 10/07/2020    Primary osteoarthritis of right foot 01/25/2020    Hammer toe of left foot 01/14/2020    Pain in right foot 01/14/2020    Extensor tendonitis of foot - Right Foot 01/14/2020    Hav (hallux abducto valgus), right 06/17/2019    Biliary colic 06/14/2019    Primary osteoarthritis of left knee 06/19/2018    Family history of glaucoma in sister 04/05/2018    Acquired valgus deformity knee, left 01/02/2018    S/P left knee arthroscopy 01/02/2018    History of total right knee replacement 06/06/2016    Chronic pain of left knee 06/30/2015    CMC arthritis, thumb, degenerative 10/21/2014    Primary localized osteoarthrosis, lower leg 10/13/2014    Osteoarthrosis, unspecified  whether generalized or localized, involving lower leg 09/19/2014     Dx updated per 2019 IMO Load      HBP (high blood pressure) 11/06/2012    Hyperlipemia 11/06/2012    Anxiety 11/06/2012    GERD (gastroesophageal reflux disease) 11/06/2012      Recent Labs Obtained:  No visits with results within 7 Day(s) from this visit.   Latest known visit with results is:   Lab Visit on 09/13/2022   Component Date Value Ref Range Status    Cholesterol 09/13/2022 186  120 - 199 mg/dL Final    Comment: The National Cholesterol Education Program (NCEP) has set the  following guidelines (reference ranges) for Cholesterol:  Optimal.....................<200 mg/dL  Borderline High.............200-239 mg/dL  High........................> or = 240 mg/dL      Triglycerides 09/13/2022 73  30 - 150 mg/dL Final    Comment: The National Cholesterol Education Program (NCEP) has set the  following guidelines (reference values) for triglycerides:  Normal......................<150 mg/dL  Borderline High.............150-199 mg/dL  High........................200-499 mg/dL      HDL 09/13/2022 71  40 - 75 mg/dL Final    Comment: The National Cholesterol Education Program (NCEP) has set the  following guidelines (reference values) for HDL Cholesterol:  Low...............<40 mg/dL  Optimal...........>60 mg/dL      LDL Cholesterol 09/13/2022 100.4  63.0 - 159.0 mg/dL Final    Comment: The National Cholesterol Education Program (NCEP) has set the  following guidelines (reference values) for LDL Cholesterol:  Optimal.......................<130 mg/dL  Borderline High...............130-159 mg/dL  High..........................160-189 mg/dL  Very High.....................>190 mg/dL      HDL/Cholesterol Ratio 09/13/2022 38.2  20.0 - 50.0 % Final    Total Cholesterol/HDL Ratio 09/13/2022 2.6  2.0 - 5.0 Final    Non-HDL Cholesterol 09/13/2022 115  mg/dL Final    Comment: Risk category and Non-HDL cholesterol goals:  Coronary heart disease (CHD)or equivalent  (10-year risk of CHD >20%):  Non-HDL cholesterol goal     <130 mg/dL  Two or more CHD risk factors and 10-year risk of CHD <= 20%:  Non-HDL cholesterol goal     <160 mg/dL  0 to 1 CHD risk factor:  Non-HDL cholesterol goal     <190 mg/dL      Sodium 09/13/2022 127 (L)  136 - 145 mmol/L Final    Potassium 09/13/2022 4.0  3.5 - 5.1 mmol/L Final    Chloride 09/13/2022 94 (L)  95 - 110 mmol/L Final    CO2 09/13/2022 25  23 - 29 mmol/L Final    Glucose 09/13/2022 87  70 - 110 mg/dL Final    BUN 09/13/2022 15  8 - 23 mg/dL Final    Creatinine 09/13/2022 0.7  0.5 - 1.4 mg/dL Final    Calcium 09/13/2022 9.2  8.7 - 10.5 mg/dL Final    Total Protein 09/13/2022 7.1  6.0 - 8.4 g/dL Final    Albumin 09/13/2022 3.8  3.5 - 5.2 g/dL Final    Total Bilirubin 09/13/2022 0.4  0.1 - 1.0 mg/dL Final    Comment: For infants and newborns, interpretation of results should be based  on gestational age, weight and in agreement with clinical  observations.    Premature Infant recommended reference ranges:  Up to 24 hours.............<8.0 mg/dL  Up to 48 hours............<12.0 mg/dL  3-5 days..................<15.0 mg/dL  6-29 days.................<15.0 mg/dL      Alkaline Phosphatase 09/13/2022 88  55 - 135 U/L Final    AST 09/13/2022 35  10 - 40 U/L Final    ALT 09/13/2022 26  10 - 44 U/L Final    Anion Gap 09/13/2022 8  8 - 16 mmol/L Final    eGFR 09/13/2022 >60.0  >60 mL/min/1.73 m^2 Final    Cholesterol 09/13/2022 186  120 - 199 mg/dL Final    Comment: The National Cholesterol Education Program (NCEP) has set the  following guidelines (reference ranges) for Cholesterol:  Optimal.....................<200 mg/dL  Borderline High.............200-239 mg/dL  High........................> or = 240 mg/dL      Triglycerides 09/13/2022 73  30 - 150 mg/dL Final    Comment: The National Cholesterol Education Program (NCEP) has set the  following guidelines (reference values) for triglycerides:  Normal......................<150 mg/dL  Borderline  High.............150-199 mg/dL  High........................200-499 mg/dL      HDL 09/13/2022 71  40 - 75 mg/dL Final    Comment: The National Cholesterol Education Program (NCEP) has set the  following guidelines (reference values) for HDL Cholesterol:  Low...............<40 mg/dL  Optimal...........>60 mg/dL      LDL Cholesterol 09/13/2022 100.4  63.0 - 159.0 mg/dL Final    Comment: The National Cholesterol Education Program (NCEP) has set the  following guidelines (reference values) for LDL Cholesterol:  Optimal.......................<130 mg/dL  Borderline High...............130-159 mg/dL  High..........................160-189 mg/dL  Very High.....................>190 mg/dL      HDL/Cholesterol Ratio 09/13/2022 38.2  20.0 - 50.0 % Final    Total Cholesterol/HDL Ratio 09/13/2022 2.6  2.0 - 5.0 Final    Non-HDL Cholesterol 09/13/2022 115  mg/dL Final    Comment: Risk category and Non-HDL cholesterol goals:  Coronary heart disease (CHD)or equivalent (10-year risk of CHD >20%):  Non-HDL cholesterol goal     <130 mg/dL  Two or more CHD risk factors and 10-year risk of CHD <= 20%:  Non-HDL cholesterol goal     <160 mg/dL  0 to 1 CHD risk factor:  Non-HDL cholesterol goal     <190 mg/dL      WBC 09/13/2022 8.19  3.90 - 12.70 K/uL Final    RBC 09/13/2022 4.27  4.00 - 5.40 M/uL Final    Hemoglobin 09/13/2022 13.0  12.0 - 16.0 g/dL Final    Hematocrit 09/13/2022 35.6 (L)  37.0 - 48.5 % Final    MCV 09/13/2022 83  82 - 98 fL Final    MCH 09/13/2022 30.4  27.0 - 31.0 pg Final    MCHC 09/13/2022 36.5 (H)  32.0 - 36.0 g/dL Final    RDW 09/13/2022 12.5  11.5 - 14.5 % Final    Platelets 09/13/2022 385  150 - 450 K/uL Final    MPV 09/13/2022 9.1 (L)  9.2 - 12.9 fL Final    Immature Granulocytes 09/13/2022 0.4  0.0 - 0.5 % Final    Gran # (ANC) 09/13/2022 4.6  1.8 - 7.7 K/uL Final    Immature Grans (Abs) 09/13/2022 0.03  0.00 - 0.04 K/uL Final    Comment: Mild elevation in immature granulocytes is non specific and   can be seen in  a variety of conditions including stress response,   acute inflammation, trauma and pregnancy. Correlation with other   laboratory and clinical findings is essential.      Lymph # 2022 2.7  1.0 - 4.8 K/uL Final    Mono # 2022 0.8  0.3 - 1.0 K/uL Final    Eos # 2022 0.1  0.0 - 0.5 K/uL Final    Baso # 2022 0.04  0.00 - 0.20 K/uL Final    nRBC 2022 0  0 /100 WBC Final    Gran % 2022 55.7  38.0 - 73.0 % Final    Lymph % 2022 32.8  18.0 - 48.0 % Final    Mono % 2022 9.9  4.0 - 15.0 % Final    Eosinophil % 2022 0.7  0.0 - 8.0 % Final    Basophil % 2022 0.5  0.0 - 1.9 % Final    Differential Method 2022 Automated   Final    TSH 2022 0.246 (L)  0.400 - 4.000 uIU/mL Final    Free T4 2022 0.99  0.71 - 1.51 ng/dL Final       Encounter Diagnosis   Name Primary?    Abnormal urine odor Yes        Orders Placed This Encounter   Procedures    Urinalysis, Reflex to Urine Culture Urine, Clean Catch     Standing Status:   Future     Standing Expiration Date:   3/17/2024     Order Specific Question:   Preferred Collection Type     Answer:   Urine, Clean Catch     Order Specific Question:   Specimen Source     Answer:   Urine        Will get UA and contact patient with results.    E-Visit Time Trackin min

## 2023-01-18 ENCOUNTER — LAB VISIT (OUTPATIENT)
Dept: LAB | Facility: HOSPITAL | Age: 73
End: 2023-01-18
Attending: FAMILY MEDICINE
Payer: MEDICARE

## 2023-01-18 ENCOUNTER — PATIENT MESSAGE (OUTPATIENT)
Dept: FAMILY MEDICINE | Facility: CLINIC | Age: 73
End: 2023-01-18
Payer: MEDICARE

## 2023-01-18 DIAGNOSIS — N39.0 URINARY TRACT INFECTION WITHOUT HEMATURIA, SITE UNSPECIFIED: Primary | ICD-10-CM

## 2023-01-18 DIAGNOSIS — R82.90 ABNORMAL URINE ODOR: ICD-10-CM

## 2023-01-18 DIAGNOSIS — N39.0 URINARY TRACT INFECTION WITHOUT HEMATURIA, SITE UNSPECIFIED: ICD-10-CM

## 2023-01-18 LAB
BACTERIA #/AREA URNS HPF: ABNORMAL /HPF
BILIRUB UR QL STRIP: NEGATIVE
CLARITY UR: ABNORMAL
COLOR UR: YELLOW
GLUCOSE UR QL STRIP: NEGATIVE
HGB UR QL STRIP: NEGATIVE
KETONES UR QL STRIP: NEGATIVE
LEUKOCYTE ESTERASE UR QL STRIP: ABNORMAL
MICROSCOPIC COMMENT: ABNORMAL
NITRITE UR QL STRIP: POSITIVE
PH UR STRIP: 7 [PH] (ref 5–8)
PROT UR QL STRIP: NEGATIVE
SP GR UR STRIP: 1.01 (ref 1–1.03)
SQUAMOUS #/AREA URNS HPF: 1 /HPF
URN SPEC COLLECT METH UR: ABNORMAL
WBC #/AREA URNS HPF: 4 /HPF (ref 0–5)

## 2023-01-18 PROCEDURE — 87086 URINE CULTURE/COLONY COUNT: CPT | Performed by: NURSE PRACTITIONER

## 2023-01-18 PROCEDURE — 81000 URINALYSIS NONAUTO W/SCOPE: CPT | Mod: PO | Performed by: FAMILY MEDICINE

## 2023-01-19 ENCOUNTER — PATIENT MESSAGE (OUTPATIENT)
Dept: FAMILY MEDICINE | Facility: CLINIC | Age: 73
End: 2023-01-19
Payer: MEDICARE

## 2023-01-19 RX ORDER — CIPROFLOXACIN 500 MG/1
500 TABLET ORAL 2 TIMES DAILY
Qty: 14 TABLET | Refills: 0 | Status: SHIPPED | OUTPATIENT
Start: 2023-01-19 | End: 2023-01-26

## 2023-01-25 ENCOUNTER — OFFICE VISIT (OUTPATIENT)
Dept: FAMILY MEDICINE | Facility: CLINIC | Age: 73
End: 2023-01-25
Payer: MEDICARE

## 2023-01-25 VITALS
TEMPERATURE: 98 F | SYSTOLIC BLOOD PRESSURE: 124 MMHG | WEIGHT: 166 LBS | HEART RATE: 90 BPM | BODY MASS INDEX: 29.41 KG/M2 | DIASTOLIC BLOOD PRESSURE: 70 MMHG | OXYGEN SATURATION: 98 % | HEIGHT: 63 IN

## 2023-01-25 DIAGNOSIS — N39.0 URINARY TRACT INFECTION WITHOUT HEMATURIA, SITE UNSPECIFIED: Primary | ICD-10-CM

## 2023-01-25 PROCEDURE — 99213 PR OFFICE/OUTPT VISIT, EST, LEVL III, 20-29 MIN: ICD-10-PCS | Mod: S$PBB,,, | Performed by: NURSE PRACTITIONER

## 2023-01-25 PROCEDURE — 99999 PR PBB SHADOW E&M-EST. PATIENT-LVL IV: CPT | Mod: PBBFAC,,, | Performed by: NURSE PRACTITIONER

## 2023-01-25 PROCEDURE — 99999 PR PBB SHADOW E&M-EST. PATIENT-LVL IV: ICD-10-PCS | Mod: PBBFAC,,, | Performed by: NURSE PRACTITIONER

## 2023-01-25 PROCEDURE — 99213 OFFICE O/P EST LOW 20 MIN: CPT | Mod: S$PBB,,, | Performed by: NURSE PRACTITIONER

## 2023-01-25 PROCEDURE — 99214 OFFICE O/P EST MOD 30 MIN: CPT | Mod: PBBFAC,PO | Performed by: NURSE PRACTITIONER

## 2023-01-25 NOTE — PROGRESS NOTES
Answers submitted by the patient for this visit:  High Blood Pressure Questionnaire (Submitted on 1/23/2023)  Chief Complaint: Hypertension  Chronicity: new  Onset: more than 1 year ago  Progression since onset: gradually worsening  Condition status: controlled  anxiety: Yes  blurred vision: No  chest pain: No  headaches: Yes  malaise/fatigue: No  neck pain: Yes  orthopnea: No  palpitations: No  peripheral edema: No  PND: No  shortness of breath: No  sweats: No  Agents associated with hypertension: no associated agents  CAD risks: family history  Compliance problems: no compliance problems  Past treatments: nothing

## 2023-01-25 NOTE — PROGRESS NOTES
"Subjective:       Patient ID: Lucinda Aguilera is a 72 y.o. female.    Chief Complaint: Dizziness    Patient says she has had mild dizziness since starting Cipro fpr UTI last week. Would like to have her Bp checked to ensure it is ok.    Past Medical History:  No date: Anticoagulant long-term use  No date: Anxiety      Comment:  takes daily Xanax  No date: Arthritis      Comment:  right thumb  No date: Cataract      Comment:  OU  No date: Cholelithiasis  No date: GERD (gastroesophageal reflux disease)  No date: Hepatic steatosis  No date: Hyperlipemia  No date: Hypertension  No date: PVD (posterior vitreous detachment)      Comment:  OD    Past Surgical History:  08/28/2020: BREAST BIOPSY; Left      Comment:  stereo biopsy  10/07/2020: BREAST BIOPSY; Left      Comment:  benign excisional biopsy  No date: chest abcess      Comment:  child  01/06/2012: COLONOSCOPY      Comment:  Dr. Lopez: hemorrhoids, redundant colon  2/17/2021: COLONOSCOPY; N/A      Comment:  Dr. Lopez: Congested and erythematous mucosa in the                rectum, in the recto-sigmoid colon and in the sigmoid                colon, proctosigmoid colitis, Redundant colon; biopsy:                focal active colitis "nonspecific but can be seen with                acute self-limited colitis (infection), medication effect               (e.g. NSAID use), proximity to diverticula, or                early/evolving IBD  6/6/2019: ESOPHAGOGASTRODUODENOSCOPY; N/A      Comment:  Dr. Lopez: small hiatal hernia, Non-erosive esophageal                reflux (NERD) disease?., slight antritis; biopsy: Antral                mucosa with chemical/reactive gastropathy. negative for h               pylori  2/17/2021: ESOPHAGOGASTRODUODENOSCOPY; N/A      Comment:  Procedure: EGD (ESOPHAGOGASTRODUODENOSCOPY);  Surgeon:                Nathan Lopez Jr., MD;  Location: [facility];  Service:               Endoscopy;  Laterality: N/A; Minimal gastritis; biopsy:            "     stomach- negative for h pylori, active chronic gastritis                with focal features of erosive gastritis, duodenum WNL  5/10/2022: ESOPHAGOGASTRODUODENOSCOPY; N/A      Comment:  Procedure: EGD (ESOPHAGOGASTRODUODENOSCOPY);  Surgeon:                Nathan Lopez Jr., MD;  Location: [facility];  Service:               Endoscopy;  Laterality: N/A;  10/7/2020: EXCISIONAL BIOPSY; Left      Comment:  Procedure: EXCISIONAL BIOPSY-Left with radiological                marker;  Surgeon: Brooklynn Ashford MD;  Location: [facility];                Service: General;  Laterality: Left;  No date: JOINT REPLACEMENT; Bilateral      Comment:  knee  No date: KNEE ARTHROSCOPY W/ LASER      Comment:  right  6/14/2019: LAPAROSCOPIC CHOLECYSTECTOMY; N/A      Comment:  Procedure: CHOLECYSTECTOMY, LAPAROSCOPIC;  Surgeon:                Guevara Evans MD;  [facility];  Service:                General;  Laterality: N/A;  11/2014: thumb surgery  6/19/2018: TOTAL KNEE ARTHROPLASTY; Left      Comment:  Procedure: REPLACEMENT-KNEE-TOTAL;  Surgeon: Nj Melvin MD;  Location: [address];  Service:                Orthopedics;  Laterality: Left;  [place]  07/13/2017: UPPER GASTROINTESTINAL ENDOSCOPY      Comment:  Dr. Lopez: NERD, Gastric mucosal atrophy. antritis, Scar               in the incisura. Biopsied; biopsy: chronic gastritis.                negative for h pylori    Review of patient's family history indicates:      Social History    Socioeconomic History      Marital status:       Number of children: 2    Occupational History      Occupation: retired teacher and principal    Tobacco Use      Smoking status: Never      Smokeless tobacco: Never    Substance and Sexual Activity      Alcohol use: Yes        Comment: social- maybe once every few months      Drug use: No      Sexual activity: Yes        Partners: Male        Birth control/protection: None, Post-menopausal    Social Determinants  of Health  Financial Resource Strain: Low Risk       Difficulty of Paying Living Expenses: Not hard at all  Food Insecurity: No Food Insecurity      Worried About Running Out of Food in the Last Year: Never true      Ran Out of Food in the Last Year: Never true  Transportation Needs: No Transportation Needs      Lack of Transportation (Medical): No      Lack of Transportation (Non-Medical): No  Physical Activity: Insufficiently Active      Days of Exercise per Week: 4 days      Minutes of Exercise per Session: 20 min  Stress: Stress Concern Present      Feeling of Stress : Rather much  Social Connections: Unknown      Frequency of Communication with Friends and Family: More than three times a week      Frequency of Social Gatherings with Friends and Family: More than three times a week      Active Member of Clubs or Organizations: Yes      Attends Club or Organization Meetings: Never      Marital Status:   Housing Stability: Low Risk       Unable to Pay for Housing in the Last Year: No      Number of Places Lived in the Last Year: 1      Unstable Housing in the Last Year: No    Current Outpatient Medications:  acetaminophen (TYLENOL) 500 MG tablet, Take 500 mg by mouth every 6 (six) hours as needed for Pain., Disp: , Rfl:   artificial tears ointment (ARTIFICIAL TEARS) Oint, every evening., Disp: , Rfl:   ascorbic acid, vitamin C, (VITAMIN C) 250 MG tablet, Take 500 mg by mouth once daily. , Disp: , Rfl:   aspirin (ECOTRIN) 81 MG EC tablet, Take 81 mg by mouth once daily., Disp: , Rfl:   balsalazide (COLAZAL) 750 mg capsule, TAKE 3 CAPSULES(2250 MG) BY MOUTH TWICE DAILY WITH MEALS, Disp: 180 capsule, Rfl: 11  benzonatate (TESSALON) 200 MG capsule, Take 1 capsule (200 mg total) by mouth every evening., Disp: 30 capsule, Rfl: 5  chlorthalidone (HYGROTEN) 25 MG Tab, TAKE 1 TABLET BY MOUTH EVERY MORNING for blood pressure, Disp: 90 tablet, Rfl: 2  famotidine (PEPCID) 40 MG tablet, Take 1 tablet (40 mg total) by  mouth nightly., Disp: 90 tablet, Rfl: 3  irbesartan (AVAPRO) 300 MG tablet, TAKE 1 TABLET(300 MG) BY MOUTH EVERY EVENING, Disp: 90 tablet, Rfl: 3  LORazepam (ATIVAN) 0.5 MG tablet, TAKE 1 TABLET(0.5 MG) BY MOUTH TWICE DAILY, Disp: 60 tablet, Rfl: 5  multivitamin (THERAGRAN) per tablet, Take 1 tablet by mouth once daily., Disp: , Rfl:   niacin 500 MG CpSR, Take 500 mg by mouth every evening., Disp: , Rfl:   ondansetron (ZOFRAN-ODT) 4 MG TbDL, DISSOLVE 1 TABLET ON THE TONGUE EVERY 8 HOURS AS NEEDED FOR NAUSEA, Disp: 30 tablet, Rfl: 3  pantoprazole (PROTONIX) 40 MG tablet, Take 1 tablet (40 mg total) by mouth before breakfast., Disp: 90 tablet, Rfl: 3  potassium chloride (MICRO-K) 10 MEQ CpSR, TAKE 2 CAPSULES(20 MEQ) BY MOUTH EVERY DAY, Disp: 180 capsule, Rfl: 3  pravastatin (PRAVACHOL) 40 MG tablet, Take 1 tablet (40 mg total) by mouth once daily., Disp: 90 tablet, Rfl: 3  venlafaxine (EFFEXOR-XR) 37.5 MG 24 hr capsule, Take 1 capsule (37.5 mg total) by mouth once daily., Disp: 90 capsule, Rfl: 3  vitamin D (VITAMIN D3) 1000 units Tab, Take 1,000 Units by mouth once daily., Disp: , Rfl:     No current facility-administered medications for this visit.      Review of patient's allergies indicates:  No Known Allergies     Hypertension  This is a new problem. The current episode started more than 1 year ago. The problem has been gradually worsening since onset. The problem is controlled. Associated symptoms include anxiety, headaches and neck pain. Pertinent negatives include no blurred vision, chest pain, malaise/fatigue, orthopnea, palpitations, peripheral edema, PND, shortness of breath or sweats. There are no associated agents to hypertension. Risk factors for coronary artery disease include family history. Past treatments include nothing. There are no compliance problems.    Review of Systems   Constitutional:  Negative for chills, fever and malaise/fatigue.   Eyes:  Negative for blurred vision.   Respiratory:   Negative for shortness of breath.    Cardiovascular:  Negative for chest pain, palpitations, orthopnea and PND.   Musculoskeletal:  Positive for neck pain.   Neurological:  Positive for light-headedness and headaches.       Objective:      Physical Exam  Constitutional:       General: She is not in acute distress.     Appearance: Normal appearance.   HENT:      Head: Normocephalic and atraumatic.      Right Ear: Tympanic membrane normal.      Left Ear: Tympanic membrane normal.   Cardiovascular:      Rate and Rhythm: Normal rate and regular rhythm.      Heart sounds: No murmur heard.  Pulmonary:      Effort: Pulmonary effort is normal. No respiratory distress.      Breath sounds: Normal breath sounds.   Neurological:      Mental Status: She is alert and oriented to person, place, and time.      Cranial Nerves: Cranial nerves 2-12 are intact.      Motor: Motor function is intact.      Coordination: Coordination is intact.   Psychiatric:         Mood and Affect: Mood normal.         Behavior: Behavior normal.       Assessment:       Problem List Items Addressed This Visit    None  Visit Diagnoses       Urinary tract infection without hematuria, site unspecified    -  Primary    Relevant Orders    Urine culture            Plan:       1. Urinary tract infection without hematuria, site unspecified  Likely medication side effects, vital signs and exam normal. Repeat urinalysis when antibitoic is complete in 2 days, follow up for any new or worsening symptoms.   - Urine culture; Future

## 2023-01-31 ENCOUNTER — EXTERNAL CHRONIC CARE MANAGEMENT (OUTPATIENT)
Dept: PRIMARY CARE CLINIC | Facility: CLINIC | Age: 73
End: 2023-01-31
Payer: MEDICARE

## 2023-01-31 PROCEDURE — 99490 CHRNC CARE MGMT STAFF 1ST 20: CPT | Mod: S$PBB,,, | Performed by: FAMILY MEDICINE

## 2023-01-31 PROCEDURE — 99490 PR CHRONIC CARE MGMT, 1ST 20 MIN: ICD-10-PCS | Mod: S$PBB,,, | Performed by: FAMILY MEDICINE

## 2023-01-31 PROCEDURE — 99490 CHRNC CARE MGMT STAFF 1ST 20: CPT | Mod: PBBFAC,PO | Performed by: FAMILY MEDICINE

## 2023-02-02 LAB
BACTERIA UR CULT: NORMAL
BACTERIA UR CULT: NORMAL

## 2023-02-20 ENCOUNTER — OFFICE VISIT (OUTPATIENT)
Dept: FAMILY MEDICINE | Facility: CLINIC | Age: 73
End: 2023-02-20
Payer: MEDICARE

## 2023-02-20 VITALS
BODY MASS INDEX: 28.95 KG/M2 | OXYGEN SATURATION: 96 % | SYSTOLIC BLOOD PRESSURE: 122 MMHG | TEMPERATURE: 98 F | HEART RATE: 100 BPM | WEIGHT: 163.38 LBS | DIASTOLIC BLOOD PRESSURE: 86 MMHG | HEIGHT: 63 IN

## 2023-02-20 DIAGNOSIS — B37.0 THRUSH: ICD-10-CM

## 2023-02-20 DIAGNOSIS — J02.9 SORE THROAT: Primary | ICD-10-CM

## 2023-02-20 LAB
CTP QC/QA: YES
MOLECULAR STREP A: NEGATIVE
POC MOLECULAR INFLUENZA A AGN: NEGATIVE
POC MOLECULAR INFLUENZA B AGN: NEGATIVE
SARS-COV-2 RDRP RESP QL NAA+PROBE: NEGATIVE

## 2023-02-20 PROCEDURE — 87651 STREP A DNA AMP PROBE: CPT | Mod: PBBFAC,PO | Performed by: NURSE PRACTITIONER

## 2023-02-20 PROCEDURE — 99214 OFFICE O/P EST MOD 30 MIN: CPT | Mod: PBBFAC,PO | Performed by: NURSE PRACTITIONER

## 2023-02-20 PROCEDURE — 99999 PR PBB SHADOW E&M-EST. PATIENT-LVL IV: CPT | Mod: PBBFAC,,, | Performed by: NURSE PRACTITIONER

## 2023-02-20 PROCEDURE — 87502 INFLUENZA DNA AMP PROBE: CPT | Mod: PBBFAC,PO | Performed by: NURSE PRACTITIONER

## 2023-02-20 PROCEDURE — 99214 PR OFFICE/OUTPT VISIT, EST, LEVL IV, 30-39 MIN: ICD-10-PCS | Mod: S$PBB,CS,, | Performed by: NURSE PRACTITIONER

## 2023-02-20 PROCEDURE — 99999 PR PBB SHADOW E&M-EST. PATIENT-LVL IV: ICD-10-PCS | Mod: PBBFAC,,, | Performed by: NURSE PRACTITIONER

## 2023-02-20 PROCEDURE — 99214 OFFICE O/P EST MOD 30 MIN: CPT | Mod: S$PBB,CS,, | Performed by: NURSE PRACTITIONER

## 2023-02-20 PROCEDURE — 87635 SARS-COV-2 COVID-19 AMP PRB: CPT | Mod: PBBFAC,PO | Performed by: NURSE PRACTITIONER

## 2023-02-20 RX ORDER — LIDOCAINE HYDROCHLORIDE 20 MG/ML
SOLUTION ORAL; TOPICAL
COMMUNITY
Start: 2023-02-16 | End: 2023-04-08 | Stop reason: CLARIF

## 2023-02-20 RX ORDER — FLUCONAZOLE 100 MG/1
TABLET ORAL
Qty: 8 TABLET | Refills: 0 | Status: SHIPPED | OUTPATIENT
Start: 2023-02-20 | End: 2023-04-08 | Stop reason: CLARIF

## 2023-02-20 NOTE — PROGRESS NOTES
Subjective:       Patient ID: Lucinda Aguilera is a 72 y.o. female.    Chief Complaint: Sore Throat    HPI  Patient presents for sore throat  Onset 5 days ago. No ill contacts  Evaluated at urgent care 4 days ago--strep negative. Prescribed magic mouthwash--no improvement  Denies fever. Increased mucus production--improved with mucinex but sore throat persists    Treated for UTI with cipro last month    Hx GERD: compliant with pantoprazole and pepcid. Denies heartburn, regurgitation, belching     Vitals:    02/20/23 1108   BP: 122/86   Pulse: 100   Temp: 97.6 °F (36.4 °C)     Review of Systems   Constitutional:  Negative for fever.   HENT:  Positive for sore throat.    Hematological:  Negative for adenopathy.     Past Medical History:   Diagnosis Date    Anticoagulant long-term use     Anxiety     takes daily Xanax    Arthritis     right thumb    Cataract     OU    Cholelithiasis     GERD (gastroesophageal reflux disease)     Hepatic steatosis     Hyperlipemia     Hypertension     PVD (posterior vitreous detachment)     OD     Objective:      Physical Exam  Vitals and nursing note reviewed.   Constitutional:       General: She is not in acute distress.     Appearance: She is not diaphoretic.   HENT:      Head: Normocephalic.      Right Ear: Hearing, tympanic membrane, ear canal and external ear normal.      Left Ear: Hearing, tympanic membrane, ear canal and external ear normal.      Mouth/Throat:      Comments: +white plaque to back of tongue; fissuring   Eyes:      General:         Right eye: No discharge.         Left eye: No discharge.   Neck:      Trachea: No tracheal deviation.   Cardiovascular:      Rate and Rhythm: Normal rate.   Pulmonary:      Effort: Pulmonary effort is normal.      Breath sounds: Normal breath sounds.   Lymphadenopathy:      Head:      Right side of head: No submental, submandibular or tonsillar adenopathy.      Left side of head: No submental, submandibular or tonsillar adenopathy.    Skin:     Coloration: Skin is not pale.   Neurological:      Mental Status: She is alert and oriented to person, place, and time.   Psychiatric:         Speech: Speech normal.         Behavior: Behavior normal.         Thought Content: Thought content normal.         Judgment: Judgment normal.       Assessment:       1. Sore throat    2. Thrush          Plan:       Sore throat  -     POCT Strep A, Molecular  -     POCT COVID-19 Rapid Screening; Future; Expected date: 02/20/2023  -     POCT Influenza A/B Molecular    Thrush  -     fluconazole (DIFLUCAN) 100 MG tablet; Take 2 tabs today, then 1 tablet daily x 6 days  Dispense: 8 tablet; Refill: 0      Strep, covid, flu negative   education provided on supportive care, symptom monitoring and return precautions             Medication List with Changes/Refills   New Medications    FLUCONAZOLE (DIFLUCAN) 100 MG TABLET    Take 2 tabs today, then 1 tablet daily x 6 days   Current Medications    ACETAMINOPHEN (TYLENOL) 500 MG TABLET    Take 500 mg by mouth every 6 (six) hours as needed for Pain.    ARTIFICIAL TEARS OINTMENT (ARTIFICIAL TEARS) OINT    every evening.    ASCORBIC ACID, VITAMIN C, (VITAMIN C) 250 MG TABLET    Take 500 mg by mouth once daily.     ASPIRIN (ECOTRIN) 81 MG EC TABLET    Take 81 mg by mouth once daily.    BALSALAZIDE (COLAZAL) 750 MG CAPSULE    TAKE 3 CAPSULES(2250 MG) BY MOUTH TWICE DAILY WITH MEALS    BENZONATATE (TESSALON) 200 MG CAPSULE    Take 1 capsule (200 mg total) by mouth every evening.    CHLORTHALIDONE (HYGROTEN) 25 MG TAB    TAKE 1 TABLET BY MOUTH EVERY MORNING for blood pressure    FAMOTIDINE (PEPCID) 40 MG TABLET    Take 1 tablet (40 mg total) by mouth nightly.    IRBESARTAN (AVAPRO) 300 MG TABLET    TAKE 1 TABLET(300 MG) BY MOUTH EVERY EVENING    LIDOCAINE VISCOUS 2 % SOLUTION    Take by mouth.    LORAZEPAM (ATIVAN) 0.5 MG TABLET    TAKE 1 TABLET(0.5 MG) BY MOUTH TWICE DAILY    MULTIVITAMIN (THERAGRAN) PER TABLET    Take 1 tablet by  mouth once daily.    NIACIN 500 MG CPSR    Take 500 mg by mouth every evening.    ONDANSETRON (ZOFRAN-ODT) 4 MG TBDL    DISSOLVE 1 TABLET ON THE TONGUE EVERY 8 HOURS AS NEEDED FOR NAUSEA    PANTOPRAZOLE (PROTONIX) 40 MG TABLET    Take 1 tablet (40 mg total) by mouth before breakfast.    POTASSIUM CHLORIDE (MICRO-K) 10 MEQ CPSR    TAKE 2 CAPSULES(20 MEQ) BY MOUTH EVERY DAY    PRAVASTATIN (PRAVACHOL) 40 MG TABLET    Take 1 tablet (40 mg total) by mouth once daily.    VENLAFAXINE (EFFEXOR-XR) 37.5 MG 24 HR CAPSULE    Take 1 capsule (37.5 mg total) by mouth once daily.    VITAMIN D (VITAMIN D3) 1000 UNITS TAB    Take 1,000 Units by mouth once daily.

## 2023-02-23 ENCOUNTER — OFFICE VISIT (OUTPATIENT)
Dept: FAMILY MEDICINE | Facility: CLINIC | Age: 73
End: 2023-02-23
Payer: MEDICARE

## 2023-02-23 VITALS
OXYGEN SATURATION: 95 % | SYSTOLIC BLOOD PRESSURE: 124 MMHG | HEIGHT: 63 IN | RESPIRATION RATE: 18 BRPM | HEART RATE: 92 BPM | TEMPERATURE: 98 F | DIASTOLIC BLOOD PRESSURE: 60 MMHG | WEIGHT: 162.94 LBS | BODY MASS INDEX: 28.87 KG/M2

## 2023-02-23 DIAGNOSIS — K21.9 GASTROESOPHAGEAL REFLUX DISEASE WITHOUT ESOPHAGITIS: ICD-10-CM

## 2023-02-23 DIAGNOSIS — E78.5 HYPERLIPIDEMIA, UNSPECIFIED HYPERLIPIDEMIA TYPE: ICD-10-CM

## 2023-02-23 DIAGNOSIS — I10 ESSENTIAL HYPERTENSION: ICD-10-CM

## 2023-02-23 DIAGNOSIS — F41.9 ANXIETY: Primary | ICD-10-CM

## 2023-02-23 DIAGNOSIS — I70.0 AORTIC ATHEROSCLEROSIS: ICD-10-CM

## 2023-02-23 PROCEDURE — 99214 OFFICE O/P EST MOD 30 MIN: CPT | Mod: PBBFAC,PO | Performed by: FAMILY MEDICINE

## 2023-02-23 PROCEDURE — 99214 PR OFFICE/OUTPT VISIT, EST, LEVL IV, 30-39 MIN: ICD-10-PCS | Mod: S$PBB,,, | Performed by: FAMILY MEDICINE

## 2023-02-23 PROCEDURE — 99214 OFFICE O/P EST MOD 30 MIN: CPT | Mod: S$PBB,,, | Performed by: FAMILY MEDICINE

## 2023-02-23 PROCEDURE — 99999 PR PBB SHADOW E&M-EST. PATIENT-LVL IV: CPT | Mod: PBBFAC,,, | Performed by: FAMILY MEDICINE

## 2023-02-23 PROCEDURE — 99999 PR PBB SHADOW E&M-EST. PATIENT-LVL IV: ICD-10-PCS | Mod: PBBFAC,,, | Performed by: FAMILY MEDICINE

## 2023-02-23 NOTE — PROGRESS NOTES
Subjective:       Patient ID: Lucinda Aguilera is a 72 y.o. female.    Chief Complaint: Anxiety (6 month follow up)      HPI Comments: Here for f/u on chronic health issues    Recently recovered from thrush    Depression - tolerating Effexor XR 37.5mg; she could not tolerate the 75mg dose; she still has worry. She did some counseling.  HLD, aortic atherosclerosis - tolerating pravachol 40mg daily  Anxiety - Taking lorazepam BID. Doing some counseling presently  HTN - tolerating Avapro 300mg daily and Hygroten 25mg daily; BP controlled; digital flowsheet reviewed  She got some swelling with amlodipine  GERD - tolerating Protonix 40mg daily  S/p conner - taking colestipol    Past Medical History:    Hypertension                                                  Anxiety                                           Hyperlipemia                                                  GERD (gastroesophageal reflux disease)                        Cataract                                                        Comment:OU    PVD (posterior vitreous detachment)                             Comment:OD    Arthritis                                                       Comment:right thumb    Past Surgical History:    chest abcess                                                     Comment:child    KNEE ARTHROSCOPY W/ LASER                                        Comment:right    COLONOSCOPY                                      1/2012          Comment:repeat in 5 years    No Known Allergies    Social History    Marital Status:              Spouse Name:                       Years of Education:                 Number of children: 2             Occupational History  Occupation          Employer            Comment               retired                                   retired teacher an*                         Social History Main Topics    Smoking Status: Never Smoker                      Smokeless Status: Never Used                         Alcohol Use: Yes                Comment: social    Drug Use: No              Sexual Activity: Yes               Partners with: Male       Birth Control/Protection: None, Post-menopausal    Current Outpatient Medications on File Prior to Visit   Medication Sig Dispense Refill    acetaminophen (TYLENOL) 500 MG tablet Take 500 mg by mouth every 6 (six) hours as needed for Pain.      artificial tears ointment (ARTIFICIAL TEARS) Oint every evening.      ascorbic acid, vitamin C, (VITAMIN C) 250 MG tablet Take 500 mg by mouth once daily.       aspirin (ECOTRIN) 81 MG EC tablet Take 81 mg by mouth once daily.      balsalazide (COLAZAL) 750 mg capsule TAKE 3 CAPSULES(2250 MG) BY MOUTH TWICE DAILY WITH MEALS 180 capsule 11    chlorthalidone (HYGROTEN) 25 MG Tab TAKE 1 TABLET BY MOUTH EVERY MORNING for blood pressure 90 tablet 2    famotidine (PEPCID) 40 MG tablet Take 1 tablet (40 mg total) by mouth nightly. 90 tablet 3    fluconazole (DIFLUCAN) 100 MG tablet Take 2 tabs today, then 1 tablet daily x 6 days 8 tablet 0    irbesartan (AVAPRO) 300 MG tablet TAKE 1 TABLET(300 MG) BY MOUTH EVERY EVENING 90 tablet 3    LIDOCAINE VISCOUS 2 % solution Take by mouth.      LORazepam (ATIVAN) 0.5 MG tablet TAKE 1 TABLET(0.5 MG) BY MOUTH TWICE DAILY 60 tablet 5    multivitamin (THERAGRAN) per tablet Take 1 tablet by mouth once daily.      niacin 500 MG CpSR Take 500 mg by mouth every evening.      ondansetron (ZOFRAN-ODT) 4 MG TbDL DISSOLVE 1 TABLET ON THE TONGUE EVERY 8 HOURS AS NEEDED FOR NAUSEA 30 tablet 3    potassium chloride (MICRO-K) 10 MEQ CpSR TAKE 2 CAPSULES(20 MEQ) BY MOUTH EVERY  capsule 3    pravastatin (PRAVACHOL) 40 MG tablet Take 1 tablet (40 mg total) by mouth once daily. 90 tablet 3    venlafaxine (EFFEXOR-XR) 37.5 MG 24 hr capsule Take 1 capsule (37.5 mg total) by mouth once daily. 90 capsule 3    vitamin D (VITAMIN D3) 1000 units Tab Take 1,000 Units by mouth once daily.      pantoprazole (PROTONIX) 40 MG  "tablet Take 1 tablet (40 mg total) by mouth before breakfast. (Patient not taking: Reported on 2/23/2023) 90 tablet 3     No current facility-administered medications on file prior to visit.     Review of patient's family history indicates:    Hypertension                   Mother                    Heart disease                  Mother                    Stroke                         Father                    Review of Systems   Constitutional: Negative for fever, chills, appetite change and unexpected weight change.   HENT: Negative for sore throat and trouble swallowing.    Eyes: Negative for pain and visual disturbance.   Respiratory: Negative for cough, shortness of breath and wheezing.    Cardiovascular: Negative for chest pain and palpitations.   Gastrointestinal: Negative for nausea, vomiting, abdominal pain, diarrhea and blood in stool.   Genitourinary: Negative for dysuria, hematuria and difficulty urinating.   Musculoskeletal: Negative for arthralgias, gait problem and neck pain.   Skin: Negative for rash and wound.   Neurological: Negative for dizziness, weakness, numbness and headaches.   Hematological: Negative for adenopathy.   Psychiatric/Behavioral: Negative for dysphoric mood.           Objective:       /60   Pulse 92   Temp 97.5 °F (36.4 °C) (Oral)   Resp 18   Ht 5' 3" (1.6 m)   Wt 73.9 kg (162 lb 14.7 oz)   LMP 04/18/2004   SpO2 95%   BMI 28.86 kg/m²     Physical Exam   Constitutional: She is oriented to person, place, and time. She appears well-developed and well-nourished.   HENT:   Head: Normocephalic.   Mouth/Throat: Oropharynx is clear and moist. No oropharyngeal exudate or posterior oropharyngeal erythema.   Eyes: Conjunctivae and EOM are normal. Pupils are equal, round, and reactive to light.   Neck: Normal range of motion. Neck supple. No thyromegaly present.   Cardiovascular: Normal rate, regular rhythm, S1 normal, S2 normal, normal heart sounds and intact distal pulses.  " Exam reveals no gallop and no friction rub.    No murmur heard.  Pulmonary/Chest: Effort normal and breath sounds normal. She has no wheezes. She has no rales.   Abdominal: Normal appearance.   Musculoskeletal:        Right lower leg: She exhibits no edema.        Left lower leg: She exhibits no edema.   Lymphadenopathy:     She has no cervical adenopathy.   Neurological: She is alert and oriented to person, place, and time. No cranial nerve deficit. Gait normal.   Skin: Skin is warm and intact. No rash noted.   Psychiatric: She has a normal mood and affect.         Results for orders placed or performed in visit on 02/20/23   POCT Strep A, Molecular   Result Value Ref Range    Molecular Strep A, POC Negative Negative     Acceptable Yes    POCT Influenza A/B Molecular   Result Value Ref Range    POC Molecular Influenza A Ag Negative Negative, Not Reported    POC Molecular Influenza B Ag Negative Negative, Not Reported     Acceptable Yes    POCT COVID-19 Rapid Screening   Result Value Ref Range    POC Rapid COVID Negative Negative     Acceptable Yes      *Note: Due to a large number of results and/or encounters for the requested time period, some results have not been displayed. A complete set of results can be found in Results Review.         Assessment:       1. Anxiety    2. Essential hypertension    3. Aortic atherosclerosis    4. Hyperlipidemia, unspecified hyperlipidemia type    5. Gastroesophageal reflux disease without esophagitis            Plan:       Anxiety  -     Ambulatory referral/consult to Psychiatry; Future; Expected date: 03/02/2023    Essential hypertension  -     Comprehensive Metabolic Panel; Future; Expected date: 08/22/2023  -     Lipid Panel; Future; Expected date: 08/22/2023  -     CBC Auto Differential; Future; Expected date: 08/22/2023  -     TSH; Future; Expected date: 08/22/2023    Aortic atherosclerosis    Hyperlipidemia, unspecified  hyperlipidemia type    Gastroesophageal reflux disease without esophagitis         continue Ativan BID  Effexor XR 37.5mg daily; therapy referral  Overall stable  cotninue aspirin 81mg daily  Continue other present meds  Counseled on regular exercise, maintenance of a healthy weight, balanced diet rich in fruits/vegetables and lean protein, and avoidance of unhealthy habits like smoking and excessive alcohol intake.  F/u 6 months with labs

## 2023-02-25 PROBLEM — I70.0 AORTIC ATHEROSCLEROSIS: Status: ACTIVE | Noted: 2023-02-25

## 2023-02-28 ENCOUNTER — EXTERNAL CHRONIC CARE MANAGEMENT (OUTPATIENT)
Dept: PRIMARY CARE CLINIC | Facility: CLINIC | Age: 73
End: 2023-02-28
Payer: MEDICARE

## 2023-02-28 ENCOUNTER — PATIENT MESSAGE (OUTPATIENT)
Dept: PSYCHIATRY | Facility: CLINIC | Age: 73
End: 2023-02-28
Payer: MEDICARE

## 2023-02-28 PROCEDURE — 99490 CHRNC CARE MGMT STAFF 1ST 20: CPT | Mod: PBBFAC,PO | Performed by: FAMILY MEDICINE

## 2023-02-28 PROCEDURE — 99490 CHRNC CARE MGMT STAFF 1ST 20: CPT | Mod: S$PBB,,, | Performed by: FAMILY MEDICINE

## 2023-02-28 PROCEDURE — 99490 PR CHRONIC CARE MGMT, 1ST 20 MIN: ICD-10-PCS | Mod: S$PBB,,, | Performed by: FAMILY MEDICINE

## 2023-03-28 ENCOUNTER — OFFICE VISIT (OUTPATIENT)
Dept: ORTHOPEDICS | Facility: CLINIC | Age: 73
End: 2023-03-28
Payer: MEDICARE

## 2023-03-28 VITALS — WEIGHT: 162 LBS | BODY MASS INDEX: 28.7 KG/M2 | HEIGHT: 63 IN

## 2023-03-28 DIAGNOSIS — M70.61 GREATER TROCHANTERIC BURSITIS OF RIGHT HIP: Primary | ICD-10-CM

## 2023-03-28 PROCEDURE — 99999 PR PBB SHADOW E&M-EST. PATIENT-LVL III: ICD-10-PCS | Mod: PBBFAC,,, | Performed by: ORTHOPAEDIC SURGERY

## 2023-03-28 PROCEDURE — 20610 DRAIN/INJ JOINT/BURSA W/O US: CPT | Mod: PBBFAC,PN,RT | Performed by: ORTHOPAEDIC SURGERY

## 2023-03-28 PROCEDURE — 99499 UNLISTED E&M SERVICE: CPT | Mod: S$PBB,,, | Performed by: ORTHOPAEDIC SURGERY

## 2023-03-28 PROCEDURE — 99213 OFFICE O/P EST LOW 20 MIN: CPT | Mod: PBBFAC,PN | Performed by: ORTHOPAEDIC SURGERY

## 2023-03-28 PROCEDURE — 20610 LARGE JOINT ASPIRATION/INJECTION: R GREATER TROCHANTERIC BURSA: ICD-10-PCS | Mod: S$PBB,RT,, | Performed by: ORTHOPAEDIC SURGERY

## 2023-03-28 PROCEDURE — 99999 PR PBB SHADOW E&M-EST. PATIENT-LVL III: CPT | Mod: PBBFAC,,, | Performed by: ORTHOPAEDIC SURGERY

## 2023-03-28 PROCEDURE — 99499 NO LOS: ICD-10-PCS | Mod: S$PBB,,, | Performed by: ORTHOPAEDIC SURGERY

## 2023-03-28 RX ADMIN — TRIAMCINOLONE ACETONIDE 40 MG: 40 INJECTION, SUSPENSION INTRA-ARTICULAR; INTRAMUSCULAR at 10:03

## 2023-03-30 ENCOUNTER — PATIENT MESSAGE (OUTPATIENT)
Dept: FAMILY MEDICINE | Facility: CLINIC | Age: 73
End: 2023-03-30
Payer: MEDICARE

## 2023-03-31 ENCOUNTER — EXTERNAL CHRONIC CARE MANAGEMENT (OUTPATIENT)
Dept: PRIMARY CARE CLINIC | Facility: CLINIC | Age: 73
End: 2023-03-31
Payer: MEDICARE

## 2023-03-31 PROCEDURE — 99490 CHRNC CARE MGMT STAFF 1ST 20: CPT | Mod: S$PBB,,, | Performed by: FAMILY MEDICINE

## 2023-03-31 PROCEDURE — 99490 CHRNC CARE MGMT STAFF 1ST 20: CPT | Mod: PBBFAC,PO | Performed by: FAMILY MEDICINE

## 2023-03-31 PROCEDURE — 99490 PR CHRONIC CARE MGMT, 1ST 20 MIN: ICD-10-PCS | Mod: S$PBB,,, | Performed by: FAMILY MEDICINE

## 2023-04-03 RX ORDER — TRIAMCINOLONE ACETONIDE 40 MG/ML
40 INJECTION, SUSPENSION INTRA-ARTICULAR; INTRAMUSCULAR
Status: DISCONTINUED | OUTPATIENT
Start: 2023-03-28 | End: 2023-04-03 | Stop reason: HOSPADM

## 2023-04-03 NOTE — PROCEDURES
Large Joint Aspiration/Injection: R greater trochanteric bursa    Date/Time: 3/28/2023 10:00 AM  Performed by: Nick Arnold MD  Authorized by: Nick Arnold MD     Consent Done?:  Yes (Verbal)  Indications:  Pain  Timeout: prior to procedure the correct patient, procedure, and site was verified    Prep: patient was prepped and draped in usual sterile fashion      Local anesthesia used?: Yes    Local anesthetic:  Lidocaine 1% without epinephrine  Anesthetic total (ml):  5      Details:  Needle Size:  22 G  Approach:  Lateral  Location:  Hip  Site:  R greater trochanteric bursa  Medications:  40 mg triamcinolone acetonide 40 mg/mL  Patient tolerance:  Patient tolerated the procedure well with no immediate complications

## 2023-04-03 NOTE — PROGRESS NOTES
"Chief Complaint   Patient presents with    Right Hip - Pain, Injections     Chief Complaint   Patient presents with    Right Hip - Pain, Injections      HPI:   This is a 72 y.o. who presents to clinic today for right hip pain for the past 1 weeks after no known trauma. Complains of pain while sleeping on side and when descending stairs. Pain is dull. No numbness or tingling. No associated signs or symptoms.      Past Medical History:   Diagnosis Date    Anticoagulant long-term use     Anxiety     takes daily Xanax    Arthritis     right thumb    Cataract     OU    Cholelithiasis     GERD (gastroesophageal reflux disease)     Hepatic steatosis     Hyperlipemia     Hypertension     PVD (posterior vitreous detachment)     OD     Past Surgical History:   Procedure Laterality Date    BREAST BIOPSY Left 08/28/2020    stereo biopsy    BREAST BIOPSY Left 10/07/2020    benign excisional biopsy    chest abcess      child    COLONOSCOPY  01/06/2012    Dr. Lopez: hemorrhoids, redundant colon    COLONOSCOPY N/A 02/17/2021    Dr. Lopez: Congested and erythematous mucosa in the rectum, in the recto-sigmoid colon and in the sigmoid colon, proctosigmoid colitis, Redundant colon; biopsy: focal active colitis "nonspecific but can be seen with acute self-limited colitis (infection), medication effect (e.g. NSAID use), proximity to diverticula, or early/evolving IBD    ESOPHAGOGASTRODUODENOSCOPY N/A 06/06/2019    Dr. Lopez: small hiatal hernia, Non-erosive esophageal reflux (NERD) disease?., slight antritis; biopsy: Antral mucosa with chemical/reactive gastropathy. negative for h pylori    ESOPHAGOGASTRODUODENOSCOPY N/A 02/17/2021    Procedure: EGD (ESOPHAGOGASTRODUODENOSCOPY);  Surgeon: Nathan Lopez Jr., MD;  Location: [facility];  Service: Endoscopy;  Laterality: N/A; Minimal gastritis; biopsy: stomach- negative for h pylori, active chronic gastritis with focal features of erosive gastritis, duodenum WNL    " ESOPHAGOGASTRODUODENOSCOPY N/A 05/10/2022    Procedure: EGD (ESOPHAGOGASTRODUODENOSCOPY);  Surgeon: Nathan Lopez Jr., MD;  Location: [facility] END[facility];  Service: Endoscopy;  Laterality: N/A;    EXCISIONAL BIOPSY Left 10/07/2020    Procedure: EXCISIONAL BIOPSY-Left with radiological marker;  Surgeon: Brooklynn Ashford MD;  Location: [facility];  Service: General;  Laterality: Left;    FRACTURE SURGERY  2010    Left thumb repaired surgically    JOINT REPLACEMENT Bilateral     knee    KNEE ARTHROSCOPY W/ LASER      right    LAPAROSCOPIC CHOLECYSTECTOMY N/A 06/14/2019    Procedure: CHOLECYSTECTOMY, LAPAROSCOPIC;  Surgeon: Guevara Evans MD;  Location: [facility];  Service: General;  Laterality: N/A;    thumb surgery  11/2014    TOTAL KNEE ARTHROPLASTY Left 06/19/2018    Procedure: REPLACEMENT-KNEE-TOTAL;  Surgeon: Nj Melvin MD;  [address];  Service: Orthopedics;  Laterality: Left;  [employer]    UPPER GASTROINTESTINAL ENDOSCOPY  07/13/2017    Dr. Lopez: NERD, Gastric mucosal atrophy. antritis, Scar in the incisura. Biopsied; biopsy: chronic gastritis. negative for h pylori     Current Outpatient Medications on File Prior to Visit   Medication Sig Dispense Refill    acetaminophen (TYLENOL) 500 MG tablet Take 500 mg by mouth every 6 (six) hours as needed for Pain.      artificial tears ointment (ARTIFICIAL TEARS) Oint every evening.      ascorbic acid, vitamin C, (VITAMIN C) 250 MG tablet Take 500 mg by mouth once daily.       aspirin (ECOTRIN) 81 MG EC tablet Take 81 mg by mouth once daily.      balsalazide (COLAZAL) 750 mg capsule TAKE 3 CAPSULES(2250 MG) BY MOUTH TWICE DAILY WITH MEALS 180 capsule 11    chlorthalidone (HYGROTEN) 25 MG Tab TAKE 1 TABLET BY MOUTH EVERY MORNING for blood pressure 90 tablet 2    famotidine (PEPCID) 40 MG tablet Take 1 tablet (40 mg total) by mouth nightly. 90 tablet 3    fluconazole (DIFLUCAN) 100 MG tablet Take 2 tabs today, then 1 tablet daily x 6 days 8 tablet 0    irbesartan  (AVAPRO) 300 MG tablet TAKE 1 TABLET(300 MG) BY MOUTH EVERY EVENING 90 tablet 3    LIDOCAINE VISCOUS 2 % solution Take by mouth.      LORazepam (ATIVAN) 0.5 MG tablet TAKE 1 TABLET(0.5 MG) BY MOUTH TWICE DAILY 60 tablet 5    multivitamin (THERAGRAN) per tablet Take 1 tablet by mouth once daily.      niacin 500 MG CpSR Take 500 mg by mouth every evening.      ondansetron (ZOFRAN-ODT) 4 MG TbDL DISSOLVE 1 TABLET ON THE TONGUE EVERY 8 HOURS AS NEEDED FOR NAUSEA 30 tablet 3    pantoprazole (PROTONIX) 40 MG tablet Take 1 tablet (40 mg total) by mouth before breakfast. 90 tablet 3    potassium chloride (MICRO-K) 10 MEQ CpSR TAKE 2 CAPSULES(20 MEQ) BY MOUTH EVERY  capsule 3    pravastatin (PRAVACHOL) 40 MG tablet Take 1 tablet (40 mg total) by mouth once daily. 90 tablet 3    venlafaxine (EFFEXOR-XR) 37.5 MG 24 hr capsule Take 1 capsule (37.5 mg total) by mouth once daily. 90 capsule 3    vitamin D (VITAMIN D3) 1000 units Tab Take 1,000 Units by mouth once daily.       No current facility-administered medications on file prior to visit.     Review of patient's allergies indicates:  No Known Allergies  Family History   Problem Relation Age of Onset    Hypertension Mother     Heart disease Mother     Glaucoma Mother     Stroke Father     Glaucoma Sister     Cataracts Sister     Macular degeneration Sister     Breast cancer Neg Hx     Colon cancer Neg Hx     Crohn's disease Neg Hx     Ulcerative colitis Neg Hx     Stomach cancer Neg Hx     Esophageal cancer Neg Hx     Celiac disease Neg Hx     Amblyopia Neg Hx     Blindness Neg Hx     Retinal detachment Neg Hx     Strabismus Neg Hx      Social History     Socioeconomic History    Marital status:     Number of children: 2   Occupational History    Occupation: retired teacher and principal    Occupation: substitute   Tobacco Use    Smoking status: Never    Smokeless tobacco: Never   Substance and Sexual Activity    Alcohol use: Yes     Comment: social- maybe once  every few months    Drug use: No    Sexual activity: Yes     Partners: Male     Birth control/protection: Post-menopausal, None     Social Determinants of Health     Financial Resource Strain: Low Risk     Difficulty of Paying Living Expenses: Not hard at all   Food Insecurity: No Food Insecurity    Worried About Running Out of Food in the Last Year: Never true    Ran Out of Food in the Last Year: Never true   Transportation Needs: No Transportation Needs    Lack of Transportation (Medical): No    Lack of Transportation (Non-Medical): No   Physical Activity: Insufficiently Active    Days of Exercise per Week: 4 days    Minutes of Exercise per Session: 20 min   Stress: Stress Concern Present    Feeling of Stress : Rather much   Social Connections: Unknown    Frequency of Communication with Friends and Family: More than three times a week    Frequency of Social Gatherings with Friends and Family: More than three times a week    Active Member of Clubs or Organizations: Yes    Attends Club or Organization Meetings: Never    Marital Status:    Housing Stability: Low Risk     Unable to Pay for Housing in the Last Year: No    Number of Places Lived in the Last Year: 1    Unstable Housing in the Last Year: No       Review of Systems:  Constitutional:  Denies fever or chills   Eyes:  Denies change in visual acuity   HENT:  Denies nasal congestion or sore throat   Respiratory:  Denies cough or shortness of breath   Cardiovascular:  Denies chest pain or edema   GI:  Denies abdominal pain, nausea, vomiting, bloody stools or diarrhea   :  Denies dysuria   Integument:  Denies rash   Neurologic:  Denies headache, focal weakness or sensory changes   Endocrine:  Denies polyuria or polydipsia   Lymphatic:  Denies swollen glands   Psychiatric:  Denies depression or anxiety     Physical Exam:   Constitutional:  Well developed, well nourished, no acute distress, non-toxic appearance   Integument:  Well hydrated, no rash    Lymphatic:  No lymphadenopathy noted   Neurologic:  Alert & oriented x 3  Psychiatric:  Speech and behavior appropriate     Bilateral Hip Exam    left Hip Exam   left hip exam performed same as contralateral hip and is normal.     right Hip Exam   Tenderness   The patient is experiencing tenderness in the greater trochanter.  Range of Motion   The patient has normal hip ROM.  Muscle Strength   Abduction: 4/5   Other   Erythema: absent  Sensation: normal  Pulse: present    There are no diagnoses linked to this encounter.       Using an aseptic technique, I injected 5 cc of lidocaine 1% without and 1 cc of kenalog 40mg into the right Hip. The patient tolerated this well. I will have them return to clinic in 2 months.

## 2023-04-09 PROBLEM — R55 SYNCOPE: Status: ACTIVE | Noted: 2023-04-09

## 2023-04-09 PROBLEM — E87.1 HYPONATREMIA: Status: ACTIVE | Noted: 2023-04-09

## 2023-04-09 PROBLEM — Z71.89 ACP (ADVANCE CARE PLANNING): Status: ACTIVE | Noted: 2023-04-09

## 2023-04-12 ENCOUNTER — PATIENT MESSAGE (OUTPATIENT)
Dept: FAMILY MEDICINE | Facility: CLINIC | Age: 73
End: 2023-04-12
Payer: MEDICARE

## 2023-04-12 NOTE — TELEPHONE ENCOUNTER
I spoke with Ms. Jane and offered her a later date for a hospital follow up with Dr. Hassan, she said that she will keep what she has.

## 2023-04-13 ENCOUNTER — PATIENT MESSAGE (OUTPATIENT)
Dept: FAMILY MEDICINE | Facility: CLINIC | Age: 73
End: 2023-04-13
Payer: MEDICARE

## 2023-04-13 ENCOUNTER — PATIENT MESSAGE (OUTPATIENT)
Dept: ADMINISTRATIVE | Facility: OTHER | Age: 73
End: 2023-04-13
Payer: MEDICARE

## 2023-04-20 ENCOUNTER — PATIENT MESSAGE (OUTPATIENT)
Dept: FAMILY MEDICINE | Facility: CLINIC | Age: 73
End: 2023-04-20

## 2023-04-20 ENCOUNTER — OFFICE VISIT (OUTPATIENT)
Dept: FAMILY MEDICINE | Facility: CLINIC | Age: 73
End: 2023-04-20
Payer: MEDICARE

## 2023-04-20 ENCOUNTER — LAB VISIT (OUTPATIENT)
Dept: LAB | Facility: HOSPITAL | Age: 73
End: 2023-04-20
Attending: NURSE PRACTITIONER
Payer: MEDICARE

## 2023-04-20 VITALS
HEIGHT: 63 IN | DIASTOLIC BLOOD PRESSURE: 74 MMHG | BODY MASS INDEX: 28.29 KG/M2 | TEMPERATURE: 98 F | WEIGHT: 159.63 LBS | OXYGEN SATURATION: 96 % | HEART RATE: 85 BPM | RESPIRATION RATE: 18 BRPM | SYSTOLIC BLOOD PRESSURE: 136 MMHG

## 2023-04-20 DIAGNOSIS — I10 ESSENTIAL HYPERTENSION: ICD-10-CM

## 2023-04-20 DIAGNOSIS — F41.9 ANXIETY: ICD-10-CM

## 2023-04-20 DIAGNOSIS — I10 ESSENTIAL HYPERTENSION: Primary | ICD-10-CM

## 2023-04-20 DIAGNOSIS — E87.8 ELECTROLYTE ABNORMALITY: ICD-10-CM

## 2023-04-20 LAB
ANION GAP SERPL CALC-SCNC: 10 MMOL/L (ref 8–16)
BASOPHILS # BLD AUTO: 0.08 K/UL (ref 0–0.2)
BASOPHILS NFR BLD: 0.7 % (ref 0–1.9)
BUN SERPL-MCNC: 13 MG/DL (ref 8–23)
CALCIUM SERPL-MCNC: 9.8 MG/DL (ref 8.7–10.5)
CHLORIDE SERPL-SCNC: 99 MMOL/L (ref 95–110)
CO2 SERPL-SCNC: 25 MMOL/L (ref 23–29)
CREAT SERPL-MCNC: 0.7 MG/DL (ref 0.5–1.4)
DIFFERENTIAL METHOD: ABNORMAL
EOSINOPHIL # BLD AUTO: 0.1 K/UL (ref 0–0.5)
EOSINOPHIL NFR BLD: 0.5 % (ref 0–8)
ERYTHROCYTE [DISTWIDTH] IN BLOOD BY AUTOMATED COUNT: 12.6 % (ref 11.5–14.5)
EST. GFR  (NO RACE VARIABLE): >60 ML/MIN/1.73 M^2
GLUCOSE SERPL-MCNC: 92 MG/DL (ref 70–110)
HCT VFR BLD AUTO: 41.2 % (ref 37–48.5)
HGB BLD-MCNC: 13.6 G/DL (ref 12–16)
IMM GRANULOCYTES # BLD AUTO: 0.05 K/UL (ref 0–0.04)
IMM GRANULOCYTES NFR BLD AUTO: 0.4 % (ref 0–0.5)
LYMPHOCYTES # BLD AUTO: 3.4 K/UL (ref 1–4.8)
LYMPHOCYTES NFR BLD: 30.1 % (ref 18–48)
MCH RBC QN AUTO: 30.4 PG (ref 27–31)
MCHC RBC AUTO-ENTMCNC: 33 G/DL (ref 32–36)
MCV RBC AUTO: 92 FL (ref 82–98)
MONOCYTES # BLD AUTO: 0.8 K/UL (ref 0.3–1)
MONOCYTES NFR BLD: 6.7 % (ref 4–15)
NEUTROPHILS # BLD AUTO: 7 K/UL (ref 1.8–7.7)
NEUTROPHILS NFR BLD: 61.6 % (ref 38–73)
NRBC BLD-RTO: 0 /100 WBC
PLATELET # BLD AUTO: 436 K/UL (ref 150–450)
PMV BLD AUTO: 9.7 FL (ref 9.2–12.9)
POTASSIUM SERPL-SCNC: 4.1 MMOL/L (ref 3.5–5.1)
RBC # BLD AUTO: 4.48 M/UL (ref 4–5.4)
SODIUM SERPL-SCNC: 134 MMOL/L (ref 136–145)
WBC # BLD AUTO: 11.36 K/UL (ref 3.9–12.7)

## 2023-04-20 PROCEDURE — 36415 COLL VENOUS BLD VENIPUNCTURE: CPT | Mod: PO | Performed by: NURSE PRACTITIONER

## 2023-04-20 PROCEDURE — 99214 OFFICE O/P EST MOD 30 MIN: CPT | Mod: S$PBB,,, | Performed by: NURSE PRACTITIONER

## 2023-04-20 PROCEDURE — 99214 PR OFFICE/OUTPT VISIT, EST, LEVL IV, 30-39 MIN: ICD-10-PCS | Mod: S$PBB,,, | Performed by: NURSE PRACTITIONER

## 2023-04-20 PROCEDURE — 99215 OFFICE O/P EST HI 40 MIN: CPT | Mod: PBBFAC,PO | Performed by: NURSE PRACTITIONER

## 2023-04-20 PROCEDURE — 85025 COMPLETE CBC W/AUTO DIFF WBC: CPT | Performed by: NURSE PRACTITIONER

## 2023-04-20 PROCEDURE — 99999 PR PBB SHADOW E&M-EST. PATIENT-LVL V: ICD-10-PCS | Mod: PBBFAC,,, | Performed by: NURSE PRACTITIONER

## 2023-04-20 PROCEDURE — 99999 PR PBB SHADOW E&M-EST. PATIENT-LVL V: CPT | Mod: PBBFAC,,, | Performed by: NURSE PRACTITIONER

## 2023-04-20 PROCEDURE — 80048 BASIC METABOLIC PNL TOTAL CA: CPT | Performed by: NURSE PRACTITIONER

## 2023-04-20 NOTE — TELEPHONE ENCOUNTER
Spoke to pt informing her that she can have her labs drawn at her appt today. Pt verbalized understanding.

## 2023-04-20 NOTE — PROGRESS NOTES
Subjective     Patient ID: Lucinda Aguilera is a 72 y.o. female.    Chief Complaint: Hospital Follow Up ([facility] Passed out)    Patient is here for hospital follow up. Her  is with her. Denies need for home health. She has been monitoring her BP at home. Says home Bps have been elevated, says she occasionally has some dizziness. Seeing nephrology mid May. Taking sodium twice daily, lasix Mon Wed and Fri as directed. She does take potassium 20 meq daily. Her thiazide was discontinued while in the hospital. She is very anxious regarding a repeat episode of this, following her fluid restriction. Would like to wean down on her anxiety/depression medications.     Discharge Summary Notes    Discharge Summary by Niurka Wisdom DO at 4/12/2023 12:02 PM  Version 2 of 2  Author: Niurka Wisdom DO Service: Hospital Medicine Author Type: Physician  Filed: 4/12/2023 12:19 PM Creation Time: 4/12/2023 12:02 PM Status: Addendum  : Niurka Wisdom DO (Physician)  Related Notes: Original Note by Niurka Wisdom DO (Physician) filed at 4/12/2023 12:02 PM  [facility] Medicine  Discharge Summary        Patient Name: Lucinda Aguilera  MRN: [id number]  RISSA: [id number]  Patient Class: IP- Inpatient  Admission Date: 4/8/2023  Hospital Length of Stay: 3 days  Discharge Date and Time:  04/12/2023 12:02 PM  Attending Physician: Niurka Wisdom DO   Discharging Provider: Niurka Wisdom DO  Primary Care Provider: Saurabh Hassan MD     Primary Care Team: Networked reference to record PCT      HPI:   72-year-old female with hypertension, hyperlipidemia, and chronic gastritis presented to the emergency room for syncope.  Patient reports of 2 separate episodes earlier today when she became lightheaded and subsequently lost consciousness.  Her  reports of the patient regaining consciousness quickly however during her 2nd syncopal event, she hit the back of her head on a door frame prompting him to call  EMS.  In the emergency room she had 1 episode of vomiting and subsequently lost consciousness again.  Workup was notable for hyponatremia as well as hypokalemia.  She denies chest pain, shortness at breath, diaphoresis during these episodes.  She does report of recent worsening of her chronic gastritis with decreased p.o. intake over the past few days.  She was given a fluid bolus and Hospital Medicine consulted for admission.        * No surgery found *       Hospital Course:   Admitted with recurrent syncopal episodes with N/V. Found to have acute on chronic hyponatremia lower than usual and IVVD. Kept on telemetry, NSR and no arrhythmias noted. Symptoms improved with IVF but no significant improvement in hyponatremia. Nephrology consulted, thinks likely due to meds including thiazide and antidepressants; initiated on 3% saline and salt tablets. Improved Na and no further episodes of syncope. Discharged on salt tablets.      Follow ups:  Dr. Engel, nephrology        Goals of Care Treatment Preferences:  Code Status: Full Code        Consults:     Consults (From admission, onward)             Status Ordering Provider        Inpatient consult to Registered Dietitian/Nutritionist  Once       Provider:  (Not yet assigned)   Completed SHARLA FLORES        Inpatient consult to Nephrology  Once       Provider:  Igor Alvarado,    Acknowledged SHARLA FLORES                Cardiac/Vascular  HBP (high blood pressure)  Hold home chlorthalidone due to hyponatremia. Dc, initiated on lasix by nephrology        Renal/  Hyponatremia  Likely due to decreased p.o. intake as well as chlorthalidone and antidepressant use.  Patient chronically has hyponatremia but is worse this admission.      IVF with minimal improvement  Nephrology was consulted  Received 3% and salt tablets, improved     Hypokalemia  Repleted, mag normal        GI  GERD (gastroesophageal reflux disease)  Reports of recent worsening of her chronic gastritis  symptoms.  Continue home meds        Other  * Syncope  Concern for possible orthostatic hypotension versus vasovagal.  Hold home chlorthalidone and started on IV fluids.  Will also obtain echocardiogram and monitor on telemetry.     Telemetry NSR.   In setting of hypovolemic hyponatremia. Nephrology consulted for acute on chronic hypoNa+.   Orthostatics reviewed  See below     resolved        ACP (advance care planning)  Full code             Final Active Diagnoses:    Diagnosis Date Noted POA  · PRINCIPAL PROBLEM:  Syncope [R55] 04/09/2023 Yes  · Hyponatremia [E87.1] 04/09/2023 Yes  · ACP (advance care planning) [Z71.89] 04/09/2023 Not Applicable  · Hypokalemia [E87.6] 01/08/2021 Yes  · HBP (high blood pressure) [I10] 11/06/2012 Yes  · GERD (gastroesophageal reflux disease) [K21.9] 11/06/2012 Yes     Problems Resolved During this Admission:        Discharged Condition: stable     Disposition: Home or Self Care     Follow Up:        Follow-up Information            Saurabh Hassan MD Follow up.   Specialty: Family Medicine  Contact information:  [address] [place] [address]  [phone]                        [facility] TRANSITIONAL CARE Follow up.   Why: Someone will call you within 48, - 72 hours., Please answer a call from [phone].  Contact information:  [address] [place] [address]  [address]  [facility] [address]                     Janel Engel MD. Go to.   Specialty: Nephrology  Why: For follow-up; 5/16/23 @ 0945, For follow-up  Contact information:  [address] [place] [address]   [address]  [facility] [address] [phone]                                    Patient Instructions:         Ambulatory referral/consult to Transitional Care   Standing Status: Future  Referral Priority: Routine Referral Type: Consultation   Referral Reason: Specialty Services Required   Number of Visits Requested: 1        Notify your health care provider if you experience any of the following:  persistent nausea and  vomiting or diarrhea       Notify your health care provider if you experience any of the following:  severe uncontrolled pain       Notify your health care provider if you experience any of the following:  difficulty breathing or increased cough       Notify your health care provider if you experience any of the following:  severe persistent headache       Notify your health care provider if you experience any of the following:  persistent dizziness, light-headedness, or visual disturbances       Notify your health care provider if you experience any of the following:  increased confusion or weakness       Activity as tolerated        Significant Diagnostic Studies: Labs: All labs within the past 24 hours have been reviewed       Pending Diagnostic Studies:          None             Medications:  Reconciled Home Medications:        Medication List          START taking these medications        furosemide 40 MG tablet  Commonly known as: LASIX  Take 1 tablet on Mondays, Wednesdays, Fridays only     sodium chloride 1 gram tablet  Take 2 tablets (2 g total) by mouth 2 (two) times daily.                  CHANGE how you take these medications        balsalazide 750 mg capsule  Commonly known as: COLAZAL  TAKE 3 CAPSULES(2250 MG) BY MOUTH TWICE DAILY WITH MEALS  What changed:   · how much to take  · how to take this  · when to take this     ondansetron 4 MG Tbdl  Commonly known as: ZOFRAN-ODT  DISSOLVE 1 TABLET ON THE TONGUE EVERY 8 HOURS AS NEEDED FOR NAUSEA  What changed: See the new instructions.     potassium chloride 10 MEQ Cpsr  Commonly known as: MICRO-K  TAKE 2 CAPSULES(20 MEQ) BY MOUTH EVERY DAY  What changed:   · how much to take  · how to take this  · when to take this     pravastatin 40 MG tablet  Commonly known as: PRAVACHOL  Take 1 tablet (40 mg total) by mouth once daily.  What changed: when to take this     venlafaxine 37.5 MG 24 hr capsule  Commonly known as: EFFEXOR-XR  Take 1 capsule (37.5 mg total) by  mouth once daily.  What changed: when to take this                  CONTINUE taking these medications        acetaminophen 500 MG tablet  Commonly known as: TYLENOL  Take 500 mg by mouth every 6 (six) hours as needed for Pain.     ascorbic acid (vitamin C) 250 MG tablet  Commonly known as: VITAMIN C  Take 500 mg by mouth once daily.     aspirin 81 MG EC tablet  Commonly known as: ECOTRIN  Take 81 mg by mouth every evening.     famotidine 40 MG tablet  Commonly known as: PEPCID  Take 1 tablet (40 mg total) by mouth nightly.     irbesartan 300 MG tablet  Commonly known as: AVAPRO  TAKE 1 TABLET(300 MG) BY MOUTH EVERY EVENING     LORazepam 0.5 MG tablet  Commonly known as: ATIVAN  TAKE 1 TABLET(0.5 MG) BY MOUTH TWICE DAILY     melatonin 3 mg Tbdl  Take 3 mg by mouth every evening.     multivitamin per tablet  Commonly known as: THERAGRAN  Take 1 tablet by mouth once daily.     niacin 500 MG Cpsr  Take 500 mg by mouth every evening.     pantoprazole 40 MG tablet  Commonly known as: PROTONIX  Take 1 tablet (40 mg total) by mouth before breakfast.                  STOP taking these medications        chlorthalidone 25 MG Tab  Commonly known as: HYGROTEN                   Indwelling Lines/Drains at time of discharge:     Lines/Drains/Airways            Drain  Duration                Female External Urinary Catheter 04/09/23 0116 3 days                     Epidural Line  Duration                     Perineural Analgesia/Anesthesia Assessment (using dermatomes) Epidural 06/03/16 0620 2504 days         Perineural Analgesia/Anesthesia Assessment (using dermatomes) Epidural 06/19/18 1758 days                          Time spent on the discharge of patient: 35 minutes           Niurka Wisdom DO  Department of Hospital Medicine  [facility]      Discharge Summary by Niurka Wisdom DO at 4/12/2023 12:02 PM  Version 1 of 2  Author: Niurka Wisdom DO Service: Hospital Medicine Author Type: Physician  Filed:  4/12/2023 12:02 PM Creation Time: 4/12/2023 12:02 PM Status: Signed  : Niurka Wisdom DO (Physician)  Related Notes: Addendum by Niurka Wisdom DO (Physician) filed at 4/12/2023 12:19 PM  [facility] Medicine  Discharge Summary        Patient Name: Lucinda Aguilera  MRN: [id number]  RISSA: [id number]  Patient Class: IP- Inpatient  Admission Date: 4/8/2023  Hospital Length of Stay: 3 days  Discharge Date and Time:  04/12/2023 12:02 PM  Attending Physician: Niurka Wisdom DO   Discharging Provider: Niurka Wisdom DO  Primary Care Provider: Saurabh Hassan MD     Primary Care Team: Networked reference to record PCT      HPI:   72-year-old female with hypertension, hyperlipidemia, and chronic gastritis presented to the emergency room for syncope.  Patient reports of 2 separate episodes earlier today when she became lightheaded and subsequently lost consciousness.  Her  reports of the patient regaining consciousness quickly however during her 2nd syncopal event, she hit the back of her head on a door frame prompting him to call EMS.  In the emergency room she had 1 episode of vomiting and subsequently lost consciousness again.  Workup was notable for hyponatremia as well as hypokalemia.  She denies chest pain, shortness at breath, diaphoresis during these episodes.  She does report of recent worsening of her chronic gastritis with decreased p.o. intake over the past few days.  She was given a fluid bolus and Hospital Medicine consulted for admission.        * No surgery found *       Hospital Course:   Admitted with recurrent syncopal episodes with N/V. Found to have acute on chronic hyponatremia lower than usual and IVVD. Kept on telemetry, NSR and no arrhythmias noted. Symptoms improved with IVF but no significant improvement in hyponatremia. Nephrology consulted, thinks likely due to meds including thiazide and antidepressants; initiated on 3% saline and salt tablets. Improved  Na and no further episodes of syncope. Discharged on salt tablets.      Follow ups:  Dr. Engel, nephrology        Goals of Care Treatment Preferences:  Code Status: Full Code        Consults:     Consults (From admission, onward)        Status Ordering Provider        Inpatient consult to Registered Dietitian/Nutritionist  Once       Provider:  (Not yet assigned)   Completed SHARLA FLORES        Inpatient consult to Nephrology  Once       Provider:  Igor Alvarado, DO   Acknowledged SHARLA FLORES           Cardiac/Vascular  HBP (high blood pressure)  Hold home chlorthalidone due to hyponatremia. Dc, initiated on lasix by nephrology        Renal/  Hyponatremia  Likely due to decreased p.o. intake as well as chlorthalidone and antidepressant use.  Patient chronically has hyponatremia but is worse this admission.      IVF with minimal improvement  Nephrology was consulted  Received 3% and salt tablets, improved     Hypokalemia  Repleted, mag normal        GI  GERD (gastroesophageal reflux disease)  Reports of recent worsening of her chronic gastritis symptoms.  Continue home meds        Other  * Syncope  Concern for possible orthostatic hypotension versus vasovagal.  Hold home chlorthalidone and started on IV fluids.  Will also obtain echocardiogram and monitor on telemetry.     Telemetry NSR.   In setting of hypovolemic hyponatremia. Nephrology consulted for acute on chronic hypoNa+.   Orthostatics reviewed  See below     resolved        ACP (advance care planning)  Full code             Final Active Diagnoses:    Diagnosis Date Noted POA  · PRINCIPAL PROBLEM:  Syncope [R55] 04/09/2023 Yes  · Hyponatremia [E87.1] 04/09/2023 Yes  · ACP (advance care planning) [Z71.89] 04/09/2023 Not Applicable  · Hypokalemia [E87.6] 01/08/2021 Yes  · HBP (high blood pressure) [I10] 11/06/2012 Yes  · GERD (gastroesophageal reflux disease) [K21.9] 11/06/2012 Yes     Problems Resolved During this Admission:        Discharged  Condition: stable     Disposition: Home or Self Care     Follow Up:        Follow-up Information       Saurabh Hassan MD Follow up.   Specialty: Family Medicine  Contact information:  [address]  [facility] [address]  [phone]                   [facility] TRANSITIONAL CARE Follow up.   Why: Someone will call you within 48, - 72 hours., Please answer a call from [phone].  Contact information:  [address]  [address]  [facility] [address]                Janel Engel MD. Go to.   Specialty: Nephrology  Why: For follow-up; 5/16/23 @ 0945, For follow-up  Contact information:  [address] [place] [address]   [address]  [facility] [address] [phone]                          Patient Instructions:         Ambulatory referral/consult to Transitional Care   Standing Status: Future  Referral Priority: Routine Referral Type: Consultation   Referral Reason: Specialty Services Required   Number of Visits Requested: 1        Notify your health care provider if you experience any of the following:  persistent nausea and vomiting or diarrhea       Notify your health care provider if you experience any of the following:  severe uncontrolled pain       Notify your health care provider if you experience any of the following:  difficulty breathing or increased cough       Notify your health care provider if you experience any of the following:  severe persistent headache       Notify your health care provider if you experience any of the following:  persistent dizziness, light-headedness, or visual disturbances       Notify your health care provider if you experience any of the following:  increased confusion or weakness       Activity as tolerated        Significant Diagnostic Studies: Labs: All labs within the past 24 hours have been reviewed       Pending Diagnostic Studies:     None        Medications:  Reconciled Home Medications:        Medication List     START taking these medications   furosemide  40 MG tablet  Commonly known as: LASIX  Take 1 tablet (40 mg total) by mouth once daily.  Start taking on: April 13, 2023     sodium chloride 1 gram tablet  Take 2 tablets (2 g total) by mouth 2 (two) times daily.        CHANGE how you take these medications   balsalazide 750 mg capsule  Commonly known as: COLAZAL  TAKE 3 CAPSULES(2250 MG) BY MOUTH TWICE DAILY WITH MEALS  What changed:   · how much to take  · how to take this  · when to take this     ondansetron 4 MG Tbdl  Commonly known as: ZOFRAN-ODT  DISSOLVE 1 TABLET ON THE TONGUE EVERY 8 HOURS AS NEEDED FOR NAUSEA  What changed: See the new instructions.     potassium chloride 10 MEQ Cpsr  Commonly known as: MICRO-K  TAKE 2 CAPSULES(20 MEQ) BY MOUTH EVERY DAY  What changed:   · how much to take  · how to take this  · when to take this     pravastatin 40 MG tablet  Commonly known as: PRAVACHOL  Take 1 tablet (40 mg total) by mouth once daily.  What changed: when to take this     venlafaxine 37.5 MG 24 hr capsule  Commonly known as: EFFEXOR-XR  Take 1 capsule (37.5 mg total) by mouth once daily.  What changed: when to take this        CONTINUE taking these medications   acetaminophen 500 MG tablet  Commonly known as: TYLENOL  Take 500 mg by mouth every 6 (six) hours as needed for Pain.     ascorbic acid (vitamin C) 250 MG tablet  Commonly known as: VITAMIN C  Take 500 mg by mouth once daily.     aspirin 81 MG EC tablet  Commonly known as: ECOTRIN  Take 81 mg by mouth every evening.     famotidine 40 MG tablet  Commonly known as: PEPCID  Take 1 tablet (40 mg total) by mouth nightly.     irbesartan 300 MG tablet  Commonly known as: AVAPRO  TAKE 1 TABLET(300 MG) BY MOUTH EVERY EVENING     LORazepam 0.5 MG tablet  Commonly known as: ATIVAN  TAKE 1 TABLET(0.5 MG) BY MOUTH TWICE DAILY     melatonin 3 mg Tbdl  Take 3 mg by mouth every evening.     multivitamin per tablet  Commonly known as: THERAGRAN  Take 1 tablet by mouth once daily.     niacin 500 MG Cpsr  Take 500  mg by mouth every evening.     pantoprazole 40 MG tablet  Commonly known as: PROTONIX  Take 1 tablet (40 mg total) by mouth before breakfast.        STOP taking these medications   chlorthalidone 25 MG Tab  Commonly known as: HYGROTEN              Indwelling Lines/Drains at time of discharge:     Lines/Drains/Airways       Drain  Duration           Female External Urinary Catheter 04/09/23 0116 3 days           Epidural Line  Duration                Perineural Analgesia/Anesthesia Assessment (using dermatomes) Epidural 06/03/16 0620 2504 days         Perineural Analgesia/Anesthesia Assessment (using dermatomes) Epidural 06/19/18 1758 days                Time spent on the discharge of patient: 35 minutes           Niurka Wisdom DO  Department of Hospital Medicine  [facility]          Review of Systems   Constitutional: Negative.    HENT: Negative.     Respiratory: Negative.     Cardiovascular: Negative.    Gastrointestinal: Negative.    Genitourinary: Negative.    Neurological:  Positive for light-headedness.   Psychiatric/Behavioral:  The patient is nervous/anxious.         Objective     Physical Exam  Constitutional:       General: She is not in acute distress.     Appearance: Normal appearance.   HENT:      Head: Normocephalic and atraumatic.   Cardiovascular:      Rate and Rhythm: Normal rate and regular rhythm.      Heart sounds: No murmur heard.  Pulmonary:      Effort: Pulmonary effort is normal.      Breath sounds: Normal breath sounds. No wheezing.   Neurological:      Mental Status: She is alert and oriented to person, place, and time.      Cranial Nerves: No cranial nerve deficit.      Motor: No weakness.   Psychiatric:         Mood and Affect: Mood normal.         Behavior: Behavior normal.         Thought Content: Thought content normal.          Assessment and Plan     Problem List Items Addressed This Visit          Unprioritized    Anxiety     Other Visit Diagnoses       Essential  hypertension    -  Primary    Relevant Orders    BASIC METABOLIC PANEL    CBC W/ AUTO DIFFERENTIAL    Electrolyte abnormality               HTn stable today. Nurse visit to check home cuff accuracy scheduled. Monitor Bp daily, check cbc and bmp today to ensure stability. Scheduled to see PCP May 2 for follow up, keep scheduled appointment with nephrology. Patient would like to wean lorazepam, advised to reduce to 1 a day at bedtime for now and discuss further weaning with PCP at visit. Follow up immediately for new or worsening symptoms, ED precautions discussed.    1. Essential hypertension    - BASIC METABOLIC PANEL; Future  - CBC W/ AUTO DIFFERENTIAL; Future    2. Electrolyte abnormality      3. Anxiety

## 2023-04-24 ENCOUNTER — PATIENT MESSAGE (OUTPATIENT)
Dept: FAMILY MEDICINE | Facility: CLINIC | Age: 73
End: 2023-04-24
Payer: MEDICARE

## 2023-04-24 DIAGNOSIS — E87.1 HYPONATREMIA: Primary | ICD-10-CM

## 2023-04-25 ENCOUNTER — PATIENT MESSAGE (OUTPATIENT)
Dept: FAMILY MEDICINE | Facility: CLINIC | Age: 73
End: 2023-04-25
Payer: MEDICARE

## 2023-04-25 DIAGNOSIS — E87.1 HYPONATREMIA: Primary | ICD-10-CM

## 2023-04-26 ENCOUNTER — LAB VISIT (OUTPATIENT)
Dept: LAB | Facility: HOSPITAL | Age: 73
End: 2023-04-26
Attending: FAMILY MEDICINE
Payer: MEDICARE

## 2023-04-26 DIAGNOSIS — N39.0 URINARY TRACT INFECTION WITHOUT HEMATURIA, SITE UNSPECIFIED: ICD-10-CM

## 2023-04-26 DIAGNOSIS — N39.0 URINARY TRACT INFECTION WITHOUT HEMATURIA, SITE UNSPECIFIED: Primary | ICD-10-CM

## 2023-04-26 DIAGNOSIS — E87.1 HYPONATREMIA: ICD-10-CM

## 2023-04-26 LAB
ANION GAP SERPL CALC-SCNC: 12 MMOL/L (ref 8–16)
BILIRUB UR QL STRIP: NEGATIVE
BUN SERPL-MCNC: 13 MG/DL (ref 8–23)
CALCIUM SERPL-MCNC: 9.8 MG/DL (ref 8.7–10.5)
CHLORIDE SERPL-SCNC: 102 MMOL/L (ref 95–110)
CLARITY UR: CLEAR
CO2 SERPL-SCNC: 25 MMOL/L (ref 23–29)
COLOR UR: YELLOW
CREAT SERPL-MCNC: 0.7 MG/DL (ref 0.5–1.4)
EST. GFR  (NO RACE VARIABLE): >60 ML/MIN/1.73 M^2
GLUCOSE SERPL-MCNC: 89 MG/DL (ref 70–110)
GLUCOSE UR QL STRIP: NEGATIVE
HGB UR QL STRIP: NEGATIVE
KETONES UR QL STRIP: NEGATIVE
LEUKOCYTE ESTERASE UR QL STRIP: NEGATIVE
NITRITE UR QL STRIP: NEGATIVE
PH UR STRIP: 6 [PH] (ref 5–8)
POTASSIUM SERPL-SCNC: 3.7 MMOL/L (ref 3.5–5.1)
PROT UR QL STRIP: NEGATIVE
SODIUM SERPL-SCNC: 139 MMOL/L (ref 136–145)
SP GR UR STRIP: 1.01 (ref 1–1.03)
URN SPEC COLLECT METH UR: NORMAL

## 2023-04-26 PROCEDURE — 81003 URINALYSIS AUTO W/O SCOPE: CPT | Mod: PO | Performed by: NURSE PRACTITIONER

## 2023-04-26 PROCEDURE — 36415 COLL VENOUS BLD VENIPUNCTURE: CPT | Mod: PO | Performed by: FAMILY MEDICINE

## 2023-04-26 PROCEDURE — 80048 BASIC METABOLIC PNL TOTAL CA: CPT | Performed by: FAMILY MEDICINE

## 2023-04-27 ENCOUNTER — TELEPHONE (OUTPATIENT)
Dept: FAMILY MEDICINE | Facility: CLINIC | Age: 73
End: 2023-04-27

## 2023-04-27 ENCOUNTER — CLINICAL SUPPORT (OUTPATIENT)
Dept: FAMILY MEDICINE | Facility: CLINIC | Age: 73
End: 2023-04-27
Payer: MEDICARE

## 2023-04-27 VITALS — SYSTOLIC BLOOD PRESSURE: 128 MMHG | DIASTOLIC BLOOD PRESSURE: 84 MMHG

## 2023-04-27 DIAGNOSIS — I10 ESSENTIAL HYPERTENSION: Primary | ICD-10-CM

## 2023-04-27 PROCEDURE — 99212 OFFICE O/P EST SF 10 MIN: CPT | Mod: PBBFAC,PO

## 2023-04-27 PROCEDURE — 99999 PR PBB SHADOW E&M-EST. PATIENT-LVL II: ICD-10-PCS | Mod: PBBFAC,,,

## 2023-04-27 PROCEDURE — 99999 PR PBB SHADOW E&M-EST. PATIENT-LVL II: CPT | Mod: PBBFAC,,,

## 2023-04-27 NOTE — TELEPHONE ENCOUNTER
Patient here for nurse visit for BP check. Patient's BP manually is 128/84 and on her machine it is 135/91. Patient states that she just replaced the batteries last week.   Patient states that she is following up on Tuesday with you. She states that she is feeling fine - no headaches or blurry vision.

## 2023-04-30 ENCOUNTER — EXTERNAL CHRONIC CARE MANAGEMENT (OUTPATIENT)
Dept: PRIMARY CARE CLINIC | Facility: CLINIC | Age: 73
End: 2023-04-30
Payer: MEDICARE

## 2023-04-30 PROCEDURE — 99490 CHRNC CARE MGMT STAFF 1ST 20: CPT | Mod: PBBFAC,PO | Performed by: FAMILY MEDICINE

## 2023-04-30 PROCEDURE — 99490 CHRNC CARE MGMT STAFF 1ST 20: CPT | Mod: S$PBB,,, | Performed by: FAMILY MEDICINE

## 2023-04-30 PROCEDURE — 99490 PR CHRONIC CARE MGMT, 1ST 20 MIN: ICD-10-PCS | Mod: S$PBB,,, | Performed by: FAMILY MEDICINE

## 2023-05-02 ENCOUNTER — OFFICE VISIT (OUTPATIENT)
Dept: FAMILY MEDICINE | Facility: CLINIC | Age: 73
End: 2023-05-02
Payer: MEDICARE

## 2023-05-02 VITALS
WEIGHT: 162.06 LBS | HEART RATE: 93 BPM | OXYGEN SATURATION: 95 % | SYSTOLIC BLOOD PRESSURE: 142 MMHG | HEIGHT: 63 IN | RESPIRATION RATE: 18 BRPM | DIASTOLIC BLOOD PRESSURE: 74 MMHG | BODY MASS INDEX: 28.71 KG/M2 | TEMPERATURE: 98 F

## 2023-05-02 DIAGNOSIS — F41.9 ANXIETY: ICD-10-CM

## 2023-05-02 DIAGNOSIS — R11.0 NAUSEA: ICD-10-CM

## 2023-05-02 DIAGNOSIS — E87.1 HYPONATREMIA: Primary | ICD-10-CM

## 2023-05-02 DIAGNOSIS — Z87.19 HISTORY OF GASTRITIS: ICD-10-CM

## 2023-05-02 PROCEDURE — 99214 OFFICE O/P EST MOD 30 MIN: CPT | Mod: S$PBB,,, | Performed by: FAMILY MEDICINE

## 2023-05-02 PROCEDURE — 99999 PR PBB SHADOW E&M-EST. PATIENT-LVL III: CPT | Mod: PBBFAC,,, | Performed by: FAMILY MEDICINE

## 2023-05-02 PROCEDURE — 99999 PR PBB SHADOW E&M-EST. PATIENT-LVL III: ICD-10-PCS | Mod: PBBFAC,,, | Performed by: FAMILY MEDICINE

## 2023-05-02 PROCEDURE — 99214 PR OFFICE/OUTPT VISIT, EST, LEVL IV, 30-39 MIN: ICD-10-PCS | Mod: S$PBB,,, | Performed by: FAMILY MEDICINE

## 2023-05-02 PROCEDURE — 99213 OFFICE O/P EST LOW 20 MIN: CPT | Mod: PBBFAC,PO | Performed by: FAMILY MEDICINE

## 2023-05-02 RX ORDER — DOCUSATE SODIUM 100 MG/1
100 CAPSULE, LIQUID FILLED ORAL 2 TIMES DAILY
COMMUNITY

## 2023-05-02 RX ORDER — BALSALAZIDE DISODIUM 750 MG/1
CAPSULE ORAL
Qty: 180 CAPSULE | Refills: 11 | Status: SHIPPED | OUTPATIENT
Start: 2023-05-02

## 2023-05-02 RX ORDER — ONDANSETRON 4 MG/1
4 TABLET, ORALLY DISINTEGRATING ORAL EVERY 6 HOURS PRN
Qty: 30 TABLET | Refills: 3 | Status: SHIPPED | OUTPATIENT
Start: 2023-05-02 | End: 2023-11-17 | Stop reason: SDUPTHER

## 2023-05-02 NOTE — PROGRESS NOTES
Subjective:       Patient ID: Lucinda Aguilera is a 72 y.o. female.    Chief Complaint: Anxiety (Follow up with labs)    HPI Comments: Here for f/u on chronic health issues    Admission Date: 4/8/2023  Hospital Length of Stay: 3 days  Discharge Date and Time:  04/12/2023 12:02 PM  Attending Physician: Niurka Wisdom DO   Discharging Provider: Niurka Wisdom DO  Primary Care Provider: Saurabh Hassan MD  Primary Care Team: Networked reference to record PCT   HPI:   72-year-old female with hypertension, hyperlipidemia, and chronic gastritis presented to the emergency room for syncope.  Patient reports of 2 separate episodes earlier today when she became lightheaded and subsequently lost consciousness.  Her  reports of the patient regaining consciousness quickly however during her 2nd syncopal event, she hit the back of her head on a door frame prompting him to call EMS.  In the emergency room she had 1 episode of vomiting and subsequently lost consciousness again.  Workup was notable for hyponatremia as well as hypokalemia.  She denies chest pain, shortness at breath, diaphoresis during these episodes.  She does report of recent worsening of her chronic gastritis with decreased p.o. intake over the past few days.  She was given a fluid bolus and Hospital Medicine consulted for admission.  * No surgery found *    Hospital Course:   Admitted with recurrent syncopal episodes with N/V. Found to have acute on chronic hyponatremia lower than usual and IVVD. Kept on telemetry, NSR and no arrhythmias noted. Symptoms improved with IVF but no significant improvement in hyponatremia. Nephrology consulted, thinks likely due to meds including thiazide and antidepressants; initiated on 3% saline and salt tablets. Improved Na and no further episodes of syncope. Discharged on salt tablets.     Chlorthalidone was stopped.  She is taking sodium 2 BID, Lasix 40mg MWF.  Sodium has returned to normal on recent lab.  She has  f/u with Dr. Engel on 5/16.    Depression - tolerating Effexor XR 37.5mg; she could not tolerate the 75mg dose; she still has worry. She did some counseling.  HLD, aortic atherosclerosis - tolerating pravachol 40mg daily  Anxiety - Taking lorazepam BID. Doing some counseling presently  HTN - tolerating Avapro 300mg daily; BP controlled; digital flowsheet reviewed  She got some swelling with amlodipine  GERD - tolerating Protonix 40mg daily  S/p conner - taking colestipol  Colitis - stable on Colazal    Past Medical History:    Hypertension                                                  Anxiety                                           Hyperlipemia                                                  GERD (gastroesophageal reflux disease)                        Cataract                                                        Comment:OU    PVD (posterior vitreous detachment)                             Comment:OD    Arthritis                                                       Comment:right thumb    Past Surgical History:    chest abcess                                                     Comment:child    KNEE ARTHROSCOPY W/ LASER                                        Comment:right    COLONOSCOPY                                      1/2012          Comment:repeat in 5 years    No Known Allergies    Social History    Marital Status:              Spouse Name:                       Years of Education:                 Number of children: 2             Occupational History  Occupation          Employer            Comment               retired                                   retired teacher an*                         Social History Main Topics    Smoking Status: Never Smoker                      Smokeless Status: Never Used                        Alcohol Use: Yes                Comment: social    Drug Use: No              Sexual Activity: Yes               Partners with: Male       Birth Control/Protection: None,  Post-menopausal    Current Outpatient Medications on File Prior to Visit   Medication Sig Dispense Refill    acetaminophen (TYLENOL) 500 MG tablet Take 500 mg by mouth every 6 (six) hours as needed for Pain.      ascorbic acid, vitamin C, (VITAMIN C) 250 MG tablet Take 500 mg by mouth once daily.       aspirin (ECOTRIN) 81 MG EC tablet Take 81 mg by mouth every evening.      docusate sodium (COLACE) 100 MG capsule Take 100 mg by mouth 2 (two) times daily.      famotidine (PEPCID) 40 MG tablet Take 1 tablet (40 mg total) by mouth nightly. 90 tablet 3    furosemide (LASIX) 40 MG tablet Take 1 tablet on Mondays, Wednesdays, Fridays only 30 tablet 1    irbesartan (AVAPRO) 300 MG tablet TAKE 1 TABLET(300 MG) BY MOUTH EVERY EVENING (Patient taking differently: Take 300 mg by mouth every evening.) 90 tablet 3    LORazepam (ATIVAN) 0.5 MG tablet TAKE 1 TABLET(0.5 MG) BY MOUTH TWICE DAILY (Patient taking differently: Take 0.5 mg by mouth 2 (two) times daily.) 60 tablet 5    melatonin 3 mg TbDL Take 3 mg by mouth every evening.      multivitamin (THERAGRAN) per tablet Take 1 tablet by mouth once daily.      niacin 500 MG CpSR Take 500 mg by mouth every evening.      pantoprazole (PROTONIX) 40 MG tablet Take 1 tablet (40 mg total) by mouth before breakfast. 90 tablet 3    potassium chloride (MICRO-K) 10 MEQ CpSR TAKE 2 CAPSULES(20 MEQ) BY MOUTH EVERY DAY (Patient taking differently: Take 20 mEq by mouth every evening. TAKE 2 CAPSULES(20 MEQ) BY MOUTH EVERY DAY) 180 capsule 3    pravastatin (PRAVACHOL) 40 MG tablet Take 1 tablet (40 mg total) by mouth once daily. (Patient taking differently: Take 40 mg by mouth every evening.) 90 tablet 3    psyllium husk, aspartame, (NATURAL PSYLLIUM FIBER) 3.4 gram/5.8 gram Powd Take by mouth.      sodium chloride 1 gram tablet Take 2 tablets (2 g total) by mouth 2 (two) times daily. 120 tablet 1    venlafaxine (EFFEXOR-XR) 37.5 MG 24 hr capsule Take 1 capsule (37.5 mg total) by mouth once  "daily. (Patient taking differently: Take 37.5 mg by mouth every evening.) 90 capsule 3     No current facility-administered medications on file prior to visit.     Review of patient's family history indicates:    Hypertension                   Mother                    Heart disease                  Mother                    Stroke                         Father                    Review of Systems   Constitutional: Negative for fever, chills, appetite change and unexpected weight change.   HENT: Negative for sore throat and trouble swallowing.    Eyes: Negative for pain and visual disturbance.   Respiratory: Negative for cough, shortness of breath and wheezing.    Cardiovascular: Negative for chest pain and palpitations.   Gastrointestinal: Negative for nausea, vomiting, abdominal pain, diarrhea and blood in stool.   Genitourinary: Negative for dysuria, hematuria and difficulty urinating.   Musculoskeletal: Negative for arthralgias, gait problem and neck pain.   Skin: Negative for rash and wound.   Neurological: Negative for dizziness, weakness, numbness and headaches.   Hematological: Negative for adenopathy.   Psychiatric/Behavioral: Negative for dysphoric mood.           Objective:       BP (!) 142/74   Pulse 93   Temp 98 °F (36.7 °C) (Oral)   Resp 18   Ht 5' 3" (1.6 m)   Wt 73.5 kg (162 lb 0.6 oz)   LMP 04/18/2004   SpO2 95%   BMI 28.70 kg/m²     Physical Exam   Constitutional: She is oriented to person, place, and time. She appears well-developed and well-nourished.   HENT:   Head: Normocephalic.   Mouth/Throat: Oropharynx is clear and moist. No oropharyngeal exudate or posterior oropharyngeal erythema.   Eyes: Conjunctivae and EOM are normal. Pupils are equal, round, and reactive to light.   Neck: Normal range of motion. Neck supple. No thyromegaly present.   Cardiovascular: Normal rate, regular rhythm, S1 normal, S2 normal, normal heart sounds and intact distal pulses.  Exam reveals no gallop and no " friction rub.    No murmur heard.  Pulmonary/Chest: Effort normal and breath sounds normal. She has no wheezes. She has no rales.   Abdominal: Normal appearance.   Musculoskeletal:        Right lower leg: She exhibits no edema.        Left lower leg: She exhibits no edema.   Lymphadenopathy:     She has no cervical adenopathy.   Neurological: She is alert and oriented to person, place, and time. No cranial nerve deficit. Gait normal.   Skin: Skin is warm and intact. No rash noted.   Psychiatric: She has a normal mood and affect.         Results for orders placed or performed in visit on 04/26/23   Basic Metabolic Panel   Result Value Ref Range    Sodium 139 136 - 145 mmol/L    Potassium 3.7 3.5 - 5.1 mmol/L    Chloride 102 95 - 110 mmol/L    CO2 25 23 - 29 mmol/L    Glucose 89 70 - 110 mg/dL    BUN 13 8 - 23 mg/dL    Creatinine 0.7 0.5 - 1.4 mg/dL    Calcium 9.8 8.7 - 10.5 mg/dL    Anion Gap 12 8 - 16 mmol/L    eGFR >60.0 >60 mL/min/1.73 m^2   Urinalysis, Reflex to Urine Culture Urine, Clean Catch    Specimen: Urine   Result Value Ref Range    Specimen UA Urine, Clean Catch     Color, UA Yellow Yellow, Straw, Beatrice    Appearance, UA Clear Clear    pH, UA 6.0 5.0 - 8.0    Specific Gravity, UA 1.010 1.005 - 1.030    Protein, UA Negative Negative    Glucose, UA Negative Negative    Ketones, UA Negative Negative    Bilirubin (UA) Negative Negative    Occult Blood UA Negative Negative    Nitrite, UA Negative Negative    Leukocytes, UA Negative Negative     *Note: Due to a large number of results and/or encounters for the requested time period, some results have not been displayed. A complete set of results can be found in Results Review.         Assessment:       1. Hyponatremia    2. History of gastritis    3. Nausea    4. Anxiety              Plan:       Hyponatremia  -     Basic Metabolic Panel; Future; Expected date: 05/02/2023    History of gastritis  -     balsalazide (COLAZAL) 750 mg capsule; TAKE 3 CAPSULES(2250  MG) BY MOUTH TWICE DAILY WITH MEALS  Dispense: 180 capsule; Refill: 11    Nausea  -     ondansetron (ZOFRAN-ODT) 4 MG TbDL; Take 1 tablet (4 mg total) by mouth every 6 (six) hours as needed (nausea).  Dispense: 30 tablet; Refill: 3    Anxiety        Reassurance  OK to begin exercise  F/u with nephrology as planned; continue lasix and sodium until seen by nephrology  continue Ativan BID  Effexor XR 37.5mg daily  Overall stable  cotninue aspirin 81mg daily  Continue other present meds  Counseled on regular exercise, maintenance of a healthy weight, balanced diet rich in fruits/vegetables and lean protein, and avoidance of unhealthy habits like smoking and excessive alcohol intake.  F/u 6 weeks with BMP

## 2023-05-10 ENCOUNTER — LAB VISIT (OUTPATIENT)
Dept: LAB | Facility: HOSPITAL | Age: 73
End: 2023-05-10
Attending: INTERNAL MEDICINE
Payer: MEDICARE

## 2023-05-10 DIAGNOSIS — E87.1 HYPONATREMIA: ICD-10-CM

## 2023-05-10 LAB
ALBUMIN SERPL BCP-MCNC: 4.1 G/DL (ref 3.5–5.2)
ANION GAP SERPL CALC-SCNC: 11 MMOL/L (ref 8–16)
BASOPHILS # BLD AUTO: 0.07 K/UL (ref 0–0.2)
BASOPHILS NFR BLD: 0.6 % (ref 0–1.9)
BUN SERPL-MCNC: 14 MG/DL (ref 8–23)
CALCIUM SERPL-MCNC: 10.1 MG/DL (ref 8.7–10.5)
CHLORIDE SERPL-SCNC: 102 MMOL/L (ref 95–110)
CO2 SERPL-SCNC: 26 MMOL/L (ref 23–29)
CREAT SERPL-MCNC: 0.9 MG/DL (ref 0.5–1.4)
DIFFERENTIAL METHOD: ABNORMAL
EOSINOPHIL # BLD AUTO: 0.1 K/UL (ref 0–0.5)
EOSINOPHIL NFR BLD: 0.5 % (ref 0–8)
ERYTHROCYTE [DISTWIDTH] IN BLOOD BY AUTOMATED COUNT: 12.8 % (ref 11.5–14.5)
EST. GFR  (NO RACE VARIABLE): >60 ML/MIN/1.73 M^2
GLUCOSE SERPL-MCNC: 94 MG/DL (ref 70–110)
HCT VFR BLD AUTO: 42.2 % (ref 37–48.5)
HGB BLD-MCNC: 14 G/DL (ref 12–16)
IMM GRANULOCYTES # BLD AUTO: 0.05 K/UL (ref 0–0.04)
IMM GRANULOCYTES NFR BLD AUTO: 0.4 % (ref 0–0.5)
LYMPHOCYTES # BLD AUTO: 3.6 K/UL (ref 1–4.8)
LYMPHOCYTES NFR BLD: 29.2 % (ref 18–48)
MCH RBC QN AUTO: 29.9 PG (ref 27–31)
MCHC RBC AUTO-ENTMCNC: 33.2 G/DL (ref 32–36)
MCV RBC AUTO: 90 FL (ref 82–98)
MONOCYTES # BLD AUTO: 0.9 K/UL (ref 0.3–1)
MONOCYTES NFR BLD: 7.4 % (ref 4–15)
NEUTROPHILS # BLD AUTO: 7.6 K/UL (ref 1.8–7.7)
NEUTROPHILS NFR BLD: 61.9 % (ref 38–73)
NRBC BLD-RTO: 0 /100 WBC
PHOSPHATE SERPL-MCNC: 4 MG/DL (ref 2.7–4.5)
PLATELET # BLD AUTO: 412 K/UL (ref 150–450)
PMV BLD AUTO: 10.1 FL (ref 9.2–12.9)
POTASSIUM SERPL-SCNC: 4 MMOL/L (ref 3.5–5.1)
RBC # BLD AUTO: 4.68 M/UL (ref 4–5.4)
SODIUM SERPL-SCNC: 139 MMOL/L (ref 136–145)
WBC # BLD AUTO: 12.23 K/UL (ref 3.9–12.7)

## 2023-05-10 PROCEDURE — 36415 COLL VENOUS BLD VENIPUNCTURE: CPT | Mod: PO | Performed by: INTERNAL MEDICINE

## 2023-05-10 PROCEDURE — 85025 COMPLETE CBC W/AUTO DIFF WBC: CPT | Performed by: INTERNAL MEDICINE

## 2023-05-10 PROCEDURE — 80069 RENAL FUNCTION PANEL: CPT | Performed by: INTERNAL MEDICINE

## 2023-05-15 ENCOUNTER — PATIENT MESSAGE (OUTPATIENT)
Dept: ADMINISTRATIVE | Facility: OTHER | Age: 73
End: 2023-05-15
Payer: MEDICARE

## 2023-05-17 DIAGNOSIS — M25.561 PAIN IN BOTH KNEES, UNSPECIFIED CHRONICITY: Primary | ICD-10-CM

## 2023-05-17 DIAGNOSIS — M25.562 PAIN IN BOTH KNEES, UNSPECIFIED CHRONICITY: Primary | ICD-10-CM

## 2023-05-22 ENCOUNTER — OFFICE VISIT (OUTPATIENT)
Dept: FAMILY MEDICINE | Facility: CLINIC | Age: 73
End: 2023-05-22
Payer: MEDICARE

## 2023-05-22 ENCOUNTER — HOSPITAL ENCOUNTER (OUTPATIENT)
Dept: RADIOLOGY | Facility: HOSPITAL | Age: 73
Discharge: HOME | End: 2023-05-22
Attending: NURSE PRACTITIONER
Payer: MEDICARE

## 2023-05-22 VITALS
TEMPERATURE: 99 F | BODY MASS INDEX: 28.82 KG/M2 | SYSTOLIC BLOOD PRESSURE: 134 MMHG | DIASTOLIC BLOOD PRESSURE: 88 MMHG | HEART RATE: 100 BPM | OXYGEN SATURATION: 96 % | WEIGHT: 162.69 LBS

## 2023-05-22 DIAGNOSIS — R05.9 COUGH, UNSPECIFIED TYPE: Primary | ICD-10-CM

## 2023-05-22 DIAGNOSIS — R05.9 COUGH, UNSPECIFIED TYPE: ICD-10-CM

## 2023-05-22 PROCEDURE — 71046 XR CHEST PA AND LATERAL: ICD-10-PCS | Mod: 26,,, | Performed by: RADIOLOGY

## 2023-05-22 PROCEDURE — 99999 PR PBB SHADOW E&M-EST. PATIENT-LVL IV: ICD-10-PCS | Mod: PBBFAC,,, | Performed by: NURSE PRACTITIONER

## 2023-05-22 PROCEDURE — 99214 OFFICE O/P EST MOD 30 MIN: CPT | Mod: PBBFAC,25,PO | Performed by: NURSE PRACTITIONER

## 2023-05-22 PROCEDURE — 99214 OFFICE O/P EST MOD 30 MIN: CPT | Mod: S$PBB,,, | Performed by: NURSE PRACTITIONER

## 2023-05-22 PROCEDURE — 71046 X-RAY EXAM CHEST 2 VIEWS: CPT | Mod: TC,FY,PO

## 2023-05-22 PROCEDURE — 99999 PR PBB SHADOW E&M-EST. PATIENT-LVL IV: CPT | Mod: PBBFAC,,, | Performed by: NURSE PRACTITIONER

## 2023-05-22 PROCEDURE — 99214 PR OFFICE/OUTPT VISIT, EST, LEVL IV, 30-39 MIN: ICD-10-PCS | Mod: S$PBB,,, | Performed by: NURSE PRACTITIONER

## 2023-05-22 PROCEDURE — 71046 X-RAY EXAM CHEST 2 VIEWS: CPT | Mod: 26,,, | Performed by: RADIOLOGY

## 2023-05-22 RX ORDER — AZELASTINE 1 MG/ML
1 SPRAY, METERED NASAL 2 TIMES DAILY
Qty: 30 ML | Refills: 0 | Status: SHIPPED | OUTPATIENT
Start: 2023-05-22 | End: 2023-06-13 | Stop reason: ALTCHOICE

## 2023-05-22 RX ORDER — PROMETHAZINE HYDROCHLORIDE AND DEXTROMETHORPHAN HYDROBROMIDE 6.25; 15 MG/5ML; MG/5ML
5 SYRUP ORAL 3 TIMES DAILY PRN
Qty: 240 ML | Refills: 0 | Status: SHIPPED | OUTPATIENT
Start: 2023-05-22 | End: 2023-06-01

## 2023-05-22 RX ORDER — ALBUTEROL SULFATE 90 UG/1
2 AEROSOL, METERED RESPIRATORY (INHALATION) EVERY 6 HOURS PRN
Qty: 8 G | Refills: 0 | Status: SHIPPED | OUTPATIENT
Start: 2023-05-22 | End: 2023-06-20

## 2023-05-22 NOTE — PROGRESS NOTES
"Subjective     Patient ID: Lucinda Aguilera is a 72 y.o. female.    Chief Complaint: No chief complaint on file.    Started coughing 5/11, went to urgent care 5/14 tested negative for covid, prescribed tessalon perles, doxycycline, and prednisone x 5 days.   Saw nephrology and she started lisinopril, started taking it 5/18/23.     Past Medical History:  No date: Anticoagulant long-term use  No date: Anxiety      Comment:  takes daily Xanax  No date: Arthritis      Comment:  right thumb  No date: Cataract      Comment:  OU  No date: Cholelithiasis  No date: GERD (gastroesophageal reflux disease)  No date: Hepatic steatosis  No date: Hyperlipemia  No date: Hypertension  No date: PVD (posterior vitreous detachment)      Comment:  OD    Past Surgical History:  08/28/2020: BREAST BIOPSY; Left      Comment:  stereo biopsy  10/07/2020: BREAST BIOPSY; Left      Comment:  benign excisional biopsy  No date: chest abcess      Comment:  child  01/06/2012: COLONOSCOPY      Comment:  Dr. Lopez: hemorrhoids, redundant colon  02/17/2021: COLONOSCOPY; N/A      Comment:  Dr. Lopez: Congested and erythematous mucosa in the                rectum, in the recto-sigmoid colon and in the sigmoid                colon, proctosigmoid colitis, Redundant colon; biopsy:                focal active colitis "nonspecific but can be seen with                acute self-limited colitis (infection), medication effect               (e.g. NSAID use), proximity to diverticula, or                early/evolving IBD  06/06/2019: ESOPHAGOGASTRODUODENOSCOPY; N/A      Comment:  Dr. Lopez: small hiatal hernia, Non-erosive esophageal                reflux (NERD) disease?., slight antritis; biopsy: Antral                mucosa with chemical/reactive gastropathy. negative for h               pylori  02/17/2021: ESOPHAGOGASTRODUODENOSCOPY; N/A      Comment:  Procedure: EGD (ESOPHAGOGASTRODUODENOSCOPY);  Surgeon:                Nathan Lopez Jr., MD;  Location: " [facility] ENDO;  Service:               Endoscopy;  Laterality: N/A; Minimal gastritis; biopsy:                stomach- negative for h pylori, active chronic gastritis                with focal features of erosive gastritis, duodenum WNL  05/10/2022: ESOPHAGOGASTRODUODENOSCOPY; N/A      Comment:  Procedure: EGD (ESOPHAGOGASTRODUODENOSCOPY);  Surgeon:                Nathan Lopez Jr., MD;  Location: [facility] ENDO;  Service:               Endoscopy;  Laterality: N/A;  10/07/2020: EXCISIONAL BIOPSY; Left      Comment:  Procedure: EXCISIONAL BIOPSY-Left with radiological                marker;  Surgeon: Brooklynn Ashford MD;  Location: [facility];                Service: General;  Laterality: Left;  2010: FRACTURE SURGERY      Comment:  Left thumb repaired surgically  No date: JOINT REPLACEMENT; Bilateral      Comment:  knee  No date: KNEE ARTHROSCOPY W/ LASER      Comment:  right  06/14/2019: LAPAROSCOPIC CHOLECYSTECTOMY; N/A      Comment:  Procedure: CHOLECYSTECTOMY, LAPAROSCOPIC;  Surgeon:                Guevara Evans MD;  Location: [facility];  Service:                General;  Laterality: N/A;  11/2014: thumb surgery  06/19/2018: TOTAL KNEE ARTHROPLASTY; Left      Comment:  Procedure: REPLACEMENT-KNEE-TOTAL;  Surgeon: Nj Melvin MD;  Location: [address];  Service:                Orthopedics;  Laterality: Left;  [place]  07/13/2017: UPPER GASTROINTESTINAL ENDOSCOPY      Comment:  Dr. Lopez: NERD, Gastric mucosal atrophy. antritis, Scar               in the incisura. Biopsied; biopsy: chronic gastritis.                negative for h pylori    Review of patient's family history indicates:      Social History    Socioeconomic History      Marital status:       Number of children: 2    Occupational History      Occupation: retired teacher and principal      Occupation: substitute    Tobacco Use      Smoking status: Never      Smokeless tobacco: Never    Substance and Sexual Activity      Alcohol  use: Yes        Comment: social- maybe once every few months      Drug use: No      Sexual activity: Yes        Partners: Male        Birth control/protection: Post-menopausal, None    Social Determinants of Health  Financial Resource Strain: Low Risk       Difficulty of Paying Living Expenses: Not hard at all  Food Insecurity: No Food Insecurity      Worried About Running Out of Food in the Last Year: Never true      Ran Out of Food in the Last Year: Never true  Transportation Needs: No Transportation Needs      Lack of Transportation (Medical): No      Lack of Transportation (Non-Medical): No  Physical Activity: Insufficiently Active      Days of Exercise per Week: 1 day      Minutes of Exercise per Session: 10 min  Stress: No Stress Concern Present      Feeling of Stress : Not at all  Social Connections: Unknown      Frequency of Communication with Friends and Family: More than three times a week      Frequency of Social Gatherings with Friends and Family: More than three times a week      Active Member of Clubs or Organizations: Yes      Attends Club or Organization Meetings: More than 4 times per year      Marital Status:   Housing Stability: Low Risk       Unable to Pay for Housing in the Last Year: No      Number of Places Lived in the Last Year: 1      Unstable Housing in the Last Year: No    Current Outpatient Medications:  acetaminophen (TYLENOL) 500 MG tablet, Take 500 mg by mouth every 6 (six) hours as needed for Pain., Disp: , Rfl:   ascorbic acid, vitamin C, (VITAMIN C) 250 MG tablet, Take 500 mg by mouth once daily. , Disp: , Rfl:   aspirin (ECOTRIN) 81 MG EC tablet, Take 81 mg by mouth every evening., Disp: , Rfl:   balsalazide (COLAZAL) 750 mg capsule, TAKE 3 CAPSULES(2250 MG) BY MOUTH TWICE DAILY WITH MEALS, Disp: 180 capsule, Rfl: 11  benzonatate (TESSALON) 200 MG capsule, TAKE 1 CAPSULE BY MOUTH THREE TIMES DAILY FOR 10 DAYS AS NEEDED FOR COUGH, Disp: , Rfl:   famotidine (PEPCID) 40 MG  tablet, Take 1 tablet (40 mg total) by mouth nightly., Disp: 90 tablet, Rfl: 3  furosemide (LASIX) 40 MG tablet, Take 1 tablet on Mondays, Wednesdays, Fridays only, Disp: 30 tablet, Rfl: 1  lisinopriL (PRINIVIL,ZESTRIL) 40 MG tablet, Take 2 tablets (80 mg total) by mouth every evening., Disp: 180 tablet, Rfl: 3  LORazepam (ATIVAN) 0.5 MG tablet, TAKE 1 TABLET(0.5 MG) BY MOUTH TWICE DAILY (Patient taking differently: Take 0.5 mg by mouth 2 (two) times daily.), Disp: 60 tablet, Rfl: 5  multivitamin (THERAGRAN) per tablet, Take 1 tablet by mouth once daily., Disp: , Rfl:   niacin 500 MG CpSR, Take 500 mg by mouth every evening., Disp: , Rfl:   ondansetron (ZOFRAN-ODT) 4 MG TbDL, Take 1 tablet (4 mg total) by mouth every 6 (six) hours as needed (nausea)., Disp: 30 tablet, Rfl: 3  pantoprazole (PROTONIX) 40 MG tablet, Take 1 tablet (40 mg total) by mouth before breakfast., Disp: 90 tablet, Rfl: 3  potassium chloride (MICRO-K) 10 MEQ CpSR, Take 1 capsule (10 mEq total) by mouth once daily. TAKE 2 CAPSULES(20 MEQ) BY MOUTH EVERY DAY, Disp: , Rfl:   psyllium husk, aspartame, (NATURAL PSYLLIUM FIBER) 3.4 gram/5.8 gram Powd, Take by mouth., Disp: , Rfl:   venlafaxine (EFFEXOR-XR) 37.5 MG 24 hr capsule, Take 1 capsule (37.5 mg total) by mouth once daily. (Patient taking differently: Take 37.5 mg by mouth every evening.), Disp: 90 capsule, Rfl: 3  docusate sodium (COLACE) 100 MG capsule, Take 100 mg by mouth 2 (two) times daily., Disp: , Rfl:   pravastatin (PRAVACHOL) 40 MG tablet, Take 1 tablet (40 mg total) by mouth once daily. (Patient taking differently: Take 40 mg by mouth every evening.), Disp: 90 tablet, Rfl: 3    No current facility-administered medications for this visit.      Review of patient's allergies indicates:  No Known Allergies     Review of Systems   Constitutional:  Negative for chills and fever.   HENT:  Positive for postnasal drip and rhinorrhea. Negative for sore throat.    Respiratory:  Positive for  cough. Negative for shortness of breath and wheezing.    Cardiovascular: Negative.  Negative for chest pain and leg swelling.   Gastrointestinal: Negative.    Genitourinary: Negative.    Neurological: Negative.         Objective     Physical Exam  Constitutional:       Appearance: Normal appearance.   HENT:      Head: Normocephalic and atraumatic.      Nose: Rhinorrhea present.      Mouth/Throat:      Pharynx: No posterior oropharyngeal erythema.   Eyes:      Pupils: Pupils are equal, round, and reactive to light.   Cardiovascular:      Rate and Rhythm: Normal rate and regular rhythm.   Pulmonary:      Effort: Pulmonary effort is normal. No respiratory distress.      Breath sounds: Normal breath sounds. No wheezing.   Abdominal:      General: Bowel sounds are normal.      Tenderness: There is no abdominal tenderness.   Neurological:      Mental Status: She is alert and oriented to person, place, and time.   Psychiatric:         Mood and Affect: Mood normal.         Behavior: Behavior normal.          Assessment and Plan     Problem List Items Addressed This Visit    None  Visit Diagnoses       Cough, unspecified type    -  Primary    Relevant Medications    albuterol (VENTOLIN HFA) 90 mcg/actuation inhaler    promethazine-dextromethorphan (PROMETHAZINE-DM) 6.25-15 mg/5 mL Syrp    azelastine (ASTELIN) 137 mcg (0.1 %) nasal spray    Other Relevant Orders    X-Ray Chest PA And Lateral        Follow up  if symptoms are not resolving with treatment, follow up immediately for new or worsening symptoms, ED precautions discussed.      1. Cough, unspecified type    - X-Ray Chest PA And Lateral; Future  - albuterol (VENTOLIN HFA) 90 mcg/actuation inhaler; Inhale 2 puffs into the lungs every 6 (six) hours as needed for Wheezing. Rescue  Dispense: 8 g; Refill: 0  - promethazine-dextromethorphan (PROMETHAZINE-DM) 6.25-15 mg/5 mL Syrp; Take 5 mLs by mouth 3 (three) times daily as needed.  Dispense: 240 mL; Refill: 0  - azelastine  (ASTELIN) 137 mcg (0.1 %) nasal spray; 1 spray (137 mcg total) by Nasal route 2 (two) times daily.  Dispense: 30 mL; Refill: 0

## 2023-05-31 ENCOUNTER — OFFICE VISIT (OUTPATIENT)
Dept: OPTOMETRY | Facility: CLINIC | Age: 73
End: 2023-05-31
Payer: MEDICARE

## 2023-05-31 ENCOUNTER — EXTERNAL CHRONIC CARE MANAGEMENT (OUTPATIENT)
Dept: PRIMARY CARE CLINIC | Facility: CLINIC | Age: 73
End: 2023-05-31
Payer: MEDICARE

## 2023-05-31 DIAGNOSIS — Z13.5 GLAUCOMA SCREENING: ICD-10-CM

## 2023-05-31 DIAGNOSIS — H52.13 MYOPIA WITH ASTIGMATISM AND PRESBYOPIA, BILATERAL: ICD-10-CM

## 2023-05-31 DIAGNOSIS — H04.123 BILATERAL DRY EYES: ICD-10-CM

## 2023-05-31 DIAGNOSIS — H52.203 MYOPIA WITH ASTIGMATISM AND PRESBYOPIA, BILATERAL: ICD-10-CM

## 2023-05-31 DIAGNOSIS — K21.9 GASTROESOPHAGEAL REFLUX DISEASE, UNSPECIFIED WHETHER ESOPHAGITIS PRESENT: Primary | ICD-10-CM

## 2023-05-31 DIAGNOSIS — H43.813 POSTERIOR VITREOUS DETACHMENT, BILATERAL: ICD-10-CM

## 2023-05-31 DIAGNOSIS — H52.4 MYOPIA WITH ASTIGMATISM AND PRESBYOPIA, BILATERAL: ICD-10-CM

## 2023-05-31 DIAGNOSIS — H25.13 NUCLEAR SCLEROSIS, BILATERAL: Primary | ICD-10-CM

## 2023-05-31 PROCEDURE — 99490 CHRNC CARE MGMT STAFF 1ST 20: CPT | Mod: PBBFAC,PO | Performed by: FAMILY MEDICINE

## 2023-05-31 PROCEDURE — 99213 OFFICE O/P EST LOW 20 MIN: CPT | Mod: PBBFAC,PO,25 | Performed by: OPTOMETRIST

## 2023-05-31 PROCEDURE — 92014 PR EYE EXAM, EST PATIENT,COMPREHESV: ICD-10-PCS | Mod: S$PBB,,, | Performed by: OPTOMETRIST

## 2023-05-31 PROCEDURE — 92015 PR REFRACTION: ICD-10-PCS | Mod: ,,, | Performed by: OPTOMETRIST

## 2023-05-31 PROCEDURE — 92015 DETERMINE REFRACTIVE STATE: CPT | Mod: ,,, | Performed by: OPTOMETRIST

## 2023-05-31 PROCEDURE — 99490 PR CHRONIC CARE MGMT, 1ST 20 MIN: ICD-10-PCS | Mod: S$PBB,,, | Performed by: FAMILY MEDICINE

## 2023-05-31 PROCEDURE — 99999 PR PBB SHADOW E&M-EST. PATIENT-LVL III: CPT | Mod: PBBFAC,,, | Performed by: OPTOMETRIST

## 2023-05-31 PROCEDURE — 99999 PR PBB SHADOW E&M-EST. PATIENT-LVL III: ICD-10-PCS | Mod: PBBFAC,,, | Performed by: OPTOMETRIST

## 2023-05-31 PROCEDURE — 99490 CHRNC CARE MGMT STAFF 1ST 20: CPT | Mod: S$PBB,,, | Performed by: FAMILY MEDICINE

## 2023-05-31 PROCEDURE — 92014 COMPRE OPH EXAM EST PT 1/>: CPT | Mod: S$PBB,,, | Performed by: OPTOMETRIST

## 2023-05-31 RX ORDER — PANTOPRAZOLE SODIUM 40 MG/1
40 TABLET, DELAYED RELEASE ORAL
Qty: 90 TABLET | Refills: 3 | Status: SHIPPED | OUTPATIENT
Start: 2023-05-31 | End: 2023-08-02

## 2023-05-31 NOTE — PATIENT INSTRUCTIONS
"DRY EYES -- BURNING OR PROSPER SYMPTOMS:  Use Over The Counter artificial tears as needed for dry eye symptoms.   Some common brands include:  Systane, Optive, Refresh, and Thera-Tears.  These drops can be used as frequently as desired, but may be most helpful use during long periods of concentrated work.  For example, reading / working at the computer. Start with 3-4x per day.     Nighttime Ophthalmic gel or ointments are available: Refresh PM, Genteal, and Lacrilube.    Avoid drops that "get redness out" (Visine, Murine, Clear Eyes), as these may contain medication that could further irritate the eyes, especially with chronic use.    ALLERGY EYES -- ITCHING SYMPTOMS:  Over the counter medications include--Pataday, Zaditor, and Alaway.  Use as directed 1-2 drops daily for symptoms of itching / watering eyes.  These drops will not help for dry eye or exposure symptoms.    REDNESS RELIEF:  Lumify---is a good redness reliever that will not cause irritation if used chronically.        FLASHES / FLOATERS / POSTERIOR VITREOUS DETACHMENT    Call the clinic if you have any further changes in symptoms.  Including:  Increased numbers of floaters or flashing lights, dimness or darkness that moves through or stays constant in your vision, or any pain in the eye (s).    You may sometimes see small specks or clouds moving in your field of vision.  They are called FLOATERS.  You can often see them when looking at a plain background, like a blank wall or blue saul.  Floaters are actually tiny clumps of gel or cells inside the VITREOUS, the clear jelly-like fluid that fills the inside of your eye.    While these objects look like they are in front of your eye, they are actually floating inside.  What you see are the shadows they cast on the RETINA, the nerve layer at the back of the eye that senses light and allows you to see.      POSTERIOR VITREOUS DETACHMENT    The appearance of new floaters may be alarming.  If you suddenly " develop new floaters, you should contact your eye care professional  right away.    The retina can tear if the shrinking vitreous pulls away from the wall of the eye.  This sometimes causes a small amount of bleeding in the eye that may appear as new floaters.    A torn retina is always a serious problem, since it can lead to a retinal detachment.  You should see your eye care professional as soon as possible if:    even one new floater appears suddenly;  you see sudden flashes of light;  you notice other symptoms, like the loss of side vision, or a curtain closes down in your vision        POSTERIOR VITREOUS DETACHMENT is more common for people who:    are nearsighted;  have had cataract surgery;  have had YAG laser surgery of the eye;  have had inflammation inside the eye;  are over age 60.      While some floaters may remain visible, many of them will fade over time and become less noticeable.  Even if you've had some floaters for years, you should have your eyes checked as soon as possible if you notice new ones.    FLASHING LIGHTS    When the vitreous gel rubs or pulls on the retina, you may see what look like flashing lights or lightning streaks.  These flashes can appear off and on for several weeks or months.      Some people experience flashes of light that appear as jagged lines or heat waves in both eyes, lasting 10-20 minutes.  These flashes are caused by a spasm of blood vessels in the brain, which is called a migraine.    If a headache follows these flashes, it's called a migraine headache.  If   no headache occurs, these flashes are called Ophthalmic or Ocular Migraine.           Early Cataracts--not visually significant for surgery consultation.    What Are Cataracts?  A clear lens in the eye focuses light. This lets the eye see images sharply. With age, the lens slowly becomes cloudy. The cloudy lens is a cataract. A cataract scatters light and makes it hard for the eye to focus. Cataracts often  form in both eyes. But one lens may cloud faster than the other.      The Aging of Your Lens    Your lens may cloud so slowly that you don`t notice any vision changes at first. But as the cataract gets worse, the eye has a harder time focusing. In early stages, glasses may help you see better. As the lens gets cloudier, your doctor may recommend surgery to restore your vision.

## 2023-05-31 NOTE — PROGRESS NOTES
HPI    Dls  07/12/22      [facility]  [facility]ACE[facility]    Pt states VA OU is a little more blurred for reading.  Takes glasses off   to read.  Not interested in bifocal, tried them and can't do it.  Glare at   night is bothersome    +f/f  No eye drops-not often anyway  Last edited by Josette Murphy on 5/31/2023  9:26 AM.            Assessment /Plan     For exam results, see Encounter Report.    Nuclear sclerosis, bilateral    Posterior vitreous detachment, bilateral    Bilateral dry eyes    Glaucoma screening    Myopia with astigmatism and presbyopia, bilateral      Early NS / cortical changes OU, not vis sig for consult, gave info, cautions at night   RD precautions given and reviewed. Patient knows to call/ message if any further changes in symptoms occur.  Longstanding, no gross spk today, continue hydration / ATs daily  Not suspect   Updated specs rx, gave copy for distance / near sep pair     Discussed and educated patient on current findings /plan.  RTC 1 year, prn if any changes / issues

## 2023-06-02 ENCOUNTER — PES CALL (OUTPATIENT)
Dept: ADMINISTRATIVE | Facility: CLINIC | Age: 73
End: 2023-06-02
Payer: MEDICARE

## 2023-06-05 ENCOUNTER — OFFICE VISIT (OUTPATIENT)
Dept: GASTROENTEROLOGY | Facility: CLINIC | Age: 73
End: 2023-06-05
Payer: MEDICARE

## 2023-06-05 ENCOUNTER — HOSPITAL ENCOUNTER (OUTPATIENT)
Dept: RADIOLOGY | Facility: HOSPITAL | Age: 73
Discharge: HOME | End: 2023-06-05
Attending: ORTHOPAEDIC SURGERY
Payer: MEDICARE

## 2023-06-05 ENCOUNTER — PATIENT MESSAGE (OUTPATIENT)
Dept: ORTHOPEDICS | Facility: CLINIC | Age: 73
End: 2023-06-05
Payer: MEDICARE

## 2023-06-05 VITALS — WEIGHT: 165.56 LBS | HEIGHT: 63 IN | BODY MASS INDEX: 29.34 KG/M2

## 2023-06-05 DIAGNOSIS — K21.9 GASTROESOPHAGEAL REFLUX DISEASE, UNSPECIFIED WHETHER ESOPHAGITIS PRESENT: Primary | ICD-10-CM

## 2023-06-05 DIAGNOSIS — M25.561 PAIN IN BOTH KNEES, UNSPECIFIED CHRONICITY: ICD-10-CM

## 2023-06-05 DIAGNOSIS — M25.562 PAIN IN BOTH KNEES, UNSPECIFIED CHRONICITY: ICD-10-CM

## 2023-06-05 DIAGNOSIS — R10.30 LOWER ABDOMINAL PAIN: ICD-10-CM

## 2023-06-05 PROCEDURE — 99999 PR PBB SHADOW E&M-EST. PATIENT-LVL III: CPT | Mod: PBBFAC,,, | Performed by: INTERNAL MEDICINE

## 2023-06-05 PROCEDURE — 99213 OFFICE O/P EST LOW 20 MIN: CPT | Mod: S$PBB,,, | Performed by: INTERNAL MEDICINE

## 2023-06-05 PROCEDURE — 99213 OFFICE O/P EST LOW 20 MIN: CPT | Mod: PBBFAC,PO | Performed by: INTERNAL MEDICINE

## 2023-06-05 PROCEDURE — 73562 X-RAY EXAM OF KNEE 3: CPT | Mod: 26,50,, | Performed by: RADIOLOGY

## 2023-06-05 PROCEDURE — 99213 PR OFFICE/OUTPT VISIT, EST, LEVL III, 20-29 MIN: ICD-10-PCS | Mod: S$PBB,,, | Performed by: INTERNAL MEDICINE

## 2023-06-05 PROCEDURE — 73562 XR KNEE ORTHO BILAT: ICD-10-PCS | Mod: 26,50,, | Performed by: RADIOLOGY

## 2023-06-05 PROCEDURE — 73562 X-RAY EXAM OF KNEE 3: CPT | Mod: TC,50,FY,PO

## 2023-06-05 PROCEDURE — 99999 PR PBB SHADOW E&M-EST. PATIENT-LVL III: ICD-10-PCS | Mod: PBBFAC,,, | Performed by: INTERNAL MEDICINE

## 2023-06-05 NOTE — PROGRESS NOTES
Subjective     Patient ID: Lucinda Aguilera is a 72 y.o. female.    Chief Complaint: Gastroesophageal Reflux    Ms. Aguilera returns for follow-up of her reflux and possible gastroparesis, but also with a recent hospitalization at [facility], see below.  She reports she has been sick since Easter.  During which time she passed out.  And spent 4 days in the hospital.  She recalls and reports her sodium was low.  She is now seeing Dr. Engel of Nephrology.  See her office note from below.  Also her sinuses are congested, and she has a cough.  She also reports her stomach is bothering her.  She is been putting a weighted heating pad on her abdomen for relief.  Her energy is still low, and feels quite fatigued.  Her last EGD was 05/10/2022.  She is taking the pantoprazole in the morning and famotidine at night.        See Dr. Engel's Nephro note 5/23/2023:   Patient ID: Lucinda Aguilera is a 72 y.o. White female who presents for Follow-up (FOLLOW-UP- -PCP)   -Na:  was 121 in hospital now up to 139 dropped to 135 but Ca up for 8.9 to 10.1 now 9.3 with Vit D 43, iPTH 77 and Phos 4, Mag 1.9, K 2.9 to 4 now 3.8, hgb 14.  Reviewed 4 gm K diet and avoid water.   HTN:  well controlled without proteinuria  Hyponatremia d/t SIADH:   worsened by Thiazide and SSRI.  Na 121 to 131 improved to 139 now 135 off salt tablets and lasix, encourage water  restriction and  aquaretics (tea, cola, coffee) and fruit drinks or milk products.  Decrease water intake again with other fluids like fruit juice and milk when outside.   Hypokalemia:  2.9 to to 4 now 3.8 with 4 gm K diet reviewed.  On KCl, Mag 1.8 to 1.9.  Switched ARB to and ACE to improve the K and try reduced KCl from 20 to 10 mEq daily with the goal being to stop it.  Using Berry's Light Salt.  As mag only 1.9, try amiloride 5 mg qod and dc KCl after 2 weeks and recheck   Essential HTN:   labile, with low K so switched ARB to ACE to try and get rid of KCl.  If B/P  still up can add BB as HR up, now good.   +Ca:  was 8.9 to 10.1 now 9.3 with Vit D 43 stopped supplement about a year ago, and iPTH 77, creat 0.7  Ass/Plan:  Hyponatremia  -     Renal Function Panel; Future; Expected date: 06/23/2023  -     Magnesium; Future; Expected date: 06/23/2023   Essential hypertension   Other orders  -     aMILoride (MIDAMOR) 5 MG Tab; Take 1 tablet (5 mg total) by mouth every other day.  Dispense: 45 tablet; Refill: 3        See D/C summary from [facility]:  HPI:   72-year-old female with hypertension, hyperlipidemia, and chronic gastritis presented to the emergency room for syncope.  Patient reports of 2 separate episodes earlier today when she became lightheaded and subsequently lost consciousness.  Her  reports of the patient regaining consciousness quickly however during her 2nd syncopal event, she hit the back of her head on a door frame prompting him to call EMS.  In the emergency room she had 1 episode of vomiting and subsequently lost consciousness again.  Workup was notable for hyponatremia as well as hypokalemia.  She denies chest pain, shortness at breath, diaphoresis during these episodes.  She does report of recent worsening of her chronic gastritis with decreased p.o. intake over the past few days.  She was given a fluid bolus and Hospital Medicine consulted for admission.  Hospital Course:   Admitted with recurrent syncopal episodes with N/V. Found to have acute on chronic hyponatremia lower than usual and IVVD. Kept on telemetry, NSR and no arrhythmias noted. Symptoms improved with IVF but no significant improvement in hyponatremia. Nephrology consulted, thinks likely due to meds including thiazide and antidepressants; initiated on 3% saline and salt tablets. Improved Na and no further episodes of syncope. Discharged on salt tablets.    Follow ups:  Dr. Engel, nephrology          See last GI OV, 12/15/2022:  Ms. Aguilera returns for follow-up of her reflux and possible  gastroparesis.  She reports she is doing well at this time.  She reports she is watching her diet closely.  She is eating bland foods.  She is eating smaller portions.  And she is specifically avoiding fried foods and red gravies.  Last visit she was complaining of cough.  We prescribed Tessalon Perles, and this helped.  She now is reporting the cough is gone.   See her weight table below.  There appears to be a definite trend down words.        Wt Readings from Last 20 Encounters (looks like has plateau'd):  06/05/23 : 75.1 kg (165 lb 9.1 oz)  05/23/23 : 73 kg (161 lb)  05/22/23 : 73.8 kg (162 lb 11.2 oz)  05/16/23 : 73.9 kg (163 lb)  05/02/23 : 73.5 kg (162 lb 0.6 oz)  04/20/23 : 72.4 kg (159 lb 9.8 oz)  04/09/23 : 75.3 kg (166 lb)  03/28/23 : 73.5 kg (162 lb)  02/23/23 : 73.9 kg (162 lb 14.7 oz)  02/20/23 : 74.1 kg (163 lb 5.8 oz)  01/25/23 : 75.3 kg (166 lb 0.1 oz)  12/22/22 : 75.3 kg (166 lb)  12/15/22 : 75.7 kg (166 lb 14.2 oz)  11/08/22 : 76.2 kg (168 lb)  09/16/22 : 76.6 kg (168 lb 14 oz)  08/23/22 : 79.3 kg (174 lb 13.2 oz)  08/23/22 : 78.9 kg (174 lb)  08/23/22 : 79 kg (174 lb 2.6 oz)  05/20/22 : 79.2 kg (174 lb 9.7 oz)  05/10/22 : 77.1 kg (170 lb)    Review of Systems   Constitutional:  Positive for fatigue. Negative for appetite change, fever and unexpected weight change.   HENT: Negative.  Negative for mouth sores, sore throat and trouble swallowing.    Eyes: Negative.    Respiratory: Negative.  Negative for cough and choking.    Cardiovascular: Negative.  Negative for chest pain and leg swelling.   Gastrointestinal:  Positive for abdominal pain and constipation. Negative for abdominal distention, anal bleeding, blood in stool, diarrhea, nausea and vomiting.   Genitourinary: Negative.    Musculoskeletal: Negative.    Integumentary:  Negative for color change and rash. Negative.   Neurological: Negative.    Hematological:  Negative for adenopathy.   Psychiatric/Behavioral: Negative.          Objective      Physical Exam  Vitals and nursing note reviewed.   Constitutional:       General: She is not in acute distress.     Appearance: Normal appearance. She is well-developed. She is obese.   HENT:      Mouth/Throat:      Mouth: Mucous membranes are moist.      Pharynx: No oropharyngeal exudate.   Eyes:      General: No scleral icterus.  Neck:      Thyroid: No thyromegaly.      Trachea: No tracheal deviation.   Cardiovascular:      Rate and Rhythm: Normal rate and regular rhythm.      Heart sounds: Normal heart sounds.   Pulmonary:      Effort: Pulmonary effort is normal.      Breath sounds: Normal breath sounds.   Abdominal:      General: Bowel sounds are normal. There is no distension.      Palpations: Abdomen is soft. There is no mass.      Tenderness: There is abdominal tenderness (Minimally tender in the lower abdomen, without rebound.). There is no guarding.   Lymphadenopathy:      Cervical: No cervical adenopathy.   Skin:     General: Skin is warm and dry.      Findings: No rash.   Neurological:      General: No focal deficit present.      Mental Status: She is alert and oriented to person, place, and time.   Psychiatric:         Mood and Affect: Mood normal.         Behavior: Behavior normal.          Assessment and Plan     Gastroesophageal reflux disease, unspecified whether esophagitis present    Lower abdominal pain    BMI 29.0-29.9,adult      Use MiraLax, every day, to get her re regulated.  If no improvement with that, we will then we will proceed to EGD.  Otherwise, RTC 3-4 months.

## 2023-06-08 ENCOUNTER — LAB VISIT (OUTPATIENT)
Dept: LAB | Facility: HOSPITAL | Age: 73
End: 2023-06-08
Attending: FAMILY MEDICINE
Payer: MEDICARE

## 2023-06-08 ENCOUNTER — OFFICE VISIT (OUTPATIENT)
Dept: ORTHOPEDICS | Facility: CLINIC | Age: 73
End: 2023-06-08
Payer: MEDICARE

## 2023-06-08 VITALS — BODY MASS INDEX: 29.34 KG/M2 | WEIGHT: 165.56 LBS | HEIGHT: 63 IN

## 2023-06-08 DIAGNOSIS — M70.61 GREATER TROCHANTERIC BURSITIS OF RIGHT HIP: Primary | ICD-10-CM

## 2023-06-08 DIAGNOSIS — E87.1 HYPONATREMIA: ICD-10-CM

## 2023-06-08 PROCEDURE — 99499 UNLISTED E&M SERVICE: CPT | Mod: S$PBB,,, | Performed by: ORTHOPAEDIC SURGERY

## 2023-06-08 PROCEDURE — 99499 NO LOS: ICD-10-PCS | Mod: S$PBB,,, | Performed by: ORTHOPAEDIC SURGERY

## 2023-06-08 PROCEDURE — 99213 OFFICE O/P EST LOW 20 MIN: CPT | Mod: PBBFAC,PN | Performed by: ORTHOPAEDIC SURGERY

## 2023-06-08 PROCEDURE — 20610 LARGE JOINT ASPIRATION/INJECTION: R GREATER TROCHANTERIC BURSA: ICD-10-PCS | Mod: S$PBB,RT,, | Performed by: ORTHOPAEDIC SURGERY

## 2023-06-08 PROCEDURE — 36415 COLL VENOUS BLD VENIPUNCTURE: CPT | Mod: PO | Performed by: FAMILY MEDICINE

## 2023-06-08 PROCEDURE — 99999 PR PBB SHADOW E&M-EST. PATIENT-LVL III: ICD-10-PCS | Mod: PBBFAC,,, | Performed by: ORTHOPAEDIC SURGERY

## 2023-06-08 PROCEDURE — 80048 BASIC METABOLIC PNL TOTAL CA: CPT | Performed by: FAMILY MEDICINE

## 2023-06-08 PROCEDURE — 20610 DRAIN/INJ JOINT/BURSA W/O US: CPT | Mod: PBBFAC,PN | Performed by: ORTHOPAEDIC SURGERY

## 2023-06-08 PROCEDURE — 99999 PR PBB SHADOW E&M-EST. PATIENT-LVL III: CPT | Mod: PBBFAC,,, | Performed by: ORTHOPAEDIC SURGERY

## 2023-06-08 RX ADMIN — TRIAMCINOLONE ACETONIDE 40 MG: 40 INJECTION, SUSPENSION INTRA-ARTICULAR; INTRAMUSCULAR at 02:06

## 2023-06-09 LAB
ANION GAP SERPL CALC-SCNC: 10 MMOL/L (ref 8–16)
BUN SERPL-MCNC: 13 MG/DL (ref 8–23)
CALCIUM SERPL-MCNC: 9.5 MG/DL (ref 8.7–10.5)
CHLORIDE SERPL-SCNC: 98 MMOL/L (ref 95–110)
CO2 SERPL-SCNC: 24 MMOL/L (ref 23–29)
CREAT SERPL-MCNC: 0.7 MG/DL (ref 0.5–1.4)
EST. GFR  (NO RACE VARIABLE): >60 ML/MIN/1.73 M^2
GLUCOSE SERPL-MCNC: 77 MG/DL (ref 70–110)
POTASSIUM SERPL-SCNC: 4.3 MMOL/L (ref 3.5–5.1)
SODIUM SERPL-SCNC: 132 MMOL/L (ref 136–145)

## 2023-06-13 ENCOUNTER — OFFICE VISIT (OUTPATIENT)
Dept: FAMILY MEDICINE | Facility: CLINIC | Age: 73
End: 2023-06-13
Payer: MEDICARE

## 2023-06-13 VITALS
HEART RATE: 94 BPM | HEIGHT: 63 IN | SYSTOLIC BLOOD PRESSURE: 136 MMHG | OXYGEN SATURATION: 97 % | RESPIRATION RATE: 18 BRPM | WEIGHT: 163.56 LBS | BODY MASS INDEX: 28.98 KG/M2 | DIASTOLIC BLOOD PRESSURE: 70 MMHG | TEMPERATURE: 98 F

## 2023-06-13 DIAGNOSIS — I10 ESSENTIAL HYPERTENSION: ICD-10-CM

## 2023-06-13 DIAGNOSIS — F41.9 ANXIETY: ICD-10-CM

## 2023-06-13 DIAGNOSIS — E87.1 HYPONATREMIA: Primary | ICD-10-CM

## 2023-06-13 DIAGNOSIS — R05.9 COUGH, UNSPECIFIED TYPE: ICD-10-CM

## 2023-06-13 PROCEDURE — 99213 OFFICE O/P EST LOW 20 MIN: CPT | Mod: PBBFAC,PO | Performed by: FAMILY MEDICINE

## 2023-06-13 PROCEDURE — 99999 PR PBB SHADOW E&M-EST. PATIENT-LVL III: ICD-10-PCS | Mod: PBBFAC,,, | Performed by: FAMILY MEDICINE

## 2023-06-13 PROCEDURE — 99214 OFFICE O/P EST MOD 30 MIN: CPT | Mod: S$PBB,,, | Performed by: FAMILY MEDICINE

## 2023-06-13 PROCEDURE — 99999 PR PBB SHADOW E&M-EST. PATIENT-LVL III: CPT | Mod: PBBFAC,,, | Performed by: FAMILY MEDICINE

## 2023-06-13 PROCEDURE — 99214 PR OFFICE/OUTPT VISIT, EST, LEVL IV, 30-39 MIN: ICD-10-PCS | Mod: S$PBB,,, | Performed by: FAMILY MEDICINE

## 2023-06-13 NOTE — PROGRESS NOTES
Subjective:       Patient ID: Lucinda Aguilera is a 72 y.o. female.    Chief Complaint: Follow up from kidney doctor (6  week follow up)    HPI Comments: Here for f/u on chronic health issues    Joined [facility] and doing some daily exercise.    Recovering from sinusitis with some persistent cough.  Taking phenergan DM    Hyponatremia, SIADH - Chlorthalidone was stopped.  Sodium still low on recent lab.  Following with nephrology, Dr. Engel, with appt next week    Depression - tolerating Effexor XR 37.5mg; she could not tolerate the 75mg dose; She did some counseling.  HLD, aortic atherosclerosis - tolerating pravachol 40mg daily  Anxiety - Taking lorazepam BID. Doing some counseling presently  HTN - tolerating Amiloride 5mg daily and lisinopril 40mg BID; BP higher; digital flowsheet reviewed  She got some swelling with amlodipine  GERD - tolerating Protonix 40mg daily  S/p conner - taking colestipol  Colitis - stable on Colazal, miralax    Past Medical History:    Hypertension                                                  Anxiety                                           Hyperlipemia                                                  GERD (gastroesophageal reflux disease)                        Cataract                                                        Comment:OU    PVD (posterior vitreous detachment)                             Comment:OD    Arthritis                                                       Comment:right thumb    Past Surgical History:    chest abcess                                                     Comment:child    KNEE ARTHROSCOPY W/ LASER                                        Comment:right    COLONOSCOPY                                      1/2012          Comment:repeat in 5 years    No Known Allergies    Social History    Marital Status:              Spouse Name:                       Years of Education:                 Number of children: 2             Occupational  History  Occupation          Employer            Comment               retired                                   retired teacher an*                         Social History Main Topics    Smoking Status: Never Smoker                      Smokeless Status: Never Used                        Alcohol Use: Yes                Comment: social    Drug Use: No              Sexual Activity: Yes               Partners with: Male       Birth Control/Protection: None, Post-menopausal    Current Outpatient Medications on File Prior to Visit   Medication Sig Dispense Refill    acetaminophen (TYLENOL) 500 MG tablet Take 500 mg by mouth every 6 (six) hours as needed for Pain.      aMILoride (MIDAMOR) 5 MG Tab Take 1 tablet (5 mg total) by mouth every other day. 45 tablet 3    ascorbic acid, vitamin C, (VITAMIN C) 250 MG tablet Take 500 mg by mouth once daily.       aspirin (ECOTRIN) 81 MG EC tablet Take 81 mg by mouth every evening.      balsalazide (COLAZAL) 750 mg capsule TAKE 3 CAPSULES(2250 MG) BY MOUTH TWICE DAILY WITH MEALS 180 capsule 11    docusate sodium (COLACE) 100 MG capsule Take 100 mg by mouth 2 (two) times daily.      famotidine (PEPCID) 40 MG tablet Take 1 tablet (40 mg total) by mouth nightly. 90 tablet 3    lisinopriL (PRINIVIL,ZESTRIL) 40 MG tablet Take 2 tablets (80 mg total) by mouth every evening. 180 tablet 3    LORazepam (ATIVAN) 0.5 MG tablet TAKE 1 TABLET(0.5 MG) BY MOUTH TWICE DAILY (Patient taking differently: Take 0.5 mg by mouth 2 (two) times daily.) 60 tablet 5    multivitamin (THERAGRAN) per tablet Take 1 tablet by mouth once daily.      niacin 500 MG CpSR Take 500 mg by mouth every evening.      ondansetron (ZOFRAN-ODT) 4 MG TbDL Take 1 tablet (4 mg total) by mouth every 6 (six) hours as needed (nausea). 30 tablet 3    pantoprazole (PROTONIX) 40 MG tablet Take 1 tablet (40 mg total) by mouth before breakfast. 90 tablet 3    pravastatin (PRAVACHOL) 40 MG tablet Take 1 tablet (40 mg total) by mouth once  daily. (Patient taking differently: Take 40 mg by mouth every evening.) 90 tablet 3    psyllium husk, aspartame, (NATURAL PSYLLIUM FIBER) 3.4 gram/5.8 gram Powd Take by mouth.      venlafaxine (EFFEXOR-XR) 37.5 MG 24 hr capsule Take 1 capsule (37.5 mg total) by mouth once daily. (Patient taking differently: Take 37.5 mg by mouth every evening.) 90 capsule 3    albuterol (VENTOLIN HFA) 90 mcg/actuation inhaler Inhale 2 puffs into the lungs every 6 (six) hours as needed for Wheezing. Rescue (Patient not taking: Reported on 6/13/2023) 8 g 0    [DISCONTINUED] azelastine (ASTELIN) 137 mcg (0.1 %) nasal spray 1 spray (137 mcg total) by Nasal route 2 (two) times daily. (Patient not taking: Reported on 6/13/2023) 30 mL 0    [DISCONTINUED] potassium chloride (MICRO-K) 10 MEQ CpSR Take 1 capsule (10 mEq total) by mouth once daily. TAKE 2 CAPSULES(20 MEQ) BY MOUTH EVERY DAY (Patient not taking: Reported on 6/13/2023)       No current facility-administered medications on file prior to visit.     Review of patient's family history indicates:    Hypertension                   Mother                    Heart disease                  Mother                    Stroke                         Father                    Review of Systems   Constitutional: Negative for fever, chills, appetite change and unexpected weight change.   HENT: Negative for sore throat and trouble swallowing.    Eyes: Negative for pain and visual disturbance.   Respiratory: Negative for cough, shortness of breath and wheezing.    Cardiovascular: Negative for chest pain and palpitations.   Gastrointestinal: Negative for nausea, vomiting, abdominal pain, diarrhea and blood in stool.   Genitourinary: Negative for dysuria, hematuria and difficulty urinating.   Musculoskeletal: Negative for arthralgias, gait problem and neck pain.   Skin: Negative for rash and wound.   Neurological: Negative for dizziness, weakness, numbness and headaches.   Hematological: Negative  "for adenopathy.   Psychiatric/Behavioral: Negative for dysphoric mood.           Objective:       /70   Pulse 94   Temp 97.7 °F (36.5 °C) (Oral)   Resp 18   Ht 5' 3" (1.6 m)   Wt 74.2 kg (163 lb 9.3 oz)   LMP 04/18/2004   SpO2 97%   BMI 28.98 kg/m²     Physical Exam   Constitutional: She is oriented to person, place, and time. She appears well-developed and well-nourished.   HENT:   Head: Normocephalic.   Mouth/Throat: Oropharynx is clear and moist. No oropharyngeal exudate or posterior oropharyngeal erythema.   Eyes: Conjunctivae and EOM are normal. Pupils are equal, round, and reactive to light.   Neck: Normal range of motion. Neck supple. No thyromegaly present.   Cardiovascular: Normal rate, regular rhythm, S1 normal, S2 normal, normal heart sounds and intact distal pulses.  Exam reveals no gallop and no friction rub.    No murmur heard.  Pulmonary/Chest: Effort normal and breath sounds normal. She has no wheezes. She has no rales.   Abdominal: Normal appearance.   Musculoskeletal:        Right lower leg: She exhibits no edema.        Left lower leg: She exhibits no edema.   Lymphadenopathy:     She has no cervical adenopathy.   Neurological: She is alert and oriented to person, place, and time. No cranial nerve deficit. Gait normal.   Skin: Skin is warm and intact. No rash noted.   Psychiatric: She has a normal mood and affect.         Results for orders placed or performed in visit on 06/08/23   Basic Metabolic Panel   Result Value Ref Range    Sodium 132 (L) 136 - 145 mmol/L    Potassium 4.3 3.5 - 5.1 mmol/L    Chloride 98 95 - 110 mmol/L    CO2 24 23 - 29 mmol/L    Glucose 77 70 - 110 mg/dL    BUN 13 8 - 23 mg/dL    Creatinine 0.7 0.5 - 1.4 mg/dL    Calcium 9.5 8.7 - 10.5 mg/dL    Anion Gap 10 8 - 16 mmol/L    eGFR >60.0 >60 mL/min/1.73 m^2     *Note: Due to a large number of results and/or encounters for the requested time period, some results have not been displayed. A complete set of results " can be found in Results Review.         Assessment:       1. Hyponatremia    2. Essential hypertension    3. Anxiety    4. Cough, unspecified type                Plan:       Hyponatremia    Essential hypertension    Anxiety    Cough, unspecified type        Reassurance regarding resolving viral cough  F/u with nephrology as planned; will defer BP and electrolyte control to Dr. Engel  continue Ativan BID  Effexor XR 37.5mg daily  Overall stable  cotninue aspirin 81mg daily  Continue other present meds  Counseled on regular exercise, maintenance of a healthy weight, balanced diet rich in fruits/vegetables and lean protein, and avoidance of unhealthy habits like smoking and excessive alcohol intake.  F/u August with labs as planned

## 2023-06-19 ENCOUNTER — LAB VISIT (OUTPATIENT)
Dept: LAB | Facility: HOSPITAL | Age: 73
End: 2023-06-19
Attending: INTERNAL MEDICINE
Payer: MEDICARE

## 2023-06-19 DIAGNOSIS — E87.1 HYPONATREMIA: ICD-10-CM

## 2023-06-19 LAB
ALBUMIN SERPL BCP-MCNC: 3.6 G/DL (ref 3.5–5.2)
ANION GAP SERPL CALC-SCNC: 8 MMOL/L (ref 8–16)
BUN SERPL-MCNC: 13 MG/DL (ref 8–23)
CALCIUM SERPL-MCNC: 9.3 MG/DL (ref 8.7–10.5)
CHLORIDE SERPL-SCNC: 100 MMOL/L (ref 95–110)
CO2 SERPL-SCNC: 24 MMOL/L (ref 23–29)
CREAT SERPL-MCNC: 0.7 MG/DL (ref 0.5–1.4)
EST. GFR  (NO RACE VARIABLE): >60 ML/MIN/1.73 M^2
GLUCOSE SERPL-MCNC: 109 MG/DL (ref 70–110)
MAGNESIUM SERPL-MCNC: 2 MG/DL (ref 1.6–2.6)
PHOSPHATE SERPL-MCNC: 2.6 MG/DL (ref 2.7–4.5)
POTASSIUM SERPL-SCNC: 4 MMOL/L (ref 3.5–5.1)
SODIUM SERPL-SCNC: 132 MMOL/L (ref 136–145)

## 2023-06-19 PROCEDURE — 83735 ASSAY OF MAGNESIUM: CPT | Performed by: INTERNAL MEDICINE

## 2023-06-19 PROCEDURE — 80069 RENAL FUNCTION PANEL: CPT | Performed by: INTERNAL MEDICINE

## 2023-06-19 PROCEDURE — 36415 COLL VENOUS BLD VENIPUNCTURE: CPT | Mod: PO | Performed by: INTERNAL MEDICINE

## 2023-06-20 RX ORDER — TRIAMCINOLONE ACETONIDE 40 MG/ML
40 INJECTION, SUSPENSION INTRA-ARTICULAR; INTRAMUSCULAR
Status: DISCONTINUED | OUTPATIENT
Start: 2023-06-08 | End: 2023-06-20 | Stop reason: HOSPADM

## 2023-06-20 NOTE — PROCEDURES
Large Joint Aspiration/Injection: R greater trochanteric bursa    Date/Time: 6/8/2023 2:30 PM  Performed by: Nick Arnold MD  Authorized by: Nick Arnold MD     Consent Done?:  Yes (Verbal)  Indications:  Pain  Timeout: prior to procedure the correct patient, procedure, and site was verified    Prep: patient was prepped and draped in usual sterile fashion      Local anesthesia used?: Yes    Local anesthetic:  Lidocaine 1% without epinephrine  Anesthetic total (ml):  5      Details:  Needle Size:  22 G  Approach:  Lateral  Location:  Hip  Site:  R greater trochanteric bursa  Medications:  40 mg triamcinolone acetonide 40 mg/mL  Patient tolerance:  Patient tolerated the procedure well with no immediate complications

## 2023-06-20 NOTE — PROGRESS NOTES
"  Chief Complaint   Patient presents with    Right Hip - Pain      HPI:   This is a 72 y.o. who presents to clinic today for right hip pain for the past 2 weeks after no known trauma. Complains of pain while sleeping on side and when descending stairs. Pain is dull. No numbness or tingling. No associated signs or symptoms.      Past Medical History:   Diagnosis Date    Anticoagulant long-term use     Anxiety     takes daily Xanax    Arthritis     right thumb    Cataract     OU    Cholelithiasis     GERD (gastroesophageal reflux disease)     Hepatic steatosis     Hyperlipemia     Hypertension     PVD (posterior vitreous detachment)     OD     Past Surgical History:   Procedure Laterality Date    BREAST BIOPSY Left 08/28/2020    stereo biopsy    BREAST BIOPSY Left 10/07/2020    benign excisional biopsy    chest abcess      child    COLONOSCOPY  01/06/2012    Dr. Lopez: hemorrhoids, redundant colon    COLONOSCOPY N/A 02/17/2021    Dr. Lopez: Congested and erythematous mucosa in the rectum, in the recto-sigmoid colon and in the sigmoid colon, proctosigmoid colitis, Redundant colon; biopsy: focal active colitis "nonspecific but can be seen with acute self-limited colitis (infection), medication effect (e.g. NSAID use), proximity to diverticula, or early/evolving IBD    ESOPHAGOGASTRODUODENOSCOPY N/A 06/06/2019    Dr. Lopez: small hiatal hernia, Non-erosive esophageal reflux (NERD) disease?., slight antritis; biopsy: Antral mucosa with chemical/reactive gastropathy. negative for h pylori    ESOPHAGOGASTRODUODENOSCOPY N/A 02/17/2021    Procedure: EGD (ESOPHAGOGASTRODUODENOSCOPY);  Surgeon: Nathan Lopez Jr., MD;  Location: [facility];  Service: Endoscopy;  Laterality: N/A; Minimal gastritis; biopsy: stomach- negative for h pylori, active chronic gastritis with focal features of erosive gastritis, duodenum WNL    ESOPHAGOGASTRODUODENOSCOPY N/A 05/10/2022    Procedure: EGD (ESOPHAGOGASTRODUODENOSCOPY);  Surgeon: Nathan SOOD" John Ferguson MD;  Location: [facility] ENDO;  Service: Endoscopy;  Laterality: N/A;    EXCISIONAL BIOPSY Left 10/07/2020    Procedure: EXCISIONAL BIOPSY-Left with radiological marker;  Surgeon: Brooklynn Ashford MD;  Location: [facility];  Service: General;  Laterality: Left;    FRACTURE SURGERY  2010    Left thumb repaired surgically    JOINT REPLACEMENT Bilateral     knee    KNEE ARTHROSCOPY W/ LASER      right    LAPAROSCOPIC CHOLECYSTECTOMY N/A 06/14/2019    Procedure: CHOLECYSTECTOMY, LAPAROSCOPIC;  Surgeon: Guevara Evans MD;  Location: [facility] OR;  Service: General;  Laterality: N/A;    thumb surgery  11/2014    TOTAL KNEE ARTHROPLASTY Left 06/19/2018    Procedure: REPLACEMENT-KNEE-TOTAL;  Surgeon: Nj Melvin MD;  Location: [address];  Service: Orthopedics;  Laterality: Left;  [employer]    UPPER GASTROINTESTINAL ENDOSCOPY  07/13/2017    Dr. Lopez: NERD, Gastric mucosal atrophy. antritis, Scar in the incisura. Biopsied; biopsy: chronic gastritis. negative for h pylori     Current Outpatient Medications on File Prior to Visit   Medication Sig Dispense Refill    acetaminophen (TYLENOL) 500 MG tablet Take 500 mg by mouth every 6 (six) hours as needed for Pain.      aMILoride (MIDAMOR) 5 MG Tab Take 1 tablet (5 mg total) by mouth every other day. 45 tablet 3    ascorbic acid, vitamin C, (VITAMIN C) 250 MG tablet Take 500 mg by mouth once daily.       aspirin (ECOTRIN) 81 MG EC tablet Take 81 mg by mouth every evening.      balsalazide (COLAZAL) 750 mg capsule TAKE 3 CAPSULES(2250 MG) BY MOUTH TWICE DAILY WITH MEALS 180 capsule 11    docusate sodium (COLACE) 100 MG capsule Take 100 mg by mouth 2 (two) times daily.      famotidine (PEPCID) 40 MG tablet Take 1 tablet (40 mg total) by mouth nightly. 90 tablet 3    lisinopriL (PRINIVIL,ZESTRIL) 40 MG tablet Take 2 tablets (80 mg total) by mouth every evening. 180 tablet 3    LORazepam (ATIVAN) 0.5 MG tablet TAKE 1 TABLET(0.5 MG) BY MOUTH TWICE DAILY (Patient taking  differently: Take 0.5 mg by mouth 2 (two) times daily.) 60 tablet 5    multivitamin (THERAGRAN) per tablet Take 1 tablet by mouth once daily.      niacin 500 MG CpSR Take 500 mg by mouth every evening.      ondansetron (ZOFRAN-ODT) 4 MG TbDL Take 1 tablet (4 mg total) by mouth every 6 (six) hours as needed (nausea). 30 tablet 3    pantoprazole (PROTONIX) 40 MG tablet Take 1 tablet (40 mg total) by mouth before breakfast. 90 tablet 3    pravastatin (PRAVACHOL) 40 MG tablet Take 1 tablet (40 mg total) by mouth once daily. (Patient taking differently: Take 40 mg by mouth every evening.) 90 tablet 3    psyllium husk, aspartame, (NATURAL PSYLLIUM FIBER) 3.4 gram/5.8 gram Powd Take by mouth.      [DISCONTINUED] albuterol (VENTOLIN HFA) 90 mcg/actuation inhaler Inhale 2 puffs into the lungs every 6 (six) hours as needed for Wheezing. Rescue 8 g 0    [DISCONTINUED] venlafaxine (EFFEXOR-XR) 37.5 MG 24 hr capsule Take 1 capsule (37.5 mg total) by mouth once daily. (Patient taking differently: Take 37.5 mg by mouth every evening.) 90 capsule 3     No current facility-administered medications on file prior to visit.     Review of patient's allergies indicates:  No Known Allergies  Family History   Problem Relation Age of Onset    Hypertension Mother     Heart disease Mother     Glaucoma Mother     Stroke Father     Glaucoma Sister     Cataracts Sister     Macular degeneration Sister     Breast cancer Neg Hx     Colon cancer Neg Hx     Crohn's disease Neg Hx     Ulcerative colitis Neg Hx     Stomach cancer Neg Hx     Esophageal cancer Neg Hx     Celiac disease Neg Hx     Amblyopia Neg Hx     Blindness Neg Hx     Retinal detachment Neg Hx     Strabismus Neg Hx      Social History     Socioeconomic History    Marital status:     Number of children: 2   Occupational History    Occupation: retired teacher and principal    Occupation: substitute   Tobacco Use    Smoking status: Never    Smokeless tobacco: Never   Substance and  Sexual Activity    Alcohol use: Yes     Comment: social- maybe once every few months    Drug use: No    Sexual activity: Yes     Partners: Male     Birth control/protection: Post-menopausal, None     Social Determinants of Health     Financial Resource Strain: Low Risk     Difficulty of Paying Living Expenses: Not hard at all   Food Insecurity: No Food Insecurity    Worried About Running Out of Food in the Last Year: Never true    Ran Out of Food in the Last Year: Never true   Transportation Needs: No Transportation Needs    Lack of Transportation (Medical): No    Lack of Transportation (Non-Medical): No   Physical Activity: Insufficiently Active    Days of Exercise per Week: 2 days    Minutes of Exercise per Session: 10 min   Stress: No Stress Concern Present    Feeling of Stress : Not at all   Social Connections: Unknown    Frequency of Communication with Friends and Family: More than three times a week    Frequency of Social Gatherings with Friends and Family: More than three times a week    Active Member of Clubs or Organizations: Yes    Attends Club or Organization Meetings: 1 to 4 times per year    Marital Status:    Housing Stability: Low Risk     Unable to Pay for Housing in the Last Year: No    Number of Places Lived in the Last Year: 1    Unstable Housing in the Last Year: No       Review of Systems:  Constitutional:  Denies fever or chills   Eyes:  Denies change in visual acuity   HENT:  Denies nasal congestion or sore throat   Respiratory:  Denies cough or shortness of breath   Cardiovascular:  Denies chest pain or edema   GI:  Denies abdominal pain, nausea, vomiting, bloody stools or diarrhea   :  Denies dysuria   Integument:  Denies rash   Neurologic:  Denies headache, focal weakness or sensory changes   Endocrine:  Denies polyuria or polydipsia   Lymphatic:  Denies swollen glands   Psychiatric:  Denies depression or anxiety     Physical Exam:   Constitutional:  Well developed, well nourished,  no acute distress, non-toxic appearance   Integument:  Well hydrated, no rash   Lymphatic:  No lymphadenopathy noted   Neurologic:  Alert & oriented x 3  Psychiatric:  Speech and behavior appropriate     Bilateral Hip Exam    left Hip Exam   left hip exam performed same as contralateral hip and is normal.     right Hip Exam   Tenderness   The patient is experiencing tenderness in the greater trochanter.  Range of Motion   The patient has normal hip ROM.  Muscle Strength   Abduction: 4/5   Other   Erythema: absent  Sensation: normal  Pulse: present    Greater trochanteric bursitis of right hip           Using an aseptic technique, I injected 5 cc of lidocaine 1% without and 1 cc of kenalog 40mg into the right Hip. The patient tolerated this well. I will have them return to clinic as needed.

## 2023-06-22 ENCOUNTER — PATIENT MESSAGE (OUTPATIENT)
Dept: ADMINISTRATIVE | Facility: OTHER | Age: 73
End: 2023-06-22
Payer: MEDICARE

## 2023-06-23 ENCOUNTER — PATIENT MESSAGE (OUTPATIENT)
Dept: GASTROENTEROLOGY | Facility: CLINIC | Age: 73
End: 2023-06-23
Payer: MEDICARE

## 2023-06-23 DIAGNOSIS — R11.2 NON-INTRACTABLE VOMITING WITH NAUSEA: ICD-10-CM

## 2023-06-23 DIAGNOSIS — R10.13 EPIGASTRIC DISCOMFORT: ICD-10-CM

## 2023-06-23 RX ORDER — FAMOTIDINE 40 MG/1
40 TABLET, FILM COATED ORAL NIGHTLY
Qty: 90 TABLET | Refills: 3 | Status: SHIPPED | OUTPATIENT
Start: 2023-06-23

## 2023-06-26 ENCOUNTER — TELEPHONE (OUTPATIENT)
Dept: FAMILY MEDICINE | Facility: CLINIC | Age: 73
End: 2023-06-26
Payer: MEDICARE

## 2023-06-26 ENCOUNTER — PATIENT MESSAGE (OUTPATIENT)
Dept: FAMILY MEDICINE | Facility: CLINIC | Age: 73
End: 2023-06-26
Payer: MEDICARE

## 2023-06-26 DIAGNOSIS — R05.9 COUGH, UNSPECIFIED TYPE: Primary | ICD-10-CM

## 2023-06-26 NOTE — TELEPHONE ENCOUNTER
----- Message from Maryan Churchill sent at 6/26/2023  8:27 AM CDT -----  Contact: Patient  Type:  Sooner Appointment Request    Caller is requesting a sooner appointment.  Caller declined first available appointment listed below.  Caller will not accept being placed on the waitlist and is requesting a message be sent to doctor.    Name of Caller:  Patient  When is the first available appointment?  7/10  Symptoms:  cough, nausea, high BP  Best Call Back Number:  [phone]  Additional Information:  Please call the pt back to advise. Thanks!

## 2023-06-28 ENCOUNTER — PATIENT MESSAGE (OUTPATIENT)
Dept: ORTHOPEDICS | Facility: CLINIC | Age: 73
End: 2023-06-28
Payer: MEDICARE

## 2023-06-28 ENCOUNTER — OFFICE VISIT (OUTPATIENT)
Dept: FAMILY MEDICINE | Facility: CLINIC | Age: 73
End: 2023-06-28
Payer: MEDICARE

## 2023-06-28 DIAGNOSIS — G93.31 POST VIRAL SYNDROME: Primary | ICD-10-CM

## 2023-06-28 PROCEDURE — 99213 OFFICE O/P EST LOW 20 MIN: CPT | Mod: 95,,, | Performed by: NURSE PRACTITIONER

## 2023-06-28 PROCEDURE — 99213 PR OFFICE/OUTPT VISIT, EST, LEVL III, 20-29 MIN: ICD-10-PCS | Mod: 95,,, | Performed by: NURSE PRACTITIONER

## 2023-06-28 RX ORDER — FLUTICASONE PROPIONATE 44 UG/1
1 AEROSOL, METERED RESPIRATORY (INHALATION) DAILY
Qty: 10.6 G | Refills: 1 | Status: SHIPPED | OUTPATIENT
Start: 2023-06-28 | End: 2023-09-07 | Stop reason: ALTCHOICE

## 2023-06-28 RX ORDER — PROMETHAZINE HYDROCHLORIDE AND DEXTROMETHORPHAN HYDROBROMIDE 6.25; 15 MG/5ML; MG/5ML
5 SYRUP ORAL 3 TIMES DAILY PRN
Qty: 240 ML | Refills: 0 | Status: SHIPPED | OUTPATIENT
Start: 2023-06-28 | End: 2023-08-04 | Stop reason: SDUPTHER

## 2023-06-29 ENCOUNTER — DOCUMENTATION ONLY (OUTPATIENT)
Dept: FAMILY MEDICINE | Facility: CLINIC | Age: 73
End: 2023-06-29
Payer: MEDICARE

## 2023-06-29 DIAGNOSIS — M76.891 HIP FLEXOR TENDINITIS, RIGHT: ICD-10-CM

## 2023-06-29 DIAGNOSIS — M70.61 GREATER TROCHANTERIC BURSITIS OF RIGHT HIP: Primary | ICD-10-CM

## 2023-06-30 ENCOUNTER — EXTERNAL CHRONIC CARE MANAGEMENT (OUTPATIENT)
Dept: PRIMARY CARE CLINIC | Facility: CLINIC | Age: 73
End: 2023-06-30
Payer: MEDICARE

## 2023-06-30 ENCOUNTER — PATIENT MESSAGE (OUTPATIENT)
Dept: ADMINISTRATIVE | Facility: OTHER | Age: 73
End: 2023-06-30
Payer: MEDICARE

## 2023-06-30 PROCEDURE — 99490 CHRNC CARE MGMT STAFF 1ST 20: CPT | Mod: S$PBB,,, | Performed by: FAMILY MEDICINE

## 2023-06-30 PROCEDURE — 99439 CHRNC CARE MGMT STAF EA ADDL: CPT | Mod: S$PBB,,, | Performed by: FAMILY MEDICINE

## 2023-06-30 PROCEDURE — 99439 CHRNC CARE MGMT STAF EA ADDL: CPT | Mod: PBBFAC,PO | Performed by: FAMILY MEDICINE

## 2023-06-30 PROCEDURE — 99490 PR CHRONIC CARE MGMT, 1ST 20 MIN: ICD-10-PCS | Mod: S$PBB,,, | Performed by: FAMILY MEDICINE

## 2023-06-30 PROCEDURE — 99439 PR CHRONIC CARE MGMT, EA ADDTL 20 MIN: ICD-10-PCS | Mod: S$PBB,,, | Performed by: FAMILY MEDICINE

## 2023-06-30 PROCEDURE — 99490 CHRNC CARE MGMT STAFF 1ST 20: CPT | Mod: PBBFAC,25,PO | Performed by: FAMILY MEDICINE

## 2023-06-30 NOTE — PROGRESS NOTES
Subjective     Patient ID: Lucinda Aguilera is a 72 y.o. female.    Chief Complaint: No chief complaint on file.    The patient location is: [place], [place]  The chief complaint leading to consultation is: cough    Visit type: audiovisual    Face to Face time with patient: 12  15 minutes of total time spent on the encounter, which includes face to face time and non-face to face time preparing to see the patient (eg, review of tests), Obtaining and/or reviewing separately obtained history, Documenting clinical information in the electronic or other health record, Independently interpreting results (not separately reported) and communicating results to the patient/family/caregiver, or Care coordination (not separately reported).         Each patient to whom he or she provides medical services by telemedicine is:  (1) informed of the relationship between the physician and patient and the respective role of any other health care provider with respect to management of the patient; and (2) notified that he or she may decline to receive medical services by telemedicine and may withdraw from such care at any time.    Notes:   Patient has been coughing for over a month since having a URI, not resolving.       Cough  This is a recurrent problem. The current episode started in the past 7 days. The problem has been rapidly worsening. The problem occurs every few hours. The cough is Non-productive. Associated symptoms include headaches and weight loss. Pertinent negatives include no chest pain, chills, ear congestion, ear pain, fever, heartburn, hemoptysis, myalgias, nasal congestion, postnasal drip, rash, rhinorrhea, sore throat, shortness of breath, sweats or wheezing. Nothing aggravates the symptoms. She has tried a beta-agonist inhaler, prescription cough suppressant and rest for the symptoms. The treatment provided mild relief. There is no history of asthma, bronchiectasis, bronchitis, COPD, emphysema, environmental allergies  or pneumonia.   Review of Systems   Constitutional:  Positive for weight loss. Negative for chills, fatigue and fever.   HENT:  Negative for ear pain, postnasal drip, rhinorrhea and sore throat.    Respiratory:  Positive for cough. Negative for hemoptysis, shortness of breath and wheezing.    Cardiovascular:  Negative for chest pain.   Gastrointestinal: Negative.  Negative for abdominal pain, heartburn, nausea, vomiting and reflux.   Musculoskeletal:  Negative for myalgias.   Integumentary:  Negative for rash.   Allergic/Immunologic: Negative for environmental allergies.   Neurological:  Positive for headaches.        Objective     Physical Exam  Constitutional:       Appearance: Normal appearance.   Pulmonary:      Effort: Pulmonary effort is normal. No respiratory distress.   Neurological:      Mental Status: She is alert and oriented to person, place, and time.   Psychiatric:         Mood and Affect: Mood normal.         Behavior: Behavior normal.          Assessment and Plan     1. Post viral syndrome  -     fluticasone propionate (FLOVENT HFA) 44 mcg/actuation inhaler; Inhale 1 puff into the lungs once daily. Controller for 14 days  Dispense: 10.6 g; Refill: 1        Flovent as discussed, phenergan dm prn. Follow up if not resolving.         No follow-ups on file.

## 2023-06-30 NOTE — PROGRESS NOTES
Answers submitted by the patient for this visit:  Cough Questionnaire (Submitted on 6/26/2023)  Chief Complaint: Cough  Chronicity: recurrent  Onset: in the past 7 days  Progression since onset: rapidly worsening  Frequency: every few hours  Cough characteristics: non-productive  chest pain: No  chills: No  ear congestion: No  ear pain: No  fever: No  headaches: Yes  heartburn: No  hemoptysis: No  myalgias: No  nasal congestion: No  postnasal drip: No  rash: No  rhinorrhea: No  shortness of breath: No  sore throat: No  sweats: No  weight loss: Yes  wheezing: No  Aggravated by: nothing  asthma: No  bronchiectasis: No  bronchitis: No  COPD: No  emphysema: No  environmental allergies: No  pneumonia: No  Treatments tried: a beta-agonist inhaler, prescription cough suppressant, rest  Improvement on treatment: mild

## 2023-07-08 NOTE — PATIENT INSTRUCTIONS
- Perform wound care daily after shower/bath and whenever dressing becomes soiled or wet via removing dressing, cleansing with betadine, patting area dry, applying offloading pad and antibiotic cream, and covering with bandaid.    - Wear toe spacer and sneakers at all times when ambulating.    - Notify clinic if redness, swelling, or pain worsens or fails to improve.    - Follow up in 2 weeks for wound care.    
No

## 2023-07-10 ENCOUNTER — LAB VISIT (OUTPATIENT)
Dept: LAB | Facility: HOSPITAL | Age: 73
End: 2023-07-10
Attending: NURSE PRACTITIONER
Payer: MEDICARE

## 2023-07-10 DIAGNOSIS — E87.1 HYPONATREMIA: ICD-10-CM

## 2023-07-10 PROCEDURE — 36415 COLL VENOUS BLD VENIPUNCTURE: CPT | Mod: PO | Performed by: INTERNAL MEDICINE

## 2023-07-10 PROCEDURE — 80069 RENAL FUNCTION PANEL: CPT | Performed by: INTERNAL MEDICINE

## 2023-07-11 LAB
ALBUMIN SERPL BCP-MCNC: 3.6 G/DL (ref 3.5–5.2)
ANION GAP SERPL CALC-SCNC: 7 MMOL/L (ref 8–16)
BUN SERPL-MCNC: 11 MG/DL (ref 8–23)
CALCIUM SERPL-MCNC: 9.4 MG/DL (ref 8.7–10.5)
CHLORIDE SERPL-SCNC: 98 MMOL/L (ref 95–110)
CO2 SERPL-SCNC: 24 MMOL/L (ref 23–29)
CREAT SERPL-MCNC: 0.7 MG/DL (ref 0.5–1.4)
EST. GFR  (NO RACE VARIABLE): >60 ML/MIN/1.73 M^2
GLUCOSE SERPL-MCNC: 74 MG/DL (ref 70–110)
PHOSPHATE SERPL-MCNC: 3.3 MG/DL (ref 2.7–4.5)
POTASSIUM SERPL-SCNC: 4.1 MMOL/L (ref 3.5–5.1)
SODIUM SERPL-SCNC: 129 MMOL/L (ref 136–145)

## 2023-07-31 ENCOUNTER — PATIENT MESSAGE (OUTPATIENT)
Dept: ADMINISTRATIVE | Facility: OTHER | Age: 73
End: 2023-07-31
Payer: MEDICARE

## 2023-07-31 ENCOUNTER — EXTERNAL CHRONIC CARE MANAGEMENT (OUTPATIENT)
Dept: PRIMARY CARE CLINIC | Facility: CLINIC | Age: 73
End: 2023-07-31
Payer: MEDICARE

## 2023-07-31 PROCEDURE — 99490 CHRNC CARE MGMT STAFF 1ST 20: CPT | Mod: S$PBB,,, | Performed by: FAMILY MEDICINE

## 2023-07-31 PROCEDURE — 99490 PR CHRONIC CARE MGMT, 1ST 20 MIN: ICD-10-PCS | Mod: S$PBB,,, | Performed by: FAMILY MEDICINE

## 2023-07-31 PROCEDURE — 99490 CHRNC CARE MGMT STAFF 1ST 20: CPT | Mod: PBBFAC,PO | Performed by: FAMILY MEDICINE

## 2023-08-01 ENCOUNTER — CLINICAL SUPPORT (OUTPATIENT)
Dept: REHABILITATION | Facility: HOSPITAL | Age: 73
End: 2023-08-01
Payer: MEDICARE

## 2023-08-01 DIAGNOSIS — M76.891 HIP FLEXOR TENDINITIS, RIGHT: ICD-10-CM

## 2023-08-01 DIAGNOSIS — R26.9 GAIT DIFFICULTY: ICD-10-CM

## 2023-08-01 DIAGNOSIS — R29.898 WEAKNESS OF BOTH HIPS: ICD-10-CM

## 2023-08-01 DIAGNOSIS — M53.86 DECREASED ROM OF LUMBAR SPINE: Primary | ICD-10-CM

## 2023-08-01 DIAGNOSIS — M70.61 GREATER TROCHANTERIC BURSITIS OF RIGHT HIP: ICD-10-CM

## 2023-08-01 PROCEDURE — 97110 THERAPEUTIC EXERCISES: CPT | Mod: PO | Performed by: PHYSICAL THERAPIST

## 2023-08-01 PROCEDURE — 97161 PT EVAL LOW COMPLEX 20 MIN: CPT | Mod: PO | Performed by: PHYSICAL THERAPIST

## 2023-08-01 NOTE — PLAN OF CARE
[facility]   Physical Therapy Initial Evaluation      Name: Lucinda Aguilera  [facility] Number: [id number]    Therapy Diagnosis:   Encounter Diagnoses   Name Primary?    Greater trochanteric bursitis of right hip     Hip flexor tendinitis, right     Decreased ROM of lumbar spine Yes    Weakness of both hips     Gait difficulty         Physician: Chandrika Santiago FNP    Physician Orders: PT Eval and Treat   Post Surgical? No   Eval and Treat Yes   Type of Therapy Outpatient Therapy   Location: Hip     Medical Diagnosis from Referral:   M70.61 (ICD-10-CM) - Greater trochanteric bursitis of right hip   M76.891 (ICD-10-CM) - Hip flexor tendinitis, right     Evaluation Date: 8/1/2023  Authorization Period Expiration: 7/29/2024  Plan of Care Expiration: 9/29/2023  Progress Note Due: 8/31/2023  Visit # / Visits authorized: 1 / 1   FOTO: 1/ 3 (8/1/2023)    Precautions: HTN,      Time In: 1430  Time Out: 1530  Total Billable Time: 50 minutes    Subjective     Date of onset: Chronic with acute flare ups    History of current condition - Lucinda reports:     Right hip pain since the end of June 2023. Complains of pain while sleeping on side and when descending stairs. Pain is dull. No numbness or tingling. No associated signs or symptoms.  Injection: On 6/8/2023 lidocaine 1% , 1 cc of kenalog 40mg into the right Hip. The patient tolerated this well. Return to clinic as needed    Falls: 2 falls (1 a few yrs ago and 1 at [facility])    Imaging:   Stormy Ellsworth PA-C on 5/24/2022 13:25     X-Ray Lumbar Complete Including Flex And Ext  Order: [id number]  Status: Final result     Visible to patient: Yes (seen)     Next appt: 08/02/2023 at 08:30 AM in Gastroenterology (Jamee Allen NP)     Dx: Lumbar spondylosis; Spondylolisthesis...     2 Result Notes    1 Patient Communication  Details    Reading Physician Reading Date Result Priority   Marco A Murray MD  [phone] 5/20/2022 Routine     Narrative &  Impression  EXAMINATION:  XR LUMBAR SPINE 5 VIEW WITH FLEX AND EXT     CLINICAL HISTORY:  Low back pain, unspecified     TECHNIQUE:  Five views of the lumbar spine plus flexion extension views were performed.     COMPARISON:  04/05/2018     FINDINGS:  There is mild lumbar scoliosis convex to the right.  The scoliotic angle measures 6° from the superior endplate of L2 to the inferior endplate of L4.  There is degenerative disc disease at the L1-2, L4-5, and L5-S1 levels with disc narrowing.  The disc degeneration has progressed significantly since the prior study with further disc space narrowing.  There is degenerative facet arthrosis at the L3-4, L4-5, and L5-S1 levels.  The facet arthrosis results in 5 mm anterior subluxation of L4 and 12 mm anterior subluxation of L5.  There is no change in the degree of subluxation with flexion or extension.  Degenerative disc disease results in 5 mm posterior subluxation of L1 which does not change with flexion or extension.  Vertebral body heights are well maintained.     Impression:     1. Mild lumbar scoliosis.  2. Multilevel progressive degenerative disc disease.  3. Degenerative facet arthrosis at the lower 3 lumbar levels with mild anterior subluxation of L4 and L5 and no evidence of instability.  4. Mild chronic posterior subluxation of L1.        Electronically signed by: Marco A Murray MD  Date:                                            05/20/2022  Time:                                           15:41           Exam Ended: 05/20/22 15:31 Last Resulted: 05/20/22 15:41           Prior Therapy: none for balance or hips  Social History:  lives with their spouse  Occupation: retired  Prior Level of Function: Chronic condition  Current Level of Function: she has to use her arms to assist with rolling to side lying. Sleeps on back. She started at the [facility] about 1 mo ago. She feels more flexible doing yoga and swimming. Complains of pain while sleeping on side and when  descending stairs.    Pain:  Current 5/10, worst 9/10, best 0/10   Location: bilateral hips    Description: Aching and Dull  Aggravating Factors: Sitting and stair climbing, and prolonged standing.  Easing Factors: pain medication and heating pad, Tylenol, injections    Patients goals: to dec pain, improve balance.     Medical History:   Past Medical History:   Diagnosis Date    Anticoagulant long-term use     Anxiety     takes daily Xanax    Arthritis     right thumb    Cataract     OU    Cholelithiasis     GERD (gastroesophageal reflux disease)     Hepatic steatosis     Hyperlipemia     Hypertension     PVD (posterior vitreous detachment)     OD       Surgical History:   Lucinda Aguilera  has a past surgical history that includes chest abcess; Knee arthroscopy w/ laser; thumb surgery (11/2014); Total knee arthroplasty (Left, 06/19/2018); Colonoscopy (01/06/2012); Joint replacement (Bilateral); Esophagogastroduodenoscopy (N/A, 06/06/2019); Laparoscopic cholecystectomy (N/A, 06/14/2019); Upper gastrointestinal endoscopy (07/13/2017); Excisional biopsy (Left, 10/07/2020); Esophagogastroduodenoscopy (N/A, 02/17/2021); Colonoscopy (N/A, 02/17/2021); Breast biopsy (Left, 08/28/2020); Breast biopsy (Left, 10/07/2020); Esophagogastroduodenoscopy (N/A, 05/10/2022); and Fracture surgery (2010).    Medications:   Lucinda has a current medication list which includes the following prescription(s): acetaminophen, ascorbic acid (vitamin c), aspirin, atenolol, balsalazide, bupropion, docusate sodium, famotidine, flovent hfa, furosemide, lisinopril, lorazepam, magnesium oxide, multivitamin, niacin, ondansetron, pantoprazole, pravastatin, and natural psyllium fiber.    Allergies:   Review of patient's allergies indicates:  No Known Allergies     Objective      Observation: Pt is walking with a Trendelenburg with right hip drop.    Posture: impaired: FHP, rounded shoulders, increased t/s kyphosis, slight curve-Left in t/s, and no  "LLD in supine.    Lumbar Spine AROM:   Flexion: 38 degrees   Extension:12 degrees   Left side bend: 19 cm   Right side bend: 17 cm    Hip Range of Motion:   Left active Right active    Flexion 110 113   Abduction 30 45   Extension NT NT   Ext. Rotation 20 30   Int. Rotation 38 20     Lower Extremity Strength  Right LE  Left LE    Knee extension: 5/5 Knee extension: 4+/5   Knee flexion: 4/5 Knee flexion: 4/5   Hip flexion: 3+/5 Hip flexion: 3+/5   Hip Internal Rotation:  NT    Hip Internal Rotation: NT      Hip External Rotation: NT    Hip External Rotation: NT      Hip extension:  3-/5 Hip extension: 3-/5   Hip abduction: 3-/5 Hip abduction: 3/5   Hip adduction: 3/5 Hip adduction 3-/5   Ankle dorsiflexion: 4+/5 Ankle dorsiflexion: 4+/5   Ankle plantarflexion: 4+/5 Ankle plantarflexion: 4+/5     Special Tests:  JOSUE: - left, - right   FADIR: - left, - right  Piriformis test: - right, - left (pt. Does have positive tightness)   Scour: - bilaterally  No mentionable leg length discrepancy     Flexibility:    Juanita's test: R = NT ; L = NT   Elias test: R = NT ; L = NT    Joint Mobility:   LEFT: Hypo inferior>inferiormedial>inferiorlateral   RIGHT: Hypo inferior>inferiorlateral>inferiormedial    Palpation: ++ bilateral glutes, + bilateral piriformis, + left TFL, + right greater trochanter, + right IT band    Sensation: NT    Edema: non palpable to bursae    Balance:  NARROW BASE OF SUPPORT with EYES OPEN on floor x 30" very mild sway  NARROW BASE OF SUPPORT with EYES CLOSED on floor x 30" with minimum sway  Right SINGLE LEG STANCE on floor: 1-3" with constant HHA  Left SINGLE LEG STANCE on floor: 1-3" with constant HHA  Tandem stance: LLE back x 30" on floor, RLE back x 15" on floor     Timed Up and Go: 17" without need of AD    Normative Reference Values by Age  Age Group Time in Seconds   60 - 69 years  8.1 s   70 - 79 years  9.2 s  80 - 99 years 11.3 s    Cut-off Values Predictive of Falls by:   Group Time in Seconds " "Community Dwelling Frail Older Adults   >  14 associated with high fall risk   Post-op hip fracture patients at time of discharge  > 24 predictive of falls within 6 months after hip fracture   Frail older adults   > /= 30  predictive of requiring assistive device for ambulation and being dependent in ADLs       General Norms:  <10 s=freely mobile  >20 s=may need AD  >30 s=dependence    Helen ESPARZA. Reference Values for Timed Up and Go test: a descriptive meta-analysis. J Geriatr Phys there. 2006;29(2):64-68.   Intake Outcome Measure for FOTO HIP Survey    Therapist reviewed FOTO scores for Lucinda Aguilera on 8/1/2023.   FOTO documents entered into [email or web address] - see Media section.    Intake Score: 26  Goal: 46       Treatment     Total Treatment time (time-based codes) separate from Evaluation: 08 minutes     Lucinda received the treatments listed below:      therapeutic exercises to develop strength, endurance, ROM, flexibility, posture, and core stabilization for 08 minutes including:  Supine HS stretch with strap  Supine ITB stretch with strap  Piriformis hook stretch  3 x 30" each    LOWER TRUNK ROTATION  Bent knee fall out/single  Bridge  PPT  DOUBLE KNEE TO CHEST  X 3 reps each to carry into HEP.    Patient Education and Home Exercises     Education provided:   - HEP  - Pt/family was provided educational information, including: role of PT, role of pt/caregiver, goals for PT, POC, scheduling, and attendance policy.- pt verbalized understanding.     Written Home Exercises Provided: yes. Exercises were reviewed and Lucinda was able to demonstrate them prior to the end of the session.  Lucinda demonstrated good  understanding of the education provided. See EMR under Patient Instructions for exercises provided during therapy sessions.    Assessment     Lucinda is a 72 y.o. female referred to outpatient Physical Therapy with a medical diagnosis of Greater trochanteric bursitis of right hip and Hip flexor tendinitis, right . " Patient presents with myofascial tightness, Trendelenburg gait, muscle weakness, impaired hip ROM, and balance as evidence by the above tests and measures. Pt will benefit from gait training, postural training, therapeutic exercise, therapeutic activities, neuromuscular exercises, manual therapy, patient education, and modalities as indicated.    Patient prognosis is Good.   Patient will benefit from skilled outpatient Physical Therapy to address the deficits stated above and in the chart below, provide patient /family education, and to maximize patientt's level of independence.     Plan of care discussed with patient: Yes  Patient's spiritual, cultural and educational needs considered and patient is agreeable to the plan of care and goals as stated below:     Anticipated Barriers for therapy: age, lifestyle, chronic condition with acute flare ups    Medical Necessity is demonstrated by the following  History  Co-morbidities and personal factors that may impact the plan of care [] LOW: no personal factors / co-morbidities  [] MODERATE: 1-2 personal factors / co-morbidities  [x] HIGH: 3+ personal factors / co-morbidities    Moderate / High Support Documentation:   Co-morbidities affecting plan of care:   Past Medical History:   Diagnosis Date    Anticoagulant long-term use     Anxiety     takes daily Xanax    Arthritis     right thumb    Cataract     OU    Cholelithiasis     GERD (gastroesophageal reflux disease)     Hepatic steatosis     Hyperlipemia     Hypertension     PVD (posterior vitreous detachment)     OD       Personal Factors:   age  lifestyle     Examination  Body Structures and Functions, activity limitations and participation restrictions that may impact the plan of care [] LOW: addressing 1-2 elements  [] MODERATE: 3+ elements  [x] HIGH: 4+ elements (please support below)    Moderate / High Support Documentation: impaired ability to navigate stairs, sit or stand for prolonged periods, and balance    "  Clinical Presentation [x] LOW: stable  [] MODERATE: Evolving  [] HIGH: Unstable     Decision Making/ Complexity Score: low       Goals:  Short Term Goals: 4 weeks   -Lumbar AROM extension improved to 15 deg to demo improving mobility.  -Bilateral LE strength as tested 3/5 or better for improving stability and gait.  -NARROW BASE OF SUPPORT on foam x 30 " with minimum or no sway.  -Improved TUG to 12 s without AD for improving balance and gait.  -FOTO FS score improved to 30 for improving QOL.    Long Term Goals: 8 weeks   -NARROW BASE OF SUPPORT with EYES CLOSED on foam x 30" with minimum sway  -Improved TUG to 10 s without AD for improving balance and gait.  -Tandem balance on floor (right leg back) 30" for improving balance.  -FOTO FS score improved to 35 for improving QOL.    Plan     Plan of care Certification: 8/1/2023 to 9/29/2023.    Outpatient Physical Therapy 1-2 times weekly for 8 weeks to include the following interventions: Electrical Stimulation IFC, Gait Training, Manual Therapy, Moist Heat/ Ice, Neuromuscular Re-ed, Patient Education, Therapeutic Activities, and Therapeutic Exercise.     Saira Joyce, PT      I CERTIFY THE NEED FOR THESE SERVICES FURNISHED UNDER THIS PLAN OF TREATMENT AND WHILE UNDER MY CARE    Physician's comments:        Physician's Signature: ___________________________________________________ DATE:_______________________     "

## 2023-08-02 ENCOUNTER — OFFICE VISIT (OUTPATIENT)
Dept: FAMILY MEDICINE | Facility: CLINIC | Age: 73
End: 2023-08-02
Payer: MEDICARE

## 2023-08-02 ENCOUNTER — HOSPITAL ENCOUNTER (OUTPATIENT)
Dept: RADIOLOGY | Facility: HOSPITAL | Age: 73
Discharge: HOME | End: 2023-08-02
Attending: NURSE PRACTITIONER
Payer: MEDICARE

## 2023-08-02 ENCOUNTER — OFFICE VISIT (OUTPATIENT)
Dept: GASTROENTEROLOGY | Facility: CLINIC | Age: 73
End: 2023-08-02
Payer: MEDICARE

## 2023-08-02 VITALS — BODY MASS INDEX: 29.72 KG/M2 | HEIGHT: 63 IN | WEIGHT: 167.75 LBS

## 2023-08-02 VITALS
HEIGHT: 63 IN | SYSTOLIC BLOOD PRESSURE: 138 MMHG | DIASTOLIC BLOOD PRESSURE: 90 MMHG | WEIGHT: 167.75 LBS | OXYGEN SATURATION: 97 % | HEART RATE: 75 BPM | BODY MASS INDEX: 29.72 KG/M2

## 2023-08-02 DIAGNOSIS — K59.09 CHRONIC CONSTIPATION: Primary | ICD-10-CM

## 2023-08-02 DIAGNOSIS — Z90.49 S/P CHOLECYSTECTOMY: ICD-10-CM

## 2023-08-02 DIAGNOSIS — R05.9 COUGH, UNSPECIFIED TYPE: ICD-10-CM

## 2023-08-02 DIAGNOSIS — R05.9 COUGH, UNSPECIFIED TYPE: Primary | ICD-10-CM

## 2023-08-02 DIAGNOSIS — K21.9 GASTROESOPHAGEAL REFLUX DISEASE WITHOUT ESOPHAGITIS: ICD-10-CM

## 2023-08-02 DIAGNOSIS — K52.9 COLITIS: ICD-10-CM

## 2023-08-02 PROCEDURE — 99999 PR PBB SHADOW E&M-EST. PATIENT-LVL V: CPT | Mod: PBBFAC,,, | Performed by: NURSE PRACTITIONER

## 2023-08-02 PROCEDURE — 71046 XR CHEST PA AND LATERAL: ICD-10-PCS | Mod: 26,,, | Performed by: RADIOLOGY

## 2023-08-02 PROCEDURE — 99214 PR OFFICE/OUTPT VISIT, EST, LEVL IV, 30-39 MIN: ICD-10-PCS | Mod: S$PBB,,, | Performed by: NURSE PRACTITIONER

## 2023-08-02 PROCEDURE — 99214 OFFICE O/P EST MOD 30 MIN: CPT | Mod: S$PBB,,, | Performed by: NURSE PRACTITIONER

## 2023-08-02 PROCEDURE — 99999 PR PBB SHADOW E&M-EST. PATIENT-LVL IV: CPT | Mod: PBBFAC,,, | Performed by: NURSE PRACTITIONER

## 2023-08-02 PROCEDURE — 99999 PR PBB SHADOW E&M-EST. PATIENT-LVL V: ICD-10-PCS | Mod: PBBFAC,,, | Performed by: NURSE PRACTITIONER

## 2023-08-02 PROCEDURE — 99999 PR PBB SHADOW E&M-EST. PATIENT-LVL IV: ICD-10-PCS | Mod: PBBFAC,,, | Performed by: NURSE PRACTITIONER

## 2023-08-02 PROCEDURE — 99215 OFFICE O/P EST HI 40 MIN: CPT | Mod: PBBFAC,25,27,PO | Performed by: NURSE PRACTITIONER

## 2023-08-02 PROCEDURE — 71046 X-RAY EXAM CHEST 2 VIEWS: CPT | Mod: TC,FY,PO

## 2023-08-02 PROCEDURE — 99214 OFFICE O/P EST MOD 30 MIN: CPT | Mod: PBBFAC,PO,25 | Performed by: NURSE PRACTITIONER

## 2023-08-02 PROCEDURE — 99213 OFFICE O/P EST LOW 20 MIN: CPT | Mod: S$PBB,,, | Performed by: NURSE PRACTITIONER

## 2023-08-02 PROCEDURE — 71046 X-RAY EXAM CHEST 2 VIEWS: CPT | Mod: 26,,, | Performed by: RADIOLOGY

## 2023-08-02 PROCEDURE — 99213 PR OFFICE/OUTPT VISIT, EST, LEVL III, 20-29 MIN: ICD-10-PCS | Mod: S$PBB,,, | Performed by: NURSE PRACTITIONER

## 2023-08-02 RX ORDER — POLYETHYLENE GLYCOL 3350 17 G/17G
17 POWDER, FOR SOLUTION ORAL EVERY OTHER DAY
COMMUNITY

## 2023-08-02 RX ORDER — PANTOPRAZOLE SODIUM 40 MG/1
40 TABLET, DELAYED RELEASE ORAL 2 TIMES DAILY
Qty: 180 TABLET | Refills: 3 | Status: SHIPPED | OUTPATIENT
Start: 2023-08-02 | End: 2024-02-26

## 2023-08-02 NOTE — PROGRESS NOTES
Subjective:       Patient ID: Lucinda Aguilera is a 72 y.o. female, Body mass index is 29.72 kg/m².    Chief Complaint: Follow-up      Established patient of Stephanie Coleman, NP & myself.     Constipation  This is a chronic problem. The current episode started more than 1 year ago. The problem has been rapidly improving (since starting Miralax) since onset. Her stool frequency is 1 time per day. The stool is described as firm and formed. The patient is on a high fiber diet. She Exercises regularly. There has Been adequate water intake. Associated symptoms include abdominal pain (epigastric region; improved), bloating (improved) and nausea (improved). Pertinent negatives include no anorexia, diarrhea, difficulty urinating, fecal incontinence, fever, flatus, hematochezia, melena, rectal pain, vomiting or weight loss. Risk factors include recent illness. She has tried stool softeners, laxatives and fiber (Currently: OTC Miralax and stool softener every other day- helps) for the symptoms. Her past medical history is significant for abdominal surgery (Hx of cholecystectomy) and inflammatory bowel disease (Hx of colitis- taking Balsalazide 750 mg 3 capsules BID). There is no history of endocrine disease or irritable bowel syndrome.     Review of Systems   Constitutional:  Negative for appetite change, fever, unexpected weight change and weight loss.   HENT:  Negative for trouble swallowing.    Respiratory:  Negative for cough and shortness of breath.    Cardiovascular:  Negative for chest pain.   Gastrointestinal:  Positive for abdominal pain (epigastric region; improved), bloating (improved), constipation and nausea (improved). Negative for abdominal distention, anal bleeding, anorexia, blood in stool, diarrhea, flatus, hematochezia, melena, rectal pain and vomiting.        Hx of GERD- well controlled taking Protonix 40 mg once daily and Famotidine 40 mg nightly   Genitourinary:  Negative for difficulty  "urinating and dysuria.   Musculoskeletal:  Negative for gait problem.   Skin:  Negative for rash.   Neurological:  Negative for speech difficulty.   Psychiatric/Behavioral:  Negative for confusion.        Past Medical History:   Diagnosis Date    Anticoagulant long-term use     Anxiety     takes daily Xanax    Arthritis     right thumb    Cataract     OU    Cholelithiasis     GERD (gastroesophageal reflux disease)     Hepatic steatosis     Hyperlipemia     Hypertension     PVD (posterior vitreous detachment)     OD      Past Surgical History:   Procedure Laterality Date    BREAST BIOPSY Left 08/28/2020    stereo biopsy    BREAST BIOPSY Left 10/07/2020    benign excisional biopsy    chest abcess      child    COLONOSCOPY  01/06/2012    Dr. Lopez: hemorrhoids, redundant colon    COLONOSCOPY N/A 02/17/2021    Dr. Lopez: Congested and erythematous mucosa in the rectum, in the recto-sigmoid colon and in the sigmoid colon, proctosigmoid colitis, Redundant colon; biopsy: focal active colitis "nonspecific but can be seen with acute self-limited colitis (infection), medication effect (e.g. NSAID use), proximity to diverticula, or early/evolving IBD    ESOPHAGOGASTRODUODENOSCOPY N/A 06/06/2019    Dr. Lopez: small hiatal hernia, Non-erosive esophageal reflux (NERD) disease?., slight antritis; biopsy: Antral mucosa with chemical/reactive gastropathy. negative for h pylori    ESOPHAGOGASTRODUODENOSCOPY N/A 02/17/2021    Procedure: EGD (ESOPHAGOGASTRODUODENOSCOPY);  Surgeon: Nathan Lopez Jr., MD;  Location: [facility];  Service: Endoscopy;  Laterality: N/A; Minimal gastritis; biopsy: stomach- negative for h pylori, active chronic gastritis with focal features of erosive gastritis, duodenum WNL    ESOPHAGOGASTRODUODENOSCOPY N/A 05/10/2022    Procedure: EGD (ESOPHAGOGASTRODUODENOSCOPY);  Surgeon: Nathan Lopez Jr., MD;  Location: [facility];  Service: Endoscopy;  Laterality: N/A;    EXCISIONAL BIOPSY Left 10/07/2020    " Procedure: EXCISIONAL BIOPSY-Left with radiological marker;  Surgeon: Brooklynn Ashford MD;  Location: [facility];  Service: General;  Laterality: Left;    FRACTURE SURGERY  2010    Left thumb repaired surgically    JOINT REPLACEMENT Bilateral     knee    KNEE ARTHROSCOPY W/ LASER      right    LAPAROSCOPIC CHOLECYSTECTOMY N/A 06/14/2019    Procedure: CHOLECYSTECTOMY, LAPAROSCOPIC;  Surgeon: Guevara Evans MD;  Location: [facility];  Service: General;  Laterality: N/A;    thumb surgery  11/2014    TOTAL KNEE ARTHROPLASTY Left 06/19/2018    Procedure: REPLACEMENT-KNEE-TOTAL;  Surgeon: Nj Melvin MD;  Location: [address];  Service: Orthopedics;  Laterality: Left;  [employer]    UPPER GASTROINTESTINAL ENDOSCOPY  07/13/2017    Dr. Lopez: NERD, Gastric mucosal atrophy. antritis, Scar in the incisura. Biopsied; biopsy: chronic gastritis. negative for h pylori      Family History   Problem Relation Age of Onset    Hypertension Mother     Heart disease Mother     Glaucoma Mother     Stroke Father     Glaucoma Sister     Cataracts Sister     Macular degeneration Sister     Breast cancer Neg Hx     Colon cancer Neg Hx     Crohn's disease Neg Hx     Ulcerative colitis Neg Hx     Stomach cancer Neg Hx     Esophageal cancer Neg Hx     Celiac disease Neg Hx     Amblyopia Neg Hx     Blindness Neg Hx     Retinal detachment Neg Hx     Strabismus Neg Hx       Wt Readings from Last 10 Encounters:   08/02/23 76.1 kg (167 lb 12.3 oz)   07/18/23 75.8 kg (167 lb)   06/20/23 74.4 kg (164 lb)   06/13/23 74.2 kg (163 lb 9.3 oz)   06/08/23 75.1 kg (165 lb 9.1 oz)   06/05/23 75.1 kg (165 lb 9.1 oz)   05/23/23 73 kg (161 lb)   05/22/23 73.8 kg (162 lb 11.2 oz)   05/16/23 73.9 kg (163 lb)   05/02/23 73.5 kg (162 lb 0.6 oz)     Lab Results   Component Value Date    WBC 12.23 05/10/2023    HGB 14.0 05/10/2023    HCT 42.2 05/10/2023    MCV 90 05/10/2023     05/10/2023     CMP  Sodium   Date Value Ref Range Status   07/10/2023 129 (L) 136  - 145 mmol/L Final     Potassium   Date Value Ref Range Status   07/10/2023 4.1 3.5 - 5.1 mmol/L Final     Chloride   Date Value Ref Range Status   07/10/2023 98 95 - 110 mmol/L Final     CO2   Date Value Ref Range Status   07/10/2023 24 23 - 29 mmol/L Final     Glucose   Date Value Ref Range Status   07/10/2023 74 70 - 110 mg/dL Final     BUN   Date Value Ref Range Status   07/10/2023 11 8 - 23 mg/dL Final     Creatinine   Date Value Ref Range Status   07/10/2023 0.7 0.5 - 1.4 mg/dL Final     Calcium   Date Value Ref Range Status   07/10/2023 9.4 8.7 - 10.5 mg/dL Final     Total Protein   Date Value Ref Range Status   04/08/2023 7.8 6.0 - 8.4 g/dL Final     Albumin   Date Value Ref Range Status   07/10/2023 3.6 3.5 - 5.2 g/dL Final     Total Bilirubin   Date Value Ref Range Status   04/08/2023 0.6 0.2 - 1.3 mg/dL Final     Alkaline Phosphatase   Date Value Ref Range Status   04/08/2023 96 38 - 145 U/L Final     AST   Date Value Ref Range Status   04/08/2023 37 (H) 14 - 36 U/L Final     ALT   Date Value Ref Range Status   04/08/2023 23 0 - 35 U/L Final     Anion Gap   Date Value Ref Range Status   07/10/2023 7 (L) 8 - 16 mmol/L Final     eGFR if    Date Value Ref Range Status   12/29/2021 >60.0 >60 mL/min/1.73 m^2 Final     eGFR if non    Date Value Ref Range Status   12/29/2021 >60.0 >60 mL/min/1.73 m^2 Final     Comment:     Calculation used to obtain the estimated glomerular filtration  rate (eGFR) is the CKD-EPI equation.          Lab Results   Component Value Date    LIPASE 47 09/10/2019     Lab Results   Component Value Date    LIPASERES 183 11/27/2020             Reviewed prior medical records including radiology report of Gastric emptying study 7/12/22 and CT of abdomen and pelvis 6/2/22 & endoscopy history (see surgical history).     Objective:      Physical Exam  Constitutional:       General: She is not in acute distress.     Appearance: She is well-developed.   HENT:       Head: Normocephalic.      Right Ear: Hearing normal.      Left Ear: Hearing normal.      Nose: Nose normal.      Mouth/Throat:      Mouth: No oral lesions.      Pharynx: Uvula midline.   Eyes:      General: Lids are normal.      Conjunctiva/sclera: Conjunctivae normal.      Pupils: Pupils are equal, round, and reactive to light.   Neck:      Trachea: Trachea normal.   Cardiovascular:      Rate and Rhythm: Normal rate and regular rhythm.      Heart sounds: Normal heart sounds. No murmur heard.  Pulmonary:      Effort: Pulmonary effort is normal. No respiratory distress.      Breath sounds: Normal breath sounds. No stridor. No wheezing.   Abdominal:      General: Bowel sounds are normal. There is no distension.      Palpations: Abdomen is soft. There is no mass.      Tenderness: There is abdominal tenderness (mild) in the epigastric area. There is no guarding or rebound.   Musculoskeletal:         General: Normal range of motion.      Cervical back: Normal range of motion.   Skin:     General: Skin is warm and dry.      Findings: No rash.      Comments: Non jaundiced   Neurological:      Mental Status: She is alert and oriented to person, place, and time.   Psychiatric:         Speech: Speech normal.         Behavior: Behavior normal. Behavior is cooperative.           Assessment:       1. Chronic constipation    2. Colitis    3. Gastroesophageal reflux disease without esophagitis    4. S/P cholecystectomy           Plan:   All diagnoses and orders for this visit:    Chronic constipation   - Recommend daily exercise as tolerated, adequate water intake, and high fiber diet.   - Continue OTC fiber supplement  - Continue OTC stool softener   - Continue OTC MiraLax once daily (17g PO) as directed     Colitis   - Continue Balsalazide 750 mg 3 capsules BID   - Next surveillance colonoscopy due 2/2026    Gastroesophageal reflux disease without esophagitis   - Continue Protonix 40 mg once daily   - Continue Famotidine 40 mg  nightly   - Take PPI in the morning 30 minutes before breakfast  - Recommend to avoid large meals, avoid eating within 3 hours of bedtime, elevate head of bed if nocturnal symptoms are present, smoking cessation (if current smoker), & weight loss (if overweight).   - Recommend minimize/avoid high-fat foods, chocolate, caffeine, citrus, alcohol, & tomato products.  - Advised to avoid/limit use of NSAID's, since they can cause GI upset, bleeding, and/or ulcers. If needed, take with food.      S/P cholecystectomy    If no improvement in symptoms or symptoms worsen, call/follow-up at clinic or go to ER

## 2023-08-03 NOTE — PROGRESS NOTES
Answers submitted by the patient for this visit:  Cough Questionnaire (Submitted on 7/31/2023)  Chief Complaint: Cough  Chronicity: recurrent  Onset: more than 1 month ago  Progression since onset: waxing and waning  Frequency: every few hours  Cough characteristics: non-productive  chest pain: No  chills: No  ear congestion: No  ear pain: No  fever: No  headaches: Yes  heartburn: No  hemoptysis: No  myalgias: No  nasal congestion: No  postnasal drip: No  rash: No  rhinorrhea: No  shortness of breath: No  sore throat: No  sweats: No  weight loss: No  wheezing: No  Aggravated by: nothing  asthma: No  bronchiectasis: No  bronchitis: No  COPD: No  emphysema: No  environmental allergies: No  pneumonia: No  Treatments tried: oral steroids, prescription cough suppressant  Improvement on treatment: mild

## 2023-08-03 NOTE — PROGRESS NOTES
Subjective     Patient ID: Lucinda Aguilera is a 72 y.o. female.    Chief Complaint: Follow-up, Cough, and Sore Throat    Patient has had an ongoing cough since June. She has been treated with antibiotics x 2 and inhaled steroids. She says now she mainly coughs at night when lying down. This morning she has noticed a slight hoarseness and sore throat. She does notice a globus sensation. Using flovent as prescribed. She does have history of GERD, taking pepcid and protonix as prescribed. She did start lisinopril through nephrology in June, initial illness occurred about a week before starting lisinopril.     Follow-up  Associated symptoms include coughing, headaches and a sore throat. Pertinent negatives include no chest pain, chills, fever, myalgias or rash.   Cough  This is a recurrent problem. The current episode started more than 1 month ago. The problem has been waxing and waning. The problem occurs every few hours. The cough is Non-productive. Associated symptoms include headaches and a sore throat. Pertinent negatives include no chest pain, chills, ear congestion, ear pain, fever, heartburn, hemoptysis, myalgias, nasal congestion, postnasal drip, rash, rhinorrhea, shortness of breath, sweats, weight loss or wheezing. Nothing aggravates the symptoms. She has tried oral steroids and prescription cough suppressant for the symptoms. The treatment provided mild relief. There is no history of asthma, bronchiectasis, bronchitis, COPD, emphysema, environmental allergies or pneumonia.   Sore Throat   Associated symptoms include coughing and headaches. Pertinent negatives include no ear pain or shortness of breath.     Review of Systems   Constitutional:  Negative for chills, fever and weight loss.   HENT:  Positive for sore throat. Negative for ear pain, postnasal drip and rhinorrhea.    Respiratory:  Positive for cough. Negative for hemoptysis, shortness of breath and wheezing.    Cardiovascular:  Negative for chest  pain.   Gastrointestinal:  Negative for heartburn.   Musculoskeletal:  Negative for myalgias.   Integumentary:  Negative for rash.   Allergic/Immunologic: Negative for environmental allergies.   Neurological:  Positive for headaches.          Objective     Physical Exam  Constitutional:       General: She is not in acute distress.     Appearance: Normal appearance.   HENT:      Head: Normocephalic and atraumatic.      Right Ear: Tympanic membrane normal.      Left Ear: Tympanic membrane normal.      Nose: No rhinorrhea.      Mouth/Throat:      Pharynx: No posterior oropharyngeal erythema.   Eyes:      Pupils: Pupils are equal, round, and reactive to light.   Cardiovascular:      Rate and Rhythm: Normal rate and regular rhythm.      Heart sounds: No murmur heard.  Pulmonary:      Effort: Pulmonary effort is normal. No respiratory distress.      Breath sounds: Normal breath sounds.   Neurological:      Mental Status: She is alert and oriented to person, place, and time.   Psychiatric:         Mood and Affect: Mood normal.         Behavior: Behavior normal.            Assessment and Plan     1. Cough, unspecified type  -     X-Ray Chest PA And Lateral; Future; Expected date: 08/02/2023    Other orders  -     pantoprazole (PROTONIX) 40 MG tablet; Take 1 tablet (40 mg total) by mouth 2 (two) times daily.  Dispense: 180 tablet; Refill: 3        Several etiology possibilities discussed with patient. Will increase her protonix, if that does not resolve the issue we may need to switch from lisinopril to an ARB. CXr is clear.         No follow-ups on file.

## 2023-08-04 ENCOUNTER — PATIENT MESSAGE (OUTPATIENT)
Dept: FAMILY MEDICINE | Facility: CLINIC | Age: 73
End: 2023-08-04
Payer: MEDICARE

## 2023-08-04 DIAGNOSIS — R05.9 COUGH, UNSPECIFIED TYPE: ICD-10-CM

## 2023-08-04 RX ORDER — PROMETHAZINE HYDROCHLORIDE AND DEXTROMETHORPHAN HYDROBROMIDE 6.25; 15 MG/5ML; MG/5ML
5 SYRUP ORAL 3 TIMES DAILY PRN
Qty: 240 ML | Refills: 0 | Status: SHIPPED | OUTPATIENT
Start: 2023-08-04 | End: 2023-09-28 | Stop reason: SDUPTHER

## 2023-08-07 ENCOUNTER — LAB VISIT (OUTPATIENT)
Dept: LAB | Facility: HOSPITAL | Age: 73
End: 2023-08-07
Attending: NURSE PRACTITIONER
Payer: MEDICARE

## 2023-08-07 DIAGNOSIS — E87.1 HYPONATREMIA: ICD-10-CM

## 2023-08-07 LAB
ALBUMIN SERPL BCP-MCNC: 3.7 G/DL (ref 3.5–5.2)
ANION GAP SERPL CALC-SCNC: 10 MMOL/L (ref 8–16)
BUN SERPL-MCNC: 15 MG/DL (ref 8–23)
CALCIUM SERPL-MCNC: 9.2 MG/DL (ref 8.7–10.5)
CHLORIDE SERPL-SCNC: 102 MMOL/L (ref 95–110)
CO2 SERPL-SCNC: 26 MMOL/L (ref 23–29)
CREAT SERPL-MCNC: 0.7 MG/DL (ref 0.5–1.4)
EST. GFR  (NO RACE VARIABLE): >60 ML/MIN/1.73 M^2
GLUCOSE SERPL-MCNC: 83 MG/DL (ref 70–110)
MAGNESIUM SERPL-MCNC: 1.9 MG/DL (ref 1.6–2.6)
PHOSPHATE SERPL-MCNC: 2.6 MG/DL (ref 2.7–4.5)
POTASSIUM SERPL-SCNC: 3.9 MMOL/L (ref 3.5–5.1)
SODIUM SERPL-SCNC: 138 MMOL/L (ref 136–145)

## 2023-08-07 PROCEDURE — 36415 COLL VENOUS BLD VENIPUNCTURE: CPT | Mod: PO | Performed by: INTERNAL MEDICINE

## 2023-08-07 PROCEDURE — 80069 RENAL FUNCTION PANEL: CPT | Performed by: INTERNAL MEDICINE

## 2023-08-07 PROCEDURE — 83735 ASSAY OF MAGNESIUM: CPT | Performed by: INTERNAL MEDICINE

## 2023-08-08 ENCOUNTER — CLINICAL SUPPORT (OUTPATIENT)
Dept: REHABILITATION | Facility: HOSPITAL | Age: 73
End: 2023-08-08
Payer: MEDICARE

## 2023-08-08 DIAGNOSIS — M25.559 HIP PAIN, UNSPECIFIED LATERALITY: ICD-10-CM

## 2023-08-08 DIAGNOSIS — R29.898 WEAKNESS OF BOTH HIPS: Primary | ICD-10-CM

## 2023-08-08 DIAGNOSIS — R26.9 GAIT DIFFICULTY: ICD-10-CM

## 2023-08-08 PROCEDURE — 97140 MANUAL THERAPY 1/> REGIONS: CPT | Mod: PO

## 2023-08-08 PROCEDURE — 97110 THERAPEUTIC EXERCISES: CPT | Mod: PO

## 2023-08-08 NOTE — PROGRESS NOTES
"[facility]   Physical Therapy Treatment Note      Name: Lucinda Aguilera  Clinic Number: [id number]    Therapy Diagnosis: No diagnosis found.  Physician: Chandrika Santiago FNP    Visit Date: 8/8/2023  Physician Orders: PT Eval and Treat   Post Surgical? No   Eval and Treat Yes   Type of Therapy Outpatient Therapy   Location: Hip      Medical Diagnosis from Referral:   M70.61 (ICD-10-CM) - Greater trochanteric bursitis of right hip   M76.891 (ICD-10-CM) - Hip flexor tendinitis, right      Evaluation Date: 8/1/2023  Authorization Period Expiration: 7/29/2024  Plan of Care Expiration: 9/29/2023  Progress Note Due: 8/31/2023  Visit # / Visits authorized: 1 / 1   FOTO: 1/ 3 (8/1/2023)      PTA Visit #: 0/5     Precautions: HTN,       Time In: 1550  Time Out: 1635   Total Billable Time: 45 minutes  TE 2, MT 1    Subjective     Pt reports: was having a little pain with exercise and a little more after exercise.  She was compliant with home exercise program.  Response to previous treatment: mild pain with exercise and worse after exercise  Functional change: none    Pain: 5/10 pre session and 1/10 post session.   Location: L>R hips      Objective      Objective Measures updated at progress report unless specified.     Treatment     Lucinda received the treatments listed below:      therapeutic exercises to develop strength, endurance, ROM, flexibility, posture, and core stabilization for 25 minutes including:    Supine HS stretch with strap  Supine ITB stretch with strap  Piriformis hook stretch  3 x 30" each  Seated HS stretch 2 x 20 secs       LOWER TRUNK ROTATION x 20 reps small amplitude  PPT x20   Bent knee fall out/single 2 x10  varying hip angle on second set  Bridge    DOUBLE KNEE TO CHEST  X 3 reps each to carry into HEP.    Gait 3 x 50' for carryover with intervention    Nu step x 5 min no resistance and had pain in last minute.     manual therapy techniques: Manual traction, Myofacial " release, and Soft tissue Mobilization were applied to the: iliacus, psoas, QL, TFL, glut medius and adductor nelson for 20 minutes, including:  Short axis traction 3x in session  Long axis traction  Lateral hip traction in hooklying    neuromuscular re-education activities to improve: Sense, Proprioception, and Posture for 0 minutes. The following activities were included:      therapeutic activities to improve functional performance for   minutes, including:      gait training to improve functional mobility and safety for   minutes, including:      hot pack for 0 minutes to .    cold pack for 0 minutes to .    Patient Education and Home Exercises       Education provided:   - HEP  - Pt/family was provided educational information, including: role of PT, role of pt/caregiver, goals for PT, POC, scheduling, and attendance policy.- pt verbalized understanding.      Written Home Exercises Provided: yes. Exercises were reviewed and Lucinda was able to demonstrate them prior to the end of the session.  Lucinda demonstrated good  understanding of the education provided. See EMR under Patient Instructions for exercises provided during therapy sessions.       Assessment     Pt with good relief in session with decreased intensity and AROM of stretches and exercise. Pt with good relief after manual therapy with progressive increased loading with fading trendelenburg in session. Pt with good pain relief with session.    Lucinda Is progressing well towards her goals.   Pt prognosis is Good.     Pt will continue to benefit from skilled outpatient physical therapy to address the deficits listed in the problem list box on initial evaluation, provide pt/family education and to maximize pt's level of independence in the home and community environment.     Pt's spiritual, cultural and educational needs considered and pt agreeable to plan of care and goals.     Anticipated barriers to physical therapy: chronic nature and acute  "flareups    Goals: Short Term Goals: 4 weeks   -Lumbar AROM extension improved to 15 deg to demo improving mobility. Progressing   -Bilateral LE strength as tested 3/5 or better for improving stability and gait.  -NARROW BASE OF SUPPORT on foam x 30 " with minimum or no sway.  -Improved TUG to 12 s without AD for improving balance and gait.  -FOTO FS score improved to 30 for improving QOL.     Long Term Goals: 8 weeks   -NARROW BASE OF SUPPORT with EYES CLOSED on foam x 30" with minimum sway  -Improved TUG to 10 s without AD for improving balance and gait.  -Tandem balance on floor (right leg back) 30" for improving balance.  -FOTO FS score improved to 35 for improving QOL.    Plan     Cont with POC    Nora Costa PT     "

## 2023-08-14 NOTE — PROGRESS NOTES
"[facility]   Physical Therapy Treatment Note      Name: Lucinda Aguilera  Clinic Number: [id number]    Therapy Diagnosis: No diagnosis found.  Physician: Chandrika Santiago FNP    Visit Date: 8/15/2023  Physician Orders: PT Eval and Treat   Post Surgical? No   Eval and Treat Yes   Type of Therapy Outpatient Therapy   Location: Hip      Medical Diagnosis from Referral:   M70.61 (ICD-10-CM) - Greater trochanteric bursitis of right hip   M76.891 (ICD-10-CM) - Hip flexor tendinitis, right      Evaluation Date: 8/1/2023  Authorization Period Expiration: 7/29/2024  Plan of Care Expiration: 9/29/2023  Progress Note Due: 8/31/2023  Visit # / Visits authorized: 1 / 1   FOTO: 1/ 3 (8/1/2023)      PTA Visit #: 0/5     Precautions: HTN,       Time In: 822 am  Time Out: 907  Total Billable Time: 45 minutes  TE 2, MT 1    Subjective     Pt reports: was having a little pain since last session but 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  She was compliant with home exercise program.  Response to previous treatment: mild pain with exercise and worse after exercise  Functional change: none    Pain: 8/10 pre session and  4/10 post session.   Location: L<R hips      Objective      Objective Measures updated at progress report unless specified.     Treatment     Lucinda received the treatments listed below:      therapeutic exercises to develop strength, endurance, ROM, flexibility, posture, and core stabilization for 28 minutes including:    Supine HS stretch with strap  Supine ITB stretch with strap  Piriformis hook stretch 3 x 30" each  Seated HS stretch 2 x 20 secs  Supine frog stretch unilaterally 3 x 30"         LOWER TRUNK ROTATION x 20 reps medium amplitude  LTR stretch 3 x 30 secs  PPT x20   Bent knee fall out/single 2 x10  varying hip angle on second set  Bridge    DOUBLE KNEE TO CHEST  X 3 reps each to carry into HEP.    Gait 3 x 50' for carryover with " intervention    Nu step x 5 min no resistance and had pain in last minute.     manual therapy techniques: Manual traction, Myofacial release, and Soft tissue Mobilization were applied to the: iliacus, psoas, QL, TFL, glut medius and adductor nelson for 17 minutes, including:  Short axis traction 3x in session  Long axis traction  Lateral hip traction in hooklying    neuromuscular re-education activities to improve: Sense, Proprioception, and Posture for 0 minutes. The following activities were included:      therapeutic activities to improve functional performance for   minutes, including:      gait training to improve functional mobility and safety for   minutes, including:      hot pack for 0 minutes to .    cold pack for 0 minutes to .    Patient Education and Home Exercises       Education provided:   - HEP  - Pt/family was provided educational information, including: role of PT, role of pt/caregiver, goals for PT, POC, scheduling, and attendance policy.- pt verbalized understanding.      Written Home Exercises Provided: yes. Exercises were reviewed and Lucinda was able to demonstrate them prior to the end of the session.  Lucinda demonstrated good  understanding of the education provided. See EMR under Patient Instructions for exercises provided during therapy sessions.       Assessment     Pt with good relief in session with decreased intensity after long day of work yesterday. Pt with good insight but need cues for body mechanics with log rolling and not to over stretch into pain such as with HS stretch today.     Lucinda Is progressing well towards her goals.   Pt prognosis is Good.     Pt will continue to benefit from skilled outpatient physical therapy to address the deficits listed in the problem list box on initial evaluation, provide pt/family education and to maximize pt's level of independence in the home and community environment.     Pt's spiritual, cultural and educational needs considered and pt  "agreeable to plan of care and goals.     Anticipated barriers to physical therapy: chronic nature and acute flareups    Goals: Short Term Goals: 4 weeks   -Lumbar AROM extension improved to 15 deg to demo improving mobility. Progressing   -Bilateral LE strength as tested 3/5 or better for improving stability and gait.  -NARROW BASE OF SUPPORT on foam x 30 " with minimum or no sway.  -Improved TUG to 12 s without AD for improving balance and gait.  -FOTO FS score improved to 30 for improving QOL.     Long Term Goals: 8 weeks   -NARROW BASE OF SUPPORT with EYES CLOSED on foam x 30" with minimum sway  -Improved TUG to 10 s without AD for improving balance and gait.  -Tandem balance on floor (right leg back) 30" for improving balance.  -FOTO FS score improved to 35 for improving QOL.    Plan     Cont with POC    Nora Costa, PT     "

## 2023-08-15 ENCOUNTER — TELEPHONE (OUTPATIENT)
Dept: PSYCHIATRY | Facility: CLINIC | Age: 73
End: 2023-08-15
Payer: MEDICARE

## 2023-08-15 ENCOUNTER — CLINICAL SUPPORT (OUTPATIENT)
Dept: REHABILITATION | Facility: HOSPITAL | Age: 73
End: 2023-08-15
Payer: MEDICARE

## 2023-08-15 ENCOUNTER — PATIENT MESSAGE (OUTPATIENT)
Dept: PSYCHIATRY | Facility: CLINIC | Age: 73
End: 2023-08-15
Payer: MEDICARE

## 2023-08-15 DIAGNOSIS — M53.86 DECREASED ROM OF LUMBAR SPINE: Primary | ICD-10-CM

## 2023-08-15 DIAGNOSIS — M25.552 BILATERAL HIP PAIN: ICD-10-CM

## 2023-08-15 DIAGNOSIS — R26.9 GAIT DIFFICULTY: ICD-10-CM

## 2023-08-15 DIAGNOSIS — M25.551 BILATERAL HIP PAIN: ICD-10-CM

## 2023-08-15 PROCEDURE — 97110 THERAPEUTIC EXERCISES: CPT | Mod: PO

## 2023-08-15 PROCEDURE — 97140 MANUAL THERAPY 1/> REGIONS: CPT | Mod: PO

## 2023-08-15 NOTE — TELEPHONE ENCOUNTER
Called patient to schedule NP appt for med man based off referral   No answer  Lvm   And odalys msg sent

## 2023-08-17 ENCOUNTER — CLINICAL SUPPORT (OUTPATIENT)
Dept: REHABILITATION | Facility: HOSPITAL | Age: 73
End: 2023-08-17
Payer: MEDICARE

## 2023-08-17 DIAGNOSIS — R26.9 GAIT DIFFICULTY: ICD-10-CM

## 2023-08-17 DIAGNOSIS — R29.898 WEAKNESS OF BOTH HIPS: ICD-10-CM

## 2023-08-17 DIAGNOSIS — M25.551 PAIN OF RIGHT HIP: Primary | ICD-10-CM

## 2023-08-17 PROCEDURE — 97140 MANUAL THERAPY 1/> REGIONS: CPT | Mod: PO

## 2023-08-17 PROCEDURE — 97110 THERAPEUTIC EXERCISES: CPT | Mod: PO

## 2023-08-17 NOTE — PROGRESS NOTES
"[facility]   Physical Therapy Treatment Note      Name: Lucinda Aguilera  Clinic Number: [id number]    Therapy Diagnosis: No diagnosis found.  Physician: Chandrika Santiago FNP    Visit Date: 8/17/2023  Physician Orders: PT Eval and Treat   Post Surgical? No   Eval and Treat Yes   Type of Therapy Outpatient Therapy   Location: Hip      Medical Diagnosis from Referral:   M70.61 (ICD-10-CM) - Greater trochanteric bursitis of right hip   M76.891 (ICD-10-CM) - Hip flexor tendinitis, right      Evaluation Date: 8/1/2023  Authorization Period Expiration: 7/29/2024  Plan of Care Expiration: 9/29/2023  Progress Note Due: 8/31/2023  Visit # / Visits authorized: 1 / 1   FOTO: 1/ 3 (8/1/2023)      PTA Visit #: 0/5     Precautions: HTN,       Time In: 845 am  Time Out: 925  Total Billable Time: 40 minutes  TE 2, MT 1    Subjective     Pt reports: was having a little pain since last session but 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  She was compliant with home exercise program.  Response to previous treatment: mild pain with exercise and worse after exercise  Functional change: none    Pain: 4/10 pre session and  /10 post session.   Location: L<R hips      Objective      Objective Measures updated at progress report unless specified.     Treatment     Lucinda received the treatments listed below:      therapeutic exercises to develop strength, endurance, ROM, flexibility, posture, and core stabilization for 25 minutes including:      LOWER TRUNK ROTATION x 20 reps medium amplitude  LTR stretch 3 x 30 secs  PPT x20   Adduction ball squeeze + PPT x 20 hold 2 secs  Bent knee fall out/single 2 x10  varying hip angle on second set  Supine heel slides with use of sliders x 20     Supine HS stretch with strap  Supine ITB stretch with strap      Piriformis hook stretch 3 x 30" each  Seated HS stretch 2 x 20 secs  Supine frog stretch unilaterally 3 x 30"               DOUBLE " KNEE TO CHEST  X 3 reps each to carry into HEP.    Gait 3 x 50' for carryover with intervention    Nu step x 5 min no resistance and had pain in last minute.     manual therapy techniques: Manual traction, Myofacial release, and Soft tissue Mobilization were applied to the: iliacus, psoas, QL, TFL, glut medius and adductor nelson for 15 minutes, including:  Short axis traction 3x in session  Long axis traction  Lateral hip traction in hooklying    neuromuscular re-education activities to improve: Sense, Proprioception, and Posture for 0 minutes. The following activities were included:      therapeutic activities to improve functional performance for   minutes, including:      gait training to improve functional mobility and safety for   minutes, including:      hot pack for 0 minutes to .    cold pack for 0 minutes to .    Patient Education and Home Exercises       Education provided:   - HEP  - Pt/family was provided educational information, including: role of PT, role of pt/caregiver, goals for PT, POC, scheduling, and attendance policy.- pt verbalized understanding.      Written Home Exercises Provided: yes. Exercises were reviewed and Lucinda was able to demonstrate them prior to the end of the session.  Lucinda demonstrated good  understanding of the education provided. See EMR under Patient Instructions for exercises provided during therapy sessions.       Assessment     Pt with good relief in session with additional exercise and heel slide into HEP without issues. Pt had good active ROM with slider in supine. Pt with good insight but need cues for body mechanics with log rolling still but improved in session.      Lucinda Is progressing well towards her goals.   Pt prognosis is Good.     Pt will continue to benefit from skilled outpatient physical therapy to address the deficits listed in the problem list box on initial evaluation, provide pt/family education and to maximize pt's level of independence in the  "home and community environment.     Pt's spiritual, cultural and educational needs considered and pt agreeable to plan of care and goals.     Anticipated barriers to physical therapy: chronic nature and acute flareups    Goals: Short Term Goals: 4 weeks   -Lumbar AROM extension improved to 15 deg to demo improving mobility. Progressing   -Bilateral LE strength as tested 3/5 or better for improving stability and gait.  -NARROW BASE OF SUPPORT on foam x 30 " with minimum or no sway.  -Improved TUG to 12 s without AD for improving balance and gait.  -FOTO FS score improved to 30 for improving QOL.     Long Term Goals: 8 weeks   -NARROW BASE OF SUPPORT with EYES CLOSED on foam x 30" with minimum sway  -Improved TUG to 10 s without AD for improving balance and gait.  -Tandem balance on floor (right leg back) 30" for improving balance.  -FOTO FS score improved to 35 for improving QOL.    Plan     Cont with POC    Nora Costa, PT     "

## 2023-08-19 ENCOUNTER — LAB VISIT (OUTPATIENT)
Dept: LAB | Facility: HOSPITAL | Age: 73
End: 2023-08-19
Attending: FAMILY MEDICINE
Payer: MEDICARE

## 2023-08-19 DIAGNOSIS — I10 ESSENTIAL HYPERTENSION: ICD-10-CM

## 2023-08-19 LAB
ALBUMIN SERPL BCP-MCNC: 4 G/DL (ref 3.5–5.2)
ALP SERPL-CCNC: 89 U/L (ref 55–135)
ALT SERPL W/O P-5'-P-CCNC: 31 U/L (ref 10–44)
ANION GAP SERPL CALC-SCNC: 10 MMOL/L (ref 8–16)
AST SERPL-CCNC: 43 U/L (ref 10–40)
BASOPHILS # BLD AUTO: 0.09 K/UL (ref 0–0.2)
BASOPHILS NFR BLD: 1 % (ref 0–1.9)
BILIRUB SERPL-MCNC: 0.4 MG/DL (ref 0.1–1)
BUN SERPL-MCNC: 10 MG/DL (ref 8–23)
CALCIUM SERPL-MCNC: 9.7 MG/DL (ref 8.7–10.5)
CHLORIDE SERPL-SCNC: 102 MMOL/L (ref 95–110)
CHOLEST SERPL-MCNC: 250 MG/DL (ref 120–199)
CHOLEST/HDLC SERPL: 3.4 {RATIO} (ref 2–5)
CO2 SERPL-SCNC: 24 MMOL/L (ref 23–29)
CREAT SERPL-MCNC: 0.8 MG/DL (ref 0.5–1.4)
DIFFERENTIAL METHOD: NORMAL
EOSINOPHIL # BLD AUTO: 0.3 K/UL (ref 0–0.5)
EOSINOPHIL NFR BLD: 3.1 % (ref 0–8)
ERYTHROCYTE [DISTWIDTH] IN BLOOD BY AUTOMATED COUNT: 12.9 % (ref 11.5–14.5)
EST. GFR  (NO RACE VARIABLE): >60 ML/MIN/1.73 M^2
GLUCOSE SERPL-MCNC: 94 MG/DL (ref 70–110)
HCT VFR BLD AUTO: 44.2 % (ref 37–48.5)
HDLC SERPL-MCNC: 73 MG/DL (ref 40–75)
HDLC SERPL: 29.2 % (ref 20–50)
HGB BLD-MCNC: 14.6 G/DL (ref 12–16)
IMM GRANULOCYTES # BLD AUTO: 0.02 K/UL (ref 0–0.04)
IMM GRANULOCYTES NFR BLD AUTO: 0.2 % (ref 0–0.5)
LDLC SERPL CALC-MCNC: 142.6 MG/DL (ref 63–159)
LYMPHOCYTES # BLD AUTO: 3 K/UL (ref 1–4.8)
LYMPHOCYTES NFR BLD: 32.5 % (ref 18–48)
MCH RBC QN AUTO: 29.9 PG (ref 27–31)
MCHC RBC AUTO-ENTMCNC: 33 G/DL (ref 32–36)
MCV RBC AUTO: 91 FL (ref 82–98)
MONOCYTES # BLD AUTO: 0.6 K/UL (ref 0.3–1)
MONOCYTES NFR BLD: 6.4 % (ref 4–15)
NEUTROPHILS # BLD AUTO: 5.3 K/UL (ref 1.8–7.7)
NEUTROPHILS NFR BLD: 56.8 % (ref 38–73)
NONHDLC SERPL-MCNC: 177 MG/DL
NRBC BLD-RTO: 0 /100 WBC
PLATELET # BLD AUTO: 450 K/UL (ref 150–450)
PMV BLD AUTO: 9.8 FL (ref 9.2–12.9)
POTASSIUM SERPL-SCNC: 3.9 MMOL/L (ref 3.5–5.1)
PROT SERPL-MCNC: 7.8 G/DL (ref 6–8.4)
RBC # BLD AUTO: 4.88 M/UL (ref 4–5.4)
SODIUM SERPL-SCNC: 136 MMOL/L (ref 136–145)
TRIGL SERPL-MCNC: 172 MG/DL (ref 30–150)
TSH SERPL DL<=0.005 MIU/L-ACNC: 0.74 UIU/ML (ref 0.4–4)
WBC # BLD AUTO: 9.35 K/UL (ref 3.9–12.7)

## 2023-08-19 PROCEDURE — 80053 COMPREHEN METABOLIC PANEL: CPT | Performed by: FAMILY MEDICINE

## 2023-08-19 PROCEDURE — 85025 COMPLETE CBC W/AUTO DIFF WBC: CPT | Performed by: FAMILY MEDICINE

## 2023-08-19 PROCEDURE — 80061 LIPID PANEL: CPT | Performed by: FAMILY MEDICINE

## 2023-08-19 PROCEDURE — 84443 ASSAY THYROID STIM HORMONE: CPT | Performed by: FAMILY MEDICINE

## 2023-08-19 PROCEDURE — 36415 COLL VENOUS BLD VENIPUNCTURE: CPT | Mod: PO | Performed by: FAMILY MEDICINE

## 2023-08-21 ENCOUNTER — OFFICE VISIT (OUTPATIENT)
Dept: FAMILY MEDICINE | Facility: CLINIC | Age: 73
End: 2023-08-21
Payer: MEDICARE

## 2023-08-21 ENCOUNTER — PATIENT MESSAGE (OUTPATIENT)
Dept: ADMINISTRATIVE | Facility: HOSPITAL | Age: 73
End: 2023-08-21
Payer: MEDICARE

## 2023-08-21 VITALS
BODY MASS INDEX: 29.41 KG/M2 | HEIGHT: 63 IN | OXYGEN SATURATION: 96 % | SYSTOLIC BLOOD PRESSURE: 132 MMHG | HEART RATE: 74 BPM | RESPIRATION RATE: 18 BRPM | DIASTOLIC BLOOD PRESSURE: 72 MMHG | WEIGHT: 166 LBS | TEMPERATURE: 98 F

## 2023-08-21 DIAGNOSIS — I10 ESSENTIAL HYPERTENSION: Primary | ICD-10-CM

## 2023-08-21 DIAGNOSIS — F41.9 ANXIETY: ICD-10-CM

## 2023-08-21 DIAGNOSIS — K21.9 GASTROESOPHAGEAL REFLUX DISEASE, UNSPECIFIED WHETHER ESOPHAGITIS PRESENT: ICD-10-CM

## 2023-08-21 DIAGNOSIS — E78.5 HYPERLIPIDEMIA, UNSPECIFIED HYPERLIPIDEMIA TYPE: ICD-10-CM

## 2023-08-21 PROBLEM — R55 SYNCOPE: Status: RESOLVED | Noted: 2023-04-09 | Resolved: 2023-08-21

## 2023-08-21 PROBLEM — E87.1 HYPONATREMIA: Status: RESOLVED | Noted: 2023-04-09 | Resolved: 2023-08-21

## 2023-08-21 PROBLEM — R19.7 DIARRHEA: Status: RESOLVED | Noted: 2021-02-17 | Resolved: 2023-08-21

## 2023-08-21 PROBLEM — E87.6 HYPOKALEMIA: Status: RESOLVED | Noted: 2021-01-08 | Resolved: 2023-08-21

## 2023-08-21 PROCEDURE — 99213 OFFICE O/P EST LOW 20 MIN: CPT | Mod: PBBFAC,PO | Performed by: FAMILY MEDICINE

## 2023-08-21 PROCEDURE — 99999 PR PBB SHADOW E&M-EST. PATIENT-LVL III: ICD-10-PCS | Mod: PBBFAC,,, | Performed by: FAMILY MEDICINE

## 2023-08-21 PROCEDURE — 99214 PR OFFICE/OUTPT VISIT, EST, LEVL IV, 30-39 MIN: ICD-10-PCS | Mod: S$PBB,,, | Performed by: FAMILY MEDICINE

## 2023-08-21 PROCEDURE — 99214 OFFICE O/P EST MOD 30 MIN: CPT | Mod: S$PBB,,, | Performed by: FAMILY MEDICINE

## 2023-08-21 PROCEDURE — 99999 PR PBB SHADOW E&M-EST. PATIENT-LVL III: CPT | Mod: PBBFAC,,, | Performed by: FAMILY MEDICINE

## 2023-08-21 NOTE — PROGRESS NOTES
Subjective:       Patient ID: Lucinda Aguilera is a 72 y.o. female.    Chief Complaint: Hypertension (6 month follow up with labs)    HPI Comments: Here for f/u on chronic health issues    Joined [facility] and doing some daily exercise.  Currently seeing PT for some gait training.    Getting some persistent dry cough. There is a concern this is related to lisinopril.    Hyponatremia, SIADH - Chlorthalidone was stopped. Following with nephrology, Dr. Engel.  Depression - stopped Effexor XR 37.5mg due to hyponatremia and now taking wellbutrin on recommendation of nephrology  HLD, aortic atherosclerosis - tolerating pravachol 40mg daily  Anxiety - Taking lorazepam BID. Doing some counseling presently  HTN - tolerating Atenolol 25mg 1/2 daily, lasix 20mg PRN and lisinopril 40mg BID; BP variable; digital flowsheet reviewed  She got some swelling with amlodipine  GERD - tolerating Protonix 40mg daily  S/p conner - taking colestipol  Colitis - stable on Colazal, miralax    Past Medical History:    Hypertension                                                  Anxiety                                           Hyperlipemia                                                  GERD (gastroesophageal reflux disease)                        Cataract                                                        Comment:OU    PVD (posterior vitreous detachment)                             Comment:OD    Arthritis                                                       Comment:right thumb    Past Surgical History:    chest abcess                                                     Comment:child    KNEE ARTHROSCOPY W/ LASER                                        Comment:right    COLONOSCOPY                                      1/2012          Comment:repeat in 5 years    No Known Allergies    Social History    Marital Status:              Spouse Name:                       Years of Education:                 Number of children: 2              Occupational History  Occupation          Employer            Comment               retired                                   retired teacher an*                         Social History Main Topics    Smoking Status: Never Smoker                      Smokeless Status: Never Used                        Alcohol Use: Yes                Comment: social    Drug Use: No              Sexual Activity: Yes               Partners with: Male       Birth Control/Protection: None, Post-menopausal    Current Outpatient Medications on File Prior to Visit   Medication Sig Dispense Refill    acetaminophen (TYLENOL) 500 MG tablet Take 500 mg by mouth every 6 (six) hours as needed for Pain.      ascorbic acid, vitamin C, (VITAMIN C) 250 MG tablet Take 500 mg by mouth once daily.       aspirin (ECOTRIN) 81 MG EC tablet Take 81 mg by mouth every evening.      atenoloL (TENORMIN) 25 MG tablet Take 0.5 tablets (12.5 mg total) by mouth every evening. 45 tablet 3    balsalazide (COLAZAL) 750 mg capsule TAKE 3 CAPSULES(2250 MG) BY MOUTH TWICE DAILY WITH MEALS 180 capsule 11    buPROPion (WELLBUTRIN XL) 150 MG TB24 tablet Take 1 tablet (150 mg total) by mouth once daily. 90 tablet 3    docusate sodium (COLACE) 100 MG capsule Take 100 mg by mouth 2 (two) times daily.      famotidine (PEPCID) 40 MG tablet Take 1 tablet (40 mg total) by mouth nightly. 90 tablet 3    furosemide (LASIX) 20 MG tablet Take 1 tablet (20 mg total) by mouth daily as needed (edema). 2 pm on an empty stomach if swollen. 90 tablet 3    lisinopriL (PRINIVIL,ZESTRIL) 40 MG tablet Take 1 tablet (40 mg total) by mouth 2 (two) times a day. 180 tablet 3    LORazepam (ATIVAN) 0.5 MG tablet TAKE 1 TABLET(0.5 MG) BY MOUTH TWICE DAILY (Patient taking differently: Take 0.5 mg by mouth 2 (two) times daily.) 60 tablet 5    magnesium oxide (MAG-OX) 400 mg (241.3 mg magnesium) tablet Take 1 tablet (400 mg total) by mouth once daily.  0    multivitamin (THERAGRAN) per tablet Take 1  tablet by mouth once daily.      niacin 500 MG CpSR Take 500 mg by mouth every evening.      ondansetron (ZOFRAN-ODT) 4 MG TbDL Take 1 tablet (4 mg total) by mouth every 6 (six) hours as needed (nausea). 30 tablet 3    pantoprazole (PROTONIX) 40 MG tablet Take 1 tablet (40 mg total) by mouth 2 (two) times daily. 180 tablet 3    polyethylene glycol (GLYCOLAX) 17 gram/dose powder Take 17 g by mouth every other day.      pravastatin (PRAVACHOL) 40 MG tablet Take 1 tablet (40 mg total) by mouth once daily. (Patient taking differently: Take 40 mg by mouth every evening.) 90 tablet 3    psyllium husk, aspartame, (NATURAL PSYLLIUM FIBER) 3.4 gram/5.8 gram Powd Take by mouth.      fluticasone propionate (FLOVENT HFA) 44 mcg/actuation inhaler Inhale 1 puff into the lungs once daily. Controller for 14 days 10.6 g 1     No current facility-administered medications on file prior to visit.     Review of patient's family history indicates:    Hypertension                   Mother                    Heart disease                  Mother                    Stroke                         Father                    Review of Systems   Constitutional: Negative for fever, chills, appetite change and unexpected weight change.   HENT: Negative for sore throat and trouble swallowing.    Eyes: Negative for pain and visual disturbance.   Respiratory: Negative for cough, shortness of breath and wheezing.    Cardiovascular: Negative for chest pain and palpitations.   Gastrointestinal: Negative for nausea, vomiting, abdominal pain, diarrhea and blood in stool.   Genitourinary: Negative for dysuria, hematuria and difficulty urinating.   Musculoskeletal: Negative for arthralgias, gait problem and neck pain.   Skin: Negative for rash and wound.   Neurological: Negative for dizziness, weakness, numbness and headaches.   Hematological: Negative for adenopathy.   Psychiatric/Behavioral: Negative for dysphoric mood.           Objective:       BP  "132/72   Pulse 74   Temp 97.5 °F (36.4 °C) (Oral)   Resp 18   Ht 5' 3" (1.6 m)   Wt 75.3 kg (166 lb 0.1 oz)   LMP 04/18/2004   SpO2 96%   BMI 29.41 kg/m²     Physical Exam   Constitutional: She is oriented to person, place, and time. She appears well-developed and well-nourished.   HENT:   Head: Normocephalic.   Mouth/Throat: Oropharynx is clear and moist. No oropharyngeal exudate or posterior oropharyngeal erythema.   Eyes: Conjunctivae and EOM are normal. Pupils are equal, round, and reactive to light.   Neck: Normal range of motion. Neck supple. No thyromegaly present.   Cardiovascular: Normal rate, regular rhythm, S1 normal, S2 normal, normal heart sounds and intact distal pulses.  Exam reveals no gallop and no friction rub.    No murmur heard.  Pulmonary/Chest: Effort normal and breath sounds normal. She has no wheezes. She has no rales.   Abdominal: Normal appearance.   Musculoskeletal:        Right lower leg: She exhibits no edema.        Left lower leg: She exhibits no edema.   Lymphadenopathy:     She has no cervical adenopathy.   Neurological: She is alert and oriented to person, place, and time. No cranial nerve deficit. Gait normal.   Skin: Skin is warm and intact. No rash noted.   Psychiatric: She has a normal mood and affect.         Results for orders placed or performed in visit on 08/19/23   Comprehensive Metabolic Panel   Result Value Ref Range    Sodium 136 136 - 145 mmol/L    Potassium 3.9 3.5 - 5.1 mmol/L    Chloride 102 95 - 110 mmol/L    CO2 24 23 - 29 mmol/L    Glucose 94 70 - 110 mg/dL    BUN 10 8 - 23 mg/dL    Creatinine 0.8 0.5 - 1.4 mg/dL    Calcium 9.7 8.7 - 10.5 mg/dL    Total Protein 7.8 6.0 - 8.4 g/dL    Albumin 4.0 3.5 - 5.2 g/dL    Total Bilirubin 0.4 0.1 - 1.0 mg/dL    Alkaline Phosphatase 89 55 - 135 U/L    AST 43 (H) 10 - 40 U/L    ALT 31 10 - 44 U/L    eGFR >60.0 >60 mL/min/1.73 m^2    Anion Gap 10 8 - 16 mmol/L   Lipid Panel   Result Value Ref Range    Cholesterol 250 " (H) 120 - 199 mg/dL    Triglycerides 172 (H) 30 - 150 mg/dL    HDL 73 40 - 75 mg/dL    LDL Cholesterol 142.6 63.0 - 159.0 mg/dL    HDL/Cholesterol Ratio 29.2 20.0 - 50.0 %    Total Cholesterol/HDL Ratio 3.4 2.0 - 5.0    Non-HDL Cholesterol 177 mg/dL   CBC Auto Differential   Result Value Ref Range    WBC 9.35 3.90 - 12.70 K/uL    RBC 4.88 4.00 - 5.40 M/uL    Hemoglobin 14.6 12.0 - 16.0 g/dL    Hematocrit 44.2 37.0 - 48.5 %    MCV 91 82 - 98 fL    MCH 29.9 27.0 - 31.0 pg    MCHC 33.0 32.0 - 36.0 g/dL    RDW 12.9 11.5 - 14.5 %    Platelets 450 150 - 450 K/uL    MPV 9.8 9.2 - 12.9 fL    Immature Granulocytes 0.2 0.0 - 0.5 %    Gran # (ANC) 5.3 1.8 - 7.7 K/uL    Immature Grans (Abs) 0.02 0.00 - 0.04 K/uL    Lymph # 3.0 1.0 - 4.8 K/uL    Mono # 0.6 0.3 - 1.0 K/uL    Eos # 0.3 0.0 - 0.5 K/uL    Baso # 0.09 0.00 - 0.20 K/uL    nRBC 0 0 /100 WBC    Gran % 56.8 38.0 - 73.0 %    Lymph % 32.5 18.0 - 48.0 %    Mono % 6.4 4.0 - 15.0 %    Eosinophil % 3.1 0.0 - 8.0 %    Basophil % 1.0 0.0 - 1.9 %    Differential Method Automated    TSH   Result Value Ref Range    TSH 0.738 0.400 - 4.000 uIU/mL     *Note: Due to a large number of results and/or encounters for the requested time period, some results have not been displayed. A complete set of results can be found in Results Review.         Assessment:       1. Essential hypertension    2. Anxiety    3. Hyperlipidemia, unspecified hyperlipidemia type    4. Gastroesophageal reflux disease, unspecified whether esophagitis present                  Plan:       Essential hypertension    Anxiety    Hyperlipidemia, unspecified hyperlipidemia type    Gastroesophageal reflux disease, unspecified whether esophagitis present        F/u with nephrology as planned  continue Ativan BID, wellbutrin  Increase to atenolol 25mg daily; discussed changing lisinopril with nephrologist due to persistent cough  Overall stable  cotninue aspirin 81mg daily  Continue other present meds  Counseled on regular  exercise, maintenance of a healthy weight, balanced diet rich in fruits/vegetables and lean protein, and avoidance of unhealthy habits like smoking and excessive alcohol intake.  F/u 6 months

## 2023-08-22 ENCOUNTER — CLINICAL SUPPORT (OUTPATIENT)
Dept: REHABILITATION | Facility: HOSPITAL | Age: 73
End: 2023-08-22
Payer: MEDICARE

## 2023-08-22 ENCOUNTER — PATIENT MESSAGE (OUTPATIENT)
Dept: FAMILY MEDICINE | Facility: CLINIC | Age: 73
End: 2023-08-22
Payer: MEDICARE

## 2023-08-22 ENCOUNTER — PATIENT MESSAGE (OUTPATIENT)
Dept: ADMINISTRATIVE | Facility: HOSPITAL | Age: 73
End: 2023-08-22
Payer: MEDICARE

## 2023-08-22 ENCOUNTER — PATIENT OUTREACH (OUTPATIENT)
Dept: ADMINISTRATIVE | Facility: HOSPITAL | Age: 73
End: 2023-08-22
Payer: MEDICARE

## 2023-08-22 DIAGNOSIS — M53.86 DECREASED ROM OF LUMBAR SPINE: Primary | ICD-10-CM

## 2023-08-22 DIAGNOSIS — R29.898 WEAKNESS OF BOTH HIPS: ICD-10-CM

## 2023-08-22 DIAGNOSIS — Z12.31 ENCOUNTER FOR SCREENING MAMMOGRAM FOR MALIGNANT NEOPLASM OF BREAST: Primary | ICD-10-CM

## 2023-08-22 PROCEDURE — 97140 MANUAL THERAPY 1/> REGIONS: CPT | Mod: PO,CQ

## 2023-08-22 PROCEDURE — 97110 THERAPEUTIC EXERCISES: CPT | Mod: PO,CQ

## 2023-08-22 NOTE — PROGRESS NOTES
"[facility]   Physical Therapy Treatment Note      Name: Lucinda DRAKE Ale  [facility] Number: [id number]    Therapy Diagnosis:   Encounter Diagnoses   Name Primary?    Decreased ROM of lumbar spine Yes    Weakness of both hips      Physician:     Visit Date: 8/22/2023  Physician Orders: PT Eval and Treat   Post Surgical? No   Eval and Treat Yes   Type of Therapy Outpatient Therapy   Location: Hip      Medical Diagnosis from Referral:   M70.61 (ICD-10-CM) - Greater trochanteric bursitis of right hip   M76.891 (ICD-10-CM) - Hip flexor tendinitis, right      Evaluation Date: 8/1/2023  Authorization Period Expiration: 7/29/2024  Plan of Care Expiration: 9/29/2023  Progress Note Due: 8/31/2023  Visit # / Visits authorized: 5 / 20   FOTO: 1/ 3 (8/1/2023)      PTA Visit #: 0/5     Precautions: HTN,       Time In: 8:30 am  Time Out: 9:15  Total Billable Time: 45 minutes      Subjective     Pt reports: that she is feeling better today.  She was compliant with home exercise program.  Response to previous treatment: mild pain with exercise and worse after exercise  Functional change: none    Pain: 3/10 .   Location: R hip      Objective      Objective Measures updated at progress report unless specified.     Treatment     Lucinda received the treatments listed below:      therapeutic exercises to develop strength, endurance, ROM, flexibility, posture, and core stabilization for 30 minutes including:      LOWER TRUNK ROTATION x 20 reps medium amplitude  LTR stretch 3 x 30 secs  PPT x20   Adduction ball squeeze + PPT x 20 hold 2 secs  Bent knee fall out/single 2 x10  varying hip angle on second set  Supine heel slides with use of sliders x 20     Supine HS stretch with strap  Supine ITB stretch with strap      Piriformis hook stretch 3 x 30" each  Seated HS stretch 2 x 20 secs  Supine frog stretch unilaterally 3 x 30"               DOUBLE KNEE TO CHEST  X 3 reps each to carry into HEP.    Gait 3 x 50' for " carryover with intervention NP    Nu step x 5 min no resistance .     manual therapy techniques: Manual traction, Myofacial release, and Soft tissue Mobilization were applied to the: iliacus, psoas, QL, TFL, glut medius and adductor nelson for 15 minutes, including:  Short axis traction 3x in session  Long axis traction  Lateral hip traction in hooklying    neuromuscular re-education activities to improve: Sense, Proprioception, and Posture for 0 minutes. The following activities were included:      therapeutic activities to improve functional performance for   minutes, including:      gait training to improve functional mobility and safety for   minutes, including:      hot pack for 0 minutes to .    cold pack for 0 minutes to .    Patient Education and Home Exercises       Education provided:   - HEP  - Pt/family was provided educational information, including: role of PT, role of pt/caregiver, goals for PT, POC, scheduling, and attendance policy.- pt verbalized understanding.      Written Home Exercises Provided: yes. Exercises were reviewed and Lucinda was able to demonstrate them prior to the end of the session.  Lucinda demonstrated good  understanding of the education provided. See EMR under Patient Instructions for exercises provided during therapy sessions.       Assessment     Lucinda fausto today's tx with ther ex and manual intervention well. She was w/o c/o pn sx throughout tx. She does exhibit some B hip weakness R>L.  Pt had good active ROM with slider in supine. Pt with good insight but need cues for body mechanics with log rolling still but improved in session.      Lucinda Is progressing well towards her goals.   Pt prognosis is Good.     Pt will continue to benefit from skilled outpatient physical therapy to address the deficits listed in the problem list box on initial evaluation, provide pt/family education and to maximize pt's level of independence in the home and community environment.     Pt's  "spiritual, cultural and educational needs considered and pt agreeable to plan of care and goals.     Anticipated barriers to physical therapy: chronic nature and acute flareups    Goals: Short Term Goals: 4 weeks   -Lumbar AROM extension improved to 15 deg to demo improving mobility. Progressing   -Bilateral LE strength as tested 3/5 or better for improving stability and gait.  -NARROW BASE OF SUPPORT on foam x 30 " with minimum or no sway.  -Improved TUG to 12 s without AD for improving balance and gait.  -FOTO FS score improved to 30 for improving QOL.     Long Term Goals: 8 weeks   -NARROW BASE OF SUPPORT with EYES CLOSED on foam x 30" with minimum sway  -Improved TUG to 10 s without AD for improving balance and gait.  -Tandem balance on floor (right leg back) 30" for improving balance.  -FOTO FS score improved to 35 for improving QOL.    Plan     Cont with POC    Manuel Garcia, PTA     "

## 2023-08-24 ENCOUNTER — CLINICAL SUPPORT (OUTPATIENT)
Dept: REHABILITATION | Facility: HOSPITAL | Age: 73
End: 2023-08-24
Payer: MEDICARE

## 2023-08-24 DIAGNOSIS — R29.898 WEAKNESS OF BOTH HIPS: ICD-10-CM

## 2023-08-24 DIAGNOSIS — M53.86 DECREASED ROM OF LUMBAR SPINE: Primary | ICD-10-CM

## 2023-08-24 PROCEDURE — 97110 THERAPEUTIC EXERCISES: CPT | Mod: PO,CQ

## 2023-08-24 PROCEDURE — 97140 MANUAL THERAPY 1/> REGIONS: CPT | Mod: PO,CQ

## 2023-08-24 NOTE — PROGRESS NOTES
"[facility]   Physical Therapy Treatment Note      Name: Lucinda Aguilera  Clinic Number: [id number]    Therapy Diagnosis:   Encounter Diagnoses   Name Primary?    Decreased ROM of lumbar spine Yes    Weakness of both hips      Physician:     Visit Date: 8/24/2023  Physician Orders: PT Eval and Treat   Post Surgical? No   Eval and Treat Yes   Type of Therapy Outpatient Therapy   Location: Hip      Medical Diagnosis from Referral:   M70.61 (ICD-10-CM) - Greater trochanteric bursitis of right hip   M76.891 (ICD-10-CM) - Hip flexor tendinitis, right      Evaluation Date: 8/1/2023  Authorization Period Expiration: 7/29/2024  Plan of Care Expiration: 9/29/2023  Progress Note Due: 8/31/2023  Visit # / Visits authorized: 6 / 20   FOTO: 1/ 3 (8/1/2023)      PTA Visit #: 0/5     Precautions: HTN,       Time In: 8:45 am  Time Out: 9:47  Total Billable Time: 62 minutes      Subjective     Pt reports: that she is w/o c/o pn today. Reports that she participated in Aquatic exs at gym yesterday with some minor gen mm soreness afterward.   She was compliant with home exercise program.  Response to previous treatment: mild pain with exercise and worse after exercise  Functional change: none    Pain: 0/10 .   Location: R hip      Objective      Objective Measures updated at progress report unless specified.     Treatment     Lucinda received the treatments listed below:      therapeutic exercises to develop strength, endurance, ROM, flexibility, posture, and core stabilization for 45 minutes including:      LOWER TRUNK ROTATION x 3 min medium amplitude with red ball.  LTR stretch 3 x 30 secs  PPT x20   Adduction ball squeeze + PPT x 20 hold 2 secs  Bent knee fall out/single 2 x10  varying hip angle on second set  Supine heel slides with use of sliders x 20     Supine HS stretch with strap 3 x 30 sec  Supine ITB stretch with strap 3x 30 sec      Piriformis hook stretch 3 x 30" each  Seated HS stretch 2 x 20 " "secs NP  Supine frog stretch unilaterally 3 x 30"  DOUBLE KNEE TO CHEST with green t-ball 20x  .    Gait 3 x 50' side stepping VCs for form    Recumbent stat bike x 5 min L2. (Nu-step not available) .     manual therapy techniques: Manual traction, Myofacial release, and Soft tissue Mobilization were applied to the: iliacus, psoas, QL, TFL, glut medius and adductor nelson for 15 minutes, including:  Short axis traction 3x in session  Long axis traction  Lateral hip traction in hooklying    neuromuscular re-education activities to improve: Sense, Proprioception, and Posture for 0 minutes. The following activities were included:      therapeutic activities to improve functional performance for   minutes, including:      gait training to improve functional mobility and safety for   minutes, including:      hot pack for 0 minutes to .    cold pack for 0 minutes to .    Patient Education and Home Exercises       Education provided:   - HEP  - Pt/family was provided educational information, including: role of PT, role of pt/caregiver, goals for PT, POC, scheduling, and attendance policy.- pt verbalized understanding.      Written Home Exercises Provided: yes. Exercises were reviewed and Lucinda was able to demonstrate them prior to the end of the session.  Lucinda demonstrated good  understanding of the education provided. See EMR under Patient Instructions for exercises provided during therapy sessions.       Assessment     Lucinda fausto today's tx with progression of  ther ex and manual intervention well. She was able to progress with core stab, hip ex and gt today w/o c/o pn sx throughout tx. She does exhibit some B hip weakness R>L.  Pt had good active ROM with DKTC  with ball. Pt with good insight but need cues for body mechanics with log rolling still but improved in session.      Lucinda Is progressing well towards her goals.   Pt prognosis is Good.     Pt will continue to benefit from skilled outpatient physical " "therapy to address the deficits listed in the problem list box on initial evaluation, provide pt/family education and to maximize pt's level of independence in the home and community environment.     Pt's spiritual, cultural and educational needs considered and pt agreeable to plan of care and goals.     Anticipated barriers to physical therapy: chronic nature and acute flareups    Goals: Short Term Goals: 4 weeks   -Lumbar AROM extension improved to 15 deg to demo improving mobility. Progressing   -Bilateral LE strength as tested 3/5 or better for improving stability and gait.  -NARROW BASE OF SUPPORT on foam x 30 " with minimum or no sway.  -Improved TUG to 12 s without AD for improving balance and gait.  -FOTO FS score improved to 30 for improving QOL.     Long Term Goals: 8 weeks   -NARROW BASE OF SUPPORT with EYES CLOSED on foam x 30" with minimum sway  -Improved TUG to 10 s without AD for improving balance and gait.  -Tandem balance on floor (right leg back) 30" for improving balance.  -FOTO FS score improved to 35 for improving QOL.    Plan     Cont with POC    Manuel Garcia PTA     "

## 2023-08-25 ENCOUNTER — PATIENT MESSAGE (OUTPATIENT)
Dept: FAMILY MEDICINE | Facility: CLINIC | Age: 73
End: 2023-08-25
Payer: MEDICARE

## 2023-08-25 NOTE — TELEPHONE ENCOUNTER
Patient seen on 8/21/23. Patient have excessive coughing.  She is requesting medication.  Please advise.

## 2023-08-28 RX ORDER — BENZONATATE 200 MG/1
200 CAPSULE ORAL 3 TIMES DAILY PRN
Qty: 30 CAPSULE | Refills: 0 | Status: SHIPPED | OUTPATIENT
Start: 2023-08-28 | End: 2023-09-28 | Stop reason: SDUPTHER

## 2023-08-29 ENCOUNTER — PATIENT MESSAGE (OUTPATIENT)
Dept: ORTHOPEDICS | Facility: CLINIC | Age: 73
End: 2023-08-29
Payer: MEDICARE

## 2023-08-31 ENCOUNTER — CLINICAL SUPPORT (OUTPATIENT)
Dept: REHABILITATION | Facility: HOSPITAL | Age: 73
End: 2023-08-31
Payer: MEDICARE

## 2023-08-31 ENCOUNTER — EXTERNAL CHRONIC CARE MANAGEMENT (OUTPATIENT)
Dept: PRIMARY CARE CLINIC | Facility: CLINIC | Age: 73
End: 2023-08-31
Payer: MEDICARE

## 2023-08-31 ENCOUNTER — PATIENT MESSAGE (OUTPATIENT)
Dept: FAMILY MEDICINE | Facility: CLINIC | Age: 73
End: 2023-08-31

## 2023-08-31 ENCOUNTER — OFFICE VISIT (OUTPATIENT)
Dept: ORTHOPEDICS | Facility: CLINIC | Age: 73
End: 2023-08-31
Payer: MEDICARE

## 2023-08-31 DIAGNOSIS — M53.86 DECREASED ROM OF LUMBAR SPINE: Primary | ICD-10-CM

## 2023-08-31 DIAGNOSIS — M70.61 GREATER TROCHANTERIC BURSITIS OF RIGHT HIP: Primary | ICD-10-CM

## 2023-08-31 DIAGNOSIS — R29.898 WEAKNESS OF BOTH HIPS: ICD-10-CM

## 2023-08-31 PROCEDURE — 20610 DRAIN/INJ JOINT/BURSA W/O US: CPT | Mod: S$PBB,RT,, | Performed by: NURSE PRACTITIONER

## 2023-08-31 PROCEDURE — 99999 PR PBB SHADOW E&M-EST. PATIENT-LVL III: ICD-10-PCS | Mod: PBBFAC,,, | Performed by: NURSE PRACTITIONER

## 2023-08-31 PROCEDURE — 99999PBSHW PR PBB SHADOW TECHNICAL ONLY FILED TO HB: Mod: PBBFAC,,,

## 2023-08-31 PROCEDURE — 99490 CHRNC CARE MGMT STAFF 1ST 20: CPT | Mod: S$PBB,,, | Performed by: FAMILY MEDICINE

## 2023-08-31 PROCEDURE — 99490 PR CHRONIC CARE MGMT, 1ST 20 MIN: ICD-10-PCS | Mod: S$PBB,,, | Performed by: FAMILY MEDICINE

## 2023-08-31 PROCEDURE — 20610 DRAIN/INJ JOINT/BURSA W/O US: CPT | Mod: PBBFAC,PN,RT | Performed by: NURSE PRACTITIONER

## 2023-08-31 PROCEDURE — 97110 THERAPEUTIC EXERCISES: CPT | Mod: PO,CQ

## 2023-08-31 PROCEDURE — 99213 OFFICE O/P EST LOW 20 MIN: CPT | Mod: PBBFAC,PN,25 | Performed by: NURSE PRACTITIONER

## 2023-08-31 PROCEDURE — 99212 PR OFFICE/OUTPT VISIT, EST, LEVL II, 10-19 MIN: ICD-10-PCS | Mod: S$PBB,25,, | Performed by: NURSE PRACTITIONER

## 2023-08-31 PROCEDURE — 20610 LARGE JOINT ASPIRATION/INJECTION: R GREATER TROCHANTERIC BURSA: ICD-10-PCS | Mod: S$PBB,RT,, | Performed by: NURSE PRACTITIONER

## 2023-08-31 PROCEDURE — 97140 MANUAL THERAPY 1/> REGIONS: CPT | Mod: PO,CQ

## 2023-08-31 PROCEDURE — 99490 CHRNC CARE MGMT STAFF 1ST 20: CPT | Mod: PBBFAC,27,PO | Performed by: FAMILY MEDICINE

## 2023-08-31 PROCEDURE — 99212 OFFICE O/P EST SF 10 MIN: CPT | Mod: S$PBB,25,, | Performed by: NURSE PRACTITIONER

## 2023-08-31 PROCEDURE — 99999PBSHW PR PBB SHADOW TECHNICAL ONLY FILED TO HB: ICD-10-PCS | Mod: PBBFAC,,,

## 2023-08-31 PROCEDURE — 99999 PR PBB SHADOW E&M-EST. PATIENT-LVL III: CPT | Mod: PBBFAC,,, | Performed by: NURSE PRACTITIONER

## 2023-08-31 RX ORDER — TRIAMCINOLONE ACETONIDE 40 MG/ML
40 INJECTION, SUSPENSION INTRA-ARTICULAR; INTRAMUSCULAR
Status: DISCONTINUED | OUTPATIENT
Start: 2023-08-31 | End: 2023-08-31 | Stop reason: HOSPADM

## 2023-08-31 RX ADMIN — TRIAMCINOLONE ACETONIDE 40 MG: 40 INJECTION, SUSPENSION INTRA-ARTICULAR; INTRAMUSCULAR at 09:08

## 2023-08-31 NOTE — PROGRESS NOTES
"[facility]   Physical Therapy Treatment Note      Name: Lucinda Aguilera  Clinic Number: [id number]    Therapy Diagnosis:   Encounter Diagnoses   Name Primary?    Decreased ROM of lumbar spine Yes    Weakness of both hips      Physician:     Visit Date: 8/31/2023  Physician Orders: PT Eval and Treat   Post Surgical? No   Eval and Treat Yes   Type of Therapy Outpatient Therapy   Location: Hip      Medical Diagnosis from Referral:   M70.61 (ICD-10-CM) - Greater trochanteric bursitis of right hip   M76.891 (ICD-10-CM) - Hip flexor tendinitis, right      Evaluation Date: 8/1/2023  Authorization Period Expiration: 7/29/2024  Plan of Care Expiration: 9/29/2023  Progress Note Due: 8/31/2023  Visit # / Visits authorized: 6 / 20   FOTO: 1/ 3 (8/1/2023)      PTA Visit #: 0/5     Precautions: HTN,       Time In: 7:26 am  Time Out: 8:20  Total Billable Time: 51 minutes      Subjective     Pt reports: that she is w/o c/o pn today. Reports that she is scheduled to receive an injection in her hip after today's session.   She was compliant with home exercise program.  Response to previous treatment: mild pain with exercise and worse after exercise  Functional change: none    Pain: 0/10 .   Location: R hip      Objective      Objective Measures updated at progress report unless specified.     Treatment     Lucinda received the treatments listed below:      therapeutic exercises to develop strength, endurance, ROM, flexibility, posture, and core stabilization for 30 minutes including:    Recumbent stat bike x 5 min L2. (Nu-step not available) NP    LOWER TRUNK ROTATION x 3 min medium amplitude with red ball.  LTR stretch 3 x 30 secs  PPT x20   Adduction ball squeeze + PPT x 20 hold 2 secs  Bent knee fall out/single 2 x10  yellow t-band  Supine heel slides with use of sliders x 20     Supine HS stretch with strap 3 x 30 sec  Supine ITB stretch with strap 3x 30 sec      Piriformis hook stretch 3 x 30" " "each  Seated HS stretch 2 x 20 secs NP  Supine frog stretch unilaterally 3 x 30"  DOUBLE KNEE TO CHEST with green t-ball 20x  .    Gait 3 x 50' side stepping VCs for form      manual therapy techniques: Manual traction, Myofacial release, and Soft tissue Mobilization were applied to the: iliacus, psoas, QL, TFL, glut medius and adductor nelson for 15 minutes, including:  Short axis traction 3x in session  Long axis traction  Lateral hip traction in hooklying    neuromuscular re-education activities to improve: Sense, Proprioception, and Posture for 0 minutes. The following activities were included:      therapeutic activities to improve functional performance for   minutes, including:      gait training to improve functional mobility and safety for   minutes, including:      hot pack for 0 minutes to .    cold pack for 0 minutes to .    Patient Education and Home Exercises       Education provided:   - HEP  - Pt/family was provided educational information, including: role of PT, role of pt/caregiver, goals for PT, POC, scheduling, and attendance policy.- pt verbalized understanding.      Written Home Exercises Provided: yes. Exercises were reviewed and Lucinda was able to demonstrate them prior to the end of the session.  Lucinda demonstrated good  understanding of the education provided. See EMR under Patient Instructions for exercises provided during therapy sessions.       Assessment     Lucinda fausto today's tx with progression of  ther ex and manual intervention well. She was able to progress with resistance with core stab, hip ex today w/o c/o pn sx throughout tx. She does exhibit some B hip weakness R>L.  Pt with good insight but need cues for body mechanics with log rolling still but improved in session.      Lucinda Is progressing well towards her goals.   Pt prognosis is Good.     Pt will continue to benefit from skilled outpatient physical therapy to address the deficits listed in the problem list box on " "initial evaluation, provide pt/family education and to maximize pt's level of independence in the home and community environment.     Pt's spiritual, cultural and educational needs considered and pt agreeable to plan of care and goals.     Anticipated barriers to physical therapy: chronic nature and acute flareups    Goals: Short Term Goals: 4 weeks   -Lumbar AROM extension improved to 15 deg to demo improving mobility. Progressing   -Bilateral LE strength as tested 3/5 or better for improving stability and gait.  -NARROW BASE OF SUPPORT on foam x 30 " with minimum or no sway.  -Improved TUG to 12 s without AD for improving balance and gait.  -FOTO FS score improved to 30 for improving QOL.     Long Term Goals: 8 weeks   -NARROW BASE OF SUPPORT with EYES CLOSED on foam x 30" with minimum sway  -Improved TUG to 10 s without AD for improving balance and gait.  -Tandem balance on floor (right leg back) 30" for improving balance.  -FOTO FS score improved to 35 for improving QOL.    Plan     Cont with POC    Manuel Garcia PTA     "

## 2023-08-31 NOTE — PROGRESS NOTES
"  Chief Complaint   Patient presents with    Right Hip - Pain      HPI:   This is a 72 y.o. who presents to clinic today for right hip pain for the past 2 weeks after no known trauma. Complains of pain while sleeping on side and when descending stairs. Pain is dull and deep. No numbness or tingling. No associated signs or symptoms.      Past Medical History:   Diagnosis Date    Anticoagulant long-term use     Anxiety     takes daily Xanax    Arthritis     right thumb    Cataract     OU    Cholelithiasis     GERD (gastroesophageal reflux disease)     Hepatic steatosis     Hyperlipemia     Hypertension     PVD (posterior vitreous detachment)     OD     Past Surgical History:   Procedure Laterality Date    BREAST BIOPSY Left 08/28/2020    stereo biopsy    BREAST BIOPSY Left 10/07/2020    benign excisional biopsy    chest abcess      child    COLONOSCOPY  01/06/2012    Dr. Lopez: hemorrhoids, redundant colon    COLONOSCOPY N/A 02/17/2021    Dr. Lopez: Congested and erythematous mucosa in the rectum, in the recto-sigmoid colon and in the sigmoid colon, proctosigmoid colitis, Redundant colon; biopsy: focal active colitis "nonspecific but can be seen with acute self-limited colitis (infection), medication effect (e.g. NSAID use), proximity to diverticula, or early/evolving IBD    ESOPHAGOGASTRODUODENOSCOPY N/A 06/06/2019    Dr. Lopez: small hiatal hernia, Non-erosive esophageal reflux (NERD) disease?., slight antritis; biopsy: Antral mucosa with chemical/reactive gastropathy. negative for h pylori    ESOPHAGOGASTRODUODENOSCOPY N/A 02/17/2021    Procedure: EGD (ESOPHAGOGASTRODUODENOSCOPY);  Surgeon: Nathan Lopez Jr., MD;  Location: [facility];  Service: Endoscopy;  Laterality: N/A; Minimal gastritis; biopsy: stomach- negative for h pylori, active chronic gastritis with focal features of erosive gastritis, duodenum WNL    ESOPHAGOGASTRODUODENOSCOPY N/A 05/10/2022    Procedure: EGD (ESOPHAGOGASTRODUODENOSCOPY);  Surgeon: " Nathan Lopez Jr., MD;  Location: [facility] ENDO;  Service: Endoscopy;  Laterality: N/A;    EXCISIONAL BIOPSY Left 10/07/2020    Procedure: EXCISIONAL BIOPSY-Left with radiological marker;  Surgeon: Brooklynn Ashford MD;  Location: [facility];  Service: General;  Laterality: Left;    FRACTURE SURGERY  2010    Left thumb repaired surgically    JOINT REPLACEMENT Bilateral     knee    KNEE ARTHROSCOPY W/ LASER      right    LAPAROSCOPIC CHOLECYSTECTOMY N/A 06/14/2019    Procedure: CHOLECYSTECTOMY, LAPAROSCOPIC;  Surgeon: Guevara Evans MD;  Location: [facility] OR;  Service: General;  Laterality: N/A;    thumb surgery  11/2014    TOTAL KNEE ARTHROPLASTY Left 06/19/2018    Procedure: REPLACEMENT-KNEE-TOTAL;  Surgeon: Nj Melvin MD;  Location: [address];  Service: Orthopedics;  Laterality: Left;  [facility]    UPPER GASTROINTESTINAL ENDOSCOPY  07/13/2017    Dr. Lopez: NERD, Gastric mucosal atrophy. antritis, Scar in the incisura. Biopsied; biopsy: chronic gastritis. negative for h pylori     Current Outpatient Medications on File Prior to Visit   Medication Sig Dispense Refill    acetaminophen (TYLENOL) 500 MG tablet Take 500 mg by mouth every 6 (six) hours as needed for Pain.      ascorbic acid, vitamin C, (VITAMIN C) 250 MG tablet Take 500 mg by mouth once daily.       aspirin (ECOTRIN) 81 MG EC tablet Take 81 mg by mouth every evening.      atenoloL (TENORMIN) 25 MG tablet Take 1 tablet (25 mg total) by mouth every evening. 90 tablet 3    balsalazide (COLAZAL) 750 mg capsule TAKE 3 CAPSULES(2250 MG) BY MOUTH TWICE DAILY WITH MEALS 180 capsule 11    benzonatate (TESSALON) 200 MG capsule Take 1 capsule (200 mg total) by mouth 3 (three) times daily as needed for Cough. 30 capsule 0    buPROPion (WELLBUTRIN XL) 150 MG TB24 tablet Take 1 tablet (150 mg total) by mouth once daily. 90 tablet 3    docusate sodium (COLACE) 100 MG capsule Take 100 mg by mouth 2 (two) times daily.      famotidine (PEPCID) 40 MG tablet Take 1  tablet (40 mg total) by mouth nightly. 90 tablet 3    furosemide (LASIX) 20 MG tablet Take 1 tablet (20 mg total) by mouth daily as needed (edema). 2 pm on an empty stomach if swollen. 90 tablet 3    lisinopriL (PRINIVIL,ZESTRIL) 40 MG tablet Take 1 tablet (40 mg total) by mouth 2 (two) times a day. 180 tablet 3    LORazepam (ATIVAN) 0.5 MG tablet TAKE 1 TABLET(0.5 MG) BY MOUTH TWICE DAILY (Patient taking differently: Take 0.5 mg by mouth 2 (two) times daily.) 60 tablet 5    magnesium oxide (MAG-OX) 400 mg (241.3 mg magnesium) tablet Take 1 tablet (400 mg total) by mouth once daily.  0    multivitamin (THERAGRAN) per tablet Take 1 tablet by mouth once daily.      niacin 500 MG CpSR Take 500 mg by mouth every evening.      ondansetron (ZOFRAN-ODT) 4 MG TbDL Take 1 tablet (4 mg total) by mouth every 6 (six) hours as needed (nausea). 30 tablet 3    pantoprazole (PROTONIX) 40 MG tablet Take 1 tablet (40 mg total) by mouth 2 (two) times daily. 180 tablet 3    polyethylene glycol (GLYCOLAX) 17 gram/dose powder Take 17 g by mouth every other day.      pravastatin (PRAVACHOL) 40 MG tablet Take 1 tablet (40 mg total) by mouth once daily. (Patient taking differently: Take 40 mg by mouth every evening.) 90 tablet 3    psyllium husk, aspartame, (NATURAL PSYLLIUM FIBER) 3.4 gram/5.8 gram Powd Take by mouth.      fluticasone propionate (FLOVENT HFA) 44 mcg/actuation inhaler Inhale 1 puff into the lungs once daily. Controller for 14 days 10.6 g 1     No current facility-administered medications on file prior to visit.     Review of patient's allergies indicates:  No Known Allergies  Family History   Problem Relation Age of Onset    Hypertension Mother     Heart disease Mother     Glaucoma Mother     Stroke Father     Glaucoma Sister     Cataracts Sister     Macular degeneration Sister     Breast cancer Neg Hx     Colon cancer Neg Hx     Crohn's disease Neg Hx     Ulcerative colitis Neg Hx     Stomach cancer Neg Hx     Esophageal  cancer Neg Hx     Celiac disease Neg Hx     Amblyopia Neg Hx     Blindness Neg Hx     Retinal detachment Neg Hx     Strabismus Neg Hx      Social History     Socioeconomic History    Marital status:     Number of children: 2   Occupational History    Occupation: retired teacher and principal    Occupation: substitute   Tobacco Use    Smoking status: Never    Smokeless tobacco: Never   Substance and Sexual Activity    Alcohol use: Yes     Comment: social- maybe once every few months    Drug use: No    Sexual activity: Yes     Partners: Male     Birth control/protection: Post-menopausal, None     Social Determinants of Health     Financial Resource Strain: Low Risk  (8/17/2023)    Overall Financial Resource Strain (CARDIA)     Difficulty of Paying Living Expenses: Not hard at all   Food Insecurity: No Food Insecurity (8/17/2023)    Hunger Vital Sign     Worried About Running Out of Food in the Last Year: Never true     Ran Out of Food in the Last Year: Never true   Transportation Needs: No Transportation Needs (8/17/2023)    PRAPARE - Transportation     Lack of Transportation (Medical): No     Lack of Transportation (Non-Medical): No   Physical Activity: Sufficiently Active (8/17/2023)    Exercise Vital Sign     Days of Exercise per Week: 4 days     Minutes of Exercise per Session: 40 min   Recent Concern: Physical Activity - Insufficiently Active (6/6/2023)    Exercise Vital Sign     Days of Exercise per Week: 2 days     Minutes of Exercise per Session: 10 min   Stress: No Stress Concern Present (8/17/2023)    [employer] - Occupational Stress Questionnaire     Feeling of Stress : Only a little   Social Connections: Unknown (8/17/2023)    Social Connection and Isolation Panel [NHANES]     Frequency of Communication with Friends and Family: More than three times a week     Frequency of Social Gatherings with Friends and Family: Three times a week     Active Member of Clubs or  Organizations: Yes     Attends Club or Organization Meetings: 1 to 4 times per year     Marital Status:    Housing Stability: Low Risk  (8/17/2023)    Housing Stability Vital Sign     Unable to Pay for Housing in the Last Year: No     Number of Places Lived in the Last Year: 1     Unstable Housing in the Last Year: No       Review of Systems:  Constitutional:  Denies fever or chills   Eyes:  Denies change in visual acuity   HENT:  Denies nasal congestion or sore throat   Respiratory:  Denies cough or shortness of breath   Cardiovascular:  Denies chest pain or edema   GI:  Denies abdominal pain, nausea, vomiting, bloody stools or diarrhea   :  Denies dysuria   Integument:  Denies rash   Neurologic:  Denies headache, focal weakness or sensory changes   Endocrine:  Denies polyuria or polydipsia   Lymphatic:  Denies swollen glands   Psychiatric:  Denies depression or anxiety     Physical Exam:   Constitutional:  Well developed, well nourished, no acute distress, non-toxic appearance   Integument:  Well hydrated, no rash   Lymphatic:  No lymphadenopathy noted   Neurologic:  Alert & oriented x 3  Psychiatric:  Speech and behavior appropriate     Bilateral Hip Exam    left Hip Exam   left hip exam performed same as contralateral hip and is normal.     right Hip Exam   Tenderness   The patient is experiencing tenderness in the greater trochanter.  Range of Motion   The patient has normal hip ROM.  Muscle Strength   Abduction: 4/5   Other   Erythema: absent  Sensation: normal  Pulse: present          Greater trochanteric bursitis of right hip  -     Large Joint Aspiration/Injection: R greater trochanteric bursa      Using an aseptic technique, I injected 5 cc of lidocaine 1% without and 1 cc of kenalog 40mg into the right Hip. The patient tolerated this well.    Rtc in 6 weeks or as needed.

## 2023-08-31 NOTE — PROCEDURES
Large Joint Aspiration/Injection: R greater trochanteric bursa    Date/Time: 8/31/2023 9:00 AM    Performed by: Chandrika Santiago FNP  Authorized by: Chandrika Santiago FNP    Consent Done?:  Yes (Verbal)  Indications:  Pain  Timeout: prior to procedure the correct patient, procedure, and site was verified    Prep: patient was prepped and draped in usual sterile fashion      Local anesthesia used?: Yes    Local anesthetic:  Lidocaine 1% without epinephrine  Anesthetic total (ml):  5      Details:  Needle Size:  22 G  Approach:  Lateral  Location:  Hip  Site:  R greater trochanteric bursa  Medications:  40 mg triamcinolone acetonide 40 mg/mL  Patient tolerance:  Patient tolerated the procedure well with no immediate complications

## 2023-09-05 ENCOUNTER — CLINICAL SUPPORT (OUTPATIENT)
Dept: REHABILITATION | Facility: HOSPITAL | Age: 73
End: 2023-09-05
Payer: MEDICARE

## 2023-09-05 DIAGNOSIS — M70.61 GREATER TROCHANTERIC BURSITIS OF RIGHT HIP: Primary | ICD-10-CM

## 2023-09-05 PROCEDURE — 97110 THERAPEUTIC EXERCISES: CPT | Mod: PO,CQ

## 2023-09-05 NOTE — PROGRESS NOTES
"[facility]   Physical Therapy Treatment Note      Name: Lucinda Aguilera  Clinic Number: [id number]    Therapy Diagnosis:   No diagnosis found.    Physician:     Visit Date: 9/5/2023  Physician Orders: PT Eval and Treat   Post Surgical? No   Eval and Treat Yes   Type of Therapy Outpatient Therapy   Location: Hip      Medical Diagnosis from Referral:   M70.61 (ICD-10-CM) - Greater trochanteric bursitis of right hip   M76.891 (ICD-10-CM) - Hip flexor tendinitis, right      Evaluation Date: 8/1/2023  Authorization Period Expiration: 7/29/2024  Plan of Care Expiration: 9/29/2023  Progress Note Due: 8/31/2023  Visit # / Visits authorized: 7 / 20   FOTO: 1/ 3 (8/1/2023)      PTA Visit #: 0/5     Precautions: HTN,       Time In: 7:45 am  Time Out: 8:20  Total Billable Time: 51 minutes      Subjective     Pt reports: that she is w/o c/o pn today. Reports that she received injection in her hip after Friday's session.   She was compliant with home exercise program.  Response to previous treatment: mild pain with exercise and worse after exercise  Functional change: none    Pain: 0/10 .   Location: R hip      Objective      Objective Measures updated at progress report unless specified.     Treatment     Lucinda received the treatments listed below:      therapeutic exercises to develop strength, endurance, ROM, flexibility, posture, and core stabilization for 40 minutes including:  Nu-step x 5 min L2.     LOWER TRUNK ROTATION x 3 min medium amplitude with red ball.  LTR stretch 3 x 30 secs  PPT x20   Adduction ball squeeze + PPT x 20 hold 2 secs  Bent knee fall out/single 2 x10  yellow t-band  Supine heel slides with use of sliders x 20     Supine HS stretch with strap 3 x 30 sec  Supine ITB stretch with strap 3x 30 sec      Piriformis hook stretch 3 x 30" each  Seated HS stretch 2 x 20 secs NP  Supine frog stretch unilaterally 3 x 30"  DOUBLE KNEE TO CHEST with green t-ball 20x  .    Gait 3 x 50' " side stepping VCs for form x 5 min      manual therapy techniques: Manual traction, Myofacial release, and Soft tissue Mobilization were applied to the: iliacus, psoas, QL, TFL, glut medius and adductor nelson for 00 minutes, including:  Short axis traction 3x in session  Long axis traction  Lateral hip traction in hooklying    neuromuscular re-education activities to improve: Sense, Proprioception, and Posture for 0 minutes. The following activities were included:      therapeutic activities to improve functional performance for   minutes, including:      gait training to improve functional mobility and safety for   minutes, including:      hot pack for 0 minutes to .    cold pack for 0 minutes to .    Patient Education and Home Exercises       Education provided:   - HEP  - Pt/family was provided educational information, including: role of PT, role of pt/caregiver, goals for PT, POC, scheduling, and attendance policy.- pt verbalized understanding.      Written Home Exercises Provided: yes. Exercises were reviewed and Lucinda was able to demonstrate them prior to the end of the session.  Lucinda demonstrated good  understanding of the education provided. See EMR under Patient Instructions for exercises provided during therapy sessions.       Assessment     Lucinda fausto today's tx with ther ex well. She was able to perform all core stab, hip ex today w/o c/o pn sx throughout tx. She does exhibit some B hip weakness R>L.  Pt with good insight but need cues for body mechanics with log rolling still but improved in session.      Lucinda Is progressing well towards her goals.   Pt prognosis is Good.     Pt will continue to benefit from skilled outpatient physical therapy to address the deficits listed in the problem list box on initial evaluation, provide pt/family education and to maximize pt's level of independence in the home and community environment.     Pt's spiritual, cultural and educational needs considered and  "pt agreeable to plan of care and goals.     Anticipated barriers to physical therapy: chronic nature and acute flareups    Goals: Short Term Goals: 4 weeks   -Lumbar AROM extension improved to 15 deg to demo improving mobility. Progressing   -Bilateral LE strength as tested 3/5 or better for improving stability and gait.  -NARROW BASE OF SUPPORT on foam x 30 " with minimum or no sway.  -Improved TUG to 12 s without AD for improving balance and gait.  -FOTO FS score improved to 30 for improving QOL.     Long Term Goals: 8 weeks   -NARROW BASE OF SUPPORT with EYES CLOSED on foam x 30" with minimum sway  -Improved TUG to 10 s without AD for improving balance and gait.  -Tandem balance on floor (right leg back) 30" for improving balance.  -FOTO FS score improved to 35 for improving QOL.    Plan     Cont with POC    Manuel Garcia PTA     "

## 2023-09-07 ENCOUNTER — OFFICE VISIT (OUTPATIENT)
Dept: FAMILY MEDICINE | Facility: CLINIC | Age: 73
End: 2023-09-07
Payer: MEDICARE

## 2023-09-07 ENCOUNTER — CLINICAL SUPPORT (OUTPATIENT)
Dept: REHABILITATION | Facility: HOSPITAL | Age: 73
End: 2023-09-07
Payer: MEDICARE

## 2023-09-07 VITALS
OXYGEN SATURATION: 95 % | HEIGHT: 63 IN | TEMPERATURE: 98 F | BODY MASS INDEX: 29.88 KG/M2 | HEART RATE: 75 BPM | DIASTOLIC BLOOD PRESSURE: 72 MMHG | RESPIRATION RATE: 18 BRPM | SYSTOLIC BLOOD PRESSURE: 134 MMHG | WEIGHT: 168.63 LBS

## 2023-09-07 DIAGNOSIS — F41.9 ANXIETY: ICD-10-CM

## 2023-09-07 DIAGNOSIS — M53.86 DECREASED ROM OF LUMBAR SPINE: Primary | ICD-10-CM

## 2023-09-07 DIAGNOSIS — I10 ESSENTIAL HYPERTENSION: Primary | ICD-10-CM

## 2023-09-07 DIAGNOSIS — R29.898 WEAKNESS OF BOTH HIPS: ICD-10-CM

## 2023-09-07 PROCEDURE — 99999 PR PBB SHADOW E&M-EST. PATIENT-LVL III: CPT | Mod: PBBFAC,,, | Performed by: FAMILY MEDICINE

## 2023-09-07 PROCEDURE — 97110 THERAPEUTIC EXERCISES: CPT | Mod: PO,CQ

## 2023-09-07 PROCEDURE — 97112 NEUROMUSCULAR REEDUCATION: CPT | Mod: PO,CQ

## 2023-09-07 PROCEDURE — 99213 OFFICE O/P EST LOW 20 MIN: CPT | Mod: S$PBB,,, | Performed by: FAMILY MEDICINE

## 2023-09-07 PROCEDURE — 99999 PR PBB SHADOW E&M-EST. PATIENT-LVL III: ICD-10-PCS | Mod: PBBFAC,,, | Performed by: FAMILY MEDICINE

## 2023-09-07 PROCEDURE — 99213 PR OFFICE/OUTPT VISIT, EST, LEVL III, 20-29 MIN: ICD-10-PCS | Mod: S$PBB,,, | Performed by: FAMILY MEDICINE

## 2023-09-07 PROCEDURE — 99213 OFFICE O/P EST LOW 20 MIN: CPT | Mod: PBBFAC,PO | Performed by: FAMILY MEDICINE

## 2023-09-07 NOTE — PROGRESS NOTES
Subjective:       Patient ID: Lucinda Aguilera is a 72 y.o. female.    Chief Complaint: Hypertension (2 week follow up)    HPI Comments: Here for f/u on chronic health issues    Joined [facility] and doing some daily exercise.  Currently seeing PT for some gait training.    Cough has improved and is 99% gone now.    Hyponatremia, SIADH - Chlorthalidone was stopped. Following with nephrology, Dr. Engel.  Depression - stopped Effexor XR 37.5mg due to hyponatremia and now taking wellbutrin on recommendation of nephrology  HLD, aortic atherosclerosis - tolerating pravachol 40mg daily  Anxiety - Taking lorazepam BID. Wellbutrin. Doing some counseling presently  HTN - tolerating Atenolol 25mg daily, lasix 20mg PRN and lisinopril 40mg BID; BP variable; digital flowsheet reviewed  She got some swelling with amlodipine  GERD - tolerating Protonix 40mg daily  S/p conner - taking colestipol  Colitis - stable on Colazal, miralax    Past Medical History:    Hypertension                                                  Anxiety                                           Hyperlipemia                                                  GERD (gastroesophageal reflux disease)                        Cataract                                                        Comment:OU    PVD (posterior vitreous detachment)                             Comment:OD    Arthritis                                                       Comment:right thumb    Past Surgical History:    chest abcess                                                     Comment:child    KNEE ARTHROSCOPY W/ LASER                                        Comment:right    COLONOSCOPY                                      1/2012          Comment:repeat in 5 years    No Known Allergies    Social History    Marital Status:              Spouse Name:                       Years of Education:                 Number of children: 2             Occupational History  Occupation          Employer             Comment               retired                                   retired teacher an*                         Social History Main Topics    Smoking Status: Never Smoker                      Smokeless Status: Never Used                        Alcohol Use: Yes                Comment: social    Drug Use: No              Sexual Activity: Yes               Partners with: Male       Birth Control/Protection: None, Post-menopausal    Current Outpatient Medications on File Prior to Visit   Medication Sig Dispense Refill    acetaminophen (TYLENOL) 500 MG tablet Take 500 mg by mouth every 6 (six) hours as needed for Pain.      ascorbic acid, vitamin C, (VITAMIN C) 250 MG tablet Take 500 mg by mouth once daily.       aspirin (ECOTRIN) 81 MG EC tablet Take 81 mg by mouth every evening.      atenoloL (TENORMIN) 25 MG tablet Take 1 tablet (25 mg total) by mouth every evening. 90 tablet 3    balsalazide (COLAZAL) 750 mg capsule TAKE 3 CAPSULES(2250 MG) BY MOUTH TWICE DAILY WITH MEALS 180 capsule 11    benzonatate (TESSALON) 200 MG capsule Take 1 capsule (200 mg total) by mouth 3 (three) times daily as needed for Cough. 30 capsule 0    buPROPion (WELLBUTRIN XL) 150 MG TB24 tablet Take 1 tablet (150 mg total) by mouth once daily. 90 tablet 3    docusate sodium (COLACE) 100 MG capsule Take 100 mg by mouth 2 (two) times daily.      famotidine (PEPCID) 40 MG tablet Take 1 tablet (40 mg total) by mouth nightly. 90 tablet 3    furosemide (LASIX) 20 MG tablet Take 1 tablet (20 mg total) by mouth daily as needed (edema). 2 pm on an empty stomach if swollen. 90 tablet 3    lisinopriL (PRINIVIL,ZESTRIL) 40 MG tablet Take 1 tablet (40 mg total) by mouth 2 (two) times a day. 180 tablet 3    LORazepam (ATIVAN) 0.5 MG tablet TAKE 1 TABLET(0.5 MG) BY MOUTH TWICE DAILY (Patient taking differently: Take 0.5 mg by mouth 2 (two) times daily.) 60 tablet 5    magnesium oxide (MAG-OX) 400 mg (241.3 mg magnesium) tablet Take 1 tablet (400 mg total)  by mouth once daily.  0    multivitamin (THERAGRAN) per tablet Take 1 tablet by mouth once daily.      niacin 500 MG CpSR Take 500 mg by mouth every evening.      ondansetron (ZOFRAN-ODT) 4 MG TbDL Take 1 tablet (4 mg total) by mouth every 6 (six) hours as needed (nausea). 30 tablet 3    pantoprazole (PROTONIX) 40 MG tablet Take 1 tablet (40 mg total) by mouth 2 (two) times daily. 180 tablet 3    polyethylene glycol (GLYCOLAX) 17 gram/dose powder Take 17 g by mouth every other day.      pravastatin (PRAVACHOL) 40 MG tablet Take 1 tablet (40 mg total) by mouth once daily. (Patient taking differently: Take 40 mg by mouth every evening.) 90 tablet 3    psyllium husk, aspartame, (NATURAL PSYLLIUM FIBER) 3.4 gram/5.8 gram Powd Take by mouth.      [DISCONTINUED] fluticasone propionate (FLOVENT HFA) 44 mcg/actuation inhaler Inhale 1 puff into the lungs once daily. Controller for 14 days (Patient not taking: Reported on 9/7/2023) 10.6 g 1     No current facility-administered medications on file prior to visit.     Review of patient's family history indicates:    Hypertension                   Mother                    Heart disease                  Mother                    Stroke                         Father                    Review of Systems   Constitutional: Negative for fever, chills, appetite change and unexpected weight change.   HENT: Negative for sore throat and trouble swallowing.    Eyes: Negative for pain and visual disturbance.   Respiratory: Negative for cough, shortness of breath and wheezing.    Cardiovascular: Negative for chest pain and palpitations.   Gastrointestinal: Negative for nausea, vomiting, abdominal pain, diarrhea and blood in stool.   Genitourinary: Negative for dysuria, hematuria and difficulty urinating.   Musculoskeletal: Negative for arthralgias, gait problem and neck pain.   Skin: Negative for rash and wound.   Neurological: Negative for dizziness, weakness, numbness and headaches.  "  Hematological: Negative for adenopathy.   Psychiatric/Behavioral: Negative for dysphoric mood.           Objective:       /72   Pulse 75   Temp 97.8 °F (36.6 °C) (Oral)   Resp 18   Ht 5' 3" (1.6 m)   Wt 76.5 kg (168 lb 10.4 oz)   LMP 04/18/2004   SpO2 95%   BMI 29.88 kg/m²     Physical Exam   Constitutional: She is oriented to person, place, and time. She appears well-developed and well-nourished.   HENT:   Head: Normocephalic.   Mouth/Throat: Oropharynx is clear and moist. No oropharyngeal exudate or posterior oropharyngeal erythema.   Eyes: Conjunctivae and EOM are normal. Pupils are equal, round, and reactive to light.   Neck: Normal range of motion. Neck supple. No thyromegaly present.   Cardiovascular: Normal rate, regular rhythm, S1 normal, S2 normal, normal heart sounds and intact distal pulses.  Exam reveals no gallop and no friction rub.    No murmur heard.  Pulmonary/Chest: Effort normal and breath sounds normal. She has no wheezes. She has no rales.   Abdominal: Normal appearance.   Musculoskeletal:        Right lower leg: She exhibits no edema.        Left lower leg: She exhibits no edema.   Lymphadenopathy:     She has no cervical adenopathy.   Neurological: She is alert and oriented to person, place, and time. No cranial nerve deficit. Gait normal.   Skin: Skin is warm and intact. No rash noted.   Psychiatric: She has a normal mood and affect.         Results for orders placed or performed in visit on 08/19/23   Comprehensive Metabolic Panel   Result Value Ref Range    Sodium 136 136 - 145 mmol/L    Potassium 3.9 3.5 - 5.1 mmol/L    Chloride 102 95 - 110 mmol/L    CO2 24 23 - 29 mmol/L    Glucose 94 70 - 110 mg/dL    BUN 10 8 - 23 mg/dL    Creatinine 0.8 0.5 - 1.4 mg/dL    Calcium 9.7 8.7 - 10.5 mg/dL    Total Protein 7.8 6.0 - 8.4 g/dL    Albumin 4.0 3.5 - 5.2 g/dL    Total Bilirubin 0.4 0.1 - 1.0 mg/dL    Alkaline Phosphatase 89 55 - 135 U/L    AST 43 (H) 10 - 40 U/L    ALT 31 10 - 44 " U/L    eGFR >60.0 >60 mL/min/1.73 m^2    Anion Gap 10 8 - 16 mmol/L   Lipid Panel   Result Value Ref Range    Cholesterol 250 (H) 120 - 199 mg/dL    Triglycerides 172 (H) 30 - 150 mg/dL    HDL 73 40 - 75 mg/dL    LDL Cholesterol 142.6 63.0 - 159.0 mg/dL    HDL/Cholesterol Ratio 29.2 20.0 - 50.0 %    Total Cholesterol/HDL Ratio 3.4 2.0 - 5.0    Non-HDL Cholesterol 177 mg/dL   CBC Auto Differential   Result Value Ref Range    WBC 9.35 3.90 - 12.70 K/uL    RBC 4.88 4.00 - 5.40 M/uL    Hemoglobin 14.6 12.0 - 16.0 g/dL    Hematocrit 44.2 37.0 - 48.5 %    MCV 91 82 - 98 fL    MCH 29.9 27.0 - 31.0 pg    MCHC 33.0 32.0 - 36.0 g/dL    RDW 12.9 11.5 - 14.5 %    Platelets 450 150 - 450 K/uL    MPV 9.8 9.2 - 12.9 fL    Immature Granulocytes 0.2 0.0 - 0.5 %    Gran # (ANC) 5.3 1.8 - 7.7 K/uL    Immature Grans (Abs) 0.02 0.00 - 0.04 K/uL    Lymph # 3.0 1.0 - 4.8 K/uL    Mono # 0.6 0.3 - 1.0 K/uL    Eos # 0.3 0.0 - 0.5 K/uL    Baso # 0.09 0.00 - 0.20 K/uL    nRBC 0 0 /100 WBC    Gran % 56.8 38.0 - 73.0 %    Lymph % 32.5 18.0 - 48.0 %    Mono % 6.4 4.0 - 15.0 %    Eosinophil % 3.1 0.0 - 8.0 %    Basophil % 1.0 0.0 - 1.9 %    Differential Method Automated    TSH   Result Value Ref Range    TSH 0.738 0.400 - 4.000 uIU/mL     *Note: Due to a large number of results and/or encounters for the requested time period, some results have not been displayed. A complete set of results can be found in Results Review.         Assessment:       1. Essential hypertension    2. Anxiety                    Plan:       Essential hypertension    Anxiety          F/u with nephrology as planned  continue Ativan BID, wellbutrin  atenolol 25mg daily  Overall stable  cotninue aspirin 81mg daily  Continue other present meds  Counseled on regular exercise, maintenance of a healthy weight, balanced diet rich in fruits/vegetables and lean protein, and avoidance of unhealthy habits like smoking and excessive alcohol intake.  F/u 5 months as  planned

## 2023-09-19 ENCOUNTER — CLINICAL SUPPORT (OUTPATIENT)
Dept: REHABILITATION | Facility: HOSPITAL | Age: 73
End: 2023-09-19
Payer: MEDICARE

## 2023-09-19 DIAGNOSIS — R26.9 GAIT DIFFICULTY: ICD-10-CM

## 2023-09-19 DIAGNOSIS — R29.898 WEAKNESS OF BOTH HIPS: Primary | ICD-10-CM

## 2023-09-19 PROCEDURE — 97140 MANUAL THERAPY 1/> REGIONS: CPT | Mod: PO

## 2023-09-19 PROCEDURE — 97110 THERAPEUTIC EXERCISES: CPT | Mod: PO

## 2023-09-19 NOTE — PROGRESS NOTES
[facility]   Physical Therapy Treatment Note      Name: Lucinda Aguilera  Clinic Number: [id number]    Therapy Diagnosis:   Encounter Diagnoses   Name Primary?    Weakness of both hips Yes    Gait difficulty          Physician:     Visit Date: 9/19/2023  Physician Orders: PT Eval and Treat   Post Surgical? No   Eval and Treat Yes   Type of Therapy Outpatient Therapy   Location: Hip      Medical Diagnosis from Referral:   M70.61 (ICD-10-CM) - Greater trochanteric bursitis of right hip   M76.891 (ICD-10-CM) - Hip flexor tendinitis, right      Evaluation Date: 8/1/2023  Authorization Period Expiration: 7/29/2024  Plan of Care Expiration: 9/29/2023  Progress Note Due: 8/31/2023  Visit # / Visits authorized: 10 / 20   FOTO: 2/ 3 (9/19/2023)      PTA Visit #: 0/5     Precautions: HTN,       Time In: 7:47 am  Time Out: 8:40 am  Total Billable Time: 53 minutes  TE 3, MT 1    Subjective     Pt reports: that she is w/o c/o hip or back pn today. She feels that the injections was beneficial. She does report B anterior tib pn today.   She was compliant with home exercise program.  Response to previous treatment: mild pain with exercise and worse after exercise  Functional change: none    Pain: 0-1/10 .   Location: B hips    Objective      9/19/23 : reassessment and foto:   Foto improved from 26% on eval to 65% today    Trunk flexion 100 deg and lumbar extension 15 deg.     Hip Range of Motion:    Left active Right active    Flexion 109 110   Abduction 44 46   Extension 10 8   Ext. Rotation 34 35   Int. Rotation 25 26      Lower Extremity Strength  Right LE   Left LE     Knee extension: 5/5 Knee extension: 4+/5   Knee flexion: 4+/5 Knee flexion: 4+/5   Hip flexion: 4/5 Hip flexion: 4/5   Hip Internal Rotation:  NT    Hip Internal Rotation: NT      Hip External Rotation: NT    Hip External Rotation: NT      Hip extension:  4-5 Hip extension: 4-/5   Hip abduction: 4-/5 Hip abduction: 4/5   Hip adduction:  "4/5 Hip adduction 4/5   Ankle dorsiflexion: 4+/5 Ankle dorsiflexion: 4+/5   Ankle plantarflexion: 4+/5 Ankle plantarflexion:        Treatment     Lucinda received the treatments listed below:      therapeutic exercises to develop strength, endurance, ROM, flexibility, posture, and core stabilization for 38 minutes including:  Reassessment on 9/19/23 inluded    Nu-step x 6 min L2.       LTR stretch 3 x 30 secs  Bent knee fall out/single 2 x10  yellow t-band      Bridges with abd BTB 3 x 10  Supine clams BTB 3 x 10      Hip add/frog stretch 3 x 30 secs  Trunk rotation stretch 3 x 30 secs    Supine heel slides with use of sliders x 20   Supine HS stretch with strap 3 x 30 sec  Supine ITB stretch with strap 3x 30 sec      Piriformis hook stretch 3 x 30" each  Seated HS stretch 2 x 20 secs NP  Supine frog stretch unilaterally 3 x 30"  DOUBLE KNEE TO CHEST with green t-ball 20x  .    Gait 3 x 50' side stepping VCs for form x 5 min      manual therapy techniques: Manual traction, Myofacial release, and Soft tissue Mobilization were applied to the: iliacus, psoas, QL, TFL, glut medius and adductor nelson for 10 minutes, including:  Short axis traction 3x in session  Long axis traction  Lateral hip traction in hooklying    neuromuscular re-education activities to improve: Sense, Proprioception, and Posture for 5 minutes. The following activities were included:  Marching on trampoline 2 min x 2  PPT x20   Adduction ball squeeze + PPT x 20 hold 2 secs  LOWER TRUNK ROTATION x 3 min medium amplitude with yellow ball.    therapeutic activities to improve functional performance for   minutes, including:      gait training to improve functional mobility and safety for   minutes, including:      hot pack for 0 minutes to .    cold pack for 0 minutes to .    Patient Education and Home Exercises       Education provided:   - HEP  - Pt/family was provided educational information, including: role of PT, role of pt/caregiver, goals for " "PT, POC, scheduling, and attendance policy.- pt verbalized understanding.      Written Home Exercises Provided: yes. Exercises were reviewed and Lucinda was able to demonstrate them prior to the end of the session.  Lucinda demonstrated good  understanding of the education provided. See EMR under Patient Instructions for exercises provided during therapy sessions.       Assessment     Lucinda grossly increased AROM overall with pain well controlled with return to work and recent vacation to [place] and [place] for increased  walking and prolonged sitting without issues. Pt with continues to progress with strength gain at hips and knees. Pt achieved 4 of 9 set goals and progressing overall.     Lucinda Is progressing well towards her goals.   Pt prognosis is Good.     Pt will continue to benefit from skilled outpatient physical therapy to address the deficits listed in the problem list box on initial evaluation, provide pt/family education and to maximize pt's level of independence in the home and community environment.     Pt's spiritual, cultural and educational needs considered and pt agreeable to plan of care and goals.     Anticipated barriers to physical therapy: chronic nature and acute flareups    Goals: Short Term Goals: 4 weeks   -Lumbar AROM extension improved to 15 deg to demo improving mobility. MET 9/19/23  -Bilateral LE strength as tested 3/5 or better for improving stability and gait. MET 9/19/23  -NARROW BASE OF SUPPORT on foam x 30 " with minimum or no sway.  -Improved TUG to 12 s without AD for improving balance and gait.  -FOTO FS score improved to 30 for improving QOL. MET 9/19/23     Long Term Goals: 8 weeks   -NARROW BASE OF SUPPORT with EYES CLOSED on foam x 30" with minimum sway  -Improved TUG to 10 s without AD for improving balance and gait.  -Tandem balance on floor (right leg back) 30" for improving balance.  -FOTO FS score improved to 35 for improving QOL. MET 9/19/23    Plan     Cont " with POC    Nora Costa, PT

## 2023-09-20 ENCOUNTER — PATIENT MESSAGE (OUTPATIENT)
Dept: ADMINISTRATIVE | Facility: OTHER | Age: 73
End: 2023-09-20
Payer: MEDICARE

## 2023-09-21 ENCOUNTER — CLINICAL SUPPORT (OUTPATIENT)
Dept: REHABILITATION | Facility: HOSPITAL | Age: 73
End: 2023-09-21
Payer: MEDICARE

## 2023-09-21 DIAGNOSIS — R29.898 WEAKNESS OF BOTH HIPS: ICD-10-CM

## 2023-09-21 DIAGNOSIS — G89.29 CHRONIC BILATERAL LOW BACK PAIN WITHOUT SCIATICA: Primary | ICD-10-CM

## 2023-09-21 DIAGNOSIS — M54.50 CHRONIC BILATERAL LOW BACK PAIN WITHOUT SCIATICA: Primary | ICD-10-CM

## 2023-09-21 PROCEDURE — 97140 MANUAL THERAPY 1/> REGIONS: CPT | Mod: PO

## 2023-09-21 PROCEDURE — 97010 HOT OR COLD PACKS THERAPY: CPT | Mod: PO

## 2023-09-21 PROCEDURE — 97112 NEUROMUSCULAR REEDUCATION: CPT | Mod: PO

## 2023-09-21 PROCEDURE — 97110 THERAPEUTIC EXERCISES: CPT | Mod: PO

## 2023-09-21 NOTE — PROGRESS NOTES
[facility]   Physical Therapy Treatment Note      Name: Lucinda Aguilera  Clinic Number: [id number]    Therapy Diagnosis:   Encounter Diagnoses   Name Primary?    Chronic bilateral low back pain without sciatica Yes    Weakness of both hips            Physician:     Visit Date: 9/21/2023  Physician Orders: PT Eval and Treat   Post Surgical? No   Eval and Treat Yes   Type of Therapy Outpatient Therapy   Location: Hip      Medical Diagnosis from Referral:   M70.61 (ICD-10-CM) - Greater trochanteric bursitis of right hip   M76.891 (ICD-10-CM) - Hip flexor tendinitis, right      Evaluation Date: 8/1/2023  Authorization Period Expiration: 7/29/2024  Plan of Care Expiration: 9/29/2023  Progress Note Due: 8/31/2023  Visit # / Visits authorized: 10 / 20   FOTO: 2/ 3 (9/19/2023)      PTA Visit #: 0/5     Precautions: HTN,       Time In: 7:47 am  Time Out: 8:42 am  Total Billable Time: 55 minutes  TE 2, MT 1 NMR 1 + 10 min MHP after session.    Subjective     Pt reports: that she is w/o c/o hip or back for days but overdid standing so R hip increased today.   She was compliant with home exercise program.  Response to previous treatment: mild pain with exercise and worse after exercise  Functional change: none    Pain: 5-6/10  pre session and 0/10 post session.  Location: B hips    Objective      9/19/23 : reassessment and foto:   Foto improved from 26% on eval to 65% today    Trunk flexion 100 deg and lumbar extension 15 deg.     Hip Range of Motion:    Left active Right active    Flexion 109 110   Abduction 44 46   Extension 10 8   Ext. Rotation 34 35   Int. Rotation 25 26      Lower Extremity Strength  Right LE   Left LE     Knee extension: 5/5 Knee extension: 4+/5   Knee flexion: 4+/5 Knee flexion: 4+/5   Hip flexion: 4/5 Hip flexion: 4/5   Hip Internal Rotation:  NT    Hip Internal Rotation: NT      Hip External Rotation: NT    Hip External Rotation: NT      Hip extension:  4-5 Hip extension: 4-/5  "  Hip abduction: 4-/5 Hip abduction: 4/5   Hip adduction: 4/5 Hip adduction 4/5   Ankle dorsiflexion: 4+/5 Ankle dorsiflexion: 4+/5   Ankle plantarflexion: 4+/5 Ankle plantarflexion:        Treatment     Lucinda received the treatments listed below:      therapeutic exercises to develop strength, endurance, ROM, flexibility, posture, and core stabilization for 33  minutes including:      Nu-step x 6 min L2.       LTR stretch 3 x 30 secs  Bent knee fall out/single 2 x10  yellow t-band      Bridges with abd BTB 3 x 10  Supine clams BTB 3 x 10      Hip add/frog stretch 3 x 30 secs  Trunk rotation stretch 3 x 30 secs    Supine heel slides with use of sliders x 20   Supine HS stretch with strap 3 x 30 sec  Supine ITB stretch with strap 3x 30 sec      Piriformis hook stretch 3 x 30" each  Seated HS stretch 2 x 20 secs   Supine frog stretch unilaterally 3 x 30"  DOUBLE KNEE TO CHEST with green t-ball 20x  .    Gait 3 x 50' side stepping VCs for form x 5 min      manual therapy techniques: Manual traction, Myofacial release, and Soft tissue Mobilization were applied to the: iliacus, psoas, QL, TFL, glut medius and adductor nelson for 10 minutes, including:  Short axis traction 3x in session  Long axis traction  Lateral hip traction in hooklying    neuromuscular re-education activities to improve: Sense, Proprioception, and Posture for 12 minutes. The following activities were included:  Marching on trampoline vs blue foam 2 min x 2  PPT x20   Adduction ball squeeze + PPT x 20 hold 2 secs  LOWER TRUNK ROTATION x 3 min medium amplitude with yellow ball.  Supine marching x 20 + PPT    therapeutic activities to improve functional performance for   minutes, including:      gait training to improve functional mobility and safety for   minutes, including:      hot pack for 10 minutes to  R hip after session.     cold pack for 0 minutes to .    Patient Education and Home Exercises       Education provided:   - HEP  - Pt/family was " "provided educational information, including: role of PT, role of pt/caregiver, goals for PT, POC, scheduling, and attendance policy.- pt verbalized understanding.      Written Home Exercises Provided: yes. Exercises were reviewed and Lucinda was able to demonstrate them prior to the end of the session.  Lucinda demonstrated good  understanding of the education provided. See EMR under Patient Instructions for exercises provided during therapy sessions.       Assessment     Lucinda with good tolerance and workload today without issues. Pt with good relief of R hip pain in session and limited by prolonged standing and walking so increased emphasis on standing activities as tolerated and then transitioned back to supine exercise and endurance on Nu step. Pt with continued increased upright posture with neurobalance activities for control in reciprocal activities with postural stability.    Lucinda Is progressing well towards her goals.   Pt prognosis is Good.     Pt will continue to benefit from skilled outpatient physical therapy to address the deficits listed in the problem list box on initial evaluation, provide pt/family education and to maximize pt's level of independence in the home and community environment.     Pt's spiritual, cultural and educational needs considered and pt agreeable to plan of care and goals.     Anticipated barriers to physical therapy: chronic nature and acute flareups    Goals: Short Term Goals: 4 weeks   -Lumbar AROM extension improved to 15 deg to demo improving mobility. MET 9/19/23  -Bilateral LE strength as tested 3/5 or better for improving stability and gait. MET 9/19/23  -NARROW BASE OF SUPPORT on foam x 30 " with minimum or no sway.  -Improved TUG to 12 s without AD for improving balance and gait.  -FOTO FS score improved to 30 for improving QOL. MET 9/19/23     Long Term Goals: 8 weeks   -NARROW BASE OF SUPPORT with EYES CLOSED on foam x 30" with minimum sway  -Improved TUG to 10 " "s without AD for improving balance and gait.  -Tandem balance on floor (right leg back) 30" for improving balance.  -FOTO FS score improved to 35 for improving QOL. MET 9/19/23    Plan     Cont with POC    Nora Costa PT     " Assistance with ambulation/Assistance OOB with selected safe patient handling equipment/Communicate Risk of Fall with Harm to all staff/Discuss with provider need for PT consult/Monitor for mental status changes/Monitor gait and stability/Reinforce activity limits and safety measures with patient and family/Tailored Fall Risk Interventions/Toileting schedule using arm’s reach rule for commode and bathroom/Use of alarms - bed, chair and/or voice tab/Visual Cue: Yellow wristband and red socks/Bed in lowest position, wheels locked, appropriate side rails in place/Call bell, personal items and telephone in reach/Instruct patient to call for assistance before getting out of bed or chair/Non-slip footwear when patient is out of bed/[place] to call system/Physically safe environment - no spills, clutter or unnecessary equipment/Purposeful Proactive Rounding/Room/bathroom lighting operational, light cord in reach

## 2023-09-24 DIAGNOSIS — F41.9 ANXIETY: ICD-10-CM

## 2023-09-24 RX ORDER — LORAZEPAM 0.5 MG/1
TABLET ORAL
Qty: 60 TABLET | Refills: 5 | Status: SHIPPED | OUTPATIENT
Start: 2023-09-24

## 2023-09-24 NOTE — TELEPHONE ENCOUNTER
No care due was identified.  [facility] Embedded Care Due Messages. Reference number: [id number].   9/24/2023 9:44:49 AM CDT

## 2023-09-27 ENCOUNTER — PATIENT MESSAGE (OUTPATIENT)
Dept: FAMILY MEDICINE | Facility: CLINIC | Age: 73
End: 2023-09-27
Payer: MEDICARE

## 2023-09-27 DIAGNOSIS — R05.9 COUGH, UNSPECIFIED TYPE: ICD-10-CM

## 2023-09-28 ENCOUNTER — PATIENT MESSAGE (OUTPATIENT)
Dept: FAMILY MEDICINE | Facility: CLINIC | Age: 73
End: 2023-09-28
Payer: MEDICARE

## 2023-09-28 RX ORDER — PROMETHAZINE HYDROCHLORIDE AND DEXTROMETHORPHAN HYDROBROMIDE 6.25; 15 MG/5ML; MG/5ML
5 SYRUP ORAL 3 TIMES DAILY PRN
Qty: 240 ML | Refills: 0 | Status: SHIPPED | OUTPATIENT
Start: 2023-09-28 | End: 2023-10-08

## 2023-09-28 RX ORDER — BENZONATATE 200 MG/1
200 CAPSULE ORAL 3 TIMES DAILY PRN
Qty: 30 CAPSULE | Refills: 0 | Status: SHIPPED | OUTPATIENT
Start: 2023-09-28 | End: 2023-10-08

## 2023-09-30 ENCOUNTER — EXTERNAL CHRONIC CARE MANAGEMENT (OUTPATIENT)
Dept: PRIMARY CARE CLINIC | Facility: CLINIC | Age: 73
End: 2023-09-30
Payer: MEDICARE

## 2023-09-30 PROCEDURE — 99490 CHRNC CARE MGMT STAFF 1ST 20: CPT | Mod: S$PBB,,, | Performed by: FAMILY MEDICINE

## 2023-09-30 PROCEDURE — 99490 CHRNC CARE MGMT STAFF 1ST 20: CPT | Mod: PBBFAC,PO | Performed by: FAMILY MEDICINE

## 2023-09-30 PROCEDURE — 99490 PR CHRONIC CARE MGMT, 1ST 20 MIN: ICD-10-PCS | Mod: S$PBB,,, | Performed by: FAMILY MEDICINE

## 2023-10-03 ENCOUNTER — PATIENT MESSAGE (OUTPATIENT)
Dept: ORTHOPEDICS | Facility: CLINIC | Age: 73
End: 2023-10-03
Payer: MEDICARE

## 2023-10-03 ENCOUNTER — PATIENT MESSAGE (OUTPATIENT)
Dept: FAMILY MEDICINE | Facility: CLINIC | Age: 73
End: 2023-10-03
Payer: MEDICARE

## 2023-10-03 ENCOUNTER — CLINICAL SUPPORT (OUTPATIENT)
Dept: REHABILITATION | Facility: HOSPITAL | Age: 73
End: 2023-10-03
Payer: MEDICARE

## 2023-10-03 ENCOUNTER — LAB VISIT (OUTPATIENT)
Dept: LAB | Facility: HOSPITAL | Age: 73
End: 2023-10-03
Payer: MEDICARE

## 2023-10-03 DIAGNOSIS — E87.1 HYPONATREMIA: ICD-10-CM

## 2023-10-03 DIAGNOSIS — M25.551 PAIN OF RIGHT HIP: Primary | ICD-10-CM

## 2023-10-03 LAB
ALBUMIN SERPL BCP-MCNC: 3.7 G/DL (ref 3.5–5.2)
ANION GAP SERPL CALC-SCNC: 4 MMOL/L (ref 8–16)
BUN SERPL-MCNC: 9 MG/DL (ref 8–23)
CALCIUM SERPL-MCNC: 9.2 MG/DL (ref 8.7–10.5)
CHLORIDE SERPL-SCNC: 105 MMOL/L (ref 95–110)
CO2 SERPL-SCNC: 29 MMOL/L (ref 23–29)
CREAT SERPL-MCNC: 0.7 MG/DL (ref 0.5–1.4)
EST. GFR  (NO RACE VARIABLE): >60 ML/MIN/1.73 M^2
GLUCOSE SERPL-MCNC: 69 MG/DL (ref 70–110)
PHOSPHATE SERPL-MCNC: 3 MG/DL (ref 2.7–4.5)
POTASSIUM SERPL-SCNC: 4 MMOL/L (ref 3.5–5.1)
SODIUM SERPL-SCNC: 138 MMOL/L (ref 136–145)

## 2023-10-03 PROCEDURE — 97010 HOT OR COLD PACKS THERAPY: CPT | Mod: PO

## 2023-10-03 PROCEDURE — 97110 THERAPEUTIC EXERCISES: CPT | Mod: PO

## 2023-10-03 PROCEDURE — 36415 COLL VENOUS BLD VENIPUNCTURE: CPT | Mod: PO | Performed by: INTERNAL MEDICINE

## 2023-10-03 PROCEDURE — 97140 MANUAL THERAPY 1/> REGIONS: CPT | Mod: PO

## 2023-10-03 PROCEDURE — 80069 RENAL FUNCTION PANEL: CPT | Performed by: INTERNAL MEDICINE

## 2023-10-03 NOTE — PROGRESS NOTES
[facility]   Physical Therapy Treatment Note      Name: Lucinda Aguilera  Clinic Number: [id number]    Therapy Diagnosis:   Encounter Diagnosis   Name Primary?    Pain of right hip Yes             Physician:     Visit Date: 10/3/2023  Physician Orders: PT Eval and Treat   Post Surgical? No   Eval and Treat Yes   Type of Therapy Outpatient Therapy   Location: Hip      Medical Diagnosis from Referral:   M70.61 (ICD-10-CM) - Greater trochanteric bursitis of right hip   M76.891 (ICD-10-CM) - Hip flexor tendinitis, right      Evaluation Date: 8/1/2023  Authorization Period Expiration: 7/29/2024  Plan of Care Expiration: 9/29/2023 extend POC 11/30/23  Progress Note Due: 1019/2023  Visit # / Visits authorized: 10 / 20   FOTO: 2/ 3 (9/19/2023)      PTA Visit #: 0/5     Precautions: HTN,       Time In: 1042 am  Time Out: 1130 am  Total Billable Time:  38 minutes  TE 2, MT 1 + 10 min MHP after session.    Subjective     Pt reports:  She was compliant with home exercise program.  Response to previous treatment: mild pain with exercise and worse after exercise  Functional change: none    Pain: 8/10  pre session and 0/10 post session.  Location: B hips    Objective      9/19/23 : reassessment and foto:   Foto improved from 26% on eval to 65% today    Trunk flexion 100 deg and lumbar extension 15 deg.     Hip Range of Motion:    Left active Right active    Flexion 109 110   Abduction 44 46   Extension 10 8   Ext. Rotation 34 35   Int. Rotation 25 26      Lower Extremity Strength  Right LE   Left LE     Knee extension: 5/5 Knee extension: 4+/5   Knee flexion: 4+/5 Knee flexion: 4+/5   Hip flexion: 4/5 Hip flexion: 4/5   Hip Internal Rotation:  NT    Hip Internal Rotation: NT      Hip External Rotation: NT    Hip External Rotation: NT      Hip extension:  4-5 Hip extension: 4-/5   Hip abduction: 4-/5 Hip abduction: 4/5   Hip adduction: 4/5 Hip adduction 4/5   Ankle dorsiflexion: 4+/5 Ankle dorsiflexion:  "4+/5   Ankle plantarflexion: 4+/5 Ankle plantarflexion:        Treatment     Lucinda received the treatments listed below:      therapeutic exercises to develop strength, endurance, ROM, flexibility, posture, and core stabilization for 23  minutes including:      Nu-step x 6 min L2.       LTR stretch 3 x 30 secs  Bent knee fall out/single 2 x10  yellow t-band      Bridges with abd BTB 3 x 10    3 rounds of this today: with manual therapy in between each round  Supine clams BTB 3 x 10      Hip add/frog stretch 3 x 30 secs  Trunk rotation stretch 3 x 30 secs  Elias test stretch x 1 min   Gait 100'    Supine heel slides with use of sliders x 20   Supine HS stretch with strap 3 x 30 sec  Supine ITB stretch with strap 3x 30 sec      Piriformis hook stretch 3 x 30" each  Seated HS stretch 2 x 20 secs   Supine frog stretch unilaterally 3 x 30"  DOUBLE KNEE TO CHEST with green t-ball 20x  .    Gait 3 x 50' side stepping VCs for form x 5 min      manual therapy techniques: Manual traction, Myofacial release, and Soft tissue Mobilization were applied to the: iliacus, psoas, QL, TFL, glut medius and adductor nelson for 15 minutes, including:  Short axis traction 3x in session  Long axis traction  Lateral hip traction in hooklying    neuromuscular re-education activities to improve: Sense, Proprioception, and Posture for 0 minutes. The following activities were included:  Marching on trampoline vs blue foam 2 min x 2  PPT x20   Adduction ball squeeze + PPT x 20 hold 2 secs  LOWER TRUNK ROTATION x 3 min medium amplitude with yellow ball.  Supine marching x 20 + PPT    therapeutic activities to improve functional performance for   minutes, including:      gait training to improve functional mobility and safety for   minutes, including:      hot pack for 10 minutes to  R adductor after session.     cold pack for 0 minutes to .    Patient Education and Home Exercises       Education provided:   - HEP  - Pt/family was provided " "educational information, including: role of PT, role of pt/caregiver, goals for PT, POC, scheduling, and attendance policy.- pt verbalized understanding.      Written Home Exercises Provided: yes. Exercises were reviewed and Lucinda was able to demonstrate them prior to the end of the session.  Lucinda demonstrated good  understanding of the education provided. See EMR under Patient Instructions for exercises provided during therapy sessions.       Assessment     Lucinda with overuse with 4 consecutive days of work. Pt education with activity tolerance and pt has good awareness and management in session and good relief with session. Pt with overuse with repetitive sit to stand and sitting and 4 full days of work adductors guarding on R.  Pt achieved 4 of 9 set goals and progress ing with all set goals with 11 of 20 visits complete. PT to extend POC to complete pre-approved visits and complete POC for 20 visits total.     Lucinda Is progressing well towards her goals.   Pt prognosis is Good.     Pt will continue to benefit from skilled outpatient physical therapy to address the deficits listed in the problem list box on initial evaluation, provide pt/family education and to maximize pt's level of independence in the home and community environment.     Pt's spiritual, cultural and educational needs considered and pt agreeable to plan of care and goals.     Anticipated barriers to physical therapy: chronic nature and acute flareups    Goals: Short Term Goals: 4 weeks   -Lumbar AROM extension improved to 15 deg to demo improving mobility. MET 9/19/23  -Bilateral LE strength as tested 3/5 or better for improving stability and gait. MET 9/19/23  -NARROW BASE OF SUPPORT on foam x 30 " with minimum or no sway.  -Improved TUG to 12 s without AD for improving balance and gait.  -FOTO FS score improved to 30 for improving QOL. MET 9/19/23     Long Term Goals: 8 weeks   -NARROW BASE OF SUPPORT with EYES CLOSED on foam x 30" " "with minimum sway  -Improved TUG to 10 s without AD for improving balance and gait.  -Tandem balance on floor (right leg back) 30" for improving balance.  -FOTO FS score improved to 35 for improving QOL. MET 9/19/23    Plan     Cont with POC extend POC to 11/30/23 2 x per week 5 weeks for remaining visits.     Nora Costa, PT     "

## 2023-10-05 ENCOUNTER — PATIENT MESSAGE (OUTPATIENT)
Dept: FAMILY MEDICINE | Facility: CLINIC | Age: 73
End: 2023-10-05
Payer: MEDICARE

## 2023-10-05 NOTE — TELEPHONE ENCOUNTER
Atenolol 25mg  Lisinopril 40mg bid  Lasix as needed      Directed to nurse visit.   Forwarding as review to confirm with provider no further action needed.

## 2023-10-06 ENCOUNTER — PATIENT MESSAGE (OUTPATIENT)
Dept: ORTHOPEDICS | Facility: CLINIC | Age: 73
End: 2023-10-06
Payer: MEDICARE

## 2023-10-10 ENCOUNTER — OFFICE VISIT (OUTPATIENT)
Dept: ORTHOPEDICS | Facility: CLINIC | Age: 73
End: 2023-10-10
Payer: MEDICARE

## 2023-10-10 VITALS — BODY MASS INDEX: 29.88 KG/M2 | HEIGHT: 63 IN | WEIGHT: 168.63 LBS

## 2023-10-10 DIAGNOSIS — M70.61 GREATER TROCHANTERIC BURSITIS OF RIGHT HIP: Primary | ICD-10-CM

## 2023-10-10 PROCEDURE — 20610 DRAIN/INJ JOINT/BURSA W/O US: CPT | Mod: PBBFAC,PO | Performed by: NURSE PRACTITIONER

## 2023-10-10 PROCEDURE — 99213 OFFICE O/P EST LOW 20 MIN: CPT | Mod: S$PBB,25,, | Performed by: NURSE PRACTITIONER

## 2023-10-10 PROCEDURE — 99999 PR PBB SHADOW E&M-EST. PATIENT-LVL III: ICD-10-PCS | Mod: PBBFAC,,, | Performed by: NURSE PRACTITIONER

## 2023-10-10 PROCEDURE — 99999 PR PBB SHADOW E&M-EST. PATIENT-LVL III: CPT | Mod: PBBFAC,,, | Performed by: NURSE PRACTITIONER

## 2023-10-10 PROCEDURE — 99999PBSHW PR PBB SHADOW TECHNICAL ONLY FILED TO HB: ICD-10-PCS | Mod: PBBFAC,,,

## 2023-10-10 PROCEDURE — 99213 OFFICE O/P EST LOW 20 MIN: CPT | Mod: PBBFAC,PO,25 | Performed by: NURSE PRACTITIONER

## 2023-10-10 PROCEDURE — 99213 PR OFFICE/OUTPT VISIT, EST, LEVL III, 20-29 MIN: ICD-10-PCS | Mod: S$PBB,25,, | Performed by: NURSE PRACTITIONER

## 2023-10-10 PROCEDURE — 20610 LARGE JOINT ASPIRATION/INJECTION: R GREATER TROCHANTERIC BURSA: ICD-10-PCS | Mod: S$PBB,RT,, | Performed by: NURSE PRACTITIONER

## 2023-10-10 PROCEDURE — 99999PBSHW PR PBB SHADOW TECHNICAL ONLY FILED TO HB: Mod: PBBFAC,,,

## 2023-10-10 PROCEDURE — 20610 DRAIN/INJ JOINT/BURSA W/O US: CPT | Mod: S$PBB,RT,, | Performed by: NURSE PRACTITIONER

## 2023-10-10 RX ORDER — TRIAMCINOLONE ACETONIDE 40 MG/ML
40 INJECTION, SUSPENSION INTRA-ARTICULAR; INTRAMUSCULAR
Status: DISCONTINUED | OUTPATIENT
Start: 2023-10-10 | End: 2023-10-11 | Stop reason: HOSPADM

## 2023-10-10 RX ADMIN — TRIAMCINOLONE ACETONIDE 40 MG: 40 INJECTION, SUSPENSION INTRA-ARTICULAR; INTRAMUSCULAR at 02:10

## 2023-10-10 NOTE — PROGRESS NOTES
"  Chief Complaint   Patient presents with    Right Hip - Pain      HPI:   This is a 72 y.o. who presents to clinic today for right hip pain for the past 3 weeks after no known trauma. Complains of pain while sleeping on side and when descending stairs. Pain is dull and deep. No numbness or tingling. No associated signs or symptoms.      Past Medical History:   Diagnosis Date    Anticoagulant long-term use     Anxiety     takes daily Xanax    Arthritis     right thumb    Cataract     OU    Cholelithiasis     GERD (gastroesophageal reflux disease)     Hepatic steatosis     Hyperlipemia     Hypertension     PVD (posterior vitreous detachment)     OD     Past Surgical History:   Procedure Laterality Date    BREAST BIOPSY Left 08/28/2020    stereo biopsy    BREAST BIOPSY Left 10/07/2020    benign excisional biopsy    chest abcess      child    COLONOSCOPY  01/06/2012    Dr. Lopez: hemorrhoids, redundant colon    COLONOSCOPY N/A 02/17/2021    Dr. Lopez: Congested and erythematous mucosa in the rectum, in the recto-sigmoid colon and in the sigmoid colon, proctosigmoid colitis, Redundant colon; biopsy: focal active colitis "nonspecific but can be seen with acute self-limited colitis (infection), medication effect (e.g. NSAID use), proximity to diverticula, or early/evolving IBD    ESOPHAGOGASTRODUODENOSCOPY N/A 06/06/2019    Dr. Lopez: small hiatal hernia, Non-erosive esophageal reflux (NERD) disease?., slight antritis; biopsy: Antral mucosa with chemical/reactive gastropathy. negative for h pylori    ESOPHAGOGASTRODUODENOSCOPY N/A 02/17/2021    Procedure: EGD (ESOPHAGOGASTRODUODENOSCOPY);  Surgeon: Nathan Lopez Jr., MD;  Location: [facility];  Service: Endoscopy;  Laterality: N/A; Minimal gastritis; biopsy: stomach- negative for h pylori, active chronic gastritis with focal features of erosive gastritis, duodenum WNL    ESOPHAGOGASTRODUODENOSCOPY N/A 05/10/2022    Procedure: EGD (ESOPHAGOGASTRODUODENOSCOPY);  Surgeon: " Nathan Lopez Jr., MD;  Location: [facility] ENDO;  Service: Endoscopy;  Laterality: N/A;    EXCISIONAL BIOPSY Left 10/07/2020    Procedure: EXCISIONAL BIOPSY-Left with radiological marker;  Surgeon: Brooklynn Ashford MD;  Location: [facility];  Service: General;  Laterality: Left;    FRACTURE SURGERY  2010    Left thumb repaired surgically    JOINT REPLACEMENT Bilateral     knee    KNEE ARTHROSCOPY W/ LASER      right    LAPAROSCOPIC CHOLECYSTECTOMY N/A 06/14/2019    Procedure: CHOLECYSTECTOMY, LAPAROSCOPIC;  Surgeon: Guevara Evans MD;  Location: [facility];  Service: General;  Laterality: N/A;    thumb surgery  11/2014    TOTAL KNEE ARTHROPLASTY Left 06/19/2018    Procedure: REPLACEMENT-KNEE-TOTAL;  Surgeon: Nj Melvin MD;  Location: [address];  Service: Orthopedics;  Laterality: Left;  POTATOSOFT    UPPER GASTROINTESTINAL ENDOSCOPY  07/13/2017    Dr. Lopez: NERD, Gastric mucosal atrophy. antritis, Scar in the incisura. Biopsied; biopsy: chronic gastritis. negative for h pylori     Current Outpatient Medications on File Prior to Visit   Medication Sig Dispense Refill    acetaminophen (TYLENOL) 500 MG tablet Take 500 mg by mouth every 6 (six) hours as needed for Pain.      ascorbic acid, vitamin C, (VITAMIN C) 250 MG tablet Take 500 mg by mouth once daily.       aspirin (ECOTRIN) 81 MG EC tablet Take 81 mg by mouth every evening.      atenoloL (TENORMIN) 25 MG tablet Take 1 tablet (25 mg total) by mouth every evening. 90 tablet 3    balsalazide (COLAZAL) 750 mg capsule TAKE 3 CAPSULES(2250 MG) BY MOUTH TWICE DAILY WITH MEALS 180 capsule 11    buPROPion (WELLBUTRIN XL) 150 MG TB24 tablet Take 1 tablet (150 mg total) by mouth once daily. 90 tablet 3    docusate sodium (COLACE) 100 MG capsule Take 100 mg by mouth 2 (two) times daily.      famotidine (PEPCID) 40 MG tablet Take 1 tablet (40 mg total) by mouth nightly. 90 tablet 3    furosemide (LASIX) 20 MG tablet Take 1 tablet (20 mg total) by mouth daily as needed  (edema). 2 pm on an empty stomach if swollen. 90 tablet 3    lisinopriL (PRINIVIL,ZESTRIL) 40 MG tablet Take 1 tablet (40 mg total) by mouth 2 (two) times a day. 180 tablet 3    LORazepam (ATIVAN) 0.5 MG tablet TAKE 1 TABLET(0.5 MG) BY MOUTH TWICE DAILY 60 tablet 5    magnesium oxide (MAG-OX) 400 mg (241.3 mg magnesium) tablet Take 1 tablet (400 mg total) by mouth once daily.  0    multivitamin (THERAGRAN) per tablet Take 1 tablet by mouth once daily.      niacin 500 MG CpSR Take 500 mg by mouth every evening.      ondansetron (ZOFRAN-ODT) 4 MG TbDL Take 1 tablet (4 mg total) by mouth every 6 (six) hours as needed (nausea). 30 tablet 3    pantoprazole (PROTONIX) 40 MG tablet Take 1 tablet (40 mg total) by mouth 2 (two) times daily. 180 tablet 3    polyethylene glycol (GLYCOLAX) 17 gram/dose powder Take 17 g by mouth every other day.      pravastatin (PRAVACHOL) 40 MG tablet Take 1 tablet (40 mg total) by mouth once daily. (Patient taking differently: Take 40 mg by mouth every evening.) 90 tablet 3    psyllium husk, aspartame, (NATURAL PSYLLIUM FIBER) 3.4 gram/5.8 gram Powd Take by mouth.       No current facility-administered medications on file prior to visit.     Review of patient's allergies indicates:  No Known Allergies  Family History   Problem Relation Age of Onset    Hypertension Mother     Heart disease Mother     Glaucoma Mother     Stroke Father     Glaucoma Sister     Cataracts Sister     Macular degeneration Sister     Breast cancer Neg Hx     Colon cancer Neg Hx     Crohn's disease Neg Hx     Ulcerative colitis Neg Hx     Stomach cancer Neg Hx     Esophageal cancer Neg Hx     Celiac disease Neg Hx     Amblyopia Neg Hx     Blindness Neg Hx     Retinal detachment Neg Hx     Strabismus Neg Hx      Social History     Socioeconomic History    Marital status:     Number of children: 2   Occupational History    Occupation: retired teacher and principal    Occupation: substitute   Tobacco Use    Smoking  status: Never    Smokeless tobacco: Never   Substance and Sexual Activity    Alcohol use: Yes     Comment: social- maybe once every few months    Drug use: No    Sexual activity: Yes     Partners: Male     Birth control/protection: Post-menopausal, None     Social Determinants of Health     Financial Resource Strain: Low Risk  (10/7/2023)    Overall Financial Resource Strain (CARDIA)     Difficulty of Paying Living Expenses: Not hard at all   Food Insecurity: No Food Insecurity (10/7/2023)    Hunger Vital Sign     Worried About Running Out of Food in the Last Year: Never true     Ran Out of Food in the Last Year: Never true   Transportation Needs: No Transportation Needs (10/7/2023)    PRAPARE - Transportation     Lack of Transportation (Medical): No     Lack of Transportation (Non-Medical): No   Physical Activity: Insufficiently Active (10/7/2023)    Exercise Vital Sign     Days of Exercise per Week: 5 days     Minutes of Exercise per Session: 10 min   Stress: No Stress Concern Present (10/7/2023)    [employer] - Occupational Stress Questionnaire     Feeling of Stress : Not at all   Social Connections: Unknown (10/7/2023)    Social Connection and Isolation Panel [NHANES]     Frequency of Communication with Friends and Family: More than three times a week     Frequency of Social Gatherings with Friends and Family: Once a week     Active Member of Clubs or Organizations: Yes     Attends Club or Organization Meetings: 1 to 4 times per year     Marital Status:    Housing Stability: Low Risk  (10/7/2023)    Housing Stability Vital Sign     Unable to Pay for Housing in the Last Year: No     Number of Places Lived in the Last Year: 1     Unstable Housing in the Last Year: No       Review of Systems:  Constitutional:  Denies fever or chills   Eyes:  Denies change in visual acuity   HENT:  Denies nasal congestion or sore throat   Respiratory:  Denies cough or shortness of breath    Cardiovascular:  Denies chest pain or edema   GI:  Denies abdominal pain, nausea, vomiting, bloody stools or diarrhea   :  Denies dysuria   Integument:  Denies rash   Neurologic:  Denies headache, focal weakness or sensory changes   Endocrine:  Denies polyuria or polydipsia   Lymphatic:  Denies swollen glands   Psychiatric:  Denies depression or anxiety     Physical Exam:   Constitutional:  Well developed, well nourished, no acute distress, non-toxic appearance   Integument:  Well hydrated, no rash   Lymphatic:  No lymphadenopathy noted   Neurologic:  Alert & oriented x 3  Psychiatric:  Speech and behavior appropriate     Bilateral Hip Exam    left Hip Exam   left hip exam performed same as contralateral hip and is normal.     right Hip Exam   Tenderness   The patient is experiencing tenderness in the greater trochanter.  Range of Motion   The patient has normal hip ROM.  Muscle Strength   Abduction: 4/5   Other   Erythema: absent  Sensation: normal  Pulse: present        Greater trochanteric bursitis of right hip  -     Large Joint Aspiration/Injection: R greater trochanteric bursa  -     triamcinolone acetonide injection 40 mg         Using an aseptic technique, I injected 5 cc of lidocaine 1% without and 1 cc of kenalog 40mg into the right hip. The patient tolerated this well. I will have them return to clinic as needed.

## 2023-10-10 NOTE — PROCEDURES
Large Joint Aspiration/Injection: R greater trochanteric bursa    Date/Time: 10/10/2023 2:40 PM    Performed by: Chandrika Santiago FNP  Authorized by: Chandrika Santiago FNP    Consent Done?:  Yes (Verbal)  Indications:  Pain  Timeout: prior to procedure the correct patient, procedure, and site was verified    Prep: patient was prepped and draped in usual sterile fashion      Local anesthesia used?: Yes    Local anesthetic:  Lidocaine 1% without epinephrine  Anesthetic total (ml):  5      Details:  Needle Size:  22 G  Approach:  Lateral  Location:  Hip  Site:  R greater trochanteric bursa  Medications:  40 mg triamcinolone acetonide 40 mg/mL  Patient tolerance:  Patient tolerated the procedure well with no immediate complications

## 2023-10-11 DIAGNOSIS — E78.5 HYPERLIPIDEMIA, UNSPECIFIED HYPERLIPIDEMIA TYPE: ICD-10-CM

## 2023-10-11 RX ORDER — PRAVASTATIN SODIUM 40 MG/1
40 TABLET ORAL
Qty: 90 TABLET | Refills: 3 | Status: SHIPPED | OUTPATIENT
Start: 2023-10-11 | End: 2023-11-20

## 2023-10-11 NOTE — TELEPHONE ENCOUNTER
No care due was identified.  [facility] Embedded Care Due Messages. Reference number: [id number].   10/11/2023 4:52:56 PM CDT

## 2023-10-11 NOTE — TELEPHONE ENCOUNTER
No care due was identified.  [facility] Embedded Care Due Messages. Reference number: [id number].   10/11/2023 3:53:08 PM CDT

## 2023-10-12 ENCOUNTER — CLINICAL SUPPORT (OUTPATIENT)
Dept: FAMILY MEDICINE | Facility: CLINIC | Age: 73
End: 2023-10-12
Payer: MEDICARE

## 2023-10-12 ENCOUNTER — TELEPHONE (OUTPATIENT)
Dept: FAMILY MEDICINE | Facility: CLINIC | Age: 73
End: 2023-10-12

## 2023-10-12 VITALS — HEART RATE: 70 BPM | SYSTOLIC BLOOD PRESSURE: 136 MMHG | OXYGEN SATURATION: 97 % | DIASTOLIC BLOOD PRESSURE: 84 MMHG

## 2023-10-12 DIAGNOSIS — I10 ESSENTIAL HYPERTENSION: Primary | ICD-10-CM

## 2023-10-12 PROCEDURE — 99999 PR PBB SHADOW E&M-EST. PATIENT-LVL III: ICD-10-PCS | Mod: PBBFAC,,,

## 2023-10-12 PROCEDURE — 99999 PR PBB SHADOW E&M-EST. PATIENT-LVL III: CPT | Mod: PBBFAC,,,

## 2023-10-12 PROCEDURE — 99213 OFFICE O/P EST LOW 20 MIN: CPT | Mod: PBBFAC,PO

## 2023-10-12 RX ORDER — PRAVASTATIN SODIUM 40 MG/1
40 TABLET ORAL DAILY
Qty: 90 TABLET | Refills: 3 | Status: SHIPPED | OUTPATIENT
Start: 2023-10-12 | End: 2024-01-04 | Stop reason: SDUPTHER

## 2023-10-12 NOTE — TELEPHONE ENCOUNTER
Patient here for BP check.  /90   Pulse 68    15 minutes later     /84  Pulse 70    Educated patient on when to take BP at home. She states that she has been getting high readings. 180s/80s.  Also discussed changing batteries her machine.    Verified and reviewed patient's medications with her. Patient was concerned about lisinopril 40 mg BID. She states 2 pills at night  and did not know if she needs to take one in the AM and one in the PM.    Spoke with Dr Hassan before patient departure and he states that she is not wrong in taking the medication that way but she can try to split the medication.      Informed patient that she can take one tablet in the AM and one in the PM - informed her to keep her BP log for 2 weeks and then let us know how it is doing. Patient verbalized understanding and states that she will follow up in 2 weeks.

## 2023-10-12 NOTE — TELEPHONE ENCOUNTER
Refill Decision Note   Lucinda Ale  is requesting a refill authorization.  Brief Assessment and Rationale for Refill:  Approve     Medication Therapy Plan:         Comments:     Note composed:8:45 PM 10/11/2023             Appointments     Last Visit   9/7/2023 Saurabh Hassan MD   Next Visit   2/21/2024 Saurabh Hassan MD

## 2023-10-12 NOTE — PROGRESS NOTES
Lucinda Aguilera 72 y.o. female is here today for Blood Pressure check.   History of HTN yes.    Review of patient's allergies indicates:  No Known Allergies  Creatinine   Date Value Ref Range Status   10/03/2023 0.7 0.5 - 1.4 mg/dL Final     Sodium   Date Value Ref Range Status   10/03/2023 138 136 - 145 mmol/L Final     Potassium   Date Value Ref Range Status   10/03/2023 4.0 3.5 - 5.1 mmol/L Final   ]  Patient verifies taking blood pressure medications on a regular basis at the same time of the day.     Current Outpatient Medications:     acetaminophen (TYLENOL) 500 MG tablet, Take 500 mg by mouth every 6 (six) hours as needed for Pain., Disp: , Rfl:     ascorbic acid, vitamin C, (VITAMIN C) 250 MG tablet, Take 500 mg by mouth once daily. , Disp: , Rfl:     aspirin (ECOTRIN) 81 MG EC tablet, Take 81 mg by mouth every evening., Disp: , Rfl:     atenoloL (TENORMIN) 25 MG tablet, Take 1 tablet (25 mg total) by mouth every evening., Disp: 90 tablet, Rfl: 3    balsalazide (COLAZAL) 750 mg capsule, TAKE 3 CAPSULES(2250 MG) BY MOUTH TWICE DAILY WITH MEALS, Disp: 180 capsule, Rfl: 11    buPROPion (WELLBUTRIN XL) 150 MG TB24 tablet, Take 1 tablet (150 mg total) by mouth once daily., Disp: 90 tablet, Rfl: 3    docusate sodium (COLACE) 100 MG capsule, Take 100 mg by mouth 2 (two) times daily., Disp: , Rfl:     famotidine (PEPCID) 40 MG tablet, Take 1 tablet (40 mg total) by mouth nightly., Disp: 90 tablet, Rfl: 3    furosemide (LASIX) 20 MG tablet, Take 1 tablet (20 mg total) by mouth daily as needed (edema). 2 pm on an empty stomach if swollen., Disp: 90 tablet, Rfl: 3    lisinopriL (PRINIVIL,ZESTRIL) 40 MG tablet, Take 1 tablet (40 mg total) by mouth 2 (two) times a day., Disp: 180 tablet, Rfl: 3    LORazepam (ATIVAN) 0.5 MG tablet, TAKE 1 TABLET(0.5 MG) BY MOUTH TWICE DAILY, Disp: 60 tablet, Rfl: 5    magnesium oxide (MAG-OX) 400 mg (241.3 mg magnesium) tablet, Take 1 tablet (400 mg total) by mouth once daily., Disp: ,  Rfl: 0    multivitamin (THERAGRAN) per tablet, Take 1 tablet by mouth once daily., Disp: , Rfl:     niacin 500 MG CpSR, Take 500 mg by mouth every evening., Disp: , Rfl:     ondansetron (ZOFRAN-ODT) 4 MG TbDL, Take 1 tablet (4 mg total) by mouth every 6 (six) hours as needed (nausea)., Disp: 30 tablet, Rfl: 3    pantoprazole (PROTONIX) 40 MG tablet, Take 1 tablet (40 mg total) by mouth 2 (two) times daily., Disp: 180 tablet, Rfl: 3    polyethylene glycol (GLYCOLAX) 17 gram/dose powder, Take 17 g by mouth every other day., Disp: , Rfl:     pravastatin (PRAVACHOL) 40 MG tablet, Take 1 tablet (40 mg total) by mouth once daily., Disp: 90 tablet, Rfl: 3    pravastatin (PRAVACHOL) 40 MG tablet, TAKE 1 TABLET(40 MG) BY MOUTH EVERY DAY, Disp: 90 tablet, Rfl: 3    psyllium husk, aspartame, (NATURAL PSYLLIUM FIBER) 3.4 gram/5.8 gram Powd, Take by mouth., Disp: , Rfl:   No current facility-administered medications for this visit.  Does patient have record of home blood pressure readings yes.    Last dose of blood pressure medication was taken last night.  Patient is asymptomatic.     /90 , Pulse: 68.    Blood pressure reading after 15 minutes was 136/84, Pulse 70.

## 2023-10-13 ENCOUNTER — CLINICAL SUPPORT (OUTPATIENT)
Dept: REHABILITATION | Facility: HOSPITAL | Age: 73
End: 2023-10-13
Payer: MEDICARE

## 2023-10-13 DIAGNOSIS — R29.898 WEAKNESS OF BOTH HIPS: ICD-10-CM

## 2023-10-13 DIAGNOSIS — M53.86 DECREASED ROM OF LUMBAR SPINE: Primary | ICD-10-CM

## 2023-10-13 PROCEDURE — 97112 NEUROMUSCULAR REEDUCATION: CPT | Mod: PO,CQ

## 2023-10-13 PROCEDURE — 97110 THERAPEUTIC EXERCISES: CPT | Mod: PO,CQ

## 2023-10-13 PROCEDURE — 97140 MANUAL THERAPY 1/> REGIONS: CPT | Mod: PO,CQ

## 2023-10-13 NOTE — PROGRESS NOTES
[facility]   Physical Therapy Treatment Note      Name: Lucinda Aguilera  [facility] Number: [id number]    Therapy Diagnosis:   Encounter Diagnoses   Name Primary?    Decreased ROM of lumbar spine Yes    Weakness of both hips              Physician:     Visit Date: 10/13/2023  Physician Orders: PT Eval and Treat   Post Surgical? No   Eval and Treat Yes   Type of Therapy Outpatient Therapy   Location: Hip      Medical Diagnosis from Referral:   M70.61 (ICD-10-CM) - Greater trochanteric bursitis of right hip   M76.891 (ICD-10-CM) - Hip flexor tendinitis, right      Evaluation Date: 8/1/2023  Authorization Period Expiration: 7/29/2024  Plan of Care Expiration: 9/29/2023 extend POC 11/30/23  Progress Note Due: 1019/2023  Visit # / Visits authorized: 11 / 20   FOTO: 2/ 3 (9/19/2023)      PTA Visit #: 0/5     Precautions: HTN,       Time In: 9:00 am  Time Out: 10:00 am  Total Billable Time:  60 minutes      Subjective     Pt reports: That she received an injection in her R hip on Tuesday but no change yet. She is w/o complaint upon arrival this morning.  She was compliant with home exercise program.  Response to previous treatment: mild pain with exercise and worse after exercise  Functional change: none    Pain: 0/10    Location: B hips    Objective      9/19/23 : reassessment and foto:   Foto improved from 26% on eval to 65% today    Trunk flexion 100 deg and lumbar extension 15 deg.     Hip Range of Motion:    Left active Right active    Flexion 109 110   Abduction 44 46   Extension 10 8   Ext. Rotation 34 35   Int. Rotation 25 26      Lower Extremity Strength  Right LE   Left LE     Knee extension: 5/5 Knee extension: 4+/5   Knee flexion: 4+/5 Knee flexion: 4+/5   Hip flexion: 4/5 Hip flexion: 4/5   Hip Internal Rotation:  NT    Hip Internal Rotation: NT      Hip External Rotation: NT    Hip External Rotation: NT      Hip extension:  4-5 Hip extension: 4-/5   Hip abduction: 4-/5 Hip abduction: 4/5  "  Hip adduction: 4/5 Hip adduction 4/5   Ankle dorsiflexion: 4+/5 Ankle dorsiflexion: 4+/5   Ankle plantarflexion: 4+/5 Ankle plantarflexion:        Treatment     Lucinda received the treatments listed below:      therapeutic exercises to develop strength, endurance, ROM, flexibility, posture, and core stabilization for 30  minutes including:      Nu-step x 6 min L2.       LTR stretch 3 x 30 secs  Bent knee fall out/single 2 x10  red t-band      Bridges with abd BTB 3 x 10    3 rounds of this today: with manual therapy in between each round  Supine clams BTB 3 x 10      Hip add/frog stretch 3 x 30 secs  Trunk rotation stretch 3 x 30 secs  Elias test stretch x 1 min   Gait 100'    Supine heel slides with use of sliders x 20   Supine HS stretch with strap 3 x 30 sec  Supine ITB stretch with strap 3x 30 sec      Piriformis hook stretch 3 x 30" each  Seated HS stretch 2 x 20 secs   Supine frog stretch unilaterally 3 x 30"  DOUBLE KNEE TO CHEST with green t-ball 20x  .    Gait 3 x 50' side stepping VCs for form x 4 laps      manual therapy techniques: Manual traction, Myofacial release, and Soft tissue Mobilization were applied to the: iliacus, psoas, QL, TFL, glut medius and adductor nelson for 15 minutes, including:  Short axis traction 3x in session  Long axis traction  Lateral hip traction in hooklying    neuromuscular re-education activities to improve: Sense, Proprioception, and Posture for 10 minutes. The following activities were included:  Marching on trampoline vs blue foam 2 min x 2 NP  PPT x20   Adduction ball squeeze + PPT x 20 hold 2 secs  LOWER TRUNK ROTATION x 3 min medium amplitude with yellow ball.  Supine marching x 20 + PPT    therapeutic activities to improve functional performance for   minutes, including:      gait training to improve functional mobility and safety for   minutes, including:      hot pack for 0 minutes to  R adductor after session.     cold pack for 0 minutes to .    Patient " "Education and Home Exercises       Education provided:   - HEP  - Pt/family was provided educational information, including: role of PT, role of pt/caregiver, goals for PT, POC, scheduling, and attendance policy.- pt verbalized understanding.      Written Home Exercises Provided: yes. Exercises were reviewed and Lucinda was able to demonstrate them prior to the end of the session.  Lucinda demonstrated good  understanding of the education provided. See EMR under Patient Instructions for exercises provided during therapy sessions.       Assessment     Lucinda fausto today's tx with ther ex,neuromuscular re-ed and manual intervention well. She was able to perform all activities w/o difficulty or complaint. PT to extend POC to complete pre-approved visits and complete POC for 20 visits total.     Lucinda Is progressing well towards her goals.   Pt prognosis is Good.     Pt will continue to benefit from skilled outpatient physical therapy to address the deficits listed in the problem list box on initial evaluation, provide pt/family education and to maximize pt's level of independence in the home and community environment.     Pt's spiritual, cultural and educational needs considered and pt agreeable to plan of care and goals.     Anticipated barriers to physical therapy: chronic nature and acute flareups    Goals: Short Term Goals: 4 weeks   -Lumbar AROM extension improved to 15 deg to demo improving mobility. MET 9/19/23  -Bilateral LE strength as tested 3/5 or better for improving stability and gait. MET 9/19/23  -NARROW BASE OF SUPPORT on foam x 30 " with minimum or no sway.  -Improved TUG to 12 s without AD for improving balance and gait.  -FOTO FS score improved to 30 for improving QOL. MET 9/19/23     Long Term Goals: 8 weeks   -NARROW BASE OF SUPPORT with EYES CLOSED on foam x 30" with minimum sway  -Improved TUG to 10 s without AD for improving balance and gait.  -Tandem balance on floor (right leg back) 30" for " improving balance.  -FOTO FS score improved to 35 for improving QOL. MET 9/19/23    Plan     Cont with POC extend POC to 11/30/23 2 x per week 5 weeks for remaining visits.     Manuel Garcia, PTA

## 2023-10-17 ENCOUNTER — HOSPITAL ENCOUNTER (OUTPATIENT)
Dept: RADIOLOGY | Facility: HOSPITAL | Age: 73
Discharge: HOME | End: 2023-10-17
Attending: INTERNAL MEDICINE
Payer: MEDICARE

## 2023-10-17 DIAGNOSIS — J06.9 UPPER RESPIRATORY TRACT INFECTION, UNSPECIFIED TYPE: ICD-10-CM

## 2023-10-17 PROCEDURE — 71046 X-RAY EXAM CHEST 2 VIEWS: CPT | Mod: 26,,, | Performed by: RADIOLOGY

## 2023-10-17 PROCEDURE — 71046 X-RAY EXAM CHEST 2 VIEWS: CPT | Mod: TC,FY,PO

## 2023-10-17 PROCEDURE — 71046 XR CHEST PA AND LATERAL: ICD-10-PCS | Mod: 26,,, | Performed by: RADIOLOGY

## 2023-10-20 ENCOUNTER — OFFICE VISIT (OUTPATIENT)
Dept: FAMILY MEDICINE | Facility: CLINIC | Age: 73
End: 2023-10-20
Payer: MEDICARE

## 2023-10-20 ENCOUNTER — TELEPHONE (OUTPATIENT)
Dept: FAMILY MEDICINE | Facility: CLINIC | Age: 73
End: 2023-10-20
Payer: MEDICARE

## 2023-10-20 VITALS
TEMPERATURE: 98 F | WEIGHT: 170.19 LBS | DIASTOLIC BLOOD PRESSURE: 86 MMHG | SYSTOLIC BLOOD PRESSURE: 144 MMHG | OXYGEN SATURATION: 96 % | HEART RATE: 78 BPM | BODY MASS INDEX: 30.15 KG/M2

## 2023-10-20 DIAGNOSIS — K21.9 GASTROESOPHAGEAL REFLUX DISEASE, UNSPECIFIED WHETHER ESOPHAGITIS PRESENT: ICD-10-CM

## 2023-10-20 DIAGNOSIS — J32.9 SINUSITIS, UNSPECIFIED CHRONICITY, UNSPECIFIED LOCATION: Primary | ICD-10-CM

## 2023-10-20 DIAGNOSIS — R09.81 NASAL CONGESTION: ICD-10-CM

## 2023-10-20 PROCEDURE — 99213 OFFICE O/P EST LOW 20 MIN: CPT | Mod: PBBFAC,PO | Performed by: NURSE PRACTITIONER

## 2023-10-20 PROCEDURE — 99214 PR OFFICE/OUTPT VISIT, EST, LEVL IV, 30-39 MIN: ICD-10-PCS | Mod: S$PBB,,, | Performed by: NURSE PRACTITIONER

## 2023-10-20 PROCEDURE — 99999 PR PBB SHADOW E&M-EST. PATIENT-LVL III: CPT | Mod: PBBFAC,,, | Performed by: NURSE PRACTITIONER

## 2023-10-20 PROCEDURE — 99999 PR PBB SHADOW E&M-EST. PATIENT-LVL III: ICD-10-PCS | Mod: PBBFAC,,, | Performed by: NURSE PRACTITIONER

## 2023-10-20 PROCEDURE — 99214 OFFICE O/P EST MOD 30 MIN: CPT | Mod: S$PBB,,, | Performed by: NURSE PRACTITIONER

## 2023-10-20 RX ORDER — AMOXICILLIN AND CLAVULANATE POTASSIUM 875; 125 MG/1; MG/1
1 TABLET, FILM COATED ORAL 2 TIMES DAILY
COMMUNITY
Start: 2023-10-17 | End: 2023-11-20

## 2023-10-20 RX ORDER — BENZONATATE 200 MG/1
200 CAPSULE ORAL
COMMUNITY
Start: 2023-10-17 | End: 2023-11-17 | Stop reason: SDUPTHER

## 2023-10-20 RX ORDER — FLUTICASONE PROPIONATE 50 MCG
1 SPRAY, SUSPENSION (ML) NASAL DAILY
Qty: 16 G | Refills: 3 | Status: SHIPPED | OUTPATIENT
Start: 2023-10-20

## 2023-10-20 NOTE — TELEPHONE ENCOUNTER
----- Message from Errol Wade sent at 10/20/2023  7:02 AM CDT -----  Contact: Pt  .Type:  Sooner Apoointment Request    Caller is requesting a sooner appointment.  Caller declined first available appointment listed below.  Caller will not accept being placed on the waitlist and is requesting a message be sent to doctor.  Name of Caller:pt  When is the first available appointment?  Symptoms:Deep couDeep cough, ribs hurting  Would the patient rather a call back or a response via [employer]?  Call back  Best Call Back Number:[phone]  Additional Information:

## 2023-10-24 ENCOUNTER — CLINICAL SUPPORT (OUTPATIENT)
Dept: REHABILITATION | Facility: HOSPITAL | Age: 73
End: 2023-10-24
Payer: MEDICARE

## 2023-10-24 DIAGNOSIS — M25.551 PAIN OF RIGHT HIP: Primary | ICD-10-CM

## 2023-10-24 DIAGNOSIS — R29.898 WEAKNESS OF BOTH HIPS: ICD-10-CM

## 2023-10-24 PROCEDURE — 97110 THERAPEUTIC EXERCISES: CPT | Mod: PO

## 2023-10-24 PROCEDURE — 97140 MANUAL THERAPY 1/> REGIONS: CPT | Mod: PO

## 2023-10-24 NOTE — PROGRESS NOTES
[facility]   Physical Therapy Treatment Note      Name: Lucinda Aguilera  Clinic Number: [id number]    Therapy Diagnosis:   No diagnosis found.            Physician:     Visit Date: 10/24/2023  Physician Orders: PT Eval and Treat   Post Surgical? No   Eval and Treat Yes   Type of Therapy Outpatient Therapy   Location: Hip      Medical Diagnosis from Referral:   M70.61 (ICD-10-CM) - Greater trochanteric bursitis of right hip   M76.891 (ICD-10-CM) - Hip flexor tendinitis, right      Evaluation Date: 8/1/2023  Authorization Period Expiration: 7/29/2024  Plan of Care Expiration: 9/29/2023 extend POC 11/30/23  Progress Note Due: 1019/2023  Visit # / Visits authorized: 11 / 20   FOTO: 2/ 3 (9/19/2023)      PTA Visit #: 0/5     Precautions: HTN,       Time In: 1255 pm  Time Out: 131 pm  Total Billable Time:  36 minutes  1:1 1255-125  TE 1 MT 1    Subjective     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 Pt with general pain overall and fatigue..  She was compliant with home exercise program.  Response to previous treatment: mild pain with exercise and worse after exercise  Functional change: none    Pain: 3/10    Location: B hips    Objective      9/19/23 : reassessment and foto:   Foto improved from 26% on eval to 65% today    Trunk flexion 100 deg and lumbar extension 15 deg.     Hip Range of Motion:    Left active Right active    Flexion 109 110   Abduction 44 46   Extension 10 8   Ext. Rotation 34 35   Int. Rotation 25 26      Lower Extremity Strength  Right LE   Left LE     Knee extension: 5/5 Knee extension: 4+/5   Knee flexion: 4+/5 Knee flexion: 4+/5   Hip flexion: 4/5 Hip flexion: 4/5   Hip Internal Rotation:  NT    Hip Internal Rotation: NT      Hip External Rotation: NT    Hip External Rotation: NT      Hip extension:  4-5 Hip extension: 4-/5   Hip abduction: 4-/5 Hip abduction: 4/5   Hip adduction: 4/5 Hip adduction 4/5   Ankle dorsiflexion: 4+/5 Ankle  "dorsiflexion: 4+/5   Ankle plantarflexion: 4+/5 Ankle plantarflexion:        Treatment     Lucinda received the treatments listed below:      therapeutic exercises to develop strength, endurance, ROM, flexibility, posture, and core stabilization for 21  minutes including:      Nu-step x 6 min L2.       LTR stretch 3 x 30 secs  Bent knee fall out/single 2 x10  blue t-band      Bridges with abd BTB 3 x 10    3 rounds of this today: with manual therapy in between each round  Supine clams BTB 3 x 10      Hip add/frog stretch 3 x 30 secs  Trunk rotation stretch 3 x 30 secs  Elias test stretch x 1 min   Gait 100'    Supine heel slides with use of sliders x 20   Supine HS stretch with strap 3 x 30 sec  Supine ITB stretch with strap 3x 30 sec      Piriformis hook stretch 3 x 30" each  Seated HS stretch 2 x 20 secs   Supine frog stretch unilaterally 3 x 30"  DOUBLE KNEE TO CHEST with green t-ball 20x  .    Gait 3 x 50' side stepping VCs for form x 4 laps      manual therapy techniques: Manual traction, Myofacial release, and Soft tissue Mobilization were applied to the: iliacus, psoas, QL, TFL, glut medius and adductor nelson for 15 minutes, including:  Short axis traction 3x in session  Long axis traction  Lateral hip traction in hooklying    neuromuscular re-education activities to improve: Sense, Proprioception, and Posture for 10 minutes. The following activities were included:  Marching on trampoline vs blue foam 2 min x 2 NP  PPT x20   Adduction ball squeeze + PPT x 20 hold 2 secs  LOWER TRUNK ROTATION x 3 min medium amplitude with yellow ball.  Supine marching x 20 + PPT    therapeutic activities to improve functional performance for   minutes, including:      gait training to improve functional mobility and safety for   minutes, including:      hot pack for 0 minutes to  R adductor after session.     cold pack for 0 minutes to .    Patient Education and Home Exercises       Education provided:   - HEP  - Pt/family " "was provided educational information, including: role of PT, role of pt/caregiver, goals for PT, POC, scheduling, and attendance policy.- pt verbalized understanding.      Written Home Exercises Provided: yes. Exercises were reviewed and Lucinda was able to demonstrate them prior to the end of the session.  Lucinda demonstrated good  understanding of the education provided. See EMR under Patient Instructions for exercises provided during therapy sessions.       Assessment     Lucinda with decreased overall tolerance in session and dismissed early due to fatigue and progressive congestion with exercise. Pt tolerated limited HEP and general pain likely due to illness versus orthopedic pain.      Lucinda Is progressing well towards her goals.   Pt prognosis is Good.     Pt will continue to benefit from skilled outpatient physical therapy to address the deficits listed in the problem list box on initial evaluation, provide pt/family education and to maximize pt's level of independence in the home and community environment.     Pt's spiritual, cultural and educational needs considered and pt agreeable to plan of care and goals.     Anticipated barriers to physical therapy: chronic nature and acute flareups    Goals: Short Term Goals: 4 weeks   -Lumbar AROM extension improved to 15 deg to demo improving mobility. MET 9/19/23  -Bilateral LE strength as tested 3/5 or better for improving stability and gait. MET 9/19/23  -NARROW BASE OF SUPPORT on foam x 30 " with minimum or no sway.  -Improved TUG to 12 s without AD for improving balance and gait.  -FOTO FS score improved to 30 for improving QOL. MET 9/19/23     Long Term Goals: 8 weeks   -NARROW BASE OF SUPPORT with EYES CLOSED on foam x 30" with minimum sway  -Improved TUG to 10 s without AD for improving balance and gait.  -Tandem balance on floor (right leg back) 30" for improving balance.  -FOTO FS score improved to 35 for improving QOL. MET 9/19/23    Plan     Cont " with POC extend POC to 11/30/23 2 x per week 5 weeks for remaining visits.     Nora Costa, PT

## 2023-10-25 ENCOUNTER — PATIENT MESSAGE (OUTPATIENT)
Dept: FAMILY MEDICINE | Facility: CLINIC | Age: 73
End: 2023-10-25
Payer: MEDICARE

## 2023-10-25 DIAGNOSIS — R06.2 WHEEZING: Primary | ICD-10-CM

## 2023-10-27 ENCOUNTER — CLINICAL SUPPORT (OUTPATIENT)
Dept: REHABILITATION | Facility: HOSPITAL | Age: 73
End: 2023-10-27
Payer: MEDICARE

## 2023-10-27 DIAGNOSIS — M53.86 DECREASED ROM OF LUMBAR SPINE: Primary | ICD-10-CM

## 2023-10-27 DIAGNOSIS — R29.898 WEAKNESS OF BOTH HIPS: ICD-10-CM

## 2023-10-27 PROCEDURE — 97110 THERAPEUTIC EXERCISES: CPT | Mod: PO,CQ

## 2023-10-27 PROCEDURE — 97112 NEUROMUSCULAR REEDUCATION: CPT | Mod: PO,CQ

## 2023-10-27 RX ORDER — METHYLPREDNISOLONE 4 MG/1
TABLET ORAL
Qty: 21 TABLET | Refills: 0 | Status: SHIPPED | OUTPATIENT
Start: 2023-10-28 | End: 2023-11-17

## 2023-10-27 NOTE — PROGRESS NOTES
[facility]   Physical Therapy Treatment Note      Name: Lucinda Aguilera  [facility] Number: [id number]    Therapy Diagnosis:   Encounter Diagnoses   Name Primary?    Decreased ROM of lumbar spine Yes    Weakness of both hips                Physician:     Visit Date: 10/27/2023  Physician Orders: PT Eval and Treat   Post Surgical? No   Eval and Treat Yes   Type of Therapy Outpatient Therapy   Location: Hip      Medical Diagnosis from Referral:   M70.61 (ICD-10-CM) - Greater trochanteric bursitis of right hip   M76.891 (ICD-10-CM) - Hip flexor tendinitis, right      Evaluation Date: 8/1/2023  Authorization Period Expiration: 7/29/2024  Plan of Care Expiration: 9/29/2023 extend POC 11/30/23  Progress Note Due: 1019/2023  Visit # / Visits authorized: 12 / 20   FOTO: 2/ 3 (9/19/2023)      PTA Visit #: 0/5     Precautions: HTN,       Time In: 10:00 AM  Time Out: 11:00  Total Billable Time:  60 minutes      Subjective     Pt reports: fatigue from illness but she is w/o c/o pn today.  She was compliant with home exercise program.  Response to previous treatment: mild pain with exercise and worse after exercise  Functional change: none    Pain: 0/10    Location: B hips    Objective      9/19/23 : reassessment and foto:   Foto improved from 26% on eval to 65% today    Trunk flexion 100 deg and lumbar extension 15 deg.     Hip Range of Motion:    Left active Right active    Flexion 109 110   Abduction 44 46   Extension 10 8   Ext. Rotation 34 35   Int. Rotation 25 26      Lower Extremity Strength  Right LE   Left LE     Knee extension: 5/5 Knee extension: 4+/5   Knee flexion: 4+/5 Knee flexion: 4+/5   Hip flexion: 4/5 Hip flexion: 4/5   Hip Internal Rotation:  NT    Hip Internal Rotation: NT      Hip External Rotation: NT    Hip External Rotation: NT      Hip extension:  4-5 Hip extension: 4-/5   Hip abduction: 4-/5 Hip abduction: 4/5   Hip adduction: 4/5 Hip adduction 4/5   Ankle dorsiflexion: 4+/5 Ankle  "dorsiflexion: 4+/5   Ankle plantarflexion: 4+/5 Ankle plantarflexion:        Treatment     Lucinda received the treatments listed below:      therapeutic exercises to develop strength, endurance, ROM, flexibility, posture, and core stabilization for 30  minutes including:      Nu-step x 6 min L2.       LTR stretch 3 x 30 secs  Bent knee fall out/single 2 x10  blue t-band      Bridges with abd BTB 3 x 10    3 rounds of this today: with manual therapy in between each round  Supine clams BTB 3 x 10      Hip add/frog stretch 3 x 30 secs  Trunk rotation stretch 3 x 30 secs  Elias test stretch x 1 min   Gait 100'    Supine heel slides with use of sliders x 20   Supine HS stretch with strap 3 x 30 sec  Supine ITB stretch with strap 3x 30 sec      Piriformis hook stretch 3 x 30" each  Seated HS stretch 2 x 20 secs  NP  Supine frog stretch unilaterally 3 x 30"  DOUBLE KNEE TO CHEST with green t-ball 20x  .    Gait 3 x 50' side stepping VCs for form x 4 laps NP      manual therapy techniques: Manual traction, Myofacial release, and Soft tissue Mobilization were applied to the: iliacus, psoas, QL, TFL, glut medius and adductor nelson for 00 minutes, including:  Short axis traction 3x in session  Long axis traction  Lateral hip traction in hooklying    neuromuscular re-education activities to improve: Sense, Proprioception, and Posture for 30 minutes. The following activities were included:  Marching on trampoline vs blue foam 2 min x 2   PPT x20   Adduction ball squeeze + PPT x 20 hold 2 secs  LOWER TRUNK ROTATION x 3 min medium amplitude with yellow ball.  Supine marching x 20 + PPT    therapeutic activities to improve functional performance for   minutes, including:      gait training to improve functional mobility and safety for   minutes, including:      hot pack for 0 minutes to  R adductor after session.     cold pack for 0 minutes to .    Patient Education and Home Exercises       Education provided:   - HEP  - " "Pt/family was provided educational information, including: role of PT, role of pt/caregiver, goals for PT, POC, scheduling, and attendance policy.- pt verbalized understanding.      Written Home Exercises Provided: yes. Exercises were reviewed and Lucinda was able to demonstrate them prior to the end of the session.  Lucinda demonstrated good  understanding of the education provided. See EMR under Patient Instructions for exercises provided during therapy sessions.       Assessment     Lucinda fausto today's tx with ther ex and neuromuscular re-ed  well. She was able to perform all activities w/o difficulty or complaint. She continues with reports of sinus related sx but this did not impede her ability to fully participate in therapeutic activities today. She was w/o fatigue throughout tx.      Lucinda Is progressing well towards her goals.   Pt prognosis is Good.     Pt will continue to benefit from skilled outpatient physical therapy to address the deficits listed in the problem list box on initial evaluation, provide pt/family education and to maximize pt's level of independence in the home and community environment.     Pt's spiritual, cultural and educational needs considered and pt agreeable to plan of care and goals.     Anticipated barriers to physical therapy: chronic nature and acute flareups    Goals: Short Term Goals: 4 weeks   -Lumbar AROM extension improved to 15 deg to demo improving mobility. MET 9/19/23  -Bilateral LE strength as tested 3/5 or better for improving stability and gait. MET 9/19/23  -NARROW BASE OF SUPPORT on foam x 30 " with minimum or no sway.  -Improved TUG to 12 s without AD for improving balance and gait.  -FOTO FS score improved to 30 for improving QOL. MET 9/19/23     Long Term Goals: 8 weeks   -NARROW BASE OF SUPPORT with EYES CLOSED on foam x 30" with minimum sway  -Improved TUG to 10 s without AD for improving balance and gait.  -Tandem balance on floor (right leg back) 30" for " improving balance.  -FOTO FS score improved to 35 for improving QOL. MET 9/19/23    Plan     Cont with POC extend POC to 11/30/23 2 x per week 5 weeks for remaining visits.     Manuel Garcia, PTA

## 2023-10-31 ENCOUNTER — EXTERNAL CHRONIC CARE MANAGEMENT (OUTPATIENT)
Dept: PRIMARY CARE CLINIC | Facility: CLINIC | Age: 73
End: 2023-10-31
Payer: MEDICARE

## 2023-10-31 ENCOUNTER — CLINICAL SUPPORT (OUTPATIENT)
Dept: REHABILITATION | Facility: HOSPITAL | Age: 73
End: 2023-10-31
Payer: MEDICARE

## 2023-10-31 DIAGNOSIS — M25.551 PAIN OF RIGHT HIP: ICD-10-CM

## 2023-10-31 DIAGNOSIS — R29.898 WEAKNESS OF BOTH HIPS: Primary | ICD-10-CM

## 2023-10-31 PROCEDURE — 99490 CHRNC CARE MGMT STAFF 1ST 20: CPT | Mod: S$PBB,,, | Performed by: FAMILY MEDICINE

## 2023-10-31 PROCEDURE — 99490 CHRNC CARE MGMT STAFF 1ST 20: CPT | Mod: PBBFAC,PO | Performed by: FAMILY MEDICINE

## 2023-10-31 PROCEDURE — 97140 MANUAL THERAPY 1/> REGIONS: CPT | Mod: PO

## 2023-10-31 PROCEDURE — 99490 PR CHRONIC CARE MGMT, 1ST 20 MIN: ICD-10-PCS | Mod: S$PBB,,, | Performed by: FAMILY MEDICINE

## 2023-10-31 PROCEDURE — 97110 THERAPEUTIC EXERCISES: CPT | Mod: PO

## 2023-10-31 PROCEDURE — 97010 HOT OR COLD PACKS THERAPY: CPT | Mod: PO

## 2023-10-31 NOTE — PROGRESS NOTES
[facility]   Physical Therapy Treatment Note      Name: Lucinda Aguilera  Clinic Number: [id number]    Therapy Diagnosis:   Encounter Diagnoses   Name Primary?    Weakness of both hips Yes    Pain of right hip            Physician:     Visit Date: 10/31/2023  Physician Orders: PT Eval and Treat   Post Surgical? No   Eval and Treat Yes   Type of Therapy Outpatient Therapy   Location: Hip      Medical Diagnosis from Referral:   M70.61 (ICD-10-CM) - Greater trochanteric bursitis of right hip   M76.891 (ICD-10-CM) - Hip flexor tendinitis, right      Evaluation Date: 8/1/2023  Authorization Period Expiration: 7/29/2024  Plan of Care Expiration: 9/29/2023 extend POC 11/30/23  Progress Note Due: 1019/2023  Visit # / Visits authorized: 16 / 20   Foto 1/3: 26%  FOTO: 2/ 3 (9/19/2023)  Foto 3/3 10/31/23 visit 15: 56%        PTA Visit #: 0/5     Precautions: HTN,       Time In: 1250 pm  Time Out: 130 pm  Total Billable Time:  40 minutes  (1:1 1250-128)  TE 2 MT 1 + MHP x 10 min after session.     Subjective     Pt reports: fatigue from walking all [place] [employer] but mild pain.  She was compliant with home exercise program.  Response to previous treatment: mild pain with exercise and worse after exercise  Functional change: none    Pain: 3/10    Location: B hips    Objective      9/19/23 : reassessment and foto:   Foto improved from 26% on eval to 65% today    Trunk flexion 100 deg and lumbar extension 15 deg.     Hip Range of Motion:    Left active Right active    Flexion 109 110   Abduction 44 46   Extension 10 8   Ext. Rotation 34 35   Int. Rotation 25 26      Lower Extremity Strength  Right LE   Left LE     Knee extension: 5/5 Knee extension: 4+/5   Knee flexion: 4+/5 Knee flexion: 4+/5   Hip flexion: 4/5 Hip flexion: 4/5   Hip Internal Rotation:  NT    Hip Internal Rotation: NT      Hip External Rotation: NT    Hip External Rotation: NT      Hip extension:  4-5 Hip extension: 4-/5   Hip  "abduction: 4-/5 Hip abduction: 4/5   Hip adduction: 4/5 Hip adduction 4/5   Ankle dorsiflexion: 4+/5 Ankle dorsiflexion: 4+/5   Ankle plantarflexion: 4+/5 Ankle plantarflexion:        Treatment     Lucinda received the treatments listed below:      therapeutic exercises to develop strength, endurance, ROM, flexibility, posture, and core stabilization for 25  minutes including:      Nu-step x 6 min L2.       LTR stretch 3 x 30 secs  Bent knee fall out/single 2 x10  blue t-band      Bridges with abd BTB 3 x 10    3 rounds of this today: with manual therapy in between each round  Supine clams BTB 3 x 10    2 rounds of stretches  Hip add/frog stretch 3 x 30 secs  Trunk rotation stretch 3 x 30 secs  Elias test stretch x 1 min   Gait 100'    Supine heel slides with use of sliders x 20   Supine HS stretch with strap 3 x 30 sec  Supine ITB stretch with strap 3x 30 sec      Piriformis hook stretch 3 x 30" each  Seated HS stretch 2 x 20 secs   Supine frog stretch unilaterally 3 x 30"  DOUBLE KNEE TO CHEST with green t-ball 20x  .    Gait 3 x 50' side stepping VCs for form x 4 laps      manual therapy techniques: Manual traction, Myofacial release, and Soft tissue Mobilization were applied to the: iliacus, psoas, QL, TFL, glut medius and adductor nelson for 15 minutes, including:  Short axis traction 3x in session  Long axis traction  Lateral hip traction in hooklying    neuromuscular re-education activities to improve: Sense, Proprioception, and Posture for 0 minutes. The following activities were included:  Marching on trampoline vs blue foam 2 min x 2 NP  PPT x20   Adduction ball squeeze + PPT x 20 hold 2 secs  LOWER TRUNK ROTATION x 3 min medium amplitude with yellow ball.  Supine marching x 20 + PPT    therapeutic activities to improve functional performance for   minutes, including:      gait training to improve functional mobility and safety for   minutes, including:      hot pack for 10 minutes to  R adductor after " "session.     cold pack for 0 minutes to .    Patient Education and Home Exercises       Education provided:   - HEP  - Pt/family was provided educational information, including: role of PT, role of pt/caregiver, goals for PT, POC, scheduling, and attendance policy.- pt verbalized understanding.      Written Home Exercises Provided: yes. Exercises were reviewed and Lucinda was able to demonstrate them prior to the end of the session.  Lucinda demonstrated good  understanding of the education provided. See EMR under Patient Instructions for exercises provided during therapy sessions.       Assessment     Lucinda with decreased overall tolerance in session with excessive workload this weekend visiting grandson and walking on [employer] [facility] to failure. Pt with reduced workload mostly supine to manage pain today.    Lucinda Is progressing well towards her goals.   Pt prognosis is Good.     Pt will continue to benefit from skilled outpatient physical therapy to address the deficits listed in the problem list box on initial evaluation, provide pt/family education and to maximize pt's level of independence in the home and community environment.     Pt's spiritual, cultural and educational needs considered and pt agreeable to plan of care and goals.     Anticipated barriers to physical therapy: chronic nature and acute flareups    Goals: Short Term Goals: 4 weeks   -Lumbar AROM extension improved to 15 deg to demo improving mobility. MET 9/19/23  -Bilateral LE strength as tested 3/5 or better for improving stability and gait. MET 9/19/23  -NARROW BASE OF SUPPORT on foam x 30 " with minimum or no sway.  -Improved TUG to 12 s without AD for improving balance and gait.  -FOTO FS score improved to 30 for improving QOL. MET 9/19/23     Long Term Goals: 8 weeks   -NARROW BASE OF SUPPORT with EYES CLOSED on foam x 30" with minimum sway  -Improved TUG to 10 s without AD for improving balance and gait.  -Tandem balance on floor " "(right leg back) 30" for improving balance.  -FOTO FS score improved to 35 for improving QOL. MET 9/19/23    Plan     Cont with POC extend POC to 11/30/23 2 x per week 5 weeks for remaining visits.     Nora Costa, PT     "

## 2023-11-02 ENCOUNTER — CLINICAL SUPPORT (OUTPATIENT)
Dept: REHABILITATION | Facility: HOSPITAL | Age: 73
End: 2023-11-02
Payer: MEDICARE

## 2023-11-02 DIAGNOSIS — M25.551 PAIN OF RIGHT HIP: Primary | ICD-10-CM

## 2023-11-02 DIAGNOSIS — R26.9 GAIT DIFFICULTY: ICD-10-CM

## 2023-11-02 DIAGNOSIS — R29.898 WEAKNESS OF BOTH HIPS: ICD-10-CM

## 2023-11-02 PROCEDURE — 97112 NEUROMUSCULAR REEDUCATION: CPT | Mod: PO

## 2023-11-02 PROCEDURE — 97110 THERAPEUTIC EXERCISES: CPT | Mod: PO

## 2023-11-02 PROCEDURE — 97140 MANUAL THERAPY 1/> REGIONS: CPT | Mod: PO

## 2023-11-02 NOTE — PROGRESS NOTES
[facility]   Physical Therapy Treatment Note      Name: Lucinda Aguilera  Clinic Number: [id number]    Therapy Diagnosis:   Encounter Diagnoses   Name Primary?    Pain of right hip Yes    Weakness of both hips     Gait difficulty          Physician:     Visit Date: 11/2/2023  Physician Orders: PT Eval and Treat   Post Surgical? No   Eval and Treat Yes   Type of Therapy Outpatient Therapy   Location: Hip      Medical Diagnosis from Referral:   M70.61 (ICD-10-CM) - Greater trochanteric bursitis of right hip   M76.891 (ICD-10-CM) - Hip flexor tendinitis, right      Evaluation Date: 8/1/2023  Authorization Period Expiration: 7/29/2024  Plan of Care Expiration: 9/29/2023 extend POC 11/30/23  Progress Note Due: 1019/2023  Visit # / Visits authorized: 16 / 20   Foto 1/3: 26%  FOTO: 2/ 3 (9/19/2023)  Foto 3/3 10/31/23 visit 15: 56%        PTA Visit #: 0/5     Precautions: HTN,       Time In: 955 am  Time Out: 1048  m  Total Billable Time:  53 minutes   TE 2 MT 1 NMR 1 + MHP x 0 min after session.     Subjective     Pt reports: better today walking better and very little to no pain.  She was compliant with home exercise program.  Response to previous treatment: mild pain with exercise and worse after exercise  Functional change: walking better again.    Pain: 1/10    Location: B hips    Objective      9/19/23 : reassessment and foto:   Foto improved from 26% on eval to 65% today    Trunk flexion 100 deg and lumbar extension 15 deg.     Hip Range of Motion:    Left active Right active    Flexion 109 110   Abduction 44 46   Extension 10 8   Ext. Rotation 34 35   Int. Rotation 25 26      Lower Extremity Strength  Right LE   Left LE     Knee extension: 5/5 Knee extension: 4+/5   Knee flexion: 4+/5 Knee flexion: 4+/5   Hip flexion: 4/5 Hip flexion: 4/5   Hip Internal Rotation:  NT    Hip Internal Rotation: NT      Hip External Rotation: NT    Hip External Rotation: NT      Hip extension:  4-5 Hip  "extension: 4-/5   Hip abduction: 4-/5 Hip abduction: 4/5   Hip adduction: 4/5 Hip adduction 4/5   Ankle dorsiflexion: 4+/5 Ankle dorsiflexion: 4+/5   Ankle plantarflexion: 4+/5 Ankle plantarflexion:        Treatment     Lucinda received the treatments listed below:      therapeutic exercises to develop strength, endurance, ROM, flexibility, posture, and core stabilization for 23  minutes including:      Nu-step x 6 min L2.       LTR stretch 3 x 30 secs  Bent knee fall out/single 2 x10  blue t-band      Bridges with abd BTB 3 x 10    Supine clams BTB 3 x 10      Hip add/frog stretch 3 x 30 secs  Trunk rotation stretch 3 x 30 secs  Elias test stretch x 1 min   Gait 100'    Supine heel slides with use of sliders x 20   Supine HS stretch with strap 3 x 30 sec  Supine ITB stretch with strap 3x 30 sec      Piriformis hook stretch 3 x 30" each  Seated HS stretch 2 x 20 secs   Supine frog stretch unilaterally 3 x 30"  DOUBLE KNEE TO CHEST with green t-ball 20x  .    Gait 3 x 50' side stepping VCs for form x 4 laps      manual therapy techniques: Manual traction, Myofacial release, and Soft tissue Mobilization were applied to the: iliacus, psoas, QL, TFL, glut medius and adductor nelson for 15 minutes, including:  Short axis traction 3x in session  Long axis traction  Lateral hip traction in hooklying    neuromuscular re-education activities to improve: Sense, Proprioception, and Posture for 15 minutes. The following activities were included:  Marching on trampoline vs blue foam 2 min x 2 NP  PPT x20   Adduction ball squeeze + PPT x 20 hold 2 secs  LOWER TRUNK ROTATION x 3 min medium amplitude with yellow ball.  Supine marching x 20 + PPT  Seated marching x 20 +PPT  Standing reciprocal marching, lateral toe taps, hip extension 2 x 10 giant set    therapeutic activities to improve functional performance for   minutes, including:      gait training to improve functional mobility and safety for   minutes, including:      hot " "pack for 10 minutes to  R adductor after session.     cold pack for 0 minutes to .    Patient Education and Home Exercises       Education provided:   - HEP  - Pt/family was provided educational information, including: role of PT, role of pt/caregiver, goals for PT, POC, scheduling, and attendance policy.- pt verbalized understanding.      Written Home Exercises Provided: yes. Exercises were reviewed and Lucinda was able to demonstrate them prior to the end of the session.  Lucinda demonstrated good  understanding of the education provided. See EMR under Patient Instructions for exercises provided during therapy sessions.       Assessment     Lucinda with increased overall tolerance in session with increased workload today and tolerance with session and improved gait skills with increased foot clearance without compensated trendelenburg.  Pt with good insight with good understanding of use of reciprocal standing exercise to mimic weight shift with walking to improve strength and carryover for motor control.     Lucinda Is progressing well towards her goals.   Pt prognosis is Good.     Pt will continue to benefit from skilled outpatient physical therapy to address the deficits listed in the problem list box on initial evaluation, provide pt/family education and to maximize pt's level of independence in the home and community environment.     Pt's spiritual, cultural and educational needs considered and pt agreeable to plan of care and goals.     Anticipated barriers to physical therapy: chronic nature and acute flareups    Goals: Short Term Goals: 4 weeks   -Lumbar AROM extension improved to 15 deg to demo improving mobility. MET 9/19/23  -Bilateral LE strength as tested 3/5 or better for improving stability and gait. MET 9/19/23  -NARROW BASE OF SUPPORT on foam x 30 " with minimum or no sway.  -Improved TUG to 12 s without AD for improving balance and gait.  -FOTO FS score improved to 30 for improving QOL. MET " "9/19/23     Long Term Goals: 8 weeks   -NARROW BASE OF SUPPORT with EYES CLOSED on foam x 30" with minimum sway  -Improved TUG to 10 s without AD for improving balance and gait.  -Tandem balance on floor (right leg back) 30" for improving balance.  -FOTO FS score improved to 35 for improving QOL. MET 9/19/23    Plan     Cont with POC extend POC to 11/30/23 2 x per week 5 weeks for remaining visits.     Nora Costa, PT     "

## 2023-11-08 ENCOUNTER — CLINICAL SUPPORT (OUTPATIENT)
Dept: REHABILITATION | Facility: HOSPITAL | Age: 73
End: 2023-11-08
Payer: MEDICARE

## 2023-11-08 DIAGNOSIS — R29.898 WEAKNESS OF BOTH HIPS: ICD-10-CM

## 2023-11-08 DIAGNOSIS — M25.551 BILATERAL HIP PAIN: Primary | ICD-10-CM

## 2023-11-08 DIAGNOSIS — M25.552 BILATERAL HIP PAIN: Primary | ICD-10-CM

## 2023-11-08 PROCEDURE — 97110 THERAPEUTIC EXERCISES: CPT | Mod: PO

## 2023-11-08 PROCEDURE — 97112 NEUROMUSCULAR REEDUCATION: CPT | Mod: PO

## 2023-11-08 PROCEDURE — 97010 HOT OR COLD PACKS THERAPY: CPT | Mod: PO

## 2023-11-08 PROCEDURE — 97140 MANUAL THERAPY 1/> REGIONS: CPT | Mod: PO

## 2023-11-09 ENCOUNTER — PATIENT MESSAGE (OUTPATIENT)
Dept: FAMILY MEDICINE | Facility: CLINIC | Age: 73
End: 2023-11-09
Payer: MEDICARE

## 2023-11-09 DIAGNOSIS — R21 RASH: Primary | ICD-10-CM

## 2023-11-09 NOTE — PROGRESS NOTES
ACUTE/SICK VISIT:    Nursing notes reviewed and accepted    ASSESSMENT/PLAN:  There are no diagnoses linked to this encounter.          SUBJECTIVE:  History of Present Illness:  Moustapha Suresh is an 6 year old male who presents with mother today.  They are here with the following chief complaint No chief complaint on file.      Moustapha Suresh is here for swelling of his cheek started last night .  Questions are answered by mother who acts as independent historian due to patient's age.    Appetite has been unchanged.  Moustapha Suresh has been sleeping well.  Activity is unchanged.    Social History:  Day care/School:  Yes: school  Exposure to someone at day care/school with similar symptoms:  No  Exposure to someone else at home with similar symptoms:  Mom not feeling well    REVIEW OF SYSTEMS:  See History of Present Illness; otherwise denies HEENT, NECK, RESPIRATORY, CARDIAC, GASTROINTESTINAL, GENITOURINARY, NEUROLOGIC or PSYCHIATRIC symptoms.    Past medical, family and social history reviewed and updated per family.    Current Outpatient Medications   Medication Sig Dispense Refill   • polyethylene glycol (MIRALAX) 17 GM/SCOOP powder Take 9 g by mouth daily. 255 g 3   • ibuprofen (CHILDRENS ADVIL) 100 MG/5ML suspension Take 10 mLs by mouth every 8 hours as needed for Pain. 273 mL 0   • acetaminophen (Tylenol Childrens) 160 MG/5ML suspension Take 7.5 mLs by mouth every 6 hours as needed for Fever. 237 mL 0     No current facility-administered medications for this visit.     ALLERGIES:  No Known Allergies    OBJECTIVE:  PHYSICAL EXAM:  There were no vitals taken for this visit.  General Appearance:  Alert, in no distress  HEENT:   Eyes:  Normal without erythema or drainage   Ears:  Right Tympanic Membrane:  Normal          Left Tympanic Membrane:  Normal   Nose:  Normal  Mouth/Oropharynx:  Moist membranes.  Neck:  Normal  Lungs:  Clear to auscultation  Heart:  Regular rate and rhythm and no  Subjective:       Patient ID: Lucinda Aguilera is a 72 y.o. female.    Chief Complaint: Cough (Going on since last Friday. Went to urgent care Sunday, neg for flu and covid) and Nasal Congestion    HPI  Patient reports nasal congestion, cough and mucus in throat x 1 week  Evaluated at urgent care over the weekend--flu and covid negative. Prescribed Augmentin. Denies fever, SOB or wheezing    She reports this is a recurrent problem. She denies smoking history or chronic persistent symptoms     GERD takes pantoprazole BID and pepcid daily --reflux symptoms controlled  Takes balsalazide BID     Vitals:    10/20/23 1111   BP: (!) 144/86   Pulse: 78   Temp: 98 °F (36.7 °C)     Review of Systems   Constitutional:  Negative for fever.   HENT:  Positive for congestion and postnasal drip. Negative for ear pain.    Respiratory:  Positive for cough. Negative for shortness of breath and wheezing.    Cardiovascular:  Negative for chest pain and leg swelling.       Past Medical History:   Diagnosis Date    Anticoagulant long-term use     Anxiety     takes daily Xanax    Arthritis     right thumb    Cataract     OU    Cholelithiasis     GERD (gastroesophageal reflux disease)     Hepatic steatosis     Hyperlipemia     Hypertension     PVD (posterior vitreous detachment)     OD     Objective:      Physical Exam  Vitals and nursing note reviewed.   Constitutional:       General: She is not in acute distress.     Appearance: She is not diaphoretic.   HENT:      Head: Normocephalic.      Right Ear: Tympanic membrane normal.      Left Ear: Tympanic membrane normal.   Eyes:      General:         Right eye: No discharge.         Left eye: No discharge.   Neck:      Trachea: No tracheal deviation.   Cardiovascular:      Rate and Rhythm: Normal rate.      Heart sounds: Normal heart sounds.   Pulmonary:      Effort: Pulmonary effort is normal.      Breath sounds: Normal breath sounds.   Skin:     Coloration: Skin is not pale.   Neurological:       Mental Status: She is alert and oriented to person, place, and time.   Psychiatric:         Speech: Speech normal.         Behavior: Behavior normal.         Thought Content: Thought content normal.         Judgment: Judgment normal.         Assessment:       1. Sinusitis, unspecified chronicity, unspecified location    2. Nasal congestion    3. Gastroesophageal reflux disease, unspecified whether esophagitis present        Plan:       Sinusitis, unspecified chronicity, unspecified location   Continue Augmentin as prescribed    Nasal congestion  -     fluticasone propionate (FLONASE) 50 mcg/actuation nasal spray; 1 spray (50 mcg total) by Each Nostril route once daily.  Dispense: 16 g; Refill: 3  - Start flonase and AH daily     Gastroesophageal reflux disease, unspecified whether esophagitis present   Symptoms controlled on current med regimen          Follow up for further evaluation if s/s worsen, fail to improve, or new symptoms arise.    Medication List with Changes/Refills   New Medications    FLUTICASONE PROPIONATE (FLONASE) 50 MCG/ACTUATION NASAL SPRAY    1 spray (50 mcg total) by Each Nostril route once daily.   Current Medications    ACETAMINOPHEN (TYLENOL) 500 MG TABLET    Take 500 mg by mouth every 6 (six) hours as needed for Pain.    AMOXICILLIN-CLAVULANATE 875-125MG (AUGMENTIN) 875-125 MG PER TABLET    Take 1 tablet by mouth 2 (two) times daily.    ASCORBIC ACID, VITAMIN C, (VITAMIN C) 250 MG TABLET    Take 500 mg by mouth once daily.     ASPIRIN (ECOTRIN) 81 MG EC TABLET    Take 81 mg by mouth every evening.    ATENOLOL (TENORMIN) 25 MG TABLET    Take 1 tablet (25 mg total) by mouth every evening.    BALSALAZIDE (COLAZAL) 750 MG CAPSULE    TAKE 3 CAPSULES(2250 MG) BY MOUTH TWICE DAILY WITH MEALS    BENZONATATE (TESSALON) 200 MG CAPSULE    Take 200 mg by mouth.    BUPROPION (WELLBUTRIN XL) 150 MG TB24 TABLET    Take 1 tablet (150 mg total) by mouth once daily.    DILTIAZEM (DILACOR XR) 240 MG CDCR  murmurs, clicks or gallops  Abdomen:  Soft, non-distended, bowel sounds normal  Skin:  Normal, no rashes or lesions            Amber Conteh MD, IBCLC  11/9/2023  2:17 PM                           Take 1 capsule (240 mg total) by mouth every evening.    DOCUSATE SODIUM (COLACE) 100 MG CAPSULE    Take 100 mg by mouth 2 (two) times daily.    FAMOTIDINE (PEPCID) 40 MG TABLET    Take 1 tablet (40 mg total) by mouth nightly.    FUROSEMIDE (LASIX) 20 MG TABLET    Take 1 tablet (20 mg total) by mouth daily as needed (edema). 2 pm on an empty stomach if swollen.    GUAIFENESIN (MUCINEX) 600 MG 12 HR TABLET    Take 1,200 mg by mouth 2 (two) times daily.    LISINOPRIL (PRINIVIL,ZESTRIL) 40 MG TABLET    Take 1 tablet (40 mg total) by mouth 2 (two) times a day.    LORAZEPAM (ATIVAN) 0.5 MG TABLET    TAKE 1 TABLET(0.5 MG) BY MOUTH TWICE DAILY    MAGNESIUM OXIDE (MAG-OX) 400 MG (241.3 MG MAGNESIUM) TABLET    Take 1 tablet (400 mg total) by mouth once daily.    MULTIVITAMIN (THERAGRAN) PER TABLET    Take 1 tablet by mouth once daily.    NIACIN 500 MG CPSR    Take 500 mg by mouth every evening.    ONDANSETRON (ZOFRAN-ODT) 4 MG TBDL    Take 1 tablet (4 mg total) by mouth every 6 (six) hours as needed (nausea).    PANTOPRAZOLE (PROTONIX) 40 MG TABLET    Take 1 tablet (40 mg total) by mouth 2 (two) times daily.    POLYETHYLENE GLYCOL (GLYCOLAX) 17 GRAM/DOSE POWDER    Take 17 g by mouth every other day.    PRAVASTATIN (PRAVACHOL) 40 MG TABLET    Take 1 tablet (40 mg total) by mouth once daily.    PRAVASTATIN (PRAVACHOL) 40 MG TABLET    TAKE 1 TABLET(40 MG) BY MOUTH EVERY DAY    PSYLLIUM HUSK, ASPARTAME, (NATURAL PSYLLIUM FIBER) 3.4 GRAM/5.8 GRAM POWD    Take by mouth.

## 2023-11-11 ENCOUNTER — HOSPITAL ENCOUNTER (OUTPATIENT)
Dept: RADIOLOGY | Facility: HOSPITAL | Age: 73
Discharge: HOME | End: 2023-11-11
Attending: FAMILY MEDICINE
Payer: MEDICARE

## 2023-11-11 DIAGNOSIS — Z12.31 ENCOUNTER FOR SCREENING MAMMOGRAM FOR MALIGNANT NEOPLASM OF BREAST: ICD-10-CM

## 2023-11-11 PROCEDURE — 77067 MAMMO DIGITAL SCREENING BILAT WITH TOMO: ICD-10-PCS | Mod: 26,,, | Performed by: RADIOLOGY

## 2023-11-11 PROCEDURE — 77063 BREAST TOMOSYNTHESIS BI: CPT | Mod: 26,,, | Performed by: RADIOLOGY

## 2023-11-11 PROCEDURE — 77063 MAMMO DIGITAL SCREENING BILAT WITH TOMO: ICD-10-PCS | Mod: 26,,, | Performed by: RADIOLOGY

## 2023-11-11 PROCEDURE — 77067 SCR MAMMO BI INCL CAD: CPT | Mod: 26,,, | Performed by: RADIOLOGY

## 2023-11-11 PROCEDURE — 77067 SCR MAMMO BI INCL CAD: CPT | Mod: TC,PO

## 2023-11-12 NOTE — PROGRESS NOTES
[facility]   Physical Therapy Treatment Note      Name: Lucinda Aguilera  Clinic Number: [id number]    Therapy Diagnosis:   Encounter Diagnoses   Name Primary?    Bilateral hip pain Yes    Weakness of both hips            Physician:     Visit Date: 11/8/2023  Physician Orders: PT Eval and Treat   Post Surgical? No   Eval and Treat Yes   Type of Therapy Outpatient Therapy   Location: Hip      Medical Diagnosis from Referral:   M70.61 (ICD-10-CM) - Greater trochanteric bursitis of right hip   M76.891 (ICD-10-CM) - Hip flexor tendinitis, right      Evaluation Date: 8/1/2023  Authorization Period Expiration: 7/29/2024  Plan of Care Expiration: 9/29/2023 extend POC 11/30/23  Progress Note Due: 10/19/2023  Visit # / Visits authorized: 16 / 20   Foto 1/3: 26%  FOTO: 2/ 3 (9/19/2023)  Foto 3/3 10/31/23 visit 15: 56%        PTA Visit #: 0/5     Precautions: HTN,       Time In: 155 pm  Time Out: 240 pm  Total Billable Time:  45 minutes   TE 1 MT 1 NMR 1 + MHP x 10 min after session.     Subjective     Pt reports: better today walking better and very little to no pain reported even with work this week.  She was compliant with home exercise program.  Response to previous treatment: mild pain with exercise and worse after exercise  Functional change: walking better again.    Pain: 1/10    Location: B hips    Objective      9/19/23 : reassessment and foto:   Foto improved from 26% on eval to 65% today    Trunk flexion 100 deg and lumbar extension 15 deg.     Hip Range of Motion:    Left active Right active    Flexion 109 110   Abduction 44 46   Extension 10 8   Ext. Rotation 34 35   Int. Rotation 25 26      Lower Extremity Strength  Right LE   Left LE     Knee extension: 5/5 Knee extension: 4+/5   Knee flexion: 4+/5 Knee flexion: 4+/5   Hip flexion: 4/5 Hip flexion: 4/5   Hip Internal Rotation:  NT    Hip Internal Rotation: NT      Hip External Rotation: NT    Hip External Rotation: NT      Hip extension:  " 4-5 Hip extension: 4-/5   Hip abduction: 4-/5 Hip abduction: 4/5   Hip adduction: 4/5 Hip adduction 4/5   Ankle dorsiflexion: 4+/5 Ankle dorsiflexion: 4+/5   Ankle plantarflexion: 4+/5 Ankle plantarflexion:        Treatment     Lucinda received the treatments listed below:      therapeutic exercises to develop strength, endurance, ROM, flexibility, posture, and core stabilization for 23  minutes including:      Nu-step x 6 min L2.       LTR stretch 3 x 30 secs  Bent knee fall out/single 2 x10  blue t-band      Bridges with abd BTB 3 x 10    Supine clams BTB 3 x 10      Hip add/frog stretch 3 x 30 secs  Trunk rotation stretch 3 x 30 secs  Elias test stretch x 1 min   Gait 100'    Supine heel slides with use of sliders x 20   Supine HS stretch with strap 3 x 30 sec  Supine ITB stretch with strap 3x 30 sec      Piriformis hook stretch 3 x 30" each  Seated HS stretch 2 x 20 secs   Supine frog stretch unilaterally 3 x 30"  DOUBLE KNEE TO CHEST with green t-ball 20x  .    Gait 3 x 50' side stepping VCs for form x 4 laps      manual therapy techniques: Manual traction, Myofacial release, and Soft tissue Mobilization were applied to the: iliacus, psoas, QL, TFL, glut medius and adductor nelson for 14 minutes, including:  Short axis traction 3x in session  Long axis traction  Lateral hip traction in hooklying    neuromuscular re-education activities to improve: Sense, Proprioception, and Posture for 8 minutes. The following activities were included:  Marching on trampoline vs blue foam 2 min x 2 NP  PPT x20   Adduction ball squeeze + PPT x 20 hold 2 secs  LOWER TRUNK ROTATION x 3 min medium amplitude with yellow ball.  Supine marching x 20 + PPT  Seated marching x 20 +PPT  Standing reciprocal marching, lateral toe taps, hip extension 2 x 10 giant set    therapeutic activities to improve functional performance for   minutes, including:      gait training to improve functional mobility and safety for   minutes, " "including:      hot pack for 10 minutes to  R adductor after session.     cold pack for 0 minutes to .    Patient Education and Home Exercises       Education provided:   - HEP  - Pt/family was provided educational information, including: role of PT, role of pt/caregiver, goals for PT, POC, scheduling, and attendance policy.- pt verbalized understanding.      Written Home Exercises Provided: yes. Exercises were reviewed and Lucinda was able to demonstrate them prior to the end of the session.  Lucinda demonstrated good  understanding of the education provided. See EMR under Patient Instructions for exercises provided during therapy sessions.       Assessment     Lucinda with increased overall tolerance in session with increased workload today and tolerance with session and improved gait skills and exercise tolerance even with work this week. Pt with good carryover with less overall pain and less overall guarding.    Lucinda Is progressing well towards her goals.   Pt prognosis is Good.     Pt will continue to benefit from skilled outpatient physical therapy to address the deficits listed in the problem list box on initial evaluation, provide pt/family education and to maximize pt's level of independence in the home and community environment.     Pt's spiritual, cultural and educational needs considered and pt agreeable to plan of care and goals.     Anticipated barriers to physical therapy: chronic nature and acute flareups    Goals: Short Term Goals: 4 weeks   -Lumbar AROM extension improved to 15 deg to demo improving mobility. MET 9/19/23  -Bilateral LE strength as tested 3/5 or better for improving stability and gait. MET 9/19/23  -NARROW BASE OF SUPPORT on foam x 30 " with minimum or no sway.  -Improved TUG to 12 s without AD for improving balance and gait.  -FOTO FS score improved to 30 for improving QOL. MET 9/19/23     Long Term Goals: 8 weeks   -NARROW BASE OF SUPPORT with EYES CLOSED on foam x 30" with " "minimum sway  -Improved TUG to 10 s without AD for improving balance and gait.  -Tandem balance on floor (right leg back) 30" for improving balance.  -FOTO FS score improved to 35 for improving QOL. MET 9/19/23    Plan     Cont with POC extend POC to 11/30/23 2 x per week 5 weeks for remaining visits.     Nora Costa, PT     "

## 2023-11-13 ENCOUNTER — CLINICAL SUPPORT (OUTPATIENT)
Dept: REHABILITATION | Facility: HOSPITAL | Age: 73
End: 2023-11-13
Payer: MEDICARE

## 2023-11-13 DIAGNOSIS — R29.898 WEAKNESS OF BOTH HIPS: ICD-10-CM

## 2023-11-13 DIAGNOSIS — M25.552 BILATERAL HIP PAIN: Primary | ICD-10-CM

## 2023-11-13 DIAGNOSIS — M25.551 BILATERAL HIP PAIN: Primary | ICD-10-CM

## 2023-11-13 PROCEDURE — 97010 HOT OR COLD PACKS THERAPY: CPT | Mod: PO

## 2023-11-13 PROCEDURE — 97112 NEUROMUSCULAR REEDUCATION: CPT | Mod: PO

## 2023-11-13 PROCEDURE — 97140 MANUAL THERAPY 1/> REGIONS: CPT | Mod: PO

## 2023-11-13 PROCEDURE — 97110 THERAPEUTIC EXERCISES: CPT | Mod: PO

## 2023-11-13 NOTE — PROGRESS NOTES
[facility]   Physical Therapy Treatment Note      Name: Lucinda Aguilera  Clinic Number: [id number]    Therapy Diagnosis:   Encounter Diagnoses   Name Primary?    Bilateral hip pain Yes    Weakness of both hips          Physician: Chandrika Santiago FNP     Visit Date: 11/13/2023  Physician Orders: PT Eval and Treat   Post Surgical? No   Eval and Treat Yes   Type of Therapy Outpatient Therapy   Location: Hip      Medical Diagnosis from Referral:   M70.61 (ICD-10-CM) - Greater trochanteric bursitis of right hip   M76.891 (ICD-10-CM) - Hip flexor tendinitis, right      Evaluation Date: 8/1/2023  Authorization Period Expiration: 7/29/2024  Plan of Care Expiration: 9/29/2023 extend POC 11/30/23  Progress Note Due: 10/19/2023  Visit # / Visits authorized: 16 / 20   Foto 1/3: 26%  FOTO: 2/ 3 (9/19/2023)  Foto 3/3 10/31/23 visit 15: 56%        PTA Visit #: 0/5     Precautions: HTN,       Time In: 1050 am  Time Out: 1135 pm  Total Billable Time:  45 minutes   (1050- 1128 38 min)  TE 1 MT 1 NMR 1 + MHP x 10 min after session.     Subjective     Pt reports: better today walking better and very little to no pain reported even with fall last week on Friday after tripped by  sliding his chair into my walk path.     She was compliant with home exercise program.  Response to previous treatment: mild pain with exercise and worse after exercise  Functional change: walking better again.    Pain: 1/10    Location: B hips    Objective      9/19/23 : reassessment and foto:   Foto improved from 26% on eval to 65% today    Trunk flexion 100 deg and lumbar extension 15 deg.     Hip Range of Motion:    Left active Right active    Flexion 109 110   Abduction 44 46   Extension 10 8   Ext. Rotation 34 35   Int. Rotation 25 26      Lower Extremity Strength  Right LE   Left LE     Knee extension: 5/5 Knee extension: 4+/5   Knee flexion: 4+/5 Knee flexion: 4+/5   Hip flexion: 4/5 Hip flexion: 4/5   Hip Internal  "Rotation:  NT    Hip Internal Rotation: NT      Hip External Rotation: NT    Hip External Rotation: NT      Hip extension:  4-5 Hip extension: 4-/5   Hip abduction: 4-/5 Hip abduction: 4/5   Hip adduction: 4/5 Hip adduction 4/5   Ankle dorsiflexion: 4+/5 Ankle dorsiflexion: 4+/5   Ankle plantarflexion: 4+/5 Ankle plantarflexion:        Treatment     Lucinda received the treatments listed below:      therapeutic exercises to develop strength, endurance, ROM, flexibility, posture, and core stabilization for 23  minutes including:      Nu-step x 6 min L2.       LTR stretch 3 x 30 secs  Bent knee fall out/single 2 x10  blue t-band      Bridges with abd BTB 3 x 10    Supine clams BTB 3 x 10      Hip add/frog stretch 3 x 30 secs  Trunk rotation stretch 3 x 30 secs  Elias test stretch x 1 min   Gait 100'    Supine heel slides with use of sliders x 20   Supine HS stretch with strap 3 x 30 sec  Supine ITB stretch with strap 3x 30 sec      Piriformis hook stretch 3 x 30" each  Seated HS stretch 2 x 20 secs   Supine frog stretch unilaterally 3 x 30"  DOUBLE KNEE TO CHEST with green t-ball 20x  .    Gait 3 x 50' side stepping VCs for form x 4 laps      manual therapy techniques: Manual traction, Myofacial release, and Soft tissue Mobilization were applied to the: iliacus, psoas, QL, TFL, glut medius and adductor nelson for 14 minutes, including:  Short axis traction 3x in session  Long axis traction  Lateral hip traction in hooklying    neuromuscular re-education activities to improve: Sense, Proprioception, and Posture for 8 minutes. The following activities were included:  Marching on trampoline vs blue foam 2 min x 2 NP  PPT x20   Adduction ball squeeze + PPT x 20 hold 2 secs  LOWER TRUNK ROTATION x 3 min medium amplitude with yellow ball.  Supine marching x 20 + PPT  Seated marching x 20 +PPT  Standing reciprocal marching, lateral toe taps, hip extension 2 x 10 giant set    therapeutic activities to improve functional " "performance for   minutes, including:      gait training to improve functional mobility and safety for   minutes, including:      hot pack for 10 minutes to  R adductor after session.     cold pack for 0 minutes to .    Patient Education and Home Exercises       Education provided:   - HEP  - Pt/family was provided educational information, including: role of PT, role of pt/caregiver, goals for PT, POC, scheduling, and attendance policy.- pt verbalized understanding.      Written Home Exercises Provided: yes. Exercises were reviewed and Lucinda was able to demonstrate them prior to the end of the session.  Lucinda demonstrated good  understanding of the education provided. See EMR under Patient Instructions for exercises provided during therapy sessions.       Assessment     Lucinda with increased overall tolerance in session with increased workload today and tolerance with session and improved gait skills and exercise tolerance even with work this week slated for 2 days with day rest in between. Pt with good insight and no issues with fall on Friday. .    Lucinda Is progressing well towards her goals.   Pt prognosis is Good.     Pt will continue to benefit from skilled outpatient physical therapy to address the deficits listed in the problem list box on initial evaluation, provide pt/family education and to maximize pt's level of independence in the home and community environment.     Pt's spiritual, cultural and educational needs considered and pt agreeable to plan of care and goals.     Anticipated barriers to physical therapy: chronic nature and acute flareups    Goals: Short Term Goals: 4 weeks   -Lumbar AROM extension improved to 15 deg to demo improving mobility. MET 9/19/23  -Bilateral LE strength as tested 3/5 or better for improving stability and gait. MET 9/19/23  -NARROW BASE OF SUPPORT on foam x 30 " with minimum or no sway.  -Improved TUG to 12 s without AD for improving balance and gait.  -FOTO FS " "score improved to 30 for improving QOL. MET 9/19/23     Long Term Goals: 8 weeks   -NARROW BASE OF SUPPORT with EYES CLOSED on foam x 30" with minimum sway  -Improved TUG to 10 s without AD for improving balance and gait.  -Tandem balance on floor (right leg back) 30" for improving balance.  -FOTO FS score improved to 35 for improving QOL. MET 9/19/23    Plan     Cont with POC extend POC to 11/30/23 2 x per week 5 weeks for remaining visits.     Nora Costa, PT     "

## 2023-11-17 ENCOUNTER — OFFICE VISIT (OUTPATIENT)
Dept: FAMILY MEDICINE | Facility: CLINIC | Age: 73
End: 2023-11-17
Payer: MEDICARE

## 2023-11-17 VITALS
HEART RATE: 81 BPM | WEIGHT: 169.75 LBS | SYSTOLIC BLOOD PRESSURE: 124 MMHG | TEMPERATURE: 98 F | DIASTOLIC BLOOD PRESSURE: 72 MMHG | HEIGHT: 63 IN | OXYGEN SATURATION: 95 % | BODY MASS INDEX: 30.08 KG/M2

## 2023-11-17 DIAGNOSIS — I10 ESSENTIAL HYPERTENSION: Primary | ICD-10-CM

## 2023-11-17 DIAGNOSIS — R05.9 COUGH, UNSPECIFIED TYPE: ICD-10-CM

## 2023-11-17 DIAGNOSIS — R11.0 NAUSEA: ICD-10-CM

## 2023-11-17 PROCEDURE — 99213 PR OFFICE/OUTPT VISIT, EST, LEVL III, 20-29 MIN: ICD-10-PCS | Mod: S$PBB,,, | Performed by: NURSE PRACTITIONER

## 2023-11-17 PROCEDURE — 99213 OFFICE O/P EST LOW 20 MIN: CPT | Mod: S$PBB,,, | Performed by: NURSE PRACTITIONER

## 2023-11-17 PROCEDURE — 99213 OFFICE O/P EST LOW 20 MIN: CPT | Mod: PBBFAC,PO | Performed by: NURSE PRACTITIONER

## 2023-11-17 PROCEDURE — 99999 PR PBB SHADOW E&M-EST. PATIENT-LVL III: CPT | Mod: PBBFAC,,, | Performed by: NURSE PRACTITIONER

## 2023-11-17 PROCEDURE — 99999 PR PBB SHADOW E&M-EST. PATIENT-LVL III: ICD-10-PCS | Mod: PBBFAC,,, | Performed by: NURSE PRACTITIONER

## 2023-11-17 RX ORDER — ONDANSETRON 4 MG/1
4 TABLET, ORALLY DISINTEGRATING ORAL EVERY 6 HOURS PRN
Qty: 30 TABLET | Refills: 3 | Status: SHIPPED | OUTPATIENT
Start: 2023-11-17

## 2023-11-17 RX ORDER — BENZONATATE 200 MG/1
200 CAPSULE ORAL 3 TIMES DAILY PRN
Qty: 30 CAPSULE | Refills: 1 | Status: SHIPPED | OUTPATIENT
Start: 2023-11-17 | End: 2024-01-03 | Stop reason: SDUPTHER

## 2023-11-17 NOTE — PROGRESS NOTES
Subjective:       Patient ID: Lucinda Aguilera is a 72 y.o. female.    Chief Complaint: Hypertension    HPI  HTN now controlled with addition of diltiazem per nephrology    Cough: resolved with daily flonase, antihistamine and tessalon perles prn     GERD: compliant with meds; discussed trigger avoidance as this can cause cough    Requesting zofran refill    Vitals:    11/17/23 1456   BP: 124/72   Pulse: 81   Temp: 98.3 °F (36.8 °C)     Review of Systems   Constitutional:  Negative for fever.   HENT:  Negative for postnasal drip and sore throat.    Respiratory:  Negative for shortness of breath and wheezing.    Cardiovascular:  Negative for chest pain.       Past Medical History:   Diagnosis Date    Anticoagulant long-term use     Anxiety     takes daily Xanax    Arthritis     right thumb    Cataract     OU    Cholelithiasis     GERD (gastroesophageal reflux disease)     Hepatic steatosis     Hyperlipemia     Hypertension     PVD (posterior vitreous detachment)     OD     Objective:      Physical Exam  Constitutional:       General: She is not in acute distress.     Appearance: She is well-developed. She is not ill-appearing, toxic-appearing or diaphoretic.   HENT:      Right Ear: Hearing normal.      Left Ear: Hearing normal.   Pulmonary:      Effort: No tachypnea or respiratory distress.   Skin:     Coloration: Skin is not pale.   Neurological:      Mental Status: She is alert and oriented to person, place, and time.   Psychiatric:         Speech: Speech normal.         Behavior: Behavior normal.         Thought Content: Thought content normal.         Judgment: Judgment normal.         Assessment:       1. Essential hypertension    2. Nausea    3. Cough, unspecified type        Plan:       Essential hypertension    Nausea  -     ondansetron (ZOFRAN-ODT) 4 MG TbDL; Take 1 tablet (4 mg total) by mouth every 6 (six) hours as needed (nausea).  Dispense: 30 tablet; Refill: 3    Cough, unspecified type  -      benzonatate (TESSALON) 200 MG capsule; Take 1 capsule (200 mg total) by mouth 3 (three) times daily as needed for Cough.  Dispense: 30 capsule; Refill: 1          FU routinely with PCP       Medication List with Changes/Refills   Current Medications    ACETAMINOPHEN (TYLENOL) 500 MG TABLET    Take 500 mg by mouth every 6 (six) hours as needed for Pain.    AMOXICILLIN-CLAVULANATE 875-125MG (AUGMENTIN) 875-125 MG PER TABLET    Take 1 tablet by mouth 2 (two) times daily.    ASCORBIC ACID, VITAMIN C, (VITAMIN C) 250 MG TABLET    Take 500 mg by mouth once daily.     ASPIRIN (ECOTRIN) 81 MG EC TABLET    Take 81 mg by mouth every evening.    ATENOLOL (TENORMIN) 25 MG TABLET    Take 1 tablet (25 mg total) by mouth every evening.    BALSALAZIDE (COLAZAL) 750 MG CAPSULE    TAKE 3 CAPSULES(2250 MG) BY MOUTH TWICE DAILY WITH MEALS    BUPROPION (WELLBUTRIN XL) 150 MG TB24 TABLET    Take 1 tablet (150 mg total) by mouth once daily.    DILTIAZEM (DILACOR XR) 240 MG CDCR    Take 1 capsule (240 mg total) by mouth every evening.    DOCUSATE SODIUM (COLACE) 100 MG CAPSULE    Take 100 mg by mouth 2 (two) times daily.    FAMOTIDINE (PEPCID) 40 MG TABLET    Take 1 tablet (40 mg total) by mouth nightly.    FLUTICASONE PROPIONATE (FLONASE) 50 MCG/ACTUATION NASAL SPRAY    1 spray (50 mcg total) by Each Nostril route once daily.    FUROSEMIDE (LASIX) 20 MG TABLET    Take 1 tablet (20 mg total) by mouth daily as needed (edema). 2 pm on an empty stomach if swollen.    GUAIFENESIN (MUCINEX) 600 MG 12 HR TABLET    Take 1,200 mg by mouth 2 (two) times daily.    LISINOPRIL (PRINIVIL,ZESTRIL) 40 MG TABLET    Take 1 tablet (40 mg total) by mouth 2 (two) times a day.    LORAZEPAM (ATIVAN) 0.5 MG TABLET    TAKE 1 TABLET(0.5 MG) BY MOUTH TWICE DAILY    MAGNESIUM OXIDE (MAG-OX) 400 MG (241.3 MG MAGNESIUM) TABLET    Take 1 tablet (400 mg total) by mouth once daily.    MULTIVITAMIN (THERAGRAN) PER TABLET    Take 1 tablet by mouth once daily.    NIACIN 500  MG CPSR    Take 500 mg by mouth every evening.    PANTOPRAZOLE (PROTONIX) 40 MG TABLET    Take 1 tablet (40 mg total) by mouth 2 (two) times daily.    POLYETHYLENE GLYCOL (GLYCOLAX) 17 GRAM/DOSE POWDER    Take 17 g by mouth every other day.    PRAVASTATIN (PRAVACHOL) 40 MG TABLET    Take 1 tablet (40 mg total) by mouth once daily.    PRAVASTATIN (PRAVACHOL) 40 MG TABLET    TAKE 1 TABLET(40 MG) BY MOUTH EVERY DAY    PSYLLIUM HUSK, ASPARTAME, (NATURAL PSYLLIUM FIBER) 3.4 GRAM/5.8 GRAM POWD    Take by mouth.   Changed and/or Refilled Medications    Modified Medication Previous Medication    BENZONATATE (TESSALON) 200 MG CAPSULE benzonatate (TESSALON) 200 MG capsule       Take 1 capsule (200 mg total) by mouth 3 (three) times daily as needed for Cough.    Take 200 mg by mouth.    ONDANSETRON (ZOFRAN-ODT) 4 MG TBDL ondansetron (ZOFRAN-ODT) 4 MG TbDL       Take 1 tablet (4 mg total) by mouth every 6 (six) hours as needed (nausea).    Take 1 tablet (4 mg total) by mouth every 6 (six) hours as needed (nausea).   Discontinued Medications    METHYLPREDNISOLONE (MEDROL DOSEPACK) 4 MG TABLET    use as directed

## 2023-11-18 ENCOUNTER — PATIENT MESSAGE (OUTPATIENT)
Dept: ADMINISTRATIVE | Facility: OTHER | Age: 73
End: 2023-11-18
Payer: MEDICARE

## 2023-11-20 ENCOUNTER — OFFICE VISIT (OUTPATIENT)
Dept: FAMILY MEDICINE | Facility: CLINIC | Age: 73
End: 2023-11-20
Payer: MEDICARE

## 2023-11-20 VITALS
OXYGEN SATURATION: 95 % | HEART RATE: 71 BPM | BODY MASS INDEX: 29.9 KG/M2 | WEIGHT: 168.75 LBS | DIASTOLIC BLOOD PRESSURE: 78 MMHG | SYSTOLIC BLOOD PRESSURE: 128 MMHG | HEIGHT: 63 IN

## 2023-11-20 DIAGNOSIS — E78.5 HYPERLIPIDEMIA, UNSPECIFIED HYPERLIPIDEMIA TYPE: ICD-10-CM

## 2023-11-20 DIAGNOSIS — K21.9 GASTROESOPHAGEAL REFLUX DISEASE, UNSPECIFIED WHETHER ESOPHAGITIS PRESENT: ICD-10-CM

## 2023-11-20 DIAGNOSIS — M25.552 BILATERAL HIP PAIN: ICD-10-CM

## 2023-11-20 DIAGNOSIS — M25.551 BILATERAL HIP PAIN: ICD-10-CM

## 2023-11-20 DIAGNOSIS — I10 PRIMARY HYPERTENSION: Primary | ICD-10-CM

## 2023-11-20 PROCEDURE — 99215 OFFICE O/P EST HI 40 MIN: CPT | Mod: PBBFAC,PO | Performed by: NURSE PRACTITIONER

## 2023-11-20 PROCEDURE — 99999 PR PBB SHADOW E&M-EST. PATIENT-LVL V: ICD-10-PCS | Mod: PBBFAC,,, | Performed by: NURSE PRACTITIONER

## 2023-11-20 PROCEDURE — 99214 OFFICE O/P EST MOD 30 MIN: CPT | Mod: S$PBB,,, | Performed by: NURSE PRACTITIONER

## 2023-11-20 PROCEDURE — 99214 PR OFFICE/OUTPT VISIT, EST, LEVL IV, 30-39 MIN: ICD-10-PCS | Mod: S$PBB,,, | Performed by: NURSE PRACTITIONER

## 2023-11-20 PROCEDURE — 99999 PR PBB SHADOW E&M-EST. PATIENT-LVL V: CPT | Mod: PBBFAC,,, | Performed by: NURSE PRACTITIONER

## 2023-11-20 NOTE — PROGRESS NOTES
Subjective     Patient ID: Lucinda Aguilera is a 72 y.o. female.    Chief Complaint: Annual Exam and Hip Pain    Seeing orthopedics tomorrow. Due for immunizations. HTN stable, recent follow up with nephrology stable. Gerd generally well controlled on omeprazole and pepcid, some occasional cough at night when lying down,  she is doing PT currently also does some aquatic exercises. Last labs in August were stable.  Seeing orthopedics for left hip pain tomorrow.    Hip Pain     Review of Systems   Constitutional: Negative.    HENT: Negative.     Respiratory: Negative.     Cardiovascular: Negative.    Gastrointestinal: Negative.    Genitourinary: Negative.    Musculoskeletal:  Positive for arthralgias.   Neurological: Negative.           Objective     Physical Exam  Constitutional:       Appearance: Normal appearance.   HENT:      Head: Normocephalic and atraumatic.   Eyes:      Pupils: Pupils are equal, round, and reactive to light.   Cardiovascular:      Rate and Rhythm: Normal rate and regular rhythm.      Heart sounds: No murmur heard.  Pulmonary:      Effort: Pulmonary effort is normal. No respiratory distress.      Breath sounds: Normal breath sounds.   Musculoskeletal:      Right lower leg: No edema.      Left lower leg: No edema.   Neurological:      Mental Status: She is alert and oriented to person, place, and time.   Psychiatric:         Mood and Affect: Mood normal.         Behavior: Behavior normal.          Assessment and Plan     1. Primary hypertension    2. Gastroesophageal reflux disease, unspecified whether esophagitis present    3. Hyperlipidemia, unspecified hyperlipidemia type    4. Bilateral hip pain        HTN stable, labs utd, vaccines today as discussed, follow up in August for labs recheck, continue PT, follow up with orthopedics as planned.          No follow-ups on file.

## 2023-11-21 ENCOUNTER — OFFICE VISIT (OUTPATIENT)
Dept: ORTHOPEDICS | Facility: CLINIC | Age: 73
End: 2023-11-21
Payer: MEDICARE

## 2023-11-21 VITALS — BODY MASS INDEX: 29.77 KG/M2 | HEIGHT: 63 IN | WEIGHT: 168 LBS

## 2023-11-21 DIAGNOSIS — M70.61 GREATER TROCHANTERIC BURSITIS OF RIGHT HIP: Primary | ICD-10-CM

## 2023-11-21 DIAGNOSIS — S86.892A LEFT MEDIAL TIBIAL STRESS SYNDROME, INITIAL ENCOUNTER: ICD-10-CM

## 2023-11-21 DIAGNOSIS — S86.891A POSTERIOR SHIN SPLINTS, RIGHT, INITIAL ENCOUNTER: ICD-10-CM

## 2023-11-21 PROCEDURE — 20610 LARGE JOINT ASPIRATION/INJECTION: R GREATER TROCHANTERIC BURSA: ICD-10-PCS | Mod: S$PBB,RT,, | Performed by: NURSE PRACTITIONER

## 2023-11-21 PROCEDURE — 99213 PR OFFICE/OUTPT VISIT, EST, LEVL III, 20-29 MIN: ICD-10-PCS | Mod: S$PBB,25,, | Performed by: NURSE PRACTITIONER

## 2023-11-21 PROCEDURE — 99999PBSHW PR PBB SHADOW TECHNICAL ONLY FILED TO HB: Mod: PBBFAC,,,

## 2023-11-21 PROCEDURE — 99999PBSHW PR PBB SHADOW TECHNICAL ONLY FILED TO HB: ICD-10-PCS | Mod: PBBFAC,,,

## 2023-11-21 PROCEDURE — 99999 PR PBB SHADOW E&M-EST. PATIENT-LVL IV: CPT | Mod: PBBFAC,,, | Performed by: NURSE PRACTITIONER

## 2023-11-21 PROCEDURE — 20610 DRAIN/INJ JOINT/BURSA W/O US: CPT | Mod: PBBFAC,PO | Performed by: NURSE PRACTITIONER

## 2023-11-21 PROCEDURE — 99213 OFFICE O/P EST LOW 20 MIN: CPT | Mod: S$PBB,25,, | Performed by: NURSE PRACTITIONER

## 2023-11-21 PROCEDURE — 20610 DRAIN/INJ JOINT/BURSA W/O US: CPT | Mod: S$PBB,RT,, | Performed by: NURSE PRACTITIONER

## 2023-11-21 PROCEDURE — 99999 PR PBB SHADOW E&M-EST. PATIENT-LVL IV: ICD-10-PCS | Mod: PBBFAC,,, | Performed by: NURSE PRACTITIONER

## 2023-11-21 PROCEDURE — 99214 OFFICE O/P EST MOD 30 MIN: CPT | Mod: PBBFAC,PO | Performed by: NURSE PRACTITIONER

## 2023-11-21 RX ORDER — TRIAMCINOLONE ACETONIDE 40 MG/ML
40 INJECTION, SUSPENSION INTRA-ARTICULAR; INTRAMUSCULAR
Status: DISCONTINUED | OUTPATIENT
Start: 2023-11-21 | End: 2023-11-21 | Stop reason: HOSPADM

## 2023-11-21 RX ADMIN — TRIAMCINOLONE ACETONIDE 40 MG: 40 INJECTION, SUSPENSION INTRA-ARTICULAR; INTRAMUSCULAR at 08:11

## 2023-11-22 NOTE — PROGRESS NOTES
"    Chief Complaint   Patient presents with    Right Hip - Pain    Right Lower Leg - Pain    Left Lower Leg - Pain      HPI:   This is a 72 y.o. who presents to clinic today for right hip pain for the past 2 weeks after no known trauma. Complains of pain while sleeping on side and when descending stairs. Pain is dull and deep. No numbness or tingling. She also reports pain to bilateral shins. She did have to do more walking than her normal over the weekend for a wedding.       Past Medical History:   Diagnosis Date    Anticoagulant long-term use     Anxiety     takes daily Xanax    Arthritis     right thumb    Cataract     OU    Cholelithiasis     GERD (gastroesophageal reflux disease)     Hepatic steatosis     Hyperlipemia     Hypertension     PVD (posterior vitreous detachment)     OD     Past Surgical History:   Procedure Laterality Date    BREAST BIOPSY Left 08/28/2020    stereo biopsy    BREAST BIOPSY Left 10/07/2020    benign excisional biopsy    chest abcess      child    COLONOSCOPY  01/06/2012    Dr. Lopez: hemorrhoids, redundant colon    COLONOSCOPY N/A 02/17/2021    Dr. Lopez: Congested and erythematous mucosa in the rectum, in the recto-sigmoid colon and in the sigmoid colon, proctosigmoid colitis, Redundant colon; biopsy: focal active colitis "nonspecific but can be seen with acute self-limited colitis (infection), medication effect (e.g. NSAID use), proximity to diverticula, or early/evolving IBD    ESOPHAGOGASTRODUODENOSCOPY N/A 06/06/2019    Dr. Lopez: small hiatal hernia, Non-erosive esophageal reflux (NERD) disease?., slight antritis; biopsy: Antral mucosa with chemical/reactive gastropathy. negative for h pylori    ESOPHAGOGASTRODUODENOSCOPY N/A 02/17/2021    Procedure: EGD (ESOPHAGOGASTRODUODENOSCOPY);  Surgeon: Nathan Lopez Jr., MD;  Location: [facility];  Service: Endoscopy;  Laterality: N/A; Minimal gastritis; biopsy: stomach- negative for h pylori, active chronic gastritis with focal " features of erosive gastritis, duodenum WNL    ESOPHAGOGASTRODUODENOSCOPY N/A 05/10/2022    Procedure: EGD (ESOPHAGOGASTRODUODENOSCOPY);  Surgeon: Nathan Lopez Jr., MD;  Location: [facility];  Service: Endoscopy;  Laterality: N/A;    EXCISIONAL BIOPSY Left 10/07/2020    Procedure: EXCISIONAL BIOPSY-Left with radiological marker;  Surgeon: Brooklynn Ashford MD;  Location: [facility];  Service: General;  Laterality: Left;    FRACTURE SURGERY  2010    Left thumb repaired surgically    JOINT REPLACEMENT Bilateral     knee    KNEE ARTHROSCOPY W/ LASER      right    LAPAROSCOPIC CHOLECYSTECTOMY N/A 06/14/2019    Procedure: CHOLECYSTECTOMY, LAPAROSCOPIC;  Surgeon: Guevara Evans MD;  Location: [facility];  Service: General;  Laterality: N/A;    thumb surgery  11/2014    TOTAL KNEE ARTHROPLASTY Left 06/19/2018    Procedure: REPLACEMENT-KNEE-TOTAL;  Surgeon: Nj Melvin MD;  Location: [address];  Service: Orthopedics;  Laterality: Left;  Ruy    UPPER GASTROINTESTINAL ENDOSCOPY  07/13/2017    Dr. Lopez: NERD, Gastric mucosal atrophy. antritis, Scar in the incisura. Biopsied; biopsy: chronic gastritis. negative for h pylori     Current Outpatient Medications on File Prior to Visit   Medication Sig Dispense Refill    acetaminophen (TYLENOL) 500 MG tablet Take 500 mg by mouth every 6 (six) hours as needed for Pain.      ascorbic acid, vitamin C, (VITAMIN C) 250 MG tablet Take 500 mg by mouth once daily.       aspirin (ECOTRIN) 81 MG EC tablet Take 81 mg by mouth every evening.      atenoloL (TENORMIN) 25 MG tablet Take 1 tablet (25 mg total) by mouth every evening. 90 tablet 3    balsalazide (COLAZAL) 750 mg capsule TAKE 3 CAPSULES(2250 MG) BY MOUTH TWICE DAILY WITH MEALS 180 capsule 11    benzonatate (TESSALON) 200 MG capsule Take 1 capsule (200 mg total) by mouth 3 (three) times daily as needed for Cough. 30 capsule 1    buPROPion (WELLBUTRIN XL) 150 MG TB24 tablet Take 1 tablet (150 mg total) by mouth once daily.  90 tablet 3    diltiaZEM (DILACOR XR) 240 MG CDCR Take 1 capsule (240 mg total) by mouth every evening. 90 capsule 3    docusate sodium (COLACE) 100 MG capsule Take 100 mg by mouth 2 (two) times daily.      famotidine (PEPCID) 40 MG tablet Take 1 tablet (40 mg total) by mouth nightly. 90 tablet 3    flu vac  65up-cisGH02U,PF, (FLUAD QUAD -,65Y UP,,PF,) 60 mcg (15 mcg x 4)/0.5 mL Syrg Inject 0.5 mLs into the muscle once. for 1 dose 0.5 mL 0    fluticasone propionate (FLONASE) 50 mcg/actuation nasal spray 1 spray (50 mcg total) by Each Nostril route once daily. 16 g 3    furosemide (LASIX) 20 MG tablet Take 1 tablet (20 mg total) by mouth daily as needed (edema). 2 pm on an empty stomach if swollen. 90 tablet 3    guaiFENesin (MUCINEX) 600 mg 12 hr tablet Take 1,200 mg by mouth 2 (two) times daily.      lisinopriL (PRINIVIL,ZESTRIL) 40 MG tablet Take 1 tablet (40 mg total) by mouth 2 (two) times a day. 180 tablet 3    LORazepam (ATIVAN) 0.5 MG tablet TAKE 1 TABLET(0.5 MG) BY MOUTH TWICE DAILY 60 tablet 5    magnesium oxide (MAG-OX) 400 mg (241.3 mg magnesium) tablet Take 1 tablet (400 mg total) by mouth once daily.  0    multivitamin (THERAGRAN) per tablet Take 1 tablet by mouth once daily.      niacin 500 MG CpSR Take 500 mg by mouth every evening.      ondansetron (ZOFRAN-ODT) 4 MG TbDL Take 1 tablet (4 mg total) by mouth every 6 (six) hours as needed (nausea). 30 tablet 3    pantoprazole (PROTONIX) 40 MG tablet Take 1 tablet (40 mg total) by mouth 2 (two) times daily. 180 tablet 3    polyethylene glycol (GLYCOLAX) 17 gram/dose powder Take 17 g by mouth every other day.      pravastatin (PRAVACHOL) 40 MG tablet Take 1 tablet (40 mg total) by mouth once daily. 90 tablet 3    psyllium husk, aspartame, (NATURAL PSYLLIUM FIBER) 3.4 gram/5.8 gram Powd Take by mouth.      [] sars-cov-2, covid-19, (SPIKEVAX, MODERNA,, 12YRS AND UP ,) 50 mcg/0.5 mL injection Inject 0.5 mLs into the muscle once. Inject  0.5 mL into the muscle once. for 1 dose 0.5 mL 0     No current facility-administered medications on file prior to visit.     Review of patient's allergies indicates:  No Known Allergies  Family History   Problem Relation Age of Onset    Hypertension Mother     Heart disease Mother     Glaucoma Mother     Stroke Father     Glaucoma Sister     Cataracts Sister     Macular degeneration Sister     Breast cancer Neg Hx     Colon cancer Neg Hx     Crohn's disease Neg Hx     Ulcerative colitis Neg Hx     Stomach cancer Neg Hx     Esophageal cancer Neg Hx     Celiac disease Neg Hx     Amblyopia Neg Hx     Blindness Neg Hx     Retinal detachment Neg Hx     Strabismus Neg Hx      Social History     Socioeconomic History    Marital status:     Number of children: 2   Occupational History    Occupation: retired teacher and principal    Occupation: substitute   Tobacco Use    Smoking status: Never    Smokeless tobacco: Never   Substance and Sexual Activity    Alcohol use: Yes     Comment: social- maybe once every few months    Drug use: No    Sexual activity: Yes     Partners: Male     Birth control/protection: Post-menopausal, None     Social Determinants of Health     Financial Resource Strain: Low Risk  (11/9/2023)    Overall Financial Resource Strain (CARDIA)     Difficulty of Paying Living Expenses: Not hard at all   Food Insecurity: No Food Insecurity (11/9/2023)    Hunger Vital Sign     Worried About Running Out of Food in the Last Year: Never true     Ran Out of Food in the Last Year: Never true   Transportation Needs: No Transportation Needs (11/9/2023)    PRAPARE - Transportation     Lack of Transportation (Medical): No     Lack of Transportation (Non-Medical): No   Physical Activity: Sufficiently Active (11/9/2023)    Exercise Vital Sign     Days of Exercise per Week: 5 days     Minutes of Exercise per Session: 50 min   Recent Concern: Physical Activity - Insufficiently Active (10/20/2023)    Exercise Vital  Sign     Days of Exercise per Week: 2 days     Minutes of Exercise per Session: 20 min   Stress: Unknown (11/9/2023)    Citizen of [place] of Occupational Health - Occupational Stress Questionnaire     Feeling of Stress : Patient refused   Social Connections: Unknown (11/9/2023)    Social Connection and Isolation Panel [NHANES]     Frequency of Communication with Friends and Family: More than three times a week     Frequency of Social Gatherings with Friends and Family: Twice a week     Active Member of Clubs or Organizations: Yes     Attends Club or Organization Meetings: Never     Marital Status:    Housing Stability: Low Risk  (11/9/2023)    Housing Stability Vital Sign     Unable to Pay for Housing in the Last Year: No     Number of Places Lived in the Last Year: 1     Unstable Housing in the Last Year: No       Review of Systems:  Constitutional:  Denies fever or chills   Eyes:  Denies change in visual acuity   HENT:  Denies nasal congestion or sore throat   Respiratory:  Denies cough or shortness of breath   Cardiovascular:  Denies chest pain or edema   GI:  Denies abdominal pain, nausea, vomiting, bloody stools or diarrhea   :  Denies dysuria   Integument:  Denies rash   Neurologic:  Denies headache, focal weakness or sensory changes   Endocrine:  Denies polyuria or polydipsia   Lymphatic:  Denies swollen glands   Psychiatric:  Denies depression or anxiety     Physical Exam:   Constitutional:  Well developed, well nourished, no acute distress, non-toxic appearance   Integument:  Well hydrated, no rash   Lymphatic:  No lymphadenopathy noted   Neurologic:  Alert & oriented x 3  Psychiatric:  Speech and behavior appropriate     Bilateral Hip Exam  Left Hip Exam   Left hip exam performed same as contralateral hip and is normal.     Right Hip Exam   Tenderness   The patient is experiencing tenderness in the greater trochanter.  Range of Motion   The patient has normal hip ROM.  Muscle Strength   Abduction:  4/5   Other   Erythema: absent  Sensation: normal  Pulse: present        Greater trochanteric bursitis of right hip  -     Large Joint Aspiration/Injection: R greater trochanteric bursa  -     triamcinolone acetonide injection 40 mg    Left medial tibial stress syndrome, initial encounter    Posterior shin splints, right, initial encounter       She has no TTP to shins. We discussed her complaint sounds like shin splints. She needs activity modifications for a few weeks; ice and nsaids prn.     Using an aseptic technique, I injected 5 cc of lidocaine 1% without and 1 cc of kenalog 40mg into the right Hip. The patient tolerated this well.     Rtc as needed.

## 2023-11-22 NOTE — PROCEDURES
Large Joint Aspiration/Injection: R greater trochanteric bursa    Date/Time: 11/21/2023 8:40 AM    Performed by: Chandrika Santiago FNP  Authorized by: Chandrika Santiago FNP    Consent Done?:  Yes (Verbal)  Indications:  Pain  Timeout: prior to procedure the correct patient, procedure, and site was verified    Prep: patient was prepped and draped in usual sterile fashion      Local anesthesia used?: Yes    Local anesthetic:  Lidocaine 1% without epinephrine  Anesthetic total (ml):  5      Details:  Needle Size:  22 G  Approach:  Lateral  Location:  Hip  Site:  R greater trochanteric bursa  Medications:  40 mg triamcinolone acetonide 40 mg/mL  Patient tolerance:  Patient tolerated the procedure well with no immediate complications

## 2023-11-27 ENCOUNTER — CLINICAL SUPPORT (OUTPATIENT)
Dept: REHABILITATION | Facility: HOSPITAL | Age: 73
End: 2023-11-27
Payer: MEDICARE

## 2023-11-27 DIAGNOSIS — M53.86 DECREASED ROM OF LUMBAR SPINE: ICD-10-CM

## 2023-11-27 DIAGNOSIS — M25.551 BILATERAL HIP PAIN: ICD-10-CM

## 2023-11-27 DIAGNOSIS — M25.552 BILATERAL HIP PAIN: ICD-10-CM

## 2023-11-27 DIAGNOSIS — M47.816 LUMBAR SPONDYLOSIS: Primary | ICD-10-CM

## 2023-11-27 PROCEDURE — 97112 NEUROMUSCULAR REEDUCATION: CPT | Mod: PO

## 2023-11-27 PROCEDURE — 97140 MANUAL THERAPY 1/> REGIONS: CPT | Mod: PO

## 2023-11-27 PROCEDURE — 97110 THERAPEUTIC EXERCISES: CPT | Mod: PO

## 2023-11-28 NOTE — PROGRESS NOTES
[facility]   Physical Therapy Treatment Note      Name: Lucinda Aguilera  Clinic Number: [id number]    Therapy Diagnosis:   Encounter Diagnoses   Name Primary?    Lumbar spondylosis Yes    Decreased ROM of lumbar spine     Bilateral hip pain        Physician: Chandrika Santiago FNP     Visit Date: 11/27/2023  Physician Orders: PT Eval and Treat   Post Surgical? No   Eval and Treat Yes   Type of Therapy Outpatient Therapy   Location: Hip      Medical Diagnosis from Referral:   M70.61 (ICD-10-CM) - Greater trochanteric bursitis of right hip   M76.891 (ICD-10-CM) - Hip flexor tendinitis, right      Evaluation Date: 8/1/2023  Authorization Period Expiration: 7/29/2024  Plan of Care Expiration: 9/29/2023 extend POC 11/30/23  Progress Note Due: 10/19/2023  Visit # / Visits authorized: 16 / 20   Foto 1/3: 26%  FOTO: 2/ 3 (9/19/2023)  Foto 3/3 10/31/23 visit 15: 56%        PTA Visit #: 0/5     Precautions: HTN,       Time In: 1055 am  Time Out: 1148 pm  Total Billable Time:  53 minutes  TE 2 MT 1 NMR 1 + MHP x 0 min after session.     Subjective     Pt reports: better today walking better and very little to no pain reported even with the holidays and activities without issues. .     She was compliant with home exercise program.  Response to previous treatment: mild pain with exercise and worse after exercise  Functional change: walking better again.    Pain: 1/10    Location: B hips    Objective      9/19/23 : reassessment and foto:   Foto improved from 26% on eval to 65% today    Trunk flexion 100 deg and lumbar extension 15 deg.     Hip Range of Motion:    Left active Right active    Flexion 109 110   Abduction 44 46   Extension 10 8   Ext. Rotation 34 35   Int. Rotation 25 26      Lower Extremity Strength  Right LE   Left LE     Knee extension: 5/5 Knee extension: 4+/5   Knee flexion: 4+/5 Knee flexion: 4+/5   Hip flexion: 4/5 Hip flexion: 4/5   Hip Internal Rotation:  NT    Hip Internal Rotation: NT  "     Hip External Rotation: NT    Hip External Rotation: NT      Hip extension:  4-5 Hip extension: 4-/5   Hip abduction: 4-/5 Hip abduction: 4/5   Hip adduction: 4/5 Hip adduction 4/5   Ankle dorsiflexion: 4+/5 Ankle dorsiflexion: 4+/5   Ankle plantarflexion: 4+/5 Ankle plantarflexion:        Treatment     Lucinda received the treatments listed below:      therapeutic exercises to develop strength, endurance, ROM, flexibility, posture, and core stabilization for 28  minutes including:      Nu-step x 6 min L2.       LTR stretch 3 x 30 secs  Bent knee fall out/single 2 x10  blue t-band      Bridges with abd BTB 3 x 10    Supine clams BTB 3 x 10      Hip add/frog stretch 3 x 30 secs  Trunk rotation stretch 3 x 30 secs  Elias test stretch x 1 min   Gait 100'    Supine heel slides with use of sliders x 20   Supine HS stretch with strap 3 x 30 sec  Supine ITB stretch with strap 3x 30 sec      Piriformis hook stretch 3 x 30" each  Seated HS stretch 2 x 20 secs   Supine frog stretch unilaterally 3 x 30"  DOUBLE KNEE TO CHEST with green t-ball 20x  .    Gait 3 x 50' side stepping VCs for form x 4 laps      manual therapy techniques: Manual traction, Myofacial release, and Soft tissue Mobilization were applied to the: iliacus, psoas, QL, TFL, glut medius and adductor nelson for 15 minutes, including:  Short axis traction 3x in session  Long axis traction  Lateral hip traction in hooklying    neuromuscular re-education activities to improve: Sense, Proprioception, and Posture for 10 minutes. The following activities were included:  Marching on trampoline vs blue foam 2 min x 2 NP  PPT x20   Adduction ball squeeze + PPT x 20 hold 2 secs  LOWER TRUNK ROTATION x 3 min medium amplitude with yellow ball.  Supine marching x 20 + PPT  Seated marching x 20 +PPT  Standing reciprocal marching, lateral toe taps, hip extension 3 x 10 giant set    therapeutic activities to improve functional performance for   minutes, " "including:      gait training to improve functional mobility and safety for   minutes, including:      hot pack for 10 minutes to  R adductor after session.     cold pack for 0 minutes to .    Patient Education and Home Exercises       Education provided:   - HEP  - Pt/family was provided educational information, including: role of PT, role of pt/caregiver, goals for PT, POC, scheduling, and attendance policy.- pt verbalized understanding.      Written Home Exercises Provided: yes. Exercises were reviewed and Lucinda was able to demonstrate them prior to the end of the session.  Lucinda demonstrated good  understanding of the education provided. See EMR under Patient Instructions for exercises provided during therapy sessions.       Assessment     Lucinda with increased tolerance with exercise and she continues to progress with standing exercise and walking tolerance with holidays and activities without exacerbation. Pt continues to progress overall with hip strength.     Lucinda Is progressing well towards her goals.   Pt prognosis is Good.     Pt will continue to benefit from skilled outpatient physical therapy to address the deficits listed in the problem list box on initial evaluation, provide pt/family education and to maximize pt's level of independence in the home and community environment.     Pt's spiritual, cultural and educational needs considered and pt agreeable to plan of care and goals.     Anticipated barriers to physical therapy: chronic nature and acute flareups    Goals: Short Term Goals: 4 weeks   -Lumbar AROM extension improved to 15 deg to demo improving mobility. MET 9/19/23  -Bilateral LE strength as tested 3/5 or better for improving stability and gait. MET 9/19/23  -NARROW BASE OF SUPPORT on foam x 30 " with minimum or no sway.  -Improved TUG to 12 s without AD for improving balance and gait.  -FOTO FS score improved to 30 for improving QOL. MET 9/19/23     Long Term Goals: 8 weeks " "  -NARROW BASE OF SUPPORT with EYES CLOSED on foam x 30" with minimum sway  -Improved TUG to 10 s without AD for improving balance and gait.  -Tandem balance on floor (right leg back) 30" for improving balance.  -FOTO FS score improved to 35 for improving QOL. MET 9/19/23    Plan     Cont with POC extend POC to 11/30/23 2 x per week 5 weeks for remaining visits.     Nora Costa, PT     "

## 2023-11-29 ENCOUNTER — TELEPHONE (OUTPATIENT)
Dept: FAMILY MEDICINE | Facility: CLINIC | Age: 73
End: 2023-11-29
Payer: MEDICARE

## 2023-11-30 ENCOUNTER — EXTERNAL CHRONIC CARE MANAGEMENT (OUTPATIENT)
Dept: PRIMARY CARE CLINIC | Facility: CLINIC | Age: 73
End: 2023-11-30
Payer: MEDICARE

## 2023-11-30 PROCEDURE — 99490 CHRNC CARE MGMT STAFF 1ST 20: CPT | Mod: S$PBB,,, | Performed by: FAMILY MEDICINE

## 2023-11-30 PROCEDURE — 99490 CHRNC CARE MGMT STAFF 1ST 20: CPT | Mod: PBBFAC,PO | Performed by: FAMILY MEDICINE

## 2023-11-30 PROCEDURE — 99490 PR CHRONIC CARE MGMT, 1ST 20 MIN: ICD-10-PCS | Mod: S$PBB,,, | Performed by: FAMILY MEDICINE

## 2023-12-17 ENCOUNTER — PATIENT MESSAGE (OUTPATIENT)
Dept: ADMINISTRATIVE | Facility: OTHER | Age: 73
End: 2023-12-17
Payer: MEDICARE

## 2023-12-28 DIAGNOSIS — M89.8X6 BILATERAL TIBIAL PAIN: Primary | ICD-10-CM

## 2023-12-31 ENCOUNTER — EXTERNAL CHRONIC CARE MANAGEMENT (OUTPATIENT)
Dept: PRIMARY CARE CLINIC | Facility: CLINIC | Age: 73
End: 2023-12-31
Payer: MEDICARE

## 2023-12-31 PROCEDURE — 99490 CHRNC CARE MGMT STAFF 1ST 20: CPT | Mod: PBBFAC,PO | Performed by: FAMILY MEDICINE

## 2023-12-31 PROCEDURE — 99490 CHRNC CARE MGMT STAFF 1ST 20: CPT | Mod: S$PBB,,, | Performed by: FAMILY MEDICINE

## 2024-01-02 ENCOUNTER — OFFICE VISIT (OUTPATIENT)
Dept: GASTROENTEROLOGY | Facility: CLINIC | Age: 74
End: 2024-01-02
Payer: MEDICARE

## 2024-01-02 VITALS — HEIGHT: 63 IN | WEIGHT: 171.5 LBS | BODY MASS INDEX: 30.39 KG/M2

## 2024-01-02 DIAGNOSIS — R11.0 NAUSEA: ICD-10-CM

## 2024-01-02 DIAGNOSIS — K59.09 CHRONIC CONSTIPATION: ICD-10-CM

## 2024-01-02 DIAGNOSIS — K21.9 GASTROESOPHAGEAL REFLUX DISEASE WITHOUT ESOPHAGITIS: ICD-10-CM

## 2024-01-02 DIAGNOSIS — K52.9 COLITIS: ICD-10-CM

## 2024-01-02 DIAGNOSIS — R10.13 EPIGASTRIC PAIN: Primary | ICD-10-CM

## 2024-01-02 PROCEDURE — 99999 PR PBB SHADOW E&M-EST. PATIENT-LVL IV: CPT | Mod: PBBFAC,,, | Performed by: NURSE PRACTITIONER

## 2024-01-02 PROCEDURE — 99214 OFFICE O/P EST MOD 30 MIN: CPT | Mod: PBBFAC,PO | Performed by: NURSE PRACTITIONER

## 2024-01-02 PROCEDURE — 99214 OFFICE O/P EST MOD 30 MIN: CPT | Mod: S$PBB,,, | Performed by: NURSE PRACTITIONER

## 2024-01-02 NOTE — PROGRESS NOTES
Subjective:       Patient ID: Lucinda Aguilera is a 73 y.o. female, Body mass index is 30.38 kg/m².    Chief Complaint: Constipation      Established patient of Stephanie Coleman, JANUARY & myself.     Abdominal Pain  This is a chronic problem. The current episode started more than 1 month ago. The onset quality is sudden. The problem occurs intermittently. The problem has been gradually worsening. The pain is located in the epigastric region. The quality of the pain is aching. The abdominal pain does not radiate. Associated symptoms include constipation (describes stool as type 1 on bristol scale; taking Colace 100 mg BID and OTC Miralax every other day) and nausea (Zofran helps). Pertinent negatives include no anorexia, belching, diarrhea, dysuria, fever, flatus, frequency, hematochezia, melena, vomiting or weight loss. The pain is aggravated by eating and palpation. The pain is relieved by Belching. She has tried proton pump inhibitors and H2 blockers (Currently: Protonix 40 mg once daily and Famotidine 40 mg nightly) for the symptoms. The treatment provided mild relief. Prior diagnostic workup includes GI consult, CT scan, upper endoscopy and lower endoscopy (GES). Her past medical history is significant for abdominal surgery, gallstones (Hx of cholecystectomy), GERD and irritable bowel syndrome. There is no history of colon cancer, Crohn's disease, pancreatitis, PUD or ulcerative colitis (Hx of colitis- taking Balsalazide 750 mg 3 capsules BID).     Review of Systems   Constitutional:  Negative for appetite change, fever, unexpected weight change and weight loss.   HENT:  Negative for trouble swallowing.    Respiratory:  Negative for cough and shortness of breath.    Cardiovascular:  Negative for chest pain.   Gastrointestinal:  Positive for abdominal pain, constipation (describes stool as type 1 on bristol scale; taking Colace 100 mg BID and OTC Miralax every other day) and nausea (Zofran helps). Negative  "for abdominal distention, anal bleeding, anorexia, blood in stool, diarrhea, flatus, hematochezia, melena, rectal pain and vomiting.   Genitourinary:  Negative for difficulty urinating, dysuria and frequency.   Musculoskeletal:  Negative for gait problem.   Skin:  Negative for rash.   Neurological:  Negative for speech difficulty.   Psychiatric/Behavioral:  Negative for confusion.        Past Medical History:   Diagnosis Date    Anticoagulant long-term use     Anxiety     takes daily Xanax    Arthritis     right thumb    Cataract     OU    Cholelithiasis     GERD (gastroesophageal reflux disease)     Hepatic steatosis     Hyperlipemia     Hypertension     PVD (posterior vitreous detachment)     OD      Past Surgical History:   Procedure Laterality Date    BREAST BIOPSY Left 08/28/2020    stereo biopsy    BREAST BIOPSY Left 10/07/2020    benign excisional biopsy    chest abcess      child    COLONOSCOPY  01/06/2012    Dr. Lopez: hemorrhoids, redundant colon    COLONOSCOPY N/A 02/17/2021    Dr. Lopez: Congested and erythematous mucosa in the rectum, in the recto-sigmoid colon and in the sigmoid colon, proctosigmoid colitis, Redundant colon; biopsy: focal active colitis "nonspecific but can be seen with acute self-limited colitis (infection), medication effect (e.g. NSAID use), proximity to diverticula, or early/evolving IBD    ESOPHAGOGASTRODUODENOSCOPY N/A 06/06/2019    Dr. Lopez: small hiatal hernia, Non-erosive esophageal reflux (NERD) disease?., slight antritis; biopsy: Antral mucosa with chemical/reactive gastropathy. negative for h pylori    ESOPHAGOGASTRODUODENOSCOPY N/A 02/17/2021    Procedure: EGD (ESOPHAGOGASTRODUODENOSCOPY);  Surgeon: Nathan Lopez Jr., MD;  Location: [facility];  Service: Endoscopy;  Laterality: N/A; Minimal gastritis; biopsy: stomach- negative for h pylori, active chronic gastritis with focal features of erosive gastritis, duodenum WNL    ESOPHAGOGASTRODUODENOSCOPY N/A 05/10/2022    " Procedure: EGD (ESOPHAGOGASTRODUODENOSCOPY);  Surgeon: Nathan Lopez Jr., MD;  Location: [facility] ENDO;  Service: Endoscopy;  Laterality: N/A;    EXCISIONAL BIOPSY Left 10/07/2020    Procedure: EXCISIONAL BIOPSY-Left with radiological marker;  Surgeon: Brooklynn Ashford MD;  Location: [facility];  Service: General;  Laterality: Left;    FRACTURE SURGERY  2010    Left thumb repaired surgically    JOINT REPLACEMENT Bilateral     knee    KNEE ARTHROSCOPY W/ LASER      right    LAPAROSCOPIC CHOLECYSTECTOMY N/A 06/14/2019    Procedure: CHOLECYSTECTOMY, LAPAROSCOPIC;  Surgeon: Guevara Evans MD;  Location: [facility] OR;  Service: General;  Laterality: N/A;    thumb surgery  11/2014    TOTAL KNEE ARTHROPLASTY Left 06/19/2018    Procedure: REPLACEMENT-KNEE-TOTAL;  Surgeon: Nj Melvin MD;  Location: [address];  Service: Orthopedics;  Laterality: Left;  [employer]    UPPER GASTROINTESTINAL ENDOSCOPY  07/13/2017    Dr. Lopez: NERD, Gastric mucosal atrophy. antritis, Scar in the incisura. Biopsied; biopsy: chronic gastritis. negative for h pylori      Family History   Problem Relation Age of Onset    Hypertension Mother     Heart disease Mother     Glaucoma Mother     Stroke Father     Glaucoma Sister     Cataracts Sister     Macular degeneration Sister     Breast cancer Neg Hx     Colon cancer Neg Hx     Crohn's disease Neg Hx     Ulcerative colitis Neg Hx     Stomach cancer Neg Hx     Esophageal cancer Neg Hx     Celiac disease Neg Hx     Amblyopia Neg Hx     Blindness Neg Hx     Retinal detachment Neg Hx     Strabismus Neg Hx       Wt Readings from Last 10 Encounters:   01/02/24 77.8 kg (171 lb 8.3 oz)   11/21/23 76.2 kg (168 lb)   11/20/23 76.5 kg (168 lb 12.2 oz)   11/17/23 77 kg (169 lb 12.1 oz)   10/20/23 77.2 kg (170 lb 3.1 oz)   10/17/23 76.2 kg (168 lb)   10/10/23 76.5 kg (168 lb 10.4 oz)   09/07/23 76.5 kg (168 lb 10.4 oz)   08/21/23 75.8 kg (167 lb)   08/21/23 75.3 kg (166 lb 0.1 oz)     Lab Results   Component  Value Date    WBC 9.35 08/19/2023    HGB 14.6 08/19/2023    HCT 44.2 08/19/2023    MCV 91 08/19/2023     08/19/2023     CMP  Sodium   Date Value Ref Range Status   10/03/2023 138 136 - 145 mmol/L Final     Potassium   Date Value Ref Range Status   10/03/2023 4.0 3.5 - 5.1 mmol/L Final     Chloride   Date Value Ref Range Status   10/03/2023 105 95 - 110 mmol/L Final     CO2   Date Value Ref Range Status   10/03/2023 29 23 - 29 mmol/L Final     Glucose   Date Value Ref Range Status   10/03/2023 69 (L) 70 - 110 mg/dL Final     BUN   Date Value Ref Range Status   10/03/2023 9 8 - 23 mg/dL Final     Creatinine   Date Value Ref Range Status   10/03/2023 0.7 0.5 - 1.4 mg/dL Final     Calcium   Date Value Ref Range Status   10/03/2023 9.2 8.7 - 10.5 mg/dL Final     Total Protein   Date Value Ref Range Status   08/19/2023 7.8 6.0 - 8.4 g/dL Final     Albumin   Date Value Ref Range Status   10/03/2023 3.7 3.5 - 5.2 g/dL Final     Total Bilirubin   Date Value Ref Range Status   08/19/2023 0.4 0.1 - 1.0 mg/dL Final     Comment:     For infants and newborns, interpretation of results should be based  on gestational age, weight and in agreement with clinical  observations.    Premature Infant recommended reference ranges:  Up to 24 hours.............<8.0 mg/dL  Up to 48 hours............<12.0 mg/dL  3-5 days..................<15.0 mg/dL  6-29 days.................<15.0 mg/dL       Alkaline Phosphatase   Date Value Ref Range Status   08/19/2023 89 55 - 135 U/L Final     AST   Date Value Ref Range Status   08/19/2023 43 (H) 10 - 40 U/L Final     ALT   Date Value Ref Range Status   08/19/2023 31 10 - 44 U/L Final     Anion Gap   Date Value Ref Range Status   10/03/2023 4 (L) 8 - 16 mmol/L Final     eGFR if    Date Value Ref Range Status   12/29/2021 >60.0 >60 mL/min/1.73 m^2 Final     eGFR if non    Date Value Ref Range Status   12/29/2021 >60.0 >60 mL/min/1.73 m^2 Final     Comment:      Calculation used to obtain the estimated glomerular filtration  rate (eGFR) is the CKD-EPI equation.          Lab Results   Component Value Date    LIPASE 47 09/10/2019     Lab Results   Component Value Date    LIPASERES 183 11/27/2020             Reviewed prior medical records including radiology report of GES 7/12/22 and CT of abdomen and pelvis 6/2/22 & endoscopy history (see surgical history).     Objective:      Physical Exam  Constitutional:       General: She is not in acute distress.     Appearance: She is well-developed.   HENT:      Head: Normocephalic.      Right Ear: Hearing normal.      Left Ear: Hearing normal.      Nose: Nose normal.      Mouth/Throat:      Mouth: No oral lesions.      Pharynx: Uvula midline.   Eyes:      General: Lids are normal.      Conjunctiva/sclera: Conjunctivae normal.      Pupils: Pupils are equal, round, and reactive to light.   Neck:      Trachea: Trachea normal.   Cardiovascular:      Rate and Rhythm: Regular rhythm.      Heart sounds: Normal heart sounds. No murmur heard.  Pulmonary:      Effort: Pulmonary effort is normal. No respiratory distress.      Breath sounds: Normal breath sounds. No stridor. No wheezing.   Abdominal:      General: Bowel sounds are normal. There is no distension.      Palpations: Abdomen is soft. There is no mass.      Tenderness: There is abdominal tenderness in the right upper quadrant, epigastric area and left upper quadrant. There is no guarding or rebound.   Musculoskeletal:         General: Normal range of motion.      Cervical back: Normal range of motion.   Skin:     General: Skin is warm and dry.      Findings: No rash.      Comments: Non jaundiced   Neurological:      Mental Status: She is alert and oriented to person, place, and time.   Psychiatric:         Speech: Speech normal.         Behavior: Behavior normal. Behavior is cooperative.           Assessment:       1. Epigastric pain    2. Nausea    3. Gastroesophageal reflux disease  without esophagitis    4. Chronic constipation    5. Colitis           Plan:   All diagnoses and orders for this visit:    Epigastric pain, Nausea & Gastroesophageal reflux disease without esophagitis   - Schedule EGD    - Continue Protonix 40 mg once daily   - Continue Famotidine 40 mg nightly   - Continue Zofran 4 mg every six hours PRN   - Take PPI in the morning 30 minutes before breakfast  - Recommend to avoid large meals, avoid eating within 3 hours of bedtime, elevate head of bed if nocturnal symptoms are present, smoking cessation (if current smoker), & weight loss (if overweight).   - Recommend minimize/avoid high-fat foods, chocolate, caffeine, citrus, alcohol, & tomato products.  - Advised to avoid/limit use of NSAID's, since they can cause GI upset, bleeding, and/or ulcers. If needed, take with food.      Chronic constipation   - Recommend daily exercise as tolerated, adequate water intake, and high fiber diet.   - Recommend OTC fiber supplement (Benefiber)  - Continue Colace 100 mg BID  - Increase OTC MiraLax to once daily     Colitis   - Continue Balsalazide 750 mg 3 capsules BID   - Next surveillance colonoscopy due 2/2026    If no improvement in symptoms or symptoms worsen, call/follow-up at clinic or go to ER

## 2024-01-03 DIAGNOSIS — R05.9 COUGH, UNSPECIFIED TYPE: ICD-10-CM

## 2024-01-03 RX ORDER — BENZONATATE 200 MG/1
200 CAPSULE ORAL 3 TIMES DAILY PRN
Qty: 30 CAPSULE | Refills: 1 | Status: SHIPPED | OUTPATIENT
Start: 2024-01-03

## 2024-01-04 ENCOUNTER — HOSPITAL ENCOUNTER (OUTPATIENT)
Dept: RADIOLOGY | Facility: HOSPITAL | Age: 74
Discharge: HOME | End: 2024-01-04
Attending: ORTHOPAEDIC SURGERY
Payer: MEDICARE

## 2024-01-04 ENCOUNTER — OFFICE VISIT (OUTPATIENT)
Dept: ORTHOPEDICS | Facility: CLINIC | Age: 74
End: 2024-01-04
Payer: MEDICARE

## 2024-01-04 ENCOUNTER — PATIENT MESSAGE (OUTPATIENT)
Dept: ORTHOPEDICS | Facility: CLINIC | Age: 74
End: 2024-01-04

## 2024-01-04 DIAGNOSIS — M70.61 GREATER TROCHANTERIC BURSITIS OF RIGHT HIP: Primary | ICD-10-CM

## 2024-01-04 DIAGNOSIS — M89.8X6 BILATERAL TIBIAL PAIN: ICD-10-CM

## 2024-01-04 PROCEDURE — 73590 X-RAY EXAM OF LOWER LEG: CPT | Mod: 26,50,, | Performed by: RADIOLOGY

## 2024-01-04 PROCEDURE — 99214 OFFICE O/P EST MOD 30 MIN: CPT | Mod: S$PBB,,, | Performed by: ORTHOPAEDIC SURGERY

## 2024-01-04 PROCEDURE — 73590 X-RAY EXAM OF LOWER LEG: CPT | Mod: TC,50,PO

## 2024-01-04 PROCEDURE — 99213 OFFICE O/P EST LOW 20 MIN: CPT | Mod: PBBFAC,PO | Performed by: ORTHOPAEDIC SURGERY

## 2024-01-04 PROCEDURE — 99999 PR PBB SHADOW E&M-EST. PATIENT-LVL III: CPT | Mod: PBBFAC,,, | Performed by: ORTHOPAEDIC SURGERY

## 2024-01-05 RX ORDER — METHOCARBAMOL 750 MG/1
750 TABLET, FILM COATED ORAL 4 TIMES DAILY PRN
Qty: 44 TABLET | Refills: 3 | Status: SHIPPED | OUTPATIENT
Start: 2024-01-05 | End: 2024-02-21 | Stop reason: SDUPTHER

## 2024-01-09 NOTE — PROGRESS NOTES
"  Chief Complaint   Patient presents with    Right Hip - Pain      HPI:   This is a 73 y.o. who presents to clinic today for right hip pain for the past 1 months after no known trauma. Complains of pain while sleeping on side and when descending stairs. Pain is dull.  No associated signs or symptoms.      Past Medical History:   Diagnosis Date    Anticoagulant long-term use     Anxiety     takes daily Xanax    Arthritis     right thumb    Cataract     OU    Cholelithiasis     GERD (gastroesophageal reflux disease)     Hepatic steatosis     Hyperlipemia     Hypertension     PVD (posterior vitreous detachment)     OD     Past Surgical History:   Procedure Laterality Date    BREAST BIOPSY Left 08/28/2020    stereo biopsy    BREAST BIOPSY Left 10/07/2020    benign excisional biopsy    chest abcess      child    COLONOSCOPY  01/06/2012    Dr. Lopez: hemorrhoids, redundant colon    COLONOSCOPY N/A 02/17/2021    Dr. Lopez: Congested and erythematous mucosa in the rectum, in the recto-sigmoid colon and in the sigmoid colon, proctosigmoid colitis, Redundant colon; biopsy: focal active colitis "nonspecific but can be seen with acute self-limited colitis (infection), medication effect (e.g. NSAID use), proximity to diverticula, or early/evolving IBD    ESOPHAGOGASTRODUODENOSCOPY N/A 06/06/2019    Dr. Lopez: small hiatal hernia, Non-erosive esophageal reflux (NERD) disease?., slight antritis; biopsy: Antral mucosa with chemical/reactive gastropathy. negative for h pylori    ESOPHAGOGASTRODUODENOSCOPY N/A 02/17/2021    Procedure: EGD (ESOPHAGOGASTRODUODENOSCOPY);  Surgeon: Nathan Lopez Jr., MD;  Location: [facility];  Service: Endoscopy;  Laterality: N/A; Minimal gastritis; biopsy: stomach- negative for h pylori, active chronic gastritis with focal features of erosive gastritis, duodenum WNL    ESOPHAGOGASTRODUODENOSCOPY N/A 05/10/2022    Procedure: EGD (ESOPHAGOGASTRODUODENOSCOPY);  Surgeon: Nathan Lopez Jr., MD;  " Location: [facility] ENDO;  Service: Endoscopy;  Laterality: N/A;    EXCISIONAL BIOPSY Left 10/07/2020    Procedure: EXCISIONAL BIOPSY-Left with radiological marker;  Surgeon: Brooklynn Ashford MD;  Location: [facility];  Service: General;  Laterality: Left;    FRACTURE SURGERY  2010    Left thumb repaired surgically    JOINT REPLACEMENT Bilateral     knee    KNEE ARTHROSCOPY W/ LASER      right    LAPAROSCOPIC CHOLECYSTECTOMY N/A 06/14/2019    Procedure: CHOLECYSTECTOMY, LAPAROSCOPIC;  Surgeon: Guevara Evans MD;  Location: [facility];  Service: General;  Laterality: N/A;    thumb surgery  11/2014    TOTAL KNEE ARTHROPLASTY Left 06/19/2018    Procedure: REPLACEMENT-KNEE-TOTAL;  Surgeon: Nj Melvin MD;  Location: [address];  Service: Orthopedics;  Laterality: Left;  [employer]    UPPER GASTROINTESTINAL ENDOSCOPY  07/13/2017    Dr. Lopez: NERD, Gastric mucosal atrophy. antritis, Scar in the incisura. Biopsied; biopsy: chronic gastritis. negative for h pylori     Current Outpatient Medications on File Prior to Visit   Medication Sig Dispense Refill    acetaminophen (TYLENOL) 500 MG tablet Take 500 mg by mouth every 6 (six) hours as needed for Pain.      ascorbic acid, vitamin C, (VITAMIN C) 250 MG tablet Take 500 mg by mouth once daily.       aspirin (ECOTRIN) 81 MG EC tablet Take 81 mg by mouth every evening.      atenoloL (TENORMIN) 25 MG tablet Take 1 tablet (25 mg total) by mouth every evening. 90 tablet 3    balsalazide (COLAZAL) 750 mg capsule TAKE 3 CAPSULES(2250 MG) BY MOUTH TWICE DAILY WITH MEALS 180 capsule 11    benzonatate (TESSALON) 200 MG capsule Take 1 capsule (200 mg total) by mouth 3 (three) times daily as needed for Cough. 30 capsule 1    buPROPion (WELLBUTRIN XL) 150 MG TB24 tablet Take 1 tablet (150 mg total) by mouth once daily. 90 tablet 3    diltiaZEM (DILACOR XR) 240 MG CDCR Take 1 capsule (240 mg total) by mouth every evening. 90 capsule 3    docusate sodium (COLACE) 100 MG capsule Take 100 mg by  mouth 2 (two) times daily.      famotidine (PEPCID) 40 MG tablet Take 1 tablet (40 mg total) by mouth nightly. 90 tablet 3    fluticasone propionate (FLONASE) 50 mcg/actuation nasal spray 1 spray (50 mcg total) by Each Nostril route once daily. 16 g 3    furosemide (LASIX) 20 MG tablet Take 1 tablet (20 mg total) by mouth daily as needed (edema). 2 pm on an empty stomach if swollen. (Patient not taking: Reported on 1/2/2024) 90 tablet 3    guaiFENesin (MUCINEX) 600 mg 12 hr tablet Take 1,200 mg by mouth 2 (two) times daily.      lisinopriL (PRINIVIL,ZESTRIL) 40 MG tablet Take 1 tablet (40 mg total) by mouth 2 (two) times a day. 180 tablet 3    LORazepam (ATIVAN) 0.5 MG tablet TAKE 1 TABLET(0.5 MG) BY MOUTH TWICE DAILY 60 tablet 5    magnesium oxide (MAG-OX) 400 mg (241.3 mg magnesium) tablet Take 1 tablet (400 mg total) by mouth once daily.  0    multivitamin (THERAGRAN) per tablet Take 1 tablet by mouth once daily.      niacin 500 MG CpSR Take 500 mg by mouth every evening.      ondansetron (ZOFRAN-ODT) 4 MG TbDL Take 1 tablet (4 mg total) by mouth every 6 (six) hours as needed (nausea). 30 tablet 3    pantoprazole (PROTONIX) 40 MG tablet Take 1 tablet (40 mg total) by mouth 2 (two) times daily. 180 tablet 3    polyethylene glycol (GLYCOLAX) 17 gram/dose powder Take 17 g by mouth every other day.      psyllium husk, aspartame, (NATURAL PSYLLIUM FIBER) 3.4 gram/5.8 gram Powd Take by mouth.       No current facility-administered medications on file prior to visit.     Review of patient's allergies indicates:  No Known Allergies  Family History   Problem Relation Age of Onset    Hypertension Mother     Heart disease Mother     Glaucoma Mother     Stroke Father     Glaucoma Sister     Cataracts Sister     Macular degeneration Sister     Breast cancer Neg Hx     Colon cancer Neg Hx     Crohn's disease Neg Hx     Ulcerative colitis Neg Hx     Stomach cancer Neg Hx     Esophageal cancer Neg Hx     Celiac disease Neg Hx      Amblyopia Neg Hx     Blindness Neg Hx     Retinal detachment Neg Hx     Strabismus Neg Hx      Social History     Socioeconomic History    Marital status:     Number of children: 2   Occupational History    Occupation: retired teacher and principal    Occupation: substitute   Tobacco Use    Smoking status: Never    Smokeless tobacco: Never   Substance and Sexual Activity    Alcohol use: Yes     Comment: social- maybe once every few months    Drug use: No    Sexual activity: Yes     Partners: Male     Birth control/protection: Post-menopausal, None     Social Determinants of Health     Financial Resource Strain: Low Risk  (11/9/2023)    Overall Financial Resource Strain (CARDIA)     Difficulty of Paying Living Expenses: Not hard at all   Food Insecurity: No Food Insecurity (11/9/2023)    Hunger Vital Sign     Worried About Running Out of Food in the Last Year: Never true     Ran Out of Food in the Last Year: Never true   Transportation Needs: No Transportation Needs (11/9/2023)    PRAPARE - Transportation     Lack of Transportation (Medical): No     Lack of Transportation (Non-Medical): No   Physical Activity: Sufficiently Active (11/9/2023)    Exercise Vital Sign     Days of Exercise per Week: 5 days     Minutes of Exercise per Session: 50 min   Recent Concern: Physical Activity - Insufficiently Active (10/20/2023)    Exercise Vital Sign     Days of Exercise per Week: 2 days     Minutes of Exercise per Session: 20 min   Stress: Patient Declined (11/9/2023)    [employer] - Occupational Stress Questionnaire     Feeling of Stress : Patient declined   Social Connections: Unknown (11/9/2023)    Social Connection and Isolation Panel [NHANES]     Frequency of Communication with Friends and Family: More than three times a week     Frequency of Social Gatherings with Friends and Family: Twice a week     Active Member of Clubs or Organizations: Yes     Attends Club or Organization  Meetings: Never     Marital Status:    Housing Stability: Low Risk  (11/9/2023)    Housing Stability Vital Sign     Unable to Pay for Housing in the Last Year: No     Number of Places Lived in the Last Year: 1     Unstable Housing in the Last Year: No       Review of Systems:  Constitutional:  Denies fever or chills   Eyes:  Denies change in visual acuity   HENT:  Denies nasal congestion or sore throat   Respiratory:  Denies cough or shortness of breath   Cardiovascular:  Denies chest pain or edema   GI:  Denies abdominal pain, nausea, vomiting, bloody stools or diarrhea   :  Denies dysuria   Integument:  Denies rash   Neurologic:  Denies headache, focal weakness or sensory changes   Endocrine:  Denies polyuria or polydipsia   Lymphatic:  Denies swollen glands   Psychiatric:  Denies depression or anxiety     Physical Exam:   Constitutional:  Well developed, well nourished, no acute distress, non-toxic appearance   Integument:  Well hydrated, no rash   Lymphatic:  No lymphadenopathy noted   Neurologic:  Alert & oriented x 3  Psychiatric:  Speech and behavior appropriate     Bilateral Hip Exam    left Hip Exam   left hip exam performed same as contralateral hip and is normal.     right Hip Exam   Tenderness   The patient is experiencing tenderness in the greater trochanter.  Range of Motion   The patient has normal hip ROM.  Muscle Strength   Abduction: 4/5   Other   Erythema: absent  Sensation: normal  Pulse: present    X-rays were personally reviewed by me and findings discussed with the patient.  2 views of the right hip show no fractures or dislocations    Greater trochanteric bursitis of right hip    Other orders  -     methocarbamoL (ROBAXIN) 750 MG Tab; Take 1 tablet (750 mg total) by mouth 4 (four) times daily as needed.  Dispense: 44 tablet; Refill: 3           Using an aseptic technique, I injected 5 cc of lidocaine 1% without and 1 cc of kenalog 40mg into the right Hip. The patient tolerated this  well. I will have them return to clinic in 6 weeks. Begin aquatic PT as discussed.

## 2024-01-11 ENCOUNTER — PATIENT MESSAGE (OUTPATIENT)
Dept: ORTHOPEDICS | Facility: CLINIC | Age: 74
End: 2024-01-11
Payer: MEDICARE

## 2024-01-11 DIAGNOSIS — M70.61 GREATER TROCHANTERIC BURSITIS OF RIGHT HIP: Primary | ICD-10-CM

## 2024-01-12 ENCOUNTER — PATIENT MESSAGE (OUTPATIENT)
Dept: ORTHOPEDICS | Facility: CLINIC | Age: 74
End: 2024-01-12
Payer: MEDICARE

## 2024-01-30 ENCOUNTER — CLINICAL SUPPORT (OUTPATIENT)
Dept: REHABILITATION | Facility: HOSPITAL | Age: 74
End: 2024-01-30
Attending: ORTHOPAEDIC SURGERY
Payer: MEDICARE

## 2024-01-30 DIAGNOSIS — M70.61 GREATER TROCHANTERIC BURSITIS OF RIGHT HIP: ICD-10-CM

## 2024-01-30 PROCEDURE — 97161 PT EVAL LOW COMPLEX 20 MIN: CPT | Mod: PO

## 2024-01-30 PROCEDURE — 97112 NEUROMUSCULAR REEDUCATION: CPT | Mod: PO

## 2024-01-30 PROCEDURE — 97010 HOT OR COLD PACKS THERAPY: CPT | Mod: PO

## 2024-01-30 PROCEDURE — 97140 MANUAL THERAPY 1/> REGIONS: CPT | Mod: PO

## 2024-01-31 ENCOUNTER — OFFICE VISIT (OUTPATIENT)
Dept: FAMILY MEDICINE | Facility: CLINIC | Age: 74
End: 2024-01-31
Payer: MEDICARE

## 2024-01-31 ENCOUNTER — EXTERNAL CHRONIC CARE MANAGEMENT (OUTPATIENT)
Dept: PRIMARY CARE CLINIC | Facility: CLINIC | Age: 74
End: 2024-01-31
Payer: MEDICARE

## 2024-01-31 VITALS
OXYGEN SATURATION: 92 % | WEIGHT: 169.06 LBS | SYSTOLIC BLOOD PRESSURE: 120 MMHG | DIASTOLIC BLOOD PRESSURE: 78 MMHG | BODY MASS INDEX: 29.95 KG/M2 | HEART RATE: 78 BPM | HEIGHT: 63 IN

## 2024-01-31 DIAGNOSIS — I70.0 AORTIC ATHEROSCLEROSIS: ICD-10-CM

## 2024-01-31 DIAGNOSIS — F41.9 ANXIETY: ICD-10-CM

## 2024-01-31 DIAGNOSIS — K21.9 GASTROESOPHAGEAL REFLUX DISEASE, UNSPECIFIED WHETHER ESOPHAGITIS PRESENT: ICD-10-CM

## 2024-01-31 DIAGNOSIS — I10 PRIMARY HYPERTENSION: Primary | ICD-10-CM

## 2024-01-31 DIAGNOSIS — F32.A DEPRESSION, UNSPECIFIED DEPRESSION TYPE: ICD-10-CM

## 2024-01-31 DIAGNOSIS — E78.5 HYPERLIPIDEMIA, UNSPECIFIED HYPERLIPIDEMIA TYPE: ICD-10-CM

## 2024-01-31 PROCEDURE — 99214 OFFICE O/P EST MOD 30 MIN: CPT | Mod: S$PBB,,, | Performed by: FAMILY MEDICINE

## 2024-01-31 PROCEDURE — 99490 CHRNC CARE MGMT STAFF 1ST 20: CPT | Mod: PBBFAC,PO | Performed by: FAMILY MEDICINE

## 2024-01-31 PROCEDURE — 99999 PR PBB SHADOW E&M-EST. PATIENT-LVL V: CPT | Mod: PBBFAC,,, | Performed by: FAMILY MEDICINE

## 2024-01-31 PROCEDURE — 99490 CHRNC CARE MGMT STAFF 1ST 20: CPT | Mod: S$PBB,,, | Performed by: FAMILY MEDICINE

## 2024-01-31 PROCEDURE — 99215 OFFICE O/P EST HI 40 MIN: CPT | Mod: PBBFAC,PO | Performed by: FAMILY MEDICINE

## 2024-01-31 NOTE — PROGRESS NOTES
Subjective:       Patient ID: Lucinda Aguilera is a 73 y.o. female.    Chief Complaint: Follow-up    HPI Comments: Here for f/u on chronic health issues    Joined [facility] and doing some daily exercise.    Still doing PT    Hyponatremia, SIADH - Chlorthalidone was stopped. Following with nephrology, Dr. Engel.  Depression - stopped Effexor XR 37.5mg due to hyponatremia and now taking wellbutrin on recommendation of nephrology  HLD, aortic atherosclerosis - tolerating pravachol 40mg daily  Anxiety,depression - Taking lorazepam BID. Wellbutrin. Doing some counseling presently. Still with regular crying.  HTN - tolerating Atenolol 25mg daily, diltiazem 240, lasix 20mg PRN and lisinopril 40mg BID; BP variable; digital flowsheet reviewed  She got some swelling with amlodipine  GERD - tolerating Protonix 40mg daily  S/p conner - taking colestipol  Colitis - stable on Colazal, miralax    Past Medical History:    Hypertension                                                  Anxiety                                           Hyperlipemia                                                  GERD (gastroesophageal reflux disease)                        Cataract                                                        Comment:OU    PVD (posterior vitreous detachment)                             Comment:OD    Arthritis                                                       Comment:right thumb    Past Surgical History:    chest abcess                                                     Comment:child    KNEE ARTHROSCOPY W/ LASER                                        Comment:right    COLONOSCOPY                                      1/2012          Comment:repeat in 5 years    No Known Allergies    Social History    Marital Status:              Spouse Name:                       Years of Education:                 Number of children: 2             Occupational History  Occupation          Employer            Comment               retired                                    retired teacher an*                         Social History Main Topics    Smoking Status: Never Smoker                      Smokeless Status: Never Used                        Alcohol Use: Yes                Comment: social    Drug Use: No              Sexual Activity: Yes               Partners with: Male       Birth Control/Protection: None, Post-menopausal    Current Outpatient Medications on File Prior to Visit   Medication Sig Dispense Refill    acetaminophen (TYLENOL) 500 MG tablet Take 500 mg by mouth every 6 (six) hours as needed for Pain.      ascorbic acid, vitamin C, (VITAMIN C) 250 MG tablet Take 500 mg by mouth once daily.       aspirin (ECOTRIN) 81 MG EC tablet Take 81 mg by mouth every evening.      atenoloL (TENORMIN) 25 MG tablet Take 1 tablet (25 mg total) by mouth every evening. 90 tablet 3    balsalazide (COLAZAL) 750 mg capsule TAKE 3 CAPSULES(2250 MG) BY MOUTH TWICE DAILY WITH MEALS 180 capsule 11    benzonatate (TESSALON) 200 MG capsule Take 1 capsule (200 mg total) by mouth 3 (three) times daily as needed for Cough. 30 capsule 1    buPROPion (WELLBUTRIN XL) 150 MG TB24 tablet Take 1 tablet (150 mg total) by mouth once daily. 90 tablet 3    diltiaZEM (DILACOR XR) 240 MG CDCR Take 1 capsule (240 mg total) by mouth every evening. 90 capsule 3    docusate sodium (COLACE) 100 MG capsule Take 100 mg by mouth 2 (two) times daily.      famotidine (PEPCID) 40 MG tablet Take 1 tablet (40 mg total) by mouth nightly. 90 tablet 3    fluticasone propionate (FLONASE) 50 mcg/actuation nasal spray 1 spray (50 mcg total) by Each Nostril route once daily. 16 g 3    guaiFENesin (MUCINEX) 600 mg 12 hr tablet Take 1,200 mg by mouth 2 (two) times daily.      lisinopriL (PRINIVIL,ZESTRIL) 40 MG tablet Take 1 tablet (40 mg total) by mouth 2 (two) times a day. 180 tablet 3    LORazepam (ATIVAN) 0.5 MG tablet TAKE 1 TABLET(0.5 MG) BY MOUTH TWICE DAILY 60 tablet 5    magnesium oxide  (MAG-OX) 400 mg (241.3 mg magnesium) tablet Take 1 tablet (400 mg total) by mouth once daily.  0    methocarbamoL (ROBAXIN) 750 MG Tab Take 1 tablet (750 mg total) by mouth 4 (four) times daily as needed. 44 tablet 3    multivitamin (THERAGRAN) per tablet Take 1 tablet by mouth once daily.      niacin 500 MG CpSR Take 500 mg by mouth every evening.      ondansetron (ZOFRAN-ODT) 4 MG TbDL Take 1 tablet (4 mg total) by mouth every 6 (six) hours as needed (nausea). 30 tablet 3    pantoprazole (PROTONIX) 40 MG tablet Take 1 tablet (40 mg total) by mouth 2 (two) times daily. 180 tablet 3    polyethylene glycol (GLYCOLAX) 17 gram/dose powder Take 17 g by mouth every other day.      pravastatin (PRAVACHOL) 40 MG tablet Take 1 tablet (40 mg total) by mouth once daily. 90 tablet 2    psyllium husk, aspartame, (NATURAL PSYLLIUM FIBER) 3.4 gram/5.8 gram Powd Take by mouth.      furosemide (LASIX) 20 MG tablet Take 1 tablet (20 mg total) by mouth daily as needed (edema). 2 pm on an empty stomach if swollen. (Patient not taking: Reported on 1/2/2024) 90 tablet 3     No current facility-administered medications on file prior to visit.     Review of patient's family history indicates:    Hypertension                   Mother                    Heart disease                  Mother                    Stroke                         Father                    Review of Systems   Constitutional: Negative for fever, chills, appetite change and unexpected weight change.   HENT: Negative for sore throat and trouble swallowing.    Eyes: Negative for pain and visual disturbance.   Respiratory: Negative for cough, shortness of breath and wheezing.    Cardiovascular: Negative for chest pain and palpitations.   Gastrointestinal: Negative for nausea, vomiting, abdominal pain, diarrhea and blood in stool.   Genitourinary: Negative for dysuria, hematuria and difficulty urinating.   Musculoskeletal: Negative for arthralgias, gait problem and neck  "pain.   Skin: Negative for rash and wound.   Neurological: Negative for dizziness, weakness, numbness and headaches.   Hematological: Negative for adenopathy.   Psychiatric/Behavioral: Negative for dysphoric mood.           Objective:       /78   Pulse 78   Ht 5' 3" (1.6 m)   Wt 76.7 kg (169 lb 1.5 oz)   LMP 04/18/2004   SpO2 (!) 92%   BMI 29.95 kg/m²     Physical Exam   Constitutional: She is oriented to person, place, and time. She appears well-developed and well-nourished.   HENT:   Head: Normocephalic.   Mouth/Throat: Oropharynx is clear and moist. No oropharyngeal exudate or posterior oropharyngeal erythema.   Eyes: Conjunctivae and EOM are normal. Pupils are equal, round, and reactive to light.   Neck: Normal range of motion. Neck supple. No thyromegaly present.   Cardiovascular: Normal rate, regular rhythm, S1 normal, S2 normal, normal heart sounds and intact distal pulses.  Exam reveals no gallop and no friction rub.    No murmur heard.  Pulmonary/Chest: Effort normal and breath sounds normal. She has no wheezes. She has no rales.   Abdominal: Normal appearance.   Musculoskeletal:        Right lower leg: She exhibits no edema.        Left lower leg: She exhibits no edema.   Lymphadenopathy:     She has no cervical adenopathy.   Neurological: She is alert and oriented to person, place, and time. No cranial nerve deficit. Gait normal.   Skin: Skin is warm and intact. No rash noted.   Psychiatric: She has a normal mood and affect.         Results for orders placed or performed in visit on 10/03/23   Renal Function Panel   Result Value Ref Range    Glucose 69 (L) 70 - 110 mg/dL    Sodium 138 136 - 145 mmol/L    Potassium 4.0 3.5 - 5.1 mmol/L    Chloride 105 95 - 110 mmol/L    CO2 29 23 - 29 mmol/L    BUN 9 8 - 23 mg/dL    Calcium 9.2 8.7 - 10.5 mg/dL    Creatinine 0.7 0.5 - 1.4 mg/dL    Albumin 3.7 3.5 - 5.2 g/dL    Phosphorus 3.0 2.7 - 4.5 mg/dL    eGFR >60.0 >60 mL/min/1.73 m^2    Anion Gap 4 (L) 8 " - 16 mmol/L     *Note: Due to a large number of results and/or encounters for the requested time period, some results have not been displayed. A complete set of results can be found in Results Review.         Assessment:       1. Primary hypertension    2. Anxiety    3. Depression, unspecified depression type    4. Hyperlipidemia, unspecified hyperlipidemia type    5. Aortic atherosclerosis    6. Gastroesophageal reflux disease, unspecified whether esophagitis present                      Plan:       Primary hypertension  -     Comprehensive Metabolic Panel; Future; Expected date: 07/29/2024  -     CBC Auto Differential; Future; Expected date: 07/29/2024  -     TSH; Future; Expected date: 07/29/2024    Anxiety  -     Ambulatory referral/consult to Psychiatry; Future; Expected date: 02/07/2024    Depression, unspecified depression type  -     Ambulatory referral/consult to Psychiatry; Future; Expected date: 02/07/2024    Hyperlipidemia, unspecified hyperlipidemia type  -     Lipid Panel; Future; Expected date: 07/29/2024    Aortic atherosclerosis    Gastroesophageal reflux disease, unspecified whether esophagitis present      BP controlled  F/u with nephrology as planned  continue Ativan BID, wellbutrin  Overall stable  cotninue aspirin 81mg daily  Continue other present meds  Counseled on regular exercise, maintenance of a healthy weight, balanced diet rich in fruits/vegetables and lean protein, and avoidance of unhealthy habits like smoking and excessive alcohol intake.  F/u 6 months with labs

## 2024-02-01 ENCOUNTER — CLINICAL SUPPORT (OUTPATIENT)
Dept: REHABILITATION | Facility: HOSPITAL | Age: 74
End: 2024-02-01
Payer: MEDICARE

## 2024-02-01 DIAGNOSIS — M70.61 GREATER TROCHANTERIC BURSITIS OF RIGHT HIP: Primary | ICD-10-CM

## 2024-02-01 PROCEDURE — 97110 THERAPEUTIC EXERCISES: CPT | Mod: PO,CQ

## 2024-02-01 PROCEDURE — 97112 NEUROMUSCULAR REEDUCATION: CPT | Mod: PO,CQ

## 2024-02-01 PROCEDURE — 97140 MANUAL THERAPY 1/> REGIONS: CPT | Mod: PO,CQ

## 2024-02-01 NOTE — PROGRESS NOTES
"[facility]   Physical Therapy Treatment Note     Name: Lucinda Aguilera  Clinic Number: [id number]    Therapy Diagnosis: No diagnosis found.  Physician: Nick Arnold MD    Visit Date: 2/1/2024  Physician Orders: PT Eval and Treat   Medical Diagnosis from Referral:   Diagnosis   M70.61 (ICD-10-CM) - Greater trochanteric bursitis of right hip      Evaluation Date: 1/30/2024  Authorization Period Expiration: 12/31/24  Plan of Care Expiration: 4/30/24  Progress Note Due: 3/1/24  Date of Surgery: NA  Visit # / Visits authorized: 2/ 20   FOTO: 1/ 3    Precautions: Standard      Time In: 302 pm  Time Out: 355 pm  Total Billable Time: 53 minutes l      SUBJECTIVE     Pt reports: that her hip is feeling better since receiving injection. No c/o pn today. She states that she would like to return to aquatic therapy  She was compliant with home exercise program.  Response to previous treatment: no adverse effects  Functional change: decreased pn sx    Pain: 0/10  Location: right hip     OBJECTIVE     Objective Measures updated at progress report unless specified.     Treatment     Lucinda received the treatments listed below:      therapeutic exercises to develop strength, endurance, ROM, flexibility, posture, and core stabilization for 30 minutes including:  Recumbent stat bike 10 min  Hip add/frog stretch 3 x 30 secs  Elias test stretch x 1 min        Supine heel slides with use of sliders x 20   Supine HS stretch with strap 3 x 30 sec  Supine ITB stretch with strap 3x 30 sec        Piriformis hook stretch 3 x 30" each  Seated HS stretch 2 x 20 secs   Supine frog stretch unilaterally 3 x 30"  DOUBLE KNEE TO CHEST with green t-ball 20x  .     Gait 3 x 50' side stepping VCs for form x 4 laps        manual therapy techniques: Manual traction, Myofacial release, and Soft tissue Mobilization were applied to the: iliacus, psoas, QL, TFL, glut medius and adductor nelson for 15 minutes, including:  Short " axis traction 3x in session  Long axis traction  Lateral hip traction in hooklying     neuromuscular re-education activities to improve: Sense, Proprioception, and Posture for 15 minutes. The following activities were included:     Nu-step x 6 min L2. (not available)  LTR stretch 3 x 30 secs  PPT x20   Bent knee fall out/single 2 x10  blue t-band  Bridges with abd BTB 3 x 10  Supine clams BTB 3 x 10   Marching on trampoline vs blue foam 2 min x 2 NP   Adduction ball squeeze + PPT x 20 hold 2 secs  LOWER TRUNK ROTATION x 3 min medium amplitude with red ball.  Supine marching x 20 + PPT  Seated marching x 20 +PPT NP  Standing reciprocal marching, lateral toe taps, hip extension 3 x 10 giant set         Patient Education and Home Exercises     Home Exercises Provided and Patient Education Provided     Education provided:   - effects of therapy  - progression of therapy    Written Home Exercises Provided: Patient instructed to cont prior HEP. Exercises were reviewed and Lucinda was able to demonstrate them prior to the end of the session.  Lucinda demonstrated good  understanding of the education provided. See EMR under Patient Instructions for exercises provided during therapy sessions    ASSESSMENT     Lucinda fausto today's tx with ther ex, neuromuscular re-ed and manual intervention well. She was able to perform all activities without pn with the exception of ITB stretch which was improved with decreased ROM. She did exhibit some fatigue with mat exs.     Lucinda Is progressing well towards her goals.   Pt prognosis is Good.     Pt will continue to benefit from skilled outpatient physical therapy to address the deficits listed in the problem list box on initial evaluation, provide pt/family education and to maximize pt's level of independence in the home and community environment.     Pt's spiritual, cultural and educational needs considered and pt agreeable to plan of care and goals.     Anticipated barriers to  physical therapy: chronic nature    Goals:STG 3 weeks  1.  Pt to be independent with HEP for increased functional mobility and pain control.  2.  Pt to increase AROM at 15 degs B hip extension no pain for increased functional mobility and transfers.  3.  Pt to decrease pain to less than 2/10 after session for increased functional mobility.  4.  Pt to increase tolerance to 45 min of exercise in session without increased pain during dry land program.      Long Term Goals: 10 weeks   1.  Pt to be independent with pain management strategies and verbalize 2 strategies for increased functional mobility and pain control.  2.  Pt to increase AROM at B hip abduction 40 degs without pain for increased functional mobility and transfers.  3.  Pt to decrease pain to less than 0/10 after session for increased functional mobility.  4.  Pt to tolerate one full day of work as  + 6 hrs without increased pain for increased activity tolerance.   5.  Pt to increased hip abduction and hip extension to 4-/5 to 4/5 bilaterally for increased overall functional mobility.     PLAN     Plan of care Certification: 1/30/2024 to 4/30/24.     Outpatient Physical Therapy 2 times weekly for 10 weeks to include the following interventions: Aquatic Therapy, Cervical/Lumbar Traction, Electrical Stimulation DN, Gait Training, Manual Therapy, Moist Heat/ Ice, Neuromuscular Re-ed, Patient Education, Therapeutic Activities, and Therapeutic Exercise.     Manuel Garcia, PTA

## 2024-02-01 NOTE — PLAN OF CARE
[facility]   Physical Therapy Initial Evaluation      Name: Lucinda Aguielra  [facility] Number: [id number]    Therapy Diagnosis:   Encounter Diagnosis   Name Primary?    Greater trochanteric bursitis of right hip         Physician: Nick Arnold MD    Physician Orders: PT Eval and Treat   Medical Diagnosis from Referral:   Diagnosis   M70.61 (ICD-10-CM) - Greater trochanteric bursitis of right hip     Evaluation Date: 1/30/2024  Authorization Period Expiration: 12/31/24  Plan of Care Expiration: 4/30/24  Progress Note Due: 3/1/24  Date of Surgery: NA  Visit # / Visits authorized: 1/ 1   FOTO: 1/ 3    Precautions: Standard     Time In: 302 pm  Time Out: 355 pm  Total Billable Time: 53 minutes low complexity eval. MT 1 NMR 1  MHP x 10 min after session.     Subjective     Date of onset: + 6 months. Pt was previously seen by me a this clinic for same diagnosis in 2023    History of current condition - Lucinda reports: per last visit with referring provider:        Chief Complaint   Patient presents with    Right Hip - Pain      HPI:   This is a 73 y.o. who presents to clinic today for right hip pain for the past 1 months after no known trauma. Complains of pain while sleeping on side and when descending stairs. Pain is dull.  No associated signs or symptoms.    Falls: none    Imaging: prior lumbar imaging and recent lower leg imaging     X-Ray Lumbar Complete Including Flex And Ext  Order: [id number]  Status: Final result     Visible to patient: Yes (seen)     Next appt: 08/02/2023 at 08:30 AM in Gastroenterology (Jamee Allen NP)     Dx: Lumbar spondylosis; Spondylolisthesis...     2 Result Notes    1 Patient Communication  Details     Reading Physician Reading Date Result Priority   Marco A Murray MD  [phone] 5/20/2022 Routine      Narrative & Impression  EXAMINATION:  XR LUMBAR SPINE 5 VIEW WITH FLEX AND EXT     CLINICAL HISTORY:  Low back pain, unspecified     TECHNIQUE:  Five  views of the lumbar spine plus flexion extension views were performed.     COMPARISON:  04/05/2018     FINDINGS:  There is mild lumbar scoliosis convex to the right.  The scoliotic angle measures 6° from the superior endplate of L2 to the inferior endplate of L4.  There is degenerative disc disease at the L1-2, L4-5, and L5-S1 levels with disc narrowing.  The disc degeneration has progressed significantly since the prior study with further disc space narrowing.  There is degenerative facet arthrosis at the L3-4, L4-5, and L5-S1 levels.  The facet arthrosis results in 5 mm anterior subluxation of L4 and 12 mm anterior subluxation of L5.  There is no change in the degree of subluxation with flexion or extension.  Degenerative disc disease results in 5 mm posterior subluxation of L1 which does not change with flexion or extension.  Vertebral body heights are well maintained.     Impression:     1. Mild lumbar scoliosis.  2. Multilevel progressive degenerative disc disease.  3. Degenerative facet arthrosis at the lower 3 lumbar levels with mild anterior subluxation of L4 and L5 and no evidence of instability.  4. Mild chronic posterior subluxation of L1.        Electronically signed by: Marco A Murray MD  Date:                                            05/20/2022  Time:                                           15:41   EXAMINATION:  XR TIBIA FIBULA BILATERAL     CLINICAL HISTORY:  Other specified disorders of bone, lower leg     TECHNIQUE:  AP and lateral views of both legs were performed     COMPARISON:  None     FINDINGS:  A right total knee prosthesis is in place.  There is demineralization in the region of the right tibia and fibula.  On the left there is also evidence of prior total knee replacement surgery as well as demineralization.  No definite acute fracture or subluxation is seen about either tibia or fibula.        Electronically signed by: Yunior Silva MD  Date:                                             01/04/2024  Time:                                           15:15      Prior Therapy: for hips  Social History:  lives with their spouse  Occupation: part time  2-3 x per week.   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      Medical History:   Past Medical History:   Diagnosis Date    Anticoagulant long-term use     Anxiety     takes daily Xanax    Arthritis     right thumb    Cataract     OU    Cholelithiasis     GERD (gastroesophageal reflux disease)     Hepatic steatosis     Hyperlipemia     Hypertension     PVD (posterior vitreous detachment)     OD       Surgical History:   Lucinda Aguilera  has a past surgical history that includes chest abcess; Knee arthroscopy w/ laser; thumb surgery (11/2014); Total knee arthroplasty (Left, 06/19/2018); Colonoscopy (01/06/2012); Joint replacement (Bilateral); Esophagogastroduodenoscopy (N/A, 06/06/2019); Laparoscopic cholecystectomy (N/A, 06/14/2019); Upper gastrointestinal endoscopy (07/13/2017); Excisional biopsy (Left, 10/07/2020); Esophagogastroduodenoscopy (N/A, 02/17/2021); Colonoscopy (N/A, 02/17/2021); Breast biopsy (Left, 08/28/2020); Breast biopsy (Left, 10/07/2020); Esophagogastroduodenoscopy (N/A, 05/10/2022); and Fracture surgery (2010).    Medications:   Lucinda has a current medication list which includes the following prescription(s): acetaminophen, ascorbic acid (vitamin c), aspirin, atenolol, balsalazide,  benzonatate, bupropion, diltiazem, docusate sodium, famotidine, fluticasone propionate, furosemide, guaifenesin, lisinopril, lorazepam, magnesium oxide, methocarbamol, multivitamin, niacin, ondansetron, pantoprazole, polyethylene glycol, pravastatin, and natural psyllium fiber.    Allergies:   Review of patient's allergies indicates:  No Known Allergies     Objective        Objective     Hip Range of Motion:    Left active Right active    Flexion 110 110   Abduction 35 40   Extension 10 10     Lower Extremity Strength  Right LE   Left LE     Knee extension: 4+/5 Knee extension: 4+/5   Knee flexion: 4/5 to 4+/5 Knee flexion: 4/5 to 4+/5   Hip flexion: 4-/5 Hip flexion: 4- 5               Hip extension:  3/5 Hip extension: 3/5   Hip abduction: 3-/5 Hip abduction: 3/5   Hip adduction: 4/5 Hip adduction 4 /5   Ankle dorsiflexion: 4+/5 Ankle dorsiflexion: 4+/5   Ankle plantarflexion: 4+/5 Ankle plantarflexion: 4+/5      Special Tests:  JOSUE: - left, - right   FADIR: - left, - right  Scour: - bilaterally  SLR - B hips and + lumbar on R  Palpation: R TFL, gluteus medius, iliacus, adductor nelson, rectus femoris and vastus lateralis  Gait: mild compensated trendelenburg, decreased hip extension and minimal to no trunk rotation. Decreased stride length bilaterally and narrow PETRONA        Intake Outcome Measure for FOTO hip Survey    Therapist reviewed FOTO scores for Lucinda Aguilera on 1/30/2024.   FOTO report - see Media section or FOTO account episode details.    Intake Score: 43%         Treatment     Total Treatment time (time-based codes) separate from Evaluation: 25 minutes     Lucinda received the treatments listed below:      Bolded= performed     therapeutic exercises to develop strength, endurance, ROM, flexibility, posture, and core stabilization for   minutes including:        Hip add/frog stretch 3 x 30 secs  Trunk rotation stretch 3 x 30 secs  Elias test stretch x 1 min   Gait 100'     Supine heel slides with  "use of sliders x 20   Supine HS stretch with strap 3 x 30 sec  Supine ITB stretch with strap 3x 30 sec        Piriformis hook stretch 3 x 30" each  Seated HS stretch 2 x 20 secs   Supine frog stretch unilaterally 3 x 30"  DOUBLE KNEE TO CHEST with green t-ball 20x  .     Gait 3 x 50' side stepping VCs for form x 4 laps        manual therapy techniques: Manual traction, Myofacial release, and Soft tissue Mobilization were applied to the: iliacus, psoas, QL, TFL, glut medius and adductor nelson for 10 minutes, including:  Short axis traction 3x in session  Long axis traction  Lateral hip traction in hooklying     neuromuscular re-education activities to improve: Sense, Proprioception, and Posture for 15 minutes. The following activities were included:     Nu-step x 6 min L2.   LTR stretch 3 x 30 secs  PPT x20   Bent knee fall out/single 2 x10  blue t-band  Bridges with abd BTB 3 x 10  Supine clams BTB 3 x 10      Marching on trampoline vs blue foam 2 min x 2 NP    Adduction ball squeeze + PPT x 20 hold 2 secs  LOWER TRUNK ROTATION x 3 min medium amplitude with yellow ball.  Supine marching x 20 + PPT  Seated marching x 20 +PPT  Standing reciprocal marching, lateral toe taps, hip extension 3 x 10 giant set       therapeutic activities to improve functional performance for   minutes, including:        gait training to improve functional mobility and safety for   minutes, including:        hot pack for 10 minutes to  R hip after session.      Patient Education and Home Exercises     Education provided:   - HEP, POC    Written Home Exercises Provided: Patient instructed to cont prior HEP. Exercises were reviewed and Lucinda was able to demonstrate them prior to the end of the session.  Lucinda demonstrated good  understanding of the education provided. See EMR under Patient Instructions for exercises provided during therapy sessions.    Assessment     Lucinda is a 73 y.o. female referred to outpatient Physical Therapy " with a medical diagnosis of   Diagnosis   M70.61 (ICD-10-CM) - Greater trochanteric bursitis of right hip   . Patient presents with R hip pain due to bursitis with limits to R hip range B hip strength especially in hip abduction and extension. Pt well known to me as she was seen for 3 months in 2023 for same issue. PT will incorporate aquatic therapy to POC to allow increased work tolerance with decreased workload for increased activity tolerance with proper upright posture during session for more functional stability and strength. 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     Patient prognosis is Good.   Patient will benefit from skilled outpatient Physical Therapy to address the deficits stated above and in the chart below, provide patient /family education, and to maximize patientt's level of independence.     Plan of care discussed with patient: Yes  Patient's spiritual, cultural and educational needs considered and patient is agreeable to the plan of care and goals as stated below:     Anticipated Barriers for therapy: chronic nature    Medical Necessity is demonstrated by the following  History  Co-morbidities and personal factors that may impact the plan of care [x] LOW: no personal factors / co-morbidities  [] MODERATE: 1-2 personal factors / co-morbidities  [] HIGH: 3+ personal factors / co-morbidities    Moderate / High Support Documentation:   Co-morbidities affecting plan of care: NA    Personal Factors:   age     Examination  Body Structures and Functions, activity limitations and participation restrictions that may impact the plan of care [] LOW: addressing 1-2 elements  [x] MODERATE: 3+ elements  [] HIGH: 4+ elements (please support below)    Moderate / High Support Documentation: range, strength, gait,      Clinical Presentation [x] LOW: stable  [] MODERATE: Evolving  [] HIGH: Unstable      Decision Making/ Complexity Score: low         Goals:STG 3 weeks  1.  Pt to be independent with HEP for increased functional mobility and pain control.  2.  Pt to increase AROM at 15 degs B hip extension no pain for increased functional mobility and transfers.  3.  Pt to decrease pain to less than 2/10 after session for increased functional mobility.  4.  Pt to increase tolerance to 45 min of exercise in session without increased pain during dry land program.     Long Term Goals: 10 weeks   1.  Pt to be independent with pain management strategies and verbalize 2 strategies for increased functional mobility and pain control.  2.  Pt to increase AROM at B hip abduction 40 degs without pain for increased functional mobility and transfers.  3.  Pt to decrease pain to less than 0/10 after session for increased functional mobility.  4.  Pt to tolerate one full day of work as  + 6 hrs without increased pain for increased activity tolerance.   5.  Pt to increased hip abduction and hip extension to 4-/5 to 4/5 bilaterally for increased overall functional mobility.     Plan     Plan of care Certification: 1/30/2024 to 4/30/24.    Outpatient Physical Therapy 2 times weekly for 10 weeks to include the following interventions: Aquatic Therapy, Cervical/Lumbar Traction, Electrical Stimulation DN, Gait Training, Manual Therapy, Moist Heat/ Ice, Neuromuscular Re-ed, Patient Education, Therapeutic Activities, and Therapeutic Exercise.     Nora Costa, PT        Physician's Signature: _________________________________________ Date: ________________

## 2024-02-02 ENCOUNTER — TELEPHONE (OUTPATIENT)
Dept: PSYCHIATRY | Facility: CLINIC | Age: 74
End: 2024-02-02
Payer: MEDICARE

## 2024-02-02 ENCOUNTER — PATIENT MESSAGE (OUTPATIENT)
Dept: ADMINISTRATIVE | Facility: OTHER | Age: 74
End: 2024-02-02
Payer: MEDICARE

## 2024-02-06 ENCOUNTER — CLINICAL SUPPORT (OUTPATIENT)
Dept: REHABILITATION | Facility: HOSPITAL | Age: 74
End: 2024-02-06
Payer: MEDICARE

## 2024-02-06 DIAGNOSIS — M25.551 BILATERAL HIP PAIN: ICD-10-CM

## 2024-02-06 DIAGNOSIS — R26.9 GAIT DIFFICULTY: ICD-10-CM

## 2024-02-06 DIAGNOSIS — R29.898 WEAKNESS OF BOTH HIPS: Primary | ICD-10-CM

## 2024-02-06 DIAGNOSIS — M53.86 DECREASED ROM OF LUMBAR SPINE: ICD-10-CM

## 2024-02-06 DIAGNOSIS — M25.552 BILATERAL HIP PAIN: ICD-10-CM

## 2024-02-06 PROCEDURE — 97140 MANUAL THERAPY 1/> REGIONS: CPT | Mod: PO

## 2024-02-06 PROCEDURE — 97112 NEUROMUSCULAR REEDUCATION: CPT | Mod: PO

## 2024-02-10 NOTE — PROGRESS NOTES
"[facility]   Physical Therapy Treatment Note     Name: Lucinda Aguilera  Clinic Number: [id number]    Therapy Diagnosis: No diagnosis found.  Physician: Nick Arnold MD    Visit Date: 2/6/2024  Physician Orders: PT Eval and Treat   Medical Diagnosis from Referral:   Diagnosis   M70.61 (ICD-10-CM) - Greater trochanteric bursitis of right hip      Evaluation Date: 1/30/2024  Authorization Period Expiration: 12/31/24  Plan of Care Expiration: 4/30/24  Progress Note Due: 3/1/24  Date of Surgery: NA  Visit # / Visits authorized: 2/ 20   FOTO: 1/ 3    Precautions: Standard      Time In: 200 pm  Time Out: 253 pm  Total Billable Time: 53 minutes NMR 2 MT 2      SUBJECTIVE     Pt reports: that her hip is feeling better since receiving injection but fatigue with work yesterday and will work tomorrow.   She was compliant with home exercise program.  Response to previous treatment: no adverse effects  Functional change: decreased pn sx    Pain: 0/10  Location: right hip     OBJECTIVE     Objective Measures updated at progress report unless specified.     Treatment     Lucinda received the treatments listed below:      therapeutic exercises to develop strength, endurance, ROM, flexibility, posture, and core stabilization for 7 minutes including:  Recumbent stat bike 10 min  Hip add/frog stretch 3 x 30 secs  Elias test stretch x 1 min        Supine heel slides with use of sliders x 20   Supine HS stretch with strap 3 x 30 sec  Supine ITB stretch with strap 3x 30 sec        Piriformis hook stretch 3 x 30" each  Seated HS stretch 2 x 20 secs   Supine frog stretch unilaterally 3 x 30"  DOUBLE KNEE TO CHEST with green t-ball 20x  .     Gait 3 x 50' side stepping VCs for form x 4 laps        manual therapy techniques: Manual traction, Myofacial release, and Soft tissue Mobilization were applied to the: iliacus, psoas, QL, TFL, glut medius and adductor nelson for 23 minutes, including:  Short axis traction " 3x in session  Long axis traction  Lateral hip traction in hooklying     neuromuscular re-education activities to improve: Sense, Proprioception, and Posture for 23 minutes. The following activities were included:     Nu-step x 6 min L2.  LTR stretch 3 x 30 secs  PPT x20   Bent knee fall out/single 2 x10  blue t-band  Bridges with abd BTB 3 x 10  Supine clams BTB 3 x 10   Marching on trampoline vs blue foam 2 min x 2 NP   Adduction ball squeeze + PPT x 20 hold 2 secs  LOWER TRUNK ROTATION x 3 min medium amplitude with red ball.  Supine marching x 20 + PPT  Seated marching x 20 +PPT NP  Standing reciprocal marching, lateral toe taps, hip extension 3 x 10 giant set         Patient Education and Home Exercises     Home Exercises Provided and Patient Education Provided     Education provided:   - effects of therapy  - progression of therapy    Written Home Exercises Provided: Patient instructed to cont prior HEP. Exercises were reviewed and Lucinda was able to demonstrate them prior to the end of the session.  Lucinda demonstrated good  understanding of the education provided. See EMR under Patient Instructions for exercises provided during therapy sessions    ASSESSMENT     Lucinda fausto today's tx with ther ex, neuromuscular re-ed and manual intervention  with decreased workload due to fatigue with work and with pending work the following day. PT and PTA discussed change of PT visits to increased frequency for aquatic therapy.     Lucinda Is progressing well towards her goals.   Pt prognosis is Good.     Pt will continue to benefit from skilled outpatient physical therapy to address the deficits listed in the problem list box on initial evaluation, provide pt/family education and to maximize pt's level of independence in the home and community environment.     Pt's spiritual, cultural and educational needs considered and pt agreeable to plan of care and goals.     Anticipated barriers to physical therapy: chronic  nature    Goals:STG 3 weeks  1.  Pt to be independent with HEP for increased functional mobility and pain control.  2.  Pt to increase AROM at 15 degs B hip extension no pain for increased functional mobility and transfers.  3.  Pt to decrease pain to less than 2/10 after session for increased functional mobility.  4.  Pt to increase tolerance to 45 min of exercise in session without increased pain during dry land program.      Long Term Goals: 10 weeks   1.  Pt to be independent with pain management strategies and verbalize 2 strategies for increased functional mobility and pain control.  2.  Pt to increase AROM at B hip abduction 40 degs without pain for increased functional mobility and transfers.  3.  Pt to decrease pain to less than 0/10 after session for increased functional mobility.  4.  Pt to tolerate one full day of work as  + 6 hrs without increased pain for increased activity tolerance.   5.  Pt to increased hip abduction and hip extension to 4-/5 to 4/5 bilaterally for increased overall functional mobility.     PLAN     Plan of care Certification: 1/30/2024 to 4/30/24.     Outpatient Physical Therapy 2 times weekly for 10 weeks to include the following interventions: Aquatic Therapy, Cervical/Lumbar Traction, Electrical Stimulation DN, Gait Training, Manual Therapy, Moist Heat/ Ice, Neuromuscular Re-ed, Patient Education, Therapeutic Activities, and Therapeutic Exercise.     Nora Costa, PT

## 2024-02-12 ENCOUNTER — PATIENT MESSAGE (OUTPATIENT)
Dept: FAMILY MEDICINE | Facility: CLINIC | Age: 74
End: 2024-02-12
Payer: MEDICARE

## 2024-02-14 ENCOUNTER — CLINICAL SUPPORT (OUTPATIENT)
Dept: REHABILITATION | Facility: HOSPITAL | Age: 74
End: 2024-02-14
Payer: MEDICARE

## 2024-02-14 DIAGNOSIS — R29.898 WEAKNESS OF BOTH HIPS: ICD-10-CM

## 2024-02-14 DIAGNOSIS — M53.86 DECREASED ROM OF LUMBAR SPINE: Primary | ICD-10-CM

## 2024-02-14 PROCEDURE — 97140 MANUAL THERAPY 1/> REGIONS: CPT | Mod: PO,CQ

## 2024-02-14 PROCEDURE — 97112 NEUROMUSCULAR REEDUCATION: CPT | Mod: PO,CQ

## 2024-02-14 PROCEDURE — 97110 THERAPEUTIC EXERCISES: CPT | Mod: PO,CQ

## 2024-02-14 NOTE — PROGRESS NOTES
"[facility]   Physical Therapy Treatment Note     Name: Lucinda DRAKE Ale  Clinic Number: [id number]    Therapy Diagnosis:   Encounter Diagnoses   Name Primary?    Decreased ROM of lumbar spine Yes    Weakness of both hips      Physician: Nick Arnold MD    Visit Date: 2/14/2024  Physician Orders: PT Eval and Treat   Medical Diagnosis from Referral:   Diagnosis   M70.61 (ICD-10-CM) - Greater trochanteric bursitis of right hip      Evaluation Date: 1/30/2024  Authorization Period Expiration: 12/31/24  Plan of Care Expiration: 4/30/24  Progress Note Due: 3/1/24  Date of Surgery: NA  Visit # / Visits authorized: 2/ 20   FOTO: 1/ 3    Precautions: Standard      Time In: 200 pm  Time Out: 3:00 pm  Total Billable Time: 60      SUBJECTIVE     Pt reports: that her hip pn is 4/10 today. She was unprepared for aquatic therapy today.  She was compliant with home exercise program.  Response to previous treatment: no adverse effects  Functional change: decreased pn sx    Pain: 4/10  Location: right hip     OBJECTIVE     Objective Measures updated at progress report unless specified.     Treatment     Lucinda received the treatments listed below:      therapeutic exercises to develop strength, endurance, ROM, flexibility, posture, and core stabilization for 15 minutes including:  Recumbent stat bike 10 min  Hip add/frog stretch 3 x 30 secs  Elias test stretch x 1 min        Supine heel slides with use of sliders x 20   Supine HS stretch with strap 3 x 30 sec  Supine ITB stretch with strap 3x 30 sec        Piriformis hook stretch 3 x 30" each  Seated HS stretch 2 x 20 secs   Supine frog stretch unilaterally 3 x 30"  DOUBLE KNEE TO CHEST with green t-ball 20x  .     Gait 3 x 50' side stepping VCs for form x 4 laps NP        manual therapy techniques: Manual traction, Myofacial release, and Soft tissue Mobilization were applied to the: iliacus, psoas, QL, TFL, glut medius and adductor nelson for 15 " minutes, including:  Short axis traction 3x in session  Long axis traction  Lateral hip traction in hooklying     neuromuscular re-education activities to improve: Sense, Proprioception, and Posture for 30 minutes. The following activities were included:     Nu-step x 6 min L2.  LTR stretch 3 x 30 secs  PPT x20   Bent knee fall out/single 2 x10  blue t-band  Bridges with abd BTB 3 x 10  Supine clams BTB 3 x 10   Marching on trampoline vs blue foam 2 min x 2 NP   Adduction ball squeeze + PPT x 20 hold 2 secs  LOWER TRUNK ROTATION x 3 min medium amplitude with red ball.  Supine marching x 20 + PPT  Seated marching x 20 +PPT NP  Standing reciprocal marching, lateral toe taps, hip extension 3 x 10 giant set         Patient Education and Home Exercises     Home Exercises Provided and Patient Education Provided     Education provided:   - effects of therapy  - progression of therapy    Written Home Exercises Provided: Patient instructed to cont prior HEP. Exercises were reviewed and Lucinda was able to demonstrate them prior to the end of the session.  Lucinda demonstrated good  understanding of the education provided. See EMR under Patient Instructions for exercises provided during therapy sessions    ASSESSMENT     Lucinda arrived unprepared for aquatic therapy on this date so her therapy was conducted on land. She fausto today's tx with ther ex, neuromuscular re-ed and manual intervention well. She does report hip pn with transitional movement supine-sit. She was w/o complaint with all other activities today. Will begin aquatic therapy next visit .     Lucinda Is progressing well towards her goals.   Pt prognosis is Good.     Pt will continue to benefit from skilled outpatient physical therapy to address the deficits listed in the problem list box on initial evaluation, provide pt/family education and to maximize pt's level of independence in the home and community environment.     Pt's spiritual, cultural and educational  needs considered and pt agreeable to plan of care and goals.     Anticipated barriers to physical therapy: chronic nature    Goals:STG 3 weeks  1.  Pt to be independent with HEP for increased functional mobility and pain control.  2.  Pt to increase AROM at 15 degs B hip extension no pain for increased functional mobility and transfers.  3.  Pt to decrease pain to less than 2/10 after session for increased functional mobility.  4.  Pt to increase tolerance to 45 min of exercise in session without increased pain during dry land program.      Long Term Goals: 10 weeks   1.  Pt to be independent with pain management strategies and verbalize 2 strategies for increased functional mobility and pain control.  2.  Pt to increase AROM at B hip abduction 40 degs without pain for increased functional mobility and transfers.  3.  Pt to decrease pain to less than 0/10 after session for increased functional mobility.  4.  Pt to tolerate one full day of work as  + 6 hrs without increased pain for increased activity tolerance.   5.  Pt to increased hip abduction and hip extension to 4-/5 to 4/5 bilaterally for increased overall functional mobility.     PLAN     Plan of care Certification: 1/30/2024 to 4/30/24.     Outpatient Physical Therapy 2 times weekly for 10 weeks to include the following interventions: Aquatic Therapy, Cervical/Lumbar Traction, Electrical Stimulation DN, Gait Training, Manual Therapy, Moist Heat/ Ice, Neuromuscular Re-ed, Patient Education, Therapeutic Activities, and Therapeutic Exercise.     Manuel Garcia, PTA

## 2024-02-15 ENCOUNTER — PATIENT MESSAGE (OUTPATIENT)
Dept: PSYCHIATRY | Facility: CLINIC | Age: 74
End: 2024-02-15
Payer: MEDICARE

## 2024-02-16 ENCOUNTER — CLINICAL SUPPORT (OUTPATIENT)
Dept: REHABILITATION | Facility: HOSPITAL | Age: 74
End: 2024-02-16
Payer: MEDICARE

## 2024-02-16 ENCOUNTER — PATIENT MESSAGE (OUTPATIENT)
Dept: ORTHOPEDICS | Facility: CLINIC | Age: 74
End: 2024-02-16
Payer: MEDICARE

## 2024-02-16 DIAGNOSIS — R26.9 GAIT DIFFICULTY: Primary | ICD-10-CM

## 2024-02-16 PROCEDURE — 97113 AQUATIC THERAPY/EXERCISES: CPT | Mod: PO,CQ

## 2024-02-16 NOTE — PROGRESS NOTES
Physical Therapy Aquatic Treatment Note     Name: Lucinda Aguilera  Clinic Number: [id number]    Therapy Diagnosis:   Encounter Diagnosis   Name Primary?    Gait difficulty Yes     Physician: Nick Arnold MD  Visit Date: 2/16/2024  Physician Orders: PT Eval and Treat   Medical Diagnosis from Referral:   Diagnosis   M70.61 (ICD-10-CM) - Greater trochanteric bursitis of right hip      Evaluation Date: 1/30/2024  Authorization Period Expiration: 12/31/24  Plan of Care Expiration: 4/30/24  Progress Note Due: 3/1/24  Date of Surgery: NA  Visit # / Visits authorized: 3/ 20   FOTO: 1/ 3    Precautions: Standard      Time In: 8:00 am  Time Out: 9:00 am  Total Billable Time: 60    Subjective     Pt reports:that she is w/o c/on hip pn this morning.  she was compliant with home exercise program given last session.     Pain: 0/10  Location: bilateral hips    Objective     Lucinda received aquatic exercises to develop strength, endurance, ROM, flexibility, posture, and core stabilization for 60 minutes including:    AMB 3 min each  FWD 0.9 mph  BWD 0.6 mph  Side 0.8  mph    Stretches 3 x 30 sec  HSS  Quad     LE exs 20x each   Mini Squat with QS  Heel Raise with GS  Hip flex/ext  Hip ABD/ADD  HS Curl  HIp Circles CW/CCW  Step-ups  LAQ     Endurance 3 min  Marching  Bicycle pink noodle    Home Exercises Provided and Patient Education Provided     Education provided:   - progress towards goals   - role of therapy     Written Home Exercises Provided: Patient was not issued HEP for pool.  Exercises were reviewed and Lucinda was able to demonstrate them prior to the end of the session.   Pt received a written copy of exercises to perform at home.     Lucinda demonstrated good  understanding of the education provided.     Assessment     Lucinda fausto today's tx with aquatic ther ex well. She was w/o complaint with all activities. She demonstrates fair recall of exs, requiring min VCs for proper form, posture and core stab tech. She  did exhibit some minor bal issues upon first entering water with VCs for keeping feet on floor and step length.   Lucinda Is progressing well towards her goals.   Pt prognosis is Good.     Pt will continue to benefit from skilled outpatient physical therapy to address the deficits listed in the problem list box on initial evaluation, provide pt/family education and to maximize pt's level of independence in the home and community environment.     Pt's spiritual, cultural and educational needs considered and pt agreeable to plan of care and goals.    Anticipated barriers to physical therapy: chronic nature    Goals: STG 3 weeks  1.  Pt to be independent with HEP for increased functional mobility and pain control.  2.  Pt to increase AROM at 15 degs B hip extension no pain for increased functional mobility and transfers.  3.  Pt to decrease pain to less than 2/10 after session for increased functional mobility.  4.  Pt to increase tolerance to 45 min of exercise in session without increased pain during dry land program.      Long Term Goals: 10 weeks   1.  Pt to be independent with pain management strategies and verbalize 2 strategies for increased functional mobility and pain control.  2.  Pt to increase AROM at B hip abduction 40 degs without pain for increased functional mobility and transfers.  3.  Pt to decrease pain to less than 0/10 after session for increased functional mobility.  4.  Pt to tolerate one full day of work as  + 6 hrs without increased pain for increased activity tolerance.   5.  Pt to increased hip abduction and hip extension to 4-/5 to 4/5 bilaterally for increased overall functional mobility.     Plan     Continue 2x per week. Plan of care Certification: 1/30/2024 to 4/30/24.     Outpatient Physical Therapy 2 times weekly for 10 weeks to include the following interventions: Aquatic Therapy, Cervical/Lumbar Traction, Electrical Stimulation DN, Gait Training, Manual Therapy,  Moist Heat/ Ice, Neuromuscular Re-ed, Patient Education, Therapeutic Activities, and Therapeutic Exercise.     Manuel Garcia, PTA

## 2024-02-19 ENCOUNTER — OFFICE VISIT (OUTPATIENT)
Dept: PSYCHIATRY | Facility: CLINIC | Age: 74
End: 2024-02-19
Payer: MEDICARE

## 2024-02-19 VITALS
WEIGHT: 169 LBS | BODY MASS INDEX: 31.1 KG/M2 | HEART RATE: 65 BPM | SYSTOLIC BLOOD PRESSURE: 149 MMHG | HEIGHT: 62 IN | DIASTOLIC BLOOD PRESSURE: 75 MMHG

## 2024-02-19 DIAGNOSIS — F32.A DEPRESSION, UNSPECIFIED DEPRESSION TYPE: ICD-10-CM

## 2024-02-19 DIAGNOSIS — F41.9 ANXIETY: ICD-10-CM

## 2024-02-19 DIAGNOSIS — F41.1 GAD (GENERALIZED ANXIETY DISORDER): Primary | ICD-10-CM

## 2024-02-19 DIAGNOSIS — F33.1 MDD (MAJOR DEPRESSIVE DISORDER), RECURRENT EPISODE, MODERATE: ICD-10-CM

## 2024-02-19 DIAGNOSIS — F42.8 OBSESSIVE THINKING: ICD-10-CM

## 2024-02-19 PROCEDURE — 99215 OFFICE O/P EST HI 40 MIN: CPT | Mod: PBBFAC,PO

## 2024-02-19 PROCEDURE — 90792 PSYCH DIAG EVAL W/MED SRVCS: CPT | Mod: ,,,

## 2024-02-19 PROCEDURE — 99999 PR PBB SHADOW E&M-EST. PATIENT-LVL V: CPT | Mod: PBBFAC,,,

## 2024-02-19 RX ORDER — SERTRALINE HYDROCHLORIDE 50 MG/1
TABLET, FILM COATED ORAL
Qty: 30 TABLET | Refills: 0 | Status: SHIPPED | OUTPATIENT
Start: 2024-02-19 | End: 2024-04-05

## 2024-02-19 NOTE — PATIENT INSTRUCTIONS
1. Continue buPROPion (WELLBUTRIN XL) 150 MG TB24 tablet - Take 1 tablet (150 mg total) by mouth once daily for depression.    2. Decrease LORazepam (ATIVAN) 0.5 MG tablet - TAKE 0.5 TABLET(0.25 MG total) BY MOUTH THREE TIMES DAILY AS NEEDED for anxiety. Discussed risk of decreased RT, sedation, addictive potential, and not to mix with alcohol.     3. Start sertraline (ZOLOFT) 50 MG tablet - Take 0.5 tablets (25 mg total) by mouth once daily for 8 days, THEN 1 tablet (50 mg total) once daily for depression and anxiety.

## 2024-02-19 NOTE — PROGRESS NOTES
Outpatient Psychiatry Initial Visit  02/19/2024    ID:  74 yo F presenting for an initial evaluation. Met with patient.    Reason for encounter: Referral from Dr. Hassan. Patient complains of anxiety/depression.    History of Present Illness:  Pt. is a 74 yo F with a past psychiatric hx of Anxiety, Depression, unspecified depression type presenting to the clinic for an initial evaluation and treatment. Past medical history outlined below. She is currently taking buPROPion (WELLBUTRIN XL), LORazepam (ATIVAN), prescribed and managed by Dr. Engel/Dr. Hassan. She reports that these medications have not adequately addressed her depression.       Pt currently endorses or denies the following symptoms:  Psych ROS  Depression: moderate  Anxiety: no panic attacks, no agoraphobia, no social anxiety  PTSD: no flashbacks, nightmares, or avoidance of stimuli  Carrie/Psychosis: No manic episodes, no A/V hallucinations  SI - No SI - access to guns:  denies    Past Psychiatric History:  Past Psych Hx:   anxiety, depression            First psych contact:  15 years ago  Prior hospitalizations:    denies  Prior suicide attempts or self harm:  denies  Prior diagnosis:  anxiety, depression  Prior meds:  Effexor, Xanax, Paxil, Ramelteon  Current meds:  Wellbutrin, Ativan  Prior psychotherapy:  denies      Past Medical Hx: Hx of TBI:      denies      Hx of seizures:  denies  Past Medical History:   Diagnosis Date    Anticoagulant long-term use     Anxiety     takes daily Xanax    Arthritis     right thumb    Cataract     OU    Cholelithiasis     GERD (gastroesophageal reflux disease)     Hepatic steatosis     Hyperlipemia     Hypertension     PVD (posterior vitreous detachment)     OD             Past Surgical Hx:  Past Surgical History:   Procedure Laterality Date    BREAST BIOPSY Left 08/28/2020    stereo biopsy    BREAST BIOPSY Left 10/07/2020    benign excisional biopsy    chest abcess      child    COLONOSCOPY  01/06/2012     "Dr. Lopez: hemorrhoids, redundant colon    COLONOSCOPY N/A 02/17/2021    Dr. Lopez: Congested and erythematous mucosa in the rectum, in the recto-sigmoid colon and in the sigmoid colon, proctosigmoid colitis, Redundant colon; biopsy: focal active colitis "nonspecific but can be seen with acute self-limited colitis (infection), medication effect (e.g. NSAID use), proximity to diverticula, or early/evolving IBD    ESOPHAGOGASTRODUODENOSCOPY N/A 06/06/2019    Dr. Lopez: small hiatal hernia, Non-erosive esophageal reflux (NERD) disease?., slight antritis; biopsy: Antral mucosa with chemical/reactive gastropathy. negative for h pylori    ESOPHAGOGASTRODUODENOSCOPY N/A 02/17/2021    Procedure: EGD (ESOPHAGOGASTRODUODENOSCOPY);  Surgeon: Nathan Lopez Jr., MD;  Location: [facility];  Service: Endoscopy;  Laterality: N/A; Minimal gastritis; biopsy: stomach- negative for h pylori, active chronic gastritis with focal features of erosive gastritis, duodenum WNL    ESOPHAGOGASTRODUODENOSCOPY N/A 05/10/2022    Procedure: EGD (ESOPHAGOGASTRODUODENOSCOPY);  Surgeon: Nathan Lopez Jr., MD;  Location: [facility];  Service: Endoscopy;  Laterality: N/A;    EXCISIONAL BIOPSY Left 10/07/2020    Procedure: EXCISIONAL BIOPSY-Left with radiological marker;  Surgeon: Brooklynn Ashford MD;  Location: [facility];  Service: General;  Laterality: Left;    FRACTURE SURGERY  2010    Left thumb repaired surgically    JOINT REPLACEMENT Bilateral     knee    KNEE ARTHROSCOPY W/ LASER      right    LAPAROSCOPIC CHOLECYSTECTOMY N/A 06/14/2019    Procedure: CHOLECYSTECTOMY, LAPAROSCOPIC;  Surgeon: Guevara Evans MD;  Location: [facility];  Service: General;  Laterality: N/A;    thumb surgery  11/2014    TOTAL KNEE ARTHROPLASTY Left 06/19/2018    Procedure: REPLACEMENT-KNEE-TOTAL;  Surgeon: Nj Melvin MD;  Location: [address];  Service: Orthopedics;  Laterality: Left;  [employer]    UPPER GASTROINTESTINAL ENDOSCOPY  07/13/2017    Dr. Lopez: JOHNNYD, " "Gastric mucosal atrophy. antritis, Scar in the incisura. Biopsied; biopsy: chronic gastritis. negative for h pylori           Family Hx:   Paternal:  denies  Maternal:  denies  Brother: alcoholic, gambler          Social Hx:   Childhood:  "good" but dad  at 7 yo, grew up poor  Marital Status:  , 51 years, good  Children:  2 -  in their 40s  Resides:    Occupation:  Retired teacher, principal  Hobbies:  substitute teaching  [employer]:  [employer]  Education level:  Masters +  :   denies  Legal:  denies    Substance Hx:  Tobacco:  denies  Alcohol:  denies  Drug use:  denies  Caffeine:  iced tea x 2 liters (encouraged pt to cut down on caffeine intake to help resolve anxiety)    Rehab:  denies  Prior/current AA:  denies    Review of Symptoms  GENERAL: no weight gain/loss  SKIN: no rashes or lacerations  HEAD: no headaches  EYES: no jaundice, blindness. No exophthalmos  EARS: no dizziness, tinnitus, or hearing loss  NOSE: no changes in smell  Mouth/throat: no dyskinetic movements or obvious goiter  CHEST: no SOB, hyperventilation or cough  CARDIO: no tachycardia, bradycardia, or chest pain  ABDOMEN: no nausea, vomiting, pain, constipation, or diarrhea  URINARY:  no frequency, dysuria, or sexual dysfunction  ENDOCRINE: No polydipsia, polyuria, no cold/hot intolerance  MUSCULOSKELETAL: no joint pain/stiffness  NEUROLOGIC: no weakness or sensory changes, no seizures, no confusion, memory loss, or forgetfulness, no tremor or abnormal movements    Current Evaluation:  Nutritional Screening:  Considering the patient's height and weight, medications, medical history and preferences, should a referral be made to the dietitian? No  Vitals: most recent vitals signs, dated greater than 90 days prior to this appointment, were reviewed  General: age appropriate, well nourished, casually dressed, neatly groomed  MSK: muscle strength/tone : no tremor or abnormal movements. Gait/Station: no ataxic, " "steady    Suicide Risk Assessment:  Protective factors: age, gender, no prior attempts, no prior hospitalizations, no ongoing substance abuse, no psychosis, , has children, denies SI/intent/plan, seeking treatment, access to treatment, future oriented, good primary support, no access to firearms    Risks:   Patient is a low immediate and long-term risk considering risk factors    Psychiatric:  Speech: Normal rate, rhythm, volume. No latency, no pressured speech  Mood/Affect: euthymic, congruent and appropriate   Thought Process: organized, logical, linear  Thought Content: no suicidal or homicidal ideation, no A/V hallucinations, delusions or paranoia  Insight: Intact; aware of illness  Judgement: behavior is adequate to circumstances  Orientation: A&O x 4  Memory: Intact for content of interview, 3/3 immediate, 3/3 after 3 mins. Able to recall recent and remote events.  Language: Grossly intact, no aphasias   Concentration: Spells "world" correctly forward & backwards  Knowledge/Intelligence: appropriate to age and level of education.   Spouse/Partner: Supportive    ASSESSMENT - DIAGNOSIS - GOALS:  Impression:          Pt presents to OP Psychiatry for initial evaluation and medication management of CARLOS, MDD, OCD (obsessive thinking). Pt reports having been on Wellbutrin and Ativan for a while, and she feels her depression stems from anxiety and worry about the health and welfare of her family. She states this is most upsetting when she worries about her grandchildren. She reports not having the time to think about all of these potential worries with her own children because she was busy raising them, but worries constantly about her grandchildren but doesn't say anything to her children because she doesn't want them to think that they can't take care of their own children. Discussed adding Zoloft to the Wellbutrin to help with OCD, anxiety and depression, and discussed decreasing the Ativan dose to 0.5 " tablet TID AS NEEDED. Pt verbalizes understanding. To continue to take the Wellbutrin as prescribed. Pt denies SI/HI/AVH, self-harm, plan, or intent. Pt verbalizes understanding of medication instructions through teach back, will reassess symptoms in 30 days or PRN if symptoms worsen.        Safe for outpatient tx and no acute safety concerns.  Diagnosis/Diagnoses:  - CARLOS (generalized anxiety disorder)  - MDD (major depressive disorder), recurrent episode, moderate  - Obsessive thinking    Strengths/Liabilities: Patient accepts feedback & guidance. Patient is motivated for change.     Treatment Goals: Specify outcomes written in observable, behavioral terms  Anxiety: acquire relapse prevention skills, reduce physical symptoms of anxiety, reduce time spent worrying (>30 minutes/day)  Depression: Acquire relapse prevention skills, increasing energy, increasing interest in usual activities, increasing motivation, reducing excessive guilt and reducing fatigue.    Treatment Plan/Recommendations:   Medication Management: The risks and benefits of medication were discussed with the patient.   Meds:    1. Continue buPROPion (WELLBUTRIN XL) 150 MG TB24 tablet - Take 1 tablet (150 mg total) by mouth once daily for depression.    2. Decrease LORazepam (ATIVAN) 0.5 MG tablet - TAKE 0.5 TABLET(0.25 MG) BY MOUTH THREE TIMES DAILY AS NEEDED for anxiety. Discussed risk of decreased RT, sedation, addictive potential, and not to mix with alcohol.     3. Start sertraline (ZOLOFT) 50 MG tablet - Take 0.5 tablets (25 mg total) by mouth once daily for 8 days, THEN 1 tablet (50 mg total) once daily baird depression and anxiety. Discussed potential for GI side effects, sexual dysfunction, mood destabilization, headaches.         Labs:  none  Return to Clinic:  30 days or PRN if symptoms worsen  Counseling time:  35 mins  Total time:  60 mins    - Patient given contact # for psychotherapists at [facility] and also instructed they may  check with insurance for a list of providers.   - Call to report any worsening of symptoms or problems associated with medication  - Pt instructed to go to ER if thoughts of harming self or others arise     - Spent 60min face to face with the pt; >50% time spent in counseling   - Supportive therapy and psycho education provided  - R/B/SE's of medications discussed with the pt who expresses understanding and chooses to take medications as prescribed.   - Pt instructed to call clinic, 911 or go to nearest emergency room if symptoms worsen or pt is in crisis. The pt expresses understanding.      Grace Mistry NP  Psychiatric-Mental Health Nurse Practitioner  Department of Psychiatry    [facility] [place] [address] [place] [address]  Phone:  [phone]  Fax: [phone]

## 2024-02-21 ENCOUNTER — CLINICAL SUPPORT (OUTPATIENT)
Dept: REHABILITATION | Facility: HOSPITAL | Age: 74
End: 2024-02-21
Payer: MEDICARE

## 2024-02-21 DIAGNOSIS — M25.551 BILATERAL HIP PAIN: ICD-10-CM

## 2024-02-21 DIAGNOSIS — M70.61 GREATER TROCHANTERIC BURSITIS OF RIGHT HIP: Primary | ICD-10-CM

## 2024-02-21 DIAGNOSIS — R29.898 WEAKNESS OF BOTH HIPS: Primary | ICD-10-CM

## 2024-02-21 DIAGNOSIS — R26.9 GAIT DIFFICULTY: ICD-10-CM

## 2024-02-21 DIAGNOSIS — M25.552 BILATERAL HIP PAIN: ICD-10-CM

## 2024-02-21 PROCEDURE — 97112 NEUROMUSCULAR REEDUCATION: CPT | Mod: PO

## 2024-02-21 PROCEDURE — 97140 MANUAL THERAPY 1/> REGIONS: CPT | Mod: PO

## 2024-02-22 RX ORDER — METHOCARBAMOL 750 MG/1
750 TABLET, FILM COATED ORAL 4 TIMES DAILY PRN
Qty: 44 TABLET | Refills: 3 | Status: SHIPPED | OUTPATIENT
Start: 2024-02-22 | End: 2024-02-27

## 2024-02-24 NOTE — PROGRESS NOTES
"[facility]   Physical Therapy Treatment Note     Name: Lucinda Aguilera  Clinic Number: [id number]    Therapy Diagnosis: No diagnosis found.  Physician: Nick Arnold MD    Visit Date: 2/21/2024  Physician Orders: PT Eval and Treat   Medical Diagnosis from Referral:   Diagnosis   M70.61 (ICD-10-CM) - Greater trochanteric bursitis of right hip      Evaluation Date: 1/30/2024  Authorization Period Expiration: 12/31/24  Plan of Care Expiration: 4/30/24  Progress Note Due: 3/1/24  Date of Surgery: NA  Visit # / Visits authorized: 5/ 20   FOTO: 1/ 3    Precautions: Standard      Time In: 955 am  Time Out: 1050 am  Total Billable Time: 55 minutes 1:1 955- 1033 NMR 2 MT 1      SUBJECTIVE     Pt reports: that her hip is feeling better no work this morning but will be doing work tomorrow.   She was compliant with home exercise program.  Response to previous treatment: no adverse effects  Functional change: decreased pn sx    Pain: 2/10  Location: right hip     OBJECTIVE     Objective Measures updated at progress report unless specified.     Treatment     Lucinda received the treatments listed below:      therapeutic exercises to develop strength, endurance, ROM, flexibility, posture, and core stabilization for 10 minutes including:  Recumbent stat bike 10 min  Hip add/frog stretch 3 x 30 secs  Elias test stretch x 1 min        Supine heel slides with use of sliders x 20   Supine HS stretch with strap 3 x 30 sec  Supine ITB stretch with strap 3x 30 sec        Piriformis hook stretch 3 x 30" each  Seated HS stretch 2 x 20 secs   Supine frog stretch unilaterally 3 x 30"  DOUBLE KNEE TO CHEST with green t-ball 20x  .     Gait 3 x 50' side stepping VCs for form x 4 laps        manual therapy techniques: Manual traction, Myofacial release, and Soft tissue Mobilization were applied to the: iliacus, psoas, QL, TFL, glut medius and adductor nelson for 18 minutes, including:  Short axis traction 3x in " session  Long axis traction  Lateral hip traction in hooklying     neuromuscular re-education activities to improve: Sense, Proprioception, and Posture for 27 minutes. The following activities were included:     Nu-step x 6 min L2.  LTR stretch 3 x 30 secs  PPT x20   Bent knee fall out/single 2 x10  blue t-band  Bridges with abd BTB 3 x 10  Supine clams BTB 3 x 10   Marching on trampoline vs blue foam 2 min x 2 NP   Adduction ball squeeze + PPT x 20 hold 2 secs  LOWER TRUNK ROTATION x 3 min medium amplitude with red ball.  Supine marching x 20 + PPT  Seated marching x 20 +PPT   Standing reciprocal marching, lateral toe taps, hip extension 3 x 10 giant set         Patient Education and Home Exercises     Home Exercises Provided and Patient Education Provided     Education provided:   - effects of therapy  - progression of therapy    Written Home Exercises Provided: Patient instructed to cont prior HEP. Exercises were reviewed and Lucinda was able to demonstrate them prior to the end of the session.  Lucinda demonstrated good  understanding of the education provided. See EMR under Patient Instructions for exercises provided during therapy sessions    ASSESSMENT     Lucinda with good tolerance and execution of HEP and continued increased workload in session without issues and mild muscle guarding overall at lateral gluteus med and TFL.    Lucinda Is progressing well towards her goals.   Pt prognosis is Good.     Pt will continue to benefit from skilled outpatient physical therapy to address the deficits listed in the problem list box on initial evaluation, provide pt/family education and to maximize pt's level of independence in the home and community environment.     Pt's spiritual, cultural and educational needs considered and pt agreeable to plan of care and goals.     Anticipated barriers to physical therapy: chronic nature    Goals:STG 3 weeks  1.  Pt to be independent with HEP for increased functional mobility  and pain control. progressing  2.  Pt to increase AROM at 15 degs B hip extension no pain for increased functional mobility and transfers.  3.  Pt to decrease pain to less than 2/10 after session for increased functional mobility. MET  4.  Pt to increase tolerance to 45 min of exercise in session without increased pain during dry land program.  MET     Long Term Goals: 10 weeks   1.  Pt to be independent with pain management strategies and verbalize 2 strategies for increased functional mobility and pain control. MET  2.  Pt to increase AROM at B hip abduction 40 degs without pain for increased functional mobility and transfers.  3.  Pt to decrease pain to less than 0/10 after session for increased functional mobility.  4.  Pt to tolerate one full day of work as  + 6 hrs without increased pain for increased activity tolerance.   5.  Pt to increased hip abduction and hip extension to 4-/5 to 4/5 bilaterally for increased overall functional mobility.     PLAN     Plan of care Certification: 1/30/2024 to 4/30/24.     Outpatient Physical Therapy 2 times weekly for 10 weeks to include the following interventions: Aquatic Therapy, Cervical/Lumbar Traction, Electrical Stimulation DN, Gait Training, Manual Therapy, Moist Heat/ Ice, Neuromuscular Re-ed, Patient Education, Therapeutic Activities, and Therapeutic Exercise.     Nora Costa, PT

## 2024-02-25 ENCOUNTER — PATIENT MESSAGE (OUTPATIENT)
Dept: PSYCHIATRY | Facility: CLINIC | Age: 74
End: 2024-02-25
Payer: MEDICARE

## 2024-02-25 ENCOUNTER — PATIENT MESSAGE (OUTPATIENT)
Dept: ORTHOPEDICS | Facility: CLINIC | Age: 74
End: 2024-02-25
Payer: MEDICARE

## 2024-02-25 ENCOUNTER — PATIENT MESSAGE (OUTPATIENT)
Dept: FAMILY MEDICINE | Facility: CLINIC | Age: 74
End: 2024-02-25
Payer: MEDICARE

## 2024-02-27 ENCOUNTER — OFFICE VISIT (OUTPATIENT)
Dept: ORTHOPEDICS | Facility: CLINIC | Age: 74
End: 2024-02-27
Payer: MEDICARE

## 2024-02-27 VITALS — HEIGHT: 62 IN | BODY MASS INDEX: 31.08 KG/M2 | WEIGHT: 168.88 LBS

## 2024-02-27 DIAGNOSIS — M70.61 GREATER TROCHANTERIC BURSITIS OF RIGHT HIP: Primary | ICD-10-CM

## 2024-02-27 PROCEDURE — 99499 UNLISTED E&M SERVICE: CPT | Mod: S$PBB,,, | Performed by: ORTHOPAEDIC SURGERY

## 2024-02-27 PROCEDURE — 20610 DRAIN/INJ JOINT/BURSA W/O US: CPT | Mod: PBBFAC,PO,RT | Performed by: ORTHOPAEDIC SURGERY

## 2024-02-27 PROCEDURE — 99999 PR PBB SHADOW E&M-EST. PATIENT-LVL IV: CPT | Mod: PBBFAC,,, | Performed by: ORTHOPAEDIC SURGERY

## 2024-02-27 PROCEDURE — 99999PBSHW PR PBB SHADOW TECHNICAL ONLY FILED TO HB: Mod: PBBFAC,,,

## 2024-02-27 PROCEDURE — 99214 OFFICE O/P EST MOD 30 MIN: CPT | Mod: PBBFAC,PO | Performed by: ORTHOPAEDIC SURGERY

## 2024-02-27 RX ORDER — METHOCARBAMOL 750 MG/1
750 TABLET, FILM COATED ORAL 4 TIMES DAILY PRN
Qty: 44 TABLET | Refills: 3 | Status: SHIPPED | OUTPATIENT
Start: 2024-02-27 | End: 2024-06-11

## 2024-02-27 RX ADMIN — TRIAMCINOLONE ACETONIDE 40 MG: 40 INJECTION, SUSPENSION INTRA-ARTICULAR; INTRAMUSCULAR at 02:02

## 2024-02-29 ENCOUNTER — EXTERNAL CHRONIC CARE MANAGEMENT (OUTPATIENT)
Dept: PRIMARY CARE CLINIC | Facility: CLINIC | Age: 74
End: 2024-02-29
Payer: MEDICARE

## 2024-02-29 ENCOUNTER — CLINICAL SUPPORT (OUTPATIENT)
Dept: REHABILITATION | Facility: HOSPITAL | Age: 74
End: 2024-02-29
Payer: MEDICARE

## 2024-02-29 ENCOUNTER — LAB VISIT (OUTPATIENT)
Dept: LAB | Facility: HOSPITAL | Age: 74
End: 2024-02-29
Attending: FAMILY MEDICINE
Payer: MEDICARE

## 2024-02-29 DIAGNOSIS — E78.49 OTHER HYPERLIPIDEMIA: ICD-10-CM

## 2024-02-29 DIAGNOSIS — R29.898 WEAKNESS OF BOTH HIPS: ICD-10-CM

## 2024-02-29 DIAGNOSIS — R05.9 COUGH, UNSPECIFIED TYPE: ICD-10-CM

## 2024-02-29 DIAGNOSIS — M53.86 DECREASED ROM OF LUMBAR SPINE: Primary | ICD-10-CM

## 2024-02-29 LAB
CHOLEST SERPL-MCNC: 233 MG/DL (ref 120–199)
CHOLEST/HDLC SERPL: 3.1 {RATIO} (ref 2–5)
HDLC SERPL-MCNC: 74 MG/DL (ref 40–75)
HDLC SERPL: 31.8 % (ref 20–50)
LDLC SERPL CALC-MCNC: 133.8 MG/DL (ref 63–159)
NONHDLC SERPL-MCNC: 159 MG/DL
TRIGL SERPL-MCNC: 126 MG/DL (ref 30–150)

## 2024-02-29 PROCEDURE — 99490 CHRNC CARE MGMT STAFF 1ST 20: CPT | Mod: S$PBB,,, | Performed by: FAMILY MEDICINE

## 2024-02-29 PROCEDURE — 97113 AQUATIC THERAPY/EXERCISES: CPT | Mod: PO,CQ

## 2024-02-29 PROCEDURE — 80061 LIPID PANEL: CPT | Performed by: FAMILY MEDICINE

## 2024-02-29 PROCEDURE — 99490 CHRNC CARE MGMT STAFF 1ST 20: CPT | Mod: PBBFAC,PO | Performed by: FAMILY MEDICINE

## 2024-02-29 PROCEDURE — 36415 COLL VENOUS BLD VENIPUNCTURE: CPT | Mod: PO | Performed by: FAMILY MEDICINE

## 2024-02-29 RX ORDER — BENZONATATE 200 MG/1
200 CAPSULE ORAL 3 TIMES DAILY PRN
Qty: 30 CAPSULE | Refills: 1 | Status: SHIPPED | OUTPATIENT
Start: 2024-02-29

## 2024-02-29 NOTE — PROGRESS NOTES
Physical Therapy Aquatic Treatment Note     Name: Lucinda Aguilera  Clinic Number: [id number]    Therapy Diagnosis:   Encounter Diagnoses   Name Primary?    Decreased ROM of lumbar spine Yes    Weakness of both hips      Physician: Nick Arnold MD  Visit Date: 2/29/2024  Physician Orders: PT Eval and Treat   Medical Diagnosis from Referral:   Diagnosis   M70.61 (ICD-10-CM) - Greater trochanteric bursitis of right hip      Evaluation Date: 1/30/2024  Authorization Period Expiration: 12/31/24  Plan of Care Expiration: 4/30/24  Progress Note Due: 3/1/24  Date of Surgery: NA  Visit # / Visits authorized: 3/ 20   FOTO: 1/ 3    Precautions: Standard      Time In: 3:00 PM  Time Out: 4:00 am  Total Billable Time: 60    Subjective     Pt reports:that she is sore today due to receiving injection in her hip.   she was compliant with home exercise program given last session.     Pain: 6/10  Location: bilateral hips    Objective     Lucinda received aquatic exercises to develop strength, endurance, ROM, flexibility, posture, and core stabilization for 60 minutes including:    AMB 3 min each  FWD 1.1 mph  BWD 0.8 mph  Side 0.8  mph    Stretches 3 x 30 sec  HSS  Quad     LE exs 20x each 1.5#  Mini Squat with QS  Heel Raise with GS  Hip flex/ext  Hip ABD/ADD  HS Curl  HIp Circles CW/CCW  Step-ups  LAQ     Endurance 3 min  Marching  Bicycle pink noodle    Home Exercises Provided and Patient Education Provided     Education provided:   - progress towards goals   - role of therapy     Written Home Exercises Provided: Patient was not issued HEP for pool.  Exercises were reviewed and Lucinda was able to demonstrate them prior to the end of the session.   Pt received a written copy of exercises to perform at home.     Lucinda demonstrated good  understanding of the education provided.     Assessment     Lucinda fausto today's tx with progression of aquatic ther ex well. She was able to progress with pace with amb in all directions and  with resistance with LE exs. She was w/o complaint with all activities. She demonstrates fair recall of exs, requiring min VCs for proper form, posture and core stab tech. VCs for keeping feet on floor and step length.   Lucinda Is progressing well towards her goals.   Pt prognosis is Good.     Pt will continue to benefit from skilled outpatient physical therapy to address the deficits listed in the problem list box on initial evaluation, provide pt/family education and to maximize pt's level of independence in the home and community environment.     Pt's spiritual, cultural and educational needs considered and pt agreeable to plan of care and goals.    Anticipated barriers to physical therapy: chronic nature    Goals: STG 3 weeks  1.  Pt to be independent with HEP for increased functional mobility and pain control.  2.  Pt to increase AROM at 15 degs B hip extension no pain for increased functional mobility and transfers.  3.  Pt to decrease pain to less than 2/10 after session for increased functional mobility.  4.  Pt to increase tolerance to 45 min of exercise in session without increased pain during dry land program.      Long Term Goals: 10 weeks   1.  Pt to be independent with pain management strategies and verbalize 2 strategies for increased functional mobility and pain control.  2.  Pt to increase AROM at B hip abduction 40 degs without pain for increased functional mobility and transfers.  3.  Pt to decrease pain to less than 0/10 after session for increased functional mobility.  4.  Pt to tolerate one full day of work as  + 6 hrs without increased pain for increased activity tolerance.   5.  Pt to increased hip abduction and hip extension to 4-/5 to 4/5 bilaterally for increased overall functional mobility.     Plan     Continue 2x per week. Plan of care Certification: 1/30/2024 to 4/30/24.     Outpatient Physical Therapy 2 times weekly for 10 weeks to include the following  interventions: Aquatic Therapy, Cervical/Lumbar Traction, Electrical Stimulation DN, Gait Training, Manual Therapy, Moist Heat/ Ice, Neuromuscular Re-ed, Patient Education, Therapeutic Activities, and Therapeutic Exercise.     Manuel Garcia, PTA

## 2024-03-05 ENCOUNTER — PATIENT MESSAGE (OUTPATIENT)
Dept: ORTHOPEDICS | Facility: CLINIC | Age: 74
End: 2024-03-05
Payer: MEDICARE

## 2024-03-05 DIAGNOSIS — M54.16 LUMBAR RADICULOPATHY, CHRONIC: Primary | ICD-10-CM

## 2024-03-05 RX ORDER — TRIAMCINOLONE ACETONIDE 40 MG/ML
40 INJECTION, SUSPENSION INTRA-ARTICULAR; INTRAMUSCULAR
Status: DISCONTINUED | OUTPATIENT
Start: 2024-02-27 | End: 2024-03-05 | Stop reason: HOSPADM

## 2024-03-05 NOTE — PROCEDURES
Large Joint Aspiration/Injection: R greater trochanteric bursa    Date/Time: 2/27/2024 2:30 PM    Performed by: Nick Arnold MD  Authorized by: Nick Arnold MD    Consent Done?:  Yes (Verbal)  Indications:  Pain  Timeout: prior to procedure the correct patient, procedure, and site was verified    Prep: patient was prepped and draped in usual sterile fashion      Local anesthesia used?: Yes    Local anesthetic:  Lidocaine 1% without epinephrine  Anesthetic total (ml):  5      Details:  Needle Size:  22 G  Approach:  Lateral  Location:  Hip  Site:  R greater trochanteric bursa  Medications:  40 mg triamcinolone acetonide 40 mg/mL  Patient tolerance:  Patient tolerated the procedure well with no immediate complications

## 2024-03-05 NOTE — PROGRESS NOTES
"  Chief Complaint   Patient presents with    Right Hip - Pain     5/10      HPI:   This is a 73 y.o. who presents to clinic today for right hip pain for the past 2 weeks after no known trauma. Complains of pain while sleeping on side and when descending stairs. Pain is dull. No numbness or tingling. No associated signs or symptoms.      Past Medical History:   Diagnosis Date    Anticoagulant long-term use     Anxiety     takes daily Xanax    Arthritis     right thumb    Cataract     OU    Cholelithiasis     GERD (gastroesophageal reflux disease)     Hepatic steatosis     Hyperlipemia     Hypertension     PVD (posterior vitreous detachment)     OD     Past Surgical History:   Procedure Laterality Date    BREAST BIOPSY Left 08/28/2020    stereo biopsy    BREAST BIOPSY Left 10/07/2020    benign excisional biopsy    chest abcess      child    COLONOSCOPY  01/06/2012    Dr. Lopez: hemorrhoids, redundant colon    COLONOSCOPY N/A 02/17/2021    Dr. Lopez: Congested and erythematous mucosa in the rectum, in the recto-sigmoid colon and in the sigmoid colon, proctosigmoid colitis, Redundant colon; biopsy: focal active colitis "nonspecific but can be seen with acute self-limited colitis (infection), medication effect (e.g. NSAID use), proximity to diverticula, or early/evolving IBD    ESOPHAGOGASTRODUODENOSCOPY N/A 06/06/2019    Dr. Lopez: small hiatal hernia, Non-erosive esophageal reflux (NERD) disease?., slight antritis; biopsy: Antral mucosa with chemical/reactive gastropathy. negative for h pylori    ESOPHAGOGASTRODUODENOSCOPY N/A 02/17/2021    Procedure: EGD (ESOPHAGOGASTRODUODENOSCOPY);  Surgeon: Nathan Lopez Jr., MD;  Location: [facility];  Service: Endoscopy;  Laterality: N/A; Minimal gastritis; biopsy: stomach- negative for h pylori, active chronic gastritis with focal features of erosive gastritis, duodenum WNL    ESOPHAGOGASTRODUODENOSCOPY N/A 05/10/2022    Procedure: EGD (ESOPHAGOGASTRODUODENOSCOPY);  Surgeon: " Nathan Lopez Jr., MD;  Location: [facility];  Service: Endoscopy;  Laterality: N/A;    EXCISIONAL BIOPSY Left 10/07/2020    Procedure: EXCISIONAL BIOPSY-Left with radiological marker;  Surgeon: Brooklynn Ashford MD;  Location: [facility];  Service: General;  Laterality: Left;    FRACTURE SURGERY  2010    Left thumb repaired surgically    JOINT REPLACEMENT Bilateral     knee    KNEE ARTHROSCOPY W/ LASER      right    LAPAROSCOPIC CHOLECYSTECTOMY N/A 06/14/2019    Procedure: CHOLECYSTECTOMY, LAPAROSCOPIC;  Surgeon: Guevara Evans MD;  Location: [facility] OR;  Service: General;  Laterality: N/A;    thumb surgery  11/2014    TOTAL KNEE ARTHROPLASTY Left 06/19/2018    Procedure: REPLACEMENT-KNEE-TOTAL;  Surgeon: Nj Melvin MD;  Location: [address];  Service: Orthopedics;  Laterality: Left;  [place]    UPPER GASTROINTESTINAL ENDOSCOPY  07/13/2017    Dr. Lopez: NERD, Gastric mucosal atrophy. antritis, Scar in the incisura. Biopsied; biopsy: chronic gastritis. negative for h pylori     Current Outpatient Medications on File Prior to Visit   Medication Sig Dispense Refill    acetaminophen (TYLENOL) 500 MG tablet Take 500 mg by mouth every 6 (six) hours as needed for Pain.      ascorbic acid, vitamin C, (VITAMIN C) 250 MG tablet Take 500 mg by mouth once daily.       aspirin (ECOTRIN) 81 MG EC tablet Take 81 mg by mouth every evening.      atenoloL (TENORMIN) 25 MG tablet Take 1 tablet (25 mg total) by mouth every evening. 30 tablet 3    balsalazide (COLAZAL) 750 mg capsule TAKE 3 CAPSULES(2250 MG) BY MOUTH TWICE DAILY WITH MEALS 180 capsule 11    buPROPion (WELLBUTRIN XL) 150 MG TB24 tablet Take 1 tablet (150 mg total) by mouth once daily. 30 tablet 3    diltiaZEM (DILACOR XR) 240 MG CDCR Take 1 capsule (240 mg total) by mouth every evening. 30 capsule 3    docusate sodium (COLACE) 100 MG capsule Take 100 mg by mouth 2 (two) times daily.      famotidine (PEPCID) 40 MG tablet Take 1 tablet (40 mg total) by mouth  nightly. 90 tablet 3    fluticasone propionate (FLONASE) 50 mcg/actuation nasal spray 1 spray (50 mcg total) by Each Nostril route once daily. 16 g 3    furosemide (LASIX) 20 MG tablet Take 1 tablet (20 mg total) by mouth daily as needed (edema). 2 pm on an empty stomach if swollen. 30 tablet 3    guaiFENesin (MUCINEX) 600 mg 12 hr tablet Take 1,200 mg by mouth 2 (two) times daily.      lisinopriL (PRINIVIL,ZESTRIL) 40 MG tablet Take 1 tablet (40 mg total) by mouth 2 (two) times a day. 60 tablet 3    LORazepam (ATIVAN) 0.5 MG tablet TAKE 1 TABLET(0.5 MG) BY MOUTH TWICE DAILY 60 tablet 5    magnesium oxide (MAG-OX) 400 mg (241.3 mg magnesium) tablet Take 1 tablet (400 mg total) by mouth once daily. 30 tablet 3    multivitamin (THERAGRAN) per tablet Take 1 tablet by mouth once daily.      ondansetron (ZOFRAN-ODT) 4 MG TbDL Take 1 tablet (4 mg total) by mouth every 6 (six) hours as needed (nausea). 30 tablet 3    pantoprazole (PROTONIX) 40 MG tablet Take 1 tablet (40 mg total) by mouth 2 (two) times daily. 60 tablet 3    polyethylene glycol (GLYCOLAX) 17 gram/dose powder Take 17 g by mouth every other day.      pravastatin (PRAVACHOL) 40 MG tablet Take 1 tablet (40 mg total) by mouth once daily. 90 tablet 2    psyllium husk, aspartame, (NATURAL PSYLLIUM FIBER) 3.4 gram/5.8 gram Powd Take by mouth.      sertraline (ZOLOFT) 50 MG tablet Take 0.5 tablets (25 mg total) by mouth once daily for 8 days, THEN 1 tablet (50 mg total) once daily for 26 days. 30 tablet 0     No current facility-administered medications on file prior to visit.     Review of patient's allergies indicates:  No Known Allergies  Family History   Problem Relation Age of Onset    Hypertension Mother     Heart disease Mother     Glaucoma Mother     Stroke Father     Glaucoma Sister     Cataracts Sister     Macular degeneration Sister     Breast cancer Neg Hx     Colon cancer Neg Hx     Crohn's disease Neg Hx     Ulcerative colitis Neg Hx     Stomach  cancer Neg Hx     Esophageal cancer Neg Hx     Celiac disease Neg Hx     Amblyopia Neg Hx     Blindness Neg Hx     Retinal detachment Neg Hx     Strabismus Neg Hx      Social History     Socioeconomic History    Marital status:     Number of children: 2   Occupational History    Occupation: retired teacher and principal    Occupation: substitute   Tobacco Use    Smoking status: Never    Smokeless tobacco: Never   Substance and Sexual Activity    Alcohol use: Yes     Comment: social- maybe once every few months    Drug use: No    Sexual activity: Yes     Partners: Male     Birth control/protection: Post-menopausal, None     Social Determinants of Health     Financial Resource Strain: Low Risk  (11/9/2023)    Overall Financial Resource Strain (CARDIA)     Difficulty of Paying Living Expenses: Not hard at all   Food Insecurity: No Food Insecurity (11/9/2023)    Hunger Vital Sign     Worried About Running Out of Food in the Last Year: Never true     Ran Out of Food in the Last Year: Never true   Transportation Needs: No Transportation Needs (11/9/2023)    PRAPARE - Transportation     Lack of Transportation (Medical): No     Lack of Transportation (Non-Medical): No   Physical Activity: Sufficiently Active (11/9/2023)    Exercise Vital Sign     Days of Exercise per Week: 5 days     Minutes of Exercise per Session: 50 min   Recent Concern: Physical Activity - Insufficiently Active (10/20/2023)    Exercise Vital Sign     Days of Exercise per Week: 2 days     Minutes of Exercise per Session: 20 min   Stress: Patient Declined (11/9/2023)    [place] [employer] - Occupational Stress Questionnaire     Feeling of Stress : Patient declined   Social Connections: Unknown (11/9/2023)    Social Connection and Isolation Panel [NHANES]     Frequency of Communication with Friends and Family: More than three times a week     Frequency of Social Gatherings with Friends and Family: Twice a week     Active Member  of Clubs or Organizations: Yes     Attends Club or Organization Meetings: Never     Marital Status:    Housing Stability: Low Risk  (11/9/2023)    Housing Stability Vital Sign     Unable to Pay for Housing in the Last Year: No     Number of Places Lived in the Last Year: 1     Unstable Housing in the Last Year: No       Review of Systems:  Constitutional:  Denies fever or chills   Eyes:  Denies change in visual acuity   HENT:  Denies nasal congestion or sore throat   Respiratory:  Denies cough or shortness of breath   Cardiovascular:  Denies chest pain or edema   GI:  Denies abdominal pain, nausea, vomiting, bloody stools or diarrhea   :  Denies dysuria   Integument:  Denies rash   Neurologic:  Denies headache, focal weakness or sensory changes   Endocrine:  Denies polyuria or polydipsia   Lymphatic:  Denies swollen glands   Psychiatric:  Denies depression or anxiety     Physical Exam:   Constitutional:  Well developed, well nourished, no acute distress, non-toxic appearance   Integument:  Well hydrated, no rash   Lymphatic:  No lymphadenopathy noted   Neurologic:  Alert & oriented x 3  Psychiatric:  Speech and behavior appropriate     Bilateral Hip Exam    left Hip Exam   left hip exam performed same as contralateral hip and is normal.     right Hip Exam   Tenderness   The patient is experiencing tenderness in the greater trochanter.  Range of Motion   The patient has normal hip ROM.  Muscle Strength   Abduction: 4/5   Other   Erythema: absent  Sensation: normal  Pulse: present    Greater trochanteric bursitis of right hip    Other orders  -     methocarbamoL (ROBAXIN) 750 MG Tab; Take 1 tablet (750 mg total) by mouth 4 (four) times daily as needed.  Dispense: 44 tablet; Refill: 3           Using an aseptic technique, I injected 5 cc of lidocaine 1% without and 1 cc of kenalog 40mg into the right Hip. The patient tolerated this well. I will have them return to clinic in 4 weeks.

## 2024-03-07 ENCOUNTER — CLINICAL SUPPORT (OUTPATIENT)
Dept: REHABILITATION | Facility: HOSPITAL | Age: 74
End: 2024-03-07
Payer: MEDICARE

## 2024-03-07 DIAGNOSIS — M53.86 DECREASED ROM OF LUMBAR SPINE: Primary | ICD-10-CM

## 2024-03-07 DIAGNOSIS — R29.898 WEAKNESS OF BOTH HIPS: ICD-10-CM

## 2024-03-07 PROCEDURE — 97113 AQUATIC THERAPY/EXERCISES: CPT | Mod: PO,CQ

## 2024-03-07 NOTE — PROGRESS NOTES
Physical Therapy Aquatic Treatment Note     Name: Lcuinda Aguilera  Clinic Number: [id number]    Therapy Diagnosis:   Encounter Diagnoses   Name Primary?    Decreased ROM of lumbar spine Yes    Weakness of both hips      Physician: Nick Arnold MD  Visit Date: 3/7/2024  Physician Orders: PT Eval and Treat   Medical Diagnosis from Referral:   Diagnosis   M70.61 (ICD-10-CM) - Greater trochanteric bursitis of right hip      Evaluation Date: 1/30/2024  Authorization Period Expiration: 12/31/24  Plan of Care Expiration: 4/30/24  Progress Note Due: 3/1/24  Date of Surgery: NA  Visit # / Visits authorized: 3/ 20   FOTO: 1/ 3    Precautions: Standard      Time In: 2:00 PM  Time Out: 3:00 am  Total Billable Time: 60    Subjective     Pt reports:B knee soreness that she rates 2/10 upon arrival. She states that she feels that it is due to her not walking enough.   she was compliant with home exercise program given last session.     Pain: 2/10  Location: bilateral knees    Objective     Lucinda received aquatic exercises to develop strength, endurance, ROM, flexibility, posture, and core stabilization for 60 minutes including:    AMB 3 min each  FWD 1.1 mph  BWD 0.8 mph  Side 0.8  mph    Stretches 3 x 30 sec  HSS  Quad     LE exs 20x each 1.5#  Mini Squat with QS  Heel Raise with GS  Hip flex/ext  Hip ABD/ADD  HS Curl  HIp Circles CW/CCW  Step-ups  LAQ     Endurance 3 min  Marching  Bicycle pink noodle    Home Exercises Provided and Patient Education Provided     Education provided:   - progress towards goals   - role of therapy     Written Home Exercises Provided: Patient was not issued HEP for pool.  Exercises were reviewed and Lucinda was able to demonstrate them prior to the end of the session.   Pt received a written copy of exercises to perform at home.     Lucinda demonstrated good  understanding of the education provided.     Assessment     Lucinda fausto today's tx with  aquatic ther ex well. She was able to progress  with pace with fwd amb . She was w/o complaint with all activities.Improved bal and cor stab tech with bicycle on noodle today.  She demonstrates good recall of exs, requiring min VCs for proper form, posture and core stab tech. VCs for keeping feet on floor and step length.   Lucinda Is progressing well towards her goals.   Pt prognosis is Good.     Pt will continue to benefit from skilled outpatient physical therapy to address the deficits listed in the problem list box on initial evaluation, provide pt/family education and to maximize pt's level of independence in the home and community environment.     Pt's spiritual, cultural and educational needs considered and pt agreeable to plan of care and goals.    Anticipated barriers to physical therapy: chronic nature    Goals: STG 3 weeks  1.  Pt to be independent with HEP for increased functional mobility and pain control.  2.  Pt to increase AROM at 15 degs B hip extension no pain for increased functional mobility and transfers.  3.  Pt to decrease pain to less than 2/10 after session for increased functional mobility.  4.  Pt to increase tolerance to 45 min of exercise in session without increased pain during dry land program.      Long Term Goals: 10 weeks   1.  Pt to be independent with pain management strategies and verbalize 2 strategies for increased functional mobility and pain control.  2.  Pt to increase AROM at B hip abduction 40 degs without pain for increased functional mobility and transfers.  3.  Pt to decrease pain to less than 0/10 after session for increased functional mobility.  4.  Pt to tolerate one full day of work as  + 6 hrs without increased pain for increased activity tolerance.   5.  Pt to increased hip abduction and hip extension to 4-/5 to 4/5 bilaterally for increased overall functional mobility.     Plan     Continue 2x per week. Plan of care Certification: 1/30/2024 to 4/30/24.     Outpatient Physical Therapy 2 times  weekly for 10 weeks to include the following interventions: Aquatic Therapy, Cervical/Lumbar Traction, Electrical Stimulation DN, Gait Training, Manual Therapy, Moist Heat/ Ice, Neuromuscular Re-ed, Patient Education, Therapeutic Activities, and Therapeutic Exercise.     Manuel Garcia, PTA

## 2024-03-11 NOTE — H&P
History & Physical - Short Stay  Gastroenterology      SUBJECTIVE:     Procedure: Gastroscopy    Chief Complaint/Indication for Procedure:  Epig pain.  GERD.    History of Present Illness:  See recent GI OV note:  Office Visit   1/2/2024  [place] - Gastroenterology       Jamee Allen NP  Gastroenterology Epigastric pain +4 more  Dx Constipation  Reason for Visit     Progress Notes    Jamee Allen NP at 1/2/2024  1:00 PM    Status: Signed   Expand All Collapse All  Subjective:         Subjective   Patient ID: Lucinda Aguilera is a 73 y.o. female, Body mass index is 30.38 kg/m².     Chief Complaint: Constipation        Established patient of Stephanie Coleman, JANUARY & myself.      Abdominal Pain  This is a chronic problem. The current episode started more than 1 month ago. The onset quality is sudden. The problem occurs intermittently. The problem has been gradually worsening. The pain is located in the epigastric region. The quality of the pain is aching. The abdominal pain does not radiate. Associated symptoms include constipation (describes stool as type 1 on bristol scale; taking Colace 100 mg BID and OTC Miralax every other day) and nausea (Zofran helps). Pertinent negatives include no anorexia, belching, diarrhea, dysuria, fever, flatus, frequency, hematochezia, melena, vomiting or weight loss. The pain is aggravated by eating and palpation. The pain is relieved by Belching. She has tried proton pump inhibitors and H2 blockers (Currently: Protonix 40 mg once daily and Famotidine 40 mg nightly) for the symptoms. The treatment provided mild relief. Prior diagnostic workup includes GI consult, CT scan, upper endoscopy and lower endoscopy (GES). Her past medical history is significant for abdominal surgery, gallstones (Hx of cholecystectomy), GERD and irritable bowel syndrome. There is no history of colon cancer, Crohn's disease, pancreatitis, PUD or ulcerative colitis (Hx of colitis- taking  Balsalazide 750 mg 3 capsules BID).      Review of Systems   Constitutional:  Negative for appetite change, fever, unexpected weight change and weight loss.   HENT:  Negative for trouble swallowing.    Respiratory:  Negative for cough and shortness of breath.    Cardiovascular:  Negative for chest pain.   Gastrointestinal:  Positive for abdominal pain, constipation (describes stool as type 1 on bristol scale; taking Colace 100 mg BID and OTC Miralax every other day) and nausea (Zofran helps). Negative for abdominal distention, anal bleeding, anorexia, blood in stool, diarrhea, flatus, hematochezia, melena, rectal pain and vomiting.     Assessment:      Assessment   1. Epigastric pain    2. Nausea    3. Gastroesophageal reflux disease without esophagitis    4. Chronic constipation    5. Colitis             Plan:   All diagnoses and orders for this visit:     Epigastric pain, Nausea & Gastroesophageal reflux disease without esophagitis              - Schedule EGD               - Continue Protonix 40 mg once daily              - Continue Famotidine 40 mg nightly              - Continue Zofran 4 mg every six hours PRN              - Take PPI in the morning 30 minutes before breakfast  - Recommend to avoid large meals, avoid eating within 3 hours of bedtime, elevate head of bed if nocturnal symptoms are present, smoking cessation (if current smoker), & weight loss (if overweight).   - Recommend minimize/avoid high-fat foods, chocolate, caffeine, citrus, alcohol, & tomato products.  - Advised to avoid/limit use of NSAID's, since they can cause GI upset, bleeding, and/or ulcers. If needed, take with food.       Chronic constipation              - Recommend daily exercise as tolerated, adequate water intake, and high fiber diet.   - Recommend OTC fiber supplement (Benefiber)  - Continue Colace 100 mg BID  - Increase OTC MiraLax to once daily     Colitis              - Continue Balsalazide 750 mg 3 capsules BID               - Next surveillance colonoscopy due 2/2026       Wt Readings from Last 15 Encounters:   03/08/24 76.2 kg (168 lb)   02/27/24 76.6 kg (168 lb 14 oz)   01/31/24 76.7 kg (169 lb 1.5 oz)   01/02/24 77.8 kg (171 lb 8.3 oz)   11/21/23 76.2 kg (168 lb)   11/20/23 76.5 kg (168 lb 12.2 oz)   11/17/23 77 kg (169 lb 12.1 oz)   10/20/23 77.2 kg (170 lb 3.1 oz)   10/17/23 76.2 kg (168 lb)   10/10/23 76.5 kg (168 lb 10.4 oz)   09/07/23 76.5 kg (168 lb 10.4 oz)   08/21/23 75.8 kg (167 lb)   08/21/23 75.3 kg (166 lb 0.1 oz)   08/02/23 76.1 kg (167 lb 12.3 oz)   08/02/23 76.1 kg (167 lb 12.3 oz)             See last Upper GI endoscopy   Indications:           Epigastric abdominal pain, Nausea with vomiting,                          Weight loss   Comorbidities       Hypertension, Obesity, Hyperlipidemia, Anxiety   Providers:             Nathan Lopez MD   Impression:    - Normal oropharynx.                          - Normal esophagus.                          - Z-line regular, 36-37 cm from the incisors.                          - Non-erosive esophageal reflux (NERD) disease(?).                          - Normal cardia.                          - M9ild/moderate antritis. Biopsied.                          - Normal stomach otherwise.                          - Normal pylorus.                          - Normal examined duodenum.                          - Normal major papilla.   Recommendation:        - Discharge patient to home.                          - Await pathology results.                          - Follow an antireflux regimen.                          - Exercise and weight loss.                          - Continue present medications.                          - Use Protonix (pantoprazole) 40 mg PO daily.                          - Use Pepcid (famotidine) 40 mg PO nightly.                          - Call the G.I. clinic in 2 weeks for reports (if                          you haven't heard from us sooner) [phone].                           - Return to GI clinic in 2-3 months.   Nathan Lopez MD   5/10/2022     PREPYLORIC GASTRITIS, BIOPSY:   Antral-type mucosa with chronic inactive gastritis. No Helicobacter-type organisms identified on the Helicobacter stain.        PTA Medications   Medication Sig    acetaminophen (TYLENOL) 500 MG tablet Take 500 mg by mouth every 6 (six) hours as needed for Pain.    ascorbic acid, vitamin C, (VITAMIN C) 250 MG tablet Take 500 mg by mouth once daily.     aspirin (ECOTRIN) 81 MG EC tablet Take 81 mg by mouth every evening.    atenoloL (TENORMIN) 25 MG tablet Take 1 tablet (25 mg total) by mouth every evening.    balsalazide (COLAZAL) 750 mg capsule TAKE 3 CAPSULES(2250 MG) BY MOUTH TWICE DAILY WITH MEALS    buPROPion (WELLBUTRIN XL) 150 MG TB24 tablet Take 1 tablet (150 mg total) by mouth once daily.    diltiaZEM (DILACOR XR) 240 MG CDCR Take 1 capsule (240 mg total) by mouth every evening.    docusate sodium (COLACE) 100 MG capsule Take 100 mg by mouth 2 (two) times daily.    famotidine (PEPCID) 40 MG tablet Take 1 tablet (40 mg total) by mouth nightly.    guaiFENesin (MUCINEX) 600 mg 12 hr tablet Take 1,200 mg by mouth 2 (two) times daily.    lisinopriL (PRINIVIL,ZESTRIL) 40 MG tablet Take 1 tablet (40 mg total) by mouth 2 (two) times a day.    magnesium oxide (MAG-OX) 400 mg (241.3 mg magnesium) tablet Take 1 tablet (400 mg total) by mouth once daily.    methocarbamoL (ROBAXIN) 750 MG Tab Take 1 tablet (750 mg total) by mouth 4 (four) times daily as needed.    multivitamin (THERAGRAN) per tablet Take 1 tablet by mouth once daily.    ondansetron (ZOFRAN-ODT) 4 MG TbDL Take 1 tablet (4 mg total) by mouth every 6 (six) hours as needed (nausea).    pantoprazole (PROTONIX) 40 MG tablet Take 1 tablet (40 mg total) by mouth 2 (two) times daily.    polyethylene glycol (GLYCOLAX) 17 gram/dose powder Take 17 g by mouth every other day.    pravastatin (PRAVACHOL) 40 MG tablet Take 1 tablet (40 mg  "total) by mouth once daily.    psyllium husk, aspartame, (NATURAL PSYLLIUM FIBER) 3.4 gram/5.8 gram Powd Take by mouth.    benzonatate (TESSALON) 200 MG capsule Take 1 capsule (200 mg total) by mouth 3 (three) times daily as needed for Cough.    fluticasone propionate (FLONASE) 50 mcg/actuation nasal spray 1 spray (50 mcg total) by Each Nostril route once daily.    furosemide (LASIX) 20 MG tablet Take 1 tablet (20 mg total) by mouth daily as needed (edema). 2 pm on an empty stomach if swollen.    LORazepam (ATIVAN) 0.5 MG tablet TAKE 1 TABLET(0.5 MG) BY MOUTH TWICE DAILY    sertraline (ZOLOFT) 50 MG tablet Take 0.5 tablets (25 mg total) by mouth once daily for 8 days, THEN 1 tablet (50 mg total) once daily for 26 days.       Review of patient's allergies indicates:  No Known Allergies     Past Medical History:   Diagnosis Date    Anticoagulant long-term use     Anxiety     takes daily Xanax    Arthritis     right thumb    Cataract     OU    Cholelithiasis     GERD (gastroesophageal reflux disease)     Hepatic steatosis     Hyperlipemia     Hypertension     PVD (posterior vitreous detachment)     OD     Past Surgical History:   Procedure Laterality Date    BREAST BIOPSY Left 08/28/2020    stereo biopsy    BREAST BIOPSY Left 10/07/2020    benign excisional biopsy    chest abcess      child    COLONOSCOPY  01/06/2012    Dr. Lopez: hemorrhoids, redundant colon    COLONOSCOPY N/A 02/17/2021    Dr. Lopez: Congested and erythematous mucosa in the rectum, in the recto-sigmoid colon and in the sigmoid colon, proctosigmoid colitis, Redundant colon; biopsy: focal active colitis "nonspecific but can be seen with acute self-limited colitis (infection), medication effect (e.g. NSAID use), proximity to diverticula, or early/evolving IBD    ESOPHAGOGASTRODUODENOSCOPY N/A 06/06/2019    Dr. Lopez: small hiatal hernia, Non-erosive esophageal reflux (NERD) disease?., slight antritis; biopsy: Antral mucosa with chemical/reactive " gastropathy. negative for h pylori    ESOPHAGOGASTRODUODENOSCOPY N/A 02/17/2021    Procedure: EGD (ESOPHAGOGASTRODUODENOSCOPY);  Surgeon: Nathan Lopez Jr., MD;  Location: [facility];  Service: Endoscopy;  Laterality: N/A; Minimal gastritis; biopsy: stomach- negative for h pylori, active chronic gastritis with focal features of erosive gastritis, duodenum WNL    ESOPHAGOGASTRODUODENOSCOPY N/A 05/10/2022    Procedure: EGD (ESOPHAGOGASTRODUODENOSCOPY);  Surgeon: Nathan Lopez Jr., MD;  Location: [facility];  Service: Endoscopy;  Laterality: N/A;    EXCISIONAL BIOPSY Left 10/07/2020    Procedure: EXCISIONAL BIOPSY-Left with radiological marker;  Surgeon: Brooklynn Ashford MD;  Location: [facility];  Service: General;  Laterality: Left;    FRACTURE SURGERY  2010    Left thumb repaired surgically    JOINT REPLACEMENT Bilateral     knee    KNEE ARTHROSCOPY W/ LASER      right    LAPAROSCOPIC CHOLECYSTECTOMY N/A 06/14/2019    Procedure: CHOLECYSTECTOMY, LAPAROSCOPIC;  Surgeon: Guevara Evans MD;  Location: [facility];  Service: General;  Laterality: N/A;    thumb surgery  11/2014    TOTAL KNEE ARTHROPLASTY Left 06/19/2018    Procedure: REPLACEMENT-KNEE-TOTAL;  Surgeon: Nj Melvin MD;  Location: [address];  Service: Orthopedics;  Laterality: Left;  [facility]    UPPER GASTROINTESTINAL ENDOSCOPY  07/13/2017    Dr. Lopez: NERD, Gastric mucosal atrophy. antritis, Scar in the incisura. Biopsied; biopsy: chronic gastritis. negative for h pylori     Family History   Problem Relation Age of Onset    Hypertension Mother     Heart disease Mother     Glaucoma Mother     Stroke Father     Glaucoma Sister     Cataracts Sister     Macular degeneration Sister     Breast cancer Neg Hx     Colon cancer Neg Hx     Crohn's disease Neg Hx     Ulcerative colitis Neg Hx     Stomach cancer Neg Hx     Esophageal cancer Neg Hx     Celiac disease Neg Hx     Amblyopia Neg Hx     Blindness Neg Hx     Retinal detachment Neg Hx     Strabismus  "Neg Hx      Social History     Tobacco Use    Smoking status: Never    Smokeless tobacco: Never   Substance Use Topics    Alcohol use: Yes     Comment: social- maybe once every few months    Drug use: No         OBJECTIVE:     Vital Signs (Most Recent)  Temp: 97.6 °F (36.4 °C) (03/12/24 0754)  Pulse: 68 (03/12/24 0754)  Resp: 16 (03/12/24 0754)  BP: (!) 148/70 (03/12/24 0754)  SpO2: 96 % (03/12/24 0754)    Physical Exam:  :   Ht: 5' 2" (157.5 cm)   Wt: 76.2 kg (168 lb)   BMI: 30.73 kg/m² .                                                     GENERAL:  Comfortable, in no acute distress.                                 HEENT EXAM:  Nonicteric.  No adenopathy.  Oropharynx is clear.               NECK:  Supple.                                                               LUNGS:  Clear.                                                               CARDIAC:  Regular rate and rhythm.  S1, S2.  No murmur.                      ABDOMEN:  Obese.  Soft, positive bowel sounds, nontender.  No hepatosplenomegaly or masses.  No rebound or guarding.                                             EXTREMITIES:  No edema.     MENTAL STATUS:  Alert and oriented.    ASSESSMENT/PLAN:     Assessment: Epig pain.  GERD.    Plan: Gastroscopy    Anesthesia Plan:   MAC / General Anaesthesia    ASA Grade: ASA 2 - Patient with mild systemic disease with no functional limitations    MALLAMPATI SCORE: II (hard and soft palate, upper portion of tonsils anduvula visible)    "

## 2024-03-12 ENCOUNTER — ANESTHESIA (OUTPATIENT)
Dept: ENDOSCOPY | Facility: HOSPITAL | Age: 74
End: 2024-03-12
Payer: MEDICARE

## 2024-03-12 ENCOUNTER — ANESTHESIA EVENT (OUTPATIENT)
Dept: ENDOSCOPY | Facility: HOSPITAL | Age: 74
End: 2024-03-12
Payer: MEDICARE

## 2024-03-12 ENCOUNTER — HOSPITAL ENCOUNTER (OUTPATIENT)
Facility: HOSPITAL | Age: 74
Discharge: HOME | End: 2024-03-12
Attending: INTERNAL MEDICINE | Admitting: FAMILY MEDICINE
Payer: MEDICARE

## 2024-03-12 VITALS
RESPIRATION RATE: 16 BRPM | WEIGHT: 168 LBS | BODY MASS INDEX: 30.91 KG/M2 | DIASTOLIC BLOOD PRESSURE: 67 MMHG | HEART RATE: 83 BPM | HEIGHT: 62 IN | TEMPERATURE: 98 F | SYSTOLIC BLOOD PRESSURE: 152 MMHG | OXYGEN SATURATION: 98 %

## 2024-03-12 DIAGNOSIS — R10.13 EPIGASTRIC PAIN: ICD-10-CM

## 2024-03-12 PROCEDURE — C1769 GUIDE WIRE: HCPCS | Mod: PO | Performed by: INTERNAL MEDICINE

## 2024-03-12 PROCEDURE — 27201012 HC FORCEPS, HOT/COLD, DISP: Mod: PO | Performed by: INTERNAL MEDICINE

## 2024-03-12 PROCEDURE — 63600175 PHARM REV CODE 636 W HCPCS: Mod: PO | Performed by: INTERNAL MEDICINE

## 2024-03-12 PROCEDURE — 88305 TISSUE EXAM BY PATHOLOGIST: CPT | Mod: 59,PO | Performed by: PATHOLOGY

## 2024-03-12 PROCEDURE — 43239 EGD BIOPSY SINGLE/MULTIPLE: CPT | Mod: 59,,, | Performed by: INTERNAL MEDICINE

## 2024-03-12 PROCEDURE — D9220A PRA ANESTHESIA: Mod: CRNA,,, | Performed by: NURSE ANESTHETIST, CERTIFIED REGISTERED

## 2024-03-12 PROCEDURE — 25000003 PHARM REV CODE 250: Mod: PO | Performed by: NURSE ANESTHETIST, CERTIFIED REGISTERED

## 2024-03-12 PROCEDURE — 88305 TISSUE EXAM BY PATHOLOGIST: CPT | Mod: 26,,, | Performed by: PATHOLOGY

## 2024-03-12 PROCEDURE — 63600175 PHARM REV CODE 636 W HCPCS: Mod: PO | Performed by: NURSE ANESTHETIST, CERTIFIED REGISTERED

## 2024-03-12 PROCEDURE — 37000008 HC ANESTHESIA 1ST 15 MINUTES: Mod: PO | Performed by: INTERNAL MEDICINE

## 2024-03-12 PROCEDURE — 43248 EGD GUIDE WIRE INSERTION: CPT | Mod: PO | Performed by: INTERNAL MEDICINE

## 2024-03-12 PROCEDURE — D9220A PRA ANESTHESIA: Mod: ANES,,, | Performed by: ANESTHESIOLOGY

## 2024-03-12 PROCEDURE — 43239 EGD BIOPSY SINGLE/MULTIPLE: CPT | Mod: 59,PO | Performed by: INTERNAL MEDICINE

## 2024-03-12 PROCEDURE — 43248 EGD GUIDE WIRE INSERTION: CPT | Mod: ,,, | Performed by: INTERNAL MEDICINE

## 2024-03-12 PROCEDURE — 37000009 HC ANESTHESIA EA ADD 15 MINS: Mod: PO | Performed by: INTERNAL MEDICINE

## 2024-03-12 RX ORDER — PROPOFOL 10 MG/ML
VIAL (ML) INTRAVENOUS
Status: DISCONTINUED | OUTPATIENT
Start: 2024-03-12 | End: 2024-03-12

## 2024-03-12 RX ORDER — LIDOCAINE HYDROCHLORIDE 20 MG/ML
INJECTION INTRAVENOUS
Status: DISCONTINUED | OUTPATIENT
Start: 2024-03-12 | End: 2024-03-12

## 2024-03-12 RX ORDER — SODIUM CHLORIDE 0.9 % (FLUSH) 0.9 %
10 SYRINGE (ML) INJECTION
Status: DISCONTINUED | OUTPATIENT
Start: 2024-03-12 | End: 2024-03-12 | Stop reason: HOSPADM

## 2024-03-12 RX ORDER — FAMOTIDINE, CALCIUM CARBONATE, AND MAGNESIUM HYDROXIDE 10; 800; 165 MG/1; MG/1; MG/1
1 TABLET, CHEWABLE ORAL 2 TIMES DAILY PRN
Start: 2024-03-12 | End: 2025-03-12

## 2024-03-12 RX ORDER — SODIUM CHLORIDE, SODIUM LACTATE, POTASSIUM CHLORIDE, CALCIUM CHLORIDE 600; 310; 30; 20 MG/100ML; MG/100ML; MG/100ML; MG/100ML
INJECTION, SOLUTION INTRAVENOUS CONTINUOUS
Status: DISCONTINUED | OUTPATIENT
Start: 2024-03-12 | End: 2024-03-12 | Stop reason: HOSPADM

## 2024-03-12 RX ADMIN — SODIUM CHLORIDE, POTASSIUM CHLORIDE, SODIUM LACTATE AND CALCIUM CHLORIDE: 600; 310; 30; 20 INJECTION, SOLUTION INTRAVENOUS at 07:03

## 2024-03-12 RX ADMIN — PROPOFOL 100 MG: 10 INJECTION, EMULSION INTRAVENOUS at 08:03

## 2024-03-12 RX ADMIN — GLYCOPYRROLATE 0.2 MG: 0.2 INJECTION, SOLUTION INTRAMUSCULAR; INTRAVENOUS at 08:03

## 2024-03-12 RX ADMIN — PROPOFOL 50 MG: 10 INJECTION, EMULSION INTRAVENOUS at 08:03

## 2024-03-12 RX ADMIN — LIDOCAINE HYDROCHLORIDE 100 MG: 20 INJECTION INTRAVENOUS at 08:03

## 2024-03-12 NOTE — ANESTHESIA PREPROCEDURE EVALUATION
03/12/2024  Lucinda Aguilera is a 73 y.o., female.    Anesthesia Evaluation    I have reviewed the Patient Summary Reports.     I have reviewed the Nursing Notes. I have reviewed the NPO Status.   I have reviewed the Medications.     Review of Systems  Social:  Non-Smoker       Cardiovascular:     Hypertension           hyperlipidemia     Functional Capacity good / => 4 METS                   Hypertension         Hepatic/GI:     GERD, well controlled Liver Disease,  Nausea and vomiting           Musculoskeletal:  Arthritis               Endocrine:        Obesity / BMI > 30  Psych:   anxiety                 Physical Exam  General:  Well nourished       Airway/Jaw/Neck:  Airway Findings: Mouth Opening: Normal     Tongue: Normal      General Airway Assessment: Adult      Mallampati: II  Improves to II with phonation.   Jaw/Neck Findings:     Neck ROM: Normal ROM          Dental:  Dental Findings: In tact      Chest/Lungs:  Chest/Lungs Findings:  Normal Respiratory Rate                Anesthesia Plan  Type of Anesthesia, risks & benefits discussed:  Anesthesia Type:  Gen Natural Airway    Patient's Preference:   Plan Factors:          Intra-op Monitoring Plan: standard ASA monitors  Intra-op Monitoring Plan Comments:   Post Op Pain Control Plan:   Post Op Pain Control Plan Comments:     Induction:   IV  Beta Blocker:  Patient is not currently on a Beta-Blocker (No further documentation required).       Informed Consent: Informed consent signed with the Patient and all parties understand the risks and agree with anesthesia plan.  All questions answered.  Anesthesia consent signed with patient.  ASA Score: 2     Day of Surgery Review of History & Physical: I have interviewed and examined the patient. I have reviewed the patient's H&P dated:  There are no significant changes.  H&P Update referred to the  surgeon/provider.          Ready For Surgery From Anesthesia Perspective.             Physical Exam  General: Well nourished    Airway:  Mallampati: II / II  Mouth Opening: Normal  Tongue: Normal  Neck ROM: Normal ROM    Dental:  In tact    Chest/Lungs:  Normal Respiratory Rate          Anesthesia Plan  Type of Anesthesia, risks & benefits discussed:    Anesthesia Type: Gen Natural Airway  Intra-op Monitoring Plan: standard ASA monitors  Induction:  IV  Informed Consent: Informed consent signed with the Patient and all parties understand the risks and agree with anesthesia plan.  All questions answered.   ASA Score: 2  Day of Surgery Review of History & Physical: H&P Update referred to the surgeon/provider.I have interviewed and examined the patient. I have reviewed the patient's H&P dated:     Ready For Surgery From Anesthesia Perspective.       .

## 2024-03-12 NOTE — TRANSFER OF CARE
"Anesthesia Transfer of Care Note    Patient: Lucinda Aguilera    Procedure(s) Performed: Procedure(s) (LRB):  EGD (ESOPHAGOGASTRODUODENOSCOPY) (N/A)    Patient location: PACU    Anesthesia Type: general    Transport from OR: Transported from OR on room air with adequate spontaneous ventilation    Post pain: adequate analgesia    Post assessment: no apparent anesthetic complications and tolerated procedure well    Post vital signs: stable    Level of consciousness: sedated    Nausea/Vomiting: no nausea/vomiting    Complications: none    Transfer of care protocol was followed      Last vitals: Visit Vitals  BP (!) 148/70   Pulse 68   Temp 36.4 °C (97.6 °F)   Resp 16   Ht 5' 2" (1.575 m)   Wt 76.2 kg (168 lb)   LMP 04/18/2004   SpO2 96%   Breastfeeding No   BMI 30.73 kg/m²     "

## 2024-03-12 NOTE — ANESTHESIA POSTPROCEDURE EVALUATION
Anesthesia Post Evaluation    Patient: Lucinda Aguilera    Procedure(s) Performed: Procedure(s) (LRB):  EGD (ESOPHAGOGASTRODUODENOSCOPY) (N/A)    Final Anesthesia Type: general      Patient location during evaluation: PACU  Patient participation: Yes- Able to Participate  Level of consciousness: sedated and awake  Post-procedure vital signs: reviewed and stable  Pain management: adequate  Airway patency: patent    PONV status at discharge: No PONV  Anesthetic complications: no      Cardiovascular status: blood pressure returned to baseline, hypertensive and hemodynamically stable  Respiratory status: spontaneous ventilation  Hydration status: euvolemic  Follow-up not needed.              Vitals Value Taken Time   /67 03/12/24 0925   Temp  03/12/24 1029   Pulse 83 03/12/24 0925   Resp 16 03/12/24 0925   SpO2 98 % 03/12/24 0925         Event Time   Out of Recovery 09:36:05         Pain/Ashley Score: Pain Rating Post Med Admin: 0 (3/12/2024  9:01 AM)  Ashley Score: 10 (3/12/2024  9:33 AM)

## 2024-03-12 NOTE — BRIEF OP NOTE
Discharge Note  Short Stay      SUMMARY     Admit Date: 3/12/2024    Attending Physician: Nathan Lopez Jr., MD     Discharge Physician: Nathan Lopez Jr., MD    Discharge Date: 3/12/2024 9:35 AM    Final Diagnosis: Epigastric pain [R10.13]  Nausea [R11.0]    Impression:            - Normal oropharynx.                          - Normal larynx.                          - Normal cricopharyngeus.                          - Normal esophagus. Biopsied.                          - Z-line regular, 35 cm from the incisors.                          - Patulous lower esophageal sphincter.                          - Non-erosive esophageal reflux (NERD) disease(?).                          - No endoscopic esophageal abnormality to explain                          patient's dysphagia. Esophagus dilated, 51 Fr.                          - Normal cardia.                          - Normal gastric fundus, gastric body and antrum.                          - Minimal antritis. Biopsied.                          - Normal pylorus.                          - Normal examined duodenum.                          - Normal major papilla.   Recommendation:        - Discharge patient to home.                          - Await pathology results.                          - Follow an antireflux regimen.                          - Exercise and weight loss.                          - Continue present medications.                          - Use Protonix (pantoprazole) 40 mg and Pepcid.                          - Use 1-2 tablet Pepcid Complete                          (famotidine+calcium carbonate+magnesium hydroxide)                          for symptoms every 12 hours. Chew completely                          before swallowing.                          - Call the G.I. clinic in 2 weeks for reports (if                          you haven't heard from us sooner) [phone].                          - Return to GI clinic in 3 months.   Nathan SOOD  MD John   3/12/2024       Disposition: HOME OR SELF CARE    Patient Instructions:   Current Discharge Medication List        START taking these medications    Details   famotidine-calcium carbonate-magnesium hydroxide (PEPCID COMPLETE) -165 mg Take 1 tablet by mouth 2 (two) times daily as needed. Chew 1 or 2 tablets, 2-3 times a day, as needed, for nausea or heartburn.           CONTINUE these medications which have NOT CHANGED    Details   acetaminophen (TYLENOL) 500 MG tablet Take 500 mg by mouth every 6 (six) hours as needed for Pain.      ascorbic acid, vitamin C, (VITAMIN C) 250 MG tablet Take 500 mg by mouth once daily.       aspirin (ECOTRIN) 81 MG EC tablet Take 81 mg by mouth every evening.      atenoloL (TENORMIN) 25 MG tablet Take 1 tablet (25 mg total) by mouth every evening.  Qty: 30 tablet, Refills: 3    Comments: .  Associated Diagnoses: Primary hypertension      balsalazide (COLAZAL) 750 mg capsule TAKE 3 CAPSULES(2250 MG) BY MOUTH TWICE DAILY WITH MEALS  Qty: 180 capsule, Refills: 11    Associated Diagnoses: History of gastritis      buPROPion (WELLBUTRIN XL) 150 MG TB24 tablet Take 1 tablet (150 mg total) by mouth once daily.  Qty: 30 tablet, Refills: 3    Associated Diagnoses: MDD (major depressive disorder), recurrent episode, moderate      diltiaZEM (DILACOR XR) 240 MG CDCR Take 1 capsule (240 mg total) by mouth every evening.  Qty: 30 capsule, Refills: 3    Associated Diagnoses: Primary hypertension      docusate sodium (COLACE) 100 MG capsule Take 100 mg by mouth 2 (two) times daily.      famotidine (PEPCID) 40 MG tablet Take 1 tablet (40 mg total) by mouth nightly.  Qty: 90 tablet, Refills: 3    Associated Diagnoses: Non-intractable vomiting with nausea; Epigastric discomfort      guaiFENesin (MUCINEX) 600 mg 12 hr tablet Take 1,200 mg by mouth 2 (two) times daily.      lisinopriL (PRINIVIL,ZESTRIL) 40 MG tablet Take 1 tablet (40 mg total) by mouth 2 (two) times a day.  Qty: 60  tablet, Refills: 3    Comments: .DC irbesartan  Associated Diagnoses: Primary hypertension      magnesium oxide (MAG-OX) 400 mg (241.3 mg magnesium) tablet Take 1 tablet (400 mg total) by mouth once daily.  Qty: 30 tablet, Refills: 3    Associated Diagnoses: Hyponatremia      methocarbamoL (ROBAXIN) 750 MG Tab Take 1 tablet (750 mg total) by mouth 4 (four) times daily as needed.  Qty: 44 tablet, Refills: 3      multivitamin (THERAGRAN) per tablet Take 1 tablet by mouth once daily.      ondansetron (ZOFRAN-ODT) 4 MG TbDL Take 1 tablet (4 mg total) by mouth every 6 (six) hours as needed (nausea).  Qty: 30 tablet, Refills: 3    Associated Diagnoses: Nausea      pantoprazole (PROTONIX) 40 MG tablet Take 1 tablet (40 mg total) by mouth 2 (two) times daily.  Qty: 60 tablet, Refills: 3    Associated Diagnoses: Gastroesophageal reflux disease, unspecified whether esophagitis present      polyethylene glycol (GLYCOLAX) 17 gram/dose powder Take 17 g by mouth every other day.      pravastatin (PRAVACHOL) 40 MG tablet Take 1 tablet (40 mg total) by mouth once daily.  Qty: 90 tablet, Refills: 2    Associated Diagnoses: Hyperlipidemia, unspecified hyperlipidemia type      psyllium husk, aspartame, (NATURAL PSYLLIUM FIBER) 3.4 gram/5.8 gram Powd Take by mouth.      benzonatate (TESSALON) 200 MG capsule Take 1 capsule (200 mg total) by mouth 3 (three) times daily as needed for Cough.  Qty: 30 capsule, Refills: 1    Associated Diagnoses: Cough, unspecified type      fluticasone propionate (FLONASE) 50 mcg/actuation nasal spray 1 spray (50 mcg total) by Each Nostril route once daily.  Qty: 16 g, Refills: 3    Associated Diagnoses: Nasal congestion      furosemide (LASIX) 20 MG tablet Take 1 tablet (20 mg total) by mouth daily as needed (edema). 2 pm on an empty stomach if swollen.  Qty: 30 tablet, Refills: 3    Associated Diagnoses: Hyperlipidemia, unspecified hyperlipidemia type      LORazepam (ATIVAN) 0.5 MG tablet TAKE 1  TABLET(0.5 MG) BY MOUTH TWICE DAILY  Qty: 60 tablet, Refills: 5    Associated Diagnoses: Anxiety      sertraline (ZOLOFT) 50 MG tablet Take 0.5 tablets (25 mg total) by mouth once daily for 8 days, THEN 1 tablet (50 mg total) once daily for 26 days.  Qty: 30 tablet, Refills: 0    Associated Diagnoses: CARLOS (generalized anxiety disorder); Obsessive thinking; MDD (major depressive disorder), recurrent episode, moderate             Discharge Procedure Orders (must include Diet, Follow-up, Activity)    Follow Up:  Follow up with PCP as per your routine.  Please follow an anti reflux diet and a high fiber diet.  Activity as tolerated.    No driving day of procedure.

## 2024-03-12 NOTE — [employer]ATION PATIENT INSTRUCTIONS
Discharge Summary/Instructions after an Endoscopic Procedure  Patient Name: Lucinda Aguilera  Patient MRN: [id number]  Patient YOB: 1950 Tuesday, March 12, 2024  Nathan Lopez MD  Dear patient,  As a result of recent federal legislation (The Federal Cures Act), you may   receive lab or pathology results from your procedure in your [facility]   account before your physician is able to contact you. Your physician or   their representative will relay the results to you with their   recommendations at their soonest availability.  Thank you,  RESTRICTIONS:  During your procedure today, you received medications for sedation.  These   medications may affect your judgment, balance and coordination.  Therefore,   for 24 hours, you have the following restrictions:   - DO NOT drive a car, operate machinery, make legal/financial decisions,   sign important papers or drink alcohol.    ACTIVITY:  Today: no heavy lifting, straining or running due to procedural   sedation/anesthesia.  The following day: return to full activity including work.  DIET:  Eat and drink normally unless instructed otherwise.     TREATMENT FOR COMMON SIDE EFFECTS:  - Mild abdominal pain, nausea, belching, bloating or excessive gas:  rest,   eat lightly and use a heating pad.  - Sore Throat: treat with throat lozenges and/or gargle with warm salt   water.  - Because air was used during the procedure, expelling large amounts of air   from your rectum or belching is normal.  - If a bowel prep was taken, you may not have a bowel movement for 1-3 days.    This is normal.  SYMPTOMS TO WATCH FOR AND REPORT TO YOUR PHYSICIAN:  1. Abdominal pain or bloating, other than gas cramps.  2. Chest pain.  3. Back pain.  4. Signs of infection such as: chills or fever occurring within 24 hours   after the procedure.  5. Rectal bleeding, which would show as bright red, maroon, or black stools.   (A tablespoon of blood from the rectum is not serious, especially  if   hemorrhoids are present.)  6. Vomiting.  7. Weakness or dizziness.  GO DIRECTLY TO THE NEAREST EMERGENCY ROOM IF YOU HAVE ANY OF THE FOLLOWING:      Difficulty breathing              Chills and/or fever over 101 F   Persistent vomiting and/or vomiting blood   Severe abdominal pain   Severe chest pain   Black, tarry stools   Bleeding- more than one tablespoon   Any other symptom or condition that you feel may need urgent attention  Your doctor recommends these additional instructions:  If any biopsies were taken, your doctors clinic will contact you in 1 to 2   weeks with any results.  Follow an antireflux regimen.  This includes:       - Do not lie down for at least 3 to 4 hours after meals.        - Raise the head of the bed 4 to 6 inches.        - Decrease excess weight.        - Avoid citrus juices and other acidic foods, alcohol, chocolate, mints,   coffee and other caffeinated beverages, carbonated beverages, fatty and   fried foods.        - Avoid tight-fitting clothing.        - Avoid cigarettes and other tobacco products.   Especially:  Exercise and weight loss.     Continue your present medications.   Take Protonix (pantoprazole) and the Pepcid (famotidine).   Chew 1-2  tablets completely before swallowing Pepcid Complete   (famotidine+calcium carbonate+magnesium hydroxide) for symptoms every 8-12   hours.   Return to GI clinic in three months.  For questions, problems or results please call your physician - Nathan Lopez MD at Work:  [phone].  EMERGENCY PHONE NUMBER: [phone], LAB RESULTS: [phone]  IF A COMPLICATION OR EMERGENCY SITUATION ARISES AND YOU ARE UNABLE TO REACH   YOUR PHYSICIAN - GO DIRECTLY TO THE EMERGENCY ROOM.  ___________________________________________  Nurse Signature  ___________________________________________  Patient/Designated Responsible Party Signature  Nathan Lopez MD  3/12/2024 9:31:35 AM  This report has been verified and signed  electronically.  Dear patient,  As a result of recent federal legislation (The Federal Cures Act), you may   receive lab or pathology results from your procedure in your [facility]   account before your physician is able to contact you. Your physician or   their representative will relay the results to you with their   recommendations at their soonest availability.  Thank you.  [employer]ATION

## 2024-03-14 ENCOUNTER — CLINICAL SUPPORT (OUTPATIENT)
Dept: REHABILITATION | Facility: HOSPITAL | Age: 74
End: 2024-03-14
Payer: MEDICARE

## 2024-03-14 DIAGNOSIS — R29.898 WEAKNESS OF BOTH HIPS: ICD-10-CM

## 2024-03-14 DIAGNOSIS — M53.86 DECREASED ROM OF LUMBAR SPINE: Primary | ICD-10-CM

## 2024-03-14 LAB
FINAL PATHOLOGIC DIAGNOSIS: NORMAL
GROSS: NORMAL
Lab: NORMAL

## 2024-03-14 PROCEDURE — 97113 AQUATIC THERAPY/EXERCISES: CPT | Mod: PO,CQ

## 2024-03-14 NOTE — PATIENT INSTRUCTIONS
Forward Walk    Walk forward with one leg. Strike pool bottom with heel. Rolling over foot, bring other leg forward. You can also try backwards.  Session: Walk  minutes. Do  sessions per week.      Walk Backward    Walking backward, maintain proper posture. Keep step length equal and opposite arm and leg motion.  Session: Walk  minutes. Do  sessions per week.      Side Step    Step to the side then bring other leg to it. Reverse directions.  Session: Walk  minutes to each side. Do  sessions per week.      Standing: Unilateral    Stand, one heel on lowest step, leg straight, standing leg slightly bent. Slowly lean forward, keeping back straight. Hold 30 seconds.  Repeat  times each leg per session. Do  sessions per week.        Quads / HF, Standing    Stand, holding on tot he pool wall. Flex the involved knee by placing on the second step. Lean back until you feel a stretch in the front of the thigh. Maintain hip extension. Hold 30 seconds.  Repeat  times each leg per session. Do  sessions per week.      Calf Stretch    Stand facing the pool wall, holding onto the edge of the pool with elbows straight and legs together. Take one step forward with the uninvolved leg, touching the toes against the pool wall, while bending the elbows as the trunk moves forward. Keep the heel of the back foot on the floor. Push the hips forward and the back heel down. Hold 30 seconds.  Repeat  times each leg per session. Do  sessions per week.      Hip Flexor Stretch    Place hand on wall for support. Place one leg in front of other, keeping the back knee straight and the front knee bent. Press the hip of the back leg forward while keeping the trunk upright, feeling the stretching in the front of the hip. Hold 30 seconds.   Repeat  times each leg per session. Do  sessions per week.      Calf Raise: Bilateral (Standing)    In the water, stand on  and rise on ball of foot. Do not let ankle roll out. Slowly return to start and  repeat.  Repeat  times each leg per session. Do  sessions per week.      Standing Glute Squeeze    Isometrically contract the buttocks, squeezing them together tightly. Hold for a count of 5, then relax.  Repeat  times each leg per session. Do  sessions per week.      Hip Extension, Knee Bent    Stand with the stomach against the pool wall. Holding onto the edge, slowly flex the knee and extend the leg backward, maintaining neutral hip alignment.  Repeat  times each leg per session. Do  sessions per week.      Hamstring Pull-Back    Stand with the back against the pool wall, hold onto the wall, and flex the hip to 90 degrees. Flex and extend the knee while maintaining hip flexion.  Repeat  times each leg per session. Do  sessions per week.      Hip Flexion, Knee Straight    Stand with feet together with one side toward the pool wall, holding on with the near arm. Gently raise the opposite leg straight out in front of the body, flexing only from the hip. Keep trunk upright and the head facing forward. Knee of the support leg should be slightly bent. Return to starting position.  Repeat  times each leg per session. Do  sessions per week.      Hip Extension, Knee Straight    Stand with feet together with one side toward the pool wall, holding on with the near arm. Gently raise the opposite leg straight backward, extending only from the hip, tightening the buttock. Keep trunk upright and the head facing forward. Knee of the support leg should be slightly bent. Return to starting position.  Repeat  times each LE per session. Do  sessions per week.      Hip Lateral Abduction / Adduction    Stand holding onto the pool wall, an arm's length away. Gently raise leg out to side. Keep knee straight. Return to start.  Repeat sequence  times each leg per session. Do  sessions per week.      Knee Flexion / Extension    Stand facing the pool wall with the fronts of the thighs and hips touching the wall. Keep the trunk upright with  the knees and hips in vertical alignment. Gently flex the knee as far as possible, pressing the heel toward the buttocks.  Repeat sequence  times each leg per session. Do  sessions per week.      Seated Leg Extension    Assume vertical position. Flex the hip to 90 degrees (seated position). Keeping the thighs together, alternately flex and extend the knees.  Repeat  times each leg per set. Do  sets per session. Do  sessions per week.      Lunge Forward    With front foot on low step, lunge forward, bending both knees. Return by straightening knee and pushing back with forward foot.  Repeat  times each side per session. Do  sessions per week.      Hip Circumduction     Stand holding onto the pool wall, an arm's length away. Gently raise one leg to the side. Move the leg in a clockwise circular pattern from the hip, keeping the leg straight. Keep the trunk upright and the head forward. Repeat in a counterclockwise direction.  Repeat  times each side per session. Do  sessions per week.      Front to Back Weight Shifting    Stand with one foot in front of the other. Shift body weight from the front leg to the back leg. Shift the body weight from the back leg to the front leg. Repeat with other foot forward. Gradually progress to a normal step length.  Repeat  times each leg per set. Do  sets per session. Do  sessions per week.      Lateral Lunge    Hands on hips or in front of you, back straight, step to side and bend your knee. Return to start and lunge to the other side.  Do  times alternating legs per set. Do  sets per session. Do  set per week.      Sit Cycle    Assume vertical position. Flex the hips to 90 degrees (seated position). Performa a cycling action with the legs, keeping the trunk vertical and the legs in front of the body. Use Noodle or sit on steps.  Repeat  times each leg per set. Do  sets per session. Do  sessions per week.      Marching    Lift right leg toward chest with knee bent. Return to start  and alternate legs.  Repeat  times each leg per set. Do  sets per session. Do  sessions per week.      Resistive Shoulder Flexion    Hold resistive devices in the hands at the sides, palms forward. Raise the involved arm in front of the body and lift it towards the surface of the water, keeping the elbow straight but not locked. Return to the original position.  Repeat  times each leg per set. Do  sets per session. Do  sessions per week.      Resistive Shoulder Extension    Hold resistive device in the hands at the sides, palms back. Lift the involved arm posteriorly toward the surface of the water, keeping the elbow straight but not locked. Return to original position.  Repeat  times each leg per set. Do  sets per session. Do  sessions per week.      Resistive  Horizontal Abduction and Adduction    Hold resistive paddles in front of the body at shoulder level, with the elbows slightly flexed. With palms facing posteriorly, move the hands away from the midline of the body (abduction). Turn the palms forward and adduct the arms to return to the original position.  Repeat  times each leg per set. Do  sets per session. Do  sessions per week.      Rowing    Secure stretch bands to the ladder or any other fixed object in the pool and face the ladder, holding the handles in both hands. Place one foot on the ladder for support. Gently pull the hands back at the chest level, leading with the elbows and adducting the scapulae. Return to original position.  Repeat  times each leg per set. Do  sets per session. Do  sessions per week.      Upright Rows    Stand on one end of the stretch band and hold the handle with both hands. Leading with the elbow, pull the hands toward the surface of the water, keeping the hands close to the body. Return to the original position.  Repeat  times each leg per set. Do  sets per session. Do  sessions per week.      Combined Elbow Bend    Stand with the hands at the side and the palms facing  forward. Flex the elbows until the hands touch the shoulder. Turn the hands until the palms are facing down. Extend the elbows, keeping them close to the body.  Repeat  times each leg per set. Do  sets per session. Do  sessions per week.      Resistive Shoulder Abduction and Adduction    Hold resistive devices out to the sides slightly below shoulder level. Pull the arms toward the hips (adduction) maintaining elbow extension throughout the exercise. Do not twist or rotate the trunk. Return to the original position (abduction).  Repeat  times each leg per set. Do  sets per session. Do  sessions per week.      Lateral Pull-Downs    Using flotation bells, stand with feet shoulder-width apart, the knees slightly bent, and the arms in front of the body. Maintain elbow flexion throughout the exercise, and do not twist or rotate the trunk. Slowly pull the arms down toward the sides. Return to the starting position.  Repeat  times each leg per set. Do  sets per session. Do  sessions per week.      Standing Crunches    Stand upright with the pelvis tilted back. Hold a ball or flotation device snugly against the chest. Gently contract the abdominal muscles to flex the spine. Hold the contraction for 4 counts, then relax for 4 counts. Maintain pelvic tilt throughout.  Repeat  times each leg per set. Do  sets per session. Do  sessions per week.      Triceps Extension    Stand upright with the elbow on the involved side flexed, holding a flotation device in the hand with the palm down. Hold onto the edge of the pool with the opposite hand. Keeping the elbow close to the side of the body, extend the forearm at the elbow. Return to the original position.  Repeat  times each leg per set. Do  sets per session. Do  sessions per week.      Resistive Band Crunches    Secure a stretch band to the pool ladder or any other stable attachment. Stand with the feet shoulder-width apart facing the ladder, and hold the stretch band with stable  (unmoving) flexed arms. Slowly contract the abdominals to flex the spine, pulling on the band. Hold  seconds, then relax.  Repeat  times each leg per set. Do  sets per session. Do  sessions per week.      Push Offs    Stand with the feet shoulder-width apart facing the pool wall. Hold onto the edge of the pool wall with both hands at arm's length. Keeping the body straight (no arch in the back), lean forward bending the arms until the chest touches or is close to the wall. Do not move the feet and keep heels on the susanna. Push off with both hands to the starting position. All movements should flow smoothly and continuously.  Repeat  times each leg per set. Do  sets per session. Do  sessions per week.      Cross Shoulder Stretch    Reach across the chest with the involved arm. Place other hand above the elbow and gently pull the arm across the chest. Hold 30 seconds, the relax. Repeat on the other side.  Repeat  times each leg per set. Do  sets per session. Do  sessions per week.      Thigh Side Bends     Stand with the feet shoulder-width apart and the hands at the sides. Slowly slide the palm of the hand on the uninvolved side down the side of the leg to the knee, laterally flexing the spine. Return the original position.  Repeat  times each leg per set. Do  sets per session. Do  sessions per week.      Shoulder Extension, Elbows Bent    Place the back against a parallel bar. Hold onto the bar with both hands, palms back. Keep the feet shoulder-width apart. Gently lower the body by bending the knees until a stretch is felt in the shoulders. Hold  seconds then relax.  Repeat  times each leg per set. Do  sets per session. Do  sessions per week.      Side Arm Pull    Secure a stretch band to the ladder or any other fixed object in the pool, and stand with the uninvolved side of the body towards the ladder. Place the uninvolved hand on the ladder for support. Reach across the body with the other hand, grasp the handles  of the stretch band, and gently abduct the arm, keeping the elbow straight. Return to the original start position.  Repeat  times each leg per set. Do  sets per session. Do sessions per week.      Wand Shoulder Stretch    Hold the wand with both hands behind the back and shoulder-width apart. Lift the arms upward until a stretch is felt in the involved shoulder. Hold  seconds, then lower arms and relax.  Repeat  times each leg per set. Do  sets per session. Do  sessions per week.    Copyright © [employer]. All rights reserved.

## 2024-03-14 NOTE — PROGRESS NOTES
Physical Therapy Aquatic Treatment Note     Name: Lucinda Aguilera  Clinic Number: [id number]    Therapy Diagnosis:   Encounter Diagnoses   Name Primary?    Decreased ROM of lumbar spine Yes    Weakness of both hips      Physician: Nick Arnold MD  Visit Date: 3/14/2024  Physician Orders: PT Eval and Treat   Medical Diagnosis from Referral:   Diagnosis   M70.61 (ICD-10-CM) - Greater trochanteric bursitis of right hip      Evaluation Date: 1/30/2024  Authorization Period Expiration: 12/31/24  Plan of Care Expiration: 4/30/24  Progress Note Due: 3/1/24  Date of Surgery: NA  Visit # / Visits authorized: 8/ 20   FOTO: 1/ 3    Precautions: Standard      Time In: 9:00 AM  Time Out: 10:00 AM  Total Billable Time: 60    Subjective     Pt reports:that her hip are feeling fine this morning. She states that she feels that she is not walking as much as she should.   she was compliant with home exercise program given last session.     Pain: 0/10  Location: bilateral knees    Objective     Lucinda received aquatic exercises to develop strength, endurance, ROM, flexibility, posture, and core stabilization for 60 minutes including:    AMB 3 min each  FWD 1.1 mph  BWD 0.8 mph  Side 0.8  mph    Stretches 3 x 30 sec  HSS  Quad     LE exs 20x each 2#  Mini Squat with QS  Heel Raise with GS  Hip flex/ext  Hip ABD/ADD  HS Curl  HIp Circles CW/CCW  Step-ups  LAQ     Endurance 3 min  Marching  Bicycle pink noodle    Home Exercises Provided and Patient Education Provided     Education provided:   - progress towards goals   - role of therapy     Written Home Exercises Provided: Patient was not issued HEP for pool.  Exercises were reviewed and Lucinda was able to demonstrate them prior to the end of the session.   Pt received a written copy of exercises to perform at home.     Lucinda demonstrated good  understanding of the education provided.     Assessment     Lucinda fausto today's tx with progression of aquatic ther ex well. She was able  to progress with resistance with LE exs today w/o difficulty or complaint . Improved bal and cor stab tech with bicycle on noodle today.  She demonstrates good recall of exs, requiring min VCs for proper form, posture and core stab tech. VCs for keeping feet on floor and step length.   Lucinda Is progressing well towards her goals.   Pt prognosis is Good.     Pt will continue to benefit from skilled outpatient physical therapy to address the deficits listed in the problem list box on initial evaluation, provide pt/family education and to maximize pt's level of independence in the home and community environment.     Pt's spiritual, cultural and educational needs considered and pt agreeable to plan of care and goals.    Anticipated barriers to physical therapy: chronic nature    Goals: STG 3 weeks  1.  Pt to be independent with HEP for increased functional mobility and pain control.  2.  Pt to increase AROM at 15 degs B hip extension no pain for increased functional mobility and transfers.  3.  Pt to decrease pain to less than 2/10 after session for increased functional mobility.  4.  Pt to increase tolerance to 45 min of exercise in session without increased pain during dry land program.      Long Term Goals: 10 weeks   1.  Pt to be independent with pain management strategies and verbalize 2 strategies for increased functional mobility and pain control.  2.  Pt to increase AROM at B hip abduction 40 degs without pain for increased functional mobility and transfers.  3.  Pt to decrease pain to less than 0/10 after session for increased functional mobility.  4.  Pt to tolerate one full day of work as  + 6 hrs without increased pain for increased activity tolerance.   5.  Pt to increased hip abduction and hip extension to 4-/5 to 4/5 bilaterally for increased overall functional mobility.     Plan     Continue 2x per week. Plan of care Certification: 1/30/2024 to 4/30/24.     Outpatient Physical Therapy  2 times weekly for 10 weeks to include the following interventions: Aquatic Therapy, Cervical/Lumbar Traction, Electrical Stimulation DN, Gait Training, Manual Therapy, Moist Heat/ Ice, Neuromuscular Re-ed, Patient Education, Therapeutic Activities, and Therapeutic Exercise.     Manuel Garcia, PTA

## 2024-03-20 ENCOUNTER — CLINICAL SUPPORT (OUTPATIENT)
Dept: REHABILITATION | Facility: HOSPITAL | Age: 74
End: 2024-03-20
Payer: MEDICARE

## 2024-03-20 DIAGNOSIS — M70.61 GREATER TROCHANTERIC BURSITIS OF RIGHT HIP: Primary | ICD-10-CM

## 2024-03-20 DIAGNOSIS — M25.551 BILATERAL HIP PAIN: Primary | ICD-10-CM

## 2024-03-20 DIAGNOSIS — R26.9 GAIT DIFFICULTY: ICD-10-CM

## 2024-03-20 DIAGNOSIS — M25.552 BILATERAL HIP PAIN: Primary | ICD-10-CM

## 2024-03-20 PROCEDURE — 97113 AQUATIC THERAPY/EXERCISES: CPT | Mod: PO,CQ

## 2024-03-20 NOTE — PROGRESS NOTES
Physical Therapy Aquatic Treatment Note     Name: Lucinda Aguilera  Clinic Number: [id number]    Therapy Diagnosis:   Encounter Diagnoses   Name Primary?    Bilateral hip pain Yes    Gait difficulty      Physician: Nick Arnold MD  Visit Date: 3/20/2024  Physician Orders: PT Eval and Treat   Medical Diagnosis from Referral:   Diagnosis   M70.61 (ICD-10-CM) - Greater trochanteric bursitis of right hip      Evaluation Date: 1/30/2024  Authorization Period Expiration: 12/31/24  Plan of Care Expiration: 4/30/24  Progress Note Due: 3/1/24  Date of Surgery: NA  Visit # / Visits authorized: 9/ 20   FOTO: 1/ 3    Precautions: Standard      Time In: 11:00 AM  Time Out: 12:00 PM  Total Billable Time: 60    Subjective     Pt reports:that she substituted yesterday and was not as sore afterward as she has been in the past. She is w/o complaint upon arrival today. .   she was compliant with home exercise program given last session.     Pain: 0/10  Location: bilateral knees    Objective     Lucinda received aquatic exercises to develop strength, endurance, ROM, flexibility, posture, and core stabilization for 60 minutes including:    AMB 3 min each  FWD 1.1 mph  BWD 0.8 mph  Side 0.8  mph    Stretches 3 x 30 sec  HSS  Quad     LE exs 20x each 2#  Mini Squat with QS  Heel Raise with GS  Hip flex/ext  Hip ABD/ADD  HS Curl  HIp Circles CW/CCW  Step-ups  LAQ     Endurance 6 min  Marching  Bicycle pink noodle NP    Home Exercises Provided and Patient Education Provided     Education provided:   - progress towards goals   - role of therapy     Written Home Exercises Provided: Patient was not issued HEP for pool.  Exercises were reviewed and Lucinda was able to demonstrate them prior to the end of the session.   Pt received a written copy of exercises to perform at home.     Lucinda demonstrated good  understanding of the education provided.     Assessment     Lucinda fausto today's tx with aquatic ther ex well. She was able to perform  all exs today w/o difficulty or complaint . She requested to not perform bicycle on noodle today so marching time was extended due to her goal of walking more.  She demonstrates good recall of exs, requiring min VCs for proper form, posture and core stab tech. VCs for keeping feet on floor and step length.   Lucinda Is progressing well towards her goals.   Pt prognosis is Good.     Pt will continue to benefit from skilled outpatient physical therapy to address the deficits listed in the problem list box on initial evaluation, provide pt/family education and to maximize pt's level of independence in the home and community environment.     Pt's spiritual, cultural and educational needs considered and pt agreeable to plan of care and goals.    Anticipated barriers to physical therapy: chronic nature    Goals: STG 3 weeks  1.  Pt to be independent with HEP for increased functional mobility and pain control.  2.  Pt to increase AROM at 15 degs B hip extension no pain for increased functional mobility and transfers.  3.  Pt to decrease pain to less than 2/10 after session for increased functional mobility.  4.  Pt to increase tolerance to 45 min of exercise in session without increased pain during dry land program.      Long Term Goals: 10 weeks   1.  Pt to be independent with pain management strategies and verbalize 2 strategies for increased functional mobility and pain control.  2.  Pt to increase AROM at B hip abduction 40 degs without pain for increased functional mobility and transfers.  3.  Pt to decrease pain to less than 0/10 after session for increased functional mobility.  4.  Pt to tolerate one full day of work as  + 6 hrs without increased pain for increased activity tolerance.   5.  Pt to increased hip abduction and hip extension to 4-/5 to 4/5 bilaterally for increased overall functional mobility.     Plan     Continue 2x per week. Plan of care Certification: 1/30/2024 to 4/30/24.      Outpatient Physical Therapy 2 times weekly for 10 weeks to include the following interventions: Aquatic Therapy, Cervical/Lumbar Traction, Electrical Stimulation DN, Gait Training, Manual Therapy, Moist Heat/ Ice, Neuromuscular Re-ed, Patient Education, Therapeutic Activities, and Therapeutic Exercise.     Manuel Garcia, PTA

## 2024-03-22 ENCOUNTER — HOSPITAL ENCOUNTER (OUTPATIENT)
Dept: RADIOLOGY | Facility: HOSPITAL | Age: 74
Discharge: HOME | End: 2024-03-22
Attending: ORTHOPAEDIC SURGERY
Payer: MEDICARE

## 2024-03-22 DIAGNOSIS — M54.16 LUMBAR RADICULOPATHY, CHRONIC: ICD-10-CM

## 2024-03-22 PROCEDURE — 72148 MRI LUMBAR SPINE W/O DYE: CPT | Mod: 26,,, | Performed by: RADIOLOGY

## 2024-03-22 PROCEDURE — 72148 MRI LUMBAR SPINE W/O DYE: CPT | Mod: TC,PO

## 2024-03-26 ENCOUNTER — OFFICE VISIT (OUTPATIENT)
Dept: ORTHOPEDICS | Facility: CLINIC | Age: 74
End: 2024-03-26
Payer: MEDICARE

## 2024-03-26 ENCOUNTER — HOSPITAL ENCOUNTER (OUTPATIENT)
Dept: RADIOLOGY | Facility: HOSPITAL | Age: 74
Discharge: HOME | End: 2024-03-26
Attending: ORTHOPAEDIC SURGERY
Payer: MEDICARE

## 2024-03-26 VITALS — WEIGHT: 164 LBS | BODY MASS INDEX: 30.18 KG/M2 | HEIGHT: 62 IN

## 2024-03-26 DIAGNOSIS — M16.11 PRIMARY OSTEOARTHRITIS OF RIGHT HIP: Primary | ICD-10-CM

## 2024-03-26 DIAGNOSIS — M70.61 GREATER TROCHANTERIC BURSITIS OF RIGHT HIP: ICD-10-CM

## 2024-03-26 PROCEDURE — 99214 OFFICE O/P EST MOD 30 MIN: CPT | Mod: PBBFAC,25,PO | Performed by: ORTHOPAEDIC SURGERY

## 2024-03-26 PROCEDURE — 73502 X-RAY EXAM HIP UNI 2-3 VIEWS: CPT | Mod: 26,RT,, | Performed by: RADIOLOGY

## 2024-03-26 PROCEDURE — 99214 OFFICE O/P EST MOD 30 MIN: CPT | Mod: S$PBB,,, | Performed by: ORTHOPAEDIC SURGERY

## 2024-03-26 PROCEDURE — 99999 PR PBB SHADOW E&M-EST. PATIENT-LVL IV: CPT | Mod: PBBFAC,,, | Performed by: ORTHOPAEDIC SURGERY

## 2024-03-26 PROCEDURE — 73502 X-RAY EXAM HIP UNI 2-3 VIEWS: CPT | Mod: TC,PO,RT

## 2024-03-26 RX ORDER — AZELASTINE 1 MG/ML
1 SPRAY, METERED NASAL 2 TIMES DAILY
COMMUNITY

## 2024-03-27 ENCOUNTER — CLINICAL SUPPORT (OUTPATIENT)
Dept: REHABILITATION | Facility: HOSPITAL | Age: 74
End: 2024-03-27
Payer: MEDICARE

## 2024-03-27 DIAGNOSIS — M25.551 PAIN OF RIGHT HIP: Primary | ICD-10-CM

## 2024-03-27 DIAGNOSIS — R29.898 WEAKNESS OF BOTH HIPS: ICD-10-CM

## 2024-03-27 DIAGNOSIS — R26.9 GAIT DIFFICULTY: ICD-10-CM

## 2024-03-27 PROCEDURE — 97112 NEUROMUSCULAR REEDUCATION: CPT | Mod: PO

## 2024-03-27 PROCEDURE — 97110 THERAPEUTIC EXERCISES: CPT | Mod: PO

## 2024-03-27 PROCEDURE — 97140 MANUAL THERAPY 1/> REGIONS: CPT | Mod: PO

## 2024-03-27 NOTE — PROGRESS NOTES
Physical Therapy Aquatic Treatment Note     Name: Lucinda Aguilera  Clinic Number: [id number]    Therapy Diagnosis:   No diagnosis found.    Physician: Nick Arnold MD  Visit Date: 3/27/2024  Physician Orders: PT Eval and Treat   Medical Diagnosis from Referral:   Diagnosis   M70.61 (ICD-10-CM) - Greater trochanteric bursitis of right hip      Evaluation Date: 1/30/2024  Authorization Period Expiration: 12/31/24  Plan of Care Expiration: 4/30/24  Progress Note Due: 4/27/24  Date of Surgery: NA  Visit # / Visits authorized: 10/ 20   FOTO: 1/ 3  Foto 2/3 visit 10 : 36%    Precautions: Standard      Time In: 1255 pM  Time Out: 1:50 PM  Total Billable Time: 55 min MT 1 NMR 2 TE 1    Subjective     Pt reports:that she substituted yesterday and was not as sore afterward as she has been in the past. She is w/o complaint upon arrival today. .   she was compliant with home exercise program given last session.     Pain: 0/10  Location: bilateral knees    Objective     3/27/24:  Foto 2: 36%       Hip Range of Motion:    Left active Right active    Flexion 112 110   Abduction 35 40   Extension 12 12      Lower Extremity Strength  Right LE   Left LE     Knee extension: 4+/5 Knee extension: 4+/5   Knee flexion: 4+/5 to 5/5 Knee flexion: 4+/5 to 5/5   Hip flexion: 4-/5 to 4/5 Hip flexion: 4- 5 to 4/5                       Hip extension:  4/5 Hip extension: 4/5   Hip abduction: 4-/5 Hip abduction: 4-/5   Hip adduction: 4/5 Hip adduction 4 /5   Ankle dorsiflexion: 4+/5 Ankle dorsiflexion: 4+/5   Ankle plantarflexion: 4+/5 Ankle plantarflexion: 4+/5      Special Tests:  JOSUE: - left, - right   FADIR: - left, - right  Scour: - bilaterally neg  SLR - B hips and lumbar neg  Palpation: unremarkable except rectus femoris , TFL on R  Gait: mild compensated trendelenburg, decreased hip extension and minimal to no trunk rotation. Decreased stride length bilaterally and narrow PETRONA grossly unchanged from eval.       Bolded= performed     "  therapeutic exercises to develop strength, endurance, ROM, flexibility, posture, and core stabilization for  10 minutes including:        Hip add/frog stretch 3 x 30 secs  Trunk rotation stretch 3 x 30 secs feet stacked and contralateral arm overhead  Elias test stretch x 1 min   Gait 100'     Supine heel slides with use of sliders x 20   Supine HS stretch with strap 3 x 30 sec  Supine ITB stretch with strap 3x 30 sec        Piriformis hook stretch 3 x 30" each  Seated HS stretch 2 x 20 secs   Supine frog stretch unilaterally 3 x 30"  DOUBLE KNEE TO CHEST with green t-ball 20x  .     manual therapy techniques: Manual traction, Myofacial release, and Soft tissue Mobilization were applied to the: iliacus, psoas, QL, TFL, glut medius and adductor nelson for 10 minutes, including:  Short axis traction 3x in session  Long axis traction  Lateral hip traction in hooklying     neuromuscular re-education activities to improve: Sense, Proprioception, and Posture for 35 minutes. The following activities were included:     Nu-step x 6 min L2.     Bent knee fall out/single 2 x10  blue t-band    Bridges with add ball 2x 10 staggered  Bridges with abd BTB x 2 x10 staggered  Supine clams GTB 3 x 10 elevated hip position   Supine slow kathy marching 2 x 10 reciprocal  Prone hip extension x 10  Sitting marching with BTB at knees for resistance reciprocal 2 x 10    Standing hip flexion reciprocal 2 x 10   Standing hip abduction toe tap reciprocal 2 x 10  Standing hip extension reciprocal 2 x 10            Lucinda received aquatic exercises to develop strength, endurance, ROM, flexibility, posture, and core stabilization for 0 minutes including:    AMB 3 min each  FWD 1.1 mph  BWD 0.8 mph  Side 0.8  mph    Stretches 3 x 30 sec  HSS  Quad     LE exs 20x each 2#  Mini Squat with QS  Heel Raise with GS  Hip flex/ext  Hip ABD/ADD  HS Curl  HIp Circles CW/CCW  Step-ups  LAQ     Endurance 6 min  Marching  Bicycle pink noodle NP    Home " Exercises Provided and Patient Education Provided     Education provided:   - progress towards goals   - role of therapy     Written Home Exercises Provided: Patient was not issued HEP for pool.  Exercises were reviewed and Lucinda was able to demonstrate them prior to the end of the session.   Pt received a written copy of exercises to perform at home.     Lucinda demonstrated good  understanding of the education provided.     Assessment     Lucinda mild increased overall hip range of motion, pain better controlled, increased overall hip and knee strength and minimal to no changes overall in gait skills with decreased step length and kathy overall. PT increased patient workload today in land program HEP. Pt achieved 5 of 9 set goals as noted below with pain well managed with strategies verbalized and no increase with teaching with proper management to achieve goals.     Lucinda Is progressing well towards her goals.   Pt prognosis is Good.     Pt will continue to benefit from skilled outpatient physical therapy to address the deficits listed in the problem list box on initial evaluation, provide pt/family education and to maximize pt's level of independence in the home and community environment.     Pt's spiritual, cultural and educational needs considered and pt agreeable to plan of care and goals.    Anticipated barriers to physical therapy: chronic nature    Goals: STG 3 weeks  1.  Pt to be independent with HEP for increased functional mobility and pain control. MET  2.  Pt to increase AROM at 15 degs B hip extension no pain for increased functional mobility and transfers. progressing  3.  Pt to decrease pain to less than 2/10 after session for increased functional mobility. progressing  4.  Pt to increase tolerance to 45 min of exercise in session without increased pain during dry land program. MET     Long Term Goals: 10 weeks   1.  Pt to be independent with pain management strategies and verbalize 2  strategies for increased functional mobility and pain control. MET   2.  Pt to increase AROM at B hip abduction 40 degs without pain for increased functional mobility and transfers.  3.  Pt to decrease pain to less than 0/10 after session for increased functional mobility.  4.  Pt to tolerate one full day of work as  + 6 hrs without increased pain for increased activity tolerance.  MET  5.  Pt to increased hip abduction and hip extension to 4-/5 to 4/5 bilaterally for increased overall functional mobility. MET    Plan     Continue 2x per week. Plan of care Certification: 1/30/2024 to 4/30/24.     Outpatient Physical Therapy 2 times weekly for 10 weeks to include the following interventions: Aquatic Therapy, Cervical/Lumbar Traction, Electrical Stimulation DN, Gait Training, Manual Therapy, Moist Heat/ Ice, Neuromuscular Re-ed, Patient Education, Therapeutic Activities, and Therapeutic Exercise.     Nora Costa, PT

## 2024-03-31 ENCOUNTER — EXTERNAL CHRONIC CARE MANAGEMENT (OUTPATIENT)
Dept: PRIMARY CARE CLINIC | Facility: CLINIC | Age: 74
End: 2024-03-31
Payer: MEDICARE

## 2024-03-31 PROCEDURE — 99490 CHRNC CARE MGMT STAFF 1ST 20: CPT | Mod: S$PBB,,, | Performed by: FAMILY MEDICINE

## 2024-03-31 PROCEDURE — 99490 CHRNC CARE MGMT STAFF 1ST 20: CPT | Mod: PBBFAC,PO | Performed by: FAMILY MEDICINE

## 2024-04-01 ENCOUNTER — PATIENT MESSAGE (OUTPATIENT)
Dept: ORTHOPEDICS | Facility: CLINIC | Age: 74
End: 2024-04-01
Payer: MEDICARE

## 2024-04-01 DIAGNOSIS — M54.16 LUMBAR RADICULOPATHY, CHRONIC: Primary | ICD-10-CM

## 2024-04-02 NOTE — PROGRESS NOTES
"Chief Complaint   Patient presents with    Right Hip - Pain       HPI:   This is a 73 y.o. who presents to clinic today complaining of right hip pain for the past 3 months after no known trauma. Pain is progressively worsening. No numbness or tingling. No associated signs or symptoms.    Past Medical History:   Diagnosis Date    Anticoagulant long-term use     Anxiety     takes daily Xanax    Arthritis     right thumb    Cataract     OU    Cholelithiasis     GERD (gastroesophageal reflux disease)     Hepatic steatosis     Hyperlipemia     Hypertension     PVD (posterior vitreous detachment)     OD     Past Surgical History:   Procedure Laterality Date    BREAST BIOPSY Left 08/28/2020    stereo biopsy    BREAST BIOPSY Left 10/07/2020    benign excisional biopsy    chest abcess      child    COLONOSCOPY  01/06/2012    Dr. Lopez: hemorrhoids, redundant colon    COLONOSCOPY N/A 02/17/2021    Dr. Lopez: Congested and erythematous mucosa in the rectum, in the recto-sigmoid colon and in the sigmoid colon, proctosigmoid colitis, Redundant colon; biopsy: focal active colitis "nonspecific but can be seen with acute self-limited colitis (infection), medication effect (e.g. NSAID use), proximity to diverticula, or early/evolving IBD    ESOPHAGOGASTRODUODENOSCOPY N/A 06/06/2019    Dr. Lopez: small hiatal hernia, Non-erosive esophageal reflux (NERD) disease?., slight antritis; biopsy: Antral mucosa with chemical/reactive gastropathy. negative for h pylori    ESOPHAGOGASTRODUODENOSCOPY N/A 02/17/2021    Procedure: EGD (ESOPHAGOGASTRODUODENOSCOPY);  Surgeon: Nathan Lopez Jr., MD;  Location: [facility];  Service: Endoscopy;  Laterality: N/A; Minimal gastritis; biopsy: stomach- negative for h pylori, active chronic gastritis with focal features of erosive gastritis, duodenum WNL    ESOPHAGOGASTRODUODENOSCOPY N/A 05/10/2022    Procedure: EGD (ESOPHAGOGASTRODUODENOSCOPY);  Surgeon: Nathan Lopez Jr., MD;  Location: [facility];  " Service: Endoscopy;  Laterality: N/A;    ESOPHAGOGASTRODUODENOSCOPY N/A 3/12/2024    Procedure: EGD (ESOPHAGOGASTRODUODENOSCOPY);  Surgeon: Nathan Lopez Jr., MD;  Location: [facility];  Service: Endoscopy;  Laterality: N/A;    EXCISIONAL BIOPSY Left 10/07/2020    Procedure: EXCISIONAL BIOPSY-Left with radiological marker;  Surgeon: Brooklynn Ashford MD;  Location: [facility];  Service: General;  Laterality: Left;    FRACTURE SURGERY  2010    Left thumb repaired surgically    JOINT REPLACEMENT Bilateral     knee    KNEE ARTHROSCOPY W/ LASER      right    LAPAROSCOPIC CHOLECYSTECTOMY N/A 06/14/2019    Procedure: CHOLECYSTECTOMY, LAPAROSCOPIC;  Surgeon: Guevara Evans MD;  Location: [facility];  Service: General;  Laterality: N/A;    thumb surgery  11/2014    TOTAL KNEE ARTHROPLASTY Left 06/19/2018    Procedure: REPLACEMENT-KNEE-TOTAL;  Surgeon: Nj Melvin MD;  Location: [address];  Service: Orthopedics;  Laterality: Left;  [place]    UPPER GASTROINTESTINAL ENDOSCOPY  07/13/2017    Dr. Lopez: NERD, Gastric mucosal atrophy. antritis, Scar in the incisura. Biopsied; biopsy: chronic gastritis. negative for h pylori     Current Outpatient Medications on File Prior to Visit   Medication Sig Dispense Refill    acetaminophen (TYLENOL) 500 MG tablet Take 500 mg by mouth every 6 (six) hours as needed for Pain.      ascorbic acid, vitamin C, (VITAMIN C) 250 MG tablet Take 500 mg by mouth once daily.       aspirin (ECOTRIN) 81 MG EC tablet Take 81 mg by mouth every evening.      atenoloL (TENORMIN) 25 MG tablet Take 1 tablet (25 mg total) by mouth every evening. 30 tablet 3    balsalazide (COLAZAL) 750 mg capsule TAKE 3 CAPSULES(2250 MG) BY MOUTH TWICE DAILY WITH MEALS 180 capsule 11    benzonatate (TESSALON) 200 MG capsule Take 1 capsule (200 mg total) by mouth 3 (three) times daily as needed for Cough. 30 capsule 1    buPROPion (WELLBUTRIN XL) 150 MG TB24 tablet Take 1 tablet (150 mg total) by mouth once daily. 30  tablet 3    diltiaZEM (DILACOR XR) 240 MG CDCR Take 1 capsule (240 mg total) by mouth every evening. 30 capsule 3    docusate sodium (COLACE) 100 MG capsule Take 100 mg by mouth 2 (two) times daily.      famotidine (PEPCID) 40 MG tablet Take 1 tablet (40 mg total) by mouth nightly. 90 tablet 3    famotidine-calcium carbonate-magnesium hydroxide (PEPCID COMPLETE) -165 mg Take 1 tablet by mouth 2 (two) times daily as needed. Chew 1 or 2 tablets, 2-3 times a day, as needed, for nausea or heartburn.      fluticasone propionate (FLONASE) 50 mcg/actuation nasal spray 1 spray (50 mcg total) by Each Nostril route once daily. 16 g 3    guaiFENesin (MUCINEX) 600 mg 12 hr tablet Take 1,200 mg by mouth 2 (two) times daily.      lisinopriL (PRINIVIL,ZESTRIL) 40 MG tablet Take 1 tablet (40 mg total) by mouth 2 (two) times a day. 60 tablet 3    LORazepam (ATIVAN) 0.5 MG tablet TAKE 1 TABLET(0.5 MG) BY MOUTH TWICE DAILY 60 tablet 5    magnesium oxide (MAG-OX) 400 mg (241.3 mg magnesium) tablet Take 1 tablet (400 mg total) by mouth once daily. 30 tablet 3    methocarbamoL (ROBAXIN) 750 MG Tab Take 1 tablet (750 mg total) by mouth 4 (four) times daily as needed. 44 tablet 3    multivitamin (THERAGRAN) per tablet Take 1 tablet by mouth once daily.      ondansetron (ZOFRAN-ODT) 4 MG TbDL Take 1 tablet (4 mg total) by mouth every 6 (six) hours as needed (nausea). 30 tablet 3    pantoprazole (PROTONIX) 40 MG tablet Take 1 tablet (40 mg total) by mouth 2 (two) times daily. 60 tablet 3    polyethylene glycol (GLYCOLAX) 17 gram/dose powder Take 17 g by mouth every other day.      psyllium husk, aspartame, (NATURAL PSYLLIUM FIBER) 3.4 gram/5.8 gram Powd Take by mouth.      rosuvastatin (CRESTOR) 20 MG tablet Take 1 tablet (20 mg total) by mouth once daily. 30 tablet 1    azelastine (ASTELIN) 137 mcg (0.1 %) nasal spray 1 spray by Nasal route 2 (two) times daily.      furosemide (LASIX) 20 MG tablet Take 1 tablet (20 mg total) by mouth  daily as needed (edema). 2 pm on an empty stomach if swollen. (Patient not taking: Reported on 3/26/2024) 30 tablet 3    sertraline (ZOLOFT) 50 MG tablet Take 0.5 tablets (25 mg total) by mouth once daily for 8 days, THEN 1 tablet (50 mg total) once daily for 26 days. 30 tablet 0     No current facility-administered medications on file prior to visit.     Review of patient's allergies indicates:  No Known Allergies  Family History   Problem Relation Age of Onset    Hypertension Mother     Heart disease Mother     Glaucoma Mother     Stroke Father     Glaucoma Sister     Cataracts Sister     Macular degeneration Sister     Breast cancer Neg Hx     Colon cancer Neg Hx     Crohn's disease Neg Hx     Ulcerative colitis Neg Hx     Stomach cancer Neg Hx     Esophageal cancer Neg Hx     Celiac disease Neg Hx     Amblyopia Neg Hx     Blindness Neg Hx     Retinal detachment Neg Hx     Strabismus Neg Hx      Social History     Socioeconomic History    Marital status:     Number of children: 2   Occupational History    Occupation: retired teacher and principal    Occupation: substitute   Tobacco Use    Smoking status: Never    Smokeless tobacco: Never   Substance and Sexual Activity    Alcohol use: Yes     Comment: social- maybe once every few months    Drug use: No    Sexual activity: Yes     Partners: Male     Birth control/protection: Post-menopausal, None     Social Determinants of Health     Financial Resource Strain: Low Risk  (11/9/2023)    Overall Financial Resource Strain (CARDIA)     Difficulty of Paying Living Expenses: Not hard at all   Food Insecurity: No Food Insecurity (11/9/2023)    Hunger Vital Sign     Worried About Running Out of Food in the Last Year: Never true     Ran Out of Food in the Last Year: Never true   Transportation Needs: No Transportation Needs (11/9/2023)    PRAPARE - Transportation     Lack of Transportation (Medical): No     Lack of Transportation (Non-Medical): No   Physical  Activity: Sufficiently Active (11/9/2023)    Exercise Vital Sign     Days of Exercise per Week: 5 days     Minutes of Exercise per Session: 50 min   Recent Concern: Physical Activity - Insufficiently Active (10/20/2023)    Exercise Vital Sign     Days of Exercise per Week: 2 days     Minutes of Exercise per Session: 20 min   Stress: Patient Declined (11/9/2023)    [employer] - Occupational Stress Questionnaire     Feeling of Stress : Patient declined   Social Connections: Unknown (11/9/2023)    Social Connection and Isolation Panel [NHANES]     Frequency of Communication with Friends and Family: More than three times a week     Frequency of Social Gatherings with Friends and Family: Twice a week     Active Member of Clubs or Organizations: Yes     Attends Club or Organization Meetings: Never     Marital Status:    Housing Stability: Low Risk  (11/9/2023)    Housing Stability Vital Sign     Unable to Pay for Housing in the Last Year: No     Number of Places Lived in the Last Year: 1     Unstable Housing in the Last Year: No       Review of Systems:  Constitutional:  Denies fever or chills   Eyes:  Denies change in visual acuity   HENT:  Denies nasal congestion or sore throat   Respiratory:  Denies cough or shortness of breath   Cardiovascular:  Denies chest pain or edema   GI:  Denies abdominal pain, nausea, vomiting, bloody stools or diarrhea   :  Denies dysuria   Integument:  Denies rash   Neurologic:  Denies headache, focal weakness or sensory changes   Endocrine:  Denies polyuria or polydipsia   Lymphatic:  Denies swollen glands   Psychiatric:  Denies depression or anxiety     Physical Exam:   Constitutional:  Well developed, well nourished, no acute distress, non-toxic appearance   Integument:  Well hydrated, no rash   Lymphatic:  No lymphadenopathy noted   Neurologic:  Alert & oriented x 3, CN 2-12 normal, normal motor function, normal sensory function, no focal deficits  noted   Psychiatric:  Speech and behavior appropriate   Eyes: EOMI  Gi: abdomen soft    Bilateral Hip Exam   right Hip Exam   Tenderness   The patient is experiencing tenderness in the groin.   Range of Motion   The patient has decreased internal rotation right hip.    Muscle Strength   Flexion: 4/5     Other   Erythema: absent  Sensation: normal  Pulse: present    left Hip Exam   Hip exam performed same as contralateral side and is normal.    X-rays were performed today, personally reviewed by me and findings discussed with the patient.  3 views of the right hip show circumferential degenerative changes    Primary osteoarthritis of right hip        Will refer to [facility] for lumbar radiculopathy. RTC 2 months

## 2024-04-03 ENCOUNTER — PATIENT MESSAGE (OUTPATIENT)
Dept: FAMILY MEDICINE | Facility: CLINIC | Age: 74
End: 2024-04-03
Payer: MEDICARE

## 2024-04-03 ENCOUNTER — CLINICAL SUPPORT (OUTPATIENT)
Dept: REHABILITATION | Facility: HOSPITAL | Age: 74
End: 2024-04-03
Payer: MEDICARE

## 2024-04-03 ENCOUNTER — PATIENT MESSAGE (OUTPATIENT)
Dept: PSYCHIATRY | Facility: CLINIC | Age: 74
End: 2024-04-03
Payer: MEDICARE

## 2024-04-03 ENCOUNTER — TELEPHONE (OUTPATIENT)
Dept: FAMILY MEDICINE | Facility: CLINIC | Age: 74
End: 2024-04-03
Payer: MEDICARE

## 2024-04-03 DIAGNOSIS — R29.898 WEAKNESS OF BOTH HIPS: Primary | ICD-10-CM

## 2024-04-03 DIAGNOSIS — R26.9 GAIT DIFFICULTY: ICD-10-CM

## 2024-04-03 DIAGNOSIS — G89.29 CHRONIC BILATERAL LOW BACK PAIN WITHOUT SCIATICA: ICD-10-CM

## 2024-04-03 DIAGNOSIS — M54.50 CHRONIC BILATERAL LOW BACK PAIN WITHOUT SCIATICA: ICD-10-CM

## 2024-04-03 PROCEDURE — 97140 MANUAL THERAPY 1/> REGIONS: CPT | Mod: PO

## 2024-04-03 PROCEDURE — 97112 NEUROMUSCULAR REEDUCATION: CPT | Mod: PO

## 2024-04-04 ENCOUNTER — LAB VISIT (OUTPATIENT)
Dept: LAB | Facility: HOSPITAL | Age: 74
End: 2024-04-04
Attending: OTOLARYNGOLOGY
Payer: MEDICARE

## 2024-04-04 ENCOUNTER — PATIENT MESSAGE (OUTPATIENT)
Dept: PSYCHIATRY | Facility: CLINIC | Age: 74
End: 2024-04-04
Payer: MEDICARE

## 2024-04-04 DIAGNOSIS — J30.5 ALLERGIC RHINITIS DUE TO FOOD: ICD-10-CM

## 2024-04-04 DIAGNOSIS — J30.9 ALLERGIC RHINOSINUSITIS: ICD-10-CM

## 2024-04-04 PROCEDURE — 86003 ALLG SPEC IGE CRUDE XTRC EA: CPT | Performed by: OTOLARYNGOLOGY

## 2024-04-04 PROCEDURE — 86003 ALLG SPEC IGE CRUDE XTRC EA: CPT | Mod: 59 | Performed by: OTOLARYNGOLOGY

## 2024-04-04 PROCEDURE — 36415 COLL VENOUS BLD VENIPUNCTURE: CPT | Mod: PO | Performed by: OTOLARYNGOLOGY

## 2024-04-04 NOTE — TELEPHONE ENCOUNTER
LVM for patient to call back so that we could get an update on her symptoms and schedule an appointment if necessary

## 2024-04-05 ENCOUNTER — OFFICE VISIT (OUTPATIENT)
Dept: FAMILY MEDICINE | Facility: CLINIC | Age: 74
End: 2024-04-05
Payer: MEDICARE

## 2024-04-05 ENCOUNTER — OFFICE VISIT (OUTPATIENT)
Dept: PSYCHIATRY | Facility: CLINIC | Age: 74
End: 2024-04-05
Payer: MEDICARE

## 2024-04-05 VITALS
WEIGHT: 170.19 LBS | BODY MASS INDEX: 31.13 KG/M2 | OXYGEN SATURATION: 95 % | HEART RATE: 80 BPM | DIASTOLIC BLOOD PRESSURE: 78 MMHG | SYSTOLIC BLOOD PRESSURE: 138 MMHG

## 2024-04-05 VITALS
HEIGHT: 62 IN | BODY MASS INDEX: 31.3 KG/M2 | SYSTOLIC BLOOD PRESSURE: 165 MMHG | WEIGHT: 170.06 LBS | HEART RATE: 76 BPM | DIASTOLIC BLOOD PRESSURE: 82 MMHG

## 2024-04-05 DIAGNOSIS — F42.8 OBSESSIVE THINKING: ICD-10-CM

## 2024-04-05 DIAGNOSIS — N30.00 ACUTE CYSTITIS WITHOUT HEMATURIA: Primary | ICD-10-CM

## 2024-04-05 DIAGNOSIS — F33.1 MDD (MAJOR DEPRESSIVE DISORDER), RECURRENT EPISODE, MODERATE: ICD-10-CM

## 2024-04-05 DIAGNOSIS — R30.0 DYSURIA: ICD-10-CM

## 2024-04-05 DIAGNOSIS — F41.1 GAD (GENERALIZED ANXIETY DISORDER): Primary | ICD-10-CM

## 2024-04-05 PROBLEM — M25.559 HIP PAIN: Status: RESOLVED | Noted: 2020-10-27 | Resolved: 2024-04-05

## 2024-04-05 LAB
BACTERIA #/AREA URNS HPF: ABNORMAL /HPF
BILIRUB UR QL STRIP: NEGATIVE
CLARITY UR: CLEAR
COLOR UR: YELLOW
GLUCOSE UR QL STRIP: NEGATIVE
HGB UR QL STRIP: NEGATIVE
KETONES UR QL STRIP: ABNORMAL
LEUKOCYTE ESTERASE UR QL STRIP: ABNORMAL
MICROSCOPIC COMMENT: ABNORMAL
NITRITE UR QL STRIP: NEGATIVE
PH UR STRIP: 7 [PH] (ref 5–8)
PROT UR QL STRIP: NEGATIVE
RBC #/AREA URNS HPF: 1 /HPF (ref 0–4)
SP GR UR STRIP: 1.01 (ref 1–1.03)
URN SPEC COLLECT METH UR: ABNORMAL
WBC #/AREA URNS HPF: 30 /HPF (ref 0–5)
WBC CLUMPS URNS QL MICRO: ABNORMAL

## 2024-04-05 PROCEDURE — 99214 OFFICE O/P EST MOD 30 MIN: CPT | Mod: S$PBB,,,

## 2024-04-05 PROCEDURE — 87088 URINE BACTERIA CULTURE: CPT | Performed by: PHYSICIAN ASSISTANT

## 2024-04-05 PROCEDURE — 87077 CULTURE AEROBIC IDENTIFY: CPT | Performed by: PHYSICIAN ASSISTANT

## 2024-04-05 PROCEDURE — 87186 SC STD MICRODIL/AGAR DIL: CPT | Performed by: PHYSICIAN ASSISTANT

## 2024-04-05 PROCEDURE — 99214 OFFICE O/P EST MOD 30 MIN: CPT | Mod: PBBFAC,PO

## 2024-04-05 PROCEDURE — 99999 PR PBB SHADOW E&M-EST. PATIENT-LVL III: CPT | Mod: PBBFAC,,, | Performed by: PHYSICIAN ASSISTANT

## 2024-04-05 PROCEDURE — 99999 PR PBB SHADOW E&M-EST. PATIENT-LVL IV: CPT | Mod: PBBFAC,,,

## 2024-04-05 PROCEDURE — 81000 URINALYSIS NONAUTO W/SCOPE: CPT | Mod: PO | Performed by: PHYSICIAN ASSISTANT

## 2024-04-05 PROCEDURE — 99214 OFFICE O/P EST MOD 30 MIN: CPT | Mod: S$PBB,,, | Performed by: PHYSICIAN ASSISTANT

## 2024-04-05 PROCEDURE — 99213 OFFICE O/P EST LOW 20 MIN: CPT | Mod: PBBFAC,27,PO | Performed by: PHYSICIAN ASSISTANT

## 2024-04-05 PROCEDURE — 87086 URINE CULTURE/COLONY COUNT: CPT | Performed by: PHYSICIAN ASSISTANT

## 2024-04-05 RX ORDER — BUPROPION HYDROCHLORIDE 150 MG/1
150 TABLET ORAL DAILY
Qty: 30 TABLET | Refills: 3 | Status: SHIPPED | OUTPATIENT
Start: 2024-04-05 | End: 2024-08-03

## 2024-04-05 RX ORDER — LORAZEPAM 0.5 MG/1
0.25 TABLET ORAL 3 TIMES DAILY PRN
Qty: 45 TABLET | Refills: 0 | Status: SHIPPED | OUTPATIENT
Start: 2024-04-05 | End: 2024-05-10

## 2024-04-05 RX ORDER — NITROFURANTOIN 25; 75 MG/1; MG/1
100 CAPSULE ORAL 2 TIMES DAILY
Qty: 10 CAPSULE | Refills: 0 | Status: SHIPPED | OUTPATIENT
Start: 2024-04-05 | End: 2024-04-10

## 2024-04-05 NOTE — PROGRESS NOTES
Subjective     Patient ID: Lucinda Aguilera is a 73 y.o. female.    Chief Complaint: Urinary Tract Infection      HPI      Pt is known to me, PCP Dr. Hassan.     Pt is a 73 year old female with lumbar spondylosis, MDD, CARLOS, HTN, HLD, GERD, osteoarthritis of multiple joints. She presents today complaining of dysuria, pelvic pressure, urinary frequency. No hematuria, flank pain, abdominal pain, N/V, fever, chills.     Review of Systems   All other systems reviewed and are negative.         Objective     Physical Exam  Constitutional:       General: She is not in acute distress.     Appearance: Normal appearance. She is not ill-appearing, toxic-appearing or diaphoretic.   HENT:      Head: Normocephalic and atraumatic.   Cardiovascular:      Heart sounds: Normal heart sounds.   Pulmonary:      Effort: No respiratory distress.      Breath sounds: Normal breath sounds.   Abdominal:      General: Abdomen is flat. Bowel sounds are normal. There is no distension.      Palpations: Abdomen is soft. There is no mass.      Tenderness: There is no abdominal tenderness. There is no right CVA tenderness, left CVA tenderness, guarding or rebound.      Hernia: No hernia is present.   Neurological:      General: No focal deficit present.      Mental Status: She is alert and oriented to person, place, and time.   Psychiatric:         Mood and Affect: Mood normal.         Behavior: Behavior normal.         Thought Content: Thought content normal.         Judgment: Judgment normal.       1. Acute cystitis without hematuria  - nitrofurantoin, macrocrystal-monohydrate, (MACROBID) 100 MG capsule; Take 1 capsule (100 mg total) by mouth 2 (two) times daily. for 5 days  Dispense: 10 capsule; Refill: 0  -increase hydration    2. Dysuria  - Urinalysis  - CULTURE, URINE    RTC/ER precautions given. F/U if symptoms persist or worsen    Laura Harding PA-C

## 2024-04-05 NOTE — PROGRESS NOTES
Physical Therapy Aquatic Treatment Note     Name: Lucinda Aguilera  Clinic Number: [id number]    Therapy Diagnosis:   No diagnosis found.    Physician: Nick Arnold MD  Visit Date: 4/3/2024  Physician Orders: PT Eval and Treat   Medical Diagnosis from Referral:   Diagnosis   M70.61 (ICD-10-CM) - Greater trochanteric bursitis of right hip      Evaluation Date: 1/30/2024  Authorization Period Expiration: 12/31/24  Plan of Care Expiration: 4/30/24  Progress Note Due: 4/27/24  Date of Surgery: NA  Visit # / Visits authorized: 10/ 20   FOTO: 1/ 3  Foto 2/3 visit 10 : 36%    Precautions: Standard      Time In: 154 PM  Time Out: 250 PM  Total Billable Time: 56 min (1:1 154-220 26 min ) MT 1 NMR 1    Subjective     Pt reports: very sedentary this weekend and week feeling aching and tired.  she was compliant with home exercise program given last session.     Pain: 2/10  Location: bilateral knees    Objective     3/27/24:  Foto 2: 36%       Hip Range of Motion:    Left active Right active    Flexion 112 110   Abduction 35 40   Extension 12 12      Lower Extremity Strength  Right LE   Left LE     Knee extension: 4+/5 Knee extension: 4+/5   Knee flexion: 4+/5 to 5/5 Knee flexion: 4+/5 to 5/5   Hip flexion: 4-/5 to 4/5 Hip flexion: 4- 5 to 4/5                       Hip extension:  4/5 Hip extension: 4/5   Hip abduction: 4-/5 Hip abduction: 4-/5   Hip adduction: 4/5 Hip adduction 4 /5   Ankle dorsiflexion: 4+/5 Ankle dorsiflexion: 4+/5   Ankle plantarflexion: 4+/5 Ankle plantarflexion: 4+/5      Special Tests:  JOSUE: - left, - right   FADIR: - left, - right  Scour: - bilaterally neg  SLR - B hips and lumbar neg  Palpation: unremarkable except rectus femoris , TFL on R  Gait: mild compensated trendelenburg, decreased hip extension and minimal to no trunk rotation. Decreased stride length bilaterally and narrow PETRONA grossly unchanged from eval.       Bolded= performed      therapeutic exercises to develop strength, endurance,  "ROM, flexibility, posture, and core stabilization for  10 minutes including:        Hip add/frog stretch 3 x 30 secs  Trunk rotation stretch 3 x 30 secs feet stacked and contralateral arm overhead  Elias test stretch x 1 min   Gait 100'     Supine heel slides with use of sliders x 20   Supine HS stretch with strap 3 x 30 sec  Supine ITB stretch with strap 3x 30 sec        Piriformis hook stretch 3 x 30" each  Seated HS stretch 2 x 20 secs   Supine frog stretch unilaterally 3 x 30"  DOUBLE KNEE TO CHEST with green t-ball 20x  .     manual therapy techniques: Manual traction, Myofacial release, and Soft tissue Mobilization were applied to the: iliacus, psoas, QL, TFL, glut medius and adductor nelson for 11 minutes, including:  Short axis traction 3x in session  Long axis traction  Lateral hip traction in hooklying     neuromuscular re-education activities to improve: Sense, Proprioception, and Posture for 35 minutes. The following activities were included:     Nu-step x 6 min L2.     Bent knee fall out/single 2 x10  blue t-band    Bridges with add ball 2x 10 staggered  Bridges with abd BTB x 2 x10 staggered  Supine clams GTB 3 x 10 elevated hip position   Supine slow kathy marching 2 x 10 reciprocal  Prone hip extension x 10  Sitting marching with BTB at knees for resistance reciprocal 2 x 10    Standing hip flexion reciprocal 2 x 10   Standing hip abduction toe tap reciprocal 2 x 10  Standing hip extension reciprocal 2 x 10            Lucinda received aquatic exercises to develop strength, endurance, ROM, flexibility, posture, and core stabilization for 0 minutes including:    AMB 3 min each  FWD 1.1 mph  BWD 0.8 mph  Side 0.8  mph    Stretches 3 x 30 sec  HSS  Quad     LE exs 20x each 2#  Mini Squat with QS  Heel Raise with GS  Hip flex/ext  Hip ABD/ADD  HS Curl  HIp Circles CW/CCW  Step-ups  LAQ     Endurance 6 min  Marching  Bicycle pink noodle NP    Home Exercises Provided and Patient Education Provided "     Education provided:   - progress towards goals   - role of therapy     Written Home Exercises Provided: Patient was not issued HEP for pool.  Exercises were reviewed and Lucinda was able to demonstrate them prior to the end of the session.   Pt received a written copy of exercises to perform at home.     Lucinda demonstrated good  understanding of the education provided.     Assessment     Lucinda had increased workload in session without complaints and increased kathy and no pain after session with increased activity.  Pt education with need for increase activity and continuity with compliance with walking and activity and HEP.    Lucinda Is progressing well towards her goals.   Pt prognosis is Good.     Pt will continue to benefit from skilled outpatient physical therapy to address the deficits listed in the problem list box on initial evaluation, provide pt/family education and to maximize pt's level of independence in the home and community environment.     Pt's spiritual, cultural and educational needs considered and pt agreeable to plan of care and goals.    Anticipated barriers to physical therapy: chronic nature    Goals: STG 3 weeks  1.  Pt to be independent with HEP for increased functional mobility and pain control. MET  2.  Pt to increase AROM at 15 degs B hip extension no pain for increased functional mobility and transfers. progressing  3.  Pt to decrease pain to less than 2/10 after session for increased functional mobility. progressing  4.  Pt to increase tolerance to 45 min of exercise in session without increased pain during dry land program. MET     Long Term Goals: 10 weeks   1.  Pt to be independent with pain management strategies and verbalize 2 strategies for increased functional mobility and pain control. MET   2.  Pt to increase AROM at B hip abduction 40 degs without pain for increased functional mobility and transfers.  3.  Pt to decrease pain to less than 0/10 after session for  increased functional mobility.  4.  Pt to tolerate one full day of work as  + 6 hrs without increased pain for increased activity tolerance.  MET  5.  Pt to increased hip abduction and hip extension to 4-/5 to 4/5 bilaterally for increased overall functional mobility. MET    Plan     Continue 2x per week. Plan of care Certification: 1/30/2024 to 4/30/24.     Outpatient Physical Therapy 2 times weekly for 10 weeks to include the following interventions: Aquatic Therapy, Cervical/Lumbar Traction, Electrical Stimulation DN, Gait Training, Manual Therapy, Moist Heat/ Ice, Neuromuscular Re-ed, Patient Education, Therapeutic Activities, and Therapeutic Exercise.     Nora Costa, PT

## 2024-04-05 NOTE — PATIENT INSTRUCTIONS
A few reminders from today:    Antibiotic as prescribed  Urinalysis and urine culture today  Drink plenty of water    Follow up with me if needed.   Please go to ER/urgent care if after hours or symptoms persist/worsen.     Do not hesitate to get in touch with me should you have any further questions.     Thank you for trusting me with your care!  I wish you health and happiness.    -Laura Harding PA-C

## 2024-04-05 NOTE — PROGRESS NOTES
Outpatient Psychiatry Follow-Up Visit    Clinical Status of Patient: Outpatient (Ambulatory)  04/05/2024     Chief Complaint: Pt is a 73 y.o. female who presents today for a follow-up. Met with patient.       Interval History and Content of Current Session:  Interim Events/Subjective Report/Content of Current Session:  follow up appointment.    Pt is a 73 y.o. female with past psychiatric hx of CARLOS (generalized anxiety disorder), MDD (major depressive disorder), recurrent episode, moderate, Obsessive thinking who presents for follow up treatment.     Past Psychiatric hx:   Pt. is a 72 yo F with a past psychiatric hx of Anxiety, Depression, unspecified depression type presenting to the clinic for an initial evaluation and treatment. Past medical history outlined below. She is currently taking buPROPion (WELLBUTRIN XL), LORazepam (ATIVAN), prescribed and managed by Dr. Engel/Dr. Hassan. She reports that these medications have not adequately addressed her depression.     2/19/2024: Pt presents to OP Psychiatry for initial evaluation and medication management of CARLOS, MDD, OCD (obsessive thinking). Pt reports having been on Wellbutrin and Ativan for a while, and she feels her depression stems from anxiety and worry about the health and welfare of her family. She states this is most upsetting when she worries about her grandchildren. She reports not having the time to think about all of these potential worries with her own children because she was busy raising them, but worries constantly about her grandchildren but doesn't say anything to her children because she doesn't want them to think that they can't take care of their own children. Discussed adding Zoloft to the Wellbutrin to help with OCD, anxiety and depression, and discussed decreasing the Ativan dose to 0.5 tablet TID AS NEEDED. Pt verbalizes understanding. To continue to take the Wellbutrin as prescribed. Pt denies SI/HI/AVH, self-harm, plan, or intent. Pt  verbalizes understanding of medication instructions through teach back, will reassess symptoms in 30 days or PRN if symptoms worsen.       Past Medical hx:   Past Medical History:   Diagnosis Date    Anticoagulant long-term use     Anxiety     takes daily Xanax    Arthritis     right thumb    Cataract     OU    Cholelithiasis     GERD (gastroesophageal reflux disease)     Hepatic steatosis     Hyperlipemia     Hypertension     PVD (posterior vitreous detachment)     OD        Interim hx:  Medication changes last visit:     1. Continue buPROPion (WELLBUTRIN XL) 150 MG TB24 tablet - Take 1 tablet (150 mg total) by mouth once daily for depression.  2. Decrease LORazepam (ATIVAN) 0.5 MG tablet - TAKE 0.5 TABLET(0.25 MG) BY MOUTH THREE TIMES DAILY AS NEEDED for anxiety. Discussed risk of decreased RT, sedation, addictive potential, and not to mix with alcohol.   4. Pt called in on 2/25/24, did not want to continue Zoloft.    Anxiety:  Depression:   Sleep:  Appetite:     Denies suicidal/homicidal ideations.  Denies hopelessness/worthlessness.    Denies auditory/visual hallucinations.      Tobacco:  denies  Alcohol:  denies  Drug use:  denies  Caffeine:  iced tea x 2 liters (encouraged pt to cut down on caffeine intake to help resolve anxiety)      Review of Systems   PSYCHIATRIC: Pertinent items are noted in the narrative.        CONSTITUTIONAL: weight stable        M/S: no pain today         ENT: no allergies noted today        ABD: no n/v/d     Past Medical, Family and Social History: The patient's past medical, family and social history have been reviewed and updated as appropriate within the electronic medical record. See encounter notes.     Medication Compliance: yes      Side effects: tolerates     Risk Parameters:  Patient reports no suicidal ideation  Patient reports no homicidal ideation  Patient reports no self-injurious behavior  Patient reports no violent behavior     Exam (detailed: at least 9 elements;  comprehensive: all 15 elements)   Constitutional  Vitals:  Most recent vital signs, dated less than 90 days prior to this appointment, were reviewed. Pulse:  [76]   BP: (165)/(82)     General:  unremarkable, age appropriate, casual attire, good eye contact, good rapport       Musculoskeletal  Muscle Strength/Tone:  no flaccidity, no tremor    Gait & Station:  normal      Psychiatric                       Speech:  normal tone, normal rate, rhythm, and volume   Mood & Affect:   Euthymic, congruent, appropriate         Thought Process:   Goal directed; Linear    Associations:   intact   Thought Content:   No SI/HI, delusions, or paranoia, no AV/VH   Insight & Judgement:   Good, adequate to circumstances   Orientation:   grossly intact; alert and oriented x 4    Memory:  intact for content of interview    Language:  grossly intact, can repeat    Attention Span  : Grossly intact for content of interview   Fund of Knowledge:   intact and appropriate to age and level of education        Assessment and Diagnosis   Status/Progress: Based on the examination today, the patient's problem(s) is/are under fair control.  New problems have not been presented today. Comorbidities are not currently complicating management of the primary condition.      Impression:  Pt presents to OP Psychiatry for routine follow-up for treatment/medication management of CARLOS, MDD, OCD. Pt reports her OCD and depression have improved after stopping Zoloft as she read an article that helped her to focus her energy on controlling her over thinking. States that it has helped her cope with her OCD and depression without the use of more medication. Pt also states that she has been doing well on the decrease in Ativan and feels that it is much better for her health. Pt pleasant, smiling and denies any other medication changes needed at this time. Pt denies SI/HI/AVH, self-harm, plan, or intent. Pt verbalizes understanding of medication instructions through  teach back. No other issues, concerns, or problems, will reassess symptoms and medications in 3 months or PRN if symptoms worsen.      Diagnosis:   - CARLOS (generalized anxiety disorder)  - MDD (major depressive disorder), recurrent episode, moderate  - Obsessive thinking      Intervention/Counseling/Treatment Plan   Medication Management:      1. Continue buPROPion (WELLBUTRIN XL) 150 MG TB24 tablet - Take 1 tablet (150 mg total) by mouth once daily for depression.     2. Continue LORazepam (ATIVAN) 0.5 MG tablet - TAKE 0.5 TABLET (0.25 MG) BY MOUTH THREE TIMES DAILY AS NEEDED for anxiety. Discussed risk of decreased RT, sedation, addictive potential, and not to mix with alcohol.      3. Patient given contact # for psychotherapists at [facility] and also instructed she may check with insurance for list of providers.      4. Call to report any worsening of symptoms or problems with the medication. Pt instructed to go to ER with thoughts of harming self, others.            Labs: none         Return to clinic:      3 months or PRN if symptoms worsen    -Spent 30min face to face with the pt; >50% time spent in counseling   -Supportive therapy and psychoeducation provided  -R/B/SE's of medications discussed with the pt who expresses understanding and chooses to take medications as prescribed.   -Pt instructed to call clinic, 911 or go to nearest emergency room if sxs worsen or pt is in   crisis. The pt expresses understanding.      Grace Mistry NP  Psychiatric-Mental Health Nurse Practitioner  Department of Psychiatry    [facility] [place] [address] [place] [address]  Phone:  [phone]  Fax: [phone]

## 2024-04-07 LAB — BACTERIA UR CULT: ABNORMAL

## 2024-04-08 ENCOUNTER — PATIENT MESSAGE (OUTPATIENT)
Dept: ADMINISTRATIVE | Facility: OTHER | Age: 74
End: 2024-04-08
Payer: MEDICARE

## 2024-04-08 LAB
ALLERGEN NAME: NORMAL
ALLERGEN NAME: NORMAL
ALLERGEN RESULT: NORMAL
ALLERGEN RESULT: NORMAL

## 2024-04-09 LAB
A ALTERNATA IGE QN: <0.1 KU/L
A FUMIGATUS IGE QN: <0.1 KU/L
ALLERGEN PIGWEED IGE: <0.1 KU/L
BAHIA GRASS IGE QN: <0.1 KU/L
BERMUDA GRASS IGE QN: <0.1 KU/L
C HERBARUM IGE QN: <0.1 KU/L
CAT DANDER IGE QN: <0.1 KU/L
COMMON PIGWEED CLASS: NORMAL
COMMON RAGWEED IGE QN: <0.1 KU/L
COW MILK IGE QN: <0.1 KU/L
D FARINAE IGE QN: <0.1 KU/L
D PTERONYSS IGE QN: <0.1 KU/L
DEPRECATED A ALTERNATA IGE RAST QL: NORMAL
DEPRECATED A FUMIGATUS IGE RAST QL: NORMAL
DEPRECATED BAHIA GRASS IGE RAST QL: NORMAL
DEPRECATED BERMUDA GRASS IGE RAST QL: NORMAL
DEPRECATED C HERBARUM IGE RAST QL: NORMAL
DEPRECATED CAT DANDER IGE RAST QL: NORMAL
DEPRECATED COMMON RAGWEED IGE RAST QL: NORMAL
DEPRECATED COW MILK IGE RAST QL: NORMAL
DEPRECATED D FARINAE IGE RAST QL: NORMAL
DEPRECATED D PTERONYSS IGE RAST QL: NORMAL
DEPRECATED DOG DANDER IGE RAST QL: NORMAL
DEPRECATED EGG WHITE IGE RAST QL: NORMAL
DEPRECATED ELDER IGE RAST QL: NORMAL
DEPRECATED P NOTATUM IGE RAST QL: NORMAL
DEPRECATED PEANUT IGE RAST QL: NORMAL
DEPRECATED PECAN/HICK TREE IGE RAST QL: NORMAL
DEPRECATED SILVER BIRCH IGE RAST QL: NORMAL
DEPRECATED SOYBEAN IGE RAST QL: NORMAL
DEPRECATED TIMOTHY IGE RAST QL: NORMAL
DEPRECATED WHEAT IGE RAST QL: NORMAL
DEPRECATED WHITE OAK IGE RAST QL: NORMAL
DOG DANDER IGE QN: <0.1 KU/L
EGG WHITE IGE QN: <0.1 KU/L
ELDER IGE QN: <0.1 KU/L
ELM CEDAR CLASS: NORMAL
ELM CEDAR, IGE: <0.1 KU/L
P NOTATUM IGE QN: <0.1 KU/L
PEANUT IGE QN: <0.1 KU/L
PECAN/HICK TREE IGE QN: <0.1 KU/L
SILVER BIRCH IGE QN: <0.1 KU/L
SOYBEAN IGE QN: <0.1 KU/L
TIMOTHY IGE QN: <0.1 KU/L
WHEAT IGE QN: <0.1 KU/L
WHITE OAK IGE QN: <0.1 KU/L

## 2024-04-10 ENCOUNTER — CLINICAL SUPPORT (OUTPATIENT)
Dept: REHABILITATION | Facility: HOSPITAL | Age: 74
End: 2024-04-10
Payer: MEDICARE

## 2024-04-10 DIAGNOSIS — R29.898 WEAKNESS OF BOTH HIPS: ICD-10-CM

## 2024-04-10 DIAGNOSIS — M53.86 DECREASED ROM OF LUMBAR SPINE: Primary | ICD-10-CM

## 2024-04-10 PROCEDURE — 97113 AQUATIC THERAPY/EXERCISES: CPT | Mod: PO,CQ

## 2024-04-10 NOTE — PROGRESS NOTES
Physical Therapy Aquatic Treatment Note     Name: Lucinda Aguilera  Clinic Number: [id number]    Therapy Diagnosis:   Encounter Diagnoses   Name Primary?    Decreased ROM of lumbar spine Yes    Weakness of both hips      Physician: Nick Arnold MD  Visit Date: 4/10/2024  Physician Orders: PT Eval and Treat   Medical Diagnosis from Referral:   Diagnosis   M70.61 (ICD-10-CM) - Greater trochanteric bursitis of right hip      Evaluation Date: 1/30/2024  Authorization Period Expiration: 12/31/24  Plan of Care Expiration: 4/30/24  Progress Note Due: 3/1/24  Date of Surgery: NA  Visit # / Visits authorized: 12/ 20   FOTO: 1/ 3    Precautions: Standard      Time In: 1:48 PM  Time Out: 2:00 PM  Total Billable Time: 72    Subjective     Pt reports:that she substituted yesterday and was a little sore afterward. She is w/o complaint upon arrival today. .   she was compliant with home exercise program given last session.     Pain: 0/10  Location: bilateral knees    Objective     Lucinda received aquatic exercises to develop strength, endurance, ROM, flexibility, posture, and core stabilization for 60 minutes including:    AMB 3 min each  FWD 1.1 mph  BWD 0.8 mph  Side 0.8  mph    Stretches 3 x 30 sec  HSS  Quad     LE exs 20x each 2#  Mini Squat with QS  Heel Raise with GS  Hip flex/ext  Hip ABD/ADD  HS Curl  HIp Circles CW/CCW  Step-ups  LAQ     Endurance 6 min  Marching  Bicycle pink noodle     Home Exercises Provided and Patient Education Provided     Education provided:   - progress towards goals   - role of therapy     Written Home Exercises Provided: Patient was not issued HEP for pool.  Exercises were reviewed and Lucinda was able to demonstrate them prior to the end of the session.   Pt received a written copy of exercises to perform at home.     Lucinda demonstrated good  understanding of the education provided.     Assessment     Lucinda fausto today's tx with progression of aquatic ther ex well. She was able to perform  all exs today w/o difficulty or complaint . Marching and Bicycle time was extended today due to her goal of walking more.  She demonstrates good recall of exs, requiring min VCs for proper form, posture and core stab tech. VCs for keeping feet on floor and step length.   Lucinda Is progressing well towards her goals.   Pt prognosis is Good.     Pt will continue to benefit from skilled outpatient physical therapy to address the deficits listed in the problem list box on initial evaluation, provide pt/family education and to maximize pt's level of independence in the home and community environment.     Pt's spiritual, cultural and educational needs considered and pt agreeable to plan of care and goals.    Anticipated barriers to physical therapy: chronic nature    Goals: STG 3 weeks  1.  Pt to be independent with HEP for increased functional mobility and pain control.  2.  Pt to increase AROM at 15 degs B hip extension no pain for increased functional mobility and transfers.  3.  Pt to decrease pain to less than 2/10 after session for increased functional mobility.  4.  Pt to increase tolerance to 45 min of exercise in session without increased pain during dry land program.      Long Term Goals: 10 weeks   1.  Pt to be independent with pain management strategies and verbalize 2 strategies for increased functional mobility and pain control.  2.  Pt to increase AROM at B hip abduction 40 degs without pain for increased functional mobility and transfers.  3.  Pt to decrease pain to less than 0/10 after session for increased functional mobility.  4.  Pt to tolerate one full day of work as  + 6 hrs without increased pain for increased activity tolerance.   5.  Pt to increased hip abduction and hip extension to 4-/5 to 4/5 bilaterally for increased overall functional mobility.     Plan     Continue 2x per week. Plan of care Certification: 1/30/2024 to 4/30/24.     Outpatient Physical Therapy 2 times weekly  for 10 weeks to include the following interventions: Aquatic Therapy, Cervical/Lumbar Traction, Electrical Stimulation DN, Gait Training, Manual Therapy, Moist Heat/ Ice, Neuromuscular Re-ed, Patient Education, Therapeutic Activities, and Therapeutic Exercise.     Manuel Garcia, PTA

## 2024-04-11 ENCOUNTER — CLINICAL SUPPORT (OUTPATIENT)
Dept: REHABILITATION | Facility: HOSPITAL | Age: 74
End: 2024-04-11
Payer: MEDICARE

## 2024-04-11 DIAGNOSIS — G89.29 CHRONIC BILATERAL LOW BACK PAIN WITHOUT SCIATICA: Primary | ICD-10-CM

## 2024-04-11 DIAGNOSIS — M54.50 CHRONIC BILATERAL LOW BACK PAIN WITHOUT SCIATICA: Primary | ICD-10-CM

## 2024-04-11 DIAGNOSIS — R29.898 WEAKNESS OF BOTH HIPS: ICD-10-CM

## 2024-04-11 LAB
ALLERGEN NAME: NORMAL
ALLERGEN NAME: NORMAL
ALLERGEN RESULT: NORMAL
ALLERGEN RESULT: NORMAL

## 2024-04-11 PROCEDURE — 97140 MANUAL THERAPY 1/> REGIONS: CPT | Mod: PO

## 2024-04-11 PROCEDURE — 97112 NEUROMUSCULAR REEDUCATION: CPT | Mod: PO

## 2024-04-13 NOTE — PROGRESS NOTES
Physical Therapy Aquatic Treatment Note     Name: Lucinda Aguilera  Clinic Number: [id number]    Therapy Diagnosis:   No diagnosis found.    Physician: Nick Arnold MD  Visit Date: 4/11/2024  Physician Orders: PT Eval and Treat   Medical Diagnosis from Referral:   Diagnosis   M70.61 (ICD-10-CM) - Greater trochanteric bursitis of right hip      Evaluation Date: 1/30/2024  Authorization Period Expiration: 12/31/24  Plan of Care Expiration: 4/30/24  Progress Note Due: 4/27/24  Date of Surgery: NA  Visit # / Visits authorized: 10/ 20   FOTO: 1/ 3  Foto 2/3 visit 10 : 36%    Precautions: Standard      Time In: 955 aM  Time Out: 10 50 aM  Total Billable Time: 55 min (1:1 125-1022 30 min) MT 1 NMR 1    Subjective     Pt reports: very sedentary this weekend and week feeling aching and tired.  she was compliant with home exercise program given last session.     Pain: 2/10  Location: bilateral knees    Objective     3/27/24:  Foto 2: 36%       Hip Range of Motion:    Left active Right active    Flexion 112 110   Abduction 35 40   Extension 12 12      Lower Extremity Strength  Right LE   Left LE     Knee extension: 4+/5 Knee extension: 4+/5   Knee flexion: 4+/5 to 5/5 Knee flexion: 4+/5 to 5/5   Hip flexion: 4-/5 to 4/5 Hip flexion: 4- 5 to 4/5                       Hip extension:  4/5 Hip extension: 4/5   Hip abduction: 4-/5 Hip abduction: 4-/5   Hip adduction: 4/5 Hip adduction 4 /5   Ankle dorsiflexion: 4+/5 Ankle dorsiflexion: 4+/5   Ankle plantarflexion: 4+/5 Ankle plantarflexion: 4+/5      Special Tests:  JOSUE: - left, - right   FADIR: - left, - right  Scour: - bilaterally neg  SLR - B hips and lumbar neg  Palpation: unremarkable except rectus femoris , TFL on R  Gait: mild compensated trendelenburg, decreased hip extension and minimal to no trunk rotation. Decreased stride length bilaterally and narrow PETRONA grossly unchanged from eval.       Bolded= performed      therapeutic exercises to develop strength, endurance,  "ROM, flexibility, posture, and core stabilization for  10 minutes including:        Hip add/frog stretch 3 x 30 secs  Trunk rotation stretch 3 x 30 secs feet stacked and contralateral arm overhead  Elias test stretch x 1 min   Gait 100'     Supine heel slides with use of sliders x 20   Supine HS stretch with strap 3 x 30 sec  Supine ITB stretch with strap 3x 30 sec        Piriformis hook stretch 3 x 30" each  Seated HS stretch 2 x 20 secs   Supine frog stretch unilaterally 3 x 30"  DOUBLE KNEE TO CHEST with green t-ball 20x  .     manual therapy techniques: Manual traction, Myofacial release, and Soft tissue Mobilization were applied to the: iliacus, psoas, QL, TFL, glut medius and adductor nelson for 10 minutes, including:  Short axis traction 3x in session  Long axis traction  Lateral hip traction in hooklying     neuromuscular re-education activities to improve: Sense, Proprioception, and Posture for 35 minutes. The following activities were included:     Nu-step x 6 min L2.     Bent knee fall out/single 2 x10  blue t-band    Bridges with add ball 2x 10 staggered  Bridges with abd BTB x 2 x10 staggered  Supine clams GTB 3 x 10 elevated hip position   Supine slow kathy marching 2 x 10 reciprocal  Prone hip extension x 10  Sitting marching with BTB at knees for resistance reciprocal 2 x 10    Standing hip flexion reciprocal 2 x 10   Standing hip abduction toe tap reciprocal 2 x 10  Standing hip extension reciprocal 2 x 10            Lucinda received aquatic exercises to develop strength, endurance, ROM, flexibility, posture, and core stabilization for 0 minutes including:    AMB 3 min each  FWD 1.1 mph  BWD 0.8 mph  Side 0.8  mph    Stretches 3 x 30 sec  HSS  Quad     LE exs 20x each 2#  Mini Squat with QS  Heel Raise with GS  Hip flex/ext  Hip ABD/ADD  HS Curl  HIp Circles CW/CCW  Step-ups  LAQ     Endurance 6 min  Marching  Bicycle pink noodle NP    Home Exercises Provided and Patient Education Provided "     Education provided:   - progress towards goals   - role of therapy     Written Home Exercises Provided: Patient was not issued HEP for pool.  Exercises were reviewed and Lucinda was able to demonstrate them prior to the end of the session.   Pt received a written copy of exercises to perform at home.     Lucinda demonstrated good  understanding of the education provided.     Assessment     Lucinda had increased workload in session without complaints  and reported increased compliance with walking and activity and reported less pain overall on arrival and in session with decreased overall rest breaks needed. Pt with good tolerance to exercise and receptive to return to gait program at home to continue carryover with strength and tolerance.     Lucinda Is progressing well towards her goals.   Pt prognosis is Good.     Pt will continue to benefit from skilled outpatient physical therapy to address the deficits listed in the problem list box on initial evaluation, provide pt/family education and to maximize pt's level of independence in the home and community environment.     Pt's spiritual, cultural and educational needs considered and pt agreeable to plan of care and goals.    Anticipated barriers to physical therapy: chronic nature    Goals: STG 3 weeks  1.  Pt to be independent with HEP for increased functional mobility and pain control. MET  2.  Pt to increase AROM at 15 degs B hip extension no pain for increased functional mobility and transfers. progressing  3.  Pt to decrease pain to less than 2/10 after session for increased functional mobility. progressing  4.  Pt to increase tolerance to 45 min of exercise in session without increased pain during dry land program. MET     Long Term Goals: 10 weeks   1.  Pt to be independent with pain management strategies and verbalize 2 strategies for increased functional mobility and pain control. MET   2.  Pt to increase AROM at B hip abduction 40 degs without pain for  increased functional mobility and transfers.  3.  Pt to decrease pain to less than 0/10 after session for increased functional mobility.  4.  Pt to tolerate one full day of work as  + 6 hrs without increased pain for increased activity tolerance.  MET  5.  Pt to increased hip abduction and hip extension to 4-/5 to 4/5 bilaterally for increased overall functional mobility. MET    Plan     Continue 2x per week. Plan of care Certification: 1/30/2024 to 4/30/24.     Outpatient Physical Therapy 2 times weekly for 10 weeks to include the following interventions: Aquatic Therapy, Cervical/Lumbar Traction, Electrical Stimulation DN, Gait Training, Manual Therapy, Moist Heat/ Ice, Neuromuscular Re-ed, Patient Education, Therapeutic Activities, and Therapeutic Exercise.     Nora Costa, PT

## 2024-04-17 ENCOUNTER — CLINICAL SUPPORT (OUTPATIENT)
Dept: REHABILITATION | Facility: HOSPITAL | Age: 74
End: 2024-04-17
Payer: MEDICARE

## 2024-04-17 DIAGNOSIS — M53.86 DECREASED ROM OF LUMBAR SPINE: Primary | ICD-10-CM

## 2024-04-17 DIAGNOSIS — R29.898 WEAKNESS OF BOTH HIPS: ICD-10-CM

## 2024-04-17 PROCEDURE — 97113 AQUATIC THERAPY/EXERCISES: CPT | Mod: PO,CQ

## 2024-04-17 NOTE — PROGRESS NOTES
Physical Therapy Aquatic Treatment Note     Name: Lucinda Aguilera  Clinic Number: [id number]    Therapy Diagnosis:   Encounter Diagnoses   Name Primary?    Decreased ROM of lumbar spine Yes    Weakness of both hips      Physician: Nick Arnold MD  Visit Date: 4/17/2024  Physician Orders: PT Eval and Treat   Medical Diagnosis from Referral:   Diagnosis   M70.61 (ICD-10-CM) - Greater trochanteric bursitis of right hip      Evaluation Date: 1/30/2024  Authorization Period Expiration: 12/31/24  Plan of Care Expiration: 4/30/24  Progress Note Due: 3/1/24  Date of Surgery: NA  Visit # / Visits authorized: 14/ 20   FOTO: 1/ 3    Precautions: Standard      Time In: 2:00 PM  Time Out: 2:55 PM  Total Billable Time: 55    Subjective     Pt reports:that she substituted yesterday and she feels great today. She is w/o complaint upon arrival today. .   she was compliant with home exercise program given last session.     Pain: 0/10  Location: bilateral knees    Objective     Lucinda received aquatic exercises to develop strength, endurance, ROM, flexibility, posture, and core stabilization for 55 minutes including:    AMB 3 min each  FWD 1.1 mph  BWD 0.8 mph  Side 0.8  mph    Stretches 3 x 30 sec  HSS  Quad     LE exs 20x each 2.5#  Mini Squat with QS  Heel Raise with GS  Hip flex/ext  Hip ABD/ADD  HS Curl  HIp Circles CW/CCW  Step-ups  LAQ     Endurance 7 min  Marching  Bicycle pink noodle     Home Exercises Provided and Patient Education Provided     Education provided:   - progress towards goals   - role of therapy     Written Home Exercises Provided: Patient was not issued HEP for pool.  Exercises were reviewed and Lucinda was able to demonstrate them prior to the end of the session.   Pt received a written copy of exercises to perform at home.     Lucinda demonstrated good  understanding of the education provided.     Assessment     Lucinda fausto today's tx with progression of aquatic ther ex well. She was able to progress  with resistance with all LE strengthening exs today w/o difficulty or complaint . Marching and Bicycle time was extended today due to her goal of walking more.  She demonstrates good recall of exs, requiring min VCs for proper form, posture and core stab tech. VCs for keeping feet on floor and step length.   Lucinda Is progressing well towards her goals.   Pt prognosis is Good.     Pt will continue to benefit from skilled outpatient physical therapy to address the deficits listed in the problem list box on initial evaluation, provide pt/family education and to maximize pt's level of independence in the home and community environment.     Pt's spiritual, cultural and educational needs considered and pt agreeable to plan of care and goals.    Anticipated barriers to physical therapy: chronic nature    Goals: STG 3 weeks  1.  Pt to be independent with HEP for increased functional mobility and pain control.  2.  Pt to increase AROM at 15 degs B hip extension no pain for increased functional mobility and transfers.  3.  Pt to decrease pain to less than 2/10 after session for increased functional mobility.  4.  Pt to increase tolerance to 45 min of exercise in session without increased pain during dry land program.      Long Term Goals: 10 weeks   1.  Pt to be independent with pain management strategies and verbalize 2 strategies for increased functional mobility and pain control.  2.  Pt to increase AROM at B hip abduction 40 degs without pain for increased functional mobility and transfers.  3.  Pt to decrease pain to less than 0/10 after session for increased functional mobility.  4.  Pt to tolerate one full day of work as  + 6 hrs without increased pain for increased activity tolerance.   5.  Pt to increased hip abduction and hip extension to 4-/5 to 4/5 bilaterally for increased overall functional mobility.     Plan     Continue 2x per week. Plan of care Certification: 1/30/2024 to 4/30/24.      Outpatient Physical Therapy 2 times weekly for 10 weeks to include the following interventions: Aquatic Therapy, Cervical/Lumbar Traction, Electrical Stimulation DN, Gait Training, Manual Therapy, Moist Heat/ Ice, Neuromuscular Re-ed, Patient Education, Therapeutic Activities, and Therapeutic Exercise.     Manuel Garcia, PTA

## 2024-04-22 ENCOUNTER — CLINICAL SUPPORT (OUTPATIENT)
Dept: REHABILITATION | Facility: HOSPITAL | Age: 74
End: 2024-04-22
Payer: MEDICARE

## 2024-04-22 DIAGNOSIS — R29.898 WEAKNESS OF BOTH HIPS: Primary | ICD-10-CM

## 2024-04-22 PROCEDURE — 97140 MANUAL THERAPY 1/> REGIONS: CPT | Mod: PO

## 2024-04-22 PROCEDURE — 97112 NEUROMUSCULAR REEDUCATION: CPT | Mod: PO

## 2024-04-22 NOTE — PROGRESS NOTES
"  Physical Therapy Aquatic Treatment Note     Name: Lucinda Aguilera  Clinic Number: [id number]    Therapy Diagnosis:   Encounter Diagnosis   Name Primary?    Weakness of both hips Yes       Physician: Nick Arnold MD  Visit Date: 4/22/2024  Physician Orders: PT Eval and Treat   Medical Diagnosis from Referral:   Diagnosis   M70.61 (ICD-10-CM) - Greater trochanteric bursitis of right hip      Evaluation Date: 1/30/2024  Authorization Period Expiration: 12/31/24  Plan of Care Expiration: 4/30/24  Progress Note Due: 3/1/24  Date of Surgery: NA  Visit # / Visits authorized: 14/ 20   FOTO: 1/ 3    Precautions: Standard      Time In: 841 AM  Time Out: 935 AM  Total Billable Time: 54 min (1:1 841-905 23min) MT 1 NMR 1     Subjective     Pt reports:pain free for days and no issues and thinking maybe to go ahead and take a break from PT for now.  .     she was compliant with home exercise program given last session.     Pain: 0/10  Location: bilateral knees    Objective     Lucinda received:       Bolded= performed      therapeutic exercises to develop strength, endurance, ROM, flexibility, posture, and core stabilization for  10 minutes including:        Hip add/frog stretch 3 x 30 secs  Trunk rotation stretch 3 x 30 secs feet stacked and contralateral arm overhead  Elias test stretch x 1 min   Gait 100'     Supine heel slides with use of sliders x 20   Supine HS stretch with strap 3 x 30 sec  Supine ITB stretch with strap 3x 30 sec        Piriformis hook stretch 3 x 30" each  Seated HS stretch 2 x 20 secs   Supine frog stretch unilaterally 3 x 30"  DOUBLE KNEE TO CHEST with green t-ball 20x  .     manual therapy techniques: Manual traction, Myofacial release, and Soft tissue Mobilization were applied to the: iliacus, psoas, QL, TFL, glut medius and adductor nelson for 10 minutes, including:  Short axis traction 3x in session  Long axis traction  Lateral hip traction in hooklying     neuromuscular re-education activities " to improve: Sense, Proprioception, and Posture for 34 minutes. The following activities were included:     Nu-step x 6 min L2.      Bent knee fall out/single 2 x10  blue t-band     Bridges with add ball 2x 10 staggered  Bridges with abd BTB x 2 x10 staggered  Supine clams GTB 3 x 10 elevated hip position   Supine slow kathy marching 2 x 10 reciprocal  Prone hip extension x 10  Sitting marching with BTB at knees for resistance reciprocal 2 x 10     Standing hip flexion reciprocal 2 x 10   Standing hip abduction toe tap reciprocal 2 x 10  Standing hip extension reciprocal 2 x 10             Home Exercises Provided and Patient Education Provided     Education provided:   - progress towards goals   - role of therapy     Written Home Exercises Provided: Patient was not issued HEP for pool.  Exercises were reviewed and Lucinda was able to demonstrate them prior to the end of the session.   Pt received a written copy of exercises to perform at home.     Lucinda demonstrated good  understanding of the education provided.     Assessment     Lucinda without issues with session and mostly pain free for weeks and with good HEP compliance and continued activity has improved pain and tolerance with increased overall strengthening has produced increased activity tolerance without issues. Pt achieved 9 of 9 set goals and with increased functional hip mobility to include 20 deg hip extension and 40 deg hip abduction with good overall strength. Pt has been able to tolerate full day of work 8 hrs without increased pain and is compliant and independent with HEP and pain management.       Lucinda Is progressing well towards her goals.   Pt prognosis is Good.     Pt will continue to benefit from skilled outpatient physical therapy to address the deficits listed in the problem list box on initial evaluation, provide pt/family education and to maximize pt's level of independence in the home and community environment.     Pt's spiritual,  cultural and educational needs considered and pt agreeable to plan of care and goals.    Anticipated barriers to physical therapy: chronic nature    Goals: STG 3 weeks  1.  Pt to be independent with HEP for increased functional mobility and pain control. MET  2.  Pt to increase AROM at 15 degs B hip extension no pain for increased functional mobility and transfers.MET  3.  Pt to decrease pain to less than 2/10 after session for increased functional mobility.MET  4.  Pt to increase tolerance to 45 min of exercise in session without increased pain during dry land program. MET     Long Term Goals: 10 weeks   1.  Pt to be independent with pain management strategies and verbalize 2 strategies for increased functional mobility and pain control.MET  2.  Pt to increase AROM at B hip abduction 40 degs without pain for increased functional mobility and transfers. MET  3.  Pt to decrease pain to less than 0/10 after session for increased functional mobility. MET  4.  Pt to tolerate one full day of work as  + 6 hrs without increased pain for increased activity tolerance. MET  5.  Pt to increased hip abduction and hip extension to 4-/5 to 4/5 bilaterally for increased overall functional mobility. MET    Plan     Continue 2x per week. Plan of care Certification: 1/30/2024 to 4/30/24.     Outpatient Physical Therapy 2 times weekly for 10 weeks to include the following interventions: Aquatic Therapy, Cervical/Lumbar Traction, Electrical Stimulation DN, Gait Training, Manual Therapy, Moist Heat/ Ice, Neuromuscular Re-ed, Patient Education, Therapeutic Activities, and Therapeutic Exercise.     Nora Costa, PT

## 2024-04-30 ENCOUNTER — LAB VISIT (OUTPATIENT)
Dept: LAB | Facility: HOSPITAL | Age: 74
End: 2024-04-30
Attending: FAMILY MEDICINE
Payer: MEDICARE

## 2024-04-30 DIAGNOSIS — E78.49 OTHER HYPERLIPIDEMIA: ICD-10-CM

## 2024-04-30 LAB
ALBUMIN SERPL BCP-MCNC: 3.6 G/DL (ref 3.5–5.2)
ALP SERPL-CCNC: 89 U/L (ref 55–135)
ALT SERPL W/O P-5'-P-CCNC: 21 U/L (ref 10–44)
ANION GAP SERPL CALC-SCNC: 8 MMOL/L (ref 8–16)
AST SERPL-CCNC: 29 U/L (ref 10–40)
BILIRUB SERPL-MCNC: 0.3 MG/DL (ref 0.1–1)
BUN SERPL-MCNC: 7 MG/DL (ref 8–23)
CALCIUM SERPL-MCNC: 9.6 MG/DL (ref 8.7–10.5)
CHLORIDE SERPL-SCNC: 101 MMOL/L (ref 95–110)
CO2 SERPL-SCNC: 25 MMOL/L (ref 23–29)
CREAT SERPL-MCNC: 0.9 MG/DL (ref 0.5–1.4)
EST. GFR  (NO RACE VARIABLE): >60 ML/MIN/1.73 M^2
GLUCOSE SERPL-MCNC: 89 MG/DL (ref 70–110)
POTASSIUM SERPL-SCNC: 3.9 MMOL/L (ref 3.5–5.1)
PROT SERPL-MCNC: 7.2 G/DL (ref 6–8.4)
SODIUM SERPL-SCNC: 134 MMOL/L (ref 136–145)

## 2024-04-30 PROCEDURE — 36415 COLL VENOUS BLD VENIPUNCTURE: CPT | Mod: PO | Performed by: FAMILY MEDICINE

## 2024-04-30 PROCEDURE — 80053 COMPREHEN METABOLIC PANEL: CPT | Performed by: FAMILY MEDICINE

## 2024-05-01 ENCOUNTER — PATIENT MESSAGE (OUTPATIENT)
Dept: PAIN MEDICINE | Facility: CLINIC | Age: 74
End: 2024-05-01

## 2024-05-01 ENCOUNTER — TELEPHONE (OUTPATIENT)
Dept: PAIN MEDICINE | Facility: CLINIC | Age: 74
End: 2024-05-01

## 2024-05-01 ENCOUNTER — HOSPITAL ENCOUNTER (OUTPATIENT)
Dept: RADIOLOGY | Facility: HOSPITAL | Age: 74
Discharge: HOME | End: 2024-05-01
Attending: ANESTHESIOLOGY
Payer: MEDICARE

## 2024-05-01 ENCOUNTER — OFFICE VISIT (OUTPATIENT)
Dept: PAIN MEDICINE | Facility: CLINIC | Age: 74
End: 2024-05-01
Payer: MEDICARE

## 2024-05-01 VITALS
BODY MASS INDEX: 30.89 KG/M2 | HEIGHT: 62 IN | SYSTOLIC BLOOD PRESSURE: 158 MMHG | HEART RATE: 64 BPM | DIASTOLIC BLOOD PRESSURE: 81 MMHG | WEIGHT: 167.88 LBS

## 2024-05-01 DIAGNOSIS — M54.16 LUMBAR RADICULOPATHY: ICD-10-CM

## 2024-05-01 DIAGNOSIS — M47.816 LUMBAR SPONDYLOSIS: Primary | ICD-10-CM

## 2024-05-01 DIAGNOSIS — M54.16 LUMBAR RADICULOPATHY, CHRONIC: ICD-10-CM

## 2024-05-01 DIAGNOSIS — M54.16 LUMBAR RADICULOPATHY, CHRONIC: Primary | ICD-10-CM

## 2024-05-01 PROCEDURE — 72114 X-RAY EXAM L-S SPINE BENDING: CPT | Mod: TC,PO

## 2024-05-01 PROCEDURE — 99999 PR PBB SHADOW E&M-EST. PATIENT-LVL V: CPT | Mod: PBBFAC,,, | Performed by: ANESTHESIOLOGY

## 2024-05-01 PROCEDURE — 72114 X-RAY EXAM L-S SPINE BENDING: CPT | Mod: 26,,, | Performed by: RADIOLOGY

## 2024-05-01 PROCEDURE — 99215 OFFICE O/P EST HI 40 MIN: CPT | Mod: PBBFAC,25,PO | Performed by: ANESTHESIOLOGY

## 2024-05-01 PROCEDURE — 99204 OFFICE O/P NEW MOD 45 MIN: CPT | Mod: S$PBB,,, | Performed by: ANESTHESIOLOGY

## 2024-05-01 RX ORDER — GABAPENTIN 300 MG/1
300 CAPSULE ORAL 2 TIMES DAILY
Qty: 60 CAPSULE | Refills: 1 | Status: SHIPPED | OUTPATIENT
Start: 2024-05-01 | End: 2024-06-30

## 2024-05-01 RX ORDER — ALPRAZOLAM 1 MG/1
1 TABLET, ORALLY DISINTEGRATING ORAL ONCE AS NEEDED
Status: CANCELLED | OUTPATIENT
Start: 2024-05-01 | End: 2035-09-28

## 2024-05-01 NOTE — TELEPHONE ENCOUNTER
Attempted to reach patient to schedule. No answer. Left voicemail to return call to office.    Patient's case request has been placed.

## 2024-05-01 NOTE — TELEPHONE ENCOUNTER
Attempted to contact pt to schedule procedure. Left VM to contact office to schedule. Orders are in.

## 2024-05-01 NOTE — TELEPHONE ENCOUNTER
----- Message from Maryan Churchill sent at 5/1/2024  1:25 PM CDT -----  Contact: Self  Type:  Patient Returning Call    Who Called:  Self  Who Left Message for Patient:  raúl  Does the patient know what this is regarding?:  scheduling her epidural   Best Call Back Number:   [phone]  Additional Information:  Please call the patient back at the phone number listed above to advise. Thank you!

## 2024-05-01 NOTE — PROGRESS NOTES
[facility] New Patient Evaluation      Referred by: Dr. Nick Arnold    PCP:     CC:   Chief Complaint   Patient presents with    Hip Pain     Right side     Low-back Pain     Right side     Knee Pain           No data to display                  HPI:   Lucinda Aguilera is a 73 y.o. female patient who has a past medical history of Anticoagulant long-term use, Anxiety, Arthritis, Cataract, Cholelithiasis, GERD (gastroesophageal reflux disease), Hepatic steatosis, Hyperlipemia, Hypertension, and PVD (posterior vitreous detachment). She presents with back pain.  She has had back pain for many years and has been gradually worsening.  Today she reports pain in her lower back that radiates primarily down her right leg.  Pain is constant, aching, shooting, 6/10.  Her pain is worse with standing and walking.  At times her legs can feel heavy an aching with walking.      Pain Intervention History:      Past Spine Surgical History:      Past and current medications:  Antineuropathics:  NSAIDs:  Physical therapy: yes, completed  Antidepressants:  Muscle relaxers: robaxin   Opioids:  Antiplatelets/Anticoagulants: aspirin     History:    Current Outpatient Medications:     acetaminophen (TYLENOL) 500 MG tablet, Take 500 mg by mouth every 6 (six) hours as needed for Pain., Disp: , Rfl:     ascorbic acid, vitamin C, (VITAMIN C) 250 MG tablet, Take 500 mg by mouth once daily. , Disp: , Rfl:     aspirin (ECOTRIN) 81 MG EC tablet, Take 81 mg by mouth every evening., Disp: , Rfl:     atenoloL (TENORMIN) 25 MG tablet, Take 1 tablet (25 mg total) by mouth every evening., Disp: 30 tablet, Rfl: 3    azelastine (ASTELIN) 137 mcg (0.1 %) nasal spray, 1 spray by Nasal route 2 (two) times daily., Disp: , Rfl:     balsalazide (COLAZAL) 750 mg capsule, TAKE 3 CAPSULES(2250 MG) BY MOUTH TWICE DAILY WITH MEALS, Disp: 180 capsule, Rfl: 11    benzonatate (TESSALON) 200 MG capsule, Take 1 capsule (200 mg total) by mouth 3 (three) times  daily as needed for Cough., Disp: 30 capsule, Rfl: 1    buPROPion (WELLBUTRIN XL) 150 MG TB24 tablet, Take 1 tablet (150 mg total) by mouth once daily., Disp: 30 tablet, Rfl: 3    diltiaZEM (DILACOR XR) 240 MG CDCR, Take 1 capsule (240 mg total) by mouth every evening., Disp: 30 capsule, Rfl: 3    docusate sodium (COLACE) 100 MG capsule, Take 100 mg by mouth 2 (two) times daily., Disp: , Rfl:     famotidine (PEPCID) 40 MG tablet, Take 1 tablet (40 mg total) by mouth nightly., Disp: 90 tablet, Rfl: 3    famotidine-calcium carbonate-magnesium hydroxide (PEPCID COMPLETE) -165 mg, Take 1 tablet by mouth 2 (two) times daily as needed. Chew 1 or 2 tablets, 2-3 times a day, as needed, for nausea or heartburn., Disp: , Rfl:     fluticasone propionate (FLONASE) 50 mcg/actuation nasal spray, 1 spray (50 mcg total) by Each Nostril route once daily., Disp: 16 g, Rfl: 3    lisinopriL (PRINIVIL,ZESTRIL) 40 MG tablet, Take 1 tablet (40 mg total) by mouth 2 (two) times a day., Disp: 60 tablet, Rfl: 3    LORazepam (ATIVAN) 0.5 MG tablet, Take 0.5 tablets (0.25 mg total) by mouth 3 (three) times daily as needed for Anxiety., Disp: 45 tablet, Rfl: 0    magnesium oxide (MAG-OX) 400 mg (241.3 mg magnesium) tablet, Take 1 tablet (400 mg total) by mouth once daily., Disp: 30 tablet, Rfl: 3    methocarbamoL (ROBAXIN) 750 MG Tab, Take 1 tablet (750 mg total) by mouth 4 (four) times daily as needed., Disp: 44 tablet, Rfl: 3    multivitamin (THERAGRAN) per tablet, Take 1 tablet by mouth once daily., Disp: , Rfl:     ondansetron (ZOFRAN-ODT) 4 MG TbDL, Take 1 tablet (4 mg total) by mouth every 6 (six) hours as needed (nausea)., Disp: 30 tablet, Rfl: 3    pantoprazole (PROTONIX) 40 MG tablet, Take 1 tablet (40 mg total) by mouth 2 (two) times daily., Disp: 60 tablet, Rfl: 3    polyethylene glycol (GLYCOLAX) 17 gram/dose powder, Take 17 g by mouth every other day., Disp: , Rfl:     rosuvastatin (CRESTOR) 20 MG tablet, Take 1 tablet (20 mg  "total) by mouth once daily., Disp: 30 tablet, Rfl: 1    furosemide (LASIX) 20 MG tablet, Take 1 tablet (20 mg total) by mouth daily as needed (edema). 2 pm on an empty stomach if swollen. (Patient not taking: Reported on 5/1/2024), Disp: 30 tablet, Rfl: 3    gabapentin (NEURONTIN) 300 MG capsule, Take 1 capsule (300 mg total) by mouth 2 (two) times daily., Disp: 60 capsule, Rfl: 1    guaiFENesin (MUCINEX) 600 mg 12 hr tablet, Take 1,200 mg by mouth 2 (two) times daily. (Patient not taking: Reported on 5/1/2024), Disp: , Rfl:     psyllium husk, aspartame, (NATURAL PSYLLIUM FIBER) 3.4 gram/5.8 gram Powd, Take by mouth. (Patient not taking: Reported on 5/1/2024), Disp: , Rfl:     Past Medical History:   Diagnosis Date    Anticoagulant long-term use     Anxiety     takes daily Xanax    Arthritis     right thumb    Cataract     OU    Cholelithiasis     GERD (gastroesophageal reflux disease)     Hepatic steatosis     Hyperlipemia     Hypertension     PVD (posterior vitreous detachment)     OD       Past Surgical History:   Procedure Laterality Date    BREAST BIOPSY Left 08/28/2020    stereo biopsy    BREAST BIOPSY Left 10/07/2020    benign excisional biopsy    chest abcess      child    COLONOSCOPY  01/06/2012    Dr. Lopez: hemorrhoids, redundant colon    COLONOSCOPY N/A 02/17/2021    Dr. Lopez: Congested and erythematous mucosa in the rectum, in the recto-sigmoid colon and in the sigmoid colon, proctosigmoid colitis, Redundant colon; biopsy: focal active colitis "nonspecific but can be seen with acute self-limited colitis (infection), medication effect (e.g. NSAID use), proximity to diverticula, or early/evolving IBD    ESOPHAGOGASTRODUODENOSCOPY N/A 06/06/2019    Dr. Lopez: small hiatal hernia, Non-erosive esophageal reflux (NERD) disease?., slight antritis; biopsy: Antral mucosa with chemical/reactive gastropathy. negative for h pylori    ESOPHAGOGASTRODUODENOSCOPY N/A 02/17/2021    Procedure: EGD " (ESOPHAGOGASTRODUODENOSCOPY);  Surgeon: Nathan Lopez Jr., MD;  Location: [facility];  Service: Endoscopy;  Laterality: N/A; Minimal gastritis; biopsy: stomach- negative for h pylori, active chronic gastritis with focal features of erosive gastritis, duodenum WNL    ESOPHAGOGASTRODUODENOSCOPY N/A 05/10/2022    Procedure: EGD (ESOPHAGOGASTRODUODENOSCOPY);  Surgeon: Nathan Lopez Jr., MD;  Location: [facility];  Service: Endoscopy;  Laterality: N/A;    ESOPHAGOGASTRODUODENOSCOPY N/A 3/12/2024    Procedure: EGD (ESOPHAGOGASTRODUODENOSCOPY);  Surgeon: Nathan Lopez Jr., MD;  Location: [facility];  Service: Endoscopy;  Laterality: N/A;    EXCISIONAL BIOPSY Left 10/07/2020    Procedure: EXCISIONAL BIOPSY-Left with radiological marker;  Surgeon: Brooklynn Ashford MD;  Location: [facility];  Service: General;  Laterality: Left;    FRACTURE SURGERY  2010    Left thumb repaired surgically    JOINT REPLACEMENT Bilateral     knee    KNEE ARTHROSCOPY W/ LASER      right    LAPAROSCOPIC CHOLECYSTECTOMY N/A 06/14/2019    Procedure: CHOLECYSTECTOMY, LAPAROSCOPIC;  Surgeon: Guevara Evans MD;  Location: [facility];  Service: General;  Laterality: N/A;    thumb surgery  11/2014    TOTAL KNEE ARTHROPLASTY Left 06/19/2018    Procedure: REPLACEMENT-KNEE-TOTAL;  Surgeon: Nj Melvin MD;  Location: [address];  Service: Orthopedics;  Laterality: Left;  [employer]    UPPER GASTROINTESTINAL ENDOSCOPY  07/13/2017    Dr. Lopez: NERD, Gastric mucosal atrophy. antritis, Scar in the incisura. Biopsied; biopsy: chronic gastritis. negative for h pylori       Family History   Problem Relation Name Age of Onset    Hypertension Mother age 82     Heart disease Mother age 82     Glaucoma Mother age 82     Stroke Father age 50     Glaucoma Sister      Cataracts Sister      Macular degeneration Sister      Breast cancer Neg Hx      Colon cancer Neg Hx      Crohn's disease Neg Hx      Ulcerative colitis Neg Hx      Stomach cancer Neg Hx       Esophageal cancer Neg Hx      Celiac disease Neg Hx      Amblyopia Neg Hx      Blindness Neg Hx      Retinal detachment Neg Hx      Strabismus Neg Hx         Social History     Socioeconomic History    Marital status:     Number of children: 2   Occupational History    Occupation: retired teacher and principal    Occupation: substitute   Tobacco Use    Smoking status: Never    Smokeless tobacco: Never   Substance and Sexual Activity    Alcohol use: Yes     Comment: social- maybe once every few months    Drug use: No    Sexual activity: Yes     Partners: Male     Birth control/protection: Post-menopausal, None     Social Determinants of Health     Financial Resource Strain: Low Risk  (11/9/2023)    Overall Financial Resource Strain (CARDIA)     Difficulty of Paying Living Expenses: Not hard at all   Food Insecurity: No Food Insecurity (11/9/2023)    Hunger Vital Sign     Worried About Running Out of Food in the Last Year: Never true     Ran Out of Food in the Last Year: Never true   Transportation Needs: No Transportation Needs (11/9/2023)    PRAPARE - Transportation     Lack of Transportation (Medical): No     Lack of Transportation (Non-Medical): No   Physical Activity: Sufficiently Active (11/9/2023)    Exercise Vital Sign     Days of Exercise per Week: 5 days     Minutes of Exercise per Session: 50 min   Recent Concern: Physical Activity - Insufficiently Active (10/20/2023)    Exercise Vital Sign     Days of Exercise per Week: 2 days     Minutes of Exercise per Session: 20 min   Stress: Patient Declined (11/9/2023)    [employer] [place] [employer] - Occupational Stress Questionnaire     Feeling of Stress : Patient declined   Housing Stability: Low Risk  (11/9/2023)    Housing Stability Vital Sign     Unable to Pay for Housing in the Last Year: No     Number of Places Lived in the Last Year: 1     Unstable Housing in the Last Year: No       Review of patient's allergies indicates:  No Known  "Allergies    Review of Systems:  12 point review of systems is negative.    Physical Exam:  Vitals:    05/01/24 1026   BP: (!) 158/81   Pulse: 64   Weight: 76.1 kg (167 lb 14.1 oz)   Height: 5' 2" (1.575 m)   PainSc:   8   PainLoc: Back     Body mass index is 30.71 kg/m².    Gen: NAD  Psych: mood appropriate for given condition  HEENT: eyes anicteric   CV: RRR  HEENT: anicteric   Respiratory: non-labored, no signs of respiratory distress  Abd: non-distended  Skin: warm, dry and intact.  Gait: No antalgic gait.     Sensory:  Intact and symmetrical to light touch in L1-S1 dermatomes bilaterally.    Motor:     Right Left   L2/3 Iliacus Hip flexion  5  5   L3/4 Qudratus Femoris Knee Extension  5  5   L4/5 Tib Anterior Ankle Dorsiflexion   5  5   L5/S1 Extensor Hallicus Longus Great toe extension  5  5   S1/S2 Gastroc/Soleus Plantar Flexion  5  5      Right Left                  Patellar DTR 2+ 2+   Achilles DTR 0 0                      Labs:  Lab Results   Component Value Date    HGBA1C 5.7 (H) 06/08/2021       Lab Results   Component Value Date    WBC 9.35 08/19/2023    HGB 14.6 08/19/2023    HCT 44.2 08/19/2023    MCV 91 08/19/2023     08/19/2023           Imaging:  MRI lumbar spine 3/22/24  FINDINGS:  Vertebral column: There is multilevel degenerative change which will be described by level.  The lumbar vertebral bodies maintain normal height.  There is no fracture.  There is transitional lumbosacral anatomy.  The last rib bearing vertebral body is designated as T12.  Utilizing this numbering system, there is stable mild 3 mm retrolisthesis of L1 on L2.  There is 4 mm anterolisthesis of L4 on L5 with 5 mm anterolisthesis of L5 on S1.  There is marked disc space narrowing at L5-S1.  There is chronic degenerative endplate signal change corresponding to sclerosis on plain films.  There is mild-to-moderate disc space narrowing at the L4-5 level.  There is marked disc space narrowing at the L1-2 level.  All of the " discs are desiccated.  Other than chronic degenerative endplate signal change, most apparent at the L5-S1 level, baseline marrow signal is normal.     Spinal canal, conus, epidural space: The spinal canal is developmentally normal.  The conus terminates at the level of superior L1 and is normal in contour and signal intensity.  There is no abnormal epidural mass or fluid collection.     Findings by level:  T11-12: There is a mild diffuse disc bulge with mild facet joint arthropathy.  There is no spinal canal or significant foraminal stenosis.  There is a small right foraminal perineural cyst.  T12-L1: There is a minimal disc bulge.  There is no spinal canal or significant foraminal stenosis.  L1-2: There is marked disc space narrowing, 3 mm retrolisthesis of L1 on L2 with inferior unroofing of a broad disc protrusion eccentric to the right where there is also osteophytic ridging.  There is bilateral facet joint arthropathy.  There is only borderline to mild spinal stenosis but there is moderate to severe right lateral recess and foraminal stenosis with only mild left foraminal stenosis.  L2-3: There is subtle trace retrolisthesis of L2 on L3.  There is a mild disc bulge.  There is left greater than right facet joint arthropathy.  There is no spinal stenosis.  There is no significant foraminal stenosis.  L3-4: There is a diffuse disc bulge.  There is facet joint arthropathy with ligamentum flavum thickening.  There is borderline to mild spinal stenosis without significant foraminal stenosis.  L4-5: There is disc space narrowing, 4 mm anterolisthesis of L4 on L5 with unroofing of a moderate broad disc protrusion.  There is bilateral facet joint arthropathy.  There is severe spinal canal, bilateral lateral recess stenosis with moderate left and mild right foraminal stenosis.  L5-S1: There is marked disc space narrowing, 5 mm anterolisthesis of L5 on S1 with unroofing of a marked broad disc protrusion in addition to  marked facet joint arthropathy results in severe spinal canal, bilateral lateral recess and foraminal stenosis.     Soft tissues, other: The posterior spinous muscles are atrophic.  The prevertebral soft tissues are normal.  The aorta is normal in caliber.  There is no hydronephrosis.    Xray lumbar spine 5/1/24  FINDINGS:  The vertebral bodies maintain normal height.  There is no fracture.  There is severe disc space narrowing at the L1-2 level as well as at the L5-S1 level with moderate disc space narrowing at the L4-5 level.  There is anterolisthesis of L4 on L5, L5 on S1 with retrolisthesis of L1 on L2 and trace retrolisthesis of L2 on L3.  There is multilevel facet joint arthropathy.  No instability is demonstrated with flexion or extension.    Assessment:   Problem List Items Addressed This Visit          Neuro    Lumbar spondylosis - Primary     Other Visit Diagnoses       Lumbar radiculopathy, chronic        Relevant Medications    gabapentin (NEURONTIN) 300 MG capsule    Other Relevant Orders    X-Ray Lumbar Complete Including Flex And Ext (Completed)    Ambulatory referral/consult to Physical/Occupational Therapy              Lucinda Aguilera is a 73 y.o. female patient who has a past medical history of Anticoagulant long-term use, Anxiety, Arthritis, Cataract, Cholelithiasis, GERD (gastroesophageal reflux disease), Hepatic steatosis, Hyperlipemia, Hypertension, and PVD (posterior vitreous detachment). She presents with back pain.  She has had back pain for many years and has been gradually worsening.  Today she reports pain in her lower back that radiates primarily down her right leg.  Pain is constant, aching, shooting, 6/10.  Her pain is worse with standing and walking.  At times her legs can feel heavy an aching with walking.    - on exam she has full strength in his lower extremities and intact sensation to light touch bilateral L2-S1.  Absent bilateral Achilles DTR.  Reproducible pain with lumbar  axial facet loading  - I independently reviewed her lumbar MRI and she has severe central canal narrowing at L4-5 and L5-S1.  She has multilevel bilateral foraminal narrowing.  She has listhesis of L1-2, L4-5, L5-S1  - I ordered a lumbar x-ray today with flexion-extension to rule out any instability of her listhesis  - over the past 12 months she has been participating in formal physical therapy and maintaining a PT directed home exercise program as well as aquatic therapy however she continues to have pain that is limiting her mobility and interfering with the quality of her life.  She describes claudication  - we will schedule for an L5-S1 VALERY.  The risks and benefits of this intervention, and alternative therapies were discussed with the patient.  The discussion of risks included infection, bleeding, need for additional procedures or surgery, nerve damage.  Questions regarding the procedure, risks, expected outcome, and possible side effects were solicited and answered to the patient's satisfaction.  Lucinda Aguilera wishes to proceed with the injection or procedure.  Written consent was obtained.  - she would also like to trial medications so we will trial gabapentin 300 mg b.i.d. for the neuropathic component of her pain.  She understands side effects can include drowsiness.  She will start by taking q.h.s. for 1 week and then increase as tolerated  - follow up 2-3 weeks post injection  - she takes aspirin preventatively.  We will have her hold this for 7 days prior to VALERY      : Not applicable    Indra Cuellar M.D.  Interventional Pain Medicine / Anesthesiology    This note was completed with dictation software and grammatical errors may exist.

## 2024-05-01 NOTE — H&P (VIEW-ONLY)
[facility] New Patient Evaluation      Referred by: Dr. Nick Arnold    PCP:     CC:   Chief Complaint   Patient presents with    Hip Pain     Right side     Low-back Pain     Right side     Knee Pain           No data to display                  HPI:   Lucinda Aguilera is a 73 y.o. female patient who has a past medical history of Anticoagulant long-term use, Anxiety, Arthritis, Cataract, Cholelithiasis, GERD (gastroesophageal reflux disease), Hepatic steatosis, Hyperlipemia, Hypertension, and PVD (posterior vitreous detachment). She presents with back pain.  She has had back pain for many years and has been gradually worsening.  Today she reports pain in her lower back that radiates primarily down her right leg.  Pain is constant, aching, shooting, 6/10.  Her pain is worse with standing and walking.  At times her legs can feel heavy an aching with walking.      Pain Intervention History:      Past Spine Surgical History:      Past and current medications:  Antineuropathics:  NSAIDs:  Physical therapy: yes, completed  Antidepressants:  Muscle relaxers: robaxin   Opioids:  Antiplatelets/Anticoagulants: aspirin     History:    Current Outpatient Medications:     acetaminophen (TYLENOL) 500 MG tablet, Take 500 mg by mouth every 6 (six) hours as needed for Pain., Disp: , Rfl:     ascorbic acid, vitamin C, (VITAMIN C) 250 MG tablet, Take 500 mg by mouth once daily. , Disp: , Rfl:     aspirin (ECOTRIN) 81 MG EC tablet, Take 81 mg by mouth every evening., Disp: , Rfl:     atenoloL (TENORMIN) 25 MG tablet, Take 1 tablet (25 mg total) by mouth every evening., Disp: 30 tablet, Rfl: 3    azelastine (ASTELIN) 137 mcg (0.1 %) nasal spray, 1 spray by Nasal route 2 (two) times daily., Disp: , Rfl:     balsalazide (COLAZAL) 750 mg capsule, TAKE 3 CAPSULES(2250 MG) BY MOUTH TWICE DAILY WITH MEALS, Disp: 180 capsule, Rfl: 11    benzonatate (TESSALON) 200 MG capsule, Take 1 capsule (200 mg total) by mouth 3 (three) times  daily as needed for Cough., Disp: 30 capsule, Rfl: 1    buPROPion (WELLBUTRIN XL) 150 MG TB24 tablet, Take 1 tablet (150 mg total) by mouth once daily., Disp: 30 tablet, Rfl: 3    diltiaZEM (DILACOR XR) 240 MG CDCR, Take 1 capsule (240 mg total) by mouth every evening., Disp: 30 capsule, Rfl: 3    docusate sodium (COLACE) 100 MG capsule, Take 100 mg by mouth 2 (two) times daily., Disp: , Rfl:     famotidine (PEPCID) 40 MG tablet, Take 1 tablet (40 mg total) by mouth nightly., Disp: 90 tablet, Rfl: 3    famotidine-calcium carbonate-magnesium hydroxide (PEPCID COMPLETE) -165 mg, Take 1 tablet by mouth 2 (two) times daily as needed. Chew 1 or 2 tablets, 2-3 times a day, as needed, for nausea or heartburn., Disp: , Rfl:     fluticasone propionate (FLONASE) 50 mcg/actuation nasal spray, 1 spray (50 mcg total) by Each Nostril route once daily., Disp: 16 g, Rfl: 3    lisinopriL (PRINIVIL,ZESTRIL) 40 MG tablet, Take 1 tablet (40 mg total) by mouth 2 (two) times a day., Disp: 60 tablet, Rfl: 3    LORazepam (ATIVAN) 0.5 MG tablet, Take 0.5 tablets (0.25 mg total) by mouth 3 (three) times daily as needed for Anxiety., Disp: 45 tablet, Rfl: 0    magnesium oxide (MAG-OX) 400 mg (241.3 mg magnesium) tablet, Take 1 tablet (400 mg total) by mouth once daily., Disp: 30 tablet, Rfl: 3    methocarbamoL (ROBAXIN) 750 MG Tab, Take 1 tablet (750 mg total) by mouth 4 (four) times daily as needed., Disp: 44 tablet, Rfl: 3    multivitamin (THERAGRAN) per tablet, Take 1 tablet by mouth once daily., Disp: , Rfl:     ondansetron (ZOFRAN-ODT) 4 MG TbDL, Take 1 tablet (4 mg total) by mouth every 6 (six) hours as needed (nausea)., Disp: 30 tablet, Rfl: 3    pantoprazole (PROTONIX) 40 MG tablet, Take 1 tablet (40 mg total) by mouth 2 (two) times daily., Disp: 60 tablet, Rfl: 3    polyethylene glycol (GLYCOLAX) 17 gram/dose powder, Take 17 g by mouth every other day., Disp: , Rfl:     rosuvastatin (CRESTOR) 20 MG tablet, Take 1 tablet (20 mg  "total) by mouth once daily., Disp: 30 tablet, Rfl: 1    furosemide (LASIX) 20 MG tablet, Take 1 tablet (20 mg total) by mouth daily as needed (edema). 2 pm on an empty stomach if swollen. (Patient not taking: Reported on 5/1/2024), Disp: 30 tablet, Rfl: 3    gabapentin (NEURONTIN) 300 MG capsule, Take 1 capsule (300 mg total) by mouth 2 (two) times daily., Disp: 60 capsule, Rfl: 1    guaiFENesin (MUCINEX) 600 mg 12 hr tablet, Take 1,200 mg by mouth 2 (two) times daily. (Patient not taking: Reported on 5/1/2024), Disp: , Rfl:     psyllium husk, aspartame, (NATURAL PSYLLIUM FIBER) 3.4 gram/5.8 gram Powd, Take by mouth. (Patient not taking: Reported on 5/1/2024), Disp: , Rfl:     Past Medical History:   Diagnosis Date    Anticoagulant long-term use     Anxiety     takes daily Xanax    Arthritis     right thumb    Cataract     OU    Cholelithiasis     GERD (gastroesophageal reflux disease)     Hepatic steatosis     Hyperlipemia     Hypertension     PVD (posterior vitreous detachment)     OD       Past Surgical History:   Procedure Laterality Date    BREAST BIOPSY Left 08/28/2020    stereo biopsy    BREAST BIOPSY Left 10/07/2020    benign excisional biopsy    chest abcess      child    COLONOSCOPY  01/06/2012    Dr. Lopez: hemorrhoids, redundant colon    COLONOSCOPY N/A 02/17/2021    Dr. Lopez: Congested and erythematous mucosa in the rectum, in the recto-sigmoid colon and in the sigmoid colon, proctosigmoid colitis, Redundant colon; biopsy: focal active colitis "nonspecific but can be seen with acute self-limited colitis (infection), medication effect (e.g. NSAID use), proximity to diverticula, or early/evolving IBD    ESOPHAGOGASTRODUODENOSCOPY N/A 06/06/2019    Dr. Lopez: small hiatal hernia, Non-erosive esophageal reflux (NERD) disease?., slight antritis; biopsy: Antral mucosa with chemical/reactive gastropathy. negative for h pylori    ESOPHAGOGASTRODUODENOSCOPY N/A 02/17/2021    Procedure: EGD " (ESOPHAGOGASTRODUODENOSCOPY);  Surgeon: Nathan Lopez Jr., MD;  Location: [facility];  Service: Endoscopy;  Laterality: N/A; Minimal gastritis; biopsy: stomach- negative for h pylori, active chronic gastritis with focal features of erosive gastritis, duodenum WNL    ESOPHAGOGASTRODUODENOSCOPY N/A 05/10/2022    Procedure: EGD (ESOPHAGOGASTRODUODENOSCOPY);  Surgeon: Nathan Lopez Jr., MD;  Location: [facility];  Service: Endoscopy;  Laterality: N/A;    ESOPHAGOGASTRODUODENOSCOPY N/A 3/12/2024    Procedure: EGD (ESOPHAGOGASTRODUODENOSCOPY);  Surgeon: Nathan Lopez Jr., MD;  Location: [facility];  Service: Endoscopy;  Laterality: N/A;    EXCISIONAL BIOPSY Left 10/07/2020    Procedure: EXCISIONAL BIOPSY-Left with radiological marker;  Surgeon: Brooklynn Ashford MD;  Location: [facility];  Service: General;  Laterality: Left;    FRACTURE SURGERY  2010    Left thumb repaired surgically    JOINT REPLACEMENT Bilateral     knee    KNEE ARTHROSCOPY W/ LASER      right    LAPAROSCOPIC CHOLECYSTECTOMY N/A 06/14/2019    Procedure: CHOLECYSTECTOMY, LAPAROSCOPIC;  Surgeon: Guevara Evans MD;  Location: [facility];  Service: General;  Laterality: N/A;    thumb surgery  11/2014    TOTAL KNEE ARTHROPLASTY Left 06/19/2018    Procedure: REPLACEMENT-KNEE-TOTAL;  Surgeon: Nj Melvin MD;  Location: [address];  Service: Orthopedics;  Laterality: Left;  [facility]    UPPER GASTROINTESTINAL ENDOSCOPY  07/13/2017    Dr. Lopez: NERD, Gastric mucosal atrophy. antritis, Scar in the incisura. Biopsied; biopsy: chronic gastritis. negative for h pylori       Family History   Problem Relation Name Age of Onset    Hypertension Mother age 82     Heart disease Mother age 82     Glaucoma Mother age 82     Stroke Father age 50     Glaucoma Sister      Cataracts Sister      Macular degeneration Sister      Breast cancer Neg Hx      Colon cancer Neg Hx      Crohn's disease Neg Hx      Ulcerative colitis Neg Hx      Stomach cancer Neg Hx       Esophageal cancer Neg Hx      Celiac disease Neg Hx      Amblyopia Neg Hx      Blindness Neg Hx      Retinal detachment Neg Hx      Strabismus Neg Hx         Social History     Socioeconomic History    Marital status:     Number of children: 2   Occupational History    Occupation: retired teacher and principal    Occupation: substitute   Tobacco Use    Smoking status: Never    Smokeless tobacco: Never   Substance and Sexual Activity    Alcohol use: Yes     Comment: social- maybe once every few months    Drug use: No    Sexual activity: Yes     Partners: Male     Birth control/protection: Post-menopausal, None     Social Determinants of Health     Financial Resource Strain: Low Risk  (11/9/2023)    Overall Financial Resource Strain (CARDIA)     Difficulty of Paying Living Expenses: Not hard at all   Food Insecurity: No Food Insecurity (11/9/2023)    Hunger Vital Sign     Worried About Running Out of Food in the Last Year: Never true     Ran Out of Food in the Last Year: Never true   Transportation Needs: No Transportation Needs (11/9/2023)    PRAPARE - Transportation     Lack of Transportation (Medical): No     Lack of Transportation (Non-Medical): No   Physical Activity: Sufficiently Active (11/9/2023)    Exercise Vital Sign     Days of Exercise per Week: 5 days     Minutes of Exercise per Session: 50 min   Recent Concern: Physical Activity - Insufficiently Active (10/20/2023)    Exercise Vital Sign     Days of Exercise per Week: 2 days     Minutes of Exercise per Session: 20 min   Stress: Patient Declined (11/9/2023)    [employer] [place] [employer] - Occupational Stress Questionnaire     Feeling of Stress : Patient declined   Housing Stability: Low Risk  (11/9/2023)    Housing Stability Vital Sign     Unable to Pay for Housing in the Last Year: No     Number of Places Lived in the Last Year: 1     Unstable Housing in the Last Year: No       Review of patient's allergies indicates:  No Known  "Allergies    Review of Systems:  12 point review of systems is negative.    Physical Exam:  Vitals:    05/01/24 1026   BP: (!) 158/81   Pulse: 64   Weight: 76.1 kg (167 lb 14.1 oz)   Height: 5' 2" (1.575 m)   PainSc:   8   PainLoc: Back     Body mass index is 30.71 kg/m².    Gen: NAD  Psych: mood appropriate for given condition  HEENT: eyes anicteric   CV: RRR  HEENT: anicteric   Respiratory: non-labored, no signs of respiratory distress  Abd: non-distended  Skin: warm, dry and intact.  Gait: No antalgic gait.     Sensory:  Intact and symmetrical to light touch in L1-S1 dermatomes bilaterally.    Motor:     Right Left   L2/3 Iliacus Hip flexion  5  5   L3/4 Qudratus Femoris Knee Extension  5  5   L4/5 Tib Anterior Ankle Dorsiflexion   5  5   L5/S1 Extensor Hallicus Longus Great toe extension  5  5   S1/S2 Gastroc/Soleus Plantar Flexion  5  5      Right Left                  Patellar DTR 2+ 2+   Achilles DTR 0 0                      Labs:  Lab Results   Component Value Date    HGBA1C 5.7 (H) 06/08/2021       Lab Results   Component Value Date    WBC 9.35 08/19/2023    HGB 14.6 08/19/2023    HCT 44.2 08/19/2023    MCV 91 08/19/2023     08/19/2023           Imaging:  MRI lumbar spine 3/22/24  FINDINGS:  Vertebral column: There is multilevel degenerative change which will be described by level.  The lumbar vertebral bodies maintain normal height.  There is no fracture.  There is transitional lumbosacral anatomy.  The last rib bearing vertebral body is designated as T12.  Utilizing this numbering system, there is stable mild 3 mm retrolisthesis of L1 on L2.  There is 4 mm anterolisthesis of L4 on L5 with 5 mm anterolisthesis of L5 on S1.  There is marked disc space narrowing at L5-S1.  There is chronic degenerative endplate signal change corresponding to sclerosis on plain films.  There is mild-to-moderate disc space narrowing at the L4-5 level.  There is marked disc space narrowing at the L1-2 level.  All of the " discs are desiccated.  Other than chronic degenerative endplate signal change, most apparent at the L5-S1 level, baseline marrow signal is normal.     Spinal canal, conus, epidural space: The spinal canal is developmentally normal.  The conus terminates at the level of superior L1 and is normal in contour and signal intensity.  There is no abnormal epidural mass or fluid collection.     Findings by level:  T11-12: There is a mild diffuse disc bulge with mild facet joint arthropathy.  There is no spinal canal or significant foraminal stenosis.  There is a small right foraminal perineural cyst.  T12-L1: There is a minimal disc bulge.  There is no spinal canal or significant foraminal stenosis.  L1-2: There is marked disc space narrowing, 3 mm retrolisthesis of L1 on L2 with inferior unroofing of a broad disc protrusion eccentric to the right where there is also osteophytic ridging.  There is bilateral facet joint arthropathy.  There is only borderline to mild spinal stenosis but there is moderate to severe right lateral recess and foraminal stenosis with only mild left foraminal stenosis.  L2-3: There is subtle trace retrolisthesis of L2 on L3.  There is a mild disc bulge.  There is left greater than right facet joint arthropathy.  There is no spinal stenosis.  There is no significant foraminal stenosis.  L3-4: There is a diffuse disc bulge.  There is facet joint arthropathy with ligamentum flavum thickening.  There is borderline to mild spinal stenosis without significant foraminal stenosis.  L4-5: There is disc space narrowing, 4 mm anterolisthesis of L4 on L5 with unroofing of a moderate broad disc protrusion.  There is bilateral facet joint arthropathy.  There is severe spinal canal, bilateral lateral recess stenosis with moderate left and mild right foraminal stenosis.  L5-S1: There is marked disc space narrowing, 5 mm anterolisthesis of L5 on S1 with unroofing of a marked broad disc protrusion in addition to  marked facet joint arthropathy results in severe spinal canal, bilateral lateral recess and foraminal stenosis.     Soft tissues, other: The posterior spinous muscles are atrophic.  The prevertebral soft tissues are normal.  The aorta is normal in caliber.  There is no hydronephrosis.    Xray lumbar spine 5/1/24  FINDINGS:  The vertebral bodies maintain normal height.  There is no fracture.  There is severe disc space narrowing at the L1-2 level as well as at the L5-S1 level with moderate disc space narrowing at the L4-5 level.  There is anterolisthesis of L4 on L5, L5 on S1 with retrolisthesis of L1 on L2 and trace retrolisthesis of L2 on L3.  There is multilevel facet joint arthropathy.  No instability is demonstrated with flexion or extension.    Assessment:   Problem List Items Addressed This Visit          Neuro    Lumbar spondylosis - Primary     Other Visit Diagnoses       Lumbar radiculopathy, chronic        Relevant Medications    gabapentin (NEURONTIN) 300 MG capsule    Other Relevant Orders    X-Ray Lumbar Complete Including Flex And Ext (Completed)    Ambulatory referral/consult to Physical/Occupational Therapy              Lucinda Aguilera is a 73 y.o. female patient who has a past medical history of Anticoagulant long-term use, Anxiety, Arthritis, Cataract, Cholelithiasis, GERD (gastroesophageal reflux disease), Hepatic steatosis, Hyperlipemia, Hypertension, and PVD (posterior vitreous detachment). She presents with back pain.  She has had back pain for many years and has been gradually worsening.  Today she reports pain in her lower back that radiates primarily down her right leg.  Pain is constant, aching, shooting, 6/10.  Her pain is worse with standing and walking.  At times her legs can feel heavy an aching with walking.    - on exam she has full strength in his lower extremities and intact sensation to light touch bilateral L2-S1.  Absent bilateral Achilles DTR.  Reproducible pain with lumbar  axial facet loading  - I independently reviewed her lumbar MRI and she has severe central canal narrowing at L4-5 and L5-S1.  She has multilevel bilateral foraminal narrowing.  She has listhesis of L1-2, L4-5, L5-S1  - I ordered a lumbar x-ray today with flexion-extension to rule out any instability of her listhesis  - over the past 12 months she has been participating in formal physical therapy and maintaining a PT directed home exercise program as well as aquatic therapy however she continues to have pain that is limiting her mobility and interfering with the quality of her life.  She describes claudication  - we will schedule for an L5-S1 VALERY.  The risks and benefits of this intervention, and alternative therapies were discussed with the patient.  The discussion of risks included infection, bleeding, need for additional procedures or surgery, nerve damage.  Questions regarding the procedure, risks, expected outcome, and possible side effects were solicited and answered to the patient's satisfaction.  Lucinda Aguilera wishes to proceed with the injection or procedure.  Written consent was obtained.  - she would also like to trial medications so we will trial gabapentin 300 mg b.i.d. for the neuropathic component of her pain.  She understands side effects can include drowsiness.  She will start by taking q.h.s. for 1 week and then increase as tolerated  - follow up 2-3 weeks post injection  - she takes aspirin preventatively.  We will have her hold this for 7 days prior to VALERY      : Not applicable    Indra Cuellar M.D.  Interventional Pain Medicine / Anesthesiology    This note was completed with dictation software and grammatical errors may exist.

## 2024-05-01 NOTE — TELEPHONE ENCOUNTER
Follow up with garfield tarango     Physician - Dr Cuellar    Type of Procedure/Injection - Lumbar Epidural  L5/S1           Laterality - NA      Anxiolysis- Local      Need to hold medication - Yes      Aspirin for 7 days      Clearance needed - No      Follow up - 2 week

## 2024-05-08 ENCOUNTER — PATIENT MESSAGE (OUTPATIENT)
Dept: ORTHOPEDICS | Facility: CLINIC | Age: 74
End: 2024-05-08
Payer: MEDICARE

## 2024-05-08 ENCOUNTER — TELEPHONE (OUTPATIENT)
Dept: PAIN MEDICINE | Facility: CLINIC | Age: 74
End: 2024-05-08
Payer: MEDICARE

## 2024-05-09 DIAGNOSIS — F41.1 GAD (GENERALIZED ANXIETY DISORDER): ICD-10-CM

## 2024-05-10 RX ORDER — LORAZEPAM 0.5 MG/1
0.25 TABLET ORAL 3 TIMES DAILY PRN
Qty: 45 TABLET | Refills: 0 | Status: SHIPPED | OUTPATIENT
Start: 2024-05-10 | End: 2024-06-09

## 2024-05-15 ENCOUNTER — PATIENT MESSAGE (OUTPATIENT)
Dept: FAMILY MEDICINE | Facility: CLINIC | Age: 74
End: 2024-05-15
Payer: MEDICARE

## 2024-05-15 ENCOUNTER — CLINICAL SUPPORT (OUTPATIENT)
Dept: REHABILITATION | Facility: HOSPITAL | Age: 74
End: 2024-05-15
Attending: ANESTHESIOLOGY
Payer: MEDICARE

## 2024-05-15 DIAGNOSIS — M54.16 LUMBAR RADICULOPATHY, CHRONIC: ICD-10-CM

## 2024-05-15 PROCEDURE — 97161 PT EVAL LOW COMPLEX 20 MIN: CPT | Mod: KX,PO

## 2024-05-15 NOTE — PLAN OF CARE
"[facility]  Physical Therapy Initial Evaluation    Date: 5/15/2024   Name: Lucinda Aguilera  Clinic Number: [id number]    Therapy Diagnosis:   Encounter Diagnosis   Name Primary?    Lumbar radiculopathy, chronic      Physician: Indra Cuellar MD    Physician Orders: PT Eval and Treat   Medical Diagnosis from Referral: Lumbar radiculopathy, chronic   Evaluation Date: 5/15/2024  Authorization Period Expiration: 5/1/2025  Plan of Care Expiration: 7/1/2024  Progress Note Due: 6/1/2024  Visit # / Visits authorized: 1/ 1   FOTO: 1/3    Precautions: Standard     Time In: 0800  Time Out: 0830  Total Appointment Time (timed & untimed codes): 30 minutes      SUBJECTIVE   Date of onset: chronic     History of current condition - Lucinda reports: +6 year hx/o LBP and right hip pain. Currently taking gabapentin and planning to get an VALERY 5/28. Reports occasional radicular pain distal to right buttock when "she gets tired"    Falls: -      Red Flag Screening:   Cough  Sneeze  Strain: (--)  Bladder/ bowel: (--)  Falls: (--)  Night pain: (+)  Unexplained weight loss: (--)  General health: none    Imaging, x-ray: lumbar   FINDINGS:  The vertebral bodies maintain normal height.  There is no fracture.  There is severe disc space narrowing at the L1-2 level as well as at the L5-S1 level with moderate disc space narrowing at the L4-5 level.  There is anterolisthesis of L4 on L5, L5 on S1 with retrolisthesis of L1 on L2 and trace retrolisthesis of L2 on L3.  There is multilevel facet joint arthropathy.  No instability is demonstrated with flexion or extension.       Prior Therapy: yes  Social History:  lives with their spouse  Occupation: retired  Prior Level of Function: "could stand as long as I'd like before"  Current Level of Function: unable to stand longer than 5 mins     Pain:  Current 0/10, worst 10/10, best 0/10   Location:  low back and right hip   Description: Sharp  Aggravating Factors: " "Standing  Easing Factors: sitting, massage, heat    Patients goals: "like to be able to stand for longer periods of time"     Medical History:   Past Medical History:   Diagnosis Date    Anticoagulant long-term use     Anxiety     takes daily Xanax    Arthritis     right thumb    Cataract     OU    Cholelithiasis     GERD (gastroesophageal reflux disease)     Hepatic steatosis     Hyperlipemia     Hypertension     PVD (posterior vitreous detachment)     OD       Surgical History:   Lucinda Aguilera  has a past surgical history that includes chest abcess; Knee arthroscopy w/ laser; thumb surgery (11/2014); Total knee arthroplasty (Left, 06/19/2018); Colonoscopy (01/06/2012); Joint replacement (Bilateral); Esophagogastroduodenoscopy (N/A, 06/06/2019); Laparoscopic cholecystectomy (N/A, 06/14/2019); Upper gastrointestinal endoscopy (07/13/2017); Excisional biopsy (Left, 10/07/2020); Esophagogastroduodenoscopy (N/A, 02/17/2021); Colonoscopy (N/A, 02/17/2021); Breast biopsy (Left, 08/28/2020); Breast biopsy (Left, 10/07/2020); Esophagogastroduodenoscopy (N/A, 05/10/2022); Fracture surgery (2010); and Esophagogastroduodenoscopy (N/A, 3/12/2024).    Medications:   Lucinda has a current medication list which includes the following prescription(s): acetaminophen, ascorbic acid (vitamin c), aspirin, atenolol, azelastine, balsalazide, benzonatate, bupropion, diltiazem, docusate sodium, famotidine, pepcid complete, fluticasone propionate, furosemide, gabapentin, guaifenesin, lisinopril, lorazepam, magnesium oxide, methocarbamol, multivitamin, ondansetron, pantoprazole, polyethylene glycol, natural psyllium fiber, and rosuvastatin.    Allergies:   Review of patient's allergies indicates:  No Known Allergies       Objective       Lumbar Range of Motion: ++ = reproduction of sx   % Limitation Pain   Flexion 50   Soft tissue restriction        Extension 50   ++        Left Side Bending 25 Stretch         Right Side Bending 50 ++   "      Left rotation   25 stretch        Right Rotation   50 ++                Hip Range of Motion:    Left active Right active    Flexion 110 110   Abduction 35 40   Extension 10 10      Lower Extremity Strength  Right LE   Left LE     Knee extension: 4+/5 Knee extension: 4+/5   Knee flexion: 4/5 to 4+/5 Knee flexion: 4/5 to 4+/5   Hip flexion: 4-/5 Hip flexion: 4- 5                       Hip extension:  3/5 Hip extension: 3/5   Hip abduction: 3-/5 Hip abduction: 3/5   Hip adduction: 4/5 Hip adduction 4 /5   Ankle dorsiflexion: 4+/5 Ankle dorsiflexion: 4+/5   Ankle plantarflexion: 4+/5 Ankle plantarflexion: 4+/5      Special Tests:  JOSUE: - left, - right   FADIR: - left, - right  Scour: - bilaterally  SLR - B hips and + lumbar on R  Palpation: R TFL, gluteus medius, iliacus, adductor nelson, rectus femoris and vastus lateralis  Gait: mild compensated trendelenburg, decreased hip extension and minimal to no trunk rotation. Decreased stride length bilaterally and narrow PETRONA          CMS Impairment/Limitation/Restriction for FOTO lumbar Survey    Therapist reviewed FOTO scores for Lucinda Aguilera on 5/15/2024.   FOTO documents entered into [employer] - see Media section.    Limitation Score: 43%  Category: Mobility         TREATMENT        Home Exercises and Patient Education Provided    Education provided:   - Role of PT, PT POC    Written Home Exercises Provided: Patient instructed to cont prior HEP.  Exercises were reviewed and Lucinda was able to demonstrate them prior to the end of the session.  Lucinda demonstrated good  understanding of the education provided.        Assessment   Lucinda is a 73 y.o. female referred to outpatient Physical Therapy with a medical diagnosis of Lumbar radiculopathy, chronic  . Physical exam is consistent with lumbar facet arthralgia with radiculopathy. Primary impairments include postural dysfunction, decreased core and LE strength, decreased lumbar and LE flexibility and mobility,  impaired neuromuscular control of core and hip musculature and pain with functional activities. This pt is an good candidate for skilled PT tx and stands to benefit from a combination of manual therapy including joint mobilizations with trigger point/myofacscial release, therapeutic exercise to establish core/joint stability, neuromuscular re-education, dry needling, and modalities Prn. The pt has been educated on their dx/POC and consents to further PT tx.     Pt prognosis is Good.   Pt will benefit from skilled outpatient Physical Therapy to address the deficits stated above and in the chart below, provide pt/family education, and to maximize pt's level of independence.     Plan of care discussed with patient: Yes  Pt's spiritual, cultural and educational needs considered and patient is agreeable to the plan of care and goals as stated below:     Anticipated Barriers for therapy: age and chronicity     Medical Necessity is demonstrated by the following  History  Co-morbidities and personal factors that may impact the plan of care [x] LOW: no personal factors / co-morbidities  [] MODERATE: 1-2 personal factors / co-morbidities  [] HIGH: 3+ personal factors / co-morbidities    Moderate / High Support Documentation:   Co-morbidities affecting plan of care: -    Personal Factors:   age     Examination  Body Structures and Functions, activity limitations and participation restrictions that may impact the plan of care [x] LOW: addressing 1-2 elements  [] MODERATE: 3+ elements  [] HIGH: 4+ elements (please support below)    Moderate / High Support Documentation: -     Clinical Presentation [x] LOW: stable  [] MODERATE: Evolving  [] HIGH: Unstable     Decision Making/ Complexity Score: low       Goals:   Short Term Goals (4 Weeks):   1) Pt will demonstrate compliance with initial home exercise program as prescribed by physical therapist to improve independence with management of condition.  2) Pt to improve active range  of motion in lumbar spine by 25% to allow for improved functional mobility.  3) Pt to tolerate standing for >30 min with <2/10 pain in low back to allow for improvement in IADLs.    Long Term Goals (6 Weeks):   1) Pt to tolerate standing and walking for community distances with <2/10 pain in low back to improve mobility with IADL's.  2) Pt to demonstrate adequate self-management skills and d/c to independent home program.    PLAN   Plan of care Certification: 5/15/2024 to 7/1/2024.    Outpatient Physical Therapy 1-2 times weekly for 6 weeks to include the following interventions: Aquatic Therapy, Manual Therapy, Neuromuscular Re-ed, Patient Education, Therapeutic Activities, and Therapeutic Exercise.     Robbie Lieberman, PT, DPT      I CERTIFY THE NEED FOR THESE SERVICES FURNISHED UNDER THIS PLAN OF TREATMENT AND WHILE UNDER MY CARE   Physician's comments:     Physician's Signature: ___________________________________________________

## 2024-05-16 ENCOUNTER — PATIENT MESSAGE (OUTPATIENT)
Dept: ADMINISTRATIVE | Facility: OTHER | Age: 74
End: 2024-05-16
Payer: MEDICARE

## 2024-05-16 ENCOUNTER — PATIENT MESSAGE (OUTPATIENT)
Dept: REHABILITATION | Facility: HOSPITAL | Age: 74
End: 2024-05-16
Payer: MEDICARE

## 2024-05-16 DIAGNOSIS — E78.49 OTHER HYPERLIPIDEMIA: ICD-10-CM

## 2024-05-17 NOTE — TELEPHONE ENCOUNTER
Refill Routing Note   Medication(s) are not appropriate for processing by [facility] for the following reason(s):        Non-participating provider    [facility] action(s):  Route      Medication Therapy Plan: Patient active with digital medicine      Appointments  past 12m or future 3m with PCP    Date Provider   Last Visit   1/31/2024 Saurabh Hassan MD   Next Visit   8/7/2024 Saurabh Hassan MD   ED visits in past 90 days: 0        Note composed:4:00 PM 05/17/2024

## 2024-05-18 DIAGNOSIS — Z87.19 HISTORY OF GASTRITIS: ICD-10-CM

## 2024-05-18 NOTE — TELEPHONE ENCOUNTER
Care Due:                  Date            Visit Type   Department     Provider  --------------------------------------------------------------------------------                                ESTABLISHED   [facility]  Last Visit: 01-      PATIENT      MEDICINE       JF STANFORD                              EP -                              PRIMARY      [facility]  Next Visit: 08-      CARE ([facility])   MEDICINE       JF STANFORD                                                            Last  Test          Frequency    Reason                     Performed    Due Date  --------------------------------------------------------------------------------    CBC.........  12 months..  balsalazide..............  08- 08-    [facility] Embedded Care Due Messages. Reference number: [id number].   5/18/2024 11:03:31 AM CDT

## 2024-05-19 RX ORDER — BALSALAZIDE DISODIUM 750 MG/1
CAPSULE ORAL
Qty: 180 CAPSULE | Refills: 11 | Status: SHIPPED | OUTPATIENT
Start: 2024-05-19

## 2024-05-20 RX ORDER — ROSUVASTATIN CALCIUM 20 MG/1
TABLET, COATED ORAL
Qty: 90 TABLET | Refills: 1 | Status: SHIPPED | OUTPATIENT
Start: 2024-05-20

## 2024-05-21 ENCOUNTER — TELEPHONE (OUTPATIENT)
Dept: DERMATOLOGY | Facility: CLINIC | Age: 74
End: 2024-05-21
Payer: MEDICARE

## 2024-05-21 ENCOUNTER — PATIENT MESSAGE (OUTPATIENT)
Dept: FAMILY MEDICINE | Facility: CLINIC | Age: 74
End: 2024-05-21
Payer: MEDICARE

## 2024-05-21 NOTE — TELEPHONE ENCOUNTER
----- Message from Tierney Dwyer sent at 5/21/2024  8:12 AM CDT -----  Contact: self  Type: Sooner Appointment Request        Caller is requesting a sooner appointment. Caller declined first available appointment listed below. Caller will not accept being placed on the waitlist and is requesting a message be sent to doctor.        Name of Caller: Patient   Best Call Back Number: [phone]  Additional Information: Patient states she is getting more spots on her arms due tot he blood thinners getting very concerned. Pt is willing to do a  virtual so she cans how her arms and see if she can get prescribed something. Thanks

## 2024-05-21 NOTE — TELEPHONE ENCOUNTER
----- Message from Tierney Dwyer sent at 5/21/2024  8:12 AM CDT -----  Contact: self  Type: Sooner Appointment Request        Caller is requesting a sooner appointment. Caller declined first available appointment listed below. Caller will not accept being placed on the waitlist and is requesting a message be sent to doctor.        Name of Caller: Patient   Best Call Back Number: [phone]  Additional Information: Patient states she is getting more spots on her arms due tot he blood thinners getting very concerned. Pt is willing to do a  virtual so she cans how her arms and see if she can get prescribed something. Thanks

## 2024-05-22 ENCOUNTER — NURSE TRIAGE (OUTPATIENT)
Dept: ADMINISTRATIVE | Facility: CLINIC | Age: 74
End: 2024-05-22
Payer: MEDICARE

## 2024-05-22 NOTE — TELEPHONE ENCOUNTER
Spoke with pt who reports that she think she took 3 of her gabapentin 300mg medication pills by accident instead of taking 1. Pt denies symptoms at this time.Advised will reach out to poison control    Poison control called, and pt call was transferred to poison control    Reason for Disposition   MORE THAN A DOUBLE DOSE of a prescription or over-the-counter (OTC) drug    Additional Information   Negative: [1] Intentional drug overdose AND [2] suicidal thoughts or ideas    Protocols used: Medication Question Call-A-

## 2024-05-23 NOTE — TELEPHONE ENCOUNTER
I spoke with MsJackelyn Lucinda and gave her this information.  She verbalized understanding.  She stated that she does not think that she did take to much.  She did not feel dizzy or sleepy.  She has been doing ok today.

## 2024-05-23 NOTE — TELEPHONE ENCOUNTER
No worries. No intervention needed.  She may feel dizziness today, and should probably just remain home and no driving.  Just have her hold taking any further gabapentin today.  Resume gabapentin tomorrow as normal

## 2024-05-28 ENCOUNTER — HOSPITAL ENCOUNTER (OUTPATIENT)
Dept: RADIOLOGY | Facility: HOSPITAL | Age: 74
Discharge: HOME | End: 2024-05-28
Attending: ANESTHESIOLOGY | Admitting: ANESTHESIOLOGY
Payer: MEDICARE

## 2024-05-28 ENCOUNTER — TELEPHONE (OUTPATIENT)
Dept: PAIN MEDICINE | Facility: CLINIC | Age: 74
End: 2024-05-28
Payer: MEDICARE

## 2024-05-28 DIAGNOSIS — M54.50 LOWER BACK PAIN: ICD-10-CM

## 2024-05-29 ENCOUNTER — PATIENT MESSAGE (OUTPATIENT)
Dept: OTOLARYNGOLOGY | Facility: CLINIC | Age: 74
End: 2024-05-29
Payer: MEDICARE

## 2024-05-30 ENCOUNTER — HOSPITAL ENCOUNTER (OUTPATIENT)
Dept: RADIOLOGY | Facility: HOSPITAL | Age: 74
Discharge: HOME | End: 2024-05-30
Attending: ANESTHESIOLOGY | Admitting: ANESTHESIOLOGY
Payer: MEDICARE

## 2024-05-30 ENCOUNTER — HOSPITAL ENCOUNTER (OUTPATIENT)
Facility: HOSPITAL | Age: 74
Discharge: HOME | End: 2024-05-30
Attending: ANESTHESIOLOGY | Admitting: ANESTHESIOLOGY
Payer: MEDICARE

## 2024-05-30 DIAGNOSIS — M54.16 LUMBAR RADICULOPATHY, CHRONIC: ICD-10-CM

## 2024-05-30 DIAGNOSIS — M54.16 LUMBAR RADICULOPATHY: Primary | ICD-10-CM

## 2024-05-30 DIAGNOSIS — M54.50 LOWER BACK PAIN: ICD-10-CM

## 2024-05-30 PROCEDURE — 62323 NJX INTERLAMINAR LMBR/SAC: CPT | Mod: ,,, | Performed by: ANESTHESIOLOGY

## 2024-05-30 PROCEDURE — 25000003 PHARM REV CODE 250: Mod: PO | Performed by: ANESTHESIOLOGY

## 2024-05-30 PROCEDURE — 76000 FLUOROSCOPY <1 HR PHYS/QHP: CPT | Mod: TC,PO

## 2024-05-30 PROCEDURE — 63600175 PHARM REV CODE 636 W HCPCS: Mod: PO | Performed by: ANESTHESIOLOGY

## 2024-05-30 PROCEDURE — 62323 NJX INTERLAMINAR LMBR/SAC: CPT | Mod: PO | Performed by: ANESTHESIOLOGY

## 2024-05-30 PROCEDURE — 25500020 PHARM REV CODE 255: Mod: PO | Performed by: ANESTHESIOLOGY

## 2024-05-30 RX ORDER — ALPRAZOLAM 0.5 MG/1
1 TABLET, ORALLY DISINTEGRATING ORAL ONCE AS NEEDED
Status: COMPLETED | OUTPATIENT
Start: 2024-05-30 | End: 2024-05-30

## 2024-05-30 RX ORDER — LIDOCAINE HYDROCHLORIDE 10 MG/ML
INJECTION, SOLUTION EPIDURAL; INFILTRATION; INTRACAUDAL; PERINEURAL
Status: DISCONTINUED | OUTPATIENT
Start: 2024-05-30 | End: 2024-05-30 | Stop reason: HOSPADM

## 2024-05-30 RX ORDER — METHYLPREDNISOLONE ACETATE 80 MG/ML
INJECTION, SUSPENSION INTRA-ARTICULAR; INTRALESIONAL; INTRAMUSCULAR; SOFT TISSUE
Status: DISCONTINUED | OUTPATIENT
Start: 2024-05-30 | End: 2024-05-30 | Stop reason: HOSPADM

## 2024-05-30 RX ADMIN — ALPRAZOLAM 1 MG: 0.5 TABLET, ORALLY DISINTEGRATING ORAL at 12:05

## 2024-05-30 NOTE — INTERVAL H&P NOTE
The patient has been examined and the H&P has been reviewed:    I concur with the findings and no changes have occurred since H&P was written.  She has held aspirin appropriately.         There are no hospital problems to display for this patient.

## 2024-05-30 NOTE — DISCHARGE SUMMARY
[facility]  Discharge Note  Short Stay    Admit Date: 5/30/2024    Discharge Date: 5/30/2024    Attending Physician: Indra Cuellar     Discharge Provider: Indra Cuellar    Diagnoses:  There are no hospital problems to display for this patient.      Discharged Condition: Good    Final Diagnoses: Lumbar radiculopathy, chronic [M54.16]    Disposition: Home or Self Care    Hospital Course: No complications, uneventful    Outcome of Hospitalization, Treatment, Procedure, or Surgery:  Patient was admitted for outpatient interventional pain management procedure. The patient tolerated the procedure well with no complications.    Follow up/Patient Instructions:  Follow up as scheduled in Pain Management office in 2-3 weeks.  Patient has received instructions and follow up date and time.    Medications:  Continue previous medications, except restart aspirin in 24 hours    Discharge Procedure Orders   Notify your health care provider if you experience any of the following:  temperature >100.4     Notify your health care provider if you experience any of the following:  persistent nausea and vomiting or diarrhea     Notify your health care provider if you experience any of the following:  severe uncontrolled pain     Notify your health care provider if you experience any of the following:  redness, tenderness, or signs of infection (pain, swelling, redness, odor or green/yellow discharge around incision site)     Notify your health care provider if you experience any of the following:  difficulty breathing or increased cough     Notify your health care provider if you experience any of the following:  severe persistent headache     Notify your health care provider if you experience any of the following:  worsening rash     Notify your health care provider if you experience any of the following:  persistent dizziness, light-headedness, or visual disturbances     Notify your health care provider if you experience any  of the following:  increased confusion or weakness     Activity as tolerated

## 2024-05-30 NOTE — OP NOTE
"Procedure Note    Procedure Date: 5/30/2024    Procedure Performed:  L5/S1 lumbar interlaminar epidural steroid injection under fluoroscopy.    Indications:  Lucinda Aguilera presents with lumbar radiculitis/radiculopathy secondary to disc herniation, osteophyte/osteophyte complexes, and/or severe degenerative disc disease producing foraminal or central spinal stenosis.  The pain has been present for at least 4 weeks and the patient has failed to respond to noninvasive conservative care.  Pain rated by NRS at baseline prior to intervention is 6/10.  Their radiculitis/radiculopathy and/or neurogenic claudication is severe enough to greatly impact their quality of life or function.     Pre-op diagnosis: Lumbar Radiculopathy    Post-op diagnosis: same    Physician: Indra Cuellar MD    IV anxiolysis medications: none    Medications injected: depomedrol 80mg, 1% Lidocaine 1ml, 2 mL sterile, preservative-free normal saline.    Local anesthetic used: 1% Lidocaine, 1 ml    Estimated Blood Loss: none    Complications:  none    Technique:  The patient was interviewed in the holding area and Risks/Benefits were discussed, including, but not limited to, the possibility of new or different pain, bleeding or infection.   All questions were answered.  The patient understood and accepted risks.  Consent was verfied.  A time-out was taken to identify patient and procedure prior to starting the procedure. The patient was placed in the prone position on the fluoroscopy table. The area of the lumbar spine was prepped with Chloraprep x2 and draped in a sterile manner. The L5/S1 interspace was identified and marked under AP fluoroscopy. The skin and subcutaneous tissues overlying the targeted interspace were anesthetized with 3-5 mL of 1% lidocaine using a 25G, 1.5" needle.  A 20G, 3.5" Tuohy epidural needle was directed toward the interspace under fluoroscopic guidance until the ligamentum flavum was engaged. From this point, a loss of " resistance technique with a glass syringe and saline was used to identify entrance of the needle into the epidural space. Once loss of resistance was observed 1 mL of contrast solution was injected. An appropriate epidurogram was noted.  A 4 mL mixture consisting of saline, 1 mL 1% Lidocaine and 80 mg of depomedrol was injected slowly and without resistance.  The needle was flushed with normal saline and removed. The contrast was seen to be displaced after injection. Patient was awake/responsive during all injections.  The patient tolerated the procedure well and was transferred to the [facility] in stable condition.  The patient was monitored after the procedure and was given post-procedure and discharge instructions to follow at home. The patient was discharged in a stable condition.

## 2024-05-31 ENCOUNTER — EXTERNAL CHRONIC CARE MANAGEMENT (OUTPATIENT)
Dept: PRIMARY CARE CLINIC | Facility: CLINIC | Age: 74
End: 2024-05-31
Payer: MEDICARE

## 2024-05-31 VITALS
BODY MASS INDEX: 30.73 KG/M2 | TEMPERATURE: 98 F | SYSTOLIC BLOOD PRESSURE: 151 MMHG | RESPIRATION RATE: 15 BRPM | WEIGHT: 167 LBS | OXYGEN SATURATION: 95 % | DIASTOLIC BLOOD PRESSURE: 70 MMHG | HEIGHT: 62 IN | HEART RATE: 71 BPM

## 2024-05-31 PROCEDURE — 99490 CHRNC CARE MGMT STAFF 1ST 20: CPT | Mod: PBBFAC,PO | Performed by: FAMILY MEDICINE

## 2024-05-31 PROCEDURE — 99490 CHRNC CARE MGMT STAFF 1ST 20: CPT | Mod: S$PBB,,, | Performed by: FAMILY MEDICINE

## 2024-06-04 ENCOUNTER — PATIENT MESSAGE (OUTPATIENT)
Dept: FAMILY MEDICINE | Facility: CLINIC | Age: 74
End: 2024-06-04
Payer: MEDICARE

## 2024-06-04 ENCOUNTER — PATIENT MESSAGE (OUTPATIENT)
Dept: OTOLARYNGOLOGY | Facility: CLINIC | Age: 74
End: 2024-06-04
Payer: MEDICARE

## 2024-06-10 ENCOUNTER — CLINICAL SUPPORT (OUTPATIENT)
Dept: AUDIOLOGY | Facility: CLINIC | Age: 74
End: 2024-06-10
Payer: MEDICARE

## 2024-06-10 DIAGNOSIS — H90.3 BILATERAL SENSORINEURAL HEARING LOSS: Primary | ICD-10-CM

## 2024-06-10 DIAGNOSIS — H93.13 TINNITUS, BILATERAL: ICD-10-CM

## 2024-06-10 PROCEDURE — 92567 TYMPANOMETRY: CPT | Mod: PBBFAC,PO | Performed by: AUDIOLOGIST-HEARING AID FITTER

## 2024-06-10 PROCEDURE — 92557 COMPREHENSIVE HEARING TEST: CPT | Mod: PBBFAC,PO | Performed by: AUDIOLOGIST-HEARING AID FITTER

## 2024-06-10 NOTE — PROGRESS NOTES
Lucinda Aguilera was seen 06/10/2024 for an audiological evaluation. Pt was alone during today's visit. Pertinent complaints today include hearing loss and buzzing tinnitus AU. Pt denies history of loud noise exposure and denies early onset of genetic family history of hearing loss. Otoscopy revealed minimal cerumen in both ears. The tympanic membrane was visualized AU prior to proceeding with the hearing testing.     Results reveal a bilateral normal sloping to moderately severe HF sensorineural hearing loss.    Speech Reception Thresholds were  5 dBHL for the right ear and 5 dBHL for the left ear.    Word recognition scores were good for the right ear and good for the left ear.   Tympanograms were Type As for the right ear and Type A for the left ear.    Audiogram results were reviewed in detail with patient and all questions were answered. Results will be reviewed by the referring provider at the completion of this note. All complaints were addressed during this visit to the patient's satisfaction. Recommend binaural amplification pending medical clearance, repeat hearing testing in one year due to tinnitus and bilateral hearing protection with either muffs or in-ear protection in loud noises. Plan of care was discussed in detail with the patient, who agreed with the plan as above. She does not feel she needs hearing aids at this time.

## 2024-06-10 NOTE — Clinical Note
Thank you for your referral to audiology. Hearing testing completed. Results reveal a bilateral normal sloping to moderately severe HF sensorineural hearing loss.    Speech Reception Thresholds were  5 dBHL for the right ear and 5 dBHL for the left ear.    Word recognition scores were good for the right ear and good for the left ear.   Tympanograms were Type As for the right ear and Type A for the left ear. Recommend binaural amplification pending medical clearance and repeat hearing testing in one year due to tinnitus. She does not feel she needs hearing aids at this time.

## 2024-06-11 ENCOUNTER — OFFICE VISIT (OUTPATIENT)
Dept: FAMILY MEDICINE | Facility: CLINIC | Age: 74
End: 2024-06-11
Payer: MEDICARE

## 2024-06-11 DIAGNOSIS — D69.2 PURPURA: Primary | ICD-10-CM

## 2024-06-11 PROCEDURE — 99213 OFFICE O/P EST LOW 20 MIN: CPT | Mod: 95,,, | Performed by: FAMILY MEDICINE

## 2024-06-11 NOTE — PROGRESS NOTES
"The patient location is: [place]  The chief complaint leading to consultation is: arm spots  Visit type: Virtual visit with synchronous audio and video  Total time spent with patient: 15 minutes  Each patient to whom he or she provides medical services by telemedicine is:  (1) informed of the relationship between the physician and patient and the respective role of any other health care provider with respect to management of the patient; and (2) notified that he or she may decline to receive medical services by telemedicine and may withdraw from such care at any time.    HPI:    C/o persistent dark spots on dorsal aspect of bilateral forearms.  She will see dermatology in July.  She does take aspirin 81mg daily.    Past Medical History:   Diagnosis Date    Anticoagulant long-term use     Anxiety     takes daily Xanax    Arthritis     right thumb    Cataract     OU    Cholelithiasis     GERD (gastroesophageal reflux disease)     Hepatic steatosis     Hyperlipemia     Hypertension     PVD (posterior vitreous detachment)     OD       Past Surgical History:   Procedure Laterality Date    BREAST BIOPSY Left 08/28/2020    stereo biopsy    BREAST BIOPSY Left 10/07/2020    benign excisional biopsy    chest abcess      child    COLONOSCOPY  01/06/2012    Dr. Lopez: hemorrhoids, redundant colon    COLONOSCOPY N/A 02/17/2021    Dr. Lopez: Congested and erythematous mucosa in the rectum, in the recto-sigmoid colon and in the sigmoid colon, proctosigmoid colitis, Redundant colon; biopsy: focal active colitis "nonspecific but can be seen with acute self-limited colitis (infection), medication effect (e.g. NSAID use), proximity to diverticula, or early/evolving IBD    EPIDURAL STEROID INJECTION INTO LUMBAR SPINE N/A 5/30/2024    Procedure: Injection-steroid-epidural-lumbar   L5/S1;  Surgeon: Indra Cuellar MD;  Location: [facility];  Service: Pain Management;  Laterality: N/A;    ESOPHAGOGASTRODUODENOSCOPY N/A 06/06/2019    Dr. Lopez: " small hiatal hernia, Non-erosive esophageal reflux (NERD) disease?., slight antritis; biopsy: Antral mucosa with chemical/reactive gastropathy. negative for h pylori    ESOPHAGOGASTRODUODENOSCOPY N/A 02/17/2021    Procedure: EGD (ESOPHAGOGASTRODUODENOSCOPY);  Surgeon: Nathan Lopez Jr., MD;  Location: [facility];  Service: Endoscopy;  Laterality: N/A; Minimal gastritis; biopsy: stomach- negative for h pylori, active chronic gastritis with focal features of erosive gastritis, duodenum WNL    ESOPHAGOGASTRODUODENOSCOPY N/A 05/10/2022    Procedure: EGD (ESOPHAGOGASTRODUODENOSCOPY);  Surgeon: Nathan Lopez Jr., MD;  Location: [facility];  Service: Endoscopy;  Laterality: N/A;    ESOPHAGOGASTRODUODENOSCOPY N/A 3/12/2024    Procedure: EGD (ESOPHAGOGASTRODUODENOSCOPY);  Surgeon: Nathan Lopez Jr., MD;  Location: [facility];  Service: Endoscopy;  Laterality: N/A;    EXCISIONAL BIOPSY Left 10/07/2020    Procedure: EXCISIONAL BIOPSY-Left with radiological marker;  Surgeon: Brooklynn Ashford MD;  Location: [facility];  Service: General;  Laterality: Left;    FRACTURE SURGERY  2010    Left thumb repaired surgically    JOINT REPLACEMENT Bilateral     knee    KNEE ARTHROSCOPY W/ LASER      right    LAPAROSCOPIC CHOLECYSTECTOMY N/A 06/14/2019    Procedure: CHOLECYSTECTOMY, LAPAROSCOPIC;  Surgeon: Guevara Evans MD;  Location: [facility];  Service: General;  Laterality: N/A;    thumb surgery  11/2014    TOTAL KNEE ARTHROPLASTY Left 06/19/2018    Procedure: REPLACEMENT-KNEE-TOTAL;  Surgeon: Nj Melvin MD;  Location: [address];  Service: Orthopedics;  Laterality: Left;  [facility]    UPPER GASTROINTESTINAL ENDOSCOPY  07/13/2017    Dr. Lopez: NERD, Gastric mucosal atrophy. antritis, Scar in the incisura. Biopsied; biopsy: chronic gastritis. negative for h pylori       Review of patient's allergies indicates:  No Known Allergies    Social History     Socioeconomic History    Marital status:     Number of children: 2    Occupational History    Occupation: retired teacher and principal    Occupation: substitute   Tobacco Use    Smoking status: Never    Smokeless tobacco: Never   Substance and Sexual Activity    Alcohol use: Yes     Comment: social- maybe once every few months    Drug use: No    Sexual activity: Yes     Partners: Male     Birth control/protection: Post-menopausal, None     Social Determinants of Health     Financial Resource Strain: Low Risk  (11/9/2023)    Overall Financial Resource Strain (CARDIA)     Difficulty of Paying Living Expenses: Not hard at all   Food Insecurity: No Food Insecurity (11/9/2023)    Hunger Vital Sign     Worried About Running Out of Food in the Last Year: Never true     Ran Out of Food in the Last Year: Never true   Transportation Needs: No Transportation Needs (11/9/2023)    PRAPARE - Transportation     Lack of Transportation (Medical): No     Lack of Transportation (Non-Medical): No   Physical Activity: Sufficiently Active (11/9/2023)    Exercise Vital Sign     Days of Exercise per Week: 5 days     Minutes of Exercise per Session: 50 min   Recent Concern: Physical Activity - Insufficiently Active (10/20/2023)    Exercise Vital Sign     Days of Exercise per Week: 2 days     Minutes of Exercise per Session: 20 min   Stress: Patient Declined (11/9/2023)    [employer] - Occupational Stress Questionnaire     Feeling of Stress : Patient declined   Housing Stability: Low Risk  (11/9/2023)    Housing Stability Vital Sign     Unable to Pay for Housing in the Last Year: No     Number of Places Lived in the Last Year: 1     Unstable Housing in the Last Year: No       Current Outpatient Medications on File Prior to Visit   Medication Sig Dispense Refill    acetaminophen (TYLENOL) 500 MG tablet Take 500 mg by mouth every 6 (six) hours as needed for Pain.      ascorbic acid, vitamin C, (VITAMIN C) 250 MG tablet Take 500 mg by mouth once daily.       atenoloL (TENORMIN) 25  MG tablet Take 1 tablet (25 mg total) by mouth every evening. 30 tablet 3    azelastine (ASTELIN) 137 mcg (0.1 %) nasal spray 1 spray by Nasal route 2 (two) times daily.      balsalazide (COLAZAL) 750 mg capsule TAKE 3 CAPSULES(2250 MG) BY MOUTH TWICE DAILY WITH MEALS 180 capsule 11    benzonatate (TESSALON) 200 MG capsule Take 1 capsule (200 mg total) by mouth 3 (three) times daily as needed for Cough. 30 capsule 1    buPROPion (WELLBUTRIN XL) 150 MG TB24 tablet Take 1 tablet (150 mg total) by mouth once daily. 30 tablet 3    ciprofloxacin-dexAMETHasone 0.3-0.1% (CIPRODEX) 0.3-0.1 % DrpS Place 4 drops into both ears 2 (two) times daily. 7.5 mL 0    diltiaZEM (DILACOR XR) 240 MG CDCR Take 1 capsule (240 mg total) by mouth every evening. 30 capsule 3    docusate sodium (COLACE) 100 MG capsule Take 100 mg by mouth 2 (two) times daily.      famotidine (PEPCID) 40 MG tablet Take 1 tablet (40 mg total) by mouth nightly. 90 tablet 3    famotidine-calcium carbonate-magnesium hydroxide (PEPCID COMPLETE) -165 mg Take 1 tablet by mouth 2 (two) times daily as needed. Chew 1 or 2 tablets, 2-3 times a day, as needed, for nausea or heartburn.      fluticasone propionate (FLONASE) 50 mcg/actuation nasal spray 1 spray (50 mcg total) by Each Nostril route once daily. 16 g 3    gabapentin (NEURONTIN) 300 MG capsule Take 1 capsule (300 mg total) by mouth 2 (two) times daily. 60 capsule 1    guaiFENesin (MUCINEX) 600 mg 12 hr tablet Take 1,200 mg by mouth 2 (two) times daily. (Patient not taking: Reported on 5/1/2024)      lisinopriL (PRINIVIL,ZESTRIL) 40 MG tablet Take 1 tablet (40 mg total) by mouth 2 (two) times a day. 60 tablet 3    LORazepam (ATIVAN) 0.5 MG tablet Take 0.5 tablets (0.25 mg total) by mouth 3 (three) times daily as needed for Anxiety. 45 tablet 0    magnesium oxide (MAG-OX) 400 mg (241.3 mg magnesium) tablet Take 1 tablet (400 mg total) by mouth once daily. 30 tablet 3    methocarbamoL (ROBAXIN) 750 MG Tab  Take 1 tablet (750 mg total) by mouth 4 (four) times daily as needed. 44 tablet 3    multivitamin (THERAGRAN) per tablet Take 1 tablet by mouth once daily.      ondansetron (ZOFRAN-ODT) 4 MG TbDL Take 1 tablet (4 mg total) by mouth every 6 (six) hours as needed (nausea). 30 tablet 3    pantoprazole (PROTONIX) 40 MG tablet TAKE 1 TABLET(40 MG) BY MOUTH TWICE DAILY 180 tablet 3    polyethylene glycol (GLYCOLAX) 17 gram/dose powder Take 17 g by mouth every other day.      psyllium husk, aspartame, (NATURAL PSYLLIUM FIBER) 3.4 gram/5.8 gram Powd Take by mouth. (Patient not taking: Reported on 5/1/2024)      rosuvastatin (CRESTOR) 20 MG tablet TAKE 1 TABLET(20 MG) BY MOUTH DAILY 90 tablet 1    [DISCONTINUED] aspirin (ECOTRIN) 81 MG EC tablet Take 81 mg by mouth every evening.      [DISCONTINUED] furosemide (LASIX) 20 MG tablet Take 1 tablet (20 mg total) by mouth daily as needed (edema). 2 pm on an empty stomach if swollen. (Patient not taking: Reported on 5/1/2024) 30 tablet 3     No current facility-administered medications on file prior to visit.       Family History   Problem Relation Name Age of Onset    Hypertension Mother age 82     Heart disease Mother age 82     Glaucoma Mother age 82     Stroke Father age 50     Glaucoma Sister      Cataracts Sister      Macular degeneration Sister      Breast cancer Neg Hx      Colon cancer Neg Hx      Crohn's disease Neg Hx      Ulcerative colitis Neg Hx      Stomach cancer Neg Hx      Esophageal cancer Neg Hx      Celiac disease Neg Hx      Amblyopia Neg Hx      Blindness Neg Hx      Retinal detachment Neg Hx      Strabismus Neg Hx          Review of Systems   HENT:  Negative for hearing loss.    Eyes:  Negative for discharge.   Respiratory:  Negative for wheezing.    Cardiovascular:  Negative for chest pain and palpitations.   Gastrointestinal:  Negative for blood in stool, constipation, diarrhea and vomiting.   Genitourinary:  Negative for dysuria and hematuria.    Musculoskeletal:  Negative for neck pain.   Neurological:  Negative for weakness and headaches.   Endo/Heme/Allergies:  Negative for polydipsia.       Physical Exam  Constitutional:       Appearance: She is well-developed.   HENT:      Head: Normocephalic and atraumatic.   Eyes:      Pupils: Pupils are equal, round, and reactive to light.   Pulmonary:      Effort: Pulmonary effort is normal.   Musculoskeletal:      Cervical back: Neck supple.   Skin:     Comments: Multiple purpura on bilateral forearm dorsum   Neurological:      Mental Status: She is alert and oriented to person, place, and time.   Psychiatric:         Behavior: Behavior normal.         Thought Content: Thought content normal.         Judgment: Judgment normal.          Purpura       Reassurance regarding superficial purpura  Stop aspirin  Answers submitted by the patient for this visit:  Review of Systems Questionnaire (Submitted on 6/5/2024)  activity change: No  unexpected weight change: No  rhinorrhea: No  trouble swallowing: No  visual disturbance: No  chest tightness: No  polyuria: No  difficulty urinating: No  menstrual problem: No  joint swelling: No  arthralgias: No  confusion: No  dysphoric mood: No

## 2024-06-12 ENCOUNTER — PATIENT MESSAGE (OUTPATIENT)
Dept: PAIN MEDICINE | Facility: CLINIC | Age: 74
End: 2024-06-12
Payer: MEDICARE

## 2024-06-12 DIAGNOSIS — R10.13 EPIGASTRIC DISCOMFORT: ICD-10-CM

## 2024-06-12 DIAGNOSIS — R11.2 NON-INTRACTABLE VOMITING WITH NAUSEA: ICD-10-CM

## 2024-06-13 DIAGNOSIS — M16.11 PRIMARY OSTEOARTHRITIS OF RIGHT HIP: Primary | ICD-10-CM

## 2024-06-14 DIAGNOSIS — R10.13 EPIGASTRIC DISCOMFORT: ICD-10-CM

## 2024-06-14 DIAGNOSIS — R11.2 NON-INTRACTABLE VOMITING WITH NAUSEA: ICD-10-CM

## 2024-06-14 RX ORDER — FAMOTIDINE 40 MG/1
40 TABLET, FILM COATED ORAL NIGHTLY
Qty: 90 TABLET | Refills: 3 | Status: SHIPPED | OUTPATIENT
Start: 2024-06-14 | End: 2024-06-14 | Stop reason: SDUPTHER

## 2024-06-18 ENCOUNTER — TELEPHONE (OUTPATIENT)
Dept: PAIN MEDICINE | Facility: CLINIC | Age: 74
End: 2024-06-18
Payer: MEDICARE

## 2024-06-18 RX ORDER — FAMOTIDINE 40 MG/1
40 TABLET, FILM COATED ORAL NIGHTLY
Qty: 90 TABLET | Refills: 3 | Status: SHIPPED | OUTPATIENT
Start: 2024-06-18

## 2024-06-20 DIAGNOSIS — F33.1 MDD (MAJOR DEPRESSIVE DISORDER), RECURRENT EPISODE, MODERATE: ICD-10-CM

## 2024-06-20 RX ORDER — BUPROPION HYDROCHLORIDE 150 MG/1
150 TABLET ORAL DAILY
Qty: 30 TABLET | Refills: 0 | Status: SHIPPED | OUTPATIENT
Start: 2024-06-20 | End: 2024-07-20

## 2024-06-21 DIAGNOSIS — F41.1 GAD (GENERALIZED ANXIETY DISORDER): ICD-10-CM

## 2024-06-21 RX ORDER — LORAZEPAM 0.5 MG/1
0.25 TABLET ORAL 3 TIMES DAILY PRN
Qty: 45 TABLET | Refills: 0 | Status: SHIPPED | OUTPATIENT
Start: 2024-06-21 | End: 2024-07-21

## 2024-06-25 ENCOUNTER — OFFICE VISIT (OUTPATIENT)
Dept: ORTHOPEDICS | Facility: CLINIC | Age: 74
End: 2024-06-25
Payer: MEDICARE

## 2024-06-25 ENCOUNTER — HOSPITAL ENCOUNTER (OUTPATIENT)
Dept: RADIOLOGY | Facility: HOSPITAL | Age: 74
Discharge: HOME | End: 2024-06-25
Attending: ORTHOPAEDIC SURGERY
Payer: MEDICARE

## 2024-06-25 ENCOUNTER — PATIENT MESSAGE (OUTPATIENT)
Dept: GASTROENTEROLOGY | Facility: CLINIC | Age: 74
End: 2024-06-25
Payer: MEDICARE

## 2024-06-25 VITALS — BODY MASS INDEX: 32.17 KG/M2 | HEIGHT: 62 IN | WEIGHT: 174.81 LBS

## 2024-06-25 DIAGNOSIS — M16.11 PRIMARY OSTEOARTHRITIS OF RIGHT HIP: ICD-10-CM

## 2024-06-25 DIAGNOSIS — M54.16 LUMBAR RADICULOPATHY, CHRONIC: Primary | ICD-10-CM

## 2024-06-25 PROCEDURE — 99214 OFFICE O/P EST MOD 30 MIN: CPT | Mod: PBBFAC,25,PO | Performed by: ORTHOPAEDIC SURGERY

## 2024-06-25 PROCEDURE — 73502 X-RAY EXAM HIP UNI 2-3 VIEWS: CPT | Mod: 26,RT,, | Performed by: RADIOLOGY

## 2024-06-25 PROCEDURE — 99999 PR PBB SHADOW E&M-EST. PATIENT-LVL IV: CPT | Mod: PBBFAC,,, | Performed by: ORTHOPAEDIC SURGERY

## 2024-06-25 PROCEDURE — 99213 OFFICE O/P EST LOW 20 MIN: CPT | Mod: S$PBB,,, | Performed by: ORTHOPAEDIC SURGERY

## 2024-06-25 PROCEDURE — 73502 X-RAY EXAM HIP UNI 2-3 VIEWS: CPT | Mod: TC,PO,RT

## 2024-06-28 ENCOUNTER — OFFICE VISIT (OUTPATIENT)
Dept: PAIN MEDICINE | Facility: CLINIC | Age: 74
End: 2024-06-28
Payer: MEDICARE

## 2024-06-28 ENCOUNTER — TELEPHONE (OUTPATIENT)
Dept: PAIN MEDICINE | Facility: CLINIC | Age: 74
End: 2024-06-28

## 2024-06-28 ENCOUNTER — LAB VISIT (OUTPATIENT)
Dept: LAB | Facility: HOSPITAL | Age: 74
End: 2024-06-28
Attending: ANESTHESIOLOGY
Payer: MEDICARE

## 2024-06-28 VITALS
SYSTOLIC BLOOD PRESSURE: 146 MMHG | WEIGHT: 174.94 LBS | HEART RATE: 72 BPM | HEIGHT: 62 IN | DIASTOLIC BLOOD PRESSURE: 72 MMHG | BODY MASS INDEX: 32.19 KG/M2

## 2024-06-28 DIAGNOSIS — Z01.818 PRE-OP TESTING: ICD-10-CM

## 2024-06-28 DIAGNOSIS — M54.16 LUMBAR RADICULOPATHY, CHRONIC: Primary | ICD-10-CM

## 2024-06-28 LAB
ERYTHROCYTE [DISTWIDTH] IN BLOOD BY AUTOMATED COUNT: 12.8 % (ref 11.5–14.5)
HCT VFR BLD AUTO: 41.4 % (ref 37–48.5)
HGB BLD-MCNC: 13.6 G/DL (ref 12–16)
MCH RBC QN AUTO: 29.9 PG (ref 27–31)
MCHC RBC AUTO-ENTMCNC: 32.9 G/DL (ref 32–36)
MCV RBC AUTO: 91 FL (ref 82–98)
PLATELET # BLD AUTO: 390 K/UL (ref 150–450)
PMV BLD AUTO: 10.2 FL (ref 9.2–12.9)
RBC # BLD AUTO: 4.55 M/UL (ref 4–5.4)
WBC # BLD AUTO: 13.36 K/UL (ref 3.9–12.7)

## 2024-06-28 PROCEDURE — 36415 COLL VENOUS BLD VENIPUNCTURE: CPT | Mod: PO | Performed by: ANESTHESIOLOGY

## 2024-06-28 PROCEDURE — 99999 PR PBB SHADOW E&M-EST. PATIENT-LVL IV: CPT | Mod: PBBFAC,,, | Performed by: ANESTHESIOLOGY

## 2024-06-28 PROCEDURE — 85027 COMPLETE CBC AUTOMATED: CPT | Performed by: ANESTHESIOLOGY

## 2024-06-28 PROCEDURE — 99214 OFFICE O/P EST MOD 30 MIN: CPT | Mod: PBBFAC,PO | Performed by: ANESTHESIOLOGY

## 2024-06-28 RX ORDER — GABAPENTIN 400 MG/1
400 CAPSULE ORAL 2 TIMES DAILY
Qty: 60 CAPSULE | Refills: 1 | Status: SHIPPED | OUTPATIENT
Start: 2024-06-28 | End: 2024-08-27

## 2024-06-28 NOTE — PROGRESS NOTES
[facility] Follow Up Evaluation      Referred by: No ref. provider found    PCP:     CC:   Chief Complaint   Patient presents with    Hip Pain           No data to display                  Interval HPI 6/28/24: Ms. Aguilera returns to the office for follow up.  She is s/p L5/S1 VALERY on 5/30/24 near 100% relief lasting for about a week.  Today she reports her typical right-sided radicular pain has returned.  She reports her pain is 9 in 10 and worse with standing and walking.  No new numbness or weakness.    HPI:   Lucinda Aguilera is a 73 y.o. female patient who has a past medical history of Anticoagulant long-term use, Anxiety, Arthritis, Cataract, Cholelithiasis, GERD (gastroesophageal reflux disease), Hepatic steatosis, Hyperlipemia, Hypertension, and PVD (posterior vitreous detachment). She presents with back pain.  She has had back pain for many years and has been gradually worsening.  Today she reports pain in her lower back that radiates primarily down her right leg.  Pain is constant, aching, shooting, 6/10.  Her pain is worse with standing and walking.  At times her legs can feel heavy an aching with walking.      Pain Intervention History:  - s/p L5/S1 VALERY on 5/30/24    Past Spine Surgical History:      Past and current medications:  Antineuropathics: gabapentin   NSAIDs:  Physical therapy: yes, completed  Antidepressants:  Muscle relaxers: robaxin   Opioids:  Antiplatelets/Anticoagulants: aspirin     History:    Current Outpatient Medications:     acetaminophen (TYLENOL) 500 MG tablet, Take 500 mg by mouth every 6 (six) hours as needed for Pain., Disp: , Rfl:     ascorbic acid, vitamin C, (VITAMIN C) 250 MG tablet, Take 500 mg by mouth once daily. , Disp: , Rfl:     atenoloL (TENORMIN) 25 MG tablet, Take 1 tablet (25 mg total) by mouth every evening., Disp: 30 tablet, Rfl: 3    azelastine (ASTELIN) 137 mcg (0.1 %) nasal spray, 1 spray by Nasal route 2 (two) times daily., Disp: , Rfl:      balsalazide (COLAZAL) 750 mg capsule, TAKE 3 CAPSULES(2250 MG) BY MOUTH TWICE DAILY WITH MEALS, Disp: 180 capsule, Rfl: 11    benzonatate (TESSALON) 200 MG capsule, Take 1 capsule (200 mg total) by mouth 3 (three) times daily as needed for Cough., Disp: 30 capsule, Rfl: 1    buPROPion (WELLBUTRIN XL) 150 MG TB24 tablet, Take 1 tablet (150 mg total) by mouth once daily., Disp: 30 tablet, Rfl: 0    ciprofloxacin-dexAMETHasone 0.3-0.1% (CIPRODEX) 0.3-0.1 % DrpS, Place 4 drops into both ears 2 (two) times daily., Disp: 7.5 mL, Rfl: 0    diltiaZEM (DILACOR XR) 240 MG CDCR, Take 1 capsule (240 mg total) by mouth every evening., Disp: 30 capsule, Rfl: 3    docusate sodium (COLACE) 100 MG capsule, Take 100 mg by mouth 2 (two) times daily., Disp: , Rfl:     famotidine (PEPCID) 40 MG tablet, Take 1 tablet (40 mg total) by mouth every evening., Disp: 90 tablet, Rfl: 3    famotidine-calcium carbonate-magnesium hydroxide (PEPCID COMPLETE) -165 mg, Take 1 tablet by mouth 2 (two) times daily as needed. Chew 1 or 2 tablets, 2-3 times a day, as needed, for nausea or heartburn., Disp: , Rfl:     fluticasone propionate (FLONASE) 50 mcg/actuation nasal spray, 1 spray (50 mcg total) by Each Nostril route once daily., Disp: 16 g, Rfl: 3    guaiFENesin (MUCINEX) 600 mg 12 hr tablet, Take 1,200 mg by mouth 2 (two) times daily., Disp: , Rfl:     lisinopriL (PRINIVIL,ZESTRIL) 40 MG tablet, Take 1 tablet (40 mg total) by mouth 2 (two) times a day., Disp: 60 tablet, Rfl: 3    LORazepam (ATIVAN) 0.5 MG tablet, Take 0.5 tablets (0.25 mg total) by mouth 3 (three) times daily as needed for Anxiety., Disp: 45 tablet, Rfl: 0    magnesium oxide (MAG-OX) 400 mg (241.3 mg magnesium) tablet, Take 1 tablet (400 mg total) by mouth once daily., Disp: 30 tablet, Rfl: 3    multivitamin (THERAGRAN) per tablet, Take 1 tablet by mouth once daily., Disp: , Rfl:     ondansetron (ZOFRAN-ODT) 4 MG TbDL, Take 1 tablet (4 mg total) by mouth every 6 (six) hours  "as needed (nausea)., Disp: 30 tablet, Rfl: 3    pantoprazole (PROTONIX) 40 MG tablet, TAKE 1 TABLET(40 MG) BY MOUTH TWICE DAILY, Disp: 180 tablet, Rfl: 3    psyllium husk, aspartame, (NATURAL PSYLLIUM FIBER) 3.4 gram/5.8 gram Powd, Take by mouth., Disp: , Rfl:     rosuvastatin (CRESTOR) 20 MG tablet, TAKE 1 TABLET(20 MG) BY MOUTH DAILY, Disp: 90 tablet, Rfl: 1    gabapentin (NEURONTIN) 400 MG capsule, Take 1 capsule (400 mg total) by mouth 2 (two) times daily., Disp: 60 capsule, Rfl: 1    Past Medical History:   Diagnosis Date    Anticoagulant long-term use     Anxiety     takes daily Xanax    Arthritis     right thumb    Cataract     OU    Cholelithiasis     GERD (gastroesophageal reflux disease)     Hepatic steatosis     Hyperlipemia     Hypertension     PVD (posterior vitreous detachment)     OD       Past Surgical History:   Procedure Laterality Date    BREAST BIOPSY Left 08/28/2020    stereo biopsy    BREAST BIOPSY Left 10/07/2020    benign excisional biopsy    chest abcess      child    COLONOSCOPY  01/06/2012    Dr. Lopez: hemorrhoids, redundant colon    COLONOSCOPY N/A 02/17/2021    Dr. Lopez: Congested and erythematous mucosa in the rectum, in the recto-sigmoid colon and in the sigmoid colon, proctosigmoid colitis, Redundant colon; biopsy: focal active colitis "nonspecific but can be seen with acute self-limited colitis (infection), medication effect (e.g. NSAID use), proximity to diverticula, or early/evolving IBD    EPIDURAL STEROID INJECTION INTO LUMBAR SPINE N/A 5/30/2024    Procedure: Injection-steroid-epidural-lumbar   L5/S1;  Surgeon: Indra Cuellar MD;  Location: [facility];  Service: Pain Management;  Laterality: N/A;    ESOPHAGOGASTRODUODENOSCOPY N/A 06/06/2019    Dr. Lopez: small hiatal hernia, Non-erosive esophageal reflux (NERD) disease?., slight antritis; biopsy: Antral mucosa with chemical/reactive gastropathy. negative for h pylori    ESOPHAGOGASTRODUODENOSCOPY N/A 02/17/2021    Procedure: " EGD (ESOPHAGOGASTRODUODENOSCOPY);  Surgeon: Nathan Lopez Jr., MD;  Location: [facility];  Service: Endoscopy;  Laterality: N/A; Minimal gastritis; biopsy: stomach- negative for h pylori, active chronic gastritis with focal features of erosive gastritis, duodenum WNL    ESOPHAGOGASTRODUODENOSCOPY N/A 05/10/2022    Procedure: EGD (ESOPHAGOGASTRODUODENOSCOPY);  Surgeon: Nathan Lopez Jr., MD;  Location: [facility];  Service: Endoscopy;  Laterality: N/A;    ESOPHAGOGASTRODUODENOSCOPY N/A 3/12/2024    Procedure: EGD (ESOPHAGOGASTRODUODENOSCOPY);  Surgeon: Nathan Lopez Jr., MD;  Location: [facility];  Service: Endoscopy;  Laterality: N/A;    EXCISIONAL BIOPSY Left 10/07/2020    Procedure: EXCISIONAL BIOPSY-Left with radiological marker;  Surgeon: Brooklynn Ashford MD;  Location: [facility];  Service: General;  Laterality: Left;    FRACTURE SURGERY  2010    Left thumb repaired surgically    JOINT REPLACEMENT Bilateral     knee    KNEE ARTHROSCOPY W/ LASER      right    LAPAROSCOPIC CHOLECYSTECTOMY N/A 06/14/2019    Procedure: CHOLECYSTECTOMY, LAPAROSCOPIC;  Surgeon: Guevara Evans MD;  Location: [facility];  Service: General;  Laterality: N/A;    thumb surgery  11/2014    TOTAL KNEE ARTHROPLASTY Left 06/19/2018    Procedure: REPLACEMENT-KNEE-TOTAL;  Surgeon: Nj Melvin MD;  Location: [address];  Service: Orthopedics;  Laterality: Left;  OutTrippin    UPPER GASTROINTESTINAL ENDOSCOPY  07/13/2017    Dr. Lopez: NERD, Gastric mucosal atrophy. antritis, Scar in the incisura. Biopsied; biopsy: chronic gastritis. negative for h pylori       Family History   Problem Relation Name Age of Onset    Hypertension Mother age 82     Heart disease Mother age 82     Glaucoma Mother age 82     Stroke Father age 50     Glaucoma Sister      Cataracts Sister      Macular degeneration Sister      Breast cancer Neg Hx      Colon cancer Neg Hx      Crohn's disease Neg Hx      Ulcerative colitis Neg Hx      Stomach cancer Neg Hx       Esophageal cancer Neg Hx      Celiac disease Neg Hx      Amblyopia Neg Hx      Blindness Neg Hx      Retinal detachment Neg Hx      Strabismus Neg Hx         Social History     Socioeconomic History    Marital status:     Number of children: 2   Occupational History    Occupation: retired teacher and principal    Occupation: substitute   Tobacco Use    Smoking status: Never    Smokeless tobacco: Never   Substance and Sexual Activity    Alcohol use: Yes     Comment: social- maybe once every few months    Drug use: No    Sexual activity: Yes     Partners: Male     Birth control/protection: Post-menopausal, None     Social Determinants of Health     Financial Resource Strain: Low Risk  (11/9/2023)    Overall Financial Resource Strain (CARDIA)     Difficulty of Paying Living Expenses: Not hard at all   Food Insecurity: No Food Insecurity (11/9/2023)    Hunger Vital Sign     Worried About Running Out of Food in the Last Year: Never true     Ran Out of Food in the Last Year: Never true   Transportation Needs: No Transportation Needs (11/9/2023)    PRAPARE - Transportation     Lack of Transportation (Medical): No     Lack of Transportation (Non-Medical): No   Physical Activity: Sufficiently Active (11/9/2023)    Exercise Vital Sign     Days of Exercise per Week: 5 days     Minutes of Exercise per Session: 50 min   Recent Concern: Physical Activity - Insufficiently Active (10/20/2023)    Exercise Vital Sign     Days of Exercise per Week: 2 days     Minutes of Exercise per Session: 20 min   Stress: Patient Declined (11/9/2023)    [employer] [place] - Occupational Stress Questionnaire     Feeling of Stress : Patient declined   Housing Stability: Low Risk  (11/9/2023)    Housing Stability Vital Sign     Unable to Pay for Housing in the Last Year: No     Number of Places Lived in the Last Year: 1     Unstable Housing in the Last Year: No       Review of patient's allergies indicates:  No Known  "Allergies    Review of Systems:  12 point review of systems is negative.    Physical Exam:  Vitals:    06/28/24 1344   BP: (!) 146/72   Pulse: 72   Weight: 79.3 kg (174 lb 15 oz)   Height: 5' 2" (1.575 m)   PainSc:   9   PainLoc: Hip     Body mass index is 32 kg/m².    Gen: NAD  Psych: mood appropriate for given condition  HEENT: eyes anicteric   CV: RRR  HEENT: anicteric   Respiratory: non-labored, no signs of respiratory distress  Abd: non-distended  Skin: warm, dry and intact.  Gait: No antalgic gait.     Sensory:  Intact and symmetrical to light touch in L1-S1 dermatomes bilaterally.    Motor:     Right Left   L2/3 Iliacus Hip flexion  5  5   L3/4 Qudratus Femoris Knee Extension  5  5   L4/5 Tib Anterior Ankle Dorsiflexion   5  5   L5/S1 Extensor Hallicus Longus Great toe extension  5  5   S1/S2 Gastroc/Soleus Plantar Flexion  5  5      Right Left                  Patellar DTR 2+ 2+   Achilles DTR 0 0                      Labs:  Lab Results   Component Value Date    HGBA1C 5.7 (H) 06/08/2021       Lab Results   Component Value Date    WBC 9.35 08/19/2023    HGB 14.6 08/19/2023    HCT 44.2 08/19/2023    MCV 91 08/19/2023     08/19/2023       Imaging:  MRI lumbar spine 3/22/24  FINDINGS:  Vertebral column: There is multilevel degenerative change which will be described by level.  The lumbar vertebral bodies maintain normal height.  There is no fracture.  There is transitional lumbosacral anatomy.  The last rib bearing vertebral body is designated as T12.  Utilizing this numbering system, there is stable mild 3 mm retrolisthesis of L1 on L2.  There is 4 mm anterolisthesis of L4 on L5 with 5 mm anterolisthesis of L5 on S1.  There is marked disc space narrowing at L5-S1.  There is chronic degenerative endplate signal change corresponding to sclerosis on plain films.  There is mild-to-moderate disc space narrowing at the L4-5 level.  There is marked disc space narrowing at the L1-2 level.  All of the discs are " desiccated.  Other than chronic degenerative endplate signal change, most apparent at the L5-S1 level, baseline marrow signal is normal.     Spinal canal, conus, epidural space: The spinal canal is developmentally normal.  The conus terminates at the level of superior L1 and is normal in contour and signal intensity.  There is no abnormal epidural mass or fluid collection.     Findings by level:  T11-12: There is a mild diffuse disc bulge with mild facet joint arthropathy.  There is no spinal canal or significant foraminal stenosis.  There is a small right foraminal perineural cyst.  T12-L1: There is a minimal disc bulge.  There is no spinal canal or significant foraminal stenosis.  L1-2: There is marked disc space narrowing, 3 mm retrolisthesis of L1 on L2 with inferior unroofing of a broad disc protrusion eccentric to the right where there is also osteophytic ridging.  There is bilateral facet joint arthropathy.  There is only borderline to mild spinal stenosis but there is moderate to severe right lateral recess and foraminal stenosis with only mild left foraminal stenosis.  L2-3: There is subtle trace retrolisthesis of L2 on L3.  There is a mild disc bulge.  There is left greater than right facet joint arthropathy.  There is no spinal stenosis.  There is no significant foraminal stenosis.  L3-4: There is a diffuse disc bulge.  There is facet joint arthropathy with ligamentum flavum thickening.  There is borderline to mild spinal stenosis without significant foraminal stenosis.  L4-5: There is disc space narrowing, 4 mm anterolisthesis of L4 on L5 with unroofing of a moderate broad disc protrusion.  There is bilateral facet joint arthropathy.  There is severe spinal canal, bilateral lateral recess stenosis with moderate left and mild right foraminal stenosis.  L5-S1: There is marked disc space narrowing, 5 mm anterolisthesis of L5 on S1 with unroofing of a marked broad disc protrusion in addition to marked facet  joint arthropathy results in severe spinal canal, bilateral lateral recess and foraminal stenosis.     Soft tissues, other: The posterior spinous muscles are atrophic.  The prevertebral soft tissues are normal.  The aorta is normal in caliber.  There is no hydronephrosis.    Xray lumbar spine 5/1/24  FINDINGS:  The vertebral bodies maintain normal height.  There is no fracture.  There is severe disc space narrowing at the L1-2 level as well as at the L5-S1 level with moderate disc space narrowing at the L4-5 level.  There is anterolisthesis of L4 on L5, L5 on S1 with retrolisthesis of L1 on L2 and trace retrolisthesis of L2 on L3.  There is multilevel facet joint arthropathy.  No instability is demonstrated with flexion or extension.    Assessment:   Problem List Items Addressed This Visit    None  Visit Diagnoses       Lumbar radiculopathy, chronic    -  Primary    Pre-op testing        Relevant Orders    CBC Without Differential              Lucinda Aguilera is a 73 y.o. female patient who has a past medical history of Anticoagulant long-term use, Anxiety, Arthritis, Cataract, Cholelithiasis, GERD (gastroesophageal reflux disease), Hepatic steatosis, Hyperlipemia, Hypertension, and PVD (posterior vitreous detachment). She presents with back pain.  She has had back pain for many years and has been gradually worsening.  Today she reports pain in her lower back that radiates primarily down her right leg.  Pain is constant, aching, shooting, 6/10.  Her pain is worse with standing and walking.  At times her legs can feel heavy an aching with walking.    6/28/24 - Ms. Aguilera returns to the office for follow up.  She is s/p L5/S1 VALERY on 5/30/24 near 100% relief lasting for about a week.  Today she reports her typical right-sided radicular pain has returned.  She reports her pain is 9 in 10 and worse with standing and walking.  No new numbness or weakness.    - on exam she has full strength in his lower extremities and  intact sensation to light touch bilateral L2-S1.   - I independently reviewed her lumbar MRI and she has severe central canal narrowing at L4-5 and L5-S1.  She has multilevel bilateral foraminal narrowing.  She has listhesis of L1-2, L4-5, L5-S1  - over the past 12 months she has been participating in formal physical therapy and maintaining a PT directed home exercise program as well as aquatic therapy however she continues to have pain that is limiting her mobility and interfering with the quality of her life.  She describes claudication  - she is taking gabapentin 300 mg b.i.d. without any side effects but does not feel like it is working well for her.  I have written a prescription increase to gabapentin 400 mg b.i.d. for the neuropathic component of her pain  - we will schedule for a caudal VALERY.  The risks and benefits of this intervention, and alternative therapies were discussed with the patient.  The discussion of risks included infection, bleeding, need for additional procedures or surgery, nerve damage.  Questions regarding the procedure, risks, expected outcome, and possible side effects were solicited and answered to the patient's satisfaction.  Lucinda Aguilera wishes to proceed with the injection or procedure.  Written consent was obtained.  - she no longer takes aspirin.  I am going to order an updated CBC.  - follow up 2-3 weeks post injection.  If she does not get significant or long-lasting relief with this epidural then I am going to refer her to Neurosurgery for an evaluation      : Not applicable    Indra Cuellar M.D.  Interventional Pain Medicine / Anesthesiology    This note was completed with dictation software and grammatical errors may exist.

## 2024-06-28 NOTE — TELEPHONE ENCOUNTER
Physician - Dr Cuellar    Type of Procedure/Injection - Caudal Epidural       Laterality - NA      Anxiolysis- Local      Need to hold medication - No      Clearance needed - No      Follow up - 2 week

## 2024-06-28 NOTE — H&P (VIEW-ONLY)
[facility] Follow Up Evaluation      Referred by: No ref. provider found    PCP:     CC:   Chief Complaint   Patient presents with    Hip Pain           No data to display                  Interval HPI 6/28/24: Ms. Aguilera returns to the office for follow up.  She is s/p L5/S1 VALERY on 5/30/24 near 100% relief lasting for about a week.  Today she reports her typical right-sided radicular pain has returned.  She reports her pain is 9 in 10 and worse with standing and walking.  No new numbness or weakness.    HPI:   Lucinda Aguilera is a 73 y.o. female patient who has a past medical history of Anticoagulant long-term use, Anxiety, Arthritis, Cataract, Cholelithiasis, GERD (gastroesophageal reflux disease), Hepatic steatosis, Hyperlipemia, Hypertension, and PVD (posterior vitreous detachment). She presents with back pain.  She has had back pain for many years and has been gradually worsening.  Today she reports pain in her lower back that radiates primarily down her right leg.  Pain is constant, aching, shooting, 6/10.  Her pain is worse with standing and walking.  At times her legs can feel heavy an aching with walking.      Pain Intervention History:  - s/p L5/S1 VALERY on 5/30/24    Past Spine Surgical History:      Past and current medications:  Antineuropathics: gabapentin   NSAIDs:  Physical therapy: yes, completed  Antidepressants:  Muscle relaxers: robaxin   Opioids:  Antiplatelets/Anticoagulants: aspirin     History:    Current Outpatient Medications:     acetaminophen (TYLENOL) 500 MG tablet, Take 500 mg by mouth every 6 (six) hours as needed for Pain., Disp: , Rfl:     ascorbic acid, vitamin C, (VITAMIN C) 250 MG tablet, Take 500 mg by mouth once daily. , Disp: , Rfl:     atenoloL (TENORMIN) 25 MG tablet, Take 1 tablet (25 mg total) by mouth every evening., Disp: 30 tablet, Rfl: 3    azelastine (ASTELIN) 137 mcg (0.1 %) nasal spray, 1 spray by Nasal route 2 (two) times daily., Disp: , Rfl:      balsalazide (COLAZAL) 750 mg capsule, TAKE 3 CAPSULES(2250 MG) BY MOUTH TWICE DAILY WITH MEALS, Disp: 180 capsule, Rfl: 11    benzonatate (TESSALON) 200 MG capsule, Take 1 capsule (200 mg total) by mouth 3 (three) times daily as needed for Cough., Disp: 30 capsule, Rfl: 1    buPROPion (WELLBUTRIN XL) 150 MG TB24 tablet, Take 1 tablet (150 mg total) by mouth once daily., Disp: 30 tablet, Rfl: 0    ciprofloxacin-dexAMETHasone 0.3-0.1% (CIPRODEX) 0.3-0.1 % DrpS, Place 4 drops into both ears 2 (two) times daily., Disp: 7.5 mL, Rfl: 0    diltiaZEM (DILACOR XR) 240 MG CDCR, Take 1 capsule (240 mg total) by mouth every evening., Disp: 30 capsule, Rfl: 3    docusate sodium (COLACE) 100 MG capsule, Take 100 mg by mouth 2 (two) times daily., Disp: , Rfl:     famotidine (PEPCID) 40 MG tablet, Take 1 tablet (40 mg total) by mouth every evening., Disp: 90 tablet, Rfl: 3    famotidine-calcium carbonate-magnesium hydroxide (PEPCID COMPLETE) -165 mg, Take 1 tablet by mouth 2 (two) times daily as needed. Chew 1 or 2 tablets, 2-3 times a day, as needed, for nausea or heartburn., Disp: , Rfl:     fluticasone propionate (FLONASE) 50 mcg/actuation nasal spray, 1 spray (50 mcg total) by Each Nostril route once daily., Disp: 16 g, Rfl: 3    guaiFENesin (MUCINEX) 600 mg 12 hr tablet, Take 1,200 mg by mouth 2 (two) times daily., Disp: , Rfl:     lisinopriL (PRINIVIL,ZESTRIL) 40 MG tablet, Take 1 tablet (40 mg total) by mouth 2 (two) times a day., Disp: 60 tablet, Rfl: 3    LORazepam (ATIVAN) 0.5 MG tablet, Take 0.5 tablets (0.25 mg total) by mouth 3 (three) times daily as needed for Anxiety., Disp: 45 tablet, Rfl: 0    magnesium oxide (MAG-OX) 400 mg (241.3 mg magnesium) tablet, Take 1 tablet (400 mg total) by mouth once daily., Disp: 30 tablet, Rfl: 3    multivitamin (THERAGRAN) per tablet, Take 1 tablet by mouth once daily., Disp: , Rfl:     ondansetron (ZOFRAN-ODT) 4 MG TbDL, Take 1 tablet (4 mg total) by mouth every 6 (six) hours  "as needed (nausea)., Disp: 30 tablet, Rfl: 3    pantoprazole (PROTONIX) 40 MG tablet, TAKE 1 TABLET(40 MG) BY MOUTH TWICE DAILY, Disp: 180 tablet, Rfl: 3    psyllium husk, aspartame, (NATURAL PSYLLIUM FIBER) 3.4 gram/5.8 gram Powd, Take by mouth., Disp: , Rfl:     rosuvastatin (CRESTOR) 20 MG tablet, TAKE 1 TABLET(20 MG) BY MOUTH DAILY, Disp: 90 tablet, Rfl: 1    gabapentin (NEURONTIN) 400 MG capsule, Take 1 capsule (400 mg total) by mouth 2 (two) times daily., Disp: 60 capsule, Rfl: 1    Past Medical History:   Diagnosis Date    Anticoagulant long-term use     Anxiety     takes daily Xanax    Arthritis     right thumb    Cataract     OU    Cholelithiasis     GERD (gastroesophageal reflux disease)     Hepatic steatosis     Hyperlipemia     Hypertension     PVD (posterior vitreous detachment)     OD       Past Surgical History:   Procedure Laterality Date    BREAST BIOPSY Left 08/28/2020    stereo biopsy    BREAST BIOPSY Left 10/07/2020    benign excisional biopsy    chest abcess      child    COLONOSCOPY  01/06/2012    Dr. Lopez: hemorrhoids, redundant colon    COLONOSCOPY N/A 02/17/2021    Dr. Lopez: Congested and erythematous mucosa in the rectum, in the recto-sigmoid colon and in the sigmoid colon, proctosigmoid colitis, Redundant colon; biopsy: focal active colitis "nonspecific but can be seen with acute self-limited colitis (infection), medication effect (e.g. NSAID use), proximity to diverticula, or early/evolving IBD    EPIDURAL STEROID INJECTION INTO LUMBAR SPINE N/A 5/30/2024    Procedure: Injection-steroid-epidural-lumbar   L5/S1;  Surgeon: Indra Cuellar MD;  Location: [facility];  Service: Pain Management;  Laterality: N/A;    ESOPHAGOGASTRODUODENOSCOPY N/A 06/06/2019    Dr. Lopez: small hiatal hernia, Non-erosive esophageal reflux (NERD) disease?., slight antritis; biopsy: Antral mucosa with chemical/reactive gastropathy. negative for h pylori    ESOPHAGOGASTRODUODENOSCOPY N/A 02/17/2021    Procedure: " EGD (ESOPHAGOGASTRODUODENOSCOPY);  Surgeon: Nathan Lopez Jr., MD;  Location: [facility];  Service: Endoscopy;  Laterality: N/A; Minimal gastritis; biopsy: stomach- negative for h pylori, active chronic gastritis with focal features of erosive gastritis, duodenum WNL    ESOPHAGOGASTRODUODENOSCOPY N/A 05/10/2022    Procedure: EGD (ESOPHAGOGASTRODUODENOSCOPY);  Surgeon: Nathan Lopez Jr., MD;  Location: [facility];  Service: Endoscopy;  Laterality: N/A;    ESOPHAGOGASTRODUODENOSCOPY N/A 3/12/2024    Procedure: EGD (ESOPHAGOGASTRODUODENOSCOPY);  Surgeon: Nathan Lopez Jr., MD;  Location: [facility];  Service: Endoscopy;  Laterality: N/A;    EXCISIONAL BIOPSY Left 10/07/2020    Procedure: EXCISIONAL BIOPSY-Left with radiological marker;  Surgeon: Brooklynn Ashford MD;  Location: [facility];  Service: General;  Laterality: Left;    FRACTURE SURGERY  2010    Left thumb repaired surgically    JOINT REPLACEMENT Bilateral     knee    KNEE ARTHROSCOPY W/ LASER      right    LAPAROSCOPIC CHOLECYSTECTOMY N/A 06/14/2019    Procedure: CHOLECYSTECTOMY, LAPAROSCOPIC;  Surgeon: Guevara Evans MD;  Location: [facility];  Service: General;  Laterality: N/A;    thumb surgery  11/2014    TOTAL KNEE ARTHROPLASTY Left 06/19/2018    Procedure: REPLACEMENT-KNEE-TOTAL;  Surgeon: Nj Melvin MD;  Location: [address];  Service: Orthopedics;  Laterality: Left;  [place]    UPPER GASTROINTESTINAL ENDOSCOPY  07/13/2017    Dr. Lopez: NERD, Gastric mucosal atrophy. antritis, Scar in the incisura. Biopsied; biopsy: chronic gastritis. negative for h pylori       Family History   Problem Relation Name Age of Onset    Hypertension Mother age 82     Heart disease Mother age 82     Glaucoma Mother age 82     Stroke Father age 50     Glaucoma Sister      Cataracts Sister      Macular degeneration Sister      Breast cancer Neg Hx      Colon cancer Neg Hx      Crohn's disease Neg Hx      Ulcerative colitis Neg Hx      Stomach cancer Neg Hx       Esophageal cancer Neg Hx      Celiac disease Neg Hx      Amblyopia Neg Hx      Blindness Neg Hx      Retinal detachment Neg Hx      Strabismus Neg Hx         Social History     Socioeconomic History    Marital status:     Number of children: 2   Occupational History    Occupation: retired teacher and principal    Occupation: substitute   Tobacco Use    Smoking status: Never    Smokeless tobacco: Never   Substance and Sexual Activity    Alcohol use: Yes     Comment: social- maybe once every few months    Drug use: No    Sexual activity: Yes     Partners: Male     Birth control/protection: Post-menopausal, None     Social Determinants of Health     Financial Resource Strain: Low Risk  (11/9/2023)    Overall Financial Resource Strain (CARDIA)     Difficulty of Paying Living Expenses: Not hard at all   Food Insecurity: No Food Insecurity (11/9/2023)    Hunger Vital Sign     Worried About Running Out of Food in the Last Year: Never true     Ran Out of Food in the Last Year: Never true   Transportation Needs: No Transportation Needs (11/9/2023)    PRAPARE - Transportation     Lack of Transportation (Medical): No     Lack of Transportation (Non-Medical): No   Physical Activity: Sufficiently Active (11/9/2023)    Exercise Vital Sign     Days of Exercise per Week: 5 days     Minutes of Exercise per Session: 50 min   Recent Concern: Physical Activity - Insufficiently Active (10/20/2023)    Exercise Vital Sign     Days of Exercise per Week: 2 days     Minutes of Exercise per Session: 20 min   Stress: Patient Declined (11/9/2023)    [employer] [place] [employer] - Occupational Stress Questionnaire     Feeling of Stress : Patient declined   Housing Stability: Low Risk  (11/9/2023)    Housing Stability Vital Sign     Unable to Pay for Housing in the Last Year: No     Number of Places Lived in the Last Year: 1     Unstable Housing in the Last Year: No       Review of patient's allergies indicates:  No Known  "Allergies    Review of Systems:  12 point review of systems is negative.    Physical Exam:  Vitals:    06/28/24 1344   BP: (!) 146/72   Pulse: 72   Weight: 79.3 kg (174 lb 15 oz)   Height: 5' 2" (1.575 m)   PainSc:   9   PainLoc: Hip     Body mass index is 32 kg/m².    Gen: NAD  Psych: mood appropriate for given condition  HEENT: eyes anicteric   CV: RRR  HEENT: anicteric   Respiratory: non-labored, no signs of respiratory distress  Abd: non-distended  Skin: warm, dry and intact.  Gait: No antalgic gait.     Sensory:  Intact and symmetrical to light touch in L1-S1 dermatomes bilaterally.    Motor:     Right Left   L2/3 Iliacus Hip flexion  5  5   L3/4 Qudratus Femoris Knee Extension  5  5   L4/5 Tib Anterior Ankle Dorsiflexion   5  5   L5/S1 Extensor Hallicus Longus Great toe extension  5  5   S1/S2 Gastroc/Soleus Plantar Flexion  5  5      Right Left                  Patellar DTR 2+ 2+   Achilles DTR 0 0                      Labs:  Lab Results   Component Value Date    HGBA1C 5.7 (H) 06/08/2021       Lab Results   Component Value Date    WBC 9.35 08/19/2023    HGB 14.6 08/19/2023    HCT 44.2 08/19/2023    MCV 91 08/19/2023     08/19/2023       Imaging:  MRI lumbar spine 3/22/24  FINDINGS:  Vertebral column: There is multilevel degenerative change which will be described by level.  The lumbar vertebral bodies maintain normal height.  There is no fracture.  There is transitional lumbosacral anatomy.  The last rib bearing vertebral body is designated as T12.  Utilizing this numbering system, there is stable mild 3 mm retrolisthesis of L1 on L2.  There is 4 mm anterolisthesis of L4 on L5 with 5 mm anterolisthesis of L5 on S1.  There is marked disc space narrowing at L5-S1.  There is chronic degenerative endplate signal change corresponding to sclerosis on plain films.  There is mild-to-moderate disc space narrowing at the L4-5 level.  There is marked disc space narrowing at the L1-2 level.  All of the discs are " desiccated.  Other than chronic degenerative endplate signal change, most apparent at the L5-S1 level, baseline marrow signal is normal.     Spinal canal, conus, epidural space: The spinal canal is developmentally normal.  The conus terminates at the level of superior L1 and is normal in contour and signal intensity.  There is no abnormal epidural mass or fluid collection.     Findings by level:  T11-12: There is a mild diffuse disc bulge with mild facet joint arthropathy.  There is no spinal canal or significant foraminal stenosis.  There is a small right foraminal perineural cyst.  T12-L1: There is a minimal disc bulge.  There is no spinal canal or significant foraminal stenosis.  L1-2: There is marked disc space narrowing, 3 mm retrolisthesis of L1 on L2 with inferior unroofing of a broad disc protrusion eccentric to the right where there is also osteophytic ridging.  There is bilateral facet joint arthropathy.  There is only borderline to mild spinal stenosis but there is moderate to severe right lateral recess and foraminal stenosis with only mild left foraminal stenosis.  L2-3: There is subtle trace retrolisthesis of L2 on L3.  There is a mild disc bulge.  There is left greater than right facet joint arthropathy.  There is no spinal stenosis.  There is no significant foraminal stenosis.  L3-4: There is a diffuse disc bulge.  There is facet joint arthropathy with ligamentum flavum thickening.  There is borderline to mild spinal stenosis without significant foraminal stenosis.  L4-5: There is disc space narrowing, 4 mm anterolisthesis of L4 on L5 with unroofing of a moderate broad disc protrusion.  There is bilateral facet joint arthropathy.  There is severe spinal canal, bilateral lateral recess stenosis with moderate left and mild right foraminal stenosis.  L5-S1: There is marked disc space narrowing, 5 mm anterolisthesis of L5 on S1 with unroofing of a marked broad disc protrusion in addition to marked facet  joint arthropathy results in severe spinal canal, bilateral lateral recess and foraminal stenosis.     Soft tissues, other: The posterior spinous muscles are atrophic.  The prevertebral soft tissues are normal.  The aorta is normal in caliber.  There is no hydronephrosis.    Xray lumbar spine 5/1/24  FINDINGS:  The vertebral bodies maintain normal height.  There is no fracture.  There is severe disc space narrowing at the L1-2 level as well as at the L5-S1 level with moderate disc space narrowing at the L4-5 level.  There is anterolisthesis of L4 on L5, L5 on S1 with retrolisthesis of L1 on L2 and trace retrolisthesis of L2 on L3.  There is multilevel facet joint arthropathy.  No instability is demonstrated with flexion or extension.    Assessment:   Problem List Items Addressed This Visit    None  Visit Diagnoses       Lumbar radiculopathy, chronic    -  Primary    Pre-op testing        Relevant Orders    CBC Without Differential              Lucinda Aguilera is a 73 y.o. female patient who has a past medical history of Anticoagulant long-term use, Anxiety, Arthritis, Cataract, Cholelithiasis, GERD (gastroesophageal reflux disease), Hepatic steatosis, Hyperlipemia, Hypertension, and PVD (posterior vitreous detachment). She presents with back pain.  She has had back pain for many years and has been gradually worsening.  Today she reports pain in her lower back that radiates primarily down her right leg.  Pain is constant, aching, shooting, 6/10.  Her pain is worse with standing and walking.  At times her legs can feel heavy an aching with walking.    6/28/24 - Ms. Aguilera returns to the office for follow up.  She is s/p L5/S1 VALERY on 5/30/24 near 100% relief lasting for about a week.  Today she reports her typical right-sided radicular pain has returned.  She reports her pain is 9 in 10 and worse with standing and walking.  No new numbness or weakness.    - on exam she has full strength in his lower extremities and  intact sensation to light touch bilateral L2-S1.   - I independently reviewed her lumbar MRI and she has severe central canal narrowing at L4-5 and L5-S1.  She has multilevel bilateral foraminal narrowing.  She has listhesis of L1-2, L4-5, L5-S1  - over the past 12 months she has been participating in formal physical therapy and maintaining a PT directed home exercise program as well as aquatic therapy however she continues to have pain that is limiting her mobility and interfering with the quality of her life.  She describes claudication  - she is taking gabapentin 300 mg b.i.d. without any side effects but does not feel like it is working well for her.  I have written a prescription increase to gabapentin 400 mg b.i.d. for the neuropathic component of her pain  - we will schedule for a caudal VALERY.  The risks and benefits of this intervention, and alternative therapies were discussed with the patient.  The discussion of risks included infection, bleeding, need for additional procedures or surgery, nerve damage.  Questions regarding the procedure, risks, expected outcome, and possible side effects were solicited and answered to the patient's satisfaction.  Lucinda Aguilera wishes to proceed with the injection or procedure.  Written consent was obtained.  - she no longer takes aspirin.  I am going to order an updated CBC.  - follow up 2-3 weeks post injection.  If she does not get significant or long-lasting relief with this epidural then I am going to refer her to Neurosurgery for an evaluation      : Not applicable    Indra Cuellar M.D.  Interventional Pain Medicine / Anesthesiology    This note was completed with dictation software and grammatical errors may exist.

## 2024-06-30 ENCOUNTER — EXTERNAL CHRONIC CARE MANAGEMENT (OUTPATIENT)
Dept: PRIMARY CARE CLINIC | Facility: CLINIC | Age: 74
End: 2024-06-30
Payer: MEDICARE

## 2024-06-30 PROCEDURE — 99439 CHRNC CARE MGMT STAF EA ADDL: CPT | Mod: PBBFAC,PO | Performed by: FAMILY MEDICINE

## 2024-06-30 PROCEDURE — 99439 CHRNC CARE MGMT STAF EA ADDL: CPT | Mod: S$PBB,,, | Performed by: FAMILY MEDICINE

## 2024-06-30 PROCEDURE — 99490 CHRNC CARE MGMT STAFF 1ST 20: CPT | Mod: S$PBB,,, | Performed by: FAMILY MEDICINE

## 2024-06-30 PROCEDURE — 99490 CHRNC CARE MGMT STAFF 1ST 20: CPT | Mod: PBBFAC,PO | Performed by: FAMILY MEDICINE

## 2024-07-02 ENCOUNTER — TELEPHONE (OUTPATIENT)
Dept: PAIN MEDICINE | Facility: CLINIC | Age: 74
End: 2024-07-02
Payer: MEDICARE

## 2024-07-02 DIAGNOSIS — M54.16 LUMBAR RADICULOPATHY: Primary | ICD-10-CM

## 2024-07-02 RX ORDER — ALPRAZOLAM 1 MG/1
1 TABLET, ORALLY DISINTEGRATING ORAL ONCE AS NEEDED
OUTPATIENT
Start: 2024-07-02 | End: 2035-11-29

## 2024-07-02 NOTE — PROGRESS NOTES
"  Chief Complaint   Patient presents with    Right Hip - Pain       HPI:   This is a 73 y.o. who returns today status post VALERY with pain management 1 months ago. Patient has been FWB.  Pain is now gone. No numbness or tingling. No associated signs or symptoms.    Past Medical History:   Diagnosis Date    Anticoagulant long-term use     Anxiety     takes daily Xanax    Arthritis     right thumb    Cataract     OU    Cholelithiasis     GERD (gastroesophageal reflux disease)     Hepatic steatosis     Hyperlipemia     Hypertension     PVD (posterior vitreous detachment)     OD     Past Surgical History:   Procedure Laterality Date    BREAST BIOPSY Left 08/28/2020    stereo biopsy    BREAST BIOPSY Left 10/07/2020    benign excisional biopsy    chest abcess      child    COLONOSCOPY  01/06/2012    Dr. Lopez: hemorrhoids, redundant colon    COLONOSCOPY N/A 02/17/2021    Dr. Lopez: Congested and erythematous mucosa in the rectum, in the recto-sigmoid colon and in the sigmoid colon, proctosigmoid colitis, Redundant colon; biopsy: focal active colitis "nonspecific but can be seen with acute self-limited colitis (infection), medication effect (e.g. NSAID use), proximity to diverticula, or early/evolving IBD    EPIDURAL STEROID INJECTION INTO LUMBAR SPINE N/A 5/30/2024    Procedure: Injection-steroid-epidural-lumbar   L5/S1;  Surgeon: Indra Cuellar MD;  Location: [facility] OR;  Service: Pain Management;  Laterality: N/A;    ESOPHAGOGASTRODUODENOSCOPY N/A 06/06/2019    Dr. Lopez: small hiatal hernia, Non-erosive esophageal reflux (NERD) disease?., slight antritis; biopsy: Antral mucosa with chemical/reactive gastropathy. negative for h pylori    ESOPHAGOGASTRODUODENOSCOPY N/A 02/17/2021    Procedure: EGD (ESOPHAGOGASTRODUODENOSCOPY);  Surgeon: Nathan Lopez Jr., MD;  Location: [facility] ENDO;  Service: Endoscopy;  Laterality: N/A; Minimal gastritis; biopsy: stomach- negative for h pylori, active chronic gastritis with focal features " of erosive gastritis, duodenum WNL    ESOPHAGOGASTRODUODENOSCOPY N/A 05/10/2022    Procedure: EGD (ESOPHAGOGASTRODUODENOSCOPY);  Surgeon: Nathan Lopez Jr., MD;  Location: [facility];  Service: Endoscopy;  Laterality: N/A;    ESOPHAGOGASTRODUODENOSCOPY N/A 3/12/2024    Procedure: EGD (ESOPHAGOGASTRODUODENOSCOPY);  Surgeon: Nathan Lopez Jr., MD;  Location: [facility];  Service: Endoscopy;  Laterality: N/A;    EXCISIONAL BIOPSY Left 10/07/2020    Procedure: EXCISIONAL BIOPSY-Left with radiological marker;  Surgeon: Brooklynn Ashford MD;  Location: [facility];  Service: General;  Laterality: Left;    FRACTURE SURGERY  2010    Left thumb repaired surgically    JOINT REPLACEMENT Bilateral     knee    KNEE ARTHROSCOPY W/ LASER      right    LAPAROSCOPIC CHOLECYSTECTOMY N/A 06/14/2019    Procedure: CHOLECYSTECTOMY, LAPAROSCOPIC;  Surgeon: Guevara Evans MD;  Location: [facility];  Service: General;  Laterality: N/A;    thumb surgery  11/2014    TOTAL KNEE ARTHROPLASTY Left 06/19/2018    Procedure: REPLACEMENT-KNEE-TOTAL;  Surgeon: Nj Melvin MD;  Location: [address];  Service: Orthopedics;  Laterality: Left;  [place]    UPPER GASTROINTESTINAL ENDOSCOPY  07/13/2017    Dr. Lopez: NERD, Gastric mucosal atrophy. antritis, Scar in the incisura. Biopsied; biopsy: chronic gastritis. negative for h pylori     Current Outpatient Medications on File Prior to Visit   Medication Sig Dispense Refill    acetaminophen (TYLENOL) 500 MG tablet Take 500 mg by mouth every 6 (six) hours as needed for Pain.      ascorbic acid, vitamin C, (VITAMIN C) 250 MG tablet Take 500 mg by mouth once daily.       atenoloL (TENORMIN) 25 MG tablet Take 1 tablet (25 mg total) by mouth every evening. 30 tablet 3    azelastine (ASTELIN) 137 mcg (0.1 %) nasal spray 1 spray by Nasal route 2 (two) times daily.      balsalazide (COLAZAL) 750 mg capsule TAKE 3 CAPSULES(2250 MG) BY MOUTH TWICE DAILY WITH MEALS 180 capsule 11    benzonatate (TESSALON) 200  MG capsule Take 1 capsule (200 mg total) by mouth 3 (three) times daily as needed for Cough. 30 capsule 1    buPROPion (WELLBUTRIN XL) 150 MG TB24 tablet Take 1 tablet (150 mg total) by mouth once daily. 30 tablet 0    ciprofloxacin-dexAMETHasone 0.3-0.1% (CIPRODEX) 0.3-0.1 % DrpS Place 4 drops into both ears 2 (two) times daily. 7.5 mL 0    diltiaZEM (DILACOR XR) 240 MG CDCR Take 1 capsule (240 mg total) by mouth every evening. 30 capsule 3    docusate sodium (COLACE) 100 MG capsule Take 100 mg by mouth 2 (two) times daily.      famotidine (PEPCID) 40 MG tablet Take 1 tablet (40 mg total) by mouth every evening. 90 tablet 3    famotidine-calcium carbonate-magnesium hydroxide (PEPCID COMPLETE) -165 mg Take 1 tablet by mouth 2 (two) times daily as needed. Chew 1 or 2 tablets, 2-3 times a day, as needed, for nausea or heartburn.      fluticasone propionate (FLONASE) 50 mcg/actuation nasal spray 1 spray (50 mcg total) by Each Nostril route once daily. 16 g 3    guaiFENesin (MUCINEX) 600 mg 12 hr tablet Take 1,200 mg by mouth 2 (two) times daily.      lisinopriL (PRINIVIL,ZESTRIL) 40 MG tablet Take 1 tablet (40 mg total) by mouth 2 (two) times a day. 60 tablet 3    LORazepam (ATIVAN) 0.5 MG tablet Take 0.5 tablets (0.25 mg total) by mouth 3 (three) times daily as needed for Anxiety. 45 tablet 0    magnesium oxide (MAG-OX) 400 mg (241.3 mg magnesium) tablet Take 1 tablet (400 mg total) by mouth once daily. 30 tablet 3    multivitamin (THERAGRAN) per tablet Take 1 tablet by mouth once daily.      ondansetron (ZOFRAN-ODT) 4 MG TbDL Take 1 tablet (4 mg total) by mouth every 6 (six) hours as needed (nausea). 30 tablet 3    pantoprazole (PROTONIX) 40 MG tablet TAKE 1 TABLET(40 MG) BY MOUTH TWICE DAILY 180 tablet 3    psyllium husk, aspartame, (NATURAL PSYLLIUM FIBER) 3.4 gram/5.8 gram Powd Take by mouth.      rosuvastatin (CRESTOR) 20 MG tablet TAKE 1 TABLET(20 MG) BY MOUTH DAILY 90 tablet 1     No current  facility-administered medications on file prior to visit.     Review of patient's allergies indicates:  No Known Allergies  Family History   Problem Relation Name Age of Onset    Hypertension Mother age 82     Heart disease Mother age 82     Glaucoma Mother age 82     Stroke Father age 50     Glaucoma Sister      Cataracts Sister      Macular degeneration Sister      Breast cancer Neg Hx      Colon cancer Neg Hx      Crohn's disease Neg Hx      Ulcerative colitis Neg Hx      Stomach cancer Neg Hx      Esophageal cancer Neg Hx      Celiac disease Neg Hx      Amblyopia Neg Hx      Blindness Neg Hx      Retinal detachment Neg Hx      Strabismus Neg Hx       Social History     Socioeconomic History    Marital status:     Number of children: 2   Occupational History    Occupation: retired teacher and principal    Occupation: substitute   Tobacco Use    Smoking status: Never    Smokeless tobacco: Never   Substance and Sexual Activity    Alcohol use: Yes     Comment: social- maybe once every few months    Drug use: No    Sexual activity: Yes     Partners: Male     Birth control/protection: Post-menopausal, None     Social Determinants of Health     Financial Resource Strain: Low Risk  (11/9/2023)    Overall Financial Resource Strain (CARDIA)     Difficulty of Paying Living Expenses: Not hard at all   Food Insecurity: No Food Insecurity (11/9/2023)    Hunger Vital Sign     Worried About Running Out of Food in the Last Year: Never true     Ran Out of Food in the Last Year: Never true   Transportation Needs: No Transportation Needs (11/9/2023)    PRAPARE - Transportation     Lack of Transportation (Medical): No     Lack of Transportation (Non-Medical): No   Physical Activity: Sufficiently Active (11/9/2023)    Exercise Vital Sign     Days of Exercise per Week: 5 days     Minutes of Exercise per Session: 50 min   Recent Concern: Physical Activity - Insufficiently Active (10/20/2023)    Exercise Vital Sign     Days of  Exercise per Week: 2 days     Minutes of Exercise per Session: 20 min   Stress: Patient Declined (11/9/2023)    [employer] [place] [employer] - Occupational Stress Questionnaire     Feeling of Stress : Patient declined   Housing Stability: Low Risk  (11/9/2023)    Housing Stability Vital Sign     Unable to Pay for Housing in the Last Year: No     Number of Places Lived in the Last Year: 1     Unstable Housing in the Last Year: No       Review of Systems:  Constitutional:  Denies fever or chills   Eyes:  Denies change in visual acuity   HENT:  Denies nasal congestion or sore throat   Respiratory:  Denies cough or shortness of breath   Cardiovascular:  Denies chest pain or edema   GI:  Denies abdominal pain, nausea, vomiting, bloody stools or diarrhea   :  Denies dysuria   Integument:  Denies rash   Neurologic:  Denies headache, focal weakness or sensory changes   Endocrine:  Denies polyuria or polydipsia   Lymphatic:  Denies swollen glands   Psychiatric:  Denies depression or anxiety     Physical Exam:   Constitutional:  Well developed, well nourished, no acute distress, non-toxic appearance   Integument:  Well hydrated, no rash   Lymphatic:  No lymphadenopathy noted   Neurologic:  Alert & oriented x 3, CN 2-12 normal, normal motor function, normal sensory function, no focal deficits noted   Psychiatric:  Speech and behavior appropriate   Gi: abdomen soft  Eyes: EOMI    BLE  FROM hips. No SLR. No long tract signs. Skin intact. Nvi distally.       Lumbar radiculopathy, chronic        Continue as tolerated. RTC Dr. Cuellar as needed.

## 2024-07-02 NOTE — TELEPHONE ENCOUNTER
----- Message from Marco A Vyas sent at 7/2/2024  9:35 AM CDT -----  Contact: Self  Type: Needs Medical Advice  Who Called:  Patient    Best Call Back Number: [phone]  Additional Information: Patient needs to cancel her appt on July 17

## 2024-07-05 ENCOUNTER — OFFICE VISIT (OUTPATIENT)
Dept: PSYCHIATRY | Facility: CLINIC | Age: 74
End: 2024-07-05
Payer: MEDICARE

## 2024-07-05 DIAGNOSIS — F41.1 GAD (GENERALIZED ANXIETY DISORDER): Primary | ICD-10-CM

## 2024-07-05 DIAGNOSIS — F33.1 MDD (MAJOR DEPRESSIVE DISORDER), RECURRENT EPISODE, MODERATE: ICD-10-CM

## 2024-07-05 DIAGNOSIS — F42.8 OBSESSIVE THINKING: ICD-10-CM

## 2024-07-05 RX ORDER — BUPROPION HYDROCHLORIDE 150 MG/1
150 TABLET ORAL DAILY
Qty: 30 TABLET | Refills: 0 | Status: SHIPPED | OUTPATIENT
Start: 2024-07-05 | End: 2024-08-04

## 2024-07-05 NOTE — PROGRESS NOTES
Outpatient Psychiatry Follow-Up Visit    Clinical Status of Patient: Outpatient (Ambulatory)  07/05/2024    The patient location is:  [place], [place]  The patient phone number is: [phone]   Visit type: Virtual visit with synchronous audio and video  Each patient to whom he or she provides medical services by telemedicine is:  (1) informed of the relationship between the physician and patient and the respective role of any other health care provider with respect to management of the patient; and (2) notified that he or she may decline to receive medical services by telemedicine and may withdraw from such care at any time.     Chief Complaint: Pt is a 73 y.o. female who presents today for a follow-up. Met with patient.       Interval History and Content of Current Session:  Interim Events/Subjective Report/Content of Current Session:  follow up appointment.    Pt is a 73 y.o. female with past psychiatric hx of CARLOS (generalized anxiety disorder), MDD (major depressive disorder), recurrent episode, moderate, Obsessive thinking who presents for follow up treatment.     Past Psychiatric hx:   Pt. is a 74 yo F with a past psychiatric hx of Anxiety, Depression, unspecified depression type presenting to the clinic for an initial evaluation and treatment. Past medical history outlined below. She is currently taking buPROPion (WELLBUTRIN XL), LORazepam (ATIVAN), prescribed and managed by Dr. Engel/Dr. Hassan. She reports that these medications have not adequately addressed her depression.     4/5/24: Pt presents to OP Psychiatry for routine follow-up for treatment/medication management of CARLOS, MDD, OCD. Pt reports her OCD and depression have improved after stopping Zoloft as she read an article that helped her to focus her energy on controlling her over thinking. States that it has helped her cope with her OCD and depression without the use of more medication. Pt also states that she has been doing well on the decrease  in Ativan and feels that it is much better for her health. Pt pleasant, smiling and denies any other medication changes needed at this time. Pt denies SI/HI/AVH, self-harm, plan, or intent. Pt verbalizes understanding of medication instructions through teach back. No other issues, concerns, or problems, will reassess symptoms and medications in 3 months or PRN if symptoms worsen.     Past Medical hx:   Past Medical History:   Diagnosis Date    Anticoagulant long-term use     Anxiety     takes daily Xanax    Arthritis     right thumb    Cataract     OU    Cholelithiasis     GERD (gastroesophageal reflux disease)     Hepatic steatosis     Hyperlipemia     Hypertension     PVD (posterior vitreous detachment)     OD        Interim hx:  Medication changes last visit:     1. Continue buPROPion (WELLBUTRIN XL) 150 MG TB24 tablet - Take 1 tablet (150 mg total) by mouth once daily for depression.  2. Continue LORazepam (ATIVAN) 0.5 MG tablet - TAKE 0.5 TABLET (0.25 MG) BY MOUTH THREE TIMES DAILY AS NEEDED for anxiety. Discussed risk of decreased RT, sedation, addictive potential, and not to mix with alcohol.     Anxiety: Improved  Depression: Good, no issues  Sleep: Good, no issues  Appetite: Same, no issues     Denies suicidal/homicidal ideations.  Denies hopelessness/worthlessness.    Denies auditory/visual hallucinations.      Tobacco:  denies  Alcohol:  denies  Drug use:  denies  Caffeine:  denies      Review of Systems   PSYCHIATRIC: Pertinent items are noted in the narrative.        CONSTITUTIONAL: weight stable        M/S: no pain today         ENT: no allergies noted today        ABD: no n/v/d     Past Medical, Family and Social History: The patient's past medical, family and social history have been reviewed and updated as appropriate within the electronic medical record. See encounter notes.     Medication Compliance: yes      Side effects: tolerates     Risk Parameters:  Patient reports no suicidal  ideation  Patient reports no homicidal ideation  Patient reports no self-injurious behavior  Patient reports no violent behavior     Exam (detailed: at least 9 elements; comprehensive: all 15 elements)   Constitutional  Vitals:  Most recent vital signs, dated less than 90 days prior to this appointment, were reviewed.     General:  unremarkable, age appropriate, casual attire, good eye contact, good rapport       Musculoskeletal  Muscle Strength/Tone:  no flaccidity, no tremor    Gait & Station:  normal      Psychiatric                       Speech:  normal tone, normal rate, rhythm, and volume   Mood & Affect:   Euthymic, congruent, appropriate         Thought Process:   Goal directed; Linear    Associations:   intact   Thought Content:   No SI/HI, delusions, or paranoia, no AV/VH   Insight & Judgement:   Good, adequate to circumstances   Orientation:   grossly intact; alert and oriented x 4    Memory:  intact for content of interview    Language:  grossly intact, can repeat    Attention Span  : Grossly intact for content of interview   Fund of Knowledge:   intact and appropriate to age and level of education        Assessment and Diagnosis   Status/Progress: Based on the examination today, the patient's problem(s) is/are under fair control.  New problems have not been presented today. Comorbidities are not currently complicating management of the primary condition.      Impression:  Pt presents to OP Psychiatry for routine follow-up for treatment/medication management of CARLOS, MDD, OCD. Pt reports all symptoms are well managed w/o complaints. States she is currently trying to wean off Ativan even more, and has stopped drinking iced tea altogether and has replaced it with lemonade and water. She was drinking 2 liters daily and states she feels great. Explained the decrease in caffeine can help resolve symptoms of anxiety, pt agrees. No medication changes at this time. Pt denies SI/HI/AVH, self-harm, plan, or intent.  Pt verbalizes understanding of medication instructions through teach back. No other issues, concerns, or problems, will reassess symptoms and medications in 3 months or PRN if symptoms worsen.      Diagnosis:   - CARLOS (generalized anxiety disorder)  - MDD (major depressive disorder), recurrent episode, moderate  - Obsessive thinking      Intervention/Counseling/Treatment Plan   Medication Management:      1. Continue buPROPion (WELLBUTRIN XL) 150 MG TB24 tablet - Take 1 tablet (150 mg total) by mouth once daily for depression.     2. Continue LORazepam (ATIVAN) 0.5 MG tablet - TAKE 0.5 TABLET (0.25 MG) BY MOUTH THREE TIMES DAILY AS NEEDED for anxiety. Discussed risk of decreased RT, sedation, addictive potential, and not to mix with alcohol.       3. Patient given contact # for psychotherapists at [facility] and also instructed she may check with insurance for list of providers.      4. Call to report any worsening of symptoms or problems with the medication. Pt instructed to go to ER with thoughts of harming self, others.           Labs: none         Return to clinic:      3 months or PRN if symptoms worsen    -Spent 30min face to face with the pt; >50% time spent in counseling   -Supportive therapy and psychoeducation provided  -R/B/SE's of medications discussed with the pt who expresses understanding and chooses to take medications as prescribed.   -Pt instructed to call clinic, 911 or go to nearest emergency room if sxs worsen or pt is in   crisis. The pt expresses understanding.      Grace Mistry NP  Psychiatric-Mental Health Nurse Practitioner  Department of Psychiatry    [facility] [place] [address] [place] [address]  Phone:  [phone]  Fax: [phone]

## 2024-07-08 ENCOUNTER — TELEPHONE (OUTPATIENT)
Dept: PAIN MEDICINE | Facility: CLINIC | Age: 74
End: 2024-07-08
Payer: MEDICARE

## 2024-07-08 ENCOUNTER — PATIENT MESSAGE (OUTPATIENT)
Dept: FAMILY MEDICINE | Facility: CLINIC | Age: 74
End: 2024-07-08
Payer: MEDICARE

## 2024-07-08 NOTE — TELEPHONE ENCOUNTER
Pt is concerned of possible infection after recent labs,   See pt MyChart message.   Please advise.

## 2024-07-08 NOTE — TELEPHONE ENCOUNTER
Can you please let the patient know that her blood work show that she may be fighting an infection.  Could she please go to an urgent care or PCP to make sure she does not have something like a UTI.

## 2024-07-09 NOTE — TELEPHONE ENCOUNTER
Pt is concerned that she may have in infection after going over her lab work with . Pt also states that she would like to be prescribed some antibiotics if she does have a infection.  Please advise

## 2024-07-10 ENCOUNTER — OFFICE VISIT (OUTPATIENT)
Dept: FAMILY MEDICINE | Facility: CLINIC | Age: 74
End: 2024-07-10
Payer: MEDICARE

## 2024-07-10 ENCOUNTER — LAB VISIT (OUTPATIENT)
Dept: LAB | Facility: HOSPITAL | Age: 74
End: 2024-07-10
Attending: NURSE PRACTITIONER
Payer: MEDICARE

## 2024-07-10 VITALS
HEART RATE: 70 BPM | TEMPERATURE: 98 F | SYSTOLIC BLOOD PRESSURE: 128 MMHG | HEIGHT: 62 IN | BODY MASS INDEX: 32.86 KG/M2 | DIASTOLIC BLOOD PRESSURE: 70 MMHG | OXYGEN SATURATION: 95 % | WEIGHT: 178.56 LBS

## 2024-07-10 DIAGNOSIS — D72.829 LEUKOCYTOSIS, UNSPECIFIED TYPE: Primary | ICD-10-CM

## 2024-07-10 DIAGNOSIS — D72.829 LEUKOCYTOSIS, UNSPECIFIED TYPE: ICD-10-CM

## 2024-07-10 LAB
BASOPHILS # BLD AUTO: 0.08 K/UL (ref 0–0.2)
BASOPHILS NFR BLD: 0.6 % (ref 0–1.9)
DIFFERENTIAL METHOD BLD: ABNORMAL
EOSINOPHIL # BLD AUTO: 0.1 K/UL (ref 0–0.5)
EOSINOPHIL NFR BLD: 0.8 % (ref 0–8)
ERYTHROCYTE [DISTWIDTH] IN BLOOD BY AUTOMATED COUNT: 13.1 % (ref 11.5–14.5)
HCT VFR BLD AUTO: 38.4 % (ref 37–48.5)
HGB BLD-MCNC: 13 G/DL (ref 12–16)
IMM GRANULOCYTES # BLD AUTO: 0.06 K/UL (ref 0–0.04)
IMM GRANULOCYTES NFR BLD AUTO: 0.5 % (ref 0–0.5)
LYMPHOCYTES # BLD AUTO: 3.7 K/UL (ref 1–4.8)
LYMPHOCYTES NFR BLD: 28.5 % (ref 18–48)
MCH RBC QN AUTO: 30.1 PG (ref 27–31)
MCHC RBC AUTO-ENTMCNC: 33.9 G/DL (ref 32–36)
MCV RBC AUTO: 89 FL (ref 82–98)
MONOCYTES # BLD AUTO: 1 K/UL (ref 0.3–1)
MONOCYTES NFR BLD: 7.4 % (ref 4–15)
NEUTROPHILS # BLD AUTO: 8.1 K/UL (ref 1.8–7.7)
NEUTROPHILS NFR BLD: 62.2 % (ref 38–73)
NRBC BLD-RTO: 0 /100 WBC
PLATELET # BLD AUTO: 380 K/UL (ref 150–450)
PMV BLD AUTO: 9.6 FL (ref 9.2–12.9)
RBC # BLD AUTO: 4.32 M/UL (ref 4–5.4)
WBC # BLD AUTO: 12.92 K/UL (ref 3.9–12.7)

## 2024-07-10 PROCEDURE — 99999 PR PBB SHADOW E&M-EST. PATIENT-LVL IV: CPT | Mod: PBBFAC,,, | Performed by: NURSE PRACTITIONER

## 2024-07-10 PROCEDURE — 85025 COMPLETE CBC W/AUTO DIFF WBC: CPT | Performed by: NURSE PRACTITIONER

## 2024-07-10 PROCEDURE — 36415 COLL VENOUS BLD VENIPUNCTURE: CPT | Mod: PO | Performed by: NURSE PRACTITIONER

## 2024-07-10 PROCEDURE — 99213 OFFICE O/P EST LOW 20 MIN: CPT | Mod: S$PBB,,, | Performed by: NURSE PRACTITIONER

## 2024-07-10 PROCEDURE — 99214 OFFICE O/P EST MOD 30 MIN: CPT | Mod: PBBFAC,PO | Performed by: NURSE PRACTITIONER

## 2024-07-10 NOTE — PROGRESS NOTES
"Subjective:       Patient ID: Lucinda Aguilera is a 73 y.o. female.    Chief Complaint: Follow-up    HPI follow up for elevated WBC on 6/28/24 with Dr Cuellar. Pt denies any fever or recent illness. Has chronic allergic rhinitis and ulcerative colitis. No new cough or sinus symptoms. No sinus pressure or drainage. No urinary pain or urinary symptoms. No abdominal pain, vomiting or diarrhea.    Past Medical History:   Diagnosis Date    Anticoagulant long-term use     Anxiety     takes daily Xanax    Arthritis     right thumb    Cataract     OU    Cholelithiasis     GERD (gastroesophageal reflux disease)     Hepatic steatosis     Hyperlipemia     Hypertension     PVD (posterior vitreous detachment)     OD       Past Surgical History:   Procedure Laterality Date    BREAST BIOPSY Left 08/28/2020    stereo biopsy    BREAST BIOPSY Left 10/07/2020    benign excisional biopsy    chest abcess      child    COLONOSCOPY  01/06/2012    Dr. Lopez: hemorrhoids, redundant colon    COLONOSCOPY N/A 02/17/2021    Dr. Lopez: Congested and erythematous mucosa in the rectum, in the recto-sigmoid colon and in the sigmoid colon, proctosigmoid colitis, Redundant colon; biopsy: focal active colitis "nonspecific but can be seen with acute self-limited colitis (infection), medication effect (e.g. NSAID use), proximity to diverticula, or early/evolving IBD    EPIDURAL STEROID INJECTION INTO LUMBAR SPINE N/A 5/30/2024    Procedure: Injection-steroid-epidural-lumbar   L5/S1;  Surgeon: Indra Cuellar MD;  Location: [facility] OR;  Service: Pain Management;  Laterality: N/A;    ESOPHAGOGASTRODUODENOSCOPY N/A 06/06/2019    Dr. Lopez: small hiatal hernia, Non-erosive esophageal reflux (NERD) disease?., slight antritis; biopsy: Antral mucosa with chemical/reactive gastropathy. negative for h pylori    ESOPHAGOGASTRODUODENOSCOPY N/A 02/17/2021    Procedure: EGD (ESOPHAGOGASTRODUODENOSCOPY);  Surgeon: Nathan Lopez Jr., MD;  Location: [facility] ENDO;  Service: " Endoscopy;  Laterality: N/A; Minimal gastritis; biopsy: stomach- negative for h pylori, active chronic gastritis with focal features of erosive gastritis, duodenum WNL    ESOPHAGOGASTRODUODENOSCOPY N/A 05/10/2022    Procedure: EGD (ESOPHAGOGASTRODUODENOSCOPY);  Surgeon: Nathan Lopez Jr., MD;  Location: [facility];  Service: Endoscopy;  Laterality: N/A;    ESOPHAGOGASTRODUODENOSCOPY N/A 3/12/2024    Procedure: EGD (ESOPHAGOGASTRODUODENOSCOPY);  Surgeon: Nathan Lopez Jr., MD;  Location: [facility];  Service: Endoscopy;  Laterality: N/A;    EXCISIONAL BIOPSY Left 10/07/2020    Procedure: EXCISIONAL BIOPSY-Left with radiological marker;  Surgeon: Brooklynn Ashford MD;  Location: [facility];  Service: General;  Laterality: Left;    FRACTURE SURGERY  2010    Left thumb repaired surgically    JOINT REPLACEMENT Bilateral     knee    KNEE ARTHROSCOPY W/ LASER      right    LAPAROSCOPIC CHOLECYSTECTOMY N/A 06/14/2019    Procedure: CHOLECYSTECTOMY, LAPAROSCOPIC;  Surgeon: Guevara Evans MD;  Location: [facility];  Service: General;  Laterality: N/A;    thumb surgery  11/2014    TOTAL KNEE ARTHROPLASTY Left 06/19/2018    Procedure: REPLACEMENT-KNEE-TOTAL;  Surgeon: Nj Melvin MD;  Location: [address];  Service: Orthopedics;  Laterality: Left;  [facility]    UPPER GASTROINTESTINAL ENDOSCOPY  07/13/2017    Dr. Lopez: NERD, Gastric mucosal atrophy. antritis, Scar in the incisura. Biopsied; biopsy: chronic gastritis. negative for h pylori       Review of patient's allergies indicates:  No Known Allergies    Social History     Socioeconomic History    Marital status:     Number of children: 2   Occupational History    Occupation: retired teacher and principal    Occupation: substitute   Tobacco Use    Smoking status: Never    Smokeless tobacco: Never   Substance and Sexual Activity    Alcohol use: Yes     Comment: social- maybe once every few months    Drug use: No    Sexual activity: Yes     Partners: Male      Birth control/protection: Post-menopausal, None     Social Determinants of Health     Financial Resource Strain: Low Risk  (11/9/2023)    Overall Financial Resource Strain (CARDIA)     Difficulty of Paying Living Expenses: Not hard at all   Food Insecurity: No Food Insecurity (11/9/2023)    Hunger Vital Sign     Worried About Running Out of Food in the Last Year: Never true     Ran Out of Food in the Last Year: Never true   Transportation Needs: No Transportation Needs (11/9/2023)    PRAPARE - Transportation     Lack of Transportation (Medical): No     Lack of Transportation (Non-Medical): No   Physical Activity: Sufficiently Active (11/9/2023)    Exercise Vital Sign     Days of Exercise per Week: 5 days     Minutes of Exercise per Session: 50 min   Recent Concern: Physical Activity - Insufficiently Active (10/20/2023)    Exercise Vital Sign     Days of Exercise per Week: 2 days     Minutes of Exercise per Session: 20 min   Stress: Patient Declined (11/9/2023)    [employer] - Occupational Stress Questionnaire     Feeling of Stress : Patient declined   Housing Stability: Low Risk  (11/9/2023)    Housing Stability Vital Sign     Unable to Pay for Housing in the Last Year: No     Number of Places Lived in the Last Year: 1     Unstable Housing in the Last Year: No       Current Outpatient Medications on File Prior to Visit   Medication Sig Dispense Refill    acetaminophen (TYLENOL) 500 MG tablet Take 500 mg by mouth every 6 (six) hours as needed for Pain.      ascorbic acid, vitamin C, (VITAMIN C) 250 MG tablet Take 500 mg by mouth once daily.       atenoloL (TENORMIN) 25 MG tablet Take 1 tablet (25 mg total) by mouth every evening. 90 tablet 3    azelastine (ASTELIN) 137 mcg (0.1 %) nasal spray 1 spray by Nasal route 2 (two) times daily.      balsalazide (COLAZAL) 750 mg capsule TAKE 3 CAPSULES(2250 MG) BY MOUTH TWICE DAILY WITH MEALS 180 capsule 11    benzonatate (TESSALON) 200 MG capsule  Take 1 capsule (200 mg total) by mouth 3 (three) times daily as needed for Cough. 30 capsule 1    buPROPion (WELLBUTRIN XL) 150 MG TB24 tablet Take 1 tablet (150 mg total) by mouth once daily. 30 tablet 0    diltiaZEM (DILACOR XR) 240 MG CDCR Take 1 capsule (240 mg total) by mouth every evening. 30 capsule 3    docusate sodium (COLACE) 100 MG capsule Take 100 mg by mouth 2 (two) times daily.      famotidine (PEPCID) 40 MG tablet Take 1 tablet (40 mg total) by mouth every evening. 90 tablet 3    famotidine-calcium carbonate-magnesium hydroxide (PEPCID COMPLETE) -165 mg Take 1 tablet by mouth 2 (two) times daily as needed. Chew 1 or 2 tablets, 2-3 times a day, as needed, for nausea or heartburn.      fluticasone propionate (FLONASE) 50 mcg/actuation nasal spray 1 spray (50 mcg total) by Each Nostril route once daily. 16 g 3    gabapentin (NEURONTIN) 400 MG capsule Take 1 capsule (400 mg total) by mouth 2 (two) times daily. 60 capsule 1    lisinopriL (PRINIVIL,ZESTRIL) 40 MG tablet Take 1 tablet (40 mg total) by mouth 2 (two) times a day. 60 tablet 3    LORazepam (ATIVAN) 0.5 MG tablet Take 0.5 tablets (0.25 mg total) by mouth 3 (three) times daily as needed for Anxiety. 45 tablet 0    magnesium oxide (MAG-OX) 400 mg (241.3 mg magnesium) tablet Take 1 tablet (400 mg total) by mouth once daily. 30 tablet 3    multivitamin (THERAGRAN) per tablet Take 1 tablet by mouth once daily.      ondansetron (ZOFRAN-ODT) 4 MG TbDL Take 1 tablet (4 mg total) by mouth every 6 (six) hours as needed (nausea). 30 tablet 3    pantoprazole (PROTONIX) 40 MG tablet TAKE 1 TABLET(40 MG) BY MOUTH TWICE DAILY 180 tablet 3    psyllium husk, aspartame, (NATURAL PSYLLIUM FIBER) 3.4 gram/5.8 gram Powd Take by mouth.      rosuvastatin (CRESTOR) 20 MG tablet TAKE 1 TABLET(20 MG) BY MOUTH DAILY 90 tablet 1    [DISCONTINUED] ciprofloxacin-dexAMETHasone 0.3-0.1% (CIPRODEX) 0.3-0.1 % DrpS Place 4 drops into both ears 2 (two) times daily. 7.5 mL 0  "   [DISCONTINUED] guaiFENesin (MUCINEX) 600 mg 12 hr tablet Take 1,200 mg by mouth 2 (two) times daily.       No current facility-administered medications on file prior to visit.       Family History   Problem Relation Name Age of Onset    Hypertension Mother age 82     Heart disease Mother age 82     Glaucoma Mother age 82     Stroke Father age 50     Glaucoma Sister      Cataracts Sister      Macular degeneration Sister      Breast cancer Neg Hx      Colon cancer Neg Hx      Crohn's disease Neg Hx      Ulcerative colitis Neg Hx      Stomach cancer Neg Hx      Esophageal cancer Neg Hx      Celiac disease Neg Hx      Amblyopia Neg Hx      Blindness Neg Hx      Retinal detachment Neg Hx      Strabismus Neg Hx         Review of Systems   Constitutional: Negative.    HENT: Negative.     Eyes: Negative.    Respiratory: Negative.     Cardiovascular: Negative.    Gastrointestinal: Negative.    Endocrine: Negative.    Genitourinary: Negative.    Musculoskeletal: Negative.    Skin: Negative.    Neurological: Negative.    Psychiatric/Behavioral: Negative.         Objective:      /70   Pulse 70   Temp 98.4 °F (36.9 °C)   Ht 5' 2" (1.575 m)   Wt 81 kg (178 lb 9.2 oz)   LMP 04/18/2004   SpO2 95%   BMI 32.66 kg/m²   Physical Exam  Vitals and nursing note reviewed.   Constitutional:       General: She is not in acute distress.     Appearance: Normal appearance. She is well-groomed.   HENT:      Head: Normocephalic.      Right Ear: Tympanic membrane, ear canal and external ear normal.      Left Ear: Tympanic membrane, ear canal and external ear normal.      Nose: Nose normal.      Mouth/Throat:      Lips: Pink.      Mouth: Mucous membranes are moist.      Pharynx: Oropharynx is clear. No oropharyngeal exudate or posterior oropharyngeal erythema.   Eyes:      Extraocular Movements: Extraocular movements intact.      Conjunctiva/sclera: Conjunctivae normal.      Pupils: Pupils are equal, round, and reactive to light. "   Cardiovascular:      Rate and Rhythm: Normal rate and regular rhythm.      Heart sounds: Normal heart sounds. No murmur heard.  Pulmonary:      Effort: Pulmonary effort is normal.      Breath sounds: Normal breath sounds.   Chest:      Chest wall: No tenderness.   Abdominal:      General: Bowel sounds are normal.      Palpations: Abdomen is soft.      Tenderness: There is no abdominal tenderness.   Musculoskeletal:         General: No swelling or tenderness. Normal range of motion.      Cervical back: Normal range of motion and neck supple.      Right lower leg: No edema.      Left lower leg: No edema.   Lymphadenopathy:      Cervical: No cervical adenopathy.   Skin:     General: Skin is warm and dry.   Neurological:      General: No focal deficit present.      Mental Status: She is alert and oriented to person, place, and time. Mental status is at baseline.      Gait: Gait is intact.   Psychiatric:         Mood and Affect: Mood normal.         Behavior: Behavior normal.         Assessment:       1. Leukocytosis, unspecified type        Plan:       Leukocytosis, unspecified type  -     Urinalysis, Reflex to Urine Culture Urine, Clean Catch; Future; Expected date: 07/10/2024  -     CBC W/ AUTO DIFFERENTIAL; Future; Expected date: 07/10/2024        Pt advised that slightly elevated WBC is most likely secondary to chronic inflammation. Repeat CBC and UA ordered.

## 2024-07-10 NOTE — TELEPHONE ENCOUNTER
Since she is having an upcoming procedure, schedule her an in-person visit with Kaylee or I just to examine her for any active signs of infection.

## 2024-07-11 ENCOUNTER — PATIENT MESSAGE (OUTPATIENT)
Dept: FAMILY MEDICINE | Facility: CLINIC | Age: 74
End: 2024-07-11
Payer: MEDICARE

## 2024-07-11 DIAGNOSIS — D72.829 LEUKOCYTOSIS, UNSPECIFIED TYPE: Primary | ICD-10-CM

## 2024-07-19 ENCOUNTER — LAB VISIT (OUTPATIENT)
Dept: LAB | Facility: HOSPITAL | Age: 74
End: 2024-07-19
Attending: NURSE PRACTITIONER
Payer: MEDICARE

## 2024-07-19 ENCOUNTER — PATIENT MESSAGE (OUTPATIENT)
Dept: PSYCHIATRY | Facility: CLINIC | Age: 74
End: 2024-07-19
Payer: MEDICARE

## 2024-07-19 DIAGNOSIS — F41.1 GAD (GENERALIZED ANXIETY DISORDER): ICD-10-CM

## 2024-07-19 DIAGNOSIS — D72.829 LEUKOCYTOSIS, UNSPECIFIED TYPE: ICD-10-CM

## 2024-07-19 LAB
BASOPHILS # BLD AUTO: 0.08 K/UL (ref 0–0.2)
BASOPHILS NFR BLD: 0.8 % (ref 0–1.9)
DIFFERENTIAL METHOD BLD: NORMAL
EOSINOPHIL # BLD AUTO: 0.2 K/UL (ref 0–0.5)
EOSINOPHIL NFR BLD: 1.5 % (ref 0–8)
ERYTHROCYTE [DISTWIDTH] IN BLOOD BY AUTOMATED COUNT: 12.9 % (ref 11.5–14.5)
HCT VFR BLD AUTO: 40.2 % (ref 37–48.5)
HGB BLD-MCNC: 13.2 G/DL (ref 12–16)
IMM GRANULOCYTES # BLD AUTO: 0.04 K/UL (ref 0–0.04)
IMM GRANULOCYTES NFR BLD AUTO: 0.4 % (ref 0–0.5)
LYMPHOCYTES # BLD AUTO: 3.2 K/UL (ref 1–4.8)
LYMPHOCYTES NFR BLD: 31.4 % (ref 18–48)
MCH RBC QN AUTO: 29.3 PG (ref 27–31)
MCHC RBC AUTO-ENTMCNC: 32.8 G/DL (ref 32–36)
MCV RBC AUTO: 89 FL (ref 82–98)
MONOCYTES # BLD AUTO: 0.8 K/UL (ref 0.3–1)
MONOCYTES NFR BLD: 8.1 % (ref 4–15)
NEUTROPHILS # BLD AUTO: 6 K/UL (ref 1.8–7.7)
NEUTROPHILS NFR BLD: 57.8 % (ref 38–73)
NRBC BLD-RTO: 0 /100 WBC
PLATELET # BLD AUTO: 393 K/UL (ref 150–450)
PMV BLD AUTO: 9.9 FL (ref 9.2–12.9)
RBC # BLD AUTO: 4.5 M/UL (ref 4–5.4)
WBC # BLD AUTO: 10.31 K/UL (ref 3.9–12.7)

## 2024-07-19 PROCEDURE — 85025 COMPLETE CBC W/AUTO DIFF WBC: CPT | Performed by: NURSE PRACTITIONER

## 2024-07-19 PROCEDURE — 36415 COLL VENOUS BLD VENIPUNCTURE: CPT | Mod: PO | Performed by: NURSE PRACTITIONER

## 2024-07-19 RX ORDER — LORAZEPAM 0.5 MG/1
0.25 TABLET ORAL 3 TIMES DAILY PRN
Qty: 45 TABLET | Refills: 0 | Status: SHIPPED | OUTPATIENT
Start: 2024-07-19 | End: 2024-08-18

## 2024-07-25 ENCOUNTER — OFFICE VISIT (OUTPATIENT)
Dept: GASTROENTEROLOGY | Facility: CLINIC | Age: 74
End: 2024-07-25
Payer: MEDICARE

## 2024-07-25 VITALS — HEIGHT: 62 IN | WEIGHT: 175.06 LBS | BODY MASS INDEX: 32.22 KG/M2

## 2024-07-25 DIAGNOSIS — K21.9 GASTROESOPHAGEAL REFLUX DISEASE, UNSPECIFIED WHETHER ESOPHAGITIS PRESENT: Primary | ICD-10-CM

## 2024-07-25 DIAGNOSIS — K52.9 COLITIS: ICD-10-CM

## 2024-07-25 DIAGNOSIS — E66.09 CLASS 1 OBESITY DUE TO EXCESS CALORIES WITH BODY MASS INDEX (BMI) OF 32.0 TO 32.9 IN ADULT, UNSPECIFIED WHETHER SERIOUS COMORBIDITY PRESENT: ICD-10-CM

## 2024-07-25 DIAGNOSIS — K59.09 CHRONIC CONSTIPATION: ICD-10-CM

## 2024-07-25 PROCEDURE — 99213 OFFICE O/P EST LOW 20 MIN: CPT | Mod: S$PBB,,, | Performed by: INTERNAL MEDICINE

## 2024-07-25 PROCEDURE — 99999 PR PBB SHADOW E&M-EST. PATIENT-LVL IV: CPT | Mod: PBBFAC,,, | Performed by: INTERNAL MEDICINE

## 2024-07-25 PROCEDURE — 99214 OFFICE O/P EST MOD 30 MIN: CPT | Mod: PBBFAC,PO | Performed by: INTERNAL MEDICINE

## 2024-07-25 NOTE — PROGRESS NOTES
Subjective     Patient ID: Lucinda Aguilera is a 73 y.o. female.    Chief Complaint: No chief complaint on file.    Ms. Aguilera returns for follow-up of her reflux.  She reports she is doing well at this point.  That her medicines are doing fine.  She is really trying to stick to a bland diet.  As for her constipation, she is using the MiraLax, taking it every few days.  She has a better bowel program now.    See the MRI report below.  Dr. Arnold has referred her to pain management.  She is scheduled to have an epidural tomorrow (Dr. Cuellar).  She is taking gabapentin, Tylenol, and using a heating pad.  No NSAIDs are being taken.    See weight table below.  No significant weight loss.      See recent MRI 3/22/2024:  Impression:   1. There is multilevel degenerative disc and facet disease.  These findings are most significant at the L4-5 and L5-S1 levels.  There is grade 1 anterolisthesis of L4 on L5 and L5 on S1 with unroofing of broad disc protrusions in addition to bilateral facet joint arthropathy resulting in severe spinal canal and bilateral lateral recess stenosis at both of these levels as well as some degree of bilateral foraminal stenosis, severe at the L5-S1 level.  2. There is also marked disc space narrowing, mild retrolisthesis of L1 on L2 with a disc bulge, facet joint arthropathy resulting in moderate to severe right lateral recess and foraminal stenosis but only borderline to mild spinal stenosis and mild left foraminal stenosis.   Electronically signed by:Errol Strong MD  Date:                                            03/22/2024        See last GI OV 1/2/2024:  Chief Complaint: Constipation  Abdominal Pain  This is a chronic problem. The current episode started more than 1 month ago. The onset quality is sudden. The problem occurs intermittently. The problem has been gradually worsening. The pain is located in the epigastric region. The quality of the pain is aching. The abdominal pain does not  radiate. Associated symptoms include constipation (describes stool as type 1 on bristol scale; taking Colace 100 mg BID and OTC Miralax every other day) and nausea (Zofran helps). Pertinent negatives include no anorexia, belching, diarrhea, dysuria, fever, flatus, frequency, hematochezia, melena, vomiting or weight loss. The pain is aggravated by eating and palpation. The pain is relieved by Belching. She has tried proton pump inhibitors and H2 blockers (Currently: Protonix 40 mg once daily and Famotidine 40 mg nightly) for the symptoms. The treatment provided mild relief. Prior diagnostic workup includes GI consult, CT scan, upper endoscopy and lower endoscopy (GES). Her past medical history is significant for abdominal surgery, gallstones (Hx of cholecystectomy), GERD and irritable bowel syndrome. There is no history of colon cancer, Crohn's disease, pancreatitis, PUD or ulcerative colitis (Hx of colitis- taking Balsalazide 750 mg 3 capsules BID).    Assessment:   Assessment  1. Epigastric pain   2. Nausea   3. Gastroesophageal reflux disease without esophagitis   4. Chronic constipation   5. Colitis    Plan:  All diagnoses and orders for this visit:   Epigastric pain, Nausea & Gastroesophageal reflux disease without esophagitis              - Schedule EGD               - Continue Protonix 40 mg once daily              - Continue Famotidine 40 mg nightly              - Continue Zofran 4 mg every six hours PRN              - Take PPI in the morning 30 minutes before breakfast  - Recommend to avoid large meals, avoid eating within 3 hours of bedtime, elevate head of bed if nocturnal symptoms are present, smoking cessation (if current smoker), & weight loss (if overweight).   - Recommend minimize/avoid high-fat foods, chocolate, caffeine, citrus, alcohol, & tomato products.  - Advised to avoid/limit use of NSAID's, since they can cause GI upset, bleeding, and/or ulcers. If needed, take with food.       Chronic  constipation              - Recommend daily exercise as tolerated, adequate water intake, and high fiber diet.   - Recommend OTC fiber supplement (Benefiber)  - Continue Colace 100 mg BID  - Increase OTC MiraLax to once daily     Colitis              - Continue Balsalazide 750 mg 3 capsules BID              - Next surveillance colonoscopy due 2/2026      See the recent Upper GI endoscopy 3/12/2024:  Indications:           Epigastric abdominal pain, Postprandial pain, Dysphagia, Nausea   Comorbidities       Hypertension, Obesity (Body Mass Index: 30.7)   Providers:             Nathan Lopez MD   Impression:            - Normal oropharynx.                          - Normal larynx.                          - Normal cricopharyngeus.                          - Normal esophagus. Biopsied.                          - Z-line regular, 35 cm from the incisors.                          - Patulous lower esophageal sphincter.                          - Non-erosive esophageal reflux (NERD) disease(?).                          - No endoscopic esophageal abnormality to explain patient's dysphagia. Esophagus dilated, 51 Fr.                          - Normal cardia.                          - Normal gastric fundus, gastric body and antrum.                          - Minimal antritis. Biopsied.                          - Normal pylorus.                          - Normal examined duodenum.                          - Normal major papilla.   Recommendation:        - Discharge patient to home.                          - Await pathology results.                          - Follow an antireflux regimen.                          - Exercise and weight loss.                          - Continue present medications.                          - Use Protonix (pantoprazole) 40 mg and Pepcid.                          - Use 1-2 tablet Pepcid Complete (famotidine+calcium carbonate+magnesium hydroxide)                          for symptoms every  12 hours. Chew completely before swallowing.                          - Call the G.I. clinic in 2 weeks for reports (if you haven't heard from us sooner) [phone].                          - Return to GI clinic in 3 months.   Nathan Lopez MD   3/12/2024     1.  Distal esophagus, biopsy:   Esophageal squamous mucosa and adjacent gastric type mucosa with mild chronic inflammation.   No evidence of intraepithelial eosinophils, intestinal metaplasia, dysplasia or malignancy is identified.     2. Pre-pyloric gastritis, biopsy:   Gastric mucosa with mild chronic gastritis.   No evidence of Helicobacter species on H&E stain.        Wt Readings from Last 20 Encounters:  07/25/24 : 79.4 kg (175 lb 0.7 oz)  07/10/24 : 81 kg (178 lb 9.2 oz)  06/28/24 : 79.3 kg (174 lb 15 oz)  06/25/24 : 79.3 kg (174 lb 13.2 oz)  06/02/24 : 79.3 kg (174 lb 13.2 oz)  05/21/24 : 75.8 kg (167 lb)  05/01/24 : 76.1 kg (167 lb 14.1 oz)  04/05/24 : 77.2 kg (170 lb 3.1 oz)  03/26/24 : 74.4 kg (164 lb)  03/08/24 : 76.2 kg (168 lb)  02/27/24 : 76.6 kg (168 lb 14 oz)  01/31/24 : 76.7 kg (169 lb 1.5 oz)  01/02/24 : 77.8 kg (171 lb 8.3 oz)  11/21/23 : 76.2 kg (168 lb)  11/20/23 : 76.5 kg (168 lb 12.2 oz)  11/17/23 : 77 kg (169 lb 12.1 oz)  10/20/23 : 77.2 kg (170 lb 3.1 oz)  10/17/23 : 76.2 kg (168 lb)  10/10/23 : 76.5 kg (168 lb 10.4 oz)  09/07/23 : 76.5 kg (168 lb 10.4 oz)        Review of Systems   Constitutional:  Negative for appetite change, fever and unexpected weight change.   HENT: Negative.  Negative for mouth sores, sore throat and trouble swallowing.    Eyes: Negative.    Respiratory: Negative.  Negative for cough and choking.    Cardiovascular: Negative.  Negative for chest pain and leg swelling.   Gastrointestinal:  Positive for constipation and reflux. Negative for abdominal distention, abdominal pain, anal bleeding, blood in stool, diarrhea, nausea and vomiting.   Genitourinary: Negative.    Musculoskeletal:  Positive for back pain.    Integumentary:  Negative for color change and rash. Negative.   Neurological: Negative.    Hematological:  Negative for adenopathy.   Psychiatric/Behavioral: Negative.            Objective     Physical Exam  Vitals and nursing note reviewed.   Constitutional:       General: She is not in acute distress.     Appearance: Normal appearance. She is well-developed. She is obese.   HENT:      Mouth/Throat:      Mouth: Mucous membranes are moist.      Pharynx: No oropharyngeal exudate.   Eyes:      General: No scleral icterus.  Neck:      Thyroid: No thyromegaly.      Trachea: No tracheal deviation.   Cardiovascular:      Rate and Rhythm: Normal rate and regular rhythm.      Heart sounds: Normal heart sounds.   Pulmonary:      Effort: Pulmonary effort is normal.      Breath sounds: Normal breath sounds.   Abdominal:      General: Bowel sounds are normal. There is no distension.      Palpations: Abdomen is soft. There is no mass.      Tenderness: There is no abdominal tenderness. There is no guarding.   Lymphadenopathy:      Cervical: No cervical adenopathy.   Skin:     General: Skin is warm and dry.      Findings: No rash.   Neurological:      General: No focal deficit present.      Mental Status: She is alert and oriented to person, place, and time.   Psychiatric:         Mood and Affect: Mood normal.         Behavior: Behavior normal.            Assessment and Plan     Gastroesophageal reflux disease, unspecified whether esophagitis present    Chronic constipation    Colitis    Class 1 obesity due to excess calories with body mass index (BMI) of 32.0 to 32.9 in adult, unspecified whether serious comorbidity present      Continue medicines the same.  Again, diet and weight loss or stressed, especially now that she has back problems.  Return to clinic 6-12 months.  Earlier, p.r.n..         No follow-ups on file.

## 2024-07-26 ENCOUNTER — HOSPITAL ENCOUNTER (OUTPATIENT)
Facility: HOSPITAL | Age: 74
Discharge: HOME | End: 2024-07-26
Attending: ANESTHESIOLOGY | Admitting: ANESTHESIOLOGY
Payer: MEDICARE

## 2024-07-26 ENCOUNTER — HOSPITAL ENCOUNTER (OUTPATIENT)
Dept: RADIOLOGY | Facility: HOSPITAL | Age: 74
Discharge: HOME | End: 2024-07-26
Attending: ANESTHESIOLOGY
Payer: MEDICARE

## 2024-07-26 DIAGNOSIS — M54.16 LUMBAR RADICULOPATHY: Primary | ICD-10-CM

## 2024-07-26 DIAGNOSIS — M54.9 BACK PAIN: ICD-10-CM

## 2024-07-26 PROCEDURE — 62323 NJX INTERLAMINAR LMBR/SAC: CPT | Mod: PO | Performed by: ANESTHESIOLOGY

## 2024-07-26 PROCEDURE — 76000 FLUOROSCOPY <1 HR PHYS/QHP: CPT | Mod: TC,PO

## 2024-07-26 PROCEDURE — 63600175 PHARM REV CODE 636 W HCPCS: Mod: PO | Performed by: ANESTHESIOLOGY

## 2024-07-26 PROCEDURE — 62323 NJX INTERLAMINAR LMBR/SAC: CPT | Mod: ,,, | Performed by: ANESTHESIOLOGY

## 2024-07-26 PROCEDURE — 25000003 PHARM REV CODE 250: Mod: PO | Performed by: ANESTHESIOLOGY

## 2024-07-26 PROCEDURE — 25500020 PHARM REV CODE 255: Mod: PO | Performed by: ANESTHESIOLOGY

## 2024-07-26 RX ORDER — BUPIVACAINE HYDROCHLORIDE 2.5 MG/ML
INJECTION, SOLUTION EPIDURAL; INFILTRATION; INTRACAUDAL
Status: DISCONTINUED | OUTPATIENT
Start: 2024-07-26 | End: 2024-07-26 | Stop reason: HOSPADM

## 2024-07-26 RX ORDER — ALPRAZOLAM 0.5 MG/1
1 TABLET, ORALLY DISINTEGRATING ORAL ONCE AS NEEDED
Status: COMPLETED | OUTPATIENT
Start: 2024-07-26 | End: 2024-07-26

## 2024-07-26 RX ORDER — METHYLPREDNISOLONE ACETATE 80 MG/ML
INJECTION, SUSPENSION INTRA-ARTICULAR; INTRALESIONAL; INTRAMUSCULAR; SOFT TISSUE
Status: DISCONTINUED | OUTPATIENT
Start: 2024-07-26 | End: 2024-07-26 | Stop reason: HOSPADM

## 2024-07-26 RX ORDER — LIDOCAINE HYDROCHLORIDE 10 MG/ML
INJECTION, SOLUTION EPIDURAL; INFILTRATION; INTRACAUDAL; PERINEURAL
Status: DISCONTINUED | OUTPATIENT
Start: 2024-07-26 | End: 2024-07-26 | Stop reason: HOSPADM

## 2024-07-26 RX ADMIN — ALPRAZOLAM 1 MG: 0.5 TABLET, ORALLY DISINTEGRATING ORAL at 10:07

## 2024-07-26 NOTE — DISCHARGE INSTRUCTIONS
PAIN MANAGEMENT    HOME CARE INSTRUCTIONS   Do not use heat (such as a heating pad) for 24 hours.  You may apply an ice pack to the injection site for 20 minutes at a time for the first 24 hours for soreness/discomfort at injection site   Keep site clean and dry for 24 hours. If bandaid is present, remove when desired.   Do not drive until tomorrow.  Take care when walking after a lumbar injection.   Resume home medication as prescribed today.  Resume Aspirin, Plavix, or Coumadin the day after the procedure unless other wise instructed.    STEROIDS   May take 10-14 days for full effects.  Avoid strenuous exercises for 2 days.      CALL PHYSICIAN FOR:  Severe increase in your usual pain or the appearance of new pain.  Prolonged or increasing weakness or numbness in the legs or arms.  Fever greater than 100 degrees F.  Drainage, redness, active bleeding, or increased swelling at the injection site.  Headache that increases when your head is upright and decreases when you lie flat.    FOR EMERGENCIES:   Go directly to the emergency department for any shortness of breath, chest pain, or problems breathing.

## 2024-07-26 NOTE — OP NOTE
Procedure Note    Procedure Date: 7/26/2024    Procedure Performed:  Caudal epidural steroid injection under fluoroscopy    Indications:  Lucinda Aguilera presents with lumbar radiculitis/radiculopathy secondary to disc herniation, osteophyte/osteophyte complexes, and/or severe degenerative disc disease producing foraminal or central spinal stenosis.  The pain has been present for at least 4 weeks and the patient has failed to respond to noninvasive conservative care.  Pain rated by NRS at baseline prior to intervention is 8/10.  Their radiculitis/radiculopathy and/or neurogenic claudication is severe enough to greatly impact their quality of life or function.     Pre-op diagnosis: Lumbar radiculitis/radiculopathy    Post-op diagnosis: same    Physician: Indra Cuellar MD    IV anxiolysis medications: none    Medications injected: Depomedrol 80mg, 2ml Bupivacaine 0.25%, 6 mL sterile, preservative-free normal saline.    Local anesthetic used: 1% Lidocaine, 1 ml    Estimated Blood Loss: none    Complications:  none    Technique:   The patient was interviewed in the holding area and Risks/Benefits were discussed, including, but not limited to, the possibility of new or different pain, bleeding or infection.   All questions were answered.  The patient understood and accepted risks.  Consent was reviewed.  A time-out was taken to identify patient and procedure side prior to starting the procedure.  After the patient was placed in prone position, the patient was prepped and draped in the usual sterile fashion using ChloraPrep x3 and sterile towels.  Appropriate anatomic landmarks were determined by identifying the sacral hiatus in the lateral fluoroscopic view.  Local anesthetic was given via a 25g 1.5 inch needle by raising a wheal and infiltrating down to the periosteum.  A 25 gauge 3.5 inch needle was introduced thru the sacral hiatus.  Omnipaque was injected to confirm placement in the appropriate area and that there  was no vascular uptake.  The medication was then injected slowly.  The patient tolerated the procedure well.  The patient was monitored after the procedure.  Patient was given post procedure and discharge instructions to follow at home.  The patient was discharged in a stable condition

## 2024-07-26 NOTE — DISCHARGE SUMMARY
[facility]  Discharge Note  Short Stay    Admit Date: 7/26/2024    Discharge Date: 7/26/2024    Attending Physician: Indra Cuellar     Discharge Provider: Indra Cuellar    Diagnoses:  There are no hospital problems to display for this patient.      Discharged Condition: Good    Final Diagnoses: Lumbar radiculopathy [M54.16]    Disposition: Home or Self Care    Hospital Course: No complications, uneventful    Outcome of Hospitalization, Treatment, Procedure, or Surgery:  Patient was admitted for outpatient interventional pain management procedure. The patient tolerated the procedure well with no complications.    Follow up/Patient Instructions:  Follow up as scheduled in Pain Management office in 2-3 weeks.  Patient has received instructions and follow up date and time.    Medications:  Continue previous medications    Discharge Procedure Orders   Notify your health care provider if you experience any of the following:  temperature >100.4     Notify your health care provider if you experience any of the following:  persistent nausea and vomiting or diarrhea     Notify your health care provider if you experience any of the following:  severe uncontrolled pain     Notify your health care provider if you experience any of the following:  redness, tenderness, or signs of infection (pain, swelling, redness, odor or green/yellow discharge around incision site)     Notify your health care provider if you experience any of the following:  difficulty breathing or increased cough     Notify your health care provider if you experience any of the following:  severe persistent headache     Notify your health care provider if you experience any of the following:  worsening rash     Notify your health care provider if you experience any of the following:  persistent dizziness, light-headedness, or visual disturbances     Notify your health care provider if you experience any of the following:  increased confusion or  weakness     Activity as tolerated

## 2024-07-26 NOTE — INTERVAL H&P NOTE
The patient has been examined and the H&P has been reviewed:    I concur with the findings and no changes have occurred since H&P was written.  Updated CBC reviewed.  We will proceed as scheduled.       There are no hospital problems to display for this patient.

## 2024-07-29 VITALS
HEIGHT: 62 IN | TEMPERATURE: 98 F | BODY MASS INDEX: 32.76 KG/M2 | DIASTOLIC BLOOD PRESSURE: 67 MMHG | RESPIRATION RATE: 16 BRPM | HEART RATE: 69 BPM | OXYGEN SATURATION: 96 % | SYSTOLIC BLOOD PRESSURE: 146 MMHG | WEIGHT: 178 LBS

## 2024-07-29 RX ORDER — GABAPENTIN 400 MG/1
400 CAPSULE ORAL 2 TIMES DAILY
Qty: 60 CAPSULE | Refills: 1 | Status: SHIPPED | OUTPATIENT
Start: 2024-07-29 | End: 2024-07-29 | Stop reason: SDUPTHER

## 2024-07-30 ENCOUNTER — PATIENT MESSAGE (OUTPATIENT)
Dept: FAMILY MEDICINE | Facility: CLINIC | Age: 74
End: 2024-07-30
Payer: MEDICARE

## 2024-07-30 RX ORDER — GABAPENTIN 400 MG/1
400 CAPSULE ORAL 2 TIMES DAILY
Qty: 60 CAPSULE | Refills: 1 | Status: SHIPPED | OUTPATIENT
Start: 2024-07-30 | End: 2024-09-28

## 2024-07-31 ENCOUNTER — EXTERNAL CHRONIC CARE MANAGEMENT (OUTPATIENT)
Dept: PRIMARY CARE CLINIC | Facility: CLINIC | Age: 74
End: 2024-07-31
Payer: MEDICARE

## 2024-07-31 ENCOUNTER — LAB VISIT (OUTPATIENT)
Dept: LAB | Facility: HOSPITAL | Age: 74
End: 2024-07-31
Attending: FAMILY MEDICINE
Payer: MEDICARE

## 2024-07-31 DIAGNOSIS — E78.5 HYPERLIPIDEMIA, UNSPECIFIED HYPERLIPIDEMIA TYPE: ICD-10-CM

## 2024-07-31 DIAGNOSIS — I10 PRIMARY HYPERTENSION: ICD-10-CM

## 2024-07-31 LAB
ALBUMIN SERPL BCP-MCNC: 3.8 G/DL (ref 3.5–5.2)
ALP SERPL-CCNC: 89 U/L (ref 55–135)
ALT SERPL W/O P-5'-P-CCNC: 20 U/L (ref 10–44)
ANION GAP SERPL CALC-SCNC: 9 MMOL/L (ref 8–16)
AST SERPL-CCNC: 23 U/L (ref 10–40)
BASOPHILS # BLD AUTO: 0.08 K/UL (ref 0–0.2)
BASOPHILS NFR BLD: 0.6 % (ref 0–1.9)
BILIRUB SERPL-MCNC: 0.3 MG/DL (ref 0.1–1)
BUN SERPL-MCNC: 11 MG/DL (ref 8–23)
CALCIUM SERPL-MCNC: 10.1 MG/DL (ref 8.7–10.5)
CHLORIDE SERPL-SCNC: 99 MMOL/L (ref 95–110)
CHOLEST SERPL-MCNC: 230 MG/DL (ref 120–199)
CHOLEST/HDLC SERPL: 2.3 {RATIO} (ref 2–5)
CO2 SERPL-SCNC: 25 MMOL/L (ref 23–29)
CREAT SERPL-MCNC: 0.7 MG/DL (ref 0.5–1.4)
DIFFERENTIAL METHOD BLD: ABNORMAL
EOSINOPHIL # BLD AUTO: 0.1 K/UL (ref 0–0.5)
EOSINOPHIL NFR BLD: 0.4 % (ref 0–8)
ERYTHROCYTE [DISTWIDTH] IN BLOOD BY AUTOMATED COUNT: 13 % (ref 11.5–14.5)
EST. GFR  (NO RACE VARIABLE): >60 ML/MIN/1.73 M^2
GLUCOSE SERPL-MCNC: 93 MG/DL (ref 70–110)
HCT VFR BLD AUTO: 42.9 % (ref 37–48.5)
HDLC SERPL-MCNC: 99 MG/DL (ref 40–75)
HDLC SERPL: 43 % (ref 20–50)
HGB BLD-MCNC: 14 G/DL (ref 12–16)
IMM GRANULOCYTES # BLD AUTO: 0.09 K/UL (ref 0–0.04)
IMM GRANULOCYTES NFR BLD AUTO: 0.6 % (ref 0–0.5)
LDLC SERPL CALC-MCNC: 104.6 MG/DL (ref 63–159)
LYMPHOCYTES # BLD AUTO: 3.4 K/UL (ref 1–4.8)
LYMPHOCYTES NFR BLD: 24.2 % (ref 18–48)
MCH RBC QN AUTO: 28.7 PG (ref 27–31)
MCHC RBC AUTO-ENTMCNC: 32.6 G/DL (ref 32–36)
MCV RBC AUTO: 88 FL (ref 82–98)
MONOCYTES # BLD AUTO: 0.9 K/UL (ref 0.3–1)
MONOCYTES NFR BLD: 6.4 % (ref 4–15)
NEUTROPHILS # BLD AUTO: 9.6 K/UL (ref 1.8–7.7)
NEUTROPHILS NFR BLD: 67.8 % (ref 38–73)
NONHDLC SERPL-MCNC: 131 MG/DL
NRBC BLD-RTO: 0 /100 WBC
PLATELET # BLD AUTO: 425 K/UL (ref 150–450)
PMV BLD AUTO: 9.8 FL (ref 9.2–12.9)
POTASSIUM SERPL-SCNC: 4.3 MMOL/L (ref 3.5–5.1)
PROT SERPL-MCNC: 7.3 G/DL (ref 6–8.4)
RBC # BLD AUTO: 4.87 M/UL (ref 4–5.4)
SODIUM SERPL-SCNC: 133 MMOL/L (ref 136–145)
TRIGL SERPL-MCNC: 132 MG/DL (ref 30–150)
TSH SERPL DL<=0.005 MIU/L-ACNC: 0.66 UIU/ML (ref 0.4–4)
WBC # BLD AUTO: 14.12 K/UL (ref 3.9–12.7)

## 2024-07-31 PROCEDURE — 99439 CHRNC CARE MGMT STAF EA ADDL: CPT | Mod: PBBFAC,PO | Performed by: FAMILY MEDICINE

## 2024-07-31 PROCEDURE — 80061 LIPID PANEL: CPT | Performed by: FAMILY MEDICINE

## 2024-07-31 PROCEDURE — 99490 CHRNC CARE MGMT STAFF 1ST 20: CPT | Mod: S$PBB,,, | Performed by: FAMILY MEDICINE

## 2024-07-31 PROCEDURE — 85025 COMPLETE CBC W/AUTO DIFF WBC: CPT | Performed by: FAMILY MEDICINE

## 2024-07-31 PROCEDURE — 99439 CHRNC CARE MGMT STAF EA ADDL: CPT | Mod: S$PBB,,, | Performed by: FAMILY MEDICINE

## 2024-07-31 PROCEDURE — 36415 COLL VENOUS BLD VENIPUNCTURE: CPT | Mod: PO | Performed by: FAMILY MEDICINE

## 2024-07-31 PROCEDURE — 84443 ASSAY THYROID STIM HORMONE: CPT | Performed by: FAMILY MEDICINE

## 2024-07-31 PROCEDURE — 80053 COMPREHEN METABOLIC PANEL: CPT | Performed by: FAMILY MEDICINE

## 2024-07-31 PROCEDURE — 99490 CHRNC CARE MGMT STAFF 1ST 20: CPT | Mod: PBBFAC,PO | Performed by: FAMILY MEDICINE

## 2024-08-07 ENCOUNTER — LAB VISIT (OUTPATIENT)
Dept: LAB | Facility: HOSPITAL | Age: 74
End: 2024-08-07
Attending: FAMILY MEDICINE
Payer: MEDICARE

## 2024-08-07 ENCOUNTER — OFFICE VISIT (OUTPATIENT)
Dept: FAMILY MEDICINE | Facility: CLINIC | Age: 74
End: 2024-08-07
Payer: MEDICARE

## 2024-08-07 VITALS
HEART RATE: 64 BPM | BODY MASS INDEX: 31.8 KG/M2 | SYSTOLIC BLOOD PRESSURE: 126 MMHG | HEIGHT: 62 IN | RESPIRATION RATE: 18 BRPM | WEIGHT: 172.81 LBS | OXYGEN SATURATION: 95 % | DIASTOLIC BLOOD PRESSURE: 62 MMHG

## 2024-08-07 DIAGNOSIS — D72.829 LEUKOCYTOSIS, UNSPECIFIED TYPE: ICD-10-CM

## 2024-08-07 DIAGNOSIS — E87.1 HYPONATREMIA: ICD-10-CM

## 2024-08-07 DIAGNOSIS — E78.49 OTHER HYPERLIPIDEMIA: ICD-10-CM

## 2024-08-07 DIAGNOSIS — R11.0 NAUSEA: ICD-10-CM

## 2024-08-07 DIAGNOSIS — I10 PRIMARY HYPERTENSION: Primary | ICD-10-CM

## 2024-08-07 DIAGNOSIS — E78.5 HYPERLIPIDEMIA, UNSPECIFIED HYPERLIPIDEMIA TYPE: ICD-10-CM

## 2024-08-07 LAB
ANION GAP SERPL CALC-SCNC: 10 MMOL/L (ref 8–16)
BASOPHILS # BLD AUTO: 0.07 K/UL (ref 0–0.2)
BASOPHILS NFR BLD: 0.5 % (ref 0–1.9)
BUN SERPL-MCNC: 13 MG/DL (ref 8–23)
CALCIUM SERPL-MCNC: 9.6 MG/DL (ref 8.7–10.5)
CHLORIDE SERPL-SCNC: 101 MMOL/L (ref 95–110)
CO2 SERPL-SCNC: 22 MMOL/L (ref 23–29)
CREAT SERPL-MCNC: 0.8 MG/DL (ref 0.5–1.4)
DIFFERENTIAL METHOD BLD: ABNORMAL
EOSINOPHIL # BLD AUTO: 0.1 K/UL (ref 0–0.5)
EOSINOPHIL NFR BLD: 0.5 % (ref 0–8)
ERYTHROCYTE [DISTWIDTH] IN BLOOD BY AUTOMATED COUNT: 13 % (ref 11.5–14.5)
EST. GFR  (NO RACE VARIABLE): >60 ML/MIN/1.73 M^2
GLUCOSE SERPL-MCNC: 86 MG/DL (ref 70–110)
HCT VFR BLD AUTO: 42 % (ref 37–48.5)
HGB BLD-MCNC: 13.7 G/DL (ref 12–16)
IMM GRANULOCYTES # BLD AUTO: 0.06 K/UL (ref 0–0.04)
IMM GRANULOCYTES NFR BLD AUTO: 0.5 % (ref 0–0.5)
LYMPHOCYTES # BLD AUTO: 3.3 K/UL (ref 1–4.8)
LYMPHOCYTES NFR BLD: 25.1 % (ref 18–48)
MCH RBC QN AUTO: 28.7 PG (ref 27–31)
MCHC RBC AUTO-ENTMCNC: 32.6 G/DL (ref 32–36)
MCV RBC AUTO: 88 FL (ref 82–98)
MONOCYTES # BLD AUTO: 1.3 K/UL (ref 0.3–1)
MONOCYTES NFR BLD: 10 % (ref 4–15)
NEUTROPHILS # BLD AUTO: 8.5 K/UL (ref 1.8–7.7)
NEUTROPHILS NFR BLD: 63.4 % (ref 38–73)
NRBC BLD-RTO: 0 /100 WBC
PLATELET # BLD AUTO: 383 K/UL (ref 150–450)
PMV BLD AUTO: 9.9 FL (ref 9.2–12.9)
POTASSIUM SERPL-SCNC: 4.3 MMOL/L (ref 3.5–5.1)
RBC # BLD AUTO: 4.78 M/UL (ref 4–5.4)
SODIUM SERPL-SCNC: 133 MMOL/L (ref 136–145)
WBC # BLD AUTO: 13.33 K/UL (ref 3.9–12.7)

## 2024-08-07 PROCEDURE — G2211 COMPLEX E/M VISIT ADD ON: HCPCS | Mod: S$PBB,,, | Performed by: FAMILY MEDICINE

## 2024-08-07 PROCEDURE — 99214 OFFICE O/P EST MOD 30 MIN: CPT | Mod: S$PBB,,, | Performed by: FAMILY MEDICINE

## 2024-08-07 PROCEDURE — 99999 PR PBB SHADOW E&M-EST. PATIENT-LVL III: CPT | Mod: PBBFAC,,, | Performed by: FAMILY MEDICINE

## 2024-08-07 PROCEDURE — 36415 COLL VENOUS BLD VENIPUNCTURE: CPT | Mod: PO | Performed by: FAMILY MEDICINE

## 2024-08-07 PROCEDURE — 80048 BASIC METABOLIC PNL TOTAL CA: CPT | Performed by: FAMILY MEDICINE

## 2024-08-07 PROCEDURE — 99213 OFFICE O/P EST LOW 20 MIN: CPT | Mod: PBBFAC,PO | Performed by: FAMILY MEDICINE

## 2024-08-07 PROCEDURE — 85025 COMPLETE CBC W/AUTO DIFF WBC: CPT | Performed by: FAMILY MEDICINE

## 2024-08-07 RX ORDER — ROSUVASTATIN CALCIUM 20 MG/1
20 TABLET, COATED ORAL DAILY
Qty: 30 TABLET | Refills: 11 | Status: SHIPPED | OUTPATIENT
Start: 2024-08-07

## 2024-08-07 RX ORDER — ATENOLOL 25 MG/1
25 TABLET ORAL NIGHTLY
Qty: 30 TABLET | Refills: 11 | Status: SHIPPED | OUTPATIENT
Start: 2024-08-07 | End: 2025-08-07

## 2024-08-07 RX ORDER — ONDANSETRON 4 MG/1
4 TABLET, ORALLY DISINTEGRATING ORAL EVERY 6 HOURS PRN
Qty: 30 TABLET | Refills: 3 | Status: SHIPPED | OUTPATIENT
Start: 2024-08-07

## 2024-08-07 RX ORDER — DILTIAZEM HYDROCHLORIDE 240 MG/1
240 CAPSULE, EXTENDED RELEASE ORAL NIGHTLY
Qty: 30 CAPSULE | Refills: 11 | Status: SHIPPED | OUTPATIENT
Start: 2024-08-07 | End: 2025-08-07

## 2024-08-07 RX ORDER — LISINOPRIL 40 MG/1
40 TABLET ORAL 2 TIMES DAILY
Qty: 60 TABLET | Refills: 11 | Status: SHIPPED | OUTPATIENT
Start: 2024-08-07 | End: 2025-08-07

## 2024-08-07 NOTE — PROGRESS NOTES
Subjective:       Patient ID: Lucinda Aguilera is a 73 y.o. female.    Chief Complaint: Hypertension (6 month follow up)    HPI Comments: Here for f/u on chronic health issues    doing some daily exercise.    WBC was elevated on recent labs.  She had VALERY on 7/26 and labs were on 7/31.    Allergic rhinitis - on Astelin and Flonase  Hyponatremia, SIADH - Chlorthalidone was stopped. Following with nephrology, Dr. Engel.  Depression, anxiety - following with psychiatry; using Ativan daily; stopped Effexor XR 37.5mg due to hyponatremia and now taking wellbutrin XL 150mg  HLD, aortic atherosclerosis - tolerating crestor 20mg daily  HTN - tolerating Atenolol 25mg daily, diltiazem 240, lasix 20mg PRN and lisinopril 40mg BID  She got some swelling with amlodipine  GERD - tolerating Protonix 40mg BID;   S/p conner - taking colestipol  Colitis - stable on Colazal, miralax    Past Medical History:    Hypertension                                                  Anxiety                                           Hyperlipemia                                                  GERD (gastroesophageal reflux disease)                        Cataract                                                        Comment:OU    PVD (posterior vitreous detachment)                             Comment:OD    Arthritis                                                       Comment:right thumb    Past Surgical History:    chest abcess                                                     Comment:child    KNEE ARTHROSCOPY W/ LASER                                        Comment:right    COLONOSCOPY                                      1/2012          Comment:repeat in 5 years    No Known Allergies    Social History    Marital Status:              Spouse Name:                       Years of Education:                 Number of children: 2             Occupational History  Occupation          Employer            Comment               retired                                    retired teacher an*                         Social History Main Topics    Smoking Status: Never Smoker                      Smokeless Status: Never Used                        Alcohol Use: Yes                Comment: social    Drug Use: No              Sexual Activity: Yes               Partners with: Male       Birth Control/Protection: None, Post-menopausal    Current Outpatient Medications on File Prior to Visit   Medication Sig Dispense Refill    acetaminophen (TYLENOL) 500 MG tablet Take 500 mg by mouth every 6 (six) hours as needed for Pain.      ascorbic acid, vitamin C, (VITAMIN C) 250 MG tablet Take 500 mg by mouth once daily.       azelastine (ASTELIN) 137 mcg (0.1 %) nasal spray 1 spray by Nasal route 2 (two) times daily.      balsalazide (COLAZAL) 750 mg capsule TAKE 3 CAPSULES(2250 MG) BY MOUTH TWICE DAILY WITH MEALS 180 capsule 11    benzonatate (TESSALON) 200 MG capsule Take 1 capsule (200 mg total) by mouth 3 (three) times daily as needed for Cough. 30 capsule 1    buPROPion (WELLBUTRIN XL) 150 MG TB24 tablet Take 1 tablet (150 mg total) by mouth once daily. 30 tablet 0    docusate sodium (COLACE) 100 MG capsule Take 100 mg by mouth 2 (two) times daily.      famotidine (PEPCID) 40 MG tablet Take 1 tablet (40 mg total) by mouth every evening. 90 tablet 3    famotidine-calcium carbonate-magnesium hydroxide (PEPCID COMPLETE) -165 mg Take 1 tablet by mouth 2 (two) times daily as needed. Chew 1 or 2 tablets, 2-3 times a day, as needed, for nausea or heartburn.      fluticasone propionate (FLONASE) 50 mcg/actuation nasal spray 1 spray (50 mcg total) by Each Nostril route once daily. 16 g 3    LORazepam (ATIVAN) 0.5 MG tablet Take 0.5 tablets (0.25 mg total) by mouth 3 (three) times daily as needed for Anxiety. 45 tablet 0    magnesium oxide (MAG-OX) 400 mg (241.3 mg magnesium) tablet Take 1 tablet (400 mg total) by mouth once daily. 30 tablet 3    multivitamin (THERAGRAN) per  "tablet Take 1 tablet by mouth once daily.      pantoprazole (PROTONIX) 40 MG tablet TAKE 1 TABLET(40 MG) BY MOUTH TWICE DAILY 180 tablet 3    psyllium husk, aspartame, (NATURAL PSYLLIUM FIBER) 3.4 gram/5.8 gram Powd Take by mouth.       No current facility-administered medications on file prior to visit.     Review of patient's family history indicates:    Hypertension                   Mother                    Heart disease                  Mother                    Stroke                         Father                    Review of Systems   Constitutional: Negative for fever, chills, appetite change and unexpected weight change.   HENT: Negative for sore throat and trouble swallowing.    Eyes: Negative for pain and visual disturbance.   Respiratory: Negative for cough, shortness of breath and wheezing.    Cardiovascular: Negative for chest pain and palpitations.   Gastrointestinal: Negative for nausea, vomiting, abdominal pain, diarrhea and blood in stool.   Genitourinary: Negative for dysuria, hematuria and difficulty urinating.   Musculoskeletal: Negative for arthralgias, gait problem and neck pain.   Skin: Negative for rash and wound.   Neurological: Negative for dizziness, weakness, numbness and headaches.   Hematological: Negative for adenopathy.   Psychiatric/Behavioral: Negative for dysphoric mood.           Objective:       /62   Pulse 64   Resp 18   Ht 5' 2" (1.575 m)   Wt 78.4 kg (172 lb 13.5 oz)   LMP 04/18/2004   SpO2 95%   BMI 31.61 kg/m²     Physical Exam   Constitutional: She is oriented to person, place, and time. She appears well-developed and well-nourished.   HENT:   Head: Normocephalic.   Mouth/Throat: Oropharynx is clear and moist. No oropharyngeal exudate or posterior oropharyngeal erythema.   Eyes: Conjunctivae and EOM are normal. Pupils are equal, round, and reactive to light.   Neck: Normal range of motion. Neck supple. No thyromegaly present.   Cardiovascular: Normal rate, " regular rhythm, S1 normal, S2 normal, normal heart sounds and intact distal pulses.  Exam reveals no gallop and no friction rub.    No murmur heard.  Pulmonary/Chest: Effort normal and breath sounds normal. She has no wheezes. She has no rales.   Abdominal: Normal appearance.   Musculoskeletal:        Right lower leg: She exhibits no edema.        Left lower leg: She exhibits no edema.   Lymphadenopathy:     She has no cervical adenopathy.   Neurological: She is alert and oriented to person, place, and time. No cranial nerve deficit. Gait normal.   Skin: Skin is warm and intact. No rash noted.   Psychiatric: She has a normal mood and affect.         Results for orders placed or performed in visit on 07/31/24   Comprehensive Metabolic Panel   Result Value Ref Range    Sodium 133 (L) 136 - 145 mmol/L    Potassium 4.3 3.5 - 5.1 mmol/L    Chloride 99 95 - 110 mmol/L    CO2 25 23 - 29 mmol/L    Glucose 93 70 - 110 mg/dL    BUN 11 8 - 23 mg/dL    Creatinine 0.7 0.5 - 1.4 mg/dL    Calcium 10.1 8.7 - 10.5 mg/dL    Total Protein 7.3 6.0 - 8.4 g/dL    Albumin 3.8 3.5 - 5.2 g/dL    Total Bilirubin 0.3 0.1 - 1.0 mg/dL    Alkaline Phosphatase 89 55 - 135 U/L    AST 23 10 - 40 U/L    ALT 20 10 - 44 U/L    eGFR >60.0 >60 mL/min/1.73 m^2    Anion Gap 9 8 - 16 mmol/L   Lipid Panel   Result Value Ref Range    Cholesterol 230 (H) 120 - 199 mg/dL    Triglycerides 132 30 - 150 mg/dL    HDL 99 (H) 40 - 75 mg/dL    LDL Cholesterol 104.6 63.0 - 159.0 mg/dL    HDL/Cholesterol Ratio 43.0 20.0 - 50.0 %    Total Cholesterol/HDL Ratio 2.3 2.0 - 5.0    Non-HDL Cholesterol 131 mg/dL   CBC Auto Differential   Result Value Ref Range    WBC 14.12 (H) 3.90 - 12.70 K/uL    RBC 4.87 4.00 - 5.40 M/uL    Hemoglobin 14.0 12.0 - 16.0 g/dL    Hematocrit 42.9 37.0 - 48.5 %    MCV 88 82 - 98 fL    MCH 28.7 27.0 - 31.0 pg    MCHC 32.6 32.0 - 36.0 g/dL    RDW 13.0 11.5 - 14.5 %    Platelets 425 150 - 450 K/uL    MPV 9.8 9.2 - 12.9 fL    Immature Granulocytes  0.6 (H) 0.0 - 0.5 %    Gran # (ANC) 9.6 (H) 1.8 - 7.7 K/uL    Immature Grans (Abs) 0.09 (H) 0.00 - 0.04 K/uL    Lymph # 3.4 1.0 - 4.8 K/uL    Mono # 0.9 0.3 - 1.0 K/uL    Eos # 0.1 0.0 - 0.5 K/uL    Baso # 0.08 0.00 - 0.20 K/uL    nRBC 0 0 /100 WBC    Gran % 67.8 38.0 - 73.0 %    Lymph % 24.2 18.0 - 48.0 %    Mono % 6.4 4.0 - 15.0 %    Eosinophil % 0.4 0.0 - 8.0 %    Basophil % 0.6 0.0 - 1.9 %    Differential Method Automated    TSH   Result Value Ref Range    TSH 0.655 0.400 - 4.000 uIU/mL     *Note: Due to a large number of results and/or encounters for the requested time period, some results have not been displayed. A complete set of results can be found in Results Review.         Assessment:       1. Primary hypertension    2. Leukocytosis, unspecified type    3. Nausea    4. Other hyperlipidemia    5. Hyponatremia    6. Hyperlipidemia, unspecified hyperlipidemia type                        Plan:       Primary hypertension  -     diltiaZEM (DILACOR XR) 240 MG CDCR; Take 1 capsule (240 mg total) by mouth every evening.  Dispense: 30 capsule; Refill: 11  -     atenoloL (TENORMIN) 25 MG tablet; Take 1 tablet (25 mg total) by mouth every evening.  Dispense: 30 tablet; Refill: 11  -     lisinopriL (PRINIVIL,ZESTRIL) 40 MG tablet; Take 1 tablet (40 mg total) by mouth 2 (two) times a day.  Dispense: 60 tablet; Refill: 11    Leukocytosis, unspecified type  -     CBC Auto Differential; Future; Expected date: 08/07/2024    Nausea  -     ondansetron (ZOFRAN-ODT) 4 MG TbDL; Take 1 tablet (4 mg total) by mouth every 6 (six) hours as needed (nausea).  Dispense: 30 tablet; Refill: 3    Other hyperlipidemia  -     rosuvastatin (CRESTOR) 20 MG tablet; Take 1 tablet (20 mg total) by mouth once daily.  Dispense: 30 tablet; Refill: 11    Hyponatremia  -     Basic Metabolic Panel; Future; Expected date: 08/07/2024    Hyperlipidemia, unspecified hyperlipidemia type      Labs today to reevaluate WBC and sodium  Consider CXR and UA if  WBC still rising  F/u with nephrology as planned  continue Ativan BID, wellbutrin  Overall stable  cotninue aspirin 81mg daily  Continue other present meds  Counseled on regular exercise, maintenance of a healthy weight, balanced diet rich in fruits/vegetables and lean protein, and avoidance of unhealthy habits like smoking and excessive alcohol intake.  F/u 6 months     Visit today included increased complexity associated with the care of the episodic problems listed above addressed and managing the longitudinal care of the patient due to the serious and/or complex managed problem(s) listed above.

## 2024-08-08 ENCOUNTER — TELEPHONE (OUTPATIENT)
Dept: NEUROSURGERY | Facility: CLINIC | Age: 74
End: 2024-08-08
Payer: MEDICARE

## 2024-08-08 ENCOUNTER — OFFICE VISIT (OUTPATIENT)
Dept: PAIN MEDICINE | Facility: CLINIC | Age: 74
End: 2024-08-08
Payer: MEDICARE

## 2024-08-08 ENCOUNTER — PATIENT MESSAGE (OUTPATIENT)
Dept: ORTHOPEDICS | Facility: CLINIC | Age: 74
End: 2024-08-08
Payer: MEDICARE

## 2024-08-08 VITALS
HEART RATE: 63 BPM | DIASTOLIC BLOOD PRESSURE: 71 MMHG | HEIGHT: 62 IN | SYSTOLIC BLOOD PRESSURE: 133 MMHG | WEIGHT: 172.5 LBS | BODY MASS INDEX: 31.74 KG/M2

## 2024-08-08 DIAGNOSIS — M54.16 LUMBAR RADICULOPATHY: Primary | ICD-10-CM

## 2024-08-08 DIAGNOSIS — M48.061 SPINAL STENOSIS OF LUMBAR REGION, UNSPECIFIED WHETHER NEUROGENIC CLAUDICATION PRESENT: ICD-10-CM

## 2024-08-08 DIAGNOSIS — M47.816 LUMBAR SPONDYLOSIS: ICD-10-CM

## 2024-08-08 DIAGNOSIS — M54.16 LUMBAR RADICULOPATHY, CHRONIC: ICD-10-CM

## 2024-08-08 PROCEDURE — 99999 PR PBB SHADOW E&M-EST. PATIENT-LVL V: CPT | Mod: PBBFAC,,,

## 2024-08-08 PROCEDURE — 99215 OFFICE O/P EST HI 40 MIN: CPT | Mod: PBBFAC,PO

## 2024-08-08 PROCEDURE — 99214 OFFICE O/P EST MOD 30 MIN: CPT | Mod: S$PBB,,,

## 2024-08-08 RX ORDER — PREGABALIN 75 MG/1
75 CAPSULE ORAL 2 TIMES DAILY
Qty: 60 CAPSULE | Refills: 1 | Status: SHIPPED | OUTPATIENT
Start: 2024-08-08 | End: 2024-08-09 | Stop reason: SDUPTHER

## 2024-08-09 ENCOUNTER — PATIENT MESSAGE (OUTPATIENT)
Dept: PAIN MEDICINE | Facility: CLINIC | Age: 74
End: 2024-08-09
Payer: MEDICARE

## 2024-08-09 DIAGNOSIS — M54.16 LUMBAR RADICULOPATHY: ICD-10-CM

## 2024-08-09 RX ORDER — PREGABALIN 75 MG/1
75 CAPSULE ORAL NIGHTLY
Qty: 30 CAPSULE | Refills: 1 | OUTPATIENT
Start: 2024-08-09 | End: 2024-09-08

## 2024-08-09 RX ORDER — PREGABALIN 75 MG/1
75 CAPSULE ORAL NIGHTLY
Qty: 90 CAPSULE | Refills: 0 | Status: SHIPPED | OUTPATIENT
Start: 2024-08-09 | End: 2024-11-07

## 2024-08-13 ENCOUNTER — OFFICE VISIT (OUTPATIENT)
Dept: ORTHOPEDICS | Facility: CLINIC | Age: 74
End: 2024-08-13
Payer: MEDICARE

## 2024-08-13 VITALS — WEIGHT: 172.63 LBS | HEIGHT: 62 IN | BODY MASS INDEX: 31.77 KG/M2

## 2024-08-13 DIAGNOSIS — M16.11 PRIMARY OSTEOARTHRITIS OF RIGHT HIP: Primary | ICD-10-CM

## 2024-08-13 PROCEDURE — 99999 PR PBB SHADOW E&M-EST. PATIENT-LVL III: CPT | Mod: PBBFAC,,, | Performed by: ORTHOPAEDIC SURGERY

## 2024-08-13 PROCEDURE — 99213 OFFICE O/P EST LOW 20 MIN: CPT | Mod: PBBFAC,PO | Performed by: ORTHOPAEDIC SURGERY

## 2024-08-13 PROCEDURE — 99213 OFFICE O/P EST LOW 20 MIN: CPT | Mod: S$PBB,,, | Performed by: ORTHOPAEDIC SURGERY

## 2024-08-13 RX ORDER — METHOCARBAMOL 750 MG/1
750 TABLET, FILM COATED ORAL 4 TIMES DAILY PRN
Qty: 44 TABLET | Refills: 3 | Status: SHIPPED | OUTPATIENT
Start: 2024-08-13

## 2024-08-14 DIAGNOSIS — F41.1 GAD (GENERALIZED ANXIETY DISORDER): ICD-10-CM

## 2024-08-15 ENCOUNTER — PATIENT MESSAGE (OUTPATIENT)
Dept: ORTHOPEDICS | Facility: CLINIC | Age: 74
End: 2024-08-15
Payer: MEDICARE

## 2024-08-15 ENCOUNTER — OFFICE VISIT (OUTPATIENT)
Dept: NEUROSURGERY | Facility: CLINIC | Age: 74
End: 2024-08-15
Payer: MEDICARE

## 2024-08-15 VITALS
HEIGHT: 62 IN | WEIGHT: 172.63 LBS | SYSTOLIC BLOOD PRESSURE: 117 MMHG | BODY MASS INDEX: 31.77 KG/M2 | HEART RATE: 61 BPM | DIASTOLIC BLOOD PRESSURE: 69 MMHG | RESPIRATION RATE: 18 BRPM

## 2024-08-15 DIAGNOSIS — F33.1 MDD (MAJOR DEPRESSIVE DISORDER), RECURRENT EPISODE, MODERATE: ICD-10-CM

## 2024-08-15 DIAGNOSIS — M25.551 PAIN OF RIGHT HIP: Primary | ICD-10-CM

## 2024-08-15 DIAGNOSIS — M43.16 SPONDYLOLISTHESIS OF LUMBAR REGION: ICD-10-CM

## 2024-08-15 DIAGNOSIS — M48.062 LUMBAR STENOSIS WITH NEUROGENIC CLAUDICATION: Primary | ICD-10-CM

## 2024-08-15 RX ORDER — BUPROPION HYDROCHLORIDE 150 MG/1
150 TABLET ORAL DAILY
Qty: 30 TABLET | Refills: 1 | Status: SHIPPED | OUTPATIENT
Start: 2024-08-15 | End: 2024-10-14

## 2024-08-15 RX ORDER — LORAZEPAM 0.5 MG/1
0.25 TABLET ORAL 3 TIMES DAILY PRN
Qty: 45 TABLET | Refills: 0 | Status: SHIPPED | OUTPATIENT
Start: 2024-08-16 | End: 2024-09-15

## 2024-08-15 NOTE — PROGRESS NOTES
"[facility] Neurosurgery - [facility]  Clinic Consult     Consult Requested By: Nick Arnold MD  PCP: Saurabh Hassan MD    SUBJECTIVE:     Chief Complaint:   Chief Complaint   Patient presents with    Lumbar Spine Pain (L-Spine)     Right sided lumbar radiculopathy x years.       History of Present Illness:  Lucinda Aguilera is a 73 y.o. female with HTN, HLD who presents for evaluation of leg pain. She denies back pain. Patient report right leg pain. She reports worsening pain in her legs with ambulation. She cannot walk long distances. She notes she can tolerate walking longer if she leans on a shopping cart. She has relief when seated. She has seen pain management and received L5-S1 and caudal ESIs without relief.     Pertinent and recent history, provider evaluations, imaging and data reviewed in EPIC        Past Medical History:   Diagnosis Date    Anticoagulant long-term use     Anxiety     takes daily Xanax    Arthritis     right thumb    Cataract     OU    Cholelithiasis     GERD (gastroesophageal reflux disease)     Hepatic steatosis     Hyperlipemia     Hypertension     PVD (posterior vitreous detachment)     OD     Past Surgical History:   Procedure Laterality Date    BREAST BIOPSY Left 08/28/2020    stereo biopsy    BREAST BIOPSY Left 10/07/2020    benign excisional biopsy    CAUDAL EPIDURAL STEROID INJECTION N/A 7/26/2024    Procedure: Injection-steroid-epidural-caudal;  Surgeon: Indra Cuellar MD;  Location: [facility] OR;  Service: Pain Management;  Laterality: N/A;    chest abcess      child    COLONOSCOPY  01/06/2012    Dr. Lopez: hemorrhoids, redundant colon    COLONOSCOPY N/A 02/17/2021    Dr. Lopez: Congested and erythematous mucosa in the rectum, in the recto-sigmoid colon and in the sigmoid colon, proctosigmoid colitis, Redundant colon; biopsy: focal active colitis "nonspecific but can be seen with acute self-limited colitis (infection), medication effect (e.g. NSAID " use), proximity to diverticula, or early/evolving IBD    EPIDURAL STEROID INJECTION INTO LUMBAR SPINE N/A 5/30/2024    Procedure: Injection-steroid-epidural-lumbar   L5/S1;  Surgeon: Indra Cuellar MD;  Location: [facility];  Service: Pain Management;  Laterality: N/A;    ESOPHAGOGASTRODUODENOSCOPY N/A 06/06/2019    Dr. Lopez: small hiatal hernia, Non-erosive esophageal reflux (NERD) disease?., slight antritis; biopsy: Antral mucosa with chemical/reactive gastropathy. negative for h pylori    ESOPHAGOGASTRODUODENOSCOPY N/A 02/17/2021    Procedure: EGD (ESOPHAGOGASTRODUODENOSCOPY);  Surgeon: Nathan Lopez Jr., MD;  Location: [facility];  Service: Endoscopy;  Laterality: N/A; Minimal gastritis; biopsy: stomach- negative for h pylori, active chronic gastritis with focal features of erosive gastritis, duodenum WNL    ESOPHAGOGASTRODUODENOSCOPY N/A 05/10/2022    Procedure: EGD (ESOPHAGOGASTRODUODENOSCOPY);  Surgeon: Nathan Lopez Jr., MD;  Location: [facility];  Service: Endoscopy;  Laterality: N/A;    ESOPHAGOGASTRODUODENOSCOPY N/A 3/12/2024    Procedure: EGD (ESOPHAGOGASTRODUODENOSCOPY);  Surgeon: Nathan Lopez Jr., MD;  Location: [facility];  Service: Endoscopy;  Laterality: N/A;    EXCISIONAL BIOPSY Left 10/07/2020    Procedure: EXCISIONAL BIOPSY-Left with radiological marker;  Surgeon: Brooklynn Ashford MD;  Location: [facility];  Service: General;  Laterality: Left;    FRACTURE SURGERY  2010    Left thumb repaired surgically    JOINT REPLACEMENT Bilateral     knee    KNEE ARTHROSCOPY W/ LASER      right    LAPAROSCOPIC CHOLECYSTECTOMY N/A 06/14/2019    Procedure: CHOLECYSTECTOMY, LAPAROSCOPIC;  Surgeon: Guevara Evans MD;  Location: [facility];  Service: General;  Laterality: N/A;    thumb surgery  11/2014    TOTAL KNEE ARTHROPLASTY Left 06/19/2018    Procedure: REPLACEMENT-KNEE-TOTAL;  Surgeon: Nj Melvin MD;  Location: [address];  Service: Orthopedics;  [facility]: [facility]  GASTROINTESTINAL ENDOSCOPY  07/13/2017    Dr. Lopez: NERD, Gastric mucosal atrophy. antritis, Scar in the incisura. Biopsied; biopsy: chronic gastritis. negative for h pylori     Family History   Problem Relation Name Age of Onset    Hypertension Mother age 82     Heart disease Mother age 82     Glaucoma Mother age 82     Stroke Father age 50     Glaucoma Sister      Cataracts Sister      Macular degeneration Sister      Breast cancer Neg Hx      Colon cancer Neg Hx      Crohn's disease Neg Hx      Ulcerative colitis Neg Hx      Stomach cancer Neg Hx      Esophageal cancer Neg Hx      Celiac disease Neg Hx      Amblyopia Neg Hx      Blindness Neg Hx      Retinal detachment Neg Hx      Strabismus Neg Hx       Social History     Tobacco Use    Smoking status: Never    Smokeless tobacco: Never   Substance Use Topics    Alcohol use: Not Currently     Comment: social- maybe once every few months    Drug use: No      Review of patient's allergies indicates:  No Known Allergies    Current Outpatient Medications:     acetaminophen (TYLENOL) 500 MG tablet, Take 500 mg by mouth every 6 (six) hours as needed for Pain., Disp: , Rfl:     ascorbic acid, vitamin C, (VITAMIN C) 250 MG tablet, Take 500 mg by mouth once daily. , Disp: , Rfl:     atenoloL (TENORMIN) 25 MG tablet, Take 1 tablet (25 mg total) by mouth every evening., Disp: 30 tablet, Rfl: 11    azelastine (ASTELIN) 137 mcg (0.1 %) nasal spray, 1 spray by Nasal route 2 (two) times daily., Disp: , Rfl:     balsalazide (COLAZAL) 750 mg capsule, TAKE 3 CAPSULES(2250 MG) BY MOUTH TWICE DAILY WITH MEALS, Disp: 180 capsule, Rfl: 11    benzonatate (TESSALON) 200 MG capsule, Take 1 capsule (200 mg total) by mouth 3 (three) times daily as needed for Cough., Disp: 30 capsule, Rfl: 1    diltiaZEM (DILACOR XR) 240 MG CDCR, Take 1 capsule (240 mg total) by mouth every evening., Disp: 30 capsule, Rfl: 11    docusate sodium (COLACE) 100 MG capsule, Take 100 mg by mouth 2 (two) times  daily., Disp: , Rfl:     famotidine (PEPCID) 40 MG tablet, Take 1 tablet (40 mg total) by mouth every evening., Disp: 90 tablet, Rfl: 3    famotidine-calcium carbonate-magnesium hydroxide (PEPCID COMPLETE) -165 mg, Take 1 tablet by mouth 2 (two) times daily as needed. Chew 1 or 2 tablets, 2-3 times a day, as needed, for nausea or heartburn., Disp: , Rfl:     fluticasone propionate (FLONASE) 50 mcg/actuation nasal spray, 1 spray (50 mcg total) by Each Nostril route once daily., Disp: 16 g, Rfl: 3    lisinopriL (PRINIVIL,ZESTRIL) 40 MG tablet, Take 1 tablet (40 mg total) by mouth 2 (two) times a day., Disp: 60 tablet, Rfl: 11    LORazepam (ATIVAN) 0.5 MG tablet, Take 0.5 tablets (0.25 mg total) by mouth 3 (three) times daily as needed for Anxiety., Disp: 45 tablet, Rfl: 0    magnesium oxide (MAG-OX) 400 mg (241.3 mg magnesium) tablet, Take 1 tablet (400 mg total) by mouth once daily., Disp: 30 tablet, Rfl: 3    methocarbamoL (ROBAXIN) 750 MG Tab, Take 1 tablet (750 mg total) by mouth 4 (four) times daily as needed., Disp: 44 tablet, Rfl: 3    multivitamin (THERAGRAN) per tablet, Take 1 tablet by mouth once daily., Disp: , Rfl:     ondansetron (ZOFRAN-ODT) 4 MG TbDL, Take 1 tablet (4 mg total) by mouth every 6 (six) hours as needed (nausea)., Disp: 30 tablet, Rfl: 3    pantoprazole (PROTONIX) 40 MG tablet, TAKE 1 TABLET(40 MG) BY MOUTH TWICE DAILY, Disp: 180 tablet, Rfl: 3    psyllium husk, aspartame, (NATURAL PSYLLIUM FIBER) 3.4 gram/5.8 gram Powd, Take by mouth., Disp: , Rfl:     rosuvastatin (CRESTOR) 20 MG tablet, Take 1 tablet (20 mg total) by mouth once daily., Disp: 30 tablet, Rfl: 11    buPROPion (WELLBUTRIN XL) 150 MG TB24 tablet, Take 1 tablet (150 mg total) by mouth once daily., Disp: 30 tablet, Rfl: 0    pregabalin (LYRICA) 75 MG capsule, Take 1 capsule (75 mg total) by mouth every evening., Disp: 90 capsule, Rfl: 0    Review of Systems:   Constitutional: no fever, chills or night sweats. No changes  "in weight   Eyes: no visual changes   ENT: no nasal congestion or sore throat   Respiratory: no cough or shortness of breath   Cardiovascular: no chest pain or palpitations   Gastrointestinal: no nausea or vomiting   Genitourinary: no hematuria or dysuria   Integument/Breast: no rash or pruritis   Hematologic/Lymphatic: no easy bruising or lymphadenopathy   Musculoskeletal: +leg pain   Neurological: no seizures or tremors   Behavioral/Psych: no auditory or visual hallucinations   Endocrine: no heat or cold intolerance         OBJECTIVE:     Vital Signs (Most Recent):  Resp: 18 (08/15/24 0943)  Estimated body mass index is 31.57 kg/m² as calculated from the following:    Height as of this encounter: 5' 2" (1.575 m).    Weight as of this encounter: 78.3 kg (172 lb 9.9 oz).    Physical Exam:   General: well developed, well nourished, no distress   Neurologic: Alert and oriented. Thought content appropriate. GCS 15.   Head: normocephalic, atraumatic  Eyes: EOMI  Neck: trachea midline, no JVD   Cardiovascular: no LE edema  Pulmonary: normal respirations, no signs of respiratory distress  Abdomen: non-distended  Sensory: intact to light touch throughout  Skin: Skin is warm, dry and intact    Motor Strength: Moves all extremities spontaneously with good tone. No abnormal movements seen.       Iliopsoas Quadriceps Knee  Flexion Tibialis  anterior Gastro- cnemius EHL   Lower: R 5/5 5/5 5/5 5/5 5/5 5/5    L 5/5 5/5 5/5 5/5 5/5 5/5     DTR's: 2+ right patellar, 1+ left patellar, 2+ juan achilles   Clonus: absent    Gait: antalgic       +shonda right  + ttp right hip      Diagnostic Results:  I have independently reviewed the following imaging:  MRI lumbar spine  FINDINGS:  Vertebral column: There is multilevel degenerative change which will be described by level.  The lumbar vertebral bodies maintain normal height.  There is no fracture.  There is transitional lumbosacral anatomy.  The last rib bearing vertebral body is " designated as T12.  Utilizing this numbering system, there is stable mild 3 mm retrolisthesis of L1 on L2.  There is 4 mm anterolisthesis of L4 on L5 with 5 mm anterolisthesis of L5 on S1.  There is marked disc space narrowing at L5-S1.  There is chronic degenerative endplate signal change corresponding to sclerosis on plain films.  There is mild-to-moderate disc space narrowing at the L4-5 level.  There is marked disc space narrowing at the L1-2 level.  All of the discs are desiccated.  Other than chronic degenerative endplate signal change, most apparent at the L5-S1 level, baseline marrow signal is normal.     Spinal canal, conus, epidural space: The spinal canal is developmentally normal.  The conus terminates at the level of superior L1 and is normal in contour and signal intensity.  There is no abnormal epidural mass or fluid collection.     Findings by level:     T11-12: There is a mild diffuse disc bulge with mild facet joint arthropathy.  There is no spinal canal or significant foraminal stenosis.  There is a small right foraminal perineural cyst.     T12-L1: There is a minimal disc bulge.  There is no spinal canal or significant foraminal stenosis.     L1-2: There is marked disc space narrowing, 3 mm retrolisthesis of L1 on L2 with inferior unroofing of a broad disc protrusion eccentric to the right where there is also osteophytic ridging.  There is bilateral facet joint arthropathy.  There is only borderline to mild spinal stenosis but there is moderate to severe right lateral recess and foraminal stenosis with only mild left foraminal stenosis.     L2-3: There is subtle trace retrolisthesis of L2 on L3.  There is a mild disc bulge.  There is left greater than right facet joint arthropathy.  There is no spinal stenosis.  There is no significant foraminal stenosis.     L3-4: There is a diffuse disc bulge.  There is facet joint arthropathy with ligamentum flavum thickening.  There is borderline to mild  spinal stenosis without significant foraminal stenosis.     L4-5: There is disc space narrowing, 4 mm anterolisthesis of L4 on L5 with unroofing of a moderate broad disc protrusion.  There is bilateral facet joint arthropathy.  There is severe spinal canal, bilateral lateral recess stenosis with moderate left and mild right foraminal stenosis.     L5-S1: There is marked disc space narrowing, 5 mm anterolisthesis of L5 on S1 with unroofing of a marked broad disc protrusion in addition to marked facet joint arthropathy results in severe spinal canal, bilateral lateral recess and foraminal stenosis.     Soft tissues, other: The posterior spinous muscles are atrophic.  The prevertebral soft tissues are normal.  The aorta is normal in caliber.  There is no hydronephrosis.        XR lumbar spine  The vertebral bodies maintain normal height.  There is no fracture.  There is severe disc space narrowing at the L1-2 level as well as at the L5-S1 level with moderate disc space narrowing at the L4-5 level.  There is anterolisthesis of L4 on L5, L5 on S1 with retrolisthesis of L1 on L2 and trace retrolisthesis of L2 on L3.  There is multilevel facet joint arthropathy.  No instability is demonstrated with flexion or extension.       ASSESSMENT/PLAN:     There are no diagnoses linked to this encounter.    Lucinda Aguilera is a 73 y.o. female    Who presents with neurogenic claudication and radiculopathy.  She is short distance symptoms of leg pain; however remains active walking etc.  No neurological deficits  She has complex lumbar imaging multilevel thoracolumbar spondylosis however has a high grade 1 spondylolisthesis at L4-5 and L5 over S1 and L5-S1 near complete loss of disc height and collapse.  With circumferential stenosis dear facet arthropathy  Reviewed in detail strategies for management.  She has not sure she would consider surgery.  Surgery would require a wide decompression facetectomy and stabilization likely with  interbody at L4-5 and L5-S1  To treat the most symptomatic levels  She had a history of osteopenia DEXA scan in 2022 this will be repeated  She is going to continue ambulation, physical therapy as needed and worked with pain management  She is considering a rhizotomy with Dr. figueroa had which I think is low risk  Has a cruise planned with the  later this year  She was thoroughly counseled on signs and symptoms to monitor with surveillance natural history etc.    With regard to her hip.  She has tenderness and a positive JOSUE, may have a secondarily component side of her spine for that proximal pain  She works with Dr. Arnold.  She is on the verge considering proceeding with a hip MRI of the right  I encouraged her to have full workup think it would be valuable for her decision if she has to pathology that are symptomatic          Patient verbalized understanding of plan. Encouraged to call with any questions or concerns.     This note was partially dictated using voice recognition software, so please excuse any errors that were not corrected.

## 2024-08-15 NOTE — TELEPHONE ENCOUNTER
Ldo-7/5  Lov-7/5  Nov-10/04   Topical Retinoid Pregnancy And Lactation Text: This medication is Pregnancy Category C. It is unknown if this medication is excreted in breast milk.

## 2024-08-19 ENCOUNTER — PATIENT MESSAGE (OUTPATIENT)
Dept: ADMINISTRATIVE | Facility: HOSPITAL | Age: 74
End: 2024-08-19
Payer: MEDICARE

## 2024-08-19 ENCOUNTER — OFFICE VISIT (OUTPATIENT)
Dept: FAMILY MEDICINE | Facility: CLINIC | Age: 74
End: 2024-08-19
Payer: MEDICARE

## 2024-08-19 VITALS
HEART RATE: 84 BPM | WEIGHT: 173.31 LBS | OXYGEN SATURATION: 95 % | RESPIRATION RATE: 18 BRPM | BODY MASS INDEX: 31.89 KG/M2 | SYSTOLIC BLOOD PRESSURE: 122 MMHG | HEIGHT: 62 IN | DIASTOLIC BLOOD PRESSURE: 68 MMHG

## 2024-08-19 DIAGNOSIS — S51.802A OPEN WOUND OF LEFT FOREARM, INITIAL ENCOUNTER: Primary | ICD-10-CM

## 2024-08-19 PROCEDURE — 99213 OFFICE O/P EST LOW 20 MIN: CPT | Mod: PBBFAC,PO | Performed by: FAMILY MEDICINE

## 2024-08-19 PROCEDURE — 99999 PR PBB SHADOW E&M-EST. PATIENT-LVL III: CPT | Mod: PBBFAC,,, | Performed by: FAMILY MEDICINE

## 2024-08-19 PROCEDURE — 99213 OFFICE O/P EST LOW 20 MIN: CPT | Mod: S$PBB,,, | Performed by: FAMILY MEDICINE

## 2024-08-19 NOTE — PROGRESS NOTES
"Subjective:       Patient ID: Lucinda Aguilera is a 73 y.o. female.    Chief Complaint: Sore on arm not healing    C/o skin tear on right forearm that is slow-healing.  No fever or chills.  No drainage.        Past Medical History:   Diagnosis Date    Anticoagulant long-term use     Anxiety     takes daily Xanax    Arthritis     right thumb    Cataract     OU    Cholelithiasis     GERD (gastroesophageal reflux disease)     Hepatic steatosis     Hyperlipemia     Hypertension     PVD (posterior vitreous detachment)     OD       Past Surgical History:   Procedure Laterality Date    BREAST BIOPSY Left 08/28/2020    stereo biopsy    BREAST BIOPSY Left 10/07/2020    benign excisional biopsy    CAUDAL EPIDURAL STEROID INJECTION N/A 7/26/2024    Procedure: Injection-steroid-epidural-caudal;  Surgeon: Indra Cuellar MD;  Location: [facility] OR;  Service: Pain Management;  Laterality: N/A;    chest abcess      child    COLONOSCOPY  01/06/2012    Dr. Lopez: hemorrhoids, redundant colon    COLONOSCOPY N/A 02/17/2021    Dr. Lopez: Congested and erythematous mucosa in the rectum, in the recto-sigmoid colon and in the sigmoid colon, proctosigmoid colitis, Redundant colon; biopsy: focal active colitis "nonspecific but can be seen with acute self-limited colitis (infection), medication effect (e.g. NSAID use), proximity to diverticula, or early/evolving IBD    EPIDURAL STEROID INJECTION INTO LUMBAR SPINE N/A 5/30/2024    Procedure: Injection-steroid-epidural-lumbar   L5/S1;  Surgeon: Indra Cuellar MD;  Location: [facility] OR;  Service: Pain Management;  Laterality: N/A;    ESOPHAGOGASTRODUODENOSCOPY N/A 06/06/2019    Dr. Lopez: small hiatal hernia, Non-erosive esophageal reflux (NERD) disease?., slight antritis; biopsy: Antral mucosa with chemical/reactive gastropathy. negative for h pylori    ESOPHAGOGASTRODUODENOSCOPY N/A 02/17/2021    Procedure: EGD (ESOPHAGOGASTRODUODENOSCOPY);  Surgeon: Nathan Lopez Jr., MD;  Location: [facility] " ENDO;  Service: Endoscopy;  Laterality: N/A; Minimal gastritis; biopsy: stomach- negative for h pylori, active chronic gastritis with focal features of erosive gastritis, duodenum WNL    ESOPHAGOGASTRODUODENOSCOPY N/A 05/10/2022    Procedure: EGD (ESOPHAGOGASTRODUODENOSCOPY);  Surgeon: Nathan Lopez Jr., MD;  Location: [facility];  Service: Endoscopy;  Laterality: N/A;    ESOPHAGOGASTRODUODENOSCOPY N/A 3/12/2024    Procedure: EGD (ESOPHAGOGASTRODUODENOSCOPY);  Surgeon: Nathan Lopez Jr., MD;  Location: [facility];  Service: Endoscopy;  Laterality: N/A;    EXCISIONAL BIOPSY Left 10/07/2020    Procedure: EXCISIONAL BIOPSY-Left with radiological marker;  Surgeon: Brooklynn Ashford MD;  Location: [facility];  Service: General;  Laterality: Left;    FRACTURE SURGERY  2010    Left thumb repaired surgically    JOINT REPLACEMENT Bilateral     knee    KNEE ARTHROSCOPY W/ LASER      right    LAPAROSCOPIC CHOLECYSTECTOMY N/A 06/14/2019    Procedure: CHOLECYSTECTOMY, LAPAROSCOPIC;  Surgeon: Guevara Evans MD;  Location: [facility];  Service: General;  Laterality: N/A;    thumb surgery  11/2014    TOTAL KNEE ARTHROPLASTY Left 06/19/2018    Procedure: REPLACEMENT-KNEE-TOTAL;  Surgeon: Nj Melvin MD;  Location: [address];  Service: Orthopedics;  Laterality: Left;  [place]    UPPER GASTROINTESTINAL ENDOSCOPY  07/13/2017    Dr. Lopez: NERD, Gastric mucosal atrophy. antritis, Scar in the incisura. Biopsied; biopsy: chronic gastritis. negative for h pylori       Review of patient's allergies indicates:  No Known Allergies    Social History     Socioeconomic History    Marital status:     Number of children: 2   Occupational History    Occupation: retired teacher and principal    Occupation: substitute   Tobacco Use    Smoking status: Never    Smokeless tobacco: Never   Substance and Sexual Activity    Alcohol use: Not Currently     Comment: social- maybe once every few months    Drug use: No    Sexual activity: Yes      Partners: Male     Birth control/protection: Post-menopausal, None     Social Determinants of Health     Financial Resource Strain: Low Risk  (11/9/2023)    Overall Financial Resource Strain (CARDIA)     Difficulty of Paying Living Expenses: Not hard at all   Food Insecurity: No Food Insecurity (11/9/2023)    Hunger Vital Sign     Worried About Running Out of Food in the Last Year: Never true     Ran Out of Food in the Last Year: Never true   Transportation Needs: No Transportation Needs (11/9/2023)    PRAPARE - Transportation     Lack of Transportation (Medical): No     Lack of Transportation (Non-Medical): No   Physical Activity: Sufficiently Active (11/9/2023)    Exercise Vital Sign     Days of Exercise per Week: 5 days     Minutes of Exercise per Session: 50 min   Recent Concern: Physical Activity - Insufficiently Active (10/20/2023)    Exercise Vital Sign     Days of Exercise per Week: 2 days     Minutes of Exercise per Session: 20 min   Stress: Patient Declined (11/9/2023)    [employer] [place] [employer] - Occupational Stress Questionnaire     Feeling of Stress : Patient declined   Housing Stability: Low Risk  (11/9/2023)    Housing Stability Vital Sign     Unable to Pay for Housing in the Last Year: No     Number of Places Lived in the Last Year: 1     Unstable Housing in the Last Year: No       Current Outpatient Medications on File Prior to Visit   Medication Sig Dispense Refill    acetaminophen (TYLENOL) 500 MG tablet Take 500 mg by mouth every 6 (six) hours as needed for Pain.      ascorbic acid, vitamin C, (VITAMIN C) 250 MG tablet Take 500 mg by mouth once daily.       atenoloL (TENORMIN) 25 MG tablet Take 1 tablet (25 mg total) by mouth every evening. 30 tablet 11    azelastine (ASTELIN) 137 mcg (0.1 %) nasal spray 1 spray by Nasal route 2 (two) times daily.      balsalazide (COLAZAL) 750 mg capsule TAKE 3 CAPSULES(2250 MG) BY MOUTH TWICE DAILY WITH MEALS 180 capsule 11    benzonatate  (TESSALON) 200 MG capsule Take 1 capsule (200 mg total) by mouth 3 (three) times daily as needed for Cough. 30 capsule 1    buPROPion (WELLBUTRIN XL) 150 MG TB24 tablet Take 1 tablet (150 mg total) by mouth once daily. 30 tablet 1    diltiaZEM (DILACOR XR) 240 MG CDCR Take 1 capsule (240 mg total) by mouth every evening. 30 capsule 11    docusate sodium (COLACE) 100 MG capsule Take 100 mg by mouth 2 (two) times daily.      famotidine (PEPCID) 40 MG tablet Take 1 tablet (40 mg total) by mouth every evening. 90 tablet 3    famotidine-calcium carbonate-magnesium hydroxide (PEPCID COMPLETE) -165 mg Take 1 tablet by mouth 2 (two) times daily as needed. Chew 1 or 2 tablets, 2-3 times a day, as needed, for nausea or heartburn.      fluticasone propionate (FLONASE) 50 mcg/actuation nasal spray 1 spray (50 mcg total) by Each Nostril route once daily. 16 g 3    lisinopriL (PRINIVIL,ZESTRIL) 40 MG tablet Take 1 tablet (40 mg total) by mouth 2 (two) times a day. 60 tablet 11    LORazepam (ATIVAN) 0.5 MG tablet Take 0.5 tablets (0.25 mg total) by mouth 3 (three) times daily as needed for Anxiety. 45 tablet 0    magnesium oxide (MAG-OX) 400 mg (241.3 mg magnesium) tablet Take 1 tablet (400 mg total) by mouth once daily. 30 tablet 3    methocarbamoL (ROBAXIN) 750 MG Tab Take 1 tablet (750 mg total) by mouth 4 (four) times daily as needed. 44 tablet 3    multivitamin (THERAGRAN) per tablet Take 1 tablet by mouth once daily.      ondansetron (ZOFRAN-ODT) 4 MG TbDL Take 1 tablet (4 mg total) by mouth every 6 (six) hours as needed (nausea). 30 tablet 3    pantoprazole (PROTONIX) 40 MG tablet TAKE 1 TABLET(40 MG) BY MOUTH TWICE DAILY 180 tablet 3    psyllium husk, aspartame, (NATURAL PSYLLIUM FIBER) 3.4 gram/5.8 gram Powd Take by mouth.      rosuvastatin (CRESTOR) 20 MG tablet Take 1 tablet (20 mg total) by mouth once daily. 30 tablet 11     No current facility-administered medications on file prior to visit.       Family History  "  Problem Relation Name Age of Onset    Hypertension Mother age 82     Heart disease Mother age 82     Glaucoma Mother age 82     Stroke Father age 50     Glaucoma Sister      Cataracts Sister      Macular degeneration Sister      Breast cancer Neg Hx      Colon cancer Neg Hx      Crohn's disease Neg Hx      Ulcerative colitis Neg Hx      Stomach cancer Neg Hx      Esophageal cancer Neg Hx      Celiac disease Neg Hx      Amblyopia Neg Hx      Blindness Neg Hx      Retinal detachment Neg Hx      Strabismus Neg Hx         Review of Systems   Constitutional:  Negative for appetite change, chills, fever and unexpected weight change.   HENT:  Negative for sore throat and trouble swallowing.    Eyes:  Negative for pain and visual disturbance.   Respiratory:  Negative for cough, shortness of breath and wheezing.    Cardiovascular:  Negative for chest pain and palpitations.   Gastrointestinal:  Negative for abdominal pain, blood in stool, diarrhea, nausea and vomiting.   Genitourinary:  Negative for difficulty urinating, dysuria and hematuria.   Musculoskeletal:  Negative for arthralgias, gait problem and neck pain.   Skin:  Positive for wound. Negative for rash.   Neurological:  Negative for dizziness, weakness, numbness and headaches.   Hematological:  Negative for adenopathy.   Psychiatric/Behavioral:  Negative for dysphoric mood.        Objective:      /68   Pulse 84   Resp 18   Ht 5' 2" (1.575 m)   Wt 78.6 kg (173 lb 4.5 oz)   LMP 04/18/2004   SpO2 95%   BMI 31.69 kg/m²   Physical Exam  Constitutional:       Appearance: Normal appearance. She is well-developed.   HENT:      Head: Normocephalic.      Mouth/Throat:      Pharynx: No oropharyngeal exudate or posterior oropharyngeal erythema.   Eyes:      Conjunctiva/sclera: Conjunctivae normal.      Pupils: Pupils are equal, round, and reactive to light.   Neck:      Thyroid: No thyromegaly.   Cardiovascular:      Rate and Rhythm: Normal rate and regular " rhythm.      Heart sounds: Normal heart sounds, S1 normal and S2 normal. No murmur heard.     No friction rub. No gallop.   Pulmonary:      Effort: Pulmonary effort is normal.      Breath sounds: Normal breath sounds. No wheezing or rales.   Musculoskeletal:      Cervical back: Normal range of motion and neck supple.      Right lower leg: No edema.      Left lower leg: No edema.   Lymphadenopathy:      Cervical: No cervical adenopathy.   Skin:     General: Skin is warm.      Findings: No rash.   Neurological:      Mental Status: She is alert and oriented to person, place, and time.      Cranial Nerves: No cranial nerve deficit.      Gait: Gait normal.         Assessment:       1. Open wound of left forearm, initial encounter        Plan:       Open wound of left forearm, initial encounter        Reassurance  Healing well  Abx ointment and bandage  Counseled on regular exercise, maintenance of a healthy weight, balanced diet rich in fruits/vegetables and lean protein, and avoidance of unhealthy habits like smoking and excessive alcohol intake.

## 2024-08-21 ENCOUNTER — HOSPITAL ENCOUNTER (OUTPATIENT)
Dept: RADIOLOGY | Facility: HOSPITAL | Age: 74
Discharge: HOME | End: 2024-08-21
Attending: ORTHOPAEDIC SURGERY
Payer: MEDICARE

## 2024-08-21 DIAGNOSIS — M25.551 PAIN OF RIGHT HIP: ICD-10-CM

## 2024-08-21 PROCEDURE — 73721 MRI JNT OF LWR EXTRE W/O DYE: CPT | Mod: 26,RT,, | Performed by: RADIOLOGY

## 2024-08-21 PROCEDURE — 73721 MRI JNT OF LWR EXTRE W/O DYE: CPT | Mod: TC,PO,RT

## 2024-08-22 ENCOUNTER — TELEPHONE (OUTPATIENT)
Dept: PAIN MEDICINE | Facility: CLINIC | Age: 74
End: 2024-08-22

## 2024-08-22 ENCOUNTER — OFFICE VISIT (OUTPATIENT)
Dept: PAIN MEDICINE | Facility: CLINIC | Age: 74
End: 2024-08-22
Payer: MEDICARE

## 2024-08-22 ENCOUNTER — HOSPITAL ENCOUNTER (OUTPATIENT)
Dept: RADIOLOGY | Facility: HOSPITAL | Age: 74
Discharge: HOME | End: 2024-08-22
Attending: STUDENT IN AN ORGANIZED HEALTH CARE EDUCATION/TRAINING PROGRAM
Payer: MEDICARE

## 2024-08-22 ENCOUNTER — PATIENT MESSAGE (OUTPATIENT)
Dept: ORTHOPEDICS | Facility: CLINIC | Age: 74
End: 2024-08-22
Payer: MEDICARE

## 2024-08-22 ENCOUNTER — TELEPHONE (OUTPATIENT)
Dept: ORTHOPEDICS | Facility: CLINIC | Age: 74
End: 2024-08-22
Payer: MEDICARE

## 2024-08-22 ENCOUNTER — PATIENT MESSAGE (OUTPATIENT)
Dept: PAIN MEDICINE | Facility: CLINIC | Age: 74
End: 2024-08-22

## 2024-08-22 VITALS
HEIGHT: 62 IN | WEIGHT: 174.63 LBS | HEART RATE: 72 BPM | BODY MASS INDEX: 32.14 KG/M2 | SYSTOLIC BLOOD PRESSURE: 137 MMHG | DIASTOLIC BLOOD PRESSURE: 64 MMHG

## 2024-08-22 DIAGNOSIS — M54.16 LUMBAR RADICULOPATHY: ICD-10-CM

## 2024-08-22 DIAGNOSIS — M81.0 AGE-RELATED OSTEOPOROSIS WITHOUT CURRENT PATHOLOGICAL FRACTURE: ICD-10-CM

## 2024-08-22 DIAGNOSIS — M25.551 PAIN OF RIGHT HIP JOINT: ICD-10-CM

## 2024-08-22 DIAGNOSIS — M47.816 LUMBAR SPONDYLOSIS: Primary | ICD-10-CM

## 2024-08-22 DIAGNOSIS — M43.16 SPONDYLOLISTHESIS OF LUMBAR REGION: ICD-10-CM

## 2024-08-22 DIAGNOSIS — M48.061 SPINAL STENOSIS OF LUMBAR REGION, UNSPECIFIED WHETHER NEUROGENIC CLAUDICATION PRESENT: ICD-10-CM

## 2024-08-22 DIAGNOSIS — M48.062 LUMBAR STENOSIS WITH NEUROGENIC CLAUDICATION: ICD-10-CM

## 2024-08-22 PROCEDURE — 99999 PR PBB SHADOW E&M-EST. PATIENT-LVL IV: CPT | Mod: PBBFAC,,,

## 2024-08-22 PROCEDURE — 77092 TBS I&R FX RSK QHP: CPT | Mod: ,,, | Performed by: RADIOLOGY

## 2024-08-22 PROCEDURE — 77091 TBS TECHL CALCULATION ONLY: CPT | Mod: PO

## 2024-08-22 PROCEDURE — 77080 DXA BONE DENSITY AXIAL: CPT | Mod: 26,,, | Performed by: RADIOLOGY

## 2024-08-22 PROCEDURE — 99214 OFFICE O/P EST MOD 30 MIN: CPT | Mod: PBBFAC,PO

## 2024-08-22 PROCEDURE — 77080 DXA BONE DENSITY AXIAL: CPT | Mod: TC,PO

## 2024-08-22 RX ORDER — SODIUM CHLORIDE, SODIUM LACTATE, POTASSIUM CHLORIDE, CALCIUM CHLORIDE 600; 310; 30; 20 MG/100ML; MG/100ML; MG/100ML; MG/100ML
INJECTION, SOLUTION INTRAVENOUS CONTINUOUS
OUTPATIENT
Start: 2024-08-22

## 2024-08-22 RX ORDER — DULOXETIN HYDROCHLORIDE 30 MG/1
30 CAPSULE, DELAYED RELEASE ORAL 2 TIMES DAILY
Qty: 60 CAPSULE | Refills: 1 | Status: SHIPPED | OUTPATIENT
Start: 2024-08-22 | End: 2024-09-21

## 2024-08-22 NOTE — TELEPHONE ENCOUNTER
Please schedule patient for the following procedure:    Physician - Dr Cuellar    Type of Procedure/Injection - Lumbar Medial Branch Block  L4/5 and L5/S1           Laterality - Bilateral      Anxiolysis- RNIV      Need to hold medication - No      N/A      Clearance needed - No      Follow up - phone call next day

## 2024-08-22 NOTE — PROGRESS NOTES
[facility] Follow Up Evaluation      Referred by: No ref. provider found    PCP:     CC:   Chief Complaint   Patient presents with    Follow-up     mri          8/22/2024     1:04 PM 8/8/2024     8:19 AM 6/28/2024     4:27 PM   Last 3 PDI Scores   Pain Disability Index (PDI) 40 20 39     Interval HPI 8/22/2024: Lucinda Aguilera returns to the office for follow up.  She returns for follow-up with continued lower back pain with radiation into bilateral hips, right lateral hip and into right leg.  She rates her pain as a 9/10, constant.  She denies any new numbness, weakness or any new changes to her bowel bladder function.  She has been following up with Orthopedics, they have ordered MRI of hip and are recommending a right hip joint injection with PM& R.  She is scheduled for this.  She did not trial Lyrica as it was too expensive.  No relief with gabapentin in the past.        HPI:   Lucinda Aguilera is a 73 y.o. female patient who has a past medical history of Anticoagulant long-term use, Anxiety, Arthritis, Cataract, Cholelithiasis, GERD (gastroesophageal reflux disease), Hepatic steatosis, Hyperlipemia, Hypertension, and PVD (posterior vitreous detachment). She presents with back pain.  She has had back pain for many years and has been gradually worsening.  Today she reports pain in her lower back that radiates primarily down her right leg.  Pain is constant, aching, shooting, 6/10.  Her pain is worse with standing and walking.  At times her legs can feel heavy an aching with walking.      Pain Intervention History:  - s/p L5/S1 VALERY on 5/30/24  - s/p caudal VALERY on 07/26/2024 with 45% relief    Past Spine Surgical History:      Past and current medications:  Antineuropathics: gabapentin   NSAIDs:  Physical therapy: yes, completed  Antidepressants:  Muscle relaxers: robaxin   Opioids:  Antiplatelets/Anticoagulants: aspirin     History:    Current Outpatient Medications:     acetaminophen (TYLENOL)  500 MG tablet, Take 500 mg by mouth every 6 (six) hours as needed for Pain., Disp: , Rfl:     ascorbic acid, vitamin C, (VITAMIN C) 250 MG tablet, Take 500 mg by mouth once daily. , Disp: , Rfl:     atenoloL (TENORMIN) 25 MG tablet, Take 1 tablet (25 mg total) by mouth every evening., Disp: 30 tablet, Rfl: 11    azelastine (ASTELIN) 137 mcg (0.1 %) nasal spray, 1 spray by Nasal route 2 (two) times daily., Disp: , Rfl:     balsalazide (COLAZAL) 750 mg capsule, TAKE 3 CAPSULES(2250 MG) BY MOUTH TWICE DAILY WITH MEALS, Disp: 180 capsule, Rfl: 11    benzonatate (TESSALON) 200 MG capsule, Take 1 capsule (200 mg total) by mouth 3 (three) times daily as needed for Cough., Disp: 30 capsule, Rfl: 1    buPROPion (WELLBUTRIN XL) 150 MG TB24 tablet, Take 1 tablet (150 mg total) by mouth once daily., Disp: 30 tablet, Rfl: 1    diltiaZEM (DILACOR XR) 240 MG CDCR, Take 1 capsule (240 mg total) by mouth every evening., Disp: 30 capsule, Rfl: 11    docusate sodium (COLACE) 100 MG capsule, Take 100 mg by mouth 2 (two) times daily., Disp: , Rfl:     DULoxetine (CYMBALTA) 30 MG capsule, Take 1 capsule (30 mg total) by mouth 2 (two) times daily., Disp: 60 capsule, Rfl: 01    famotidine (PEPCID) 40 MG tablet, Take 1 tablet (40 mg total) by mouth every evening., Disp: 90 tablet, Rfl: 3    famotidine-calcium carbonate-magnesium hydroxide (PEPCID COMPLETE) -165 mg, Take 1 tablet by mouth 2 (two) times daily as needed. Chew 1 or 2 tablets, 2-3 times a day, as needed, for nausea or heartburn., Disp: , Rfl:     fluticasone propionate (FLONASE) 50 mcg/actuation nasal spray, 1 spray (50 mcg total) by Each Nostril route once daily., Disp: 16 g, Rfl: 3    lisinopriL (PRINIVIL,ZESTRIL) 40 MG tablet, Take 1 tablet (40 mg total) by mouth 2 (two) times a day., Disp: 60 tablet, Rfl: 11    LORazepam (ATIVAN) 0.5 MG tablet, Take 0.5 tablets (0.25 mg total) by mouth 3 (three) times daily as needed for Anxiety., Disp: 45 tablet, Rfl: 0    magnesium  "oxide (MAG-OX) 400 mg (241.3 mg magnesium) tablet, Take 1 tablet (400 mg total) by mouth once daily., Disp: 30 tablet, Rfl: 3    methocarbamoL (ROBAXIN) 750 MG Tab, Take 1 tablet (750 mg total) by mouth 4 (four) times daily as needed., Disp: 44 tablet, Rfl: 3    multivitamin (THERAGRAN) per tablet, Take 1 tablet by mouth once daily., Disp: , Rfl:     ondansetron (ZOFRAN-ODT) 4 MG TbDL, Take 1 tablet (4 mg total) by mouth every 6 (six) hours as needed (nausea)., Disp: 30 tablet, Rfl: 3    pantoprazole (PROTONIX) 40 MG tablet, TAKE 1 TABLET(40 MG) BY MOUTH TWICE DAILY, Disp: 180 tablet, Rfl: 3    psyllium husk, aspartame, (NATURAL PSYLLIUM FIBER) 3.4 gram/5.8 gram Powd, Take by mouth., Disp: , Rfl:     rosuvastatin (CRESTOR) 20 MG tablet, Take 1 tablet (20 mg total) by mouth once daily., Disp: 30 tablet, Rfl: 11    Past Medical History:   Diagnosis Date    Anticoagulant long-term use     Anxiety     takes daily Xanax    Arthritis     right thumb    Cataract     OU    Cholelithiasis     GERD (gastroesophageal reflux disease)     Hepatic steatosis     Hyperlipemia     Hypertension     PVD (posterior vitreous detachment)     OD       Past Surgical History:   Procedure Laterality Date    BREAST BIOPSY Left 08/28/2020    stereo biopsy    BREAST BIOPSY Left 10/07/2020    benign excisional biopsy    CAUDAL EPIDURAL STEROID INJECTION N/A 7/26/2024    Procedure: Injection-steroid-epidural-caudal;  Surgeon: Indra Cuellar MD;  Location: [facility];  Service: Pain Management;  Laterality: N/A;    chest abcess      child    COLONOSCOPY  01/06/2012    Dr. Lopez: hemorrhoids, redundant colon    COLONOSCOPY N/A 02/17/2021    Dr. Lopez: Congested and erythematous mucosa in the rectum, in the recto-sigmoid colon and in the sigmoid colon, proctosigmoid colitis, Redundant colon; biopsy: focal active colitis "nonspecific but can be seen with acute self-limited colitis (infection), medication effect (e.g. NSAID use), proximity to " diverticula, or early/evolving IBD    EPIDURAL STEROID INJECTION INTO LUMBAR SPINE N/A 5/30/2024    Procedure: Injection-steroid-epidural-lumbar   L5/S1;  Surgeon: Indra Cuellar MD;  Location: [facility];  Service: Pain Management;  Laterality: N/A;    ESOPHAGOGASTRODUODENOSCOPY N/A 06/06/2019    Dr. Lopez: small hiatal hernia, Non-erosive esophageal reflux (NERD) disease?., slight antritis; biopsy: Antral mucosa with chemical/reactive gastropathy. negative for h pylori    ESOPHAGOGASTRODUODENOSCOPY N/A 02/17/2021    Procedure: EGD (ESOPHAGOGASTRODUODENOSCOPY);  Surgeon: Nathan Lopez Jr., MD;  Location: [facility];  Service: Endoscopy;  Laterality: N/A; Minimal gastritis; biopsy: stomach- negative for h pylori, active chronic gastritis with focal features of erosive gastritis, duodenum WNL    ESOPHAGOGASTRODUODENOSCOPY N/A 05/10/2022    Procedure: EGD (ESOPHAGOGASTRODUODENOSCOPY);  Surgeon: Nathan Lopez Jr., MD;  Location: [facility];  Service: Endoscopy;  Laterality: N/A;    ESOPHAGOGASTRODUODENOSCOPY N/A 3/12/2024    Procedure: EGD (ESOPHAGOGASTRODUODENOSCOPY);  Surgeon: Nathan Lopez Jr., MD;  Location: [facility];  Service: Endoscopy;  Laterality: N/A;    EXCISIONAL BIOPSY Left 10/07/2020    Procedure: EXCISIONAL BIOPSY-Left with radiological marker;  Surgeon: Brooklynn Ashford MD;  Location: [facility];  Service: General;  Laterality: Left;    FRACTURE SURGERY  2010    Left thumb repaired surgically    JOINT REPLACEMENT Bilateral     knee    KNEE ARTHROSCOPY W/ LASER      right    LAPAROSCOPIC CHOLECYSTECTOMY N/A 06/14/2019    Procedure: CHOLECYSTECTOMY, LAPAROSCOPIC;  Surgeon: Guevara Evans MD;  Location: [facility];  Service: General;  Laterality: N/A;    thumb surgery  11/2014    TOTAL KNEE ARTHROPLASTY Left 06/19/2018    Procedure: REPLACEMENT-KNEE-TOTAL;  Surgeon: Nj Melvin MD;  Location: [address];  Service: Orthopedics;  Laterality: Left;  [employer]    UPPER GASTROINTESTINAL ENDOSCOPY   07/13/2017    Dr. Lopez: NERD, Gastric mucosal atrophy. antritis, Scar in the incisura. Biopsied; biopsy: chronic gastritis. negative for h pylori       Family History   Problem Relation Name Age of Onset    Hypertension Mother age 82     Heart disease Mother age 82     Glaucoma Mother age 82     Stroke Father age 50     Glaucoma Sister      Cataracts Sister      Macular degeneration Sister      Breast cancer Neg Hx      Colon cancer Neg Hx      Crohn's disease Neg Hx      Ulcerative colitis Neg Hx      Stomach cancer Neg Hx      Esophageal cancer Neg Hx      Celiac disease Neg Hx      Amblyopia Neg Hx      Blindness Neg Hx      Retinal detachment Neg Hx      Strabismus Neg Hx         Social History     Socioeconomic History    Marital status:     Number of children: 2   Occupational History    Occupation: retired teacher and principal    Occupation: substitute   Tobacco Use    Smoking status: Never    Smokeless tobacco: Never   Substance and Sexual Activity    Alcohol use: Not Currently     Comment: social- maybe once every few months    Drug use: No    Sexual activity: Yes     Partners: Male     Birth control/protection: Post-menopausal, None     Social Determinants of Health     Financial Resource Strain: Low Risk  (11/9/2023)    Overall Financial Resource Strain (CARDIA)     Difficulty of Paying Living Expenses: Not hard at all   Food Insecurity: No Food Insecurity (11/9/2023)    Hunger Vital Sign     Worried About Running Out of Food in the Last Year: Never true     Ran Out of Food in the Last Year: Never true   Transportation Needs: No Transportation Needs (11/9/2023)    PRAPARE - Transportation     Lack of Transportation (Medical): No     Lack of Transportation (Non-Medical): No   Physical Activity: Sufficiently Active (11/9/2023)    Exercise Vital Sign     Days of Exercise per Week: 5 days     Minutes of Exercise per Session: 50 min   Recent Concern: Physical Activity - Insufficiently Active  "(10/20/2023)    Exercise Vital Sign     Days of Exercise per Week: 2 days     Minutes of Exercise per Session: 20 min   Stress: Patient Declined (11/9/2023)    [employer] - Occupational Stress Questionnaire     Feeling of Stress : Patient declined   Housing Stability: Low Risk  (11/9/2023)    Housing Stability Vital Sign     Unable to Pay for Housing in the Last Year: No     Number of Places Lived in the Last Year: 1     Unstable Housing in the Last Year: No       Review of patient's allergies indicates:  No Known Allergies    Review of Systems:  12 point review of systems is negative.    Physical Exam:  Vitals:    08/22/24 1305   BP: 137/64   Pulse: 72   Weight: 79.2 kg (174 lb 9.7 oz)   Height: 5' 2" (1.575 m)   PainSc:   9   PainLoc: Hip         Body mass index is 31.94 kg/m².    Gen: NAD  Psych: mood appropriate for given condition  HEENT: eyes anicteric   CV: RRR  HEENT: anicteric   Respiratory: non-labored, no signs of respiratory distress  Abd: non-distended  Skin: warm, dry and intact.  Gait: No antalgic gait.     Sensory:  Intact and symmetrical to light touch in L1-S1 dermatomes bilaterally.    Motor:     Right Left   L2/3 Iliacus Hip flexion  5  5   L3/4 Qudratus Femoris Knee Extension  5  5   L4/5 Tib Anterior Ankle Dorsiflexion   5  5   L5/S1 Extensor Hallicus Longus Great toe extension  5  5   S1/S2 Gastroc/Soleus Plantar Flexion  5  5      Right Left                  Patellar DTR 0+ 0+   Achilles DTR 0 0                      Labs:  Lab Results   Component Value Date    HGBA1C 5.7 (H) 06/08/2021       Lab Results   Component Value Date    WBC 13.33 (H) 08/07/2024    HGB 13.7 08/07/2024    HCT 42.0 08/07/2024    MCV 88 08/07/2024     08/07/2024       Imaging:  MRI lumbar spine 3/22/24  FINDINGS:  Vertebral column: There is multilevel degenerative change which will be described by level.  The lumbar vertebral bodies maintain normal height.  There is no fracture.  " There is transitional lumbosacral anatomy.  The last rib bearing vertebral body is designated as T12.  Utilizing this numbering system, there is stable mild 3 mm retrolisthesis of L1 on L2.  There is 4 mm anterolisthesis of L4 on L5 with 5 mm anterolisthesis of L5 on S1.  There is marked disc space narrowing at L5-S1.  There is chronic degenerative endplate signal change corresponding to sclerosis on plain films.  There is mild-to-moderate disc space narrowing at the L4-5 level.  There is marked disc space narrowing at the L1-2 level.  All of the discs are desiccated.  Other than chronic degenerative endplate signal change, most apparent at the L5-S1 level, baseline marrow signal is normal.     Spinal canal, conus, epidural space: The spinal canal is developmentally normal.  The conus terminates at the level of superior L1 and is normal in contour and signal intensity.  There is no abnormal epidural mass or fluid collection.     Findings by level:  T11-12: There is a mild diffuse disc bulge with mild facet joint arthropathy.  There is no spinal canal or significant foraminal stenosis.  There is a small right foraminal perineural cyst.  T12-L1: There is a minimal disc bulge.  There is no spinal canal or significant foraminal stenosis.  L1-2: There is marked disc space narrowing, 3 mm retrolisthesis of L1 on L2 with inferior unroofing of a broad disc protrusion eccentric to the right where there is also osteophytic ridging.  There is bilateral facet joint arthropathy.  There is only borderline to mild spinal stenosis but there is moderate to severe right lateral recess and foraminal stenosis with only mild left foraminal stenosis.  L2-3: There is subtle trace retrolisthesis of L2 on L3.  There is a mild disc bulge.  There is left greater than right facet joint arthropathy.  There is no spinal stenosis.  There is no significant foraminal stenosis.  L3-4: There is a diffuse disc bulge.  There is facet joint arthropathy  with ligamentum flavum thickening.  There is borderline to mild spinal stenosis without significant foraminal stenosis.  L4-5: There is disc space narrowing, 4 mm anterolisthesis of L4 on L5 with unroofing of a moderate broad disc protrusion.  There is bilateral facet joint arthropathy.  There is severe spinal canal, bilateral lateral recess stenosis with moderate left and mild right foraminal stenosis.  L5-S1: There is marked disc space narrowing, 5 mm anterolisthesis of L5 on S1 with unroofing of a marked broad disc protrusion in addition to marked facet joint arthropathy results in severe spinal canal, bilateral lateral recess and foraminal stenosis.     Soft tissues, other: The posterior spinous muscles are atrophic.  The prevertebral soft tissues are normal.  The aorta is normal in caliber.  There is no hydronephrosis.    Xray lumbar spine 5/1/24  FINDINGS:  The vertebral bodies maintain normal height.  There is no fracture.  There is severe disc space narrowing at the L1-2 level as well as at the L5-S1 level with moderate disc space narrowing at the L4-5 level.  There is anterolisthesis of L4 on L5, L5 on S1 with retrolisthesis of L1 on L2 and trace retrolisthesis of L2 on L3.  There is multilevel facet joint arthropathy.  No instability is demonstrated with flexion or extension.    Assessment:   Problem List Items Addressed This Visit          Neuro    Lumbar spondylosis - Primary     Other Visit Diagnoses       Lumbar radiculopathy        Relevant Medications    DULoxetine (CYMBALTA) 30 MG capsule    Spinal stenosis of lumbar region, unspecified whether neurogenic claudication present        Pain of right hip joint                      Lucinda Aguilera is a 73 y.o. female patient who has a past medical history of Anticoagulant long-term use, Anxiety, Arthritis, Cataract, Cholelithiasis, GERD (gastroesophageal reflux disease), Hepatic steatosis, Hyperlipemia, Hypertension, and PVD (posterior vitreous  detachment). She presents with back pain.  She has had back pain for many years and has been gradually worsening.  Today she reports pain in her lower back that radiates primarily down her right leg.  Pain is constant, aching, shooting, 6/10.  Her pain is worse with standing and walking.  At times her legs can feel heavy an aching with walking.    8/22/2024: Lucinda Aguilera returns to the office for follow up.  She returns for follow-up with continued lower back pain with radiation into bilateral hips, right lateral hip and into right leg.  She rates her pain as a 9/10, constant.  She denies any new numbness, weakness or any new changes to her bowel bladder function.  She has been following up with Orthopedics, they have ordered MRI of hip and are recommending a right hip joint injection with PM& R.  She is scheduled for this.  She did not trial Lyrica as it was too expensive.  No relief with gabapentin in the past.      - on exam she has full strength in his lower extremities and intact sensation to light touch bilateral L2-S1.   - I independently reviewed her lumbar MRI and she has severe central canal narrowing at L4-5 and L5-S1.  She has multilevel bilateral foraminal narrowing.  She has listhesis of L1-2, L4-5, L5-S1  - we reviewed her right hip MRI in office today and is consistent with Moderate right hip osteoarthritis with joint space narrowing and scattered high-grade partial to full-thickness cartilage loss   - she likely has multiple overlapping pain generators.  Likely some pain originating from her right hip joint.  I think it is reasonable to proceed with intra-articular right hip joint injection.  She will have this done with PM&R.  - likely has pain from her central canal stenosis in addition to her axial facet mediated pain.  She has failed to find relief with epidurals.  - she has been evaluated by Neurosurgery, she is a surgical candidate but they recommend exhausting all conservative measures  prior.  - to address her axial facet mediated pain I would like to schedule her for bilateral L4/5 and L5/S1 diagnostic medial branch blocks.  If successful we will repeat the blocks prior to proceeding with radiofrequency ablation.  - for any neuropathic component, we will trial Cymbalta 30 mg once daily for the 1st week and if tolerating increase to 60 mg.  Discussed with patient if she has any ill side effects to discontinue immediately.  - follow-up 4 weeks post procedure or sooner if needed.      : Not applicable      This note was completed with dictation software and grammatical errors may exist.

## 2024-08-22 NOTE — H&P (VIEW-ONLY)
[facility] Follow Up Evaluation      Referred by: No ref. provider found    PCP:     CC:   Chief Complaint   Patient presents with    Follow-up     mri          8/22/2024     1:04 PM 8/8/2024     8:19 AM 6/28/2024     4:27 PM   Last 3 PDI Scores   Pain Disability Index (PDI) 40 20 39     Interval HPI 8/22/2024: Lucinda Aguilera returns to the office for follow up.  She returns for follow-up with continued lower back pain with radiation into bilateral hips, right lateral hip and into right leg.  She rates her pain as a 9/10, constant.  She denies any new numbness, weakness or any new changes to her bowel bladder function.  She has been following up with Orthopedics, they have ordered MRI of hip and are recommending a right hip joint injection with PM& R.  She is scheduled for this.  She did not trial Lyrica as it was too expensive.  No relief with gabapentin in the past.        HPI:   Lucinda Aguilera is a 73 y.o. female patient who has a past medical history of Anticoagulant long-term use, Anxiety, Arthritis, Cataract, Cholelithiasis, GERD (gastroesophageal reflux disease), Hepatic steatosis, Hyperlipemia, Hypertension, and PVD (posterior vitreous detachment). She presents with back pain.  She has had back pain for many years and has been gradually worsening.  Today she reports pain in her lower back that radiates primarily down her right leg.  Pain is constant, aching, shooting, 6/10.  Her pain is worse with standing and walking.  At times her legs can feel heavy an aching with walking.      Pain Intervention History:  - s/p L5/S1 VALERY on 5/30/24  - s/p caudal VALERY on 07/26/2024 with 45% relief    Past Spine Surgical History:      Past and current medications:  Antineuropathics: gabapentin   NSAIDs:  Physical therapy: yes, completed  Antidepressants:  Muscle relaxers: robaxin   Opioids:  Antiplatelets/Anticoagulants: aspirin     History:    Current Outpatient Medications:     acetaminophen (TYLENOL)  500 MG tablet, Take 500 mg by mouth every 6 (six) hours as needed for Pain., Disp: , Rfl:     ascorbic acid, vitamin C, (VITAMIN C) 250 MG tablet, Take 500 mg by mouth once daily. , Disp: , Rfl:     atenoloL (TENORMIN) 25 MG tablet, Take 1 tablet (25 mg total) by mouth every evening., Disp: 30 tablet, Rfl: 11    azelastine (ASTELIN) 137 mcg (0.1 %) nasal spray, 1 spray by Nasal route 2 (two) times daily., Disp: , Rfl:     balsalazide (COLAZAL) 750 mg capsule, TAKE 3 CAPSULES(2250 MG) BY MOUTH TWICE DAILY WITH MEALS, Disp: 180 capsule, Rfl: 11    benzonatate (TESSALON) 200 MG capsule, Take 1 capsule (200 mg total) by mouth 3 (three) times daily as needed for Cough., Disp: 30 capsule, Rfl: 1    buPROPion (WELLBUTRIN XL) 150 MG TB24 tablet, Take 1 tablet (150 mg total) by mouth once daily., Disp: 30 tablet, Rfl: 1    diltiaZEM (DILACOR XR) 240 MG CDCR, Take 1 capsule (240 mg total) by mouth every evening., Disp: 30 capsule, Rfl: 11    docusate sodium (COLACE) 100 MG capsule, Take 100 mg by mouth 2 (two) times daily., Disp: , Rfl:     DULoxetine (CYMBALTA) 30 MG capsule, Take 1 capsule (30 mg total) by mouth 2 (two) times daily., Disp: 60 capsule, Rfl: 01    famotidine (PEPCID) 40 MG tablet, Take 1 tablet (40 mg total) by mouth every evening., Disp: 90 tablet, Rfl: 3    famotidine-calcium carbonate-magnesium hydroxide (PEPCID COMPLETE) -165 mg, Take 1 tablet by mouth 2 (two) times daily as needed. Chew 1 or 2 tablets, 2-3 times a day, as needed, for nausea or heartburn., Disp: , Rfl:     fluticasone propionate (FLONASE) 50 mcg/actuation nasal spray, 1 spray (50 mcg total) by Each Nostril route once daily., Disp: 16 g, Rfl: 3    lisinopriL (PRINIVIL,ZESTRIL) 40 MG tablet, Take 1 tablet (40 mg total) by mouth 2 (two) times a day., Disp: 60 tablet, Rfl: 11    LORazepam (ATIVAN) 0.5 MG tablet, Take 0.5 tablets (0.25 mg total) by mouth 3 (three) times daily as needed for Anxiety., Disp: 45 tablet, Rfl: 0    magnesium  "oxide (MAG-OX) 400 mg (241.3 mg magnesium) tablet, Take 1 tablet (400 mg total) by mouth once daily., Disp: 30 tablet, Rfl: 3    methocarbamoL (ROBAXIN) 750 MG Tab, Take 1 tablet (750 mg total) by mouth 4 (four) times daily as needed., Disp: 44 tablet, Rfl: 3    multivitamin (THERAGRAN) per tablet, Take 1 tablet by mouth once daily., Disp: , Rfl:     ondansetron (ZOFRAN-ODT) 4 MG TbDL, Take 1 tablet (4 mg total) by mouth every 6 (six) hours as needed (nausea)., Disp: 30 tablet, Rfl: 3    pantoprazole (PROTONIX) 40 MG tablet, TAKE 1 TABLET(40 MG) BY MOUTH TWICE DAILY, Disp: 180 tablet, Rfl: 3    psyllium husk, aspartame, (NATURAL PSYLLIUM FIBER) 3.4 gram/5.8 gram Powd, Take by mouth., Disp: , Rfl:     rosuvastatin (CRESTOR) 20 MG tablet, Take 1 tablet (20 mg total) by mouth once daily., Disp: 30 tablet, Rfl: 11    Past Medical History:   Diagnosis Date    Anticoagulant long-term use     Anxiety     takes daily Xanax    Arthritis     right thumb    Cataract     OU    Cholelithiasis     GERD (gastroesophageal reflux disease)     Hepatic steatosis     Hyperlipemia     Hypertension     PVD (posterior vitreous detachment)     OD       Past Surgical History:   Procedure Laterality Date    BREAST BIOPSY Left 08/28/2020    stereo biopsy    BREAST BIOPSY Left 10/07/2020    benign excisional biopsy    CAUDAL EPIDURAL STEROID INJECTION N/A 7/26/2024    Procedure: Injection-steroid-epidural-caudal;  Surgeon: Indra Cuellar MD;  Location: [facility];  Service: Pain Management;  Laterality: N/A;    chest abcess      child    COLONOSCOPY  01/06/2012    Dr. Lopez: hemorrhoids, redundant colon    COLONOSCOPY N/A 02/17/2021    Dr. Lopez: Congested and erythematous mucosa in the rectum, in the recto-sigmoid colon and in the sigmoid colon, proctosigmoid colitis, Redundant colon; biopsy: focal active colitis "nonspecific but can be seen with acute self-limited colitis (infection), medication effect (e.g. NSAID use), proximity to " diverticula, or early/evolving IBD    EPIDURAL STEROID INJECTION INTO LUMBAR SPINE N/A 5/30/2024    Procedure: Injection-steroid-epidural-lumbar   L5/S1;  Surgeon: Indra Cuellar MD;  Location: [facility];  Service: Pain Management;  Laterality: N/A;    ESOPHAGOGASTRODUODENOSCOPY N/A 06/06/2019    Dr. Lopez: small hiatal hernia, Non-erosive esophageal reflux (NERD) disease?., slight antritis; biopsy: Antral mucosa with chemical/reactive gastropathy. negative for h pylori    ESOPHAGOGASTRODUODENOSCOPY N/A 02/17/2021    Procedure: EGD (ESOPHAGOGASTRODUODENOSCOPY);  Surgeon: Nathan Lopez Jr., MD;  Location: [facility];  Service: Endoscopy;  Laterality: N/A; Minimal gastritis; biopsy: stomach- negative for h pylori, active chronic gastritis with focal features of erosive gastritis, duodenum WNL    ESOPHAGOGASTRODUODENOSCOPY N/A 05/10/2022    Procedure: EGD (ESOPHAGOGASTRODUODENOSCOPY);  Surgeon: Nathan Lopez Jr., MD;  Location: [facility];  Service: Endoscopy;  Laterality: N/A;    ESOPHAGOGASTRODUODENOSCOPY N/A 3/12/2024    Procedure: EGD (ESOPHAGOGASTRODUODENOSCOPY);  Surgeon: Nathan Lopez Jr., MD;  Location: [facility];  Service: Endoscopy;  Laterality: N/A;    EXCISIONAL BIOPSY Left 10/07/2020    Procedure: EXCISIONAL BIOPSY-Left with radiological marker;  Surgeon: Brooklynn Ashford MD;  Location: [facility];  Service: General;  Laterality: Left;    FRACTURE SURGERY  2010    Left thumb repaired surgically    JOINT REPLACEMENT Bilateral     knee    KNEE ARTHROSCOPY W/ LASER      right    LAPAROSCOPIC CHOLECYSTECTOMY N/A 06/14/2019    Procedure: CHOLECYSTECTOMY, LAPAROSCOPIC;  Surgeon: Guevara Evans MD;  Location: [facility];  Service: General;  Laterality: N/A;    thumb surgery  11/2014    TOTAL KNEE ARTHROPLASTY Left 06/19/2018    Procedure: REPLACEMENT-KNEE-TOTAL;  Surgeon: Nj Melvin MD;  Location: [address];  Service: Orthopedics;  Laterality: Left;  RECUPYL    UPPER GASTROINTESTINAL ENDOSCOPY   07/13/2017    Dr. Lopez: NERD, Gastric mucosal atrophy. antritis, Scar in the incisura. Biopsied; biopsy: chronic gastritis. negative for h pylori       Family History   Problem Relation Name Age of Onset    Hypertension Mother age 82     Heart disease Mother age 82     Glaucoma Mother age 82     Stroke Father age 50     Glaucoma Sister      Cataracts Sister      Macular degeneration Sister      Breast cancer Neg Hx      Colon cancer Neg Hx      Crohn's disease Neg Hx      Ulcerative colitis Neg Hx      Stomach cancer Neg Hx      Esophageal cancer Neg Hx      Celiac disease Neg Hx      Amblyopia Neg Hx      Blindness Neg Hx      Retinal detachment Neg Hx      Strabismus Neg Hx         Social History     Socioeconomic History    Marital status:     Number of children: 2   Occupational History    Occupation: retired teacher and principal    Occupation: substitute   Tobacco Use    Smoking status: Never    Smokeless tobacco: Never   Substance and Sexual Activity    Alcohol use: Not Currently     Comment: social- maybe once every few months    Drug use: No    Sexual activity: Yes     Partners: Male     Birth control/protection: Post-menopausal, None     Social Determinants of Health     Financial Resource Strain: Low Risk  (11/9/2023)    Overall Financial Resource Strain (CARDIA)     Difficulty of Paying Living Expenses: Not hard at all   Food Insecurity: No Food Insecurity (11/9/2023)    Hunger Vital Sign     Worried About Running Out of Food in the Last Year: Never true     Ran Out of Food in the Last Year: Never true   Transportation Needs: No Transportation Needs (11/9/2023)    PRAPARE - Transportation     Lack of Transportation (Medical): No     Lack of Transportation (Non-Medical): No   Physical Activity: Sufficiently Active (11/9/2023)    Exercise Vital Sign     Days of Exercise per Week: 5 days     Minutes of Exercise per Session: 50 min   Recent Concern: Physical Activity - Insufficiently Active  "(10/20/2023)    Exercise Vital Sign     Days of Exercise per Week: 2 days     Minutes of Exercise per Session: 20 min   Stress: Patient Declined (11/9/2023)    [place] [employer] - Occupational Stress Questionnaire     Feeling of Stress : Patient declined   Housing Stability: Low Risk  (11/9/2023)    Housing Stability Vital Sign     Unable to Pay for Housing in the Last Year: No     Number of Places Lived in the Last Year: 1     Unstable Housing in the Last Year: No       Review of patient's allergies indicates:  No Known Allergies    Review of Systems:  12 point review of systems is negative.    Physical Exam:  Vitals:    08/22/24 1305   BP: 137/64   Pulse: 72   Weight: 79.2 kg (174 lb 9.7 oz)   Height: 5' 2" (1.575 m)   PainSc:   9   PainLoc: Hip         Body mass index is 31.94 kg/m².    Gen: NAD  Psych: mood appropriate for given condition  HEENT: eyes anicteric   CV: RRR  HEENT: anicteric   Respiratory: non-labored, no signs of respiratory distress  Abd: non-distended  Skin: warm, dry and intact.  Gait: No antalgic gait.     Sensory:  Intact and symmetrical to light touch in L1-S1 dermatomes bilaterally.    Motor:     Right Left   L2/3 Iliacus Hip flexion  5  5   L3/4 Qudratus Femoris Knee Extension  5  5   L4/5 Tib Anterior Ankle Dorsiflexion   5  5   L5/S1 Extensor Hallicus Longus Great toe extension  5  5   S1/S2 Gastroc/Soleus Plantar Flexion  5  5      Right Left                  Patellar DTR 0+ 0+   Achilles DTR 0 0                      Labs:  Lab Results   Component Value Date    HGBA1C 5.7 (H) 06/08/2021       Lab Results   Component Value Date    WBC 13.33 (H) 08/07/2024    HGB 13.7 08/07/2024    HCT 42.0 08/07/2024    MCV 88 08/07/2024     08/07/2024       Imaging:  MRI lumbar spine 3/22/24  FINDINGS:  Vertebral column: There is multilevel degenerative change which will be described by level.  The lumbar vertebral bodies maintain normal height.  There is no fracture.  " There is transitional lumbosacral anatomy.  The last rib bearing vertebral body is designated as T12.  Utilizing this numbering system, there is stable mild 3 mm retrolisthesis of L1 on L2.  There is 4 mm anterolisthesis of L4 on L5 with 5 mm anterolisthesis of L5 on S1.  There is marked disc space narrowing at L5-S1.  There is chronic degenerative endplate signal change corresponding to sclerosis on plain films.  There is mild-to-moderate disc space narrowing at the L4-5 level.  There is marked disc space narrowing at the L1-2 level.  All of the discs are desiccated.  Other than chronic degenerative endplate signal change, most apparent at the L5-S1 level, baseline marrow signal is normal.     Spinal canal, conus, epidural space: The spinal canal is developmentally normal.  The conus terminates at the level of superior L1 and is normal in contour and signal intensity.  There is no abnormal epidural mass or fluid collection.     Findings by level:  T11-12: There is a mild diffuse disc bulge with mild facet joint arthropathy.  There is no spinal canal or significant foraminal stenosis.  There is a small right foraminal perineural cyst.  T12-L1: There is a minimal disc bulge.  There is no spinal canal or significant foraminal stenosis.  L1-2: There is marked disc space narrowing, 3 mm retrolisthesis of L1 on L2 with inferior unroofing of a broad disc protrusion eccentric to the right where there is also osteophytic ridging.  There is bilateral facet joint arthropathy.  There is only borderline to mild spinal stenosis but there is moderate to severe right lateral recess and foraminal stenosis with only mild left foraminal stenosis.  L2-3: There is subtle trace retrolisthesis of L2 on L3.  There is a mild disc bulge.  There is left greater than right facet joint arthropathy.  There is no spinal stenosis.  There is no significant foraminal stenosis.  L3-4: There is a diffuse disc bulge.  There is facet joint arthropathy  with ligamentum flavum thickening.  There is borderline to mild spinal stenosis without significant foraminal stenosis.  L4-5: There is disc space narrowing, 4 mm anterolisthesis of L4 on L5 with unroofing of a moderate broad disc protrusion.  There is bilateral facet joint arthropathy.  There is severe spinal canal, bilateral lateral recess stenosis with moderate left and mild right foraminal stenosis.  L5-S1: There is marked disc space narrowing, 5 mm anterolisthesis of L5 on S1 with unroofing of a marked broad disc protrusion in addition to marked facet joint arthropathy results in severe spinal canal, bilateral lateral recess and foraminal stenosis.     Soft tissues, other: The posterior spinous muscles are atrophic.  The prevertebral soft tissues are normal.  The aorta is normal in caliber.  There is no hydronephrosis.    Xray lumbar spine 5/1/24  FINDINGS:  The vertebral bodies maintain normal height.  There is no fracture.  There is severe disc space narrowing at the L1-2 level as well as at the L5-S1 level with moderate disc space narrowing at the L4-5 level.  There is anterolisthesis of L4 on L5, L5 on S1 with retrolisthesis of L1 on L2 and trace retrolisthesis of L2 on L3.  There is multilevel facet joint arthropathy.  No instability is demonstrated with flexion or extension.    Assessment:   Problem List Items Addressed This Visit          Neuro    Lumbar spondylosis - Primary     Other Visit Diagnoses       Lumbar radiculopathy        Relevant Medications    DULoxetine (CYMBALTA) 30 MG capsule    Spinal stenosis of lumbar region, unspecified whether neurogenic claudication present        Pain of right hip joint                      Lucinda Aguilera is a 73 y.o. female patient who has a past medical history of Anticoagulant long-term use, Anxiety, Arthritis, Cataract, Cholelithiasis, GERD (gastroesophageal reflux disease), Hepatic steatosis, Hyperlipemia, Hypertension, and PVD (posterior vitreous  detachment). She presents with back pain.  She has had back pain for many years and has been gradually worsening.  Today she reports pain in her lower back that radiates primarily down her right leg.  Pain is constant, aching, shooting, 6/10.  Her pain is worse with standing and walking.  At times her legs can feel heavy an aching with walking.    8/22/2024: Lucinda Aguilera returns to the office for follow up.  She returns for follow-up with continued lower back pain with radiation into bilateral hips, right lateral hip and into right leg.  She rates her pain as a 9/10, constant.  She denies any new numbness, weakness or any new changes to her bowel bladder function.  She has been following up with Orthopedics, they have ordered MRI of hip and are recommending a right hip joint injection with PM& R.  She is scheduled for this.  She did not trial Lyrica as it was too expensive.  No relief with gabapentin in the past.      - on exam she has full strength in his lower extremities and intact sensation to light touch bilateral L2-S1.   - I independently reviewed her lumbar MRI and she has severe central canal narrowing at L4-5 and L5-S1.  She has multilevel bilateral foraminal narrowing.  She has listhesis of L1-2, L4-5, L5-S1  - we reviewed her right hip MRI in office today and is consistent with Moderate right hip osteoarthritis with joint space narrowing and scattered high-grade partial to full-thickness cartilage loss   - she likely has multiple overlapping pain generators.  Likely some pain originating from her right hip joint.  I think it is reasonable to proceed with intra-articular right hip joint injection.  She will have this done with PM&R.  - likely has pain from her central canal stenosis in addition to her axial facet mediated pain.  She has failed to find relief with epidurals.  - she has been evaluated by Neurosurgery, she is a surgical candidate but they recommend exhausting all conservative measures  prior.  - to address her axial facet mediated pain I would like to schedule her for bilateral L4/5 and L5/S1 diagnostic medial branch blocks.  If successful we will repeat the blocks prior to proceeding with radiofrequency ablation.  - for any neuropathic component, we will trial Cymbalta 30 mg once daily for the 1st week and if tolerating increase to 60 mg.  Discussed with patient if she has any ill side effects to discontinue immediately.  - follow-up 4 weeks post procedure or sooner if needed.      : Not applicable      This note was completed with dictation software and grammatical errors may exist.

## 2024-08-22 NOTE — PROGRESS NOTES
"  Chief Complaint   Patient presents with    Right Hip - Pain      HPI:   This is a 73 y.o. who presents to clinic today for right hip pain for the past 3 months after no known trauma. She notes good relief from last injection. No numbness or tingling. No associated signs or symptoms.      Past Medical History:   Diagnosis Date    Anticoagulant long-term use     Anxiety     takes daily Xanax    Arthritis     right thumb    Cataract     OU    Cholelithiasis     GERD (gastroesophageal reflux disease)     Hepatic steatosis     Hyperlipemia     Hypertension     PVD (posterior vitreous detachment)     OD     Past Surgical History:   Procedure Laterality Date    BREAST BIOPSY Left 08/28/2020    stereo biopsy    BREAST BIOPSY Left 10/07/2020    benign excisional biopsy    CAUDAL EPIDURAL STEROID INJECTION N/A 7/26/2024    Procedure: Injection-steroid-epidural-caudal;  Surgeon: Indra Cuellar MD;  Location: [facility] OR;  Service: Pain Management;  Laterality: N/A;    chest abcess      child    COLONOSCOPY  01/06/2012    Dr. Lopez: hemorrhoids, redundant colon    COLONOSCOPY N/A 02/17/2021    Dr. Lopez: Congested and erythematous mucosa in the rectum, in the recto-sigmoid colon and in the sigmoid colon, proctosigmoid colitis, Redundant colon; biopsy: focal active colitis "nonspecific but can be seen with acute self-limited colitis (infection), medication effect (e.g. NSAID use), proximity to diverticula, or early/evolving IBD    EPIDURAL STEROID INJECTION INTO LUMBAR SPINE N/A 5/30/2024    Procedure: Injection-steroid-epidural-lumbar   L5/S1;  Surgeon: Indra Cuellar MD;  Location: [facility] OR;  Service: Pain Management;  Laterality: N/A;    ESOPHAGOGASTRODUODENOSCOPY N/A 06/06/2019    Dr. Lopez: small hiatal hernia, Non-erosive esophageal reflux (NERD) disease?., slight antritis; biopsy: Antral mucosa with chemical/reactive gastropathy. negative for h pylori    ESOPHAGOGASTRODUODENOSCOPY N/A 02/17/2021    Procedure: EGD " (ESOPHAGOGASTRODUODENOSCOPY);  Surgeon: Nathan Lopez Jr., MD;  Location: [facility];  Service: Endoscopy;  Laterality: N/A; Minimal gastritis; biopsy: stomach- negative for h pylori, active chronic gastritis with focal features of erosive gastritis, duodenum WNL    ESOPHAGOGASTRODUODENOSCOPY N/A 05/10/2022    Procedure: EGD (ESOPHAGOGASTRODUODENOSCOPY);  Surgeon: Nathan Lopez Jr., MD;  Location: [facility];  Service: Endoscopy;  Laterality: N/A;    ESOPHAGOGASTRODUODENOSCOPY N/A 3/12/2024    Procedure: EGD (ESOPHAGOGASTRODUODENOSCOPY);  Surgeon: Nathan Lopez Jr., MD;  Location: [facility];  Service: Endoscopy;  Laterality: N/A;    EXCISIONAL BIOPSY Left 10/07/2020    Procedure: EXCISIONAL BIOPSY-Left with radiological marker;  Surgeon: Brooklynn Ashford MD;  Location: [facility];  Service: General;  Laterality: Left;    FRACTURE SURGERY  2010    Left thumb repaired surgically    JOINT REPLACEMENT Bilateral     knee    KNEE ARTHROSCOPY W/ LASER      right    LAPAROSCOPIC CHOLECYSTECTOMY N/A 06/14/2019    Procedure: CHOLECYSTECTOMY, LAPAROSCOPIC;  Surgeon: Guevara vEans MD;  [facility];  Service: General;  Laterality: N/A;    thumb surgery  11/2014    TOTAL KNEE ARTHROPLASTY Left 06/19/2018    Procedure: REPLACEMENT-KNEE-TOTAL;  Surgeon: Nj Melvin MD;  Location: [address];  Service: Orthopedics;  Laterality: Left;  [place]    UPPER GASTROINTESTINAL ENDOSCOPY  07/13/2017    Dr. Lopez: NERD, Gastric mucosal atrophy. antritis, Scar in the incisura. Biopsied; biopsy: chronic gastritis. negative for h pylori     Current Outpatient Medications on File Prior to Visit   Medication Sig Dispense Refill    acetaminophen (TYLENOL) 500 MG tablet Take 500 mg by mouth every 6 (six) hours as needed for Pain.      ascorbic acid, vitamin C, (VITAMIN C) 250 MG tablet Take 500 mg by mouth once daily.       atenoloL (TENORMIN) 25 MG tablet Take 1 tablet (25 mg total) by mouth every evening. 30 tablet 11     azelastine (ASTELIN) 137 mcg (0.1 %) nasal spray 1 spray by Nasal route 2 (two) times daily.      balsalazide (COLAZAL) 750 mg capsule TAKE 3 CAPSULES(2250 MG) BY MOUTH TWICE DAILY WITH MEALS 180 capsule 11    benzonatate (TESSALON) 200 MG capsule Take 1 capsule (200 mg total) by mouth 3 (three) times daily as needed for Cough. 30 capsule 1    diltiaZEM (DILACOR XR) 240 MG CDCR Take 1 capsule (240 mg total) by mouth every evening. 30 capsule 11    docusate sodium (COLACE) 100 MG capsule Take 100 mg by mouth 2 (two) times daily.      famotidine (PEPCID) 40 MG tablet Take 1 tablet (40 mg total) by mouth every evening. 90 tablet 3    famotidine-calcium carbonate-magnesium hydroxide (PEPCID COMPLETE) -165 mg Take 1 tablet by mouth 2 (two) times daily as needed. Chew 1 or 2 tablets, 2-3 times a day, as needed, for nausea or heartburn.      fluticasone propionate (FLONASE) 50 mcg/actuation nasal spray 1 spray (50 mcg total) by Each Nostril route once daily. 16 g 3    lisinopriL (PRINIVIL,ZESTRIL) 40 MG tablet Take 1 tablet (40 mg total) by mouth 2 (two) times a day. 60 tablet 11    magnesium oxide (MAG-OX) 400 mg (241.3 mg magnesium) tablet Take 1 tablet (400 mg total) by mouth once daily. 30 tablet 3    multivitamin (THERAGRAN) per tablet Take 1 tablet by mouth once daily.      ondansetron (ZOFRAN-ODT) 4 MG TbDL Take 1 tablet (4 mg total) by mouth every 6 (six) hours as needed (nausea). 30 tablet 3    pantoprazole (PROTONIX) 40 MG tablet TAKE 1 TABLET(40 MG) BY MOUTH TWICE DAILY 180 tablet 3    psyllium husk, aspartame, (NATURAL PSYLLIUM FIBER) 3.4 gram/5.8 gram Powd Take by mouth.      rosuvastatin (CRESTOR) 20 MG tablet Take 1 tablet (20 mg total) by mouth once daily. 30 tablet 11     No current facility-administered medications on file prior to visit.     Review of patient's allergies indicates:  No Known Allergies  Family History   Problem Relation Name Age of Onset    Hypertension Mother age 82     Heart  disease Mother age 82     Glaucoma Mother age 82     Stroke Father age 50     Glaucoma Sister      Cataracts Sister      Macular degeneration Sister      Breast cancer Neg Hx      Colon cancer Neg Hx      Crohn's disease Neg Hx      Ulcerative colitis Neg Hx      Stomach cancer Neg Hx      Esophageal cancer Neg Hx      Celiac disease Neg Hx      Amblyopia Neg Hx      Blindness Neg Hx      Retinal detachment Neg Hx      Strabismus Neg Hx       Social History     Socioeconomic History    Marital status:     Number of children: 2   Occupational History    Occupation: retired teacher and principal    Occupation: substitute   Tobacco Use    Smoking status: Never    Smokeless tobacco: Never   Substance and Sexual Activity    Alcohol use: Not Currently     Comment: social- maybe once every few months    Drug use: No    Sexual activity: Yes     Partners: Male     Birth control/protection: Post-menopausal, None     Social Determinants of Health     Financial Resource Strain: Low Risk  (11/9/2023)    Overall Financial Resource Strain (CARDIA)     Difficulty of Paying Living Expenses: Not hard at all   Food Insecurity: No Food Insecurity (11/9/2023)    Hunger Vital Sign     Worried About Running Out of Food in the Last Year: Never true     Ran Out of Food in the Last Year: Never true   Transportation Needs: No Transportation Needs (11/9/2023)    PRAPARE - Transportation     Lack of Transportation (Medical): No     Lack of Transportation (Non-Medical): No   Physical Activity: Sufficiently Active (11/9/2023)    Exercise Vital Sign     Days of Exercise per Week: 5 days     Minutes of Exercise per Session: 50 min   Recent Concern: Physical Activity - Insufficiently Active (10/20/2023)    Exercise Vital Sign     Days of Exercise per Week: 2 days     Minutes of Exercise per Session: 20 min   Stress: Patient Declined (11/9/2023)    [employer] [place] [employer] - Occupational Stress Questionnaire     Feeling of  Stress : Patient declined   Housing Stability: Low Risk  (11/9/2023)    Housing Stability Vital Sign     Unable to Pay for Housing in the Last Year: No     Number of Places Lived in the Last Year: 1     Unstable Housing in the Last Year: No       Review of Systems:  Constitutional:  Denies fever or chills   Eyes:  Denies change in visual acuity   HENT:  Denies nasal congestion or sore throat   Respiratory:  Denies cough or shortness of breath   Cardiovascular:  Denies chest pain or edema   GI:  Denies abdominal pain, nausea, vomiting, bloody stools or diarrhea   :  Denies dysuria   Integument:  Denies rash   Neurologic:  Denies headache, focal weakness or sensory changes   Endocrine:  Denies polyuria or polydipsia   Lymphatic:  Denies swollen glands   Psychiatric:  Denies depression or anxiety     Physical Exam:   Constitutional:  Well developed, well nourished, no acute distress, non-toxic appearance   Integument:  Well hydrated, no rash   Lymphatic:  No lymphadenopathy noted   Neurologic:  Alert & oriented x 3  Psychiatric:  Speech and behavior appropriate     Bilateral Hip Exam  Tenderness   The patient is experiencing minimal tenderness in the greater trochanter.  Range of Motion   The patient has normal hip ROM.  Muscle Strength   Abduction: 4/5   Other   Erythema: absent  Sensation: normal  Pulse: present    Primary osteoarthritis of right hip    Other orders  -     methocarbamoL (ROBAXIN) 750 MG Tab; Take 1 tablet (750 mg total) by mouth 4 (four) times daily as needed.  Dispense: 44 tablet; Refill: 3         Will hold off on injections. RTC as needed

## 2024-08-26 ENCOUNTER — TELEPHONE (OUTPATIENT)
Dept: PHYSICAL MEDICINE AND REHAB | Facility: CLINIC | Age: 74
End: 2024-08-26
Payer: MEDICARE

## 2024-08-26 NOTE — TELEPHONE ENCOUNTER
Called pt and LVM about Casandra in Ortho scheduling pts chelsi the way that she did and that it may be due to the point of her last inj. I also let pt know that we didn't have anything sooner then the apt she already has.

## 2024-08-28 ENCOUNTER — PATIENT OUTREACH (OUTPATIENT)
Dept: ADMINISTRATIVE | Facility: HOSPITAL | Age: 74
End: 2024-08-28
Payer: MEDICARE

## 2024-08-28 DIAGNOSIS — Z12.31 ENCOUNTER FOR SCREENING MAMMOGRAM FOR MALIGNANT NEOPLASM OF BREAST: Primary | ICD-10-CM

## 2024-08-28 NOTE — PROGRESS NOTES
Population Health Chart Review & Patient Outreach Details      Additional [facility] Notes:               Updates Requested / Reviewed:      Updated Care Coordination Note and Immunizations Reconciliation Completed or Queried: [place]         Health Maintenance Topics Overdue:      [facility] ER Score: 0     Patient is not due for any topics at this time.                       Health Maintenance Topic(s) Outreach Outcomes & Actions Taken:    Breast Cancer Screening - Outreach Outcomes & Actions Taken  : Mammogram Order Placed, Pt Declined Scheduling Mammogram, and Pt will call back to schedule.

## 2024-08-31 ENCOUNTER — EXTERNAL CHRONIC CARE MANAGEMENT (OUTPATIENT)
Dept: PRIMARY CARE CLINIC | Facility: CLINIC | Age: 74
End: 2024-08-31
Payer: MEDICARE

## 2024-08-31 PROCEDURE — 99490 CHRNC CARE MGMT STAFF 1ST 20: CPT | Mod: S$PBB,,, | Performed by: FAMILY MEDICINE

## 2024-08-31 PROCEDURE — 99490 CHRNC CARE MGMT STAFF 1ST 20: CPT | Mod: PBBFAC,PO | Performed by: FAMILY MEDICINE

## 2024-09-02 DIAGNOSIS — R09.81 NASAL CONGESTION: ICD-10-CM

## 2024-09-03 RX ORDER — FLUTICASONE PROPIONATE 50 MCG
1 SPRAY, SUSPENSION (ML) NASAL DAILY
Qty: 16 G | Refills: 3 | Status: SHIPPED | OUTPATIENT
Start: 2024-09-03

## 2024-09-10 ENCOUNTER — PATIENT MESSAGE (OUTPATIENT)
Dept: PSYCHIATRY | Facility: CLINIC | Age: 74
End: 2024-09-10
Payer: MEDICARE

## 2024-09-10 DIAGNOSIS — R11.2 NON-INTRACTABLE VOMITING WITH NAUSEA: ICD-10-CM

## 2024-09-10 DIAGNOSIS — F33.1 MDD (MAJOR DEPRESSIVE DISORDER), RECURRENT EPISODE, MODERATE: ICD-10-CM

## 2024-09-10 DIAGNOSIS — F41.1 GAD (GENERALIZED ANXIETY DISORDER): ICD-10-CM

## 2024-09-10 DIAGNOSIS — R10.13 EPIGASTRIC DISCOMFORT: ICD-10-CM

## 2024-09-10 RX ORDER — FAMOTIDINE 40 MG/1
40 TABLET, FILM COATED ORAL NIGHTLY
Qty: 90 TABLET | Refills: 3 | Status: SHIPPED | OUTPATIENT
Start: 2024-09-10

## 2024-09-10 RX ORDER — BUPROPION HYDROCHLORIDE 150 MG/1
150 TABLET ORAL DAILY
Qty: 30 TABLET | Refills: 1 | Status: SHIPPED | OUTPATIENT
Start: 2024-09-10 | End: 2024-11-09

## 2024-09-10 RX ORDER — LORAZEPAM 0.5 MG/1
0.25 TABLET ORAL 3 TIMES DAILY PRN
Qty: 45 TABLET | Refills: 0 | Status: SHIPPED | OUTPATIENT
Start: 2024-09-16 | End: 2024-10-16

## 2024-09-11 ENCOUNTER — PATIENT MESSAGE (OUTPATIENT)
Dept: PAIN MEDICINE | Facility: CLINIC | Age: 74
End: 2024-09-11
Payer: MEDICARE

## 2024-09-12 ENCOUNTER — HOSPITAL ENCOUNTER (OUTPATIENT)
Facility: HOSPITAL | Age: 74
Discharge: HOME | End: 2024-09-12
Attending: ANESTHESIOLOGY | Admitting: ANESTHESIOLOGY
Payer: MEDICARE

## 2024-09-12 ENCOUNTER — HOSPITAL ENCOUNTER (OUTPATIENT)
Dept: RADIOLOGY | Facility: HOSPITAL | Age: 74
Discharge: HOME | End: 2024-09-12
Attending: ANESTHESIOLOGY | Admitting: ANESTHESIOLOGY
Payer: MEDICARE

## 2024-09-12 ENCOUNTER — TELEPHONE (OUTPATIENT)
Dept: PAIN MEDICINE | Facility: HOSPITAL | Age: 74
End: 2024-09-12
Payer: MEDICARE

## 2024-09-12 VITALS
DIASTOLIC BLOOD PRESSURE: 75 MMHG | BODY MASS INDEX: 32.02 KG/M2 | RESPIRATION RATE: 16 BRPM | HEART RATE: 80 BPM | HEIGHT: 62 IN | WEIGHT: 174 LBS | OXYGEN SATURATION: 99 % | TEMPERATURE: 98 F | SYSTOLIC BLOOD PRESSURE: 168 MMHG

## 2024-09-12 DIAGNOSIS — M47.816 LUMBAR SPONDYLOSIS: Primary | ICD-10-CM

## 2024-09-12 DIAGNOSIS — M54.50 LOWER BACK PAIN: ICD-10-CM

## 2024-09-12 PROCEDURE — 64493 INJ PARAVERT F JNT L/S 1 LEV: CPT | Mod: 50,PO | Performed by: ANESTHESIOLOGY

## 2024-09-12 PROCEDURE — 63600175 PHARM REV CODE 636 W HCPCS: Mod: PO | Performed by: ANESTHESIOLOGY

## 2024-09-12 PROCEDURE — 64493 INJ PARAVERT F JNT L/S 1 LEV: CPT | Mod: 50,,, | Performed by: ANESTHESIOLOGY

## 2024-09-12 PROCEDURE — 64494 INJ PARAVERT F JNT L/S 2 LEV: CPT | Mod: 50,PO | Performed by: ANESTHESIOLOGY

## 2024-09-12 PROCEDURE — 99152 MOD SED SAME PHYS/QHP 5/>YRS: CPT | Mod: ,,, | Performed by: ANESTHESIOLOGY

## 2024-09-12 PROCEDURE — 64494 INJ PARAVERT F JNT L/S 2 LEV: CPT | Mod: 50,,, | Performed by: ANESTHESIOLOGY

## 2024-09-12 PROCEDURE — 25000003 PHARM REV CODE 250: Mod: PO | Performed by: ANESTHESIOLOGY

## 2024-09-12 RX ORDER — MIDAZOLAM HYDROCHLORIDE 1 MG/ML
INJECTION INTRAMUSCULAR; INTRAVENOUS
Status: DISCONTINUED | OUTPATIENT
Start: 2024-09-12 | End: 2024-09-12 | Stop reason: HOSPADM

## 2024-09-12 RX ORDER — BUPIVACAINE HYDROCHLORIDE 2.5 MG/ML
INJECTION, SOLUTION EPIDURAL; INFILTRATION; INTRACAUDAL
Status: DISCONTINUED | OUTPATIENT
Start: 2024-09-12 | End: 2024-09-12 | Stop reason: HOSPADM

## 2024-09-12 RX ORDER — SODIUM CHLORIDE, SODIUM LACTATE, POTASSIUM CHLORIDE, CALCIUM CHLORIDE 600; 310; 30; 20 MG/100ML; MG/100ML; MG/100ML; MG/100ML
INJECTION, SOLUTION INTRAVENOUS CONTINUOUS
Status: DISCONTINUED | OUTPATIENT
Start: 2024-09-12 | End: 2024-09-12 | Stop reason: HOSPADM

## 2024-09-12 RX ORDER — LIDOCAINE HYDROCHLORIDE 10 MG/ML
INJECTION, SOLUTION EPIDURAL; INFILTRATION; INTRACAUDAL; PERINEURAL
Status: DISCONTINUED | OUTPATIENT
Start: 2024-09-12 | End: 2024-09-12 | Stop reason: HOSPADM

## 2024-09-12 RX ADMIN — SODIUM CHLORIDE, POTASSIUM CHLORIDE, SODIUM LACTATE AND CALCIUM CHLORIDE: 600; 310; 30; 20 INJECTION, SOLUTION INTRAVENOUS at 01:09

## 2024-09-12 NOTE — DISCHARGE SUMMARY
[facility]  Discharge Note  Short Stay    Admit Date: 9/12/2024    Discharge Date: 9/12/2024    Attending Physician: Indra Cuellar     Discharge Provider: Indra Cuellar    Diagnoses:  There are no hospital problems to display for this patient.      Discharged Condition: Good    Final Diagnoses: Lumbar spondylosis [M47.816]    Disposition: Home or Self Care    Hospital Course: No complications, uneventful    Outcome of Hospitalization, Treatment, Procedure, or Surgery:  Patient was admitted for outpatient interventional pain management procedure. The patient tolerated the procedure well with no complications.    Follow up/Patient Instructions:  Follow up as scheduled in Pain Management office in 2-3 weeks.  Patient has received instructions and follow up date and time.    Medications:  Continue previous medications    Discharge Procedure Orders   Notify your health care provider if you experience any of the following:  temperature >100.4     Notify your health care provider if you experience any of the following:  persistent nausea and vomiting or diarrhea     Notify your health care provider if you experience any of the following:  severe uncontrolled pain     Notify your health care provider if you experience any of the following:  redness, tenderness, or signs of infection (pain, swelling, redness, odor or green/yellow discharge around incision site)     Notify your health care provider if you experience any of the following:  difficulty breathing or increased cough     Notify your health care provider if you experience any of the following:  severe persistent headache     Notify your health care provider if you experience any of the following:  worsening rash     Notify your health care provider if you experience any of the following:  persistent dizziness, light-headedness, or visual disturbances     Notify your health care provider if you experience any of the following:  increased confusion or  weakness     Activity as tolerated

## 2024-09-12 NOTE — TELEPHONE ENCOUNTER
Please see below: please advise if you want me to work her in or schedule with Jerry. Your schedule is booked for next week.    Regarding your Lumbar Medial Branch Block , For Date of Service:  9/12/24    Please answer the following questions:    1. What percentage of pain relief did you receive following the block, from 0-100%? 80%    2. How many hours did pain relief last following the block?  1 hour    3. During this time please describe in detail the activities you were able to do? Walking around housework, feels loopy from med's she was given    4. Pain score from 0-10 pre procedure - 5     5. Pain score from 0-10 post procedure - 3

## 2024-09-12 NOTE — INTERVAL H&P NOTE
The patient has been examined and the H&P has been reviewed:    I concur with the findings and no changes have occurred since H&P was written.  The risks and benefits of this intervention, and alternative therapies were discussed with the patient.  The discussion of risks included infection, bleeding, need for additional procedures or surgery, nerve damage.  Questions regarding the procedure, risks, expected outcome, and possible side effects were solicited and answered to the patient's satisfaction.  Luicnda Ale wishes to proceed with the injection or procedure.  Written consent was obtained.  ASA 2, MP II      There are no hospital problems to display for this patient.

## 2024-09-12 NOTE — TELEPHONE ENCOUNTER
If this patient does not get good relief with diagnostic medial branch blocks please schedule her a follow up with me in the office

## 2024-09-12 NOTE — OP NOTE
Procedure Note    Procedure Date: 9/12/2024    Procedure Performed:  bilateral Diagnostic Lumbar Medial Branch @ L4/5 and L5/S1, with Fluoroscopic Guidance    Indications:   Lucinda Aguilera has chronic, moderate to severe axial lower back pain present for over the past 3 months that has failed to respond to physical therapy and PT-directed home exercises. There is no untreated radiculopathy or claudication present.  The patient has clinical and radiologic findings suggestive of facet mediated pain.     Pre-op diagnosis: Lumbar Spondylosis    Post-op diagnosis: same    Physician: Indra Cuellar MD    IV Anxiolysis medications: versed 2mg    Medications injected: Bupivacaine 0.25%, 0.5 cc per level    Local anesthetic used: 1% Lidocaine, 4 ml    Estimated Blood Loss: none    Complications:  none    Technique:  The patient was interviewed in the holding area and Risks/Benefits were discussed, including, but not limited to, the possibility of new or different pain, bleeding or infection.   All questions were answered.  The patient understood and accepted risks.  Consent was reviewed.  A time out was taken to identify the patient, procedure and side of the procedure. The patient was placed in a prone position, then prepped and draped in the usual sterile fashion using ChloraPrep x2 and sterile towels.  The levels were determined under fluoroscopic guidance and then marked.  1% Lidocaine was given by raising a wheal at the skin over each site and then infiltrated approximately 2cm deeper.  A 25-gauge 3.5 inch needle was introduced to the anatomic location of the L3-5 medial branch nerves on the bilateral side.  Then, after negative aspiration, 0.5 cc of Bupivacaine 0.25% was injected @ each level.  The patient tolerated the procedure well.  The patient tolerated the procedure well and was transferred to the [facility] in stable condition.  The patient was monitored after the procedure.  Patient was given post procedure and  discharge instructions to follow at home.  The patient was discharged in a stable condition.    Event Time In   In Facility 1232   In Pre-Procedure 1242   Physician Available    Anesthesia Available    Pre-Op: Bedside Procedure Start    Pre-Op: Bedside Procedure Stop    Pre-Procedure Complete 1301   Out of Pre-Procedure    Anesthesia Start    Anesthesia Start Data Collection    Setup Start    Setup Complete    In Room 1350   Prep Start    Procedure Prep Complete    MD notified pt. ready    Procedure Start 1354   Procedure Closing    Emergence    Procedure Finish 1406   Sedation Start 1349   Scope In    Extent Reached    Scope Out    Sedation End 1406   Out of Room 1408   Cleanup Start    Cleanup Complete    Cosmetic Start    Cosmetic Stop    Pain Mgmt In Room    Pain Mgmt Out Room    In Recovery    Anesthesia Finish    Bedside Procedure Start    Bedside Procedure Stop    Recovery Care Complete    Out of Recovery    To Phase II    In Phase II    Pain Mgmt Recovery Start 1408   Pain Mgmt Recovery Stop    Obs Rec Start    Obs Rec Stop    Phase II Care Complete    Out of Phase II    Procedural Care Complete    Discharge    Pain Follow Up Needed    Pain Follow Up Complete

## 2024-09-16 ENCOUNTER — CLINICAL SUPPORT (OUTPATIENT)
Dept: PHYSICAL MEDICINE AND REHAB | Facility: CLINIC | Age: 74
End: 2024-09-16
Payer: MEDICARE

## 2024-09-16 VITALS — SYSTOLIC BLOOD PRESSURE: 154 MMHG | HEART RATE: 65 BPM | DIASTOLIC BLOOD PRESSURE: 67 MMHG

## 2024-09-16 DIAGNOSIS — M25.551 CHRONIC RIGHT HIP PAIN: Primary | ICD-10-CM

## 2024-09-16 DIAGNOSIS — G89.29 CHRONIC RIGHT HIP PAIN: Primary | ICD-10-CM

## 2024-09-16 PROCEDURE — 99999 PR PBB SHADOW E&M-EST. PATIENT-LVL IV: CPT | Mod: PBBFAC,,, | Performed by: PHYSICAL MEDICINE & REHABILITATION

## 2024-09-16 PROCEDURE — 20611 DRAIN/INJ JOINT/BURSA W/US: CPT | Mod: PBBFAC,PO | Performed by: PHYSICAL MEDICINE & REHABILITATION

## 2024-09-16 PROCEDURE — 99999PBSHW PR PBB SHADOW TECHNICAL ONLY FILED TO HB: Mod: PBBFAC,,,

## 2024-09-16 PROCEDURE — 99204 OFFICE O/P NEW MOD 45 MIN: CPT | Mod: S$PBB,25,ICN, | Performed by: PHYSICAL MEDICINE & REHABILITATION

## 2024-09-16 RX ORDER — LIDOCAINE HYDROCHLORIDE 10 MG/ML
4 INJECTION, SOLUTION INFILTRATION; PERINEURAL
Status: DISCONTINUED | OUTPATIENT
Start: 2024-09-16 | End: 2024-09-16 | Stop reason: HOSPADM

## 2024-09-16 RX ORDER — TRIAMCINOLONE ACETONIDE 40 MG/ML
40 INJECTION, SUSPENSION INTRA-ARTICULAR; INTRAMUSCULAR
Status: DISCONTINUED | OUTPATIENT
Start: 2024-09-16 | End: 2024-09-16 | Stop reason: HOSPADM

## 2024-09-16 RX ADMIN — TRIAMCINOLONE ACETONIDE 40 MG: 40 INJECTION, SUSPENSION INTRA-ARTICULAR; INTRAMUSCULAR at 08:09

## 2024-09-16 RX ADMIN — LIDOCAINE HYDROCHLORIDE 4 ML: 10 INJECTION, SOLUTION INFILTRATION; PERINEURAL at 08:09

## 2024-09-16 NOTE — PROGRESS NOTES
[facility]    PCP: Saurabh Hassan MD    CHIEF COMPLAINT:   Chief Complaint   Patient presents with    Hip Pain     Right        HISTORY OF PRESENT ILLNESS: Lucinda Aguilera is a 73 y.o. female who presents to me for evaluation and management of right hip pain.     Today reports, chronic right hip pain without noted traumatic event. Pain to anterior groin with also underlying lateral hip/buttock pain. Historically has had right GTB injections with less than ideal response. Recent procedure by Interventional Pain with less than ideal response. Referred by ortho for right hip injection.     Medications: tylenol, robaxin   Injections:  - right GTB steroid 02/27/024  - lumbar MBBs and LESIs  Therapies: none  Bracing: none  DME: none    Review of Systems   Constitutional: Negative for fever.   HENT: Negative for drooling.    Eyes: Negative for discharge.   Respiratory: Negative for choking.    Cardiovascular: Negative for chest pain.   Genitourinary: Negative for flank pain.   Skin: Negative for wound.   Allergic/Immunologic: Negative for immunocompromised state.   Neurological: Negative for tremors and syncope.   Psychiatric/Behavioral: Negative for behavioral problems.     Past Medical History:   Past Medical History:   Diagnosis Date    Anticoagulant long-term use     Anxiety     takes daily Xanax    Arthritis     right thumb    Cataract     OU    Cholelithiasis     GERD (gastroesophageal reflux disease)     Hepatic steatosis     Hyperlipemia     Hypertension     PVD (posterior vitreous detachment)     OD       Past Surgical History:   Past Surgical History:   Procedure Laterality Date    BREAST BIOPSY Left 08/28/2020    stereo biopsy    BREAST BIOPSY Left 10/07/2020    benign excisional biopsy    CAUDAL EPIDURAL STEROID INJECTION N/A 7/26/2024    Procedure: Injection-steroid-epidural-caudal;  Surgeon: Indra Cuellar MD;  Location: [facility] OR;  Service: Pain Management;  " Laterality: N/A;    chest abcess      child    COLONOSCOPY  01/06/2012    Dr. Lopez: hemorrhoids, redundant colon    COLONOSCOPY N/A 02/17/2021    Dr. Lopez: Congested and erythematous mucosa in the rectum, in the recto-sigmoid colon and in the sigmoid colon, proctosigmoid colitis, Redundant colon; biopsy: focal active colitis "nonspecific but can be seen with acute self-limited colitis (infection), medication effect (e.g. NSAID use), proximity to diverticula, or early/evolving IBD    EPIDURAL STEROID INJECTION INTO LUMBAR SPINE N/A 5/30/2024    Procedure: Injection-steroid-epidural-lumbar   L5/S1;  Surgeon: Indra Cuellar MD;  Location: [facility];  Service: Pain Management;  Laterality: N/A;    ESOPHAGOGASTRODUODENOSCOPY N/A 06/06/2019    Dr. Lopez: small hiatal hernia, Non-erosive esophageal reflux (NERD) disease?., slight antritis; biopsy: Antral mucosa with chemical/reactive gastropathy. negative for h pylori    ESOPHAGOGASTRODUODENOSCOPY N/A 02/17/2021    Procedure: EGD (ESOPHAGOGASTRODUODENOSCOPY);  Surgeon: Nathan Lopez Jr., MD;  Location: [facility];  Service: Endoscopy;  Laterality: N/A; Minimal gastritis; biopsy: stomach- negative for h pylori, active chronic gastritis with focal features of erosive gastritis, duodenum WNL    ESOPHAGOGASTRODUODENOSCOPY N/A 05/10/2022    Procedure: EGD (ESOPHAGOGASTRODUODENOSCOPY);  Surgeon: Nathan Lopez Jr., MD;  Location: [facility];  Service: Endoscopy;  Laterality: N/A;    ESOPHAGOGASTRODUODENOSCOPY N/A 3/12/2024    Procedure: EGD (ESOPHAGOGASTRODUODENOSCOPY);  Surgeon: Nathan Lopez Jr., MD;  Location: [facility];  Service: Endoscopy;  Laterality: N/A;    EXCISIONAL BIOPSY Left 10/07/2020    Procedure: EXCISIONAL BIOPSY-Left with radiological marker;  Surgeon: Brooklynn Ashford MD;  Location: [facility];  Service: General;  Laterality: Left;    FRACTURE SURGERY  2010    Left thumb repaired surgically    INJECTION OF ANESTHETIC AGENT AROUND MEDIAL BRANCH NERVES " INNERVATING LUMBAR FACET JOINT Bilateral 9/12/2024    Procedure: Block-nerve-medial branch-lumbar    L4/5, L5/S1;  Surgeon: Indra Cuellar MD;  Location: [facility] OR;  Service: Pain Management;  Laterality: Bilateral;    JOINT REPLACEMENT Bilateral     knee    KNEE ARTHROSCOPY W/ LASER      right    LAPAROSCOPIC CHOLECYSTECTOMY N/A 06/14/2019    Procedure: CHOLECYSTECTOMY, LAPAROSCOPIC;  Surgeon: Guevara Evans MD;  Location: [facility] OR;  Service: General;  Laterality: N/A;    thumb surgery  11/2014    TOTAL KNEE ARTHROPLASTY Left 06/19/2018    Procedure: REPLACEMENT-KNEE-TOTAL;  Surgeon: Nj Melvin MD;  Location: [facility] OR [address] [place] [address];  Service: Orthopedics;  Laterality: Left;  [employer] [facility]    UPPER GASTROINTESTINAL ENDOSCOPY  07/13/2017    Dr. Lopez: NERD, Gastric mucosal atrophy. antritis, Scar in the incisura. Biopsied; biopsy: chronic gastritis. negative for h pylori       Family History:   Family History   Problem Relation Name Age of Onset    Hypertension Mother age 82     Heart disease Mother age 82     Glaucoma Mother age 82     Stroke Father age 50     Glaucoma Sister      Cataracts Sister      Macular degeneration Sister      Breast cancer Neg Hx      Colon cancer Neg Hx      Crohn's disease Neg Hx      Ulcerative colitis Neg Hx      Stomach cancer Neg Hx      Esophageal cancer Neg Hx      Celiac disease Neg Hx      Amblyopia Neg Hx      Blindness Neg Hx      Retinal detachment Neg Hx      Strabismus Neg Hx         Medications:   Current Outpatient Medications on File Prior to Visit   Medication Sig Dispense Refill    acetaminophen (TYLENOL) 500 MG tablet Take 500 mg by mouth every 6 (six) hours as needed for Pain.      ascorbic acid, vitamin C, (VITAMIN C) 250 MG tablet Take 500 mg by mouth once daily.       atenoloL (TENORMIN) 25 MG tablet Take 1 tablet (25 mg total) by mouth every evening. 30 tablet 11    azelastine (ASTELIN) 137 mcg (0.1 %) nasal spray 1 spray by Nasal route 2 (two) times daily.       balsalazide (COLAZAL) 750 mg capsule TAKE 3 CAPSULES(2250 MG) BY MOUTH TWICE DAILY WITH MEALS 180 capsule 11    benzonatate (TESSALON) 200 MG capsule Take 1 capsule (200 mg total) by mouth 3 (three) times daily as needed for Cough. 30 capsule 1    buPROPion (WELLBUTRIN XL) 150 MG TB24 tablet Take 1 tablet (150 mg total) by mouth once daily. 30 tablet 1    diltiaZEM (DILACOR XR) 240 MG CDCR Take 1 capsule (240 mg total) by mouth every evening. 30 capsule 11    docusate sodium (COLACE) 100 MG capsule Take 100 mg by mouth 2 (two) times daily.      DULoxetine (CYMBALTA) 30 MG capsule Take 1 capsule (30 mg total) by mouth 2 (two) times daily. 60 capsule 01    famotidine (PEPCID) 40 MG tablet Take 1 tablet (40 mg total) by mouth every evening. 90 tablet 3    famotidine-calcium carbonate-magnesium hydroxide (PEPCID COMPLETE) -165 mg Take 1 tablet by mouth 2 (two) times daily as needed. Chew 1 or 2 tablets, 2-3 times a day, as needed, for nausea or heartburn.      fluticasone propionate (FLONASE) 50 mcg/actuation nasal spray 1 spray (50 mcg total) by Each Nostril route once daily. 16 g 3    lisinopriL (PRINIVIL,ZESTRIL) 40 MG tablet Take 1 tablet (40 mg total) by mouth 2 (two) times a day. 60 tablet 11    LORazepam (ATIVAN) 0.5 MG tablet Take 0.5 tablets (0.25 mg total) by mouth 3 (three) times daily as needed for Anxiety. 45 tablet 0    magnesium oxide (MAG-OX) 400 mg (241.3 mg magnesium) tablet Take 1 tablet (400 mg total) by mouth once daily. 30 tablet 3    methocarbamoL (ROBAXIN) 750 MG Tab Take 1 tablet (750 mg total) by mouth 4 (four) times daily as needed. 44 tablet 3    multivitamin (THERAGRAN) per tablet Take 1 tablet by mouth once daily.      ondansetron (ZOFRAN-ODT) 4 MG TbDL Take 1 tablet (4 mg total) by mouth every 6 (six) hours as needed (nausea). 30 tablet 3    pantoprazole (PROTONIX) 40 MG tablet TAKE 1 TABLET(40 MG) BY MOUTH TWICE DAILY 180 tablet 3    psyllium husk, aspartame, (NATURAL PSYLLIUM  FIBER) 3.4 gram/5.8 gram Powd Take by mouth.      rosuvastatin (CRESTOR) 20 MG tablet Take 1 tablet (20 mg total) by mouth once daily. 30 tablet 11     No current facility-administered medications on file prior to visit.       Allergies: Review of patient's allergies indicates:  No Known Allergies    Social History:   Social History     Socioeconomic History    Marital status:     Number of children: 2   Occupational History    Occupation: retired teacher and principal    Occupation: substitute   Tobacco Use    Smoking status: Never    Smokeless tobacco: Never   Substance and Sexual Activity    Alcohol use: Not Currently     Comment: social- maybe once every few months    Drug use: No    Sexual activity: Yes     Partners: Male     Birth control/protection: Post-menopausal, None     Social Determinants of Health     Financial Resource Strain: Low Risk  (11/9/2023)    Overall Financial Resource Strain (CARDIA)     Difficulty of Paying Living Expenses: Not hard at all   Food Insecurity: No Food Insecurity (11/9/2023)    Hunger Vital Sign     Worried About Running Out of Food in the Last Year: Never true     Ran Out of Food in the Last Year: Never true   Transportation Needs: No Transportation Needs (11/9/2023)    PRAPARE - Transportation     Lack of Transportation (Medical): No     Lack of Transportation (Non-Medical): No   Physical Activity: Sufficiently Active (11/9/2023)    Exercise Vital Sign     Days of Exercise per Week: 5 days     Minutes of Exercise per Session: 50 min   Recent Concern: Physical Activity - Insufficiently Active (10/20/2023)    Exercise Vital Sign     Days of Exercise per Week: 2 days     Minutes of Exercise per Session: 20 min   Stress: Patient Declined (11/9/2023)    [employer] [place] [employer] - Occupational Stress Questionnaire     Feeling of Stress : Patient declined   Housing Stability: Low Risk  (11/9/2023)    Housing Stability Vital Sign     Unable to Pay for Housing  in the Last Year: No     Number of Places Lived in the Last Year: 1     Unstable Housing in the Last Year: No       PHYSICAL EXAMINATION:   Vitals:    09/16/24 0835   BP: (!) 154/67   Pulse: 65     Constitutional: No apparent distress. Pleasant.  HENT: Trachea midline.  Head: Normocephalic and atraumatic.   Eyes: Right eye exhibits no discharge. Left eye exhibits no discharge. No scleral icterus.   CV: Well perfused.   Pulmonary/Chest: Effort normal. No respiratory distress.   Abdominal: There is no guarding.   Neurological: Awake, alert and cooperative.  SKIN: Intact no apparent lesions, cut, ulcers or abrasions  EXT:  No cyanosis, clubbing, or edema.  SENSORY: Intact to light touch in the bilateral lower extemities.  MUSCULOSKELETAL:   Muscle Strength:(0-5)                             Right     Left  Hip Flexors                5  5  Hip Abductor              5  5  Hip Adductor              5  5  Knee Extensors          5  5  Knee Flexors              5  5  Ankle Dorsiflex           5  5  Ankle Planterflex        5  5  EHL                            5  5    Reflexes: (0-4+/4)   Right     Left  Patellar(L4)  2+  2+  Ankle(S1)  2+  2+    INSPECTION:         Right     Left   Localized/Generalized swelling:   -  -  Muscle contours normal and symmetrical: +  +  Erythema:      -  -  Gross deformity:    -  -    GAIT: smooth and symmetrical with equal arm swing    RANGE OF MOTION:           Right      Left  Lumbar Flexion:                    Full  Full   Lumbar Extension:                  Full  Full  Lumbar Lateral bending: Full  Full  Hip Internal Rotation:  Full  Full  Hip External Rotation:  Full  Full  Log Rolling:   Full  Full  Other:     TENDERNESS:                 Right      Left  Trochanteric bursa:     +  -  Gluteal muscles:          +  -  SI Joints:           -  -    SPECIAL TESTS:         Right     Left  Seated SLR:       -  -  [place]'s (SIJ):    -  -   Justin's (JOSUE):  +  -  Piriformis  stretch:  -  -  FAIR:    +  -  Other:    Imaging  XR right hip 06/25/2024 with noted: bilateral FA OA changes with joint space loss    MRI right hip 08/21/2024 with noted:   1. Moderate right hip osteoarthritis with joint space narrowing and scattered high-grade partial to full-thickness cartilage loss, as above.  2. Right hip abductor, iliopsoas, and high right hamstring tendinopathy.  No discrete tear.  3. Minimal trochanteric bursitis.  4. Degenerative changes of the visualized lower lumbar spine and at the pubic symphysis.    Data Reviewed: X-ray    Supportive Actions: Independent visualization of images or test specimens.    ASSESSMENT:   1. Chronic right hip pain        PLAN:   1. Time was spent reviewing the above diagnosis in depth with Lucinda today, including acute management and rehabilitation.     2. We discussed options for management of her pain would be to consider injection of short acting steroid. The use, benefits, risks, and expectations of all of these types of injections was discussed at length with her today. At this time, she elects to proceed with ultrasound-guided right intra-articular hip steroid injection(s). This procedure was completed today in clinic. Please see procedure note for further details. We can repeat this every 3 months if appropriate.     3. Pending response will decide how she proceeds. If she gets great relief but does not last until next appropriate steroid injection would recommend follow up with ortho to discuss surgical management of right hip. If less than ideal response from injection would suggest further evaluation again with Interventional Pain.      RTC as needed.     46 minutes of total time spent on the encounter, which includes face to face time and non-face to face time preparing to see the patient (eg, review of tests), obtaining and/or reviewing separately obtained history, documenting clinical information in the electronic or other health record,  independently interpreting results (not separately reported), communicating results to the patient/family/caregiver, and/or care coordination (not separately reported).

## 2024-09-16 NOTE — PROCEDURES
Large Joint Aspiration/Injection: R hip joint    Date/Time: 9/16/2024 8:30 AM    Performed by: Demetra Beavers, DO  Authorized by: Demetra Beavers, DO    Consent Done?:  Yes (Verbal)  Indications:  Arthritis, diagnostic evaluation and pain  Site marked: the procedure site was marked    Timeout: prior to procedure the correct patient, procedure, and site was verified    Prep: patient was prepped and draped in usual sterile fashion      Local anesthesia used?: Yes    Anesthesia:  Local infiltration  Local anesthetic:  Lidocaine 1% without epinephrine  Anesthetic total (ml):  2      Details:  Needle Size:  22 G  Ultrasonic Guidance for needle placement?: Yes    Images are saved and documented.  Approach:  Anterior  Location:  Hip  Site:  R hip joint  Medications:  4 mL LIDOcaine HCL 10 mg/ml (1%) 10 mg/mL (1 %); 40 mg triamcinolone acetonide 40 mg/mL  Patient tolerance:  Patient tolerated the procedure well with no immediate complications    Ultrasound guidance was used for correct needle placement, the images were saved and will be uploaded to EMR.

## 2024-09-19 ENCOUNTER — OFFICE VISIT (OUTPATIENT)
Dept: PAIN MEDICINE | Facility: CLINIC | Age: 74
End: 2024-09-19
Payer: MEDICARE

## 2024-09-19 VITALS
DIASTOLIC BLOOD PRESSURE: 67 MMHG | BODY MASS INDEX: 32.03 KG/M2 | SYSTOLIC BLOOD PRESSURE: 150 MMHG | WEIGHT: 174.06 LBS | HEART RATE: 77 BPM | HEIGHT: 62 IN

## 2024-09-19 DIAGNOSIS — M47.816 LUMBAR SPONDYLOSIS: Primary | ICD-10-CM

## 2024-09-19 DIAGNOSIS — M48.061 SPINAL STENOSIS OF LUMBAR REGION, UNSPECIFIED WHETHER NEUROGENIC CLAUDICATION PRESENT: ICD-10-CM

## 2024-09-19 DIAGNOSIS — M54.16 LUMBAR RADICULOPATHY: ICD-10-CM

## 2024-09-19 PROCEDURE — 99999 PR PBB SHADOW E&M-EST. PATIENT-LVL III: CPT | Mod: PBBFAC,,, | Performed by: ANESTHESIOLOGY

## 2024-09-19 PROCEDURE — 99213 OFFICE O/P EST LOW 20 MIN: CPT | Mod: PBBFAC,PO | Performed by: ANESTHESIOLOGY

## 2024-09-19 NOTE — PROGRESS NOTES
[facility] Follow Up Evaluation      Referred by: No ref. provider found    PCP:     CC:   Chief Complaint   Patient presents with    Follow-up     PAIN - f/u after LMBB per             9/19/2024     2:40 PM 8/22/2024     1:04 PM 8/8/2024     8:19 AM   Last 3 PDI Scores   Pain Disability Index (PDI) 15 40 20         Interval HPI 9/19/2024: Lucinda Aguilera returns to the office for follow up.  She is status post 1st diagnostic bilateral L4-5 and L5-S1 medial branch blocks on 9/12/24 without significant relief.  She recently had a right intra-articular hip injection with good relief of her right hip pain.  Today she rates her pain as 3/10.  She is not having any symptoms of claudication      Pain Intervention History:  - s/p L5/S1 VALERY on 5/30/24  - s/p caudal VALERY on 07/26/2024 with 45% relief  - s/p 1st diagnostic bilateral L4-5 and L5-S1 medial branch blocks on 9/12/24 without relief     Past Spine Surgical History:      HPI:   Lucinda Aguilera is a 73 y.o. female patient who has a past medical history of Anticoagulant long-term use, Anxiety, Arthritis, Cataract, Cholelithiasis, GERD (gastroesophageal reflux disease), Hepatic steatosis, Hyperlipemia, Hypertension, and PVD (posterior vitreous detachment). She presents with back pain.  She has had back pain for many years and has been gradually worsening.  Today she reports pain in her lower back that radiates primarily down her right leg.  Pain is constant, aching, shooting, 6/10.  Her pain is worse with standing and walking.  At times her legs can feel heavy an aching with walking.      Past and current medications:  Antineuropathics: gabapentin   NSAIDs:  Physical therapy: yes, completed  Antidepressants:  Muscle relaxers: robaxin   Opioids:  Antiplatelets/Anticoagulants: aspirin     History:    Current Outpatient Medications:     acetaminophen (TYLENOL) 500 MG tablet, Take 500 mg by mouth every 6 (six) hours as needed for Pain., Disp: , Rfl:      ascorbic acid, vitamin C, (VITAMIN C) 250 MG tablet, Take 500 mg by mouth once daily. , Disp: , Rfl:     atenoloL (TENORMIN) 25 MG tablet, Take 1 tablet (25 mg total) by mouth every evening., Disp: 30 tablet, Rfl: 11    azelastine (ASTELIN) 137 mcg (0.1 %) nasal spray, 1 spray by Nasal route 2 (two) times daily., Disp: , Rfl:     balsalazide (COLAZAL) 750 mg capsule, TAKE 3 CAPSULES(2250 MG) BY MOUTH TWICE DAILY WITH MEALS, Disp: 180 capsule, Rfl: 11    benzonatate (TESSALON) 200 MG capsule, Take 1 capsule (200 mg total) by mouth 3 (three) times daily as needed for Cough., Disp: 30 capsule, Rfl: 1    buPROPion (WELLBUTRIN XL) 150 MG TB24 tablet, Take 1 tablet (150 mg total) by mouth once daily., Disp: 30 tablet, Rfl: 1    diltiaZEM (DILACOR XR) 240 MG CDCR, Take 1 capsule (240 mg total) by mouth every evening., Disp: 30 capsule, Rfl: 11    docusate sodium (COLACE) 100 MG capsule, Take 100 mg by mouth 2 (two) times daily., Disp: , Rfl:     DULoxetine (CYMBALTA) 30 MG capsule, Take 1 capsule (30 mg total) by mouth 2 (two) times daily., Disp: 60 capsule, Rfl: 01    famotidine (PEPCID) 40 MG tablet, Take 1 tablet (40 mg total) by mouth every evening., Disp: 90 tablet, Rfl: 3    famotidine-calcium carbonate-magnesium hydroxide (PEPCID COMPLETE) -165 mg, Take 1 tablet by mouth 2 (two) times daily as needed. Chew 1 or 2 tablets, 2-3 times a day, as needed, for nausea or heartburn., Disp: , Rfl:     fluticasone propionate (FLONASE) 50 mcg/actuation nasal spray, 1 spray (50 mcg total) by Each Nostril route once daily., Disp: 16 g, Rfl: 3    lisinopriL (PRINIVIL,ZESTRIL) 40 MG tablet, Take 1 tablet (40 mg total) by mouth 2 (two) times a day., Disp: 60 tablet, Rfl: 11    LORazepam (ATIVAN) 0.5 MG tablet, Take 0.5 tablets (0.25 mg total) by mouth 3 (three) times daily as needed for Anxiety., Disp: 45 tablet, Rfl: 0    magnesium oxide (MAG-OX) 400 mg (241.3 mg magnesium) tablet, Take 1 tablet (400 mg total) by mouth  "once daily., Disp: 30 tablet, Rfl: 3    methocarbamoL (ROBAXIN) 750 MG Tab, Take 1 tablet (750 mg total) by mouth 4 (four) times daily as needed., Disp: 44 tablet, Rfl: 3    multivitamin (THERAGRAN) per tablet, Take 1 tablet by mouth once daily., Disp: , Rfl:     ondansetron (ZOFRAN-ODT) 4 MG TbDL, Take 1 tablet (4 mg total) by mouth every 6 (six) hours as needed (nausea)., Disp: 30 tablet, Rfl: 3    pantoprazole (PROTONIX) 40 MG tablet, TAKE 1 TABLET(40 MG) BY MOUTH TWICE DAILY, Disp: 180 tablet, Rfl: 3    psyllium husk, aspartame, (NATURAL PSYLLIUM FIBER) 3.4 gram/5.8 gram Powd, Take by mouth., Disp: , Rfl:     rosuvastatin (CRESTOR) 20 MG tablet, Take 1 tablet (20 mg total) by mouth once daily., Disp: 30 tablet, Rfl: 11    Past Medical History:   Diagnosis Date    Anticoagulant long-term use     Anxiety     takes daily Xanax    Arthritis     right thumb    Cataract     OU    Cholelithiasis     GERD (gastroesophageal reflux disease)     Hepatic steatosis     Hyperlipemia     Hypertension     PVD (posterior vitreous detachment)     OD       Past Surgical History:   Procedure Laterality Date    BREAST BIOPSY Left 08/28/2020    stereo biopsy    BREAST BIOPSY Left 10/07/2020    benign excisional biopsy    CAUDAL EPIDURAL STEROID INJECTION N/A 7/26/2024    Procedure: Injection-steroid-epidural-caudal;  Surgeon: Indra Cuellar MD;  Location: [facility] OR;  Service: Pain Management;  Laterality: N/A;    chest abcess      child    COLONOSCOPY  01/06/2012    Dr. Lopez: hemorrhoids, redundant colon    COLONOSCOPY N/A 02/17/2021    Dr. Lopez: Congested and erythematous mucosa in the rectum, in the recto-sigmoid colon and in the sigmoid colon, proctosigmoid colitis, Redundant colon; biopsy: focal active colitis "nonspecific but can be seen with acute self-limited colitis (infection), medication effect (e.g. NSAID use), proximity to diverticula, or early/evolving IBD    EPIDURAL STEROID INJECTION INTO LUMBAR SPINE N/A 5/30/2024 "    Procedure: Injection-steroid-epidural-lumbar   L5/S1;  Surgeon: Indra Cuellar MD;  Location: [facility];  Service: Pain Management;  Laterality: N/A;    ESOPHAGOGASTRODUODENOSCOPY N/A 06/06/2019    Dr. Lopez: small hiatal hernia, Non-erosive esophageal reflux (NERD) disease?., slight antritis; biopsy: Antral mucosa with chemical/reactive gastropathy. negative for h pylori    ESOPHAGOGASTRODUODENOSCOPY N/A 02/17/2021    Procedure: EGD (ESOPHAGOGASTRODUODENOSCOPY);  Surgeon: Nathan Lopez Jr., MD;  Location: [facility];  Service: Endoscopy;  Laterality: N/A; Minimal gastritis; biopsy: stomach- negative for h pylori, active chronic gastritis with focal features of erosive gastritis, duodenum WNL    ESOPHAGOGASTRODUODENOSCOPY N/A 05/10/2022    Procedure: EGD (ESOPHAGOGASTRODUODENOSCOPY);  Surgeon: Nathan Lopez Jr., MD;  Location: [facility];  Service: Endoscopy;  Laterality: N/A;    ESOPHAGOGASTRODUODENOSCOPY N/A 3/12/2024    Procedure: EGD (ESOPHAGOGASTRODUODENOSCOPY);  Surgeon: Nathan Lopez Jr., MD;  Location: [facility];  Service: Endoscopy;  Laterality: N/A;    EXCISIONAL BIOPSY Left 10/07/2020    Procedure: EXCISIONAL BIOPSY-Left with radiological marker;  Surgeon: Brooklynn Ashford MD;  Location: [facility];  Service: General;  Laterality: Left;    FRACTURE SURGERY  2010    Left thumb repaired surgically    INJECTION OF ANESTHETIC AGENT AROUND MEDIAL BRANCH NERVES INNERVATING LUMBAR FACET JOINT Bilateral 9/12/2024    Procedure: Block-nerve-medial branch-lumbar    L4/5, L5/S1;  Surgeon: Indra Cuellar MD;  Location: [facility];  Service: Pain Management;  Laterality: Bilateral;    JOINT REPLACEMENT Bilateral     knee    KNEE ARTHROSCOPY W/ LASER      right    LAPAROSCOPIC CHOLECYSTECTOMY N/A 06/14/2019    Procedure: CHOLECYSTECTOMY, LAPAROSCOPIC;  Surgeon: Guevara Evans MD;  Location: [facility];  Service: General;  Laterality: N/A;    thumb surgery  11/2014    TOTAL KNEE ARTHROPLASTY Left 06/19/2018     Procedure: REPLACEMENT-KNEE-TOTAL;  Surgeon: Nj Melvin MD;  Location: [facility] OR [address] [place] [address];  Service: Orthopedics;  Laterality: Left;  [employer]    UPPER GASTROINTESTINAL ENDOSCOPY  07/13/2017    Dr. Lopez: NERD, Gastric mucosal atrophy. antritis, Scar in the incisura. Biopsied; biopsy: chronic gastritis. negative for h pylori       Family History   Problem Relation Name Age of Onset    Hypertension Mother age 82     Heart disease Mother age 82     Glaucoma Mother age 82     Stroke Father age 50     Glaucoma Sister      Cataracts Sister      Macular degeneration Sister      Breast cancer Neg Hx      Colon cancer Neg Hx      Crohn's disease Neg Hx      Ulcerative colitis Neg Hx      Stomach cancer Neg Hx      Esophageal cancer Neg Hx      Celiac disease Neg Hx      Amblyopia Neg Hx      Blindness Neg Hx      Retinal detachment Neg Hx      Strabismus Neg Hx         Social History     Socioeconomic History    Marital status:     Number of children: 2   Occupational History    Occupation: retired teacher and principal    Occupation: substitute   Tobacco Use    Smoking status: Never    Smokeless tobacco: Never   Substance and Sexual Activity    Alcohol use: Not Currently     Comment: social- maybe once every few months    Drug use: No    Sexual activity: Yes     Partners: Male     Birth control/protection: Post-menopausal, None     Social Determinants of Health     Financial Resource Strain: Low Risk  (11/9/2023)    Overall Financial Resource Strain (CARDIA)     Difficulty of Paying Living Expenses: Not hard at all   Food Insecurity: No Food Insecurity (11/9/2023)    Hunger Vital Sign     Worried About Running Out of Food in the Last Year: Never true     Ran Out of Food in the Last Year: Never true   Transportation Needs: No Transportation Needs (11/9/2023)    PRAPARE - Transportation     Lack of Transportation (Medical): No     Lack of Transportation (Non-Medical): No   Physical Activity: Sufficiently Active  "(11/9/2023)    Exercise Vital Sign     Days of Exercise per Week: 5 days     Minutes of Exercise per Session: 50 min   Recent Concern: Physical Activity - Insufficiently Active (10/20/2023)    Exercise Vital Sign     Days of Exercise per Week: 2 days     Minutes of Exercise per Session: 20 min   Stress: Patient Declined (11/9/2023)    [employer] - Occupational Stress Questionnaire     Feeling of Stress : Patient declined   Housing Stability: Low Risk  (11/9/2023)    Housing Stability Vital Sign     Unable to Pay for Housing in the Last Year: No     Number of Places Lived in the Last Year: 1     Unstable Housing in the Last Year: No       Review of patient's allergies indicates:  No Known Allergies    Review of Systems:  12 point review of systems is negative.    Physical Exam:  Vitals:    09/19/24 1441   BP: (!) 150/67   Pulse: 77   Weight: 78.9 kg (174 lb 0.9 oz)   Height: 5' 2" (1.575 m)   PainSc:   3   PainLoc: Back         Body mass index is 31.83 kg/m².    Gen: NAD  Psych: mood appropriate for given condition  HEENT: eyes anicteric   CV: RRR  HEENT: anicteric   Respiratory: non-labored, no signs of respiratory distress  Abd: non-distended  Skin: warm, dry and intact.  Gait: No antalgic gait.     Sensory:  Intact and symmetrical to light touch in L1-S1 dermatomes bilaterally.    Motor:     Right Left   L2/3 Iliacus Hip flexion  5  5   L3/4 Qudratus Femoris Knee Extension  5  5   L4/5 Tib Anterior Ankle Dorsiflexion   5  5   L5/S1 Extensor Hallicus Longus Great toe extension  5  5   S1/S2 Gastroc/Soleus Plantar Flexion  5  5      Right Left                  Patellar DTR 0+ 0+   Achilles DTR 0 0                      Labs:  Lab Results   Component Value Date    HGBA1C 5.7 (H) 06/08/2021       Lab Results   Component Value Date    WBC 13.33 (H) 08/07/2024    HGB 13.7 08/07/2024    HCT 42.0 08/07/2024    MCV 88 08/07/2024     08/07/2024       Imaging:  MRI lumbar spine " 3/22/24  FINDINGS:  Vertebral column: There is multilevel degenerative change which will be described by level.  The lumbar vertebral bodies maintain normal height.  There is no fracture.  There is transitional lumbosacral anatomy.  The last rib bearing vertebral body is designated as T12.  Utilizing this numbering system, there is stable mild 3 mm retrolisthesis of L1 on L2.  There is 4 mm anterolisthesis of L4 on L5 with 5 mm anterolisthesis of L5 on S1.  There is marked disc space narrowing at L5-S1.  There is chronic degenerative endplate signal change corresponding to sclerosis on plain films.  There is mild-to-moderate disc space narrowing at the L4-5 level.  There is marked disc space narrowing at the L1-2 level.  All of the discs are desiccated.  Other than chronic degenerative endplate signal change, most apparent at the L5-S1 level, baseline marrow signal is normal.     Spinal canal, conus, epidural space: The spinal canal is developmentally normal.  The conus terminates at the level of superior L1 and is normal in contour and signal intensity.  There is no abnormal epidural mass or fluid collection.     Findings by level:  T11-12: There is a mild diffuse disc bulge with mild facet joint arthropathy.  There is no spinal canal or significant foraminal stenosis.  There is a small right foraminal perineural cyst.  T12-L1: There is a minimal disc bulge.  There is no spinal canal or significant foraminal stenosis.  L1-2: There is marked disc space narrowing, 3 mm retrolisthesis of L1 on L2 with inferior unroofing of a broad disc protrusion eccentric to the right where there is also osteophytic ridging.  There is bilateral facet joint arthropathy.  There is only borderline to mild spinal stenosis but there is moderate to severe right lateral recess and foraminal stenosis with only mild left foraminal stenosis.  L2-3: There is subtle trace retrolisthesis of L2 on L3.  There is a mild disc bulge.  There is left  greater than right facet joint arthropathy.  There is no spinal stenosis.  There is no significant foraminal stenosis.  L3-4: There is a diffuse disc bulge.  There is facet joint arthropathy with ligamentum flavum thickening.  There is borderline to mild spinal stenosis without significant foraminal stenosis.  L4-5: There is disc space narrowing, 4 mm anterolisthesis of L4 on L5 with unroofing of a moderate broad disc protrusion.  There is bilateral facet joint arthropathy.  There is severe spinal canal, bilateral lateral recess stenosis with moderate left and mild right foraminal stenosis.  L5-S1: There is marked disc space narrowing, 5 mm anterolisthesis of L5 on S1 with unroofing of a marked broad disc protrusion in addition to marked facet joint arthropathy results in severe spinal canal, bilateral lateral recess and foraminal stenosis.     Soft tissues, other: The posterior spinous muscles are atrophic.  The prevertebral soft tissues are normal.  The aorta is normal in caliber.  There is no hydronephrosis.    Xray lumbar spine 5/1/24  FINDINGS:  The vertebral bodies maintain normal height.  There is no fracture.  There is severe disc space narrowing at the L1-2 level as well as at the L5-S1 level with moderate disc space narrowing at the L4-5 level.  There is anterolisthesis of L4 on L5, L5 on S1 with retrolisthesis of L1 on L2 and trace retrolisthesis of L2 on L3.  There is multilevel facet joint arthropathy.  No instability is demonstrated with flexion or extension.    Assessment:   Problem List Items Addressed This Visit          Neuro    Lumbar spondylosis - Primary     Other Visit Diagnoses       Spinal stenosis of lumbar region, unspecified whether neurogenic claudication present        Lumbar radiculopathy                    Lucinda VIDAL Aguilera is a 73 y.o. female patient who has a past medical history of Anticoagulant long-term use, Anxiety, Arthritis, Cataract, Cholelithiasis, GERD (gastroesophageal  reflux disease), Hepatic steatosis, Hyperlipemia, Hypertension, and PVD (posterior vitreous detachment). She presents with back pain.  She has had back pain for many years and has been gradually worsening.  Today she reports pain in her lower back that radiates primarily down her right leg.  Pain is constant, aching, shooting, 6/10.  Her pain is worse with standing and walking.  At times her legs can feel heavy an aching with walking.      9/19/24 - Lucinda Aguilera returns to the office for follow up.  She is status post 1st diagnostic bilateral L4-5 and L5-S1 medial branch blocks on 9/12/24 without significant relief.  She recently had a right intra-articular hip injection with good relief of her right hip pain.  Today she rates her pain as 3/10.  She is not having any symptoms of claudication    - I independently reviewed her lumbar MRI and she has significant degenerative changes.  At L4-5 and L5-S1 she has severe central canal narrowing.  She has multilevel bilateral facet arthropathy.  - she was referred to me for Orthopedics for symptoms of claudication.  At that time she was having pain that made her legs feel heavy with standing and walking.  - we have since done at L5-S1 and a caudal VALERY her.  Today she reports she is not having any symptoms of claudication.  - she recently had a right intra-articular hip injection with PM&R and she reports excellent relief of her right hip pain  - she is planning to follow up with Orthopedics regarding potential surgery for her right hip  - I discussed that if she has any changes in the future such as aching, pain, numbness, tingling into her buttocks, thighs or legs especially with standing and walking to reach out to me.  Otherwise she will follow up with me on an as needed basis      : Not applicable      This note was completed with dictation software and grammatical errors may exist.

## 2024-09-23 ENCOUNTER — PATIENT MESSAGE (OUTPATIENT)
Dept: FAMILY MEDICINE | Facility: CLINIC | Age: 74
End: 2024-09-23
Payer: MEDICARE

## 2024-09-25 ENCOUNTER — OFFICE VISIT (OUTPATIENT)
Dept: FAMILY MEDICINE | Facility: CLINIC | Age: 74
End: 2024-09-25
Payer: MEDICARE

## 2024-09-25 VITALS
TEMPERATURE: 98 F | BODY MASS INDEX: 31.93 KG/M2 | HEIGHT: 62 IN | DIASTOLIC BLOOD PRESSURE: 70 MMHG | WEIGHT: 173.5 LBS | HEART RATE: 76 BPM | OXYGEN SATURATION: 95 % | SYSTOLIC BLOOD PRESSURE: 142 MMHG

## 2024-09-25 DIAGNOSIS — Z86.39 PERSONAL HISTORY OF OTHER ENDOCRINE, NUTRITIONAL AND METABOLIC DISEASE: ICD-10-CM

## 2024-09-25 DIAGNOSIS — R68.89 FORGETFULNESS: ICD-10-CM

## 2024-09-25 DIAGNOSIS — D72.829 LEUKOCYTOSIS, UNSPECIFIED TYPE: ICD-10-CM

## 2024-09-25 DIAGNOSIS — I10 ESSENTIAL HYPERTENSION: ICD-10-CM

## 2024-09-25 DIAGNOSIS — R53.82 CHRONIC FATIGUE: Primary | ICD-10-CM

## 2024-09-25 PROCEDURE — 99213 OFFICE O/P EST LOW 20 MIN: CPT | Mod: S$PBB,,, | Performed by: NURSE PRACTITIONER

## 2024-09-25 PROCEDURE — 99215 OFFICE O/P EST HI 40 MIN: CPT | Mod: PBBFAC,PO | Performed by: NURSE PRACTITIONER

## 2024-09-25 PROCEDURE — 99999 PR PBB SHADOW E&M-EST. PATIENT-LVL V: CPT | Mod: PBBFAC,,, | Performed by: NURSE PRACTITIONER

## 2024-09-25 NOTE — PROGRESS NOTES
"Subjective:       Patient ID: Lucinda Aguilera is a 73 y.o. female.    Chief Complaint: Injections    HPI "I'm here for a B12 shot to give me some energy" pt reports she is feels so tired for the past several months. Started cymbalta 30 mg 1 month ago for chronic pain. Reports no relief of pain so far and is concerned that BP is elevated since starting the cymbalta. Had right hip injection on 9/16/24 and has had no hip or leg pain since. Would like to wean off of cymbalta.    Pt reports she has aquatic classes very early in the morning and has a hard time waking up for it.    Takes lorazepam twice daily and multiple other medications that cause fatigue.    Past Medical History:   Diagnosis Date    Anticoagulant long-term use     Anxiety     takes daily Xanax    Arthritis     right thumb    Cataract     OU    Cholelithiasis     GERD (gastroesophageal reflux disease)     Hepatic steatosis     Hyperlipemia     Hypertension     PVD (posterior vitreous detachment)     OD       Past Surgical History:   Procedure Laterality Date    BREAST BIOPSY Left 08/28/2020    stereo biopsy    BREAST BIOPSY Left 10/07/2020    benign excisional biopsy    CAUDAL EPIDURAL STEROID INJECTION N/A 7/26/2024    Procedure: Injection-steroid-epidural-caudal;  Surgeon: Indra Cuellar MD;  Location: [facility] OR;  Service: Pain Management;  Laterality: N/A;    chest abcess      child    COLONOSCOPY  01/06/2012    Dr. Lopez: hemorrhoids, redundant colon    COLONOSCOPY N/A 02/17/2021    Dr. Lopez: Congested and erythematous mucosa in the rectum, in the recto-sigmoid colon and in the sigmoid colon, proctosigmoid colitis, Redundant colon; biopsy: focal active colitis "nonspecific but can be seen with acute self-limited colitis (infection), medication effect (e.g. NSAID use), proximity to diverticula, or early/evolving IBD    EPIDURAL STEROID INJECTION INTO LUMBAR SPINE N/A 5/30/2024    Procedure: Injection-steroid-epidural-lumbar   L5/S1;  Surgeon: Polo, " Indra WRIGHT MD;  Location: [facility];  Service: Pain Management;  Laterality: N/A;    ESOPHAGOGASTRODUODENOSCOPY N/A 06/06/2019    Dr. Lopez: small hiatal hernia, Non-erosive esophageal reflux (NERD) disease?., slight antritis; biopsy: Antral mucosa with chemical/reactive gastropathy. negative for h pylori    ESOPHAGOGASTRODUODENOSCOPY N/A 02/17/2021    Procedure: EGD (ESOPHAGOGASTRODUODENOSCOPY);  Surgeon: Nathan Lopez Jr., MD;  Location: [facility];  Service: Endoscopy;  Laterality: N/A; Minimal gastritis; biopsy: stomach- negative for h pylori, active chronic gastritis with focal features of erosive gastritis, duodenum WNL    ESOPHAGOGASTRODUODENOSCOPY N/A 05/10/2022    Procedure: EGD (ESOPHAGOGASTRODUODENOSCOPY);  Surgeon: Nathan Lopez Jr., MD;  Location: [facility];  Service: Endoscopy;  Laterality: N/A;    ESOPHAGOGASTRODUODENOSCOPY N/A 3/12/2024    Procedure: EGD (ESOPHAGOGASTRODUODENOSCOPY);  Surgeon: Nathan Lopez Jr., MD;  Location: [facility];  Service: Endoscopy;  Laterality: N/A;    EXCISIONAL BIOPSY Left 10/07/2020    Procedure: EXCISIONAL BIOPSY-Left with radiological marker;  Surgeon: Brooklynn Ashford MD;  Location: [facility];  Service: General;  Laterality: Left;    FRACTURE SURGERY  2010    Left thumb repaired surgically    INJECTION OF ANESTHETIC AGENT AROUND MEDIAL BRANCH NERVES INNERVATING LUMBAR FACET JOINT Bilateral 9/12/2024    Procedure: Block-nerve-medial branch-lumbar    L4/5, L5/S1;  Surgeon: Indra Cuellar MD;  Location: [facility];  Service: Pain Management;  Laterality: Bilateral;    JOINT REPLACEMENT Bilateral     knee    KNEE ARTHROSCOPY W/ LASER      right    LAPAROSCOPIC CHOLECYSTECTOMY N/A 06/14/2019    Procedure: CHOLECYSTECTOMY, LAPAROSCOPIC;  Surgeon: Guevara Evans MD;  Location: [facility];  Service: General;  Laterality: N/A;    thumb surgery  11/2014    TOTAL KNEE ARTHROPLASTY Left 06/19/2018    Procedure: REPLACEMENT-KNEE-TOTAL;  Surgeon: Nj Melvin MD;  Location:  [facility] OR 2ND FLR;  Service: Orthopedics;  Laterality: Left;  Ruy    UPPER GASTROINTESTINAL ENDOSCOPY  07/13/2017    Dr. Lopez: NERD, Gastric mucosal atrophy. antritis, Scar in the incisura. Biopsied; biopsy: chronic gastritis. negative for h pylori       Review of patient's allergies indicates:  No Known Allergies    Social History     Socioeconomic History    Marital status:     Number of children: 2   Occupational History    Occupation: retired teacher and principal    Occupation: substitute   Tobacco Use    Smoking status: Never    Smokeless tobacco: Never   Substance and Sexual Activity    Alcohol use: Not Currently     Comment: social- maybe once every few months    Drug use: No    Sexual activity: Yes     Partners: Male     Birth control/protection: Post-menopausal, None     Social Determinants of Health     Financial Resource Strain: Low Risk  (11/9/2023)    Overall Financial Resource Strain (CARDIA)     Difficulty of Paying Living Expenses: Not hard at all   Food Insecurity: No Food Insecurity (11/9/2023)    Hunger Vital Sign     Worried About Running Out of Food in the Last Year: Never true     Ran Out of Food in the Last Year: Never true   Transportation Needs: No Transportation Needs (11/9/2023)    PRAPARE - Transportation     Lack of Transportation (Medical): No     Lack of Transportation (Non-Medical): No   Physical Activity: Sufficiently Active (11/9/2023)    Exercise Vital Sign     Days of Exercise per Week: 5 days     Minutes of Exercise per Session: 50 min   Recent Concern: Physical Activity - Insufficiently Active (10/20/2023)    Exercise Vital Sign     Days of Exercise per Week: 2 days     Minutes of Exercise per Session: 20 min   Stress: Patient Declined (11/9/2023)    Citizen of the [place] of Occupational Health - Occupational Stress Questionnaire     Feeling of Stress : Patient declined   Housing Stability: Low Risk  (11/9/2023)    Housing Stability Vital Sign     Unable to Pay for  Housing in the Last Year: No     Number of Places Lived in the Last Year: 1     Unstable Housing in the Last Year: No       Current Outpatient Medications on File Prior to Visit   Medication Sig Dispense Refill    acetaminophen (TYLENOL) 500 MG tablet Take 500 mg by mouth every 6 (six) hours as needed for Pain.      ascorbic acid, vitamin C, (VITAMIN C) 250 MG tablet Take 500 mg by mouth once daily.       atenoloL (TENORMIN) 25 MG tablet Take 1 tablet (25 mg total) by mouth every evening. 30 tablet 11    azelastine (ASTELIN) 137 mcg (0.1 %) nasal spray 1 spray by Nasal route 2 (two) times daily.      balsalazide (COLAZAL) 750 mg capsule TAKE 3 CAPSULES(2250 MG) BY MOUTH TWICE DAILY WITH MEALS 180 capsule 11    benzonatate (TESSALON) 200 MG capsule Take 1 capsule (200 mg total) by mouth 3 (three) times daily as needed for Cough. 30 capsule 1    buPROPion (WELLBUTRIN XL) 150 MG TB24 tablet Take 1 tablet (150 mg total) by mouth once daily. 30 tablet 1    diltiaZEM (DILACOR XR) 240 MG CDCR Take 1 capsule (240 mg total) by mouth every evening. 30 capsule 11    docusate sodium (COLACE) 100 MG capsule Take 100 mg by mouth 2 (two) times daily.      DULoxetine (CYMBALTA) 30 MG capsule Take 1 capsule (30 mg total) by mouth 2 (two) times daily. 60 capsule 01    famotidine (PEPCID) 40 MG tablet Take 1 tablet (40 mg total) by mouth every evening. 90 tablet 3    famotidine-calcium carbonate-magnesium hydroxide (PEPCID COMPLETE) -165 mg Take 1 tablet by mouth 2 (two) times daily as needed. Chew 1 or 2 tablets, 2-3 times a day, as needed, for nausea or heartburn.      fluticasone propionate (FLONASE) 50 mcg/actuation nasal spray 1 spray (50 mcg total) by Each Nostril route once daily. 16 g 3    lisinopriL (PRINIVIL,ZESTRIL) 40 MG tablet Take 1 tablet (40 mg total) by mouth 2 (two) times a day. 60 tablet 11    LORazepam (ATIVAN) 0.5 MG tablet Take 0.5 tablets (0.25 mg total) by mouth 3 (three) times daily as needed for  "Anxiety. 45 tablet 0    magnesium oxide (MAG-OX) 400 mg (241.3 mg magnesium) tablet Take 1 tablet (400 mg total) by mouth once daily. 30 tablet 3    methocarbamoL (ROBAXIN) 750 MG Tab Take 1 tablet (750 mg total) by mouth 4 (four) times daily as needed. 44 tablet 3    multivitamin (THERAGRAN) per tablet Take 1 tablet by mouth once daily.      ondansetron (ZOFRAN-ODT) 4 MG TbDL Take 1 tablet (4 mg total) by mouth every 6 (six) hours as needed (nausea). 30 tablet 3    pantoprazole (PROTONIX) 40 MG tablet TAKE 1 TABLET(40 MG) BY MOUTH TWICE DAILY 180 tablet 3    psyllium husk, aspartame, (NATURAL PSYLLIUM FIBER) 3.4 gram/5.8 gram Powd Take by mouth.      rosuvastatin (CRESTOR) 20 MG tablet Take 1 tablet (20 mg total) by mouth once daily. 30 tablet 11     No current facility-administered medications on file prior to visit.       Family History   Problem Relation Name Age of Onset    Hypertension Mother age 82     Heart disease Mother age 82     Glaucoma Mother age 82     Stroke Father age 50     Glaucoma Sister      Cataracts Sister      Macular degeneration Sister      Breast cancer Neg Hx      Colon cancer Neg Hx      Crohn's disease Neg Hx      Ulcerative colitis Neg Hx      Stomach cancer Neg Hx      Esophageal cancer Neg Hx      Celiac disease Neg Hx      Amblyopia Neg Hx      Blindness Neg Hx      Retinal detachment Neg Hx      Strabismus Neg Hx         Review of Systems   Constitutional:  Positive for fatigue.   HENT: Negative.     Eyes: Negative.    Respiratory: Negative.     Cardiovascular: Negative.    Gastrointestinal: Negative.    Endocrine: Negative.    Genitourinary: Negative.    Musculoskeletal: Negative.    Skin: Negative.    Neurological: Negative.    Psychiatric/Behavioral: Negative.         Objective:      BP (!) 142/70   Pulse 76   Temp 98.4 °F (36.9 °C)   Ht 5' 2" (1.575 m)   Wt 78.7 kg (173 lb 8 oz)   LMP 04/18/2004   SpO2 95%   BMI 31.73 kg/m²   Physical Exam  Vitals and nursing note " reviewed.   Constitutional:       General: She is not in acute distress.     Appearance: Normal appearance. She is well-groomed.   HENT:      Head: Normocephalic.      Right Ear: External ear normal.      Left Ear: External ear normal.      Nose: Nose normal.      Mouth/Throat:      Lips: Pink.      Mouth: Mucous membranes are moist.      Pharynx: Oropharynx is clear.   Eyes:      Extraocular Movements: Extraocular movements intact.      Conjunctiva/sclera: Conjunctivae normal.      Pupils: Pupils are equal, round, and reactive to light.   Cardiovascular:      Rate and Rhythm: Normal rate and regular rhythm.      Heart sounds: Normal heart sounds. No murmur heard.  Pulmonary:      Effort: Pulmonary effort is normal.      Breath sounds: Normal breath sounds.   Chest:      Chest wall: No tenderness.   Abdominal:      General: Bowel sounds are normal.      Palpations: Abdomen is soft.      Tenderness: There is no abdominal tenderness.   Musculoskeletal:         General: No swelling or tenderness. Normal range of motion.      Cervical back: Normal range of motion and neck supple.      Right lower leg: No edema.      Left lower leg: No edema.   Skin:     General: Skin is warm and dry.   Neurological:      General: No focal deficit present.      Mental Status: She is alert and oriented to person, place, and time. Mental status is at baseline.      Gait: Gait is intact.   Psychiatric:         Mood and Affect: Mood normal.         Behavior: Behavior normal.         Assessment:       1. Chronic fatigue    2. Forgetfulness    3. Personal history of other endocrine, nutritional and metabolic disease    4. Leukocytosis, unspecified type    5. Essential hypertension        Plan:       Chronic fatigue  -     VITAMIN B12; Future; Expected date: 09/25/2024  -     CBC W/ AUTO DIFFERENTIAL; Future; Expected date: 09/25/2024    Forgetfulness  -     VITAMIN B12; Future; Expected date: 09/25/2024  -     CBC W/ AUTO DIFFERENTIAL; Future;  Expected date: 09/25/2024    Personal history of other endocrine, nutritional and metabolic disease  -     VITAMIN B12; Future; Expected date: 09/25/2024    Leukocytosis, unspecified type  -     Urinalysis, Reflex to Urine Culture Urine, Clean Catch; Future; Expected date: 09/25/2024    Essential hypertension        Pt advised that a B12 shot would not help her fatigue if she is not deficient in B12. Lab ordered. Fatigue could be caused by several of the medications she is taking, including lorazepam.    Will wean off of cymbalta per pt request. Take cymbalta 30 mg once daily for 7 days then take once every other day for 7 days then stop.

## 2024-09-26 ENCOUNTER — LAB VISIT (OUTPATIENT)
Dept: LAB | Facility: HOSPITAL | Age: 74
End: 2024-09-26
Attending: NURSE PRACTITIONER
Payer: MEDICARE

## 2024-09-26 ENCOUNTER — OFFICE VISIT (OUTPATIENT)
Dept: ORTHOPEDICS | Facility: CLINIC | Age: 74
End: 2024-09-26
Payer: MEDICARE

## 2024-09-26 VITALS — WEIGHT: 173.5 LBS | BODY MASS INDEX: 31.93 KG/M2 | HEIGHT: 62 IN

## 2024-09-26 DIAGNOSIS — D72.829 LEUKOCYTOSIS, UNSPECIFIED TYPE: ICD-10-CM

## 2024-09-26 DIAGNOSIS — M16.11 PRIMARY OSTEOARTHRITIS OF RIGHT HIP: Primary | ICD-10-CM

## 2024-09-26 LAB
BILIRUB UR QL STRIP: NEGATIVE
CLARITY UR: CLEAR
COLOR UR: YELLOW
GLUCOSE UR QL STRIP: NEGATIVE
HGB UR QL STRIP: NEGATIVE
KETONES UR QL STRIP: ABNORMAL
LEUKOCYTE ESTERASE UR QL STRIP: NEGATIVE
NITRITE UR QL STRIP: NEGATIVE
PH UR STRIP: 6 [PH] (ref 5–8)
PROT UR QL STRIP: NEGATIVE
SP GR UR STRIP: >=1.03 (ref 1–1.03)
URN SPEC COLLECT METH UR: ABNORMAL

## 2024-09-26 PROCEDURE — 99999 PR PBB SHADOW E&M-EST. PATIENT-LVL IV: CPT | Mod: PBBFAC,,, | Performed by: ORTHOPAEDIC SURGERY

## 2024-09-26 PROCEDURE — 99213 OFFICE O/P EST LOW 20 MIN: CPT | Mod: S$PBB,,, | Performed by: ORTHOPAEDIC SURGERY

## 2024-09-26 PROCEDURE — 99214 OFFICE O/P EST MOD 30 MIN: CPT | Mod: PBBFAC,PO | Performed by: ORTHOPAEDIC SURGERY

## 2024-09-26 PROCEDURE — 81003 URINALYSIS AUTO W/O SCOPE: CPT | Mod: PO | Performed by: NURSE PRACTITIONER

## 2024-09-27 ENCOUNTER — PATIENT MESSAGE (OUTPATIENT)
Dept: FAMILY MEDICINE | Facility: CLINIC | Age: 74
End: 2024-09-27
Payer: MEDICARE

## 2024-09-30 ENCOUNTER — PATIENT MESSAGE (OUTPATIENT)
Dept: PHYSICAL MEDICINE AND REHAB | Facility: CLINIC | Age: 74
End: 2024-09-30
Payer: MEDICARE

## 2024-09-30 ENCOUNTER — EXTERNAL CHRONIC CARE MANAGEMENT (OUTPATIENT)
Dept: PRIMARY CARE CLINIC | Facility: CLINIC | Age: 74
End: 2024-09-30
Payer: MEDICARE

## 2024-09-30 ENCOUNTER — PATIENT MESSAGE (OUTPATIENT)
Dept: FAMILY MEDICINE | Facility: CLINIC | Age: 74
End: 2024-09-30

## 2024-09-30 PROCEDURE — 99490 CHRNC CARE MGMT STAFF 1ST 20: CPT | Mod: PBBFAC,PO | Performed by: FAMILY MEDICINE

## 2024-09-30 PROCEDURE — 99490 CHRNC CARE MGMT STAFF 1ST 20: CPT | Mod: S$PBB,,, | Performed by: FAMILY MEDICINE

## 2024-10-02 ENCOUNTER — PATIENT MESSAGE (OUTPATIENT)
Dept: PSYCHIATRY | Facility: CLINIC | Age: 74
End: 2024-10-02
Payer: MEDICARE

## 2024-10-03 NOTE — PROGRESS NOTES
Outpatient Psychiatry Follow-Up Visit    Clinical Status of Patient: Outpatient (Ambulatory)  10/04/2024    The patient location is:  [place], [place]  The patient phone number is: [phone]   Visit type: Virtual visit with synchronous audio and video  Each patient to whom he or she provides medical services by telemedicine is:  (1) informed of the relationship between the physician and patient and the respective role of any other health care provider with respect to management of the patient; and (2) notified that he or she may decline to receive medical services by telemedicine and may withdraw from such care at any time.     Chief Complaint: Pt is a 73 y.o. female who presents today for a follow-up. Met with patient.       Interval History and Content of Current Session:  Interim Events/Subjective Report/Content of Current Session:  follow up appointment.    Pt is a 73 y.o. female with past psychiatric hx of CARLOS (generalized anxiety disorder), MDD (major depressive disorder), recurrent episode, moderate, Obsessive thinking who presents for follow up treatment.     Past Psychiatric hx:   Pt. is a 74 yo F with a past psychiatric hx of Anxiety, Depression, unspecified depression type presenting to the clinic for an initial evaluation and treatment. Past medical history outlined below. She is currently taking buPROPion (WELLBUTRIN XL), LORazepam (ATIVAN), prescribed and managed by Dr. Engel/Dr. Hassan. She reports that these medications have not adequately addressed her depression.     7/5/24: Pt presents to OP Psychiatry for routine follow-up for treatment/medication management of CARLOS, MDD, OCD. Pt reports all symptoms are well managed w/o complaints. States she is currently trying to wean off Ativan even more, and has stopped drinking iced tea altogether and has replaced it with lemonade and water. She was drinking 2 liters daily and states she feels great. Explained the decrease in caffeine can help resolve  symptoms of anxiety, pt agrees. No medication changes at this time. Pt denies SI/HI/AVH, self-harm, plan, or intent. Pt verbalizes understanding of medication instructions through teach back. No other issues, concerns, or problems, will reassess symptoms and medications in 3 months or PRN if symptoms worsen.     Past Medical hx:   Past Medical History:   Diagnosis Date    Anticoagulant long-term use     Anxiety     takes daily Xanax    Arthritis     right thumb    Cataract     OU    Cholelithiasis     GERD (gastroesophageal reflux disease)     Hepatic steatosis     Hyperlipemia     Hypertension     PVD (posterior vitreous detachment)     OD        Interim hx:  Medication changes last visit:     1. Continue buPROPion (WELLBUTRIN XL) 150 MG TB24 tablet - Take 1 tablet (150 mg total) by mouth once daily for depression.  2. Continue LORazepam (ATIVAN) 0.5 MG tablet - TAKE 0.5 TABLET (0.25 MG) BY MOUTH THREE TIMES DAILY AS NEEDED for anxiety. Discussed risk of decreased RT, sedation, addictive potential, and not to mix with alcohol.      Anxiety: Improved after successful ortho procedure  Depression: Improved after successful ortho procedure   Sleep: Good, no issues  Appetite: Same, no issues     Denies suicidal/homicidal ideations.  Denies hopelessness/worthlessness.    Denies auditory/visual hallucinations.      Tobacco:  denies  Alcohol:  denies  Drug use:  denies  Caffeine:  denies       Review of Systems   PSYCHIATRIC: Pertinent items are noted in the narrative.        CONSTITUTIONAL: weight stable        M/S: no pain today         ENT: no allergies noted today        ABD: no n/v/d     Past Medical, Family and Social History: The patient's past medical, family and social history have been reviewed and updated as appropriate within the electronic medical record. See encounter notes.     Medication Compliance: yes      Side effects: tolerates     Risk Parameters:  Patient reports no suicidal ideation  Patient reports  no homicidal ideation  Patient reports no self-injurious behavior  Patient reports no violent behavior     Exam (detailed: at least 9 elements; comprehensive: all 15 elements)   Constitutional  Vitals:  Most recent vital signs, dated less than 90 days prior to this appointment, were reviewed.    General:  unremarkable, age appropriate, casual attire, good eye contact, good rapport       Musculoskeletal  Muscle Strength/Tone:  no flaccidity, no tremor    Gait & Station:  normal      Psychiatric                       Speech:  normal tone, normal rate, rhythm, and volume   Mood & Affect:   Euthymic, congruent, appropriate         Thought Process:   Goal directed; Linear    Associations:   intact   Thought Content:   No SI/HI, delusions, or paranoia, no AV/VH   Insight & Judgement:   Good, adequate to circumstances   Orientation:   grossly intact; alert and oriented x 4    Memory:  intact for content of interview    Language:  grossly intact, can repeat    Attention Span  : Grossly intact for content of interview   Fund of Knowledge:   intact and appropriate to age and level of education        Assessment and Diagnosis   Status/Progress: Based on the examination today, the patient's problem(s) is/are under fair control.  New problems have not been presented today. Comorbidities are not currently complicating management of the primary condition.      Impression:  Pt presents to OP Psychiatry for routine follow-up for treatment/medication management of CARLOS, MDD, OCD. Pt denies any OCD symptoms, reports anxiety and depression have improved after seeing ortho specialist r/t her chronic pain. States she has gotten relief and her symptoms are manageable again. Pain management started her on 30 mg Cymbalta BID for pain, patient reports it is going well. Denies need for any medication changes at this time. Pt denies SI/HI/AVH, self-harm, plan, or intent. Pt verbalizes understanding of medication instructions through teach back.  No other issues, concerns, or problems, will reassess symptoms and medications in 3 months or PRN if symptoms worsen.      Diagnosis:   - CARLOS (generalized anxiety disorder)  - MDD (major depressive disorder), recurrent episode, moderate  - Obsessive thinking      Intervention/Counseling/Treatment Plan   Medication Management:      1. Continue buPROPion (WELLBUTRIN XL) 150 MG TB24 tablet - Take 1 tablet (150 mg total) by mouth once daily for depression.     2. Continue LORazepam (ATIVAN) 0.5 MG tablet - TAKE 0.5 TABLET (0.25 MG) BY MOUTH THREE TIMES DAILY AS NEEDED for anxiety. Discussed risk of decreased RT, sedation, addictive potential, and not to mix with alcohol.       3. Continue DULoxetine (CYMBALTA) 30 MG capsule - Take 1 capsule (30 mg total) by mouth 2 (two) times daily (prescribed and managed by Pain Mgmt).     4. Call to report any worsening of symptoms or problems with the medication. Pt instructed to go to ER with thoughts of harming self, others.     5. Patient given contact # for psychotherapists at [facility] and also instructed she may check with insurance for list of providers.          Labs: none         Return to clinic:    3 months or PRN if symptoms worsen      -Spent 30min face to face with the pt; >50% time spent in counseling   -Supportive therapy and psychoeducation provided  -R/B/SE's of medications discussed with the pt who expresses understanding and chooses to take medications as prescribed.   -Pt instructed to call clinic, 911 or go to nearest emergency room if sxs worsen or pt is in   crisis. The pt expresses understanding.      Grace Mistry NP  Psychiatric-Mental Health Nurse Practitioner  Department of Psychiatry    [facility] [place] [address] [place] [address]  Phone:  [phone]  Fax: [phone]

## 2024-10-04 ENCOUNTER — OFFICE VISIT (OUTPATIENT)
Dept: PSYCHIATRY | Facility: CLINIC | Age: 74
End: 2024-10-04
Payer: MEDICARE

## 2024-10-04 DIAGNOSIS — F42.8 OBSESSIVE THINKING: ICD-10-CM

## 2024-10-04 DIAGNOSIS — F41.1 GAD (GENERALIZED ANXIETY DISORDER): Primary | ICD-10-CM

## 2024-10-04 DIAGNOSIS — F33.1 MDD (MAJOR DEPRESSIVE DISORDER), RECURRENT EPISODE, MODERATE: ICD-10-CM

## 2024-10-09 DIAGNOSIS — F41.1 GAD (GENERALIZED ANXIETY DISORDER): ICD-10-CM

## 2024-10-10 DIAGNOSIS — I10 PRIMARY HYPERTENSION: ICD-10-CM

## 2024-10-10 DIAGNOSIS — F33.1 MDD (MAJOR DEPRESSIVE DISORDER), RECURRENT EPISODE, MODERATE: ICD-10-CM

## 2024-10-10 RX ORDER — LORAZEPAM 0.5 MG/1
0.25 TABLET ORAL 3 TIMES DAILY PRN
Qty: 45 TABLET | Refills: 0 | Status: SHIPPED | OUTPATIENT
Start: 2024-10-14 | End: 2024-11-13

## 2024-10-10 RX ORDER — DILTIAZEM HYDROCHLORIDE 240 MG/1
240 CAPSULE, EXTENDED RELEASE ORAL NIGHTLY
Qty: 30 CAPSULE | Refills: 11 | OUTPATIENT
Start: 2024-10-10 | End: 2025-10-10

## 2024-10-11 RX ORDER — BUPROPION HYDROCHLORIDE 150 MG/1
150 TABLET ORAL EVERY MORNING
Qty: 30 TABLET | Refills: 1 | Status: SHIPPED | OUTPATIENT
Start: 2024-10-11 | End: 2024-12-10

## 2024-10-11 NOTE — TELEPHONE ENCOUNTER
No care due was identified.  [facility] Embedded Care Due Messages. Reference number: [id number].   10/10/2024 7:07:01 PM CDT

## 2024-10-11 NOTE — PROGRESS NOTES
"Chief Complaint   Patient presents with    Right Hip - Pain       HPI:   This is a 73 y.o. who presents to clinic today complaining of right hip pain for the past 2 years after no known trauma. Pain is progressively worsening. No numbness or tingling. No associated signs or symptoms.    Past Medical History:   Diagnosis Date    Anticoagulant long-term use     Anxiety     takes daily Xanax    Arthritis     right thumb    Cataract     OU    Cholelithiasis     GERD (gastroesophageal reflux disease)     Hepatic steatosis     Hyperlipemia     Hypertension     PVD (posterior vitreous detachment)     OD     Past Surgical History:   Procedure Laterality Date    BREAST BIOPSY Left 08/28/2020    stereo biopsy    BREAST BIOPSY Left 10/07/2020    benign excisional biopsy    CAUDAL EPIDURAL STEROID INJECTION N/A 7/26/2024    Procedure: Injection-steroid-epidural-caudal;  Surgeon: Indra Cuellar MD;  Location: [facility] OR;  Service: Pain Management;  Laterality: N/A;    chest abcess      child    COLONOSCOPY  01/06/2012    Dr. Lopez: hemorrhoids, redundant colon    COLONOSCOPY N/A 02/17/2021    Dr. Lopez: Congested and erythematous mucosa in the rectum, in the recto-sigmoid colon and in the sigmoid colon, proctosigmoid colitis, Redundant colon; biopsy: focal active colitis "nonspecific but can be seen with acute self-limited colitis (infection), medication effect (e.g. NSAID use), proximity to diverticula, or early/evolving IBD    EPIDURAL STEROID INJECTION INTO LUMBAR SPINE N/A 5/30/2024    Procedure: Injection-steroid-epidural-lumbar   L5/S1;  Surgeon: Indra Cuellar MD;  Location: [facility] OR;  Service: Pain Management;  Laterality: N/A;    ESOPHAGOGASTRODUODENOSCOPY N/A 06/06/2019    Dr. Lopez: small hiatal hernia, Non-erosive esophageal reflux (NERD) disease?., slight antritis; biopsy: Antral mucosa with chemical/reactive gastropathy. negative for h pylori    ESOPHAGOGASTRODUODENOSCOPY N/A 02/17/2021    Procedure: EGD " (ESOPHAGOGASTRODUODENOSCOPY);  Surgeon: Nathan Lopez Jr., MD;  Location: [facility];  Service: Endoscopy;  Laterality: N/A; Minimal gastritis; biopsy: stomach- negative for h pylori, active chronic gastritis with focal features of erosive gastritis, duodenum WNL    ESOPHAGOGASTRODUODENOSCOPY N/A 05/10/2022    Procedure: EGD (ESOPHAGOGASTRODUODENOSCOPY);  Surgeon: Nathan Lopez Jr., MD;  Location: [facility];  Service: Endoscopy;  Laterality: N/A;    ESOPHAGOGASTRODUODENOSCOPY N/A 3/12/2024    Procedure: EGD (ESOPHAGOGASTRODUODENOSCOPY);  Surgeon: Nathan Lopez Jr., MD;  Location: [facility];  Service: Endoscopy;  Laterality: N/A;    EXCISIONAL BIOPSY Left 10/07/2020    Procedure: EXCISIONAL BIOPSY-Left with radiological marker;  Surgeon: Brooklynn Ashford MD;  Location: [facility];  Service: General;  Laterality: Left;    FRACTURE SURGERY  2010    Left thumb repaired surgically    INJECTION OF ANESTHETIC AGENT AROUND MEDIAL BRANCH NERVES INNERVATING LUMBAR FACET JOINT Bilateral 9/12/2024    Procedure: Block-nerve-medial branch-lumbar    L4/5, L5/S1;  Surgeon: Indra Cuellar MD;  Location: [facility];  Service: Pain Management;  Laterality: Bilateral;    JOINT REPLACEMENT Bilateral     knee    KNEE ARTHROSCOPY W/ LASER      right    LAPAROSCOPIC CHOLECYSTECTOMY N/A 06/14/2019    Procedure: CHOLECYSTECTOMY, LAPAROSCOPIC;  Surgeon: Guevara Evans MD;  Location: [facility];  Service: General;  Laterality: N/A;    thumb surgery  11/2014    TOTAL KNEE ARTHROPLASTY Left 06/19/2018    Procedure: REPLACEMENT-KNEE-TOTAL;  Surgeon: Nj Melvin MD;  Location: [address];  Service: Orthopedics;  Laterality: Left;  [employer]    UPPER GASTROINTESTINAL ENDOSCOPY  07/13/2017    Dr. Lopez: NERD, Gastric mucosal atrophy. antritis, Scar in the incisura. Biopsied; biopsy: chronic gastritis. negative for h pylori     Current Outpatient Medications on File Prior to Visit   Medication Sig Dispense Refill    acetaminophen (TYLENOL)  500 MG tablet Take 500 mg by mouth every 6 (six) hours as needed for Pain.      ascorbic acid, vitamin C, (VITAMIN C) 250 MG tablet Take 500 mg by mouth once daily.       atenoloL (TENORMIN) 25 MG tablet Take 1 tablet (25 mg total) by mouth every evening. 30 tablet 11    azelastine (ASTELIN) 137 mcg (0.1 %) nasal spray 1 spray by Nasal route 2 (two) times daily.      balsalazide (COLAZAL) 750 mg capsule TAKE 3 CAPSULES(2250 MG) BY MOUTH TWICE DAILY WITH MEALS 180 capsule 11    benzonatate (TESSALON) 200 MG capsule Take 1 capsule (200 mg total) by mouth 3 (three) times daily as needed for Cough. 30 capsule 1    diltiaZEM (DILACOR XR) 240 MG CDCR Take 1 capsule (240 mg total) by mouth every evening. 30 capsule 11    docusate sodium (COLACE) 100 MG capsule Take 100 mg by mouth 2 (two) times daily.      famotidine (PEPCID) 40 MG tablet Take 1 tablet (40 mg total) by mouth every evening. 90 tablet 3    famotidine-calcium carbonate-magnesium hydroxide (PEPCID COMPLETE) -165 mg Take 1 tablet by mouth 2 (two) times daily as needed. Chew 1 or 2 tablets, 2-3 times a day, as needed, for nausea or heartburn.      fluticasone propionate (FLONASE) 50 mcg/actuation nasal spray 1 spray (50 mcg total) by Each Nostril route once daily. 16 g 3    lisinopriL (PRINIVIL,ZESTRIL) 40 MG tablet Take 1 tablet (40 mg total) by mouth 2 (two) times a day. 60 tablet 11    magnesium oxide (MAG-OX) 400 mg (241.3 mg magnesium) tablet Take 1 tablet (400 mg total) by mouth once daily. 30 tablet 3    methocarbamoL (ROBAXIN) 750 MG Tab Take 1 tablet (750 mg total) by mouth 4 (four) times daily as needed. 44 tablet 3    multivitamin (THERAGRAN) per tablet Take 1 tablet by mouth once daily.      ondansetron (ZOFRAN-ODT) 4 MG TbDL Take 1 tablet (4 mg total) by mouth every 6 (six) hours as needed (nausea). 30 tablet 3    pantoprazole (PROTONIX) 40 MG tablet TAKE 1 TABLET(40 MG) BY MOUTH TWICE DAILY 180 tablet 3    psyllium husk, aspartame, (NATURAL  PSYLLIUM FIBER) 3.4 gram/5.8 gram Powd Take by mouth.      rosuvastatin (CRESTOR) 20 MG tablet Take 1 tablet (20 mg total) by mouth once daily. 30 tablet 11    DULoxetine (CYMBALTA) 30 MG capsule Take 1 capsule (30 mg total) by mouth 2 (two) times daily. 60 capsule 01     No current facility-administered medications on file prior to visit.     Review of patient's allergies indicates:  No Known Allergies  Family History   Problem Relation Name Age of Onset    Hypertension Mother age 82     Heart disease Mother age 82     Glaucoma Mother age 82     Stroke Father age 50     Glaucoma Sister      Cataracts Sister      Macular degeneration Sister      Breast cancer Neg Hx      Colon cancer Neg Hx      Crohn's disease Neg Hx      Ulcerative colitis Neg Hx      Stomach cancer Neg Hx      Esophageal cancer Neg Hx      Celiac disease Neg Hx      Amblyopia Neg Hx      Blindness Neg Hx      Retinal detachment Neg Hx      Strabismus Neg Hx       Social History     Socioeconomic History    Marital status:     Number of children: 2   Occupational History    Occupation: retired teacher and principal    Occupation: substitute   Tobacco Use    Smoking status: Never    Smokeless tobacco: Never   Substance and Sexual Activity    Alcohol use: Not Currently     Comment: social- maybe once every few months    Drug use: No    Sexual activity: Yes     Partners: Male     Birth control/protection: Post-menopausal, None     Social Drivers of Health     Financial Resource Strain: Low Risk  (11/9/2023)    Overall Financial Resource Strain (CARDIA)     Difficulty of Paying Living Expenses: Not hard at all   Food Insecurity: No Food Insecurity (11/9/2023)    Hunger Vital Sign     Worried About Running Out of Food in the Last Year: Never true     Ran Out of Food in the Last Year: Never true   Transportation Needs: No Transportation Needs (11/9/2023)    PRAPARE - Transportation     Lack of Transportation (Medical): No     Lack of  Transportation (Non-Medical): No   Physical Activity: Sufficiently Active (11/9/2023)    Exercise Vital Sign     Days of Exercise per Week: 5 days     Minutes of Exercise per Session: 50 min   Recent Concern: Physical Activity - Insufficiently Active (10/20/2023)    Exercise Vital Sign     Days of Exercise per Week: 2 days     Minutes of Exercise per Session: 20 min   Stress: Patient Declined (11/9/2023)    [employer] - Occupational Stress Questionnaire     Feeling of Stress : Patient declined   Housing Stability: Low Risk  (11/9/2023)    Housing Stability Vital Sign     Unable to Pay for Housing in the Last Year: No     Number of Places Lived in the Last Year: 1     Unstable Housing in the Last Year: No       Review of Systems:  Constitutional:  Denies fever or chills   Eyes:  Denies change in visual acuity   HENT:  Denies nasal congestion or sore throat   Respiratory:  Denies cough or shortness of breath   Cardiovascular:  Denies chest pain or edema   GI:  Denies abdominal pain, nausea, vomiting, bloody stools or diarrhea   :  Denies dysuria   Integument:  Denies rash   Neurologic:  Denies headache, focal weakness or sensory changes   Endocrine:  Denies polyuria or polydipsia   Lymphatic:  Denies swollen glands   Psychiatric:  Denies depression or anxiety     Physical Exam:   Constitutional:  Well developed, well nourished, no acute distress, non-toxic appearance   Integument:  Well hydrated, no rash   Lymphatic:  No lymphadenopathy noted   Neurologic:  Alert & oriented x 3, CN 2-12 normal, normal motor function, normal sensory function, no focal deficits noted   Psychiatric:  Speech and behavior appropriate   Eyes: EOMI  Gi: abdomen soft    Bilateral Hip Exam   right Hip Exam   Tenderness   The patient is experiencing tenderness in the groin.   Range of Motion   The patient has decreased internal rotation right hip.    Muscle Strength   Flexion: 4/5     Other   Erythema:  absent  Sensation: normal  Pulse: present    left Hip Exam   Hip exam performed same as contralateral side and is normal.    X-rays were performed today, personally reviewed by me and findings discussed with the patient.  3 views of the right hip show circumferential degenerative changes    Primary osteoarthritis of right hip        She is still getting relief from injections. RTC as needed.

## 2024-10-15 ENCOUNTER — PATIENT MESSAGE (OUTPATIENT)
Dept: PSYCHIATRY | Facility: CLINIC | Age: 74
End: 2024-10-15
Payer: MEDICARE

## 2024-10-31 ENCOUNTER — EXTERNAL CHRONIC CARE MANAGEMENT (OUTPATIENT)
Dept: PRIMARY CARE CLINIC | Facility: CLINIC | Age: 74
End: 2024-10-31
Payer: MEDICARE

## 2024-10-31 PROCEDURE — 99490 CHRNC CARE MGMT STAFF 1ST 20: CPT | Mod: PBBFAC,PO | Performed by: FAMILY MEDICINE

## 2024-10-31 PROCEDURE — 99490 CHRNC CARE MGMT STAFF 1ST 20: CPT | Mod: S$PBB,,, | Performed by: FAMILY MEDICINE

## 2024-11-13 ENCOUNTER — TELEPHONE (OUTPATIENT)
Dept: FAMILY MEDICINE | Facility: CLINIC | Age: 74
End: 2024-11-13
Payer: MEDICARE

## 2024-11-13 NOTE — TELEPHONE ENCOUNTER
----- Message from Ritika Arndt NP sent at 11/12/2024  1:53 PM CST -----  Forwarding this DXA in case patient needs treatment or treatment alterations.  ----- Message -----  From: Interface, Rad Results In  Sent: 8/22/2024   3:28 PM CST  To: Hardik Yi MD

## 2024-11-15 ENCOUNTER — HOSPITAL ENCOUNTER (OUTPATIENT)
Dept: RADIOLOGY | Facility: HOSPITAL | Age: 74
Discharge: HOME | End: 2024-11-15
Attending: FAMILY MEDICINE
Payer: MEDICARE

## 2024-11-15 DIAGNOSIS — Z12.31 ENCOUNTER FOR SCREENING MAMMOGRAM FOR MALIGNANT NEOPLASM OF BREAST: ICD-10-CM

## 2024-11-15 PROCEDURE — 77063 BREAST TOMOSYNTHESIS BI: CPT | Mod: 26,,, | Performed by: RADIOLOGY

## 2024-11-15 PROCEDURE — 77063 BREAST TOMOSYNTHESIS BI: CPT | Mod: TC,PN

## 2024-11-15 PROCEDURE — 77067 SCR MAMMO BI INCL CAD: CPT | Mod: 26,,, | Performed by: RADIOLOGY

## 2024-11-19 DIAGNOSIS — F41.1 GAD (GENERALIZED ANXIETY DISORDER): ICD-10-CM

## 2024-11-20 RX ORDER — LORAZEPAM 0.5 MG/1
0.25 TABLET ORAL 3 TIMES DAILY PRN
Qty: 45 TABLET | Refills: 0 | Status: SHIPPED | OUTPATIENT
Start: 2024-11-20 | End: 2024-12-20

## 2024-11-22 DIAGNOSIS — M54.16 LUMBAR RADICULOPATHY: ICD-10-CM

## 2024-11-22 RX ORDER — DULOXETIN HYDROCHLORIDE 30 MG/1
CAPSULE, DELAYED RELEASE ORAL
Qty: 60 CAPSULE | Refills: 1 | Status: SHIPPED | OUTPATIENT
Start: 2024-11-22

## 2024-11-25 ENCOUNTER — OFFICE VISIT (OUTPATIENT)
Dept: FAMILY MEDICINE | Facility: CLINIC | Age: 74
End: 2024-11-25
Payer: MEDICARE

## 2024-11-25 VITALS
SYSTOLIC BLOOD PRESSURE: 138 MMHG | DIASTOLIC BLOOD PRESSURE: 72 MMHG | HEART RATE: 76 BPM | HEIGHT: 62 IN | TEMPERATURE: 98 F | WEIGHT: 173.31 LBS | BODY MASS INDEX: 31.89 KG/M2 | OXYGEN SATURATION: 93 %

## 2024-11-25 DIAGNOSIS — N39.0 URINARY TRACT INFECTION WITHOUT HEMATURIA, SITE UNSPECIFIED: ICD-10-CM

## 2024-11-25 DIAGNOSIS — R30.0 DYSURIA: Primary | ICD-10-CM

## 2024-11-25 LAB
BILIRUBIN, UA POC OHS: NEGATIVE
BLOOD, UA POC OHS: ABNORMAL
CLARITY, UA POC OHS: ABNORMAL
COLOR, UA POC OHS: YELLOW
GLUCOSE, UA POC OHS: NEGATIVE
KETONES, UA POC OHS: NEGATIVE
LEUKOCYTES, UA POC OHS: ABNORMAL
NITRITE, UA POC OHS: NEGATIVE
PH, UA POC OHS: 7
PROTEIN, UA POC OHS: NEGATIVE
SPECIFIC GRAVITY, UA POC OHS: 1.02
UROBILINOGEN, UA POC OHS: 0.2

## 2024-11-25 PROCEDURE — 99213 OFFICE O/P EST LOW 20 MIN: CPT | Mod: S$PBB,,, | Performed by: NURSE PRACTITIONER

## 2024-11-25 PROCEDURE — 99999 PR PBB SHADOW E&M-EST. PATIENT-LVL IV: CPT | Mod: PBBFAC,,, | Performed by: NURSE PRACTITIONER

## 2024-11-25 PROCEDURE — 81003 URINALYSIS AUTO W/O SCOPE: CPT | Mod: PBBFAC,PO | Performed by: NURSE PRACTITIONER

## 2024-11-25 PROCEDURE — 99214 OFFICE O/P EST MOD 30 MIN: CPT | Mod: PBBFAC,PO | Performed by: NURSE PRACTITIONER

## 2024-11-25 PROCEDURE — 99999PBSHW POCT URINALYSIS(INSTRUMENT): Mod: PBBFAC,,,

## 2024-11-25 RX ORDER — PHENAZOPYRIDINE HYDROCHLORIDE 200 MG/1
200 TABLET, FILM COATED ORAL 3 TIMES DAILY PRN
Qty: 9 TABLET | Refills: 0 | Status: SHIPPED | OUTPATIENT
Start: 2024-11-25 | End: 2024-11-28

## 2024-11-25 RX ORDER — CEPHALEXIN 500 MG/1
500 CAPSULE ORAL EVERY 12 HOURS
Qty: 14 CAPSULE | Refills: 0 | Status: SHIPPED | OUTPATIENT
Start: 2024-11-25 | End: 2024-12-02

## 2024-11-25 NOTE — PROGRESS NOTES
"Subjective:       Patient ID: Lucinda Aguilera is a 73 y.o. female.    Chief Complaint: Urinary Tract Infection (Symptoms x2 days and urinary incontinence )    Urinary Tract Infection   Associated symptoms include frequency and urgency.    urinary incontinence and urinary frequency X 2 days. Started with burning with urination yesterday. No fever or back pain.    Past Medical History:   Diagnosis Date    Anticoagulant long-term use     Anxiety     takes daily Xanax    Arthritis     right thumb    Cataract     OU    Cholelithiasis     GERD (gastroesophageal reflux disease)     Hepatic steatosis     Hyperlipemia     Hypertension     PVD (posterior vitreous detachment)     OD       Past Surgical History:   Procedure Laterality Date    BREAST BIOPSY Left 08/28/2020    stereo biopsy    BREAST BIOPSY Left 10/07/2020    benign excisional biopsy    CAUDAL EPIDURAL STEROID INJECTION N/A 7/26/2024    Procedure: Injection-steroid-epidural-caudal;  Surgeon: Indra Cuellar MD;  Location: [facility] OR;  Service: Pain Management;  Laterality: N/A;    chest abcess      child    COLONOSCOPY  01/06/2012    Dr. Lopez: hemorrhoids, redundant colon    COLONOSCOPY N/A 02/17/2021    Dr. Lopez: Congested and erythematous mucosa in the rectum, in the recto-sigmoid colon and in the sigmoid colon, proctosigmoid colitis, Redundant colon; biopsy: focal active colitis "nonspecific but can be seen with acute self-limited colitis (infection), medication effect (e.g. NSAID use), proximity to diverticula, or early/evolving IBD    EPIDURAL STEROID INJECTION INTO LUMBAR SPINE N/A 5/30/2024    Procedure: Injection-steroid-epidural-lumbar   L5/S1;  Surgeon: Indra Cuellar MD;  Location: [facility] OR;  Service: Pain Management;  Laterality: N/A;    ESOPHAGOGASTRODUODENOSCOPY N/A 06/06/2019    Dr. Lopez: small hiatal hernia, Non-erosive esophageal reflux (NERD) disease?., slight antritis; biopsy: Antral mucosa with chemical/reactive gastropathy. negative for h " pylori    ESOPHAGOGASTRODUODENOSCOPY N/A 02/17/2021    Procedure: EGD (ESOPHAGOGASTRODUODENOSCOPY);  Surgeon: Nathan Lopez Jr., MD;  Location: [facility];  Service: Endoscopy;  Laterality: N/A; Minimal gastritis; biopsy: stomach- negative for h pylori, active chronic gastritis with focal features of erosive gastritis, duodenum WNL    ESOPHAGOGASTRODUODENOSCOPY N/A 05/10/2022    Procedure: EGD (ESOPHAGOGASTRODUODENOSCOPY);  Surgeon: Nathan Lopze Jr., MD;  Location: [facility];  Service: Endoscopy;  Laterality: N/A;    ESOPHAGOGASTRODUODENOSCOPY N/A 3/12/2024    Procedure: EGD (ESOPHAGOGASTRODUODENOSCOPY);  Surgeon: Nathan Lopez Jr., MD;  Location: [facility];  Service: Endoscopy;  Laterality: N/A;    EXCISIONAL BIOPSY Left 10/07/2020    Procedure: EXCISIONAL BIOPSY-Left with radiological marker;  Surgeon: Brooklynn Ashford MD;  Location: [facility];  Service: General;  Laterality: Left;    FRACTURE SURGERY  2010    Left thumb repaired surgically    INJECTION OF ANESTHETIC AGENT AROUND MEDIAL BRANCH NERVES INNERVATING LUMBAR FACET JOINT Bilateral 9/12/2024    Procedure: Block-nerve-medial branch-lumbar    L4/5, L5/S1;  Surgeon: Indra Cuellar MD;  Location: [facility];  Service: Pain Management;  Laterality: Bilateral;    JOINT REPLACEMENT Bilateral     knee    KNEE ARTHROSCOPY W/ LASER      right    LAPAROSCOPIC CHOLECYSTECTOMY N/A 06/14/2019    Procedure: CHOLECYSTECTOMY, LAPAROSCOPIC;  Surgeon: Guevara Evans MD;  Location: [facility];  Service: General;  Laterality: N/A;    thumb surgery  11/2014    TOTAL KNEE ARTHROPLASTY Left 06/19/2018    Procedure: REPLACEMENT-KNEE-TOTAL;  Surgeon: Nj Melvin MD;  Location: [address];  Service: Orthopedics;  Laterality: Left;  [employer]    UPPER GASTROINTESTINAL ENDOSCOPY  07/13/2017    Dr. Lopez: NERD, Gastric mucosal atrophy. antritis, Scar in the incisura. Biopsied; biopsy: chronic gastritis. negative for h pylori       Review of patient's allergies  indicates:  No Known Allergies    Social History     Socioeconomic History    Marital status:     Number of children: 2   Occupational History    Occupation: retired teacher and principal    Occupation: substitute   Tobacco Use    Smoking status: Never    Smokeless tobacco: Never   Substance and Sexual Activity    Alcohol use: Not Currently     Comment: social- maybe once every few months    Drug use: No    Sexual activity: Yes     Partners: Male     Birth control/protection: Post-menopausal, None     Social Drivers of Health     Financial Resource Strain: Low Risk  (11/25/2024)    Overall Financial Resource Strain (CARDIA)     Difficulty of Paying Living Expenses: Not hard at all   Food Insecurity: No Food Insecurity (11/25/2024)    Hunger Vital Sign     Worried About Running Out of Food in the Last Year: Never true     Ran Out of Food in the Last Year: Never true   Transportation Needs: No Transportation Needs (11/9/2023)    PRAPARE - Transportation     Lack of Transportation (Medical): No     Lack of Transportation (Non-Medical): No   Physical Activity: Sufficiently Active (11/25/2024)    Exercise Vital Sign     Days of Exercise per Week: 4 days     Minutes of Exercise per Session: 50 min   Stress: No Stress Concern Present (11/25/2024)    [employer] - Occupational Stress Questionnaire     Feeling of Stress : Not at all   Housing Stability: Low Risk  (11/9/2023)    Housing Stability Vital Sign     Unable to Pay for Housing in the Last Year: No     Number of Places Lived in the Last Year: 1     Unstable Housing in the Last Year: No       Current Outpatient Medications on File Prior to Visit   Medication Sig Dispense Refill    acetaminophen (TYLENOL) 500 MG tablet Take 500 mg by mouth every 6 (six) hours as needed for Pain.      ascorbic acid, vitamin C, (VITAMIN C) 250 MG tablet Take 500 mg by mouth once daily.       atenoloL (TENORMIN) 25 MG tablet Take 1 tablet (25 mg total)  by mouth every evening. 30 tablet 11    azelastine (ASTELIN) 137 mcg (0.1 %) nasal spray 1 spray by Nasal route 2 (two) times daily.      balsalazide (COLAZAL) 750 mg capsule TAKE 3 CAPSULES(2250 MG) BY MOUTH TWICE DAILY WITH MEALS 180 capsule 11    benzonatate (TESSALON) 200 MG capsule Take 1 capsule (200 mg total) by mouth 3 (three) times daily as needed for Cough. 30 capsule 1    buPROPion (WELLBUTRIN XL) 150 MG TB24 tablet Take 1 tablet (150 mg total) by mouth every morning. 30 tablet 1    diltiaZEM (DILACOR XR) 240 MG CDCR Take 1 capsule (240 mg total) by mouth every evening. 30 capsule 11    docusate sodium (COLACE) 100 MG capsule Take 100 mg by mouth 2 (two) times daily.      DULoxetine (CYMBALTA) 30 MG capsule TAKE 1 CAPSULE(30 MG) BY MOUTH TWICE DAILY 60 capsule 01    famotidine (PEPCID) 40 MG tablet Take 1 tablet (40 mg total) by mouth every evening. 90 tablet 3    famotidine-calcium carbonate-magnesium hydroxide (PEPCID COMPLETE) -165 mg Take 1 tablet by mouth 2 (two) times daily as needed. Chew 1 or 2 tablets, 2-3 times a day, as needed, for nausea or heartburn.      fluticasone propionate (FLONASE) 50 mcg/actuation nasal spray 1 spray (50 mcg total) by Each Nostril route once daily. 16 g 3    lisinopriL (PRINIVIL,ZESTRIL) 40 MG tablet Take 1 tablet (40 mg total) by mouth 2 (two) times a day. 60 tablet 11    LORazepam (ATIVAN) 0.5 MG tablet Take 0.5 tablets (0.25 mg total) by mouth 3 (three) times daily as needed for Anxiety. 45 tablet 0    magnesium oxide (MAG-OX) 400 mg (241.3 mg magnesium) tablet Take 1 tablet (400 mg total) by mouth once daily. 30 tablet 3    multivitamin (THERAGRAN) per tablet Take 1 tablet by mouth once daily.      ondansetron (ZOFRAN-ODT) 4 MG TbDL Take 1 tablet (4 mg total) by mouth every 6 (six) hours as needed (nausea). 30 tablet 3    pantoprazole (PROTONIX) 40 MG tablet TAKE 1 TABLET(40 MG) BY MOUTH TWICE DAILY 180 tablet 3    psyllium husk, aspartame, (NATURAL PSYLLIUM  "FIBER) 3.4 gram/5.8 gram Powd Take by mouth.      rosuvastatin (CRESTOR) 20 MG tablet Take 1 tablet (20 mg total) by mouth once daily. 30 tablet 11    [DISCONTINUED] methocarbamoL (ROBAXIN) 750 MG Tab Take 1 tablet (750 mg total) by mouth 4 (four) times daily as needed. 44 tablet 3     No current facility-administered medications on file prior to visit.       Family History   Problem Relation Name Age of Onset    Hypertension Mother age 82     Heart disease Mother age 82     Glaucoma Mother age 82     Stroke Father age 50     Glaucoma Sister      Cataracts Sister      Macular degeneration Sister      Breast cancer Neg Hx      Colon cancer Neg Hx      Crohn's disease Neg Hx      Ulcerative colitis Neg Hx      Stomach cancer Neg Hx      Esophageal cancer Neg Hx      Celiac disease Neg Hx      Amblyopia Neg Hx      Blindness Neg Hx      Retinal detachment Neg Hx      Strabismus Neg Hx         Review of Systems   Genitourinary:  Positive for dysuria, frequency and urgency.       Objective:      /72 (Patient Position: Sitting)   Pulse 76   Temp 98.1 °F (36.7 °C) (Oral)   Ht 5' 2" (1.575 m)   Wt 78.6 kg (173 lb 4.5 oz)   LMP 04/18/2004   SpO2 (!) 93%   BMI 31.69 kg/m²   Physical Exam  Vitals and nursing note reviewed.   Constitutional:       General: She is not in acute distress.     Appearance: Normal appearance. She is well-groomed.   HENT:      Head: Normocephalic.      Right Ear: External ear normal.      Left Ear: External ear normal.      Nose: Nose normal.      Mouth/Throat:      Lips: Pink.      Mouth: Mucous membranes are moist.      Pharynx: Oropharynx is clear. No oropharyngeal exudate or posterior oropharyngeal erythema.   Eyes:      Extraocular Movements: Extraocular movements intact.      Conjunctiva/sclera: Conjunctivae normal.      Pupils: Pupils are equal, round, and reactive to light.   Cardiovascular:      Rate and Rhythm: Normal rate and regular rhythm.      Heart sounds: Normal heart " sounds.   Pulmonary:      Effort: Pulmonary effort is normal.      Breath sounds: Normal breath sounds.   Chest:      Chest wall: No tenderness.   Abdominal:      General: Bowel sounds are normal.      Palpations: Abdomen is soft.      Tenderness: There is no abdominal tenderness. There is no right CVA tenderness or left CVA tenderness.   Musculoskeletal:         General: No swelling or tenderness. Normal range of motion.      Cervical back: Normal range of motion and neck supple.      Right lower leg: No edema.      Left lower leg: No edema.   Lymphadenopathy:      Cervical: No cervical adenopathy.   Skin:     General: Skin is warm and dry.   Neurological:      General: No focal deficit present.      Mental Status: She is alert and oriented to person, place, and time. Mental status is at baseline.      Gait: Gait is intact.   Psychiatric:         Mood and Affect: Mood normal.         Behavior: Behavior normal.         Assessment:       1. Dysuria    2. Urinary tract infection without hematuria, site unspecified        Plan:       Dysuria  -     POCT Urinalysis(Instrument)    Urinary tract infection without hematuria, site unspecified  -     cephALEXin (KEFLEX) 500 MG capsule; Take 1 capsule (500 mg total) by mouth every 12 (twelve) hours. for 7 days  Dispense: 14 capsule; Refill: 0    Other orders  -     phenazopyridine (PYRIDIUM) 200 MG tablet; Take 1 tablet (200 mg total) by mouth 3 (three) times daily as needed (urinary pain).  Dispense: 9 tablet; Refill: 0        Keflex X 7 days. Pyridium X 3 days. Increase water intake. RTC if still having symptoms after antibiotic complete.

## 2024-11-30 ENCOUNTER — EXTERNAL CHRONIC CARE MANAGEMENT (OUTPATIENT)
Dept: PRIMARY CARE CLINIC | Facility: CLINIC | Age: 74
End: 2024-11-30
Payer: MEDICARE

## 2024-11-30 PROCEDURE — 99490 CHRNC CARE MGMT STAFF 1ST 20: CPT | Mod: S$PBB,,, | Performed by: FAMILY MEDICINE

## 2024-11-30 PROCEDURE — 99490 CHRNC CARE MGMT STAFF 1ST 20: CPT | Mod: PBBFAC,PO | Performed by: FAMILY MEDICINE

## 2024-12-09 ENCOUNTER — OFFICE VISIT (OUTPATIENT)
Dept: UROLOGY | Facility: CLINIC | Age: 74
End: 2024-12-09
Payer: MEDICARE

## 2024-12-09 VITALS — WEIGHT: 172.38 LBS | BODY MASS INDEX: 31.72 KG/M2 | HEIGHT: 62 IN

## 2024-12-09 DIAGNOSIS — N39.41 URGENCY INCONTINENCE: Primary | ICD-10-CM

## 2024-12-09 LAB
BILIRUBIN, UA POC OHS: NEGATIVE
BLOOD, UA POC OHS: NEGATIVE
CLARITY, UA POC OHS: CLEAR
COLOR, UA POC OHS: YELLOW
GLUCOSE, UA POC OHS: NEGATIVE
KETONES, UA POC OHS: NEGATIVE
LEUKOCYTES, UA POC OHS: ABNORMAL
NITRITE, UA POC OHS: NEGATIVE
PH, UA POC OHS: 7.5
POC RESIDUAL URINE VOLUME: 13 ML (ref 0–100)
PROTEIN, UA POC OHS: NEGATIVE
SPECIFIC GRAVITY, UA POC OHS: 1.01
UROBILINOGEN, UA POC OHS: 0.2

## 2024-12-09 PROCEDURE — 99999PBSHW POCT BLADDER SCAN: Mod: PBBFAC,,,

## 2024-12-09 PROCEDURE — 87088 URINE BACTERIA CULTURE: CPT | Performed by: UROLOGY

## 2024-12-09 PROCEDURE — 87077 CULTURE AEROBIC IDENTIFY: CPT | Performed by: UROLOGY

## 2024-12-09 PROCEDURE — 99999PBSHW POCT URINALYSIS(INSTRUMENT): Mod: PBBFAC,,,

## 2024-12-09 PROCEDURE — 99204 OFFICE O/P NEW MOD 45 MIN: CPT | Mod: S$PBB,,, | Performed by: UROLOGY

## 2024-12-09 PROCEDURE — 87086 URINE CULTURE/COLONY COUNT: CPT | Performed by: UROLOGY

## 2024-12-09 PROCEDURE — 81003 URINALYSIS AUTO W/O SCOPE: CPT | Mod: PBBFAC,PO | Performed by: UROLOGY

## 2024-12-09 PROCEDURE — 99999 PR PBB SHADOW E&M-EST. PATIENT-LVL III: CPT | Mod: PBBFAC,,, | Performed by: UROLOGY

## 2024-12-09 PROCEDURE — 99213 OFFICE O/P EST LOW 20 MIN: CPT | Mod: PBBFAC,PO | Performed by: UROLOGY

## 2024-12-09 PROCEDURE — 51798 US URINE CAPACITY MEASURE: CPT | Mod: PBBFAC,PO | Performed by: UROLOGY

## 2024-12-09 PROCEDURE — 87186 SC STD MICRODIL/AGAR DIL: CPT | Performed by: UROLOGY

## 2024-12-10 NOTE — PROGRESS NOTES
[facility]      New Recurrent Urinary Tract Infections Note    12/10/2024    Referred by:  Self, Aaareferral    CHIEF COMPLAINT:  Patient presents with recurring urinary incontinence and urinary frequency that started two weeks ago, following a possible urinary tract infection.    HPI:  Patient developed dysuria and new urinary incontinence 2 weeks ago. She was treated with Keflex for a suspected urinary tract infection. The symptoms improved minimally for a brief period but did not fully resolve. The incontinence persists and has remained constant since its onset.    Patient has urinary frequency during the day, particularly after fluid intake. She reports consistent and predictable urination needs following fluid consumption, which was not present a month ago. After ceasing fluid intake at 18:00, she has nocturia 3 times per night, which also began 2 weeks ago.    The incontinence is characterized as both urge and overflow incontinence. Patient occasionally reaches the toilet in time, but often cannot, necessitating continuous diaper use. All symptoms commenced approximately 2 weeks ago.    Patient had a previous urinary tract infection in April 2024, confirmed by a positive urine culture. That infection was resistant to ampicillin. Between April and the current episode, the patient reports no other urinary tract infections.    Patient denies stress incontinence associated with coughing and sneezing.    MEDICATIONS:  Patient is on Keflex for urinary tract infection.    MEDICAL HISTORY:  Patient has a history of urinary tract infection in April 2024.    TEST RESULTS:  A urine culture was performed in April 2024, which came back positive and showed resistance to ampicillin.          A review of 10+ systems was conducted with pertinent positive and negative findings documented in HPI with all other systems reviewed and negative.    Past medical, family, surgical and social history reviewed as  documented in chart with pertinent positive medical, family, surgical and social history detailed in HPI.    Exam Findings:    Physical Exam    General: No acute distress. Nontoxic appearing.  HENT: Normocephalic. Atraumatic.  Respiratory: Normal respiratory effort. No conversational dyspnea. No audible wheezing.  Abdomen: No obvious distension.  Skin: No visible abnormalities.  Extremities: No edema upper extremities. No edema lower extremities.  Neurological: Alert and oriented x3. Normal speech.  Psychiatric: Normal mood. Normal affect. No evidence of SI.          Assessment/Plan:    Assessment & Plan    - Considered potential causes for persistent urinary symptoms after antibiotic treatment:  - 1) No initial infection, symptoms due to other causes  - 2) Initial infection resolved, new infection developed  - 3) Initial infection resistant to Keflex, persisting  - Determined need for urine culture to guide further treatment  - If culture positive: likely resistant organism, will treat with culture-specific antibiotic  - If culture negative: possible post-infection bladder irritation, will monitor and prevent recurrence  - Consider overactive bladder as potential cause if symptoms persist without infection    PLAN SUMMARY:  - Stop drinking liquids after 6 PM to reduce nighttime urination  - Ordered catheterized urine specimen for culture  - Follow up in 6 weeks to reassess symptoms  - Contact office for urine culture results    URINARY TRACT INFECTIONS:  - Discussed potential need for extensive evaluation (kidney ultrasound/CT, bladder scope) in cases of recurring UTIs.  - Informed about possibility of prolonged bladder irritation after infection clearance.  - Catheterized urine specimen for culture ordered.  - Contact office for urine culture results: if negative, results expected within 24 hours; if positive, final results may take 2-3 days.    URGE INCONTINENCE:  - Patient to stop drinking liquids after 6 PM to  reduce nighttime urination.    MEDICATION MANAGEMENT:  - Discontinued Keflex (cephalexin).    FOLLOW-UP:  - Follow up in 6 weeks to reassess symptoms and determine if they have returned to normal.

## 2024-12-12 ENCOUNTER — PATIENT MESSAGE (OUTPATIENT)
Dept: UROLOGY | Facility: CLINIC | Age: 74
End: 2024-12-12
Payer: MEDICARE

## 2024-12-12 LAB — BACTERIA UR CULT: ABNORMAL

## 2024-12-12 RX ORDER — AMPICILLIN 500 MG/1
500 CAPSULE ORAL 4 TIMES DAILY
Qty: 20 CAPSULE | Refills: 0 | Status: SHIPPED | OUTPATIENT
Start: 2024-12-12 | End: 2024-12-17

## 2024-12-16 ENCOUNTER — CLINICAL SUPPORT (OUTPATIENT)
Dept: PHYSICAL MEDICINE AND REHAB | Facility: CLINIC | Age: 74
End: 2024-12-16
Payer: MEDICARE

## 2024-12-16 ENCOUNTER — PATIENT MESSAGE (OUTPATIENT)
Dept: FAMILY MEDICINE | Facility: CLINIC | Age: 74
End: 2024-12-16
Payer: MEDICARE

## 2024-12-16 ENCOUNTER — PATIENT MESSAGE (OUTPATIENT)
Dept: PHYSICAL MEDICINE AND REHAB | Facility: CLINIC | Age: 74
End: 2024-12-16

## 2024-12-16 VITALS — SYSTOLIC BLOOD PRESSURE: 153 MMHG | DIASTOLIC BLOOD PRESSURE: 73 MMHG | HEART RATE: 71 BPM

## 2024-12-16 DIAGNOSIS — M25.551 CHRONIC RIGHT HIP PAIN: Primary | ICD-10-CM

## 2024-12-16 DIAGNOSIS — G89.29 CHRONIC RIGHT HIP PAIN: Primary | ICD-10-CM

## 2024-12-16 PROCEDURE — 99999PBSHW PR PBB SHADOW TECHNICAL ONLY FILED TO HB: Mod: PBBFAC,,,

## 2024-12-16 PROCEDURE — 20611 DRAIN/INJ JOINT/BURSA W/US: CPT | Mod: PBBFAC,PO | Performed by: PHYSICAL MEDICINE & REHABILITATION

## 2024-12-16 PROCEDURE — 99499 UNLISTED E&M SERVICE: CPT | Mod: S$PBB,,, | Performed by: PHYSICAL MEDICINE & REHABILITATION

## 2024-12-16 PROCEDURE — 99999 PR PBB SHADOW E&M-EST. PATIENT-LVL III: CPT | Mod: PBBFAC,,, | Performed by: PHYSICAL MEDICINE & REHABILITATION

## 2024-12-16 RX ORDER — TRIAMCINOLONE ACETONIDE 40 MG/ML
40 INJECTION, SUSPENSION INTRA-ARTICULAR; INTRAMUSCULAR
Status: DISCONTINUED | OUTPATIENT
Start: 2024-12-16 | End: 2024-12-16 | Stop reason: HOSPADM

## 2024-12-16 RX ORDER — LIDOCAINE HYDROCHLORIDE 10 MG/ML
4 INJECTION, SOLUTION INFILTRATION; PERINEURAL
Status: DISCONTINUED | OUTPATIENT
Start: 2024-12-16 | End: 2024-12-16 | Stop reason: HOSPADM

## 2024-12-16 RX ADMIN — TRIAMCINOLONE ACETONIDE 40 MG: 40 INJECTION, SUSPENSION INTRA-ARTICULAR; INTRAMUSCULAR at 09:12

## 2024-12-16 RX ADMIN — LIDOCAINE HYDROCHLORIDE 4 ML: 10 INJECTION, SOLUTION INFILTRATION; PERINEURAL at 09:12

## 2024-12-16 NOTE — PROCEDURES
Large Joint Aspiration/Injection: R hip joint    Date/Time: 12/16/2024 9:00 AM    Performed by: Demetra Beavers, DO  Authorized by: Demetra Beavers, DO    Consent Done?:  Yes (Verbal)  Indications:  Arthritis, diagnostic evaluation and pain  Site marked: the procedure site was marked    Timeout: prior to procedure the correct patient, procedure, and site was verified    Prep: patient was prepped and draped in usual sterile fashion      Local anesthesia used?: Yes    Anesthesia:  Local infiltration  Local anesthetic:  Lidocaine 1% without epinephrine  Anesthetic total (ml):  2      Details:  Needle Size:  22 G  Ultrasonic Guidance for needle placement?: Yes    Images are saved and documented.  Approach:  Anterior  Location:  Hip  Site:  R hip joint  Medications:  4 mL LIDOcaine HCL 10 mg/ml (1%) 10 mg/mL (1 %); 40 mg triamcinolone acetonide 40 mg/mL  Patient tolerance:  Patient tolerated the procedure well with no immediate complications    Ultrasound guidance was used for correct needle placement, the images were saved and will be uploaded to EMR.

## 2024-12-16 NOTE — PROGRESS NOTES
[facility] PROCEDURE NOTE    CHIEF COMPLAINT:   Chief Complaint   Patient presents with    Hip Pain     Chronic right hip pain       HISTORY OF PRESENT ILLNESS: Lucinda Aguilera is a 73 y.o. female who presents to me for planned procedure/injection of steroid for management of the below noted diagnosis.     F/u right hip injection 09/16/24: great relief until a week or so ago. Now with pain again. Wanting to get relief before upcoming cruise.     Medications:   Current Outpatient Medications on File Prior to Visit   Medication Sig Dispense Refill    acetaminophen (TYLENOL) 500 MG tablet Take 500 mg by mouth every 6 (six) hours as needed for Pain.      ampicillin (PRINCIPEN) 500 MG capsule Take 1 capsule (500 mg total) by mouth 4 (four) times daily. for 5 days 20 capsule 0    ascorbic acid, vitamin C, (VITAMIN C) 250 MG tablet Take 500 mg by mouth once daily.       atenoloL (TENORMIN) 25 MG tablet Take 1 tablet (25 mg total) by mouth every evening. 30 tablet 11    azelastine (ASTELIN) 137 mcg (0.1 %) nasal spray 1 spray by Nasal route 2 (two) times daily.      balsalazide (COLAZAL) 750 mg capsule TAKE 3 CAPSULES(2250 MG) BY MOUTH TWICE DAILY WITH MEALS 180 capsule 11    benzonatate (TESSALON) 200 MG capsule Take 1 capsule (200 mg total) by mouth 3 (three) times daily as needed for Cough. 30 capsule 1    buPROPion (WELLBUTRIN XL) 150 MG TB24 tablet Take 1 tablet (150 mg total) by mouth every morning. 30 tablet 1    diltiaZEM (DILACOR XR) 240 MG CDCR Take 1 capsule (240 mg total) by mouth every evening. 30 capsule 11    docusate sodium (COLACE) 100 MG capsule Take 100 mg by mouth 2 (two) times daily.      DULoxetine (CYMBALTA) 30 MG capsule TAKE 1 CAPSULE(30 MG) BY MOUTH TWICE DAILY 60 capsule 01    famotidine (PEPCID) 40 MG tablet Take 1 tablet (40 mg total) by mouth every evening. 90 tablet 3    famotidine-calcium carbonate-magnesium hydroxide (PEPCID COMPLETE) -299  mg Take 1 tablet by mouth 2 (two) times daily as needed. Chew 1 or 2 tablets, 2-3 times a day, as needed, for nausea or heartburn.      fluticasone propionate (FLONASE) 50 mcg/actuation nasal spray 1 spray (50 mcg total) by Each Nostril route once daily. 16 g 3    lisinopriL (PRINIVIL,ZESTRIL) 40 MG tablet Take 1 tablet (40 mg total) by mouth 2 (two) times a day. 60 tablet 11    LORazepam (ATIVAN) 0.5 MG tablet Take 0.5 tablets (0.25 mg total) by mouth 3 (three) times daily as needed for Anxiety. 45 tablet 0    magnesium oxide (MAG-OX) 400 mg (241.3 mg magnesium) tablet Take 1 tablet (400 mg total) by mouth once daily. 30 tablet 3    multivitamin (THERAGRAN) per tablet Take 1 tablet by mouth once daily.      ondansetron (ZOFRAN-ODT) 4 MG TbDL Take 1 tablet (4 mg total) by mouth every 6 (six) hours as needed (nausea). 30 tablet 3    pantoprazole (PROTONIX) 40 MG tablet TAKE 1 TABLET(40 MG) BY MOUTH TWICE DAILY 180 tablet 3    psyllium husk, aspartame, (NATURAL PSYLLIUM FIBER) 3.4 gram/5.8 gram Powd Take by mouth.      rosuvastatin (CRESTOR) 20 MG tablet Take 1 tablet (20 mg total) by mouth once daily. 30 tablet 11     No current facility-administered medications on file prior to visit.       Allergies: Review of patient's allergies indicates:  No Known Allergies    Vitals:   Vitals:    12/16/24 0839   BP: (!) 153/73   Pulse: 71       ASSESSMENT:   1. Chronic right hip pain        PLAN:   1. Procedure/injection was completed for management of above diagnosis.    2. Please see procedure note from today's visit with further details.     Right hip steroid     RTC as needed. Okay to repeat every 3 months.

## 2024-12-18 ENCOUNTER — OFFICE VISIT (OUTPATIENT)
Dept: FAMILY MEDICINE | Facility: CLINIC | Age: 74
End: 2024-12-18
Payer: MEDICARE

## 2024-12-18 VITALS
HEART RATE: 72 BPM | WEIGHT: 173.75 LBS | DIASTOLIC BLOOD PRESSURE: 80 MMHG | HEIGHT: 62 IN | SYSTOLIC BLOOD PRESSURE: 158 MMHG | OXYGEN SATURATION: 95 % | BODY MASS INDEX: 31.97 KG/M2

## 2024-12-18 DIAGNOSIS — M79.604 RIGHT LEG PAIN: Primary | ICD-10-CM

## 2024-12-18 DIAGNOSIS — Z00.00 PREVENTATIVE HEALTH CARE: ICD-10-CM

## 2024-12-18 PROCEDURE — 90653 IIV ADJUVANT VACCINE IM: CPT | Mod: PBBFAC,PO | Performed by: FAMILY MEDICINE

## 2024-12-18 PROCEDURE — 99213 OFFICE O/P EST LOW 20 MIN: CPT | Mod: PBBFAC,PO | Performed by: FAMILY MEDICINE

## 2024-12-18 PROCEDURE — 99999 PR PBB SHADOW E&M-EST. PATIENT-LVL III: CPT | Mod: PBBFAC,,, | Performed by: FAMILY MEDICINE

## 2024-12-18 PROCEDURE — G0008 ADMIN INFLUENZA VIRUS VAC: HCPCS | Mod: PBBFAC,PO | Performed by: FAMILY MEDICINE

## 2024-12-18 PROCEDURE — 99999PBSHW PR PBB SHADOW TECHNICAL ONLY FILED TO HB: Mod: PBBFAC,,,

## 2024-12-18 PROCEDURE — 99213 OFFICE O/P EST LOW 20 MIN: CPT | Mod: S$PBB,,, | Performed by: FAMILY MEDICINE

## 2024-12-18 RX ADMIN — INFLUENZA A VIRUS A/VICTORIA/4897/2022 IVR-238 (H1N1) ANTIGEN (FORMALDEHYDE INACTIVATED), INFLUENZA A VIRUS A/THAILAND/8/2022 IVR-237 (H3N2) ANTIGEN (FORMALDEHYDE INACTIVATED), INFLUENZA B VIRUS B/AUSTRIA/1359417/2021 BVR-26 ANTIGEN (FORMALDEHYDE INACTIVATED) 0.5 ML: 15; 15; 15 INJECTION, SUSPENSION INTRAMUSCULAR at 02:12

## 2024-12-18 NOTE — PROGRESS NOTES
"Subjective:       Patient ID: Lucinda Aguilera is a 73 y.o. female.    Chief Complaint: Leg Pain (Right calf pain)    Onset last week with some pain in the right lower leg behind the knee.  No significant swelling.  No calf pain.  No recent injury or surgery.  No h/o DVT>        Past Medical History:   Diagnosis Date    Anticoagulant long-term use     Anxiety     takes daily Xanax    Arthritis     right thumb    Cataract     OU    Cholelithiasis     GERD (gastroesophageal reflux disease)     Hepatic steatosis     Hyperlipemia     Hypertension     PVD (posterior vitreous detachment)     OD       Past Surgical History:   Procedure Laterality Date    BREAST BIOPSY Left 08/28/2020    stereo biopsy    BREAST BIOPSY Left 10/07/2020    benign excisional biopsy    CAUDAL EPIDURAL STEROID INJECTION N/A 7/26/2024    Procedure: Injection-steroid-epidural-caudal;  Surgeon: Indra Cuellar MD;  Location: [facility] OR;  Service: Pain Management;  Laterality: N/A;    chest abcess      child    COLONOSCOPY  01/06/2012    Dr. Lopez: hemorrhoids, redundant colon    COLONOSCOPY N/A 02/17/2021    Dr. Lopez: Congested and erythematous mucosa in the rectum, in the recto-sigmoid colon and in the sigmoid colon, proctosigmoid colitis, Redundant colon; biopsy: focal active colitis "nonspecific but can be seen with acute self-limited colitis (infection), medication effect (e.g. NSAID use), proximity to diverticula, or early/evolving IBD    EPIDURAL STEROID INJECTION INTO LUMBAR SPINE N/A 5/30/2024    Procedure: Injection-steroid-epidural-lumbar   L5/S1;  Surgeon: Indra Cuellar MD;  Location: [facility] OR;  Service: Pain Management;  Laterality: N/A;    ESOPHAGOGASTRODUODENOSCOPY N/A 06/06/2019    Dr. Lopez: small hiatal hernia, Non-erosive esophageal reflux (NERD) disease?., slight antritis; biopsy: Antral mucosa with chemical/reactive gastropathy. negative for h pylori    ESOPHAGOGASTRODUODENOSCOPY N/A 02/17/2021    Procedure: EGD " (ESOPHAGOGASTRODUODENOSCOPY);  Surgeon: Nathan Lopez Jr., MD;  Location: [facility];  Service: Endoscopy;  Laterality: N/A; Minimal gastritis; biopsy: stomach- negative for h pylori, active chronic gastritis with focal features of erosive gastritis, duodenum WNL    ESOPHAGOGASTRODUODENOSCOPY N/A 05/10/2022    Procedure: EGD (ESOPHAGOGASTRODUODENOSCOPY);  Surgeon: Nathan Lopez Jr., MD;  Location: [facility];  Service: Endoscopy;  Laterality: N/A;    ESOPHAGOGASTRODUODENOSCOPY N/A 3/12/2024    Procedure: EGD (ESOPHAGOGASTRODUODENOSCOPY);  Surgeon: Nathan Lopez Jr., MD;  Location: [facility];  Service: Endoscopy;  Laterality: N/A;    EXCISIONAL BIOPSY Left 10/07/2020    Procedure: EXCISIONAL BIOPSY-Left with radiological marker;  Surgeon: Brooklynn Ashford MD;  Location: [facility];  Service: General;  Laterality: Left;    FRACTURE SURGERY  2010    Left thumb repaired surgically    INJECTION OF ANESTHETIC AGENT AROUND MEDIAL BRANCH NERVES INNERVATING LUMBAR FACET JOINT Bilateral 9/12/2024    Procedure: Block-nerve-medial branch-lumbar    L4/5, L5/S1;  Surgeon: Indra Cuellar MD;  Location: [facility];  Service: Pain Management;  Laterality: Bilateral;    JOINT REPLACEMENT Bilateral     knee    KNEE ARTHROSCOPY W/ LASER      right    LAPAROSCOPIC CHOLECYSTECTOMY N/A 06/14/2019    Procedure: CHOLECYSTECTOMY, LAPAROSCOPIC;  Surgeon: Guevara Evans MD;  Location: [facility];  Service: General;  Laterality: N/A;    thumb surgery  11/2014    TOTAL KNEE ARTHROPLASTY Left 06/19/2018    Procedure: REPLACEMENT-KNEE-TOTAL;  Surgeon: Nj Melvin MD;  Location: [address];  Service: Orthopedics;  Laterality: Left;  [facility]    UPPER GASTROINTESTINAL ENDOSCOPY  07/13/2017    Dr. Lopez: NERD, Gastric mucosal atrophy. antritis, Scar in the incisura. Biopsied; biopsy: chronic gastritis. negative for h pylori       Review of patient's allergies indicates:  No Known Allergies    Social History     Socioeconomic History     Marital status:     Number of children: 2   Occupational History    Occupation: retired teacher and principal    Occupation: substitute   Tobacco Use    Smoking status: Never    Smokeless tobacco: Never   Substance and Sexual Activity    Alcohol use: Not Currently     Comment: social- maybe once every few months    Drug use: No    Sexual activity: Yes     Partners: Male     Birth control/protection: Post-menopausal, None     Social Drivers of Health     Financial Resource Strain: Low Risk  (2024)    Overall Financial Resource Strain (CARDIA)     Difficulty of Paying Living Expenses: Not hard at all   Food Insecurity: No Food Insecurity (2024)    Hunger Vital Sign     Worried About Running Out of Food in the Last Year: Never true     Ran Out of Food in the Last Year: Never true   Transportation Needs: No Transportation Needs (2023)    PRAPARE - Transportation     Lack of Transportation (Medical): No     Lack of Transportation (Non-Medical): No   Physical Activity: Sufficiently Active (2024)    Exercise Vital Sign     Days of Exercise per Week: 4 days     Minutes of Exercise per Session: 50 min   Stress: No Stress Concern Present (2024)    [employer] - Occupational Stress Questionnaire     Feeling of Stress : Not at all   Housing Stability: Low Risk  (2023)    Housing Stability Vital Sign     Unable to Pay for Housing in the Last Year: No     Number of Places Lived in the Last Year: 1     Unstable Housing in the Last Year: No       Current Outpatient Medications on File Prior to Visit   Medication Sig Dispense Refill    acetaminophen (TYLENOL) 500 MG tablet Take 500 mg by mouth every 6 (six) hours as needed for Pain.      [] ampicillin (PRINCIPEN) 500 MG capsule Take 1 capsule (500 mg total) by mouth 4 (four) times daily. for 5 days 20 capsule 0    ascorbic acid, vitamin C, (VITAMIN C) 250 MG tablet Take 500 mg by mouth once daily.        atenoloL (TENORMIN) 25 MG tablet Take 1 tablet (25 mg total) by mouth every evening. 30 tablet 11    azelastine (ASTELIN) 137 mcg (0.1 %) nasal spray 1 spray by Nasal route 2 (two) times daily.      balsalazide (COLAZAL) 750 mg capsule TAKE 3 CAPSULES(2250 MG) BY MOUTH TWICE DAILY WITH MEALS 180 capsule 11    benzonatate (TESSALON) 200 MG capsule Take 1 capsule (200 mg total) by mouth 3 (three) times daily as needed for Cough. 30 capsule 1    buPROPion (WELLBUTRIN XL) 150 MG TB24 tablet Take 1 tablet (150 mg total) by mouth every morning. 30 tablet 1    diltiaZEM (DILACOR XR) 240 MG CDCR Take 1 capsule (240 mg total) by mouth every evening. 30 capsule 11    docusate sodium (COLACE) 100 MG capsule Take 100 mg by mouth 2 (two) times daily.      DULoxetine (CYMBALTA) 30 MG capsule TAKE 1 CAPSULE(30 MG) BY MOUTH TWICE DAILY 60 capsule 01    famotidine (PEPCID) 40 MG tablet Take 1 tablet (40 mg total) by mouth every evening. 90 tablet 3    famotidine-calcium carbonate-magnesium hydroxide (PEPCID COMPLETE) -165 mg Take 1 tablet by mouth 2 (two) times daily as needed. Chew 1 or 2 tablets, 2-3 times a day, as needed, for nausea or heartburn. (Patient not taking: Reported on 12/18/2024)      fluticasone propionate (FLONASE) 50 mcg/actuation nasal spray 1 spray (50 mcg total) by Each Nostril route once daily. 16 g 3    lisinopriL (PRINIVIL,ZESTRIL) 40 MG tablet Take 1 tablet (40 mg total) by mouth 2 (two) times a day. 60 tablet 11    LORazepam (ATIVAN) 0.5 MG tablet Take 0.5 tablets (0.25 mg total) by mouth 3 (three) times daily as needed for Anxiety. 45 tablet 0    magnesium oxide (MAG-OX) 400 mg (241.3 mg magnesium) tablet Take 1 tablet (400 mg total) by mouth once daily. 30 tablet 3    multivitamin (THERAGRAN) per tablet Take 1 tablet by mouth once daily.      ondansetron (ZOFRAN-ODT) 4 MG TbDL Take 1 tablet (4 mg total) by mouth every 6 (six) hours as needed (nausea). 30 tablet 3    pantoprazole (PROTONIX) 40 MG  "tablet TAKE 1 TABLET(40 MG) BY MOUTH TWICE DAILY 180 tablet 3    psyllium husk, aspartame, (NATURAL PSYLLIUM FIBER) 3.4 gram/5.8 gram Powd Take by mouth.      rosuvastatin (CRESTOR) 20 MG tablet Take 1 tablet (20 mg total) by mouth once daily. 30 tablet 11     No current facility-administered medications on file prior to visit.       Family History   Problem Relation Name Age of Onset    Hypertension Mother age 82     Heart disease Mother age 82     Glaucoma Mother age 82     Stroke Father age 50     Glaucoma Sister      Cataracts Sister      Macular degeneration Sister      Breast cancer Neg Hx      Colon cancer Neg Hx      Crohn's disease Neg Hx      Ulcerative colitis Neg Hx      Stomach cancer Neg Hx      Esophageal cancer Neg Hx      Celiac disease Neg Hx      Amblyopia Neg Hx      Blindness Neg Hx      Retinal detachment Neg Hx      Strabismus Neg Hx         Review of Systems   Constitutional:  Negative for activity change and unexpected weight change.   HENT:  Negative for hearing loss, rhinorrhea and trouble swallowing.    Eyes:  Negative for discharge and visual disturbance.   Respiratory:  Negative for chest tightness and wheezing.    Cardiovascular:  Negative for chest pain and palpitations.   Gastrointestinal:  Negative for blood in stool, constipation, diarrhea and vomiting.   Endocrine: Positive for polyuria. Negative for polydipsia.   Genitourinary:  Negative for difficulty urinating, dysuria, hematuria and menstrual problem.   Musculoskeletal:  Positive for arthralgias. Negative for joint swelling and neck pain.   Neurological:  Negative for weakness and headaches.   Psychiatric/Behavioral:  Negative for confusion and dysphoric mood.        Objective:      BP (!) 158/80   Pulse 72   Ht 5' 2" (1.575 m)   Wt 78.8 kg (173 lb 11.6 oz)   LMP 04/18/2004   SpO2 95%   BMI 31.77 kg/m²   Physical Exam  Constitutional:       Appearance: Normal appearance. She is well-developed.   HENT:      Head: " Normocephalic.      Mouth/Throat:      Pharynx: No oropharyngeal exudate or posterior oropharyngeal erythema.   Eyes:      Conjunctiva/sclera: Conjunctivae normal.      Pupils: Pupils are equal, round, and reactive to light.   Neck:      Thyroid: No thyromegaly.   Cardiovascular:      Rate and Rhythm: Normal rate and regular rhythm.      Heart sounds: Normal heart sounds, S1 normal and S2 normal. No murmur heard.     No friction rub. No gallop.   Pulmonary:      Effort: Pulmonary effort is normal.      Breath sounds: Normal breath sounds. No wheezing or rales.   Musculoskeletal:      Cervical back: Normal range of motion and neck supple.      Right lower leg: No edema.      Left lower leg: No edema.        Legs:    Lymphadenopathy:      Cervical: No cervical adenopathy.   Skin:     General: Skin is warm.      Findings: No rash.   Neurological:      Mental Status: She is alert and oriented to person, place, and time.      Cranial Nerves: No cranial nerve deficit.      Gait: Gait normal.         Assessment:       1. Right leg pain    2. Preventative health care        Plan:       Right leg pain    Preventative health care  -     influenza (adjuvanted) (Fluad) 45 mcg/0.5 mL IM vaccine (> or = 66 yo) 0.5 mL      Reassurance; suspect combination of likely Baker's cyst and varicose veins  No clinical concerns for DVT  Discussed basic stretching  Counseled on regular exercise, maintenance of a healthy weight, balanced diet rich in fruits/vegetables and lean protein, and avoidance of unhealthy habits like smoking and excessive alcohol intake.

## 2024-12-19 ENCOUNTER — OFFICE VISIT (OUTPATIENT)
Dept: GASTROENTEROLOGY | Facility: CLINIC | Age: 74
End: 2024-12-19
Payer: MEDICARE

## 2024-12-19 ENCOUNTER — PATIENT MESSAGE (OUTPATIENT)
Dept: FAMILY MEDICINE | Facility: CLINIC | Age: 74
End: 2024-12-19
Payer: MEDICARE

## 2024-12-19 VITALS — WEIGHT: 175.06 LBS | HEIGHT: 62 IN | BODY MASS INDEX: 32.22 KG/M2

## 2024-12-19 DIAGNOSIS — E66.09 CLASS 1 OBESITY DUE TO EXCESS CALORIES WITH BODY MASS INDEX (BMI) OF 30.0 TO 30.9 IN ADULT, UNSPECIFIED WHETHER SERIOUS COMORBIDITY PRESENT: ICD-10-CM

## 2024-12-19 DIAGNOSIS — K21.9 GASTROESOPHAGEAL REFLUX DISEASE, UNSPECIFIED WHETHER ESOPHAGITIS PRESENT: Primary | ICD-10-CM

## 2024-12-19 DIAGNOSIS — E66.811 CLASS 1 OBESITY DUE TO EXCESS CALORIES WITH BODY MASS INDEX (BMI) OF 30.0 TO 30.9 IN ADULT, UNSPECIFIED WHETHER SERIOUS COMORBIDITY PRESENT: ICD-10-CM

## 2024-12-19 DIAGNOSIS — K59.09 CHRONIC CONSTIPATION: ICD-10-CM

## 2024-12-19 DIAGNOSIS — K52.9 NON-SPECIFIC COLITIS: ICD-10-CM

## 2024-12-19 PROCEDURE — 99213 OFFICE O/P EST LOW 20 MIN: CPT | Mod: PBBFAC,PO | Performed by: INTERNAL MEDICINE

## 2024-12-19 PROCEDURE — 99213 OFFICE O/P EST LOW 20 MIN: CPT | Mod: S$PBB,,, | Performed by: INTERNAL MEDICINE

## 2024-12-19 PROCEDURE — 99999 PR PBB SHADOW E&M-EST. PATIENT-LVL III: CPT | Mod: PBBFAC,,, | Performed by: INTERNAL MEDICINE

## 2024-12-19 NOTE — PROGRESS NOTES
Subjective     Patient ID: Lucinda Aguilera is a 73 y.o. female.    Chief Complaint: No chief complaint on file.                See last OV note 7/25/2024:  Ms. Aguilera returns for follow-up of her reflux.  She reports she is doing well at this point.  That her medicines are doing fine.  She is really trying to stick to a bland diet.  As for her constipation, she is using the MiraLax, taking it every few days.  She has a better bowel program now.   See the MRI report below.  Dr. Arnold has referred her to pain management.  She is scheduled to have an epidural tomorrow (Dr. Cuellar).  She is taking gabapentin, Tylenol, and using a heating pad.  No NSAIDs are being taken.   See weight table below.  No significant weight loss.              Wt Readings from Last 15 Encounters:  12/19/24 : 79.4 kg (175 lb 0.7 oz)  12/18/24 : 78.8 kg (173 lb 11.6 oz)  12/09/24 : 78.2 kg (172 lb 6.4 oz)  11/25/24 : 78.6 kg (173 lb 4.5 oz)  09/26/24 : 78.7 kg (173 lb 8 oz)  09/25/24 : 78.7 kg (173 lb 8 oz)  09/19/24 : 78.9 kg (174 lb 0.9 oz)  09/12/24 : 78.9 kg (174 lb)  08/22/24 : 79.2 kg (174 lb 9.7 oz)  08/19/24 : 78.6 kg (173 lb 4.5 oz)  08/15/24 : 78.3 kg (172 lb 9.9 oz)  08/13/24 : 78.3 kg (172 lb 9.9 oz)  08/08/24 : 78.3 kg (172 lb 8.2 oz)  08/07/24 : 78.4 kg (172 lb 13.5 oz)  07/23/24 : 80.7 kg (178 lb)         Review of Systems       Objective     Physical Exam       Assessment and Plan     Gastroesophageal reflux disease, unspecified whether esophagitis present    Chronic constipation    Non-specific colitis    Class 1 obesity due to excess calories with body mass index (BMI) of 30.0 to 30.9 in adult, unspecified whether serious comorbidity present      Cont meds same.  Diet and weight loss.  RTC 6 months.         No follow-ups on file.

## 2024-12-25 DIAGNOSIS — F41.1 GAD (GENERALIZED ANXIETY DISORDER): ICD-10-CM

## 2024-12-25 NOTE — TELEPHONE ENCOUNTER
Spoke with patient. Reviewed breast biopsy procedure and reviewed instructions for breast biopsy. Patient expressed understanding and all questions were answered. Provided patient with my phone number to call for any further concerns or questions.   Patient scheduled breast biopsy at the [facility] on 8/28/2020.   good, to achieve stated therapy goals

## 2024-12-26 RX ORDER — LORAZEPAM 0.5 MG/1
0.25 TABLET ORAL 3 TIMES DAILY PRN
Qty: 45 TABLET | Refills: 0 | Status: SHIPPED | OUTPATIENT
Start: 2024-12-26 | End: 2025-01-25

## 2024-12-31 ENCOUNTER — EXTERNAL CHRONIC CARE MANAGEMENT (OUTPATIENT)
Dept: PRIMARY CARE CLINIC | Facility: CLINIC | Age: 74
End: 2024-12-31
Payer: MEDICARE

## 2024-12-31 PROCEDURE — 99490 CHRNC CARE MGMT STAFF 1ST 20: CPT | Mod: PBBFAC,PO | Performed by: FAMILY MEDICINE

## 2025-01-02 NOTE — PROGRESS NOTES
Outpatient Psychiatry Follow-Up Visit    Clinical Status of Patient: Outpatient (Ambulatory)  01/03/2025    The patient location is:  [place], [place]  The patient phone number is: [phone]   Visit type: Virtual visit with synchronous audio and video  Each patient to whom he or she provides medical services by telemedicine is:  (1) informed of the relationship between the physician and patient and the respective role of any other health care provider with respect to management of the patient; and (2) notified that he or she may decline to receive medical services by telemedicine and may withdraw from such care at any time.     Chief Complaint: Pt is a 74 y.o. female who presents today for a follow-up. Met with patient.       Interval History and Content of Current Session:  Interim Events/Subjective Report/Content of Current Session:  follow up appointment.    Pt is a 74 y.o. female with past psychiatric hx of CARLOS (generalized anxiety disorder), MDD (major depressive disorder), recurrent episode, moderate, Obsessive thinking who presents for follow up treatment.     Past Psychiatric hx:   Pt. is a 74 yo F with a past psychiatric hx of Anxiety, Depression, unspecified depression type presenting to the clinic for an initial evaluation and treatment. Past medical history outlined below. She is currently taking buPROPion (WELLBUTRIN XL), LORazepam (ATIVAN), prescribed and managed by Dr. Engel/Dr. Hassan. She reports that these medications have not adequately addressed her depression.     10/4/24: Pt presents to OP Psychiatry for routine follow-up for treatment/medication management of CARLOS, MDD, OCD. Pt denies any OCD symptoms, reports anxiety and depression have improved after seeing ortho specialist r/t her chronic pain. States she has gotten relief and her symptoms are manageable again. Pain management started her on 30 mg Cymbalta BID for pain, patient reports it is going well. Denies need for any medication  changes at this time. Pt denies SI/HI/AVH, self-harm, plan, or intent. Pt verbalizes understanding of medication instructions through teach back. No other issues, concerns, or problems, will reassess symptoms and medications in 3 months or PRN if symptoms worsen.     Past Medical hx:   Past Medical History:   Diagnosis Date    Anticoagulant long-term use     Anxiety     takes daily Xanax    Arthritis     right thumb    Cataract     OU    Cholelithiasis     GERD (gastroesophageal reflux disease)     Hepatic steatosis     Hyperlipemia     Hypertension     PVD (posterior vitreous detachment)     OD        Interim hx:  Medication changes last visit:     1. Continue buPROPion (WELLBUTRIN XL) 150 MG TB24 tablet - Take 1 tablet (150 mg total) by mouth once daily for depression.  2. Continue LORazepam (ATIVAN) 0.5 MG tablet - TAKE 0.5 TABLET (0.25 MG) BY MOUTH THREE TIMES DAILY AS NEEDED for anxiety. Discussed risk of decreased RT, sedation, addictive potential, and not to mix with alcohol.    3. Continue DULoxetine (CYMBALTA) 30 MG capsule - Take 1 capsule (30 mg total) by mouth 2 (two) times daily (prescribed and managed by Pain Mgmt).    Anxiety: Good, issues  Depression: Good, no issues  Sleep: Good, manageable  Appetite: Same, no issues     Denies suicidal/homicidal ideations.  Denies hopelessness/worthlessness.    Denies auditory/visual hallucinations.      Tobacco:  denies  Alcohol:  denies  Drug use:  denies  Caffeine:  denies       Review of Systems   PSYCHIATRIC: Pertinent items are noted in the narrative.        CONSTITUTIONAL: weight stable        M/S: no pain today         ENT: no allergies noted today        ABD: no n/v/d     Past Medical, Family and Social History: The patient's past medical, family and social history have been reviewed and updated as appropriate within the electronic medical record. See encounter notes.     Medication Compliance: yes      Side effects: tolerates     Risk Parameters:  Patient  reports no suicidal ideation  Patient reports no homicidal ideation  Patient reports no self-injurious behavior  Patient reports no violent behavior     Exam (detailed: at least 9 elements; comprehensive: all 15 elements)   Constitutional  Vitals:  Most recent vital signs, dated less than 90 days prior to this appointment, were reviewed.    General:  unremarkable, age appropriate, casual attire, good eye contact, good rapport       Musculoskeletal  Muscle Strength/Tone:  no flaccidity, no tremor    Gait & Station:  normal      Psychiatric                       Speech:  normal tone, normal rate, rhythm, and volume   Mood & Affect:   Euthymic, congruent, appropriate         Thought Process:   Goal directed; Linear    Associations:   intact   Thought Content:   No SI/HI, delusions, or paranoia, no AV/VH   Insight & Judgement:   Good, adequate to circumstances   Orientation:   grossly intact; alert and oriented x 4    Memory:  intact for content of interview    Language:  grossly intact, can repeat    Attention Span  : Grossly intact for content of interview   Fund of Knowledge:   intact and appropriate to age and level of education        Assessment and Diagnosis   Status/Progress: Based on the examination today, the patient's problem(s) is/are under fair control.  New problems have not been presented today. Comorbidities are not currently complicating management of the primary condition.      Impression:  Pt presents to OP Psychiatry for routine follow-up for treatment/medication management of CARLOS, MDD, OCD. Pt reports all medications effectively managing her symptoms, denies need for any medication changes at this time. Pt denies SI/HI/AVH, self-harm, plan, or intent. Pt verbalizes understanding of medication instructions through teach back. No other issues, concerns, or problems, will reassess symptoms and medications in 3 months or PRN if symptoms worsen.      Diagnosis:   - CARLOS (generalized anxiety disorder)  -  MDD (major depressive disorder), recurrent episode, moderate  - Obsessive thinking      Intervention/Counseling/Treatment Plan   Medication Management:      1. Continue buPROPion (WELLBUTRIN XL) 150 MG TB24 tablet - Take 1 tablet (150 mg total) by mouth once daily for depression.     2. Continue LORazepam (ATIVAN) 0.5 MG tablet - TAKE 0.5 TABLET (0.25 MG) BY MOUTH THREE TIMES DAILY AS NEEDED for anxiety. Discussed risk of decreased RT, sedation, addictive potential, and not to mix with alcohol.       3. Continue DULoxetine (CYMBALTA) 30 MG capsule - Take 1 capsule (30 mg total) by mouth 2 (two) times daily (prescribed and managed by Pain Mgmt).     4. Call to report any worsening of symptoms or problems with the medication. Pt instructed to go to ER with thoughts of harming self, others.     5. Patient given contact # for psychotherapists at [facility] and also instructed she may check with insurance for list of providers.          Labs: none         Return to clinic:      3 months or PRN if symptoms worsen    -Spent 30min face to face with the pt; >50% time spent in counseling   -Supportive therapy and psychoeducation provided  -R/B/SE's of medications discussed with the pt who expresses understanding and chooses to take medications as prescribed.   -Pt instructed to call clinic, 911 or go to nearest emergency room if sxs worsen or pt is in   crisis. The pt expresses understanding.      Grace Mistry NP  Psychiatric-Mental Health Nurse Practitioner  Department of Psychiatry    [facility] [place] [address] [place] [address]  Phone:  [phone]  Fax: [phone]

## 2025-01-03 ENCOUNTER — OFFICE VISIT (OUTPATIENT)
Dept: PSYCHIATRY | Facility: CLINIC | Age: 75
End: 2025-01-03
Payer: MEDICARE

## 2025-01-03 DIAGNOSIS — F33.1 MDD (MAJOR DEPRESSIVE DISORDER), RECURRENT EPISODE, MODERATE: ICD-10-CM

## 2025-01-03 DIAGNOSIS — F41.1 GAD (GENERALIZED ANXIETY DISORDER): Primary | ICD-10-CM

## 2025-01-03 DIAGNOSIS — F42.8 OBSESSIVE THINKING: ICD-10-CM

## 2025-01-03 RX ORDER — BUPROPION HYDROCHLORIDE 150 MG/1
150 TABLET ORAL EVERY MORNING
Qty: 30 TABLET | Refills: 1 | Status: SHIPPED | OUTPATIENT
Start: 2025-01-03 | End: 2025-03-04

## 2025-01-06 ENCOUNTER — PATIENT MESSAGE (OUTPATIENT)
Dept: ADMINISTRATIVE | Facility: OTHER | Age: 75
End: 2025-01-06
Payer: MEDICARE

## 2025-01-07 ENCOUNTER — HOSPITAL ENCOUNTER (OUTPATIENT)
Dept: RADIOLOGY | Facility: HOSPITAL | Age: 75
Discharge: HOME | End: 2025-01-07
Attending: NURSE PRACTITIONER
Payer: MEDICARE

## 2025-01-07 ENCOUNTER — OFFICE VISIT (OUTPATIENT)
Dept: ORTHOPEDICS | Facility: CLINIC | Age: 75
End: 2025-01-07
Payer: MEDICARE

## 2025-01-07 VITALS
WEIGHT: 175.06 LBS | HEIGHT: 62 IN | HEART RATE: 72 BPM | SYSTOLIC BLOOD PRESSURE: 158 MMHG | DIASTOLIC BLOOD PRESSURE: 80 MMHG | BODY MASS INDEX: 32.22 KG/M2

## 2025-01-07 DIAGNOSIS — M70.51 PES ANSERINUS BURSITIS OF RIGHT KNEE: Primary | ICD-10-CM

## 2025-01-07 DIAGNOSIS — M25.561 RIGHT KNEE PAIN, UNSPECIFIED CHRONICITY: ICD-10-CM

## 2025-01-07 DIAGNOSIS — M25.561 RIGHT KNEE PAIN, UNSPECIFIED CHRONICITY: Primary | ICD-10-CM

## 2025-01-07 DIAGNOSIS — S86.111A GASTROCNEMIUS MUSCLE STRAIN, RIGHT, INITIAL ENCOUNTER: ICD-10-CM

## 2025-01-07 DIAGNOSIS — M25.50 GENERALIZED JOINT PAIN: ICD-10-CM

## 2025-01-07 DIAGNOSIS — M76.891 HAMSTRING TENDINITIS OF RIGHT THIGH: ICD-10-CM

## 2025-01-07 PROCEDURE — 20610 DRAIN/INJ JOINT/BURSA W/O US: CPT | Mod: S$PBB,RT,, | Performed by: NURSE PRACTITIONER

## 2025-01-07 PROCEDURE — 99999PBSHW PR PBB SHADOW TECHNICAL ONLY FILED TO HB: Mod: PBBFAC,,,

## 2025-01-07 PROCEDURE — 73562 X-RAY EXAM OF KNEE 3: CPT | Mod: 26,RT,, | Performed by: RADIOLOGY

## 2025-01-07 PROCEDURE — 99999 PR PBB SHADOW E&M-EST. PATIENT-LVL IV: CPT | Mod: PBBFAC,,, | Performed by: NURSE PRACTITIONER

## 2025-01-07 PROCEDURE — 73560 X-RAY EXAM OF KNEE 1 OR 2: CPT | Mod: TC,PO,LT

## 2025-01-07 PROCEDURE — 99214 OFFICE O/P EST MOD 30 MIN: CPT | Mod: PBBFAC,25,PO | Performed by: NURSE PRACTITIONER

## 2025-01-07 PROCEDURE — 73560 X-RAY EXAM OF KNEE 1 OR 2: CPT | Mod: 26,59,LT, | Performed by: RADIOLOGY

## 2025-01-07 PROCEDURE — 20610 DRAIN/INJ JOINT/BURSA W/O US: CPT | Mod: PBBFAC,PO,RT | Performed by: NURSE PRACTITIONER

## 2025-01-07 PROCEDURE — 99213 OFFICE O/P EST LOW 20 MIN: CPT | Mod: S$PBB,25,, | Performed by: NURSE PRACTITIONER

## 2025-01-07 RX ORDER — METHYLPREDNISOLONE 4 MG/1
TABLET ORAL
Qty: 21 EACH | Refills: 0 | Status: SHIPPED | OUTPATIENT
Start: 2025-01-07 | End: 2025-01-28

## 2025-01-07 RX ADMIN — TRIAMCINOLONE ACETONIDE 40 MG: 40 INJECTION, SUSPENSION INTRA-ARTICULAR; INTRAMUSCULAR at 03:01

## 2025-01-07 NOTE — PROGRESS NOTES
"Chief Complaint   Patient presents with    Right Knee - Pain         HPI:   This is a 74 y.o. who presents to clinic today complaining of right knee pain for 2 months after no known trauma. Pain is progressively worsening. No numbness or tingling. No associated signs or symptoms.    Past Medical History:   Diagnosis Date    Anticoagulant long-term use     Anxiety     takes daily Xanax    Arthritis     right thumb    Cataract     OU    Cholelithiasis     GERD (gastroesophageal reflux disease)     Hepatic steatosis     Hyperlipemia     Hypertension     PVD (posterior vitreous detachment)     OD     Past Surgical History:   Procedure Laterality Date    BREAST BIOPSY Left 08/28/2020    stereo biopsy    BREAST BIOPSY Left 10/07/2020    benign excisional biopsy    CAUDAL EPIDURAL STEROID INJECTION N/A 7/26/2024    Procedure: Injection-steroid-epidural-caudal;  Surgeon: Indra Cuellar MD;  Location: [facility] OR;  Service: Pain Management;  Laterality: N/A;    chest abcess      child    COLONOSCOPY  01/06/2012    Dr. Lopez: hemorrhoids, redundant colon    COLONOSCOPY N/A 02/17/2021    Dr. Lopez: Congested and erythematous mucosa in the rectum, in the recto-sigmoid colon and in the sigmoid colon, proctosigmoid colitis, Redundant colon; biopsy: focal active colitis "nonspecific but can be seen with acute self-limited colitis (infection), medication effect (e.g. NSAID use), proximity to diverticula, or early/evolving IBD    EPIDURAL STEROID INJECTION INTO LUMBAR SPINE N/A 5/30/2024    Procedure: Injection-steroid-epidural-lumbar   L5/S1;  Surgeon: Indra Cuellar MD;  Location: [facility] OR;  Service: Pain Management;  Laterality: N/A;    ESOPHAGOGASTRODUODENOSCOPY N/A 06/06/2019    Dr. Lopez: small hiatal hernia, Non-erosive esophageal reflux (NERD) disease?., slight antritis; biopsy: Antral mucosa with chemical/reactive gastropathy. negative for h pylori    ESOPHAGOGASTRODUODENOSCOPY N/A 02/17/2021    Procedure: EGD " (ESOPHAGOGASTRODUODENOSCOPY);  Surgeon: Nathan Lopez Jr., MD;  Location: [facility];  Service: Endoscopy;  Laterality: N/A; Minimal gastritis; biopsy: stomach- negative for h pylori, active chronic gastritis with focal features of erosive gastritis, duodenum WNL    ESOPHAGOGASTRODUODENOSCOPY N/A 05/10/2022    Procedure: EGD (ESOPHAGOGASTRODUODENOSCOPY);  Surgeon: Nathan Lopez Jr., MD;  Location: [facility];  Service: Endoscopy;  Laterality: N/A;    ESOPHAGOGASTRODUODENOSCOPY N/A 3/12/2024    Procedure: EGD (ESOPHAGOGASTRODUODENOSCOPY);  Surgeon: Nathan Lopez Jr., MD;  Location: [facility];  Service: Endoscopy;  Laterality: N/A;    EXCISIONAL BIOPSY Left 10/07/2020    Procedure: EXCISIONAL BIOPSY-Left with radiological marker;  Surgeon: Brooklynn Ashford MD;  Location: [facility];  Service: General;  Laterality: Left;    FRACTURE SURGERY  2010    Left thumb repaired surgically    INJECTION OF ANESTHETIC AGENT AROUND MEDIAL BRANCH NERVES INNERVATING LUMBAR FACET JOINT Bilateral 9/12/2024    Procedure: Block-nerve-medial branch-lumbar    L4/5, L5/S1;  Surgeon: Indra Cuellar MD;  Location: [facility];  Service: Pain Management;  Laterality: Bilateral;    JOINT REPLACEMENT Bilateral     knee    KNEE ARTHROSCOPY W/ LASER      right    LAPAROSCOPIC CHOLECYSTECTOMY N/A 06/14/2019    Procedure: CHOLECYSTECTOMY, LAPAROSCOPIC;  Surgeon: Guevara Evans MD;  Location: [facility];  Service: General;  Laterality: N/A;    thumb surgery  11/2014    TOTAL KNEE ARTHROPLASTY Left 06/19/2018    Procedure: REPLACEMENT-KNEE-TOTAL;  Surgeon: Nj Melvin MD;  Location: [address];  Service: Orthopedics;  Laterality: Left;  Eyelation    UPPER GASTROINTESTINAL ENDOSCOPY  07/13/2017    Dr. Lopez: NERD, Gastric mucosal atrophy. antritis, Scar in the incisura. Biopsied; biopsy: chronic gastritis. negative for h pylori     Current Outpatient Medications on File Prior to Visit   Medication Sig Dispense Refill    acetaminophen (TYLENOL)  500 MG tablet Take 500 mg by mouth every 6 (six) hours as needed for Pain.      ascorbic acid, vitamin C, (VITAMIN C) 250 MG tablet Take 500 mg by mouth once daily.       atenoloL (TENORMIN) 25 MG tablet Take 1 tablet (25 mg total) by mouth every evening. 30 tablet 11    azelastine (ASTELIN) 137 mcg (0.1 %) nasal spray 1 spray by Nasal route 2 (two) times daily.      balsalazide (COLAZAL) 750 mg capsule TAKE 3 CAPSULES(2250 MG) BY MOUTH TWICE DAILY WITH MEALS 180 capsule 11    benzonatate (TESSALON) 200 MG capsule Take 1 capsule (200 mg total) by mouth 3 (three) times daily as needed for Cough. 30 capsule 1    buPROPion (WELLBUTRIN XL) 150 MG TB24 tablet Take 1 tablet (150 mg total) by mouth every morning. 30 tablet 1    diltiaZEM (DILACOR XR) 240 MG CDCR Take 1 capsule (240 mg total) by mouth every evening. 30 capsule 11    docusate sodium (COLACE) 100 MG capsule Take 100 mg by mouth 2 (two) times daily.      DULoxetine (CYMBALTA) 30 MG capsule TAKE 1 CAPSULE(30 MG) BY MOUTH TWICE DAILY 60 capsule 01    famotidine (PEPCID) 40 MG tablet Take 1 tablet (40 mg total) by mouth every evening. 90 tablet 3    famotidine-calcium carbonate-magnesium hydroxide (PEPCID COMPLETE) -165 mg Take 1 tablet by mouth 2 (two) times daily as needed. Chew 1 or 2 tablets, 2-3 times a day, as needed, for nausea or heartburn.      fluticasone propionate (FLONASE) 50 mcg/actuation nasal spray 1 spray (50 mcg total) by Each Nostril route once daily. 16 g 3    lisinopriL (PRINIVIL,ZESTRIL) 40 MG tablet Take 1 tablet (40 mg total) by mouth 2 (two) times a day. 60 tablet 11    LORazepam (ATIVAN) 0.5 MG tablet Take 0.5 tablets (0.25 mg total) by mouth 3 (three) times daily as needed for Anxiety. 45 tablet 0    magnesium oxide (MAG-OX) 400 mg (241.3 mg magnesium) tablet Take 1 tablet (400 mg total) by mouth once daily. 30 tablet 3    multivitamin (THERAGRAN) per tablet Take 1 tablet by mouth once daily.      ondansetron (ZOFRAN-ODT) 4 MG  TbDL Take 1 tablet (4 mg total) by mouth every 6 (six) hours as needed (nausea). 30 tablet 3    pantoprazole (PROTONIX) 40 MG tablet TAKE 1 TABLET(40 MG) BY MOUTH TWICE DAILY 180 tablet 3    psyllium husk, aspartame, (NATURAL PSYLLIUM FIBER) 3.4 gram/5.8 gram Powd Take by mouth.      rosuvastatin (CRESTOR) 20 MG tablet Take 1 tablet (20 mg total) by mouth once daily. 30 tablet 11     No current facility-administered medications on file prior to visit.     Review of patient's allergies indicates:  No Known Allergies  Family History   Problem Relation Name Age of Onset    Hypertension Mother age 82     Heart disease Mother age 82     Glaucoma Mother age 82     Stroke Father age 50     Glaucoma Sister      Cataracts Sister      Macular degeneration Sister      Breast cancer Neg Hx      Colon cancer Neg Hx      Crohn's disease Neg Hx      Ulcerative colitis Neg Hx      Stomach cancer Neg Hx      Esophageal cancer Neg Hx      Celiac disease Neg Hx      Amblyopia Neg Hx      Blindness Neg Hx      Retinal detachment Neg Hx      Strabismus Neg Hx       Social History     Socioeconomic History    Marital status:     Number of children: 2   Occupational History    Occupation: retired teacher and principal    Occupation: substitute   Tobacco Use    Smoking status: Never    Smokeless tobacco: Never   Substance and Sexual Activity    Alcohol use: Not Currently     Comment: social- maybe once every few months    Drug use: No    Sexual activity: Yes     Partners: Male     Birth control/protection: Post-menopausal, None     Social Drivers of Health     Financial Resource Strain: Low Risk  (11/25/2024)    Overall Financial Resource Strain (CARDIA)     Difficulty of Paying Living Expenses: Not hard at all   Food Insecurity: No Food Insecurity (11/25/2024)    Hunger Vital Sign     Worried About Running Out of Food in the Last Year: Never true     Ran Out of Food in the Last Year: Never true   Transportation Needs: No  Transportation Needs (11/9/2023)    PRAPARE - Transportation     Lack of Transportation (Medical): No     Lack of Transportation (Non-Medical): No   Physical Activity: Sufficiently Active (11/25/2024)    Exercise Vital Sign     Days of Exercise per Week: 4 days     Minutes of Exercise per Session: 50 min   Stress: No Stress Concern Present (11/25/2024)    [employer] - Occupational Stress Questionnaire     Feeling of Stress : Not at all   Housing Stability: Low Risk  (11/9/2023)    Housing Stability Vital Sign     Unable to Pay for Housing in the Last Year: No     Number of Places Lived in the Last Year: 1     Unstable Housing in the Last Year: No       Review of Systems:  Constitutional:  Denies fever or chills   Eyes:  Denies change in visual acuity   HENT:  Denies nasal congestion or sore throat   Respiratory:  Denies cough or shortness of breath   Cardiovascular:  Denies chest pain or edema   GI:  Denies abdominal pain, nausea, vomiting, bloody stools or diarrhea   :  Denies dysuria   Integument:  Denies rash   Neurologic:  Denies headache, focal weakness or sensory changes   Endocrine:  Denies polyuria or polydipsia   Lymphatic:  Denies swollen glands   Psychiatric:  Denies depression or anxiety     Physical Exam:   Constitutional:  Well developed, well nourished, no acute distress, non-toxic appearance   Integument:  Well hydrated  Neurologic:  Alert & oriented x 3  Psychiatric:  Speech and behavior appropriate    Bilateral Knee Exam    right Knee Exam     Tenderness   The patient is experiencing tenderness to right pes anserinus bursa    Range of Motion   Extension: abnormal   Flexion: abnormal     Muscle Strength     The patient has normal knee strength.    Tests   Liyah:  Medial - positive   Lachman:  Anterior - negative      Varus: negative  Valgus: negative  Patellar Apprehension: negative    Other   Erythema: absent  Sensation: normal  Pulse: present  Swelling:  mild      left Knee Exam   left knee exam performed same as contralateral side and is normal.            X-rays were performed, personally reviewed by me and findings discussed with the patient.  3 views of the right knee show implants intact.           Pes anserinus bursitis of right knee  -     methylPREDNISolone (MEDROL DOSEPACK) 4 mg tablet; use as directed  Dispense: 21 each; Refill: 0  -     Large Joint Aspiration/Injection: R anserine bursa  -     triamcinolone acetonide injection 40 mg    Hamstring tendinitis of right thigh  -     methylPREDNISolone (MEDROL DOSEPACK) 4 mg tablet; use as directed  Dispense: 21 each; Refill: 0    Gastrocnemius muscle strain, right, initial encounter  -     methylPREDNISolone (MEDROL DOSEPACK) 4 mg tablet; use as directed  Dispense: 21 each; Refill: 0    Generalized joint pain  -     CRUZ Screen w/Reflex; Future; Expected date: 01/07/2025  -     C-reactive protein; Future; Expected date: 01/07/2025  -     Sedimentation rate; Future; Expected date: 01/07/2025  -     methylPREDNISolone (MEDROL DOSEPACK) 4 mg tablet; use as directed  Dispense: 21 each; Refill: 0        Using an aseptic technique, I injected 1 cc of lidocaine 1% without and 1 cc of kenalog 40mg into the pes anserinus bursa of right knee. The patient tolerated this well.   Rtc as needed.

## 2025-01-09 ENCOUNTER — PATIENT MESSAGE (OUTPATIENT)
Dept: ORTHOPEDICS | Facility: CLINIC | Age: 75
End: 2025-01-09
Payer: MEDICARE

## 2025-01-09 DIAGNOSIS — M25.50 GENERALIZED JOINT PAIN: ICD-10-CM

## 2025-01-09 DIAGNOSIS — R76.8 ANA POSITIVE: Primary | ICD-10-CM

## 2025-01-10 ENCOUNTER — TELEPHONE (OUTPATIENT)
Dept: RHEUMATOLOGY | Facility: CLINIC | Age: 75
End: 2025-01-10
Payer: MEDICARE

## 2025-01-10 NOTE — TELEPHONE ENCOUNTER
----- Message from Matilde sent at 1/9/2025 12:53 PM CST -----  Type:  Appointment Request     Name of Caller:LOGAN SCHMID [[id number]]    When is the first available appointment?No access    Would the patient rather a call back or a response via [facility]? Call back     Best Call Back Number:[phone]     Additional Information: Patient states she was referred to be see by the department and would like to get scheduled at the earliest availability. Patient would like a call back with further information and assistance.

## 2025-01-13 ENCOUNTER — PATIENT MESSAGE (OUTPATIENT)
Dept: ORTHOPEDICS | Facility: CLINIC | Age: 75
End: 2025-01-13
Payer: MEDICARE

## 2025-01-16 ENCOUNTER — TELEPHONE (OUTPATIENT)
Dept: FAMILY MEDICINE | Facility: CLINIC | Age: 75
End: 2025-01-16
Payer: MEDICARE

## 2025-01-16 NOTE — TELEPHONE ENCOUNTER
Called left a voice message asking patient does she need a sooner appointment.  She already have an appointment on 2/7/25.

## 2025-01-22 RX ORDER — TRIAMCINOLONE ACETONIDE 40 MG/ML
40 INJECTION, SUSPENSION INTRA-ARTICULAR; INTRAMUSCULAR
Status: DISCONTINUED | OUTPATIENT
Start: 2025-01-07 | End: 2025-01-22 | Stop reason: HOSPADM

## 2025-01-22 NOTE — PROCEDURES
Large Joint Aspiration/Injection: R anserine bursa    Date/Time: 1/7/2025 3:20 PM    Performed by: Chandrika Santiago FNP  Authorized by: Chandrika Santiago FNP    Consent Done?:  Yes (Verbal)  Indications:  Pain  Timeout: prior to procedure the correct patient, procedure, and site was verified    Prep: patient was prepped and draped in usual sterile fashion    Local anesthetic:  Lidocaine 1% without epinephrine  Anesthetic total (ml):  1      Details:  Needle Size:  21 G  Approach:  Anterolateral  Location:  Knee  Site:  R anserine bursa  Medications:  40 mg triamcinolone acetonide 40 mg/mL  Patient tolerance:  Patient tolerated the procedure well with no immediate complications

## 2025-01-27 ENCOUNTER — PATIENT MESSAGE (OUTPATIENT)
Dept: ORTHOPEDICS | Facility: CLINIC | Age: 75
End: 2025-01-27
Payer: MEDICARE

## 2025-01-27 ENCOUNTER — PATIENT MESSAGE (OUTPATIENT)
Dept: UROLOGY | Facility: CLINIC | Age: 75
End: 2025-01-27

## 2025-01-27 ENCOUNTER — OFFICE VISIT (OUTPATIENT)
Dept: UROLOGY | Facility: CLINIC | Age: 75
End: 2025-01-27
Payer: MEDICARE

## 2025-01-27 VITALS — HEIGHT: 62 IN | BODY MASS INDEX: 31.93 KG/M2 | WEIGHT: 173.5 LBS

## 2025-01-27 DIAGNOSIS — N39.41 URGENCY INCONTINENCE: Primary | ICD-10-CM

## 2025-01-27 DIAGNOSIS — N39.0 RECURRENT UTI: ICD-10-CM

## 2025-01-27 PROCEDURE — 99213 OFFICE O/P EST LOW 20 MIN: CPT | Mod: PBBFAC,PO | Performed by: UROLOGY

## 2025-01-27 PROCEDURE — 99999 PR PBB SHADOW E&M-EST. PATIENT-LVL III: CPT | Mod: PBBFAC,,, | Performed by: UROLOGY

## 2025-01-27 PROCEDURE — 99214 OFFICE O/P EST MOD 30 MIN: CPT | Mod: S$PBB,,, | Performed by: UROLOGY

## 2025-01-27 PROCEDURE — G2211 COMPLEX E/M VISIT ADD ON: HCPCS | Mod: S$PBB,,, | Performed by: UROLOGY

## 2025-01-27 RX ORDER — SOLIFENACIN SUCCINATE 10 MG/1
10 TABLET, FILM COATED ORAL DAILY
Qty: 30 TABLET | Refills: 11 | Status: SHIPPED | OUTPATIENT
Start: 2025-01-27 | End: 2026-01-27

## 2025-01-27 RX ORDER — VIBEGRON 75 MG/1
75 TABLET, FILM COATED ORAL DAILY
Qty: 30 TABLET | Refills: 11 | Status: SHIPPED | OUTPATIENT
Start: 2025-01-27 | End: 2025-01-27

## 2025-01-27 RX ORDER — MIRABEGRON 50 MG/1
50 TABLET, FILM COATED, EXTENDED RELEASE ORAL DAILY
Qty: 30 TABLET | Refills: 11 | Status: SHIPPED | OUTPATIENT
Start: 2025-01-27 | End: 2025-01-31 | Stop reason: CLARIF

## 2025-01-27 NOTE — PROGRESS NOTES
[facility]      Return Overactive Bladder (OAB) Note    1/27/2025    Referred by:  No ref. provider found    History of Present Illness    CHIEF COMPLAINT:  Patient presents for follow-up evaluation of urinary symptoms, including frequency, urgency, and urgency incontinence, after treatment for a urinary tract infection.    HPI:  Patient, a 74-year-old woman, returns for continued evaluation of urinary symptoms. She was initially seen as a new patient in December 2024 for recurrent UTIs and symptoms suggestive of overactive bladder. At that time, she reported voiding 3 times per night and urgency incontinence. A urine culture showed enterococcus, which was treated with culture-specific antibiotics.    Following antibiotic treatment and implementation of behavioral therapy, including fluid modification and decreasing fluid intake in the evening, the patient reports symptom improvement but not complete resolution. She continues to have bothersome urinary frequency, urgency, and urgency incontinence.    Patient wears incontinence products at night due to her condition. She reports intermittent ability to reach the bathroom in time, both during day and night. Last night, she woke up twice to urinate, an improvement from her previous report of voiding 3 times per night.    Patient states symptoms remain bothersome enough to warrant medication for overactive bladder. When feeling the urge to urinate while in bed, the time between sensation and urgency incontinence is approximately 30 seconds.    MEDICATIONS:  Patient is on D-mannose supplement and reports that it is helping with her condition.    MEDICAL HISTORY:  Patient has a history of recurrent urinary tract infections and overactive bladder.    TEST RESULTS:  A urine culture conducted in December 2024 revealed the presence of enterococcus. Patient was treated with culture-specific antibiotics.          A review of 10+ systems was conducted with  pertinent positive and negative findings documented in HPI with all other systems reviewed and negative.    Past medical, family, surgical and social history reviewed as documented in chart with pertinent positive medical, family, surgical and social history detailed in HPI.    Previous OAB therapies:  Behavioral Therapies  : (fluid/dietary modification) -  provided no benefit  Medications  None  Other Therapies  No Other Therapies    Previous OLEGARIO therapies:  no previous therapy      Exam Findings:    Physical Exam    General: No acute distress. Nontoxic appearing.  HENT: Normocephalic. Atraumatic.  Respiratory: Normal respiratory effort. No conversational dyspnea. No audible wheezing.  Abdomen: No obvious distension.  Skin: No visible abnormalities.  Extremities: No edema upper extremities. No edema lower extremities.  Neurological: Alert and oriented x3. Normal speech.  Psychiatric: Normal mood. Normal affect. No evidence of SI.          Assessment/Plan:    Assessment & Plan    Determined patient's urinary symptoms persisted after successful treatment of UTI with culture-specific antibiotics  Assessed that overactive bladder medication is warranted to address ongoing frequency, urgency, and urgency incontinence  Considered formulary coverage and side effect profiles in medication selection  Preferred Gemtesa, Mirabetric, or generic Mirabegron for minimal side effects  Identified anti-muscarinics as alternative option if preferred medications not covered or too expensive    PLAN SUMMARY:  - Started overactive bladder medication (specific medication to be determined based on formulary coverage)  - Continued D-mannose supplement  - Follow up in 6 weeks to assess progress with new overactive bladder medication  - Contact office if experiencing side effects to discuss switching medication    OVERACTIVE BLADDER:  - Explained purpose of overactive bladder medication:  - Decrease frequency by allowing bladder to hold more  comfortably  - Increase warning time between urge and voiding  - Discussed potential side effects of anti-muscarinic medications:  - Dry mouth  - Dry eyes  - Constipation  - Noted it takes time for overactive bladder medications to become effective.  - Started overactive bladder medication (specific medication to be determined based on formulary coverage).    URINARY TRACT INFECTION:  - Continued D-mannose supplement.    FOLLOW-UP:  - Follow up in 6 weeks to assess progress with new overactive bladder medication.  - Contact the office if experiencing side effects with the medication to discuss switching to a different agent.               Visit complexity today is associated with medical care services that are part of the ongoing care related to the single serious and/or complex condition of Overactive bladder (OAB). A longitudinal relationship exists or is being developed between the patient and this practitioner for the care of this condition.

## 2025-01-30 ENCOUNTER — PATIENT MESSAGE (OUTPATIENT)
Dept: FAMILY MEDICINE | Facility: CLINIC | Age: 75
End: 2025-01-30
Payer: MEDICARE

## 2025-01-30 ENCOUNTER — OFFICE VISIT (OUTPATIENT)
Dept: ORTHOPEDICS | Facility: CLINIC | Age: 75
End: 2025-01-30
Payer: MEDICARE

## 2025-01-30 VITALS — BODY MASS INDEX: 31.93 KG/M2 | WEIGHT: 173.5 LBS | HEIGHT: 62 IN

## 2025-01-30 DIAGNOSIS — M70.61 GREATER TROCHANTERIC BURSITIS OF RIGHT HIP: Primary | ICD-10-CM

## 2025-01-30 PROCEDURE — 99999 PR PBB SHADOW E&M-EST. PATIENT-LVL V: CPT | Mod: PBBFAC,,, | Performed by: ORTHOPAEDIC SURGERY

## 2025-01-30 PROCEDURE — 99215 OFFICE O/P EST HI 40 MIN: CPT | Mod: PBBFAC,PO | Performed by: ORTHOPAEDIC SURGERY

## 2025-01-30 PROCEDURE — 99214 OFFICE O/P EST MOD 30 MIN: CPT | Mod: S$PBB,,, | Performed by: ORTHOPAEDIC SURGERY

## 2025-01-30 RX ORDER — SODIUM CHLORIDE 0.9 % (FLUSH) 0.9 %
10 SYRINGE (ML) INJECTION EVERY 6 HOURS PRN
Status: DISCONTINUED | OUTPATIENT
Start: 2025-02-03 | End: 2025-01-31 | Stop reason: CLARIF

## 2025-01-30 RX ORDER — CEFAZOLIN SODIUM 2 G/50ML
2 SOLUTION INTRAVENOUS
Status: CANCELLED | OUTPATIENT
Start: 2025-01-30

## 2025-01-31 ENCOUNTER — EXTERNAL CHRONIC CARE MANAGEMENT (OUTPATIENT)
Dept: PRIMARY CARE CLINIC | Facility: CLINIC | Age: 75
End: 2025-01-31
Payer: MEDICARE

## 2025-01-31 DIAGNOSIS — F41.1 GAD (GENERALIZED ANXIETY DISORDER): ICD-10-CM

## 2025-01-31 PROCEDURE — 99490 CHRNC CARE MGMT STAFF 1ST 20: CPT | Mod: PBBFAC,PO | Performed by: FAMILY MEDICINE

## 2025-01-31 PROCEDURE — 99490 CHRNC CARE MGMT STAFF 1ST 20: CPT | Mod: S$PBB,,, | Performed by: FAMILY MEDICINE

## 2025-02-03 PROBLEM — M70.61 GREATER TROCHANTERIC BURSITIS OF RIGHT HIP: Status: ACTIVE | Noted: 2025-02-03

## 2025-02-03 RX ORDER — LORAZEPAM 0.5 MG/1
0.25 TABLET ORAL 3 TIMES DAILY PRN
Qty: 45 TABLET | Refills: 0 | Status: SHIPPED | OUTPATIENT
Start: 2025-02-03 | End: 2025-03-05

## 2025-02-05 ENCOUNTER — PATIENT MESSAGE (OUTPATIENT)
Dept: ORTHOPEDICS | Facility: CLINIC | Age: 75
End: 2025-02-05
Payer: MEDICARE

## 2025-02-06 ENCOUNTER — OFFICE VISIT (OUTPATIENT)
Dept: FAMILY MEDICINE | Facility: CLINIC | Age: 75
End: 2025-02-06
Payer: MEDICARE

## 2025-02-06 VITALS
WEIGHT: 175.06 LBS | OXYGEN SATURATION: 97 % | SYSTOLIC BLOOD PRESSURE: 136 MMHG | HEART RATE: 79 BPM | DIASTOLIC BLOOD PRESSURE: 74 MMHG | HEIGHT: 63 IN | BODY MASS INDEX: 31.02 KG/M2 | TEMPERATURE: 98 F

## 2025-02-06 DIAGNOSIS — I70.0 AORTIC ATHEROSCLEROSIS: ICD-10-CM

## 2025-02-06 DIAGNOSIS — R05.9 COUGH, UNSPECIFIED TYPE: ICD-10-CM

## 2025-02-06 DIAGNOSIS — E78.5 HYPERLIPIDEMIA, UNSPECIFIED HYPERLIPIDEMIA TYPE: ICD-10-CM

## 2025-02-06 DIAGNOSIS — I10 PRIMARY HYPERTENSION: Primary | ICD-10-CM

## 2025-02-06 DIAGNOSIS — F41.9 ANXIETY: ICD-10-CM

## 2025-02-06 PROCEDURE — 99999 PR PBB SHADOW E&M-EST. PATIENT-LVL III: CPT | Mod: PBBFAC,,, | Performed by: FAMILY MEDICINE

## 2025-02-06 PROCEDURE — G2211 COMPLEX E/M VISIT ADD ON: HCPCS | Mod: S$PBB,,, | Performed by: FAMILY MEDICINE

## 2025-02-06 PROCEDURE — 99214 OFFICE O/P EST MOD 30 MIN: CPT | Mod: S$PBB,,, | Performed by: FAMILY MEDICINE

## 2025-02-06 PROCEDURE — 99213 OFFICE O/P EST LOW 20 MIN: CPT | Mod: PBBFAC,PO | Performed by: FAMILY MEDICINE

## 2025-02-06 NOTE — PROGRESS NOTES
Subjective:       Patient ID: Lucinda Aguilera is a 74 y.o. female.    Chief Complaint: Hypertension (6 month follow up ) and Cough (Started this week)    HPI Comments: Here for f/u on chronic health issues    doing some daily exercise.    C/o 4 day h/o cough. She is getting some sputum. Denies any fever or other URI symptoms.    Recovering from left hip bursectomy. She is taking tramadol 4 times a day and robaxin PRN.    Allergic rhinitis - on Astelin and Flonase  Hyponatremia, SIADH - Chlorthalidone was stopped. Following with nephrology, Dr. Engel.  Depression, anxiety - following with psychiatry; using Ativan daily; stopped Effexor XR 37.5mg due to hyponatremia; taking wellbutrin XL 150mg  HLD, aortic atherosclerosis - tolerating crestor 20mg daily  HTN - tolerating Atenolol 25mg daily, diltiazem 240, lasix 20mg PRN and lisinopril 40mg BID  She got some swelling with amlodipine  GERD - tolerating Protonix 40mg BID;   S/p conner - taking colestipol  Colitis - stable on Colazal, miralax    Past Medical History:    Hypertension                                                  Anxiety                                           Hyperlipemia                                                  GERD (gastroesophageal reflux disease)                        Cataract                                                        Comment:OU    PVD (posterior vitreous detachment)                             Comment:OD    Arthritis                                                       Comment:right thumb    Past Surgical History:    chest abcess                                                     Comment:child    KNEE ARTHROSCOPY W/ LASER                                        Comment:right    COLONOSCOPY                                      1/2012          Comment:repeat in 5 years    No Known Allergies    Social History    Marital Status:              Spouse Name:                       Years of Education:                 Number  of children: 2             Occupational History  Occupation          Employer            Comment               retired                                   retired teacher an*                         Social History Main Topics    Smoking Status: Never Smoker                      Smokeless Status: Never Used                        Alcohol Use: Yes                Comment: social    Drug Use: No              Sexual Activity: Yes               Partners with: Male       Birth Control/Protection: None, Post-menopausal    Current Outpatient Medications on File Prior to Visit   Medication Sig Dispense Refill    acetaminophen (TYLENOL) 500 MG tablet Take 500 mg by mouth every 6 (six) hours as needed for Pain.      ascorbic acid, vitamin C, (VITAMIN C) 250 MG tablet Take 500 mg by mouth once daily.       atenoloL (TENORMIN) 25 MG tablet Take 1 tablet (25 mg total) by mouth every evening. 30 tablet 11    azelastine (ASTELIN) 137 mcg (0.1 %) nasal spray 1 spray by Nasal route 2 (two) times daily as needed.      balsalazide (COLAZAL) 750 mg capsule TAKE 3 CAPSULES(2250 MG) BY MOUTH TWICE DAILY WITH MEALS 180 capsule 11    benzonatate (TESSALON) 200 MG capsule Take 1 capsule (200 mg total) by mouth 3 (three) times daily as needed for Cough. 30 capsule 1    buPROPion (WELLBUTRIN XL) 150 MG TB24 tablet Take 1 tablet (150 mg total) by mouth every morning. 30 tablet 1    CRANBERRY EXTRACT-D-MANNOSE ORAL Take by mouth.      diltiaZEM (DILACOR XR) 240 MG CDCR Take 1 capsule (240 mg total) by mouth every evening. 30 capsule 11    docusate sodium (COLACE) 100 MG capsule Take 100 mg by mouth 2 (two) times daily as needed for Constipation.      DULoxetine (CYMBALTA) 30 MG capsule TAKE 1 CAPSULE(30 MG) BY MOUTH TWICE DAILY 60 capsule 01    famotidine-calcium carbonate-magnesium hydroxide (PEPCID COMPLETE) -165 mg Take 1 tablet by mouth 2 (two) times daily as needed. Chew 1 or 2 tablets, 2-3 times a day, as needed, for nausea or  heartburn.      fluticasone propionate (FLONASE) 50 mcg/actuation nasal spray 1 spray (50 mcg total) by Each Nostril route once daily. 16 g 3    lisinopriL (PRINIVIL,ZESTRIL) 40 MG tablet Take 1 tablet (40 mg total) by mouth 2 (two) times a day. 60 tablet 11    LORazepam (ATIVAN) 0.5 MG tablet Take 0.5 tablets (0.25 mg total) by mouth 3 (three) times daily as needed for Anxiety. 45 tablet 0    magnesium oxide (MAG-OX) 400 mg (241.3 mg magnesium) tablet Take 1 tablet (400 mg total) by mouth once daily. 30 tablet 3    methocarbamoL (ROBAXIN) 750 MG Tab Take 1 tablet (750 mg total) by mouth 4 (four) times daily as needed. 44 tablet 0    multivitamin (THERAGRAN) per tablet Take 1 tablet by mouth once daily.      ondansetron (ZOFRAN-ODT) 4 MG TbDL Take 1 tablet (4 mg total) by mouth every 6 (six) hours as needed (nausea). 30 tablet 3    pantoprazole (PROTONIX) 40 MG tablet TAKE 1 TABLET(40 MG) BY MOUTH TWICE DAILY 180 tablet 3    psyllium husk, aspartame, (NATURAL PSYLLIUM FIBER) 3.4 gram/5.8 gram Powd Take by mouth.      rosuvastatin (CRESTOR) 20 MG tablet Take 1 tablet (20 mg total) by mouth once daily. 30 tablet 11    solifenacin (VESICARE) 10 MG tablet Take 1 tablet (10 mg total) by mouth once daily. 30 tablet 11    traMADoL (ULTRAM) 50 mg tablet Take 1 tablet (50 mg total) by mouth every 4 (four) hours as needed. 44 tablet 0    famotidine (PEPCID) 40 MG tablet Take 1 tablet (40 mg total) by mouth every evening. (Patient not taking: Reported on 2/6/2025) 90 tablet 3    oxyCODONE-acetaminophen (PERCOCET)  mg per tablet Take 1 tablet by mouth every 6 (six) hours as needed. (Patient not taking: Reported on 2/6/2025) 22 tablet 0     No current facility-administered medications on file prior to visit.     Review of patient's family history indicates:    Hypertension                   Mother                    Heart disease                  Mother                    Stroke                         Father                "     Review of Systems   Constitutional: Negative for fever, chills, appetite change and unexpected weight change.   HENT: Negative for sore throat and trouble swallowing.    Eyes: Negative for pain and visual disturbance.   Respiratory: Negative for shortness of breath and wheezing.    Cardiovascular: Negative for chest pain and palpitations.   Gastrointestinal: Negative for nausea, vomiting, abdominal pain, diarrhea and blood in stool.   Genitourinary: Negative for dysuria, hematuria and difficulty urinating.   Skin: Negative for rash and wound.   Neurological: Negative for dizziness, weakness, numbness and headaches.   Hematological: Negative for adenopathy.   Psychiatric/Behavioral: Negative for dysphoric mood.           Objective:       /74   Pulse 79   Temp 98.3 °F (36.8 °C) (Oral)   Ht 5' 3" (1.6 m)   Wt 79.4 kg (175 lb 0.7 oz)   LMP 04/18/2004   SpO2 97%   BMI 31.01 kg/m²     Physical Exam   Constitutional: She is oriented to person, place, and time. She appears well-developed and well-nourished.   HENT:   Head: Normocephalic.   Mouth/Throat: Oropharynx is clear and moist. No oropharyngeal exudate or posterior oropharyngeal erythema.   Eyes: Conjunctivae and EOM are normal. Pupils are equal, round, and reactive to light.   Neck: Normal range of motion. Neck supple. No thyromegaly present.   Cardiovascular: Normal rate, regular rhythm, S1 normal, S2 normal, normal heart sounds and intact distal pulses.  Exam reveals no gallop and no friction rub.    No murmur heard.  Pulmonary/Chest: Effort normal and breath sounds normal. She has no wheezes. She has no rales.   Abdominal: Normal appearance.   Musculoskeletal:        Right lower leg: She exhibits no edema.        Left lower leg: She exhibits no edema.   Lymphadenopathy:     She has no cervical adenopathy.   Neurological: She is alert and oriented to person, place, and time. No cranial nerve deficit. Gait normal.   Skin: Skin is warm and intact. " No rash noted.   Psychiatric: She has a normal mood and affect.   Right hip dressing in place        Results for orders placed or performed in visit on 02/01/25   APTT    Collection Time: 02/01/25 10:46 AM   Result Value Ref Range    aPTT 30.3 24.6 - 36.7 sec   CBC Without Differential    Collection Time: 02/01/25 10:46 AM   Result Value Ref Range    WBC 10.88 3.90 - 12.70 K/uL    RBC 4.43 4.00 - 5.40 M/uL    Hemoglobin 13.1 12.0 - 16.0 g/dL    Hematocrit 38.6 37.0 - 48.5 %    MCV 87 82 - 98 fL    MCH 29.6 27.0 - 31.0 pg    MCHC 33.9 32.0 - 36.0 g/dL    RDW 12.4 11.5 - 14.5 %    Platelets 305 150 - 450 K/uL    MPV 9.1 (L) 9.2 - 12.9 fL   Comprehensive Metabolic Panel    Collection Time: 02/01/25 10:46 AM   Result Value Ref Range    Sodium 133 (L) 136 - 145 mmol/L    Potassium 4.3 3.5 - 5.1 mmol/L    Chloride 101 95 - 110 mmol/L    CO2 21 (L) 23 - 29 mmol/L    Glucose 115 (H) 70 - 110 mg/dL    BUN 13 8 - 23 mg/dL    Creatinine 0.58 0.50 - 1.40 mg/dL    Calcium 9.0 8.7 - 10.5 mg/dL    Total Protein 6.4 6.0 - 8.4 g/dL    Albumin 3.8 3.5 - 5.2 g/dL    Total Bilirubin 0.3 0.2 - 1.0 mg/dL    Alkaline Phosphatase 82 40 - 150 U/L    AST 23 10 - 40 U/L    ALT 19 10 - 44 U/L    Anion Gap 11 8 - 16 mmol/L    eGFR >60 >60 mL/min/1.73 m^2   Protime-INR    Collection Time: 02/01/25 10:46 AM   Result Value Ref Range    PT 12.3 11.8 - 14.7 sec    INR 0.9      *Note: Due to a large number of results and/or encounters for the requested time period, some results have not been displayed. A complete set of results can be found in Results Review.         Assessment:       1. Primary hypertension    2. Hyperlipidemia, unspecified hyperlipidemia type    3. Cough, unspecified type    4. Anxiety    5. Aortic atherosclerosis                          Plan:       Primary hypertension  -     TSH; Future; Expected date: 08/05/2025  -     CBC Auto Differential; Future; Expected date: 08/05/2025  -     Comprehensive Metabolic Panel; Future;  Expected date: 08/05/2025    Hyperlipidemia, unspecified hyperlipidemia type  -     Lipid Panel; Future; Expected date: 08/05/2025    Cough, unspecified type    Anxiety    Aortic atherosclerosis      Reassurance; deep breathing  Overall stable  cotninue aspirin 81mg daily  Continue other present meds  Counseled on regular exercise, maintenance of a healthy weight, balanced diet rich in fruits/vegetables and lean protein, and avoidance of unhealthy habits like smoking and excessive alcohol intake.  F/u 6 months     Visit today included increased complexity associated with the care of the episodic problems listed above addressed and managing the longitudinal care of the patient due to the serious and/or complex managed problem(s) listed above.                    Answers submitted by the patient for this visit:  Cough Questionnaire (Submitted on 2/5/2025)  Chief Complaint: Cough  Chronicity: new  Onset: in the past 7 days  Progression since onset: rapidly worsening  Frequency: constantly  Cough characteristics: productive of sputum  chest pain: No  chills: No  ear congestion: No  ear pain: No  fever: No  headaches: No  heartburn: No  hemoptysis: No  myalgias: No  nasal congestion: No  postnasal drip: No  rash: No  rhinorrhea: No  shortness of breath: No  sore throat: No  sweats: No  weight loss: No  wheezing: No  Aggravated by: nothing  asthma: No  bronchiectasis: No  bronchitis: Yes  COPD: No  emphysema: No  environmental allergies: No  pneumonia: No  Treatments tried: prescription cough suppressant  Improvement on treatment: no relief

## 2025-02-08 ENCOUNTER — PATIENT MESSAGE (OUTPATIENT)
Dept: FAMILY MEDICINE | Facility: CLINIC | Age: 75
End: 2025-02-08
Payer: MEDICARE

## 2025-02-10 ENCOUNTER — PATIENT MESSAGE (OUTPATIENT)
Dept: FAMILY MEDICINE | Facility: CLINIC | Age: 75
End: 2025-02-10
Payer: MEDICARE

## 2025-02-12 ENCOUNTER — PATIENT MESSAGE (OUTPATIENT)
Dept: FAMILY MEDICINE | Facility: CLINIC | Age: 75
End: 2025-02-12
Payer: MEDICARE

## 2025-02-18 ENCOUNTER — OFFICE VISIT (OUTPATIENT)
Dept: ORTHOPEDICS | Facility: CLINIC | Age: 75
End: 2025-02-18
Payer: MEDICARE

## 2025-02-18 VITALS — BODY MASS INDEX: 31.02 KG/M2 | WEIGHT: 175.06 LBS | HEIGHT: 63 IN

## 2025-02-18 DIAGNOSIS — M70.61 GREATER TROCHANTERIC BURSITIS OF RIGHT HIP: Primary | ICD-10-CM

## 2025-02-18 PROCEDURE — 99024 POSTOP FOLLOW-UP VISIT: CPT | Mod: POP,,, | Performed by: ORTHOPAEDIC SURGERY

## 2025-02-18 PROCEDURE — 99214 OFFICE O/P EST MOD 30 MIN: CPT | Mod: PBBFAC,PO | Performed by: ORTHOPAEDIC SURGERY

## 2025-02-18 PROCEDURE — 99999 PR PBB SHADOW E&M-EST. PATIENT-LVL IV: CPT | Mod: PBBFAC,,, | Performed by: ORTHOPAEDIC SURGERY

## 2025-02-19 ENCOUNTER — HOSPITAL ENCOUNTER (OUTPATIENT)
Dept: RADIOLOGY | Facility: HOSPITAL | Age: 75
Discharge: HOME | End: 2025-02-19
Attending: FAMILY MEDICINE
Payer: MEDICARE

## 2025-02-19 ENCOUNTER — OFFICE VISIT (OUTPATIENT)
Dept: FAMILY MEDICINE | Facility: CLINIC | Age: 75
End: 2025-02-19
Payer: MEDICARE

## 2025-02-19 ENCOUNTER — RESULTS FOLLOW-UP (OUTPATIENT)
Dept: FAMILY MEDICINE | Facility: CLINIC | Age: 75
End: 2025-02-19

## 2025-02-19 VITALS
WEIGHT: 171.94 LBS | DIASTOLIC BLOOD PRESSURE: 64 MMHG | HEIGHT: 63 IN | SYSTOLIC BLOOD PRESSURE: 128 MMHG | OXYGEN SATURATION: 94 % | TEMPERATURE: 99 F | BODY MASS INDEX: 30.46 KG/M2 | HEART RATE: 83 BPM

## 2025-02-19 DIAGNOSIS — R05.2 SUBACUTE COUGH: Primary | ICD-10-CM

## 2025-02-19 DIAGNOSIS — R05.2 SUBACUTE COUGH: ICD-10-CM

## 2025-02-19 PROCEDURE — 71046 X-RAY EXAM CHEST 2 VIEWS: CPT | Mod: TC,FY,PO

## 2025-02-19 PROCEDURE — 99213 OFFICE O/P EST LOW 20 MIN: CPT | Mod: PBBFAC,25,PO | Performed by: FAMILY MEDICINE

## 2025-02-19 NOTE — PROGRESS NOTES
Subjective:       Patient ID: Lucinda Aguilera is a 74 y.o. female.    Chief Complaint: Cough (For about two weeks.  Chest started hurting last night)    C/o 3 weeks of persistent cough.  This is dry, worse at night.  Cough drop and drinking water helps.  Getting fits of coughing intermittently throughout the day.  Last night she felt a heavy feeling in her chest when she was lying in bed.    She does take lisinopril 40mg for BP control, but has been on this for 2 years.  She is taking protonix for reflux.      Cough  This is a chronic problem. The current episode started more than 1 month ago. The problem has been waxing and waning. The problem occurs every few minutes. The cough is Non-productive. Associated symptoms include headaches (left forehead). Pertinent negatives include no chest pain, chills, ear congestion, ear pain, fever, heartburn, hemoptysis, myalgias, nasal congestion, postnasal drip, rash, rhinorrhea, sore throat, shortness of breath, sweats, weight loss or wheezing. There is no history of asthma, bronchiectasis, bronchitis, COPD, emphysema, environmental allergies or pneumonia.       Past Medical History:   Diagnosis Date    Anxiety     takes daily Xanax    Arthritis     right thumb    Cataract     OU    Cholelithiasis     GERD (gastroesophageal reflux disease)     Hepatic steatosis     Hyperlipemia     Hypertension     PVD (posterior vitreous detachment)     OD       Past Surgical History:   Procedure Laterality Date    BREAST BIOPSY Left 08/28/2020    stereo biopsy    BREAST BIOPSY Left 10/07/2020    benign excisional biopsy    CAUDAL EPIDURAL STEROID INJECTION N/A 7/26/2024    Procedure: Injection-steroid-epidural-caudal;  Surgeon: Indra Cuellar MD;  Location: [facility] OR;  Service: Pain Management;  Laterality: N/A;    chest abcess      child    COLONOSCOPY  01/06/2012    Dr. Lopez: hemorrhoids, redundant colon    COLONOSCOPY N/A 02/17/2021    Dr. Lopez: Congested and erythematous mucosa in the  "rectum, in the recto-sigmoid colon and in the sigmoid colon, proctosigmoid colitis, Redundant colon; biopsy: focal active colitis "nonspecific but can be seen with acute self-limited colitis (infection), medication effect (e.g. NSAID use), proximity to diverticula, or early/evolving IBD    EPIDURAL STEROID INJECTION INTO LUMBAR SPINE N/A 5/30/2024    Procedure: Injection-steroid-epidural-lumbar   L5/S1;  Surgeon: Indra Cuellar MD;  Location: [facility];  Service: Pain Management;  Laterality: N/A;    ESOPHAGOGASTRODUODENOSCOPY N/A 06/06/2019    Dr. Lopez: small hiatal hernia, Non-erosive esophageal reflux (NERD) disease?., slight antritis; biopsy: Antral mucosa with chemical/reactive gastropathy. negative for h pylori    ESOPHAGOGASTRODUODENOSCOPY N/A 02/17/2021    Procedure: EGD (ESOPHAGOGASTRODUODENOSCOPY);  Surgeon: Nathan Lopez Jr., MD;  Location: [facility];  Service: Endoscopy;  Laterality: N/A; Minimal gastritis; biopsy: stomach- negative for h pylori, active chronic gastritis with focal features of erosive gastritis, duodenum WNL    ESOPHAGOGASTRODUODENOSCOPY N/A 05/10/2022    Procedure: EGD (ESOPHAGOGASTRODUODENOSCOPY);  Surgeon: Nathan Lopez Jr., MD;  Location: [facility];  Service: Endoscopy;  Laterality: N/A;    ESOPHAGOGASTRODUODENOSCOPY N/A 3/12/2024    Procedure: EGD (ESOPHAGOGASTRODUODENOSCOPY);  Surgeon: Nathan Lopez Jr., MD;  Location: [facility];  Service: Endoscopy;  Laterality: N/A;    EXCISION OF BURSA OF HIP Right 2/3/2025    Procedure: BURSECTOMY, HIP;  Surgeon: Nick Arnold MD;  Location: [facility];  Service: Orthopedics;  Laterality: Right;    EXCISIONAL BIOPSY Left 10/07/2020    Procedure: EXCISIONAL BIOPSY-Left with radiological marker;  Surgeon: Brooklynn Ashford MD;  Location: [facility];  Service: General;  Laterality: Left;    FRACTURE SURGERY  2010    Left thumb repaired surgically    INJECTION OF ANESTHETIC AGENT AROUND MEDIAL BRANCH NERVES INNERVATING LUMBAR FACET JOINT " Bilateral 9/12/2024    Procedure: Block-nerve-medial branch-lumbar    L4/5, L5/S1;  Surgeon: Indra Cuellar MD;  [facility] OR;  Service: Pain Management;  Laterality: Bilateral;    JOINT REPLACEMENT Bilateral     knee    KNEE ARTHROSCOPY W/ LASER      right    LAPAROSCOPIC CHOLECYSTECTOMY N/A 06/14/2019    Procedure: CHOLECYSTECTOMY, LAPAROSCOPIC;  Surgeon: Guevara Evans MD;  Location: [facility] OR;  Service: General;  Laterality: N/A;    thumb surgery  11/2014    TOTAL KNEE ARTHROPLASTY Left 06/19/2018    Procedure: REPLACEMENT-KNEE-TOTAL;  Surgeon: Nj Melvin MD;  [facility];  Service: Orthopedics;  Laterality: Left;  [employer]    UPPER GASTROINTESTINAL ENDOSCOPY  07/13/2017    Dr. Lopez: NERD, Gastric mucosal atrophy. antritis, Scar in the incisura. Biopsied; biopsy: chronic gastritis. negative for h pylori       Review of patient's allergies indicates:  No Known Allergies    Social History[1]    Medications Ordered Prior to Encounter[2]    Family History   Problem Relation Name Age of Onset    Hypertension Mother age 82     Heart disease Mother age 82     Glaucoma Mother age 82     Stroke Father age 50     Glaucoma Sister      Cataracts Sister      Macular degeneration Sister      Breast cancer Neg Hx      Colon cancer Neg Hx      Crohn's disease Neg Hx      Ulcerative colitis Neg Hx      Stomach cancer Neg Hx      Esophageal cancer Neg Hx      Celiac disease Neg Hx      Amblyopia Neg Hx      Blindness Neg Hx      Retinal detachment Neg Hx      Strabismus Neg Hx         Review of Systems   Constitutional:  Negative for chills, fever and weight loss.   HENT:  Negative for ear pain, postnasal drip, rhinorrhea and sore throat.    Respiratory:  Positive for cough (triggers with deep inhalation). Negative for hemoptysis, shortness of breath and wheezing.    Cardiovascular:  Negative for chest pain.   Gastrointestinal:  Negative for heartburn.   Musculoskeletal:  Negative for myalgias.   Skin:  " Negative for rash.   Allergic/Immunologic: Negative for environmental allergies.   Neurological:  Positive for headaches (left forehead).       Objective:      /64   Pulse 83   Temp 98.7 °F (37.1 °C) (Oral)   Ht 5' 3" (1.6 m)   Wt 78 kg (171 lb 15.3 oz)   LMP 04/18/2004   SpO2 (!) 94%   BMI 30.46 kg/m²   Physical Exam  Constitutional:       Appearance: She is well-developed.   HENT:      Head: Normocephalic and atraumatic.      Right Ear: Tympanic membrane, ear canal and external ear normal.      Left Ear: Hearing, tympanic membrane and external ear normal.      Nose: Nose normal. No septal deviation or rhinorrhea.      Right Sinus: No maxillary sinus tenderness or frontal sinus tenderness.      Left Sinus: No maxillary sinus tenderness or frontal sinus tenderness.      Mouth/Throat:      Pharynx: No oropharyngeal exudate or posterior oropharyngeal erythema.      Tonsils: No tonsillar abscesses.   Eyes:      Conjunctiva/sclera: Conjunctivae normal.      Pupils: Pupils are equal, round, and reactive to light.   Cardiovascular:      Rate and Rhythm: Normal rate and regular rhythm.      Heart sounds: Normal heart sounds.   Pulmonary:      Effort: Pulmonary effort is normal.      Breath sounds: Normal breath sounds. No wheezing or rales.   Musculoskeletal:      Cervical back: Normal range of motion and neck supple.   Lymphadenopathy:      Cervical: No cervical adenopathy.         Assessment:       1. Subacute cough        Plan:       Subacute cough  -     X-Ray Chest PA And Lateral; Future; Expected date: 02/19/2025      Reassurance regarding resolving post-viral cough  Deep breathing exercises discussed.  Increase fluids.  Mucinex BID PRN for congestion.            [1]   Social History  Socioeconomic History    Marital status:     Number of children: 2   Occupational History    Occupation: retired teacher and principal    Occupation: substitute   Tobacco Use    Smoking status: Never    Smokeless " tobacco: Never   Substance and Sexual Activity    Alcohol use: Not Currently     Comment: social- maybe once every few months    Drug use: No    Sexual activity: Yes     Partners: Male     Birth control/protection: Post-menopausal, None     Social Drivers of Health     Financial Resource Strain: Low Risk  (11/25/2024)    Overall Financial Resource Strain (CARDIA)     Difficulty of Paying Living Expenses: Not hard at all   Food Insecurity: No Food Insecurity (11/25/2024)    Hunger Vital Sign     Worried About Running Out of Food in the Last Year: Never true     Ran Out of Food in the Last Year: Never true   Transportation Needs: No Transportation Needs (11/9/2023)    PRAPARE - Transportation     Lack of Transportation (Medical): No     Lack of Transportation (Non-Medical): No   Physical Activity: Sufficiently Active (11/25/2024)    Exercise Vital Sign     Days of Exercise per Week: 4 days     Minutes of Exercise per Session: 50 min   Stress: No Stress Concern Present (11/25/2024)    Citizen of [place] [employer] - Occupational Stress Questionnaire     Feeling of Stress : Not at all   Housing Stability: Low Risk  (11/9/2023)    Housing Stability Vital Sign     Unable to Pay for Housing in the Last Year: No     Number of Places Lived in the Last Year: 1     Unstable Housing in the Last Year: No   [2]   Current Outpatient Medications on File Prior to Visit   Medication Sig Dispense Refill    acetaminophen (TYLENOL) 500 MG tablet Take 500 mg by mouth every 6 (six) hours as needed for Pain.      ascorbic acid, vitamin C, (VITAMIN C) 250 MG tablet Take 500 mg by mouth once daily.       atenoloL (TENORMIN) 25 MG tablet Take 1 tablet (25 mg total) by mouth every evening. 30 tablet 11    azelastine (ASTELIN) 137 mcg (0.1 %) nasal spray 1 spray by Nasal route 2 (two) times daily as needed.      balsalazide (COLAZAL) 750 mg capsule TAKE 3 CAPSULES(2250 MG) BY MOUTH TWICE DAILY WITH MEALS 180 capsule 11    benzonatate  (TESSALON) 200 MG capsule Take 1 capsule (200 mg total) by mouth 3 (three) times daily as needed for Cough. 30 capsule 1    buPROPion (WELLBUTRIN XL) 150 MG TB24 tablet Take 1 tablet (150 mg total) by mouth every morning. 30 tablet 1    CRANBERRY EXTRACT-D-MANNOSE ORAL Take by mouth.      diltiaZEM (DILACOR XR) 240 MG CDCR Take 1 capsule (240 mg total) by mouth every evening. 30 capsule 11    docusate sodium (COLACE) 100 MG capsule Take 100 mg by mouth 2 (two) times daily as needed for Constipation.      DULoxetine (CYMBALTA) 30 MG capsule TAKE 1 CAPSULE(30 MG) BY MOUTH TWICE DAILY 60 capsule 01    famotidine (PEPCID) 40 MG tablet Take 1 tablet (40 mg total) by mouth every evening. 90 tablet 3    fluticasone propionate (FLONASE) 50 mcg/actuation nasal spray 1 spray (50 mcg total) by Each Nostril route once daily. 16 g 3    lisinopriL (PRINIVIL,ZESTRIL) 40 MG tablet Take 1 tablet (40 mg total) by mouth 2 (two) times a day. 60 tablet 11    LORazepam (ATIVAN) 0.5 MG tablet Take 0.5 tablets (0.25 mg total) by mouth 3 (three) times daily as needed for Anxiety. 45 tablet 0    magnesium oxide (MAG-OX) 400 mg (241.3 mg magnesium) tablet Take 1 tablet (400 mg total) by mouth once daily. 30 tablet 3    multivitamin (THERAGRAN) per tablet Take 1 tablet by mouth once daily.      ondansetron (ZOFRAN-ODT) 4 MG TbDL Take 1 tablet (4 mg total) by mouth every 6 (six) hours as needed (nausea). 30 tablet 3    oxyCODONE-acetaminophen (PERCOCET)  mg per tablet Take 1 tablet by mouth every 6 (six) hours as needed. 22 tablet 0    pantoprazole (PROTONIX) 40 MG tablet TAKE 1 TABLET(40 MG) BY MOUTH TWICE DAILY 180 tablet 3    psyllium husk, aspartame, (NATURAL PSYLLIUM FIBER) 3.4 gram/5.8 gram Powd Take by mouth.      rosuvastatin (CRESTOR) 20 MG tablet Take 1 tablet (20 mg total) by mouth once daily. 30 tablet 11    solifenacin (VESICARE) 10 MG tablet Take 1 tablet (10 mg total) by mouth once daily. 30 tablet 11    traMADoL (ULTRAM)  50 mg tablet Take 1 tablet (50 mg total) by mouth every 4 (four) hours as needed. 44 tablet 0    famotidine-calcium carbonate-magnesium hydroxide (PEPCID COMPLETE) -165 mg Take 1 tablet by mouth 2 (two) times daily as needed. Chew 1 or 2 tablets, 2-3 times a day, as needed, for nausea or heartburn. (Patient not taking: Reported on 2/19/2025)       No current facility-administered medications on file prior to visit.

## 2025-02-24 ENCOUNTER — PATIENT MESSAGE (OUTPATIENT)
Dept: ORTHOPEDICS | Facility: CLINIC | Age: 75
End: 2025-02-24
Payer: MEDICARE

## 2025-02-24 DIAGNOSIS — M70.61 GREATER TROCHANTERIC BURSITIS OF RIGHT HIP: Primary | ICD-10-CM

## 2025-02-25 NOTE — PROGRESS NOTES
Chief Complaint   Patient presents with    Right Hip - Post-op Evaluation       HPI:  74 y.o. returns to clinic today status post  right GTB 2 weeks ago. Pain is mild. Patient is compliant most of the time with restrictions.     R hip    Incision healed. No erythema or fluctuance. Overall normal alignment. Mild point TTP about the surgical site. Decreased ROM due to stiffness. Compartments soft. Skin intact. NVI distally.    Greater trochanteric bursitis of right hip        Staples out. RTC 4 weeks

## 2025-02-26 ENCOUNTER — OFFICE VISIT (OUTPATIENT)
Dept: OPTOMETRY | Facility: CLINIC | Age: 75
End: 2025-02-26
Payer: MEDICARE

## 2025-02-26 ENCOUNTER — CLINICAL SUPPORT (OUTPATIENT)
Dept: REHABILITATION | Facility: HOSPITAL | Age: 75
End: 2025-02-26
Attending: ORTHOPAEDIC SURGERY
Payer: MEDICARE

## 2025-02-26 DIAGNOSIS — M25.651 DECREASED RANGE OF RIGHT HIP MOVEMENT: ICD-10-CM

## 2025-02-26 DIAGNOSIS — H52.4 MYOPIA WITH ASTIGMATISM AND PRESBYOPIA, BILATERAL: ICD-10-CM

## 2025-02-26 DIAGNOSIS — H25.13 NUCLEAR SCLEROSIS, BILATERAL: Primary | ICD-10-CM

## 2025-02-26 DIAGNOSIS — H52.13 MYOPIA WITH ASTIGMATISM AND PRESBYOPIA, BILATERAL: ICD-10-CM

## 2025-02-26 DIAGNOSIS — Z13.5 GLAUCOMA SCREENING: ICD-10-CM

## 2025-02-26 DIAGNOSIS — M70.61 GREATER TROCHANTERIC BURSITIS OF RIGHT HIP: Primary | ICD-10-CM

## 2025-02-26 DIAGNOSIS — H04.123 BILATERAL DRY EYES: ICD-10-CM

## 2025-02-26 DIAGNOSIS — H52.203 MYOPIA WITH ASTIGMATISM AND PRESBYOPIA, BILATERAL: ICD-10-CM

## 2025-02-26 DIAGNOSIS — H43.813 POSTERIOR VITREOUS DETACHMENT, BILATERAL: ICD-10-CM

## 2025-02-26 PROCEDURE — 92014 COMPRE OPH EXAM EST PT 1/>: CPT | Mod: S$PBB,,, | Performed by: OPTOMETRIST

## 2025-02-26 PROCEDURE — 99214 OFFICE O/P EST MOD 30 MIN: CPT | Mod: PBBFAC,PO | Performed by: OPTOMETRIST

## 2025-02-26 PROCEDURE — 97162 PT EVAL MOD COMPLEX 30 MIN: CPT | Mod: PO | Performed by: PHYSICAL THERAPIST

## 2025-02-26 PROCEDURE — 92015 DETERMINE REFRACTIVE STATE: CPT | Mod: ,,, | Performed by: OPTOMETRIST

## 2025-02-26 PROCEDURE — 99999 PR PBB SHADOW E&M-EST. PATIENT-LVL IV: CPT | Mod: PBBFAC,,, | Performed by: OPTOMETRIST

## 2025-02-26 NOTE — PATIENT INSTRUCTIONS
"DRY EYES -- BURNING OR PROSPER SYMPTOMS:  Use Over The Counter artificial tears as needed for dry eye symptoms.   Some common brands include:  Systane, Optive, Refresh, and Thera-Tears.  These drops can be used as frequently as desired, but may be most helpful use during long periods of concentrated work.  For example, reading / working at the computer. Start with 3-4x per day.     Nighttime Ophthalmic gel or ointments are available: Refresh PM, Genteal, and Lacrilube.    Avoid drops that "get redness out" (Visine, Murine, Clear Eyes), as these may contain medication that could further irritate the eyes, especially with chronic use.    ALLERGY EYES -- ITCHING SYMPTOMS:  Over the counter medications include--Pataday, Zaditor, and Alaway.  Use as directed 1-2 drops daily for symptoms of itching / watering eyes.  These drops will not help for dry eye or exposure symptoms.    REDNESS RELIEF:  Lumify---is a good redness reliever that will not cause irritation if used chronically.        Early Cataracts--not visually significant for surgery consultation.    What Are Cataracts?  A clear lens in the eye focuses light. This lets the eye see images sharply. With age, the lens slowly becomes cloudy. The cloudy lens is a cataract. A cataract scatters light and makes it hard for the eye to focus. Cataracts often form in both eyes. But one lens may cloud faster than the other.      The Aging of Your Lens    Your lens may cloud so slowly that you don`t notice any vision changes at first. But as the cataract gets worse, the eye has a harder time focusing. In early stages, glasses may help you see better. As the lens gets cloudier, your doctor may recommend surgery to restore your vision.   FLASHES / FLOATERS / POSTERIOR VITREOUS DETACHMENT    Call the clinic if you have any further changes in symptoms.  Including:  Increased numbers of floaters or flashing lights, dimness or darkness that moves through or stays constant in your " vision, or any pain in the eye (s).    You may sometimes see small specks or clouds moving in your field of vision.  They are called FLOATERS.  You can often see them when looking at a plain background, like a blank wall or blue saul.  Floaters are actually tiny clumps of gel or cells inside the VITREOUS, the clear jelly-like fluid that fills the inside of your eye.    While these objects look like they are in front of your eye, they are actually floating inside.  What you see are the shadows they cast on the RETINA, the nerve layer at the back of the eye that senses light and allows you to see.      POSTERIOR VITREOUS DETACHMENT    The appearance of new floaters may be alarming.  If you suddenly develop new floaters, you should contact your eye care professional  right away.    The retina can tear if the shrinking vitreous pulls away from the wall of the eye.  This sometimes causes a small amount of bleeding in the eye that may appear as new floaters.    A torn retina is always a serious problem, since it can lead to a retinal detachment.  You should see your eye care professional as soon as possible if:    even one new floater appears suddenly;  you see sudden flashes of light;  you notice other symptoms, like the loss of side vision, or a curtain closes down in your vision        POSTERIOR VITREOUS DETACHMENT is more common for people who:    are nearsighted;  have had cataract surgery;  have had YAG laser surgery of the eye;  have had inflammation inside the eye;  are over age 60.      While some floaters may remain visible, many of them will fade over time and become less noticeable.  Even if you've had some floaters for years, you should have your eyes checked as soon as possible if you notice new ones.    FLASHING LIGHTS    When the vitreous gel rubs or pulls on the retina, you may see what look like flashing lights or lightning streaks.  These flashes can appear off and on for several weeks or months.       Some people experience flashes of light that appear as jagged lines or heat waves in both eyes, lasting 10-20 minutes.  These flashes are caused by a spasm of blood vessels in the brain, which is called a migraine.    If a headache follows these flashes, it's called a migraine headache.  If   no headache occurs, these flashes are called Ophthalmic or Ocular Migraine.

## 2025-02-26 NOTE — PROGRESS NOTES
HPI    Pt presents today for a dfe.  Pt states near [facility] is getting worse.  Does not instill gtts.  Denies ocular pain/disc.  Denies f/f.    HTN controlled w/ meds.  Last edited by Pia Rodríguez on 2/26/2025  9:23 AM.            Assessment /Plan     For exam results, see Encounter Report.    Nuclear sclerosis, bilateral    Posterior vitreous detachment, bilateral    Bilateral dry eyes    Glaucoma screening    Myopia with astigmatism and presbyopia, bilateral      Early Vis sig NS/ cortical cataracts, OD>OS. Not ready for consult, gave info, cautions driving especially at night. CE possible in 2-3 yrs   RD precautions given and reviewed. Patient knows to call/ message if any further changes in symptoms occur.  Longstanding, no gross spk, moderate tear lake   Not suspect   Updated specs rx gave copy for distance only ---prefers uncorrected for near, may ultimately need readers for near     Discussed and educated patient on current findings /plan.  RTC 1 year, prn if any changes / issues

## 2025-02-28 ENCOUNTER — CLINICAL SUPPORT (OUTPATIENT)
Dept: REHABILITATION | Facility: HOSPITAL | Age: 75
End: 2025-02-28
Attending: PHYSICAL THERAPIST
Payer: MEDICARE

## 2025-02-28 ENCOUNTER — EXTERNAL CHRONIC CARE MANAGEMENT (OUTPATIENT)
Dept: PRIMARY CARE CLINIC | Facility: CLINIC | Age: 75
End: 2025-02-28
Payer: MEDICARE

## 2025-02-28 DIAGNOSIS — M70.61 GREATER TROCHANTERIC BURSITIS OF RIGHT HIP: ICD-10-CM

## 2025-02-28 DIAGNOSIS — R26.9 GAIT DIFFICULTY: Primary | ICD-10-CM

## 2025-02-28 DIAGNOSIS — M25.651 DECREASED RANGE OF RIGHT HIP MOVEMENT: ICD-10-CM

## 2025-02-28 PROBLEM — R29.898 WEAKNESS OF BOTH HIPS: Status: RESOLVED | Noted: 2022-05-23 | Resolved: 2025-02-28

## 2025-02-28 PROBLEM — M53.86 DECREASED ROM OF LUMBAR SPINE: Status: RESOLVED | Noted: 2022-05-23 | Resolved: 2025-02-28

## 2025-02-28 PROCEDURE — 97112 NEUROMUSCULAR REEDUCATION: CPT | Mod: PO | Performed by: PHYSICAL THERAPIST

## 2025-02-28 PROCEDURE — 97110 THERAPEUTIC EXERCISES: CPT | Mod: PO | Performed by: PHYSICAL THERAPIST

## 2025-02-28 PROCEDURE — 99490 CHRNC CARE MGMT STAFF 1ST 20: CPT | Mod: PBBFAC,PO | Performed by: FAMILY MEDICINE

## 2025-02-28 NOTE — PROGRESS NOTES
"  Outpatient Rehab    Physical Therapy Visit    Patient Name: Lucinda Aguilera  MRN: [id number]  YOB: 1950  Encounter Date: 2/28/2025    Therapy Diagnosis:   Encounter Diagnoses   Name Primary?    Gait difficulty Yes    Decreased range of right hip movement     Greater trochanteric bursitis of right hip      Physician: Nick Arnold MD    Physician Orders: Eval and Treat  Medical Diagnosis: M70.61 (ICD-10-CM) - Greater trochanteric bursitis of right hip     Visit # / Visits Authorized:  1 / 10   Date of Evaluation:  2/26/2025  Insurance Authorization Period: 01/01/2025  to     12/31/2025     Plan of Care Certification:  2/26/2025 to 4/18/2025      Time In: 0900   Time Out: 1000  Total Time: 60   Total Billable Time: 30 (1:1)    FOTO:  Intake Score:  %  Survey Score 1:  %  Survey Score 2:  %         Subjective   Same pain..  Pain reported as 4/10.      Objective            Treatment:  Therapeutic Exercise  Therapeutic Exercise Activity 1: HS stretch with strap 3 x 30"  Therapeutic Exercise Activity 2: ITB stretch with strap 3 x 30"  Therapeutic Exercise Activity 3: piriformis stretch 3 x 30"  Therapeutic Exercise Activity 4: recument bike x 5 mins to inc flexibility at beginning    Manual Therapy  Manual Therapy Activity 1: rolling with stick to piriformis, glutes, ITB, quads, and HS x 10 mins    Balance/Neuromuscular Re-Education  Balance/Neuromuscular Re-Education Activity 1: bridge 2 x 10  Balance/Neuromuscular Re-Education Activity 2: LTR 2 x 10  Balance/Neuromuscular Re-Education Activity 3: BKFO 2 x 10  Balance/Neuromuscular Re-Education Activity 4: PPT 2 x 10  Balance/Neuromuscular Re-Education Activity 5: DKTC 2 x 10         Assessment & Plan   Assessment: Lucinda tolerated session well with MT to reduce pain, improve blood flow, and flexibility. Followed this with ex to improve core, hip, and LE stab. Will cont to progress as tolerated.  Evaluation/Treatment Tolerance: Patient tolerated " treatment well    Patient will continue to benefit from skilled outpatient physical therapy to address the deficits listed in the problem list box on initial evaluation, provide pt/family education and to maximize pt's level of independence in the home and community environment.     Patient's spiritual, cultural, and educational needs considered and patient agreeable to plan of care and goals.           Plan: From a physical therapy perspective, the patient would benefit from: Skilled Rehab Services     Planned therapy interventions include: Therapeutic exercise, Therapeutic activities, Neuromuscular re-education, Manual therapy, and Other (Comment). Dry needling  Planned modalities to include: Electrical stimulation - passive/unattended, Thermotherapy (hot pack), and Cryotherapy (cold pack).         Visit Frequency: 2 times Per Week for 8 Weeks.    Goals:   Active       LTGS: 8 WEEKS         LTG #1 STRENGTH  (Progressing)       Start:  02/28/25       1.) Patient will demo equal strength for improving gait.         LTG #2 PAIN  (Progressing)       Start:  02/28/25       2.) Patient will ambulate without AD to demo dec pain and improved strength.         LTG #3 FOTO (Progressing)       Start:  02/28/25       3.) FOTO goal to be written.            STGs - 4 WEEKS         STG #1 ROM  (Progressing)       Start:  02/28/25       1.) Patient will demo bilateral hip AROM equal for symmetry in ambulation.         STG #2 STRENGTH  (Progressing)       Start:  02/28/25       2.) Patient will demo 4-/5 strength grossly in right hip for improving walking.         STG #3 FOTO  (Progressing)       Start:  02/28/25       3.) Patient will complete FOTO and goal will be written.             Saira Joyce, PT, DPT

## 2025-02-28 NOTE — PROGRESS NOTES
Outpatient Rehab    Physical Therapy Evaluation    Patient Name: Lucinda Aguilera  MRN: [id number]  YOB: 1950  Encounter Date: 2/26/2025    Therapy Diagnosis:   Encounter Diagnoses   Name Primary?    Greater trochanteric bursitis of right hip Yes    Decreased range of right hip movement      Physician: Nick Arnold MD    Physician Orders: Eval and Treat  Medical Diagnosis: M70.61 (ICD-10-CM) - Greater trochanteric bursitis of right hip     2/3/2025:  PREOPERATIVE DIAGNOSIS: 1. Right trochanteric bursitis                                                       2. Right piriformis syndrome     POSTOPERATIVE DIAGNOSIS: same.      PROCEDURE: 1. Right trochanteric bursectomy                            2. Right piriformis tenotomy    Visit # / Visits Authorized:  1 / 1   Date of Evaluation:  2/26/2025   Insurance Authorization Period:     02/24/2025    to     02/24/2026     Plan of Care Certification:  2/26/2025 to 4/11/2025      Time In: 1410   Time Out: 1500  Total Time: 50   Total Billable Time: 45    Intake Outcome Measure for FOTO Survey    Therapist reviewed FOTO scores for Lucinda Aguilera on 2/26/2025.   FOTO report - see Media section or FOTO account episode details.     Intake Score: 33 (Goal: 46)%         Subjective   History of Present Illness  Lucinda is a 74 y.o. female who reports to physical therapy with a chief concern of R hip pain s/p bursa removal and tendon release.     The patient reports a medical diagnosis of M70.61 (ICD-10-CM) - Greater trochanteric bursitis of right hip.    Diagnostic tests related to this condition: X-ray.   X-Ray Details: X-Ray Hip 2 or 3 views Right with Pelvis when performed Order: [id number]  Status: Final result     Next appt: 03/18/2025 at 02:15 PM in Orthopedics (Nick Arnold MD)     Dx: Primary osteoarthritis of right hip   Test Result Released: Yes (seen)   0 Result Notes Details Reading Physician Reading Date Result Priority Jama Garland IV, MD  [phone]  6/25/2024 Routine Narrative & Impression EXAMINATION: XR HIP WITH PELVIS WHEN PERFORMED FINDINGS: A large quantity of stool is noted the region the colon, please correlate for constipation.   Spurring is noted at the SI joints bilaterally as can be seen with sacroiliitis or degenerative change.   Hip joint space loss is noted bilaterally left greater than right, this could relate to history of injury, trauma, or altered biomechanics unchanged since the prior.   Spurring is noted at the pubic symphysis as can be seen with kicking type injuries osteitis pubis or multiple childbirth.   Degenerative changes are noted in the spine with facet changes and intervertebral disc height loss suspected.   A large calcification is noted overlying the pelvis stable since the prior that can be seen on the CT of 01/15/2021 favored relate to a fibroid.   Impression:    History of Present Condition/Illness: Patient reports that she underwent a right tendon release and bursa removal with some relief, but still significant pain. She has been walking with the rollator for about 2 weeks since surgery. She did have an antalgic gt prior. She would like to dec or cure pain. Her main complaint is walking with rollator. She was without AD prior.    Pain     Patient reports a current pain level of 6/10. Pain at best is reported as 0/10. Pain at worst is reported as 10/10.   Location: R hip  Clinical Progression (since onset): Unchanged  Pain Qualities: Aching, Dull, Sharp  Pain-Relieving Factors: Other (Comment), Ice  Other Pain-Relieving Factors: using AD  Pain-Aggravating Factors: Walking         Review of Systems  Patient reports: Bladder Incontinence  Additional Red Flag Details: HBP +, medicated     Treatment History  Treatments  Discharged From Past 30 Days: Outpatient therapy  Previously Received Treatments: Yes  Previous Treatments: Physical therapy  Currently Receiving Treatments: No    Employment  Patient does not report  that: Does the patient's condition impact their ability to work?  Employment Status: Retired   Does substitute teach.      Past Medical History/Physical Systems Review:   Lucinda Aguilera  has a past medical history of Anxiety, Arthritis, Cataract, Cholelithiasis, GERD (gastroesophageal reflux disease), Hepatic steatosis, Hyperlipemia, Hypertension, and PVD (posterior vitreous detachment).    Lucinda Aguilera  has a past surgical history that includes chest abcess; Knee arthroscopy w/ laser; thumb surgery (11/2014); Total knee arthroplasty (Left, 06/19/2018); Colonoscopy (01/06/2012); Joint replacement (Bilateral); Esophagogastroduodenoscopy (N/A, 06/06/2019); Laparoscopic cholecystectomy (N/A, 06/14/2019); Upper gastrointestinal endoscopy (07/13/2017); Excisional biopsy (Left, 10/07/2020); Esophagogastroduodenoscopy (N/A, 02/17/2021); Colonoscopy (N/A, 02/17/2021); Breast biopsy (Left, 08/28/2020); Breast biopsy (Left, 10/07/2020); Esophagogastroduodenoscopy (N/A, 05/10/2022); Fracture surgery (2010); Esophagogastroduodenoscopy (N/A, 3/12/2024); Epidural steroid injection into lumbar spine (N/A, 5/30/2024); Caudal epidural steroid injection (N/A, 7/26/2024); Injection of anesthetic agent around medial branch nerves innervating lumbar facet joint (Bilateral, 9/12/2024); and Excision of bursa of hip (Right, 2/3/2025).    Lucinda has a current medication list which includes the following prescription(s): acetaminophen, ascorbic acid (vitamin c), atenolol, azelastine, balsalazide, benzonatate, bupropion, cranberry extract/d-mannose, diltiazem, docusate sodium, duloxetine, famotidine, pepcid complete, fluticasone propionate, lisinopril, lorazepam, magnesium oxide, multivitamin, ondansetron, oxycodone-acetaminophen, pantoprazole, natural psyllium fiber, rosuvastatin, solifenacin, and tramadol.    Review of patient's allergies indicates:  No Known Allergies     Objective       Hip Range of Motion    Limited right hip ROM  d/t pain.    Knee Range of Motion    Bilateral knee ROM WNL.              Hip Strength - Planes of Motion   Right Strength Right Pain Left Strength Left  Pain   Flexion (L2) 3+   4     Extension 3+   4     ABduction 3   4-     ADduction 4-   4     Internal Rotation           External Rotation               Knee Strength   Right Strength Right Pain Left Strength Left  Pain   Flexion (S2) 5   5     Prone Flexion           Extension (L3) 5   5                Lumbar/Pelvic Girdle Special Tests  Pelvic Girdle / Sacrum Tests  Negative: Right JOSUE and Right FADIR         Hip Special Tests  Intra-Articular/Impingement Tests  Negative: Right JOSUE and Right FADIR  Flex/Imbalance/Structural Tests  Positive: Right Modified Juanita's and Right Piriformis  + pain to palpation right hip flexors and glutes.           Treatment:  To be given first tx.    Assessment & Plan   Assessment  Lucinda presents with a condition of Moderate complexity.   Presentation of Symptoms: Changing  Will Comorbidities Impact Care: No       Functional Limitations: Activity tolerance, Ambulating on uneven surfaces, Carrying objects, Decreased ambulation distance/endurance, Maintaining balance, Increased risk of fall, Gait limitations, Functional mobility, Range of motion, Squatting  Impairments: Abnormal gait, Abnormal or restricted range of motion, Impaired physical strength, Pain with functional activity    Patient Goal for Therapy (PT): To improve sx and QOL, walk without AD.  Prognosis: Good  Assessment Details: Patient present s/p right trochanteric bursa removal and piriformis tenotomy. She has less pain, but experiences pain in wt bearing. She Is pos for muscle tightness and glute weakness. She will benefit from skilled PT to dec sx and improve functional gt.    Plan  From a physical therapy perspective, the patient would benefit from: Skilled Rehab Services    Planned therapy interventions include: Therapeutic exercise, Therapeutic activities,  Neuromuscular re-education, Manual therapy, and Other (Comment). Dry needling  Planned modalities to include: Electrical stimulation - passive/unattended, Thermotherapy (hot pack), and Cryotherapy (cold pack).        Visit Frequency: 2 times Per Week for 8 Weeks.       This plan was discussed with Patient.   Plan details: 2x/week for 8 weeks          Patient's spiritual, cultural, and educational needs considered and patient agreeable to plan of care and goals.     Education  Education was done with Patient. The patient's learning style includes Demonstration and Listening. The patient Demonstrates understanding and Verbalizes understanding.                 Goals:   Active       LTGS: 8 WEEKS         LTG #1 STRENGTH  (Progressing)       Start:  02/28/25       1.) Patient will demo equal strength for improving gait.         LTG #2 PAIN  (Progressing)       Start:  02/28/25       2.) Patient will ambulate without AD to demo dec pain and improved strength.         LTG #3 FOTO (Progressing)       Start:  02/28/25       3.) FOTO goal to be written.            STGs - 4 WEEKS         STG #1 ROM  (Progressing)       Start:  02/28/25       1.) Patient will demo bilateral hip AROM equal for symmetry in ambulation.         STG #2 STRENGTH  (Progressing)       Start:  02/28/25       2.) Patient will demo 4-/5 strength grossly in right hip for improving walking.         STG #3 FOTO  (Progressing)       Start:  02/28/25       3.) Patient will complete FOTO and goal will be written.             Saira Joyce, PT, DPT

## 2025-03-06 ENCOUNTER — CLINICAL SUPPORT (OUTPATIENT)
Dept: REHABILITATION | Facility: HOSPITAL | Age: 75
End: 2025-03-06
Payer: MEDICARE

## 2025-03-06 DIAGNOSIS — R26.9 GAIT DIFFICULTY: Primary | ICD-10-CM

## 2025-03-06 DIAGNOSIS — M25.651 DECREASED RANGE OF RIGHT HIP MOVEMENT: ICD-10-CM

## 2025-03-06 DIAGNOSIS — M25.559 HIP PAIN, UNSPECIFIED LATERALITY: ICD-10-CM

## 2025-03-06 PROCEDURE — 97112 NEUROMUSCULAR REEDUCATION: CPT | Mod: PO,CQ

## 2025-03-06 PROCEDURE — 97140 MANUAL THERAPY 1/> REGIONS: CPT | Mod: PO,CQ

## 2025-03-06 PROCEDURE — 97110 THERAPEUTIC EXERCISES: CPT | Mod: PO,CQ

## 2025-03-06 NOTE — PROGRESS NOTES
"  Outpatient Rehab    Physical Therapy Visit    Patient Name: Lucinda Aguilera  MRN: [id number]  YOB: 1950  Encounter Date: 3/6/2025    Therapy Diagnosis:   Encounter Diagnoses   Name Primary?    Gait difficulty Yes    Decreased range of right hip movement     Hip pain, unspecified laterality [M25.559]      Physician: Nick Arnold MD  Medical Diagnosis: M70.61 (ICD-10-CM) - Greater trochanteric bursitis of right hip      Visit # / Visits Authorized:  1 / 10   Date of Evaluation:  2/26/2025  Insurance Authorization Period: 01/01/2025  to     12/31/2025      Plan of Care Certification:  2/26/2025 to 4/18/2025        Time In: 0950   Time Out: 1054  Total Time: 64   Total Billable Time: 60    FOTO:  Intake Score:  %  Survey Score 1:  %  Survey Score 2:  %         Subjective   Lucinda reports that she has been performing HEP as instructed. She states that she feels that she is walking better and is not using RW. She is w/o c/o pn today..  Pain reported as 0/10.      Objective            Treatment:  Therapeutic Exercise  Therapeutic Exercise Activity 1: HS stretch with strap 3 x 30"  Therapeutic Exercise Activity 2: ITB stretch with strap 3 x 30"  Therapeutic Exercise Activity 3: piriformis stretch 3 x 30"  Therapeutic Exercise Activity 4: recument bike x 10 mins to inc flexibility at beginning    Manual Therapy  Manual Therapy Activity 1: rolling with stick to piriformis, glutes, ITB, quads, and HS x 10 mins    Balance/Neuromuscular Re-Education  Balance/Neuromuscular Re-Education Activity 1: bridge 3 x 10  Balance/Neuromuscular Re-Education Activity 2: LTR 2 x 10  Balance/Neuromuscular Re-Education Activity 3: BKFO 2 x 10  Balance/Neuromuscular Re-Education Activity 4: PPT 3 x 10 3 sec hold  Balance/Neuromuscular Re-Education Activity 5: DKTC 2 x 10  Balance/Neuromuscular Re-Education Activity 6: PPT with hip ADD with ball 3 sec hold 30x  Balance/Neuromuscular Re-Education Activity 7: SLR with QS " 30x    Assessment & Plan   Assessment: Lucinda tolerated today's session with progression of ther ex, neuromuscular re-ed and manual intervention well. MT to reduce pain, improve blood flow, and flexibility with some min TTP at R greater troch.She was able to perform all other activities w/o difficulty or complaint. Some minor fatigue noted that resolved with brief rest breaks. Will cont to progress as tolerated.  Evaluation/Treatment Tolerance: Patient tolerated treatment well    Patient will continue to benefit from skilled outpatient physical therapy to address the deficits listed in the problem list box on initial evaluation, provide pt/family education and to maximize pt's level of independence in the home and community environment.     Patient's spiritual, cultural, and educational needs considered and patient agreeable to plan of care and goals.           Plan:   From a physical therapy perspective, the patient would benefit from: Skilled Rehab Services     Planned therapy interventions include: Therapeutic exercise, Therapeutic activities, Neuromuscular re-education, Manual therapy, and Other (Comment). Dry needling  Planned modalities to include: Electrical stimulation - passive/unattended, Thermotherapy (hot pack), and Cryotherapy (cold pack).         Visit Frequency: 2 times Per Week for 8 Weeks     Goals:   Active       LTGS: 8 WEEKS         LTG #1 STRENGTH  (Progressing)       Start:  02/28/25       1.) Patient will demo equal strength for improving gait.         LTG #2 PAIN  (Progressing)       Start:  02/28/25       2.) Patient will ambulate without AD to demo dec pain and improved strength.         LTG #3 FOTO (Progressing)       Start:  02/28/25       3.) FOTO goal to be written.            STGs - 4 WEEKS         STG #1 ROM  (Progressing)       Start:  02/28/25       1.) Patient will demo bilateral hip AROM equal for symmetry in ambulation.         STG #2 STRENGTH  (Progressing)       Start:   02/28/25       2.) Patient will demo 4-/5 strength grossly in right hip for improving walking.         STG #3 FOTO  (Progressing)       Start:  02/28/25       3.) Patient will complete FOTO and goal will be written.             Manuel Garcia, PTA

## 2025-03-10 ENCOUNTER — CLINICAL SUPPORT (OUTPATIENT)
Dept: REHABILITATION | Facility: HOSPITAL | Age: 75
End: 2025-03-10
Payer: MEDICARE

## 2025-03-10 DIAGNOSIS — M25.651 DECREASED RANGE OF RIGHT HIP MOVEMENT: ICD-10-CM

## 2025-03-10 DIAGNOSIS — M70.61 GREATER TROCHANTERIC BURSITIS OF RIGHT HIP: ICD-10-CM

## 2025-03-10 DIAGNOSIS — R26.9 GAIT DIFFICULTY: Primary | ICD-10-CM

## 2025-03-10 PROCEDURE — 97112 NEUROMUSCULAR REEDUCATION: CPT | Mod: PO | Performed by: PHYSICAL THERAPIST

## 2025-03-10 PROCEDURE — 97140 MANUAL THERAPY 1/> REGIONS: CPT | Mod: PO | Performed by: PHYSICAL THERAPIST

## 2025-03-10 NOTE — PROGRESS NOTES
"    Outpatient Rehab    Physical Therapy Visit    Patient Name: Lucinda Aguilera  MRN: [id number]  YOB: 1950  Encounter Date: 3/10/2025    Therapy Diagnosis:   Encounter Diagnoses   Name Primary?    Gait difficulty Yes    Decreased range of right hip movement     Greater trochanteric bursitis of right hip        Physician: Nick Arnold MD  Medical Diagnosis: M70.61 (ICD-10-CM) - Greater trochanteric bursitis of right hip      Visit # / Visits Authorized:  1 / 10   Date of Evaluation:  2/26/2025  Insurance Authorization Period: 01/01/2025  to     12/31/2025      Plan of Care Certification:  2/26/2025 to 4/18/2025        Time In: 1100   Time Out: 1200  Total Time: 60   Total Billable Time: 30 minimum 1:1    FOTO:  Intake Score:  %  Survey Score 1:  %  Survey Score 2:  %         Subjective   Lucinda reports that she has been performing HEP as instructed. She states that she feels that she is walking better. She is not using AD. But R hip pain continues..  Pain reported as 4/10.      Objective            Treatment:  Therapeutic Exercise  Therapeutic Exercise Activity 1: HS stretch with strap 3 x 30"  Therapeutic Exercise Activity 2: ITB stretch with strap 3 x 30"  Therapeutic Exercise Activity 3: piriformis stretch 3 x 30"  Therapeutic Exercise Activity 4: recument bike x 10 mins to inc flexibility at beginning  Therapeutic Exercise Activity 5: Fig 4 piriformis stretch 3 x 30"    Manual Therapy  Manual Therapy Activity 1: rolling with stick to piriformis, glutes, ITB, quads, and HS x 10 mins    Balance/Neuromuscular Re-Education  Balance/Neuromuscular Re-Education Activity 1: bridge 3 x 10  Balance/Neuromuscular Re-Education Activity 2: LTR 2 x 10  Balance/Neuromuscular Re-Education Activity 3: BKFO 2 x 10  Balance/Neuromuscular Re-Education Activity 4: PPT 3 x 10 3 sec hold  Balance/Neuromuscular Re-Education Activity 5: DKTC 2 x 10  Balance/Neuromuscular Re-Education Activity 6: PPT with hip ADD with " ball 3 sec hold 30x  Balance/Neuromuscular Re-Education Activity 7: SLR with QS 30x    Assessment & Plan   Assessment: Lucinda tolerated today's session with ther ex, neuromuscular re-ed and manual intervention well. MT to reduce pain, improve blood flow, and flexibility with some min TTP at R greater troch. She was able to perform all other activities w/o difficulty or complaint. Antalgic gt noted without AD. Some minor fatigue noted that resolved with brief rest breaks. Will cont to progress as tolerated.  Evaluation/Treatment Tolerance: Patient tolerated treatment well    Patient will continue to benefit from skilled outpatient physical therapy to address the deficits listed in the problem list box on initial evaluation, provide pt/family education and to maximize pt's level of independence in the home and community environment.     Patient's spiritual, cultural, and educational needs considered and patient agreeable to plan of care and goals.           Plan: From a physical therapy perspective, the patient would benefit from: Skilled Rehab Services     Planned therapy interventions include: Therapeutic exercise, Therapeutic activities, Neuromuscular re-education, Manual therapy, and Other (Comment). Dry needling  Planned modalities to include: Electrical stimulation - passive/unattended, Thermotherapy (hot pack), and Cryotherapy (cold pack).         Visit Frequency: 2 times Per Week for 8 Weeks. From a physical therapy perspective, the patient would benefit from: Skilled Rehab Services     Planned therapy interventions include: Therapeutic exercise, Therapeutic activities, Neuromuscular re-education, Manual therapy, and Other (Comment). Dry needling  Planned modalities to include: Electrical stimulation - passive/unattended, Thermotherapy (hot pack), and Cryotherapy (cold pack).         Visit Frequency: 2 times Per Week for 8 Weeks     Goals:   Active       LTGS: 8 WEEKS         LTG #1 STRENGTH  (Progressing)        Start:  02/28/25       1.) Patient will demo equal strength for improving gait.         LTG #2 PAIN  (Progressing)       Start:  02/28/25       2.) Patient will ambulate without AD to demo dec pain and improved strength.         LTG #3 FOTO (Progressing)       Start:  02/28/25       3.) FOTO goal to be written.            STGs - 4 WEEKS         STG #1 ROM  (Progressing)       Start:  02/28/25       1.) Patient will demo bilateral hip AROM equal for symmetry in ambulation.         STG #2 STRENGTH  (Progressing)       Start:  02/28/25       2.) Patient will demo 4-/5 strength grossly in right hip for improving walking.         STG #3 FOTO  (Progressing)       Start:  02/28/25       3.) Patient will complete FOTO and goal will be written.             Saira Joyce, PT, DPT

## 2025-03-12 ENCOUNTER — CLINICAL SUPPORT (OUTPATIENT)
Dept: REHABILITATION | Facility: HOSPITAL | Age: 75
End: 2025-03-12
Attending: PHYSICAL THERAPIST
Payer: MEDICARE

## 2025-03-12 DIAGNOSIS — M25.651 DECREASED RANGE OF RIGHT HIP MOVEMENT: ICD-10-CM

## 2025-03-12 DIAGNOSIS — M70.61 GREATER TROCHANTERIC BURSITIS OF RIGHT HIP: ICD-10-CM

## 2025-03-12 DIAGNOSIS — R26.9 GAIT DIFFICULTY: Primary | ICD-10-CM

## 2025-03-12 PROCEDURE — 97140 MANUAL THERAPY 1/> REGIONS: CPT | Mod: PO | Performed by: PHYSICAL THERAPIST

## 2025-03-12 PROCEDURE — 97112 NEUROMUSCULAR REEDUCATION: CPT | Mod: PO | Performed by: PHYSICAL THERAPIST

## 2025-03-12 PROCEDURE — 97110 THERAPEUTIC EXERCISES: CPT | Mod: PO | Performed by: PHYSICAL THERAPIST

## 2025-03-12 NOTE — PROGRESS NOTES
"    Outpatient Rehab    Physical Therapy Visit    Patient Name: Lucinda Aguilera  MRN: [id number]  YOB: 1950  Encounter Date: 3/12/2025    Therapy Diagnosis:   Encounter Diagnoses   Name Primary?    Gait difficulty Yes    Decreased range of right hip movement     Greater trochanteric bursitis of right hip          Physician: Nick Arnold MD  Medical Diagnosis: M70.61 (ICD-10-CM) - Greater trochanteric bursitis of right hip      Visit # / Visits Authorized:  1 / 10   Date of Evaluation:  2/26/2025  Insurance Authorization Period: 01/01/2025  to     12/31/2025      Plan of Care Certification:  2/26/2025 to 4/18/2025        Time In: 0900   Time Out: 1000  Total Time: 60   Total Billable Time: 30 minimum 1:1    FOTO:  Intake Score:  %  Survey Score 1:  %  Survey Score 2:  %         Subjective   Lucinda is stiff this morning. She reports her morning is just starting..  Pain reported as 8/10. Her R hip    Objective            Treatment:  Therapeutic Exercise  Therapeutic Exercise Activity 1: HS stretch with strap 3 x 20"  Therapeutic Exercise Activity 2: ITB stretch with strap 3 x 20"  Therapeutic Exercise Activity 3: piriformis stretch 3 x 20"  Therapeutic Exercise Activity 4: recument bike x 10 mins to inc flexibility at beginning  Therapeutic Exercise Activity 5: Fig 4 piriformis stretch 3 x 20"    Manual Therapy  Manual Therapy Activity 1: rolling with stick to piriformis, glutes, ITB, quads, and HS x 10 mins    Balance/Neuromuscular Re-Education  Balance/Neuromuscular Re-Education Activity 1: bridge 3 x 10  Balance/Neuromuscular Re-Education Activity 2: LTR 2 x 10  Balance/Neuromuscular Re-Education Activity 3: BKFO 2 x 10  Balance/Neuromuscular Re-Education Activity 4: PPT 3 x 10 3 sec hold  Balance/Neuromuscular Re-Education Activity 5: DKTC 2 x 10  Balance/Neuromuscular Re-Education Activity 6: PPT with hip ADD with ball 3 sec hold 30x  Balance/Neuromuscular Re-Education Activity 7: SLR with QS " 30x    Assessment & Plan   Assessment: Lucinda continues with pain and possibly inflammation of the R hip, piriformis, and glute max. It is recommended to ice 15 mins 3-5x/day. She tolerates tx well otherwise. Cont to treat with MD f/u next week. Hip weakness continues.       Patient will continue to benefit from skilled outpatient physical therapy to address the deficits listed in the problem list box on initial evaluation, provide pt/family education and to maximize pt's level of independence in the home and community environment.     Patient's spiritual, cultural, and educational needs considered and patient agreeable to plan of care and goals.           Plan: From a physical therapy perspective, the patient would benefit from: Skilled Rehab Services     Planned therapy interventions include: Therapeutic exercise, Therapeutic activities, Neuromuscular re-education, Manual therapy, and Other (Comment). Dry needling  Planned modalities to include: Electrical stimulation - passive/unattended, Thermotherapy (hot pack), and Cryotherapy (cold pack).         Visit Frequency: 2 times Per Week for 8 Weeks. From a physical therapy perspective, the patient would benefit from: Skilled Rehab Services     Planned therapy interventions include: Therapeutic exercise, Therapeutic activities, Neuromuscular re-education, Manual therapy, and Other (Comment). Dry needling  Planned modalities to include: Electrical stimulation - passive/unattended, Thermotherapy (hot pack), and Cryotherapy (cold pack).         Visit Frequency: 2 times Per Week for 8 Weeks     Goals:   Active       LTGS: 8 WEEKS         LTG #1 STRENGTH  (Progressing)       Start:  02/28/25       1.) Patient will demo equal strength for improving gait.         LTG #2 PAIN  (Progressing)       Start:  02/28/25       2.) Patient will ambulate without AD to demo dec pain and improved strength.         LTG #3 FOTO (Progressing)       Start:  02/28/25       3.) FOTO goal to  be written.            STGs - 4 WEEKS         STG #1 ROM  (Progressing)       Start:  02/28/25       1.) Patient will demo bilateral hip AROM equal for symmetry in ambulation.         STG #2 STRENGTH  (Progressing)       Start:  02/28/25       2.) Patient will demo 4-/5 strength grossly in right hip for improving walking.         STG #3 FOTO  (Progressing)       Start:  02/28/25       3.) Patient will complete FOTO and goal will be written.             Saira Joyce, PT, DPT

## 2025-03-17 ENCOUNTER — CLINICAL SUPPORT (OUTPATIENT)
Dept: REHABILITATION | Facility: HOSPITAL | Age: 75
End: 2025-03-17
Attending: PHYSICAL THERAPIST
Payer: MEDICARE

## 2025-03-17 DIAGNOSIS — R26.9 GAIT DIFFICULTY: Primary | ICD-10-CM

## 2025-03-17 DIAGNOSIS — M70.61 GREATER TROCHANTERIC BURSITIS OF RIGHT HIP: ICD-10-CM

## 2025-03-17 DIAGNOSIS — M25.651 DECREASED RANGE OF RIGHT HIP MOVEMENT: ICD-10-CM

## 2025-03-17 PROCEDURE — 97110 THERAPEUTIC EXERCISES: CPT | Mod: PO | Performed by: PHYSICAL THERAPIST

## 2025-03-17 PROCEDURE — 97112 NEUROMUSCULAR REEDUCATION: CPT | Mod: PO | Performed by: PHYSICAL THERAPIST

## 2025-03-17 NOTE — PROGRESS NOTES
Outpatient Rehab    Physical Therapy Visit    Patient Name: Lucinda Aguilera  MRN: [id number]  YOB: 1950  Encounter Date: 3/17/2025    Therapy Diagnosis:   Encounter Diagnoses   Name Primary?    Gait difficulty Yes    Decreased range of right hip movement     Greater trochanteric bursitis of right hip      Physician: Nick Arnold MD    Physician Orders: Eval and Treat  Medical Diagnosis: Greater trochanteric bursitis of right hip    Visit # / Visits Authorized:  5 / 10  Date of Evaluation: 2/26/2025   Insurance Authorization Period: 1/1/2025 to 12/31/2025  Plan of Care Certification:  2/26/2025 to 4/11/2025      PT/PTA: PT   Number of PTA visits since last PT visit:0  Time In: 1300   Time Out: 1347  Total Time: 47   Total Billable Time: 55    FOTO:  Intake Score: 33 (Goal: 46)%  Survey Score 1: 43 (+10)%  Survey Score 2:  %     Subjective   Lucinda states that she has been having muscle spasms of the R posterior thigh. She continues with edema of the R hip. She states that the incisional scar is small..  Pain reported as 6/10. Her R hip    Objective     Hip Range of Motion   Limited right hip ROM d/t pain. (IMPROVED)     Knee Range of Motion   Bilateral knee ROM WNL.     Hip Strength - Planes of Motion    Right Strength Right Pain Left Strength Left  Pain   Flexion (L2) 3+   4     Extension 3+   4     ABduction 3   4-     ADduction 4-   4     Internal Rotation           External Rotation              Knee Strength    Right Strength Right Pain Left Strength Left  Pain   Flexion (S2) 5   5     Prone Flexion           Extension (L3) 5   5        Lumbar/Pelvic Girdle Special Tests  Pelvic Girdle / Sacrum Tests  Negative: Right JOSUE and Right FADIR     Hip Special Tests  Intra-Articular/Impingement Tests  Negative: Right JOSUE and Right FADIR  Flex/Imbalance/Structural Tests  Positive: Right Modified Juanita's and Right Piriformis  + pain to palpation right hip flexors and glutes.     "  Treatment:  Therapeutic Exercise  TE 1: HS stretch with strap 3 x 20"  TE 2: ITB stretch with strap 3 x 20"  TE 3: piriformis stretch 3 x 20"  TE 4: recument bike x 10 mins to inc flexibility at beginning  TE 5: Fig 4 piriformis stretch 3 x 20"  TE 6: prone hip flexor with strap stretch 3 x 30"  Balance/Neuromuscular Re-Education  NMR 1: bridge 3 x 10  NMR 2: LTR 2 x 10  NMR 3: BKFO 2 x 10  NMR 4: PPT 3 x 10 3 sec hold  NMR 5: DKTC 2 x 10  NMR 6: PPT with hip ADD with ball 3 sec hold 30x  NMR 7: SLR with QS 30x  NMR 8: PT reassessment, education, goals.    Time Entry(in minutes):  Neuromuscular Re-Education Time Entry: 27  Therapeutic Exercise Time Entry: 20    Assessment & Plan   Assessment: Lucinda continues with pain and possibly inflammation of the R hip, piriformis, and glute max. It is recommended to ice 15 mins 3-5x/day. She tolerates tx well otherwise. Lucinda demos improving hip ROM as evidenced by her ability to cross the R leg with minimal pain. Encouraged home stretching to address the muscle spasms. + prone hip flexor stretch today. Pt ROM is improved, but strength is not yet changed. Pt is meeting 2 STGs this date with additional goals written. Cont to treat with MD f/u tomorrow. Hip weakness and pain continues.  Evaluation/Treatment Tolerance: Patient tolerated treatment well    Patient will continue to benefit from skilled outpatient physical therapy to address the deficits listed in the problem list box on initial evaluation, provide pt/family education and to maximize pt's level of independence in the home and community environment.     Patient's spiritual, cultural, and educational needs considered and patient agreeable to plan of care and goals.       Plan: From a physical therapy perspective, the patient would benefit from: Skilled Rehab Services     Planned therapy interventions include: Therapeutic exercise, Therapeutic activities, Neuromuscular re-education, Manual therapy, and Other " (Comment). Dry needling  Planned modalities to include: Electrical stimulation - passive/unattended, Thermotherapy (hot pack), and Cryotherapy (cold pack).         Visit Frequency: 2 times Per Week for 8 Weeks.    Goals:   Active       LTGS: 8 WEEKS         LTG #1 STRENGTH  (Progressing)       Start:  02/28/25    Expected End:  04/11/25       1.) Patient will demo equal strength for improving gait.         LTG #2 PAIN  (Met)       Start:  02/28/25    Resolved:  03/17/25    2.) Patient will ambulate without AD to demo dec pain and improved strength.         LTG #3 FOTO (Met)       Start:  02/28/25    Resolved:  03/17/25    3.) FOTO goal to be written.            NEW LTG -4 WEEKS       LTG #3: FOTO       Start:  03/17/25    Expected End:  04/11/25       4.) Patient will score +3 on the FOTO for improved QOL.            STGs - 4 WEEKS         STG #1 ROM  (Met)       Start:  02/28/25    Resolved:  03/17/25    1.) Patient will demo bilateral hip AROM equal for symmetry in ambulation.         STG #2 STRENGTH  (Progressing)       Start:  02/28/25    Expected End:  04/11/25       2.) Patient will demo 4-/5 strength grossly in right hip for improving walking.         STG #3 FOTO  (Met)       Start:  02/28/25    Resolved:  03/17/25    3.) Patient will complete FOTO and goal will be written.             Saira Joyce, PT, DPT

## 2025-03-18 ENCOUNTER — OFFICE VISIT (OUTPATIENT)
Dept: ORTHOPEDICS | Facility: CLINIC | Age: 75
End: 2025-03-18
Payer: MEDICARE

## 2025-03-18 VITALS — BODY MASS INDEX: 30.46 KG/M2 | WEIGHT: 171.94 LBS | HEIGHT: 63 IN

## 2025-03-18 DIAGNOSIS — M70.61 GREATER TROCHANTERIC BURSITIS OF RIGHT HIP: Primary | ICD-10-CM

## 2025-03-18 PROCEDURE — 99214 OFFICE O/P EST MOD 30 MIN: CPT | Mod: PBBFAC,PO | Performed by: ORTHOPAEDIC SURGERY

## 2025-03-18 PROCEDURE — 99024 POSTOP FOLLOW-UP VISIT: CPT | Mod: POP,,, | Performed by: ORTHOPAEDIC SURGERY

## 2025-03-18 PROCEDURE — 99999 PR PBB SHADOW E&M-EST. PATIENT-LVL IV: CPT | Mod: PBBFAC,,, | Performed by: ORTHOPAEDIC SURGERY

## 2025-03-18 RX ORDER — METHOCARBAMOL 750 MG/1
750 TABLET, FILM COATED ORAL EVERY 6 HOURS PRN
COMMUNITY
Start: 2025-02-25

## 2025-03-18 RX ORDER — TRAMADOL HYDROCHLORIDE 50 MG/1
50 TABLET ORAL EVERY 4 HOURS PRN
Qty: 44 TABLET | Refills: 0 | Status: SHIPPED | OUTPATIENT
Start: 2025-03-18

## 2025-03-20 ENCOUNTER — CLINICAL SUPPORT (OUTPATIENT)
Dept: REHABILITATION | Facility: HOSPITAL | Age: 75
End: 2025-03-20
Attending: PHYSICAL THERAPIST
Payer: MEDICARE

## 2025-03-20 DIAGNOSIS — F41.1 GAD (GENERALIZED ANXIETY DISORDER): ICD-10-CM

## 2025-03-20 DIAGNOSIS — R26.9 GAIT DIFFICULTY: Primary | ICD-10-CM

## 2025-03-20 DIAGNOSIS — M76.891 HIP FLEXOR TENDINITIS, RIGHT: ICD-10-CM

## 2025-03-20 PROCEDURE — 97110 THERAPEUTIC EXERCISES: CPT | Mod: PO,CQ

## 2025-03-20 PROCEDURE — 97112 NEUROMUSCULAR REEDUCATION: CPT | Mod: PO,CQ

## 2025-03-21 RX ORDER — LORAZEPAM 0.5 MG/1
0.25 TABLET ORAL 3 TIMES DAILY PRN
Qty: 45 TABLET | Refills: 0 | Status: SHIPPED | OUTPATIENT
Start: 2025-03-21 | End: 2025-04-20

## 2025-03-24 ENCOUNTER — CLINICAL SUPPORT (OUTPATIENT)
Dept: REHABILITATION | Facility: HOSPITAL | Age: 75
End: 2025-03-24
Attending: PHYSICAL THERAPIST
Payer: MEDICARE

## 2025-03-24 DIAGNOSIS — M25.651 DECREASED RANGE OF RIGHT HIP MOVEMENT: ICD-10-CM

## 2025-03-24 DIAGNOSIS — R26.9 GAIT DIFFICULTY: Primary | ICD-10-CM

## 2025-03-24 PROCEDURE — 97140 MANUAL THERAPY 1/> REGIONS: CPT | Mod: PO | Performed by: PHYSICAL THERAPIST

## 2025-03-24 PROCEDURE — 97112 NEUROMUSCULAR REEDUCATION: CPT | Mod: PO | Performed by: PHYSICAL THERAPIST

## 2025-03-24 NOTE — PROGRESS NOTES
"  Outpatient Rehab    Physical Therapy Visit    Patient Name: Lucinda Aguilera  MRN: [id number]  YOB: 1950  Encounter Date: 3/24/2025    Therapy Diagnosis:   Encounter Diagnoses   Name Primary?    Gait difficulty Yes    Decreased range of right hip movement        Physician: Nick Arnold MD    Physician Orders: Eval and Treat  Medical Diagnosis: Greater trochanteric bursitis of right hip    Visit # / Visits Authorized:  7 / 20  Date of Evaluation: 2/26/2025   Insurance Authorization Period: 1/1/2025 to 12/31/2025  Plan of Care Certification:  2/26/2025 to 4/11/2025     PT/PTA: PT   Number of PTA visits since last PT visit:0  Time In: 1400   Time Out: 1500  Total Time: 60   Total Billable Time: 60    FOTO:  Intake Score:  %  Survey Score 1:  %  Survey Score 2:  %         Subjective   Lucinda states that she thinks she has a Baker's Cyst behind R knee and reports 7/10 posterior knee pn today. She reports that Dr. Arnold cleared her for Dry Needling ..  Pain reported as 7/10. posterior R knee    Objective            Treatment:  Therapeutic Exercise  TE 1: HS stretch with strap 3 x 20"  TE 2: ITB stretch with strap 3 x 20"  TE 3: piriformis stretch 3 x 20"  TE 5: Fig 4 piriformis stretch 3 x 20"  TE 6: prone hip flexor with strap stretch 3 x 30"  Manual Therapy  MT 2: This was applied to the right rec fem and VL. Dry needling was performed to decrease inflammation, increase circulation, decrease pain and restore homeostasis. 40 mm needles with 0.30 diameter were used. Electrical stimulation was applied to the needles. Began at the ant thigh as was easiest to reach in given time today. She has experience inconsistent pain ant or more posterior. Patient demonstrated appropriate response to Functional Dry Needling. good  rhythmical contractions observed with estim to treated muscle groups.  Balance/Neuromuscular Re-Education  NMR 1: bridge 3 x 10 yellow t-band  NMR 2: LTR 2 x 10  NMR 3: BKFO 2 x 10  NMR 4: " PPT 3 x 10 3 sec hold  NMR 5: DKTC 3 x 10 orange t-ball  NMR 6: PPT with hip ADD with ball 3 sec hold 30x  NMR 7: SLR with QS 30x 1#    Time Entry(in minutes):       Assessment & Plan   Assessment: Lucinda stated she was fatigued and opted not to ride the bike today. She agreed to DN with some relief noted. She continued through program as tolerated. Will progress as able.  Evaluation/Treatment Tolerance: Patient tolerated treatment well    Patient will continue to benefit from skilled outpatient physical therapy to address the deficits listed in the problem list box on initial evaluation, provide pt/family education and to maximize pt's level of independence in the home and community environment.     Patient's spiritual, cultural, and educational needs considered and patient agreeable to plan of care and goals.           Plan: From a physical therapy perspective, the patient would benefit from: Skilled Rehab Services     Planned therapy interventions include: Therapeutic exercise, Therapeutic activities, Neuromuscular re-education, Manual therapy, and Other (Comment). Dry needling  Planned modalities to include: Electrical stimulation - passive/unattended, Thermotherapy (hot pack), and Cryotherapy (cold pack).         Visit Frequency: 2 times Per Week for 8 Weeks.From a physical therapy perspective, the patient would benefit from: Skilled Rehab Services Planned therapy interventions include: Therapeutic exercise, Therapeutic activities, Neuromuscular re-education, Manual therapy, and Other (Comment). Dry needling Planned modalities to include: Electrical stimulation - passive/unattended, Thermotherapy (hot pack), and Cryotherapy (cold pack). Visit Frequency: 2 times Per Week for 8 Weeks.    Goals:   Active       LTGS: 8 WEEKS         LTG #1 STRENGTH  (Progressing)       Start:  02/28/25    Expected End:  04/11/25       1.) Patient will demo equal strength for improving gait.         LTG #2 PAIN  (Met)       Start:   02/28/25    Resolved:  03/17/25    2.) Patient will ambulate without AD to demo dec pain and improved strength.         LTG #3 FOTO (Met)       Start:  02/28/25    Resolved:  03/17/25    3.) FOTO goal to be written.            NEW LTG -4 WEEKS       LTG #3: FOTO (Progressing)       Start:  03/17/25    Expected End:  04/11/25       4.) Patient will score +3 on the FOTO for improved QOL.            STGs - 4 WEEKS         STG #1 ROM  (Met)       Start:  02/28/25    Resolved:  03/17/25    1.) Patient will demo bilateral hip AROM equal for symmetry in ambulation.         STG #2 STRENGTH  (Progressing)       Start:  02/28/25    Expected End:  04/11/25       2.) Patient will demo 4-/5 strength grossly in right hip for improving walking.         STG #3 FOTO  (Met)       Start:  02/28/25    Resolved:  03/17/25    3.) Patient will complete FOTO and goal will be written.             Saira Joyce, PT, DPT

## 2025-03-24 NOTE — PROGRESS NOTES
Chief Complaint   Patient presents with    Right Hip - Post-op Evaluation, Pain       HPI:  74 y.o. returns to clinic today status post  right GTB 6 weeks ago. Pain is mild. Patient is compliant most of the time with restrictions.     R hip    Incision healed. No erythema or fluctuance. Overall normal alignment. Minimal point TTP about the surgical site. Decreased ROM due to stiffness. Compartments soft. Skin intact. NVI distally.    Greater trochanteric bursitis of right hip  -     traMADoL (ULTRAM) 50 mg tablet; Take 1 tablet (50 mg total) by mouth every 4 (four) hours as needed.  Dispense: 44 tablet; Refill: 0        Begin PT for dry needling. RTC 2 months

## 2025-03-25 ENCOUNTER — PATIENT MESSAGE (OUTPATIENT)
Dept: FAMILY MEDICINE | Facility: CLINIC | Age: 75
End: 2025-03-25
Payer: MEDICARE

## 2025-03-27 ENCOUNTER — CLINICAL SUPPORT (OUTPATIENT)
Dept: REHABILITATION | Facility: HOSPITAL | Age: 75
End: 2025-03-27
Attending: PHYSICAL THERAPIST
Payer: MEDICARE

## 2025-03-27 DIAGNOSIS — R26.9 GAIT DIFFICULTY: ICD-10-CM

## 2025-03-27 DIAGNOSIS — M25.651 DECREASED RANGE OF RIGHT HIP MOVEMENT: Primary | ICD-10-CM

## 2025-03-27 PROCEDURE — 97140 MANUAL THERAPY 1/> REGIONS: CPT | Mod: PO | Performed by: PHYSICAL THERAPIST

## 2025-03-27 PROCEDURE — 97110 THERAPEUTIC EXERCISES: CPT | Mod: PO | Performed by: PHYSICAL THERAPIST

## 2025-03-27 PROCEDURE — 97112 NEUROMUSCULAR REEDUCATION: CPT | Mod: PO | Performed by: PHYSICAL THERAPIST

## 2025-03-27 PROCEDURE — 97116 GAIT TRAINING THERAPY: CPT | Mod: PO | Performed by: PHYSICAL THERAPIST

## 2025-03-27 NOTE — PROGRESS NOTES
Outpatient Rehab    Physical Therapy Progress Note    Patient Name: Lucinda Aguilera  MRN: [id number]  YOB: 1950  Encounter Date: 3/27/2025    Therapy Diagnosis:   Encounter Diagnoses   Name Primary?    Decreased range of right hip movement Yes    Gait difficulty      Physician: Nick Arnold MD    Physician Orders: Eval and Treat  Medical Diagnosis: Greater trochanteric bursitis of right hip    Visit # / Visits Authorized:  8 / 20  Date of Evaluation: 2/26/2025   Insurance Authorization Period: 1/1/2025 to 12/31/2025  Plan of Care Certification:  2/26/2025 to 4/11/2025     PT/PTA: PT   Number of PTA visits since last PT visit:0  Time In: 1300   Time Out: 1400  Total Time: 60   Total Billable Time: 54    FOTO:  Intake Score:  %  Survey Score 1:  %  Survey Score 2:  %         Subjective   Lucinda states that she thinks she has a Baker's Cyst behind R knee and reports 7/10 posterior knee pn today. She reports that Dr. Arnold cleared her for Dry Needling ..  Pain reported as 7/10. posterior R knee    Objective           Hip Range of Motion    Right hip AROM WNL this date. (Improved)     Knee Range of Motion    Bilateral knee ROM WNL.     Hip Strength - Planes of Motion    Right Strength Right Pain Left Strength Left  Pain   Flexion (L2) 3+   4     Extension 3+   4     ABduction 3   4-     ADduction 4-   4     Internal Rotation           External Rotation              Patient is unable to perform hip Abduction against gravity in side lying without hip flexor compensations.    Knee Strength    Right Strength Right Pain Left Strength Left  Pain   Flexion (S2) 5   5     Prone Flexion           Extension (L3) 5   5        Lumbar/Pelvic Girdle Special Tests  Pelvic Girdle / Sacrum Tests  Negative: Right JOSUE and Right FADIR     Hip Special Tests  Intra-Articular/Impingement Tests  Negative: Right JOSUE and Right FADIR  Flex/Imbalance/Structural Tests  Positive: Right Modified Juanita's and Right  "Piriformis  + pain to palpation right hip flexors and glutes and piriformis.     Treatment:  Therapeutic Exercise  TE 4: recument bike x 10 mins to inc flexibility at beginning  TE 6: prone hip flexor with strap stretch 3 x 30"  Manual Therapy  MT 2: This was applied to the right. Dry needling was performed to decrease inflammation, increase circulation, decrease pain and restore homeostasis. 40-50 mm needles with 0.30 diameter were used. Electrical stimulation was not applied to the needles d/t time and number of locations. Glut med, TFL, and piriformis rec'd DN. DN to R med HS also performed. Patient demonstrated appropriate response to Functional Dry Needling. good rhythmical contractions observed with estim to treated muscle groups. Pt noted to walk with mildly improved gt.  Balance/Neuromuscular Re-Education  NMR 8: PT reassessment, education, goals.  Gait Training  GT 1: Gt training for heel strike purposes d/t hip and knee pain.    Time Entry(in minutes):  Gait Training Time Entry: 10  Manual Therapy Time Entry: 25  Neuromuscular Re-Education Time Entry: 10  Therapeutic Exercise Time Entry: 15    Assessment & Plan   Assessment: Lucinda fatigues easily. She has weakness and cannot perform s/l hip abd against gravity without hip flexor activation first. Will retrain on glut initiation. Lucinda has improved R hip ROM. She is walking with antalgic pattern d/t R knee mostly. DN to knee and hip today. Tenderness is significant at R med HS. Pt demonstrated improving gt pattern after training; however, recommended she use cane to improve gt pattern and stability in gt at this time.  Evaluation/Treatment Tolerance: Patient tolerated treatment well    Patient will continue to benefit from skilled outpatient physical therapy to address the deficits listed in the problem list box on initial evaluation, provide pt/family education and to maximize pt's level of independence in the home and community environment. "     Patient's spiritual, cultural, and educational needs considered and patient agreeable to plan of care and goals.       Plan: From a physical therapy perspective, the patient would benefit from: Skilled Rehab Services     Planned therapy interventions include: Therapeutic exercise, Therapeutic activities, Neuromuscular re-education, Manual therapy, and Other (Comment). Dry needling  Planned modalities to include: Electrical stimulation - passive/unattended, Thermotherapy (hot pack), and Cryotherapy (cold pack).         Visit Frequency: 2 times Per Week for 8 Weeks.    Goals:   Active       LTGS: 8 WEEKS         LTG #1 STRENGTH  (Progressing)       Start:  02/28/25    Expected End:  04/11/25       1.) Patient will demo equal strength for improving gait.         LTG #2 PAIN  (Met)       Start:  02/28/25    Resolved:  03/17/25    2.) Patient will ambulate without AD to demo dec pain and improved strength.         LTG #3 FOTO (Met)       Start:  02/28/25    Resolved:  03/17/25    3.) FOTO goal to be written.            NEW LTG -4 WEEKS       LTG #3: FOTO (Progressing)       Start:  03/17/25    Expected End:  04/11/25       4.) Patient will score +3 on the FOTO for improved QOL.            STG added 3/28/2025       STG #4 FOTO        Start:  03/28/25    Expected End:  04/27/25       4.)Patient will score 46 on the FOTO for improving QOL.            STGs - 4 WEEKS         STG #1 ROM  (Met)       Start:  02/28/25    Resolved:  03/17/25    1.) Patient will demo bilateral hip AROM equal for symmetry in ambulation.         STG #2 STRENGTH  (Progressing)       Start:  02/28/25    Expected End:  04/11/25       2.) Patient will demo 4-/5 strength grossly in right hip for improving walking.         STG #3 FOTO  (Met)       Start:  02/28/25    Resolved:  03/17/25    3.) Patient will complete FOTO and goal will be written.             Saira Joyce, PT, DPT

## 2025-03-28 ENCOUNTER — PATIENT MESSAGE (OUTPATIENT)
Dept: UROLOGY | Facility: CLINIC | Age: 75
End: 2025-03-28
Payer: MEDICARE

## 2025-03-31 ENCOUNTER — OFFICE VISIT (OUTPATIENT)
Dept: UROLOGY | Facility: CLINIC | Age: 75
End: 2025-03-31
Payer: MEDICARE

## 2025-03-31 ENCOUNTER — EXTERNAL CHRONIC CARE MANAGEMENT (OUTPATIENT)
Dept: PRIMARY CARE CLINIC | Facility: CLINIC | Age: 75
End: 2025-03-31
Payer: MEDICARE

## 2025-03-31 VITALS — HEIGHT: 63 IN | BODY MASS INDEX: 30.23 KG/M2 | WEIGHT: 170.63 LBS

## 2025-03-31 DIAGNOSIS — N39.0 RECURRENT UTI: Primary | ICD-10-CM

## 2025-03-31 DIAGNOSIS — N39.41 URGENCY INCONTINENCE: ICD-10-CM

## 2025-03-31 PROCEDURE — 99490 CHRNC CARE MGMT STAFF 1ST 20: CPT | Mod: PBBFAC,PO | Performed by: FAMILY MEDICINE

## 2025-03-31 PROCEDURE — 99212 OFFICE O/P EST SF 10 MIN: CPT | Mod: PBBFAC,PO,25 | Performed by: UROLOGY

## 2025-03-31 PROCEDURE — G2211 COMPLEX E/M VISIT ADD ON: HCPCS | Mod: S$PBB,,, | Performed by: UROLOGY

## 2025-03-31 PROCEDURE — 99490 CHRNC CARE MGMT STAFF 1ST 20: CPT | Mod: S$PBB,,, | Performed by: FAMILY MEDICINE

## 2025-03-31 PROCEDURE — 99214 OFFICE O/P EST MOD 30 MIN: CPT | Mod: S$PBB,,, | Performed by: UROLOGY

## 2025-03-31 PROCEDURE — 99999 PR PBB SHADOW E&M-EST. PATIENT-LVL II: CPT | Mod: PBBFAC,,, | Performed by: UROLOGY

## 2025-03-31 NOTE — PROGRESS NOTES
[facility]      Return Overactive Bladder (OAB) Note    3/31/2025    Referred by:  No ref. provider found     CHIEF COMPLAINT:  Patient presents for continued evaluation and management of overactive bladder symptoms.    HPI:  Patient, a 74-year-old woman, returns for follow-up of her overactive bladder symptoms. She has a history of urinary frequency, urgency, and urgency incontinence, as well as recurrent urinary tract infections. 2 months ago, she was voiding 3 times per night with urgency incontinence, wearing incontinence products at night, and occasionally unable to reach the bathroom in time. Her symptoms were present both day and night.    She was initially treated with culture-specific antibiotics for Enterococcus found in a urine culture and started on D-Mannose supplementation, which has been beneficial. She is currently taking Vesicare 10 mg daily for overactive bladder, which is helping with frequency and urgency. She now urinates only 1-2 times per night, which she considers acceptable. During the day, she reports having more control but occasionally delays voiding for too long.    She reports intermittent dry mouth as a side effect of the Vesicare, but is willing to continue the medication due to its benefits. She is also taking duloxetine, which may contribute to continence, though it was not prescribed for urinary symptoms.    Recently, she was treated for a suspected urinary tract infection. She reports being questioned about symptoms but notes that no urine culture was performed before antibiotic treatment was initiated. She is recovering from a UTI but acknowledges the diagnosis was made without confirmatory testing.    She denies dry eyes or constipation as side effects of her medication.    MEDICATIONS:  Patient is on Vesicare 10 mg daily for overactive bladder, which helps with frequency and urgency but causes dry mouth as a side effect. She is also on Duloxetine, though not  for urinary tract symptoms. She takes D-Mannose, a supplement that helps prevent urinary tract infections.    MEDICAL HISTORY:  Patient has a history of overactive bladder and recurrent urinary tract infections. She recently experienced a urinary tract infection episode that was treated with antibiotics.    TEST RESULTS:  A urine culture performed two months ago was positive for Enterococcus. Patient was treated with culture-specific antibiotics.          A review of 10+ systems was conducted with pertinent positive and negative findings documented in HPI with all other systems reviewed and negative.    Past medical, family, surgical and social history reviewed as documented in chart with pertinent positive medical, family, surgical and social history detailed in HPI.    Previous OAB therapies:  Behavioral Therapies  : (fluid/dietary modification) -  provided some but insufficient benefit  Medications  solifenacin (VESIcare)  10  >3 months (continues to provide satisfactory benefit)  Other Therapies  No Other Therapies    Previous OLEGARIO therapies:  pelvic floor exercises without therapy      Exam Findings:    Physical Exam    General: No acute distress. Nontoxic appearing.  HENT: Normocephalic. Atraumatic.  Respiratory: Normal respiratory effort. No conversational dyspnea. No audible wheezing.  Abdomen: No obvious distension.  Skin: No visible abnormalities.  Extremities: No edema upper extremities. No edema lower extremities.  Neurological: Alert and oriented x3. Normal speech.  Psychiatric: Normal mood. Normal affect. No evidence of SI.          Assessment/Plan:      IMPRESSION:  Overactive bladder symptoms improved with Vesicare 10 mg daily, though dry mouth side effect persists.  Recent UTI treatment without culture makes it difficult to assess efficacy of preventive measures like D-mannose.  Considered alternative medications (Gemtesa or Myrbetriq) if dry mouth becomes intolerable, but potential insurance  coverage issues noted.  Continued current treatment plan given overall symptom improvement.    PLAN SUMMARY:  - Continue D-mannose supplementation for UTI prevention  - Continue Vesicare 10 mg daily for overactive bladder  - Urine cultures recommended for proper UTI diagnosis and treatment    OVERACTIVE BLADDER:  - Explained that overactive bladder treatments are not curative but manage symptoms.  - Continue Vesicare 10 mg daily for overactive bladder.  - Informed patient about potential for dry mouth side effect to improve over time.    URINARY TRACT INFECTION:  - Discussed importance of urine cultures for proper UTI diagnosis and treatment.  - Continue D-mannose supplementation for UTI prevention.              Visit complexity today is associated with medical care services that are part of the ongoing care related to the single serious and/or complex condition of Overactive bladder (OAB). A longitudinal relationship exists or is being developed between the patient and this practitioner for the care of this condition.        Assistance with ambulation/Assistance OOB with selected safe patient handling equipment/Communicate Risk of Fall with Harm to all staff/Discuss with provider need for PT consult/Monitor gait and stability/Provide patient with walking aids - walker, cane, crutches/Reinforce activity limits and safety measures with patient and family/Tailored Fall Risk Interventions/Visual Cue: Yellow wristband and red socks/Bed in lowest position, wheels locked, appropriate side rails in place/Call bell, personal items and telephone in reach/Instruct patient to call for assistance before getting out of bed or chair/Non-slip footwear when patient is out of bed/[place] to call system/Physically safe environment - no spills, clutter or unnecessary equipment/Purposeful Proactive Rounding/Room/bathroom lighting operational, light cord in reach

## 2025-04-02 ENCOUNTER — CLINICAL SUPPORT (OUTPATIENT)
Dept: REHABILITATION | Facility: HOSPITAL | Age: 75
End: 2025-04-02
Payer: MEDICARE

## 2025-04-02 DIAGNOSIS — M25.651 DECREASED RANGE OF RIGHT HIP MOVEMENT: Primary | ICD-10-CM

## 2025-04-02 DIAGNOSIS — M25.551 BILATERAL HIP PAIN: ICD-10-CM

## 2025-04-02 DIAGNOSIS — M25.552 BILATERAL HIP PAIN: ICD-10-CM

## 2025-04-02 PROCEDURE — 97110 THERAPEUTIC EXERCISES: CPT | Mod: PO,CQ

## 2025-04-02 PROCEDURE — 97112 NEUROMUSCULAR REEDUCATION: CPT | Mod: PO,CQ

## 2025-04-02 NOTE — PROGRESS NOTES
"  Outpatient Rehab    Physical Therapy Visit    Patient Name: Lucinda Aguilera  MRN: [id number]  YOB: 1950  Encounter Date: 4/2/2025    Therapy Diagnosis:   Encounter Diagnoses   Name Primary?    Decreased range of right hip movement Yes    Bilateral hip pain      Physician: Nick Arnold MD    Physician Orders: Eval and Treat  Medical Diagnosis: Greater trochanteric bursitis of right hip    Visit # / Visits Authorized:  9 / 20  Insurance Authorization Period: 1/1/2025 to 12/31/2025  Date of Evaluation: 2/26/2025   Plan of Care Certification:  2/26/2025 to 4/11/2025       PT/PTA:     Number of PTA visits since last PT visit:   Time In: 1300   Time Out: 1400  Total Time: 60   Total Billable Time:  60    FOTO:  Intake Score:  %  Survey Score 1:  %  Survey Score 2:  %         Subjective   Lucinda states that she had decreased post knee pn post DN last visit but pn has since returned. She reports 6/10 post knee pn upon arrival today..  Pain reported as 6/10. posterior R knee    Objective            Treatment:  Therapeutic Exercise  TE 1: HS stretch with strap 3 x 20"  TE 2: ITB stretch with strap 3 x 20"  TE 3: piriformis stretch 3 x 20"  TE 4: recument bike x 10 mins to inc flexibility at beginning  TE 5: Fig 4 piriformis stretch 3 x 20"  TE 6: prone hip flexor with strap stretch 3 x 30"  Manual Therapy  MT 1: rolling with stick to piriformis, glutes, ITB, quads, and HS x 10 mins NP  Balance/Neuromuscular Re-Education  NMR 1: bridge 3 x 10 yellow t-band  NMR 2: LTR 2 x 10  NMR 3: BKFO 2 x 10 yellow t-band  NMR 4: PPT 3 x 10 3 sec hold  NMR 5: DKTC 3 x 10 orange t-ball  NMR 6: PPT with hip ADD with ball 3 sec hold 30x  NMR 7: SLR with QS 30x 1#    Time Entry(in minutes):  Neuromuscular Re-Education Time Entry: 30  Therapeutic Exercise Time Entry: 30    Assessment & Plan   Assessment: Lucinda lambert today's tx with progression of ther ex well. She does continue to require rest breaks due to fatigue. She was " able to progress with resistance with supine hip ABD ex.  Evaluation/Treatment Tolerance: Patient tolerated treatment well    Patient will continue to benefit from skilled outpatient physical therapy to address the deficits listed in the problem list box on initial evaluation, provide pt/family education and to maximize pt's level of independence in the home and community environment.     Patient's spiritual, cultural, and educational needs considered and patient agreeable to plan of care and goals.           Plan: From a physical therapy perspective, the patient would benefit from: Skilled Rehab Services     Planned therapy interventions include: Therapeutic exercise, Therapeutic activities, Neuromuscular re-education, Manual therapy, and Other (Comment). Dry needling  Planned modalities to include: Electrical stimulation - passive/unattended, Thermotherapy (hot pack), and Cryotherapy (cold pack).         Visit Frequency: 2 times Per Week for 8 Weeks.    Goals:   Active       LTGS: 8 WEEKS         LTG #1 STRENGTH  (Progressing)       Start:  02/28/25    Expected End:  04/11/25       1.) Patient will demo equal strength for improving gait.         LTG #2 PAIN  (Met)       Start:  02/28/25    Resolved:  03/17/25    2.) Patient will ambulate without AD to demo dec pain and improved strength.         LTG #3 FOTO (Met)       Start:  02/28/25    Resolved:  03/17/25    3.) FOTO goal to be written.            NEW LTG -4 WEEKS       LTG #3: FOTO (Progressing)       Start:  03/17/25    Expected End:  04/11/25       4.) Patient will score +3 on the FOTO for improved QOL.            STG added 3/28/2025       STG #4 FOTO        Start:  03/28/25    Expected End:  04/27/25       4.)Patient will score 46 on the FOTO for improving QOL.            STGs - 4 WEEKS         STG #1 ROM  (Met)       Start:  02/28/25    Resolved:  03/17/25    1.) Patient will demo bilateral hip AROM equal for symmetry in ambulation.         STG #2 STRENGTH   (Progressing)       Start:  02/28/25    Expected End:  04/11/25       2.) Patient will demo 4-/5 strength grossly in right hip for improving walking.         STG #3 FOTO  (Met)       Start:  02/28/25    Resolved:  03/17/25    3.) Patient will complete FOTO and goal will be written.             Manuel Garcia, PTA

## 2025-04-03 ENCOUNTER — OFFICE VISIT (OUTPATIENT)
Dept: PSYCHIATRY | Facility: CLINIC | Age: 75
End: 2025-04-03
Payer: MEDICARE

## 2025-04-03 DIAGNOSIS — F33.1 MDD (MAJOR DEPRESSIVE DISORDER), RECURRENT EPISODE, MODERATE: ICD-10-CM

## 2025-04-03 DIAGNOSIS — F41.1 GAD (GENERALIZED ANXIETY DISORDER): Primary | ICD-10-CM

## 2025-04-03 DIAGNOSIS — F42.8 OBSESSIVE THINKING: ICD-10-CM

## 2025-04-03 RX ORDER — BUPROPION HYDROCHLORIDE 150 MG/1
150 TABLET ORAL EVERY MORNING
Qty: 30 TABLET | Refills: 2 | Status: SHIPPED | OUTPATIENT
Start: 2025-04-03 | End: 2025-07-02

## 2025-04-03 NOTE — PROGRESS NOTES
Outpatient Psychiatry Follow-Up Visit    Clinical Status of Patient: Outpatient (Ambulatory)  04/03/2025    The patient location is:  [place], [place]  The patient phone number is: [phone]   Visit type: Virtual visit with synchronous audio and video  Each patient to whom he or she provides medical services by telemedicine is:  (1) informed of the relationship between the physician and patient and the respective role of any other health care provider with respect to management of the patient; and (2) notified that he or she may decline to receive medical services by telemedicine and may withdraw from such care at any time.     Chief Complaint: Pt is a 74 y.o. female who presents today for a follow-up. Met with patient.       Interval History and Content of Current Session:  Interim Events/Subjective Report/Content of Current Session:  follow up appointment.    Pt is a 74 y.o. female with past psychiatric hx of CARLOS (generalized anxiety disorder), MDD (major depressive disorder), recurrent episode, moderate, Obsessive thinking who presents for follow up treatment.     Past Psychiatric hx:   Pt. is a 72 yo F with a past psychiatric hx of Anxiety, Depression, unspecified depression type presenting to the clinic for an initial evaluation and treatment. Past medical history outlined below. She is currently taking buPROPion (WELLBUTRIN XL), LORazepam (ATIVAN), prescribed and managed by Dr. Engel/Dr. Hassan. She reports that these medications have not adequately addressed her depression.     1/3/25: Pt presents to OP Psychiatry for routine follow-up for treatment/medication management of CARLOS, MDD, OCD. Pt reports all medications effectively managing her symptoms, denies need for any medication changes at this time. Pt denies SI/HI/AVH, self-harm, plan, or intent. Pt verbalizes understanding of medication instructions through teach back. No other issues, concerns, or problems, will reassess symptoms and medications in 3  months or PRN if symptoms worsen.     Past Medical hx:   Past Medical History:   Diagnosis Date    Anxiety     takes daily Xanax    Arthritis     right thumb    Cataract     OU    Cholelithiasis     GERD (gastroesophageal reflux disease)     Hepatic steatosis     Hyperlipemia     Hypertension     PVD (posterior vitreous detachment)     OD        Interim hx:  Medication changes last visit:     1. Continue buPROPion (WELLBUTRIN XL) 150 MG TB24 tablet - Take 1 tablet (150 mg total) by mouth once daily for depression.  2. Continue LORazepam (ATIVAN) 0.5 MG tablet - TAKE 0.5 TABLET (0.25 MG) BY MOUTH THREE TIMES DAILY AS NEEDED for anxiety. Discussed risk of decreased RT, sedation, addictive potential, and not to mix with alcohol.    3. Continue DULoxetine (CYMBALTA) 30 MG capsule - Take 1 capsule (30 mg total) by mouth 2 (two) times daily (prescribed and managed by Pain Mgmt).    Anxiety: Good, no issues  Depression: Good, no issues  Sleep: Good, no issues  Appetite: Same, no issues     Denies suicidal/homicidal ideations.  Denies hopelessness/worthlessness.    Denies auditory/visual hallucinations.      Tobacco:  denies  Alcohol:  denies  Drug use:  denies  Caffeine:  denies       Review of Systems   PSYCHIATRIC: Pertinent items are noted in the narrative.        CONSTITUTIONAL: weight stable        M/S: no pain today         ENT: no allergies noted today        ABD: no n/v/d     Past Medical, Family and Social History: The patient's past medical, family and social history have been reviewed and updated as appropriate within the electronic medical record. See encounter notes.     Medication Compliance: yes      Side effects: tolerates     Risk Parameters:  Patient reports no suicidal ideation  Patient reports no homicidal ideation  Patient reports no self-injurious behavior  Patient reports no violent behavior     Exam (detailed: at least 9 elements; comprehensive: all 15 elements)   Constitutional  Vitals:  Most  recent vital signs, dated less than 90 days prior to this appointment, were reviewed.    General:  unremarkable, age appropriate, casual attire, good eye contact, good rapport       Musculoskeletal  Muscle Strength/Tone:  no flaccidity, no tremor    Gait & Station:  normal      Psychiatric                       Speech:  normal tone, normal rate, rhythm, and volume   Mood & Affect:   Euthymic, congruent, appropriate         Thought Process:   Goal directed; Linear    Associations:   intact   Thought Content:   No SI/HI, delusions, or paranoia, no AV/VH   Insight & Judgement:   Good, adequate to circumstances   Orientation:   grossly intact; alert and oriented x 4    Memory:  intact for content of interview    Language:  grossly intact, can repeat    Attention Span  : Grossly intact for content of interview   Fund of Knowledge:   intact and appropriate to age and level of education        Assessment and Diagnosis   Status/Progress: Based on the examination today, the patient's problem(s) is/are under fair control.  New problems have not been presented today. Comorbidities are not currently complicating management of the primary condition.      Impression:  Pt presents to OP Psychiatry for routine follow-up for treatment/medication management of CARLOS, MDD, OCD. Pt reports all medications effectively treating symptoms, denies need for any medication changes at this time. Pt denies SI/HI/AVH, self-harm, plan, or intent. Pt verbalizes understanding of medication instructions through teach back. No other issues, concerns, or problems, will reassess symptoms and medications in 3 months or PRN if symptoms worsen.      Diagnosis:   - CARLOS (generalized anxiety disorder)  - MDD (major depressive disorder), recurrent episode, moderate  - Obsessive thinking      Intervention/Counseling/Treatment Plan   Medication Management:      1. Continue buPROPion (WELLBUTRIN XL) 150 MG TB24 tablet - Take 1 tablet (150 mg total) by mouth once  daily for depression.     2. Continue LORazepam (ATIVAN) 0.5 MG tablet - TAKE 0.5 TABLET (0.25 MG) BY MOUTH THREE TIMES DAILY AS NEEDED for anxiety. Discussed risk of decreased RT, sedation, addictive potential, and not to mix with alcohol.       3. Continue DULoxetine (CYMBALTA) 30 MG capsule - Take 1 capsule (30 mg total) by mouth 2 (two) times daily (prescribed and managed by Pain Mgmt).     4. Call to report any worsening of symptoms or problems with the medication. Pt instructed to go to ER with thoughts of harming self, others.     5. Patient given contact # for psychotherapists at [facility] and also instructed she may check with insurance for list of providers.          Labs: none         Return to clinic:    3 months or PRN if symptoms worsen      -Spent 30min face to face with the pt; >50% time spent in counseling   -Supportive therapy and psychoeducation provided  -R/B/SE's of medications discussed with the pt who expresses understanding and chooses to take medications as prescribed.   -Pt instructed to call clinic, 911 or go to nearest emergency room if sxs worsen or pt is in   crisis. The pt expresses understanding.      Grace Mistry NP  Psychiatric-Mental Health Nurse Practitioner  Department of Psychiatry    [facility] [place] [address] [place] [address]  Phone:  [phone]  Fax: [phone]

## 2025-04-04 ENCOUNTER — CLINICAL SUPPORT (OUTPATIENT)
Dept: REHABILITATION | Facility: HOSPITAL | Age: 75
End: 2025-04-04
Attending: PHYSICAL THERAPIST
Payer: MEDICARE

## 2025-04-04 DIAGNOSIS — R26.9 GAIT DIFFICULTY: ICD-10-CM

## 2025-04-04 DIAGNOSIS — M25.651 DECREASED RANGE OF RIGHT HIP MOVEMENT: Primary | ICD-10-CM

## 2025-04-04 PROCEDURE — 97140 MANUAL THERAPY 1/> REGIONS: CPT | Mod: PO | Performed by: PHYSICAL THERAPIST

## 2025-04-04 PROCEDURE — 97112 NEUROMUSCULAR REEDUCATION: CPT | Mod: PO | Performed by: PHYSICAL THERAPIST

## 2025-04-07 ENCOUNTER — CLINICAL SUPPORT (OUTPATIENT)
Dept: REHABILITATION | Facility: HOSPITAL | Age: 75
End: 2025-04-07
Payer: MEDICARE

## 2025-04-07 DIAGNOSIS — R26.9 GAIT DIFFICULTY: Primary | ICD-10-CM

## 2025-04-07 DIAGNOSIS — M25.651 DECREASED RANGE OF RIGHT HIP MOVEMENT: ICD-10-CM

## 2025-04-07 PROCEDURE — 97140 MANUAL THERAPY 1/> REGIONS: CPT | Mod: PO | Performed by: PHYSICAL THERAPIST

## 2025-04-07 PROCEDURE — 97110 THERAPEUTIC EXERCISES: CPT | Mod: PO | Performed by: PHYSICAL THERAPIST

## 2025-04-07 PROCEDURE — 97112 NEUROMUSCULAR REEDUCATION: CPT | Mod: PO | Performed by: PHYSICAL THERAPIST

## 2025-04-07 PROCEDURE — 97116 GAIT TRAINING THERAPY: CPT | Mod: PO | Performed by: PHYSICAL THERAPIST

## 2025-04-07 NOTE — PROGRESS NOTES
"  Outpatient Rehab    Physical Therapy Visit    Patient Name: Lucinda Aguilera  MRN: [id number]  YOB: 1950  Encounter Date: 4/4/2025    Therapy Diagnosis:   Encounter Diagnoses   Name Primary?    Decreased range of right hip movement Yes    Gait difficulty      Physician: Nick Arnold MD    Physician Orders: Eval and Treat  Medical Diagnosis: Greater trochanteric bursitis of right hip    Visit # / Visits Authorized:  11 / 20  Insurance Authorization Period: 1/1/2025 to 12/31/2025  Date of Evaluation: 2/26/2025   Plan of Care Certification:  2/26/2025 to 4/11/2025       PT/PTA: PT   Number of PTA visits since last PT visit:0  Time In: 1100   Time Out: 1200  Total Time: 60   Total Billable Time: 3060    FOTO:  Intake Score:  %  Survey Score 1:  %  Survey Score 2:  %         Subjective   Lucinda states improving R hip and knee pain. She brings her cane..  Pain reported as 4/10. at R hip    Objective            Treatment:  Therapeutic Exercise  TE 1: HS stretch with strap 3 x 20"  TE 2: ITB stretch with strap 3 x 20"  TE 3: piriformis stretch 3 x 20"  TE 4: declines today d/t fatigue  TE 5: Fig 4 piriformis stretch 3 x 20"  TE 6: prone hip flexor with strap stretch 3 x 30"  Manual Therapy  MT 2: This was applied to the right. Dry needling was performed to decrease inflammation, increase circulation, decrease pain and restore homeostasis. 40-50 mm needles with 0.30 diameter were used. Electrical stimulation was not applied to the needles d/t time and number of locations. Glut med, TFL, and piriformis rec'd DN. DN to R med HS also performed. Patient demonstrated appropriate response to Functional Dry Needling. good rhythmical contractions to treated muscle groups. Pt noted to walk with mildly improved gt.  Balance/Neuromuscular Re-Education  NMR 1: bridge 3 x 10 yellow t-band  NMR 2: LTR 2 x 10  NMR 3: BKFO 2 x 10 yellow t-band  NMR 4: PPT 3 x 10 3 sec hold  NMR 5: DKTC 3 x 10 orange t-ball  NMR 6: PPT " with hip ADD with ball 3 sec hold 30x    Time Entry(in minutes):  Manual Therapy Time Entry: 15  Neuromuscular Re-Education Time Entry: 30  Therapeutic Exercise Time Entry: 15    Assessment & Plan   Assessment: Lucinda was fatigued requesting not to ride the recumbent bike today. She continues making slow gains. Pain limiting at times. She continues with R knee HS stiffness upon standing from sitting for a length of time. Recommended she bring her cane in to gt train as she will benefit from dec wt bearing to R LE but able to do so safely while walking.  Evaluation/Treatment Tolerance: Patient tolerated treatment well    Patient will continue to benefit from skilled outpatient physical therapy to address the deficits listed in the problem list box on initial evaluation, provide pt/family education and to maximize pt's level of independence in the home and community environment.     Patient's spiritual, cultural, and educational needs considered and patient agreeable to plan of care and goals.           Plan: From a physical therapy perspective, the patient would benefit from: Skilled Rehab Services     Planned therapy interventions include: Therapeutic exercise, Therapeutic activities, Neuromuscular re-education, Manual therapy, and Other (Comment). Dry needling  Planned modalities to include: Electrical stimulation - passive/unattended, Thermotherapy (hot pack), and Cryotherapy (cold pack).         Visit Frequency: 2 times Per Week for 8 Weeks.    Goals:   Active       LTGS: 8 WEEKS         LTG #1 STRENGTH  (Progressing)       Start:  02/28/25    Expected End:  04/11/25       1.) Patient will demo equal strength for improving gait.         LTG #2 PAIN  (Met)       Start:  02/28/25    Resolved:  03/17/25    2.) Patient will ambulate without AD to demo dec pain and improved strength.         LTG #3 FOTO (Met)       Start:  02/28/25    Resolved:  03/17/25    3.) FOTO goal to be written.            NEW LTG -4 WEEKS        LTG #3: FOTO (Progressing)       Start:  03/17/25    Expected End:  04/11/25       4.) Patient will score +3 on the FOTO for improved QOL.            STG added 3/28/2025       STG #4 FOTO  (Progressing)       Start:  03/28/25    Expected End:  04/27/25       4.)Patient will score 46 on the FOTO for improving QOL.            STGs - 4 WEEKS         STG #1 ROM  (Met)       Start:  02/28/25    Resolved:  03/17/25    1.) Patient will demo bilateral hip AROM equal for symmetry in ambulation.         STG #2 STRENGTH  (Progressing)       Start:  02/28/25    Expected End:  04/11/25       2.) Patient will demo 4-/5 strength grossly in right hip for improving walking.         STG #3 FOTO  (Met)       Start:  02/28/25    Resolved:  03/17/25    3.) Patient will complete FOTO and goal will be written.             Saira Joyce, PT, DPT

## 2025-04-10 ENCOUNTER — CLINICAL SUPPORT (OUTPATIENT)
Dept: REHABILITATION | Facility: HOSPITAL | Age: 75
End: 2025-04-10
Payer: MEDICARE

## 2025-04-10 DIAGNOSIS — M25.651 DECREASED RANGE OF RIGHT HIP MOVEMENT: ICD-10-CM

## 2025-04-10 DIAGNOSIS — R26.9 GAIT DIFFICULTY: Primary | ICD-10-CM

## 2025-04-10 DIAGNOSIS — M25.551 BILATERAL HIP PAIN: ICD-10-CM

## 2025-04-10 DIAGNOSIS — M25.552 BILATERAL HIP PAIN: ICD-10-CM

## 2025-04-10 PROCEDURE — 97112 NEUROMUSCULAR REEDUCATION: CPT | Mod: PO,CQ

## 2025-04-10 PROCEDURE — 97110 THERAPEUTIC EXERCISES: CPT | Mod: PO,CQ

## 2025-04-10 NOTE — PROGRESS NOTES
"  Outpatient Rehab    Physical Therapy Visit    Patient Name: Lucinda Aguilera  MRN: [id number]  YOB: 1950  Encounter Date: 4/10/2025    Therapy Diagnosis:   Encounter Diagnoses   Name Primary?    Gait difficulty Yes    Decreased range of right hip movement     Bilateral hip pain      Physician: Nick Arnold MD    Physician Orders: Eval and Treat  Medical Diagnosis: Greater trochanteric bursitis of right hip    Visit # / Visits Authorized:  12 / 20  Insurance Authorization Period: 1/1/2025 to 12/31/2025  Date of Evaluation: 2/26/2025   Plan of Care Certification:  2/26/2025 to 4/11/2025       PT/PTA:     Number of PTA visits since last PT visit:   Time In: 0938   Time Out: 1035  Total Time: 57   Total Billable Time: 57    FOTO:  Intake Score:  %  Survey Score 1:  %  Survey Score 2:  %         Subjective   Lucinda continues with R hip and knee pain..         Objective            Treatment:  Therapeutic Exercise  TE 1: HS stretch with strap 3 x 20"  TE 2: ITB stretch with strap 3 x 20"  TE 3: piriformis stretch 3 x 20"  TE 4: Recumbent bike x 10min  TE 5: Fig 4 piriformis stretch 3 x 20"  TE 6: prone hip flexor with strap stretch 3 x 30"  Balance/Neuromuscular Re-Education  NMR 1: bridge 3 x 10 yellow t-band  NMR 2: LTR 2 x 10  NMR 3: BKFO 2 x 10 yellow t-band  NMR 4: PPT 3 x 10 3 sec hold  NMR 5: DKTC 3 x 10 orange t-ball  NMR 6: PPT with hip ADD with ball 3 sec hold 30x  NMR 7: SLR with QS 30x 1#    Time Entry(in minutes):  Neuromuscular Re-Education Time Entry: 30  Therapeutic Exercise Time Entry: 27    Assessment & Plan   Assessment: Lucinda lambert today's tx with progression of ther ex well. She does continue to require rest breaks due to fatigue. She required min VCs for proper form and core stab exs. She was able to preform all activities w/o difficulty or complaint. .  Evaluation/Treatment Tolerance: Patient tolerated treatment well    Patient will continue to benefit from skilled outpatient " physical therapy to address the deficits listed in the problem list box on initial evaluation, provide pt/family education and to maximize pt's level of independence in the home and community environment.     Patient's spiritual, cultural, and educational needs considered and patient agreeable to plan of care and goals.           Plan: From a physical therapy perspective, the patient would benefit from: Skilled Rehab Services     Planned therapy interventions include: Therapeutic exercise, Therapeutic activities, Neuromuscular re-education, Manual therapy, and Other (Comment). Dry needling  Planned modalities to include: Electrical stimulation - passive/unattended, Thermotherapy (hot pack), and Cryotherapy (cold pack).         Visit Frequency: 2 times Per Week for 8 Weeks.    Goals:   Active       LTGS: 8 WEEKS         LTG #1 STRENGTH  (Progressing)       Start:  02/28/25    Expected End:  04/11/25       1.) Patient will demo equal strength for improving gait.         LTG #2 PAIN  (Met)       Start:  02/28/25    Resolved:  03/17/25    2.) Patient will ambulate without AD to demo dec pain and improved strength.         LTG #3 FOTO (Met)       Start:  02/28/25    Resolved:  03/17/25    3.) FOTO goal to be written.            NEW LTG -4 WEEKS       LTG #3: FOTO (Progressing)       Start:  03/17/25    Expected End:  04/11/25       4.) Patient will score +3 on the FOTO for improved QOL.            STG added 3/28/2025       STG #4 FOTO  (Progressing)       Start:  03/28/25    Expected End:  04/27/25       4.)Patient will score 46 on the FOTO for improving QOL.            STGs - 4 WEEKS         STG #1 ROM  (Met)       Start:  02/28/25    Resolved:  03/17/25    1.) Patient will demo bilateral hip AROM equal for symmetry in ambulation.         STG #2 STRENGTH  (Progressing)       Start:  02/28/25    Expected End:  04/11/25       2.) Patient will demo 4-/5 strength grossly in right hip for improving walking.         STG #3  FOTO  (Met)       Start:  02/28/25    Resolved:  03/17/25    3.) Patient will complete FOTO and goal will be written.             Manuel Garcia, PTA

## 2025-04-14 ENCOUNTER — CLINICAL SUPPORT (OUTPATIENT)
Dept: REHABILITATION | Facility: HOSPITAL | Age: 75
End: 2025-04-14
Payer: MEDICARE

## 2025-04-14 ENCOUNTER — OFFICE VISIT (OUTPATIENT)
Dept: FAMILY MEDICINE | Facility: CLINIC | Age: 75
End: 2025-04-14
Payer: MEDICARE

## 2025-04-14 VITALS
HEART RATE: 72 BPM | OXYGEN SATURATION: 95 % | SYSTOLIC BLOOD PRESSURE: 132 MMHG | HEIGHT: 63 IN | BODY MASS INDEX: 30.35 KG/M2 | DIASTOLIC BLOOD PRESSURE: 68 MMHG | WEIGHT: 171.31 LBS

## 2025-04-14 DIAGNOSIS — R26.9 GAIT DIFFICULTY: ICD-10-CM

## 2025-04-14 DIAGNOSIS — R35.0 FREQUENCY OF URINATION: ICD-10-CM

## 2025-04-14 DIAGNOSIS — M25.651 DECREASED RANGE OF RIGHT HIP MOVEMENT: Primary | ICD-10-CM

## 2025-04-14 DIAGNOSIS — N39.0 URINARY TRACT INFECTION WITHOUT HEMATURIA, SITE UNSPECIFIED: Primary | ICD-10-CM

## 2025-04-14 LAB
BILIRUBIN, UA POC OHS: ABNORMAL
BLOOD, UA POC OHS: ABNORMAL
CLARITY, UA POC OHS: ABNORMAL
COLOR, UA POC OHS: YELLOW
GLUCOSE, UA POC OHS: NEGATIVE
KETONES, UA POC OHS: ABNORMAL
LEUKOCYTES, UA POC OHS: ABNORMAL
NITRITE, UA POC OHS: NEGATIVE
PH, UA POC OHS: 5.5
PROTEIN, UA POC OHS: 100
SPECIFIC GRAVITY, UA POC OHS: 1.01
UROBILINOGEN, UA POC OHS: 0.2

## 2025-04-14 PROCEDURE — 81003 URINALYSIS AUTO W/O SCOPE: CPT | Mod: PBBFAC,PO | Performed by: FAMILY MEDICINE

## 2025-04-14 PROCEDURE — 97530 THERAPEUTIC ACTIVITIES: CPT | Mod: PO | Performed by: PHYSICAL THERAPIST

## 2025-04-14 PROCEDURE — 99999 PR PBB SHADOW E&M-EST. PATIENT-LVL III: CPT | Mod: PBBFAC,,, | Performed by: FAMILY MEDICINE

## 2025-04-14 PROCEDURE — 99213 OFFICE O/P EST LOW 20 MIN: CPT | Mod: PBBFAC,PO | Performed by: FAMILY MEDICINE

## 2025-04-14 PROCEDURE — 87086 URINE CULTURE/COLONY COUNT: CPT | Performed by: FAMILY MEDICINE

## 2025-04-14 PROCEDURE — 99213 OFFICE O/P EST LOW 20 MIN: CPT | Mod: S$PBB,,, | Performed by: FAMILY MEDICINE

## 2025-04-14 PROCEDURE — 97112 NEUROMUSCULAR REEDUCATION: CPT | Mod: PO | Performed by: PHYSICAL THERAPIST

## 2025-04-14 PROCEDURE — 99999PBSHW POCT URINALYSIS(INSTRUMENT): Mod: PBBFAC,,,

## 2025-04-14 RX ORDER — CIPROFLOXACIN 500 MG/1
500 TABLET ORAL 2 TIMES DAILY
Qty: 14 TABLET | Refills: 0 | Status: SHIPPED | OUTPATIENT
Start: 2025-04-14 | End: 2025-04-21

## 2025-04-14 NOTE — PROGRESS NOTES
"  Outpatient Rehab    Physical Therapy Visit    Patient Name: Lucinda Aguilera  MRN: [id number]  YOB: 1950  Encounter Date: 4/14/2025    Therapy Diagnosis:   Encounter Diagnoses   Name Primary?    Decreased range of right hip movement Yes    Gait difficulty      Physician: Nick Arnold MD    Physician Orders: Eval and Treat  Medical Diagnosis: Greater trochanteric bursitis of right hip    Visit # / Visits Authorized:  13 / 20  Insurance Authorization Period: 1/1/2025 to 12/31/2025  Date of Evaluation: 2/26/2025   Plan of Care Certification:  2/26/2025 to 4/11/2025       PT/PTA: PT   Number of PTA visits since last PT visit:1  Time In: 1300   Time Out: 1400  Total Time: 60   Total Billable Time: 30    FOTO:  Intake Score:  %  Survey Score 1:  %  Survey Score 2:  %         Subjective   Lucinda states she does not have R hip pain unless standing for a period of time..  Pain reported as 0/10. at R hip    Objective            Treatment:  Therapeutic Exercise  TE 1: HS stretch with strap 3 x 20"  TE 2: ITB stretch with strap 3 x 20"  TE 3: piriformis stretch 3 x 20"  TE 4: Recumbent bike x 10min  TE 5: Fig 4 piriformis stretch 3 x 20"  TE 6: prone hip flexor with strap stretch 3 x 30"  Balance/Neuromuscular Re-Education  NMR 1: bridge 3 x 10 yellow t-band  NMR 2: Standing NBOS on floor with over head rainbow toy ball reaches and fwd reaches x 10 each, lateral reaches x 10 each  NMR 3: BKFO 2 x 10 yellow t-band  Therapeutic Activity  TA 1: Sit to standing using arm rests x 10  TA 2: Standing heel raises 2 x 10  TA 3: Standing lateral stepping at table 3 laps  TA 4: Standing hip abd x 10  TA 5: Standing HSC x 10  TA 6: Standing hip ext with knee bent x 10    Time Entry(in minutes):  Neuromuscular Re-Education Time Entry: 23  Therapeutic Activity Time Entry: 15  Therapeutic Exercise Time Entry: 22    Assessment & Plan   Assessment: Lucinda lambert today's tx with progression of ther ex well. She does continue to " require rest breaks due to fatigue. She does require HHA to off load wt a bit. Pt will cont most supine mat strengthening at home as directed to focus on wt bearing strengthening. Cont glute strength.  Evaluation/Treatment Tolerance: Patient tolerated treatment well    Patient will continue to benefit from skilled outpatient physical therapy to address the deficits listed in the problem list box on initial evaluation, provide pt/family education and to maximize pt's level of independence in the home and community environment.     Patient's spiritual, cultural, and educational needs considered and patient agreeable to plan of care and goals.           Plan: From a physical therapy perspective, the patient would benefit from: Skilled Rehab Services     Planned therapy interventions include: Therapeutic exercise, Therapeutic activities, Neuromuscular re-education, Manual therapy, and Other (Comment). Dry needling  Planned modalities to include: Electrical stimulation - passive/unattended, Thermotherapy (hot pack), and Cryotherapy (cold pack).         Visit Frequency: 2 times Per Week for 8 Weeks.    Goals:   Active       LTGS: 8 WEEKS         LTG #1 STRENGTH  (Progressing)       Start:  02/28/25    Expected End:  04/11/25       1.) Patient will demo equal strength for improving gait.         LTG #2 PAIN  (Met)       Start:  02/28/25    Resolved:  03/17/25    2.) Patient will ambulate without AD to demo dec pain and improved strength.         LTG #3 FOTO (Met)       Start:  02/28/25    Resolved:  03/17/25    3.) FOTO goal to be written.            NEW LTG -4 WEEKS       LTG #3: FOTO (Progressing)       Start:  03/17/25    Expected End:  04/11/25       4.) Patient will score +3 on the FOTO for improved QOL.            STG added 3/28/2025       STG #4 FOTO  (Progressing)       Start:  03/28/25    Expected End:  04/27/25       4.)Patient will score 46 on the FOTO for improving QOL.            STGs - 4 WEEKS         STG #1  ROM  (Met)       Start:  02/28/25    Resolved:  03/17/25    1.) Patient will demo bilateral hip AROM equal for symmetry in ambulation.         STG #2 STRENGTH  (Progressing)       Start:  02/28/25    Expected End:  04/11/25       2.) Patient will demo 4-/5 strength grossly in right hip for improving walking.         STG #3 FOTO  (Met)       Start:  02/28/25    Resolved:  03/17/25    3.) Patient will complete FOTO and goal will be written.             Saria Joyce, PT, DPT

## 2025-04-14 NOTE — PROGRESS NOTES
Subjective:       Patient ID: Lucinda Aguilera is a 74 y.o. female.    Chief Complaint: Burning and pressure upon urination (Woke up with it this am)    Onset this AM with urinary burning and lower abdominal pressure.  Symptoms are similar to previous UTIs  She completed antibiotic (Macrobid) 1 week ago for UTI after an Evisit.    Dysuria   This is a recurrent problem. The current episode started today. The problem occurs every urination. The problem has been unchanged. The quality of the pain is described as burning. The pain is at a severity of 10/10. The pain is severe. Associated symptoms include frequency. Pertinent negatives include no behavior changes, chills, discharge, flank pain, hematuria, hesitancy, nausea, possible pregnancy, sweats, urgency, vomiting, weight loss, constipation, rash or withholding. Her past medical history is significant for hypertension and recurrent UTIs. There is no history of catheterization, diabetes insipidus, diabetes mellitus, genitourinary reflux, kidney stones, a single kidney, urinary stasis or a urological procedure.       Past Medical History:   Diagnosis Date    Anxiety     takes daily Xanax    Arthritis     right thumb    Cataract     OU    Cholelithiasis     GERD (gastroesophageal reflux disease)     Hepatic steatosis     Hyperlipemia     Hypertension     PVD (posterior vitreous detachment)     OD       Past Surgical History:   Procedure Laterality Date    BREAST BIOPSY Left 08/28/2020    stereo biopsy    BREAST BIOPSY Left 10/07/2020    benign excisional biopsy    CAUDAL EPIDURAL STEROID INJECTION N/A 7/26/2024    Procedure: Injection-steroid-epidural-caudal;  Surgeon: Indra Cuellar MD;  Location: [facility] OR;  Service: Pain Management;  Laterality: N/A;    chest abcess      child    COLONOSCOPY  01/06/2012    Dr. Lopez: hemorrhoids, redundant colon    COLONOSCOPY N/A 02/17/2021    Dr. Lopez: Congested and erythematous mucosa in the rectum, in the recto-sigmoid colon  "and in the sigmoid colon, proctosigmoid colitis, Redundant colon; biopsy: focal active colitis "nonspecific but can be seen with acute self-limited colitis (infection), medication effect (e.g. NSAID use), proximity to diverticula, or early/evolving IBD    EPIDURAL STEROID INJECTION INTO LUMBAR SPINE N/A 5/30/2024    Procedure: Injection-steroid-epidural-lumbar   L5/S1;  Surgeon: Indra Cuellar MD;  Location: [facility];  Service: Pain Management;  Laterality: N/A;    ESOPHAGOGASTRODUODENOSCOPY N/A 06/06/2019    Dr. Lopez: small hiatal hernia, Non-erosive esophageal reflux (NERD) disease?., slight antritis; biopsy: Antral mucosa with chemical/reactive gastropathy. negative for h pylori    ESOPHAGOGASTRODUODENOSCOPY N/A 02/17/2021    Procedure: EGD (ESOPHAGOGASTRODUODENOSCOPY);  Surgeon: Nathan Lopez Jr., MD;  Location: [facility];  Service: Endoscopy;  Laterality: N/A; Minimal gastritis; biopsy: stomach- negative for h pylori, active chronic gastritis with focal features of erosive gastritis, duodenum WNL    ESOPHAGOGASTRODUODENOSCOPY N/A 05/10/2022    Procedure: EGD (ESOPHAGOGASTRODUODENOSCOPY);  Surgeon: Nathan Lopez Jr., MD;  Location: [facility];  Service: Endoscopy;  Laterality: N/A;    ESOPHAGOGASTRODUODENOSCOPY N/A 3/12/2024    Procedure: EGD (ESOPHAGOGASTRODUODENOSCOPY);  Surgeon: Nathan Lopez Jr., MD;  Location: [facility];  Service: Endoscopy;  Laterality: N/A;    EXCISION OF BURSA OF HIP Right 2/3/2025    Procedure: BURSECTOMY, HIP;  Surgeon: Nick Arnold MD;  Location: [facility];  Service: Orthopedics;  Laterality: Right;    EXCISIONAL BIOPSY Left 10/07/2020    Procedure: EXCISIONAL BIOPSY-Left with radiological marker;  Surgeon: Brooklynn Ashford MD;  Location: [facility];  Service: General;  Laterality: Left;    FRACTURE SURGERY  2010    Left thumb repaired surgically    INJECTION OF ANESTHETIC AGENT AROUND MEDIAL BRANCH NERVES INNERVATING LUMBAR FACET JOINT Bilateral 9/12/2024    Procedure: " "Block-nerve-medial branch-lumbar    L4/5, L5/S1;  Surgeon: Indra Cuellar MD;  Location: [facility] OR;  Service: Pain Management;  Laterality: Bilateral;    JOINT REPLACEMENT Bilateral     knee    KNEE ARTHROSCOPY W/ LASER      right    LAPAROSCOPIC CHOLECYSTECTOMY N/A 06/14/2019    Procedure: CHOLECYSTECTOMY, LAPAROSCOPIC;  Surgeon: Guevara Evans MD;  Location: [facility] OR;  Service: General;  Laterality: N/A;    thumb surgery  11/2014    TOTAL KNEE ARTHROPLASTY Left 06/19/2018    Procedure: REPLACEMENT-KNEE-TOTAL;  Surgeon: Nj Melvin MD;  [facility];  Service: Orthopedics;  Laterality: Left;  [employer]    UPPER GASTROINTESTINAL ENDOSCOPY  07/13/2017    Dr. Lopez: NERD, Gastric mucosal atrophy. antritis, Scar in the incisura. Biopsied; biopsy: chronic gastritis. negative for h pylori       Review of patient's allergies indicates:  No Known Allergies    Social History[1]    Medications Ordered Prior to Encounter[2]    Family History   Problem Relation Name Age of Onset    Hypertension Mother age 82     Heart disease Mother age 82     Glaucoma Mother age 82     Stroke Father age 50     Glaucoma Sister      Cataracts Sister      Macular degeneration Sister      Breast cancer Neg Hx      Colon cancer Neg Hx      Crohn's disease Neg Hx      Ulcerative colitis Neg Hx      Stomach cancer Neg Hx      Esophageal cancer Neg Hx      Celiac disease Neg Hx      Amblyopia Neg Hx      Blindness Neg Hx      Retinal detachment Neg Hx      Strabismus Neg Hx         Review of Systems   Constitutional:  Negative for chills and weight loss.   Gastrointestinal:  Negative for constipation, nausea and vomiting.   Genitourinary:  Positive for dysuria and frequency. Negative for flank pain, hematuria, hesitancy and urgency.   Skin:  Negative for rash.       Objective:      /68   Pulse 72   Ht 5' 3" (1.6 m)   Wt 77.7 kg (171 lb 4.8 oz)   LMP 04/18/2004   SpO2 95%   BMI 30.34 kg/m²   Physical Exam  Constitutional: "       Appearance: Normal appearance. She is well-developed.   HENT:      Head: Normocephalic.      Mouth/Throat:      Pharynx: No oropharyngeal exudate or posterior oropharyngeal erythema.   Eyes:      Conjunctiva/sclera: Conjunctivae normal.      Pupils: Pupils are equal, round, and reactive to light.   Neck:      Thyroid: No thyromegaly.   Cardiovascular:      Rate and Rhythm: Normal rate and regular rhythm.      Heart sounds: Normal heart sounds, S1 normal and S2 normal. No murmur heard.     No friction rub. No gallop.   Pulmonary:      Effort: Pulmonary effort is normal.      Breath sounds: Normal breath sounds. No wheezing or rales.   Abdominal:      General: Abdomen is flat. Bowel sounds are normal.      Palpations: Abdomen is soft.      Tenderness: There is no abdominal tenderness.   Musculoskeletal:      Cervical back: Normal range of motion and neck supple.      Right lower leg: No edema.      Left lower leg: No edema.   Lymphadenopathy:      Cervical: No cervical adenopathy.   Skin:     General: Skin is warm.      Findings: No rash.   Neurological:      Mental Status: She is alert and oriented to person, place, and time.      Cranial Nerves: No cranial nerve deficit.      Gait: Gait normal.         Results for orders placed or performed in visit on 04/14/25   POCT Urinalysis(Instrument)    Collection Time: 04/14/25 10:10 AM   Result Value Ref Range    Color, POC UA Yellow Yellow, Straw, Colorless    Clarity, POC UA Cloudy (A) Clear    Glucose, POC UA Negative Negative    Bilirubin, POC UA Small (A) Negative    Ketones, POC UA Trace (A) Negative    Spec Grav POC UA 1.015 1.005 - 1.030    Blood, POC UA Moderate (A) Negative    pH, POC UA 5.5 5.0 - 8.0    Protein, POC  (A) Negative    Urobilinogen, POC UA 0.2 <=1.0    Nitrite, POC UA Negative Negative    WBC, POC UA Large (A) Negative     *Note: Due to a large number of results and/or encounters for the requested time period, some results have not been  displayed. A complete set of results can be found in Results Review.       Previous urine cultures reviewed    Assessment:       1. Urinary tract infection without hematuria, site unspecified    2. Frequency of urination        Plan:       Urinary tract infection without hematuria, site unspecified  -     Urine Culture High Risk; Future; Expected date: 04/14/2025  -     ciprofloxacin HCl (CIPRO) 500 MG tablet; Take 1 tablet (500 mg total) by mouth 2 (two) times daily. for 7 days  Dispense: 14 tablet; Refill: 0    Frequency of urination  -     POCT Urinalysis(Instrument)      Increase fluids  Counseled on regular exercise, maintenance of a healthy weight, balanced diet rich in fruits/vegetables and lean protein, and avoidance of unhealthy habits like smoking and excessive alcohol intake.         [1]   Social History  Socioeconomic History    Marital status:     Number of children: 2   Occupational History    Occupation: retired teacher and principal    Occupation: substitute   Tobacco Use    Smoking status: Never    Smokeless tobacco: Never   Substance and Sexual Activity    Alcohol use: Not Currently     Comment: social- maybe once every few months    Drug use: No    Sexual activity: Yes     Partners: Male     Birth control/protection: Post-menopausal, None     Social Drivers of Health     Financial Resource Strain: Low Risk  (11/25/2024)    Overall Financial Resource Strain (CARDIA)     Difficulty of Paying Living Expenses: Not hard at all   Food Insecurity: No Food Insecurity (11/25/2024)    Hunger Vital Sign     Worried About Running Out of Food in the Last Year: Never true     Ran Out of Food in the Last Year: Never true   Transportation Needs: No Transportation Needs (11/9/2023)    PRAPARE - Transportation     Lack of Transportation (Medical): No     Lack of Transportation (Non-Medical): No   Physical Activity: Sufficiently Active (11/25/2024)    Exercise Vital Sign     Days of Exercise per Week: 4 days      Minutes of Exercise per Session: 50 min   Stress: No Stress Concern Present (11/25/2024)    [employer] - Occupational Stress Questionnaire     Feeling of Stress : Not at all   Housing Stability: Low Risk  (11/9/2023)    Housing Stability Vital Sign     Unable to Pay for Housing in the Last Year: No     Number of Places Lived in the Last Year: 1     Unstable Housing in the Last Year: No   [2]   Current Outpatient Medications on File Prior to Visit   Medication Sig Dispense Refill    acetaminophen (TYLENOL) 500 MG tablet Take 500 mg by mouth every 6 (six) hours as needed for Pain.      ascorbic acid, vitamin C, (VITAMIN C) 250 MG tablet Take 500 mg by mouth once daily.       atenoloL (TENORMIN) 25 MG tablet Take 1 tablet (25 mg total) by mouth every evening. 30 tablet 11    azelastine (ASTELIN) 137 mcg (0.1 %) nasal spray 1 spray by Nasal route 2 (two) times daily as needed.      balsalazide (COLAZAL) 750 mg capsule TAKE 3 CAPSULES(2250 MG) BY MOUTH TWICE DAILY WITH MEALS 180 capsule 11    benzonatate (TESSALON) 200 MG capsule Take 1 capsule (200 mg total) by mouth 3 (three) times daily as needed for Cough. 30 capsule 1    buPROPion (WELLBUTRIN XL) 150 MG TB24 tablet Take 1 tablet (150 mg total) by mouth every morning. 30 tablet 2    CRANBERRY EXTRACT-D-MANNOSE ORAL Take by mouth.      d-mannose 500 mg Cap Take by mouth.      diltiaZEM (DILACOR XR) 240 MG CDCR Take 1 capsule (240 mg total) by mouth every evening. 30 capsule 11    docusate sodium (COLACE) 100 MG capsule Take 100 mg by mouth 2 (two) times daily as needed for Constipation.      DULoxetine (CYMBALTA) 30 MG capsule TAKE 1 CAPSULE(30 MG) BY MOUTH TWICE DAILY 60 capsule 01    famotidine (PEPCID) 40 MG tablet Take 1 tablet (40 mg total) by mouth every evening. 90 tablet 3    fluticasone propionate (FLONASE) 50 mcg/actuation nasal spray 1 spray (50 mcg total) by Each Nostril route once daily. 16 g 3    lisinopriL  (PRINIVIL,ZESTRIL) 40 MG tablet Take 1 tablet (40 mg total) by mouth 2 (two) times a day. 60 tablet 11    LORazepam (ATIVAN) 0.5 MG tablet Take 0.5 tablets (0.25 mg total) by mouth 3 (three) times daily as needed for Anxiety. 45 tablet 0    magnesium oxide (MAG-OX) 400 mg (241.3 mg magnesium) tablet Take 1 tablet (400 mg total) by mouth once daily. 30 tablet 3    methocarbamoL (ROBAXIN) 750 MG Tab Take 750 mg by mouth every 6 (six) hours as needed.      multivitamin (THERAGRAN) per tablet Take 1 tablet by mouth once daily.      ondansetron (ZOFRAN-ODT) 4 MG TbDL Take 1 tablet (4 mg total) by mouth every 6 (six) hours as needed (nausea). 30 tablet 3    pantoprazole (PROTONIX) 40 MG tablet TAKE 1 TABLET(40 MG) BY MOUTH TWICE DAILY 180 tablet 3    psyllium husk, aspartame, (NATURAL PSYLLIUM FIBER) 3.4 gram/5.8 gram Powd Take by mouth.      rosuvastatin (CRESTOR) 20 MG tablet Take 1 tablet (20 mg total) by mouth once daily. 30 tablet 11    solifenacin (VESICARE) 10 MG tablet Take 1 tablet (10 mg total) by mouth once daily. 30 tablet 11    traMADoL (ULTRAM) 50 mg tablet Take 1 tablet (50 mg total) by mouth every 4 (four) hours as needed. 44 tablet 0    famotidine-calcium carbonate-magnesium hydroxide (PEPCID COMPLETE) -165 mg Take 1 tablet by mouth 2 (two) times daily as needed. Chew 1 or 2 tablets, 2-3 times a day, as needed, for nausea or heartburn.       No current facility-administered medications on file prior to visit.

## 2025-04-16 ENCOUNTER — CLINICAL SUPPORT (OUTPATIENT)
Dept: REHABILITATION | Facility: HOSPITAL | Age: 75
End: 2025-04-16
Payer: MEDICARE

## 2025-04-16 DIAGNOSIS — M25.552 BILATERAL HIP PAIN: ICD-10-CM

## 2025-04-16 DIAGNOSIS — M25.551 BILATERAL HIP PAIN: ICD-10-CM

## 2025-04-16 DIAGNOSIS — R26.9 GAIT DIFFICULTY: Primary | ICD-10-CM

## 2025-04-16 LAB — BACTERIA UR CULT: ABNORMAL

## 2025-04-16 PROCEDURE — 97112 NEUROMUSCULAR REEDUCATION: CPT | Mod: PO,CQ

## 2025-04-16 PROCEDURE — 97110 THERAPEUTIC EXERCISES: CPT | Mod: PO,CQ

## 2025-04-16 NOTE — PROGRESS NOTES
"  Outpatient Rehab    Physical Therapy Visit    Patient Name: Lucinda Aguilera  MRN: [id number]  YOB: 1950  Encounter Date: 4/16/2025    Therapy Diagnosis:   Encounter Diagnoses   Name Primary?    Gait difficulty Yes    Bilateral hip pain      Physician: Nick Arnold MD    Physician Orders: Eval and Treat  Medical Diagnosis: Greater trochanteric bursitis of right hip    Visit # / Visits Authorized:  14 / 20  Insurance Authorization Period: 1/1/2025 to 12/31/2025  Date of Evaluation: 2/26/2025   Plan of Care Certification:  2/26/2025 to 4/11/2025       PT/PTA:     Number of PTA visits since last PT visit:   Time In: 1300   Time Out: 1400  Total Time: 60   Total Billable Time: 60    FOTO:  Intake Score:  %  Survey Score 1:  %  Survey Score 2:  %         Subjective   Lucinda is w/o complaint upon arrival today..  Pain reported as 0/10. at R hip    Objective            Treatment:  Therapeutic Exercise  TE 1: HS stretch with strap 3 x 20"  TE 2: ITB stretch with strap 3 x 20"  TE 3: piriformis stretch 3 x 20"  TE 4: Recumbent bike x 10min  TE 5: Fig 4 piriformis stretch 3 x 20"  TE 6: prone hip flexor with strap stretch 3 x 30"  Balance/Neuromuscular Re-Education  NMR 1: bridge 3 x 10 yellow t-band  NMR 2: Standing NBOS on floor with over head rainbow toy ball reaches and fwd reaches x 10 each, lateral reaches x 10 each  NMR 3: BKFO 2 x 10 yellow t-band  NMR 4: PPT 3 x 10 3 sec hold  NMR 5: DKTC 3 x 10 orange t-ball  NMR 6: PPT with hip ADD with ball 3 sec hold 30x  NMR 7: SLR with QS 30x 1#    Time Entry(in minutes):  Neuromuscular Re-Education Time Entry: 30  Therapeutic Exercise Time Entry: 30    Assessment & Plan   Assessment: Lucinda lambert today's tx with progression of ther ex well. She does continue to require rest breaks due to fatigue. She does require stand by A with transitioning from supine-prone-supine-side lying due to apprehension and fear.of falling.. Pt will cont most supine mat " strengthening at home as directed to focus on wt bearing strengthening. Cont glute strength.  Evaluation/Treatment Tolerance: Patient tolerated treatment well    Patient will continue to benefit from skilled outpatient physical therapy to address the deficits listed in the problem list box on initial evaluation, provide pt/family education and to maximize pt's level of independence in the home and community environment.     Patient's spiritual, cultural, and educational needs considered and patient agreeable to plan of care and goals.           Plan: From a physical therapy perspective, the patient would benefit from: Skilled Rehab Services     Planned therapy interventions include: Therapeutic exercise, Therapeutic activities, Neuromuscular re-education, Manual therapy, and Other (Comment). Dry needling  Planned modalities to include: Electrical stimulation - passive/unattended, Thermotherapy (hot pack), and Cryotherapy (cold pack).         Visit Frequency: 2 times Per Week for 8 Weeks.    Goals:   Active       LTGS: 8 WEEKS         LTG #1 STRENGTH  (Progressing)       Start:  02/28/25    Expected End:  04/11/25       1.) Patient will demo equal strength for improving gait.         LTG #2 PAIN  (Met)       Start:  02/28/25    Resolved:  03/17/25    2.) Patient will ambulate without AD to demo dec pain and improved strength.         LTG #3 FOTO (Met)       Start:  02/28/25    Resolved:  03/17/25    3.) FOTO goal to be written.            NEW LTG -4 WEEKS       LTG #3: FOTO (Progressing)       Start:  03/17/25    Expected End:  04/11/25       4.) Patient will score +3 on the FOTO for improved QOL.            STG added 3/28/2025       STG #4 FOTO  (Progressing)       Start:  03/28/25    Expected End:  04/27/25       4.)Patient will score 46 on the FOTO for improving QOL.            STGs - 4 WEEKS         STG #1 ROM  (Met)       Start:  02/28/25    Resolved:  03/17/25    1.) Patient will demo bilateral hip AROM equal  for symmetry in ambulation.         STG #2 STRENGTH  (Progressing)       Start:  02/28/25    Expected End:  04/11/25       2.) Patient will demo 4-/5 strength grossly in right hip for improving walking.         STG #3 FOTO  (Met)       Start:  02/28/25    Resolved:  03/17/25    3.) Patient will complete FOTO and goal will be written.             Manuel Garcia, PTA

## 2025-04-24 ENCOUNTER — CLINICAL SUPPORT (OUTPATIENT)
Dept: REHABILITATION | Facility: HOSPITAL | Age: 75
End: 2025-04-24
Attending: PHYSICAL THERAPIST
Payer: MEDICARE

## 2025-04-24 DIAGNOSIS — M25.651 DECREASED RANGE OF RIGHT HIP MOVEMENT: ICD-10-CM

## 2025-04-24 DIAGNOSIS — R26.9 GAIT DIFFICULTY: Primary | ICD-10-CM

## 2025-04-24 PROCEDURE — 97112 NEUROMUSCULAR REEDUCATION: CPT | Mod: PO | Performed by: PHYSICAL THERAPIST

## 2025-04-24 PROCEDURE — 97110 THERAPEUTIC EXERCISES: CPT | Mod: PO | Performed by: PHYSICAL THERAPIST

## 2025-04-26 ENCOUNTER — PATIENT MESSAGE (OUTPATIENT)
Dept: ADMINISTRATIVE | Facility: OTHER | Age: 75
End: 2025-04-26
Payer: MEDICARE

## 2025-04-28 DIAGNOSIS — M70.51 PES ANSERINUS BURSITIS OF RIGHT KNEE: Primary | ICD-10-CM

## 2025-04-28 NOTE — PROGRESS NOTES
Outpatient Rehab    Physical Therapy Visit    Patient Name: Lucinda Aguilera  MRN: [id number]  YOB: 1950  Encounter Date: 4/24/2025    Therapy Diagnosis:   Encounter Diagnoses   Name Primary?    Gait difficulty Yes    Decreased range of right hip movement      Physician: Nick Arnold MD    Physician Orders: Eval and Treat  Medical Diagnosis: Greater trochanteric bursitis of right hip    Visit # / Visits Authorized:  15 / 20  Insurance Authorization Period: 1/1/2025 to 12/31/2025  Date of Evaluation: 2/26/2025   Plan of Care Certification:  2/26/2025 to 4/11/2025       PT/PTA: PT   Number of PTA visits since last PT visit:1  Time In: 1300   Time Out: 1400  Total Time: 60   Total Billable Time: 30    FOTO:  Intake Score:  %  Survey Score 1:  %  Survey Score 2:  %         Subjective   Lucinda is w/o complaint upon arrival today. However, she reports sudden knee pain of HS upon standing..  Pain reported as 0/10. at R hip    Objective            Treatment:  Therapeutic Exercise  TE 7: PROM HIP AND KNEE STRETCHING OF RIGHT  Balance/Neuromuscular Re-Education  NMR 1: bridge 3 x 10 yellow t-band  NMR 3: BKFO 2 x 10 yellow t-band  NMR 4: PPT 3 x 10 3 sec hold  NMR 6: PPT with hip ADD with ball 3 sec hold 30x    Time Entry(in minutes):  Neuromuscular Re-Education Time Entry: 30  Therapeutic Exercise Time Entry: 10    Assessment & Plan   Assessment: Lucinda had impaired tolerance to ex this visit. She arrived well and had been doing fine, but upon standing, she had sudden R HS pain.  She does require stand by A with transitioning from supine-prone-supine-side lying due to apprehension and fear.of falling. PROM performed to assist pt with continuing sessions. However, this was successful in reducing pt pain, but pain returned with knee flex. PT unable to perform special testing d/t pt tolerance. She is painful to palpation at lat R HS. Cont glute strength at this time. If not back to baseline, refer to   [facility] for possible imaging.  Evaluation/Treatment Tolerance: Patient limited by pain    Patient will continue to benefit from skilled outpatient physical therapy to address the deficits listed in the problem list box on initial evaluation, provide pt/family education and to maximize pt's level of independence in the home and community environment.     Patient's spiritual, cultural, and educational needs considered and patient agreeable to plan of care and goals.           Plan: From a physical therapy perspective, the patient would benefit from: Skilled Rehab Services     Planned therapy interventions include: Therapeutic exercise, Therapeutic activities, Neuromuscular re-education, Manual therapy, and Other (Comment). Dry needling  Planned modalities to include: Electrical stimulation - passive/unattended, Thermotherapy (hot pack), and Cryotherapy (cold pack).         Visit Frequency: 2 times Per Week for 8 Weeks.    Goals:   Active       LTGS: 8 WEEKS         LTG #1 STRENGTH  (Ongoing)       Start:  02/28/25    Expected End:  04/11/25       1.) Patient will demo equal strength for improving gait.         LTG #2 PAIN  (Met)       Start:  02/28/25    Resolved:  03/17/25    2.) Patient will ambulate without AD to demo dec pain and improved strength.         LTG #3 FOTO (Met)       Start:  02/28/25    Resolved:  03/17/25    3.) FOTO goal to be written.            NEW LTG -4 WEEKS       LTG #3: FOTO (Ongoing)       Start:  03/17/25    Expected End:  04/11/25       4.) Patient will score +3 on the FOTO for improved QOL.            STG added 3/28/2025       STG #4 FOTO  (Ongoing)       Start:  03/28/25    Expected End:  04/27/25       4.)Patient will score 46 on the FOTO for improving QOL.            STGs - 4 WEEKS         STG #1 ROM  (Met)       Start:  02/28/25    Resolved:  03/17/25    1.) Patient will demo bilateral hip AROM equal for symmetry in ambulation.         STG #2 STRENGTH  (Ongoing)       Start:  02/28/25     Expected End:  04/11/25       2.) Patient will demo 4-/5 strength grossly in right hip for improving walking.         STG #3 FOTO  (Met)       Start:  02/28/25    Resolved:  03/17/25    3.) Patient will complete FOTO and goal will be written.             Saira Joyce, PT, DPT

## 2025-04-29 ENCOUNTER — OFFICE VISIT (OUTPATIENT)
Dept: FAMILY MEDICINE | Facility: CLINIC | Age: 75
End: 2025-04-29
Payer: MEDICARE

## 2025-04-29 ENCOUNTER — OFFICE VISIT (OUTPATIENT)
Dept: ORTHOPEDICS | Facility: CLINIC | Age: 75
End: 2025-04-29
Payer: MEDICARE

## 2025-04-29 VITALS
WEIGHT: 168.44 LBS | BODY MASS INDEX: 29.84 KG/M2 | SYSTOLIC BLOOD PRESSURE: 130 MMHG | TEMPERATURE: 98 F | DIASTOLIC BLOOD PRESSURE: 76 MMHG | HEIGHT: 63 IN | OXYGEN SATURATION: 95 % | HEART RATE: 78 BPM

## 2025-04-29 VITALS
HEART RATE: 78 BPM | HEIGHT: 63 IN | BODY MASS INDEX: 29.84 KG/M2 | DIASTOLIC BLOOD PRESSURE: 76 MMHG | WEIGHT: 168.44 LBS | SYSTOLIC BLOOD PRESSURE: 130 MMHG

## 2025-04-29 DIAGNOSIS — T46.4X5A ACE-INHIBITOR COUGH: ICD-10-CM

## 2025-04-29 DIAGNOSIS — F41.1 GAD (GENERALIZED ANXIETY DISORDER): ICD-10-CM

## 2025-04-29 DIAGNOSIS — R05.8 ACE-INHIBITOR COUGH: ICD-10-CM

## 2025-04-29 DIAGNOSIS — H61.23 BILATERAL IMPACTED CERUMEN: ICD-10-CM

## 2025-04-29 DIAGNOSIS — I10 PRIMARY HYPERTENSION: Primary | ICD-10-CM

## 2025-04-29 DIAGNOSIS — M54.16 LUMBAR RADICULOPATHY: ICD-10-CM

## 2025-04-29 DIAGNOSIS — M70.51 PES ANSERINUS BURSITIS OF RIGHT KNEE: Primary | ICD-10-CM

## 2025-04-29 PROCEDURE — 99999 PR PBB SHADOW E&M-EST. PATIENT-LVL V: CPT | Mod: PBBFAC,,, | Performed by: NURSE PRACTITIONER

## 2025-04-29 PROCEDURE — 20610 DRAIN/INJ JOINT/BURSA W/O US: CPT | Mod: PBBFAC,PO,RT | Performed by: NURSE PRACTITIONER

## 2025-04-29 PROCEDURE — 99213 OFFICE O/P EST LOW 20 MIN: CPT | Mod: PBBFAC,27,PO | Performed by: NURSE PRACTITIONER

## 2025-04-29 PROCEDURE — 99214 OFFICE O/P EST MOD 30 MIN: CPT | Mod: S$PBB,25,, | Performed by: NURSE PRACTITIONER

## 2025-04-29 PROCEDURE — 99999PBSHW PR PBB SHADOW TECHNICAL ONLY FILED TO HB: Mod: PBBFAC,,,

## 2025-04-29 PROCEDURE — 99999 PR PBB SHADOW E&M-EST. PATIENT-LVL III: CPT | Mod: PBBFAC,,, | Performed by: NURSE PRACTITIONER

## 2025-04-29 PROCEDURE — 20610 DRAIN/INJ JOINT/BURSA W/O US: CPT | Mod: S$PBB,RT,, | Performed by: NURSE PRACTITIONER

## 2025-04-29 PROCEDURE — 99215 OFFICE O/P EST HI 40 MIN: CPT | Mod: PBBFAC,PO | Performed by: NURSE PRACTITIONER

## 2025-04-29 RX ORDER — AZELASTINE 1 MG/ML
1 SPRAY, METERED NASAL 2 TIMES DAILY PRN
Qty: 30 ML | Refills: 11 | Status: SHIPPED | OUTPATIENT
Start: 2025-04-29

## 2025-04-29 RX ORDER — TRIAMCINOLONE ACETONIDE 40 MG/ML
40 INJECTION, SUSPENSION INTRA-ARTICULAR; INTRAMUSCULAR
Status: DISCONTINUED | OUTPATIENT
Start: 2025-04-29 | End: 2025-04-29 | Stop reason: HOSPADM

## 2025-04-29 RX ORDER — DULOXETIN HYDROCHLORIDE 30 MG/1
30 CAPSULE, DELAYED RELEASE ORAL DAILY
Qty: 30 CAPSULE | Refills: 5 | Status: SHIPPED | OUTPATIENT
Start: 2025-04-29

## 2025-04-29 RX ORDER — LOSARTAN POTASSIUM 100 MG/1
100 TABLET ORAL DAILY
Qty: 30 TABLET | Refills: 0 | Status: SHIPPED | OUTPATIENT
Start: 2025-04-29 | End: 2026-04-29

## 2025-04-29 RX ORDER — LORAZEPAM 0.5 MG/1
0.25 TABLET ORAL 3 TIMES DAILY PRN
Qty: 45 TABLET | Refills: 0 | Status: SHIPPED | OUTPATIENT
Start: 2025-04-29 | End: 2025-05-29

## 2025-04-29 RX ADMIN — TRIAMCINOLONE ACETONIDE 40 MG: 40 INJECTION, SUSPENSION INTRA-ARTICULAR; INTRAMUSCULAR at 11:04

## 2025-04-29 NOTE — PROGRESS NOTES
"Chief Complaint   Patient presents with    Right Knee - Pain         HPI:   This is a 74 y.o. who presents to clinic today complaining of right knee and right hamstring pain for several weeks after no known trauma. Pain is progressively worsening. No numbness or tingling. No associated signs or symptoms.    Past Medical History:   Diagnosis Date    Anxiety     takes daily Xanax    Arthritis     right thumb    Cataract     OU    Cholelithiasis     GERD (gastroesophageal reflux disease)     Hepatic steatosis     Hyperlipemia     Hypertension     PVD (posterior vitreous detachment)     OD     Past Surgical History:   Procedure Laterality Date    BREAST BIOPSY Left 08/28/2020    stereo biopsy    BREAST BIOPSY Left 10/07/2020    benign excisional biopsy    CAUDAL EPIDURAL STEROID INJECTION N/A 7/26/2024    Procedure: Injection-steroid-epidural-caudal;  Surgeon: Indra Cuellar MD;  Location: [facility] OR;  Service: Pain Management;  Laterality: N/A;    chest abcess      child    COLONOSCOPY  01/06/2012    Dr. Lopez: hemorrhoids, redundant colon    COLONOSCOPY N/A 02/17/2021    Dr. Lopez: Congested and erythematous mucosa in the rectum, in the recto-sigmoid colon and in the sigmoid colon, proctosigmoid colitis, Redundant colon; biopsy: focal active colitis "nonspecific but can be seen with acute self-limited colitis (infection), medication effect (e.g. NSAID use), proximity to diverticula, or early/evolving IBD    EPIDURAL STEROID INJECTION INTO LUMBAR SPINE N/A 5/30/2024    Procedure: Injection-steroid-epidural-lumbar   L5/S1;  Surgeon: Indra Cuellar MD;  Location: [facility] OR;  Service: Pain Management;  Laterality: N/A;    ESOPHAGOGASTRODUODENOSCOPY N/A 06/06/2019    Dr. Lopez: small hiatal hernia, Non-erosive esophageal reflux (NERD) disease?., slight antritis; biopsy: Antral mucosa with chemical/reactive gastropathy. negative for h pylori    ESOPHAGOGASTRODUODENOSCOPY N/A 02/17/2021    Procedure: EGD " (ESOPHAGOGASTRODUODENOSCOPY);  Surgeon: Nathan Lopez Jr., MD;  Location: [facility] ENDO;  Service: Endoscopy;  Laterality: N/A; Minimal gastritis; biopsy: stomach- negative for h pylori, active chronic gastritis with focal features of erosive gastritis, duodenum WNL    ESOPHAGOGASTRODUODENOSCOPY N/A 05/10/2022    Procedure: EGD (ESOPHAGOGASTRODUODENOSCOPY);  Surgeon: Nathan Lopez Jr., MD;  Location: [facility] ENDO;  Service: Endoscopy;  Laterality: N/A;    ESOPHAGOGASTRODUODENOSCOPY N/A 3/12/2024    Procedure: EGD (ESOPHAGOGASTRODUODENOSCOPY);  Surgeon: Nathan Lopez Jr., MD;  Location: [facility];  Service: Endoscopy;  Laterality: N/A;    EXCISION OF BURSA OF HIP Right 2/3/2025    Procedure: BURSECTOMY, HIP;  Surgeon: Nick Arnold MD;  Location: [facility] OR;  Service: Orthopedics;  Laterality: Right;    EXCISIONAL BIOPSY Left 10/07/2020    Procedure: EXCISIONAL BIOPSY-Left with radiological marker;  Surgeon: Brooklynn Ashford MD;  Location: [facility];  Service: General;  Laterality: Left;    FRACTURE SURGERY  2010    Left thumb repaired surgically    INJECTION OF ANESTHETIC AGENT AROUND MEDIAL BRANCH NERVES INNERVATING LUMBAR FACET JOINT Bilateral 9/12/2024    Procedure: Block-nerve-medial branch-lumbar    L4/5, L5/S1;  Surgeon: Indra Cuellar MD;  Location: [facility];  Service: Pain Management;  Laterality: Bilateral;    JOINT REPLACEMENT Bilateral     knee    KNEE ARTHROSCOPY W/ LASER      right    LAPAROSCOPIC CHOLECYSTECTOMY N/A 06/14/2019    Procedure: CHOLECYSTECTOMY, LAPAROSCOPIC;  Surgeon: Guevara Evans MD;  Location: [facility] OR;  Service: General;  Laterality: N/A;    thumb surgery  11/2014    TOTAL KNEE ARTHROPLASTY Left 06/19/2018    Procedure: REPLACEMENT-KNEE-TOTAL;  Surgeon: Nj Melvin MD;  Location: [address];  Service: Orthopedics;  Laterality: Left;  [place]    UPPER GASTROINTESTINAL ENDOSCOPY  07/13/2017    Dr. Lopez: NERD, Gastric mucosal atrophy. antritis, Scar in the incisura.  Biopsied; biopsy: chronic gastritis. negative for h pylori     Medications Ordered Prior to Encounter[1]  Review of patient's allergies indicates:  No Known Allergies  Family History   Problem Relation Name Age of Onset    Hypertension Mother age 82     Heart disease Mother age 82     Glaucoma Mother age 82     Stroke Father age 50     Glaucoma Sister      Cataracts Sister      Macular degeneration Sister      Breast cancer Neg Hx      Colon cancer Neg Hx      Crohn's disease Neg Hx      Ulcerative colitis Neg Hx      Stomach cancer Neg Hx      Esophageal cancer Neg Hx      Celiac disease Neg Hx      Amblyopia Neg Hx      Blindness Neg Hx      Retinal detachment Neg Hx      Strabismus Neg Hx       Social History[2]    Review of Systems:  Constitutional:  Denies fever or chills   Eyes:  Denies change in visual acuity   HENT:  Denies nasal congestion or sore throat   Respiratory:  Denies cough or shortness of breath   Cardiovascular:  Denies chest pain or edema   GI:  Denies abdominal pain, nausea, vomiting, bloody stools or diarrhea   :  Denies dysuria   Integument:  Denies rash   Neurologic:  Denies headache, focal weakness or sensory changes   Endocrine:  Denies polyuria or polydipsia   Lymphatic:  Denies swollen glands   Psychiatric:  Denies depression or anxiety     Physical Exam:   Constitutional:  Well developed, well nourished, no acute distress, non-toxic appearance   Integument:  Well hydrated, no rash   Lymphatic:  No lymphadenopathy noted   Neurologic:  Alert & oriented x 3, CN 2-12 normal, normal motor function, normal sensory function, no focal deficits noted   Psychiatric:  Speech and behavior appropriate   Eyes: EOMI  Gi: abdomen soft    Bilateral Knee Exam    Right Knee Exam     Tenderness   The patient is experiencing tenderness to right pes anserinus bursa and hamstring.    Range of Motion   Extension: abnormal   Flexion: abnormal     Muscle Strength     The patient has normal knee  strength.    Tests   Liyah:  Medial - positive   Lachman:  Anterior - negative      Varus: negative  Valgus: negative  Patellar Apprehension: negative    Other   Erythema: absent  Sensation: normal  Pulse: present  Swelling: mild      left Knee Exam   left knee exam performed same as contralateral side and is normal.              Pes anserinus bursitis of right knee  -     Large Joint Aspiration/Injection: R anserine bursa  -     triamcinolone acetonide injection 40 mg            Using an aseptic technique, I injected 1 cc of lidocaine 1% without and 1 cc of kenalog 40mg into the right pes anserinus bursa. The patient tolerated this well.   Return to clinic as needed.            [1]   Current Outpatient Medications on File Prior to Visit   Medication Sig Dispense Refill    acetaminophen (TYLENOL) 500 MG tablet Take 500 mg by mouth every 6 (six) hours as needed for Pain.      ascorbic acid, vitamin C, (VITAMIN C) 250 MG tablet Take 500 mg by mouth once daily.       atenoloL (TENORMIN) 25 MG tablet Take 1 tablet (25 mg total) by mouth every evening. 30 tablet 11    balsalazide (COLAZAL) 750 mg capsule TAKE 3 CAPSULES(2250 MG) BY MOUTH TWICE DAILY WITH MEALS 180 capsule 11    benzonatate (TESSALON) 200 MG capsule Take 1 capsule (200 mg total) by mouth 3 (three) times daily as needed for Cough. 30 capsule 1    buPROPion (WELLBUTRIN XL) 150 MG TB24 tablet Take 1 tablet (150 mg total) by mouth every morning. 30 tablet 2    CRANBERRY EXTRACT-D-MANNOSE ORAL Take by mouth.      d-mannose 500 mg Cap Take by mouth.      diltiaZEM (DILACOR XR) 240 MG CDCR Take 1 capsule (240 mg total) by mouth every evening. 30 capsule 11    docusate sodium (COLACE) 100 MG capsule Take 100 mg by mouth 2 (two) times daily as needed for Constipation.      famotidine (PEPCID) 40 MG tablet Take 1 tablet (40 mg total) by mouth every evening. 90 tablet 3    famotidine-calcium carbonate-magnesium hydroxide (PEPCID COMPLETE) -165 mg Take 1  tablet by mouth 2 (two) times daily as needed. Chew 1 or 2 tablets, 2-3 times a day, as needed, for nausea or heartburn.      fluticasone propionate (FLONASE) 50 mcg/actuation nasal spray 1 spray (50 mcg total) by Each Nostril route once daily. 16 g 3    LORazepam (ATIVAN) 0.5 MG tablet Take 0.5 tablets (0.25 mg total) by mouth 3 (three) times daily as needed for Anxiety. 45 tablet 0    magnesium oxide (MAG-OX) 400 mg (241.3 mg magnesium) tablet Take 1 tablet (400 mg total) by mouth once daily. 30 tablet 3    methocarbamoL (ROBAXIN) 750 MG Tab Take 750 mg by mouth every 6 (six) hours as needed.      multivitamin (THERAGRAN) per tablet Take 1 tablet by mouth once daily.      ondansetron (ZOFRAN-ODT) 4 MG TbDL Take 1 tablet (4 mg total) by mouth every 6 (six) hours as needed (nausea). 30 tablet 3    pantoprazole (PROTONIX) 40 MG tablet TAKE 1 TABLET(40 MG) BY MOUTH TWICE DAILY 180 tablet 3    psyllium husk, aspartame, (NATURAL PSYLLIUM FIBER) 3.4 gram/5.8 gram Powd Take by mouth.      rosuvastatin (CRESTOR) 20 MG tablet Take 1 tablet (20 mg total) by mouth once daily. 30 tablet 11    solifenacin (VESICARE) 10 MG tablet Take 1 tablet (10 mg total) by mouth once daily. 30 tablet 11    traMADoL (ULTRAM) 50 mg tablet Take 1 tablet (50 mg total) by mouth every 4 (four) hours as needed. 44 tablet 0    [DISCONTINUED] azelastine (ASTELIN) 137 mcg (0.1 %) nasal spray 1 spray by Nasal route 2 (two) times daily as needed.      [DISCONTINUED] DULoxetine (CYMBALTA) 30 MG capsule TAKE 1 CAPSULE(30 MG) BY MOUTH TWICE DAILY 60 capsule 01    [DISCONTINUED] lisinopriL (PRINIVIL,ZESTRIL) 40 MG tablet Take 1 tablet (40 mg total) by mouth 2 (two) times a day. 60 tablet 11     No current facility-administered medications on file prior to visit.   [2]   Social History  Socioeconomic History    Marital status:     Number of children: 2   Occupational History    Occupation: retired teacher and principal    Occupation: substitute    Tobacco Use    Smoking status: Never    Smokeless tobacco: Never   Substance and Sexual Activity    Alcohol use: Not Currently     Comment: social- maybe once every few months    Drug use: No    Sexual activity: Yes     Partners: Male     Birth control/protection: Post-menopausal, None     Social Drivers of Health     Financial Resource Strain: Low Risk  (11/25/2024)    Overall Financial Resource Strain (CARDIA)     Difficulty of Paying Living Expenses: Not hard at all   Food Insecurity: No Food Insecurity (11/25/2024)    Hunger Vital Sign     Worried About Running Out of Food in the Last Year: Never true     Ran Out of Food in the Last Year: Never true   Transportation Needs: No Transportation Needs (11/9/2023)    PRAPARE - Transportation     Lack of Transportation (Medical): No     Lack of Transportation (Non-Medical): No   Physical Activity: Sufficiently Active (11/25/2024)    Exercise Vital Sign     Days of Exercise per Week: 4 days     Minutes of Exercise per Session: 50 min   Stress: No Stress Concern Present (11/25/2024)    [employer] [place] [employer] - Occupational Stress Questionnaire     Feeling of Stress : Not at all   Housing Stability: Low Risk  (11/9/2023)    Housing Stability Vital Sign     Unable to Pay for Housing in the Last Year: No     Number of Places Lived in the Last Year: 1     Unstable Housing in the Last Year: No

## 2025-04-29 NOTE — PROCEDURES
Large Joint Aspiration/Injection: R anserine bursa    Date/Time: 4/29/2025 11:20 AM    Performed by: Chandrika Santiago FNP  Authorized by: Chandrika Santiago FNP    Consent Done?:  Yes (Verbal)  Indications:  Pain  Timeout: prior to procedure the correct patient, procedure, and site was verified    Prep: patient was prepped and draped in usual sterile fashion    Local anesthetic:  Lidocaine 1% without epinephrine  Anesthetic total (ml):  1      Details:  Needle Size:  21 G  Approach:  Anterolateral  Location:  Knee  Site:  R anserine bursa  Medications:  40 mg triamcinolone acetonide 40 mg/mL  Patient tolerance:  Patient tolerated the procedure well with no immediate complications

## 2025-04-29 NOTE — PROGRESS NOTES
"Subjective:       Patient ID: Lucinda Aguilera is a 74 y.o. female.    Chief Complaint: Cough (Patient stated she's been having this awful cough since December, patient stated cough is getting heavier and worse. )    HPI dry cough since 1/2025. Cough is worse at night and often wakes her up. Uses cough drops during the night,which soothes the cough. No nasal symptoms, post nasal drip. No shortness of breath, wheezing, or chest pain. Pt states she sneezes 3 times every time she coughs. Has acid reflux that is well controlled with protonix daily. taking tessalon with no relief. Taking zyrtec, flonase and astelin nasal spray. Has seen ENT at [facility] for her allergies last year. Pt reports she does not feel like her cough is related to her allergies. had normal CXR on 2/19/25. Denies history of smoking, asthma or any diagnosed lung disease.    Requests refill of cymbalta for chronic pain. Ran out of cymbalta several months ago.    Past Medical History:   Diagnosis Date    Anxiety     takes daily Xanax    Arthritis     right thumb    Cataract     OU    Cholelithiasis     GERD (gastroesophageal reflux disease)     Hepatic steatosis     Hyperlipemia     Hypertension     PVD (posterior vitreous detachment)     OD       Past Surgical History:   Procedure Laterality Date    BREAST BIOPSY Left 08/28/2020    stereo biopsy    BREAST BIOPSY Left 10/07/2020    benign excisional biopsy    CAUDAL EPIDURAL STEROID INJECTION N/A 7/26/2024    Procedure: Injection-steroid-epidural-caudal;  Surgeon: Indra Cuellar MD;  Location: [facility] OR;  Service: Pain Management;  Laterality: N/A;    chest abcess      child    COLONOSCOPY  01/06/2012    Dr. Lopez: hemorrhoids, redundant colon    COLONOSCOPY N/A 02/17/2021    Dr. Lopez: Congested and erythematous mucosa in the rectum, in the recto-sigmoid colon and in the sigmoid colon, proctosigmoid colitis, Redundant colon; biopsy: focal active colitis "nonspecific but can be seen with acute " self-limited colitis (infection), medication effect (e.g. NSAID use), proximity to diverticula, or early/evolving IBD    EPIDURAL STEROID INJECTION INTO LUMBAR SPINE N/A 5/30/2024    Procedure: Injection-steroid-epidural-lumbar   L5/S1;  Surgeon: Indra Cuellar MD;  Location: [facility];  Service: Pain Management;  Laterality: N/A;    ESOPHAGOGASTRODUODENOSCOPY N/A 06/06/2019    Dr. Lopez: small hiatal hernia, Non-erosive esophageal reflux (NERD) disease?., slight antritis; biopsy: Antral mucosa with chemical/reactive gastropathy. negative for h pylori    ESOPHAGOGASTRODUODENOSCOPY N/A 02/17/2021    Procedure: EGD (ESOPHAGOGASTRODUODENOSCOPY);  Surgeon: Nathan Lopez Jr., MD;  Location: [facility];  Service: Endoscopy;  Laterality: N/A; Minimal gastritis; biopsy: stomach- negative for h pylori, active chronic gastritis with focal features of erosive gastritis, duodenum WNL    ESOPHAGOGASTRODUODENOSCOPY N/A 05/10/2022    Procedure: EGD (ESOPHAGOGASTRODUODENOSCOPY);  Surgeon: Nathan Lopez Jr., MD;  Location: [facility];  Service: Endoscopy;  Laterality: N/A;    ESOPHAGOGASTRODUODENOSCOPY N/A 3/12/2024    Procedure: EGD (ESOPHAGOGASTRODUODENOSCOPY);  Surgeon: Nathan Lopez Jr., MD;  Location: [facility];  Service: Endoscopy;  Laterality: N/A;    EXCISION OF BURSA OF HIP Right 2/3/2025    Procedure: BURSECTOMY, HIP;  Surgeon: Nick Arnold MD;  Location: [facility] OR;  Service: Orthopedics;  Laterality: Right;    EXCISIONAL BIOPSY Left 10/07/2020    Procedure: EXCISIONAL BIOPSY-Left with radiological marker;  Surgeon: Brooklynn Ashford MD;  Location: [facility];  Service: General;  Laterality: Left;    FRACTURE SURGERY  2010    Left thumb repaired surgically    INJECTION OF ANESTHETIC AGENT AROUND MEDIAL BRANCH NERVES INNERVATING LUMBAR FACET JOINT Bilateral 9/12/2024    Procedure: Block-nerve-medial branch-lumbar    L4/5, L5/S1;  Surgeon: Indra Cuellar MD;  Location: [facility] OR;  Service: Pain Management;  Laterality:  "Bilateral;    JOINT REPLACEMENT Bilateral     knee    KNEE ARTHROSCOPY W/ LASER      right    LAPAROSCOPIC CHOLECYSTECTOMY N/A 06/14/2019    Procedure: CHOLECYSTECTOMY, LAPAROSCOPIC;  Surgeon: Guevara Evans MD;  Location: [facility] OR;  Service: General;  Laterality: N/A;    thumb surgery  11/2014    TOTAL KNEE ARTHROPLASTY Left 06/19/2018    Procedure: REPLACEMENT-KNEE-TOTAL;  Surgeon: Nj Melvin MD;  Location: [facility] OR [address] [place] [address];  Service: Orthopedics;  Laterality: Left;  Gertrude    UPPER GASTROINTESTINAL ENDOSCOPY  07/13/2017    Dr. Lopez: NERD, Gastric mucosal atrophy. antritis, Scar in the incisura. Biopsied; biopsy: chronic gastritis. negative for h pylori       Review of patient's allergies indicates:  No Known Allergies    Social History[1]    Medications Ordered Prior to Encounter[2]    Family History   Problem Relation Name Age of Onset    Hypertension Mother age 82     Heart disease Mother age 82     Glaucoma Mother age 82     Stroke Father age 50     Glaucoma Sister      Cataracts Sister      Macular degeneration Sister      Breast cancer Neg Hx      Colon cancer Neg Hx      Crohn's disease Neg Hx      Ulcerative colitis Neg Hx      Stomach cancer Neg Hx      Esophageal cancer Neg Hx      Celiac disease Neg Hx      Amblyopia Neg Hx      Blindness Neg Hx      Retinal detachment Neg Hx      Strabismus Neg Hx         Review of Systems   Constitutional: Negative.    HENT: Negative.     Eyes: Negative.    Respiratory:  Positive for cough.    Cardiovascular: Negative.    Gastrointestinal: Negative.    Endocrine: Negative.    Genitourinary: Negative.    Musculoskeletal: Negative.    Skin: Negative.    Neurological: Negative.    Psychiatric/Behavioral: Negative.         Objective:      /76 (Patient Position: Sitting)   Pulse 78   Temp 97.9 °F (36.6 °C) (Oral)   Ht 5' 3" (1.6 m)   Wt 76.4 kg (168 lb 6.9 oz)   LMP 04/18/2004   SpO2 95%   BMI 29.84 kg/m²   Physical Exam  Vitals and nursing note " reviewed.   Constitutional:       General: She is not in acute distress.     Appearance: Normal appearance. She is well-groomed.   HENT:      Head: Normocephalic.      Right Ear: External ear normal. There is impacted cerumen.      Left Ear: External ear normal. There is impacted cerumen.      Nose: Nose normal.      Mouth/Throat:      Lips: Pink.      Mouth: Mucous membranes are moist.      Pharynx: Oropharynx is clear. No oropharyngeal exudate or posterior oropharyngeal erythema.   Eyes:      Extraocular Movements: Extraocular movements intact.      Conjunctiva/sclera: Conjunctivae normal.      Pupils: Pupils are equal, round, and reactive to light.   Cardiovascular:      Rate and Rhythm: Normal rate and regular rhythm.      Heart sounds: Normal heart sounds. No murmur heard.  Pulmonary:      Effort: Pulmonary effort is normal.      Breath sounds: Normal breath sounds.   Chest:      Chest wall: No tenderness.   Abdominal:      General: Bowel sounds are normal.      Palpations: Abdomen is soft.      Tenderness: There is no abdominal tenderness.   Musculoskeletal:         General: No swelling or tenderness. Normal range of motion.      Cervical back: Normal range of motion and neck supple.      Right lower leg: No edema.      Left lower leg: No edema.   Lymphadenopathy:      Cervical: No cervical adenopathy.   Skin:     General: Skin is warm and dry.   Neurological:      General: No focal deficit present.      Mental Status: She is alert and oriented to person, place, and time. Mental status is at baseline.      Gait: Gait is intact.   Psychiatric:         Mood and Affect: Mood normal.         Behavior: Behavior normal.         Assessment:       1. Primary hypertension    2. Bilateral impacted cerumen    3. ACE-inhibitor cough    4. Lumbar radiculopathy        Plan:       Primary hypertension  -     losartan (COZAAR) 100 MG tablet; Take 1 tablet (100 mg total) by mouth once daily.  Dispense: 30 tablet; Refill:  0    Bilateral impacted cerumen    ACE-inhibitor cough    Lumbar radiculopathy  -     DULoxetine (CYMBALTA) 30 MG capsule; Take 1 capsule (30 mg total) by mouth once daily.  Dispense: 30 capsule; Refill: 5    Other orders  -     azelastine (ASTELIN) 137 mcg (0.1 %) nasal spray; 1 spray (137 mcg total) by Nasal route 2 (two) times daily as needed for Rhinitis.  Dispense: 30 mL; Refill: 11        HTN/cough: stop lisinopril. Start losartan. Continue diltiazem and atenolol. RTC 3 wks.    Restart cymbalta 30 mg daily for chronic pain. Can increase to bid after 2 wks.    Cerumen impaction: Use OTC debrox ear wax kit as directed. RTC if no relief.         [1]   Social History  Socioeconomic History    Marital status:     Number of children: 2   Occupational History    Occupation: retired teacher and principal    Occupation: substitute   Tobacco Use    Smoking status: Never    Smokeless tobacco: Never   Substance and Sexual Activity    Alcohol use: Not Currently     Comment: social- maybe once every few months    Drug use: No    Sexual activity: Yes     Partners: Male     Birth control/protection: Post-menopausal, None     Social Drivers of Health     Financial Resource Strain: Low Risk  (11/25/2024)    Overall Financial Resource Strain (CARDIA)     Difficulty of Paying Living Expenses: Not hard at all   Food Insecurity: No Food Insecurity (11/25/2024)    Hunger Vital Sign     Worried About Running Out of Food in the Last Year: Never true     Ran Out of Food in the Last Year: Never true   Transportation Needs: No Transportation Needs (11/9/2023)    PRAPARE - Transportation     Lack of Transportation (Medical): No     Lack of Transportation (Non-Medical): No   Physical Activity: Sufficiently Active (11/25/2024)    Exercise Vital Sign     Days of Exercise per Week: 4 days     Minutes of Exercise per Session: 50 min   Stress: No Stress Concern Present (11/25/2024)    [employer] -  Occupational Stress Questionnaire     Feeling of Stress : Not at all   Housing Stability: Low Risk  (11/9/2023)    Housing Stability Vital Sign     Unable to Pay for Housing in the Last Year: No     Number of Places Lived in the Last Year: 1     Unstable Housing in the Last Year: No   [2]   Current Outpatient Medications on File Prior to Visit   Medication Sig Dispense Refill    acetaminophen (TYLENOL) 500 MG tablet Take 500 mg by mouth every 6 (six) hours as needed for Pain.      ascorbic acid, vitamin C, (VITAMIN C) 250 MG tablet Take 500 mg by mouth once daily.       atenoloL (TENORMIN) 25 MG tablet Take 1 tablet (25 mg total) by mouth every evening. 30 tablet 11    balsalazide (COLAZAL) 750 mg capsule TAKE 3 CAPSULES(2250 MG) BY MOUTH TWICE DAILY WITH MEALS 180 capsule 11    benzonatate (TESSALON) 200 MG capsule Take 1 capsule (200 mg total) by mouth 3 (three) times daily as needed for Cough. 30 capsule 1    buPROPion (WELLBUTRIN XL) 150 MG TB24 tablet Take 1 tablet (150 mg total) by mouth every morning. 30 tablet 2    CRANBERRY EXTRACT-D-MANNOSE ORAL Take by mouth.      d-mannose 500 mg Cap Take by mouth.      diltiaZEM (DILACOR XR) 240 MG CDCR Take 1 capsule (240 mg total) by mouth every evening. 30 capsule 11    docusate sodium (COLACE) 100 MG capsule Take 100 mg by mouth 2 (two) times daily as needed for Constipation.      famotidine (PEPCID) 40 MG tablet Take 1 tablet (40 mg total) by mouth every evening. 90 tablet 3    famotidine-calcium carbonate-magnesium hydroxide (PEPCID COMPLETE) -165 mg Take 1 tablet by mouth 2 (two) times daily as needed. Chew 1 or 2 tablets, 2-3 times a day, as needed, for nausea or heartburn.      fluticasone propionate (FLONASE) 50 mcg/actuation nasal spray 1 spray (50 mcg total) by Each Nostril route once daily. 16 g 3    LORazepam (ATIVAN) 0.5 MG tablet Take 0.5 tablets (0.25 mg total) by mouth 3 (three) times daily as needed for Anxiety. 45 tablet 0    magnesium oxide  (MAG-OX) 400 mg (241.3 mg magnesium) tablet Take 1 tablet (400 mg total) by mouth once daily. 30 tablet 3    methocarbamoL (ROBAXIN) 750 MG Tab Take 750 mg by mouth every 6 (six) hours as needed.      multivitamin (THERAGRAN) per tablet Take 1 tablet by mouth once daily.      ondansetron (ZOFRAN-ODT) 4 MG TbDL Take 1 tablet (4 mg total) by mouth every 6 (six) hours as needed (nausea). 30 tablet 3    pantoprazole (PROTONIX) 40 MG tablet TAKE 1 TABLET(40 MG) BY MOUTH TWICE DAILY 180 tablet 3    psyllium husk, aspartame, (NATURAL PSYLLIUM FIBER) 3.4 gram/5.8 gram Powd Take by mouth.      rosuvastatin (CRESTOR) 20 MG tablet Take 1 tablet (20 mg total) by mouth once daily. 30 tablet 11    solifenacin (VESICARE) 10 MG tablet Take 1 tablet (10 mg total) by mouth once daily. 30 tablet 11    traMADoL (ULTRAM) 50 mg tablet Take 1 tablet (50 mg total) by mouth every 4 (four) hours as needed. 44 tablet 0    [DISCONTINUED] DULoxetine (CYMBALTA) 30 MG capsule TAKE 1 CAPSULE(30 MG) BY MOUTH TWICE DAILY 60 capsule 01    [DISCONTINUED] lisinopriL (PRINIVIL,ZESTRIL) 40 MG tablet Take 1 tablet (40 mg total) by mouth 2 (two) times a day. 60 tablet 11    [DISCONTINUED] azelastine (ASTELIN) 137 mcg (0.1 %) nasal spray 1 spray by Nasal route 2 (two) times daily as needed.       No current facility-administered medications on file prior to visit.

## 2025-04-30 ENCOUNTER — EXTERNAL CHRONIC CARE MANAGEMENT (OUTPATIENT)
Dept: PRIMARY CARE CLINIC | Facility: CLINIC | Age: 75
End: 2025-04-30
Payer: MEDICARE

## 2025-04-30 PROCEDURE — 99490 CHRNC CARE MGMT STAFF 1ST 20: CPT | Mod: PBBFAC,PO | Performed by: FAMILY MEDICINE

## 2025-05-01 ENCOUNTER — OFFICE VISIT (OUTPATIENT)
Dept: ORTHOPEDICS | Facility: CLINIC | Age: 75
End: 2025-05-01
Payer: MEDICARE

## 2025-05-01 ENCOUNTER — HOSPITAL ENCOUNTER (OUTPATIENT)
Dept: RADIOLOGY | Facility: HOSPITAL | Age: 75
Discharge: HOME | End: 2025-05-01
Attending: ORTHOPAEDIC SURGERY
Payer: MEDICARE

## 2025-05-01 ENCOUNTER — PATIENT MESSAGE (OUTPATIENT)
Dept: ORTHOPEDICS | Facility: CLINIC | Age: 75
End: 2025-05-01

## 2025-05-01 VITALS — BODY MASS INDEX: 29.84 KG/M2 | HEIGHT: 63 IN | WEIGHT: 168.44 LBS

## 2025-05-01 DIAGNOSIS — M25.361 KNEE INSTABILITY, RIGHT: Primary | ICD-10-CM

## 2025-05-01 DIAGNOSIS — M70.51 PES ANSERINUS BURSITIS OF RIGHT KNEE: ICD-10-CM

## 2025-05-01 PROCEDURE — 99214 OFFICE O/P EST MOD 30 MIN: CPT | Mod: S$PBB,24,, | Performed by: ORTHOPAEDIC SURGERY

## 2025-05-01 PROCEDURE — 73560 X-RAY EXAM OF KNEE 1 OR 2: CPT | Mod: TC,PO,LT

## 2025-05-01 PROCEDURE — 99214 OFFICE O/P EST MOD 30 MIN: CPT | Mod: PBBFAC,24,PO | Performed by: ORTHOPAEDIC SURGERY

## 2025-05-01 PROCEDURE — 99999 PR PBB SHADOW E&M-EST. PATIENT-LVL IV: CPT | Mod: PBBFAC,,, | Performed by: ORTHOPAEDIC SURGERY

## 2025-05-05 ENCOUNTER — CLINICAL SUPPORT (OUTPATIENT)
Dept: REHABILITATION | Facility: HOSPITAL | Age: 75
End: 2025-05-05
Attending: PHYSICAL THERAPIST
Payer: MEDICARE

## 2025-05-05 DIAGNOSIS — R26.9 GAIT DIFFICULTY: Primary | ICD-10-CM

## 2025-05-05 DIAGNOSIS — M25.562 CHRONIC PAIN OF LEFT KNEE: ICD-10-CM

## 2025-05-05 DIAGNOSIS — G89.29 CHRONIC PAIN OF LEFT KNEE: ICD-10-CM

## 2025-05-05 DIAGNOSIS — M25.651 DECREASED RANGE OF RIGHT HIP MOVEMENT: ICD-10-CM

## 2025-05-05 PROCEDURE — 97530 THERAPEUTIC ACTIVITIES: CPT | Mod: PO | Performed by: PHYSICAL THERAPIST

## 2025-05-05 PROCEDURE — 97110 THERAPEUTIC EXERCISES: CPT | Mod: PO | Performed by: PHYSICAL THERAPIST

## 2025-05-05 PROCEDURE — 97112 NEUROMUSCULAR REEDUCATION: CPT | Mod: PO | Performed by: PHYSICAL THERAPIST

## 2025-05-05 NOTE — PROGRESS NOTES
"       Outpatient Rehab    Physical Therapy Visit    Patient Name: Lucinda Aguilera  MRN: [id number]  YOB: 1950  Encounter Date: 5/5/2025    Therapy Diagnosis:   Encounter Diagnoses   Name Primary?    Gait difficulty Yes    Chronic pain of left knee     Decreased range of right hip movement      Physician: Nick Arnold MD    Physician Orders: Eval and Treat  Medical Diagnosis: Greater trochanteric bursitis of right hip    Visit # / Visits Authorized:  17 / 30  Insurance Authorization Period: 1/1/2025 to 12/31/2025  Date of Evaluation: 2/26/2025   Plan of Care Certification:  2/26/2025 to 4/11/2025       PT/PTA: PT   Number of PTA visits since last PT visit:0  Time In: 1300   Time Out: 1400  Total Time: 60   Total Billable Time: 55    FOTO:  Intake Score:  %  Survey Score 1:  %  Survey Score 2:  %         Subjective   Lucinda reports improved R medial knee pain. Her pain changes from hip to med and lateral knee and thigh..  Pain reported as 2/10. at R hip    Objective            Treatment:  Therapeutic Exercise  TE 1: HS stretch with strap 3 x 20"  TE 2: ITB stretch with strap 3 x 20"  TE 8: Nu step 8 mins  TE 9: leg press 20# 3 x 10  TE 10: Seated hip abd/add 30# 2 x 10  Balance/Neuromuscular Re-Education  NMR 1: bridge 3 x 10 yellow t-band  NMR 2: Standing NBOS on floor with over head rainbow toy ball reaches and fwd reaches x 10 each, lateral reaches x 10 each  NMR 3: BKFO 2 x 10 yellow t-band  NMR 5: DKTC 3 x 10 orange t-ball  NMR 7: SLR with QS 30x 1#  Therapeutic Activity  TA 1: Sit to standing using arm rests x 10  TA 2: Standing heel raises 2 x 10  TA 3: Standing lateral stepping at table 3 laps  TA 4: Standing hip abd x 10  TA 5: Standing HSC x 10  TA 6: Standing hip ext with knee bent x 10    Time Entry(in minutes):  Neuromuscular Re-Education Time Entry: 30  Therapeutic Activity Time Entry: 10  Therapeutic Exercise Time Entry: 20    Assessment & Plan   Assessment: Lucinda is limited at " times by R hip or knee pain. She req freq rest breaks. We have tried palliative care. d/t ins limitations we may be more productive with focus on strengthening to improve gt and mobility to functional range. She would like to not be an (I) walker. Progressed to standing ex.  Evaluation/Treatment Tolerance: Patient limited by pain    Patient will continue to benefit from skilled outpatient physical therapy to address the deficits listed in the problem list box on initial evaluation, provide pt/family education and to maximize pt's level of independence in the home and community environment.     Patient's spiritual, cultural, and educational needs considered and patient agreeable to plan of care and goals.           Plan: From a physical therapy perspective, the patient would benefit from: Skilled Rehab Services  Planned therapy interventions include: Therapeutic exercise, Therapeutic activities, Neuromuscular re-education, Manual therapy, and Other (Comment). Dry needling  Planned modalities to include: Electrical stimulation - passive/unattended, Thermotherapy (hot pack), and Cryotherapy (cold pack). Visit Frequency: 2 times Per Week for 8 additional Weeks, effective 4/11/2025.    Goals:   Active       LTGS: 8 WEEKS         LTG #1 STRENGTH  (Ongoing)       Start:  02/28/25    Expected End:  04/11/25       1.) Patient will demo equal strength for improving gait.         LTG #2 PAIN  (Met)       Start:  02/28/25    Resolved:  03/17/25    2.) Patient will ambulate without AD to demo dec pain and improved strength.         LTG #3 FOTO (Met)       Start:  02/28/25    Resolved:  03/17/25    3.) FOTO goal to be written.            NEW LTG -4 WEEKS       LTG #3: FOTO (Ongoing)       Start:  03/17/25    Expected End:  04/11/25       4.) Patient will score +3 on the FOTO for improved QOL.            STG added 3/28/2025       STG #4 FOTO  (Ongoing)       Start:  03/28/25    Expected End:  04/27/25       4.)Patient will score  46 on the FOTO for improving QOL.            STGs - 4 WEEKS         STG #1 ROM  (Met)       Start:  02/28/25    Resolved:  03/17/25    1.) Patient will demo bilateral hip AROM equal for symmetry in ambulation.         STG #2 STRENGTH  (Ongoing)       Start:  02/28/25    Expected End:  04/11/25       2.) Patient will demo 4-/5 strength grossly in right hip for improving walking.         STG #3 FOTO  (Met)       Start:  02/28/25    Resolved:  03/17/25    3.) Patient will complete FOTO and goal will be written.             Saira Joyce, PT, DPT

## 2025-05-08 ENCOUNTER — CLINICAL SUPPORT (OUTPATIENT)
Dept: REHABILITATION | Facility: HOSPITAL | Age: 75
End: 2025-05-08
Payer: MEDICARE

## 2025-05-08 DIAGNOSIS — R26.9 GAIT DIFFICULTY: Primary | ICD-10-CM

## 2025-05-08 DIAGNOSIS — M25.651 DECREASED RANGE OF RIGHT HIP MOVEMENT: ICD-10-CM

## 2025-05-08 PROCEDURE — 97110 THERAPEUTIC EXERCISES: CPT | Mod: PO | Performed by: PHYSICAL THERAPIST

## 2025-05-08 PROCEDURE — 97140 MANUAL THERAPY 1/> REGIONS: CPT | Mod: PO | Performed by: PHYSICAL THERAPIST

## 2025-05-08 PROCEDURE — 97116 GAIT TRAINING THERAPY: CPT | Mod: PO | Performed by: PHYSICAL THERAPIST

## 2025-05-08 NOTE — PROGRESS NOTES
Outpatient Rehab    Physical Therapy Progress Note : Updated Plan of Care    Patient Name: Lucinda Aguilera  MRN: [id number]  YOB: 1950  Encounter Date: 5/8/2025    Therapy Diagnosis:   Encounter Diagnoses   Name Primary?    Gait difficulty Yes    Decreased range of right hip movement      Physician: Nick Arnold MD    Physician Orders: Eval and Treat  Medical Diagnosis: Greater trochanteric bursitis of right hip    Visit # / Visits Authorized:  17 / 30  Insurance Authorization Period: 1/1/2025 to 12/31/2025  Date of Evaluation: 2/26/2025   Plan of Care Certification: 4/11/2025 to 6/6/2025     PT/PTA: PT   Number of PTA visits since last PT visit:0  Time In: 1300   Time Out: 1400  Total Time (in minutes): 60   Total Billable Time (in minutes): 55    FOTO:  Intake Score:  %  Survey Score 2:  %  Survey Score 3:  %         Subjective   Lucinda reports R ant/lateral pain about the VL..  Pain reported as 0/10. at R hip  Objective           Hip Range of Motion    Limited right hip ROM d/t pain.     Knee Range of Motion    Bilateral knee ROM WNL.     Hip Strength - Planes of Motion    Right Strength Right Pain Left Strength Left  Pain   Flexion (L2) 4-   4     Extension 3+   4     ABduction 3   4-     ADduction 4-   4     Internal Rotation           External Rotation              Knee Strength    Right Strength Right Pain Left Strength Left  Pain   Flexion (S2) 5   5     Prone Flexion           Extension (L3) 5   5        Lumbar/Pelvic Girdle Special Tests  Pelvic Girdle / Sacrum Tests  Negative: Right JOSUE and Right FADIR    Hip Special Tests  Intra-Articular/Impingement Tests  Negative: Right JOSUE and Right FADIR  Flex/Imbalance/Structural Tests  Positive: Right Modified Juanita's and Right Piriformis  + pain to palpation right hip flexors and neg in glutes.     Treatment:  Therapeutic Exercise  TE 8: Nu step 8 mins  TE 9: leg press 20# 3 x 10  TE 10: Seated hip abd/add 30# 2 x 10  Manual Therapy  MT 2:  DN was applied with 10 mins of estim following consent and palpation. ttp noted at R VL with 4 needle insertion and removal. Appropriate response was noted. Pt at 10 pps freq and 8 intensity. This performed in supine with roll under knee.  Gait Training  GT 1: 8 mins of heel to toe in clinic to improve presence of Trendelenburg.    Time Entry(in minutes):  Gait Training Time Entry: 8  Manual Therapy Time Entry: 20  Therapeutic Exercise Time Entry: 20    Assessment & Plan   Assessment  Lucinda                 Assessment Details: Lucinda requires rest breaks and has impaired tolerance to activities. She has a Trendelenburg present on R. Glute weakness continues greatest dificit. Pt demos very mild strength improvement at this time, but as been limited by hip and knee pain to some extent bilaterally. Lucinda has met 2 STGs and 2 LTGs with about half remaining. Cont glute strength at this time.Rec'd inject to pes anserine with mild relief.    Plan  From a physical therapy perspective, the patient would benefit from: Skilled Rehab Services                              Plan details: From a physical therapy perspective, the patient would benefit from: Skilled Rehab Services Planned therapy interventions include: Therapeutic exercise, Therapeutic activities, Neuromuscular re-education, Manual therapy, and Other (Comment). Dry needling Planned modalities to include: Electrical stimulation - passive/unattended, Thermotherapy (hot pack), and Cryotherapy (cold pack). Visit Frequency: 2 times Per Week for 8 additional Weeks, effective 4/11/2025.          Patient will continue to benefit from skilled outpatient physical therapy to address the deficits listed in the problem list box on initial evaluation, provide pt/family education and to maximize pt's level of independence in the home and community environment.     Patient's spiritual, cultural, and educational needs considered and patient agreeable to plan of care and goals.            Goals:   Active       LTGS: 8 WEEKS         LTG #1 STRENGTH  (Ongoing)       Start:  02/28/25    Expected End:  04/11/25       1.) Patient will demo equal strength for improving gait.         LTG #2 PAIN  (Met)       Start:  02/28/25    Resolved:  03/17/25    2.) Patient will ambulate without AD to demo dec pain and improved strength.         LTG #3 FOTO (Met)       Start:  02/28/25    Resolved:  03/17/25    3.) FOTO goal to be written.            NEW LTG -4 WEEKS       LTG #3: FOTO (Ongoing)       Start:  03/17/25    Expected End:  04/11/25       4.) Patient will score +3 on the FOTO for improved QOL.            STG added 3/28/2025       STG #4 FOTO  (Ongoing)       Start:  03/28/25    Expected End:  04/27/25       4.)Patient will score 46 on the FOTO for improving QOL.            STGs - 4 WEEKS         STG #1 ROM  (Met)       Start:  02/28/25    Resolved:  03/17/25    1.) Patient will demo bilateral hip AROM equal for symmetry in ambulation.         STG #2 STRENGTH  (Ongoing)       Start:  02/28/25    Expected End:  04/11/25       2.) Patient will demo 4-/5 strength grossly in right hip for improving walking.         STG #3 FOTO  (Met)       Start:  02/28/25    Resolved:  03/17/25    3.) Patient will complete FOTO and goal will be written.             Saira Joyce, PT, DPT

## 2025-05-08 NOTE — PROGRESS NOTES
"Chief Complaint   Patient presents with    Right Knee - Pain       HPI:    This is a 74 y.o. who presents today complaining of right knee instability and pain for 2 years after undergoing TKA at [facility] "many years ago." Pain is dull. No numbness or tingling. No associated signs or symptoms.      Past Medical History:   Diagnosis Date    Anxiety     takes daily Xanax    Arthritis     right thumb    Cataract     OU    Cholelithiasis     GERD (gastroesophageal reflux disease)     Hepatic steatosis     Hyperlipemia     Hypertension     PVD (posterior vitreous detachment)     OD      Past Surgical History:   Procedure Laterality Date    BREAST BIOPSY Left 08/28/2020    stereo biopsy    BREAST BIOPSY Left 10/07/2020    benign excisional biopsy    CAUDAL EPIDURAL STEROID INJECTION N/A 7/26/2024    Procedure: Injection-steroid-epidural-caudal;  Surgeon: Indra Cuellar MD;  Location: [facility] OR;  Service: Pain Management;  Laterality: N/A;    chest abcess      child    COLONOSCOPY  01/06/2012    Dr. Lopez: hemorrhoids, redundant colon    COLONOSCOPY N/A 02/17/2021    Dr. Lopez: Congested and erythematous mucosa in the rectum, in the recto-sigmoid colon and in the sigmoid colon, proctosigmoid colitis, Redundant colon; biopsy: focal active colitis "nonspecific but can be seen with acute self-limited colitis (infection), medication effect (e.g. NSAID use), proximity to diverticula, or early/evolving IBD    EPIDURAL STEROID INJECTION INTO LUMBAR SPINE N/A 5/30/2024    Procedure: Injection-steroid-epidural-lumbar   L5/S1;  Surgeon: Indra Cuellar MD;  Location: [facility] OR;  Service: Pain Management;  Laterality: N/A;    ESOPHAGOGASTRODUODENOSCOPY N/A 06/06/2019    Dr. Lopez: small hiatal hernia, Non-erosive esophageal reflux (NERD) disease?., slight antritis; biopsy: Antral mucosa with chemical/reactive gastropathy. negative for h pylori    ESOPHAGOGASTRODUODENOSCOPY N/A 02/17/2021    Procedure: EGD (ESOPHAGOGASTRODUODENOSCOPY);  " Surgeon: Nathan Lopez Jr., MD;  Location: [facility];  Service: Endoscopy;  Laterality: N/A; Minimal gastritis; biopsy: stomach- negative for h pylori, active chronic gastritis with focal features of erosive gastritis, duodenum WNL    ESOPHAGOGASTRODUODENOSCOPY N/A 05/10/2022    Procedure: EGD (ESOPHAGOGASTRODUODENOSCOPY);  Surgeon: Nathan Lopez Jr., MD;  Location: [facility] ENDO;  Service: Endoscopy;  Laterality: N/A;    ESOPHAGOGASTRODUODENOSCOPY N/A 3/12/2024    Procedure: EGD (ESOPHAGOGASTRODUODENOSCOPY);  Surgeon: Nathan Lopez Jr., MD;  Location: [facility];  Service: Endoscopy;  Laterality: N/A;    EXCISION OF BURSA OF HIP Right 2/3/2025    Procedure: BURSECTOMY, HIP;  Surgeon: Nick Arnold MD;  Location: [facility] OR;  Service: Orthopedics;  Laterality: Right;    EXCISIONAL BIOPSY Left 10/07/2020    Procedure: EXCISIONAL BIOPSY-Left with radiological marker;  Surgeon: Brooklynn Ashford MD;  Location: [facility];  Service: General;  Laterality: Left;    FRACTURE SURGERY  2010    Left thumb repaired surgically    INJECTION OF ANESTHETIC AGENT AROUND MEDIAL BRANCH NERVES INNERVATING LUMBAR FACET JOINT Bilateral 9/12/2024    Procedure: Block-nerve-medial branch-lumbar    L4/5, L5/S1;  Surgeon: Indra Cuellar MD;  Location: [facility];  Service: Pain Management;  Laterality: Bilateral;    JOINT REPLACEMENT Bilateral     knee    KNEE ARTHROSCOPY W/ LASER      right    LAPAROSCOPIC CHOLECYSTECTOMY N/A 06/14/2019    Procedure: CHOLECYSTECTOMY, LAPAROSCOPIC;  Surgeon: Guevara Evans MD;  Location: [facility] OR;  Service: General;  Laterality: N/A;    thumb surgery  11/2014    TOTAL KNEE ARTHROPLASTY Left 06/19/2018    Procedure: REPLACEMENT-KNEE-TOTAL;  Surgeon: Nj Melvin MD;  Location: [address];  Service: Orthopedics;  Laterality: Left;  [employer]    UPPER GASTROINTESTINAL ENDOSCOPY  07/13/2017    Dr. Lopez: NERD, Gastric mucosal atrophy. antritis, Scar in the incisura. Biopsied; biopsy: chronic gastritis.  negative for h pylori      Medications Ordered Prior to Encounter[1]   Review of patient's allergies indicates:  No Known Allergies   Family History not pertinent   Social History[2]      Review of Systems:   Constitutional:  Denies fever or chills    Eyes:  Denies change in visual acuity    HENT:  Denies nasal congestion or sore throat    Respiratory:  Denies cough or shortness of breath    Cardiovascular:  Denies chest pain or edema    GI:  Denies abdominal pain, nausea, vomiting, bloody stools or diarrhea    :  Denies dysuria    Integument:  Denies rash    Neurologic:  Denies headache, focal weakness or sensory changes    Endocrine:  Denies polyuria or polydipsia    Lymphatic:  Denies swollen glands    Psychiatric:  Denies depression or anxiety       Physical Exam:    Constitutional:  Well developed, well nourished, no acute distress, non-toxic appearance    Integument:  Well hydrated, no rash    Lymphatic:  No lymphadenopathy noted    Neurologic:  Alert & oriented x 3,     Psychiatric:  Speech and behavior appropriate    Gi: abdomen soft  Eyes: EOMI   L knee  Exam performed same as contralateral side and is normal  R knee  Instability noted in flexion and extension. FROM. Skin intact. Incision healed. NVI distally.      X-rays were performed today, personally reviewed by me and findings discussed with the patient.   3 views of the right knee show implants intact in good position      Knee instability, right        Will order knee brace for knee instability due to the varus/valgus instability in the knee. It is necessary for stability to prevent falls.   RTC 4 weeks recheck.             [1]   Current Outpatient Medications on File Prior to Visit   Medication Sig Dispense Refill    acetaminophen (TYLENOL) 500 MG tablet Take 500 mg by mouth every 6 (six) hours as needed for Pain.      ascorbic acid, vitamin C, (VITAMIN C) 250 MG tablet Take 500 mg by mouth once daily.       atenoloL (TENORMIN) 25 MG tablet Take 1  tablet (25 mg total) by mouth every evening. 30 tablet 11    azelastine (ASTELIN) 137 mcg (0.1 %) nasal spray 1 spray (137 mcg total) by Nasal route 2 (two) times daily as needed for Rhinitis. 30 mL 11    balsalazide (COLAZAL) 750 mg capsule TAKE 3 CAPSULES(2250 MG) BY MOUTH TWICE DAILY WITH MEALS 180 capsule 11    benzonatate (TESSALON) 200 MG capsule Take 1 capsule (200 mg total) by mouth 3 (three) times daily as needed for Cough. 30 capsule 1    buPROPion (WELLBUTRIN XL) 150 MG TB24 tablet Take 1 tablet (150 mg total) by mouth every morning. 30 tablet 2    CRANBERRY EXTRACT-D-MANNOSE ORAL Take by mouth.      d-mannose 500 mg Cap Take by mouth.      diltiaZEM (DILACOR XR) 240 MG CDCR Take 1 capsule (240 mg total) by mouth every evening. 30 capsule 11    docusate sodium (COLACE) 100 MG capsule Take 100 mg by mouth 2 (two) times daily as needed for Constipation.      DULoxetine (CYMBALTA) 30 MG capsule Take 1 capsule (30 mg total) by mouth once daily. 30 capsule 5    famotidine (PEPCID) 40 MG tablet Take 1 tablet (40 mg total) by mouth every evening. 90 tablet 3    fluticasone propionate (FLONASE) 50 mcg/actuation nasal spray 1 spray (50 mcg total) by Each Nostril route once daily. 16 g 3    LORazepam (ATIVAN) 0.5 MG tablet Take 0.5 tablets (0.25 mg total) by mouth 3 (three) times daily as needed for Anxiety. 45 tablet 0    losartan (COZAAR) 100 MG tablet Take 1 tablet (100 mg total) by mouth once daily. 30 tablet 0    magnesium oxide (MAG-OX) 400 mg (241.3 mg magnesium) tablet Take 1 tablet (400 mg total) by mouth once daily. 30 tablet 3    methocarbamoL (ROBAXIN) 750 MG Tab Take 750 mg by mouth every 6 (six) hours as needed.      multivitamin (THERAGRAN) per tablet Take 1 tablet by mouth once daily.      ondansetron (ZOFRAN-ODT) 4 MG TbDL Take 1 tablet (4 mg total) by mouth every 6 (six) hours as needed (nausea). 30 tablet 3    pantoprazole (PROTONIX) 40 MG tablet TAKE 1 TABLET(40 MG) BY MOUTH TWICE DAILY 180  tablet 3    psyllium husk, aspartame, (NATURAL PSYLLIUM FIBER) 3.4 gram/5.8 gram Powd Take by mouth.      rosuvastatin (CRESTOR) 20 MG tablet Take 1 tablet (20 mg total) by mouth once daily. 30 tablet 11    solifenacin (VESICARE) 10 MG tablet Take 1 tablet (10 mg total) by mouth once daily. 30 tablet 11    traMADoL (ULTRAM) 50 mg tablet Take 1 tablet (50 mg total) by mouth every 4 (four) hours as needed. 44 tablet 0    famotidine-calcium carbonate-magnesium hydroxide (PEPCID COMPLETE) -165 mg Take 1 tablet by mouth 2 (two) times daily as needed. Chew 1 or 2 tablets, 2-3 times a day, as needed, for nausea or heartburn.       No current facility-administered medications on file prior to visit.   [2]   Social History  Socioeconomic History    Marital status:     Number of children: 2   Occupational History    Occupation: retired teacher and principal    Occupation: substitute   Tobacco Use    Smoking status: Never    Smokeless tobacco: Never   Substance and Sexual Activity    Alcohol use: Not Currently     Comment: social- maybe once every few months    Drug use: No    Sexual activity: Yes     Partners: Male     Birth control/protection: Post-menopausal, None     Social Drivers of Health     Financial Resource Strain: Low Risk  (11/25/2024)    Overall Financial Resource Strain (CARDIA)     Difficulty of Paying Living Expenses: Not hard at all   Food Insecurity: No Food Insecurity (11/25/2024)    Hunger Vital Sign     Worried About Running Out of Food in the Last Year: Never true     Ran Out of Food in the Last Year: Never true   Transportation Needs: No Transportation Needs (11/9/2023)    PRAPARE - Transportation     Lack of Transportation (Medical): No     Lack of Transportation (Non-Medical): No   Physical Activity: Sufficiently Active (11/25/2024)    Exercise Vital Sign     Days of Exercise per Week: 4 days     Minutes of Exercise per Session: 50 min   Stress: No Stress Concern Present (11/25/2024)     [facility] - Occupational Stress Questionnaire     Feeling of Stress : Not at all   Housing Stability: Low Risk  (11/9/2023)    Housing Stability Vital Sign     Unable to Pay for Housing in the Last Year: No     Number of Places Lived in the Last Year: 1     Unstable Housing in the Last Year: No

## 2025-05-09 DIAGNOSIS — M70.61 GREATER TROCHANTERIC BURSITIS OF RIGHT HIP: ICD-10-CM

## 2025-05-09 RX ORDER — TRAMADOL HYDROCHLORIDE 50 MG/1
50 TABLET ORAL EVERY 6 HOURS PRN
Qty: 28 TABLET | Refills: 0 | Status: SHIPPED | OUTPATIENT
Start: 2025-05-09

## 2025-05-13 ENCOUNTER — OFFICE VISIT (OUTPATIENT)
Dept: OTOLARYNGOLOGY | Facility: CLINIC | Age: 75
End: 2025-05-13
Payer: MEDICARE

## 2025-05-13 ENCOUNTER — CLINICAL SUPPORT (OUTPATIENT)
Dept: REHABILITATION | Facility: HOSPITAL | Age: 75
End: 2025-05-13
Payer: MEDICARE

## 2025-05-13 VITALS — BODY MASS INDEX: 29.84 KG/M2 | WEIGHT: 168.44 LBS

## 2025-05-13 DIAGNOSIS — M25.552 BILATERAL HIP PAIN: ICD-10-CM

## 2025-05-13 DIAGNOSIS — H61.23 BILATERAL IMPACTED CERUMEN: Primary | ICD-10-CM

## 2025-05-13 DIAGNOSIS — R26.9 GAIT DIFFICULTY: Primary | ICD-10-CM

## 2025-05-13 DIAGNOSIS — M25.551 BILATERAL HIP PAIN: ICD-10-CM

## 2025-05-13 PROCEDURE — 97112 NEUROMUSCULAR REEDUCATION: CPT | Mod: PO,CQ

## 2025-05-13 PROCEDURE — 69210 REMOVE IMPACTED EAR WAX UNI: CPT | Mod: 50,PBBFAC,PO | Performed by: NURSE PRACTITIONER

## 2025-05-13 PROCEDURE — 99999 PR PBB SHADOW E&M-EST. PATIENT-LVL III: CPT | Mod: PBBFAC,,, | Performed by: NURSE PRACTITIONER

## 2025-05-13 PROCEDURE — 97530 THERAPEUTIC ACTIVITIES: CPT | Mod: PO,CQ

## 2025-05-13 PROCEDURE — 99499 UNLISTED E&M SERVICE: CPT | Mod: S$PBB,,, | Performed by: NURSE PRACTITIONER

## 2025-05-13 PROCEDURE — 97110 THERAPEUTIC EXERCISES: CPT | Mod: PO,CQ

## 2025-05-13 PROCEDURE — 99213 OFFICE O/P EST LOW 20 MIN: CPT | Mod: PBBFAC,PO | Performed by: NURSE PRACTITIONER

## 2025-05-13 PROCEDURE — 69210 REMOVE IMPACTED EAR WAX UNI: CPT | Mod: S$PBB,,, | Performed by: NURSE PRACTITIONER

## 2025-05-13 NOTE — PROGRESS NOTES
Subjective:       Patient ID: Lucinda Aguilera is a 74 y.o. female.    Chief Complaint: No chief complaint on file.    Ear Fullness       Patient last seen <3 years ago for cerumen impaction removal. Patient returns today for stopped up ears. Denies otalgia or otorrhea.         Review of Systems   Constitutional: Negative.    HENT: Negative.     Eyes: Negative.    Respiratory: Negative.     Cardiovascular: Negative.    Gastrointestinal: Negative.    Musculoskeletal: Negative.    Skin: Negative.    Neurological: Negative.    Hematological: Negative.    Psychiatric/Behavioral: Negative.         Objective:      Physical Exam  Vitals and nursing note reviewed.   Constitutional:       General: She is not in acute distress.     Appearance: She is well-developed. She is not ill-appearing.   HENT:      Head: Normocephalic and atraumatic.      Right Ear: Hearing, tympanic membrane, ear canal and external ear normal. No middle ear effusion. Tympanic membrane is not erythematous.      Left Ear: Hearing, tympanic membrane, ear canal and external ear normal.  No middle ear effusion. Tympanic membrane is not erythematous.      Nose: Nose normal.   Eyes:      General: Lids are normal. No scleral icterus.        Right eye: No discharge.         Left eye: No discharge.   Neck:      Trachea: Trachea normal. No tracheal deviation.   Cardiovascular:      Rate and Rhythm: Normal rate.   Pulmonary:      Effort: Pulmonary effort is normal. No respiratory distress.      Breath sounds: No stridor. No wheezing.   Musculoskeletal:         General: Normal range of motion.      Cervical back: Normal range of motion and neck supple.   Skin:     General: Skin is warm and dry.   Neurological:      Mental Status: She is alert and oriented to person, place, and time.      Coordination: Coordination normal.      Gait: Gait normal.   Psychiatric:         Attention and Perception: Attention normal.         Mood and Affect: Mood normal.         Speech:  Speech normal.         Behavior: Behavior normal. Behavior is cooperative.      SEPARATE PROCEDURE IN OFFICE:   Procedure: Removal of impacted cerumen, bilateral   Pre Procedure Diagnosis: Cerumen Impaction   Post Procedure Diagnosis: Cerumen Impaction   Verbal informed consent in regards to risk of trauma to ear canal, ear drum or hearing, discomfort during procedure and/or inability to remove cerumen impaction in one session or unforeseen events or complications.   No anesthesia.     Procedure in detail:   Ear canal visualized bilateral with appropriate size ear speculum utilizing Operating Head Binocular Otomicroscope   Utilizing the following:  Ring curet was used AU. The impacted cerumen of the ear canals was removed atraumatically. The TM and EAC were then inspected and found to be clear of wax. See description of TMs/EACs in PE above.   Complications: No   Condition: Improved/Good     Assessment:       Bilateral cerumen impactions removed    Plan:       Patient encouraged to return to clinic if symptoms worsen/persist and as needed for further ENT symptoms or concerns.

## 2025-05-13 NOTE — PROGRESS NOTES
"  Outpatient Rehab    Physical Therapy Visit    Patient Name: Lucinda Aguilera  MRN: [id number]  YOB: 1950  Encounter Date: 5/13/2025    Therapy Diagnosis:   Encounter Diagnoses   Name Primary?    Gait difficulty Yes    Bilateral hip pain      Physician: Nick Arnold MD    Physician Orders: Eval and Treat  Medical Diagnosis: Greater trochanteric bursitis of right hip    Visit # / Visits Authorized:  18 / 30  Insurance Authorization Period: 1/1/2025 to 12/31/2025  Date of Evaluation: 2/26/2025  Plan of Care Certification: 5/8/2025 to 6/6/2025      PT/PTA:     Number of PTA visits since last PT visit:   Time In: 1100   Time Out: 1200  Total Time (in minutes): 60   Total Billable Time (in minutes): 60    FOTO:  Intake Score:  %  Survey Score 2:  %  Survey Score 3:  %    Precautions:       Subjective   Lucinda is w/o c/o knee pn upon arrival. States that she recieved a brace for her R knee from Dr. Arnold..  Pain reported as 0/10.      Objective            Treatment:  Therapeutic Exercise  TE 1: HS stretch with strap 3 x 20"  TE 2: ITB stretch with strap 3 x 20"  TE 3: piriformis stretch 3 x 20"  TE 5: Fig 4 piriformis stretch 3 x 20"  TE 8: Nu step 8 mins  Balance/Neuromuscular Re-Education  NMR 1: bridge 3 x 10 red t-band  NMR 2: Standing NBOS on floor with over head rainbow toy ball reaches and fwd reaches x 10 each, lateral reaches x 10 each  NMR 3: BKFO 2 x 10 red t-band  NMR 4: PPT 3 x 10 3 sec hold  NMR 5: DKTC 3 x 10 orange t-ball  NMR 6: PPT with hip ADD with ball 3 sec hold 30x  NMR 7: SLR with QS 30x 1#  Therapeutic Activity  TA 1: Sit to standing using arm rests x 10  TA 2: Standing heel raises 2 x 10  TA 3: Standing lateral stepping at table 3 laps  TA 4: Standing hip abd x 20 1#  TA 5: Standing HSC x 20 1#  TA 6: Standing hip ext with knee bent x 20 1#      Time Entry(in minutes):  Neuromuscular Re-Education Time Entry: 30  Therapeutic Activity Time Entry: 10  Therapeutic Exercise Time " Entry: 20    Assessment & Plan   Assessment: Lucinda lambert today's tx with progression of ther ex, ther act and neuromuscular re-ed well. She was able to progress with resistance and or reps today w/o difficulty or complaint. She did require brief rest break post sit-stand and amb around table.  Evaluation/Treatment Tolerance: Patient tolerated treatment well    Patient will continue to benefit from skilled outpatient physical therapy to address the deficits listed in the problem list box on initial evaluation, provide pt/family education and to maximize pt's level of independence in the home and community environment.     Patient's spiritual, cultural, and educational needs considered and patient agreeable to plan of care and goals.           Plan: From a physical therapy perspective, the patient would benefit from: Skilled Rehab Services  Planned therapy interventions include: Therapeutic exercise, Therapeutic activities, Neuromuscular re-education, Manual therapy, and Other (Comment). Dry needling  Planned modalities to include: Electrical stimulation - passive/unattended, Thermotherapy (hot pack), and Cryotherapy (cold pack). Visit Frequency: 2 times Per Week for 8 additional Weeks, effective 4/11/2025.    Goals:   Active       LTGS: 8 WEEKS         LTG #1 STRENGTH  (Ongoing)       Start:  02/28/25    Expected End:  04/11/25       1.) Patient will demo equal strength for improving gait.         LTG #2 PAIN  (Met)       Start:  02/28/25    Resolved:  03/17/25    2.) Patient will ambulate without AD to demo dec pain and improved strength.         LTG #3 FOTO (Met)       Start:  02/28/25    Resolved:  03/17/25    3.) FOTO goal to be written.            NEW LTG -4 WEEKS       LTG #3: FOTO (Ongoing)       Start:  03/17/25    Expected End:  04/11/25       4.) Patient will score +3 on the FOTO for improved QOL.            STG added 3/28/2025       STG #4 FOTO  (Ongoing)       Start:  03/28/25    Expected End:  04/27/25        4.)Patient will score 46 on the FOTO for improving QOL.            STGs - 4 WEEKS         STG #1 ROM  (Met)       Start:  02/28/25    Resolved:  03/17/25    1.) Patient will demo bilateral hip AROM equal for symmetry in ambulation.         STG #2 STRENGTH  (Ongoing)       Start:  02/28/25    Expected End:  04/11/25       2.) Patient will demo 4-/5 strength grossly in right hip for improving walking.         STG #3 FOTO  (Met)       Start:  02/28/25    Resolved:  03/17/25    3.) Patient will complete FOTO and goal will be written.             Manuel Garcia, PTA

## 2025-05-15 ENCOUNTER — CLINICAL SUPPORT (OUTPATIENT)
Dept: REHABILITATION | Facility: HOSPITAL | Age: 75
End: 2025-05-15
Payer: MEDICARE

## 2025-05-15 DIAGNOSIS — M70.61 GREATER TROCHANTERIC BURSITIS OF RIGHT HIP: ICD-10-CM

## 2025-05-15 DIAGNOSIS — R26.9 GAIT DIFFICULTY: ICD-10-CM

## 2025-05-15 DIAGNOSIS — M25.651 DECREASED RANGE OF RIGHT HIP MOVEMENT: Primary | ICD-10-CM

## 2025-05-15 PROCEDURE — 97530 THERAPEUTIC ACTIVITIES: CPT | Mod: PO | Performed by: PHYSICAL THERAPIST

## 2025-05-15 NOTE — PROGRESS NOTES
"    Outpatient Rehab    Physical Therapy Visit    Patient Name: Lucinda Aguilera  MRN: [id number]  YOB: 1950  Encounter Date: 5/15/2025    Therapy Diagnosis:   Encounter Diagnoses   Name Primary?    Decreased range of right hip movement Yes    Greater trochanteric bursitis of right hip     Gait difficulty      Physician: Nick Arnold MD    Physician Orders: Eval and Treat  Medical Diagnosis: Greater trochanteric bursitis of right hip    Visit # / Visits Authorized:  19 / 30  Insurance Authorization Period: 1/1/2025 to 12/31/2025  Date of Evaluation: 2/26/2025  Plan of Care Certification: 5/8/2025 to 6/6/2025      PT/PTA: PT   Number of PTA visits since last PT visit:0  Time In: 1100   Time Out: 1200  Total Time (in minutes): 60   Total Billable Time (in minutes): 30    FOTO:  Intake Score:  %  Survey Score 2:  %  Survey Score 3:  %    Precautions:       Subjective   Lucinda is w/o c/o knee pn upon arrival. No pain..  Pain reported as 0/10. at R hip    Objective            Treatment:  Therapeutic Exercise  TE 1: HS stretch with strap 3 x 20"  TE 2: ITB stretch with strap 3 x 20"  TE 3: piriformis stretch 3 x 20"  TE 5: Fig 4 piriformis stretch 3 x 20"  TE 8: Nu step 8 mins  Balance/Neuromuscular Re-Education  NMR 1: bridge 3 x 10 red t-band  NMR 2: Standing NBOS on floor with over head rainbow toy ball reaches and fwd reaches x 10 each, lateral reaches x 10 each  NMR 3: BKFO 2 x 10 red t-band  Therapeutic Activity  TA 1: Sit to standing using arm rests x 10  TA 2: Standing heel raises 2 x 10, 2#  TA 3: Standing lateral stepping at table 3 laps  TA 4: Standing hip abd x 20 2#  TA 5: Standing HSC x 20 2#  TA 6: Standing hip ext with knee bent x 20 2#  TA 7: gt with proper ws and wt bearing // bars      Time Entry(in minutes):  Neuromuscular Re-Education Time Entry: 30  Therapeutic Activity Time Entry: 15  Therapeutic Exercise Time Entry: 15    Assessment & Plan   Assessment: Lucindakash lambert today's tx with " progression of ther ex, ther act and neuromuscular re-ed well. She was able to progress with resistance and or reps today w/o difficulty or complaint. She did required brief rest break after standing strengthening. She continues to need strengthening in functional positions to impact gait.  Evaluation/Treatment Tolerance: Patient tolerated treatment well    Patient will continue to benefit from skilled outpatient physical therapy to address the deficits listed in the problem list box on initial evaluation, provide pt/family education and to maximize pt's level of independence in the home and community environment.     Patient's spiritual, cultural, and educational needs considered and patient agreeable to plan of care and goals.           Plan: From a physical therapy perspective, the patient would benefit from: Skilled Rehab Services  Planned therapy interventions include: Therapeutic exercise, Therapeutic activities, Neuromuscular re-education, Manual therapy, and Other (Comment). Dry needling  Planned modalities to include: Electrical stimulation - passive/unattended, Thermotherapy (hot pack), and Cryotherapy (cold pack). Visit Frequency: 2 times Per Week for 8 additional Weeks, effective 4/11/2025.    Goals:   Active       LTGS: 8 WEEKS         LTG #1 STRENGTH  (Progressing)       Start:  02/28/25    Expected End:  04/11/25       1.) Patient will demo equal strength for improving gait.         LTG #2 PAIN  (Met)       Start:  02/28/25    Resolved:  03/17/25    2.) Patient will ambulate without AD to demo dec pain and improved strength.         LTG #3 FOTO (Met)       Start:  02/28/25    Resolved:  03/17/25    3.) FOTO goal to be written.            NEW LTG -4 WEEKS       LTG #3: FOTO (Progressing)       Start:  03/17/25    Expected End:  04/11/25       4.) Patient will score +3 on the FOTO for improved QOL.            STG added 3/28/2025       STG #4 FOTO  (Progressing)       Start:  03/28/25    Expected End:   04/27/25       4.)Patient will score 46 on the FOTO for improving QOL.            STGs - 4 WEEKS         STG #1 ROM  (Met)       Start:  02/28/25    Resolved:  03/17/25    1.) Patient will demo bilateral hip AROM equal for symmetry in ambulation.         STG #2 STRENGTH  (Progressing)       Start:  02/28/25    Expected End:  04/11/25       2.) Patient will demo 4-/5 strength grossly in right hip for improving walking.         STG #3 FOTO  (Met)       Start:  02/28/25    Resolved:  03/17/25    3.) Patient will complete FOTO and goal will be written.             Saira Joyce, PT, DPT

## 2025-05-19 DIAGNOSIS — Z87.19 HISTORY OF GASTRITIS: ICD-10-CM

## 2025-05-19 RX ORDER — BALSALAZIDE DISODIUM 750 MG/1
2250 CAPSULE ORAL 2 TIMES DAILY WITH MEALS
Qty: 180 CAPSULE | Refills: 11 | Status: SHIPPED | OUTPATIENT
Start: 2025-05-19

## 2025-05-19 NOTE — TELEPHONE ENCOUNTER
Care Due:                  Date            Visit Type   Department     Provider  --------------------------------------------------------------------------------                                MYCHART                              FOLLOWUP/OF  [facility]  Last Visit: 04-      FICE VISIT   MEDICINE       Saurabh Hassan                              EP -                              PRIMARY      [facility]  Next Visit: 08-      CARE ([facility])   MEDICINE       Saurabh Hassan                                                            Last  Test          Frequency    Reason                     Performed    Due Date  --------------------------------------------------------------------------------    Lipid Panel.  12 months..  rosuvastatin.............  07- 07-    [employer] Embedded Care Due Messages. Reference number: [id number].   5/19/2025 7:10:44 AM CDT

## 2025-05-20 ENCOUNTER — CLINICAL SUPPORT (OUTPATIENT)
Dept: REHABILITATION | Facility: HOSPITAL | Age: 75
End: 2025-05-20
Payer: MEDICARE

## 2025-05-20 DIAGNOSIS — M70.61 GREATER TROCHANTERIC BURSITIS OF RIGHT HIP: Primary | ICD-10-CM

## 2025-05-22 ENCOUNTER — CLINICAL SUPPORT (OUTPATIENT)
Dept: REHABILITATION | Facility: HOSPITAL | Age: 75
End: 2025-05-22
Payer: MEDICARE

## 2025-05-22 DIAGNOSIS — M25.651 DECREASED RANGE OF RIGHT HIP MOVEMENT: Primary | ICD-10-CM

## 2025-05-22 DIAGNOSIS — R26.9 GAIT DIFFICULTY: ICD-10-CM

## 2025-05-22 PROCEDURE — 97110 THERAPEUTIC EXERCISES: CPT | Mod: PO | Performed by: PHYSICAL THERAPIST

## 2025-05-22 PROCEDURE — 97140 MANUAL THERAPY 1/> REGIONS: CPT | Mod: PO | Performed by: PHYSICAL THERAPIST

## 2025-05-22 NOTE — PROGRESS NOTES
Lucinda cancelled her appoint upon arrival today.  Outpatient Rehab    Physical Therapy Visit    Patient Name: Lucinda Aguilera  MRN: [id number]  YOB: 1950  Encounter Date: 5/20/2025    Therapy Diagnosis:   Encounter Diagnosis   Name Primary?    Greater trochanteric bursitis of right hip Yes     Physician: Nick Arnold MD    Physician Orders: Eval and Treat  Medical Diagnosis: Greater trochanteric bursitis of right hip    Visit # / Visits Authorized:  20 / 30  Insurance Authorization Period: 1/1/2025 to 12/31/2025  Date of Evaluation: 2/26/2025  Plan of Care Certification: 5/8/2025 to 6/6/2025      PT/PTA:     Number of PTA visits since last PT visit:   Time In:     Time Out:    Total Time (in minutes):     Total Billable Time (in minutes):      FOTO:  Intake Score:  %  Survey Score 2:  %  Survey Score 3:  %    Precautions:       Subjective    Lucinda cancelled her appoint upon arrival today stating that she did not feel well.          Objective            Treatment:       Time Entry(in minutes):       Assessment & Plan   Assessment:         The patient will continue to benefit from skilled outpatient physical therapy in order to address the deficits listed in the problem list on the initial evaluation, provide patient and family education, and maximize the patients level of independence in the home and community environments.     The patient's spiritual, cultural, and educational needs were considered, and the patient is agreeable to the plan of care and goals.           Plan:      Goals:   Active       LTGS: 8 WEEKS         LTG #1 STRENGTH  (Progressing)       Start:  02/28/25    Expected End:  04/11/25       1.) Patient will demo equal strength for improving gait.         LTG #2 PAIN  (Met)       Start:  02/28/25    Resolved:  03/17/25    2.) Patient will ambulate without AD to demo dec pain and improved strength.         LTG #3 FOTO (Met)       Start:  02/28/25    Resolved:  03/17/25    3.) FOTO  goal to be written.            NEW LTG -4 WEEKS       LTG #3: FOTO (Progressing)       Start:  03/17/25    Expected End:  04/11/25       4.) Patient will score +3 on the FOTO for improved QOL.            STG added 3/28/2025       STG #4 FOTO  (Progressing)       Start:  03/28/25    Expected End:  04/27/25       4.)Patient will score 46 on the FOTO for improving QOL.            STGs - 4 WEEKS         STG #1 ROM  (Met)       Start:  02/28/25    Resolved:  03/17/25    1.) Patient will demo bilateral hip AROM equal for symmetry in ambulation.         STG #2 STRENGTH  (Progressing)       Start:  02/28/25    Expected End:  04/11/25       2.) Patient will demo 4-/5 strength grossly in right hip for improving walking.         STG #3 FOTO  (Met)       Start:  02/28/25    Resolved:  03/17/25    3.) Patient will complete FOTO and goal will be written.             Manuel Garcia, PTA

## 2025-05-22 NOTE — PROGRESS NOTES
Outpatient Rehab    Physical Therapy Visit    Patient Name: Lucinda Aguilera  MRN: [id number]  YOB: 1950  Encounter Date: 5/22/2025    Therapy Diagnosis:   Encounter Diagnoses   Name Primary?    Decreased range of right hip movement Yes    Gait difficulty      Physician: Nick Arnold MD    Physician Orders: Eval and Treat  Medical Diagnosis: Greater trochanteric bursitis of right hip    Visit # / Visits Authorized:  20 / 30  Insurance Authorization Period: 1/1/2025 to 12/31/2025  Date of Evaluation: 2/26/2025  Plan of Care Certification: 5/8/2025 to 6/6/2025      PT/PTA: PT   Number of PTA visits since last PT visit:1  Time In: 1100   Time Out: 1200  Total Time (in minutes): 60   Total Billable Time (in minutes): 30    FOTO:  Intake Score:  %  Survey Score 2:  %  Survey Score 3:  %    Precautions:       Subjective   Lucinda arrived on Tuesday with high R hip pain and decided to cancel. DN helps and therapist was not available. She arrived today feeling much better with less pain. She was eager for DN. She has good relief from this..  Pain reported as 2/10. at R hip    Objective            Treatment:  Therapeutic Exercise  TE 1: np  TE 2: np  TE 3: np  TE 5: np  TE 8: Nu step 8 mins  Manual Therapy  MT 2: DN was applied with 10 mins of estim following consent and palpation. ttp noted at R piriformis, TFL, and glut med with 4 needle insertion, 0.30 x 50, and removal. Appropriate response was noted. Pt at 10 pps freq and 8 intensity. This performed in sidelying. Good response to intervention.  Balance/Neuromuscular Re-Education  NMR 1: np  NMR 2: Standing NBOS on floor with over head rainbow toy ball reaches and fwd reaches x 10 each, lateral reaches x 10 each  NMR 3: np  NMR 4: np  Therapeutic Activity  TA 1: Sit to standing using arm rests x 10  TA 2: Standing heel raises 2 x 10, 2#  TA 3: Standing lateral stepping at table 3 laps  TA 4: Standing hip abd x 20 2#  TA 5: Standing HSC x 20 2#  TA 6:  Standing hip ext with knee bent x 20 2#      Time Entry(in minutes):  Manual Therapy Time Entry: 20  Therapeutic Exercise Time Entry: 8    Assessment & Plan   Assessment: Lucinda continues with R hip pain and gait deviations: trendelenburg, hip weakness. She receives relief when pain is present from dry needling. Will continue glute strengthening and gait training to improving mobility.  Evaluation/Treatment Tolerance: Patient tolerated treatment well    Patient will continue to benefit from skilled outpatient physical therapy to address the deficits listed in the problem list box on initial evaluation, provide pt/family education and to maximize pt's level of independence in the home and community environment.     Patient's spiritual, cultural, and educational needs considered and patient agreeable to plan of care and goals.           Plan: From a physical therapy perspective, the patient would benefit from: Skilled Rehab Services  Planned therapy interventions include: Therapeutic exercise, Therapeutic activities, Neuromuscular re-education, Manual therapy, and Other (Comment). Dry needling  Planned modalities to include: Electrical stimulation - passive/unattended, Thermotherapy (hot pack), and Cryotherapy (cold pack). Visit Frequency: 2 times Per Week for 8 additional Weeks, effective 4/11/2025.    Goals:   Active       LTGS: 8 WEEKS         LTG #1 STRENGTH  (Progressing)       Start:  02/28/25    Expected End:  04/11/25       1.) Patient will demo equal strength for improving gait.         LTG #2 PAIN  (Met)       Start:  02/28/25    Resolved:  03/17/25    2.) Patient will ambulate without AD to demo dec pain and improved strength.         LTG #3 FOTO (Met)       Start:  02/28/25    Resolved:  03/17/25    3.) FOTO goal to be written.            NEW LTG -4 WEEKS       LTG #3: FOTO (Progressing)       Start:  03/17/25    Expected End:  04/11/25       4.) Patient will score +3 on the FOTO for improved QOL.             STG added 3/28/2025       STG #4 FOTO  (Progressing)       Start:  03/28/25    Expected End:  04/27/25       4.)Patient will score 46 on the FOTO for improving QOL.            STGs - 4 WEEKS         STG #1 ROM  (Met)       Start:  02/28/25    Resolved:  03/17/25    1.) Patient will demo bilateral hip AROM equal for symmetry in ambulation.         STG #2 STRENGTH  (Progressing)       Start:  02/28/25    Expected End:  04/11/25       2.) Patient will demo 4-/5 strength grossly in right hip for improving walking.         STG #3 FOTO  (Met)       Start:  02/28/25    Resolved:  03/17/25    3.) Patient will complete FOTO and goal will be written.             Saira Joyce, PT, DPT

## 2025-05-27 ENCOUNTER — CLINICAL SUPPORT (OUTPATIENT)
Dept: REHABILITATION | Facility: HOSPITAL | Age: 75
End: 2025-05-27
Attending: PHYSICAL THERAPIST
Payer: MEDICARE

## 2025-05-27 DIAGNOSIS — M25.651 DECREASED RANGE OF RIGHT HIP MOVEMENT: ICD-10-CM

## 2025-05-27 DIAGNOSIS — R26.9 GAIT DIFFICULTY: Primary | ICD-10-CM

## 2025-05-27 DIAGNOSIS — M70.61 GREATER TROCHANTERIC BURSITIS OF RIGHT HIP: ICD-10-CM

## 2025-05-27 DIAGNOSIS — G89.29 CHRONIC BILATERAL LOW BACK PAIN WITHOUT SCIATICA: ICD-10-CM

## 2025-05-27 DIAGNOSIS — M54.50 CHRONIC BILATERAL LOW BACK PAIN WITHOUT SCIATICA: ICD-10-CM

## 2025-05-27 PROCEDURE — 97112 NEUROMUSCULAR REEDUCATION: CPT | Mod: PO | Performed by: PHYSICAL THERAPIST

## 2025-05-27 PROCEDURE — 97140 MANUAL THERAPY 1/> REGIONS: CPT | Mod: PO | Performed by: PHYSICAL THERAPIST

## 2025-05-28 NOTE — PROGRESS NOTES
Outpatient Rehab    Physical Therapy Visit    Patient Name: Lucinda Aguilera  MRN: [id number]  YOB: 1950  Encounter Date: 5/27/2025    Therapy Diagnosis:   Encounter Diagnoses   Name Primary?    Gait difficulty Yes    Decreased range of right hip movement     Greater trochanteric bursitis of right hip     Chronic bilateral low back pain without sciatica      Physician: Nick Arnold MD    Physician Orders: Eval and Treat  Medical Diagnosis: Greater trochanteric bursitis of right hip    Visit # / Visits Authorized:  21 / 30  Insurance Authorization Period: 1/1/2025 to 12/31/2025  Date of Evaluation: 2/26/2025  Plan of Care Certification: 5/8/2025 to 6/6/2025      PT/PTA: PT   Number of PTA visits since last PT visit:0  Time In: 1300   Time Out: 1400  Total Time (in minutes): 60   Total Billable Time (in minutes): 30    FOTO:  Intake Score:  %  Survey Score 2:  %  Survey Score 3:  %    Precautions:       Subjective   Lucinda returns with high levels of R knee pain. She has a h/o back pain..  Pain reported as 3/10. R knee pain    Objective            Treatment:  Therapeutic Exercise  TE 8: Nu step 8 mins  Manual Therapy  MT 2: DN was applied with 10 mins of estim following consent and palpation. ttp noted at R piriformis, TFL, and glut med with 4 needle insertion, 0.30 x 50, and removal. Appropriate response was noted. Pt at 10 pps freq and 8 intensity. This performed in sidelying. Good response to intervention.  Balance/Neuromuscular Re-Education  NMR 2: Standing NBOS on floor with over head rainbow toy ball reaches and fwd reaches x 10 each, lateral reaches x 10 each  Therapeutic Activity  TA 1: Sit to standing using arm rests x 10  TA 2: Standing heel raises 2 x 10, 2#  TA 3: Standing lateral stepping at table 3 laps  TA 4: Standing hip abd x 20 2#  TA 5: Standing HSC x 20 2#  TA 6: Standing hip ext with knee bent x 20 2#      Time Entry(in minutes):  Manual Therapy Time Entry: 15  Neuromuscular  Re-Education Time Entry: 30    Assessment & Plan   Assessment: Lucinda was noted to have a right lower iliac crest in standing. The R limb was shorter in supine. Heel lift distributed and placed into shoe. I am hoping this decreases stresses to the lower extremity as pain have alternated between R lateral hip and parts of the R knee.  Evaluation/Treatment Tolerance: Patient tolerated treatment well    Patient will continue to benefit from skilled outpatient physical therapy to address the deficits listed in the problem list box on initial evaluation, provide pt/family education and to maximize pt's level of independence in the home and community environment.     Patient's spiritual, cultural, and educational needs considered and patient agreeable to plan of care and goals.           Plan: From a physical therapy perspective, the patient would benefit from: Skilled Rehab Services  Planned therapy interventions include: Therapeutic exercise, Therapeutic activities, Neuromuscular re-education, Manual therapy, and Other (Comment). Dry needling  Planned modalities to include: Electrical stimulation - passive/unattended, Thermotherapy (hot pack), and Cryotherapy (cold pack). Visit Frequency: 2 times Per Week for 8 additional Weeks, effective 4/11/2025.    Goals:   Active       LTGS: 8 WEEKS         LTG #1 STRENGTH  (Progressing)       Start:  02/28/25    Expected End:  04/11/25       1.) Patient will demo equal strength for improving gait.         LTG #2 PAIN  (Met)       Start:  02/28/25    Resolved:  03/17/25    2.) Patient will ambulate without AD to demo dec pain and improved strength.         LTG #3 FOTO (Met)       Start:  02/28/25    Resolved:  03/17/25    3.) FOTO goal to be written.            NEW LTG -4 WEEKS       LTG #3: FOTO (Progressing)       Start:  03/17/25    Expected End:  04/11/25       4.) Patient will score +3 on the FOTO for improved QOL.            STG added 3/28/2025       STG #4 FOTO   (Progressing)       Start:  03/28/25    Expected End:  04/27/25       4.)Patient will score 46 on the FOTO for improving QOL.            STGs - 4 WEEKS         STG #1 ROM  (Met)       Start:  02/28/25    Resolved:  03/17/25    1.) Patient will demo bilateral hip AROM equal for symmetry in ambulation.         STG #2 STRENGTH  (Progressing)       Start:  02/28/25    Expected End:  04/11/25       2.) Patient will demo 4-/5 strength grossly in right hip for improving walking.         STG #3 FOTO  (Met)       Start:  02/28/25    Resolved:  03/17/25    3.) Patient will complete FOTO and goal will be written.             Saira Joyce, PT, DPT

## 2025-05-29 ENCOUNTER — CLINICAL SUPPORT (OUTPATIENT)
Dept: REHABILITATION | Facility: HOSPITAL | Age: 75
End: 2025-05-29
Payer: MEDICARE

## 2025-05-29 DIAGNOSIS — M25.651 DECREASED RANGE OF RIGHT HIP MOVEMENT: Primary | ICD-10-CM

## 2025-05-29 DIAGNOSIS — M70.61 GREATER TROCHANTERIC BURSITIS OF RIGHT HIP: ICD-10-CM

## 2025-05-29 PROCEDURE — 97110 THERAPEUTIC EXERCISES: CPT | Mod: PO,CQ

## 2025-05-31 ENCOUNTER — PATIENT MESSAGE (OUTPATIENT)
Dept: FAMILY MEDICINE | Facility: CLINIC | Age: 75
End: 2025-05-31
Payer: MEDICARE

## 2025-05-31 ENCOUNTER — EXTERNAL CHRONIC CARE MANAGEMENT (OUTPATIENT)
Dept: PRIMARY CARE CLINIC | Facility: CLINIC | Age: 75
End: 2025-05-31
Payer: MEDICARE

## 2025-05-31 DIAGNOSIS — I10 PRIMARY HYPERTENSION: ICD-10-CM

## 2025-05-31 PROCEDURE — 99490 CHRNC CARE MGMT STAFF 1ST 20: CPT | Mod: PBBFAC,PO | Performed by: FAMILY MEDICINE

## 2025-05-31 PROCEDURE — 99490 CHRNC CARE MGMT STAFF 1ST 20: CPT | Mod: S$PBB,,, | Performed by: FAMILY MEDICINE

## 2025-06-02 RX ORDER — LOSARTAN POTASSIUM 100 MG/1
100 TABLET ORAL DAILY
Qty: 30 TABLET | Refills: 1 | Status: SHIPPED | OUTPATIENT
Start: 2025-06-02 | End: 2026-06-02

## 2025-06-03 ENCOUNTER — PATIENT MESSAGE (OUTPATIENT)
Dept: ORTHOPEDICS | Facility: CLINIC | Age: 75
End: 2025-06-03

## 2025-06-03 ENCOUNTER — OFFICE VISIT (OUTPATIENT)
Dept: ORTHOPEDICS | Facility: CLINIC | Age: 75
End: 2025-06-03
Payer: MEDICARE

## 2025-06-03 VITALS — WEIGHT: 168.44 LBS | BODY MASS INDEX: 29.84 KG/M2 | HEIGHT: 63 IN

## 2025-06-03 DIAGNOSIS — M54.16 LUMBAR RADICULOPATHY, CHRONIC: Primary | ICD-10-CM

## 2025-06-03 PROCEDURE — 99214 OFFICE O/P EST MOD 30 MIN: CPT | Mod: S$PBB,,, | Performed by: ORTHOPAEDIC SURGERY

## 2025-06-03 PROCEDURE — 99214 OFFICE O/P EST MOD 30 MIN: CPT | Mod: PBBFAC,PO | Performed by: ORTHOPAEDIC SURGERY

## 2025-06-03 PROCEDURE — 99999 PR PBB SHADOW E&M-EST. PATIENT-LVL IV: CPT | Mod: PBBFAC,,, | Performed by: ORTHOPAEDIC SURGERY

## 2025-06-03 RX ORDER — METHOCARBAMOL 750 MG/1
750 TABLET, FILM COATED ORAL 4 TIMES DAILY PRN
Qty: 44 TABLET | Refills: 3 | Status: SHIPPED | OUTPATIENT
Start: 2025-06-03

## 2025-06-05 ENCOUNTER — PATIENT MESSAGE (OUTPATIENT)
Dept: PSYCHIATRY | Facility: CLINIC | Age: 75
End: 2025-06-05
Payer: MEDICARE

## 2025-06-06 ENCOUNTER — CLINICAL SUPPORT (OUTPATIENT)
Dept: REHABILITATION | Facility: HOSPITAL | Age: 75
End: 2025-06-06
Payer: MEDICARE

## 2025-06-06 ENCOUNTER — TELEPHONE (OUTPATIENT)
Dept: NEUROSURGERY | Facility: CLINIC | Age: 75
End: 2025-06-06
Payer: MEDICARE

## 2025-06-06 ENCOUNTER — PATIENT MESSAGE (OUTPATIENT)
Dept: PSYCHIATRY | Facility: CLINIC | Age: 75
End: 2025-06-06
Payer: MEDICARE

## 2025-06-06 ENCOUNTER — PATIENT MESSAGE (OUTPATIENT)
Dept: NEUROSURGERY | Facility: CLINIC | Age: 75
End: 2025-06-06
Payer: MEDICARE

## 2025-06-06 ENCOUNTER — PATIENT MESSAGE (OUTPATIENT)
Dept: ORTHOPEDICS | Facility: CLINIC | Age: 75
End: 2025-06-06
Payer: MEDICARE

## 2025-06-06 DIAGNOSIS — M25.651 DECREASED RANGE OF RIGHT HIP MOVEMENT: ICD-10-CM

## 2025-06-06 DIAGNOSIS — R26.9 GAIT DIFFICULTY: Primary | ICD-10-CM

## 2025-06-06 DIAGNOSIS — M54.50 CHRONIC BILATERAL LOW BACK PAIN WITHOUT SCIATICA: ICD-10-CM

## 2025-06-06 DIAGNOSIS — G89.29 CHRONIC BILATERAL LOW BACK PAIN WITHOUT SCIATICA: ICD-10-CM

## 2025-06-06 PROCEDURE — 97112 NEUROMUSCULAR REEDUCATION: CPT | Mod: KX,PO | Performed by: PHYSICAL THERAPIST

## 2025-06-06 PROCEDURE — 97140 MANUAL THERAPY 1/> REGIONS: CPT | Mod: KX,PO | Performed by: PHYSICAL THERAPIST

## 2025-06-06 PROCEDURE — 97110 THERAPEUTIC EXERCISES: CPT | Mod: KX,PO | Performed by: PHYSICAL THERAPIST

## 2025-06-09 NOTE — PROGRESS NOTES
Outpatient Rehab    Physical Therapy Progress Note : Updated Plan of Care    Patient Name: Lucinda Aguilera  MRN: [id number]  YOB: 1950  Encounter Date: 6/6/2025    Therapy Diagnosis:   Encounter Diagnoses   Name Primary?    Gait difficulty Yes    Decreased range of right hip movement     Chronic bilateral low back pain without sciatica      Physician: Nick Arnold MD    Physician Orders: Eval and Treat  Medical Diagnosis: Greater trochanteric bursitis of right hip  Surgical Diagnosis: Not applicable for this Episode   Surgical Date: Not applicable for this Episode    Visit # / Visits Authorized:  23 / 30  Insurance Authorization Period: 1/1/2025 to 12/31/2025  Date of Evaluation: 2/26/2025   Plan of Care Certification: 6/6/2025 to 7/4/2025     PT/PTA: PT   Number of PTA visits since last PT visit:0  Time In: 1200   Time Out: 1300  Total Time (in minutes): 60   Total Billable Time (in minutes): 40    FOTO:  Intake Score:  %  Survey Score 2:  %  Survey Score 3:  %    Precautions:       Subjective   Lucinda saw Dr. Arnold. (note incomplete). The dx is lumbar radiculopathy and consistent with PT findings. She left with dec pain..  Pain reported as 8/10. R LE    Objective          Hypomobile l/s and t/s p/a.    Lumbar spine AROM:  Patient noted to deviate ->Left with forward flexion. 40% flexion present. Tough to reach neutral extension, Left lateral flexion is decreased.    Hip Range of Motion   Limited right hip ROM d/t pain.     Knee Range of Motion    Bilateral knee ROM WNL.     Hip Strength - Planes of Motion    Right Strength Right Pain Left Strength Left  Pain   Flexion (L2) 4-  + 4     Extension 3+   4     ABduction 3  + 4-     ADduction 4-  + 4     Internal Rotation           External Rotation              Knee Strength    Right Strength Right Pain Left Strength Left  Pain   Flexion (S2) 5   5     Prone Flexion           Extension (L3) 5   5        Lumbar/Pelvic Girdle Special Tests  Pelvic  "Girdle / Sacrum Tests  Negative: Right JOSUE and Right FADIR     Hip Special Tests  Intra-Articular/Impingement Tests  Negative: Right JOSUE and Right FADIR  Flex/Imbalance/Structural Tests  Positive: Right Modified Juanita's and Right Piriformis  + pain to palpation right hip flexors and neg in glutes.    Patient has significant difficulty getting in prone, but lumbar extension helped pain. She has significant flexion and deviates Left with bending. Will try extension in sitting. Her shoulders are painful limiting treatment as well.     Treatment:  Therapeutic Exercise  TE 1: HS, ITB, piriformis stretch 3 x 30" each  Manual Therapy  MT 1: prone p/a upper l/s and lower t/s grade II-III mobs  Balance/Neuromuscular Re-Education  NMR 1: Education provide in HEP for back pain, Dn for hip reached plateau d/t back sx.  NMR 3: Pt was reassessed this date. New dx: Chronic bilateral low back pain without sciatica.  NMR 4: prone lying x 3 mins, prone on elbows x 3 mins, Pt with dec pain in prone lying, difficulty getting their and PT unweighted shoulders for (A).  NMR 5: GS x 20  NMR 6: Slouch over correct 2 x 10  NMR 7: bridge 2 x 10    Time Entry(in minutes):  Manual Therapy Time Entry: 10  Neuromuscular Re-Education Time Entry: 25  Therapeutic Activity Time Entry: 10    Assessment & Plan      Lucinda has a known back pain and findings. She is now referred to neurology. She would now like tx to focus on the daniel dx. She has reached a plateau with DN to hip and buttock as a result of pain likely d/t back condition. She demos impaired lumbothoracic mobility, ROM, strength, functional mobility, and pain limiting. Lucinda has multiple worn and painful joints making prone lumbar ext difficult. We managed with mod PT assistance to off load the shoulders. Pt needing mod assist prone to sit. Lucinda is now walking with a cane for safety. She has most notably weak hips. Pt demonstrated ext based preference and will cont this as able. "     The patient will continue to benefit from skilled outpatient physical therapy in order to address the deficits listed in the problem list on the initial evaluation, provide patient and family education, and maximize the patients level of independence in the home and community environments.     The patient's spiritual, cultural, and educational needs were considered, and the patient is agreeable to the plan of care and goals.       From a physical therapy perspective, the patient would benefit from: Skilled Rehab Services Planned therapy interventions include: Therapeutic exercise, Therapeutic activities, Neuromuscular re-education, Manual therapy, and Other (Comment). Dry needling Planned modalities to include: Electrical stimulation - passive/unattended, Thermotherapy (hot pack), and Cryotherapy (cold pack). Visit Frequency: 2 times Per Week for 4 additional Weeks, effective 6/6/2025.     Goals:   Active       FOTO       Patient will improve current FOTO score by 5 points for improving QOL.       Start:  06/11/25    Expected End:  07/04/25               LTG #3 AMBULATION       Patient will return to ambulating without AD to demo improved strength and pain. (Progressing)       Start:  06/09/25    Expected End:  07/04/25               LTGS: 8 WEEKS         LTG #1 STRENGTH  (Ongoing)       Start:  02/28/25    Expected End:  07/04/25       1.) Patient will demo equal strength for improving gait.         LTG #2 PAIN  (Met)       Start:  02/28/25    Resolved:  03/17/25    2.) Patient will ambulate without AD to demo dec pain and improved strength.         LTG #3 FOTO (Met)       Start:  02/28/25    Resolved:  03/17/25    3.) FOTO goal to be written.            LUMBAR ROM       Patient will demo forward flexion with less deviation to Left and improve ROM if able. (Progressing)       Start:  06/09/25    Expected End:  07/04/25               NEW LTG -4 WEEKS       LTG #3: FOTO (Ongoing)       Start:  03/17/25     Expected End:  07/04/25       4.) Patient will score +3 on the FOTO for improved QOL.            STG added 3/28/2025       STG #4 FOTO  (Ongoing)       Start:  03/28/25    Expected End:  07/04/25       4.)Patient will score 46 on the FOTO for improving QOL.            STGs - 4 WEEKS         STG #1 ROM  (Met)       Start:  02/28/25    Resolved:  03/17/25    1.) Patient will demo bilateral hip AROM equal for symmetry in ambulation.         STG #2 STRENGTH  (Ongoing)       Start:  02/28/25    Expected End:  07/04/25       2.) Patient will demo 4-/5 strength grossly in right hip for improving walking.         STG #3 FOTO  (Met)       Start:  02/28/25    Resolved:  03/17/25    3.) Patient will complete FOTO and goal will be written.             Saira Joyce, PT, DPT   16-Dec-2022 12:20

## 2025-06-10 ENCOUNTER — OFFICE VISIT (OUTPATIENT)
Dept: ORTHOPEDICS | Facility: CLINIC | Age: 75
End: 2025-06-10
Payer: MEDICARE

## 2025-06-10 ENCOUNTER — PATIENT MESSAGE (OUTPATIENT)
Dept: ORTHOPEDICS | Facility: CLINIC | Age: 75
End: 2025-06-10

## 2025-06-10 DIAGNOSIS — M70.51 PES ANSERINUS BURSITIS OF RIGHT KNEE: Primary | ICD-10-CM

## 2025-06-10 PROCEDURE — 20610 DRAIN/INJ JOINT/BURSA W/O US: CPT | Mod: PBBFAC,PO,RT | Performed by: NURSE PRACTITIONER

## 2025-06-10 PROCEDURE — 99213 OFFICE O/P EST LOW 20 MIN: CPT | Mod: PBBFAC,PO | Performed by: NURSE PRACTITIONER

## 2025-06-10 PROCEDURE — 20610 DRAIN/INJ JOINT/BURSA W/O US: CPT | Mod: S$PBB,RT,, | Performed by: NURSE PRACTITIONER

## 2025-06-10 PROCEDURE — 99212 OFFICE O/P EST SF 10 MIN: CPT | Mod: 25,S$PBB,, | Performed by: NURSE PRACTITIONER

## 2025-06-10 PROCEDURE — 99999 PR PBB SHADOW E&M-EST. PATIENT-LVL III: CPT | Mod: PBBFAC,,, | Performed by: NURSE PRACTITIONER

## 2025-06-10 PROCEDURE — 99999PBSHW PR PBB SHADOW TECHNICAL ONLY FILED TO HB: Mod: PBBFAC,,,

## 2025-06-10 RX ORDER — TRIAMCINOLONE ACETONIDE 40 MG/ML
40 INJECTION, SUSPENSION INTRA-ARTICULAR; INTRAMUSCULAR
Status: DISCONTINUED | OUTPATIENT
Start: 2025-06-10 | End: 2025-06-10 | Stop reason: HOSPADM

## 2025-06-10 RX ADMIN — TRIAMCINOLONE ACETONIDE 40 MG: 40 INJECTION, SUSPENSION INTRA-ARTICULAR; INTRAMUSCULAR at 09:06

## 2025-06-10 NOTE — PROGRESS NOTES
"Chief Complaint   Patient presents with    Right Knee - Pain       HPI:   This is a 74 y.o. who returns to clinic today in follow-up for right knee pain for the past 2 weeks after no known trauma. Pain is aching and dull. No numbness or tingling. No associated signs or symptoms.    Past Medical History:   Diagnosis Date    Anxiety     takes daily Xanax    Arthritis     right thumb    Cataract     OU    Cholelithiasis     GERD (gastroesophageal reflux disease)     Hepatic steatosis     Hyperlipemia     Hypertension     PVD (posterior vitreous detachment)     OD     Past Surgical History:   Procedure Laterality Date    BREAST BIOPSY Left 08/28/2020    stereo biopsy    BREAST BIOPSY Left 10/07/2020    benign excisional biopsy    CAUDAL EPIDURAL STEROID INJECTION N/A 7/26/2024    Procedure: Injection-steroid-epidural-caudal;  Surgeon: Indra Cuellar MD;  Location: [facility] OR;  Service: Pain Management;  Laterality: N/A;    chest abcess      child    COLONOSCOPY  01/06/2012    Dr. Lopez: hemorrhoids, redundant colon    COLONOSCOPY N/A 02/17/2021    Dr. Lopez: Congested and erythematous mucosa in the rectum, in the recto-sigmoid colon and in the sigmoid colon, proctosigmoid colitis, Redundant colon; biopsy: focal active colitis "nonspecific but can be seen with acute self-limited colitis (infection), medication effect (e.g. NSAID use), proximity to diverticula, or early/evolving IBD    EPIDURAL STEROID INJECTION INTO LUMBAR SPINE N/A 5/30/2024    Procedure: Injection-steroid-epidural-lumbar   L5/S1;  Surgeon: Indra Cuellar MD;  Location: [facility] OR;  Service: Pain Management;  Laterality: N/A;    ESOPHAGOGASTRODUODENOSCOPY N/A 06/06/2019    Dr. Lopez: small hiatal hernia, Non-erosive esophageal reflux (NERD) disease?., slight antritis; biopsy: Antral mucosa with chemical/reactive gastropathy. negative for h pylori    ESOPHAGOGASTRODUODENOSCOPY N/A 02/17/2021    Procedure: EGD (ESOPHAGOGASTRODUODENOSCOPY);  Surgeon: Nathan" BARRIE Lopez Jr., MD;  Location: [facility];  Service: Endoscopy;  Laterality: N/A; Minimal gastritis; biopsy: stomach- negative for h pylori, active chronic gastritis with focal features of erosive gastritis, duodenum WNL    ESOPHAGOGASTRODUODENOSCOPY N/A 05/10/2022    Procedure: EGD (ESOPHAGOGASTRODUODENOSCOPY);  Surgeon: Nathan Lopez Jr., MD;  Location: [facility] ENDO;  Service: Endoscopy;  Laterality: N/A;    ESOPHAGOGASTRODUODENOSCOPY N/A 3/12/2024    Procedure: EGD (ESOPHAGOGASTRODUODENOSCOPY);  Surgeon: Nathan Lopez Jr., MD;  Location: [facility] ENDO;  Service: Endoscopy;  Laterality: N/A;    EXCISION OF BURSA OF HIP Right 2/3/2025    Procedure: BURSECTOMY, HIP;  Surgeon: Nick Arnold MD;  Location: [facility];  Service: Orthopedics;  Laterality: Right;    EXCISIONAL BIOPSY Left 10/07/2020    Procedure: EXCISIONAL BIOPSY-Left with radiological marker;  Surgeon: Brooklynn Ashford MD;  Location: [facility];  Service: General;  Laterality: Left;    FRACTURE SURGERY  2010    Left thumb repaired surgically    INJECTION OF ANESTHETIC AGENT AROUND MEDIAL BRANCH NERVES INNERVATING LUMBAR FACET JOINT Bilateral 9/12/2024    Procedure: Block-nerve-medial branch-lumbar    L4/5, L5/S1;  Surgeon: Indra Cuellar MD;  Location: [facility];  Service: Pain Management;  Laterality: Bilateral;    JOINT REPLACEMENT Bilateral     knee    KNEE ARTHROSCOPY W/ LASER      right    LAPAROSCOPIC CHOLECYSTECTOMY N/A 06/14/2019    Procedure: CHOLECYSTECTOMY, LAPAROSCOPIC;  Surgeon: Guevara Evans MD;  Location: [facility];  Service: General;  Laterality: N/A;    thumb surgery  11/2014    TOTAL KNEE ARTHROPLASTY Left 06/19/2018    Procedure: REPLACEMENT-KNEE-TOTAL;  Surgeon: Nj Melvin MD;  Location: [address];  Service: Orthopedics;  Laterality: Left;  Preggers    UPPER GASTROINTESTINAL ENDOSCOPY  07/13/2017    Dr. Lopez: NERD, Gastric mucosal atrophy. antritis, Scar in the incisura. Biopsied; biopsy: chronic gastritis. negative for h  pylori     Medications Ordered Prior to Encounter[1]  Review of patient's allergies indicates:  No Known Allergies  Family History   Problem Relation Name Age of Onset    Hypertension Mother age 82     Heart disease Mother age 82     Glaucoma Mother age 82     Stroke Father age 50     Glaucoma Sister      Cataracts Sister      Macular degeneration Sister      Breast cancer Neg Hx      Colon cancer Neg Hx      Crohn's disease Neg Hx      Ulcerative colitis Neg Hx      Stomach cancer Neg Hx      Esophageal cancer Neg Hx      Celiac disease Neg Hx      Amblyopia Neg Hx      Blindness Neg Hx      Retinal detachment Neg Hx      Strabismus Neg Hx       Social History[2]    Review of Systems:  Constitutional:  Denies fever or chills   Eyes:  Denies change in visual acuity   HENT:  Denies nasal congestion or sore throat   Respiratory:  Denies cough or shortness of breath   Cardiovascular:  Denies chest pain or edema   GI:  Denies abdominal pain, nausea, vomiting, bloody stools or diarrhea   :  Denies dysuria   Integument:  Denies rash   Neurologic:  Denies headache, focal weakness or sensory changes   Endocrine:  Denies polyuria or polydipsia   Lymphatic:  Denies swollen glands   Psychiatric:  Denies depression or anxiety     Physical Exam:   Constitutional:  Well developed, well nourished, no acute distress, non-toxic appearance   Integument:  Well hydrated  Neurologic:  Alert & oriented x 3  Psychiatric:  Speech and behavior appropriate     Bilateral Knee Exam    Right Knee Exam   Tenderness   The patient is experiencing tenderness to her right pes anserinus bursa    Range of Motion   Flexion: normal     Muscle Strength   The patient has normal knee strength.    Tests   Varus: negative  Valgus: negative  Patellar Apprehension: negative      Other   Erythema: absent  Sensation: normal  Pulse: present  Swelling: mild    Left Knee exam   Knee exam performed same as contralateral side and is normal              Pes  anserinus bursitis of right knee  -     Large Joint Aspiration/Injection: R anserine bursa  -     triamcinolone acetonide injection 40 mg         Using an aseptic technique, I injected 1 cc of lidocaine 1% without and 1 cc of kenalog 40mg into the right knee pes anserinus bursa. The patient tolerated this well.   Rtc in 6 weeks.         [1]   Current Outpatient Medications on File Prior to Visit   Medication Sig Dispense Refill    acetaminophen (TYLENOL) 500 MG tablet Take 500 mg by mouth every 6 (six) hours as needed for Pain.      ascorbic acid, vitamin C, (VITAMIN C) 250 MG tablet Take 500 mg by mouth once daily.       atenoloL (TENORMIN) 25 MG tablet Take 1 tablet (25 mg total) by mouth every evening. 30 tablet 11    azelastine (ASTELIN) 137 mcg (0.1 %) nasal spray 1 spray (137 mcg total) by Nasal route 2 (two) times daily as needed for Rhinitis. 30 mL 11    balsalazide (COLAZAL) 750 mg capsule Take 3 capsules (2,250 mg total) by mouth 2 (two) times daily with meals. TAKE 3 CAPSULES(2250 MG) BY MOUTH TWICE DAILY WITH MEALS 180 capsule 11    benzonatate (TESSALON) 200 MG capsule Take 1 capsule (200 mg total) by mouth 3 (three) times daily as needed for Cough. 30 capsule 1    buPROPion (WELLBUTRIN XL) 150 MG TB24 tablet Take 1 tablet (150 mg total) by mouth every morning. 30 tablet 2    CRANBERRY EXTRACT-D-MANNOSE ORAL Take by mouth.      d-mannose 500 mg Cap Take by mouth.      diltiaZEM (DILACOR XR) 240 MG CDCR Take 1 capsule (240 mg total) by mouth every evening. 30 capsule 11    docusate sodium (COLACE) 100 MG capsule Take 100 mg by mouth 2 (two) times daily as needed for Constipation.      DULoxetine (CYMBALTA) 30 MG capsule Take 1 capsule (30 mg total) by mouth once daily. 30 capsule 5    famotidine (PEPCID) 40 MG tablet Take 1 tablet (40 mg total) by mouth every evening. 90 tablet 3    fluticasone propionate (FLONASE) 50 mcg/actuation nasal spray 1 spray (50 mcg total) by Each Nostril route once daily. 16 g  3    losartan (COZAAR) 100 MG tablet Take 1 tablet (100 mg total) by mouth once daily. 30 tablet 1    magnesium oxide (MAG-OX) 400 mg (241.3 mg magnesium) tablet Take 1 tablet (400 mg total) by mouth once daily. 30 tablet 3    methocarbamoL (ROBAXIN) 750 MG Tab Take 1 tablet (750 mg total) by mouth 4 (four) times daily as needed. 44 tablet 3    multivitamin (THERAGRAN) per tablet Take 1 tablet by mouth once daily.      ondansetron (ZOFRAN-ODT) 4 MG TbDL Take 1 tablet (4 mg total) by mouth every 6 (six) hours as needed (nausea). 30 tablet 3    pantoprazole (PROTONIX) 40 MG tablet TAKE 1 TABLET(40 MG) BY MOUTH TWICE DAILY 180 tablet 3    psyllium husk, aspartame, (NATURAL PSYLLIUM FIBER) 3.4 gram/5.8 gram Powd Take by mouth.      rosuvastatin (CRESTOR) 20 MG tablet Take 1 tablet (20 mg total) by mouth once daily. 30 tablet 11    solifenacin (VESICARE) 10 MG tablet Take 1 tablet (10 mg total) by mouth once daily. 30 tablet 11    traMADoL (ULTRAM) 50 mg tablet Take 1 tablet (50 mg total) by mouth every 6 (six) hours as needed for Pain. 28 tablet 0    famotidine-calcium carbonate-magnesium hydroxide (PEPCID COMPLETE) -165 mg Take 1 tablet by mouth 2 (two) times daily as needed. Chew 1 or 2 tablets, 2-3 times a day, as needed, for nausea or heartburn.      LORazepam (ATIVAN) 0.5 MG tablet Take 0.5 tablets (0.25 mg total) by mouth 3 (three) times daily as needed for Anxiety. 45 tablet 0     No current facility-administered medications on file prior to visit.   [2]   Social History  Socioeconomic History    Marital status:     Number of children: 2   Occupational History    Occupation: retired teacher and principal    Occupation: substitute   Tobacco Use    Smoking status: Never    Smokeless tobacco: Never   Substance and Sexual Activity    Alcohol use: Not Currently     Comment: social- maybe once every few months    Drug use: No    Sexual activity: Yes     Partners: Male     Birth control/protection:  Post-menopausal, None     Social Drivers of Health     Financial Resource Strain: Low Risk  (11/25/2024)    Overall Financial Resource Strain (CARDIA)     Difficulty of Paying Living Expenses: Not hard at all   Food Insecurity: No Food Insecurity (11/25/2024)    Hunger Vital Sign     Worried About Running Out of Food in the Last Year: Never true     Ran Out of Food in the Last Year: Never true   Transportation Needs: No Transportation Needs (11/9/2023)    PRAPARE - Transportation     Lack of Transportation (Medical): No     Lack of Transportation (Non-Medical): No   Physical Activity: Sufficiently Active (11/25/2024)    Exercise Vital Sign     Days of Exercise per Week: 4 days     Minutes of Exercise per Session: 50 min   Stress: No Stress Concern Present (11/25/2024)    [employer] [place] [employer] - Occupational Stress Questionnaire     Feeling of Stress : Not at all   Housing Stability: Low Risk  (11/9/2023)    Housing Stability Vital Sign     Unable to Pay for Housing in the Last Year: No     Number of Places Lived in the Last Year: 1     Unstable Housing in the Last Year: No

## 2025-06-10 NOTE — PROCEDURES
Large Joint Aspiration/Injection: R anserine bursa    Date/Time: 6/10/2025 9:20 AM    Performed by: Chandrika Santiago FNP  Authorized by: Chandrika Santiago FNP    Consent Done?:  Yes (Verbal)  Indications:  Pain  Timeout: prior to procedure the correct patient, procedure, and site was verified    Prep: patient was prepped and draped in usual sterile fashion    Local anesthetic:  Lidocaine 1% without epinephrine  Anesthetic total (ml):  1      Details:  Needle Size:  21 G  Approach:  Anterolateral  Location:  Knee  Site:  R anserine bursa  Medications:  40 mg triamcinolone acetonide 40 mg/mL  Patient tolerance:  Patient tolerated the procedure well with no immediate complications

## 2025-06-11 ENCOUNTER — OFFICE VISIT (OUTPATIENT)
Dept: PSYCHIATRY | Facility: CLINIC | Age: 75
End: 2025-06-11
Payer: MEDICARE

## 2025-06-11 ENCOUNTER — OFFICE VISIT (OUTPATIENT)
Dept: NEUROSURGERY | Facility: CLINIC | Age: 75
End: 2025-06-11
Payer: MEDICARE

## 2025-06-11 VITALS
DIASTOLIC BLOOD PRESSURE: 81 MMHG | HEIGHT: 63 IN | HEART RATE: 99 BPM | BODY MASS INDEX: 29.84 KG/M2 | WEIGHT: 168.44 LBS | SYSTOLIC BLOOD PRESSURE: 150 MMHG

## 2025-06-11 VITALS
HEART RATE: 73 BPM | SYSTOLIC BLOOD PRESSURE: 145 MMHG | HEIGHT: 63 IN | BODY MASS INDEX: 29.84 KG/M2 | DIASTOLIC BLOOD PRESSURE: 77 MMHG | RESPIRATION RATE: 18 BRPM | WEIGHT: 168.44 LBS

## 2025-06-11 DIAGNOSIS — F33.0 MDD (MAJOR DEPRESSIVE DISORDER), RECURRENT EPISODE, MILD: ICD-10-CM

## 2025-06-11 DIAGNOSIS — M54.16 LUMBAR RADICULOPATHY, CHRONIC: ICD-10-CM

## 2025-06-11 DIAGNOSIS — F42.8 OBSESSIVE THINKING: ICD-10-CM

## 2025-06-11 DIAGNOSIS — M43.16 SPONDYLOLISTHESIS OF LUMBAR REGION: Primary | ICD-10-CM

## 2025-06-11 DIAGNOSIS — M48.062 SPINAL STENOSIS OF LUMBAR REGION WITH NEUROGENIC CLAUDICATION: ICD-10-CM

## 2025-06-11 DIAGNOSIS — M51.362 DEGENERATION OF INTERVERTEBRAL DISC OF LUMBAR REGION WITH DISCOGENIC BACK PAIN AND LOWER EXTREMITY PAIN: ICD-10-CM

## 2025-06-11 DIAGNOSIS — M54.9 DORSALGIA, UNSPECIFIED: ICD-10-CM

## 2025-06-11 DIAGNOSIS — F41.1 GAD (GENERALIZED ANXIETY DISORDER): Primary | ICD-10-CM

## 2025-06-11 PROCEDURE — 99214 OFFICE O/P EST MOD 30 MIN: CPT | Mod: S$PBB,,,

## 2025-06-11 PROCEDURE — 99215 OFFICE O/P EST HI 40 MIN: CPT | Mod: S$PBB,,, | Performed by: PHYSICIAN ASSISTANT

## 2025-06-11 PROCEDURE — 99214 OFFICE O/P EST MOD 30 MIN: CPT | Mod: PBBFAC,PO

## 2025-06-11 PROCEDURE — 99999 PR PBB SHADOW E&M-EST. PATIENT-LVL IV: CPT | Mod: PBBFAC,,,

## 2025-06-11 NOTE — PROGRESS NOTES
HPI:   Mrs. Aguilera is a 74-year-old returning for follow-up reporting worsening right-sided low back and hip pain that radiates down her right leg.  It is constant, daily now and worse with activities.  She uses a Rollator walker whenever she knows she is going to have to walk some distance.  She has tried over-the-counter medicines without relief.  She has failed physical therapy and pain management in the past without any lasting relief.  She saw Dr. Arnold with orthopedics who said she has maximized all of her hip treatments she is to follow up with her spine surgeon now.  Imaging is over a year old.    General: well developed, well nourished, no distress.   Neurologic: Alert and oriented. Thought content appropriate.  Cranial nerves: face symmetric, EOMI.   Motor Strength: Moves all extremities spontaneously with good tone. No abnormal movements seen.      Strength   Deltoids Triceps Biceps Wrist Extension Wrist Flexion Hand    Upper: R 5/5 5/5 5/5 5/5 5/5 5/5     L 5/5 5/5 5/5 5/5 5/5 5/5       Iliopsoas Quadriceps Knee  Flexion Tibialis  anterior Gastro- cnemius EHL   Lower: R 5/5 5/5 5/5 5/5 5/5 5/5     L 5/5 5/5 5/5 5/5 5/5 5/5      - SLR    Radiculopathy: RLE L4/5 distribution  Gait: antalgic    Sensory: intact to light touch throughout  DTR's - 0 + and symmetric in LE    No new imaging    ASSESSMENT AND PLAN:    Mrs. Aguilera returns for follow-up reporting severe, worsening low back and hip pain with right leg pain.  She reports worsening over the last he months with progression of leg pain to her shin.  She saw Dr. Arnold who recommended following up with spine surgery further workup of her hip pain.  Prior lumbar MRI independently reviewed.  She has a grade 1 spondylolisthesis L4 on L5 and L5 on S1 contributing to severe central canal stenosis with degenerated discs and facet arthropathy.  She needs an updated lumbar MRI and bending x-rays to begin the workup.  She has failed all conservative  treatments with physical therapy and pain management with Dr. Cuellar in the past.  She will follow up after testing.

## 2025-06-11 NOTE — PROGRESS NOTES
Outpatient Psychiatry Follow-Up Visit    Clinical Status of Patient: Outpatient (Ambulatory)  06/11/2025     Chief Complaint: Pt is a 74 y.o. female who presents today for a follow-up. Met with patient.       Interval History and Content of Current Session:  Interim Events/Subjective Report/Content of Current Session:  follow up appointment.    Pt is a 74 y.o. female with past psychiatric hx of CARLOS (generalized anxiety disorder), MDD (major depressive disorder), recurrent episode, moderate, Obsessive thinking who presents for follow up treatment.     Past Psychiatric hx:   Pt. is a 74 yo F with a past psychiatric hx of Anxiety, Depression, unspecified depression type presenting to the clinic for an initial evaluation and treatment. Past medical history outlined below. She is currently taking buPROPion (WELLBUTRIN XL), LORazepam (ATIVAN), prescribed and managed by Dr. Engel/Dr. Hassan. She reports that these medications have not adequately addressed her depression.     4/3/25: Pt presents to OP Psychiatry for routine follow-up for treatment/medication management of CARLOS, MDD, OCD. Pt reports all medications effectively treating symptoms, denies need for any medication changes at this time. Pt denies SI/HI/AVH, self-harm, plan, or intent. Pt verbalizes understanding of medication instructions through teach back. No other issues, concerns, or problems, will reassess symptoms and medications in 3 months or PRN if symptoms worsen.     Past Medical hx:   Past Medical History:   Diagnosis Date    Anxiety     takes daily Xanax    Arthritis     right thumb    Cataract     OU    Cholelithiasis     GERD (gastroesophageal reflux disease)     Hepatic steatosis     Hyperlipemia     Hypertension     PVD (posterior vitreous detachment)     OD        Interim hx:  Medication changes last visit:     1. Continue buPROPion (WELLBUTRIN XL) 150 MG TB24 tablet - Take 1 tablet (150 mg total) by mouth once daily for depression.  3.  Continue DULoxetine (CYMBALTA) 30 MG capsule - Take 1 capsule (30 mg total) by mouth daily (prescribed and managed by Pain Mgmt).    Anxiety: Situational r/t hip surgery  Depression: Situational r/t hip surgery  Sleep: Improved  Appetite: Same, no issues     Denies suicidal/homicidal ideations.  Denies hopelessness/worthlessness.    Denies auditory/visual hallucinations.      Tobacco:  denies  Alcohol:  denies  Drug use:  denies  Caffeine:  denies       Review of Systems   PSYCHIATRIC: Pertinent items are noted in the narrative.        CONSTITUTIONAL: weight stable        M/S: no pain today         ENT: no allergies noted today        ABD: no n/v/d     Past Medical, Family and Social History: The patient's past medical, family and social history have been reviewed and updated as appropriate within the electronic medical record. See encounter notes.     Medication Compliance: yes      Side effects: tolerates     Risk Parameters:  Patient reports no suicidal ideation  Patient reports no homicidal ideation  Patient reports no self-injurious behavior  Patient reports no violent behavior     Exam (detailed: at least 9 elements; comprehensive: all 15 elements)   Constitutional  Vitals:  Most recent vital signs, dated less than 90 days prior to this appointment, were reviewed. Pulse:  [73-99]   Resp:  [18]   BP: (145-150)/(77-81)     General:  unremarkable, age appropriate, casual attire, good eye contact, good rapport       Musculoskeletal  Muscle Strength/Tone:  no flaccidity, no tremor    Gait & Station:  normal      Psychiatric                       Speech:  normal tone, normal rate, rhythm, and volume   Mood & Affect:   Euthymic, congruent, appropriate         Thought Process:   Goal directed; Linear    Associations:   intact   Thought Content:   No SI/HI, delusions, or paranoia, no AV/VH   Insight & Judgement:   Good, adequate to circumstances   Orientation:   grossly intact; alert and oriented x 4    Memory:  intact  "for content of interview    Language:  grossly intact, can repeat    Attention Span  : Grossly intact for content of interview   Fund of Knowledge:   intact and appropriate to age and level of education        Assessment and Diagnosis   Status/Progress: Based on the examination today, the patient's problem(s) is/are under fair control.  New problems have not been presented today. Comorbidities are not currently complicating management of the primary condition.      Impression:  Pt presents to OP Psychiatry for routine follow-up for treatment/medication management of CARLOS, MDD, OCD. Pt reports she stopped taking Ativan per her pain management provider. States she had her hip surgery and this has caused more pain issues throughout her back, is scheduled for diagnostics tomorrow and has an appointment to speak with her surgeon about surgery that she reports has a long rehab (6 months). Pt is anxious to get it underway. She feels her mental health has improved over the past year and states "even though I still worry about my children and all my grandchildren, I have gotten used to it." Pt reports how proud she is of her grandchildren and her family tells her they don't see her crying all the time. Pt reports all medications effectively treating symptoms, denies need for any medication changes at this time. Pt denies SI/HI/AVH, self-harm, plan, or intent. Pt verbalizes understanding of medication instructions through teach back. No other issues, concerns, or problems, will reassess symptoms and medications in 6 months or PRN if symptoms worsen.      Diagnosis:   - CARLOS (generalized anxiety disorder)  - MDD (major depressive disorder), recurrent episode, mild  - Obsessive thinking      Intervention/Counseling/Treatment Plan   Medication Management:      1. Continue buPROPion (WELLBUTRIN XL) 150 MG TB24 tablet - Take 1 tablet (150 mg total) by mouth once daily for depression.     2. Continue DULoxetine (CYMBALTA) 30 MG " capsule - Take 1 capsule (30 mg total) by mouth once daily (prescribed and managed by Pain Mgmt).     3. Patient given contact # for psychotherapists at [facility] and also instructed she may check with insurance for list of providers.      4. Call to report any worsening of symptoms or problems with the medication. Pt instructed to go to ER with thoughts of harming self, others.              Labs: none         Return to clinic:      6 months or PRN if symptoms worsen    -Spent 30min face to face with the pt; >50% time spent in counseling   -Supportive therapy and psychoeducation provided  -R/B/SE's of medications discussed with the pt who expresses understanding and chooses to take medications as prescribed.   -Pt instructed to call clinic, 911 or go to nearest emergency room if sxs worsen or pt is in   crisis. The pt expresses understanding.      Grace Mistry NP  Psychiatric-Mental Health Nurse Practitioner  Department of Psychiatry    [facility] [place] [address] [place] [address]  Phone:  [phone]  Fax: [phone]

## 2025-06-12 ENCOUNTER — HOSPITAL ENCOUNTER (OUTPATIENT)
Dept: RADIOLOGY | Facility: HOSPITAL | Age: 75
Discharge: HOME | End: 2025-06-12
Attending: PHYSICIAN ASSISTANT
Payer: MEDICARE

## 2025-06-12 ENCOUNTER — CLINICAL SUPPORT (OUTPATIENT)
Dept: REHABILITATION | Facility: HOSPITAL | Age: 75
End: 2025-06-12
Attending: PHYSICAL THERAPIST
Payer: MEDICARE

## 2025-06-12 DIAGNOSIS — G89.29 CHRONIC BILATERAL LOW BACK PAIN WITHOUT SCIATICA: ICD-10-CM

## 2025-06-12 DIAGNOSIS — R26.9 GAIT DIFFICULTY: ICD-10-CM

## 2025-06-12 DIAGNOSIS — M54.50 CHRONIC BILATERAL LOW BACK PAIN WITHOUT SCIATICA: ICD-10-CM

## 2025-06-12 DIAGNOSIS — M54.16 LUMBAR RADICULOPATHY, CHRONIC: ICD-10-CM

## 2025-06-12 DIAGNOSIS — M48.062 SPINAL STENOSIS OF LUMBAR REGION WITH NEUROGENIC CLAUDICATION: ICD-10-CM

## 2025-06-12 DIAGNOSIS — M43.16 SPONDYLOLISTHESIS OF LUMBAR REGION: ICD-10-CM

## 2025-06-12 DIAGNOSIS — M25.651 DECREASED RANGE OF RIGHT HIP MOVEMENT: Primary | ICD-10-CM

## 2025-06-12 DIAGNOSIS — M54.9 DORSALGIA, UNSPECIFIED: ICD-10-CM

## 2025-06-12 PROCEDURE — 97112 NEUROMUSCULAR REEDUCATION: CPT | Mod: KX,PO | Performed by: PHYSICAL THERAPIST

## 2025-06-12 PROCEDURE — 72110 X-RAY EXAM L-2 SPINE 4/>VWS: CPT | Mod: TC,FY,PO

## 2025-06-12 PROCEDURE — 97110 THERAPEUTIC EXERCISES: CPT | Mod: KX,PO | Performed by: PHYSICAL THERAPIST

## 2025-06-12 PROCEDURE — 72148 MRI LUMBAR SPINE W/O DYE: CPT | Mod: 26,,, | Performed by: RADIOLOGY

## 2025-06-12 PROCEDURE — 72110 X-RAY EXAM L-2 SPINE 4/>VWS: CPT | Mod: 26,,, | Performed by: RADIOLOGY

## 2025-06-12 PROCEDURE — 72148 MRI LUMBAR SPINE W/O DYE: CPT | Mod: TC,PO

## 2025-06-12 NOTE — PROGRESS NOTES
"Chief Complaint   Patient presents with    Right Knee - Pain       HPI:    This is a 74 y.o. who presents today complaining of right leg pain for 2 months after no interval trauma. Pain is tingling. She notes numbness and tingling. No associated signs or symptoms.      Past Medical History:   Diagnosis Date    Anxiety     takes daily Xanax    Arthritis     right thumb    Cataract     OU    Cholelithiasis     GERD (gastroesophageal reflux disease)     Hepatic steatosis     Hyperlipemia     Hypertension     PVD (posterior vitreous detachment)     OD      Past Surgical History:   Procedure Laterality Date    BREAST BIOPSY Left 08/28/2020    stereo biopsy    BREAST BIOPSY Left 10/07/2020    benign excisional biopsy    CAUDAL EPIDURAL STEROID INJECTION N/A 7/26/2024    Procedure: Injection-steroid-epidural-caudal;  Surgeon: Indra Cuellar MD;  Location: [facility] OR;  Service: Pain Management;  Laterality: N/A;    chest abcess      child    COLONOSCOPY  01/06/2012    Dr. Lopez: hemorrhoids, redundant colon    COLONOSCOPY N/A 02/17/2021    Dr. Lopez: Congested and erythematous mucosa in the rectum, in the recto-sigmoid colon and in the sigmoid colon, proctosigmoid colitis, Redundant colon; biopsy: focal active colitis "nonspecific but can be seen with acute self-limited colitis (infection), medication effect (e.g. NSAID use), proximity to diverticula, or early/evolving IBD    EPIDURAL STEROID INJECTION INTO LUMBAR SPINE N/A 5/30/2024    Procedure: Injection-steroid-epidural-lumbar   L5/S1;  Surgeon: Indra Cuellar MD;  Location: [facility] OR;  Service: Pain Management;  Laterality: N/A;    ESOPHAGOGASTRODUODENOSCOPY N/A 06/06/2019    Dr. Lopez: small hiatal hernia, Non-erosive esophageal reflux (NERD) disease?., slight antritis; biopsy: Antral mucosa with chemical/reactive gastropathy. negative for h pylori    ESOPHAGOGASTRODUODENOSCOPY N/A 02/17/2021    Procedure: EGD (ESOPHAGOGASTRODUODENOSCOPY);  Surgeon: Nathan Lopez " MD Marty;  Location: [facility];  Service: Endoscopy;  Laterality: N/A; Minimal gastritis; biopsy: stomach- negative for h pylori, active chronic gastritis with focal features of erosive gastritis, duodenum WNL    ESOPHAGOGASTRODUODENOSCOPY N/A 05/10/2022    Procedure: EGD (ESOPHAGOGASTRODUODENOSCOPY);  Surgeon: Nathan Loepz Jr., MD;  Location: [facility] ENDO;  Service: Endoscopy;  Laterality: N/A;    ESOPHAGOGASTRODUODENOSCOPY N/A 3/12/2024    Procedure: EGD (ESOPHAGOGASTRODUODENOSCOPY);  Surgeon: Nathan Lopez Jr., MD;  Location: [facility];  Service: Endoscopy;  Laterality: N/A;    EXCISION OF BURSA OF HIP Right 2/3/2025    Procedure: BURSECTOMY, HIP;  Surgeon: Nick Arnold MD;  Location: [facility];  Service: Orthopedics;  Laterality: Right;    EXCISIONAL BIOPSY Left 10/07/2020    Procedure: EXCISIONAL BIOPSY-Left with radiological marker;  Surgeon: Brooklynn Ashford MD;  Location: [facility];  Service: General;  Laterality: Left;    FRACTURE SURGERY  2010    Left thumb repaired surgically    INJECTION OF ANESTHETIC AGENT AROUND MEDIAL BRANCH NERVES INNERVATING LUMBAR FACET JOINT Bilateral 9/12/2024    Procedure: Block-nerve-medial branch-lumbar    L4/5, L5/S1;  Surgeon: Indra Cuellar MD;  Location: [facility];  Service: Pain Management;  Laterality: Bilateral;    JOINT REPLACEMENT Bilateral     knee    KNEE ARTHROSCOPY W/ LASER      right    LAPAROSCOPIC CHOLECYSTECTOMY N/A 06/14/2019    Procedure: CHOLECYSTECTOMY, LAPAROSCOPIC;  Surgeon: Guevara Evans MD;  Location: [facility];  Service: General;  Laterality: N/A;    thumb surgery  11/2014    TOTAL KNEE ARTHROPLASTY Left 06/19/2018    Procedure: REPLACEMENT-KNEE-TOTAL;  Surgeon: Nj Melvin MD;  Location: [address];  Service: Orthopedics;  Laterality: Left;  [employer]    UPPER GASTROINTESTINAL ENDOSCOPY  07/13/2017    Dr. Lopez: NERD, Gastric mucosal atrophy. antritis, Scar in the incisura. Biopsied; biopsy: chronic gastritis. negative for h pylori       Medications Ordered Prior to Encounter[1]   Review of patient's allergies indicates:  No Known Allergies   Family History not pertinent   Social History[2]      Review of Systems:   Constitutional:  Denies fever or chills    Eyes:  Denies change in visual acuity    HENT:  Denies nasal congestion or sore throat    Respiratory:  Denies cough or shortness of breath    Cardiovascular:  Denies chest pain or edema    GI:  Denies abdominal pain, nausea, vomiting, bloody stools or diarrhea    :  Denies dysuria    Integument:  Denies rash    Neurologic:  Denies headache, focal weakness or sensory changes    Endocrine:  Denies polyuria or polydipsia    Lymphatic:  Denies swollen glands    Psychiatric:  Denies depression or anxiety       Physical Exam:    Constitutional:  Well developed, well nourished, no acute distress, non-toxic appearance    Integument:  Well hydrated, no rash    Lymphatic:  No lymphadenopathy noted    Neurologic:  Alert & oriented x 3,     Psychiatric:  Speech and behavior appropriate    Gi: abdomen soft  Eyes: EOMI   L spine  +SLR at 30 degrees. +long tract signs. 4/5 quads and EHL. Foot perfused.       Lumbar radiculopathy, chronic  -     Ambulatory referral/consult to Neurosurgery; Future; Expected date: 06/10/2025    Other orders  -     methocarbamoL (ROBAXIN) 750 MG Tab; Take 1 tablet (750 mg total) by mouth 4 (four) times daily as needed.  Dispense: 44 tablet; Refill: 3        Will refer to Dr. Yi for possible surgical intervention            [1]   Current Outpatient Medications on File Prior to Visit   Medication Sig Dispense Refill    acetaminophen (TYLENOL) 500 MG tablet Take 500 mg by mouth every 6 (six) hours as needed for Pain.      ascorbic acid, vitamin C, (VITAMIN C) 250 MG tablet Take 500 mg by mouth once daily.       atenoloL (TENORMIN) 25 MG tablet Take 1 tablet (25 mg total) by mouth every evening. 30 tablet 11    azelastine (ASTELIN) 137 mcg (0.1 %) nasal spray 1 spray (137 mcg  total) by Nasal route 2 (two) times daily as needed for Rhinitis. 30 mL 11    balsalazide (COLAZAL) 750 mg capsule Take 3 capsules (2,250 mg total) by mouth 2 (two) times daily with meals. TAKE 3 CAPSULES(2250 MG) BY MOUTH TWICE DAILY WITH MEALS 180 capsule 11    benzonatate (TESSALON) 200 MG capsule Take 1 capsule (200 mg total) by mouth 3 (three) times daily as needed for Cough. 30 capsule 1    buPROPion (WELLBUTRIN XL) 150 MG TB24 tablet Take 1 tablet (150 mg total) by mouth every morning. 30 tablet 2    CRANBERRY EXTRACT-D-MANNOSE ORAL Take by mouth.      d-mannose 500 mg Cap Take by mouth.      diltiaZEM (DILACOR XR) 240 MG CDCR Take 1 capsule (240 mg total) by mouth every evening. 30 capsule 11    docusate sodium (COLACE) 100 MG capsule Take 100 mg by mouth 2 (two) times daily as needed for Constipation.      DULoxetine (CYMBALTA) 30 MG capsule Take 1 capsule (30 mg total) by mouth once daily. 30 capsule 5    famotidine (PEPCID) 40 MG tablet Take 1 tablet (40 mg total) by mouth every evening. 90 tablet 3    fluticasone propionate (FLONASE) 50 mcg/actuation nasal spray 1 spray (50 mcg total) by Each Nostril route once daily. 16 g 3    losartan (COZAAR) 100 MG tablet Take 1 tablet (100 mg total) by mouth once daily. 30 tablet 1    magnesium oxide (MAG-OX) 400 mg (241.3 mg magnesium) tablet Take 1 tablet (400 mg total) by mouth once daily. 30 tablet 3    multivitamin (THERAGRAN) per tablet Take 1 tablet by mouth once daily.      ondansetron (ZOFRAN-ODT) 4 MG TbDL Take 1 tablet (4 mg total) by mouth every 6 (six) hours as needed (nausea). 30 tablet 3    pantoprazole (PROTONIX) 40 MG tablet TAKE 1 TABLET(40 MG) BY MOUTH TWICE DAILY 180 tablet 3    psyllium husk, aspartame, (NATURAL PSYLLIUM FIBER) 3.4 gram/5.8 gram Powd Take by mouth.      rosuvastatin (CRESTOR) 20 MG tablet Take 1 tablet (20 mg total) by mouth once daily. 30 tablet 11    solifenacin (VESICARE) 10 MG tablet Take 1 tablet (10 mg total) by mouth once  daily. 30 tablet 11    traMADoL (ULTRAM) 50 mg tablet Take 1 tablet (50 mg total) by mouth every 6 (six) hours as needed for Pain. 28 tablet 0    famotidine-calcium carbonate-magnesium hydroxide (PEPCID COMPLETE) -165 mg Take 1 tablet by mouth 2 (two) times daily as needed. Chew 1 or 2 tablets, 2-3 times a day, as needed, for nausea or heartburn.       No current facility-administered medications on file prior to visit.   [2]   Social History  Socioeconomic History    Marital status:     Number of children: 2   Occupational History    Occupation: retired teacher and principal    Occupation: substitute   Tobacco Use    Smoking status: Never    Smokeless tobacco: Never   Substance and Sexual Activity    Alcohol use: Not Currently     Comment: social- maybe once every few months    Drug use: No    Sexual activity: Yes     Partners: Male     Birth control/protection: Post-menopausal, None     Social Drivers of Health     Financial Resource Strain: Low Risk  (11/25/2024)    Overall Financial Resource Strain (CARDIA)     Difficulty of Paying Living Expenses: Not hard at all   Food Insecurity: No Food Insecurity (11/25/2024)    Hunger Vital Sign     Worried About Running Out of Food in the Last Year: Never true     Ran Out of Food in the Last Year: Never true   Transportation Needs: No Transportation Needs (11/9/2023)    PRAPARE - Transportation     Lack of Transportation (Medical): No     Lack of Transportation (Non-Medical): No   Physical Activity: Sufficiently Active (11/25/2024)    Exercise Vital Sign     Days of Exercise per Week: 4 days     Minutes of Exercise per Session: 50 min   Stress: No Stress Concern Present (11/25/2024)    [employer] - Occupational Stress Questionnaire     Feeling of Stress : Not at all   Housing Stability: Low Risk  (11/9/2023)    Housing Stability Vital Sign     Unable to Pay for Housing in the Last Year: No     Number of Places Lived in the Last  Year: 1     Unstable Housing in the Last Year: No

## 2025-06-12 NOTE — PROGRESS NOTES
"  Outpatient Rehab    Physical Therapy Visit    Patient Name: Lucinda Aguilera  MRN: [id number]  YOB: 1950  Encounter Date: 6/12/2025    Therapy Diagnosis:   Encounter Diagnoses   Name Primary?    Decreased range of right hip movement Yes    Chronic bilateral low back pain without sciatica     Gait difficulty      Physician: Nick Arnold MD    Physician Orders: Eval and Treat  Medical Diagnosis: Greater trochanteric bursitis of right hip  Surgical Diagnosis: Not applicable for this Episode   Surgical Date: Not applicable for this Episode    Visit # / Visits Authorized:  24 / 30  Insurance Authorization Period: 1/1/2025 to 12/31/2025  Date of Evaluation: 2/26/2025  Plan of Care Certification: 6/11/2025 to 7/4/2025      PT/PTA: PT   Number of PTA visits since last PT visit:0  Time In: 1100   Time Out: 1135  Total Time (in minutes): 35   Total Billable Time (in minutes): 30    FOTO:  Intake Score:  %  Survey Score 2:  %  Survey Score 3:  %    Precautions:       Subjective      Pain reported as 6/10. bilateral LBP    Objective            Treatment:  Therapeutic Exercise  TE 2: Table to over head dowel lifts 2 x 10, for lx ext  TE 4: Heel Raise 20s in standing  TE 8: Nu step 8 mins, with roll  TE 10: Seated hip abd/add 30# 2 x 10  Balance/Neuromuscular Re-Education  NMR 1: np  NMR 2: Seated scap retraction 2 x 10, 3" holds  NMR 4: Seated in chair with lx ext to table x 5 mins  NMR 6: Slouch over correct 2 x 10  NMR 7: bridge 2 x 10    Time Entry(in minutes):  Neuromuscular Re-Education Time Entry: 20  Therapeutic Exercise Time Entry: 15    Assessment & Plan   Assessment: Lucinda had low back pain coming in today without inc in pain through tx. Her R hip was feeling good with R knee pain, but she rec'd an injection to it recently. She has an appt with neuro surgery next week for the back pain. Continue focus on postural correction and lumbar ext. Positions modified d/t other joint " involvement.  Evaluation/Treatment Tolerance: Patient tolerated treatment well    The patient will continue to benefit from skilled outpatient physical therapy in order to address the deficits listed in the problem list on the initial evaluation, provide patient and family education, and maximize the patients level of independence in the home and community environments.     The patient's spiritual, cultural, and educational needs were considered, and the patient is agreeable to the plan of care and goals.           Plan: From a physical therapy perspective, the patient would benefit from: Skilled Rehab Services  Planned therapy interventions include: Therapeutic exercise, Therapeutic activities, Neuromuscular re-education, Manual therapy, and Other (Comment). Dry needling  Planned modalities to include: Electrical stimulation - passive/unattended, Thermotherapy (hot pack), and Cryotherapy (cold pack). Visit Frequency: 2 times Per Week for 4 additional Weeks, effective 6/6/2025.    Goals:   Active       FOTO       Patient will improve current FOTO score by 5 points for improving QOL. (Progressing)       Start:  06/11/25    Expected End:  07/04/25               LTG #3 AMBULATION       Patient will return to ambulating without AD to demo improved strength and pain. (Progressing)       Start:  06/09/25    Expected End:  07/04/25               LTGS: 8 WEEKS         LTG #1 STRENGTH  (Progressing)       Start:  02/28/25    Expected End:  07/04/25       1.) Patient will demo equal strength for improving gait.         LTG #2 PAIN  (Met)       Start:  02/28/25    Resolved:  03/17/25    2.) Patient will ambulate without AD to demo dec pain and improved strength.         LTG #3 FOTO (Met)       Start:  02/28/25    Resolved:  03/17/25    3.) FOTO goal to be written.            LUMBAR ROM       Patient will demo forward flexion with less deviation to Left and improve ROM if able. (Progressing)       Start:  06/09/25    Expected  End:  07/04/25               NEW LTG -4 WEEKS       LTG #3: FOTO (Progressing)       Start:  03/17/25    Expected End:  07/04/25       4.) Patient will score +3 on the FOTO for improved QOL.            STG added 3/28/2025       STG #4 FOTO  (Progressing)       Start:  03/28/25    Expected End:  07/04/25       4.)Patient will score 46 on the FOTO for improving QOL.            STGs - 4 WEEKS         STG #1 ROM  (Met)       Start:  02/28/25    Resolved:  03/17/25    1.) Patient will demo bilateral hip AROM equal for symmetry in ambulation.         STG #2 STRENGTH  (Progressing)       Start:  02/28/25    Expected End:  07/04/25       2.) Patient will demo 4-/5 strength grossly in right hip for improving walking.         STG #3 FOTO  (Met)       Start:  02/28/25    Resolved:  03/17/25    3.) Patient will complete FOTO and goal will be written.             Saira Joyce, PT, DPT

## 2025-06-17 ENCOUNTER — TELEPHONE (OUTPATIENT)
Dept: NEUROSURGERY | Facility: CLINIC | Age: 75
End: 2025-06-17
Payer: MEDICARE

## 2025-06-18 ENCOUNTER — OFFICE VISIT (OUTPATIENT)
Dept: NEUROSURGERY | Facility: CLINIC | Age: 75
End: 2025-06-18
Payer: MEDICARE

## 2025-06-18 DIAGNOSIS — M54.9 DORSALGIA, UNSPECIFIED: Primary | ICD-10-CM

## 2025-06-18 PROCEDURE — 99215 OFFICE O/P EST HI 40 MIN: CPT | Mod: S$PBB,,, | Performed by: STUDENT IN AN ORGANIZED HEALTH CARE EDUCATION/TRAINING PROGRAM

## 2025-06-18 NOTE — PROGRESS NOTES
Returns today for re-evaluation as well as imaging  Reviewed  Unfortunately she just continues to have difficulty with even in-home and reasonable distance ambulation  This multifactorial she is very slow steppage gait she has had surgery on both knees we will wear brace at times    However she will get episodes of severe pain and paresthesias more so down the right leg which limits her ability to stand and walk reasonable difference distances    She is very complex imaging  Based on her lumbar x-ray and MRI    Multilevel degeneration degree of scoliosis disc degeneration at multiple levels including L1-L2    However with regard to stenosis this has much worse at L4-5 and L5-S1 where she has spondylolisthesis severe facet arthropathy and stenosis    She denies any major medical problems she had a DEXA scan last year with osteopenia  She denies any upper extremity symptoms changes decreased function no neck pain        HPI:   Mrs. Aguilera is a 74-year-old returning for follow-up reporting worsening right-sided low back and hip pain that radiates down her right leg.  It is constant, daily now and worse with activities.  She uses a Rollator walker whenever she knows she is going to have to walk some distance.  She has tried over-the-counter medicines without relief.  She has failed physical therapy and pain management in the past without any lasting relief.  She saw Dr. Arnold with orthopedics who said she has maximized all of her hip treatments she is to follow up with her spine surgeon now.  Imaging is over a year old.    General: well developed, well nourished, no distress.   Neurologic: Alert and oriented. Thought content appropriate.  Cranial nerves: face symmetric, EOMI.   Motor Strength: Moves all extremities spontaneously with good tone. No abnormal movements seen.      Strength   Deltoids Triceps Biceps Wrist Extension Wrist Flexion Hand    Upper: R 5/5 5/5 5/5 5/5 5/5 5/5     L 5/5 5/5 5/5 5/5 5/5 5/5        Iliopsoas Quadriceps Knee  Flexion Tibialis  anterior Gastro- cnemius EHL   Lower: R 5/5 5/5 5/5 4/5 5/5 4+/5     L 5/5 5/5 5/5 4/5 5/5 5/5      Very slow cautious steppage gait    - SLR    Radiculopathy: RLE L4/5 distribution  Gait: antalgic    Sensory:  Decreased patchy t  DTR's - 0 + and symmetric in LE    No new imaging    ASSESSMENT AND PLAN:    Mrs. Aguilera returns for follow-up reporting severe, worsening low back and hip pain with right leg pain.  She reports worsening over the last he months with progression of leg pain to her shin.  She saw Dr. Arnold who recommended following up with spine surgery further workup of her hip pain.  Prior lumbar MRI independently reviewed.  She has a grade 1 spondylolisthesis L4 on L5 and L5 on S1 contributing to severe central canal stenosis with degenerated discs and facet arthropathy.  She needs an updated lumbar MRI and bending x-rays to begin the workup.  She has failed all conservative treatments with physical therapy and pain management with Dr. Cuellar in the past.  She will follow up after testing.    Assessment recommendation  74-year-old female with back pain neurogenic claudication right lower extremity radiculopathy  She is unable to progress with a reasonable distance ambulation even in-home and within the community with physical therapy she has modified her activity she has moved to a use of a intermittent rolling walker she is attempted interventions with pain management without relief  She has difficulty and concerns she will get severe pain right leg greater than left which limits her walking ability    Reviewed all this she has multi multiple factors she has a very slow steppage gait she has had knee surgeries  However symptomatic from her lumbar spine progressive    She does not have major medical comorbidities      Reviewed the complexity of her film this has multilevel degeneration, osteopenia  However she has severe stenosis with spondylolisthesis at L4-5  and L5-S1  We discussed options in detail.  We discussed the gravity of surgery as well as the risk  She feels that she has hit a plateau has severe pain unable to walk and not find conservative solutions  She feels that she we would like to proceed with surgery    She is going to get a scoliosis film and a CT scan  He has severe disc collapse at the lower segment which were calling 5 1 high grade 1 to grade 2 spondy  We will get the CT scan scoli x-ray an surgical options    The diagnosis, goals, limitations, risks and benefits of surgery and alternative treatment options where discussed at length (pros/cons). All questions/concerns were addressed. The patient has verbalized a good understanding of the diagnosis, the planned procedure, anticipated post-operative course, overall expectations, and risks including but not limited to:  nerve root injury/paralysis, death, nerve injury leading to pain or neurological deficit, csf leak, vascular injury or serious bleeding/need for blood product transfusion/stroke, wrong level surgery, hardware/instrumenteation misplacement, chronic pain/failure to improve or worsening of symptoms, infection, pseudoarthrosis, hardware failure, need for further surgery at the same or different levels; medical (eg Heart attack, blood clot, infection) and anesthetic complications.

## 2025-06-19 ENCOUNTER — HOSPITAL ENCOUNTER (OUTPATIENT)
Dept: RADIOLOGY | Facility: HOSPITAL | Age: 75
Discharge: HOME | End: 2025-06-19
Attending: STUDENT IN AN ORGANIZED HEALTH CARE EDUCATION/TRAINING PROGRAM
Payer: MEDICARE

## 2025-06-19 DIAGNOSIS — M54.9 DORSALGIA, UNSPECIFIED: ICD-10-CM

## 2025-06-19 PROCEDURE — 72082 X-RAY EXAM ENTIRE SPI 2/3 VW: CPT | Mod: 26,,, | Performed by: RADIOLOGY

## 2025-06-19 PROCEDURE — 72131 CT LUMBAR SPINE W/O DYE: CPT | Mod: 26,,, | Performed by: RADIOLOGY

## 2025-06-19 PROCEDURE — 72131 CT LUMBAR SPINE W/O DYE: CPT | Mod: TC,PO

## 2025-06-19 PROCEDURE — 72082 X-RAY EXAM ENTIRE SPI 2/3 VW: CPT | Mod: TC,FY,PO

## 2025-06-19 NOTE — TELEPHONE ENCOUNTER
Department of Anesthesiology  Preprocedure Note       Name:  Kady Hope   Age:  71 y.o.  :  1953                                          MRN:  [id number]         Date:  2025      Surgeon: Surgeon(s):  Margaret Olvera MD    Procedure: Procedure(s):  COLONOSCOPY    Medications prior to admission:   Prior to Admission medications    Medication Sig Start Date End Date Taking? Authorizing Provider   hydroCHLOROthiazide (HYDRODIURIL) 25 MG tablet Take 1 tablet by mouth once daily 25   Carmelita Vasques MD   atorvastatin (LIPITOR) 10 MG tablet Take 1 tablet by mouth once daily 25   Carmelita Vasques MD   cetirizine (ZYRTEC) 10 MG tablet Take 1 tablet by mouth daily 24   Carmelita Vasques MD   Azelastine HCl 137 MCG/SPRAY SOLN 2 SPRAYS BY NASAL ROUTE 2 TIMES DAILY 6/3/24   Carmelita Vasques MD   ibuprofen (ADVIL;MOTRIN) 800 MG tablet Take 1 tablet by mouth every 8 hours as needed for Pain 23   ProviderGuzman MD   ipratropium (ATROVENT) 0.03 % nasal spray  14   Provider, MD Guzman       Current medications:    Current Facility-Administered Medications   Medication Dose Route Frequency Provider Last Rate Last Admin   • lactated ringers infusion   IntraVENous Continuous Brody Haley MD           Allergies:    Allergies   Allergen Reactions   • Bactrim [Sulfamethoxazole-Trimethoprim] Hives       Problem List:    Patient Active Problem List   Diagnosis Code   • Hyperlipidemia E78.5   • Chronic back pain M54.9, G89.29   • ISABELA (obstructive sleep apnea) G47.33   • Spondylosis of lumbar region without myelopathy or radiculopathy M47.816   • Primary hypertension I10   • Immunization due Z23   • Post-menopausal Z78.0   • Leukocytosis D72.829   • Chronic seasonal allergic rhinitis J30.2   • Other fatigue R53.83   • Chronic venous hypertension involving right side I87.301   • Need for diphtheria-tetanus-pertussis (Tdap) vaccine Z23   • Allergic rhinitis J30.9   • Hand  Refill Authorization Note   Lucinda Aguilera  is requesting a refill authorization.  Brief Assessment and Rationale for Refill:  Approve     Medication Therapy Plan:       Medication Reconciliation Completed: No   Comments:   --->Care Gap information included below if applicable.   Orders Placed This Encounter    irbesartan (AVAPRO) 300 MG tablet      Requested Prescriptions   Signed Prescriptions Disp Refills    irbesartan (AVAPRO) 300 MG tablet 90 tablet 3     Sig: TAKE 1 TABLET(300 MG) BY MOUTH EVERY EVENING       Cardiovascular:  Angiotensin Receptor Blockers Passed - 1/20/2022  6:12 AM        Passed - Patient is at least 18 years old        Passed - Last BP in normal range within 360 days     BP Readings from Last 1 Encounters:   12/30/21 112/74               Passed - Valid encounter within last 15 months     Recent Visits  Date Type Provider Dept   12/30/21 Office Visit Saurabh Hassan MD [facility]   06/08/21 Office Visit Saurabh Hassan MD [facility]   12/23/20 Office Visit Sauarbh Hassan MD [facility]   12/01/20 Office Visit Saurabh Hassan MD [facility]   11/11/20 Office Visit Saurabh Hassan MD [facility]   07/21/20 Office Visit Saurabh Hassan MD [facility]   06/11/20 Office Visit Saurabh Hassan MD [facility]   05/11/20 Office Visit Saurabh Hassan MD [facility]   Showing recent visits within past 720 days and meeting all other requirements  Future Appointments  No visits were found meeting these conditions.  Showing future appointments within next 150 days and meeting all other requirements      Future Appointments              In 1 month Saurabh Hassan MD [facility]                Passed - Cr is 1.39 or below and within 360 days     Lab Results   Component Value Date    CREATININE 0.7 12/29/2021    CREATININE 0.7 06/08/2021    CREATININE 0.8 01/15/2021               Passed - K is 5.2 or below and within 360 days     Potassium   Date Value Ref Range Status   12/29/2021 4.5 3.5 - 5.1 mmol/L Final   06/08/2021 3.7 3.5 - 5.1 mmol/L Final   01/15/2021 3.8 3.5 - 5.1 mmol/L Final              Passed - eGFR within 360 days     Lab Results   Component Value Date    EGFRNONAA >60.0 12/29/2021    EGFRNONAA >60.0 06/08/2021    EGFRNONAA >60.0 01/15/2021                    Appointments  past 12m or future 3m with PCP    Date Provider   Last Visit   12/30/2021 Saurabh Hassan MD   Next Visit   3/11/2022 Saurabh Hassan MD   ED visits in past 90 days: 0     Note composed:10:51 AM 02/07/2022

## 2025-06-20 ENCOUNTER — PATIENT MESSAGE (OUTPATIENT)
Dept: ADMINISTRATIVE | Facility: OTHER | Age: 75
End: 2025-06-20
Payer: MEDICARE

## 2025-06-23 DIAGNOSIS — Z00.00 ENCOUNTER FOR MEDICARE ANNUAL WELLNESS EXAM: ICD-10-CM

## 2025-06-25 ENCOUNTER — DOCUMENTATION ONLY (OUTPATIENT)
Dept: REHABILITATION | Facility: HOSPITAL | Age: 75
End: 2025-06-25
Payer: MEDICARE

## 2025-06-30 ENCOUNTER — PATIENT MESSAGE (OUTPATIENT)
Dept: NEUROSURGERY | Facility: CLINIC | Age: 75
End: 2025-06-30
Payer: MEDICARE

## 2025-06-30 ENCOUNTER — EXTERNAL CHRONIC CARE MANAGEMENT (OUTPATIENT)
Dept: PRIMARY CARE CLINIC | Facility: CLINIC | Age: 75
End: 2025-06-30
Payer: MEDICARE

## 2025-06-30 PROCEDURE — 99490 CHRNC CARE MGMT STAFF 1ST 20: CPT | Mod: S$PBB,,, | Performed by: FAMILY MEDICINE

## 2025-06-30 PROCEDURE — 99490 CHRNC CARE MGMT STAFF 1ST 20: CPT | Mod: PBBFAC,PO | Performed by: FAMILY MEDICINE

## 2025-07-03 ENCOUNTER — OFFICE VISIT (OUTPATIENT)
Dept: NEUROSURGERY | Facility: CLINIC | Age: 75
End: 2025-07-03
Payer: MEDICARE

## 2025-07-03 VITALS
SYSTOLIC BLOOD PRESSURE: 139 MMHG | DIASTOLIC BLOOD PRESSURE: 75 MMHG | BODY MASS INDEX: 29.84 KG/M2 | HEART RATE: 79 BPM | WEIGHT: 168.44 LBS | HEIGHT: 63 IN | RESPIRATION RATE: 18 BRPM

## 2025-07-03 DIAGNOSIS — M48.062 SPINAL STENOSIS OF LUMBAR REGION WITH NEUROGENIC CLAUDICATION: ICD-10-CM

## 2025-07-03 DIAGNOSIS — M43.16 SPONDYLOLISTHESIS OF LUMBAR REGION: Primary | ICD-10-CM

## 2025-07-03 PROCEDURE — 99215 OFFICE O/P EST HI 40 MIN: CPT | Mod: S$PBB,,, | Performed by: STUDENT IN AN ORGANIZED HEALTH CARE EDUCATION/TRAINING PROGRAM

## 2025-07-03 NOTE — PROGRESS NOTES
Returns today for re-evaluation as well as imaging  Reviewed  Unfortunately she just continues to have difficulty with even in-home and reasonable distance ambulation  This multifactorial she is very slow steppage gait she has had surgery on both knees we will wear brace at times    However she will get episodes of severe pain and paresthesias more so down the right leg which limits her ability to stand and walk reasonable difference distances    She is very complex imaging  Based on her lumbar x-ray and MRI    Multilevel degeneration degree of scoliosis disc degeneration at multiple levels including L1-L2    However with regard to stenosis this has much worse at L4-5 and L5-S1 where she has spondylolisthesis severe facet arthropathy and stenosis    She denies any major medical problems she had a DEXA scan last year with osteopenia  She denies any upper extremity symptoms changes decreased function no neck pain        HPI:   Mrs. Aguilera is a 74-year-old returning for follow-up reporting worsening right-sided low back and hip pain that radiates down her right leg.  It is constant, daily now and worse with activities.  She uses a Rollator walker whenever she knows she is going to have to walk some distance.  She has tried over-the-counter medicines without relief.  She has failed physical therapy and pain management in the past without any lasting relief.  She saw Dr. Arnold with orthopedics who said she has maximized all of her hip treatments she is to follow up with her spine surgeon now.  Imaging is over a year old.    General: well developed, well nourished, no distress.   Neurologic: Alert and oriented. Thought content appropriate.  Cranial nerves: face symmetric, EOMI.   Motor Strength: Moves all extremities spontaneously with good tone. No abnormal movements seen.      Strength   Deltoids Triceps Biceps Wrist Extension Wrist Flexion Hand    Upper: R 5/5 5/5 5/5 5/5 5/5 5/5     L 5/5 5/5 5/5 5/5 5/5 5/5        Iliopsoas Quadriceps Knee  Flexion Tibialis  anterior Gastro- cnemius EHL   Lower: R 5/5 5/5 5/5 4/5 4/5 4+/5     L 5/5 5/5 5/5 4/5 5/5 5/5      Very slow cautious steppage gait    - SLR    Radiculopathy: RLE L4/5 distribution  Gait: antalgic    Sensory:  Decreased patchy t  DTR's - 0 + and symmetric in LE    No new imaging    ASSESSMENT AND PLAN:    Mrs. Aguilera returns for follow-up reporting severe, worsening low back and hip pain with right leg pain.  She reports worsening over the last he months with progression of leg pain to her shin.  She saw Dr. Arnold who recommended following up with spine surgery further workup of her hip pain.  Prior lumbar MRI independently reviewed.  She has a grade 1 spondylolisthesis L4 on L5 and L5 on S1 contributing to severe central canal stenosis with degenerated discs and facet arthropathy.  She needs an updated lumbar MRI and bending x-rays to begin the workup.  She has failed all conservative treatments with physical therapy and pain management with Dr. Cuellar in the past.  She will follow up after testing.    Assessment recommendation  74-year-old female with back pain neurogenic claudication right lower extremity radiculopathy  She is unable to progress with a reasonable distance ambulation even in-home and within the community with physical therapy she has modified her activity she has moved to a use of a intermittent rolling walker she is attempted interventions with pain management without relief  She has difficulty and concerns she will get severe pain right leg greater than left which limits her walking ability    Reviewed all this she has multi multiple factors she has a very slow steppage gait she has had knee surgeries  However symptomatic from her lumbar spine progressive    She does not have major medical comorbidities      Reviewed the complexity of her film this has multilevel degeneration, osteopenia  However she has severe stenosis with spondylolisthesis at L4-5  and L5-S1  We discussed options in detail.  We discussed the gravity of surgery as well as the risk  She feels that she has hit a plateau has severe pain unable to walk and not find conservative solutions  She feels that she we would like to proceed with surgery    She is going to get a scoliosis film and a CT scan  He has severe disc collapse at the lower segment which were calling 5 1 high grade 1 to grade 2 spondy  We will get the CT scan scoli x-ray an surgical options    Assessment and recs    Essentially we reviewed options.  She really has lost her basic ambulation has leg weakness including bilateral footdrop  She has neurogenic claudication and radiculopathy  Most of her symptoms correlating with the L4-5 and 5 1  She certainly has spondylosis proximal to this without stenosis  We reviewed this in detail she is complicated stenosis with a spondylolisthesis at L4-5 and 5 1  She has severe ligamentous hypertrophy facet arthropathy ossification of the ligament at certain components with severe central lateral recess and foraminal stenosis and a combination of all these factors.  We discussed surgical and nonsurgical options as well as multiple surgical techniques  We discussed the risks benefits pros and cons of each  Obtain opinions from my colleagues and partners  With the severity of her collapse spondylolisthesis and bony stenosis we discussed a staged procedure  What she is interested in which would include an L4-5 L5-S1 anterior lumbar interbody fusion for indirect decompression reduction enhancement of fusion, this would be followed by stage II posterior decompression and instrumented fusion  We discussed the gravity of surgery as well as the expected recovery and therapy  We discussed nonoperative treatments a problem is her limited ability walking her progressive weakness and severe pain  With severe structural compression, after long discussion with and consideration with the patient and  they  would like to proceed with surgery                              The diagnosis, goals, limitations, risks and benefits of surgery and alternative treatment options where discussed at length (pros/cons). All questions/concerns were addressed. The patient has verbalized a good understanding of the diagnosis, the planned procedure, anticipated post-operative course, overall expectations, and risks including but not limited to:  nerve root injury/paralysis, death, nerve injury leading to pain or neurological deficit, csf leak, vascular injury or serious bleeding/need for blood product transfusion/stroke, wrong level surgery, hardware/instrumenteation misplacement, chronic pain/failure to improve or worsening of symptoms, infection, pseudoarthrosis, hardware failure, need for further surgery at the same or different levels; medical (eg Heart attack, blood clot, infection) and anesthetic complications.

## 2025-07-17 ENCOUNTER — TELEPHONE (OUTPATIENT)
Dept: NEUROSURGERY | Facility: CLINIC | Age: 75
End: 2025-07-17
Payer: MEDICARE

## 2025-07-17 DIAGNOSIS — M48.062 SPINAL STENOSIS OF LUMBAR REGION WITH NEUROGENIC CLAUDICATION: ICD-10-CM

## 2025-07-17 DIAGNOSIS — M43.16 SPONDYLOLISTHESIS OF LUMBAR REGION: Primary | ICD-10-CM

## 2025-07-17 DIAGNOSIS — R79.1 ABNORMAL COAGULATION PROFILE: ICD-10-CM

## 2025-07-17 RX ORDER — SODIUM CHLORIDE, SODIUM LACTATE, POTASSIUM CHLORIDE, CALCIUM CHLORIDE 600; 310; 30; 20 MG/100ML; MG/100ML; MG/100ML; MG/100ML
INJECTION, SOLUTION INTRAVENOUS CONTINUOUS
OUTPATIENT
Start: 2025-07-17

## 2025-07-17 RX ORDER — LIDOCAINE HYDROCHLORIDE 10 MG/ML
1 INJECTION, SOLUTION EPIDURAL; INFILTRATION; INTRACAUDAL; PERINEURAL ONCE
OUTPATIENT
Start: 2025-07-17 | End: 2025-07-17

## 2025-07-17 RX ORDER — CEFAZOLIN SODIUM 2 G/50ML
2 SOLUTION INTRAVENOUS
OUTPATIENT
Start: 2025-07-17

## 2025-07-17 NOTE — TELEPHONE ENCOUNTER
Mrs Aguilera will be having surgery on 8/4/2025 with Dr. Yi, Neurosurgeon, at [facility]. We are reaching out to obtain a surgical clearance from Dr. Hassan to ensure the safety of our patient. The surgery is lumbar fusion  and will be under general anesthesia. This procedure typically lasts approximately 4 hours. We request that the patient hold all anticoagulant/antiinflammatory medications for 5-7 days prior to surgery. Please let us know if our office can be of further assistance.     Thank you,     Mayra Sandoval LPN  P: [phone]  F: [phone]

## 2025-07-25 DIAGNOSIS — I10 PRIMARY HYPERTENSION: ICD-10-CM

## 2025-07-26 NOTE — TELEPHONE ENCOUNTER
Care Due:                  Date            Visit Type   Department     Provider  --------------------------------------------------------------------------------                                MYCHART                              FOLLOWUP/OF  [facility]  Last Visit: 04-      FICE VISIT   MEDICINE       Saurabh Hassan                              EP -                              PRIMARY      [facility]  Next Visit: 08-      CARE ([facility])   MEDICINE       Saurabh Hassan                                                            Last  Test          Frequency    Reason                     Performed    Due Date  --------------------------------------------------------------------------------    Lipid Panel.  12 months..  rosuvastatin.............  07- 07-    [employer] Embedded Care Due Messages. Reference number: [id number].   7/25/2025 7:08:13 PM CDT

## 2025-07-27 RX ORDER — LOSARTAN POTASSIUM 100 MG/1
100 TABLET ORAL DAILY
Qty: 90 TABLET | Refills: 3 | Status: SHIPPED | OUTPATIENT
Start: 2025-07-27

## 2025-07-27 NOTE — TELEPHONE ENCOUNTER
Refill Routing Note   Medication(s) are not appropriate for processing by [facility] for the following reason(s):        No active prescription written by provider    [facility] action(s):  Defer      Medication Therapy Plan: FOVS, FLOS      Appointments  past 12m or future 3m with PCP    Date Provider   Last Visit   4/14/2025 Saurabh Hassan MD   Next Visit   8/6/2025 Saurabh Hassan MD   ED visits in past 90 days: 0        Note composed:10:54 PM 07/26/2025

## 2025-07-28 ENCOUNTER — PATIENT MESSAGE (OUTPATIENT)
Dept: FAMILY MEDICINE | Facility: CLINIC | Age: 75
End: 2025-07-28

## 2025-07-28 ENCOUNTER — OFFICE VISIT (OUTPATIENT)
Dept: FAMILY MEDICINE | Facility: CLINIC | Age: 75
End: 2025-07-28
Payer: MEDICARE

## 2025-07-28 ENCOUNTER — HOSPITAL ENCOUNTER (OUTPATIENT)
Dept: RADIOLOGY | Facility: HOSPITAL | Age: 75
Discharge: HOME | End: 2025-07-28
Attending: NURSE PRACTITIONER
Payer: MEDICARE

## 2025-07-28 VITALS
OXYGEN SATURATION: 96 % | WEIGHT: 167.31 LBS | SYSTOLIC BLOOD PRESSURE: 126 MMHG | DIASTOLIC BLOOD PRESSURE: 60 MMHG | HEART RATE: 75 BPM | TEMPERATURE: 98 F | HEIGHT: 63 IN | BODY MASS INDEX: 29.64 KG/M2

## 2025-07-28 DIAGNOSIS — D72.829 LEUKOCYTOSIS, UNSPECIFIED TYPE: Primary | ICD-10-CM

## 2025-07-28 DIAGNOSIS — M54.50 CHRONIC BILATERAL LOW BACK PAIN WITHOUT SCIATICA: ICD-10-CM

## 2025-07-28 DIAGNOSIS — K21.9 GASTROESOPHAGEAL REFLUX DISEASE, UNSPECIFIED WHETHER ESOPHAGITIS PRESENT: ICD-10-CM

## 2025-07-28 DIAGNOSIS — Z01.818 PRE-OP EVALUATION: Primary | ICD-10-CM

## 2025-07-28 DIAGNOSIS — G89.29 CHRONIC BILATERAL LOW BACK PAIN WITHOUT SCIATICA: ICD-10-CM

## 2025-07-28 DIAGNOSIS — Z01.818 PRE-OP EVALUATION: ICD-10-CM

## 2025-07-28 DIAGNOSIS — M47.816 LUMBAR SPONDYLOSIS: ICD-10-CM

## 2025-07-28 DIAGNOSIS — I10 HYPERTENSION, UNSPECIFIED TYPE: ICD-10-CM

## 2025-07-28 LAB
OHS QRS DURATION: 82 MS
OHS QTC CALCULATION: 422 MS

## 2025-07-28 PROCEDURE — 93005 ELECTROCARDIOGRAM TRACING: CPT | Mod: PBBFAC,PO | Performed by: INTERNAL MEDICINE

## 2025-07-28 PROCEDURE — 99215 OFFICE O/P EST HI 40 MIN: CPT | Mod: PBBFAC,25,PO | Performed by: NURSE PRACTITIONER

## 2025-07-28 PROCEDURE — 99214 OFFICE O/P EST MOD 30 MIN: CPT | Mod: S$PBB,,, | Performed by: NURSE PRACTITIONER

## 2025-07-28 PROCEDURE — 71046 X-RAY EXAM CHEST 2 VIEWS: CPT | Mod: TC,PO

## 2025-07-28 PROCEDURE — 93010 ELECTROCARDIOGRAM REPORT: CPT | Mod: S$PBB,,, | Performed by: INTERNAL MEDICINE

## 2025-07-28 PROCEDURE — 71046 X-RAY EXAM CHEST 2 VIEWS: CPT | Mod: 26,,, | Performed by: RADIOLOGY

## 2025-07-28 PROCEDURE — 99999 PR PBB SHADOW E&M-EST. PATIENT-LVL V: CPT | Mod: PBBFAC,,, | Performed by: NURSE PRACTITIONER

## 2025-07-28 NOTE — PROGRESS NOTES
Patient ID: Lucinda Aguilera is a 74 y.o. female.    Chief Complaint: Pre-op Exam      Lucinda Aguilera is in the office  pre op clearance.    HPI  Surgery clearance for lumbar spine surgery with dr Yi on 8/12/25 and 8/13/25.     Followed by Dr Lopez for ulcerative colitis and GERD    Followed by psychiatry for bipolar disorder.     HTN controlled  Past Medical History:   Diagnosis Date    Anxiety     takes daily Xanax    Arthritis     right thumb    Cataract     OU    Cholelithiasis     GERD (gastroesophageal reflux disease)     Hepatic steatosis     Hyperlipemia     Hypertension     PVD (posterior vitreous detachment)     OD              Current Medications[1]    The ASCVD Risk score (Debbie LOPEZ, et al., 2019) failed to calculate for the following reasons:    Risk score cannot be calculated because patient has a medical history suggesting prior/existing ASCVD     Wt Readings from Last 3 Encounters:   07/28/25 75.9 kg (167 lb 5.3 oz)   07/03/25 76.4 kg (168 lb 6.9 oz)   06/11/25 76.4 kg (168 lb 6.9 oz)     Temp Readings from Last 3 Encounters:   07/28/25 98.2 °F (36.8 °C) (Oral)   04/29/25 97.9 °F (36.6 °C) (Oral)   02/19/25 98.7 °F (37.1 °C) (Oral)     BP Readings from Last 3 Encounters:   07/28/25 (!) 142/80   07/03/25 139/75   06/11/25 (!) 145/77     Pulse Readings from Last 3 Encounters:   07/28/25 75   07/03/25 79   06/11/25 73     Resp Readings from Last 3 Encounters:   07/03/25 18   06/11/25 18   02/03/25 16     PF Readings from Last 3 Encounters:   No data found for PF     SpO2 Readings from Last 3 Encounters:   07/28/25 96%   04/29/25 95%   04/14/25 95%        Lab Results   Component Value Date    HGBA1C 5.7 (H) 06/08/2021     Lab Results   Component Value Date    LDLCALC 104.6 07/31/2024    CREATININE 0.58 02/01/2025       Review of Systems   Constitutional: Negative.    HENT: Negative.     Eyes: Negative.    Respiratory: Negative.     Cardiovascular: Negative.    Gastrointestinal: Negative.     Endocrine: Negative.    Genitourinary: Negative.    Musculoskeletal:  Positive for back pain and gait problem.   Skin: Negative.    Psychiatric/Behavioral: Negative.             Objective:      Physical Exam  Vitals and nursing note reviewed.   Constitutional:       General: She is not in acute distress.     Appearance: Normal appearance. She is well-groomed.   HENT:      Head: Normocephalic.      Right Ear: External ear normal.      Left Ear: External ear normal.      Nose: Nose normal.      Mouth/Throat:      Lips: Pink.      Mouth: Mucous membranes are moist.      Pharynx: Oropharynx is clear. No oropharyngeal exudate or posterior oropharyngeal erythema.   Eyes:      Extraocular Movements: Extraocular movements intact.      Conjunctiva/sclera: Conjunctivae normal.      Pupils: Pupils are equal, round, and reactive to light.   Cardiovascular:      Rate and Rhythm: Normal rate and regular rhythm.      Heart sounds: Normal heart sounds. No murmur heard.  Pulmonary:      Effort: Pulmonary effort is normal.      Breath sounds: Normal breath sounds.   Chest:      Chest wall: No tenderness.   Abdominal:      General: Bowel sounds are normal.      Palpations: Abdomen is soft.      Tenderness: There is no abdominal tenderness.   Musculoskeletal:         General: No swelling or tenderness. Normal range of motion.      Cervical back: Normal range of motion and neck supple.      Right lower leg: No edema.      Left lower leg: No edema.   Lymphadenopathy:      Cervical: No cervical adenopathy.   Skin:     General: Skin is warm and dry.   Neurological:      General: No focal deficit present.      Mental Status: She is alert and oriented to person, place, and time. Mental status is at baseline.      Gait: Gait abnormal (assisted by rollator).   Psychiatric:         Mood and Affect: Mood normal.         Behavior: Behavior normal.             Screening recommendations appropriate to age and health status were  reviewed.    Pre-op evaluation  -     IN OFFICE EKG 12-LEAD (to Muse)  -     X-Ray Chest PA And Lateral; Future; Expected date: 07/28/2025  -     CBC W/ AUTO DIFFERENTIAL; Future; Expected date: 07/28/2025  -     Comprehensive Metabolic Panel; Future; Expected date: 07/28/2025        RCRI risk factors include: high risk type of surgery. As such, per RCRI the risk of cardiac death, nonfatal myocardial infarction, or nonfatal cardiac arrest is 0.4% and the risk of myocardial infarction, pulmonary edema, ventricular fibrillation, primary cardiac arrest, or complete heart block is 0.5%.  Overall this patient can be considered low risk for this intermediate risk procedure. No further cardiac testing is recommended at this time.     Patient denies any symptoms (as per HPI) concerning for undiagnosed lung disease including JUN. Patient is a non-smoker. We discussed the benefits of early mobilization and deep breathing after surgery.      Screened patient for alcohol misuse, use of illicit drugs, and personal or family history of anesthetic complications or bleeding diathesis and no substantial concerns were identified.     All current medications were reviewed and at this time no changes to medications are recommended prior to surgery.     I recommend use of standard pre-op and post-op precautions for this patient. In my opinion, she is medically optimized for this procedure, and can proceed without further evaluation.          [1]   Current Outpatient Medications:     acetaminophen (TYLENOL) 500 MG tablet, Take 500 mg by mouth every 6 (six) hours as needed for Pain., Disp: , Rfl:     ascorbic acid, vitamin C, (VITAMIN C) 250 MG tablet, Take 500 mg by mouth once daily. , Disp: , Rfl:     atenoloL (TENORMIN) 25 MG tablet, Take 1 tablet (25 mg total) by mouth every evening., Disp: 30 tablet, Rfl: 11    azelastine (ASTELIN) 137 mcg (0.1 %) nasal spray, 1 spray (137 mcg total) by Nasal route 2 (two) times daily as needed for  Rhinitis., Disp: 30 mL, Rfl: 11    balsalazide (COLAZAL) 750 mg capsule, Take 3 capsules (2,250 mg total) by mouth 2 (two) times daily with meals. TAKE 3 CAPSULES(2250 MG) BY MOUTH TWICE DAILY WITH MEALS, Disp: 180 capsule, Rfl: 11    benzonatate (TESSALON) 200 MG capsule, Take 1 capsule (200 mg total) by mouth 3 (three) times daily as needed for Cough., Disp: 30 capsule, Rfl: 1    buPROPion (WELLBUTRIN XL) 150 MG TB24 tablet, Take 1 tablet (150 mg total) by mouth every morning., Disp: 30 tablet, Rfl: 2    CRANBERRY EXTRACT-D-MANNOSE ORAL, Take by mouth., Disp: , Rfl:     d-mannose 500 mg Cap, Take by mouth., Disp: , Rfl:     diltiaZEM (DILACOR XR) 240 MG CDCR, Take 1 capsule (240 mg total) by mouth every evening., Disp: 30 capsule, Rfl: 11    docusate sodium (COLACE) 100 MG capsule, Take 100 mg by mouth 2 (two) times daily as needed for Constipation., Disp: , Rfl:     DULoxetine (CYMBALTA) 30 MG capsule, Take 1 capsule (30 mg total) by mouth once daily., Disp: 30 capsule, Rfl: 5    famotidine (PEPCID) 40 MG tablet, Take 1 tablet (40 mg total) by mouth every evening., Disp: 90 tablet, Rfl: 3    famotidine-calcium carbonate-magnesium hydroxide (PEPCID COMPLETE) -165 mg, Take 1 tablet by mouth 2 (two) times daily as needed. Chew 1 or 2 tablets, 2-3 times a day, as needed, for nausea or heartburn., Disp: , Rfl:     fluticasone propionate (FLONASE) 50 mcg/actuation nasal spray, 1 spray (50 mcg total) by Each Nostril route once daily., Disp: 16 g, Rfl: 3    losartan (COZAAR) 100 MG tablet, Take 1 tablet (100 mg total) by mouth once daily., Disp: 90 tablet, Rfl: 3    magnesium oxide (MAG-OX) 400 mg (241.3 mg magnesium) tablet, Take 1 tablet (400 mg total) by mouth once daily., Disp: 30 tablet, Rfl: 3    methocarbamoL (ROBAXIN) 750 MG Tab, Take 1 tablet (750 mg total) by mouth 4 (four) times daily as needed., Disp: 44 tablet, Rfl: 3    multivitamin (THERAGRAN) per tablet, Take 1 tablet by mouth once daily., Disp: ,  Rfl:     ondansetron (ZOFRAN-ODT) 4 MG TbDL, Take 1 tablet (4 mg total) by mouth every 6 (six) hours as needed (nausea)., Disp: 30 tablet, Rfl: 3    pantoprazole (PROTONIX) 40 MG tablet, TAKE 1 TABLET(40 MG) BY MOUTH TWICE DAILY, Disp: 180 tablet, Rfl: 3    psyllium husk, aspartame, (NATURAL PSYLLIUM FIBER) 3.4 gram/5.8 gram Powd, Take by mouth., Disp: , Rfl:     rosuvastatin (CRESTOR) 20 MG tablet, Take 1 tablet (20 mg total) by mouth once daily., Disp: 30 tablet, Rfl: 11    solifenacin (VESICARE) 10 MG tablet, Take 1 tablet (10 mg total) by mouth once daily., Disp: 30 tablet, Rfl: 11    traMADoL (ULTRAM) 50 mg tablet, Take 1 tablet (50 mg total) by mouth every 6 (six) hours as needed for Pain., Disp: 28 tablet, Rfl: 0

## 2025-07-29 ENCOUNTER — LAB VISIT (OUTPATIENT)
Dept: LAB | Facility: HOSPITAL | Age: 75
End: 2025-07-29
Attending: NURSE PRACTITIONER
Payer: MEDICARE

## 2025-07-29 DIAGNOSIS — D72.829 LEUKOCYTOSIS, UNSPECIFIED TYPE: ICD-10-CM

## 2025-07-29 LAB
ABSOLUTE EOSINOPHIL (OHS): 0.08 K/UL
ABSOLUTE MONOCYTE (OHS): 0.84 K/UL (ref 0.3–1)
ABSOLUTE NEUTROPHIL COUNT (OHS): 7.01 K/UL (ref 1.8–7.7)
ALBUMIN SERPL BCP-MCNC: 3.6 G/DL (ref 3.5–5.2)
ALP SERPL-CCNC: 93 UNIT/L (ref 40–150)
ALT SERPL W/O P-5'-P-CCNC: 17 UNIT/L (ref 10–44)
ANION GAP (OHS): 8 MMOL/L (ref 8–16)
AST SERPL-CCNC: 18 UNIT/L (ref 11–45)
BASOPHILS # BLD AUTO: 0.06 K/UL
BASOPHILS NFR BLD AUTO: 0.6 %
BILIRUB SERPL-MCNC: 0.5 MG/DL (ref 0.1–1)
BILIRUB UR QL STRIP.AUTO: NEGATIVE
BUN SERPL-MCNC: 11 MG/DL (ref 8–23)
CALCIUM SERPL-MCNC: 8.9 MG/DL (ref 8.7–10.5)
CHLORIDE SERPL-SCNC: 98 MMOL/L (ref 95–110)
CLARITY UR: CLEAR
CO2 SERPL-SCNC: 26 MMOL/L (ref 23–29)
COLOR UR AUTO: YELLOW
CREAT SERPL-MCNC: 0.7 MG/DL (ref 0.5–1.4)
ERYTHROCYTE [DISTWIDTH] IN BLOOD BY AUTOMATED COUNT: 13 % (ref 11.5–14.5)
GFR SERPLBLD CREATININE-BSD FMLA CKD-EPI: >60 ML/MIN/1.73/M2
GLUCOSE SERPL-MCNC: 95 MG/DL (ref 70–110)
GLUCOSE UR QL STRIP: NEGATIVE
HCT VFR BLD AUTO: 39.8 % (ref 37–48.5)
HGB BLD-MCNC: 13.6 GM/DL (ref 12–16)
HGB UR QL STRIP: NEGATIVE
IMM GRANULOCYTES # BLD AUTO: 0.07 K/UL (ref 0–0.04)
IMM GRANULOCYTES NFR BLD AUTO: 0.7 % (ref 0–0.5)
KETONES UR QL STRIP: ABNORMAL
LEUKOCYTE ESTERASE UR QL STRIP: NEGATIVE
LYMPHOCYTES # BLD AUTO: 2.64 K/UL (ref 1–4.8)
MCH RBC QN AUTO: 29.9 PG (ref 27–31)
MCHC RBC AUTO-ENTMCNC: 34.2 G/DL (ref 32–36)
MCV RBC AUTO: 88 FL (ref 82–98)
NITRITE UR QL STRIP: NEGATIVE
NUCLEATED RBC (/100WBC) (OHS): 0 /100 WBC
PH UR STRIP: 8 [PH]
PLATELET # BLD AUTO: 331 K/UL (ref 150–450)
PMV BLD AUTO: 8.6 FL (ref 9.2–12.9)
POTASSIUM SERPL-SCNC: 4 MMOL/L (ref 3.5–5.1)
PROT SERPL-MCNC: 7.1 GM/DL (ref 6–8.4)
PROT UR QL STRIP: NEGATIVE
RBC # BLD AUTO: 4.55 M/UL (ref 4–5.4)
RELATIVE EOSINOPHIL (OHS): 0.7 %
RELATIVE LYMPHOCYTE (OHS): 24.7 % (ref 18–48)
RELATIVE MONOCYTE (OHS): 7.9 % (ref 4–15)
RELATIVE NEUTROPHIL (OHS): 65.4 % (ref 38–73)
SODIUM SERPL-SCNC: 132 MMOL/L (ref 136–145)
SP GR UR STRIP: 1.01
WBC # BLD AUTO: 10.7 K/UL (ref 3.9–12.7)

## 2025-07-29 PROCEDURE — 85025 COMPLETE CBC W/AUTO DIFF WBC: CPT | Mod: PO

## 2025-07-29 PROCEDURE — 80053 COMPREHEN METABOLIC PANEL: CPT | Mod: PO

## 2025-07-29 PROCEDURE — 81003 URINALYSIS AUTO W/O SCOPE: CPT | Mod: PO

## 2025-07-29 PROCEDURE — 36415 COLL VENOUS BLD VENIPUNCTURE: CPT | Mod: PO

## 2025-07-30 ENCOUNTER — LAB VISIT (OUTPATIENT)
Dept: LAB | Facility: HOSPITAL | Age: 75
End: 2025-07-30
Attending: FAMILY MEDICINE
Payer: MEDICARE

## 2025-07-30 DIAGNOSIS — E78.5 HYPERLIPIDEMIA, UNSPECIFIED HYPERLIPIDEMIA TYPE: ICD-10-CM

## 2025-07-30 DIAGNOSIS — I10 PRIMARY HYPERTENSION: ICD-10-CM

## 2025-07-30 LAB
ABSOLUTE EOSINOPHIL (OHS): 0.05 K/UL
ABSOLUTE MONOCYTE (OHS): 0.82 K/UL (ref 0.3–1)
ABSOLUTE NEUTROPHIL COUNT (OHS): 7.23 K/UL (ref 1.8–7.7)
ALBUMIN SERPL BCP-MCNC: 3.9 G/DL (ref 3.5–5.2)
ALP SERPL-CCNC: 104 UNIT/L (ref 40–150)
ALT SERPL W/O P-5'-P-CCNC: 19 UNIT/L (ref 0–55)
ANION GAP (OHS): 8 MMOL/L (ref 8–16)
AST SERPL-CCNC: 27 UNIT/L (ref 0–50)
BASOPHILS # BLD AUTO: 0.06 K/UL
BASOPHILS NFR BLD AUTO: 0.6 %
BILIRUB SERPL-MCNC: 0.4 MG/DL (ref 0.1–1)
BUN SERPL-MCNC: 9 MG/DL (ref 8–23)
CALCIUM SERPL-MCNC: 9.7 MG/DL (ref 8.7–10.5)
CHLORIDE SERPL-SCNC: 102 MMOL/L (ref 95–110)
CHOLEST SERPL-MCNC: 215 MG/DL (ref 120–199)
CHOLEST/HDLC SERPL: 2.5 {RATIO} (ref 2–5)
CO2 SERPL-SCNC: 26 MMOL/L (ref 23–29)
CREAT SERPL-MCNC: 0.7 MG/DL (ref 0.5–1.4)
ERYTHROCYTE [DISTWIDTH] IN BLOOD BY AUTOMATED COUNT: 13.3 % (ref 11.5–14.5)
GFR SERPLBLD CREATININE-BSD FMLA CKD-EPI: >60 ML/MIN/1.73/M2
GLUCOSE SERPL-MCNC: 101 MG/DL (ref 70–110)
HCT VFR BLD AUTO: 41.3 % (ref 37–48.5)
HDLC SERPL-MCNC: 85 MG/DL (ref 40–75)
HDLC SERPL: 39.5 % (ref 20–50)
HGB BLD-MCNC: 13.9 GM/DL (ref 12–16)
IMM GRANULOCYTES # BLD AUTO: 0.1 K/UL (ref 0–0.04)
IMM GRANULOCYTES NFR BLD AUTO: 0.9 % (ref 0–0.5)
LDLC SERPL CALC-MCNC: 111.8 MG/DL (ref 63–159)
LYMPHOCYTES # BLD AUTO: 2.46 K/UL (ref 1–4.8)
MCH RBC QN AUTO: 29.6 PG (ref 27–31)
MCHC RBC AUTO-ENTMCNC: 33.7 G/DL (ref 32–36)
MCV RBC AUTO: 88 FL (ref 82–98)
NONHDLC SERPL-MCNC: 130 MG/DL
NUCLEATED RBC (/100WBC) (OHS): 0 /100 WBC
PLATELET # BLD AUTO: 375 K/UL (ref 150–450)
PMV BLD AUTO: 9.6 FL (ref 9.2–12.9)
POTASSIUM SERPL-SCNC: 4.8 MMOL/L (ref 3.5–5.1)
PROT SERPL-MCNC: 7.2 GM/DL (ref 6–8.4)
RBC # BLD AUTO: 4.69 M/UL (ref 4–5.4)
RELATIVE EOSINOPHIL (OHS): 0.5 %
RELATIVE LYMPHOCYTE (OHS): 22.9 % (ref 18–48)
RELATIVE MONOCYTE (OHS): 7.6 % (ref 4–15)
RELATIVE NEUTROPHIL (OHS): 67.5 % (ref 38–73)
SODIUM SERPL-SCNC: 136 MMOL/L (ref 136–145)
TRIGL SERPL-MCNC: 91 MG/DL (ref 30–150)
TSH SERPL-ACNC: 1.18 UIU/ML (ref 0.4–4)
WBC # BLD AUTO: 10.72 K/UL (ref 3.9–12.7)

## 2025-07-30 PROCEDURE — 84443 ASSAY THYROID STIM HORMONE: CPT

## 2025-07-30 PROCEDURE — 80053 COMPREHEN METABOLIC PANEL: CPT

## 2025-07-30 PROCEDURE — 80061 LIPID PANEL: CPT

## 2025-07-30 PROCEDURE — 85025 COMPLETE CBC W/AUTO DIFF WBC: CPT

## 2025-07-30 PROCEDURE — 36415 COLL VENOUS BLD VENIPUNCTURE: CPT | Mod: PO

## 2025-07-31 ENCOUNTER — EXTERNAL CHRONIC CARE MANAGEMENT (OUTPATIENT)
Dept: PRIMARY CARE CLINIC | Facility: CLINIC | Age: 75
End: 2025-07-31
Payer: MEDICARE

## 2025-07-31 ENCOUNTER — OFFICE VISIT (OUTPATIENT)
Facility: CLINIC | Age: 75
End: 2025-07-31
Payer: MEDICARE

## 2025-07-31 VITALS
SYSTOLIC BLOOD PRESSURE: 150 MMHG | HEART RATE: 71 BPM | BODY MASS INDEX: 29.57 KG/M2 | HEIGHT: 63 IN | DIASTOLIC BLOOD PRESSURE: 80 MMHG | WEIGHT: 166.88 LBS

## 2025-07-31 DIAGNOSIS — M47.816 LUMBAR SPONDYLOSIS: Primary | ICD-10-CM

## 2025-07-31 PROCEDURE — 99490 CHRNC CARE MGMT STAFF 1ST 20: CPT | Mod: PBBFAC,PO | Performed by: FAMILY MEDICINE

## 2025-07-31 PROCEDURE — 99213 OFFICE O/P EST LOW 20 MIN: CPT | Mod: PBBFAC,25,PO | Performed by: STUDENT IN AN ORGANIZED HEALTH CARE EDUCATION/TRAINING PROGRAM

## 2025-07-31 PROCEDURE — 99999 PR PBB SHADOW E&M-EST. PATIENT-LVL III: CPT | Mod: PBBFAC,,, | Performed by: STUDENT IN AN ORGANIZED HEALTH CARE EDUCATION/TRAINING PROGRAM

## 2025-07-31 PROCEDURE — 99490 CHRNC CARE MGMT STAFF 1ST 20: CPT | Mod: S$PBB,,, | Performed by: FAMILY MEDICINE

## 2025-07-31 NOTE — PROGRESS NOTES
"  [place] - Vascular Surgery  [facility]  History & Physical    PATIENT NAME: Lucinda Aguilera  MRN: [id number]  TODAY'S DATE: 07/31/2025    SUBJECTIVE:     Chief Complaint:  Right hip pain and back pain      History of Present Illness:  Lucinda Aguilera is a 74 y.o. female presents has a referral for right hip pain and back pain with plans for two-level ALIF    Right hip pain that gets worse at the end of the day. She was getting shots in her hip every 6wks but this did not work. She also went to pain management and tried injections which eventually started working but then eventually stopped. She started wearing a brace which helped at times but has gotten worse    She denies any abdominal surgeries. All her deliveries were vaginal      Review of patient's allergies indicates:  No Known Allergies    Past Medical History:   Diagnosis Date    Anxiety     takes daily Xanax    Arthritis     right thumb    Cataract     OU    Cholelithiasis     GERD (gastroesophageal reflux disease)     Hepatic steatosis     Hyperlipemia     Hypertension     PVD (posterior vitreous detachment)     OD     Past Surgical History:   Procedure Laterality Date    BREAST BIOPSY Left 08/28/2020    stereo biopsy    BREAST BIOPSY Left 10/07/2020    benign excisional biopsy    CAUDAL EPIDURAL STEROID INJECTION N/A 7/26/2024    Procedure: Injection-steroid-epidural-caudal;  Surgeon: Indra Cuellar MD;  Location: [facility] OR;  Service: Pain Management;  Laterality: N/A;    chest abcess      child    COLONOSCOPY  01/06/2012    Dr. Lopez: hemorrhoids, redundant colon    COLONOSCOPY N/A 02/17/2021    Dr. Lopez: Congested and erythematous mucosa in the rectum, in the recto-sigmoid colon and in the sigmoid colon, proctosigmoid colitis, Redundant colon; biopsy: focal active colitis "nonspecific but can be seen with acute self-limited colitis (infection), medication effect (e.g. NSAID use), proximity to diverticula, or early/evolving IBD "    EPIDURAL STEROID INJECTION INTO LUMBAR SPINE N/A 5/30/2024    Procedure: Injection-steroid-epidural-lumbar   L5/S1;  Surgeon: Indra Cuellar MD;  Location: [facility];  Service: Pain Management;  Laterality: N/A;    ESOPHAGOGASTRODUODENOSCOPY N/A 06/06/2019    Dr. Lopez: small hiatal hernia, Non-erosive esophageal reflux (NERD) disease?., slight antritis; biopsy: Antral mucosa with chemical/reactive gastropathy. negative for h pylori    ESOPHAGOGASTRODUODENOSCOPY N/A 02/17/2021    Procedure: EGD (ESOPHAGOGASTRODUODENOSCOPY);  Surgeon: Nathan Lopez Jr., MD;  Location: [facility];  Service: Endoscopy;  Laterality: N/A; Minimal gastritis; biopsy: stomach- negative for h pylori, active chronic gastritis with focal features of erosive gastritis, duodenum WNL    ESOPHAGOGASTRODUODENOSCOPY N/A 05/10/2022    Procedure: EGD (ESOPHAGOGASTRODUODENOSCOPY);  Surgeon: Nathan Lopez Jr., MD;  Location: [facility];  Service: Endoscopy;  Laterality: N/A;    ESOPHAGOGASTRODUODENOSCOPY N/A 3/12/2024    Procedure: EGD (ESOPHAGOGASTRODUODENOSCOPY);  Surgeon: Nathan Lopez Jr., MD;  Location: [facility];  Service: Endoscopy;  Laterality: N/A;    EXCISION OF BURSA OF HIP Right 2/3/2025    Procedure: BURSECTOMY, HIP;  Surgeon: Nick Arnold MD;  Location: [facility] OR;  Service: Orthopedics;  Laterality: Right;    EXCISIONAL BIOPSY Left 10/07/2020    Procedure: EXCISIONAL BIOPSY-Left with radiological marker;  Surgeon: Brooklynn Ashford MD;  Location: [facility];  Service: General;  Laterality: Left;    FRACTURE SURGERY  2010    Left thumb repaired surgically    INJECTION OF ANESTHETIC AGENT AROUND MEDIAL BRANCH NERVES INNERVATING LUMBAR FACET JOINT Bilateral 9/12/2024    Procedure: Block-nerve-medial branch-lumbar    L4/5, L5/S1;  Surgeon: Indra Cuellar MD;  Location: [facility];  Service: Pain Management;  Laterality: Bilateral;    JOINT REPLACEMENT Bilateral     knee    KNEE ARTHROSCOPY W/ LASER      right    LAPAROSCOPIC  CHOLECYSTECTOMY N/A 06/14/2019    Procedure: CHOLECYSTECTOMY, LAPAROSCOPIC;  Surgeon: Guevara Evans MD;  Location: [facility] OR;  Service: General;  Laterality: N/A;    thumb surgery  11/2014    TOTAL KNEE ARTHROPLASTY Left 06/19/2018    Procedure: REPLACEMENT-KNEE-TOTAL;  Surgeon: Nj Melvin MD;  Location: [facility] OR 2ND FLR;  Service: Orthopedics;  Laterality: Left;  [employer]    UPPER GASTROINTESTINAL ENDOSCOPY  07/13/2017    Dr. Lopez: NERD, Gastric mucosal atrophy. antritis, Scar in the incisura. Biopsied; biopsy: chronic gastritis. negative for h pylori     Family History   Problem Relation Name Age of Onset    Hypertension Mother age 82     Heart disease Mother age 82     Glaucoma Mother age 82     Stroke Father age 50     Glaucoma Sister      Cataracts Sister      Macular degeneration Sister      Breast cancer Neg Hx      Colon cancer Neg Hx      Crohn's disease Neg Hx      Ulcerative colitis Neg Hx      Stomach cancer Neg Hx      Esophageal cancer Neg Hx      Celiac disease Neg Hx      Amblyopia Neg Hx      Blindness Neg Hx      Retinal detachment Neg Hx      Strabismus Neg Hx       Social History[1]     Review of Systems:  ROS    OBJECTIVE:     Vital Signs (Most Recent):  Pulse: 71 (07/31/25 1014)  BP: (!) 150/80 (07/31/25 1014)      Physical Exam:  Physical Exam  Constitutional: Awake and oriented to person, place, and time. Appears well-developed and well-nourished. In no apparent distress.  HEENT: Normocephalic. Pupils normal and conjunctivae normal. Mucous membranes normal, no cyanosis or icterus, trachea central, no pallor or icterus is noted.  Neck: Normal range of motion. Neck supple. No JVD present. No bruit present.   Cardiovascular: Normal rate, regular rhythm and normal heart sounds. S1, S2. Exam reveals no gallop, clicks, or friction rub. No murmur noted.  Pulmonary/Chest: Effort normal and breath sounds clear to auscultation. No respiratory distress. No wheezes, rales, or coughing present.    Abdominal: Soft. Bowel sounds are normal. There is no tenderness. No rebound tenderness or guarding present. No abdomen pulsations, bruits, or masses noted.   Urinary: No sanderson catheter present  Skin: Skin is warm and dry.  Extremities: No cyanosis, erythema, clubbing or edema noted at this time. No calf tenderness bilaterally.   Peripheral vascular system: Good palpable distal pulses.       Laboratory:  Lab Results   Component Value Date    WBC 10.72 07/30/2025    HGB 13.9 07/30/2025    HCT 41.3 07/30/2025     07/30/2025    CHOL 215 (H) 07/30/2025    TRIG 91 07/30/2025    HDL 85 (H) 07/30/2025    ALT 19 07/30/2025    AST 27 07/30/2025     07/30/2025    K 4.8 07/30/2025     07/30/2025    CREATININE 0.7 07/30/2025    BUN 9 07/30/2025    CO2 26 07/30/2025    TSH 1.181 07/30/2025    INR 0.9 02/01/2025    HGBA1C 5.7 (H) 06/08/2021         Diagnostic Results:  X-Ray Chest PA And Lateral  Narrative: EXAMINATION:  XR CHEST PA AND LATERAL    CLINICAL HISTORY:  Encounter for other preprocedural examination    TECHNIQUE:  PA and lateral views of the chest were performed.    COMPARISON:  February 19, 2025    FINDINGS:  The lungs appear clear.  The cardiomediastinal silhouette and bony structures are unremarkable.  Surgical clips are seen in the right upper quadrant.  Impression: No evidence of acute cardiopulmonary disease    Electronically signed by: Yunior Silva MD  Date:    07/28/2025  Time:    11:24    Results for orders placed or performed during the hospital encounter of 06/19/25 (from the past 2160 hours)   CT Lumbar Spine Without Contrast    Narrative    EXAMINATION:  CT LUMBAR SPINE WITHOUT CONTRAST    CLINICAL HISTORY:  Low back pain, symptoms persist with > 6wks conservative treatment;  Dorsalgia, unspecified    TECHNIQUE:  Low-dose axial, sagittal and coronal reformations are obtained through the lumbar spine.  Contrast was not administered.    COMPARISON:  Lumbar spine MRI dated  06/12/2025    FINDINGS:  Vertebral column: As seen on recent MRI there is multilevel degenerative change.  Also there is transitional lumbosacral anatomy with suspected lumbarization of S1 and rudimentary S1-2 disc space.  The last rib bearing vertebral body is designated as.  There is severe disc space narrowing at the L1-2, L5-S1 levels.  There is endplate sclerosis and osteophyte formation at both of these levels as well as vacuum discs.  There is moderate disc space narrowing at the L4-5 level where there is a vacuum disc as well.  There is mild retrolisthesis of L1 on L2, trace retrolisthesis of L2 on L3.  There is grade 1 anterolisthesis of L4 on L5 and L5 on S1, measuring approximately 5 mm.  The vertebral bodies maintain normal height.  There is no fracture.    Spinal canal, conus, epidural space: The spinal canal appears to be developmentally normal.  There is no abnormal epidural soft tissue mass or fluid collection.    Findings by level:    T11-12: There is minimal disc bulge.  There is no spinal canal or significant foraminal stenosis.    T12-L1: There is no spinal canal or significant foraminal stenosis.  There is a tiny right paracentral disc protrusion better demonstrated on MRI.    L1-2: There is marked disc space narrowing, mild retrolisthesis of L1 on L2.  There is right greater than left facet joint arthropathy.  There is a disc bulge with osteophytic ridging.  There is severe right, mild-to-moderate left foraminal stenosis with moderate spinal stenosis.    L2-3: There is subtle trace retrolisthesis of L2 on L3.  There is facet joint arthropathy with ligamentum flavum thickening.  There is a mild disc bulge.  There is mild spinal stenosis without significant foraminal stenosis or change.    L3-4: There is facet joint arthropathy with ligamentum flavum thickening in addition to a mild disc bulge resulting in mild spinal stenosis and right greater than left foraminal stenosis    L4-5: There is  anterolisthesis of L4 on L5.  There is marked facet joint arthropathy with ligamentum flavum thickening.  There is unroofing of a broad disc protrusion.  There is severe spinal canal, bilateral lateral recess stenosis with severe left and mild-to-moderate right foraminal stenosis.    L5-S1: There is severe disc space narrowing, anterolisthesis of L5 on S1 with unroofing of a moderate broad disc protrusion in addition to marked facet joint arthropathy contributing to severe spinal canal, bilateral lateral recess and foraminal stenosis.    S1-2: This is a rudimentary disc space.  There is no spinal stenosis.    Soft tissues, other: There is a heavily calcified uterine fibroid in the pelvis.  There is mild atherosclerosis.  There is no aneurysm of the aorta.  There is no hydronephrosis.      Impression    There is multilevel degenerative disc and facet disease resulting in some degree of spinal canal, lateral recess and foraminal stenosis at multiple levels, most severe at the lower 2 lumbar levels.  There is no fracture.  Considering differences in modality there is no significant change compared to recent MRI.  These findings are discussed in detail by level above.      Electronically signed by: Errol Strong MD  Date:    06/19/2025  Time:    17:02     *Note: Due to a large number of results and/or encounters for the requested time period, some results have not been displayed. A complete set of results can be found in Results Review.          ASSESSMENT/PLAN:     74-year-old female who presents with right hip and lower back pain.  She is being followed by her neurosurgeon, Dr. Yi, who has diagnose her with spinal stenosis at the level of the L4-L5 and L5-S1.  She has already completed aggressive conservative management with physical therapy and injections which have lost their efficacy.    She would like to move forward with surgical intervention.  Today I discussed extensively the risks and benefits of an  NACHO.  I discussed with her my role with the surgical exposure and the risks that are involved occluding bowel injury, abdominal wall herniation, venous/arterial injury causing potential life-threatening bleeding.  She asks all the appropriate questions and I do feel that she has a understanding of my role in the risks involved.    We will plan to move forward with two-level ALIF of L4-L5 and L5-S1      Liam Armando M.D.   [facility]   Date of Service: 07/31/2025             [1]   Social History  Tobacco Use    Smoking status: Never    Smokeless tobacco: Never   Substance Use Topics    Alcohol use: Not Currently     Comment: social- maybe once every few months    Drug use: No

## 2025-08-06 ENCOUNTER — OFFICE VISIT (OUTPATIENT)
Dept: FAMILY MEDICINE | Facility: CLINIC | Age: 75
End: 2025-08-06
Payer: MEDICARE

## 2025-08-06 VITALS
BODY MASS INDEX: 29.8 KG/M2 | OXYGEN SATURATION: 95 % | WEIGHT: 168.19 LBS | DIASTOLIC BLOOD PRESSURE: 70 MMHG | HEART RATE: 76 BPM | HEIGHT: 63 IN | SYSTOLIC BLOOD PRESSURE: 126 MMHG

## 2025-08-06 DIAGNOSIS — M47.816 LUMBAR SPONDYLOSIS: ICD-10-CM

## 2025-08-06 DIAGNOSIS — E78.5 HYPERLIPIDEMIA, UNSPECIFIED HYPERLIPIDEMIA TYPE: ICD-10-CM

## 2025-08-06 DIAGNOSIS — I10 HYPERTENSION, UNSPECIFIED TYPE: Primary | ICD-10-CM

## 2025-08-06 DIAGNOSIS — F41.1 GAD (GENERALIZED ANXIETY DISORDER): ICD-10-CM

## 2025-08-06 DIAGNOSIS — K21.9 GASTROESOPHAGEAL REFLUX DISEASE, UNSPECIFIED WHETHER ESOPHAGITIS PRESENT: ICD-10-CM

## 2025-08-06 PROCEDURE — 99999 PR PBB SHADOW E&M-EST. PATIENT-LVL III: CPT | Mod: PBBFAC,,, | Performed by: FAMILY MEDICINE

## 2025-08-06 PROCEDURE — 99213 OFFICE O/P EST LOW 20 MIN: CPT | Mod: PBBFAC,PO | Performed by: FAMILY MEDICINE

## 2025-08-06 PROCEDURE — 99214 OFFICE O/P EST MOD 30 MIN: CPT | Mod: S$PBB,,, | Performed by: FAMILY MEDICINE

## 2025-08-06 PROCEDURE — G2211 COMPLEX E/M VISIT ADD ON: HCPCS | Mod: ,,, | Performed by: FAMILY MEDICINE

## 2025-08-06 NOTE — PROGRESS NOTES
Subjective:       Patient ID: Lucinda Aguilera is a 74 y.o. female.    Chief Complaint: Hypertension (6 month follow up )    HPI Comments: Here for f/u on chronic health issues    Having lumbar spine surgery on 8/12    Overall feeling well.    Allergic rhinitis - on Astelin and Flonase  Hyponatremia, SIADH - Chlorthalidone was stopped. Following with nephrology, Dr. Engel.  Depression, anxiety - following with psychiatry; using Ativan daily; stopped Effexor XR 37.5mg due to hyponatremia; taking wellbutrin XL 150mg and Cymbalta  HLD, aortic atherosclerosis - tolerating crestor 20mg daily  HTN - tolerating Atenolol 25mg daily, diltiazem 240, lasix 20mg PRN and losartan 100mg  She got some swelling with amlodipine  GERD - tolerating Protonix 40mg BID;   S/p conner - taking colestipol  Colitis - stable on Colazal, miralax    Past Medical History:    Hypertension                                                  Anxiety                                           Hyperlipemia                                                  GERD (gastroesophageal reflux disease)                        Cataract                                                        Comment:OU    PVD (posterior vitreous detachment)                             Comment:OD    Arthritis                                                       Comment:right thumb    Past Surgical History:    chest abcess                                                     Comment:child    KNEE ARTHROSCOPY W/ LASER                                        Comment:right    COLONOSCOPY                                      1/2012          Comment:repeat in 5 years    No Known Allergies    Social History    Marital Status:              Spouse Name:                       Years of Education:                 Number of children: 2             Occupational History  Occupation          Employer            Comment               retired                                   retired teacher an*                          Social History Main Topics    Smoking Status: Never Smoker                      Smokeless Status: Never Used                        Alcohol Use: Yes                Comment: social    Drug Use: No              Sexual Activity: Yes               Partners with: Male       Birth Control/Protection: None, Post-menopausal    Current Outpatient Medications on File Prior to Visit   Medication Sig Dispense Refill    acetaminophen (TYLENOL) 500 MG tablet Take 500 mg by mouth every 6 (six) hours as needed for Pain.      ascorbic acid, vitamin C, (VITAMIN C) 250 MG tablet Take 500 mg by mouth once daily.       atenoloL (TENORMIN) 25 MG tablet Take 1 tablet (25 mg total) by mouth every evening. 30 tablet 11    azelastine (ASTELIN) 137 mcg (0.1 %) nasal spray 1 spray (137 mcg total) by Nasal route 2 (two) times daily as needed for Rhinitis. 30 mL 11    balsalazide (COLAZAL) 750 mg capsule Take 3 capsules (2,250 mg total) by mouth 2 (two) times daily with meals. TAKE 3 CAPSULES(2250 MG) BY MOUTH TWICE DAILY WITH MEALS 180 capsule 11    benzonatate (TESSALON) 200 MG capsule Take 1 capsule (200 mg total) by mouth 3 (three) times daily as needed for Cough. 30 capsule 1    buPROPion (WELLBUTRIN XL) 150 MG TB24 tablet Take 1 tablet (150 mg total) by mouth every morning. 30 tablet 2    d-mannose 500 mg Cap Take by mouth.      diltiaZEM (DILACOR XR) 240 MG CDCR Take 1 capsule (240 mg total) by mouth every evening. 30 capsule 11    docusate sodium (COLACE) 100 MG capsule Take 100 mg by mouth 2 (two) times daily as needed for Constipation.      DULoxetine (CYMBALTA) 30 MG capsule Take 1 capsule (30 mg total) by mouth once daily. 30 capsule 5    famotidine (PEPCID) 40 MG tablet Take 1 tablet (40 mg total) by mouth every evening. 90 tablet 3    fluticasone propionate (FLONASE) 50 mcg/actuation nasal spray 1 spray (50 mcg total) by Each Nostril route once daily. 16 g 3    losartan (COZAAR) 100 MG tablet Take 1 tablet (100  mg total) by mouth once daily. 90 tablet 3    magnesium oxide (MAG-OX) 400 mg (241.3 mg magnesium) tablet Take 1 tablet (400 mg total) by mouth once daily. 30 tablet 3    methocarbamoL (ROBAXIN) 750 MG Tab Take 1 tablet (750 mg total) by mouth 4 (four) times daily as needed. 44 tablet 3    multivitamin (THERAGRAN) per tablet Take 1 tablet by mouth once daily.      ondansetron (ZOFRAN-ODT) 4 MG TbDL Take 1 tablet (4 mg total) by mouth every 6 (six) hours as needed (nausea). 30 tablet 3    pantoprazole (PROTONIX) 40 MG tablet TAKE 1 TABLET(40 MG) BY MOUTH TWICE DAILY 180 tablet 3    psyllium husk, aspartame, (NATURAL PSYLLIUM FIBER) 3.4 gram/5.8 gram Powd Take by mouth.      rosuvastatin (CRESTOR) 20 MG tablet Take 1 tablet (20 mg total) by mouth once daily. 30 tablet 11    solifenacin (VESICARE) 10 MG tablet Take 1 tablet (10 mg total) by mouth once daily. 30 tablet 11    traMADoL (ULTRAM) 50 mg tablet Take 1 tablet (50 mg total) by mouth every 6 (six) hours as needed for Pain. 28 tablet 0     No current facility-administered medications on file prior to visit.     Review of patient's family history indicates:    Hypertension                   Mother                    Heart disease                  Mother                    Stroke                         Father                    Review of Systems   Constitutional: Negative for fever, chills, appetite change and unexpected weight change.   HENT: Negative for sore throat and trouble swallowing.    Eyes: Negative for pain and visual disturbance.   Respiratory: Negative for shortness of breath and wheezing.    Cardiovascular: Negative for chest pain and palpitations.   Gastrointestinal: Negative for nausea, vomiting, abdominal pain, diarrhea and blood in stool.   Genitourinary: Negative for dysuria, hematuria and difficulty urinating.   Skin: Negative for rash and wound.   Neurological: Negative for dizziness, weakness, numbness and headaches.   Hematological: Negative  "for adenopathy.   Psychiatric/Behavioral: Negative for dysphoric mood.           Objective:       /70   Pulse 76   Ht 5' 3" (1.6 m)   Wt 76.3 kg (168 lb 3.4 oz)   LMP 04/18/2004   SpO2 95%   BMI 29.80 kg/m²     Physical Exam   Constitutional: She is oriented to person, place, and time. She appears well-developed and well-nourished.   HENT:   Head: Normocephalic.   Mouth/Throat: Oropharynx is clear and moist. No oropharyngeal exudate or posterior oropharyngeal erythema.   Eyes: Conjunctivae and EOM are normal. Pupils are equal, round, and reactive to light.   Neck: Normal range of motion. Neck supple. No thyromegaly present.   Cardiovascular: Normal rate, regular rhythm, S1 normal, S2 normal, normal heart sounds and intact distal pulses.  Exam reveals no gallop and no friction rub.    No murmur heard.  Pulmonary/Chest: Effort normal and breath sounds normal. She has no wheezes. She has no rales.   Abdominal: Normal appearance.   Musculoskeletal:        Right lower leg: She exhibits no edema.        Left lower leg: She exhibits no edema.   Lymphadenopathy:     She has no cervical adenopathy.   Neurological: She is alert and oriented to person, place, and time. No cranial nerve deficit. Gait normal.   Skin: Skin is warm and intact. No rash noted.   Psychiatric: She has a normal mood and affect.   Right hip dressing in place        Results for orders placed or performed in visit on 07/30/25   TSH    Collection Time: 07/30/25  7:08 AM   Result Value Ref Range    TSH 1.181 0.400 - 4.000 uIU/mL   Lipid Panel    Collection Time: 07/30/25  7:08 AM   Result Value Ref Range    Cholesterol Total 215 (H) 120 - 199 mg/dL    Triglyceride 91 30 - 150 mg/dL    HDL Cholesterol 85 (H) 40 - 75 mg/dL    LDL Cholesterol 111.8 63.0 - 159.0 mg/dL    HDL/Cholesterol Ratio 39.5 20.0 - 50.0 %    Cholesterol/HDL Ratio 2.5 2.0 - 5.0    Non HDL Cholesterol 130 mg/dL   Comprehensive Metabolic Panel    Collection Time: 07/30/25  7:08 " AM   Result Value Ref Range    Sodium 136 136 - 145 mmol/L    Potassium 4.8 3.5 - 5.1 mmol/L    Chloride 102 95 - 110 mmol/L    CO2 26 23 - 29 mmol/L    Glucose 101 70 - 110 mg/dL    BUN 9 8 - 23 mg/dL    Creatinine 0.7 0.5 - 1.4 mg/dL    Calcium 9.7 8.7 - 10.5 mg/dL    Protein Total 7.2 6.0 - 8.4 gm/dL    Albumin 3.9 3.5 - 5.2 g/dL    Bilirubin Total 0.4 0.1 - 1.0 mg/dL     40 - 150 unit/L    AST 27 0 - 50 unit/L    ALT 19 0 - 55 unit/L    Anion Gap 8 8 - 16 mmol/L    eGFR >60 >60 mL/min/1.73/m2   CBC with Differential    Collection Time: 07/30/25  7:08 AM   Result Value Ref Range    WBC 10.72 3.90 - 12.70 K/uL    RBC 4.69 4.00 - 5.40 M/uL    HGB 13.9 12.0 - 16.0 gm/dL    HCT 41.3 37.0 - 48.5 %    MCV 88 82 - 98 fL    MCH 29.6 27.0 - 31.0 pg    MCHC 33.7 32.0 - 36.0 g/dL    RDW 13.3 11.5 - 14.5 %    Platelet Count 375 150 - 450 K/uL    MPV 9.6 9.2 - 12.9 fL    Nucleated RBC 0 <=0 /100 WBC    Neut % 67.5 38 - 73 %    Lymph % 22.9 18 - 48 %    Mono % 7.6 4 - 15 %    Eos % 0.5 <=8 %    Basophil % 0.6 <=1.9 %    Imm Grans % 0.9 (H) 0.0 - 0.5 %    Neut # 7.23 1.8 - 7.7 K/uL    Lymph # 2.46 1 - 4.8 K/uL    Mono # 0.82 0.3 - 1 K/uL    Eos # 0.05 <=0.5 K/uL    Baso # 0.06 <=0.2 K/uL    Imm Grans # 0.10 (H) 0.00 - 0.04 K/uL     *Note: Due to a large number of results and/or encounters for the requested time period, some results have not been displayed. A complete set of results can be found in Results Review.         Assessment:       1. Hypertension, unspecified type    2. Gastroesophageal reflux disease, unspecified whether esophagitis present    3. Lumbar spondylosis    4. Hyperlipidemia, unspecified hyperlipidemia type    5. CARLOS (generalized anxiety disorder)                Plan:       Hypertension, unspecified type    Gastroesophageal reflux disease, unspecified whether esophagitis present    Lumbar spondylosis    Hyperlipidemia, unspecified hyperlipidemia type    CARLOS (generalized anxiety disorder)      Overall  stable  Cleared to proceed with lumbar surgery as planned.  continue aspirin 81mg daily, but hold 5- days prior to surgery  Continue other present meds  Counseled on regular exercise, maintenance of a healthy weight, balanced diet rich in fruits/vegetables and lean protein, and avoidance of unhealthy habits like smoking and excessive alcohol intake.  F/u 6 months     Visit today included increased complexity associated with the care of the episodic problems listed above addressed and managing the longitudinal care of the patient due to the serious and/or complex managed problem(s) listed above.

## 2025-08-07 DIAGNOSIS — R11.0 NAUSEA: ICD-10-CM

## 2025-08-07 RX ORDER — ONDANSETRON 4 MG/1
TABLET, ORALLY DISINTEGRATING ORAL
Qty: 30 TABLET | Refills: 3 | Status: SHIPPED | OUTPATIENT
Start: 2025-08-07

## 2025-08-07 NOTE — TELEPHONE ENCOUNTER
No care due was identified.  [facility] Embedded Care Due Messages. Reference number: [id number].   8/07/2025 12:53:12 PM CDT

## 2025-08-12 PROBLEM — M48.062 SPINAL STENOSIS OF LUMBAR REGION WITH NEUROGENIC CLAUDICATION: Status: ACTIVE | Noted: 2025-08-12

## 2025-08-14 PROBLEM — D62 ACUTE BLOOD LOSS ANEMIA: Status: ACTIVE | Noted: 2025-08-14

## 2025-08-15 PROBLEM — I63.231 STROKE DUE TO STENOSIS OF RIGHT CAROTID ARTERY: Status: ACTIVE | Noted: 2025-08-15

## 2025-08-15 PROBLEM — R54 AGE-RELATED PHYSICAL DEBILITY: Status: ACTIVE | Noted: 2025-08-15

## 2025-08-15 PROBLEM — I65.21 INTRACRANIAL CAROTID STENOSIS, RIGHT: Status: ACTIVE | Noted: 2025-08-15

## 2025-08-16 PROBLEM — I63.413 CEREBROVASCULAR ACCIDENT (CVA) DUE TO BILATERAL EMBOLISM OF MIDDLE CEREBRAL ARTERIES: Status: ACTIVE | Noted: 2025-08-15

## 2025-08-16 PROBLEM — Q24.8 INTERATRIAL CARDIAC SHUNT: Status: ACTIVE | Noted: 2025-08-16

## 2025-08-16 PROBLEM — I63.231 STROKE DUE TO STENOSIS OF RIGHT CAROTID ARTERY: Status: ACTIVE | Noted: 2025-08-16

## 2025-08-18 PROBLEM — I80.8 THROMBOPHLEBITIS ARM: Status: ACTIVE | Noted: 2025-08-18

## 2025-08-22 PROBLEM — R74.01 TRANSAMINITIS: Status: ACTIVE | Noted: 2025-08-22

## 2025-08-22 PROBLEM — D72.829 LEUKOCYTOSIS: Status: ACTIVE | Noted: 2025-08-22

## 2025-09-02 PROBLEM — T14.8XXA WOUND INFECTION: Status: ACTIVE | Noted: 2025-09-02

## 2025-09-02 PROBLEM — L08.9 WOUND INFECTION: Status: ACTIVE | Noted: 2025-09-02

## 2025-09-02 PROBLEM — Z51.89 ENCOUNTER FOR WOUND RE-CHECK: Status: ACTIVE | Noted: 2025-09-02

## 2025-09-03 PROBLEM — Z73.6 LIMITATION OF ACTIVITY DUE TO DISABILITY: Status: ACTIVE | Noted: 2025-09-03

## 2025-09-05 ENCOUNTER — TELEPHONE (OUTPATIENT)
Dept: NEUROSURGERY | Facility: CLINIC | Age: 75
End: 2025-09-05
Payer: MEDICARE

## 2025-09-05 PROBLEM — Z51.89 ENCOUNTER FOR WOUND RE-CHECK: Status: RESOLVED | Noted: 2025-09-02 | Resolved: 2025-09-05

## 2025-09-05 PROBLEM — T81.42XA DEEP INCISIONAL SURGICAL SITE INFECTION: Status: ACTIVE | Noted: 2025-09-02

## (undated) DEVICE — GLOVE 7.5 PROTEXIS PI MICRO

## (undated) DEVICE — NDL SPINAL 18GX3.5 SPINOCAN

## (undated) DEVICE — GLOVE SURG ULTRA TOUCH 7.5

## (undated) DEVICE — Device

## (undated) DEVICE — PAD CAST SPECIALIST STRL 6

## (undated) DEVICE — SCISSOR 5MMX35CM DIRECT DRIVE

## (undated) DEVICE — NEPTUNE 4 PORT MANIFOLD

## (undated) DEVICE — SEE MEDLINE ITEM 157117

## (undated) DEVICE — NDL 18GA X1 1/2 REG BEVEL

## (undated) DEVICE — SEE MEDLINE ITEM 146292

## (undated) DEVICE — SEE MEDLINE ITEM 152530

## (undated) DEVICE — BLADE SAGITTAL 18 X 1.27 X 90M

## (undated) DEVICE — BANDAGE ACE ELASTIC 6"

## (undated) DEVICE — SOL SOD CHLORIDE 0.9% 10ML

## (undated) DEVICE — TROCAR ENDO Z THREAD KII 5X100

## (undated) DEVICE — BOWL CEMENT

## (undated) DEVICE — TROCAR KII BLLN 12MM 10CM

## (undated) DEVICE — BLADE ULTRACUT 3.5MM

## (undated) DEVICE — SUT 0 VICRYL / UR6 (J603)

## (undated) DEVICE — SOL IRR NACL .9% 3000ML

## (undated) DEVICE — DRESSING AQUACEL AG ADV 3.5X12

## (undated) DEVICE — TOWEL OR DISP STRL BLUE 4/PK

## (undated) DEVICE — KIT TOTAL KNEE TKOFG

## (undated) DEVICE — TRAY NERVE BLOCK

## (undated) DEVICE — BAG TISS RETRV MONARCH 10MM

## (undated) DEVICE — SUT CTD VICRYL BR UR-5

## (undated) DEVICE — HOOD T-5 TEAR AWAY STERILE

## (undated) DEVICE — DISSECTOR CURVED 5DCD

## (undated) DEVICE — DRAPE EMERALD 87X114.75X113

## (undated) DEVICE — SEE MEDLINE ITEM 157216

## (undated) DEVICE — SYR 30CC LUER LOCK

## (undated) DEVICE — UNDERGLOVES BIOGEL PI SZ 7 LF

## (undated) DEVICE — SEE MEDLINE ITEM 146298

## (undated) DEVICE — NDL HYPO A BEVEL 18X1 1/2

## (undated) DEVICE — SEE MEDLINE ITEM 152487

## (undated) DEVICE — APPLICATOR CHLORAPREP ORN 26ML

## (undated) DEVICE — SUT MONOCRYL 4-0 PS-2

## (undated) DEVICE — TUBE SET INFLOW/OUTFLOW

## (undated) DEVICE — PAD COLD THERAPY KNEE WRAP ON

## (undated) DEVICE — PADDING CAST SPECIALIST 6X4YD

## (undated) DEVICE — PACK CUSTOM ENDO CHOLO SLI

## (undated) DEVICE — SOL WATER STRL IRR 1000ML

## (undated) DEVICE — SEE MEDLINE ITEM 152622

## (undated) DEVICE — TUBING PNEUMO

## (undated) DEVICE — NDL HYPO A BEVEL 22X1 1/2

## (undated) DEVICE — BANDAGE ESMARK 6X12

## (undated) DEVICE — SYR LUER LOCK TIP 60ML

## (undated) DEVICE — SEE MEDLINE ITEM 152515

## (undated) DEVICE — ELECTRODE REM PLYHSV RETURN 9

## (undated) DEVICE — PROBE ARTHSCP EDGE  90 SUCTION

## (undated) DEVICE — LINER SUCTION 3000CC

## (undated) DEVICE — CLIPPER BLADE MOD 4406 (CAREF)

## (undated) DEVICE — SEE MEDLINE ITEM 157131

## (undated) DEVICE — GAUZE SPONGE 4X4 12PLY

## (undated) DEVICE — KIT IRR SUCTION HND PIECE

## (undated) DEVICE — NDL SPINAL SPINOCAN 22GX3.5

## (undated) DEVICE — STRAP OR TABLE 5IN X 72IN

## (undated) DEVICE — NDL SAFETY 21G X 1 1/2 ECLPSE

## (undated) DEVICE — HANDLE SURG LIGHT NONRIGID

## (undated) DEVICE — SEE MEDLINE ITEM 154981

## (undated) DEVICE — SUT 3-0 ETHILON 18 FS-1

## (undated) DEVICE — APPLICATOR CHLORAPREP CLR 10.5

## (undated) DEVICE — DRAPE STERI U-SHAPED 47X51IN

## (undated) DEVICE — TROCAR ENDOPATH XCEL 5X100MM

## (undated) DEVICE — SLEEVE SCD EXPRESS CALF MEDIUM

## (undated) DEVICE — UNDERGLOVE BIOGEL PI SZ 6.5 LF

## (undated) DEVICE — PUMP COLD THERAPY

## (undated) DEVICE — SEE MEDLINE ITEM 146313

## (undated) DEVICE — NDL SPINAL 25GX3.5 SPINOCAN

## (undated) DEVICE — CLOSURE SKIN STERI STRIP 1/2X4

## (undated) DEVICE — KIT ANTIFOG

## (undated) DEVICE — TAPE SURG DURAPORE 2 X10YD

## (undated) DEVICE — BLADE SURG CARBON STEEL SZ11

## (undated) DEVICE — DRAPE PLASTIC U 60X72

## (undated) DEVICE — DRESSING XEROFORM FOIL PK 1X8

## (undated) DEVICE — TOURNIQUET SB QC DP 34X4IN

## (undated) DEVICE — MASK FLYTE HOOD PEEL AWAY

## (undated) DEVICE — APPLIER CLIP EPIX UNIV 5X34